# Patient Record
Sex: MALE | Race: WHITE | NOT HISPANIC OR LATINO | Employment: OTHER | URBAN - METROPOLITAN AREA
[De-identification: names, ages, dates, MRNs, and addresses within clinical notes are randomized per-mention and may not be internally consistent; named-entity substitution may affect disease eponyms.]

---

## 2018-05-11 ENCOUNTER — APPOINTMENT (OUTPATIENT)
Dept: GENERAL RADIOLOGY | Facility: HOSPITAL | Age: 73
End: 2018-05-11

## 2018-05-11 ENCOUNTER — HOSPITAL ENCOUNTER (EMERGENCY)
Facility: HOSPITAL | Age: 73
Discharge: HOME | End: 2018-05-11
Attending: FAMILY MEDICINE | Admitting: FAMILY MEDICINE

## 2018-05-11 VITALS
SYSTOLIC BLOOD PRESSURE: 118 MMHG | WEIGHT: 235 LBS | OXYGEN SATURATION: 94 % | RESPIRATION RATE: 19 BRPM | TEMPERATURE: 97.6 F | BODY MASS INDEX: 40.12 KG/M2 | DIASTOLIC BLOOD PRESSURE: 75 MMHG | HEART RATE: 80 BPM | HEIGHT: 64 IN

## 2018-05-11 DIAGNOSIS — R06.00 DYSPNEA, UNSPECIFIED TYPE: ICD-10-CM

## 2018-05-11 DIAGNOSIS — I49.3 ASYMPTOMATIC PVCS: Primary | ICD-10-CM

## 2018-05-11 LAB
ALBUMIN SERPL-MCNC: 4.1 G/DL (ref 3.5–5.2)
ALBUMIN/GLOB SERPL: 1.6 G/DL
ALP SERPL-CCNC: 113 U/L (ref 39–117)
ALT SERPL W P-5'-P-CCNC: 39 U/L (ref 1–41)
ANION GAP SERPL CALCULATED.3IONS-SCNC: 13.3 MMOL/L
AST SERPL-CCNC: 36 U/L (ref 1–40)
BASOPHILS # BLD AUTO: 0.03 10*3/MM3 (ref 0–0.2)
BASOPHILS NFR BLD AUTO: 0.6 % (ref 0–1.5)
BILIRUB SERPL-MCNC: 0.7 MG/DL (ref 0.1–1.2)
BUN BLD-MCNC: 24 MG/DL (ref 8–23)
BUN/CREAT SERPL: 19 (ref 7–25)
CALCIUM SPEC-SCNC: 9.1 MG/DL (ref 8.6–10.5)
CHLORIDE SERPL-SCNC: 104 MMOL/L (ref 98–107)
CO2 SERPL-SCNC: 24.7 MMOL/L (ref 22–29)
CREAT BLD-MCNC: 1.26 MG/DL (ref 0.76–1.27)
DEPRECATED RDW RBC AUTO: 49.6 FL (ref 37–54)
EOSINOPHIL # BLD AUTO: 0.14 10*3/MM3 (ref 0–0.7)
EOSINOPHIL NFR BLD AUTO: 2.9 % (ref 0.3–6.2)
ERYTHROCYTE [DISTWIDTH] IN BLOOD BY AUTOMATED COUNT: 13.8 % (ref 11.5–14.5)
GFR SERPL CREATININE-BSD FRML MDRD: 56 ML/MIN/1.73
GLOBULIN UR ELPH-MCNC: 2.5 GM/DL
GLUCOSE BLD-MCNC: 151 MG/DL (ref 65–99)
HCT VFR BLD AUTO: 40.9 % (ref 40.4–52.2)
HGB BLD-MCNC: 12.9 G/DL (ref 13.7–17.6)
IMM GRANULOCYTES # BLD: 0.06 10*3/MM3 (ref 0–0.03)
IMM GRANULOCYTES NFR BLD: 1.2 % (ref 0–0.5)
LYMPHOCYTES # BLD AUTO: 1.27 10*3/MM3 (ref 0.9–4.8)
LYMPHOCYTES NFR BLD AUTO: 26.3 % (ref 19.6–45.3)
MCH RBC QN AUTO: 31 PG (ref 27–32.7)
MCHC RBC AUTO-ENTMCNC: 31.5 G/DL (ref 32.6–36.4)
MCV RBC AUTO: 98.3 FL (ref 79.8–96.2)
MONOCYTES # BLD AUTO: 0.42 10*3/MM3 (ref 0.2–1.2)
MONOCYTES NFR BLD AUTO: 8.7 % (ref 5–12)
NEUTROPHILS # BLD AUTO: 2.97 10*3/MM3 (ref 1.9–8.1)
NEUTROPHILS NFR BLD AUTO: 61.5 % (ref 42.7–76)
NT-PROBNP SERPL-MCNC: 62.9 PG/ML (ref 5–900)
PLATELET # BLD AUTO: 133 10*3/MM3 (ref 140–500)
PMV BLD AUTO: 10.1 FL (ref 6–12)
POTASSIUM BLD-SCNC: 4.3 MMOL/L (ref 3.5–5.2)
PROT SERPL-MCNC: 6.6 G/DL (ref 6–8.5)
RBC # BLD AUTO: 4.16 10*6/MM3 (ref 4.6–6)
SODIUM BLD-SCNC: 142 MMOL/L (ref 136–145)
TROPONIN T SERPL-MCNC: <0.01 NG/ML (ref 0–0.03)
TSH SERPL DL<=0.05 MIU/L-ACNC: 1.56 MIU/ML (ref 0.27–4.2)
WBC NRBC COR # BLD: 4.83 10*3/MM3 (ref 4.5–10.7)

## 2018-05-11 PROCEDURE — 83880 ASSAY OF NATRIURETIC PEPTIDE: CPT | Performed by: FAMILY MEDICINE

## 2018-05-11 PROCEDURE — 93010 ELECTROCARDIOGRAM REPORT: CPT | Performed by: INTERNAL MEDICINE

## 2018-05-11 PROCEDURE — 93005 ELECTROCARDIOGRAM TRACING: CPT | Performed by: FAMILY MEDICINE

## 2018-05-11 PROCEDURE — 71046 X-RAY EXAM CHEST 2 VIEWS: CPT

## 2018-05-11 PROCEDURE — 84443 ASSAY THYROID STIM HORMONE: CPT | Performed by: FAMILY MEDICINE

## 2018-05-11 PROCEDURE — 84484 ASSAY OF TROPONIN QUANT: CPT | Performed by: FAMILY MEDICINE

## 2018-05-11 PROCEDURE — 80053 COMPREHEN METABOLIC PANEL: CPT | Performed by: FAMILY MEDICINE

## 2018-05-11 PROCEDURE — 85025 COMPLETE CBC W/AUTO DIFF WBC: CPT | Performed by: FAMILY MEDICINE

## 2018-05-11 PROCEDURE — 99284 EMERGENCY DEPT VISIT MOD MDM: CPT

## 2018-05-11 RX ORDER — ALBUTEROL SULFATE 90 UG/1
2 AEROSOL, METERED RESPIRATORY (INHALATION) EVERY 4 HOURS PRN
COMMUNITY
End: 2021-08-11

## 2018-05-11 RX ORDER — DORZOLAMIDE HYDROCHLORIDE AND TIMOLOL MALEATE 20; 5 MG/ML; MG/ML
1 SOLUTION/ DROPS OPHTHALMIC 2 TIMES DAILY
COMMUNITY

## 2018-05-11 NOTE — ED NOTES
Patient notes that over the past couple of months he has had persistent malaise, decreased appetite.  Patient notes feeling more tired with exertion and shortness of breath persists with activity.      Valentin Castro RN  05/11/18 0325

## 2018-05-11 NOTE — ED PROVIDER NOTES
EMERGENCY DEPARTMENT ENCOUNTER    CHIEF COMPLAINT  Chief Complaint: SOA  History given by: Pt  History limited by: Nothing  Room Number: 13/13  PMD: Damien Pierce MD  Pulmonologist: Dr. Peralta    HPI:  Pt is a 73 y.o. male with a hx of sleep apnea on BiPAP at night who presents complaining of SOA over the last few months that was worse last night. He reports his SOA woke him up from sleep last night. He reports increased SOA and fatigue after exertion. Pt denies cough, CP, changes in appetite, N/V/D, blood in stool, or difficulty urinating. He reports he is followed by Dr. Peralta (Pulmonologist) but has not had his BiPAP mask fit re-evaluated since 02/2017. Pt denies a hx of COPD, CHF or MI. Wife reports a family hx of heart disease.     Duration:  A few months  Onset: gradual  Timing: constant  Quality: SOA on BiPAP  Intensity/Severity: moderate  Progression: worse last night  Associated Symptoms: fatigue  Aggravating Factors: none  Alleviating Factors: none  Previous Episodes: Pt has a hx of sleep apnea  Treatment before arrival: Pt reports he was last evaluated by his pulmonologist on 02/2017    PAST MEDICAL HISTORY  Active Ambulatory Problems     Diagnosis Date Noted   • OA (osteoarthritis) of knee 09/13/2016     Resolved Ambulatory Problems     Diagnosis Date Noted   • No Resolved Ambulatory Problems     Past Medical History:   Diagnosis Date   • Arthritis    • Cancer    • Glaucoma    • Hyperlipidemia    • Hypertension    • Hypothyroid    • Restless leg    • Sleep apnea        PAST SURGICAL HISTORY  Past Surgical History:   Procedure Laterality Date   • CATARACT EXTRACTION     • COLONOSCOPY     • HERNIA REPAIR     • JOINT REPLACEMENT Right 2014   • NECK SURGERY     • [place] TOTAL KNEE ARTHROPLASTY Left 9/13/2016    Procedure: LT TOTAL KNEE ARTHROPLASTY;  Surgeon: Tadeo Basurto MD;  Location: [facility] OR;  Service: Orthopedics   • PROSTATE SURGERY     • THYROID SURGERY     • TOTAL KNEE ARTHROPLASTY          FAMILY HISTORY  History reviewed. No pertinent family history.    SOCIAL HISTORY  Social History     Social History   • Marital status:      Spouse name: N/A   • Number of children: N/A   • Years of education: N/A     Occupational History   • Not on file.     Social History Main Topics   • Smoking status: Never Smoker   • Smokeless tobacco: Never Used   • Alcohol use No   • Drug use: No   • Sexual activity: Defer     Other Topics Concern   • Not on file     Social History Narrative   • No narrative on file       ALLERGIES  Review of patient's allergies indicates no known allergies.    REVIEW OF SYSTEMS  Review of Systems   Constitutional: Positive for fatigue. Negative for activity change, appetite change and fever.   HENT: Negative for congestion and sore throat.    Eyes: Negative.    Respiratory: Positive for shortness of breath. Negative for cough.    Cardiovascular: Negative for chest pain and leg swelling.   Gastrointestinal: Negative for abdominal pain, blood in stool, diarrhea and vomiting.   Endocrine: Negative.    Genitourinary: Negative for decreased urine volume and dysuria.   Musculoskeletal: Negative for neck pain.   Skin: Negative for rash and wound.   Allergic/Immunologic: Negative.    Neurological: Negative for weakness, numbness and headaches.   Hematological: Negative.    Psychiatric/Behavioral: Negative.    All other systems reviewed and are negative.      PHYSICAL EXAM  ED Triage Vitals   Temp Heart Rate Resp BP SpO2   05/11/18 1113 05/11/18 1113 05/11/18 1113 05/11/18 1120 05/11/18 1113   97.6 °F (36.4 °C) 96 20 144/88 95 %      Temp src Heart Rate Source Patient Position BP Location FiO2 (%)   05/11/18 1113 -- -- -- --   Tympanic           Physical Exam   Constitutional: He is oriented to person, place, and time and well-developed, well-nourished, and in no distress.   HENT:   Head: Normocephalic and atraumatic.   Eyes: EOM are normal. Pupils are equal, round, and reactive to  light.   Neck: Normal range of motion. Neck supple.   Cardiovascular: Normal rate, regular rhythm and normal heart sounds.    Pulmonary/Chest: Effort normal and breath sounds normal. No respiratory distress.   Abdominal: Soft. There is no tenderness. There is no rebound and no guarding.   Musculoskeletal: Normal range of motion. He exhibits no edema.   Neurological: He is alert and oriented to person, place, and time. He has normal sensation and normal strength.   Skin: Skin is warm and dry.   Psychiatric: Mood and affect normal.   Nursing note and vitals reviewed.      LAB RESULTS  Lab Results (last 24 hours)     Procedure Component Value Units Date/Time    CBC & Differential [[id number]] Collected:  05/11/18 1141    Specimen:  Blood Updated:  05/11/18 1152    Narrative:       The following orders were created for panel order CBC & Differential.  Procedure                               Abnormality         Status                     ---------                               -----------         ------                     CBC Auto Differential[[id number]]         Abnormal            Final result                 Please view results for these tests on the individual orders.    Comprehensive Metabolic Panel [[id number]]  (Abnormal) Collected:  05/11/18 1141    Specimen:  Blood Updated:  05/11/18 1218     Glucose 151 (H) mg/dL      BUN 24 (H) mg/dL      Creatinine 1.26 mg/dL      Sodium 142 mmol/L      Potassium 4.3 mmol/L      Chloride 104 mmol/L      CO2 24.7 mmol/L      Calcium 9.1 mg/dL      Total Protein 6.6 g/dL      Albumin 4.10 g/dL      ALT (SGPT) 39 U/L      AST (SGOT) 36 U/L      Alkaline Phosphatase 113 U/L      Total Bilirubin 0.7 mg/dL      eGFR Non African Amer 56 (L) mL/min/1.73      Globulin 2.5 gm/dL      A/G Ratio 1.6 g/dL      BUN/Creatinine Ratio 19.0     Anion Gap 13.3 mmol/L     Narrative:       The MDRD GFR formula is only valid for adults with stable renal function between ages 18 and 70.    BNP  [[id number]]  (Normal) Collected:  05/11/18 1141    Specimen:  Blood Updated:  05/11/18 1218     proBNP 62.9 pg/mL     Narrative:       Among patients with dyspnea, NT-proBNP is highly sensitive for the detection of acute congestive heart failure. In addition NT-proBNP of <300 pg/ml effectively rules out acute congestive heart failure with 99% negative predictive value.    Troponin [[id number]]  (Normal) Collected:  05/11/18 1141    Specimen:  Blood Updated:  05/11/18 1218     Troponin T <0.010 ng/mL     Narrative:       Troponin T Reference Ranges:  Less than 0.03 ng/mL:    Negative for AMI  0.03 to 0.09 ng/mL:      Indeterminant for AMI  Greater than 0.09 ng/mL: Positive for AMI    CBC Auto Differential [[id number]]  (Abnormal) Collected:  05/11/18 1141    Specimen:  Blood Updated:  05/11/18 1152     WBC 4.83 10*3/mm3      RBC 4.16 (L) 10*6/mm3      Hemoglobin 12.9 (L) g/dL      Hematocrit 40.9 %      MCV 98.3 (H) fL      MCH 31.0 pg      MCHC 31.5 (L) g/dL      RDW 13.8 %      RDW-SD 49.6 fl      MPV 10.1 fL      Platelets 133 (L) 10*3/mm3      Neutrophil % 61.5 %      Lymphocyte % 26.3 %      Monocyte % 8.7 %      Eosinophil % 2.9 %      Basophil % 0.6 %      Immature Grans % 1.2 (H) %      Neutrophils, Absolute 2.97 10*3/mm3      Lymphocytes, Absolute 1.27 10*3/mm3      Monocytes, Absolute 0.42 10*3/mm3      Eosinophils, Absolute 0.14 10*3/mm3      Basophils, Absolute 0.03 10*3/mm3      Immature Grans, Absolute 0.06 (H) 10*3/mm3     TSH [[id number]]  (Normal) Collected:  05/11/18 1141    Specimen:  Blood Updated:  05/11/18 1224     TSH 1.560 mIU/mL           I ordered the above labs and reviewed the results    RADIOLOGY  XR Chest 2 View   Final Result   No focal pulmonary consolidation. Borderline heart size.   Follow-up as clinical indications persist.       This report was finalized on 5/11/2018 12:24 PM by Dr. Ethan Horan M.D.               I ordered the above noted radiological studies. Interpreted by  radiologist. Reviewed by me in PACS.       PROCEDURES  Procedures    EKG           EKG time: 1128  Rhythm/Rate: nsr, 86 with occasional PVCs  P waves and CO: normal  QRS, axis: normal   ST and T waves: Normal     Interpreted Contemporaneously by me, independently viewed  changed compared to prior 8/31/16 where PVCs are new      PROGRESS AND CONSULTS  ED Course     1120 - EKG ordered for further evaluation.     1137 - Lab work and CXR ordered for further evaluation.     1140 - TSH ordered.     He has occasional asymptomatic PVC's but nothing acute on his EKG and a negative troponin and BNP.     1454 - Rechecked pt. Pt is resting comfortably in NAD. He states he feels improved. D/w pt the results of his lab work including unremarkable CXR and lab results. Informed pt of the plan to discharge home with a follow up with cardiology and pulmonology. Pt understands and agrees with plan. All questions answered.       MEDICAL DECISION MAKING  Results were reviewed/discussed with the patient and they were also made aware of online access. Pt also made aware that some labs, such as cultures, will not be resulted during ER visit and follow up with PMD is necessary.     MDM  Number of Diagnoses or Management Options  Asymptomatic PVCs:   Dyspnea, unspecified type:      Amount and/or Complexity of Data Reviewed  Clinical lab tests: ordered and reviewed (TSH - 1.56  Troponin - negative  proBNP - 62.9)  Tests in the radiology section of CPT®: reviewed and ordered (CXR - negative acute)  Tests in the medicine section of CPT®: reviewed and ordered (See EKG procedure note.)           DIAGNOSIS  Final diagnoses:   Asymptomatic PVCs   Dyspnea, unspecified type       DISPOSITION  DISCHARGE    Patient discharged in stable condition.    Reviewed implications of results, diagnosis, meds, responsibility to follow up, warning signs and symptoms of possible worsening, potential complications and reasons to return to ER.    Patient/Family  voiced understanding of above instructions.    Discussed plan for discharge, as there is no emergent indication for admission. Patient referred to primary care provider for BP management due to today's BP. Pt/family is agreeable and understands need for follow up and repeat testing.  Pt is aware that discharge does not mean that nothing is wrong but it indicates no emergency is present that requires admission and they must continue care with follow-up as given below or physician of their choice.     FOLLOW-UP  Tadeo Peralta MD  [address]  [id number]  [facility] [id number] [phone]          Cole Macias MD  [address]  [facility] [place] [address]  [phone]               Medication List      No changes were made to your prescriptions during this visit.           Latest Documented Vital Signs:  As of 2:59 PM  BP- 118/61 HR- 83 Temp- 97.6 °F (36.4 °C) (Tympanic) O2 sat- 95%    --  Documentation assistance provided by torie Anand for Dr. Giron.  Information recorded by the scribe was done at my direction and has been verified and validated by me.          Robbin Anand  05/11/18 8128       Adriano Giron MD  05/11/18 4620

## 2018-05-24 ENCOUNTER — OFFICE VISIT (OUTPATIENT)
Dept: CARDIOLOGY | Facility: CLINIC | Age: 73
End: 2018-05-24

## 2018-05-24 VITALS
HEART RATE: 69 BPM | DIASTOLIC BLOOD PRESSURE: 86 MMHG | SYSTOLIC BLOOD PRESSURE: 122 MMHG | HEIGHT: 67 IN | WEIGHT: 246 LBS | BODY MASS INDEX: 38.61 KG/M2

## 2018-05-24 DIAGNOSIS — I10 ESSENTIAL HYPERTENSION: ICD-10-CM

## 2018-05-24 DIAGNOSIS — R06.02 SHORTNESS OF BREATH: Primary | ICD-10-CM

## 2018-05-24 DIAGNOSIS — Z82.49 FAMILY HISTORY OF PREMATURE CAD: ICD-10-CM

## 2018-05-24 DIAGNOSIS — I49.3 PVC (PREMATURE VENTRICULAR CONTRACTION): ICD-10-CM

## 2018-05-24 PROCEDURE — 99204 OFFICE O/P NEW MOD 45 MIN: CPT | Performed by: INTERNAL MEDICINE

## 2018-05-24 PROCEDURE — 93000 ELECTROCARDIOGRAM COMPLETE: CPT | Performed by: INTERNAL MEDICINE

## 2018-05-24 NOTE — PROGRESS NOTES
Date of Office Visit: 2018  Encounter Provider: Cole Macias MD  Place of Service: [facility] CARDIOLOGY  Patient Name: Nathan Baez  :1945    Chief Complaint   Patient presents with   • Irregular Heart Beat     ER/ HOSPITAL FOLLOW UP    :     HPI: Nathan Baez is a 73 y.o. male who presents today to follow up after a recent ED visit.  He denies any history of cardiac disease but he has treated hypertension and hyperlipidemia.  He denies a history of diabetes, but his glucose was 150 in the ED (that was non-fasting though).  He has three siblings with CAD, but none were premature.    He has treated sleep apnea.  Over the last year, he has had four episodes (approximately) of dyspnea upon waking up.  It gets better when he gets up and moves around.  The most recent episode was quite severe and scared him so he came to the ED.  His workup was normal; he was noted to have infrequent PVCs on the monitor.    He denies chest pain or pressure.  He denies exertional dyspnea but it would seem that he's sedentary.  He has mild dependent ankle edema.  He denies palpitations or syncope.     Past Medical History:   Diagnosis Date   • Arthritis    • Glaucoma    • Hyperlipidemia    • Hypertension    • Hypothyroid    • Prostate cancer    • Restless leg    • Sleep apnea        Past Surgical History:   Procedure Laterality Date   • CATARACT EXTRACTION     • COLONOSCOPY     • HERNIA REPAIR     • JOINT REPLACEMENT Right    • NECK SURGERY     • [place] TOTAL KNEE ARTHROPLASTY Left 2016    Procedure: LT TOTAL KNEE ARTHROPLASTY;  Surgeon: Tadeo Basurto MD;  Location: [facility];  Service: Orthopedics   • PROSTATE SURGERY     • THYROID SURGERY     • TOTAL KNEE ARTHROPLASTY         Social History     Social History   • Marital status:      Spouse name: N/A   • Number of children: N/A   • Years of education: N/A     Occupational History   • Not on file.     Social History  Main Topics   • Smoking status: Former Smoker     Quit date: 1/1/1984   • Smokeless tobacco: Never Used   • Alcohol use No      Comment: 3-4 MONTHLY   • Drug use: No   • Sexual activity: Defer     Other Topics Concern   • Not on file     Social History Narrative   • No narrative on file       Family History   Problem Relation Age of Onset   • Cancer Mother         COLON   • Cancer Father         PROSTATE   • Cancer Sister         BREAST CANCER   • Heart attack Brother    • Heart disease Brother        Review of Systems   Constitution: Positive for malaise/fatigue.   Cardiovascular: Positive for leg swelling. Negative for chest pain and palpitations.   Respiratory: Positive for cough, shortness of breath, snoring and wheezing.    Skin: Positive for itching (SHARMILA ANKLE).   All other systems reviewed and are negative.      No Known Allergies      Current Outpatient Prescriptions:   •  albuterol (PROVENTIL HFA;VENTOLIN HFA) 108 (90 Base) MCG/ACT inhaler, Inhale 2 puffs Every 4 (Four) Hours As Needed for Wheezing., Disp: , Rfl:   •  amLODIPine (NORVASC) 5 MG tablet, Take 5 mg by mouth every morning., Disp: , Rfl:   •  brimonidine-timolol (COMBIGAN) 0.2-0.5 % ophthalmic solution, Administer 1 drop to both eyes every 12 (twelve) hours., Disp: , Rfl:   •  Celecoxib (CELEBREX PO), Take 200 mg by mouth every morning., Disp: , Rfl:   •  dorzolamide-timolol (COSOPT) 22.3-6.8 MG/ML ophthalmic solution, 1 drop 2 (Two) Times a Day., Disp: , Rfl:   •  Fluticasone Furoate-Vilanterol (BREO ELLIPTA IN), Inhale., Disp: , Rfl:   •  HYDROCHLOROTHIAZIDE PO, Take 25 mg by mouth daily with breakfast., Disp: , Rfl:   •  levothyroxine (SYNTHROID) 88 MCG tablet, Take 88 mcg by mouth every morning., Disp: , Rfl:   •  losartan (COZAAR) 100 MG tablet, Take 100 mg by mouth every morning., Disp: , Rfl:   •  montelukast (SINGULAIR) 10 MG tablet, Take 10 mg by mouth every night., Disp: , Rfl:   •  PANTOPRAZOLE SODIUM PO, Take 40 mg by mouth every  "morning., Disp: , Rfl:   •  pramipexole (MIRAPEX) 1.5 MG tablet, Take 0.75 mg by mouth every evening., Disp: , Rfl:   •  rosuvastatin (CRESTOR) 20 MG tablet, Take 20 mg by mouth every evening., Disp: , Rfl:       Objective:     Vitals:    05/24/18 1020   BP: 122/86   BP Location: Right arm   Pulse: 69   Weight: 112 kg (246 lb)   Height: 170.2 cm (67\")     Body mass index is 38.53 kg/m².    Physical Exam   Constitutional: He is oriented to person, place, and time.   Obese   HENT:   Head: Normocephalic.   Nose: Nose normal.   Mouth/Throat: Oropharynx is clear and moist.   Eyes: Conjunctivae and EOM are normal. Pupils are equal, round, and reactive to light.   Neck: Normal range of motion.   Cannot assess for JVD due to habitus   Cardiovascular: Normal rate, regular rhythm, normal heart sounds and intact distal pulses.    No murmur heard.  Pulmonary/Chest: Effort normal.   Abdominal: Soft.   Obesity limits abdominal exam   Musculoskeletal: Normal range of motion. He exhibits edema (trace edema).   Neurological: He is alert and oriented to person, place, and time. No cranial nerve deficit.   Skin: Skin is warm and dry. No rash noted.   Psychiatric: He has a normal mood and affect. His behavior is normal. Judgment and thought content normal.   Vitals reviewed.        ECG 12 Lead  Date/Time: 5/24/2018 10:58 AM  Performed by: DENISE SHERIFF  Authorized by: DENISE SHERIFF   Comparison: compared with previous ECG   Comparison to previous ECG: PVC resolved  Rhythm: sinus rhythm  Conduction: conduction normal  QRS axis: normal  Other: no other findings  Clinical impression: normal ECG              Assessment:       Diagnosis Plan   1. Shortness of breath  Adult Transthoracic Echo Complete W/ Cont if Necessary Per Protocol    Stress Test With Pet Myocardial Perfusion (Multi-Study)   2. Family history of premature CAD  Adult Transthoracic Echo Complete W/ Cont if Necessary Per Protocol    Stress Test With Pet Myocardial Perfusion " (Multi-Study)   3. Essential hypertension     4. PVC (premature ventricular contraction)            Plan:       He has had intermittent episodes of severe dyspnea upon waking.  He doesn't have any other classic symptoms of CAD but he certainly has risk factors.  In the ED, his BNP was 63, which is completely normal, and his CXR was normal.  I have recommended an echocardiogram and PET stress.      His blood pressure is generally within goal.  He recently had labs performed at Dr. Pierce's office; we will obtain those to assess his glucose.      He was noted to have asymptomatic PVCs in the ED.  If the above cardiac workup is normal, I will need to get a rhythm monitor to exclude arrhythmia as the cause of his symptoms.     Sincerely,       Cole Macias MD

## 2018-06-13 ENCOUNTER — HOSPITAL ENCOUNTER (OUTPATIENT)
Dept: CARDIOLOGY | Facility: HOSPITAL | Age: 73
Discharge: HOME | End: 2018-06-13
Attending: INTERNAL MEDICINE | Admitting: INTERNAL MEDICINE

## 2018-06-13 ENCOUNTER — HOSPITAL ENCOUNTER (OUTPATIENT)
Dept: CARDIOLOGY | Facility: HOSPITAL | Age: 73
Discharge: HOME | End: 2018-06-13
Attending: INTERNAL MEDICINE

## 2018-06-13 VITALS
HEART RATE: 70 BPM | HEIGHT: 67 IN | WEIGHT: 246 LBS | SYSTOLIC BLOOD PRESSURE: 150 MMHG | BODY MASS INDEX: 38.61 KG/M2 | DIASTOLIC BLOOD PRESSURE: 88 MMHG

## 2018-06-13 DIAGNOSIS — R06.02 SHORTNESS OF BREATH: ICD-10-CM

## 2018-06-13 DIAGNOSIS — Z82.49 FAMILY HISTORY OF PREMATURE CAD: ICD-10-CM

## 2018-06-13 DIAGNOSIS — I49.3 FREQUENT PVCS: Primary | ICD-10-CM

## 2018-06-13 LAB
BH CV NUCLEAR PRIOR STUDY: 2
BH CV STRESS BP STAGE 1: NORMAL
BH CV STRESS COMMENTS STAGE 1: NORMAL
BH CV STRESS DOSE REGADENOSON STAGE 1: 0.4
BH CV STRESS DURATION MIN STAGE 1: 0
BH CV STRESS DURATION SEC STAGE 1: 10
BH CV STRESS HR STAGE 1: 94
BH CV STRESS PROTOCOL 1: NORMAL
BH CV STRESS RECOVERY BP: NORMAL MMHG
BH CV STRESS RECOVERY HR: 87 BPM
BH CV STRESS STAGE 1: 1
LV EF NUC BP: 72 %
MAXIMAL PREDICTED HEART RATE: 148 BPM
PERCENT MAX PREDICTED HR: 63.51 %
STRESS BASELINE BP: NORMAL MMHG
STRESS BASELINE HR: 73 BPM
STRESS PERCENT HR: 75 %
STRESS POST EXERCISE DUR SEC: 10 SEC
STRESS POST PEAK BP: NORMAL MMHG
STRESS POST PEAK HR: 94 BPM
STRESS TARGET HR: 126 BPM

## 2018-06-13 PROCEDURE — 78492 MYOCRD IMG PET MLT RST&STRS: CPT

## 2018-06-13 PROCEDURE — 93016 CV STRESS TEST SUPVJ ONLY: CPT | Performed by: INTERNAL MEDICINE

## 2018-06-13 PROCEDURE — 93306 TTE W/DOPPLER COMPLETE: CPT | Performed by: INTERNAL MEDICINE

## 2018-06-13 PROCEDURE — A9555 RB82 RUBIDIUM: HCPCS | Performed by: INTERNAL MEDICINE

## 2018-06-13 PROCEDURE — 78492 MYOCRD IMG PET MLT RST&STRS: CPT | Performed by: INTERNAL MEDICINE

## 2018-06-13 PROCEDURE — 93017 CV STRESS TEST TRACING ONLY: CPT

## 2018-06-13 PROCEDURE — 25010000002 PERFLUTREN (DEFINITY) 8.476 MG IN SODIUM CHLORIDE 0.9 % 10 ML INJECTION: Performed by: INTERNAL MEDICINE

## 2018-06-13 PROCEDURE — 25010000002 REGADENOSON 0.4 MG/5ML SOLUTION: Performed by: INTERNAL MEDICINE

## 2018-06-13 PROCEDURE — 93018 CV STRESS TEST I&R ONLY: CPT | Performed by: INTERNAL MEDICINE

## 2018-06-13 PROCEDURE — 0 RUBIDIUM CHLORIDE: Performed by: INTERNAL MEDICINE

## 2018-06-13 PROCEDURE — 93306 TTE W/DOPPLER COMPLETE: CPT

## 2018-06-13 RX ADMIN — RUBIDIUM CHLORIDE RB-82 1 DOSE: 150 INJECTION, SOLUTION INTRAVENOUS at 10:55

## 2018-06-13 RX ADMIN — PERFLUTREN 1.5 ML: 6.52 INJECTION, SUSPENSION INTRAVENOUS at 10:45

## 2018-06-13 RX ADMIN — REGADENOSON 0.4 MG: 0.08 INJECTION, SOLUTION INTRAVENOUS at 10:55

## 2018-06-13 RX ADMIN — RUBIDIUM CHLORIDE RB-82 1 DOSE: 150 INJECTION, SOLUTION INTRAVENOUS at 10:45

## 2018-06-14 LAB
ASCENDING AORTA: 2.7 CM
BH CV ECHO MEAS - ACS: 2.1 CM
BH CV ECHO MEAS - AO MAX PG (FULL): 2.2 MMHG
BH CV ECHO MEAS - AO MAX PG: 7 MMHG
BH CV ECHO MEAS - AO MEAN PG (FULL): 0.85 MMHG
BH CV ECHO MEAS - AO MEAN PG: 3.2 MMHG
BH CV ECHO MEAS - AO ROOT AREA (BSA CORRECTED): 1.5
BH CV ECHO MEAS - AO ROOT AREA: 8.6 CM^2
BH CV ECHO MEAS - AO ROOT DIAM: 3.3 CM
BH CV ECHO MEAS - AO V2 MAX: 131.9 CM/SEC
BH CV ECHO MEAS - AO V2 MEAN: 82.1 CM/SEC
BH CV ECHO MEAS - AO V2 VTI: 26.6 CM
BH CV ECHO MEAS - AVA(I,A): 2.2 CM^2
BH CV ECHO MEAS - AVA(I,D): 2.2 CM^2
BH CV ECHO MEAS - AVA(V,A): 2.1 CM^2
BH CV ECHO MEAS - AVA(V,D): 2.1 CM^2
BH CV ECHO MEAS - BSA(HAYCOCK): 2.3 M^2
BH CV ECHO MEAS - BSA: 2.2 M^2
BH CV ECHO MEAS - BZI_BMI: 38.5 KILOGRAMS/M^2
BH CV ECHO MEAS - BZI_METRIC_HEIGHT: 170.2 CM
BH CV ECHO MEAS - BZI_METRIC_WEIGHT: 111.6 KG
BH CV ECHO MEAS - CONTRAST EF (2CH): 62.4 ML/M^2
BH CV ECHO MEAS - CONTRAST EF 4CH: 61.8 ML/M^2
BH CV ECHO MEAS - EDV(MOD-SP2): 85 ML
BH CV ECHO MEAS - EDV(MOD-SP4): 89 ML
BH CV ECHO MEAS - EDV(TEICH): 127.7 ML
BH CV ECHO MEAS - EF(CUBED): 74 %
BH CV ECHO MEAS - EF(MOD-BP): 62 %
BH CV ECHO MEAS - EF(MOD-SP2): 62.4 %
BH CV ECHO MEAS - EF(MOD-SP4): 61.8 %
BH CV ECHO MEAS - EF(TEICH): 65.4 %
BH CV ECHO MEAS - ESV(MOD-SP2): 32 ML
BH CV ECHO MEAS - ESV(MOD-SP4): 34 ML
BH CV ECHO MEAS - ESV(TEICH): 44.2 ML
BH CV ECHO MEAS - FS: 36.1 %
BH CV ECHO MEAS - IVS/LVPW: 1
BH CV ECHO MEAS - IVSD: 1.2 CM
BH CV ECHO MEAS - LAT PEAK E' VEL: 9 CM/SEC
BH CV ECHO MEAS - LV DIASTOLIC VOL/BSA (35-75): 40.3 ML/M^2
BH CV ECHO MEAS - LV MASS(C)D: 244.9 GRAMS
BH CV ECHO MEAS - LV MASS(C)DI: 110.9 GRAMS/M^2
BH CV ECHO MEAS - LV MAX PG: 4.8 MMHG
BH CV ECHO MEAS - LV MEAN PG: 2.4 MMHG
BH CV ECHO MEAS - LV SYSTOLIC VOL/BSA (12-30): 15.4 ML/M^2
BH CV ECHO MEAS - LV V1 MAX: 109.1 CM/SEC
BH CV ECHO MEAS - LV V1 MEAN: 73.6 CM/SEC
BH CV ECHO MEAS - LV V1 VTI: 22.8 CM
BH CV ECHO MEAS - LVIDD: 5.2 CM
BH CV ECHO MEAS - LVIDS: 3.3 CM
BH CV ECHO MEAS - LVLD AP2: 7 CM
BH CV ECHO MEAS - LVLD AP4: 7 CM
BH CV ECHO MEAS - LVLS AP2: 6 CM
BH CV ECHO MEAS - LVLS AP4: 5.4 CM
BH CV ECHO MEAS - LVOT AREA (M): 2.5 CM^2
BH CV ECHO MEAS - LVOT AREA: 2.6 CM^2
BH CV ECHO MEAS - LVOT DIAM: 1.8 CM
BH CV ECHO MEAS - LVPWD: 1.2 CM
BH CV ECHO MEAS - MED PEAK E' VEL: 9 CM/SEC
BH CV ECHO MEAS - MV A DUR: 0.1 SEC
BH CV ECHO MEAS - MV A MAX VEL: 87.3 CM/SEC
BH CV ECHO MEAS - MV DEC SLOPE: 489.2 CM/SEC^2
BH CV ECHO MEAS - MV DEC TIME: 0.17 SEC
BH CV ECHO MEAS - MV E MAX VEL: 87.3 CM/SEC
BH CV ECHO MEAS - MV E/A: 1
BH CV ECHO MEAS - MV MAX PG: 3.4 MMHG
BH CV ECHO MEAS - MV MEAN PG: 1.7 MMHG
BH CV ECHO MEAS - MV P1/2T MAX VEL: 87.3 CM/SEC
BH CV ECHO MEAS - MV P1/2T: 52.3 MSEC
BH CV ECHO MEAS - MV V2 MAX: 91.5 CM/SEC
BH CV ECHO MEAS - MV V2 MEAN: 62.7 CM/SEC
BH CV ECHO MEAS - MV V2 VTI: 24.3 CM
BH CV ECHO MEAS - MVA P1/2T LCG: 2.5 CM^2
BH CV ECHO MEAS - MVA(P1/2T): 4.2 CM^2
BH CV ECHO MEAS - MVA(VTI): 2.4 CM^2
BH CV ECHO MEAS - PA ACC TIME: 0.15 SEC
BH CV ECHO MEAS - PA MAX PG (FULL): 1.7 MMHG
BH CV ECHO MEAS - PA MAX PG: 3.5 MMHG
BH CV ECHO MEAS - PA PR(ACCEL): 10.9 MMHG
BH CV ECHO MEAS - PA V2 MAX: 93.9 CM/SEC
BH CV ECHO MEAS - PULM A REVS DUR: 0.11 SEC
BH CV ECHO MEAS - PULM A REVS VEL: 31 CM/SEC
BH CV ECHO MEAS - PULM DIAS VEL: 29.7 CM/SEC
BH CV ECHO MEAS - PULM S/D: 1.4
BH CV ECHO MEAS - PULM SYS VEL: 41.2 CM/SEC
BH CV ECHO MEAS - PVA(V,A): 2.5 CM^2
BH CV ECHO MEAS - PVA(V,D): 2.5 CM^2
BH CV ECHO MEAS - QP/QS: 0.88
BH CV ECHO MEAS - RV MAX PG: 1.8 MMHG
BH CV ECHO MEAS - RV MEAN PG: 0.98 MMHG
BH CV ECHO MEAS - RV V1 MAX: 66.9 CM/SEC
BH CV ECHO MEAS - RV V1 MEAN: 46.8 CM/SEC
BH CV ECHO MEAS - RV V1 VTI: 14.8 CM
BH CV ECHO MEAS - RVOT AREA: 3.5 CM^2
BH CV ECHO MEAS - RVOT DIAM: 2.1 CM
BH CV ECHO MEAS - SI(AO): 104.1 ML/M^2
BH CV ECHO MEAS - SI(CUBED): 46.2 ML/M^2
BH CV ECHO MEAS - SI(LVOT): 26.4 ML/M^2
BH CV ECHO MEAS - SI(MOD-SP2): 24 ML/M^2
BH CV ECHO MEAS - SI(MOD-SP4): 24.9 ML/M^2
BH CV ECHO MEAS - SI(TEICH): 37.8 ML/M^2
BH CV ECHO MEAS - SUP REN AO DIAM: 2.1 CM
BH CV ECHO MEAS - SV(AO): 230 ML
BH CV ECHO MEAS - SV(CUBED): 102.1 ML
BH CV ECHO MEAS - SV(LVOT): 58.3 ML
BH CV ECHO MEAS - SV(MOD-SP2): 53 ML
BH CV ECHO MEAS - SV(MOD-SP4): 55 ML
BH CV ECHO MEAS - SV(RVOT): 51.2 ML
BH CV ECHO MEAS - SV(TEICH): 83.5 ML
BH CV ECHO MEAS - TAPSE (>1.6): 2.3 CM2
BH CV ECHO MEASUREMENTS AVERAGE E/E' RATIO: 9.7
BH CV XLRA - RV BASE: 2.4 CM
BH CV XLRA - TDI S': 15 CM/SEC
LEFT ATRIUM VOLUME INDEX: 20 ML/M2
MAXIMAL PREDICTED HEART RATE: 148 BPM
SINUS: 2.9 CM
STJ: 2.3 CM
STRESS TARGET HR: 126 BPM

## 2019-03-16 ENCOUNTER — APPOINTMENT (OUTPATIENT)
Dept: CT IMAGING | Facility: HOSPITAL | Age: 74
End: 2019-03-16

## 2019-03-16 ENCOUNTER — HOSPITAL ENCOUNTER (EMERGENCY)
Facility: HOSPITAL | Age: 74
Discharge: HOME | End: 2019-03-16
Attending: EMERGENCY MEDICINE | Admitting: EMERGENCY MEDICINE

## 2019-03-16 ENCOUNTER — HOSPITAL ENCOUNTER (OUTPATIENT)
Dept: CARDIOLOGY | Facility: HOSPITAL | Age: 74
Discharge: HOME | End: 2019-03-16

## 2019-03-16 VITALS
HEART RATE: 85 BPM | BODY MASS INDEX: 39.87 KG/M2 | SYSTOLIC BLOOD PRESSURE: 119 MMHG | HEIGHT: 67 IN | DIASTOLIC BLOOD PRESSURE: 82 MMHG | TEMPERATURE: 99.6 F | OXYGEN SATURATION: 96 % | RESPIRATION RATE: 16 BRPM | WEIGHT: 254 LBS

## 2019-03-16 DIAGNOSIS — L03.116 CELLULITIS OF LEFT LOWER EXTREMITY: ICD-10-CM

## 2019-03-16 DIAGNOSIS — M79.89 PAIN AND SWELLING OF LEFT LOWER LEG: Primary | ICD-10-CM

## 2019-03-16 DIAGNOSIS — M79.89 PAIN AND SWELLING OF LEFT LOWER LEG: ICD-10-CM

## 2019-03-16 DIAGNOSIS — M79.662 PAIN AND SWELLING OF LEFT LOWER LEG: ICD-10-CM

## 2019-03-16 DIAGNOSIS — M79.662 PAIN AND SWELLING OF LEFT LOWER LEG: Primary | ICD-10-CM

## 2019-03-16 LAB
ALBUMIN SERPL-MCNC: 4.3 G/DL (ref 3.5–5.2)
ALBUMIN/GLOB SERPL: 1.5 G/DL
ALP SERPL-CCNC: 116 U/L (ref 39–117)
ALT SERPL W P-5'-P-CCNC: 30 U/L (ref 1–41)
ANION GAP SERPL CALCULATED.3IONS-SCNC: 12.4 MMOL/L
AST SERPL-CCNC: 26 U/L (ref 1–40)
BASOPHILS # BLD AUTO: 0.04 10*3/MM3 (ref 0–0.2)
BASOPHILS NFR BLD AUTO: 0.4 % (ref 0–1.5)
BH CV LOWER VASCULAR LEFT COMMON FEMORAL AUGMENT: NORMAL
BH CV LOWER VASCULAR LEFT COMMON FEMORAL COMPETENT: NORMAL
BH CV LOWER VASCULAR LEFT COMMON FEMORAL COMPRESS: NORMAL
BH CV LOWER VASCULAR LEFT COMMON FEMORAL PHASIC: NORMAL
BH CV LOWER VASCULAR LEFT COMMON FEMORAL SPONT: NORMAL
BH CV LOWER VASCULAR LEFT DISTAL FEMORAL COMPRESS: NORMAL
BH CV LOWER VASCULAR LEFT GASTRONEMIUS COMPRESS: NORMAL
BH CV LOWER VASCULAR LEFT GREATER SAPH AK COMPRESS: NORMAL
BH CV LOWER VASCULAR LEFT GREATER SAPH BK COMPRESS: NORMAL
BH CV LOWER VASCULAR LEFT MID FEMORAL AUGMENT: NORMAL
BH CV LOWER VASCULAR LEFT MID FEMORAL COMPETENT: NORMAL
BH CV LOWER VASCULAR LEFT MID FEMORAL COMPRESS: NORMAL
BH CV LOWER VASCULAR LEFT MID FEMORAL PHASIC: NORMAL
BH CV LOWER VASCULAR LEFT MID FEMORAL SPONT: NORMAL
BH CV LOWER VASCULAR LEFT PERONEAL COMPRESS: NORMAL
BH CV LOWER VASCULAR LEFT POPLITEAL AUGMENT: NORMAL
BH CV LOWER VASCULAR LEFT POPLITEAL COMPETENT: NORMAL
BH CV LOWER VASCULAR LEFT POPLITEAL COMPRESS: NORMAL
BH CV LOWER VASCULAR LEFT POPLITEAL PHASIC: NORMAL
BH CV LOWER VASCULAR LEFT POPLITEAL SPONT: NORMAL
BH CV LOWER VASCULAR LEFT POSTERIOR TIBIAL COMPRESS: NORMAL
BH CV LOWER VASCULAR LEFT PROXIMAL FEMORAL COMPRESS: NORMAL
BH CV LOWER VASCULAR LEFT SAPHENOFEMORAL JUNCTION AUGMENT: NORMAL
BH CV LOWER VASCULAR LEFT SAPHENOFEMORAL JUNCTION COMPETENT: NORMAL
BH CV LOWER VASCULAR LEFT SAPHENOFEMORAL JUNCTION COMPRESS: NORMAL
BH CV LOWER VASCULAR LEFT SAPHENOFEMORAL JUNCTION PHASIC: NORMAL
BH CV LOWER VASCULAR LEFT SAPHENOFEMORAL JUNCTION SPONT: NORMAL
BH CV LOWER VASCULAR RIGHT COMMON FEMORAL AUGMENT: NORMAL
BH CV LOWER VASCULAR RIGHT COMMON FEMORAL COMPETENT: NORMAL
BH CV LOWER VASCULAR RIGHT COMMON FEMORAL COMPRESS: NORMAL
BH CV LOWER VASCULAR RIGHT COMMON FEMORAL PHASIC: NORMAL
BH CV LOWER VASCULAR RIGHT COMMON FEMORAL SPONT: NORMAL
BILIRUB SERPL-MCNC: 0.9 MG/DL (ref 0.1–1.2)
BUN BLD-MCNC: 21 MG/DL (ref 8–23)
BUN/CREAT SERPL: 17.1 (ref 7–25)
CALCIUM SPEC-SCNC: 9.1 MG/DL (ref 8.6–10.5)
CHLORIDE SERPL-SCNC: 103 MMOL/L (ref 98–107)
CO2 SERPL-SCNC: 27.6 MMOL/L (ref 22–29)
CREAT BLD-MCNC: 1.23 MG/DL (ref 0.76–1.27)
DEPRECATED RDW RBC AUTO: 47.5 FL (ref 37–54)
EOSINOPHIL # BLD AUTO: 0.09 10*3/MM3 (ref 0–0.4)
EOSINOPHIL NFR BLD AUTO: 1 % (ref 0.3–6.2)
ERYTHROCYTE [DISTWIDTH] IN BLOOD BY AUTOMATED COUNT: 13.3 % (ref 12.3–15.4)
GFR SERPL CREATININE-BSD FRML MDRD: 58 ML/MIN/1.73
GLOBULIN UR ELPH-MCNC: 2.9 GM/DL
GLUCOSE BLD-MCNC: 142 MG/DL (ref 65–99)
HCT VFR BLD AUTO: 41 % (ref 37.5–51)
HGB BLD-MCNC: 12.8 G/DL (ref 13–17.7)
HOLD SPECIMEN: NORMAL
HOLD SPECIMEN: NORMAL
IMM GRANULOCYTES # BLD AUTO: 0.14 10*3/MM3 (ref 0–0.05)
IMM GRANULOCYTES NFR BLD AUTO: 1.5 % (ref 0–0.5)
LYMPHOCYTES # BLD AUTO: 1.11 10*3/MM3 (ref 0.7–3.1)
LYMPHOCYTES NFR BLD AUTO: 12.2 % (ref 19.6–45.3)
MCH RBC QN AUTO: 30.3 PG (ref 26.6–33)
MCHC RBC AUTO-ENTMCNC: 31.2 G/DL (ref 31.5–35.7)
MCV RBC AUTO: 97.2 FL (ref 79–97)
MONOCYTES # BLD AUTO: 1.05 10*3/MM3 (ref 0.1–0.9)
MONOCYTES NFR BLD AUTO: 11.6 % (ref 5–12)
NEUTROPHILS # BLD AUTO: 6.66 10*3/MM3 (ref 1.4–7)
NEUTROPHILS NFR BLD AUTO: 73.3 % (ref 42.7–76)
NRBC BLD AUTO-RTO: 0.1 /100 WBC (ref 0–0)
NT-PROBNP SERPL-MCNC: 125.8 PG/ML (ref 5–900)
PLATELET # BLD AUTO: 146 10*3/MM3 (ref 140–450)
PMV BLD AUTO: 10.7 FL (ref 6–12)
POTASSIUM BLD-SCNC: 3.4 MMOL/L (ref 3.5–5.2)
PROT SERPL-MCNC: 7.2 G/DL (ref 6–8.5)
RBC # BLD AUTO: 4.22 10*6/MM3 (ref 4.14–5.8)
SODIUM BLD-SCNC: 143 MMOL/L (ref 136–145)
TROPONIN T SERPL-MCNC: <0.01 NG/ML (ref 0–0.03)
WBC NRBC COR # BLD: 9.09 10*3/MM3 (ref 3.4–10.8)
WHOLE BLOOD HOLD SPECIMEN: NORMAL
WHOLE BLOOD HOLD SPECIMEN: NORMAL

## 2019-03-16 PROCEDURE — 71275 CT ANGIOGRAPHY CHEST: CPT

## 2019-03-16 PROCEDURE — 93971 EXTREMITY STUDY: CPT

## 2019-03-16 PROCEDURE — 85025 COMPLETE CBC W/AUTO DIFF WBC: CPT | Performed by: EMERGENCY MEDICINE

## 2019-03-16 PROCEDURE — 0 IOPAMIDOL PER 1 ML: Performed by: EMERGENCY MEDICINE

## 2019-03-16 PROCEDURE — 84484 ASSAY OF TROPONIN QUANT: CPT | Performed by: EMERGENCY MEDICINE

## 2019-03-16 PROCEDURE — 83880 ASSAY OF NATRIURETIC PEPTIDE: CPT | Performed by: EMERGENCY MEDICINE

## 2019-03-16 PROCEDURE — 99284 EMERGENCY DEPT VISIT MOD MDM: CPT

## 2019-03-16 PROCEDURE — 96372 THER/PROPH/DIAG INJ SC/IM: CPT

## 2019-03-16 PROCEDURE — 80053 COMPREHEN METABOLIC PANEL: CPT | Performed by: EMERGENCY MEDICINE

## 2019-03-16 PROCEDURE — 25010000002 ENOXAPARIN PER 10 MG: Performed by: EMERGENCY MEDICINE

## 2019-03-16 RX ORDER — SODIUM CHLORIDE 0.9 % (FLUSH) 0.9 %
10 SYRINGE (ML) INJECTION AS NEEDED
Status: DISCONTINUED | OUTPATIENT
Start: 2019-03-16 | End: 2019-03-16 | Stop reason: HOSPADM

## 2019-03-16 RX ORDER — CEPHALEXIN 500 MG/1
500 CAPSULE ORAL 3 TIMES DAILY
Qty: 21 CAPSULE | Refills: 0 | Status: SHIPPED | OUTPATIENT
Start: 2019-03-16 | End: 2019-06-05 | Stop reason: HOSPADM

## 2019-03-16 RX ADMIN — IOPAMIDOL 95 ML: 755 INJECTION, SOLUTION INTRAVENOUS at 02:23

## 2019-03-16 RX ADMIN — ENOXAPARIN SODIUM 120 MG: 60 INJECTION SUBCUTANEOUS at 03:23

## 2019-03-16 NOTE — ED NOTES
"Pt c/o redness, swelling, warmth x approx 1 week.  Also c/o tingling in rt shoulder x \"a couple weeks\".  Pt states SOA off and on.     Jacque Cochran RN  03/16/19 0120    "

## 2019-03-16 NOTE — ED PROVIDER NOTES
EMERGENCY DEPARTMENT ENCOUNTER    CHIEF COMPLAINT  Chief Complaint: Leg pain  History given by: patient   History limited by: n/a  Room Number: [facility]/  PMD: Damien Pierce MD      HPI:  Pt is a 73 y.o. male who presents complaining of left leg pain, redness, and swelling that has been progressively worsening for the past 2 weeks. Pt denies known injury, break in the skin, fever, or chills. Pt does report SOA that is present occasionally. Pt denies CP or cough. Pt denies prior hx of DVT    Duration:  2 weeks   Onset: gradual  Timing: constant   Location: LLE  Radiation: none  Quality: pain  Intensity/Severity: moderate  Progression: unchanged   Associated Symptoms: redness, swelling, SOA  Aggravating Factors: none  Alleviating Factors: none    PAST MEDICAL HISTORY  Active Ambulatory Problems     Diagnosis Date Noted   • OA (osteoarthritis) of knee 09/13/2016   • Hypertension      Resolved Ambulatory Problems     Diagnosis Date Noted   • No Resolved Ambulatory Problems     Past Medical History:   Diagnosis Date   • Arthritis    • Glaucoma    • Hyperlipidemia    • Hypertension    • Hypothyroid    • Prostate cancer (CMS/HCC)    • Restless leg    • Sleep apnea        PAST SURGICAL HISTORY  Past Surgical History:   Procedure Laterality Date   • CATARACT EXTRACTION     • COLONOSCOPY     • HERNIA REPAIR     • JOINT REPLACEMENT Right 2014   • NECK SURGERY     • PA TOTAL KNEE ARTHROPLASTY Left 9/13/2016    Procedure: LT TOTAL KNEE ARTHROPLASTY;  Surgeon: Tadeo Basurto MD;  Location: [facility];  Service: Orthopedics   • PROSTATE SURGERY     • THYROID SURGERY     • TOTAL KNEE ARTHROPLASTY         FAMILY HISTORY  Family History   Problem Relation Age of Onset   • Cancer Mother         COLON   • Cancer Father         PROSTATE   • Cancer Sister         BREAST CANCER   • Heart attack Brother    • Heart disease Brother        SOCIAL HISTORY  Social History     Socioeconomic History   • Marital status:       Spouse name: Not on file   • Number of children: Not on file   • Years of education: Not on file   • Highest education level: Not on file   Social Needs   • Financial resource strain: Not on file   • Food insecurity - worry: Not on file   • Food insecurity - inability: Not on file   • Transportation needs - medical: Not on file   • Transportation needs - non-medical: Not on file   Occupational History   • Not on file   Tobacco Use   • Smoking status: Former Smoker     Last attempt to quit: 1984     Years since quittin.2   • Smokeless tobacco: Never Used   Substance and Sexual Activity   • Alcohol use: No     Comment: 3-4 MONTHLY   • Drug use: No   • Sexual activity: Defer   Other Topics Concern   • Not on file   Social History Narrative   • Not on file       ALLERGIES  Patient has no known allergies.    REVIEW OF SYSTEMS  Review of Systems   Constitutional: Negative for activity change, appetite change and fever.   HENT: Negative for congestion and sore throat.    Eyes: Negative.    Respiratory: Positive for shortness of breath. Negative for cough.    Cardiovascular: Positive for leg swelling (LLE). Negative for chest pain.   Gastrointestinal: Negative for abdominal pain, diarrhea and vomiting.   Endocrine: Negative.    Genitourinary: Negative for decreased urine volume and dysuria.   Musculoskeletal: Positive for myalgias (LLE, with redness). Negative for neck pain.   Skin: Negative for rash and wound.   Allergic/Immunologic: Negative.    Neurological: Negative for weakness, numbness and headaches.   Hematological: Negative.    Psychiatric/Behavioral: Negative.    All other systems reviewed and are negative.      PHYSICAL EXAM  ED Triage Vitals [19 0000]   Temp Heart Rate Resp BP SpO2   99.6 °F (37.6 °C) 103 16 -- 99 %      Temp src Heart Rate Source Patient Position BP Location FiO2 (%)   -- -- -- -- --       Physical Exam   Constitutional: He is oriented to person, place, and time. No distress.    HENT:   Head: Normocephalic and atraumatic.   Eyes: EOM are normal. Pupils are equal, round, and reactive to light.   Neck: Normal range of motion. Neck supple.   Cardiovascular: Regular rhythm and normal heart sounds. Tachycardia present.   Pulmonary/Chest: Effort normal and breath sounds normal. No respiratory distress.   Abdominal: Soft. There is no tenderness. There is no rebound and no guarding.   Musculoskeletal: Normal range of motion. He exhibits edema (LLE).   SWelling to the LLE with redness and warmth present. Comparments soft, pulse palpable   Neurological: He is alert and oriented to person, place, and time. He has normal sensation and normal strength.   Skin: Skin is warm and dry.   Psychiatric: Mood and affect normal.   Nursing note and vitals reviewed.      LAB RESULTS  Lab Results (last 24 hours)     Procedure Component Value Units Date/Time    CBC & Differential [[id number]] Collected:  03/16/19 0128    Specimen:  Blood Updated:  03/16/19 0154    Narrative:       The following orders were created for panel order CBC & Differential.  Procedure                               Abnormality         Status                     ---------                               -----------         ------                     CBC Auto Differential[[id number]]        Abnormal            Final result                 Please view results for these tests on the individual orders.    Comprehensive Metabolic Panel [[id number]]  (Abnormal) Collected:  03/16/19 0128    Specimen:  Blood Updated:  03/16/19 0206     Glucose 142 mg/dL      BUN 21 mg/dL      Creatinine 1.23 mg/dL      Sodium 143 mmol/L      Potassium 3.4 mmol/L      Chloride 103 mmol/L      CO2 27.6 mmol/L      Calcium 9.1 mg/dL      Total Protein 7.2 g/dL      Albumin 4.30 g/dL      ALT (SGPT) 30 U/L      AST (SGOT) 26 U/L      Alkaline Phosphatase 116 U/L      Total Bilirubin 0.9 mg/dL      eGFR Non African Amer 58 mL/min/1.73      Globulin 2.9 gm/dL      A/G Ratio 1.5  g/dL      BUN/Creatinine Ratio 17.1     Anion Gap 12.4 mmol/L     Narrative:       GFR Normal >60  Chronic Kidney Disease <60  Kidney Failure <15    Troponin [[id number]]  (Normal) Collected:  03/16/19 0128    Specimen:  Blood Updated:  03/16/19 0206     Troponin T <0.010 ng/mL     Narrative:       Troponin T Reference Range:  <= 0.03 ng/mL-   Negative for AMI  >0.03 ng/mL-     Abnormal for myocardial necrosis.  Clinicians would have to utilize clinical acumen, EKG, Troponin and serial changes to determine if it is an Acute Myocardial Infarction or myocardial injury due to an underlying chronic condition.     BNP [[id number]]  (Normal) Collected:  03/16/19 0128    Specimen:  Blood Updated:  03/16/19 0204     proBNP 125.8 pg/mL     Narrative:       Among patients with dyspnea, NT-proBNP is highly sensitive for the detection of acute congestive heart failure. In addition NT-proBNP of <300 pg/ml effectively rules out acute congestive heart failure with 99% negative predictive value.    CBC Auto Differential [[id number]]  (Abnormal) Collected:  03/16/19 0128    Specimen:  Blood Updated:  03/16/19 0154     WBC 9.09 10*3/mm3      RBC 4.22 10*6/mm3      Hemoglobin 12.8 g/dL      Hematocrit 41.0 %      MCV 97.2 fL      MCH 30.3 pg      MCHC 31.2 g/dL      RDW 13.3 %      RDW-SD 47.5 fl      MPV 10.7 fL      Platelets 146 10*3/mm3      Neutrophil % 73.3 %      Lymphocyte % 12.2 %      Monocyte % 11.6 %      Eosinophil % 1.0 %      Basophil % 0.4 %      Immature Grans % 1.5 %      Neutrophils, Absolute 6.66 10*3/mm3      Lymphocytes, Absolute 1.11 10*3/mm3      Monocytes, Absolute 1.05 10*3/mm3      Eosinophils, Absolute 0.09 10*3/mm3      Basophils, Absolute 0.04 10*3/mm3      Immature Grans, Absolute 0.14 10*3/mm3      nRBC 0.1 /100 WBC           I ordered the above labs and reviewed the results    RADIOLOGY  CT Angiogram Chest With Contrast   Final Result   1. No active disease or pulmonary embolism.   2. Partial visualization  of a suspected large upper pole left renal cyst       This report was finalized on 3/16/2019 2:45 AM by Sergio Her M.D.               I ordered the above noted radiological studies. Interpreted by radiologist. Reviewed by me in PACS.       PROCEDURES  Procedures      PROGRESS AND CONSULTS       01:16  HR- 103 Temp- 99.6 °F (37.6 °C) O2 sat- 99%  Informed pt of the plan for labs and CTA chest. Pt understands and agrees with the plan, all questions answered.    01:18  CMP, CBC, troponin, BNP, and CTA chest ordered.     03:00  BP- 136/79 HR- 90 Temp- 99.6 °F (37.6 °C) O2 sat- 92%  Rechecked the patient who is in NAD and is resting comfortably. 03:00. Informed pt that the CTA chest shows no PE. Informed pt of the plan for single dose of Lovenox in the ED tonight. Will discharge pt with an rx for abx, and outpatient referral for duplex venous doppler. Pt understands and agrees with the plan, all questions answered    03:10  Lovenox ordered for DVT prophylaxis     MEDICAL DECISION MAKING  Results were reviewed/discussed with the patient and they were also made aware of online access. Pt also made aware that some labs, such as cultures, will not be resulted during ER visit and follow up with PMD is necessary.     MDM  Number of Diagnoses or Management Options     Amount and/or Complexity of Data Reviewed  Clinical lab tests: ordered and reviewed (Troponin is <0.010)  Tests in the radiology section of CPT®: ordered and reviewed (CTA chest shows NAD)  Decide to obtain previous medical records or to obtain history from someone other than the patient: yes  Review and summarize past medical records: yes (Seen in the ED 5/11/18 s/t SOA and was dx with asymptomatic PVC's )    Patient Progress  Patient progress: stable         DIAGNOSIS  Final diagnoses:   Pain and swelling of left lower leg   Cellulitis of left lower extremity       DISPOSITION  DISCHARGE    Patient discharged in stable condition.    Reviewed implications of  results, diagnosis, meds, responsibility to follow up, warning signs and symptoms of possible worsening, potential complications and reasons to return to ER.    Patient/Family voiced understanding of above instructions.    Discussed plan for discharge, as there is no emergent indication for admission. Patient referred to primary care provider for BP management due to today's BP. Pt/family is agreeable and understands need for follow up and repeat testing.  Pt is aware that discharge does not mean that nothing is wrong but it indicates no emergency is present that requires admission and they must continue care with follow-up as given below or physician of their choice.       FOLLOW-UP  Damien Pierce MD  [id number] [id number]  [phone]    Schedule an appointment as soon as possible for a visit            Medication List      New Prescriptions    cephalexin 500 MG capsule  Commonly known as:  KEFLEX  Take 1 capsule by mouth 3 (Three) Times a Day.            Latest Documented Vital Signs:  As of 1:05 AM  BP- 119/82 HR- 85 Temp- 99.6 °F (37.6 °C) O2 sat- 96%    --  Documentation assistance provided by torie Roach for Dr Sullivan.  Information recorded by the torie was done at my direction and has been verified and validated by me.        Beckie Roach  03/16/19 0329       Guru Sullivan MD  03/17/19 0101

## 2019-04-25 ENCOUNTER — TRANSCRIBE ORDERS (OUTPATIENT)
Dept: ADMINISTRATIVE | Facility: HOSPITAL | Age: 74
End: 2019-04-25

## 2019-04-25 DIAGNOSIS — I89.0 LYMPHEDEMA: Primary | ICD-10-CM

## 2019-04-30 ENCOUNTER — HOSPITAL ENCOUNTER (OUTPATIENT)
Dept: CT IMAGING | Facility: HOSPITAL | Age: 74
Discharge: HOME | End: 2019-04-30
Admitting: SURGERY

## 2019-04-30 DIAGNOSIS — I89.0 LYMPHEDEMA: ICD-10-CM

## 2019-04-30 LAB — CREAT BLDA-MCNC: 1.2 MG/DL (ref 0.6–1.3)

## 2019-04-30 PROCEDURE — 82565 ASSAY OF CREATININE: CPT

## 2019-04-30 PROCEDURE — 25010000002 IOPAMIDOL 61 % SOLUTION: Performed by: SURGERY

## 2019-04-30 PROCEDURE — 74178 CT ABD&PLV WO CNTR FLWD CNTR: CPT

## 2019-04-30 RX ADMIN — IOPAMIDOL 85 ML: 612 INJECTION, SOLUTION INTRAVENOUS at 08:36

## 2019-06-02 ENCOUNTER — APPOINTMENT (OUTPATIENT)
Dept: CT IMAGING | Facility: HOSPITAL | Age: 74
End: 2019-06-02

## 2019-06-02 ENCOUNTER — HOSPITAL ENCOUNTER (INPATIENT)
Facility: HOSPITAL | Age: 74
LOS: 3 days | Discharge: HOME | End: 2019-06-05
Attending: EMERGENCY MEDICINE | Admitting: HOSPITALIST

## 2019-06-02 ENCOUNTER — APPOINTMENT (OUTPATIENT)
Dept: GENERAL RADIOLOGY | Facility: HOSPITAL | Age: 74
End: 2019-06-02

## 2019-06-02 DIAGNOSIS — R07.9 CHEST PAIN, UNSPECIFIED TYPE: ICD-10-CM

## 2019-06-02 DIAGNOSIS — R79.89 ELEVATED LACTIC ACID LEVEL: ICD-10-CM

## 2019-06-02 DIAGNOSIS — L03.311 ABDOMINAL WALL CELLULITIS: Primary | ICD-10-CM

## 2019-06-02 LAB
ALBUMIN SERPL-MCNC: 3.9 G/DL (ref 3.5–5.2)
ALBUMIN/GLOB SERPL: 1.3 G/DL
ALP SERPL-CCNC: 136 U/L (ref 39–117)
ALT SERPL W P-5'-P-CCNC: 44 U/L (ref 1–41)
ANION GAP SERPL CALCULATED.3IONS-SCNC: 15.1 MMOL/L
AST SERPL-CCNC: 31 U/L (ref 1–40)
BILIRUB SERPL-MCNC: 1.3 MG/DL (ref 0.2–1.2)
BUN BLD-MCNC: 24 MG/DL (ref 8–23)
BUN/CREAT SERPL: 20.7 (ref 7–25)
CALCIUM SPEC-SCNC: 9.3 MG/DL (ref 8.6–10.5)
CHLORIDE SERPL-SCNC: 97 MMOL/L (ref 98–107)
CO2 SERPL-SCNC: 24.9 MMOL/L (ref 22–29)
CREAT BLD-MCNC: 1.16 MG/DL (ref 0.76–1.27)
D-LACTATE SERPL-SCNC: 2.4 MMOL/L (ref 0.5–2)
DEPRECATED RDW RBC AUTO: 49.1 FL (ref 37–54)
ERYTHROCYTE [DISTWIDTH] IN BLOOD BY AUTOMATED COUNT: 13.6 % (ref 12.3–15.4)
GFR SERPL CREATININE-BSD FRML MDRD: 62 ML/MIN/1.73
GLOBULIN UR ELPH-MCNC: 3.1 GM/DL
GLUCOSE BLD-MCNC: 168 MG/DL (ref 65–99)
HCT VFR BLD AUTO: 41.4 % (ref 37.5–51)
HGB BLD-MCNC: 12.7 G/DL (ref 13–17.7)
HOLD SPECIMEN: NORMAL
HOLD SPECIMEN: NORMAL
LYMPHOCYTES # BLD MANUAL: 0.37 10*3/MM3 (ref 0.7–3.1)
LYMPHOCYTES NFR BLD MANUAL: 3 % (ref 19.6–45.3)
LYMPHOCYTES NFR BLD MANUAL: 5 % (ref 5–12)
MCH RBC QN AUTO: 30 PG (ref 26.6–33)
MCHC RBC AUTO-ENTMCNC: 30.7 G/DL (ref 31.5–35.7)
MCV RBC AUTO: 97.6 FL (ref 79–97)
MONOCYTES # BLD AUTO: 0.62 10*3/MM3 (ref 0.1–0.9)
NEUTROPHILS # BLD AUTO: 11.48 10*3/MM3 (ref 1.7–7)
NEUTROPHILS NFR BLD MANUAL: 92 % (ref 42.7–76)
PLAT MORPH BLD: NORMAL
PLATELET # BLD AUTO: 146 10*3/MM3 (ref 140–450)
PMV BLD AUTO: 11 FL (ref 6–12)
POTASSIUM BLD-SCNC: 4 MMOL/L (ref 3.5–5.2)
PROCALCITONIN SERPL-MCNC: 6.62 NG/ML (ref 0.1–0.25)
PROT SERPL-MCNC: 7 G/DL (ref 6–8.5)
RBC # BLD AUTO: 4.24 10*6/MM3 (ref 4.14–5.8)
RBC MORPH BLD: NORMAL
SODIUM BLD-SCNC: 137 MMOL/L (ref 136–145)
TROPONIN T SERPL-MCNC: <0.01 NG/ML (ref 0–0.03)
TROPONIN T SERPL-MCNC: <0.01 NG/ML (ref 0–0.03)
WBC MORPH BLD: NORMAL
WBC NRBC COR # BLD: 12.48 10*3/MM3 (ref 3.4–10.8)
WHOLE BLOOD HOLD SPECIMEN: NORMAL
WHOLE BLOOD HOLD SPECIMEN: NORMAL

## 2019-06-02 PROCEDURE — 87040 BLOOD CULTURE FOR BACTERIA: CPT | Performed by: PHYSICIAN ASSISTANT

## 2019-06-02 PROCEDURE — 84145 PROCALCITONIN (PCT): CPT | Performed by: PHYSICIAN ASSISTANT

## 2019-06-02 PROCEDURE — 93005 ELECTROCARDIOGRAM TRACING: CPT

## 2019-06-02 PROCEDURE — 84484 ASSAY OF TROPONIN QUANT: CPT | Performed by: EMERGENCY MEDICINE

## 2019-06-02 PROCEDURE — 80053 COMPREHEN METABOLIC PANEL: CPT

## 2019-06-02 PROCEDURE — 94640 AIRWAY INHALATION TREATMENT: CPT

## 2019-06-02 PROCEDURE — 93005 ELECTROCARDIOGRAM TRACING: CPT | Performed by: EMERGENCY MEDICINE

## 2019-06-02 PROCEDURE — 0 IOPAMIDOL PER 1 ML: Performed by: PHYSICIAN ASSISTANT

## 2019-06-02 PROCEDURE — 71046 X-RAY EXAM CHEST 2 VIEWS: CPT

## 2019-06-02 PROCEDURE — 85007 BL SMEAR W/DIFF WBC COUNT: CPT

## 2019-06-02 PROCEDURE — 85025 COMPLETE CBC W/AUTO DIFF WBC: CPT

## 2019-06-02 PROCEDURE — 83605 ASSAY OF LACTIC ACID: CPT | Performed by: PHYSICIAN ASSISTANT

## 2019-06-02 PROCEDURE — 94799 UNLISTED PULMONARY SVC/PX: CPT

## 2019-06-02 PROCEDURE — 25010000002 VANCOMYCIN 10 G RECONSTITUTED SOLUTION: Performed by: PHYSICIAN ASSISTANT

## 2019-06-02 PROCEDURE — 25010000002 PIPERACILLIN SOD-TAZOBACTAM PER 1 G: Performed by: PHYSICIAN ASSISTANT

## 2019-06-02 PROCEDURE — 99285 EMERGENCY DEPT VISIT HI MDM: CPT

## 2019-06-02 PROCEDURE — 71275 CT ANGIOGRAPHY CHEST: CPT

## 2019-06-02 PROCEDURE — 84484 ASSAY OF TROPONIN QUANT: CPT

## 2019-06-02 RX ORDER — ASPIRIN 325 MG
325 TABLET ORAL ONCE
Status: DISCONTINUED | OUTPATIENT
Start: 2019-06-02 | End: 2019-06-05 | Stop reason: HOSPADM

## 2019-06-02 RX ORDER — SODIUM CHLORIDE 0.9 % (FLUSH) 0.9 %
10 SYRINGE (ML) INJECTION AS NEEDED
Status: DISCONTINUED | OUTPATIENT
Start: 2019-06-02 | End: 2019-06-05 | Stop reason: HOSPADM

## 2019-06-02 RX ORDER — IPRATROPIUM BROMIDE AND ALBUTEROL SULFATE 2.5; .5 MG/3ML; MG/3ML
3 SOLUTION RESPIRATORY (INHALATION) ONCE
Status: COMPLETED | OUTPATIENT
Start: 2019-06-02 | End: 2019-06-02

## 2019-06-02 RX ORDER — ACETAMINOPHEN 500 MG
1000 TABLET ORAL ONCE
Status: COMPLETED | OUTPATIENT
Start: 2019-06-02 | End: 2019-06-02

## 2019-06-02 RX ADMIN — TAZOBACTAM SODIUM AND PIPERACILLIN SODIUM 4.5 G: 500; 4 INJECTION, SOLUTION INTRAVENOUS at 22:11

## 2019-06-02 RX ADMIN — IPRATROPIUM BROMIDE AND ALBUTEROL SULFATE 3 ML: 2.5; .5 SOLUTION RESPIRATORY (INHALATION) at 21:25

## 2019-06-02 RX ADMIN — IOPAMIDOL 95 ML: 755 INJECTION, SOLUTION INTRAVENOUS at 21:34

## 2019-06-02 RX ADMIN — VANCOMYCIN HYDROCHLORIDE 2250 MG: 10 INJECTION, POWDER, LYOPHILIZED, FOR SOLUTION INTRAVENOUS at 22:53

## 2019-06-02 RX ADMIN — SODIUM CHLORIDE 1000 ML: 9 INJECTION, SOLUTION INTRAVENOUS at 21:10

## 2019-06-02 RX ADMIN — ACETAMINOPHEN 1000 MG: 500 TABLET, FILM COATED ORAL at 21:07

## 2019-06-02 NOTE — ED TRIAGE NOTES
Pt to ER via private vehicle. Ambulatory on arrival. He c/o chest pain all day. He appears short of breath. He also c/o redness/warmth to lower abdomen.

## 2019-06-03 PROBLEM — I82.409 ACUTE DVT (DEEP VENOUS THROMBOSIS): Status: ACTIVE | Noted: 2019-06-03

## 2019-06-03 PROBLEM — E03.9 HYPOTHYROIDISM (ACQUIRED): Status: ACTIVE | Noted: 2019-06-03

## 2019-06-03 LAB
ANION GAP SERPL CALCULATED.3IONS-SCNC: 13.7 MMOL/L
BACTERIA UR QL AUTO: ABNORMAL /HPF
BILIRUB UR QL STRIP: NEGATIVE
BUN BLD-MCNC: 24 MG/DL (ref 8–23)
BUN/CREAT SERPL: 18.8 (ref 7–25)
CALCIUM SPEC-SCNC: 8.1 MG/DL (ref 8.6–10.5)
CHLORIDE SERPL-SCNC: 103 MMOL/L (ref 98–107)
CLARITY UR: CLEAR
CO2 SERPL-SCNC: 23.3 MMOL/L (ref 22–29)
COLOR UR: YELLOW
CREAT BLD-MCNC: 1.28 MG/DL (ref 0.76–1.27)
D-LACTATE SERPL-SCNC: 1.9 MMOL/L (ref 0.5–2)
DEPRECATED RDW RBC AUTO: 50.9 FL (ref 37–54)
ERYTHROCYTE [DISTWIDTH] IN BLOOD BY AUTOMATED COUNT: 14 % (ref 12.3–15.4)
GFR SERPL CREATININE-BSD FRML MDRD: 55 ML/MIN/1.73
GLUCOSE BLD-MCNC: 173 MG/DL (ref 65–99)
GLUCOSE BLDC GLUCOMTR-MCNC: 115 MG/DL (ref 70–130)
GLUCOSE BLDC GLUCOMTR-MCNC: 180 MG/DL (ref 70–130)
GLUCOSE BLDC GLUCOMTR-MCNC: 183 MG/DL (ref 70–130)
GLUCOSE UR STRIP-MCNC: NEGATIVE MG/DL
HCT VFR BLD AUTO: 35.3 % (ref 37.5–51)
HGB BLD-MCNC: 10.9 G/DL (ref 13–17.7)
HGB UR QL STRIP.AUTO: NEGATIVE
HOLD SPECIMEN: NORMAL
HYALINE CASTS UR QL AUTO: ABNORMAL /LPF
KETONES UR QL STRIP: NEGATIVE
LEUKOCYTE ESTERASE UR QL STRIP.AUTO: NEGATIVE
MAGNESIUM SERPL-MCNC: 1.9 MG/DL (ref 1.6–2.4)
MCH RBC QN AUTO: 30.4 PG (ref 26.6–33)
MCHC RBC AUTO-ENTMCNC: 30.9 G/DL (ref 31.5–35.7)
MCV RBC AUTO: 98.3 FL (ref 79–97)
NITRITE UR QL STRIP: POSITIVE
PH UR STRIP.AUTO: <=5 [PH] (ref 5–8)
PHOSPHATE SERPL-MCNC: 3.1 MG/DL (ref 2.5–4.5)
PLATELET # BLD AUTO: 123 10*3/MM3 (ref 140–450)
PMV BLD AUTO: 10.4 FL (ref 6–12)
POTASSIUM BLD-SCNC: 3.4 MMOL/L (ref 3.5–5.2)
PROT UR QL STRIP: NEGATIVE
RBC # BLD AUTO: 3.59 10*6/MM3 (ref 4.14–5.8)
RBC # UR: ABNORMAL /HPF
REF LAB TEST METHOD: ABNORMAL
SODIUM BLD-SCNC: 140 MMOL/L (ref 136–145)
SP GR UR STRIP: >=1.03 (ref 1–1.03)
SQUAMOUS #/AREA URNS HPF: ABNORMAL /HPF
UROBILINOGEN UR QL STRIP: ABNORMAL
WBC NRBC COR # BLD: 8.9 10*3/MM3 (ref 3.4–10.8)
WBC UR QL AUTO: ABNORMAL /HPF

## 2019-06-03 PROCEDURE — 25010000002 VANCOMYCIN 10 G RECONSTITUTED SOLUTION: Performed by: HOSPITALIST

## 2019-06-03 PROCEDURE — 81001 URINALYSIS AUTO W/SCOPE: CPT | Performed by: PHYSICIAN ASSISTANT

## 2019-06-03 PROCEDURE — 85027 COMPLETE CBC AUTOMATED: CPT | Performed by: HOSPITALIST

## 2019-06-03 PROCEDURE — 84100 ASSAY OF PHOSPHORUS: CPT | Performed by: HOSPITALIST

## 2019-06-03 PROCEDURE — 82962 GLUCOSE BLOOD TEST: CPT

## 2019-06-03 PROCEDURE — 83735 ASSAY OF MAGNESIUM: CPT | Performed by: HOSPITALIST

## 2019-06-03 PROCEDURE — 80048 BASIC METABOLIC PNL TOTAL CA: CPT | Performed by: HOSPITALIST

## 2019-06-03 PROCEDURE — 63710000001 INSULIN LISPRO (HUMAN) PER 5 UNITS: Performed by: HOSPITALIST

## 2019-06-03 PROCEDURE — 83605 ASSAY OF LACTIC ACID: CPT | Performed by: PHYSICIAN ASSISTANT

## 2019-06-03 PROCEDURE — 25010000002 PIPERACILLIN SOD-TAZOBACTAM PER 1 G: Performed by: HOSPITALIST

## 2019-06-03 PROCEDURE — 25810000003 SODIUM CHLORIDE 0.9 % WITH KCL 20 MEQ 20-0.9 MEQ/L-% SOLUTION: Performed by: HOSPITALIST

## 2019-06-03 RX ORDER — ONDANSETRON 4 MG/1
4 TABLET, FILM COATED ORAL EVERY 6 HOURS PRN
Status: DISCONTINUED | OUTPATIENT
Start: 2019-06-03 | End: 2019-06-05 | Stop reason: HOSPADM

## 2019-06-03 RX ORDER — SODIUM CHLORIDE 0.9 % (FLUSH) 0.9 %
3-10 SYRINGE (ML) INJECTION AS NEEDED
Status: DISCONTINUED | OUTPATIENT
Start: 2019-06-03 | End: 2019-06-05 | Stop reason: HOSPADM

## 2019-06-03 RX ORDER — MONTELUKAST SODIUM 10 MG/1
10 TABLET ORAL NIGHTLY
Status: DISCONTINUED | OUTPATIENT
Start: 2019-06-03 | End: 2019-06-05 | Stop reason: HOSPADM

## 2019-06-03 RX ORDER — ONDANSETRON 2 MG/ML
4 INJECTION INTRAMUSCULAR; INTRAVENOUS EVERY 6 HOURS PRN
Status: DISCONTINUED | OUTPATIENT
Start: 2019-06-03 | End: 2019-06-05 | Stop reason: HOSPADM

## 2019-06-03 RX ORDER — CELECOXIB 200 MG/1
200 CAPSULE ORAL EVERY MORNING
Status: DISCONTINUED | OUTPATIENT
Start: 2019-06-03 | End: 2019-06-05 | Stop reason: HOSPADM

## 2019-06-03 RX ORDER — PANTOPRAZOLE SODIUM 40 MG/1
40 TABLET, DELAYED RELEASE ORAL EVERY MORNING
Status: DISCONTINUED | OUTPATIENT
Start: 2019-06-03 | End: 2019-06-05 | Stop reason: HOSPADM

## 2019-06-03 RX ORDER — HYDROCODONE BITARTRATE AND ACETAMINOPHEN 5; 325 MG/1; MG/1
1 TABLET ORAL EVERY 4 HOURS PRN
Status: DISCONTINUED | OUTPATIENT
Start: 2019-06-03 | End: 2019-06-05 | Stop reason: HOSPADM

## 2019-06-03 RX ORDER — ROSUVASTATIN CALCIUM 20 MG/1
20 TABLET, COATED ORAL EVERY EVENING
Status: DISCONTINUED | OUTPATIENT
Start: 2019-06-03 | End: 2019-06-05 | Stop reason: HOSPADM

## 2019-06-03 RX ORDER — SODIUM CHLORIDE 0.9 % (FLUSH) 0.9 %
3 SYRINGE (ML) INJECTION EVERY 12 HOURS SCHEDULED
Status: DISCONTINUED | OUTPATIENT
Start: 2019-06-03 | End: 2019-06-05 | Stop reason: HOSPADM

## 2019-06-03 RX ORDER — DEXTROSE MONOHYDRATE 25 G/50ML
25 INJECTION, SOLUTION INTRAVENOUS
Status: DISCONTINUED | OUTPATIENT
Start: 2019-06-03 | End: 2019-06-05 | Stop reason: HOSPADM

## 2019-06-03 RX ORDER — BISACODYL 10 MG
10 SUPPOSITORY, RECTAL RECTAL DAILY PRN
Status: DISCONTINUED | OUTPATIENT
Start: 2019-06-03 | End: 2019-06-05 | Stop reason: HOSPADM

## 2019-06-03 RX ORDER — NICOTINE POLACRILEX 4 MG
15 LOZENGE BUCCAL
Status: DISCONTINUED | OUTPATIENT
Start: 2019-06-03 | End: 2019-06-05 | Stop reason: HOSPADM

## 2019-06-03 RX ORDER — ACETAMINOPHEN 325 MG/1
650 TABLET ORAL EVERY 4 HOURS PRN
Status: DISCONTINUED | OUTPATIENT
Start: 2019-06-03 | End: 2019-06-05 | Stop reason: HOSPADM

## 2019-06-03 RX ORDER — LOSARTAN POTASSIUM 100 MG/1
100 TABLET ORAL EVERY MORNING
Status: DISCONTINUED | OUTPATIENT
Start: 2019-06-03 | End: 2019-06-05 | Stop reason: HOSPADM

## 2019-06-03 RX ORDER — BISACODYL 5 MG/1
5 TABLET, DELAYED RELEASE ORAL DAILY PRN
Status: DISCONTINUED | OUTPATIENT
Start: 2019-06-03 | End: 2019-06-05 | Stop reason: HOSPADM

## 2019-06-03 RX ORDER — LEVOTHYROXINE SODIUM 88 UG/1
88 TABLET ORAL
Status: DISCONTINUED | OUTPATIENT
Start: 2019-06-03 | End: 2019-06-05 | Stop reason: HOSPADM

## 2019-06-03 RX ORDER — ALBUTEROL SULFATE 2.5 MG/3ML
2.5 SOLUTION RESPIRATORY (INHALATION) EVERY 6 HOURS PRN
Status: DISCONTINUED | OUTPATIENT
Start: 2019-06-03 | End: 2019-06-05 | Stop reason: HOSPADM

## 2019-06-03 RX ORDER — POTASSIUM CHLORIDE 750 MG/1
40 CAPSULE, EXTENDED RELEASE ORAL ONCE
Status: COMPLETED | OUTPATIENT
Start: 2019-06-03 | End: 2019-06-03

## 2019-06-03 RX ORDER — POTASSIUM CHLORIDE 1.5 G/1.77G
40 POWDER, FOR SOLUTION ORAL ONCE
Status: DISCONTINUED | OUTPATIENT
Start: 2019-06-03 | End: 2019-06-03

## 2019-06-03 RX ORDER — SODIUM CHLORIDE AND POTASSIUM CHLORIDE 150; 900 MG/100ML; MG/100ML
75 INJECTION, SOLUTION INTRAVENOUS CONTINUOUS
Status: DISCONTINUED | OUTPATIENT
Start: 2019-06-03 | End: 2019-06-03

## 2019-06-03 RX ADMIN — TAZOBACTAM SODIUM AND PIPERACILLIN SODIUM 4.5 G: 500; 4 INJECTION, SOLUTION INTRAVENOUS at 03:01

## 2019-06-03 RX ADMIN — LOSARTAN POTASSIUM 100 MG: 100 TABLET, FILM COATED ORAL at 06:18

## 2019-06-03 RX ADMIN — PRAMIPEXOLE DIHYDROCHLORIDE 0.75 MG: 0.5 TABLET ORAL at 17:01

## 2019-06-03 RX ADMIN — TAZOBACTAM SODIUM AND PIPERACILLIN SODIUM 4.5 G: 500; 4 INJECTION, SOLUTION INTRAVENOUS at 12:38

## 2019-06-03 RX ADMIN — INSULIN LISPRO 2 UNITS: 100 INJECTION, SOLUTION INTRAVENOUS; SUBCUTANEOUS at 12:38

## 2019-06-03 RX ADMIN — INSULIN LISPRO 2 UNITS: 100 INJECTION, SOLUTION INTRAVENOUS; SUBCUTANEOUS at 21:48

## 2019-06-03 RX ADMIN — LEVOTHYROXINE SODIUM 88 MCG: 88 TABLET ORAL at 06:18

## 2019-06-03 RX ADMIN — APIXABAN 5 MG: 5 TABLET, FILM COATED ORAL at 21:48

## 2019-06-03 RX ADMIN — SODIUM CHLORIDE, PRESERVATIVE FREE 3 ML: 5 INJECTION INTRAVENOUS at 03:02

## 2019-06-03 RX ADMIN — MONTELUKAST SODIUM 10 MG: 10 TABLET, FILM COATED ORAL at 21:48

## 2019-06-03 RX ADMIN — POTASSIUM CHLORIDE AND SODIUM CHLORIDE 75 ML/HR: 900; 150 INJECTION, SOLUTION INTRAVENOUS at 03:01

## 2019-06-03 RX ADMIN — CELECOXIB 200 MG: 200 CAPSULE ORAL at 06:18

## 2019-06-03 RX ADMIN — PANTOPRAZOLE SODIUM 40 MG: 40 TABLET, DELAYED RELEASE ORAL at 06:18

## 2019-06-03 RX ADMIN — VANCOMYCIN HYDROCHLORIDE 1500 MG: 10 INJECTION, POWDER, LYOPHILIZED, FOR SOLUTION INTRAVENOUS at 09:43

## 2019-06-03 RX ADMIN — ROSUVASTATIN CALCIUM 20 MG: 20 TABLET, FILM COATED ORAL at 17:01

## 2019-06-03 RX ADMIN — APIXABAN 5 MG: 5 TABLET, FILM COATED ORAL at 08:58

## 2019-06-03 RX ADMIN — POTASSIUM CHLORIDE 40 MEQ: 750 CAPSULE, EXTENDED RELEASE ORAL at 14:13

## 2019-06-03 RX ADMIN — TAZOBACTAM SODIUM AND PIPERACILLIN SODIUM 4.5 G: 500; 4 INJECTION, SOLUTION INTRAVENOUS at 21:49

## 2019-06-03 NOTE — ED PROVIDER NOTES
MD ATTESTATION NOTE    Pt presents c/o CP starting this morning. Pt confirms worsening abdominal rash also starting this morning. Pt's family notes a similar rash in January 2019.     Limited physical exam:  Patient is nontoxic appearing. NAD.   Lungs/cardiovascular: lungs CTAB; RRR  Abdomen: erythematous rash on lower part of abdomen that is tender and warm      EKG          EKG time: 1922  Rhythm/Rate: sinus tachy 110  Multiple PVCs  P waves and CT: nml  QRS, axis: nml   ST and T waves: nml     Interpreted Contemporaneously by me, independently viewed  Similar compared to prior 5/11/18    Plan: administer abx prior to admission.     The CARMEN and I have discussed this patient's history, physical exam, and treatment plan.  I have reviewed the documentation and personally had a face to face interaction with the patient. I affirm the documentation and agree with the treatment and plan.  The attached note describes my personal findings.    Documentation assistance provided by torie Barksdale for Dr Jonathon MD.  Information recorded by the scribe was done at my direction and has been verified and validated by me.       Laura Barksdale  06/02/19 3837       Claudy Holt MD  06/02/19 8815

## 2019-06-03 NOTE — SIGNIFICANT NOTE
06/03/19 1105   Rehab Time/Intention   Evaluation Not Performed other (see comments)  (Pt. sleeping in room. Spoke with pt.'s family in room. Pt. has been up ambulating independently and has no functional deficits at this time.  Pt. does not require skilled inpt. P.T. and family agreeable.  Will sign off. )   Rehab Treatment   Discipline physical therapist

## 2019-06-03 NOTE — PROGRESS NOTES
"DAILY PROGRESS NOTE  [facility]    Patient Identification:  Name: Nathan Baez  Age: 74 y.o.  Sex: male  :  1945  MRN: [id number]         Primary Care Physician: Damien Pierce MD      Subjective  No new complaints.  Overall feeling much better.  On questioning patient is unaware of any previous blood sugar issues.    Objective:  General Appearance:  Comfortable, well-appearing, in no acute distress and not in pain (Morbidly obese.).    Vital signs: (most recent): Blood pressure 124/79, pulse 96, temperature 98.8 °F (37.1 °C), temperature source Oral, resp. rate 20, height 170.2 cm (67\"), weight 113 kg (250 lb 1.6 oz), SpO2 95 %.    Lungs:  Normal effort and normal respiratory rate.  Breath sounds clear to auscultation.    Heart: Normal rate.  Regular rhythm.    Abdomen: Abdomen is soft and non-distended.  Bowel sounds are normal.   There is no abdominal tenderness.     Extremities: There is dependent edema.  (1+ pretibial pitting edema bilaterally.)  Neurological: Patient is alert and oriented to person, place and time.    Skin:  Warm and dry.  (Erythema involving most of the abdomen above the umbilicus.  Induration.  No drainage.  Warm to touch.)              Vital signs in last 24 hours:  Temp:  [98.2 °F (36.8 °C)-100.9 °F (38.3 °C)] 98.8 °F (37.1 °C)  Heart Rate:  [] 96  Resp:  [20-24] 20  BP: ()/(54-79) 124/79    Intake/Output:    Intake/Output Summary (Last 24 hours) at 6/3/2019 1144  Last data filed at 6/3/2019 0301  Gross per 24 hour   Intake 2100 ml   Output --   Net 2100 ml         Results from last 7 days   Lab Units 19  03519   WBC 10*3/mm3 8.90 12.48*   HEMOGLOBIN g/dL 10.9* 12.7*   PLATELETS 10*3/mm3 123* 146     Results from last 7 days   Lab Units 19  03519   SODIUM mmol/L 140 137   POTASSIUM mmol/L 3.4* 4.0   CHLORIDE mmol/L 103 97*   CO2 mmol/L 23.3 24.9   BUN mg/dL 24* 24*   CREATININE mg/dL 1.28* 1.16   GLUCOSE " mg/dL 173* 168*   Estimated Creatinine Clearance: 61.7 mL/min (A) (by C-G formula based on SCr of 1.28 mg/dL (H)).  Results from last 7 days   Lab Units 06/03/19  0354 06/02/19 1949   CALCIUM mg/dL 8.1* 9.3   ALBUMIN g/dL  --  3.90   MAGNESIUM mg/dL 1.9  --    PHOSPHORUS mg/dL 3.1  --      Results from last 7 days   Lab Units 06/02/19 1949   ALBUMIN g/dL 3.90   BILIRUBIN mg/dL 1.3*   ALK PHOS U/L 136*   AST (SGOT) U/L 31   ALT (SGPT) U/L 44*       Assessment:    Abdominal wall cellulitis: Clinically most consistent with strep infection.  Await blood cultures.      Anemia, macrocytic: Check B12, folate, TSH    Hypokalemia: P.o. replacement.    Chronic kidney disease stage III: Monitor renal status and fluid status.    Hyperglycemia, possible early diabetes type 2.:  Check A1c: Sliding scale insulin:    Morbid obesity: Weight loss highly encouraged.    Obstructive sleep apnea: Continue CPAP.    Possible UTI: Should be more than adequately covered from antibiotic she is taking for cellulitis.    Hypertension: Continue home meds.    Acute DVT (deep venous thrombosis) : Continue Eliquis.    Hypothyroidism (acquired)    Edema: DC IV fluids.      Plan:  Please see above.  Discussed with patient and wife at bedside.  Over 30 minutes spent with over one half in counseling and medical management.    Daren Coyle MD  6/3/2019  11:44 AM

## 2019-06-03 NOTE — ED PROVIDER NOTES
" EMERGENCY DEPARTMENT ENCOUNTER    CHIEF COMPLAINT  Chief Complaint: Chest Pain   History given by: Patient   History limited by: none   Room Number: 30/30  PMD: Damien Pierce MD      HPI:  Pt is a 74 y.o. male who presents complaining of intermittent, L-sided chest pain that began early this morning while at [employer]. Pt confirms mild generalized abd pain, abd distention, loss of appetite, and FRIEDMAN. Pt denies any new SOA, cough, diaphoresis, nausea, and chills. Per spouse, pt has \"beet-red\" erythema to lower abd and states that pt had similar episode in 1/2019 where he was dx with cellulitis, given topical ointment by PCP with improvement. Spouse states pt used ointment today, without improvement. Per pt, he has hx of RLE DVT, dx 2 weeks ago, and affirms compliance with Eliquis. Spouse states pt was recently on abx due to concern for cellulitis of BLE, was taken off of them 2 weeks ago due to discovery of DVT.     Duration:  More than 8 hours   Onset: gradual   Timing: constant   Location: L-sided chest   Radiation: none   Quality: pain   Intensity/Severity: moderate   Progression: unchanged   Associated Symptoms: mild abd pain, abd distention, loss of appetite, HA and erythema to lower abd   Aggravating Factors: unknown   Alleviating Factors: none   Previous Episodes: Pt has hx of cellulitis to abd and recent hx of DVT.   Treatment before arrival: Pt has used topical ointment without improvement, and affirms compliance with Eliquis.     PAST MEDICAL HISTORY  Active Ambulatory Problems     Diagnosis Date Noted   • OA (osteoarthritis) of knee 09/13/2016   • Hypertension      Resolved Ambulatory Problems     Diagnosis Date Noted   • No Resolved Ambulatory Problems     Past Medical History:   Diagnosis Date   • Arthritis    • DVT (deep venous thrombosis) (CMS/HCC)    • Glaucoma    • Hyperlipidemia    • Hypertension    • Hypothyroid    • Prostate cancer (CMS/HCC)    • Restless leg    • Sleep apnea        PAST " SURGICAL HISTORY  Past Surgical History:   Procedure Laterality Date   • CATARACT EXTRACTION     • COLONOSCOPY     • HERNIA REPAIR     • JOINT REPLACEMENT Right    • NECK SURGERY     • OH TOTAL KNEE ARTHROPLASTY Left 2016    Procedure: LT TOTAL KNEE ARTHROPLASTY;  Surgeon: Tadeo Basurto MD;  Location: [facility] OR;  Service: Orthopedics   • PROSTATE SURGERY     • THYROID SURGERY     • TOTAL KNEE ARTHROPLASTY         FAMILY HISTORY  Family History   Problem Relation Age of Onset   • Cancer Mother         COLON   • Cancer Father         PROSTATE   • Cancer Sister         BREAST CANCER   • Heart attack Brother    • Heart disease Brother        SOCIAL HISTORY  Social History     Socioeconomic History   • Marital status:      Spouse name: Not on file   • Number of children: Not on file   • Years of education: Not on file   • Highest education level: Not on file   Tobacco Use   • Smoking status: Former Smoker     Last attempt to quit: 1984     Years since quittin.4   • Smokeless tobacco: Never Used   Substance and Sexual Activity   • Alcohol use: No     Comment: 3-4 MONTHLY   • Drug use: No   • Sexual activity: Defer       ALLERGIES  Patient has no known allergies.    REVIEW OF SYSTEMS  Review of Systems   Constitutional: Positive for appetite change (loss of). Negative for activity change and fever.   HENT: Negative for congestion and sore throat.    Eyes: Negative.    Respiratory: Negative for cough and shortness of breath.    Cardiovascular: Positive for chest pain (L sided). Negative for leg swelling.   Gastrointestinal: Positive for abdominal distention and abdominal pain. Negative for diarrhea and vomiting.   Endocrine: Negative.    Genitourinary: Negative for decreased urine volume and dysuria.   Musculoskeletal: Negative for neck pain.   Skin: Positive for color change (erythema to abd). Negative for rash and wound.   Allergic/Immunologic: Negative.    Neurological: Positive for  headaches (mild). Negative for weakness and numbness.   Hematological: Negative.    Psychiatric/Behavioral: Negative.    All other systems reviewed and are negative.      PHYSICAL EXAM  ED Triage Vitals [06/02/19 1922]   Temp Heart Rate Resp BP SpO2   (!) 100.9 °F (38.3 °C) 118 24 110/68 97 %      Temp src Heart Rate Source Patient Position BP Location FiO2 (%)   Oral Monitor Lying Left arm --       Physical Exam   Constitutional: He is oriented to person, place, and time. No distress.   HENT:   Head: Normocephalic and atraumatic.   Mouth/Throat: Oropharynx is clear and moist.   Eyes: EOM are normal. Pupils are equal, round, and reactive to light.   Neck: Normal range of motion. Neck supple.   Cardiovascular: Regular rhythm and normal heart sounds. Tachycardia present.   Pulmonary/Chest: Effort normal. No respiratory distress. He has decreased breath sounds (with prolonged expirations). He has rhonchi.   Abdominal: Soft. There is no rebound and no guarding.   Large area of warmth and erythema, worse in R lower abd wall.    Musculoskeletal: Normal range of motion. He exhibits no edema.        Left lower leg: He exhibits tenderness (mild).   Neurological: He is alert and oriented to person, place, and time. He has normal sensation and normal strength.   Skin: Skin is warm and dry.   Psychiatric: Mood and affect normal.   Nursing note and vitals reviewed.      LAB RESULTS  Lab Results (last 24 hours)     Procedure Component Value Units Date/Time    CBC & Differential [[id number]] Collected:  06/02/19 1949    Specimen:  Blood Updated:  06/02/19 2032    Narrative:       The following orders were created for panel order CBC & Differential.  Procedure                               Abnormality         Status                     ---------                               -----------         ------                     CBC Auto Differential[[id number]]        Abnormal            Final result                 Please view results for  these tests on the individual orders.    Comprehensive Metabolic Panel [[id number]]  (Abnormal) Collected:  06/02/19 1949    Specimen:  Blood Updated:  06/02/19 2022     Glucose 168 mg/dL      BUN 24 mg/dL      Creatinine 1.16 mg/dL      Sodium 137 mmol/L      Potassium 4.0 mmol/L      Chloride 97 mmol/L      CO2 24.9 mmol/L      Calcium 9.3 mg/dL      Total Protein 7.0 g/dL      Albumin 3.90 g/dL      ALT (SGPT) 44 U/L      AST (SGOT) 31 U/L      Alkaline Phosphatase 136 U/L      Total Bilirubin 1.3 mg/dL      eGFR Non African Amer 62 mL/min/1.73      Globulin 3.1 gm/dL      A/G Ratio 1.3 g/dL      BUN/Creatinine Ratio 20.7     Anion Gap 15.1 mmol/L     Narrative:       GFR Normal >60  Chronic Kidney Disease <60  Kidney Failure <15    Troponin [[id number]]  (Normal) Collected:  06/02/19 1949    Specimen:  Blood Updated:  06/02/19 2022     Troponin T <0.010 ng/mL     Narrative:       Troponin T Reference Range:  <= 0.03 ng/mL-   Negative for AMI  >0.03 ng/mL-     Abnormal for myocardial necrosis.  Clinicians would have to utilize clinical acumen, EKG, Troponin and serial changes to determine if it is an Acute Myocardial Infarction or myocardial injury due to an underlying chronic condition.     CBC Auto Differential [[id number]]  (Abnormal) Collected:  06/02/19 1949    Specimen:  Blood Updated:  06/02/19 2032     WBC 12.48 10*3/mm3      RBC 4.24 10*6/mm3      Hemoglobin 12.7 g/dL      Hematocrit 41.4 %      MCV 97.6 fL      MCH 30.0 pg      MCHC 30.7 g/dL      RDW 13.6 %      RDW-SD 49.1 fl      MPV 11.0 fL      Platelets 146 10*3/mm3     Manual Differential [[id number]]  (Abnormal) Collected:  06/02/19 1949    Specimen:  Blood Updated:  06/02/19 2032     Neutrophil % 92.0 %      Lymphocyte % 3.0 %      Monocyte % 5.0 %      Neutrophils Absolute 11.48 10*3/mm3      Lymphocytes Absolute 0.37 10*3/mm3      Monocytes Absolute 0.62 10*3/mm3      RBC Morphology Normal     WBC Morphology Normal     Platelet Morphology Normal  "   Procalcitonin [[id number]]  (Abnormal) Collected:  06/02/19 1949    Specimen:  Blood Updated:  06/02/19 2253     Procalcitonin 6.62 ng/mL     Narrative:       As a Marker for Sepsis (Non-Neonates):   1. <0.5 ng/mL represents a low risk of severe sepsis and/or septic shock.  1. >2 ng/mL represents a high risk of severe sepsis and/or septic shock.    As a Marker for Lower Respiratory Tract Infections that require antibiotic therapy:  PCT on Admission     Antibiotic Therapy             6-12 Hrs later  > 0.5                Strongly Recommended            >0.25 - <0.5         Recommended  0.1 - 0.25           Discouraged                   Remeasure/reassess PCT  <0.1                 Strongly Discouraged          Remeasure/reassess PCT      As 28 day mortality risk marker: \"Change in Procalcitonin Result\" (> 80 % or <=80 %) if Day 0 (or Day 1) and Day 4 values are available. Refer to http://www.[email or web address]-pct-[email or web address]/   Change in PCT <=80 %   A decrease of PCT levels below or equal to 80 % defines a positive change in PCT test result representing a higher risk for 28-day all-cause mortality of patients diagnosed with severe sepsis or septic shock.  Change in PCT > 80 %   A decrease of PCT levels of more than 80 % defines a negative change in PCT result representing a lower risk for 28-day all-cause mortality of patients diagnosed with severe sepsis or septic shock.                Blood Culture - Blood, Arm, Right [[id number]] Collected:  06/02/19 2118    Specimen:  Blood from Arm, Right Updated:  06/02/19 2122    Lactic Acid, Plasma [[id number]]  (Abnormal) Collected:  06/02/19 2118    Specimen:  Blood Updated:  06/02/19 2146     Lactate 2.4 mmol/L     Lactic Acid, Reflex Timer (This will reflex a repeat order 3-3:15 hours after ordered.) [[id number]] Collected:  06/02/19 2118    Specimen:  Blood Updated:  06/02/19 2146    Troponin [[id number]]  (Normal) Collected:  06/02/19 2210    Specimen:  Blood from Arm, Left " Updated:  06/02/19 2244     Troponin T <0.010 ng/mL     Narrative:       Troponin T Reference Range:  <= 0.03 ng/mL-   Negative for AMI  >0.03 ng/mL-     Abnormal for myocardial necrosis.  Clinicians would have to utilize clinical acumen, EKG, Troponin and serial changes to determine if it is an Acute Myocardial Infarction or myocardial injury due to an underlying chronic condition.     Blood Culture - Blood, Arm, Left [[id number]] Collected:  06/02/19 2210    Specimen:  Blood from Arm, Left Updated:  06/02/19 2216          I ordered the above labs and reviewed the results    RADIOLOGY  CT Angiogram Chest With Contrast   Final Result   1. No active disease or pulmonary embolism.   2. Partial visualization of a suspected upper pole left renal cyst again   noted       This report was finalized on 6/2/2019 9:46 PM by Sergio Her M.D.          XR Chest 2 View   Final Result   1. No active disease.       This report was finalized on 6/2/2019 8:46 PM by Sergio Her M.D.               I ordered the above noted radiological studies. Interpreted by radiologist. Reviewed by me in PACS.     PROCEDURES  Procedures  EKG          EKG time: 1922  Rhythm/Rate: Sinus Tachycardia 110  P waves and IN: nml  QRS, axis: nml   ST and T waves: nml   Multiple PVCs  Interpreted Contemporaneously by me, independently viewed  Changed compared to prior 5/11/18 -rate faster today, PVCs present on prior.     PROGRESS AND CONSULTS     2058: Discussed pt's case with pt's son on the phone per request of pt and spouse. Informed son and pt of plan to CTA chest for PE rule out. Updated pt on evidence of fever upon arrival and probable need for admission and cellulitis treatment due to sx.     2104: Labs ordered for further evaluation.     2151: Zosyn and Vancomycin ordered for abx treatment.    2153: Discussed pt's case with Dr. Holt, who, after bedside evaluation, agrees with plan for care.     2155: Pt rechecked and resting comfortably.  Discussed results of CTA without evidence of PE or pneumonia. Informed pt of fever likely due to cellulitis of abd wall with plan for admission and further abx treatment. Pt offered antiemetics at this time which pt declined. Pt understands and agrees with the plan, all questions answered.    2217: Call placed to [facility].     2246: Discussed pt's case with Dr. Meraz ([facility]) who agreed to admit pt to telemetry for further abx treatment and monitoring of cellulitis.     MEDICAL DECISION MAKING  Results were reviewed/discussed with the patient and they were also made aware of online access. Pt also made aware that some labs, such as cultures, will not be resulted during ER visit and follow up with PMD is necessary.     MDM  Number of Diagnoses or Management Options  Abdominal wall cellulitis:   Chest pain, unspecified type:   Elevated lactic acid level:      Amount and/or Complexity of Data Reviewed  Clinical lab tests: reviewed (Lactate - 2.4  WBC - 12.48)  Tests in the radiology section of CPT®: reviewed (CXR - no active disease  doppler - negative for DVT  CTA - no active disease or PE)  Tests in the medicine section of CPT®: reviewed (See note)  Decide to obtain previous medical records or to obtain history from someone other than the patient: yes  Discuss the patient with other providers: yes (Dr. Meraz ([facility]))           DIAGNOSIS  Final diagnoses:   Abdominal wall cellulitis   Elevated lactic acid level   Chest pain, unspecified type       DISPOSITION  ADMISSION    Discussed treatment plan and reason for admission with pt/family and admitting physician.  Pt/family voiced understanding of the plan for admission for further testing/treatment as needed.         Latest Documented Vital Signs:  As of 11:19 PM  BP- 110/68 HR- 104 Temp- (!) 100.9 °F (38.3 °C) (Oral) O2 sat- 99%    --  Documentation assistance provided by torie Her for Quinn Gomez PA-C.  Information recorded by the torie was done at my  direction and has been verified and validated by me.                      Jena Her  06/02/19 9821       Quinn Gomez PA  06/02/19 7875

## 2019-06-03 NOTE — PLAN OF CARE
Problem: Patient Care Overview  Goal: Plan of Care Review  Outcome: Ongoing (interventions implemented as appropriate)   06/03/19 0618   Coping/Psychosocial   Plan of Care Reviewed With patient;spouse   Plan of Care Review   Progress no change   OTHER   Outcome Summary Vss and on RA. Steady gait. Ambulating with stand-by assist. A&0x4. Denies pain. Lower abdomen hot, pink, and tender to touch. IV ABX continued. IVF infusing. Afebrile. No distress noted.

## 2019-06-03 NOTE — H&P
Name: Nathan Baez ADMIT: 2019   : 1945  PCP: Damien Pierce MD    MRN: [id number] LOS: 0 days   AGE/SEX: 74 y.o. male  ROOM:      Chief Complaint   Patient presents with   • Chest Pain       Subjective   Patient is a 74 y.o. male who presents to [facility] with the above chief complaint.  He felt bad all day today.  He has a history of cellulitis in the past but never on his abdomen.  He was recently diagnosed with a DVT and actually presented today by feeling bad and developing some left sided chest pain.  It started off and on but it was nonradiating lasted a little bit and then would go away.  There is no aggravating or relieving factors that he noted.  Denies any history of coronary artery disease or heart issues.  He is been increasingly fatigued.  Again he was seen by his primary care physician earlier this week and diagnosed with a DVT and was started on Eliquis.  He denies any nausea vomiting no fevers or chills no history of heart attacks or strokes but does have some hypertension hyperlipidemia.  In the emergency room he was found to have a large area of cellulitis across his upper pannus and lower abdomen.  He was started on IV antibiotics but given the severity of illness we were asked to admit to the hospital.    History of Present Illness    Past Medical History:   Diagnosis Date   • Arthritis    • DVT (deep venous thrombosis) (CMS/HCC)     l leg    • Glaucoma    • Hyperlipidemia    • Hypertension    • Hypothyroid    • Prostate cancer (CMS/HCC)    • Restless leg    • Sleep apnea      Past Surgical History:   Procedure Laterality Date   • CATARACT EXTRACTION     • COLONOSCOPY     • HERNIA REPAIR     • JOINT REPLACEMENT Right    • NECK SURGERY     • MD TOTAL KNEE ARTHROPLASTY Left 2016    Procedure: LT TOTAL KNEE ARTHROPLASTY;  Surgeon: Tadeo Basurto MD;  Location: [facility];  Service: Orthopedics   • PROSTATE SURGERY     • THYROID SURGERY      • TOTAL KNEE ARTHROPLASTY       Family History   Problem Relation Age of Onset   • Cancer Mother         COLON   • Cancer Father         PROSTATE   • Cancer Sister         BREAST CANCER   • Heart attack Brother    • Heart disease Brother      Social History     Tobacco Use   • Smoking status: Former Smoker     Last attempt to quit: 1984     Years since quittin.4   • Smokeless tobacco: Never Used   Substance Use Topics   • Alcohol use: No     Comment: 3-4 MONTHLY   • Drug use: No       (Not in a hospital admission)  Allergies:  Patient has no known allergies.    Review of Systems   Unable to perform ROS: Dementia        Objective    Vital Signs  Temp:  [100.9 °F (38.3 °C)] 100.9 °F (38.3 °C)  Heart Rate:  [104-118] 104  Resp:  [20-24] 20  BP: (110)/(68) 110/68  SpO2:  [97 %-100 %] 99 %  on   ;   Device (Oxygen Therapy): room air  Body mass index is 40 kg/m².    Physical Exam   Constitutional: He is oriented to person, place, and time. He appears well-developed and well-nourished.   HENT:   Head: Normocephalic and atraumatic.   Eyes: EOM are normal.   Cardiovascular: Normal rate, regular rhythm and normal heart sounds.   Pulmonary/Chest: Effort normal and breath sounds normal.   Abdominal: Soft. Bowel sounds are normal. He exhibits distension. He exhibits no mass. There is tenderness. There is no rebound and no guarding. No hernia.   See image below   Musculoskeletal: Normal range of motion. He exhibits no edema.   Neurological: He is alert and oriented to person, place, and time.   Skin: Skin is warm and dry. No rash noted. There is erythema.   Psychiatric: He has a normal mood and affect. His behavior is normal.   Nursing note and vitals reviewed.          Results Review:   I reviewed the patient's new clinical results.  Results from last 7 days   Lab Units 19   WBC 10*3/mm3 12.48*   HEMOGLOBIN g/dL 12.7*   PLATELETS 10*3/mm3 146     Results from last 7 days   Lab Units 19   SODIUM  mmol/L 137   POTASSIUM mmol/L 4.0   CHLORIDE mmol/L 97*   CO2 mmol/L 24.9   BUN mg/dL 24*   CREATININE mg/dL 1.16   GLUCOSE mg/dL 168*   ALBUMIN g/dL 3.90   BILIRUBIN mg/dL 1.3*   ALK PHOS U/L 136*   AST (SGOT) U/L 31   ALT (SGPT) U/L 44*   Estimated Creatinine Clearance: 69.1 mL/min (by C-G formula based on SCr of 1.16 mg/dL).  Results from last 7 days   Lab Units 06/02/19 2210 06/02/19  1949   TROPONIN T ng/mL <0.010 <0.010         Invalid input(s): LDLCALC    CT Angiogram Chest With Contrast   Final Result   1. No active disease or pulmonary embolism.   2. Partial visualization of a suspected upper pole left renal cyst again   noted       This report was finalized on 6/2/2019 9:46 PM by Sergio Her M.D.          XR Chest 2 View   Final Result   1. No active disease.       This report was finalized on 6/2/2019 8:46 PM by Sergio Her M.D.            Assessment/Plan       Hypertension    Abdominal wall cellulitis    Acute DVT (deep venous thrombosis) (CMS/HCC)    Hypothyroidism (acquired)      Assessment & Plan  He is admitted to the hospital with abdominal wall cellulitis.  Is rather red and angry currently.  I placed him on IV vancomycin and Zosyn.  We will follow-up blood cultures.  We will monitor for improvement and plans to transition to oral antibiotics once we have seen significant improvement.  We will otherwise continue his home medications and monitor him closely over the course of the hospitalization.  The plan was discussed with the patient and his son at the bedside both are in agreement with the plan.      I discussed the patients findings and my recommendations with patient, family and nursing staff.    Torrey Meraz MD  [facility]  06/02/19  10:48 PM

## 2019-06-03 NOTE — PROGRESS NOTES
"Pharmacokinetic Consult - Vancomycin and Zosyn Dosing (Initial Note)    Nathan Baez has been consulted for pharmacy to dose vancomycin and Zosyn for Abdominal Wall Cellulitis.  Pharmacy dosing vancomycin and Zosyn per Dr. Torrey Ayala's request.   Goal trough: 15-20 mg/L     Relevant clinical data and objective history reviewed:  74 y.o. male 170.2 cm (67\") 113 kg (250 lb 1.6 oz)    Past Medical History:   Diagnosis Date   • Arthritis    • DVT (deep venous thrombosis) (CMS/HCC)     l leg    • Glaucoma    • Hyperlipidemia    • Hypertension    • Hypothyroid    • Prostate cancer (CMS/HCC)    • Restless leg    • Sleep apnea      Creatinine   Date Value Ref Range Status   06/02/2019 1.16 0.76 - 1.27 mg/dL Final   04/30/2019 1.20 0.60 - 1.30 mg/dL Final     Comment:     Serial Number: [id number]:  [id number]   03/16/2019 1.23 0.76 - 1.27 mg/dL Final   05/11/2018 1.26 0.76 - 1.27 mg/dL Final     BUN   Date Value Ref Range Status   06/02/2019 24 (H) 8 - 23 mg/dL Final     Estimated Creatinine Clearance: 68.1 mL/min (by C-G formula based on SCr of 1.16 mg/dL).    Lab Results   Component Value Date    WBC 12.48 (H) 06/02/2019     Temp Readings from Last 3 Encounters:   06/03/19 98.2 °F (36.8 °C) (Oral)   03/16/19 99.6 °F (37.6 °C)   05/11/18 97.6 °F (36.4 °C) (Oral)        Assessment/Plan  1) Patient received Vancomycin 2250 mg iv x1 at 0014. Will start vancomycin 1500 mg IV q12h.     2) Vancomycin level on 6/4/19 at 0930.    3) Will monitor serum creatinine every 24 hours for the first 3 days then at least every 48 hours per dosing recommendations.      4) Pharmacy will continue to follow daily while on vancomycin and adjust as needed.     5) Will order Zosyn 4.5 gm iv q8h.     Sincerely,  Jamie Elliott, [facility]      "

## 2019-06-03 NOTE — PLAN OF CARE
Problem: Patient Care Overview  Goal: Plan of Care Review  Outcome: Ongoing (interventions implemented as appropriate)   06/03/19 6500   Coping/Psychosocial   Plan of Care Reviewed With patient   Plan of Care Review   Progress improving   OTHER   Outcome Summary VSS. No c/o pain. Abd red and warm. Continue IV abx. IVF d/c'd. Up ad dasha. Ambulated in herman. Room air. Continue to monitor.

## 2019-06-04 LAB
ANION GAP SERPL CALCULATED.3IONS-SCNC: 13.5 MMOL/L
BASOPHILS # BLD AUTO: 0.03 10*3/MM3 (ref 0–0.2)
BASOPHILS NFR BLD AUTO: 0.4 % (ref 0–1.5)
BUN BLD-MCNC: 14 MG/DL (ref 8–23)
BUN/CREAT SERPL: 13.2 (ref 7–25)
CALCIUM SPEC-SCNC: 8.8 MG/DL (ref 8.6–10.5)
CHLORIDE SERPL-SCNC: 102 MMOL/L (ref 98–107)
CO2 SERPL-SCNC: 22.5 MMOL/L (ref 22–29)
CREAT BLD-MCNC: 1.06 MG/DL (ref 0.76–1.27)
DEPRECATED RDW RBC AUTO: 52.2 FL (ref 37–54)
EOSINOPHIL # BLD AUTO: 0.05 10*3/MM3 (ref 0–0.4)
EOSINOPHIL NFR BLD AUTO: 0.6 % (ref 0.3–6.2)
ERYTHROCYTE [DISTWIDTH] IN BLOOD BY AUTOMATED COUNT: 14.2 % (ref 12.3–15.4)
FOLATE SERPL-MCNC: 19.1 NG/ML (ref 4.78–24.2)
GFR SERPL CREATININE-BSD FRML MDRD: 68 ML/MIN/1.73
GLUCOSE BLD-MCNC: 115 MG/DL (ref 65–99)
GLUCOSE BLDC GLUCOMTR-MCNC: 109 MG/DL (ref 70–130)
GLUCOSE BLDC GLUCOMTR-MCNC: 119 MG/DL (ref 70–130)
GLUCOSE BLDC GLUCOMTR-MCNC: 121 MG/DL (ref 70–130)
GLUCOSE BLDC GLUCOMTR-MCNC: 177 MG/DL (ref 70–130)
HBA1C MFR BLD: 6.17 % (ref 4.8–5.6)
HCT VFR BLD AUTO: 35.4 % (ref 37.5–51)
HGB BLD-MCNC: 10.7 G/DL (ref 13–17.7)
LYMPHOCYTES # BLD AUTO: 0.86 10*3/MM3 (ref 0.7–3.1)
LYMPHOCYTES NFR BLD AUTO: 10.8 % (ref 19.6–45.3)
MCH RBC QN AUTO: 30 PG (ref 26.6–33)
MCHC RBC AUTO-ENTMCNC: 30.2 G/DL (ref 31.5–35.7)
MCV RBC AUTO: 99.2 FL (ref 79–97)
MONOCYTES # BLD AUTO: 0.92 10*3/MM3 (ref 0.1–0.9)
MONOCYTES NFR BLD AUTO: 11.6 % (ref 5–12)
NEUTROPHILS # BLD AUTO: 5.96 10*3/MM3 (ref 1.7–7)
NEUTROPHILS NFR BLD AUTO: 75.1 % (ref 42.7–76)
PLATELET # BLD AUTO: 131 10*3/MM3 (ref 140–450)
PMV BLD AUTO: 11 FL (ref 6–12)
POTASSIUM BLD-SCNC: 3.9 MMOL/L (ref 3.5–5.2)
RBC # BLD AUTO: 3.57 10*6/MM3 (ref 4.14–5.8)
SODIUM BLD-SCNC: 138 MMOL/L (ref 136–145)
TSH SERPL DL<=0.05 MIU/L-ACNC: 2.22 MIU/ML (ref 0.27–4.2)
VANCOMYCIN TROUGH SERPL-MCNC: 16.2 MCG/ML (ref 5–20)
VIT B12 BLD-MCNC: 334 PG/ML (ref 211–946)
WBC NRBC COR # BLD: 7.94 10*3/MM3 (ref 3.4–10.8)

## 2019-06-04 PROCEDURE — 25010000002 PIPERACILLIN SOD-TAZOBACTAM PER 1 G: Performed by: HOSPITALIST

## 2019-06-04 PROCEDURE — 63710000001 INSULIN LISPRO (HUMAN) PER 5 UNITS: Performed by: HOSPITALIST

## 2019-06-04 PROCEDURE — 83036 HEMOGLOBIN GLYCOSYLATED A1C: CPT | Performed by: HOSPITALIST

## 2019-06-04 PROCEDURE — 80048 BASIC METABOLIC PNL TOTAL CA: CPT | Performed by: HOSPITALIST

## 2019-06-04 PROCEDURE — 84443 ASSAY THYROID STIM HORMONE: CPT | Performed by: HOSPITALIST

## 2019-06-04 PROCEDURE — 85025 COMPLETE CBC W/AUTO DIFF WBC: CPT | Performed by: HOSPITALIST

## 2019-06-04 PROCEDURE — 82962 GLUCOSE BLOOD TEST: CPT

## 2019-06-04 PROCEDURE — 25010000002 VANCOMYCIN 10 G RECONSTITUTED SOLUTION: Performed by: HOSPITALIST

## 2019-06-04 PROCEDURE — 82746 ASSAY OF FOLIC ACID SERUM: CPT | Performed by: HOSPITALIST

## 2019-06-04 PROCEDURE — 82607 VITAMIN B-12: CPT | Performed by: HOSPITALIST

## 2019-06-04 PROCEDURE — 80202 ASSAY OF VANCOMYCIN: CPT | Performed by: HOSPITALIST

## 2019-06-04 RX ORDER — DOXYCYCLINE 100 MG/1
100 CAPSULE ORAL EVERY 12 HOURS SCHEDULED
Status: DISCONTINUED | OUTPATIENT
Start: 2019-06-04 | End: 2019-06-05 | Stop reason: HOSPADM

## 2019-06-04 RX ADMIN — TAZOBACTAM SODIUM AND PIPERACILLIN SODIUM 4.5 G: 500; 4 INJECTION, SOLUTION INTRAVENOUS at 22:03

## 2019-06-04 RX ADMIN — PRAMIPEXOLE DIHYDROCHLORIDE 0.75 MG: 0.5 TABLET ORAL at 17:36

## 2019-06-04 RX ADMIN — INSULIN LISPRO 2 UNITS: 100 INJECTION, SOLUTION INTRAVENOUS; SUBCUTANEOUS at 20:26

## 2019-06-04 RX ADMIN — SODIUM CHLORIDE, PRESERVATIVE FREE 3 ML: 5 INJECTION INTRAVENOUS at 20:27

## 2019-06-04 RX ADMIN — VANCOMYCIN HYDROCHLORIDE 1500 MG: 10 INJECTION, POWDER, LYOPHILIZED, FOR SOLUTION INTRAVENOUS at 12:43

## 2019-06-04 RX ADMIN — TAZOBACTAM SODIUM AND PIPERACILLIN SODIUM 4.5 G: 500; 4 INJECTION, SOLUTION INTRAVENOUS at 06:47

## 2019-06-04 RX ADMIN — DOXYCYCLINE 100 MG: 100 CAPSULE ORAL at 20:26

## 2019-06-04 RX ADMIN — MONTELUKAST SODIUM 10 MG: 10 TABLET, FILM COATED ORAL at 20:26

## 2019-06-04 RX ADMIN — VANCOMYCIN HYDROCHLORIDE 1500 MG: 10 INJECTION, POWDER, LYOPHILIZED, FOR SOLUTION INTRAVENOUS at 00:06

## 2019-06-04 RX ADMIN — SODIUM CHLORIDE, PRESERVATIVE FREE 3 ML: 5 INJECTION INTRAVENOUS at 09:55

## 2019-06-04 RX ADMIN — CELECOXIB 200 MG: 200 CAPSULE ORAL at 09:54

## 2019-06-04 RX ADMIN — LOSARTAN POTASSIUM 100 MG: 100 TABLET, FILM COATED ORAL at 09:54

## 2019-06-04 RX ADMIN — APIXABAN 5 MG: 5 TABLET, FILM COATED ORAL at 20:26

## 2019-06-04 RX ADMIN — TAZOBACTAM SODIUM AND PIPERACILLIN SODIUM 4.5 G: 500; 4 INJECTION, SOLUTION INTRAVENOUS at 14:14

## 2019-06-04 RX ADMIN — ROSUVASTATIN CALCIUM 20 MG: 20 TABLET, FILM COATED ORAL at 17:36

## 2019-06-04 RX ADMIN — PANTOPRAZOLE SODIUM 40 MG: 40 TABLET, DELAYED RELEASE ORAL at 09:53

## 2019-06-04 RX ADMIN — LEVOTHYROXINE SODIUM 88 MCG: 88 TABLET ORAL at 06:47

## 2019-06-04 RX ADMIN — APIXABAN 5 MG: 5 TABLET, FILM COATED ORAL at 09:54

## 2019-06-04 NOTE — PROGRESS NOTES
"DAILY PROGRESS NOTE  [facility]    Patient Identification:  Name: Nathan Baez  Age: 74 y.o.  Sex: male  :  1945  MRN: [id number]         Primary Care Physician: Damien Pierce MD      Subjective  Overall feeling much better.  Notes that the area of the rash in the lower abdomen is now feeling itchy.  No pain.    Objective:  General Appearance:  Comfortable, well-appearing, in no acute distress and not in pain (Morbidly obese).    Vital signs: (most recent): Blood pressure 129/80, pulse 89, temperature 98.5 °F (36.9 °C), temperature source Oral, resp. rate 20, height 170.2 cm (67\"), weight 115 kg (253 lb), SpO2 96 %.    Lungs:  Normal effort and normal respiratory rate.  Breath sounds clear to auscultation.    Heart: Normal rate.  Regular rhythm.    Abdomen: Abdomen is soft and non-distended.  (Obese).  Bowel sounds are normal.   There is no abdominal tenderness.     Extremities: There is no dependent edema.    Neurological: Patient is alert and oriented to person, place and time.    Skin:  Warm and dry.  There is a rash.  (Area of erythema in the lower abdominal area is approximately same the erythema is much less intense.  The duration also much improved)              Vital signs in last 24 hours:  Temp:  [98.1 °F (36.7 °C)-98.5 °F (36.9 °C)] 98.5 °F (36.9 °C)  Heart Rate:  [] 89  Resp:  [20] 20  BP: (126-134)/(75-89) 129/80    Intake/Output:    Intake/Output Summary (Last 24 hours) at 2019 1649  Last data filed at 2019 1414  Gross per 24 hour   Intake 350 ml   Output --   Net 350 ml         Results from last 7 days   Lab Units 19  0504 19  0354 19   WBC 10*3/mm3 7.94 8.90 12.48*   HEMOGLOBIN g/dL 10.7* 10.9* 12.7*   PLATELETS 10*3/mm3 131* 123* 146     Results from last 7 days   Lab Units 19  0504 19  0354 19   SODIUM mmol/L 138 140 137   POTASSIUM mmol/L 3.9 3.4* 4.0   CHLORIDE mmol/L 102 103 97*   CO2 mmol/L 22.5 23.3 " 24.9   BUN mg/dL 14 24* 24*   CREATININE mg/dL 1.06 1.28* 1.16   GLUCOSE mg/dL 115* 173* 168*   Estimated Creatinine Clearance: 75.2 mL/min (by C-G formula based on SCr of 1.06 mg/dL).  Results from last 7 days   Lab Units 06/04/19  0504 06/03/19  0354 06/02/19  1949   CALCIUM mg/dL 8.8 8.1* 9.3   ALBUMIN g/dL  --   --  3.90   MAGNESIUM mg/dL  --  1.9  --    PHOSPHORUS mg/dL  --  3.1  --      Results from last 7 days   Lab Units 06/02/19  1949   ALBUMIN g/dL 3.90   BILIRUBIN mg/dL 1.3*   ALK PHOS U/L 136*   AST (SGOT) U/L 31   ALT (SGPT) U/L 44*       Assessment:  Abdominal wall cellulitis: Clinically most consistent with strep infection.  Await blood cultures.    Change IV vancomycin to p.o. doxycycline.    Anemia, macrocytic: B12, folate, TSH normal    Hypokalemia: Corrected.    Chronic kidney disease stage III: Monitor renal status and fluid status.    Hyperglycemia IGT:    Morbid obesity: Weight loss highly encouraged.    Obstructive sleep apnea: Continue CPAP.    Possible UTI: Should be more than adequately covered from antibiotic she is taking for cellulitis.    Hypertension: Continue home meds.    Acute DVT (deep venous thrombosis) : Continue Eliquis.    Hypothyroidism (acquired)    Edema: DC IV fluids.      Plan:  Please see above.  Probable discharge tomorrow.    Daren Coyle MD  6/4/2019  4:49 PM

## 2019-06-04 NOTE — PLAN OF CARE
Problem: Fall Risk (Adult)  Intervention: Monitor/Assist with Self Care   06/04/19 0554   Activity   Activity Assistance Provided independent   Daily Care Interventions   Self-Care Promotion independence encouraged;BADL personal objects within reach     Intervention: Review Medications/Identify Contributors to Fall Risk   06/04/19 0554   Safety Management   Medication Review/Management medications reviewed       Goal: Absence of Fall  Outcome: Ongoing (interventions implemented as appropriate)   06/04/19 0554   Fall Risk (Adult)   Absence of Fall making progress toward outcome       Problem: Pain, Acute (Adult)  Intervention: Monitor and Manage Analgesia   06/04/19 0554   Safety Management   Medication Review/Management medications reviewed     Intervention: Support/Optimize Psychosocial Response to Acute Pain   06/04/19 0554   Coping/Psychosocial Interventions   Supportive Measures active listening utilized   Psychosocial Support   Diversional Activities television   Family/Support System Care self-care encouraged   Interventions   Trust Relationship/Rapport care explained         Problem: Patient Care Overview  Goal: Plan of Care Review  Outcome: Ongoing (interventions implemented as appropriate)   06/04/19 0554   Coping/Psychosocial   Plan of Care Reviewed With patient   Plan of Care Review   Progress improving   OTHER   Outcome Summary Patient had no complaints overnight. VSS. IV Zosyn and Vanc given. Will continue to monitor

## 2019-06-04 NOTE — PROGRESS NOTES
"Pharmacokinetic Evaluation: Vancomycin IV  Patient: Nathan Baez  Admission Date: 602  MRN: [id number]  : 1945    Day of vancomycin therapy:    Consult for Dr. DENISE Meraz  Treating: Abdominal wall cellulitis  Goal trough: 15-20 mcg/mL     Current regimen: Vancomycin 1500 mg IV q12hrs  Other antimicrobials: Zosyn 4.5 mg IV q8hrs EI    Relevant clinical data and objective history reviewed:  74 y.o. male 170.2 cm (67\") 115 kg (253 lb)  Body mass index is 39.63 kg/m².  Results from last 7 days   Lab Units 19  0504 19  0354 19  1949   CREATININE mg/dL 1.06 1.28* 1.16     Estimated Creatinine Clearance: 75.2 mL/min (by C-G formula based on SCr of 1.06 mg/dL).    Lab Results   Component Value Date    WBC 7.94 2019    WBC 8.90 2019    WBC 12.48 (H) 2019     Temp:  [98.1 °F (36.7 °C)-98.5 °F (36.9 °C)] 98.3 °F (36.8 °C)  Heart Rate:  [] 93  Resp:  [20] 20  BP: (126-144)/(75-89) 134/79  No intake or output data in the 24 hours ending 19 0922     Baseline cultures/labs/radiology:     & 6/3 Lactic acid: 2.4 --> 1.9   Procalcitonin: 6.62  6/3 UA: 2+ bacteria, 6-12 WBC  / HgA1C: 6.17     Radiology:    CXR: No active disease.   CTA Chest: No active disease or pulmonary embolism. Partial visualization of a suspected upper pole left renal cyst again noted     Cultures:    Blood cx x2: NG x 24 hrs      Assessment/Plan:   PMH: Arthritis, DVT, Glaucoma, Hyperlipidemia, Hypertension, Hypothyroid, Prostate cancer, Restless leg, and Sleep apnea.     HPI: Admitted w left-sided chest pain. Recent dx DVT (on Apixaban). Large area of cellulitis across upper pannus and lower abdomen.    1. Vanc trough this AM appropriate. Continue same for now. Scr improved  2. Suggest recheck level soon d/t inconsistent  administration times     Lab Results   Component Value Date    VANCO TROUGH 16.20 mcg/mL 2019 @ 09:17 am     Vancomycin IV Dosing & Levels History:  6/3 " 00:14 2250 mg x1  6/3 09:43, 00:06 1500 mg q12hrs   6/4 09:17 Trough: 16.20 mcg/mL    Thank you for your consult,    Sydin Sandoval RPH

## 2019-06-05 VITALS
RESPIRATION RATE: 18 BRPM | WEIGHT: 253 LBS | DIASTOLIC BLOOD PRESSURE: 94 MMHG | HEART RATE: 87 BPM | HEIGHT: 67 IN | OXYGEN SATURATION: 93 % | BODY MASS INDEX: 39.71 KG/M2 | SYSTOLIC BLOOD PRESSURE: 139 MMHG | TEMPERATURE: 97.9 F

## 2019-06-05 LAB
GLUCOSE BLDC GLUCOMTR-MCNC: 114 MG/DL (ref 70–130)
GLUCOSE BLDC GLUCOMTR-MCNC: 121 MG/DL (ref 70–130)

## 2019-06-05 PROCEDURE — 82962 GLUCOSE BLOOD TEST: CPT

## 2019-06-05 PROCEDURE — 25010000002 PIPERACILLIN SOD-TAZOBACTAM PER 1 G: Performed by: HOSPITALIST

## 2019-06-05 RX ORDER — DOXYCYCLINE 100 MG/1
100 CAPSULE ORAL EVERY 12 HOURS SCHEDULED
Qty: 12 CAPSULE | Refills: 0 | Status: SHIPPED | OUTPATIENT
Start: 2019-06-05 | End: 2019-06-11

## 2019-06-05 RX ADMIN — APIXABAN 5 MG: 5 TABLET, FILM COATED ORAL at 09:04

## 2019-06-05 RX ADMIN — SODIUM CHLORIDE, PRESERVATIVE FREE 3 ML: 5 INJECTION INTRAVENOUS at 09:05

## 2019-06-05 RX ADMIN — CELECOXIB 200 MG: 200 CAPSULE ORAL at 06:27

## 2019-06-05 RX ADMIN — LEVOTHYROXINE SODIUM 88 MCG: 88 TABLET ORAL at 06:27

## 2019-06-05 RX ADMIN — TAZOBACTAM SODIUM AND PIPERACILLIN SODIUM 4.5 G: 500; 4 INJECTION, SOLUTION INTRAVENOUS at 06:27

## 2019-06-05 RX ADMIN — PANTOPRAZOLE SODIUM 40 MG: 40 TABLET, DELAYED RELEASE ORAL at 06:27

## 2019-06-05 RX ADMIN — DOXYCYCLINE 100 MG: 100 CAPSULE ORAL at 09:04

## 2019-06-05 RX ADMIN — LOSARTAN POTASSIUM 100 MG: 100 TABLET, FILM COATED ORAL at 06:27

## 2019-06-05 NOTE — PLAN OF CARE
Problem: Fall Risk (Adult)  Goal: Identify Related Risk Factors and Signs and Symptoms  Outcome: Ongoing (interventions implemented as appropriate)   06/05/19 1131   Fall Risk (Adult)   Related Risk Factors (Fall Risk) environment unfamiliar   Signs and Symptoms (Fall Risk) presence of risk factors       Problem: Pain, Acute (Adult)  Goal: Identify Related Risk Factors and Signs and Symptoms  Outcome: Ongoing (interventions implemented as appropriate)   06/05/19 1131   Pain, Acute (Adult)   Related Risk Factors (Acute Pain) infection   Signs and Symptoms (Acute Pain) verbalization of pain descriptors       Problem: Infection, Risk/Actual (Adult)  Goal: Identify Related Risk Factors and Signs and Symptoms  Outcome: Ongoing (interventions implemented as appropriate)   06/05/19 1131   Infection, Risk/Actual (Adult)   Related Risk Factors (Infection, Risk/Actual) skin integrity impairment   Signs and Symptoms (Infection, Risk/Actual) pain     Goal: Infection Prevention/Resolution  Outcome: Ongoing (interventions implemented as appropriate)   06/05/19 1131   Infection, Risk/Actual (Adult)   Infection Prevention/Resolution making progress toward outcome       Problem: Patient Care Overview  Goal: Plan of Care Review  Outcome: Ongoing (interventions implemented as appropriate)   06/05/19 1131   Coping/Psychosocial   Plan of Care Reviewed With patient;spouse   Plan of Care Review   Progress improving   OTHER   Outcome Summary plan to dc home; abdomen red but improved; no c/o pain; iv zosyn and po doxy administered; tolerating regular diet; VSS

## 2019-06-05 NOTE — PROGRESS NOTES
Continued Stay Note  [facility]     Patient Name: Nathan Baez  MRN: [id number]  Today's Date: 6/5/2019    Admit Date: 6/2/2019    Discharge Plan     Row Name 06/05/19 1144       Plan    Final Discharge Disposition Code  01 - home or self-care    Final Note  DC home via private auto. Skye Grover RN    Row Name 06/05/19 1012       Plan    Plan  return home with spouse    Patient/Family in Agreement with Plan  yes    Plan Comments  Spoke with patient and wife, Coco, at bedside.  They report that patient has been ambulating ad dasha in room and in the hallway.  Discussed the benefits of having HH follow, but they do not feel it is needed at this time.  Plan remains home with wife.  CCP will follow as needed. Skye Grover RN        Discharge Codes    No documentation.       Expected Discharge Date and Time     Expected Discharge Date Expected Discharge Time    Jun 5, 2019             Skye Grover RN

## 2019-06-05 NOTE — PLAN OF CARE
Problem: Fall Risk (Adult)  Goal: Identify Related Risk Factors and Signs and Symptoms  Outcome: Ongoing (interventions implemented as appropriate)    Goal: Absence of Fall  Outcome: Ongoing (interventions implemented as appropriate)      Problem: Pain, Acute (Adult)  Goal: Identify Related Risk Factors and Signs and Symptoms  Outcome: Ongoing (interventions implemented as appropriate)    Goal: Acceptable Pain Control/Comfort Level  Outcome: Ongoing (interventions implemented as appropriate)      Problem: Infection, Risk/Actual (Adult)  Goal: Identify Related Risk Factors and Signs and Symptoms  Outcome: Ongoing (interventions implemented as appropriate)    Goal: Infection Prevention/Resolution  Outcome: Ongoing (interventions implemented as appropriate)      Problem: Patient Care Overview  Goal: Plan of Care Review  Outcome: Ongoing (interventions implemented as appropriate)   06/05/19 0526   Plan of Care Review   Progress improving   OTHER   Outcome Summary VSS, , abdomenal redness improving, IV antibiotocis changed to PO except Zosyn, no c/o pain, wife at bedside, prob d/c today     Goal: Individualization and Mutuality  Outcome: Ongoing (interventions implemented as appropriate)    Goal: Discharge Needs Assessment  Outcome: Ongoing (interventions implemented as appropriate)    Goal: Interprofessional Rounds/Family Conf  Outcome: Ongoing (interventions implemented as appropriate)

## 2019-06-05 NOTE — PROGRESS NOTES
Continued Stay Note  [facility]     Patient Name: Nathan Baez  MRN: [id number]  Today's Date: 6/5/2019    Admit Date: 6/2/2019    Discharge Plan     Row Name 06/05/19 1012       Plan    Plan  return home with spouse    Patient/Family in Agreement with Plan  yes    Plan Comments  Spoke with patient and wife, Coco, at bedside.  They report that patient has been ambulating ad dasha in room and in the hallway.  Discussed the benefits of having HH follow, but they do not feel it is needed at this time.  Plan remains home with wife.  CCP will follow as needed. Skye Grover RN        Discharge Codes    No documentation.       Expected Discharge Date and Time     Expected Discharge Date Expected Discharge Time    Jun 5, 2019             Skye Grover RN

## 2019-06-05 NOTE — DISCHARGE SUMMARY
PHYSICIAN DISCHARGE SUMMARY                                                                        [facility]    Patient Identification:  Name: Nathan Baez  Age: 74 y.o.  Sex: male  :  1945  MRN: [id number]  Primary Care Physician: Damien Pierce MD    Admit date: 2019  Discharge date and time: 2019     Discharged Condition: good    Discharge Diagnoses:   Abdominal wall cellulitis:    Anemia, macrocytic: B12, folate, TSH normal    Hypokalemia: Corrected.    Chronic kidney disease stage III:     Hyperglycemia IGT:    Morbid obesity:    Obstructive sleep apnea:     Hypertension: Continue home meds.    Acute DVT (deep venous thrombosis) :     Hypothyroidism (acquired)    Edema:           Hospital Course:  Pleasant 73-year-old male who is in the process of being worked up for atypical left-sided chest pain as well as re-recently diagnosed DVT with Eliquis just started.  He presents with a large cellulitic area on his lower abdominal area.  He did have a temperature of 100.9 on presentation although he is unaware of fever sweats or chills.  He did have a leukocytosis of 12.48 with a left shift.  On my initial evaluation there is a large area of erythema that extended from about the level of the umbilicus covering the upper portion of an abdominal pannus.  It did not extend beyond the crease.  No extension to the groin area.  There is no appreciable maceration of the skin under the pannus.  It was warm to touch and associated with palpable induration.  He was cultured and initially covered with Zosyn and vancomycin.  Vancomycin switched over to oral doxycycline.  He had progressive improvement from hospitalization.  Very quick defervescent's of his fever and resolution of the leukocytosis.  Symptomatically he is doing well.  His only complaint is that he notes some itching in the area of the rash.  Blood cultures are  negative.  Today continues to show improvement and at this point can be switched over to oral antibiotics and discharged remainder of his treatment follow-up as an outpatient.        Consults:     Consults     Date and Time Order Name Status Description    6/2/2019 9702 [facility] (on-call MD unless specified) Completed             Discharge Exam:  Physical Exam   Afebrile vital signs stable.  Well-developed well-nourished male in no apparent distress.  Lungs clear to auscultation good air movement.  Heart regular rate and rhythm.  Extremities with trace pretibial edema bilaterally patient is wearing his support hose.  Abdomen is obese.  There remains erythema extending from the level umbilicus over the superior portion of his pannus.  The intensity however is markedly improved.  The induration is markedly regressed and only a small area in the lower portion of the pannus is still palpably indurated.  No tenderness.  No pustules.  No drainage.  Warmth to touch is resolved.  Alert oriented conversant cooperative and pleasant.     Disposition:  Home    Patient Instructions:      Discharge Medications      New Medications      Instructions Start Date   doxycycline 100 MG capsule  Commonly known as:  MONODOX   100 mg, Oral, Every 12 Hours Scheduled         Continue These Medications      Instructions Start Date   albuterol sulfate  (90 Base) MCG/ACT inhaler  Commonly known as:  PROVENTIL HFA;VENTOLIN HFA;PROAIR HFA   2 puffs, Inhalation, Every 4 Hours PRN      amLODIPine 5 MG tablet  Commonly known as:  NORVASC   5 mg, Oral, Every Morning      apixaban 5 MG tablet tablet  Commonly known as:  ELIQUIS   5 mg, Oral, 2 Times Daily      BREO ELLIPTA IN   Inhalation      CELEBREX PO   200 mg, Oral, Every Morning      COMBIGAN 0.2-0.5 % ophthalmic solution  Generic drug:  brimonidine-timolol   1 drop, Both Eyes, Every 12 Hours      dorzolamide-timolol 22.3-6.8 MG/ML ophthalmic solution  Commonly known as:  COSOPT   1 drop, 2  Times Daily      HYDROCHLOROTHIAZIDE PO   25 mg, Oral, Daily With Breakfast      Latanoprost 0.005 % emulsion   1 drop, Ophthalmic, Nightly      losartan 100 MG tablet  Commonly known as:  COZAAR   100 mg, Oral, Every Morning      montelukast 10 MG tablet  Commonly known as:  SINGULAIR   10 mg, Oral, Nightly      NON FORMULARY   1 each, Both Eyes, 2 Times Daily, lalanoprost 0.005% 1 drop both eye       PANTOPRAZOLE SODIUM PO   40 mg, Oral, Every Morning      pramipexole 1.5 MG tablet  Commonly known as:  MIRAPEX   0.75 mg, Oral, 2 Times Daily      rosuvastatin 20 MG tablet  Commonly known as:  CRESTOR   20 mg, Oral, Every Evening      SYNTHROID 88 MCG tablet  Generic drug:  levothyroxine   88 mcg, Oral, Every Morning         Stop These Medications    cephalexin 500 MG capsule  Commonly known as:  KEFLEX          Diet Instructions     Diet: Cardiac      Discharge Diet:  Cardiac        No future appointments.  Additional Instructions for the Follow-ups that You Need to Schedule     Discharge Follow-up with PCP   As directed       Currently Documented PCP:    Damien Pierce MD    PCP Phone Number:    [phone]     Follow Up Details:  1 week           Follow-up Information     Milagros Zamora APRN Follow up in 13 day(s).    Specialty:  Nurse Practitioner  Why:  Follow up on June 18th @ 10:00 AM  Contact information:   [address] [facility] [address] [phone]             Damien Pierce MD .    Specialty:  Family Medicine  Why:  1 week  Contact information:  [address] [facility] [address]  [phone]                 Discharge Order (From admission, onward)    Start     Ordered    06/05/19 1107  Discharge patient  Once     Expected Discharge Date:  06/05/19    Discharge Disposition:  Home or Self Care    Physician of Record for Attribution - Please select from Treatment Team:  WEST NELSON [[id number]]    Review needed by CMO to determine Physician of Record:  No       Question  Answer Comment   Physician of Record for Attribution - Please select from Treatment Team DAREN COYLE.    Review needed by CMO to determine Physician of Record No        06/05/19 1110            Total time spent discharging patient including evaluation,post hospitalization follow up,  medication and post hospitalization instructions and education total time exceeds 30 minutes.    Signed:  Daren Coyle MD  6/5/2019  11:10 AM    EMR Dragon/Transcription disclaimer:   Much of this encounter note is an electronic transcription/translation of spoken language to printed text. The electronic translation of spoken language may permit erroneous, or at times, nonsensical words or phrases to be inadvertently transcribed; Although I have reviewed the note for such errors, some may still exist.

## 2019-06-06 ENCOUNTER — READMISSION MANAGEMENT (OUTPATIENT)
Dept: CALL CENTER | Facility: HOSPITAL | Age: 74
End: 2019-06-06

## 2019-06-06 NOTE — OUTREACH NOTE
Prep Survey      Responses   Facility patient discharged from?  [facility]   Is patient eligible?  Yes   Discharge diagnosis  Abdominal wall cellulitis Chronic kidney disease DVT   Does the patient have one of the following disease processes/diagnoses(primary or secondary)?  Other   Does the patient have Home health ordered?  No   Is there a DME ordered?  No   Prep survey completed?  Yes          Mindy Gordon RN

## 2019-06-07 LAB
BACTERIA SPEC AEROBE CULT: NORMAL
BACTERIA SPEC AEROBE CULT: NORMAL

## 2019-06-08 ENCOUNTER — READMISSION MANAGEMENT (OUTPATIENT)
Dept: CALL CENTER | Facility: HOSPITAL | Age: 74
End: 2019-06-08

## 2019-06-08 NOTE — OUTREACH NOTE
Medical Week 1 Survey      Responses   Facility patient discharged from?  [facility]   Does the patient have one of the following disease processes/diagnoses(primary or secondary)?  Other   Is there a successful TCM telephone encounter documented?  No   Week 1 attempt successful?  No   Unsuccessful attempts  Attempt 1          Sanam Rodriguez RN

## 2019-06-10 ENCOUNTER — READMISSION MANAGEMENT (OUTPATIENT)
Dept: CALL CENTER | Facility: HOSPITAL | Age: 74
End: 2019-06-10

## 2019-06-10 NOTE — OUTREACH NOTE
Medical Week 1 Survey      Responses   Facility patient discharged from?  [facility]   Does the patient have one of the following disease processes/diagnoses(primary or secondary)?  Other   Is there a successful TCM telephone encounter documented?  No   Week 1 attempt successful?  Yes   Call start time  1402   Call end time  1406   Discharge diagnosis  Abdominal wall cellulitis Chronic kidney disease DVT   Meds reviewed with patient/caregiver?  Yes   Is the patient having any side effects they believe may be caused by any medication additions or changes?  No   Does the patient have all medications ordered at discharge?  Yes   Is the patient taking all medications as directed (includes completed medication regime)?  Yes   Medication comments  still taking the antibiotics   Does the patient have a primary care provider?   Yes   Does the patient have an appointment with their PCP within 7 days of discharge?  Yes   Comments regarding PCP  saw PCP last Thursday   Has the patient kept scheduled appointments due by today?  Yes   Has home health visited the patient within 72 hours of discharge?  N/A   Psychosocial issues?  No   Did the patient receive a copy of their discharge instructions?  Yes   Nursing interventions  Reviewed instructions with patient   What is the patient's perception of their health status since discharge?  Improving   Is the patient/caregiver able to teach back the hierarchy of who to call/visit for symptoms/problems? PCP, Specialist, Home health nurse, Urgent Care, ED, 911  Yes   Additional teach back comments  Pt says he is doing much better, no questions or concerns noted.   Week 1 call completed?  Yes          Hyun Sanon RN

## 2019-06-18 ENCOUNTER — READMISSION MANAGEMENT (OUTPATIENT)
Dept: CALL CENTER | Facility: HOSPITAL | Age: 74
End: 2019-06-18

## 2019-06-18 NOTE — OUTREACH NOTE
Medical Week 2 Survey      Responses   Facility patient discharged from?  [facility]   Does the patient have one of the following disease processes/diagnoses(primary or secondary)?  Other   Week 2 attempt successful?  Yes   Call start time  0917   Discharge diagnosis  Abdominal wall cellulitis Chronic kidney disease DVT   Call end time  0920   Meds reviewed with patient/caregiver?  Yes   Is the patient having any side effects they believe may be caused by any medication additions or changes?  No   Does the patient have all medications ordered at discharge?  Yes   Is the patient taking all medications as directed (includes completed medication regime)?  Yes   Does the patient have a primary care provider?   Yes   Does the patient have an appointment with their PCP within 7 days of discharge?  Yes   Has the patient kept scheduled appointments due by today?  Yes   Has home health visited the patient within 72 hours of discharge?  N/A   Psychosocial issues?  No   Comments  Patient reports he is doing well. No further chest pain. Abdominal wound is healing well.    Did the patient receive a copy of their discharge instructions?  Yes   Nursing interventions  Reviewed instructions with patient   What is the patient's perception of their health status since discharge?  Improving   Is the patient/caregiver able to teach back signs and symptoms related to disease process for when to call PCP?  Yes   Is the patient/caregiver able to teach back signs and symptoms related to disease process for when to call 911?  Yes   Is the patient/caregiver able to teach back the hierarchy of who to call/visit for symptoms/problems? PCP, Specialist, Home health nurse, Urgent Care, ED, 911  Yes   Additional teach back comments  Seek medical care if  develops chest pain, fever or chills, SOB, or worsening s/s of infection of abdominal wound. Patient verbalized understanding.   Week 2 Call Completed?  Yes          Da Acosta RN

## 2019-06-26 ENCOUNTER — READMISSION MANAGEMENT (OUTPATIENT)
Dept: CALL CENTER | Facility: HOSPITAL | Age: 74
End: 2019-06-26

## 2019-06-26 NOTE — OUTREACH NOTE
Medical Week 3 Survey      Responses   Facility patient discharged from?  [place]   Does the patient have one of the following disease processes/diagnoses(primary or secondary)?  Other   Week 3 attempt successful?  Yes   Call start time  1017   Call end time  1020   Discharge diagnosis  Abdominal wall cellulitis Chronic kidney disease DVT   Meds reviewed with patient/caregiver?  Yes   Is the patient taking all medications as directed (includes completed medication regime)?  Yes   Medication comments  more antibiotics for cellulitis on stomach from PCP   Has the patient kept scheduled appointments due by today?  Yes   What is the patient's perception of their health status since discharge?  Improving   Is the patient/caregiver able to teach back signs and symptoms related to disease process for when to call PCP?  Yes   Is the patient/caregiver able to teach back signs and symptoms related to disease process for when to call 911?  Yes   Is the patient/caregiver able to teach back the hierarchy of who to call/visit for symptoms/problems? PCP, Specialist, Home health nurse, Urgent Care, ED, 911  Yes   Additional teach back comments  Area on stomach is better but still there. Pt on more antibx and also taking yogurt. He is keeping all appts. Afebrile at this time.    Week 3 Call Completed?  Yes          Renetta Lema RN

## 2019-07-04 ENCOUNTER — HOSPITAL ENCOUNTER (EMERGENCY)
Facility: HOSPITAL | Age: 74
Discharge: HOME | End: 2019-07-05
Attending: EMERGENCY MEDICINE | Admitting: EMERGENCY MEDICINE

## 2019-07-04 ENCOUNTER — APPOINTMENT (OUTPATIENT)
Dept: CT IMAGING | Facility: HOSPITAL | Age: 74
End: 2019-07-04
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
## 2019-07-04 DIAGNOSIS — S20.212A RIB CONTUSION, LEFT, INITIAL ENCOUNTER: Primary | ICD-10-CM

## 2019-07-04 LAB
ANION GAP SERPL CALCULATED.3IONS-SCNC: 10.8 MMOL/L (ref 5–15)
BASOPHILS # BLD AUTO: 0.03 10*3/MM3 (ref 0–0.2)
BASOPHILS NFR BLD AUTO: 0.5 % (ref 0–1.5)
BUN BLD-MCNC: 30 MG/DL (ref 8–23)
BUN/CREAT SERPL: 21.1 (ref 7–25)
CALCIUM SPEC-SCNC: 9.4 MG/DL (ref 8.6–10.5)
CHLORIDE SERPL-SCNC: 101 MMOL/L (ref 98–107)
CO2 SERPL-SCNC: 21.2 MMOL/L (ref 22–29)
CREAT BLD-MCNC: 1.42 MG/DL (ref 0.76–1.27)
DEPRECATED RDW RBC AUTO: 49.2 FL (ref 37–54)
EOSINOPHIL # BLD AUTO: 0.14 10*3/MM3 (ref 0–0.4)
EOSINOPHIL NFR BLD AUTO: 2.3 % (ref 0.3–6.2)
ERYTHROCYTE [DISTWIDTH] IN BLOOD BY AUTOMATED COUNT: 13.9 % (ref 12.3–15.4)
GFR SERPL CREATININE-BSD FRML MDRD: 49 ML/MIN/1.73
GLUCOSE BLD-MCNC: 114 MG/DL (ref 65–99)
HCT VFR BLD AUTO: 39.8 % (ref 37.5–51)
HGB BLD-MCNC: 12.4 G/DL (ref 13–17.7)
IMM GRANULOCYTES # BLD AUTO: 0.14 10*3/MM3 (ref 0–0.05)
IMM GRANULOCYTES NFR BLD AUTO: 2.3 % (ref 0–0.5)
LYMPHOCYTES # BLD AUTO: 1.19 10*3/MM3 (ref 0.7–3.1)
LYMPHOCYTES NFR BLD AUTO: 19.4 % (ref 19.6–45.3)
MCH RBC QN AUTO: 30 PG (ref 26.6–33)
MCHC RBC AUTO-ENTMCNC: 31.2 G/DL (ref 31.5–35.7)
MCV RBC AUTO: 96.1 FL (ref 79–97)
MONOCYTES # BLD AUTO: 0.66 10*3/MM3 (ref 0.1–0.9)
MONOCYTES NFR BLD AUTO: 10.7 % (ref 5–12)
NEUTROPHILS # BLD AUTO: 3.98 10*3/MM3 (ref 1.7–7)
NEUTROPHILS NFR BLD AUTO: 64.8 % (ref 42.7–76)
NRBC BLD AUTO-RTO: 0 /100 WBC (ref 0–0.2)
PLATELET # BLD AUTO: 162 10*3/MM3 (ref 140–450)
PMV BLD AUTO: 10.2 FL (ref 6–12)
POTASSIUM BLD-SCNC: 3.7 MMOL/L (ref 3.5–5.2)
RBC # BLD AUTO: 4.14 10*6/MM3 (ref 4.14–5.8)
SODIUM BLD-SCNC: 133 MMOL/L (ref 136–145)
WBC NRBC COR # BLD: 6.14 10*3/MM3 (ref 3.4–10.8)

## 2019-07-04 PROCEDURE — 25010000002 IOPAMIDOL 61 % SOLUTION: Performed by: EMERGENCY MEDICINE

## 2019-07-04 PROCEDURE — 71260 CT THORAX DX C+: CPT

## 2019-07-04 PROCEDURE — 25010000002 ONDANSETRON PER 1 MG: Performed by: EMERGENCY MEDICINE

## 2019-07-04 PROCEDURE — 74177 CT ABD & PELVIS W/CONTRAST: CPT

## 2019-07-04 PROCEDURE — 85025 COMPLETE CBC W/AUTO DIFF WBC: CPT | Performed by: EMERGENCY MEDICINE

## 2019-07-04 PROCEDURE — 96375 TX/PRO/DX INJ NEW DRUG ADDON: CPT

## 2019-07-04 PROCEDURE — 96374 THER/PROPH/DIAG INJ IV PUSH: CPT

## 2019-07-04 PROCEDURE — 99284 EMERGENCY DEPT VISIT MOD MDM: CPT

## 2019-07-04 PROCEDURE — 36415 COLL VENOUS BLD VENIPUNCTURE: CPT

## 2019-07-04 PROCEDURE — 25010000002 HYDROMORPHONE 1 MG/ML SOLUTION: Performed by: EMERGENCY MEDICINE

## 2019-07-04 PROCEDURE — 80048 BASIC METABOLIC PNL TOTAL CA: CPT | Performed by: EMERGENCY MEDICINE

## 2019-07-04 RX ORDER — ONDANSETRON 2 MG/ML
4 INJECTION INTRAMUSCULAR; INTRAVENOUS ONCE
Status: COMPLETED | OUTPATIENT
Start: 2019-07-04 | End: 2019-07-04

## 2019-07-04 RX ORDER — SODIUM CHLORIDE 0.9 % (FLUSH) 0.9 %
10 SYRINGE (ML) INJECTION AS NEEDED
Status: DISCONTINUED | OUTPATIENT
Start: 2019-07-04 | End: 2019-07-05 | Stop reason: HOSPADM

## 2019-07-04 RX ADMIN — SODIUM CHLORIDE, POTASSIUM CHLORIDE, SODIUM LACTATE AND CALCIUM CHLORIDE 1000 ML: 600; 310; 30; 20 INJECTION, SOLUTION INTRAVENOUS at 23:03

## 2019-07-04 RX ADMIN — ONDANSETRON HYDROCHLORIDE 4 MG: 2 SOLUTION INTRAMUSCULAR; INTRAVENOUS at 22:08

## 2019-07-04 RX ADMIN — HYDROMORPHONE HYDROCHLORIDE 1 MG: 1 INJECTION, SOLUTION INTRAMUSCULAR; INTRAVENOUS; SUBCUTANEOUS at 22:10

## 2019-07-04 RX ADMIN — IOPAMIDOL 85 ML: 612 INJECTION, SOLUTION INTRAVENOUS at 23:29

## 2019-07-05 VITALS
SYSTOLIC BLOOD PRESSURE: 147 MMHG | HEART RATE: 84 BPM | TEMPERATURE: 97.8 F | OXYGEN SATURATION: 95 % | WEIGHT: 250.3 LBS | HEIGHT: 67 IN | DIASTOLIC BLOOD PRESSURE: 95 MMHG | BODY MASS INDEX: 39.28 KG/M2 | RESPIRATION RATE: 20 BRPM

## 2019-07-05 RX ORDER — HYDROCODONE BITARTRATE AND ACETAMINOPHEN 5; 325 MG/1; MG/1
1 TABLET ORAL EVERY 6 HOURS PRN
Qty: 12 TABLET | Refills: 0 | Status: SHIPPED | OUTPATIENT
Start: 2019-07-05 | End: 2019-07-08

## 2019-07-05 NOTE — ED TRIAGE NOTES
Pt tripped and fell onto left side, c/o left sided rib pain. Denies hitting head recently stopped blood thinner.

## 2019-07-05 NOTE — ED PROVIDER NOTES
EMERGENCY DEPARTMENT ENCOUNTER    Room Number:  17/17  Date seen:  7/5/2019  Time seen: 9:49 PM  PCP: Damien Pierce MD  Historian: Patient    HPI:  Chief Complaint: Rib pain  A complete HPI/ROS/PMH/PSH/SH/FH are unobtainable due to: None  Context: Nathan Baez is a 74 y.o. male who presents to the ED c/o L sided rib pain, exacerbated by inspiration, that began suddenly PTA when pt fell onto his L side. Pt also c/o R knee pain and bilateral shoulder pain. He is unsure of how he fell and denies any head injury or LOC during the fall. He was on Eliquis for treatment of a DVT, but it was discontinued 3 days ago.    PAST MEDICAL HISTORY  Active Ambulatory Problems     Diagnosis Date Noted   • OA (osteoarthritis) of knee 09/13/2016   • Hypertension    • Abdominal wall cellulitis 06/02/2019   • Acute DVT (deep venous thrombosis) (CMS/HCC) 06/03/2019   • Hypothyroidism (acquired) 06/03/2019     Resolved Ambulatory Problems     Diagnosis Date Noted   • No Resolved Ambulatory Problems     Past Medical History:   Diagnosis Date   • Arthritis    • DVT (deep venous thrombosis) (CMS/HCC)    • Glaucoma    • Hyperlipidemia    • Hypertension    • Hypothyroid    • Prostate cancer (CMS/HCC)    • Restless leg    • Sleep apnea          PAST SURGICAL HISTORY  Past Surgical History:   Procedure Laterality Date   • CATARACT EXTRACTION     • COLONOSCOPY     • HERNIA REPAIR     • JOINT REPLACEMENT Right 2014   • NECK SURGERY     • DC TOTAL KNEE ARTHROPLASTY Left 9/13/2016    Procedure: LT TOTAL KNEE ARTHROPLASTY;  Surgeon: Tadeo Basurto MD;  Location: [facility];  Service: Orthopedics   • PROSTATE SURGERY     • THYROID SURGERY     • TOTAL KNEE ARTHROPLASTY           FAMILY HISTORY  Family History   Problem Relation Age of Onset   • Cancer Mother         COLON   • Cancer Father         PROSTATE   • Cancer Sister         BREAST CANCER   • Heart attack Brother    • Heart disease Brother          SOCIAL HISTORY  Social History      Socioeconomic History   • Marital status:      Spouse name: Not on file   • Number of children: Not on file   • Years of education: Not on file   • Highest education level: Not on file   Tobacco Use   • Smoking status: Former Smoker     Last attempt to quit: 1984     Years since quittin.5   • Smokeless tobacco: Never Used   Substance and Sexual Activity   • Alcohol use: No     Comment: 3-4 MONTHLY   • Drug use: No   • Sexual activity: Defer         ALLERGIES  Patient has no known allergies.        REVIEW OF SYSTEMS  Review of Systems   Constitutional: Negative for fever.   HENT: Negative for sore throat.    Respiratory: Negative for shortness of breath.    Cardiovascular: Negative for chest pain.   Gastrointestinal: Negative for abdominal pain.   Endocrine: Negative for polyuria.   Genitourinary: Negative for dysuria.   Musculoskeletal: Positive for myalgias (L sided rib pain, exacerbated by inspiration, R knee pain, and bilateral shoulder pain). Negative for neck pain.   Skin: Negative for rash.   Neurological: Negative for syncope and headaches.   All other systems reviewed and are negative.           PHYSICAL EXAM  ED Triage Vitals [19 2134]   Temp Heart Rate Resp BP SpO2   97.8 °F (36.6 °C) 101 16 145/90 93 %      Temp src Heart Rate Source Patient Position BP Location FiO2 (%)   Tympanic -- Sitting Left arm --         GENERAL: not distressed  HENT: nares patent, head is atraumatic  EYES: no scleral icterus  CV: regular rhythm, regular rate  RESPIRATORY: normal effort, CTAB  ABDOMEN: soft, nontender  MUSCULOSKELETAL: no deformity, there is non-focal shoulder tenderness bilaterally, there is L lateral chest wall tenderness, there is no C-spine tenderness, intact R knee extension with stable ligaments, and there is R patellar tenderness  NEURO: alert, WALTER, FC  SKIN: warm, dry    Vital signs and nursing notes reviewed.    LAB RESULTS  Recent Results (from the past 24 hour(s))   Basic  Metabolic Panel    Collection Time: 07/04/19 10:07 PM   Result Value Ref Range    Glucose 114 (H) 65 - 99 mg/dL    BUN 30 (H) 8 - 23 mg/dL    Creatinine 1.42 (H) 0.76 - 1.27 mg/dL    Sodium 133 (L) 136 - 145 mmol/L    Potassium 3.7 3.5 - 5.2 mmol/L    Chloride 101 98 - 107 mmol/L    CO2 21.2 (L) 22.0 - 29.0 mmol/L    Calcium 9.4 8.6 - 10.5 mg/dL    eGFR Non African Amer 49 (L) >60 mL/min/1.73    BUN/Creatinine Ratio 21.1 7.0 - 25.0    Anion Gap 10.8 5.0 - 15.0 mmol/L   CBC Auto Differential    Collection Time: 07/04/19 10:07 PM   Result Value Ref Range    WBC 6.14 3.40 - 10.80 10*3/mm3    RBC 4.14 4.14 - 5.80 10*6/mm3    Hemoglobin 12.4 (L) 13.0 - 17.7 g/dL    Hematocrit 39.8 37.5 - 51.0 %    MCV 96.1 79.0 - 97.0 fL    MCH 30.0 26.6 - 33.0 pg    MCHC 31.2 (L) 31.5 - 35.7 g/dL    RDW 13.9 12.3 - 15.4 %    RDW-SD 49.2 37.0 - 54.0 fl    MPV 10.2 6.0 - 12.0 fL    Platelets 162 140 - 450 10*3/mm3    Neutrophil % 64.8 42.7 - 76.0 %    Lymphocyte % 19.4 (L) 19.6 - 45.3 %    Monocyte % 10.7 5.0 - 12.0 %    Eosinophil % 2.3 0.3 - 6.2 %    Basophil % 0.5 0.0 - 1.5 %    Immature Grans % 2.3 (H) 0.0 - 0.5 %    Neutrophils, Absolute 3.98 1.70 - 7.00 10*3/mm3    Lymphocytes, Absolute 1.19 0.70 - 3.10 10*3/mm3    Monocytes, Absolute 0.66 0.10 - 0.90 10*3/mm3    Eosinophils, Absolute 0.14 0.00 - 0.40 10*3/mm3    Basophils, Absolute 0.03 0.00 - 0.20 10*3/mm3    Immature Grans, Absolute 0.14 (H) 0.00 - 0.05 10*3/mm3    nRBC 0.0 0.0 - 0.2 /100 WBC       Ordered the above labs and reviewed the results.        RADIOLOGY  CT Chest With Contrast   Final Result   No acute traumatic injury identified.       Radiation dose reduction techniques were utilized, including automated   exposure control and exposure modulation based on body size.       This report was finalized on 7/5/2019 12:28 AM by Dr. Tess Sorto M.D.          CT Abdomen Pelvis With Contrast   Final Result   No acute traumatic injury identified.       Radiation dose  reduction techniques were utilized, including automated   exposure control and exposure modulation based on body size.       This report was finalized on 7/5/2019 12:28 AM by Dr. Tess Sorto M.D.               MEDICATIONS GIVEN IN ER  Medications   sodium chloride 0.9 % flush 10 mL (not administered)   HYDROmorphone (DILAUDID) injection 1 mg (1 mg Intravenous Given 7/4/19 2210)   ondansetron (ZOFRAN) injection 4 mg (4 mg Intravenous Given 7/4/19 2208)   lactated ringers bolus 1,000 mL (1,000 mL Intravenous New Bag 7/4/19 2303)   iopamidol (ISOVUE-300) 61 % injection 100 mL (85 mL Intravenous Given by Other 7/4/19 2329)       PROGRESS AND CONSULTS     2159 Ordered CBC, BMP, CT abd/pel, and CT chest for further evaluation. Ordered dilaudid for pain and zofran for nausea.    0041 Rechecked with pt and inform ed him of the results of his labs and CTs. Plan to discharge with [place]. He has been advised to follow up with his PCP for his kidney function. Pt understands and agrees with the plan, all questions answered.    MEDICAL DECISION MAKING      MDM  Number of Diagnoses or Management Options  Fall, initial encounter:   Rib contusion, left, initial encounter:   Diagnosis management comments: CT scan is negative.  No evidence of any splenic laceration.  No rib fractures.  This is a rib contusion.  Patient is ambulatory.  Discharge home with good return precautions.    The patient is abnormal creatinine.  He Arslan has a follow-up appointment scheduled with his PCP next week.       Amount and/or Complexity of Data Reviewed  Clinical lab tests: ordered and reviewed  Tests in the radiology section of CPT®: ordered and reviewed  Decide to obtain previous medical records or to obtain history from someone other than the patient: yes  Obtain history from someone other than the patient: yes (wife)  Independent visualization of images, tracings, or specimens: yes (No splenic laceration)    Patient Progress  Patient  progress: stable             DIAGNOSIS  Final diagnoses:   Rib contusion, left, initial encounter   Fall, initial encounter         DISPOSITION  DISCHARGE    Patient discharged in stable condition.    Reviewed implications of results, diagnosis, meds, responsibility to follow up, warning signs and symptoms of possible worsening, potential complications and reasons to return to ER, including new or worsening sxs.    Patient/Family voiced understanding of above instructions.    Discussed plan for discharge, as there is no emergent indication for admission. Patient referred to primary care provider for BP management due to today's BP. Pt/family is agreeable and understands need for follow up and repeat testing.  Pt is aware that discharge does not mean that nothing is wrong but it indicates no emergency is present that requires admission and they must continue care with follow-up as given below or physician of their choice.     FOLLOW-UP  Damien Pierce MD  [id number] [id number]  [phone]    Schedule an appointment as soon as possible for a visit   As needed       Medication List      New Prescriptions    HYDROcodone-acetaminophen 5-325 MG per tablet  Commonly known as:  NORCO  Take 1 tablet by mouth Every 6 (Six) Hours As Needed for Moderate Pain    for up to 3 days.        Latest Documented Vital Signs:  As of 12:43 AM  BP- 151/81 HR- 81 Temp- 97.8 °F (36.6 °C) (Tympanic) O2 sat- 96%    --  Documentation assistance provided by torie Song for Dr. Saleem MD.  Information recorded by the scribe was done at my direction and has been verified and validated by me.     Ann Song  07/05/19 0043       Juni Monge II, MD  07/05/19 0147       Juni Monge II, MD  07/05/19 0148

## 2019-07-06 ENCOUNTER — READMISSION MANAGEMENT (OUTPATIENT)
Dept: CALL CENTER | Facility: HOSPITAL | Age: 74
End: 2019-07-06

## 2019-07-06 NOTE — OUTREACH NOTE
Medical Week 4 Survey      Responses   Facility patient discharged from?  [facility]   Does the patient have one of the following disease processes/diagnoses(primary or secondary)?  Other   Week 4 attempt successful?  Yes   Call start time  1636   Call end time  1638   Discharge diagnosis  Abdominal wall cellulitis Chronic kidney disease DVT   Meds reviewed with patient/caregiver?  Yes   Is the patient having any side effects they believe may be caused by any medication additions or changes?  No   Is the patient taking all medications as directed (includes completed medication regime)?  Yes   Has the patient kept scheduled appointments due by today?  Yes   Is the patient still receiving Home Health Services?  N/A   Psychosocial issues?  No   What is the patient's perception of their health status since discharge?  Improving   Is the patient/caregiver able to teach back signs and symptoms related to disease process for when to call PCP?  Yes   Is the patient/caregiver able to teach back signs and symptoms related to disease process for when to call 911?  Yes   Is the patient/caregiver able to teach back the hierarchy of who to call/visit for symptoms/problems? PCP, Specialist, Home health nurse, Urgent Care, ED, 911  Yes   Week 4 Call Completed?  Yes   Would the patient like one additional call?  No   Graduated  Yes          Nathan Silva RN

## 2019-07-11 ENCOUNTER — OFFICE VISIT (OUTPATIENT)
Dept: FAMILY MEDICINE CLINIC | Facility: CLINIC | Age: 74
End: 2019-07-11

## 2019-07-11 VITALS
DIASTOLIC BLOOD PRESSURE: 90 MMHG | HEART RATE: 84 BPM | OXYGEN SATURATION: 94 % | TEMPERATURE: 98.1 F | HEIGHT: 67 IN | SYSTOLIC BLOOD PRESSURE: 145 MMHG | BODY MASS INDEX: 38.45 KG/M2 | WEIGHT: 245 LBS

## 2019-07-11 DIAGNOSIS — K21.9 GASTROESOPHAGEAL REFLUX DISEASE WITHOUT ESOPHAGITIS: ICD-10-CM

## 2019-07-11 DIAGNOSIS — I10 ESSENTIAL HYPERTENSION: Primary | ICD-10-CM

## 2019-07-11 DIAGNOSIS — R73.9 HYPERGLYCEMIA: ICD-10-CM

## 2019-07-11 DIAGNOSIS — E03.9 HYPOTHYROIDISM (ACQUIRED): ICD-10-CM

## 2019-07-11 PROBLEM — I87.2 VENOUS INSUFFICIENCY: Status: ACTIVE | Noted: 2019-07-11

## 2019-07-11 PROBLEM — G47.33 OSA (OBSTRUCTIVE SLEEP APNEA): Status: ACTIVE | Noted: 2019-07-11

## 2019-07-11 PROBLEM — M51.36 DDD (DEGENERATIVE DISC DISEASE), LUMBAR: Status: ACTIVE | Noted: 2019-07-11

## 2019-07-11 PROBLEM — E78.2 MIXED HYPERLIPIDEMIA: Status: ACTIVE | Noted: 2019-07-11

## 2019-07-11 PROBLEM — T78.40XA ALLERGIC: Status: ACTIVE | Noted: 2019-07-11

## 2019-07-11 PROBLEM — F51.01 PRIMARY INSOMNIA: Status: ACTIVE | Noted: 2019-07-11

## 2019-07-11 PROBLEM — I82.409 ACUTE DVT (DEEP VENOUS THROMBOSIS): Status: RESOLVED | Noted: 2019-06-03 | Resolved: 2019-07-11

## 2019-07-11 PROBLEM — M51.369 DDD (DEGENERATIVE DISC DISEASE), LUMBAR: Status: ACTIVE | Noted: 2019-07-11

## 2019-07-11 PROBLEM — G25.81 RESTLESS LEG SYNDROME: Status: ACTIVE | Noted: 2019-07-11

## 2019-07-11 PROBLEM — C61 PROSTATE CANCER: Status: ACTIVE | Noted: 2019-07-11

## 2019-07-11 PROCEDURE — 99214 OFFICE O/P EST MOD 30 MIN: CPT | Performed by: FAMILY MEDICINE

## 2019-07-11 RX ORDER — CELECOXIB 200 MG/1
200 CAPSULE ORAL DAILY
COMMUNITY
End: 2019-08-22 | Stop reason: SDUPTHER

## 2019-07-11 RX ORDER — PANTOPRAZOLE SODIUM 40 MG/1
GRANULE, DELAYED RELEASE ORAL
COMMUNITY
End: 2019-10-01

## 2019-07-11 NOTE — PROGRESS NOTES
Pt is here for 3 week f/u for cellulitis on abdomen   Chief Complaint   Patient presents with   • Hypertension   • Osteoarthritis       Subjective   Nathan Baez is a 74 y.o. male.     History of Present Illness     The following portions of the patient's history were reviewed and updated as appropriate: allergies, current medications, past family history, past medical history, past social history, past surgical history and problem list.   F/U HTN.  No orhtostasis.  No SE with meds.   F/U hyperlipidemai.  No myalgias.    F/U cellulitis.  Doing well.  Finished bactrim and cephalexin.    Review of Systems   Constitutional: Negative for appetite change and fatigue.   HENT: Negative for nosebleeds and sore throat.    Eyes: Negative for blurred vision and visual disturbance.   Respiratory: Negative for shortness of breath and wheezing.    Cardiovascular: Negative for chest pain and leg swelling.   Gastrointestinal: Negative for abdominal distention and abdominal pain.   Endocrine: Negative for cold intolerance and polyuria.   Genitourinary: Negative for dysuria and hematuria.   Musculoskeletal: Negative for arthralgias and myalgias.   Skin: Negative for color change and rash.   Neurological: Negative for weakness and confusion.   Psychiatric/Behavioral: Negative for agitation and depressed mood.       Patient Active Problem List   Diagnosis   • OA (osteoarthritis) of knee   • Hypertension   • Abdominal wall cellulitis   • Hypothyroidism (acquired)   • Gastroesophageal reflux disease without esophagitis   • Mixed hyperlipidemia   • Primary insomnia   • DDD (degenerative disc disease), lumbar   • KWAME (obstructive sleep apnea)   • Hyperglycemia   • Allergic   • Venous insufficiency   • Restless leg syndrome   • Prostate cancer (CMS/HCC)       No Known Allergies      Current Outpatient Medications:   •  albuterol (PROVENTIL HFA;VENTOLIN HFA) 108 (90 Base) MCG/ACT inhaler, Inhale 2 puffs Every 4 (Four) Hours As Needed for  Wheezing., Disp: , Rfl:   •  amLODIPine (NORVASC) 5 MG tablet, Take 5 mg by mouth every morning., Disp: , Rfl:   •  dorzolamide-timolol (COSOPT) 22.3-6.8 MG/ML ophthalmic solution, 1 drop 2 (Two) Times a Day., Disp: , Rfl:   •  Fluticasone Furoate-Vilanterol (BREO ELLIPTA IN), Inhale., Disp: , Rfl:   •  HYDROCHLOROTHIAZIDE PO, Take 25 mg by mouth daily with breakfast., Disp: , Rfl:   •  Latanoprost 0.005 % emulsion, Apply 1 drop to eye(s) as directed by provider Every Night., Disp: , Rfl:   •  levothyroxine (SYNTHROID) 88 MCG tablet, Take 88 mcg by mouth every morning., Disp: , Rfl:   •  losartan (COZAAR) 100 MG tablet, Take 100 mg by mouth every morning., Disp: , Rfl:   •  montelukast (SINGULAIR) 10 MG tablet, Take 10 mg by mouth every night., Disp: , Rfl:   •  NON FORMULARY, Administer 1 each to both eyes 2 (Two) Times a Day. lalanoprost 0.005% 1 drop both eye, Disp: , Rfl:   •  PANTOPRAZOLE SODIUM PO, Take 40 mg by mouth every morning., Disp: , Rfl:   •  pramipexole (MIRAPEX) 1.5 MG tablet, Take 0.75 mg by mouth 2 (Two) Times a Day., Disp: , Rfl:   •  rosuvastatin (CRESTOR) 20 MG tablet, Take 20 mg by mouth every evening., Disp: , Rfl:   •  brimonidine-timolol (COMBIGAN) 0.2-0.5 % ophthalmic solution, Administer 1 drop to both eyes every 12 (twelve) hours., Disp: , Rfl:   •  celecoxib (CELEBREX) 200 MG capsule, Take 200 mg by mouth Daily., Disp: , Rfl:   •  pantoprazole (PROTONIX) 40 MG pack packet, Take  by mouth., Disp: , Rfl:     Past Medical History:   Diagnosis Date   • Arthritis    • DVT (deep venous thrombosis) (CMS/HCC)     l leg    • Glaucoma    • Hyperlipidemia    • Hypertension    • Hypothyroid    • Prostate cancer (CMS/HCC)    • Restless leg    • Sleep apnea        Past Surgical History:   Procedure Laterality Date   • CATARACT EXTRACTION     • COLONOSCOPY      3/17normal   • HERNIA REPAIR      umbilical   • JOINT REPLACEMENT Right 2014   • NECK SURGERY     • DC TOTAL KNEE ARTHROPLASTY Left 9/13/2016     Procedure: LT TOTAL KNEE ARTHROPLASTY;  Surgeon: Tadeo Basurto MD;  Location: [facility];  Service: Orthopedics   • PROSTATE SURGERY         • THYROID LOBECTOMY     • THYROID SURGERY     • TOTAL KNEE ARTHROPLASTY         Family History   Problem Relation Age of Onset   • Cancer Mother         COLON   • Cancer Father         PROSTATE   • Cancer Sister         BREAST CANCER   • Heart attack Brother    • Heart disease Brother        Social History     Tobacco Use   • Smoking status: Former Smoker     Last attempt to quit: 1984     Years since quittin.5   • Smokeless tobacco: Never Used   Substance Use Topics   • Alcohol use: No     Comment: 3-4 MONTHLY            Objective     Vitals:    19 1514   BP: 145/90   Pulse:    Temp:    SpO2:      Body mass index is 38.37 kg/m².    Physical Exam   Constitutional: He appears well-developed and well-nourished.   HENT:   Head: Normocephalic and atraumatic.   Mouth/Throat: Oropharynx is clear and moist.   Eyes: Pupils are equal, round, and reactive to light. No scleral icterus.   Neck: No thyromegaly present.   Cardiovascular: Normal rate and regular rhythm. Exam reveals no gallop and no friction rub.   No murmur heard.  Pulmonary/Chest: Effort normal. No respiratory distress. He has no wheezes. He has no rales. He exhibits no tenderness.   Abdominal: Soft. Bowel sounds are normal. He exhibits no distension. There is no tenderness.   Musculoskeletal: Normal range of motion. He exhibits no edema or deformity.   Lymphadenopathy:     He has no cervical adenopathy.   Neurological: No cranial nerve deficit. He exhibits normal muscle tone.   Skin: Skin is warm and dry. No rash noted. He is not diaphoretic.   Vitals reviewed.      Lab Results   Component Value Date    GLUCOSE 114 (H) 2019    BUN 30 (H) 2019    CREATININE 1.42 (H) 2019    EGFRIFNONA 49 (L) 2019    EGFRIFAFRI  2016      Comment:      <15 Indicative of kidney  failure.    BCR 21.1 07/04/2019    K 3.7 07/04/2019    CO2 21.2 (L) 07/04/2019    CALCIUM 9.4 07/04/2019    ALBUMIN 3.90 06/02/2019    AST 31 06/02/2019    ALT 44 (H) 06/02/2019       WBC   Date Value Ref Range Status   07/04/2019 6.14 3.40 - 10.80 10*3/mm3 Final     RBC   Date Value Ref Range Status   07/04/2019 4.14 4.14 - 5.80 10*6/mm3 Final     Hemoglobin   Date Value Ref Range Status   07/04/2019 12.4 (L) 13.0 - 17.7 g/dL Final     Hematocrit   Date Value Ref Range Status   07/04/2019 39.8 37.5 - 51.0 % Final     MCV   Date Value Ref Range Status   07/04/2019 96.1 79.0 - 97.0 fL Final     MCH   Date Value Ref Range Status   07/04/2019 30.0 26.6 - 33.0 pg Final     MCHC   Date Value Ref Range Status   07/04/2019 31.2 (L) 31.5 - 35.7 g/dL Final     RDW   Date Value Ref Range Status   07/04/2019 13.9 12.3 - 15.4 % Final     RDW-SD   Date Value Ref Range Status   07/04/2019 49.2 37.0 - 54.0 fl Final     MPV   Date Value Ref Range Status   07/04/2019 10.2 6.0 - 12.0 fL Final     Platelets   Date Value Ref Range Status   07/04/2019 162 140 - 450 10*3/mm3 Final     Neutrophil %   Date Value Ref Range Status   07/04/2019 64.8 42.7 - 76.0 % Final     Lymphocyte %   Date Value Ref Range Status   07/04/2019 19.4 (L) 19.6 - 45.3 % Final     Monocyte %   Date Value Ref Range Status   07/04/2019 10.7 5.0 - 12.0 % Final     Eosinophil %   Date Value Ref Range Status   07/04/2019 2.3 0.3 - 6.2 % Final     Basophil %   Date Value Ref Range Status   07/04/2019 0.5 0.0 - 1.5 % Final     Immature Grans %   Date Value Ref Range Status   07/04/2019 2.3 (H) 0.0 - 0.5 % Final     Neutrophils, Absolute   Date Value Ref Range Status   07/04/2019 3.98 1.70 - 7.00 10*3/mm3 Final     Lymphocytes, Absolute   Date Value Ref Range Status   07/04/2019 1.19 0.70 - 3.10 10*3/mm3 Final     Monocytes, Absolute   Date Value Ref Range Status   07/04/2019 0.66 0.10 - 0.90 10*3/mm3 Final     Eosinophils, Absolute   Date Value Ref Range Status    07/04/2019 0.14 0.00 - 0.40 10*3/mm3 Final     Basophils, Absolute   Date Value Ref Range Status   07/04/2019 0.03 0.00 - 0.20 10*3/mm3 Final     Immature Grans, Absolute   Date Value Ref Range Status   07/04/2019 0.14 (H) 0.00 - 0.05 10*3/mm3 Final     nRBC   Date Value Ref Range Status   07/04/2019 0.0 0.0 - 0.2 /100 WBC Final       Lab Results   Component Value Date    HGBA1C 6.17 (H) 06/04/2019       Lab Results   Component Value Date    BOPIFXXA74 334 06/04/2019       TSH   Date Value Ref Range Status   06/04/2019 2.220 0.270 - 4.200 mIU/mL Final       No results found for: CHOL  No results found for: TRIG  No results found for: HDL  No results found for: LDL  No results found for: VLDL  No results found for: LDLHDL      Procedures    Assessment/Plan   Problems Addressed this Visit        Cardiovascular and Mediastinum    Hypertension - Primary       Digestive    Gastroesophageal reflux disease without esophagitis    Relevant Medications    pantoprazole (PROTONIX) 40 MG pack packet       Endocrine    Hypothyroidism (acquired)          No orders of the defined types were placed in this encounter.      Current Outpatient Medications   Medication Sig Dispense Refill   • albuterol (PROVENTIL HFA;VENTOLIN HFA) 108 (90 Base) MCG/ACT inhaler Inhale 2 puffs Every 4 (Four) Hours As Needed for Wheezing.     • amLODIPine (NORVASC) 5 MG tablet Take 5 mg by mouth every morning.     • dorzolamide-timolol (COSOPT) 22.3-6.8 MG/ML ophthalmic solution 1 drop 2 (Two) Times a Day.     • Fluticasone Furoate-Vilanterol (BREO ELLIPTA IN) Inhale.     • HYDROCHLOROTHIAZIDE PO Take 25 mg by mouth daily with breakfast.     • Latanoprost 0.005 % emulsion Apply 1 drop to eye(s) as directed by provider Every Night.     • levothyroxine (SYNTHROID) 88 MCG tablet Take 88 mcg by mouth every morning.     • losartan (COZAAR) 100 MG tablet Take 100 mg by mouth every morning.     • montelukast (SINGULAIR) 10 MG tablet Take 10 mg by mouth every  night.     • NON FORMULARY Administer 1 each to both eyes 2 (Two) Times a Day. lalanoprost 0.005% 1 drop both eye     • PANTOPRAZOLE SODIUM PO Take 40 mg by mouth every morning.     • pramipexole (MIRAPEX) 1.5 MG tablet Take 0.75 mg by mouth 2 (Two) Times a Day.     • rosuvastatin (CRESTOR) 20 MG tablet Take 20 mg by mouth every evening.     • brimonidine-timolol (COMBIGAN) 0.2-0.5 % ophthalmic solution Administer 1 drop to both eyes every 12 (twelve) hours.     • celecoxib (CELEBREX) 200 MG capsule Take 200 mg by mouth Daily.     • pantoprazole (PROTONIX) 40 MG pack packet Take  by mouth.       No current facility-administered medications for this visit.        No Follow-up on file.    There are no Patient Instructions on file for this visit.

## 2019-07-12 LAB
ALBUMIN SERPL-MCNC: 4.3 G/DL (ref 3.5–5.2)
ALBUMIN/GLOB SERPL: 1.6 G/DL
ALP SERPL-CCNC: 136 U/L (ref 39–117)
ALT SERPL-CCNC: 30 U/L (ref 1–41)
AST SERPL-CCNC: 27 U/L (ref 1–40)
BILIRUB SERPL-MCNC: 0.6 MG/DL (ref 0.2–1.2)
BUN SERPL-MCNC: 31 MG/DL (ref 8–23)
BUN/CREAT SERPL: 23.8 (ref 7–25)
CALCIUM SERPL-MCNC: 9.6 MG/DL (ref 8.6–10.5)
CHLORIDE SERPL-SCNC: 102 MMOL/L (ref 98–107)
CO2 SERPL-SCNC: 26.4 MMOL/L (ref 22–29)
CREAT SERPL-MCNC: 1.3 MG/DL (ref 0.76–1.27)
GLOBULIN SER CALC-MCNC: 2.7 GM/DL
GLUCOSE SERPL-MCNC: 98 MG/DL (ref 65–99)
HBA1C MFR BLD: 6 % (ref 4.8–5.6)
POTASSIUM SERPL-SCNC: 4.1 MMOL/L (ref 3.5–5.2)
PROT SERPL-MCNC: 7 G/DL (ref 6–8.5)
SODIUM SERPL-SCNC: 141 MMOL/L (ref 136–145)
T4 FREE SERPL-MCNC: 1.35 NG/DL (ref 0.93–1.7)
TSH SERPL DL<=0.005 MIU/L-ACNC: 3.75 MIU/ML (ref 0.27–4.2)

## 2019-08-08 ENCOUNTER — TELEPHONE (OUTPATIENT)
Dept: FAMILY MEDICINE CLINIC | Facility: CLINIC | Age: 74
End: 2019-08-08

## 2019-08-08 NOTE — TELEPHONE ENCOUNTER
I called pt but no answer and no VM. Dr Pierce had an opening on 8/15 @ 3:45,  I went ahead and put them in this spot so it wouldn't get filled before they call back.

## 2019-08-08 NOTE — TELEPHONE ENCOUNTER
Please call Coco Baez about Nathan, she insist that he gets I to see Dr. Pierce prior to 10/1/19 over his shoulder, he fell on 7/4/19 & hurt his shoulder, he was seen at [facility] ER however his shoulder has gotten no better

## 2019-08-15 ENCOUNTER — TRANSCRIBE ORDERS (OUTPATIENT)
Dept: PHYSICAL THERAPY | Facility: CLINIC | Age: 74
End: 2019-08-15

## 2019-08-15 ENCOUNTER — OFFICE VISIT (OUTPATIENT)
Dept: FAMILY MEDICINE CLINIC | Facility: CLINIC | Age: 74
End: 2019-08-15

## 2019-08-15 VITALS
HEART RATE: 89 BPM | HEIGHT: 67 IN | TEMPERATURE: 98.3 F | OXYGEN SATURATION: 94 % | WEIGHT: 245 LBS | BODY MASS INDEX: 38.45 KG/M2 | SYSTOLIC BLOOD PRESSURE: 116 MMHG | DIASTOLIC BLOOD PRESSURE: 75 MMHG

## 2019-08-15 DIAGNOSIS — M75.81 ROTATOR CUFF TENDONITIS, RIGHT: Primary | ICD-10-CM

## 2019-08-15 DIAGNOSIS — M25.511 ACUTE PAIN OF RIGHT SHOULDER: ICD-10-CM

## 2019-08-15 DIAGNOSIS — I10 ESSENTIAL HYPERTENSION: ICD-10-CM

## 2019-08-15 DIAGNOSIS — L03.311 ABDOMINAL WALL CELLULITIS: Primary | ICD-10-CM

## 2019-08-15 PROCEDURE — 99214 OFFICE O/P EST MOD 30 MIN: CPT | Performed by: FAMILY MEDICINE

## 2019-08-15 PROCEDURE — 73030 X-RAY EXAM OF SHOULDER: CPT | Performed by: FAMILY MEDICINE

## 2019-08-15 RX ORDER — PANTOPRAZOLE SODIUM 40 MG/1
40 TABLET, DELAYED RELEASE ORAL 2 TIMES DAILY
COMMUNITY
Start: 2019-08-01 | End: 2020-02-10 | Stop reason: SDUPTHER

## 2019-08-15 RX ORDER — LATANOPROST 50 UG/ML
1 SOLUTION/ DROPS OPHTHALMIC NIGHTLY
Status: ON HOLD | COMMUNITY
Start: 2019-07-30 | End: 2023-02-17

## 2019-08-15 RX ORDER — BRIMONIDINE TARTRATE 2 MG/ML
SOLUTION/ DROPS OPHTHALMIC
COMMUNITY
Start: 2019-07-05 | End: 2019-10-30

## 2019-08-15 RX ORDER — MONTELUKAST SODIUM 10 MG/1
10 TABLET ORAL NIGHTLY
Qty: 90 TABLET | Refills: 3 | Status: SHIPPED | OUTPATIENT
Start: 2019-08-15 | End: 2020-08-19 | Stop reason: SDUPTHER

## 2019-08-15 NOTE — PROGRESS NOTES
Chief Complaint   Patient presents with   • Heartburn   • Hyperlipidemia       Subjective   Nathan Baez is a 74 y.o. male.     Pt had a fall 7/4 and went to [facility] ER where they did xray of ribs, now right shoulder is causing him pain       Heartburn   He reports no chest pain or no coughing. Pertinent negatives include no fatigue.   Hyperlipidemia   Pertinent negatives include no chest pain or shortness of breath.   c/o pain in R shoulder.  Had fall 7/4/19 with injury of ribs.  Aching at night. CT of chest was normal except coronary artery calcifications.  Worse in a recliner.    FU abdominal wall cellulitis.  Much better.   F/u HTN.  No orthostasis.  Doing well with meds.          The following portions of the patient's history were reviewed and updated as appropriate: allergies, current medications, past family history, past medical history, past social history, past surgical history and problem list.    Review of Systems   Constitutional: Negative for fatigue.   Eyes: Negative for blurred vision.   Respiratory: Negative for cough, chest tightness and shortness of breath.    Cardiovascular: Negative for chest pain, palpitations and leg swelling.   Musculoskeletal:        L shoulder pain   Neurological: Negative for dizziness, light-headedness and headache.       Patient Active Problem List   Diagnosis   • OA (osteoarthritis) of knee   • Hypertension   • Abdominal wall cellulitis   • Hypothyroidism (acquired)   • Gastroesophageal reflux disease without esophagitis   • Mixed hyperlipidemia   • Primary insomnia   • DDD (degenerative disc disease), lumbar   • KWAME (obstructive sleep apnea)   • Hyperglycemia   • Allergic   • Venous insufficiency   • Restless leg syndrome   • Prostate cancer (CMS/[facility])       No Known Allergies      Current Outpatient Medications:   •  albuterol (PROVENTIL HFA;VENTOLIN HFA) 108 (90 Base) MCG/ACT inhaler, Inhale 2 puffs Every 4 (Four) Hours As Needed for Wheezing., Disp: , Rfl:   •   amLODIPine (NORVASC) 5 MG tablet, Take 5 mg by mouth every morning., Disp: , Rfl:   •  aspirin 81 MG tablet, Take 1 tablet by mouth Daily., Disp: , Rfl:   •  brimonidine (ALPHAGAN) 0.2 % ophthalmic solution, , Disp: , Rfl:   •  brimonidine-timolol (COMBIGAN) 0.2-0.5 % ophthalmic solution, Administer 1 drop to both eyes every 12 (twelve) hours., Disp: , Rfl:   •  celecoxib (CELEBREX) 200 MG capsule, Take 200 mg by mouth Daily., Disp: , Rfl:   •  dorzolamide-timolol (COSOPT) 22.3-6.8 MG/ML ophthalmic solution, 1 drop 2 (Two) Times a Day., Disp: , Rfl:   •  Fluticasone Furoate-Vilanterol (BREO ELLIPTA IN), Inhale., Disp: , Rfl:   •  HYDROCHLOROTHIAZIDE PO, Take 25 mg by mouth daily with breakfast., Disp: , Rfl:   •  latanoprost (XALATAN) 0.005 % ophthalmic solution, , Disp: , Rfl:   •  levothyroxine (SYNTHROID) 88 MCG tablet, Take 88 mcg by mouth every morning., Disp: , Rfl:   •  losartan (COZAAR) 100 MG tablet, Take 100 mg by mouth every morning., Disp: , Rfl:   •  montelukast (SINGULAIR) 10 MG tablet, Take 1 tablet by mouth Every Night., Disp: 90 tablet, Rfl: 3  •  pantoprazole (PROTONIX) 40 MG EC tablet, , Disp: , Rfl:   •  pramipexole (MIRAPEX) 1.5 MG tablet, Take 0.75 mg by mouth 2 (Two) Times a Day., Disp: , Rfl:   •  rosuvastatin (CRESTOR) 20 MG tablet, Take 20 mg by mouth every evening., Disp: , Rfl:   •  Latanoprost 0.005 % emulsion, Apply 1 drop to eye(s) as directed by provider Every Night., Disp: , Rfl:   •  NON FORMULARY, Administer 1 each to both eyes 2 (Two) Times a Day. lalanoprost 0.005% 1 drop both eye, Disp: , Rfl:   •  pantoprazole (PROTONIX) 40 MG pack packet, Take  by mouth., Disp: , Rfl:   •  PANTOPRAZOLE SODIUM PO, Take 40 mg by mouth every morning., Disp: , Rfl:     Past Medical History:   Diagnosis Date   • Arthritis    • DVT (deep venous thrombosis) (CMS/HCC)     l leg    • Glaucoma    • Hyperlipidemia    • Hypertension    • Hypothyroid    • Prostate cancer (CMS/HCC)    • Restless leg    •  Sleep apnea        Past Surgical History:   Procedure Laterality Date   • CATARACT EXTRACTION     • COLONOSCOPY      3/17normal   • HERNIA REPAIR      umbilical   • JOINT REPLACEMENT Right    • NECK SURGERY     • VT TOTAL KNEE ARTHROPLASTY Left 2016    Procedure: LT TOTAL KNEE ARTHROPLASTY;  Surgeon: aTdeo Basurto MD;  Location: [facility] OR;  Service: Orthopedics   • PROSTATE SURGERY         • THYROID LOBECTOMY     • THYROID SURGERY     • TOTAL KNEE ARTHROPLASTY         Family History   Problem Relation Age of Onset   • Cancer Mother         COLON   • Cancer Father         PROSTATE   • Cancer Sister         BREAST CANCER   • Heart attack Brother    • Heart disease Brother        Social History     Tobacco Use   • Smoking status: Former Smoker     Last attempt to quit: 1984     Years since quittin.6   • Smokeless tobacco: Never Used   Substance Use Topics   • Alcohol use: No     Comment: 3-4 MONTHLY            Objective     Vitals:    08/15/19 1544   BP: 116/75   Pulse: 89   Temp: 98.3 °F (36.8 °C)   SpO2: 94%     Body mass index is 38.37 kg/m².    Physical Exam    Lab Results   Component Value Date    GLUCOSE 114 (H) 2019    BUN 31 (H) 2019    CREATININE 1.30 (H) 2019    EGFRIFNONA 54 (L) 2019    EGFRIFAFRI 66 2019    BCR 23.8 2019    K 4.1 2019    CO2 26.4 2019    CALCIUM 9.6 2019    PROTENTOTREF 7.0 2019    ALBUMIN 4.30 2019    LABIL2 1.6 2019    AST 27 2019    ALT 30 2019       WBC   Date Value Ref Range Status   2019 6.14 3.40 - 10.80 10*3/mm3 Final     RBC   Date Value Ref Range Status   2019 4.14 4.14 - 5.80 10*6/mm3 Final     Hemoglobin   Date Value Ref Range Status   2019 12.4 (L) 13.0 - 17.7 g/dL Final     Hematocrit   Date Value Ref Range Status   2019 39.8 37.5 - 51.0 % Final     MCV   Date Value Ref Range Status   2019 96.1 79.0 - 97.0 fL Final     MCH   Date Value  Ref Range Status   07/04/2019 30.0 26.6 - 33.0 pg Final     MCHC   Date Value Ref Range Status   07/04/2019 31.2 (L) 31.5 - 35.7 g/dL Final     RDW   Date Value Ref Range Status   07/04/2019 13.9 12.3 - 15.4 % Final     RDW-SD   Date Value Ref Range Status   07/04/2019 49.2 37.0 - 54.0 fl Final     MPV   Date Value Ref Range Status   07/04/2019 10.2 6.0 - 12.0 fL Final     Platelets   Date Value Ref Range Status   07/04/2019 162 140 - 450 10*3/mm3 Final     Neutrophil %   Date Value Ref Range Status   07/04/2019 64.8 42.7 - 76.0 % Final     Lymphocyte %   Date Value Ref Range Status   07/04/2019 19.4 (L) 19.6 - 45.3 % Final     Monocyte %   Date Value Ref Range Status   07/04/2019 10.7 5.0 - 12.0 % Final     Eosinophil %   Date Value Ref Range Status   07/04/2019 2.3 0.3 - 6.2 % Final     Basophil %   Date Value Ref Range Status   07/04/2019 0.5 0.0 - 1.5 % Final     Immature Grans %   Date Value Ref Range Status   07/04/2019 2.3 (H) 0.0 - 0.5 % Final     Neutrophils, Absolute   Date Value Ref Range Status   07/04/2019 3.98 1.70 - 7.00 10*3/mm3 Final     Lymphocytes, Absolute   Date Value Ref Range Status   07/04/2019 1.19 0.70 - 3.10 10*3/mm3 Final     Monocytes, Absolute   Date Value Ref Range Status   07/04/2019 0.66 0.10 - 0.90 10*3/mm3 Final     Eosinophils, Absolute   Date Value Ref Range Status   07/04/2019 0.14 0.00 - 0.40 10*3/mm3 Final     Basophils, Absolute   Date Value Ref Range Status   07/04/2019 0.03 0.00 - 0.20 10*3/mm3 Final     Immature Grans, Absolute   Date Value Ref Range Status   07/04/2019 0.14 (H) 0.00 - 0.05 10*3/mm3 Final     nRBC   Date Value Ref Range Status   07/04/2019 0.0 0.0 - 0.2 /100 WBC Final       Lab Results   Component Value Date    HGBA1C 6.00 (H) 07/11/2019       Lab Results   Component Value Date    RQUOSGFH62 334 06/04/2019       TSH   Date Value Ref Range Status   07/11/2019 3.750 0.270 - 4.200 mIU/mL Final   06/04/2019 2.220 0.270 - 4.200 mIU/mL Final       No results  found for: CHOL  No results found for: TRIG  No results found for: HDL  No results found for: LDL  No results found for: VLDL  No results found for: LDLHDL      Procedures    Assessment/Plan   Problems Addressed this Visit        Cardiovascular and Mediastinum    Hypertension       Other    Abdominal wall cellulitis - Primary      Other Visit Diagnoses     Acute pain of right shoulder        Relevant Orders    XR Shoulder 2+ View Right          Orders Placed This Encounter   Procedures   • XR Shoulder 2+ View Right     Order Specific Question:   Reason for Exam:     Answer:   shoulder pain       Current Outpatient Medications   Medication Sig Dispense Refill   • albuterol (PROVENTIL HFA;VENTOLIN HFA) 108 (90 Base) MCG/ACT inhaler Inhale 2 puffs Every 4 (Four) Hours As Needed for Wheezing.     • amLODIPine (NORVASC) 5 MG tablet Take 5 mg by mouth every morning.     • aspirin 81 MG tablet Take 1 tablet by mouth Daily.     • brimonidine (ALPHAGAN) 0.2 % ophthalmic solution      • brimonidine-timolol (COMBIGAN) 0.2-0.5 % ophthalmic solution Administer 1 drop to both eyes every 12 (twelve) hours.     • celecoxib (CELEBREX) 200 MG capsule Take 200 mg by mouth Daily.     • dorzolamide-timolol (COSOPT) 22.3-6.8 MG/ML ophthalmic solution 1 drop 2 (Two) Times a Day.     • Fluticasone Furoate-Vilanterol (BREO ELLIPTA IN) Inhale.     • HYDROCHLOROTHIAZIDE PO Take 25 mg by mouth daily with breakfast.     • latanoprost (XALATAN) 0.005 % ophthalmic solution      • levothyroxine (SYNTHROID) 88 MCG tablet Take 88 mcg by mouth every morning.     • losartan (COZAAR) 100 MG tablet Take 100 mg by mouth every morning.     • montelukast (SINGULAIR) 10 MG tablet Take 1 tablet by mouth Every Night. 90 tablet 3   • pantoprazole (PROTONIX) 40 MG EC tablet      • pramipexole (MIRAPEX) 1.5 MG tablet Take 0.75 mg by mouth 2 (Two) Times a Day.     • rosuvastatin (CRESTOR) 20 MG tablet Take 20 mg by mouth every evening.     • Latanoprost 0.005 %  emulsion Apply 1 drop to eye(s) as directed by provider Every Night.     • NON FORMULARY Administer 1 each to both eyes 2 (Two) Times a Day. lalanoprost 0.005% 1 drop both eye     • pantoprazole (PROTONIX) 40 MG pack packet Take  by mouth.     • PANTOPRAZOLE SODIUM PO Take 40 mg by mouth every morning.       No current facility-administered medications for this visit.        Nathan Baez had no medications administered during this visit.    No Follow-up on file.    There are no Patient Instructions on file for this visit.  PT 2 times a week for 8 weeks.

## 2019-08-19 ENCOUNTER — TELEPHONE (OUTPATIENT)
Dept: FAMILY MEDICINE CLINIC | Facility: CLINIC | Age: 74
End: 2019-08-19

## 2019-08-19 ENCOUNTER — TREATMENT (OUTPATIENT)
Dept: PHYSICAL THERAPY | Facility: CLINIC | Age: 74
End: 2019-08-19

## 2019-08-19 DIAGNOSIS — M25.511 RIGHT SHOULDER PAIN, UNSPECIFIED CHRONICITY: Primary | ICD-10-CM

## 2019-08-19 PROCEDURE — 97162 PT EVAL MOD COMPLEX 30 MIN: CPT | Performed by: PHYSICAL THERAPIST

## 2019-08-19 PROCEDURE — 97110 THERAPEUTIC EXERCISES: CPT | Performed by: PHYSICAL THERAPIST

## 2019-08-19 PROCEDURE — G0283 ELEC STIM OTHER THAN WOUND: HCPCS | Performed by: PHYSICAL THERAPIST

## 2019-08-19 NOTE — PROGRESS NOTES
Physical Therapy Initial Evaluation and Plan of Care    Patient: Nathan Baez   : 1945  Diagnosis/ICD-10 Code:  Right shoulder pain, unspecified chronicity [M25.511]  Referring practitioner: Damien Pierce MD  Past Medical History Reviewed: 2019    PLOF: Independent and lives with wife    Subjective Evaluation    History of Present Illness  Date of onset: 2019  Mechanism of injury: I fell at [employer] on  and I think I put my arm out. The shoulder has gotten worse and worse. No neck pain or numbness or tingling. Left arm is ok.   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 Tenderness of the biceps and middle deltoid.     Cervical/Thoracic Screen   Cervical range of motion within normal limits    Active Range of Motion   Left Shoulder   Normal active range of motion    Right Shoulder   Flexion: 93 degrees with pain  Abduction: 56 degrees with pain  External rotation BTH: Active external rotation behind the head: top of head. with pain  Internal rotation BTB: Active internal rotation behind the back: lateral buttocks. with pain    Passive Range of Motion     Right Shoulder   Normal passive range of motion    Joint Play     Right Shoulder  Joints within functional limits are the posterior capsule and inferior capsule.     Strength/Myotome Testing     Left Shoulder   Normal muscle strength    Right Shoulder     Planes of Motion   Flexion: 3+   Abduction: 3+   External  rotation at 0°: 3+   Internal rotation at 0°: 4+     Tests     Right Shoulder   Positive empty can, painful arc and resisted supine external rotation.   Negative AC shear and Neer's.          Assessment & Plan     Assessment  Impairments: abnormal muscle firing, abnormal or restricted ROM, impaired physical strength and pain with function  Assessment details: Pt presents to PT with symptoms consistent with R shoulder impingement and rotator cuff tendonitis. Pt has limited ROM, weakness and pain in R shoulder with ADL's and most all movement patterns.  Pt would benefit from skilled PT intervention to address the deficits noted.   Prognosis: good  Functional Limitations: lifting, sleeping, pushing, uncomfortable because of pain, reaching behind back and reaching overhead  Goals  Plan Goals: SHORT TERM GOALS: 8 visits  1. Patient to be compliant with HEP and no diff. with sleeping  2. Increased (R) UE strength to 4/5 with no pain> 4/10 to allow for household and work activities.   3. Pt to exhibit (R) shoulder active flexion / ABD to 110° in standing/sitting to assist with reaching overhead with less pain  4. Pt demonstrates improved posture in sitting and standing with minimal-no cues during treatment session    LONG TERM GOALS: 16 visits  1. Pt score <30% perceived disability on DASH   2. Pt. to exhibit (R) shoulder AROM to WFL (> 140° flex/abd. to allow for reaching overhead and behind back without pain limiting function  3. Pt to exhibit 4+/5 UE strength to allow for pushing/pulling and lifting >5 #to occur with pain <2/10  4. Pt able to reach overhead and lift 10# (B) x 10 to allow for return to doing work around home.       Plan  Therapy options: will be seen for skilled physical therapy services  Planned modality interventions: cryotherapy, electrical stimulation/Russian stimulation, TENS, thermotherapy (hydrocollator packs) and ultrasound  Other planned modality interventions: Dry Needling  Planned therapy  interventions: abdominal trunk stabilization, ADL retraining, flexibility, body mechanics training, home exercise program, functional ROM exercises, joint mobilization, manual therapy, neuromuscular re-education, postural training, soft tissue mobilization, spinal/joint mobilization, strengthening, stretching and therapeutic activities  Duration in visits: 16  Treatment plan discussed with: patient        Manual Therapy:    -     mins  24790;  Therapeutic Exercise:    10     mins  47344;     Neuromuscular Jon:    -    mins  72063;    Therapeutic Activity:     -     mins  90604;     Gait Training:      -     mins  34822;     Ultrasound:     -     mins  18629;    Electrical Stimulation:    9     mins  87628 ( );  Dry Needling     -     mins self-pay    Timed Treatment:   10   mins   Total Treatment:     55   mins      PT SIGNATURE: Elizabeth Estescy, PT   DATE TREATMENT INITIATED: 8/19/2019    Medicare Initial Certification  Certification Period: 11/17/2019  I certify that the therapy services are furnished while this patient is under my care.  The services outlined above are required by this patient, and will be reviewed every 90 days.     PHYSICIAN: Damien Pierce MD      DATE:     Please sign and return via fax to [phone].. Thank you, [facility].

## 2019-08-21 ENCOUNTER — TREATMENT (OUTPATIENT)
Dept: PHYSICAL THERAPY | Facility: CLINIC | Age: 74
End: 2019-08-21

## 2019-08-21 DIAGNOSIS — M25.511 RIGHT SHOULDER PAIN, UNSPECIFIED CHRONICITY: Primary | ICD-10-CM

## 2019-08-21 PROCEDURE — G0283 ELEC STIM OTHER THAN WOUND: HCPCS | Performed by: PHYSICAL THERAPIST

## 2019-08-21 PROCEDURE — 97110 THERAPEUTIC EXERCISES: CPT | Performed by: PHYSICAL THERAPIST

## 2019-08-21 PROCEDURE — 97140 MANUAL THERAPY 1/> REGIONS: CPT | Performed by: PHYSICAL THERAPIST

## 2019-08-21 NOTE — PROGRESS NOTES
Physical Therapy Daily Progress Note  Visit: 2    Nathan Baez reports: The shoulder is doing ok    Subjective     Objective   See Exercise, Manual, and Modality Logs for complete treatment.       Assessment & Plan     Assessment  Assessment details: Pt tolerated treatment well. Had increased pain after session. Pt also reminded of erect posture and keeping right shoulder depressed. Probable neck involvement contributing to his shoulder pain    Plan  Plan details: Add rotator cuff strength next session        Manual Therapy:    13     mins  14165;  Therapeutic Exercise:    25     mins  73728;     Neuromuscular Jon:    -    mins  45657;    Therapeutic Activity:     -     mins  82052;     Gait Training:      -     mins  01233;     Ultrasound:     8     mins  33106;    Electrical Stimulation:    10     mins  50257 ( );  Dry Needling     -     mins self-pay    Timed Treatment:   46   mins   Total Treatment:     60   mins    Elizabeth Chavez PT  [place] License #: [id number]    Physical Therapist

## 2019-08-22 DIAGNOSIS — M19.90 ARTHRITIS: Primary | ICD-10-CM

## 2019-08-22 RX ORDER — CELECOXIB 200 MG/1
200 CAPSULE ORAL DAILY
Qty: 90 CAPSULE | Refills: 3 | Status: SHIPPED | OUTPATIENT
Start: 2019-08-22 | End: 2019-11-26

## 2019-08-27 ENCOUNTER — TREATMENT (OUTPATIENT)
Dept: PHYSICAL THERAPY | Facility: CLINIC | Age: 74
End: 2019-08-27

## 2019-08-27 DIAGNOSIS — M25.511 RIGHT SHOULDER PAIN, UNSPECIFIED CHRONICITY: Primary | ICD-10-CM

## 2019-08-27 PROCEDURE — 97110 THERAPEUTIC EXERCISES: CPT | Performed by: PHYSICAL THERAPIST

## 2019-08-27 PROCEDURE — 97035 APP MDLTY 1+ULTRASOUND EA 15: CPT | Performed by: PHYSICAL THERAPIST

## 2019-08-27 PROCEDURE — G0283 ELEC STIM OTHER THAN WOUND: HCPCS | Performed by: PHYSICAL THERAPIST

## 2019-08-27 PROCEDURE — 97140 MANUAL THERAPY 1/> REGIONS: CPT | Performed by: PHYSICAL THERAPIST

## 2019-08-27 NOTE — PROGRESS NOTES
Physical Therapy Daily Progress Note  Visit: 3    Nathan Baez reports: I am not seeing much difference in therapy    Subjective     Objective   See Exercise, Manual, and Modality Logs for complete treatment.       Assessment & Plan     Assessment  Assessment details: Added shoulder isometrics to help with pain control and strength. Demonstrates good passive motion, but continues to have impingement with most movements of right shoulder    Plan  Plan details: Progress as able        Manual Therapy:    9     mins  10946;  Therapeutic Exercise:    20     mins  59388;     Neuromuscular Jon:    -    mins  98112;    Therapeutic Activity:     -     mins  11600;     Gait Training:      -     mins  57958;     Ultrasound:     10     mins  55797;    Electrical Stimulation:    15     mins  01961 ( );  Dry Needling     -     mins self-pay    Timed Treatment:   39   mins   Total Treatment:     62   mins    Elizabeth Chavez PT  [place] License #: [id number]    Physical Therapist

## 2019-08-29 ENCOUNTER — TREATMENT (OUTPATIENT)
Dept: PHYSICAL THERAPY | Facility: CLINIC | Age: 74
End: 2019-08-29

## 2019-08-29 DIAGNOSIS — M25.511 RIGHT SHOULDER PAIN, UNSPECIFIED CHRONICITY: Primary | ICD-10-CM

## 2019-08-29 PROCEDURE — G0283 ELEC STIM OTHER THAN WOUND: HCPCS | Performed by: PHYSICAL THERAPIST

## 2019-08-29 PROCEDURE — 97110 THERAPEUTIC EXERCISES: CPT | Performed by: PHYSICAL THERAPIST

## 2019-08-29 PROCEDURE — 97140 MANUAL THERAPY 1/> REGIONS: CPT | Performed by: PHYSICAL THERAPIST

## 2019-08-29 NOTE — PROGRESS NOTES
Physical Therapy Daily Progress Note  Visit: 4    Nathan Baez reports: I hurt my shoulder last night trying to open a gate. It has been hurting a little more    Subjective     Objective   See Exercise, Manual, and Modality Logs for complete treatment.       Assessment & Plan     Assessment  Assessment details: Increased tension and tenderness over bicep and deltoid muscle today. Pt reported relief after manual interventions. Educated to perform exercises that do not bother the arm and to rest from overdoing it until it heals. Education for ice as needed at home as well    Plan  Plan details: Progress as able        Manual Therapy:    11     mins  11276;  Therapeutic Exercise:    30     mins  00080;     Neuromuscular Jon:    -    mins  74775;    Therapeutic Activity:     -     mins  71068;     Gait Training:      -     mins  17905;     Ultrasound:     -     mins  63374;    Electrical Stimulation:    15     mins  94205 ( );  Dry Needling     -     mins self-pay    Timed Treatment:   41   mins   Total Treatment:     56   mins    Elizabeth Chavez PT  [place] License #: [id number]    Physical Therapist

## 2019-09-03 ENCOUNTER — TREATMENT (OUTPATIENT)
Dept: PHYSICAL THERAPY | Facility: CLINIC | Age: 74
End: 2019-09-03

## 2019-09-03 DIAGNOSIS — M25.511 RIGHT SHOULDER PAIN, UNSPECIFIED CHRONICITY: Primary | ICD-10-CM

## 2019-09-03 PROCEDURE — G0283 ELEC STIM OTHER THAN WOUND: HCPCS | Performed by: PHYSICAL THERAPIST

## 2019-09-03 PROCEDURE — 97110 THERAPEUTIC EXERCISES: CPT | Performed by: PHYSICAL THERAPIST

## 2019-09-03 PROCEDURE — 97140 MANUAL THERAPY 1/> REGIONS: CPT | Performed by: PHYSICAL THERAPIST

## 2019-09-03 NOTE — PROGRESS NOTES
Physical Therapy Daily Progress Note  Visit: 5    Nathan Velezkett reports: My shoulder is still sore, but I believe it is getting better    Subjective     Objective   See Exercise, Manual, and Modality Logs for complete treatment.       Assessment & Plan     Assessment  Assessment details: Pt tolerated progression of strength exercises very well. Did not have any increase in sx's. Continued reminders for posture with standing exercises    Plan  Plan details: Progress per POC        Manual Therapy:    10     mins  46107;  Therapeutic Exercise:    24     mins  14866;     Neuromuscular Jon:    -    mins  77260;    Therapeutic Activity:     -     mins  39442;     Gait Training:      -     mins  78294;     Ultrasound:     10     mins  18473;    Electrical Stimulation:    15     mins  76276 ( );  Dry Needling     -     mins self-pay    Timed Treatment:   44   mins   Total Treatment:     62   mins    CITLALLI Cedeno License #: [id number]    Physical Therapist

## 2019-09-05 ENCOUNTER — TREATMENT (OUTPATIENT)
Dept: PHYSICAL THERAPY | Facility: CLINIC | Age: 74
End: 2019-09-05

## 2019-09-05 DIAGNOSIS — M25.511 RIGHT SHOULDER PAIN, UNSPECIFIED CHRONICITY: Primary | ICD-10-CM

## 2019-09-05 PROCEDURE — G0283 ELEC STIM OTHER THAN WOUND: HCPCS | Performed by: PHYSICAL THERAPIST

## 2019-09-05 PROCEDURE — 97110 THERAPEUTIC EXERCISES: CPT | Performed by: PHYSICAL THERAPIST

## 2019-09-05 PROCEDURE — 97035 APP MDLTY 1+ULTRASOUND EA 15: CPT | Performed by: PHYSICAL THERAPIST

## 2019-09-05 PROCEDURE — 97140 MANUAL THERAPY 1/> REGIONS: CPT | Performed by: PHYSICAL THERAPIST

## 2019-09-05 NOTE — PROGRESS NOTES
Physical Therapy Daily Progress Note  Visit: 6    Nathan Grantt reports: I had a hard time sleeping last night because my shoulder was hurting    Subjective     Objective   See Exercise, Manual, and Modality Logs for complete treatment.       Assessment & Plan     Assessment  Assessment details: Pt did ok with exercises. Continues to have pain with overhead motion. Added ER/IR with band to HEP    Plan  Plan details: Progress as able        Manual Therapy:    8     mins  63645;  Therapeutic Exercise:    21     mins  26389;     Neuromuscular Jon:    -    mins  79520;    Therapeutic Activity:     -     mins  37540;     Gait Training:      -     mins  93946;     Ultrasound:     10     mins  92275;    Electrical Stimulation:    15     mins  29690 ( );  Dry Needling     -     mins self-pay    Timed Treatment:   39   mins   Total Treatment:     60   mins    Elizabeth Chavez PT  [place] License #: [id number]    Physical Therapist

## 2019-09-10 ENCOUNTER — TREATMENT (OUTPATIENT)
Dept: PHYSICAL THERAPY | Facility: CLINIC | Age: 74
End: 2019-09-10

## 2019-09-10 DIAGNOSIS — M25.511 RIGHT SHOULDER PAIN, UNSPECIFIED CHRONICITY: Primary | ICD-10-CM

## 2019-09-10 PROCEDURE — 97110 THERAPEUTIC EXERCISES: CPT | Performed by: PHYSICAL THERAPIST

## 2019-09-10 PROCEDURE — 97035 APP MDLTY 1+ULTRASOUND EA 15: CPT | Performed by: PHYSICAL THERAPIST

## 2019-09-10 PROCEDURE — G0283 ELEC STIM OTHER THAN WOUND: HCPCS | Performed by: PHYSICAL THERAPIST

## 2019-09-10 NOTE — PROGRESS NOTES
Physical Therapy Daily Progress Note  Visit: 7    Nathan Baez reports: My shoulder is doing better    Subjective     Objective   See Exercise, Manual, and Modality Logs for complete treatment.       Assessment & Plan     Assessment  Assessment details: Pt shoulder is doing better. Continued education for erect posture    Plan  Plan details: Reassess next session        Manual Therapy:    4     mins  63576;  Therapeutic Exercise:    25     mins  83407;     Neuromuscular Jon:    -    mins  86645;    Therapeutic Activity:     -     mins  55457;     Gait Training:      -     mins  87924;     Ultrasound:     10     mins  31740;    Electrical Stimulation:    15     mins  54526 ( );  Dry Needling     -     mins self-pay    Timed Treatment:   39   mins   Total Treatment:     56   mins    Elizabeth Chavez PT  [place] License #: [id number]    Physical Therapist

## 2019-09-12 ENCOUNTER — TELEPHONE (OUTPATIENT)
Dept: FAMILY MEDICINE CLINIC | Facility: CLINIC | Age: 74
End: 2019-09-12

## 2019-09-12 ENCOUNTER — TREATMENT (OUTPATIENT)
Dept: PHYSICAL THERAPY | Facility: CLINIC | Age: 74
End: 2019-09-12

## 2019-09-12 DIAGNOSIS — M25.511 RIGHT SHOULDER PAIN, UNSPECIFIED CHRONICITY: Primary | ICD-10-CM

## 2019-09-12 PROCEDURE — G0283 ELEC STIM OTHER THAN WOUND: HCPCS | Performed by: PHYSICAL THERAPIST

## 2019-09-12 PROCEDURE — 97035 APP MDLTY 1+ULTRASOUND EA 15: CPT | Performed by: PHYSICAL THERAPIST

## 2019-09-12 PROCEDURE — 97530 THERAPEUTIC ACTIVITIES: CPT | Performed by: PHYSICAL THERAPIST

## 2019-09-12 PROCEDURE — 97110 THERAPEUTIC EXERCISES: CPT | Performed by: PHYSICAL THERAPIST

## 2019-09-12 RX ORDER — AMLODIPINE BESYLATE 5 MG/1
5 TABLET ORAL EVERY MORNING
Qty: 90 TABLET | Refills: 3 | Status: SHIPPED | OUTPATIENT
Start: 2019-09-12 | End: 2019-11-17 | Stop reason: HOSPADM

## 2019-09-12 NOTE — PROGRESS NOTES
Physical Therapy Daily Progress Note  Visit: 8    Nathan Baez reports: I am currently building a deck. Per wife: he feels better when he is not working on his deck and he lets his arm rest a little.   I have see some improvement in therapy. I can reach behind my head better. Still difficult to reach my arm away from my body. Pain is 5/10, gets up to 8/10      Subjective     Objective       Active Range of Motion     Right Shoulder   Flexion: 160 degrees   Abduction: 161 degrees   External rotation BTH: C6   Internal rotation BTB: sacrum     Strength/Myotome Testing     Right Shoulder     Planes of Motion   Flexion: 4+   Abduction: 4+   External rotation at 0°: 4+   Internal rotation at 0°: 4+      See Exercise, Manual, and Modality Logs for complete treatment.       Assessment & Plan     Assessment  Assessment details: Pt making great progress with PT. Although he continues to report pain in his right shoulder his ROM, strength and function have significantly improved. Educated pt to try and take frequent rest breaks while building his deck to avoid tendonitis and aggravating his arthritis. Pt would benefit from continued PT services to continue building stability in order to help reduce pain    Plan  Plan details: Continue PT for 5 visits        Manual Therapy:    -     mins  83076;  Therapeutic Exercise:    34     mins  96299;     Neuromuscular Jon:    -    mins  75721;    Therapeutic Activity:     10     mins  33717;     Gait Training:      -     mins  73000;     Ultrasound:     10     mins  81958;    Electrical Stimulation:    10     mins  61379 ( );  Dry Needling     -     mins self-pay    Timed Treatment:   54   mins   Total Treatment:     68   mins    Elizabeth Chavez, PT  [place] License #: [id number]    Physical Therapist

## 2019-09-19 ENCOUNTER — TREATMENT (OUTPATIENT)
Dept: PHYSICAL THERAPY | Facility: CLINIC | Age: 74
End: 2019-09-19

## 2019-09-19 DIAGNOSIS — M25.511 RIGHT SHOULDER PAIN, UNSPECIFIED CHRONICITY: Primary | ICD-10-CM

## 2019-09-19 PROCEDURE — 97110 THERAPEUTIC EXERCISES: CPT | Performed by: PHYSICAL THERAPIST

## 2019-09-19 PROCEDURE — 97140 MANUAL THERAPY 1/> REGIONS: CPT | Performed by: PHYSICAL THERAPIST

## 2019-09-19 PROCEDURE — G0283 ELEC STIM OTHER THAN WOUND: HCPCS | Performed by: PHYSICAL THERAPIST

## 2019-09-19 NOTE — PROGRESS NOTES
Physical Therapy Daily Progress Note  Visit: 9    Nathan Baez reports: Shoulder is sore today. It is more sore at the end of the door normally    Subjective     Objective   See Exercise, Manual, and Modality Logs for complete treatment.       Assessment & Plan     Assessment  Assessment details: Pt continues to have pain with active motion overhead. Discussed that his pain at the end of the day is most likely from overuse and to use ice to manage pain and try to rest as able.     Plan  Plan details: Progress per POC        Manual Therapy:    10     mins  82795;  Therapeutic Exercise:    26     mins  63825;     Neuromuscular Jon:    -    mins  10408;    Therapeutic Activity:     -     mins  20634;     Gait Training:      -     mins  52698;     Ultrasound:     10     mins  65655;    Electrical Stimulation:    10     mins  86414 ( );  Dry Needling     -     mins self-pay    Timed Treatment:   46   mins   Total Treatment:     65   mins    Elizabeth Chavez PT  [place] License #: [id number]    Physical Therapist

## 2019-10-01 ENCOUNTER — OFFICE VISIT (OUTPATIENT)
Dept: FAMILY MEDICINE CLINIC | Facility: CLINIC | Age: 74
End: 2019-10-01

## 2019-10-01 VITALS
TEMPERATURE: 98.2 F | BODY MASS INDEX: 39.33 KG/M2 | HEART RATE: 82 BPM | DIASTOLIC BLOOD PRESSURE: 86 MMHG | OXYGEN SATURATION: 96 % | HEIGHT: 67 IN | SYSTOLIC BLOOD PRESSURE: 130 MMHG | WEIGHT: 250.6 LBS

## 2019-10-01 DIAGNOSIS — G25.81 RESTLESS LEG SYNDROME: ICD-10-CM

## 2019-10-01 DIAGNOSIS — I10 ESSENTIAL HYPERTENSION: ICD-10-CM

## 2019-10-01 DIAGNOSIS — E78.2 MIXED HYPERLIPIDEMIA: Primary | ICD-10-CM

## 2019-10-01 PROCEDURE — 99214 OFFICE O/P EST MOD 30 MIN: CPT | Performed by: FAMILY MEDICINE

## 2019-10-01 NOTE — PROGRESS NOTES
Chief Complaint   Patient presents with   • Hyperlipidemia   • Hypertension   • Chest Pain     rib pain        Subjective   Nathan Baez is a 74 y.o. male.     History of Present Illness   Fu HTN.  No orhtostasis. Doing wellwith mneds.   F/U hyperlipidemia.  No myalgias.   F/U RLS.  Doing wel lwith mirapex.  Doing well.    F/U R rib pain.  Doing much better.  Seems to have improved now.    The following portions of the patient's history were reviewed and updated as appropriate: allergies, current medications, past family history, past medical history, past social history, past surgical history and problem list.    Review of Systems   Constitutional: Negative for appetite change and fatigue.   HENT: Negative for nosebleeds and sore throat.    Eyes: Negative for blurred vision and visual disturbance.   Respiratory: Negative for shortness of breath and wheezing.    Cardiovascular: Negative for chest pain and leg swelling.   Gastrointestinal: Negative for abdominal distention and abdominal pain.   Endocrine: Negative for cold intolerance and polyuria.   Genitourinary: Negative for dysuria and hematuria.   Musculoskeletal: Negative for arthralgias and myalgias.   Skin: Negative for color change and rash.   Neurological: Negative for weakness and confusion.   Psychiatric/Behavioral: Negative for agitation and depressed mood.       Patient Active Problem List   Diagnosis   • OA (osteoarthritis) of knee   • Hypertension   • Abdominal wall cellulitis   • Hypothyroidism (acquired)   • Gastroesophageal reflux disease without esophagitis   • Mixed hyperlipidemia   • Primary insomnia   • DDD (degenerative disc disease), lumbar   • KWAME (obstructive sleep apnea)   • Hyperglycemia   • Allergic   • Venous insufficiency   • Restless leg syndrome   • Prostate cancer (CMS/HCC)   • Venous stasis dermatitis   • Tinea cruris   • Tinea corporis   • Throat clearing   • Sleep apnea   • Skin lesion of face   • Rib pain on left side   •  Restless legs syndrome   • Restless leg   • Rectal bleed   • Presbycusis   • Pes planus   • Osteoarthritis   • Obesity   • Medicare annual wellness visit, subsequent   • Lumbar strain   • Internal hemorrhoids   • Insomnia   • Inflamed skin tag   • Hypothyroid   • Hyperplastic polyp of stomach   • Hyperlipidemia   • Hospital discharge follow-up   • History of colon polyps   • High risk medication use   • Glaucoma   • Gastroenteritis, acute   • Erysipelas   • Encounter for screening colonoscopy   • Encounter for long-term (current) use of NSAIDs   • Dysphagia   • DVT (deep venous thrombosis) (CMS/HCC)   • DJD (degenerative joint disease), lumbar   • Diverticulosis   • Disequilibrium   • Cough   • Contact with hypodermic needle   • Cervical radiculopathy   • Cellulitis   • Arthritis   • Allergic rhinitis   • Acute glaucoma   • Acute embolism and thrombosis of vein   • Actinic keratosis       No Known Allergies      Current Outpatient Medications:   •  amLODIPine (NORVASC) 5 MG tablet, Take 1 tablet by mouth Every Morning., Disp: 90 tablet, Rfl: 3  •  aspirin 81 MG tablet, Take 1 tablet by mouth Daily., Disp: , Rfl:   •  azelastine (ASTELIN) 0.1 % nasal spray, 2 sprays into the nostril(s) as directed by provider 2 (Two) Times a Day. Use in each nostril as directed, Disp: , Rfl:   •  baclofen (LIORESAL) 10 MG tablet, Take 10 mg by mouth 3 (Three) Times a Day., Disp: , Rfl:   •  brimonidine (ALPHAGAN) 0.2 % ophthalmic solution, , Disp: , Rfl:   •  brimonidine-timolol (COMBIGAN) 0.2-0.5 % ophthalmic solution, Administer 1 drop to both eyes every 12 (twelve) hours., Disp: , Rfl:   •  celecoxib (CELEBREX) 200 MG capsule, Take 1 capsule by mouth Daily., Disp: 90 capsule, Rfl: 3  •  clotrimazole (LOTRIMIN) 1 % cream, Apply  topically to the appropriate area as directed 2 (Two) Times a Day., Disp: , Rfl:   •  dorzolamide-timolol (COSOPT) 22.3-6.8 MG/ML ophthalmic solution, 1 drop 2 (Two) Times a Day., Disp: , Rfl:   •   Fluticasone Furoate-Vilanterol (BREO ELLIPTA IN), Inhale., Disp: , Rfl:   •  HYDROCHLOROTHIAZIDE PO, Take 25 mg by mouth daily with breakfast., Disp: , Rfl:   •  latanoprost (XALATAN) 0.005 % ophthalmic solution, , Disp: , Rfl:   •  Latanoprost 0.005 % emulsion, Apply 1 drop to eye(s) as directed by provider Every Night., Disp: , Rfl:   •  levothyroxine (SYNTHROID) 88 MCG tablet, Take 88 mcg by mouth every morning., Disp: , Rfl:   •  losartan (COZAAR) 100 MG tablet, Take 100 mg by mouth every morning., Disp: , Rfl:   •  montelukast (SINGULAIR) 10 MG tablet, Take 1 tablet by mouth Every Night., Disp: 90 tablet, Rfl: 3  •  NON FORMULARY, Administer 1 each to both eyes 2 (Two) Times a Day. lalanoprost 0.005% 1 drop both eye, Disp: , Rfl:   •  pantoprazole (PROTONIX) 40 MG EC tablet, , Disp: , Rfl:   •  pramipexole (MIRAPEX) 1.5 MG tablet, Take 0.75 mg by mouth 2 (Two) Times a Day., Disp: , Rfl:   •  rosuvastatin (CRESTOR) 20 MG tablet, Take 20 mg by mouth every evening., Disp: , Rfl:   •  sulfamethoxazole-trimethoprim (BACTRIM DS,SEPTRA DS) 800-160 MG per tablet, Take 1 tablet by mouth 2 (Two) Times a Day., Disp: , Rfl:   •  timolol (TIMOPTIC) 0.5 % ophthalmic solution, 1 drop 2 (Two) Times a Day., Disp: , Rfl:   •  albuterol (PROVENTIL HFA;VENTOLIN HFA) 108 (90 Base) MCG/ACT inhaler, Inhale 2 puffs Every 4 (Four) Hours As Needed for Wheezing., Disp: , Rfl:   •  apixaban (ELIQUIS) 5 MG tablet tablet, Take 5 mg by mouth 2 (Two) Times a Day., Disp: , Rfl:   •  pantoprazole (PROTONIX) 40 MG pack packet, Take  by mouth., Disp: , Rfl:   •  PANTOPRAZOLE SODIUM PO, Take 40 mg by mouth every morning., Disp: , Rfl:     Past Medical History:   Diagnosis Date   • Actinic keratosis    • Acute embolism and thrombosis of vein    • Acute glaucoma    • Allergic rhinitis    • Arthritis    • Cellulitis    • Cervical radiculopathy    • Contact with hypodermic needle    • Cough    • Disequilibrium    • Diverticulosis    • DJD  (degenerative joint disease), lumbar    • DVT (deep venous thrombosis) (CMS/HCC)     l leg    • Dysphagia    • Encounter for long-term (current) use of NSAIDs    • Encounter for screening colonoscopy    • Erysipelas    • Gastroenteritis, acute    • Glaucoma    • High risk medication use    • History of colon polyps    • Hospital discharge follow-up    • Hyperglycemia    • Hyperlipidemia    • Hyperplastic polyp of stomach    • Hypertension    • Hypothyroid    • Inflamed skin tag    • Insomnia    • Internal hemorrhoids    • Lumbar strain    • Medicare annual wellness visit, subsequent    • Obesity    • KWAME (obstructive sleep apnea)    • Osteoarthritis    • Pes planus    • Presbycusis    • Prostate cancer (CMS/HCC)    • Prostate cancer (CMS/HCC)    • Rectal bleed    • Restless leg    • Restless legs syndrome    • Rib pain on left side    • Skin lesion of face    • Sleep apnea    • Throat clearing    • Tinea corporis    • Tinea cruris    • Venous stasis dermatitis        Past Surgical History:   Procedure Laterality Date   • CATARACT EXTRACTION     • COLONOSCOPY  03/2017    3/17normal. Recheck 2022. 12/11. Repeat in 5 years    • ENDOSCOPY W/ PEG REMOVAL     • FOOT SURGERY     • HERNIA REPAIR      umbilical   • JOINT REPLACEMENT Right 2014   • NECK SURGERY     • AK TOTAL KNEE ARTHROPLASTY Left 9/13/2016    Procedure: LT TOTAL KNEE ARTHROPLASTY;  Surgeon: Tadeo Basurto MD;  Location: [facility] OR;  Service: Orthopedics   • PROSTATE SURGERY      1999   • PROSTATECTOMY  07/1999 July 1999. Radical    • THYROID LOBECTOMY     • THYROID SURGERY     • TOTAL KNEE ARTHROPLASTY     • WRIST SURGERY Right        Family History   Problem Relation Age of Onset   • Cancer Mother         COLON   • Cancer Father         PROSTATE   • Cancer Sister         BREAST CANCER   • Breast cancer Sister    • Gout Sister    • Hypertension Sister    • Heart attack Brother    • Bone cancer Brother    • Heart disease Brother        Social  History     Tobacco Use   • Smoking status: Former Smoker     Last attempt to quit: 1984     Years since quittin.7   • Smokeless tobacco: Never Used   Substance Use Topics   • Alcohol use: Yes     Alcohol/week: 0.6 oz     Types: 1 Shots of liquor per week     Comment: 3-4 MONTHLY            Objective     Vitals:    10/01/19 1139   BP: 130/86   Pulse: 82   Temp: 98.2 °F (36.8 °C)   SpO2: 96%     Body mass index is 39.25 kg/m².    Physical Exam   Constitutional: He is oriented to person, place, and time. He appears well-developed and well-nourished.   HENT:   Head: Normocephalic and atraumatic.   Right Ear: External ear normal.   Left Ear: External ear normal.   Nose: Nose normal.   Mouth/Throat: Oropharynx is clear and moist.   Eyes: Conjunctivae and EOM are normal. Pupils are equal, round, and reactive to light.   Neck: Normal range of motion. Neck supple. No tracheal deviation present. No thyromegaly present.   Cardiovascular: Normal rate, regular rhythm and normal heart sounds. Exam reveals no gallop and no friction rub.   No murmur heard.  Pulmonary/Chest: Effort normal and breath sounds normal. No respiratory distress. He exhibits no tenderness.   Abdominal: Soft. Bowel sounds are normal. He exhibits no distension. There is no tenderness.   Musculoskeletal: Normal range of motion. He exhibits no edema or tenderness.   Lymphadenopathy:     He has no cervical adenopathy.   Neurological: He is alert and oriented to person, place, and time. He displays normal reflexes. No cranial nerve deficit or sensory deficit. Coordination normal.   Skin: Skin is warm and dry.   Psychiatric: He has a normal mood and affect. His behavior is normal. Judgment and thought content normal.   Nursing note and vitals reviewed.      Lab Results   Component Value Date    GLUCOSE 114 (H) 2019    BUN 31 (H) 2019    CREATININE 1.30 (H) 2019    EGFRIFNONA 54 (L) 2019    EGFRIFAFRI 66 2019    BCR 23.8  07/11/2019    K 4.1 07/11/2019    CO2 26.4 07/11/2019    CALCIUM 9.6 07/11/2019    PROTENTOTREF 7.0 07/11/2019    ALBUMIN 4.30 07/11/2019    LABIL2 1.6 07/11/2019    AST 27 07/11/2019    ALT 30 07/11/2019       WBC   Date Value Ref Range Status   07/04/2019 6.14 3.40 - 10.80 10*3/mm3 Final     RBC   Date Value Ref Range Status   07/04/2019 4.14 4.14 - 5.80 10*6/mm3 Final     Hemoglobin   Date Value Ref Range Status   07/04/2019 12.4 (L) 13.0 - 17.7 g/dL Final     Hematocrit   Date Value Ref Range Status   07/04/2019 39.8 37.5 - 51.0 % Final     MCV   Date Value Ref Range Status   07/04/2019 96.1 79.0 - 97.0 fL Final     MCH   Date Value Ref Range Status   07/04/2019 30.0 26.6 - 33.0 pg Final     MCHC   Date Value Ref Range Status   07/04/2019 31.2 (L) 31.5 - 35.7 g/dL Final     RDW   Date Value Ref Range Status   07/04/2019 13.9 12.3 - 15.4 % Final     RDW-SD   Date Value Ref Range Status   07/04/2019 49.2 37.0 - 54.0 fl Final     MPV   Date Value Ref Range Status   07/04/2019 10.2 6.0 - 12.0 fL Final     Platelets   Date Value Ref Range Status   07/04/2019 162 140 - 450 10*3/mm3 Final     Neutrophil %   Date Value Ref Range Status   07/04/2019 64.8 42.7 - 76.0 % Final     Lymphocyte %   Date Value Ref Range Status   07/04/2019 19.4 (L) 19.6 - 45.3 % Final     Monocyte %   Date Value Ref Range Status   07/04/2019 10.7 5.0 - 12.0 % Final     Eosinophil %   Date Value Ref Range Status   07/04/2019 2.3 0.3 - 6.2 % Final     Basophil %   Date Value Ref Range Status   07/04/2019 0.5 0.0 - 1.5 % Final     Immature Grans %   Date Value Ref Range Status   07/04/2019 2.3 (H) 0.0 - 0.5 % Final     Neutrophils, Absolute   Date Value Ref Range Status   07/04/2019 3.98 1.70 - 7.00 10*3/mm3 Final     Lymphocytes, Absolute   Date Value Ref Range Status   07/04/2019 1.19 0.70 - 3.10 10*3/mm3 Final     Monocytes, Absolute   Date Value Ref Range Status   07/04/2019 0.66 0.10 - 0.90 10*3/mm3 Final     Eosinophils, Absolute   Date  Value Ref Range Status   07/04/2019 0.14 0.00 - 0.40 10*3/mm3 Final     Basophils, Absolute   Date Value Ref Range Status   07/04/2019 0.03 0.00 - 0.20 10*3/mm3 Final     Immature Grans, Absolute   Date Value Ref Range Status   07/04/2019 0.14 (H) 0.00 - 0.05 10*3/mm3 Final     nRBC   Date Value Ref Range Status   07/04/2019 0.0 0.0 - 0.2 /100 WBC Final       Lab Results   Component Value Date    HGBA1C 6.00 (H) 07/11/2019       Lab Results   Component Value Date    ELEZWKVN31 334 06/04/2019       TSH   Date Value Ref Range Status   07/11/2019 3.750 0.270 - 4.200 mIU/mL Final   06/04/2019 2.220 0.270 - 4.200 mIU/mL Final       No results found for: CHOL  No results found for: TRIG  No results found for: HDL  No results found for: LDL  No results found for: VLDL  No results found for: LDLHDL      Procedures    Assessment/Plan   Problems Addressed this Visit        Cardiovascular and Mediastinum    Hypertension    Mixed hyperlipidemia - Primary       Other    Restless leg syndrome          No orders of the defined types were placed in this encounter.      Current Outpatient Medications   Medication Sig Dispense Refill   • amLODIPine (NORVASC) 5 MG tablet Take 1 tablet by mouth Every Morning. 90 tablet 3   • aspirin 81 MG tablet Take 1 tablet by mouth Daily.     • azelastine (ASTELIN) 0.1 % nasal spray 2 sprays into the nostril(s) as directed by provider 2 (Two) Times a Day. Use in each nostril as directed     • baclofen (LIORESAL) 10 MG tablet Take 10 mg by mouth 3 (Three) Times a Day.     • brimonidine (ALPHAGAN) 0.2 % ophthalmic solution      • brimonidine-timolol (COMBIGAN) 0.2-0.5 % ophthalmic solution Administer 1 drop to both eyes every 12 (twelve) hours.     • celecoxib (CELEBREX) 200 MG capsule Take 1 capsule by mouth Daily. 90 capsule 3   • clotrimazole (LOTRIMIN) 1 % cream Apply  topically to the appropriate area as directed 2 (Two) Times a Day.     • dorzolamide-timolol (COSOPT) 22.3-6.8 MG/ML ophthalmic  solution 1 drop 2 (Two) Times a Day.     • Fluticasone Furoate-Vilanterol (BREO ELLIPTA IN) Inhale.     • HYDROCHLOROTHIAZIDE PO Take 25 mg by mouth daily with breakfast.     • latanoprost (XALATAN) 0.005 % ophthalmic solution      • Latanoprost 0.005 % emulsion Apply 1 drop to eye(s) as directed by provider Every Night.     • levothyroxine (SYNTHROID) 88 MCG tablet Take 88 mcg by mouth every morning.     • losartan (COZAAR) 100 MG tablet Take 100 mg by mouth every morning.     • montelukast (SINGULAIR) 10 MG tablet Take 1 tablet by mouth Every Night. 90 tablet 3   • NON FORMULARY Administer 1 each to both eyes 2 (Two) Times a Day. lalanoprost 0.005% 1 drop both eye     • pantoprazole (PROTONIX) 40 MG EC tablet      • pramipexole (MIRAPEX) 1.5 MG tablet Take 0.75 mg by mouth 2 (Two) Times a Day.     • rosuvastatin (CRESTOR) 20 MG tablet Take 20 mg by mouth every evening.     • sulfamethoxazole-trimethoprim (BACTRIM DS,SEPTRA DS) 800-160 MG per tablet Take 1 tablet by mouth 2 (Two) Times a Day.     • timolol (TIMOPTIC) 0.5 % ophthalmic solution 1 drop 2 (Two) Times a Day.     • albuterol (PROVENTIL HFA;VENTOLIN HFA) 108 (90 Base) MCG/ACT inhaler Inhale 2 puffs Every 4 (Four) Hours As Needed for Wheezing.     • apixaban (ELIQUIS) 5 MG tablet tablet Take 5 mg by mouth 2 (Two) Times a Day.     • pantoprazole (PROTONIX) 40 MG pack packet Take  by mouth.     • PANTOPRAZOLE SODIUM PO Take 40 mg by mouth every morning.       No current facility-administered medications for this visit.        Nathan Baez had no medications administered during this visit.    Return in about 2 months (around 12/1/2019).    There are no Patient Instructions on file for this visit.

## 2019-10-30 ENCOUNTER — HOSPITAL ENCOUNTER (OUTPATIENT)
Dept: GENERAL RADIOLOGY | Facility: HOSPITAL | Age: 74
Discharge: HOME | End: 2019-10-30
Admitting: ORTHOPAEDIC SURGERY

## 2019-10-30 ENCOUNTER — APPOINTMENT (OUTPATIENT)
Dept: PREADMISSION TESTING | Facility: HOSPITAL | Age: 74
End: 2019-10-30

## 2019-10-30 VITALS
OXYGEN SATURATION: 94 % | BODY MASS INDEX: 39.24 KG/M2 | SYSTOLIC BLOOD PRESSURE: 137 MMHG | TEMPERATURE: 97.4 F | DIASTOLIC BLOOD PRESSURE: 83 MMHG | HEART RATE: 86 BPM | HEIGHT: 67 IN | WEIGHT: 250 LBS | RESPIRATION RATE: 20 BRPM

## 2019-10-30 LAB
ABO GROUP BLD: NORMAL
ANION GAP SERPL CALCULATED.3IONS-SCNC: 11.5 MMOL/L (ref 5–15)
BLD GP AB SCN SERPL QL: NEGATIVE
BUN BLD-MCNC: 23 MG/DL (ref 8–23)
BUN/CREAT SERPL: 21.7 (ref 7–25)
CALCIUM SPEC-SCNC: 8.8 MG/DL (ref 8.6–10.5)
CHLORIDE SERPL-SCNC: 104 MMOL/L (ref 98–107)
CO2 SERPL-SCNC: 24.5 MMOL/L (ref 22–29)
CREAT BLD-MCNC: 1.06 MG/DL (ref 0.76–1.27)
DEPRECATED RDW RBC AUTO: 44.5 FL (ref 37–54)
ERYTHROCYTE [DISTWIDTH] IN BLOOD BY AUTOMATED COUNT: 13.6 % (ref 12.3–15.4)
GFR SERPL CREATININE-BSD FRML MDRD: 68 ML/MIN/1.73
GLUCOSE BLD-MCNC: 139 MG/DL (ref 65–99)
HCT VFR BLD AUTO: 38.3 % (ref 37.5–51)
HGB BLD-MCNC: 12.7 G/DL (ref 13–17.7)
MCH RBC QN AUTO: 29.7 PG (ref 26.6–33)
MCHC RBC AUTO-ENTMCNC: 33.2 G/DL (ref 31.5–35.7)
MCV RBC AUTO: 89.5 FL (ref 79–97)
PLATELET # BLD AUTO: 153 10*3/MM3 (ref 140–450)
PMV BLD AUTO: 9.9 FL (ref 6–12)
POTASSIUM BLD-SCNC: 3.8 MMOL/L (ref 3.5–5.2)
RBC # BLD AUTO: 4.28 10*6/MM3 (ref 4.14–5.8)
RH BLD: POSITIVE
SODIUM BLD-SCNC: 140 MMOL/L (ref 136–145)
T&S EXPIRATION DATE: NORMAL
WBC NRBC COR # BLD: 5.74 10*3/MM3 (ref 3.4–10.8)

## 2019-10-30 PROCEDURE — 36415 COLL VENOUS BLD VENIPUNCTURE: CPT

## 2019-10-30 PROCEDURE — 73030 X-RAY EXAM OF SHOULDER: CPT

## 2019-10-30 PROCEDURE — 80048 BASIC METABOLIC PNL TOTAL CA: CPT | Performed by: ORTHOPAEDIC SURGERY

## 2019-10-30 PROCEDURE — 86901 BLOOD TYPING SEROLOGIC RH(D): CPT | Performed by: ORTHOPAEDIC SURGERY

## 2019-10-30 PROCEDURE — 86900 BLOOD TYPING SEROLOGIC ABO: CPT | Performed by: ORTHOPAEDIC SURGERY

## 2019-10-30 PROCEDURE — 85027 COMPLETE CBC AUTOMATED: CPT | Performed by: ORTHOPAEDIC SURGERY

## 2019-10-30 PROCEDURE — A9270 NON-COVERED ITEM OR SERVICE: HCPCS | Performed by: ORTHOPAEDIC SURGERY

## 2019-10-30 PROCEDURE — 86850 RBC ANTIBODY SCREEN: CPT | Performed by: ORTHOPAEDIC SURGERY

## 2019-10-30 PROCEDURE — 63710000001 MUPIROCIN 2 % OINTMENT: Performed by: ORTHOPAEDIC SURGERY

## 2019-10-30 RX ORDER — SENNOSIDES 8.6 MG
1300 CAPSULE ORAL 2 TIMES DAILY
COMMUNITY
End: 2021-08-11

## 2019-10-30 RX ORDER — CHLORHEXIDINE GLUCONATE 500 MG/1
1 CLOTH TOPICAL
COMMUNITY
End: 2019-11-17 | Stop reason: HOSPADM

## 2019-10-30 RX ORDER — FEXOFENADINE HCL 180 MG/1
180 TABLET ORAL DAILY
Status: ON HOLD | COMMUNITY
End: 2023-02-17

## 2019-11-11 ENCOUNTER — TRANSCRIBE ORDERS (OUTPATIENT)
Dept: PHYSICAL THERAPY | Facility: CLINIC | Age: 74
End: 2019-11-11

## 2019-11-11 DIAGNOSIS — Z96.611 S/P REVERSE TOTAL SHOULDER ARTHROPLASTY, RIGHT: Primary | ICD-10-CM

## 2019-11-12 NOTE — H&P
Provider: BOBO HORAT MD  HPI  Chief complaint right shoulder injury.  This gentleman is here to review MRI results for her right shoulder.  Briefly he is a 74-year-old  male is right-hand dominant and retired.  He injured his right shoulder July 4, 2019.  He fell onto the right shoulder and has been unable lift the arm since that time.  He tried to wait and let the pain settle down and then went to [facility] and did physical therapy for 6 weeks.  He continues to have soreness, sharp pain weakness and aching despite your good care provided.  He has trouble sleeping the right side.  Review of Systems:  Positive for: Insomnia, Joint Pain, Poor Balance, Shortness of Breath and Shortness of Breath While Lying down.    Patient denies: Abdominal Pain, Bleeding, Chest Pain, Convulsions/Seizure, Decreased Motion, Depression, Difficulty Swallowing, Easy Bruisability, Emotional Disturbances, Eyes or Vision Problems, Fecal Incontinence, Fever/Chills, Headaches, Increased Thirst, Increased Hunger, Nausea/Vomiting, Night Sweats, Persistent Cough, Rash, Skin Problems, Urinary Retention and Weakness.  Allergies:  * no known allergies  Medications:  azelastine hcl solution (azelastine hcl soln)   allegra allergy tablet (fexofenadine hcl tabs)   breo ellipta aerosol powder breath activated (fluticasone furoate-vilanterol aepb)   ventolin hfa aerosol solution (albuterol sulfate aers)   montelukast sodium tablet (montelukast sodium tabs)   levothyroxine sodium tablet (levothyroxine sodium tabs)   * lalanaprost?   brimonidine tartrate solution (brimonidine tartrate soln)   dorzolamide hcl-timolol mal solution (dorzolamide hcl-timolol mal soln)   rosuvastatin calcium tablet (rosuvastatin calcium tabs)   pramipexole dihydrochloride tablet (pramipexole dihydrochloride tabs)   losartan potassium tablet (losartan potassium tabs)   amlodipine besylate tablet (amlodipine besylate tabs)   pantoprazole sodium tablet delayed  release (pantoprazole sodium tbec)   hydrochlorothiazide capsule (hydrochlorothiazide caps)   celecoxib capsule (celecoxib caps)   Patient History of:  THYROID DISEASE  HIGH CHOLESTEROL  SLEEP APNEA/CPAP/BIPAP  BLOOD CLOTS/EMBOLISM - NEGATIVE  CANCER - PROSTATE  Surgical History:  left APPENDED-   left Total Knee Arthroplasty-[[id number]]   left APPENDED-   Cataract removal-[[id number]]   STOMACH SCOPE-   THYROID SURGERY-   COLONOSCOPY-   PROSTATE SURGERY-   Hernia Repair-[[id number]]   NECK SURGERY-   RIGHT WRIST JOINT REPLACEMENT-   TOE JOINT REPLACEMENT LT FOOT-   right Total Knee Arthroplasty-[[id number]]   Known Family History of:  cancer-father  cancer-mother  Past medical, social, family histories and ROS reviewed today with the patient and changes documented in the chart (10/07/2019).  Referring Dr. MARIA A LYLES MD  PCP Dr. TRUPTI GARCIA, MARIA A    Physical Exam  Height:  65.5 in.    Weight:  247 lbs.     BMI:  40.62    Mental/HEENT/Cardio/Skin  The patient's general appearance is well nourished, well hydrated, no acute distress.  Orientation is alert and oriented x 3.  The patient's mood is normal.  Pulmonary exam shows normal air exchange, no labored breathing, or shortness of breath.  A skin exam shows normal temperature and color in the area of examination.  A lymphatic exam reveals no adenopathy in the area of examination.      Right Shoulder  Skin is normal.  There is no atrophy.  There is no effusion.  There is no warmth.  No erythema.  Lymphadenopathy is negative.  Right shoulder active ROM today shows: Elevation = 80; ER(side) = 40; ER(abd) = 80; IR(abd) = 60; IR(vert) = to waist; Abduction = 100.  Right shoulder passive ROM today shows: Elevation = 140; ER(side) = 40; ER(abd) = 80; Abduction = 100.  Shoulder strength: Elevation = 3/5; ER = 4/5; IR = 5-/5; ABD = 4/5.  No crepitation.  Neer test is positive.  Grover test is positive.  Arc of motion is positive.  Empty can test was positive.  Pulses  are normal.  Normal sensation.  Capillary refill is normal.        Imaging/Diagnostic Studies  MRI shows an extremely large supraspinatus and infraspinatus tendon tear right shoulder with significant retraction.  He has a long head biceps tendon tear and moderate arthritis.  Impression  Right chronic full thickness rotator cuff tear  Right glenohumeral joint osteoarthritis    Plan  We discussed the natural history and findings.  He is an extremely large tear with associated arthritis.  It is now 3 months post injury and chronic.  I think his best chance at full recovery is her right reverse total shoulder arthroplasty.  I think repairing the tendon and having a successful result has a low chance of success.    We discussed the benefits of surgical intervention, as well as alternative treatments.  Potential surgical risks and complications include but are not limited to DVT, infection, neurovascular injury, fracture, implant wear, failure, possible need for revision surgery, loss of motion, dislocation, limb length changes.  Sufficient opportunity was given to discuss the condition and treatment plan with the doctor, and all questions were answered for the patient.  Nonsurgical measures such as injections, medications, or physical therapy may not offer significant relief to this patient.  DINESH agreed to proceed with the surgery.

## 2019-11-13 ENCOUNTER — ANESTHESIA (OUTPATIENT)
Dept: PERIOP | Facility: HOSPITAL | Age: 74
End: 2019-11-13

## 2019-11-13 ENCOUNTER — APPOINTMENT (OUTPATIENT)
Dept: GENERAL RADIOLOGY | Facility: HOSPITAL | Age: 74
End: 2019-11-13

## 2019-11-13 ENCOUNTER — ANESTHESIA EVENT (OUTPATIENT)
Dept: PERIOP | Facility: HOSPITAL | Age: 74
End: 2019-11-13

## 2019-11-13 ENCOUNTER — HOSPITAL ENCOUNTER (INPATIENT)
Facility: HOSPITAL | Age: 74
LOS: 4 days | Discharge: HOME | End: 2019-11-17
Attending: ORTHOPAEDIC SURGERY | Admitting: ORTHOPAEDIC SURGERY

## 2019-11-13 PROBLEM — Z96.611 STATUS POST REVERSE TOTAL SHOULDER REPLACEMENT, RIGHT: Status: ACTIVE | Noted: 2019-11-13

## 2019-11-13 LAB
GLUCOSE BLDC GLUCOMTR-MCNC: 133 MG/DL (ref 70–130)
HCT VFR BLD AUTO: 31.1 % (ref 37.5–51)
HGB BLD-MCNC: 10.1 G/DL (ref 13–17.7)

## 2019-11-13 PROCEDURE — C1776 JOINT DEVICE (IMPLANTABLE): HCPCS | Performed by: ORTHOPAEDIC SURGERY

## 2019-11-13 PROCEDURE — 0RRJ00Z REPLACEMENT OF RIGHT SHOULDER JOINT WITH REVERSE BALL AND SOCKET SYNTHETIC SUBSTITUTE, OPEN APPROACH: ICD-10-PCS | Performed by: ORTHOPAEDIC SURGERY

## 2019-11-13 PROCEDURE — P9016 RBC LEUKOCYTES REDUCED: HCPCS

## 2019-11-13 PROCEDURE — 25010000002 ONDANSETRON PER 1 MG: Performed by: ORTHOPAEDIC SURGERY

## 2019-11-13 PROCEDURE — C1713 ANCHOR/SCREW BN/BN,TIS/BN: HCPCS | Performed by: ORTHOPAEDIC SURGERY

## 2019-11-13 PROCEDURE — P9041 ALBUMIN (HUMAN),5%, 50ML: HCPCS | Performed by: NURSE ANESTHETIST, CERTIFIED REGISTERED

## 2019-11-13 PROCEDURE — 85018 HEMOGLOBIN: CPT | Performed by: ANESTHESIOLOGY

## 2019-11-13 PROCEDURE — 86900 BLOOD TYPING SEROLOGIC ABO: CPT

## 2019-11-13 PROCEDURE — 25010000002 HEPARIN (PORCINE) PER 1000 UNITS: Performed by: ORTHOPAEDIC SURGERY

## 2019-11-13 PROCEDURE — 82947 ASSAY GLUCOSE BLOOD QUANT: CPT

## 2019-11-13 PROCEDURE — 25010000002 PHENYLEPHRINE 10 MG/ML SOLUTION 5 ML VIAL: Performed by: ANESTHESIOLOGY

## 2019-11-13 PROCEDURE — 85018 HEMOGLOBIN: CPT

## 2019-11-13 PROCEDURE — 85014 HEMATOCRIT: CPT

## 2019-11-13 PROCEDURE — 25010000003 CEFAZOLIN IN DEXTROSE 2-4 GM/100ML-% SOLUTION: Performed by: ORTHOPAEDIC SURGERY

## 2019-11-13 PROCEDURE — 25010000002 FENTANYL CITRATE (PF) 100 MCG/2ML SOLUTION: Performed by: ANESTHESIOLOGY

## 2019-11-13 PROCEDURE — 25010000002 ALBUMIN HUMAN 5% PER 50 ML: Performed by: NURSE ANESTHETIST, CERTIFIED REGISTERED

## 2019-11-13 PROCEDURE — 25010000002 PHENYLEPHRINE PER 1 ML: Performed by: NURSE ANESTHETIST, CERTIFIED REGISTERED

## 2019-11-13 PROCEDURE — 25010000002 MIDAZOLAM PER 1 MG: Performed by: ANESTHESIOLOGY

## 2019-11-13 PROCEDURE — 82803 BLOOD GASES ANY COMBINATION: CPT

## 2019-11-13 PROCEDURE — 86923 COMPATIBILITY TEST ELECTRIC: CPT

## 2019-11-13 PROCEDURE — 94799 UNLISTED PULMONARY SVC/PX: CPT

## 2019-11-13 PROCEDURE — 36430 TRANSFUSION BLD/BLD COMPNT: CPT

## 2019-11-13 PROCEDURE — 85014 HEMATOCRIT: CPT | Performed by: ANESTHESIOLOGY

## 2019-11-13 PROCEDURE — 73020 X-RAY EXAM OF SHOULDER: CPT

## 2019-11-13 PROCEDURE — 25010000002 PROPOFOL 10 MG/ML EMULSION: Performed by: NURSE ANESTHETIST, CERTIFIED REGISTERED

## 2019-11-13 PROCEDURE — 25010000002 PHENYLEPHRINE PER 1 ML: Performed by: ANESTHESIOLOGY

## 2019-11-13 PROCEDURE — 05Q70ZZ REPAIR RIGHT AXILLARY VEIN, OPEN APPROACH: ICD-10-PCS | Performed by: SURGERY

## 2019-11-13 PROCEDURE — 82962 GLUCOSE BLOOD TEST: CPT

## 2019-11-13 PROCEDURE — 25010000002 ROPIVACAINE PER 1 MG: Performed by: ANESTHESIOLOGY

## 2019-11-13 DEVICE — GLENOSPHERE SHLDR/REV EQUINOXE 38MM: Type: IMPLANTABLE DEVICE | Site: SHOULDER | Status: FUNCTIONAL

## 2019-11-13 DEVICE — STEM HUM PRI EQUINOXE PRESSFIT 12MM SHT: Type: IMPLANTABLE DEVICE | Site: SHOULDER | Status: FUNCTIONAL

## 2019-11-13 DEVICE — IMPLANTABLE DEVICE: Type: IMPLANTABLE DEVICE | Status: FUNCTIONAL

## 2019-11-13 DEVICE — LINER HUM EQUINOXE REVSHLDR 38PLS2.5: Type: IMPLANTABLE DEVICE | Site: SHOULDER | Status: FUNCTIONAL

## 2019-11-13 DEVICE — SCRW COMPR EQUINOXE LK 4.5X26MM: Type: IMPLANTABLE DEVICE | Site: SHOULDER | Status: FUNCTIONAL

## 2019-11-13 DEVICE — SCRW COMPR EQUINOXE LK 4.5X18MM: Type: IMPLANTABLE DEVICE | Site: SHOULDER | Status: FUNCTIONAL

## 2019-11-13 DEVICE — SCRW EQUINOXE TORQ DEFINE REV SHLDR KT: Type: IMPLANTABLE DEVICE | Site: SHOULDER | Status: FUNCTIONAL

## 2019-11-13 DEVICE — PLT/JOINT GLEN EQUINOXE STD/POST: Type: IMPLANTABLE DEVICE | Site: SHOULDER | Status: FUNCTIONAL

## 2019-11-13 DEVICE — SCRW LK EQUINOXE GLENOSPHERE REV/SHLDR: Type: IMPLANTABLE DEVICE | Site: SHOULDER | Status: FUNCTIONAL

## 2019-11-13 DEVICE — TRY HUM EQUINOXE ADPT REV SHLDR PLS0: Type: IMPLANTABLE DEVICE | Site: SHOULDER | Status: FUNCTIONAL

## 2019-11-13 RX ORDER — ALBUMIN, HUMAN INJ 5% 5 %
SOLUTION INTRAVENOUS CONTINUOUS PRN
Status: DISCONTINUED | OUTPATIENT
Start: 2019-11-13 | End: 2019-11-13 | Stop reason: SURG

## 2019-11-13 RX ORDER — EPHEDRINE SULFATE 50 MG/ML
5 INJECTION, SOLUTION INTRAVENOUS ONCE AS NEEDED
Status: DISCONTINUED | OUTPATIENT
Start: 2019-11-13 | End: 2019-11-13 | Stop reason: HOSPADM

## 2019-11-13 RX ORDER — ROCURONIUM BROMIDE 10 MG/ML
INJECTION, SOLUTION INTRAVENOUS AS NEEDED
Status: DISCONTINUED | OUTPATIENT
Start: 2019-11-13 | End: 2019-11-13 | Stop reason: SURG

## 2019-11-13 RX ORDER — ROSUVASTATIN CALCIUM 20 MG/1
20 TABLET, COATED ORAL EVERY EVENING
Status: DISCONTINUED | OUTPATIENT
Start: 2019-11-13 | End: 2019-11-17 | Stop reason: HOSPADM

## 2019-11-13 RX ORDER — LOSARTAN POTASSIUM 100 MG/1
100 TABLET ORAL EVERY MORNING
Status: DISCONTINUED | OUTPATIENT
Start: 2019-11-14 | End: 2019-11-15

## 2019-11-13 RX ORDER — MONTELUKAST SODIUM 10 MG/1
10 TABLET ORAL NIGHTLY
Status: DISCONTINUED | OUTPATIENT
Start: 2019-11-13 | End: 2019-11-17 | Stop reason: HOSPADM

## 2019-11-13 RX ORDER — PROPOFOL 10 MG/ML
VIAL (ML) INTRAVENOUS
Status: DISPENSED
Start: 2019-11-13 | End: 2019-11-14

## 2019-11-13 RX ORDER — PROPOFOL 10 MG/ML
VIAL (ML) INTRAVENOUS AS NEEDED
Status: DISCONTINUED | OUTPATIENT
Start: 2019-11-13 | End: 2019-11-13 | Stop reason: SURG

## 2019-11-13 RX ORDER — ONDANSETRON 2 MG/ML
4 INJECTION INTRAMUSCULAR; INTRAVENOUS ONCE AS NEEDED
Status: DISCONTINUED | OUTPATIENT
Start: 2019-11-13 | End: 2019-11-13 | Stop reason: HOSPADM

## 2019-11-13 RX ORDER — TIMOLOL MALEATE 5 MG/ML
1 SOLUTION/ DROPS OPHTHALMIC EVERY 12 HOURS SCHEDULED
Status: DISCONTINUED | OUTPATIENT
Start: 2019-11-13 | End: 2019-11-17 | Stop reason: HOSPADM

## 2019-11-13 RX ORDER — MORPHINE SULFATE 2 MG/ML
4 INJECTION, SOLUTION INTRAMUSCULAR; INTRAVENOUS
Status: DISCONTINUED | OUTPATIENT
Start: 2019-11-13 | End: 2019-11-17 | Stop reason: HOSPADM

## 2019-11-13 RX ORDER — AMLODIPINE BESYLATE 5 MG/1
5 TABLET ORAL EVERY MORNING
Status: DISCONTINUED | OUTPATIENT
Start: 2019-11-14 | End: 2019-11-15

## 2019-11-13 RX ORDER — SODIUM CHLORIDE 9 MG/ML
INJECTION, SOLUTION INTRAVENOUS CONTINUOUS PRN
Status: DISCONTINUED | OUTPATIENT
Start: 2019-11-13 | End: 2019-11-13 | Stop reason: SURG

## 2019-11-13 RX ORDER — ALBUTEROL SULFATE 2.5 MG/3ML
2.5 SOLUTION RESPIRATORY (INHALATION) EVERY 4 HOURS PRN
Status: DISCONTINUED | OUTPATIENT
Start: 2019-11-13 | End: 2019-11-17 | Stop reason: HOSPADM

## 2019-11-13 RX ORDER — MAGNESIUM HYDROXIDE 1200 MG/15ML
LIQUID ORAL AS NEEDED
Status: DISCONTINUED | OUTPATIENT
Start: 2019-11-13 | End: 2019-11-13 | Stop reason: HOSPADM

## 2019-11-13 RX ORDER — FENTANYL CITRATE 50 UG/ML
50 INJECTION, SOLUTION INTRAMUSCULAR; INTRAVENOUS
Status: DISCONTINUED | OUTPATIENT
Start: 2019-11-13 | End: 2019-11-13 | Stop reason: HOSPADM

## 2019-11-13 RX ORDER — HYDROCHLOROTHIAZIDE 25 MG/1
25 TABLET ORAL
Status: DISCONTINUED | OUTPATIENT
Start: 2019-11-14 | End: 2019-11-17

## 2019-11-13 RX ORDER — CEFAZOLIN SODIUM 2 G/100ML
2 INJECTION, SOLUTION INTRAVENOUS ONCE
Status: DISCONTINUED | OUTPATIENT
Start: 2019-11-13 | End: 2019-11-13 | Stop reason: SDUPTHER

## 2019-11-13 RX ORDER — SODIUM CHLORIDE 0.9 % (FLUSH) 0.9 %
3 SYRINGE (ML) INJECTION EVERY 12 HOURS SCHEDULED
Status: DISCONTINUED | OUTPATIENT
Start: 2019-11-13 | End: 2019-11-13 | Stop reason: HOSPADM

## 2019-11-13 RX ORDER — DORZOLAMIDE HYDROCHLORIDE AND TIMOLOL MALEATE 20; 5 MG/ML; MG/ML
1 SOLUTION/ DROPS OPHTHALMIC 2 TIMES DAILY
Status: DISCONTINUED | OUTPATIENT
Start: 2019-11-13 | End: 2019-11-13 | Stop reason: CLARIF

## 2019-11-13 RX ORDER — MIDAZOLAM HYDROCHLORIDE 1 MG/ML
1 INJECTION INTRAMUSCULAR; INTRAVENOUS
Status: DISCONTINUED | OUTPATIENT
Start: 2019-11-13 | End: 2019-11-13 | Stop reason: HOSPADM

## 2019-11-13 RX ORDER — DIPHENHYDRAMINE HCL 25 MG
25 CAPSULE ORAL
Status: DISCONTINUED | OUTPATIENT
Start: 2019-11-13 | End: 2019-11-13 | Stop reason: HOSPADM

## 2019-11-13 RX ORDER — LIDOCAINE HYDROCHLORIDE 20 MG/ML
INJECTION, SOLUTION INFILTRATION; PERINEURAL AS NEEDED
Status: DISCONTINUED | OUTPATIENT
Start: 2019-11-13 | End: 2019-11-13 | Stop reason: SURG

## 2019-11-13 RX ORDER — LIDOCAINE HYDROCHLORIDE 10 MG/ML
0.5 INJECTION, SOLUTION EPIDURAL; INFILTRATION; INTRACAUDAL; PERINEURAL ONCE AS NEEDED
Status: DISCONTINUED | OUTPATIENT
Start: 2019-11-13 | End: 2019-11-13 | Stop reason: HOSPADM

## 2019-11-13 RX ORDER — ASPIRIN 325 MG
325 TABLET ORAL DAILY
Status: DISCONTINUED | OUTPATIENT
Start: 2019-11-14 | End: 2019-11-17 | Stop reason: HOSPADM

## 2019-11-13 RX ORDER — ALBUTEROL SULFATE 90 UG/1
2 AEROSOL, METERED RESPIRATORY (INHALATION) EVERY 4 HOURS PRN
Status: DISCONTINUED | OUTPATIENT
Start: 2019-11-13 | End: 2019-11-13 | Stop reason: CLARIF

## 2019-11-13 RX ORDER — CEFAZOLIN SODIUM 2 G/100ML
2 INJECTION, SOLUTION INTRAVENOUS EVERY 8 HOURS
Status: COMPLETED | OUTPATIENT
Start: 2019-11-13 | End: 2019-11-14

## 2019-11-13 RX ORDER — ONDANSETRON 4 MG/1
4 TABLET, FILM COATED ORAL EVERY 6 HOURS PRN
Status: DISCONTINUED | OUTPATIENT
Start: 2019-11-13 | End: 2019-11-17 | Stop reason: HOSPADM

## 2019-11-13 RX ORDER — MORPHINE SULFATE 2 MG/ML
6 INJECTION, SOLUTION INTRAMUSCULAR; INTRAVENOUS
Status: DISCONTINUED | OUTPATIENT
Start: 2019-11-13 | End: 2019-11-15

## 2019-11-13 RX ORDER — DORZOLAMIDE HCL 20 MG/ML
1 SOLUTION/ DROPS OPHTHALMIC 2 TIMES DAILY
Status: DISCONTINUED | OUTPATIENT
Start: 2019-11-13 | End: 2019-11-17 | Stop reason: HOSPADM

## 2019-11-13 RX ORDER — LEVOTHYROXINE SODIUM 88 UG/1
88 TABLET ORAL EVERY MORNING
Status: DISCONTINUED | OUTPATIENT
Start: 2019-11-14 | End: 2019-11-17 | Stop reason: HOSPADM

## 2019-11-13 RX ORDER — ROPIVACAINE HYDROCHLORIDE 5 MG/ML
INJECTION, SOLUTION EPIDURAL; INFILTRATION; PERINEURAL
Status: COMPLETED | OUTPATIENT
Start: 2019-11-13 | End: 2019-11-13

## 2019-11-13 RX ORDER — ONDANSETRON 2 MG/ML
4 INJECTION INTRAMUSCULAR; INTRAVENOUS EVERY 6 HOURS PRN
Status: DISCONTINUED | OUTPATIENT
Start: 2019-11-13 | End: 2019-11-17 | Stop reason: HOSPADM

## 2019-11-13 RX ORDER — NALOXONE HCL 0.4 MG/ML
0.4 VIAL (ML) INJECTION
Status: DISCONTINUED | OUTPATIENT
Start: 2019-11-13 | End: 2019-11-15

## 2019-11-13 RX ORDER — LABETALOL HYDROCHLORIDE 5 MG/ML
5 INJECTION, SOLUTION INTRAVENOUS
Status: DISCONTINUED | OUTPATIENT
Start: 2019-11-13 | End: 2019-11-13 | Stop reason: HOSPADM

## 2019-11-13 RX ORDER — DIAZEPAM 5 MG/1
5 TABLET ORAL EVERY 6 HOURS PRN
Status: DISCONTINUED | OUTPATIENT
Start: 2019-11-13 | End: 2019-11-17 | Stop reason: HOSPADM

## 2019-11-13 RX ORDER — FAMOTIDINE 10 MG/ML
20 INJECTION, SOLUTION INTRAVENOUS ONCE
Status: COMPLETED | OUTPATIENT
Start: 2019-11-13 | End: 2019-11-13

## 2019-11-13 RX ORDER — OXYCODONE AND ACETAMINOPHEN 7.5; 325 MG/1; MG/1
1 TABLET ORAL EVERY 4 HOURS PRN
Status: DISCONTINUED | OUTPATIENT
Start: 2019-11-13 | End: 2019-11-14

## 2019-11-13 RX ORDER — DIPHENHYDRAMINE HYDROCHLORIDE 50 MG/ML
12.5 INJECTION INTRAMUSCULAR; INTRAVENOUS
Status: DISCONTINUED | OUTPATIENT
Start: 2019-11-13 | End: 2019-11-13 | Stop reason: HOSPADM

## 2019-11-13 RX ORDER — SODIUM CHLORIDE 0.9 % (FLUSH) 0.9 %
3-10 SYRINGE (ML) INJECTION AS NEEDED
Status: DISCONTINUED | OUTPATIENT
Start: 2019-11-13 | End: 2019-11-13 | Stop reason: HOSPADM

## 2019-11-13 RX ORDER — CELECOXIB 200 MG/1
200 CAPSULE ORAL DAILY
Status: DISCONTINUED | OUTPATIENT
Start: 2019-11-14 | End: 2019-11-17 | Stop reason: HOSPADM

## 2019-11-13 RX ORDER — NALOXONE HCL 0.4 MG/ML
0.4 VIAL (ML) INJECTION
Status: DISCONTINUED | OUTPATIENT
Start: 2019-11-13 | End: 2019-11-17 | Stop reason: HOSPADM

## 2019-11-13 RX ORDER — OXYCODONE AND ACETAMINOPHEN 7.5; 325 MG/1; MG/1
TABLET ORAL
Status: COMPLETED
Start: 2019-11-13 | End: 2019-11-13

## 2019-11-13 RX ORDER — HYDRALAZINE HYDROCHLORIDE 20 MG/ML
5 INJECTION INTRAMUSCULAR; INTRAVENOUS
Status: DISCONTINUED | OUTPATIENT
Start: 2019-11-13 | End: 2019-11-13 | Stop reason: HOSPADM

## 2019-11-13 RX ORDER — EPHEDRINE SULFATE 50 MG/ML
INJECTION, SOLUTION INTRAVENOUS AS NEEDED
Status: DISCONTINUED | OUTPATIENT
Start: 2019-11-13 | End: 2019-11-13 | Stop reason: SURG

## 2019-11-13 RX ORDER — SODIUM CHLORIDE, SODIUM LACTATE, POTASSIUM CHLORIDE, CALCIUM CHLORIDE 600; 310; 30; 20 MG/100ML; MG/100ML; MG/100ML; MG/100ML
9 INJECTION, SOLUTION INTRAVENOUS CONTINUOUS
Status: DISCONTINUED | OUTPATIENT
Start: 2019-11-13 | End: 2019-11-14

## 2019-11-13 RX ORDER — AZELASTINE 1 MG/ML
2 SPRAY, METERED NASAL DAILY
Status: DISCONTINUED | OUTPATIENT
Start: 2019-11-14 | End: 2019-11-17 | Stop reason: HOSPADM

## 2019-11-13 RX ORDER — CEFAZOLIN SODIUM 2 G/100ML
2 INJECTION, SOLUTION INTRAVENOUS ONCE
Status: COMPLETED | OUTPATIENT
Start: 2019-11-13 | End: 2019-11-13

## 2019-11-13 RX ORDER — SODIUM CHLORIDE 450 MG/100ML
75 INJECTION, SOLUTION INTRAVENOUS CONTINUOUS
Status: DISCONTINUED | OUTPATIENT
Start: 2019-11-13 | End: 2019-11-16

## 2019-11-13 RX ORDER — BUDESONIDE AND FORMOTEROL FUMARATE DIHYDRATE 160; 4.5 UG/1; UG/1
2 AEROSOL RESPIRATORY (INHALATION)
Status: DISCONTINUED | OUTPATIENT
Start: 2019-11-13 | End: 2019-11-17 | Stop reason: HOSPADM

## 2019-11-13 RX ORDER — BISACODYL 10 MG
10 SUPPOSITORY, RECTAL RECTAL DAILY PRN
Status: DISCONTINUED | OUTPATIENT
Start: 2019-11-13 | End: 2019-11-17 | Stop reason: HOSPADM

## 2019-11-13 RX ORDER — PANTOPRAZOLE SODIUM 40 MG/1
40 TABLET, DELAYED RELEASE ORAL
Status: DISCONTINUED | OUTPATIENT
Start: 2019-11-14 | End: 2019-11-17 | Stop reason: HOSPADM

## 2019-11-13 RX ORDER — LIDOCAINE HYDROCHLORIDE AND EPINEPHRINE BITARTRATE 20; .01 MG/ML; MG/ML
INJECTION, SOLUTION SUBCUTANEOUS
Status: COMPLETED | OUTPATIENT
Start: 2019-11-13 | End: 2019-11-13

## 2019-11-13 RX ORDER — MIDAZOLAM HYDROCHLORIDE 1 MG/ML
2 INJECTION INTRAMUSCULAR; INTRAVENOUS
Status: DISCONTINUED | OUTPATIENT
Start: 2019-11-13 | End: 2019-11-13 | Stop reason: HOSPADM

## 2019-11-13 RX ORDER — PRAMIPEXOLE DIHYDROCHLORIDE 1.5 MG/1
1.5 TABLET ORAL 3 TIMES DAILY
Status: DISCONTINUED | OUTPATIENT
Start: 2019-11-13 | End: 2019-11-17 | Stop reason: HOSPADM

## 2019-11-13 RX ORDER — LATANOPROST 50 UG/ML
1 SOLUTION/ DROPS OPHTHALMIC NIGHTLY
Status: DISCONTINUED | OUTPATIENT
Start: 2019-11-13 | End: 2019-11-17 | Stop reason: HOSPADM

## 2019-11-13 RX ADMIN — LIDOCAINE HYDROCHLORIDE 80 MG: 20 INJECTION, SOLUTION INFILTRATION; PERINEURAL at 13:36

## 2019-11-13 RX ADMIN — TIMOLOL MALEATE 1 DROP: 5 SOLUTION OPHTHALMIC at 23:20

## 2019-11-13 RX ADMIN — PHENYLEPHRINE HYDROCHLORIDE 200 MCG: 10 INJECTION INTRAVENOUS at 15:21

## 2019-11-13 RX ADMIN — ROCURONIUM BROMIDE 10 MG: 10 INJECTION, SOLUTION INTRAVENOUS at 14:59

## 2019-11-13 RX ADMIN — PHENYLEPHRINE HYDROCHLORIDE 100 MCG: 10 INJECTION INTRAVENOUS at 15:46

## 2019-11-13 RX ADMIN — SUGAMMADEX 400 MG: 100 INJECTION, SOLUTION INTRAVENOUS at 17:42

## 2019-11-13 RX ADMIN — OXYCODONE HYDROCHLORIDE AND ACETAMINOPHEN 1 TABLET: 7.5; 325 TABLET ORAL at 19:11

## 2019-11-13 RX ADMIN — PHENYLEPHRINE HYDROCHLORIDE 100 MCG: 10 INJECTION INTRAVENOUS at 14:45

## 2019-11-13 RX ADMIN — PHENYLEPHRINE HYDROCHLORIDE 100 MCG: 10 INJECTION INTRAVENOUS at 15:41

## 2019-11-13 RX ADMIN — ROCURONIUM BROMIDE 50 MG: 10 INJECTION, SOLUTION INTRAVENOUS at 13:36

## 2019-11-13 RX ADMIN — PHENYLEPHRINE HYDROCHLORIDE 200 MCG: 10 INJECTION INTRAVENOUS at 16:15

## 2019-11-13 RX ADMIN — PHENYLEPHRINE HYDROCHLORIDE 1 MCG/KG/MIN: 10 INJECTION, SOLUTION INTRAMUSCULAR; INTRAVENOUS; SUBCUTANEOUS at 15:45

## 2019-11-13 RX ADMIN — PHENYLEPHRINE HYDROCHLORIDE 100 MCG: 10 INJECTION INTRAVENOUS at 15:07

## 2019-11-13 RX ADMIN — PHENYLEPHRINE HYDROCHLORIDE 200 MCG: 10 INJECTION INTRAVENOUS at 16:44

## 2019-11-13 RX ADMIN — PHENYLEPHRINE HYDROCHLORIDE 100 MCG: 10 INJECTION INTRAVENOUS at 15:38

## 2019-11-13 RX ADMIN — SODIUM CHLORIDE 75 ML/HR: 4.5 INJECTION, SOLUTION INTRAVENOUS at 20:48

## 2019-11-13 RX ADMIN — DORZOLAMIDE HYDROCHLORIDE 1 DROP: 20 SOLUTION/ DROPS OPHTHALMIC at 23:20

## 2019-11-13 RX ADMIN — ROSUVASTATIN CALCIUM 20 MG: 20 TABLET, FILM COATED ORAL at 21:30

## 2019-11-13 RX ADMIN — MIDAZOLAM 1 MG: 1 INJECTION INTRAMUSCULAR; INTRAVENOUS at 12:20

## 2019-11-13 RX ADMIN — ROPIVACAINE HYDROCHLORIDE 25 ML: 5 INJECTION, SOLUTION EPIDURAL; INFILTRATION; PERINEURAL at 12:30

## 2019-11-13 RX ADMIN — LIDOCAINE HYDROCHLORIDE AND EPINEPHRINE 5 ML: 20; 10 INJECTION, SOLUTION INFILTRATION; PERINEURAL at 12:30

## 2019-11-13 RX ADMIN — ROCURONIUM BROMIDE 30 MG: 10 INJECTION, SOLUTION INTRAVENOUS at 16:06

## 2019-11-13 RX ADMIN — EPHEDRINE SULFATE 10 MG: 50 INJECTION INTRAMUSCULAR; INTRAVENOUS; SUBCUTANEOUS at 14:33

## 2019-11-13 RX ADMIN — FENTANYL CITRATE 50 MCG: 50 INJECTION, SOLUTION INTRAMUSCULAR; INTRAVENOUS at 12:20

## 2019-11-13 RX ADMIN — PHENYLEPHRINE HYDROCHLORIDE 200 MCG: 10 INJECTION INTRAVENOUS at 16:59

## 2019-11-13 RX ADMIN — SODIUM CHLORIDE: 9 INJECTION, SOLUTION INTRAVENOUS at 15:45

## 2019-11-13 RX ADMIN — PROPOFOL 150 MG: 10 INJECTION, EMULSION INTRAVENOUS at 13:36

## 2019-11-13 RX ADMIN — ALBUMIN (HUMAN): 0.05 SOLUTION INTRAVENOUS at 15:30

## 2019-11-13 RX ADMIN — ROCURONIUM BROMIDE 10 MG: 10 INJECTION, SOLUTION INTRAVENOUS at 15:03

## 2019-11-13 RX ADMIN — PHENYLEPHRINE HYDROCHLORIDE 100 MCG: 10 INJECTION INTRAVENOUS at 15:01

## 2019-11-13 RX ADMIN — PHENYLEPHRINE HYDROCHLORIDE 200 MCG: 10 INJECTION INTRAVENOUS at 16:27

## 2019-11-13 RX ADMIN — PHENYLEPHRINE HYDROCHLORIDE 200 MCG: 10 INJECTION INTRAVENOUS at 16:00

## 2019-11-13 RX ADMIN — PHENYLEPHRINE HYDROCHLORIDE 100 MCG: 10 INJECTION INTRAVENOUS at 14:34

## 2019-11-13 RX ADMIN — ROCURONIUM BROMIDE 10 MG: 10 INJECTION, SOLUTION INTRAVENOUS at 14:34

## 2019-11-13 RX ADMIN — PHENYLEPHRINE HYDROCHLORIDE 200 MCG: 10 INJECTION INTRAVENOUS at 14:47

## 2019-11-13 RX ADMIN — LATANOPROST 1 DROP: 50 SOLUTION OPHTHALMIC at 21:31

## 2019-11-13 RX ADMIN — PHENYLEPHRINE HYDROCHLORIDE 100 MCG: 10 INJECTION INTRAVENOUS at 15:44

## 2019-11-13 RX ADMIN — PHENYLEPHRINE HYDROCHLORIDE 100 MCG: 10 INJECTION INTRAVENOUS at 15:04

## 2019-11-13 RX ADMIN — EPHEDRINE SULFATE 10 MG: 50 INJECTION INTRAMUSCULAR; INTRAVENOUS; SUBCUTANEOUS at 13:57

## 2019-11-13 RX ADMIN — CEFAZOLIN SODIUM 2 G: 2 INJECTION, SOLUTION INTRAVENOUS at 21:31

## 2019-11-13 RX ADMIN — FAMOTIDINE 20 MG: 10 INJECTION INTRAVENOUS at 12:45

## 2019-11-13 RX ADMIN — PHENYLEPHRINE HYDROCHLORIDE 200 MCG: 10 INJECTION INTRAVENOUS at 14:59

## 2019-11-13 RX ADMIN — PHENYLEPHRINE HYDROCHLORIDE 0.5 MCG/KG/MIN: 10 INJECTION INTRAVENOUS at 18:09

## 2019-11-13 RX ADMIN — SODIUM CHLORIDE: 9 INJECTION, SOLUTION INTRAVENOUS at 15:43

## 2019-11-13 RX ADMIN — PRAMIPEXOLE DIHYDROCHLORIDE 1.5 MG: 1.5 TABLET ORAL at 21:30

## 2019-11-13 RX ADMIN — PHENYLEPHRINE HYDROCHLORIDE 200 MCG: 10 INJECTION INTRAVENOUS at 16:07

## 2019-11-13 RX ADMIN — ROCURONIUM BROMIDE 20 MG: 10 INJECTION, SOLUTION INTRAVENOUS at 15:34

## 2019-11-13 RX ADMIN — ALBUMIN (HUMAN): 0.05 SOLUTION INTRAVENOUS at 15:05

## 2019-11-13 RX ADMIN — CEFAZOLIN SODIUM 2 G: 2 INJECTION, SOLUTION INTRAVENOUS at 13:45

## 2019-11-13 RX ADMIN — EPHEDRINE SULFATE 10 MG: 50 INJECTION INTRAMUSCULAR; INTRAVENOUS; SUBCUTANEOUS at 14:30

## 2019-11-13 RX ADMIN — PHENYLEPHRINE HYDROCHLORIDE 200 MCG: 10 INJECTION INTRAVENOUS at 17:19

## 2019-11-13 RX ADMIN — EPHEDRINE SULFATE 10 MG: 50 INJECTION INTRAMUSCULAR; INTRAVENOUS; SUBCUTANEOUS at 14:28

## 2019-11-13 RX ADMIN — PHENYLEPHRINE HYDROCHLORIDE 200 MCG: 10 INJECTION INTRAVENOUS at 16:38

## 2019-11-13 RX ADMIN — SODIUM CHLORIDE, POTASSIUM CHLORIDE, SODIUM LACTATE AND CALCIUM CHLORIDE 9 ML/HR: 600; 310; 30; 20 INJECTION, SOLUTION INTRAVENOUS at 12:08

## 2019-11-13 RX ADMIN — PHENYLEPHRINE HYDROCHLORIDE 200 MCG: 10 INJECTION INTRAVENOUS at 15:18

## 2019-11-13 RX ADMIN — SODIUM CHLORIDE: 9 INJECTION, SOLUTION INTRAVENOUS at 15:20

## 2019-11-13 RX ADMIN — PHENYLEPHRINE HYDROCHLORIDE 100 MCG: 10 INJECTION INTRAVENOUS at 15:36

## 2019-11-13 RX ADMIN — ONDANSETRON 4 MG: 2 INJECTION INTRAMUSCULAR; INTRAVENOUS at 21:30

## 2019-11-13 RX ADMIN — MONTELUKAST SODIUM 10 MG: 10 TABLET, FILM COATED ORAL at 21:31

## 2019-11-13 RX ADMIN — PHENYLEPHRINE HYDROCHLORIDE 200 MCG: 10 INJECTION INTRAVENOUS at 15:00

## 2019-11-13 RX ADMIN — EPHEDRINE SULFATE 10 MG: 50 INJECTION INTRAMUSCULAR; INTRAVENOUS; SUBCUTANEOUS at 14:20

## 2019-11-13 NOTE — ANESTHESIA PREPROCEDURE EVALUATION
Anesthesia Evaluation     Patient summary reviewed and Nursing notes reviewed   no history of anesthetic complications:  NPO Solid Status: > 8 hours  NPO Liquid Status: > 2 hours           Airway   Mallampati: II  TM distance: >3 FB  Neck ROM: full  no difficulty expected  Dental - normal exam     Pulmonary - normal exam   (+) a smoker Former, sleep apnea on CPAP,   (-) COPD, asthma, lung cancer, no home oxygen  Cardiovascular - normal exam  Exercise tolerance: good (4-7 METS)    ECG reviewed  Rhythm: regular  Rate: normal    (+) hypertension well controlled 2 medications or greater, dysrhythmias PAC, DVT resolved, hyperlipidemia,   (-) valvular problems/murmurs, past MI, CAD, angina, CHF, cardiac stents, pericardial effusion      Neuro/Psych  (+) numbness,     (-) seizures, TIA, CVA  GI/Hepatic/Renal/Endo    (+) morbid obesity, GERD, GI bleeding lower resolved, renal disease stones, thyroid problem hypothyroidism  (-) hiatal hernia, PUD, hepatitis, liver disease    Musculoskeletal     Abdominal  - normal exam   Substance History - negative use     OB/GYN          Other   arthritis,    history of cancer remission                  Anesthesia Plan    ASA 3     general with block   (GA w/ ISB for POPC)  intravenous induction     Anesthetic plan, all risks, benefits, and alternatives have been provided, discussed and informed consent has been obtained with: patient.    Plan discussed with CRNA and attending.

## 2019-11-13 NOTE — OP NOTE
TOTAL SHOULDER REVERSE ARTHROPLASTY  Procedure Note    Nathan Baez  11/13/2019    Pre-op Diagnosis:    Rotator cuff arthropathy right shoulder    Post-op Diagnosis   Rotator cuff arthropathy right shoulder    Procedure:  Right reverse total shoulder arthroplasty    Surgeon: Behzad Kelley M.D.    Surgical assistant: Stella Blanco CSA      Anesthesia: General with Block  Anesthesiologist: Pranav Norman MD  CRNA: Ольга Vu CRNA    Staff:   Cell Saver : Kleber Lan  Circulator: Renee Brown RN; Shonda Irene RN; Randi Soler RN  Scrub Person: Herlinda Bhatia  Assistant: Stella Blanco Valerie Gentry, CSA    Indication for Asst.  A surgical assistant, Stella Blanco CSA , was utilized as a medical necessity and was present through the entire procedure allowing safe completion of the procedure as well as reducing overall operative time and morbidity for the patient.    IV antibiotic: Cefazolin    Estimated Blood Loss: 1200 ml    Specimens:   Order Name Source Comment Collection Info Order Time   HEMOGLOBIN AND HEMATOCRIT, BLOOD   Collected By: Nelida Jones RN 11/13/2019  5:46 PM   PREPARE RBC Other   11/13/2019  3:19 PM    Indication for Transfusion  Intraop/Postop Blood Loss       When to Transfuse?  Today      PREPARE RBC Other   11/13/2019  4:25 PM    Indication for Transfusion  Intraop/Postop Blood Loss       When to Transfuse?  Today          Complications: Axillary vein tear requiring vascular repair    Implants: [employer] reverse total shoulder system     Implant specifics: +12 Preserve press-fit stem, +0 humeral tray, +2.5 humeral liner 38 mm, 38 mm baseplate with 3 compression screws and locking caps, 38 mm glenosphere      Procedure: The patient was taken to the operative suite.  After adequate anesthesia was established the upper extremity was prepped and draped in the usual fashion.  The head was kept in a neutral position Ioban was utilized  covering the skin over the shoulder and axilla.  An anterior incision was made starting lateral to the coracoid towards the axillary crease through skin and subcutaneous tissues.  The deltopectoral interval was entered.  The cephalic vein was preserved initially but later ligated with 2-0 Vicryl suture because of some bleeding..  The conjoined tendon was reflected medially.  The subscapularis was divided and tagged.  Arthropathic changes were noted in the glenohumeral joint.  The rotator cuff was in poor condition with tearing noted.  The superior aspect of the head was exposed because of the chronic rotator cuff tear.  The capsule was released off the humeral neck and the posterior soft tissue attachments were protected.  I cauterized the circumflex vessels laterally and then continued to externally rotate the shoulder.  As I did so bleeding was encountered in the axillary space.  Initially I tried to get digital pressure and then packed the area but it was significant amount of bleeding.  To get better exposure I felt the best next step would be to do our head cut so I could see better into the axillary area to explore the vessels.  An external cutting guide was utilized and the head was cut at a 20° retroverted angle.  Excess osteophytes were excised. Appropriate impaction broaching was carried out to a 12 mm broach and it was left in place for later trialing.I then visualized the glenoid and retractors were placed.  The capsule was reflected off the deep surface of the subscapularis.  The capsule was also released off the humeral neck and off the inferior glenoid protecting the axillary nerve throughout.  This allowed for visualization of the glenoid.  I continued to pack the inferior capsular area and vessels.  After the head cut I placed a laminar  to gain visualization.  Careful dissection was carried down inferiorly but there would seem to be a complex of vessels that were difficult to isolate  bleeding from.  This carried medially and was explored.  It was at this point that I felt a vascular consult to isolate the bleeder was indicated.  Vascular was called in and they then performed a vascular repair which would be dictated by them involving the axillary vein and some circumflex vessels inferiorly.  A central guidepin was then drilled and reaming was carried out attempting to preserve as much bone as possible.  The guide was replaced and a central cage hole was drilled.  The baseplate was then compressed onto the glenoid having previously packed bone graft into the cage from the humeral head and held with compression screws and locking caps.  An appropriate 88 mm size glenosphere was chosen and fixed to the baseplate with a compression screw.  I then trialed the humeral tray and polyethylene.  Vascular surgery stay during our trialing procedure to make sure there was no further disruption of the vascular repair they performed.  The humeral components were removed and I pulse lavaged with antibiotic solution.  I press-fit a 12 mm Preserve stem.  Good purchase and fixation was noted.  Once satisfied with range of motion and stability the +0 tray chosen was placed and held with a torque limiting screw.  The +2.5 mm, 38 mm polyethylene was then inserted and compressed and the shoulder was reduced.  Subdeltoid scar tissue was excised area  Good range of motion was noted.  Good stability was noted.  No further bleeding was encountered vigorous irrigation and suctioning was performed.  The deltopectoral interval was closed with 0 Vicryl.  The subcutaneous tissues were closed with 2-0 Vicryl.  The skin was closed with staples the patient had a sterile compression dressing applied and was awoken and taken to the postanesthetic recovery unit in good condition.    Behzad Kelley MD     Date: 11/13/2019  Time: 6:35 PM

## 2019-11-13 NOTE — OP NOTE
[facility]  Nathan Baez  1945     Brief Operative Note    11/13/19   Valentin Niño MD      Preoperative Diagnosis: right axillary vein injury    Postoperative Diagnosis: same as above    Procedure Performed: repair of bleeding axillary vein     Surgeon: Valentin Niño MD    Assistant: Debra GÓMEZ and they provided critical assistance during the case including suctioning, exposure, retraction, and reduction of blood loss.    Anesthesia: General Anesthesia via Endotracheal Tube    Estimated Blood Loss: 250 ml    Specimen: None    Complications: None    Dispo: completion shoulder replacement    Indication for procedure: 74 y.o. male undergoing shoulder repair.  We were called for intraoperative consult due to bleeding.    Description of procedure: The bleeding was controlled with manual pressure on my arrival.  The axillary vein was controlled with vascular clamps and was elevated into the wound.  There was a front and back injury of the vein.  The back wall was repaired with 5-0 Prolene.  The front wall injury was larger and was repaired with 4-0 Prolene.  Hemostasis was obtained.  The rest of the shoulder prosthetic was then implanted.  There was further severe bleeding from a large branch of the axillary vein distal to our repair.  This was also controlled with vascular clamps and repaired with 4-0 Prolene.  Approximately 250 mL of blood was lost during the time of our repair.  The axillary vein was not significantly narrowed by the repair.  The rest of the shoulder replacement and closure was performed by Dr. Kelley.          Valentin Niño MD  11/13/19

## 2019-11-13 NOTE — ANESTHESIA PROCEDURE NOTES
Airway  Urgency: elective    Date/Time: 11/13/2019 1:39 PM  Airway not difficult (not with CMAC, used due to history of patient tooth chipped for intubation.)    General Information and Staff    Patient location during procedure: OR  CRNA: Ольга Vu CRNA    Indications and Patient Condition  Indications for airway management: airway protection    Preoxygenated: yes  Mask difficulty assessment: 2 - vent by mask + OA or adjuvant +/- NMBA    Final Airway Details  Final airway type: endotracheal airway      Successful airway: ETT  Cuffed: yes   Successful intubation technique: direct laryngoscopy and video laryngoscopy  Facilitating devices/methods: intubating stylet  Endotracheal tube insertion site: oral  Blade: Papito  Blade size: D  ETT size (mm): 7.5  Cormack-Lehane Classification: grade I - full view of glottis  Placement verified by: chest auscultation and capnometry   Measured from: lips  ETT/EBT  to lips (cm): 22  Number of attempts at approach: 1  Assessment: lips, teeth, and gum same as pre-op and atraumatic intubation    Additional Comments   ett cuff up at MOP

## 2019-11-13 NOTE — ANESTHESIA PROCEDURE NOTES
Peripheral Block    Pre-sedation assessment completed: 11/13/2019 12:26 PM    Patient reassessed immediately prior to procedure    Patient location during procedure: holding area  Start time: 11/13/2019 12:26 PM  Stop time: 11/13/2019 12:33 PM  Reason for block: at surgeon's request and post-op pain management  Performed by  Anesthesiologist: Pranav Norman MD  Preanesthetic Checklist  Completed: patient identified, site marked, surgical consent, pre-op evaluation, timeout performed, IV checked, risks and benefits discussed and monitors and equipment checked  Prep:  Pt Position: supine  Sterile barriers:cap, gloves and mask  Prep: ChloraPrep  Patient monitoring: blood pressure monitoring, continuous pulse oximetry and EKG  Procedure  Sedation:yes  Performed under: local infiltration  Guidance:ultrasound guided  ULTRASOUND INTERPRETATION.  Using ultrasound guidance a 22 G (22G needle for placement of 3cc 2%lido to site prior to start of procedure.) gauge needle was placed in close proximity to the brachial plexus nerve, at which point, under ultrasound guidance anesthetic was injected in the area of the nerve and spread of the anesthesia was seen on ultrasound in close proximity thereto.  There were no abnormalities seen on ultrasound; a digital image was taken; and the patient tolerated the procedure with no complications. Images:still images obtained, printed/placed on chart    Laterality:right  Block Type:interscalene  Injection Technique:single-shot  Needle Type:echogenic  Needle Gauge:22 G  Resistance on Injection: none    Medications Used: ropivacaine (NAROPIN) 0.5 % injection, 25 mL  lidocaine-EPINEPHrine (XYLOCAINE W/EPI) 2 %-1:[id number] injection, 5 mL  Med admintered at 11/13/2019 12:30 PM      Post Assessment  Injection Assessment: negative aspiration for heme, no paresthesia on injection and incremental injection  Patient Tolerance:comfortable throughout block  Complications:no

## 2019-11-13 NOTE — PERIOPERATIVE NURSING NOTE
Dr. Norman brought patient to main PACU. Patient was intubated being manually ventilated with ambu bag. Dr. Norman stayed at bedside, administering sedation, and matthew via IV until H&H levels were drawn showing Hgb 10 and Hct 30. This made Ester comfortable with weaning off ventilator. After RT Clifton arrived, he initiated weaning tests on ventilator. Patient was pulling at least 400 tidal volumes. Ester pulled ET after deflating the balloon. Patient was able to breathe deeply at about 24 breaths per minute, with oxygen supplementation at 4L NC, saturating 95%. Within 2 minutes patient opened eyes and was oriented to place, person, and situation.

## 2019-11-14 LAB
ABO + RH BLD: NORMAL
ABO + RH BLD: NORMAL
ANION GAP SERPL CALCULATED.3IONS-SCNC: 14.7 MMOL/L (ref 5–15)
BASE EXCESS BLDA CALC-SCNC: -2 MMOL/L (ref -5–5)
BASE EXCESS BLDA CALC-SCNC: -3 MMOL/L (ref -5–5)
BASE EXCESS BLDA CALC-SCNC: 1 MMOL/L (ref -5–5)
BH BB BLOOD EXPIRATION DATE: NORMAL
BH BB BLOOD EXPIRATION DATE: NORMAL
BH BB BLOOD TYPE BARCODE: 5100
BH BB BLOOD TYPE BARCODE: 5100
BH BB DISPENSE STATUS: NORMAL
BH BB DISPENSE STATUS: NORMAL
BH BB PRODUCT CODE: NORMAL
BH BB PRODUCT CODE: NORMAL
BH BB UNIT NUMBER: NORMAL
BH BB UNIT NUMBER: NORMAL
BUN BLD-MCNC: 20 MG/DL (ref 8–23)
BUN/CREAT SERPL: 12.8 (ref 7–25)
CALCIUM SPEC-SCNC: 7.6 MG/DL (ref 8.6–10.5)
CHLORIDE SERPL-SCNC: 106 MMOL/L (ref 98–107)
CO2 BLDA-SCNC: 26 MMOL/L (ref 24–29)
CO2 BLDA-SCNC: 27 MMOL/L (ref 24–29)
CO2 BLDA-SCNC: 27 MMOL/L (ref 24–29)
CO2 SERPL-SCNC: 21.3 MMOL/L (ref 22–29)
CREAT BLD-MCNC: 1.56 MG/DL (ref 0.76–1.27)
GFR SERPL CREATININE-BSD FRML MDRD: 44 ML/MIN/1.73
GLUCOSE BLD-MCNC: 194 MG/DL (ref 65–99)
GLUCOSE BLDC GLUCOMTR-MCNC: 136 MG/DL (ref 70–130)
GLUCOSE BLDC GLUCOMTR-MCNC: 144 MG/DL (ref 70–130)
GLUCOSE BLDC GLUCOMTR-MCNC: 147 MG/DL (ref 70–130)
GLUCOSE BLDC GLUCOMTR-MCNC: 149 MG/DL (ref 70–130)
GLUCOSE BLDC GLUCOMTR-MCNC: 192 MG/DL (ref 70–130)
GLUCOSE BLDC GLUCOMTR-MCNC: 203 MG/DL (ref 70–130)
GLUCOSE BLDC GLUCOMTR-MCNC: 209 MG/DL (ref 70–130)
HCO3 BLDA-SCNC: 24.7 MMOL/L (ref 22–26)
HCO3 BLDA-SCNC: 25.5 MMOL/L (ref 22–26)
HCO3 BLDA-SCNC: 25.6 MMOL/L (ref 22–26)
HCT VFR BLD AUTO: 30.1 % (ref 37.5–51)
HCT VFR BLDA CALC: 25 % (ref 38–51)
HCT VFR BLDA CALC: 28 % (ref 38–51)
HCT VFR BLDA CALC: 29 % (ref 38–51)
HGB BLD-MCNC: 9.8 G/DL (ref 13–17.7)
HGB BLDA-MCNC: 8.5 G/DL (ref 12–17)
HGB BLDA-MCNC: 9.5 G/DL (ref 12–17)
HGB BLDA-MCNC: 9.9 G/DL (ref 12–17)
PCO2 BLDA: 42.9 MM HG (ref 35–45)
PCO2 BLDA: 56.4 MM HG (ref 35–45)
PCO2 BLDA: 58.3 MM HG (ref 35–45)
PH BLDA: 7.24 PH UNITS (ref 7.35–7.6)
PH BLDA: 7.26 PH UNITS (ref 7.35–7.6)
PH BLDA: 7.38 PH UNITS (ref 7.35–7.6)
PO2 BLDA: 35 MMHG (ref 80–105)
PO2 BLDA: 40 MMHG (ref 80–105)
PO2 BLDA: 65 MMHG (ref 80–105)
POTASSIUM BLD-SCNC: 4 MMOL/L (ref 3.5–5.2)
POTASSIUM BLDA-SCNC: 3.5 MMOL/L (ref 3.5–4.9)
POTASSIUM BLDA-SCNC: 3.5 MMOL/L (ref 3.5–4.9)
POTASSIUM BLDA-SCNC: 3.7 MMOL/L (ref 3.5–4.9)
SAO2 % BLDA: 56 % (ref 95–98)
SAO2 % BLDA: 65 % (ref 95–98)
SAO2 % BLDA: 92 % (ref 95–98)
SODIUM BLD-SCNC: 142 MMOL/L (ref 136–145)
UNIT  ABO: NORMAL
UNIT  ABO: NORMAL
UNIT  RH: NORMAL
UNIT  RH: NORMAL

## 2019-11-14 PROCEDURE — 25010000002 KETOROLAC TROMETHAMINE PER 15 MG: Performed by: ORTHOPAEDIC SURGERY

## 2019-11-14 PROCEDURE — 25010000003 CEFAZOLIN IN DEXTROSE 2-4 GM/100ML-% SOLUTION: Performed by: ORTHOPAEDIC SURGERY

## 2019-11-14 PROCEDURE — 94799 UNLISTED PULMONARY SVC/PX: CPT

## 2019-11-14 PROCEDURE — 25010000002 ENOXAPARIN PER 10 MG: Performed by: SURGERY

## 2019-11-14 PROCEDURE — 97162 PT EVAL MOD COMPLEX 30 MIN: CPT

## 2019-11-14 PROCEDURE — 80048 BASIC METABOLIC PNL TOTAL CA: CPT | Performed by: ORTHOPAEDIC SURGERY

## 2019-11-14 PROCEDURE — 25010000002 MORPHINE PER 10 MG: Performed by: ORTHOPAEDIC SURGERY

## 2019-11-14 PROCEDURE — 94640 AIRWAY INHALATION TREATMENT: CPT

## 2019-11-14 PROCEDURE — 25010000002 HYDROMORPHONE 1 MG/ML SOLUTION: Performed by: ORTHOPAEDIC SURGERY

## 2019-11-14 PROCEDURE — 85014 HEMATOCRIT: CPT | Performed by: ORTHOPAEDIC SURGERY

## 2019-11-14 PROCEDURE — 85018 HEMOGLOBIN: CPT | Performed by: ORTHOPAEDIC SURGERY

## 2019-11-14 PROCEDURE — 94664 DEMO&/EVAL PT USE INHALER: CPT

## 2019-11-14 PROCEDURE — 25010000002 PHENYLEPHRINE 10 MG/ML SOLUTION 5 ML VIAL: Performed by: ANESTHESIOLOGY

## 2019-11-14 PROCEDURE — 82962 GLUCOSE BLOOD TEST: CPT

## 2019-11-14 PROCEDURE — 97110 THERAPEUTIC EXERCISES: CPT

## 2019-11-14 RX ORDER — OXYCODONE AND ACETAMINOPHEN 10; 325 MG/1; MG/1
1 TABLET ORAL EVERY 4 HOURS PRN
Status: DISCONTINUED | OUTPATIENT
Start: 2019-11-14 | End: 2019-11-17 | Stop reason: HOSPADM

## 2019-11-14 RX ORDER — KETOROLAC TROMETHAMINE 15 MG/ML
15 INJECTION, SOLUTION INTRAMUSCULAR; INTRAVENOUS ONCE
Status: COMPLETED | OUTPATIENT
Start: 2019-11-14 | End: 2019-11-14

## 2019-11-14 RX ORDER — POLYETHYLENE GLYCOL 3350 17 G/17G
17 POWDER, FOR SOLUTION ORAL DAILY
Status: DISCONTINUED | OUTPATIENT
Start: 2019-11-15 | End: 2019-11-17 | Stop reason: HOSPADM

## 2019-11-14 RX ADMIN — TIMOLOL MALEATE 1 DROP: 5 SOLUTION OPHTHALMIC at 12:38

## 2019-11-14 RX ADMIN — MORPHINE SULFATE 4 MG: 2 INJECTION, SOLUTION INTRAMUSCULAR; INTRAVENOUS at 22:29

## 2019-11-14 RX ADMIN — OXYCODONE HYDROCHLORIDE AND ACETAMINOPHEN 1 TABLET: 10; 325 TABLET ORAL at 14:29

## 2019-11-14 RX ADMIN — HYDROCHLOROTHIAZIDE 25 MG: 25 TABLET ORAL at 12:36

## 2019-11-14 RX ADMIN — OXYCODONE HYDROCHLORIDE AND ACETAMINOPHEN 1 TABLET: 7.5; 325 TABLET ORAL at 00:47

## 2019-11-14 RX ADMIN — OXYCODONE HYDROCHLORIDE AND ACETAMINOPHEN 1 TABLET: 10; 325 TABLET ORAL at 18:33

## 2019-11-14 RX ADMIN — DORZOLAMIDE HYDROCHLORIDE 1 DROP: 20 SOLUTION/ DROPS OPHTHALMIC at 22:28

## 2019-11-14 RX ADMIN — MONTELUKAST SODIUM 10 MG: 10 TABLET, FILM COATED ORAL at 22:30

## 2019-11-14 RX ADMIN — CEFAZOLIN SODIUM 2 G: 2 INJECTION, SOLUTION INTRAVENOUS at 05:13

## 2019-11-14 RX ADMIN — MORPHINE SULFATE 4 MG: 2 INJECTION, SOLUTION INTRAMUSCULAR; INTRAVENOUS at 12:37

## 2019-11-14 RX ADMIN — PANTOPRAZOLE SODIUM 40 MG: 40 TABLET, DELAYED RELEASE ORAL at 06:15

## 2019-11-14 RX ADMIN — PHENYLEPHRINE HYDROCHLORIDE 0.5 MCG/KG/MIN: 10 INJECTION INTRAVENOUS at 06:46

## 2019-11-14 RX ADMIN — ROSUVASTATIN CALCIUM 20 MG: 20 TABLET, FILM COATED ORAL at 22:28

## 2019-11-14 RX ADMIN — OXYCODONE HYDROCHLORIDE AND ACETAMINOPHEN 1 TABLET: 7.5; 325 TABLET ORAL at 05:13

## 2019-11-14 RX ADMIN — DORZOLAMIDE HYDROCHLORIDE 1 DROP: 20 SOLUTION/ DROPS OPHTHALMIC at 12:38

## 2019-11-14 RX ADMIN — KETOROLAC TROMETHAMINE 15 MG: 15 INJECTION, SOLUTION INTRAMUSCULAR; INTRAVENOUS at 12:36

## 2019-11-14 RX ADMIN — ASPIRIN 325 MG: 325 TABLET ORAL at 12:34

## 2019-11-14 RX ADMIN — ENOXAPARIN SODIUM 30 MG: 30 INJECTION SUBCUTANEOUS at 12:38

## 2019-11-14 RX ADMIN — OXYCODONE HYDROCHLORIDE AND ACETAMINOPHEN 1 TABLET: 10; 325 TABLET ORAL at 10:14

## 2019-11-14 RX ADMIN — LATANOPROST 1 DROP: 50 SOLUTION OPHTHALMIC at 22:29

## 2019-11-14 RX ADMIN — LEVOTHYROXINE SODIUM 88 MCG: 88 TABLET ORAL at 06:14

## 2019-11-14 RX ADMIN — BUDESONIDE AND FORMOTEROL FUMARATE DIHYDRATE 2 PUFF: 160; 4.5 AEROSOL RESPIRATORY (INHALATION) at 20:45

## 2019-11-14 RX ADMIN — HYDROMORPHONE HYDROCHLORIDE 1 MG: 1 INJECTION, SOLUTION INTRAMUSCULAR; INTRAVENOUS; SUBCUTANEOUS at 08:13

## 2019-11-14 RX ADMIN — TIMOLOL MALEATE 1 DROP: 5 SOLUTION OPHTHALMIC at 22:28

## 2019-11-14 RX ADMIN — MORPHINE SULFATE 4 MG: 2 INJECTION, SOLUTION INTRAMUSCULAR; INTRAVENOUS at 03:50

## 2019-11-14 RX ADMIN — MORPHINE SULFATE 4 MG: 2 INJECTION, SOLUTION INTRAMUSCULAR; INTRAVENOUS at 16:30

## 2019-11-14 RX ADMIN — ENOXAPARIN SODIUM 30 MG: 30 INJECTION SUBCUTANEOUS at 22:29

## 2019-11-14 RX ADMIN — PRAMIPEXOLE DIHYDROCHLORIDE 1.5 MG: 1.5 TABLET ORAL at 22:28

## 2019-11-14 NOTE — ANESTHESIA POSTPROCEDURE EVALUATION
Patient: Nathan Baez    Procedure Summary     Date:  11/13/19 Room / Location:   [facility]  [facility]  [facility]    Anesthesia Start:  1330 Anesthesia Stop:  1729    Procedure:  TOTAL SHOULDER REVERSE ARTHROPLASTY RIGHT REPAIR RIGHT AXILLARY VEIN (Right Shoulder) Diagnosis:      Surgeon:  Behzad Kelley MD Provider:  Pranav Norman MD    Anesthesia Type:  general with block ASA Status:  3          Anesthesia Type: general with block  Last vitals  BP   107/65 (11/13/19 1905)   Temp   37.6 °C (99.7 °F) (11/13/19 1728)   Pulse   105 (11/13/19 1905)   Resp   24 (11/13/19 1905)     SpO2   99 % (11/13/19 1905)     Post Anesthesia Care and Evaluation    Patient location during evaluation: bedside  Patient participation: complete - patient participated  Level of consciousness: awake and alert  Pain management: adequate  Airway patency: patent  Anesthetic complications: No anesthetic complications    Cardiovascular status: acceptable  Respiratory status: acceptable  Hydration status: acceptable    Comments: /65   Pulse 105   Temp 37.6 °C (99.7 °F) (Oral)   Resp 24   Wt 113 kg (248 lb 14.4 oz)   SpO2 99%   BMI 38.98 kg/m²

## 2019-11-14 NOTE — PLAN OF CARE
Problem: Patient Care Overview  Goal: Plan of Care Review  Outcome: Ongoing (interventions implemented as appropriate)   11/14/19 0602   Coping/Psychosocial   Plan of Care Reviewed With patient   Plan of Care Review   Progress improving   OTHER   Outcome Summary Pt admitted to CCU from PACU on matthew gtt. A&O VSS with matthew gtt. Weaned as able, Hgb stable, able to get up to chair. PRN pain medication X3 for surgical pain.

## 2019-11-14 NOTE — CONSULTS
Group: [facility]         CONSULT NOTE    Patient Identification:  Nathan Baez  74 y.o.  male  1945  [id number]            Requesting physician: Ortho    Reason for Consultation: ICU comanagement    CC:     History of Present Illness:  74-year-old gentleman with known history of KWAME and multiple other medical problems.  Patient admitted to ICU with right shoulder arthroplasty and postop hypotension.  During procedure patient had injury to the right axillary vein with significant bleeding.  There was some postoperative bleeding and patient received blood transfusion intra-and postoperatively.  Currently he is on his home CPAP and a little bit of Donovan-Synephrine which is currently being weaned down.  Patient awake and other than complaining shoulder pain from his surgery he denies any chest pain or shortness of breath.  No aspiration was reported.      Review of Systems  Rest of the 12 point review of system negative  Past Medical History:  Past Medical History:   Diagnosis Date   • Actinic keratosis    • Acute embolism and thrombosis of vein    • Allergic rhinitis    • Arthritis    • Cataract     forming left eye   • Cellulitis    • Cellulitis    • Cervical radiculopathy    • Contact with hypodermic needle    • Cough    • Disequilibrium    • Diverticulosis    • DJD (degenerative joint disease), lumbar    • DVT (deep venous thrombosis) (CMS/[facility]AC, located within [facility])     l leg  dx 2019 and was on blood thinner and ow off wears compression    • Dysphagia    • Encounter for long-term (current) use of NSAIDs    • Encounter for screening colonoscopy    • Erysipelas    • Gastroenteritis, acute    • GERD (gastroesophageal reflux disease)    • Glaucoma    • High risk medication use    • History of colon polyps    • Hospital discharge follow-up    • Hyperglycemia    • Hyperlipidemia    • Hyperplastic polyp of stomach    • Hypertension    • Hypothyroid    • Inflamed skin tag    • Insomnia    • Internal hemorrhoids    • Kidney  stone     has had history of passing and still has kidney stone on both sides    • Lumbar strain    • Male urinary stress incontinence     s/p prostatectomy   • Medicare annual wellness visit, subsequent    • Obesity    • KWAME (obstructive sleep apnea)    • Osteoarthritis    • Pes planus    • Presbycusis    • Prostate cancer (CMS/HCC)    • Prostate cancer (CMS/HCC)    • Rectal bleed    • Restless legs syndrome    • Rib pain on left side    • Shoulder pain    • Skin lesion of face    • Sleep apnea     bipap   • Throat clearing    • Tinea corporis    • Tinea cruris    • Venous stasis dermatitis        Past Surgical History:  Past Surgical History:   Procedure Laterality Date   • CATARACT EXTRACTION Right    • COLONOSCOPY  03/2017    3/17normal. Recheck 2022. 12/11. Repeat in 5 years    • ENDOSCOPY W/ PEG REMOVAL     • FOOT SURGERY      1998 had big toe replaced on left foot   • HERNIA REPAIR      umbilical   • JOINT REPLACEMENT Right 2014   • NECK SURGERY      cervical disc   • TN TOTAL KNEE ARTHROPLASTY Left 9/13/2016    Procedure: LT TOTAL KNEE ARTHROPLASTY;  Surgeon: Tadeo Basurto MD;  Location: [facility];  Service: Orthopedics   • PROSTATECTOMY  07/1999 July 1999. Radical    • THYROID LOBECTOMY     • THYROID SURGERY  2011    partial doesn't know which one was removed   • TONSILLECTOMY     • TOTAL KNEE ARTHROPLASTY Right 2014   • WRIST SURGERY Right     x2  wrist replaced        Home Meds:  Medications Prior to Admission   Medication Sig Dispense Refill Last Dose   • acetaminophen (TYLENOL) 650 MG 8 hr tablet Take 1,300 mg by mouth 2 (Two) Times a Day.   11/12/2019 at Unknown time   • amLODIPine (NORVASC) 5 MG tablet Take 1 tablet by mouth Every Morning. 90 tablet 3 11/13/2019 at Unknown time   • azelastine (ASTELIN) 0.1 % nasal spray 2 sprays into the nostril(s) as directed by provider 2 (Two) Times a Day. Use in each nostril as directed   11/12/2019 at Unknown time   • BRIMONIDINE TARTRATE OP Apply 1  drop to eye(s) as directed by provider 2 (Two) Times a Day.   11/12/2019 at Unknown time   • Chlorhexidine Gluconate Cloth 2 % pads Apply 1 application topically. USE AS DIRECTED PREOP   11/13/2019 at Unknown time   • dorzolamide-timolol (COSOPT) 22.3-6.8 MG/ML ophthalmic solution Administer 1 drop to both eyes 2 (Two) Times a Day.   11/12/2019 at Unknown time   • fexofenadine (ALLEGRA) 180 MG tablet Take 180 mg by mouth Daily.   11/12/2019 at Unknown time   • Fluticasone Furoate-Vilanterol (BREO ELLIPTA IN) Inhale 1 puff Every Morning.   11/13/2019 at Unknown time   • HYDROCHLOROTHIAZIDE PO Take 25 mg by mouth daily with breakfast.   11/12/2019 at Unknown time   • latanoprost (XALATAN) 0.005 % ophthalmic solution Administer 1 drop to both eyes Every Night.   11/12/2019 at Unknown time   • Latanoprost 0.005 % emulsion Apply 1 drop to eye(s) as directed by provider Every Night.   11/12/2019 at Unknown time   • levothyroxine (SYNTHROID) 88 MCG tablet Take 88 mcg by mouth every morning.   11/12/2019 at Unknown time   • losartan (COZAAR) 100 MG tablet Take 100 mg by mouth every morning.   11/13/2019 at Unknown time   • montelukast (SINGULAIR) 10 MG tablet Take 1 tablet by mouth Every Night. 90 tablet 3 11/12/2019 at Unknown time   • mupirocin (BACTROBAN) 2 % nasal ointment 1 application into the nostril(s) as directed by provider. USE AS DIRECTED PREOP   11/13/2019 at Unknown time   • pantoprazole (PROTONIX) 40 MG EC tablet Take 40 mg by mouth 2 (Two) Times a Day.   11/13/2019 at Unknown time   • pramipexole (MIRAPEX) 1.5 MG tablet Take 1.5 mg by mouth 3 (Three) Times a Day.   11/12/2019 at Unknown time   • rosuvastatin (CRESTOR) 20 MG tablet Take 20 mg by mouth every evening.   11/12/2019 at Unknown time   • albuterol (PROVENTIL HFA;VENTOLIN HFA) 108 (90 Base) MCG/ACT inhaler Inhale 2 puffs Every 4 (Four) Hours As Needed for Wheezing.   Unknown   • ALLERGY SERUM INJECTION Inject  under the skin into the appropriate  area as directed 1 (One) Time Per Week.      • aspirin 81 MG tablet Take 1 tablet by mouth Daily.   10/30/2019   • celecoxib (CELEBREX) 200 MG capsule Take 1 capsule by mouth Daily. 90 capsule 3 10/30/2019       Allergies:  No Known Allergies    Social History:   Social History     Socioeconomic History   • Marital status:      Spouse name: Not on file   • Number of children: Not on file   • Years of education: Not on file   • Highest education level: Not on file   Tobacco Use   • Smoking status: Former Smoker     Packs/day: 1.00     Years: 20.00     Pack years: 20.00     Types: Cigarettes     Last attempt to quit: 1984     Years since quittin.8   • Smokeless tobacco: Never Used   Substance and Sexual Activity   • Alcohol use: Yes     Comment: 3-4 MONTHLY   • Drug use: No       Family History:  Family History   Problem Relation Age of Onset   • Cancer Mother         COLON   • Cancer Father         PROSTATE   • Cancer Sister         BREAST CANCER   • Breast cancer Sister    • Gout Sister    • Hypertension Sister    • Heart attack Brother    • Bone cancer Brother    • Heart disease Brother    • Malig Hyperthermia Neg Hx        Physical Exam:  /48   Pulse 96   Temp 97.1 °F (36.2 °C) (Oral)   Resp 12   Wt 115 kg (254 lb 10.1 oz)   SpO2 90%   BMI 39.88 kg/m²  Body mass index is 39.88 kg/m². 90% 115 kg (254 lb 10.1 oz)  Physical Exam  Elderly gentleman currently on a CPAP awake alert following commands  Well-developed obese body habitus  Eyes normal conjunctive a pupils reactive to light  ENT Mallampati 4 normal nasal exam  Neck midline trachea no thyromegaly  Chest clear no labored breathing  CVS regular rate and rhythm no lower extremity edema  Abdomen obese nontender no hepatospleno megaly  CNS intact normal sensory exam  Skin no rashes no nodules  Psych oriented to time place and person normal memory  Musculoskeletal right shoulder heavily bandaged no cyanosis no clubbing    LABS:  Lab  Results   Component Value Date    CALCIUM 7.6 (L) 11/14/2019    PHOS 3.1 06/03/2019     Results from last 7 days   Lab Units 11/14/19  0109 11/13/19  1747 11/13/19  1743   SODIUM mmol/L 142  --   --    POTASSIUM mmol/L 4.0  --   --    CHLORIDE mmol/L 106  --   --    CO2 mmol/L 21.3*  --   --    BUN mg/dL 20  --   --    CREATININE mg/dL 1.56*  --   --    GLUCOSE mg/dL 194*  --   --    CALCIUM mg/dL 7.6*  --   --    HEMOGLOBIN g/dL 9.8* 10.1*  --    HEMOGLOBIN, POC g/dL  --   --  9.9*     Lab Results   Component Value Date    TROPONINT <0.010 06/02/2019                 Results from last 7 days   Lab Units 11/13/19  1743 11/13/19  1639 11/13/19  1550   PH, ARTERIAL pH units 7.24* 7.26* 7.38                 Lab Results   Component Value Date    TSH 3.750 07/11/2019     Estimated Creatinine Clearance: 51.4 mL/min (A) (by C-G formula based on SCr of 1.56 mg/dL (H)).         Imaging: I personally visualized the images of scans/x-rays performed within last 3 days.      Assessment:  Postop hypertension  Right total shoulder and axillary vein repair  Postop bleeding resolved  Acute blood loss anemia  KWAME  Acute renal insufficiency      Recommendations:  Wean off pressors as tolerated  Hemoglobin stable posttransfusion  No signs of overt bleeding  Monitor hemoglobin closely  Home CPAP  Monitor creatinine closely  Discussed plan of care with the family  Once off Donovan-Synephrine patient is cleared to go out of the ICU            Paulina Valencia MD  11/14/2019  11:46 AM      Much of this encounter note is an electronic transcription/translation of spoken language to printed text using Dragon Software.

## 2019-11-14 NOTE — PLAN OF CARE
Problem: Patient Care Overview  Goal: Plan of Care Review   11/14/19 1126   Coping/Psychosocial   Plan of Care Reviewed With patient;spouse   OTHER   Outcome Summary Pt is 73 yo male admitted s/p Rt reverse TSA and Rt axillary vein repair 11/13/2019. His PMH is significant for high cholesterol, sleep apnea, and prostate CA and he has a history of multiple total joint replacements. He reports having been independent with all mobility prior to admission and used a cane in Lt hand to ambulate any distance. He presents to therapy in high levels of pain and demosntrating moderately severe SOA when mobilizing in the room. He was able to complete bed mobility and functional transfers with min A of therapist and ambulated in herman for 30 ft also with min A, displaying irregular step lengths and lateral lean over each foot while in stance phase. He reports having chronic back pain and this gait pattern may be typical for him, though he is extremely limited by SOA at the time. He will benefit from skilled therapy services to address functional impairments in activity tolerance/endurance, gait abnormalities, and need for assistance with functional transfers. Anticipate d/c to home with assist.

## 2019-11-14 NOTE — PROGRESS NOTES
Discharge Planning Assessment  [facility]     Patient Name: Nathan Baez  MRN: [id number]  Today's Date: 11/14/2019    Admit Date: 11/13/2019    Discharge Needs Assessment     Row Name 11/14/19 1545       Living Environment    Lives With  spouse    Current Living Arrangements  home/apartment/condo    Primary Care Provided by  self;spouse/significant other    Provides Primary Care For  no one    Family Caregiver if Needed  spouse    Quality of Family Relationships  supportive    Able to Return to Prior Arrangements  yes       Resource/Environmental Concerns    Resource/Environmental Concerns  none    Transportation Concerns  car, none       Transition Planning    Patient/Family Anticipates Transition to  home with family    Patient/Family Anticipated Services at Transition  none    Transportation Anticipated  family or friend will provide       Discharge Needs Assessment    Concerns to be Addressed  no discharge needs identified    Equipment Currently Used at Home  bipap/cpap    Anticipated Changes Related to Illness  none    Equipment Needed After Discharge  none        Discharge Plan     Row Name 11/14/19 1546       Plan    Plan  Home no needs     Plan Comments  MM noted  CCP met with patient and his wife Coco, [phone] to discuss discharge planning.  CCP role explained.  Face sheet verified.   His emergency contact is his wife. His  PCP he uses the [facility].  lives with his brother Arsen in a single story house.  He uis Dr Damien Pierce.  He uses  no DME  to ambulate.  He is independent with ADL's.   .  Pt has no history of home health.   He has no history of rehab stays.  He obtains his medications, from [facility].  .  Plan is home.   CCP following        Destination      No service coordination in this encounter.      Durable Medical Equipment      No service coordination in this encounter.      Dialysis/Infusion      No service coordination in this encounter.       Home Medical Care      No service coordination in this encounter.      Therapy      No service coordination in this encounter.      Community Resources      No service coordination in this encounter.          Demographic Summary    No documentation.       Functional Status    No documentation.       Psychosocial    No documentation.       Abuse/Neglect    No documentation.       Legal    No documentation.       Substance Abuse    No documentation.       Patient Forms    No documentation.           Arlette Church RN

## 2019-11-14 NOTE — PROGRESS NOTES
Name: Nathan Baez ADMIT: 2019   : 1945  PCP: Damien Pierce MD    MRN: [id number] LOS: 1 days   AGE/SEX: 74 y.o. male  ROOM: [address]    Active Hospital Problems    Diagnosis  POA   • Status post reverse total shoulder replacement, right [Z96.611]  Not Applicable      Resolved Hospital Problems   No resolved problems to display.     Subjective     74 y.o. male s/p right total shoulder repair with repair of axillary vein   Extubated and up in a chair this morning.   Complaining of the expected pain.  No complaints of numbness or tingling of the right hand.    Review of Systems  As above  Objective     Vital Signs  Temp:  [97.6 °F (36.4 °C)-99.7 °F (37.6 °C)] 97.6 °F (36.4 °C)  Heart Rate:  [] 87  Resp:  [12-24] 12  BP: ()/(32-90) 119/51    Physical Exam  General: Alert and oriented  CV: Regular rate and rhythm  Respiratory: Unlabored breathing on nasal cannula  GI: Abdomen is soft, nontender  Extremities: Right shoulder dressing clean.  No size discrepancy between the forearms.      Results Review:       I reviewed the patient's new clinical results.  Results from last 7 days   Lab Units 19  0109 19  1747 19  1743 19  1639 19  1550   HEMOGLOBIN g/dL 9.8* 10.1*  --   --   --    HEMOGLOBIN, POC g/dL  --   --  9.9* 8.5* 9.5*     Results from last 7 days   Lab Units 19  0109   SODIUM mmol/L 142   POTASSIUM mmol/L 4.0   CHLORIDE mmol/L 106   CO2 mmol/L 21.3*   BUN mg/dL 20   CREATININE mg/dL 1.56*   GLUCOSE mg/dL 194*   Estimated Creatinine Clearance: 51.4 mL/min (A) (by C-G formula based on SCr of 1.56 mg/dL (H)).  Results from last 7 days   Lab Units 19  010   CALCIUM mg/dL 7.6*       Assessment/Plan           Active Hospital Problems    Diagnosis  POA   • Status post reverse total shoulder replacement, right [Z96.611]  Not Applicable      Resolved Hospital Problems   No resolved problems to display.        Assessment & Plan  74  y.o. male postoperative day #1 status post total shoulder replacement with repair of axillary vein.  Hemoglobin is 9.8 after transfusions.  Heart rate in the 80s.  Tolerating a diet.  Renal function stable with chronic kidney disease.  Recommend initiation of prophylactic Lovenox 30 mg subcutaneous twice daily.  I discussed with his wife that he is likely to recover somewhat slowly due to the physiologic effects of significant blood loss in the elderly.  Overall though, he looks good.         Valentin Niño MD  11/14/19   7:58 AM  Office Number [phone]

## 2019-11-14 NOTE — THERAPY EVALUATION
Patient Name: Nathan Baez  : 1945    MRN: [id number]                              Today's Date: 2019       Admit Date: 2019    Visit Dx: No diagnosis found.  Patient Active Problem List   Diagnosis   • OA (osteoarthritis) of knee   • Hypertension   • Abdominal wall cellulitis   • Hypothyroidism (acquired)   • Gastroesophageal reflux disease without esophagitis   • Mixed hyperlipidemia   • Primary insomnia   • DDD (degenerative disc disease), lumbar   • KWAME (obstructive sleep apnea)   • Hyperglycemia   • Allergic   • Venous insufficiency   • Restless leg syndrome   • Prostate cancer (CMS/HCC)   • Venous stasis dermatitis   • Tinea cruris   • Tinea corporis   • Throat clearing   • Sleep apnea   • Skin lesion of face   • Rib pain on left side   • Restless legs syndrome   • Restless leg   • Rectal bleed   • Presbycusis   • Pes planus   • Osteoarthritis   • Obesity   • Medicare annual wellness visit, subsequent   • Lumbar strain   • Internal hemorrhoids   • Insomnia   • Inflamed skin tag   • Hypothyroid   • Hyperplastic polyp of stomach   • Hyperlipidemia   • Hospital discharge follow-up   • History of colon polyps   • High risk medication use   • Glaucoma   • Gastroenteritis, acute   • Erysipelas   • Encounter for screening colonoscopy   • Encounter for long-term (current) use of NSAIDs   • Dysphagia   • DVT (deep venous thrombosis) (CMS/HCC)   • DJD (degenerative joint disease), lumbar   • Diverticulosis   • Disequilibrium   • Cough   • Contact with hypodermic needle   • Cervical radiculopathy   • Cellulitis   • Arthritis   • Allergic rhinitis   • Acute glaucoma   • Acute embolism and thrombosis of vein   • Actinic keratosis   • Status post reverse total shoulder replacement, right     Past Medical History:   Diagnosis Date   • Actinic keratosis    • Acute embolism and thrombosis of vein    • Allergic rhinitis    • Arthritis    • Cataract     forming left eye   • Cellulitis    • Cellulitis    •  Cervical radiculopathy    • Contact with hypodermic needle    • Cough    • Disequilibrium    • Diverticulosis    • DJD (degenerative joint disease), lumbar    • DVT (deep venous thrombosis) (CMS/HCC)     l leg  dx 2019 and was on blood thinner and ow off wears compression    • Dysphagia    • Encounter for long-term (current) use of NSAIDs    • Encounter for screening colonoscopy    • Erysipelas    • Gastroenteritis, acute    • GERD (gastroesophageal reflux disease)    • Glaucoma    • High risk medication use    • History of colon polyps    • Hospital discharge follow-up    • Hyperglycemia    • Hyperlipidemia    • Hyperplastic polyp of stomach    • Hypertension    • Hypothyroid    • Inflamed skin tag    • Insomnia    • Internal hemorrhoids    • Kidney stone     has had history of passing and still has kidney stone on both sides    • Lumbar strain    • Male urinary stress incontinence     s/p prostatectomy   • Medicare annual wellness visit, subsequent    • Obesity    • KWAME (obstructive sleep apnea)    • Osteoarthritis    • Pes planus    • Presbycusis    • Prostate cancer (CMS/HCC)    • Prostate cancer (CMS/HCC)    • Rectal bleed    • Restless legs syndrome    • Rib pain on left side    • Shoulder pain    • Skin lesion of face    • Sleep apnea     bipap   • Throat clearing    • Tinea corporis    • Tinea cruris    • Venous stasis dermatitis      Past Surgical History:   Procedure Laterality Date   • CATARACT EXTRACTION Right    • COLONOSCOPY  03/2017    3/17normal. Recheck 2022. 12/11. Repeat in 5 years    • ENDOSCOPY W/ PEG REMOVAL     • FOOT SURGERY      1998 had big toe replaced on left foot   • HERNIA REPAIR      umbilical   • JOINT REPLACEMENT Right 2014   • NECK SURGERY      cervical disc   • NH TOTAL KNEE ARTHROPLASTY Left 9/13/2016    Procedure: LT TOTAL KNEE ARTHROPLASTY;  Surgeon: Tadeo Basurto MD;  Location: [facility];  Service: Orthopedics   • PROSTATECTOMY  07/1999 July 1999. Radical    •  THYROID LOBECTOMY     • THYROID SURGERY  2011    partial doesn't know which one was removed   • TONSILLECTOMY     • TOTAL KNEE ARTHROPLASTY Right 2014   • WRIST SURGERY Right     x2  wrist replaced     General Information     [facility] Name 11/14/19 1116          PT Evaluation Time/Intention    Document Type  evaluation  (Pended)   -AP     Mode of Treatment  physical therapy  (Pended)   -AP     Row Name 11/14/19 1116          General Information    Patient Profile Reviewed?  yes  (Pended)   -AP     Prior Level of Function  independent:  (Pended)   -AP     Existing Precautions/Restrictions  fall;shoulder  (Pended)   -AP     [facility] Name 11/14/19 1116          Relationship/Environment    Lives With  spouse  (Pended)   -AP     Row Name 11/14/19 1116          Resource/Environmental Concerns    Current Living Arrangements  home/apartment/condo  (Pended)   -AP     Row Name 11/14/19 1116          Home Main Entrance    Number of Stairs, Main Entrance  three  (Pended)   -AP     Row Name 11/14/19 1116          Stairs Within Home, Primary    Stairs, Within Home, Primary  flight of steps to basement, not necessary to go downstairs  (Pended)   -AP     Number of Stairs, Within Home, Primary  ten  (Pended)   -AP     Row Name 11/14/19 1116          Cognitive Assessment/Intervention- PT/OT    Orientation Status (Cognition)  oriented x 4  (Pended)   -AP     Row Name 11/14/19 1116          Safety Issues, Functional Mobility    Impairments Affecting Function (Mobility)  balance;endurance/activity tolerance;pain;strength;range of motion (ROM);shortness of breath  (Pended)   -AP       User Key  (r) = Recorded By, (t) = Taken By, (c) = Cosigned By    Initials Name Provider Type    AP Hermelinda Anders, PT Student PT Student        Mobility     [facility] Name 11/14/19 1117          Bed Mobility Assessment/Treatment    Bed Mobility Assessment/Treatment  bed mobility (all) activities  (Pended)   -AP     [place] Level (Bed Mobility)  minimum assist (75%  patient effort)  (Pended)   -AP     Assistive Device (Bed Mobility)  head of bed elevated;bed rails  (Pended)   -AP     Comment (Bed Mobility)  needed head of bed eevated pretty high and used hand rails with RUE  (Pended)   -AP     Row Name 11/14/19 1117          Sit-Stand Transfer    Sit-Stand [place] (Transfers)  minimum assist (75% patient effort);1 person to manage equipment;1 person assist  (Pended)   -AP     Assistive Device (Sit-Stand Transfers)  cane, straight  (Pended)   -AP     Row Name 11/14/19 1117          Gait/Stairs Assessment/Training    Gait/Stairs Assessment/Training  gait/ambulation independence  (Pended)   -AP     [place] Level (Gait)  minimum assist (75% patient effort);2 person assist  (Pended)   -AP     Distance in Feet (Gait)  30 ft   (Pended)   -AP     Deviations/Abnormal Patterns (Gait)  gait speed decreased;stride length decreased;base of support, wide  (Pended)   -AP     Bilateral Gait Deviations  lateral trunk flexion;leans left;leans right;heel strike decreased  (Pended)   -AP     Comment (Gait/Stairs)  pt laterally leans in stance phase on each foot  (Pended)   -AP       User Key  (r) = Recorded By, (t) = Taken By, (c) = Cosigned By    Initials Name Provider Type    Hermelinda Portillo PT Student PT Student        Obj/Interventions     Row Name 11/14/19 1119          General ROM    GENERAL ROM COMMENTS  AROM of RUE limited to wrist and elbow motions only, pendulums only with Rt shoulder  (Pended)   -AP     Row Name 11/14/19 1119          MMT (Manual Muscle Testing)    General MMT Comments  Unable to assess, restricted use of Rt shoulder per TSA protocol  (Pended)   -AP     Row Name 11/14/19 1119          Therapeutic Exercise    Upper Extremity (Therapeutic Exercise)  bicep curl, right  (Pended)  gripping exercise with Rt hand, shoulder pendulums Rt UE  -AP     Upper Extremity Range of Motion (Therapeutic Exercise)  forearm supination/pronation, right;wrist flexion/extension,  right  (Pended)   -AP     Position (Therapeutic Exercise)  seated  (Pended)   -AP     Sets/Reps (Therapeutic Exercise)  1/10  (Pended)   -AP     Row Name 11/14/19 1119          Static Sitting Balance    Level of [place] (Unsupported Sitting, Static Balance)  supervision  (Pended)   -AP     Sitting Position (Unsupported Sitting, Static Balance)  sitting on edge of bed  (Pended)   -AP     Row Name 11/14/19 1119          Dynamic Sitting Balance    Level of [place], Reaches Outside Midline (Sitting, Dynamic Balance)  supervision  (Pended)   -AP     Sitting Position, Reaches Outside Midline (Sitting, Dynamic Balance)  sitting on edge of bed  (Pended)   -AP     Row Name 11/14/19 1119          Static Standing Balance    Level of [place] (Supported Standing, Static Balance)  contact guard assist  (Pended)   -AP     Row Name 11/14/19 1119          Dynamic Standing Balance    Level of [place], Reaches Outside Midline (Standing, Dynamic Balance)  2 person assist;minimal assist, 75% patient effort  (Pended)   -AP     Comment, Reaches Outside Midline (Standing, Dynamic Balance)  seems unsteady on his feet, lateral lean in waddling type motion when walking. Short, uneven steps  (Pended)   -AP     Row Name 11/14/19 1119          Sensory Assessment/Intervention    Sensory General Assessment  no sensation deficits identified  (Pended)   -AP       User Key  (r) = Recorded By, (t) = Taken By, (c) = Cosigned By    Initials Name Provider Type    AP Hermelinda Anders, PT Student PT Student        Goals/Plan     Row Name 11/14/19 1123          Bed Mobility Goal 1 (PT)    Activity/Assistive Device (Bed Mobility Goal 1, PT)  bed mobility activities, all  (Pended)   -AP     [place] Level/Cues Needed (Bed Mobility Goal 1, PT)  supervision required  (Pended)   -AP     Time Frame (Bed Mobility Goal 1, PT)  1 week  (Pended)   -AP     Row Name 11/14/19 1123          Transfer Goal 1 (PT)    Activity/Assistive Device (Transfer  Goal 1, PT)  transfers, all  (Pended)   -AP     [place] Level/Cues Needed (Transfer Goal 1, PT)  supervision required  (Pended)   -AP     Time Frame (Transfer Goal 1, PT)  1 week  (Pended)   -AP     Row Name 11/14/19 1123          Gait Training Goal 1 (PT)    Activity/Assistive Device (Gait Training Goal 1, PT)  gait (walking locomotion)  (Pended)   -AP     [place] Level (Gait Training Goal 1, PT)  supervision required  (Pended)   -AP     Distance (Gait Goal 1, PT)  150 ft  (Pended)   -AP     Time Frame (Gait Training Goal 1, PT)  1 week  (Pended)   -AP     Row Name 11/14/19 1123          Patient Education Goal (PT)    Activity (Patient Education Goal, PT)  Pt will verbalize and demosntrate understanding of home exercise program.  (Pended)   -AP     Time Frame (Patient Education Goal, PT)  1 week  (Pended)   -AP       User Key  (r) = Recorded By, (t) = Taken By, (c) = Cosigned By    Initials Name Provider Type    AP Hermelinda Anders, PT Student PT Student        Clinical Impression     Row Name 11/14/19 1121          Pain Assessment    Additional Documentation  Pain Scale: Numbers Pre/Post-Treatment (Group)  (Pended)   -AP     Row Name 11/14/19 1121          Pain Scale: Numbers Pre/Post-Treatment    Pain Scale: Numbers, Pretreatment  6/10  (Pended)   -AP     Pain Location - Side  Right  (Pended)   -AP     Pain Location  shoulder  (Pended)   -AP     Pain Intervention(s)  Medication (See MAR);Ambulation/increased activity;Repositioned  (Pended)   -AP     Row Name 11/14/19 1121          Physical Therapy Clinical Impression    Patient/Family Goals Statement (PT Clinical Impression)  Return home   (Pended)   -AP     Criteria for Skilled Interventions Met (PT Clinical Impression)  yes;treatment indicated  (Pended)   -AP     Rehab Potential (PT Clinical Summary)  good, to achieve stated therapy goals  (Pended)   -AP     Row Name 11/14/19 1121          Vital Signs    Pre Systolic BP Rehab  117  (Pended)   -AP     Pre  Treatment Diastolic BP  75  (Pended)   -AP     Pretreatment Heart Rate (beats/min)  93  (Pended)   -AP     Pre SpO2 (%)  93  (Pended)   -AP     O2 Delivery Pre Treatment  supplemental O2  (Pended)   -AP     Row Name 11/14/19 1121          Positioning and Restraints    Pre-Treatment Position  in bed  (Pended)   -AP     Post Treatment Position  chair  (Pended)   -AP     In Chair  reclined;call light within reach;encouraged to call for assist;with family/caregiver;notified nsg  (Pended)   -AP       User Key  (r) = Recorded By, (t) = Taken By, (c) = Cosigned By    Initials Name Provider Type    AP Hermelinda Anders, PT Student PT Student        Outcome Measures     Row Name 11/14/19 1125          How much help from another person do you currently need...    Turning from your back to your side while in flat bed without using bedrails?  3  (Pended)   -AP     Moving from lying on back to sitting on the side of a flat bed without bedrails?  2  (Pended)   -AP     Moving to and from a bed to a chair (including a wheelchair)?  3  (Pended)   -AP     Standing up from a chair using your arms (e.g., wheelchair, bedside chair)?  3  (Pended)   -AP     Climbing 3-5 steps with a railing?  2  (Pended)   -AP     To walk in hospital room?  3  (Pended)   -AP     AM-PAC 6 Clicks Score (PT)  16  (Pended)   -AP     Row Name 11/14/19 1125          Functional Assessment    Outcome Measure Options  AM-PAC 6 Clicks Basic Mobility (PT)  (Pended)   -AP       User Key  (r) = Recorded By, (t) = Taken By, (c) = Cosigned By    Initials Name Provider Type    AP Hermelinda Anders, PT Student PT Student        Physical Therapy Education     Title: PT OT SLP Therapies (Done)     Topic: Physical Therapy (Done)     Point: Mobility training (Done)     Learning Progress Summary           Patient Acceptance, E, VU by AP at 11/14/2019 11:26 AM   Significant Other Acceptance, E, VU by AP at 11/14/2019 11:26 AM                   Point: Home exercise program (Done)      Learning Progress Summary           Patient Acceptance, E, VU by AP at 11/14/2019 11:26 AM   Significant Other Acceptance, E, VU by AP at 11/14/2019 11:26 AM                   Point: Body mechanics (Done)     Learning Progress Summary           Patient Acceptance, E, VU by AP at 11/14/2019 11:26 AM   Significant Other Acceptance, E, VU by AP at 11/14/2019 11:26 AM                   Point: Precautions (Done)     Learning Progress Summary           Patient Acceptance, E, VU by AP at 11/14/2019 11:26 AM   Significant Other Acceptance, E, VU by AP at 11/14/2019 11:26 AM                               User Key     Initials Effective Dates Name Provider Type Discipline     09/04/19 -  Hermelinda Anders, PT Student PT Student PT              PT Recommendation and Plan  Planned Therapy Interventions (PT Eval): (P) balance training, bed mobility training, gait training, home exercise program, strengthening, ROM (range of motion), transfer training  Plan of Care Reviewed With: (P) patient, spouse  Outcome Summary: (P) Pt is 73 yo male admitted s/p Rt reverse TSAand Rt axillary vein repair 11/13/2019. His PMH is significant for high cholesterol, sleep apnea, and prostate CA and he has a history of multiple total joint replacements. He reports having been independent with all mobility prior to admission and used a cane in Lt hand to ambulate any distance. He presents to therapy in high levels of pain and demosntrating moderately severe SOA when mobilizing in the room. He was able to complete bed mobility and functional transfers with min A of therapist and ambulated in herman for 30 ft also with min A, displaying irregular step lengths and lateral lean over each foot while in stance phase. He reports having chronic back pain and this gait pattern may be typical for him, though he is extremely limited by SOA at the time. He will benefit from skilled therapy services to address functional impairments in activity tolerance/endurance,  gait abnormalities, and need for assistance with functional transfers. Anticipate d/c to home with assist.      Time Calculation:   PT Charges     Row Name 11/14/19 1132             Time Calculation    Start Time  1042  (Pended)   -AP      Stop Time  1114  (Pended)   -AP      Time Calculation (min)  32 min  (Pended)   -AP      PT Received On  11/14/19  (Pended)   -AP      PT - Next Appointment  11/15/19  (Pended)   -AP      PT Goal Re-Cert Due Date  11/21/19  (Pended)   -AP         Time Calculation- PT    Total Timed Code Minutes- PT  20 minute(s)  (Pended)   -AP        User Key  (r) = Recorded By, (t) = Taken By, (c) = Cosigned By    Initials Name Provider Type    AP Hermelinda Anders, PT Student PT Student        Therapy Charges for Today     Code Description Service Date Service Provider Modifiers Qty    75581964506  PT EVAL MOD COMPLEXITY 2 11/14/2019 Hermelinda Anders, PT Student GP 1    00214586294 HC PT THER PROC EA 15 MIN 11/14/2019 Hermelinda Anders, PT Student GP 1    40691631956 HC PT THER SUPP EA 15 MIN 11/14/2019 Hermelinda Anders, PT Student GP 1          PT G-Codes  Outcome Measure Options: (P) AM-PAC 6 Clicks Basic Mobility (PT)  AM-PAC 6 Clicks Score (PT): (P) 16    Hermelinda Anders PT Student  11/14/2019

## 2019-11-14 NOTE — PROGRESS NOTES
Orthopedic Progress Note    Subjective     Post-Operative Day: 1 reverse total shoulder arthroplasty right with axillary vein repair  Systemic or Specific Complaints: .  Patient feels weak and is in pain as the block is worn off.    Objective     Vital signs in last 24 hours:  Temp:  [97.6 °F (36.4 °C)-99.7 °F (37.6 °C)] 97.6 °F (36.4 °C)  Heart Rate:  [] 96  Resp:  [12-24] 12  BP: ()/(32-90) 115/48    General: alert, appears stated age and cooperative   Neurovascular: Radial nerve: Intact, Ulnar nerve: Intact and Median nerve: Intact  Radial pulse: 2+.  Did not test axillary nerve this morning   Wound: Dressing clean and dry. No evidence of infection.   Range of Motion: Hand/Wrist/elbow ROM WNL. ROM shoulder not tested.     Data Review  CBC:  Results from last 7 days   Lab Units 11/14/19  0109   HEMOGLOBIN g/dL 9.8*   HEMATOCRIT % 30.1*       Assessment/Plan     IMPRESSION: Postoperative day 1 from right reverse total shoulder and axillary vein repair.  Patient was transfused intra-and postoperatively and has a stable hemoglobin of 9.8.  His blood pressure is now stable but he will be closely observed.  Lovenox was recommended 30 mg twice daily.  I have increased his Percocet dose and switch from morphine to Dilaudid for pain control.  A single dose of tramadol will be used 15 mg.    Pain Relief: some relief        Activity: Stay in sling.      LOS: 1 day     Behzad Kelley MD    Date: 11/14/2019  Time: 9:06 AM

## 2019-11-15 LAB
ABO GROUP BLD: NORMAL
ANION GAP SERPL CALCULATED.3IONS-SCNC: 13.2 MMOL/L (ref 5–15)
BLD GP AB SCN SERPL QL: NEGATIVE
BUN BLD-MCNC: 32 MG/DL (ref 8–23)
BUN/CREAT SERPL: 14 (ref 7–25)
CALCIUM SPEC-SCNC: 7.5 MG/DL (ref 8.6–10.5)
CHLORIDE SERPL-SCNC: 95 MMOL/L (ref 98–107)
CO2 SERPL-SCNC: 22.8 MMOL/L (ref 22–29)
CREAT BLD-MCNC: 2.28 MG/DL (ref 0.76–1.27)
DEPRECATED RDW RBC AUTO: 46.9 FL (ref 37–54)
ERYTHROCYTE [DISTWIDTH] IN BLOOD BY AUTOMATED COUNT: 14 % (ref 12.3–15.4)
GFR SERPL CREATININE-BSD FRML MDRD: 28 ML/MIN/1.73
GLUCOSE BLD-MCNC: 130 MG/DL (ref 65–99)
HCT VFR BLD AUTO: 22.9 % (ref 37.5–51)
HCT VFR BLD AUTO: 27.1 % (ref 37.5–51)
HGB BLD-MCNC: 7.6 G/DL (ref 13–17.7)
HGB BLD-MCNC: 8.8 G/DL (ref 13–17.7)
MCH RBC QN AUTO: 29.9 PG (ref 26.6–33)
MCHC RBC AUTO-ENTMCNC: 33.2 G/DL (ref 31.5–35.7)
MCV RBC AUTO: 90.2 FL (ref 79–97)
PLATELET # BLD AUTO: 106 10*3/MM3 (ref 140–450)
PMV BLD AUTO: 10.3 FL (ref 6–12)
POTASSIUM BLD-SCNC: 3.6 MMOL/L (ref 3.5–5.2)
RBC # BLD AUTO: 2.54 10*6/MM3 (ref 4.14–5.8)
RH BLD: POSITIVE
SODIUM BLD-SCNC: 131 MMOL/L (ref 136–145)
T&S EXPIRATION DATE: NORMAL
WBC NRBC COR # BLD: 8.11 10*3/MM3 (ref 3.4–10.8)

## 2019-11-15 PROCEDURE — 25010000002 MORPHINE PER 10 MG: Performed by: ORTHOPAEDIC SURGERY

## 2019-11-15 PROCEDURE — 63710000001 DIPHENHYDRAMINE PER 50 MG: Performed by: ORTHOPAEDIC SURGERY

## 2019-11-15 PROCEDURE — 80048 BASIC METABOLIC PNL TOTAL CA: CPT | Performed by: INTERNAL MEDICINE

## 2019-11-15 PROCEDURE — 97110 THERAPEUTIC EXERCISES: CPT

## 2019-11-15 PROCEDURE — 94799 UNLISTED PULMONARY SVC/PX: CPT

## 2019-11-15 PROCEDURE — 25010000002 ENOXAPARIN PER 10 MG: Performed by: SURGERY

## 2019-11-15 PROCEDURE — 36430 TRANSFUSION BLD/BLD COMPNT: CPT

## 2019-11-15 PROCEDURE — 85014 HEMATOCRIT: CPT | Performed by: ORTHOPAEDIC SURGERY

## 2019-11-15 PROCEDURE — 85018 HEMOGLOBIN: CPT | Performed by: ORTHOPAEDIC SURGERY

## 2019-11-15 PROCEDURE — 86901 BLOOD TYPING SEROLOGIC RH(D): CPT | Performed by: ORTHOPAEDIC SURGERY

## 2019-11-15 PROCEDURE — 86900 BLOOD TYPING SEROLOGIC ABO: CPT

## 2019-11-15 PROCEDURE — P9016 RBC LEUKOCYTES REDUCED: HCPCS

## 2019-11-15 PROCEDURE — 86850 RBC ANTIBODY SCREEN: CPT | Performed by: ORTHOPAEDIC SURGERY

## 2019-11-15 PROCEDURE — 85027 COMPLETE CBC AUTOMATED: CPT | Performed by: INTERNAL MEDICINE

## 2019-11-15 PROCEDURE — 86900 BLOOD TYPING SEROLOGIC ABO: CPT | Performed by: ORTHOPAEDIC SURGERY

## 2019-11-15 PROCEDURE — 86923 COMPATIBILITY TEST ELECTRIC: CPT

## 2019-11-15 RX ORDER — HYDROMORPHONE HYDROCHLORIDE 1 MG/ML
1 INJECTION, SOLUTION INTRAMUSCULAR; INTRAVENOUS; SUBCUTANEOUS
Status: DISCONTINUED | OUTPATIENT
Start: 2019-11-15 | End: 2019-11-17 | Stop reason: HOSPADM

## 2019-11-15 RX ORDER — DIPHENHYDRAMINE HCL 25 MG
50 CAPSULE ORAL EVERY 6 HOURS PRN
Status: DISCONTINUED | OUTPATIENT
Start: 2019-11-15 | End: 2019-11-17 | Stop reason: HOSPADM

## 2019-11-15 RX ORDER — AMLODIPINE BESYLATE 5 MG/1
5 TABLET ORAL EVERY MORNING
Status: DISCONTINUED | OUTPATIENT
Start: 2019-11-15 | End: 2019-11-17

## 2019-11-15 RX ORDER — OXYCODONE AND ACETAMINOPHEN 10; 325 MG/1; MG/1
1 TABLET ORAL EVERY 4 HOURS PRN
Qty: 40 TABLET | Refills: 0 | Status: SHIPPED | OUTPATIENT
Start: 2019-11-15 | End: 2019-11-24

## 2019-11-15 RX ORDER — LOSARTAN POTASSIUM 100 MG/1
100 TABLET ORAL EVERY MORNING
Status: DISCONTINUED | OUTPATIENT
Start: 2019-11-15 | End: 2019-11-15

## 2019-11-15 RX ORDER — DIPHENHYDRAMINE HCL 25 MG
25 CAPSULE ORAL EVERY 4 HOURS PRN
Status: DISCONTINUED | OUTPATIENT
Start: 2019-11-15 | End: 2019-11-17 | Stop reason: HOSPADM

## 2019-11-15 RX ADMIN — LOSARTAN POTASSIUM 100 MG: 100 TABLET, FILM COATED ORAL at 09:16

## 2019-11-15 RX ADMIN — AMLODIPINE BESYLATE 5 MG: 5 TABLET ORAL at 09:14

## 2019-11-15 RX ADMIN — LATANOPROST 1 DROP: 50 SOLUTION OPHTHALMIC at 20:22

## 2019-11-15 RX ADMIN — DIPHENHYDRAMINE HYDROCHLORIDE 25 MG: 25 CAPSULE ORAL at 20:22

## 2019-11-15 RX ADMIN — PRAMIPEXOLE DIHYDROCHLORIDE 1.5 MG: 1.5 TABLET ORAL at 20:21

## 2019-11-15 RX ADMIN — HYDROCHLOROTHIAZIDE 25 MG: 25 TABLET ORAL at 09:15

## 2019-11-15 RX ADMIN — TIMOLOL MALEATE 1 DROP: 5 SOLUTION OPHTHALMIC at 09:15

## 2019-11-15 RX ADMIN — OXYCODONE HYDROCHLORIDE AND ACETAMINOPHEN 1 TABLET: 10; 325 TABLET ORAL at 13:47

## 2019-11-15 RX ADMIN — LEVOTHYROXINE SODIUM 88 MCG: 88 TABLET ORAL at 12:13

## 2019-11-15 RX ADMIN — BUDESONIDE AND FORMOTEROL FUMARATE DIHYDRATE 2 PUFF: 160; 4.5 AEROSOL RESPIRATORY (INHALATION) at 12:10

## 2019-11-15 RX ADMIN — POLYETHYLENE GLYCOL 3350 17 G: 17 POWDER, FOR SOLUTION ORAL at 09:14

## 2019-11-15 RX ADMIN — ENOXAPARIN SODIUM 30 MG: 30 INJECTION SUBCUTANEOUS at 20:21

## 2019-11-15 RX ADMIN — PRAMIPEXOLE DIHYDROCHLORIDE 1.5 MG: 1.5 TABLET ORAL at 16:33

## 2019-11-15 RX ADMIN — TIMOLOL MALEATE 1 DROP: 5 SOLUTION OPHTHALMIC at 20:22

## 2019-11-15 RX ADMIN — ROSUVASTATIN CALCIUM 20 MG: 20 TABLET, FILM COATED ORAL at 16:33

## 2019-11-15 RX ADMIN — PANTOPRAZOLE SODIUM 40 MG: 40 TABLET, DELAYED RELEASE ORAL at 07:04

## 2019-11-15 RX ADMIN — MONTELUKAST SODIUM 10 MG: 10 TABLET, FILM COATED ORAL at 20:21

## 2019-11-15 RX ADMIN — ENOXAPARIN SODIUM 30 MG: 30 INJECTION SUBCUTANEOUS at 12:14

## 2019-11-15 RX ADMIN — BUDESONIDE AND FORMOTEROL FUMARATE DIHYDRATE 2 PUFF: 160; 4.5 AEROSOL RESPIRATORY (INHALATION) at 22:52

## 2019-11-15 RX ADMIN — ASPIRIN 325 MG: 325 TABLET ORAL at 09:14

## 2019-11-15 RX ADMIN — PRAMIPEXOLE DIHYDROCHLORIDE 1.5 MG: 1.5 TABLET ORAL at 09:14

## 2019-11-15 RX ADMIN — OXYCODONE HYDROCHLORIDE AND ACETAMINOPHEN 1 TABLET: 10; 325 TABLET ORAL at 22:49

## 2019-11-15 RX ADMIN — OXYCODONE HYDROCHLORIDE AND ACETAMINOPHEN 1 TABLET: 10; 325 TABLET ORAL at 09:14

## 2019-11-15 RX ADMIN — SODIUM CHLORIDE 75 ML/HR: 4.5 INJECTION, SOLUTION INTRAVENOUS at 02:02

## 2019-11-15 RX ADMIN — DORZOLAMIDE HYDROCHLORIDE 1 DROP: 20 SOLUTION/ DROPS OPHTHALMIC at 20:22

## 2019-11-15 RX ADMIN — MORPHINE SULFATE 4 MG: 2 INJECTION, SOLUTION INTRAMUSCULAR; INTRAVENOUS at 01:40

## 2019-11-15 RX ADMIN — DORZOLAMIDE HYDROCHLORIDE 1 DROP: 20 SOLUTION/ DROPS OPHTHALMIC at 09:15

## 2019-11-15 NOTE — DISCHARGE INSTRUCTIONS
Dr. Kelley  [address] [facility] [address]  [phone]    TOTAL & REVERSE TOTAL SHOULDER REPLACEMENT  HOSPITAL DISCHARGE INSTRUCTIONS     GENERAL INFORMATION: With improved surgical techniques for total shoulder and reverse total shoulder replacement and the Hoahaoism of ultrasound guided long acting nerve blocks, the hospital stay for patients has decreased significantly. Patients generally go home the following morning after consultation with a physical therapist.  SPECIFIC POST-OP INSTRUCTIONS:  * FOLLOW UP APPOINTMENT: You will need to call our office [phone] and make an appointment to follow up 10-14 days after your date of surgery. We can see you back sooner if there are any problems or concerns.   * BLOOD THINNERS: All patients will be on some type of blood thinner post-op to prevent blood clot. In the hospital, we often use a blood thinning shot the first day post-op. Most patients who are not already on a blood thinner are discharged on over-the-counter aspirin 325mg daily. If you were taking a blood thinner prior to surgery, we will have you resume this on the first day post-op.   * STAPLES: Staples are taken our 10-14 days post-op. This will be done during your first post-op appointment in our office.   * ICE: Ice helps to decrease both pain and swelling. Ice should be applied to the shoulder 20-25 minutes each hour while awake.   * DRESSING CHANGES: We ask that you keep the incision clean and dry. Please use water proof bandaids to cover incision while showering. These can be purchased at your local pharmacy. They may need to be overlapped to cover entire length of incision. These can be changed daily or as needed. Do not use any ointment on the incision.   * SHOWERING: The wound edges typically come together and seal by 3-5 days post-op if there is no drainage. Again, please use waterproof bandaids to cover incision while showering. DO NOT SUBMERSE SHOULDER IN POOL,HOT TUB OR BATH UNTIL AT LEAST 3-4  WEEKS POST-OP (EVEN WITH WATERPROOF BANDAIDS).  * PAIN  MEDICINE: You will be given a prescription for oral pain medication prior to discharge from the hospital. Please let us know if you have a sensitivity to certain pain medications prior to discharge. Additional pain medication prescriptions can be written, but must be picked up at either our [place] or [place] office locations. They can NOT be called into your pharmacy.   * ORAL ANTI-INFLAMMATORIES:   You will be asked to discontinue ALL oral anti-inflammatories prior to surgery. You can resume these the first day post-op.These can be combined with the oral pain medications safely. DO NOT TAKE ADDITIONAL TYLENOL WITH THE NARCOTIC PAIN MEDICATION (it already has Tylenol in it). If you were not taking an anti-inflammatory pre-op, you can start one on the first day post-op. It will help decrease pain and swelling. Typical medications and dosages are as follows:   Advil/Motrin/Ibuprofen 200mg, 4 pills every 8 hours   Aleve/Naproxen Sodium 220mg, 2 pills every 12 hours  * SLING: We recommend you wear the sling at all times, other than when showering or doing physical therapy exercises.    * WEIGHT BEARING: We ask that you be non-weight bearing on the operative shoulder. Do not use the operative arm to lift/push/pull greater than 5lbs.   * PHYSICAL THERAPY: A physical therapist will see you in the hospital your first day post-op. They will teach you some very gentle range of motion exercises to do at home for the first 10-14 days. After we see you in the office during your first post-op visit, we will give you a prescription to start formal physical therapy. This can be done downstairs at [facility] or at a location of your choice. Physical therapy is typically 2-3 times a week for 4-6 weeks, depending on the individual and their progress.   * DRIVING A CAR: Medically/legally we can not recommend you drive a car while in the sling or while on pain medication (roughly  "10-14 days).   * RETURNING TO DAILY AND RECREATIONAL ACTIVITIES: For the most part patients can progress to daily activities as tolerated (keeping in mind the restrictions listed above). Initially, we do not want you to \"overdo\" it in an attempt to minimize post-op pain and swelling. Once the swelling is controlled, you can progress with activities as tolerated. Pain and swelling should be your guide to increasing your activity level.   * RETURN TO WORK: The return to work date depends on many factors and is very dependent on the individual. You would most likely be able to return to a sedentary job after your first post-op visit (10-14 days after surgery). A more physical job would obviously require a longer recovery time before return to work.  "

## 2019-11-15 NOTE — SIGNIFICANT NOTE
Pt checked on x2 today for attempt at amb. IV for PRBC changed to his left foot. Pt educated on ex program as per orders and NWB RUE. Nsg to amb pt later today as able.

## 2019-11-15 NOTE — DISCHARGE SUMMARY
Orthopedic Discharge Summary      Patient: Nathan Baez      YOB: 1945    Medical Record Number: [id number]    Attending Physician: Behzad Kelley MD  Consulting Physician(s):   Date of Admission: 11/13/2019 10:47 AM  Date of Discharge:       Patient Active Problem List   Diagnosis   • OA (osteoarthritis) of knee   • Hypertension   • Abdominal wall cellulitis   • Hypothyroidism (acquired)   • Gastroesophageal reflux disease without esophagitis   • Mixed hyperlipidemia   • Primary insomnia   • DDD (degenerative disc disease), lumbar   • KWAME (obstructive sleep apnea)   • Hyperglycemia   • Allergic   • Venous insufficiency   • Restless leg syndrome   • Prostate cancer (CMS/HCC)   • Venous stasis dermatitis   • Tinea cruris   • Tinea corporis   • Throat clearing   • Sleep apnea   • Skin lesion of face   • Rib pain on left side   • Restless legs syndrome   • Restless leg   • Rectal bleed   • Presbycusis   • Pes planus   • Osteoarthritis   • Obesity   • Medicare annual wellness visit, subsequent   • Lumbar strain   • Internal hemorrhoids   • Insomnia   • Inflamed skin tag   • Hypothyroid   • Hyperplastic polyp of stomach   • Hyperlipidemia   • Hospital discharge follow-up   • History of colon polyps   • High risk medication use   • Glaucoma   • Gastroenteritis, acute   • Erysipelas   • Encounter for screening colonoscopy   • Encounter for long-term (current) use of NSAIDs   • Dysphagia   • DVT (deep venous thrombosis) (CMS/HCC)   • DJD (degenerative joint disease), lumbar   • Diverticulosis   • Disequilibrium   • Cough   • Contact with hypodermic needle   • Cervical radiculopathy   • Cellulitis   • Arthritis   • Allergic rhinitis   • Acute glaucoma   • Acute embolism and thrombosis of vein   • Actinic keratosis   • Status post reverse total shoulder replacement, right     [unfilled]    Allergies: No Known Allergies    Current Medications:     Discharge Medications      ASK your doctor about these  medications      Instructions Start Date   acetaminophen 650 MG 8 hr tablet  Commonly known as:  TYLENOL   1,300 mg, Oral, 2 Times Daily      albuterol sulfate  (90 Base) MCG/ACT inhaler  Commonly known as:  PROVENTIL HFA;VENTOLIN HFA;PROAIR HFA   2 puffs, Inhalation, Every 4 Hours PRN      ALLERGY SERUM INJECTION   Subcutaneous, Weekly      amLODIPine 5 MG tablet  Commonly known as:  NORVASC   5 mg, Oral, Every Morning      aspirin 81 MG tablet   81 mg, Oral, Daily      azelastine 0.1 % nasal spray  Commonly known as:  ASTELIN   2 sprays, Nasal, 2 Times Daily, Use in each nostril as directed       BREO ELLIPTA IN   1 puff, Inhalation, Every Morning      BRIMONIDINE TARTRATE OP   1 drop, Ophthalmic, 2 Times Daily      celecoxib 200 MG capsule  Commonly known as:  CELEBREX   200 mg, Oral, Daily      Chlorhexidine Gluconate Cloth 2 % pads   1 application, Apply externally, USE AS DIRECTED PREOP       dorzolamide-timolol 22.3-6.8 MG/ML ophthalmic solution  Commonly known as:  COSOPT   1 drop, Both Eyes, 2 Times Daily      fexofenadine 180 MG tablet  Commonly known as:  ALLEGRA   180 mg, Oral, Daily      HYDROCHLOROTHIAZIDE PO   25 mg, Oral, Daily With Breakfast      Latanoprost 0.005 % emulsion   1 drop, Ophthalmic, Nightly      latanoprost 0.005 % ophthalmic solution  Commonly known as:  XALATAN   1 drop, Both Eyes, Nightly      losartan 100 MG tablet  Commonly known as:  COZAAR   100 mg, Oral, Every Morning      montelukast 10 MG tablet  Commonly known as:  SINGULAIR   10 mg, Oral, Nightly      mupirocin 2 % nasal ointment  Commonly known as:  BACTROBAN   1 application, Nasal, USE AS DIRECTED PREOP       pantoprazole 40 MG EC tablet  Commonly known as:  PROTONIX   40 mg, Oral, 2 Times Daily      pramipexole 1.5 MG tablet  Commonly known as:  MIRAPEX   1.5 mg, Oral, 3 Times Daily      rosuvastatin 20 MG tablet  Commonly known as:  CRESTOR   20 mg, Oral, Every Evening      SYNTHROID 88 MCG tablet  Generic drug:   levothyroxine   88 mcg, Oral, Every Morning                 Past Medical History:   Diagnosis Date   • Actinic keratosis    • Acute embolism and thrombosis of vein    • Allergic rhinitis    • Arthritis    • Cataract     forming left eye   • Cellulitis    • Cellulitis    • Cervical radiculopathy    • Contact with hypodermic needle    • Cough    • Disequilibrium    • Diverticulosis    • DJD (degenerative joint disease), lumbar    • DVT (deep venous thrombosis) (CMS/HCC)     l leg  dx 2019 and was on blood thinner and ow off wears compression    • Dysphagia    • Encounter for long-term (current) use of NSAIDs    • Encounter for screening colonoscopy    • Erysipelas    • Gastroenteritis, acute    • GERD (gastroesophageal reflux disease)    • Glaucoma    • High risk medication use    • History of colon polyps    • Hospital discharge follow-up    • Hyperglycemia    • Hyperlipidemia    • Hyperplastic polyp of stomach    • Hypertension    • Hypothyroid    • Inflamed skin tag    • Insomnia    • Internal hemorrhoids    • Kidney stone     has had history of passing and still has kidney stone on both sides    • Lumbar strain    • Male urinary stress incontinence     s/p prostatectomy   • Medicare annual wellness visit, subsequent    • Obesity    • KWAME (obstructive sleep apnea)    • Osteoarthritis    • Pes planus    • Presbycusis    • Prostate cancer (CMS/HCC)    • Prostate cancer (CMS/HCC)    • Rectal bleed    • Restless legs syndrome    • Rib pain on left side    • Shoulder pain    • Skin lesion of face    • Sleep apnea     bipap   • Throat clearing    • Tinea corporis    • Tinea cruris    • Venous stasis dermatitis         Past Surgical History:   Procedure Laterality Date   • CATARACT EXTRACTION Right    • COLONOSCOPY  03/2017    3/17normal. Recheck 2022. 12/11. Repeat in 5 years    • ENDOSCOPY W/ PEG REMOVAL     • FOOT SURGERY      1998 had big toe replaced on left foot   • HERNIA REPAIR      umbilical   • JOINT REPLACEMENT  "Right    • NECK SURGERY      cervical disc   • OK TOTAL KNEE ARTHROPLASTY Left 2016    Procedure: LT TOTAL KNEE ARTHROPLASTY;  Surgeon: Tadeo Basurto MD;  Location: [facility];  Service: Orthopedics   • PROSTATECTOMY  1999. Radical    • THYROID LOBECTOMY     • THYROID SURGERY      partial doesn't know which one was removed   • TONSILLECTOMY     • TOTAL KNEE ARTHROPLASTY Right 2014   • WRIST SURGERY Right     x2  wrist replaced        Social History     Occupational History   • Not on file   Tobacco Use   • Smoking status: Former Smoker     Packs/day: 1.00     Years: 20.00     Pack years: 20.00     Types: Cigarettes     Last attempt to quit: 1984     Years since quittin.8   • Smokeless tobacco: Never Used   Substance and Sexual Activity   • Alcohol use: Yes     Comment: 3-4 MONTHLY   • Drug use: No   • Sexual activity: Not on file      Social History     Social History Narrative   • Not on file        Family History   Problem Relation Age of Onset   • Cancer Mother         COLON   • Cancer Father         PROSTATE   • Cancer Sister         BREAST CANCER   • Breast cancer Sister    • Gout Sister    • Hypertension Sister    • Heart attack Brother    • Bone cancer Brother    • Heart disease Brother    • Malig Hyperthermia Neg Hx          Physical Exam: 74 y.o. male  General Appearance:    Alert, cooperative, in no acute distress                      Vitals:    19 2355 11/15/19 0004 11/15/19 0336 11/15/19 0700   BP: 99/55  102/57 115/62   BP Location: Left arm  Left arm Left arm   Patient Position: Lying  Lying Lying   Pulse: 91  95 103   Resp: 16  16 16   Temp: 97 °F (36.1 °C)  97.9 °F (36.6 °C) 97.2 °F (36.2 °C)   TempSrc: Oral  Oral Oral   SpO2: 93%  90% 90%   Weight:  115 kg (254 lb 10.1 oz)     Height:  170.2 cm (67.01\")                                                      Extremities:   Incision intact without signs or symptoms of infection.               " Neurovascular status remains intact to operative extremity.      Moves all extremities well, no edema, no cyanosis, no              redness   Pulses:   Pulses palpable and equal bilaterally   Skin:   No bleeding, bruising or rash   Lymph nodes:   No palpable adenopathy   Neurologic:   Cranial nerves 2 - 12 grossly intact, sensation intact, DTR        present and equal bilaterally           Hospital Course:  74 y.o. male admitted to [facility] to services of Behzad Kelley MD with Status post reverse total shoulder replacement, right [Z96.611] on 11/13/2019 and underwent [unfilled]  Per [unfilled]. Antibiotic and VTE prophylaxis were per SCIP protocols. Post-operatively the patient transferred to the post-operative floor where the patient underwent mobilization therapy that included active as well as passive ROM exercises. Opioids were titrated to achieve appropriate pain management to allow for participation in mobilization exercises. Vital signs are now stable. The incision is intact without signs or symptoms of infection. Operative extremity neurovascular status remains intact.   Appropriate education re: incision care, activity levels, medications, and follow up visits was completed and all questions were answered. The patient is now deemed stable for discharge to Home.      DIAGNOSTIC TESTS:   [unfilled]      [unfilled]    Discharge and Follow up Instructions: Follow up in 10-14 days.        Date: [unfilled]  Behzad Kelley MD      Time:

## 2019-11-15 NOTE — PROGRESS NOTES
Orthopedic Progress Note    Subjective     Post-Operative Day: POD #2  Systemic or Specific Complaints: No Complaints. Pain is under control with PO meds.     Objective     Vital signs in last 24 hours:  Temp:  [97 °F (36.1 °C)-97.9 °F (36.6 °C)] 97.2 °F (36.2 °C)  Heart Rate:  [] 103  Resp:  [16] 16  BP: ()/(48-62) 115/62    General: alert, appears stated age and cooperative   Neurovascular: Radial nerve: Intact, Ulnar nerve: Intact and Median nerve: Intact  Radial pulse: 2+.   Wound: Dressing clean and dry. No evidence of infection.   Range of Motion: Hand/Wrist/elbow ROM WNL. ROM shoulder not tested.     Data Review  CBC:  Results from last 7 days   Lab Units 11/15/19  0303   WBC 10*3/mm3 8.11   RBC 10*6/mm3 2.54*   HEMOGLOBIN g/dL 7.6*   HEMATOCRIT % 22.9*   PLATELETS 10*3/mm3 106*       Assessment/Plan     IMPRESSION:stable w/anemia    Pain Relief: some relief    PLAN:Follow up in office 10-14 days. Will transfuse again today. If H/H is acceptable after transfusion (call my office [phone]) then he can go home this afternoon.     Activity: Stay in sling.      LOS: 2 days     Behzad Kelley MD    Date: 11/15/2019  Time: 7:56 AM

## 2019-11-15 NOTE — PLAN OF CARE
Problem: Patient Care Overview  Goal: Plan of Care Review  Outcome: Ongoing (interventions implemented as appropriate)   11/15/19 0615   Coping/Psychosocial   Plan of Care Reviewed With patient;spouse   Plan of Care Review   Progress improving   OTHER   Outcome Summary 74/M POD 1 Right reverse TKA and rt repair of rt axillary vein. Pt BRP w/assist x1. Pain was controlled by IV pain meds. Pt is resting comfortably . Will continue to monitor.      Goal: Individualization and Mutuality  Outcome: Ongoing (interventions implemented as appropriate)    Goal: Discharge Needs Assessment  Outcome: Ongoing (interventions implemented as appropriate)    Goal: Interprofessional Rounds/Family Conf  Outcome: Ongoing (interventions implemented as appropriate)      Problem: Pain, Chronic (Adult)  Goal: Identify Related Risk Factors and Signs and Symptoms  Outcome: Ongoing (interventions implemented as appropriate)    Goal: Acceptable Pain/Comfort Level and Functional Ability  Outcome: Ongoing (interventions implemented as appropriate)      Problem: Fall Risk (Adult)  Goal: Identify Related Risk Factors and Signs and Symptoms  Outcome: Ongoing (interventions implemented as appropriate)    Goal: Absence of Fall  Outcome: Outcome(s) achieved Date Met: 11/15/19      Problem: Skin Injury Risk (Adult)  Goal: Identify Related Risk Factors and Signs and Symptoms  Outcome: Ongoing (interventions implemented as appropriate)    Goal: Skin Health and Integrity  Outcome: Ongoing (interventions implemented as appropriate)

## 2019-11-15 NOTE — THERAPY TREATMENT NOTE
Patient Name: Nathan Baez  : 1945    MRN: [id number]                              Today's Date: 11/15/2019       Admit Date: 2019    Visit Dx: No diagnosis found.  Patient Active Problem List   Diagnosis   • OA (osteoarthritis) of knee   • Hypertension   • Abdominal wall cellulitis   • Hypothyroidism (acquired)   • Gastroesophageal reflux disease without esophagitis   • Mixed hyperlipidemia   • Primary insomnia   • DDD (degenerative disc disease), lumbar   • KWAME (obstructive sleep apnea)   • Hyperglycemia   • Allergic   • Venous insufficiency   • Restless leg syndrome   • Prostate cancer (CMS/HCC)   • Venous stasis dermatitis   • Tinea cruris   • Tinea corporis   • Throat clearing   • Sleep apnea   • Skin lesion of face   • Rib pain on left side   • Restless legs syndrome   • Restless leg   • Rectal bleed   • Presbycusis   • Pes planus   • Osteoarthritis   • Obesity   • Medicare annual wellness visit, subsequent   • Lumbar strain   • Internal hemorrhoids   • Insomnia   • Inflamed skin tag   • Hypothyroid   • Hyperplastic polyp of stomach   • Hyperlipidemia   • Hospital discharge follow-up   • History of colon polyps   • High risk medication use   • Glaucoma   • Gastroenteritis, acute   • Erysipelas   • Encounter for screening colonoscopy   • Encounter for long-term (current) use of NSAIDs   • Dysphagia   • DVT (deep venous thrombosis) (CMS/HCC)   • DJD (degenerative joint disease), lumbar   • Diverticulosis   • Disequilibrium   • Cough   • Contact with hypodermic needle   • Cervical radiculopathy   • Cellulitis   • Arthritis   • Allergic rhinitis   • Acute glaucoma   • Acute embolism and thrombosis of vein   • Actinic keratosis   • Status post reverse total shoulder replacement, right     Past Medical History:   Diagnosis Date   • Actinic keratosis    • Acute embolism and thrombosis of vein    • Allergic rhinitis    • Arthritis    • Cataract     forming left eye   • Cellulitis    • Cellulitis    •  Cervical radiculopathy    • Contact with hypodermic needle    • Cough    • Disequilibrium    • Diverticulosis    • DJD (degenerative joint disease), lumbar    • DVT (deep venous thrombosis) (CMS/HCC)     l leg  dx 2019 and was on blood thinner and ow off wears compression    • Dysphagia    • Encounter for long-term (current) use of NSAIDs    • Encounter for screening colonoscopy    • Erysipelas    • Gastroenteritis, acute    • GERD (gastroesophageal reflux disease)    • Glaucoma    • High risk medication use    • History of colon polyps    • Hospital discharge follow-up    • Hyperglycemia    • Hyperlipidemia    • Hyperplastic polyp of stomach    • Hypertension    • Hypothyroid    • Inflamed skin tag    • Insomnia    • Internal hemorrhoids    • Kidney stone     has had history of passing and still has kidney stone on both sides    • Lumbar strain    • Male urinary stress incontinence     s/p prostatectomy   • Medicare annual wellness visit, subsequent    • Obesity    • KWAME (obstructive sleep apnea)    • Osteoarthritis    • Pes planus    • Presbycusis    • Prostate cancer (CMS/HCC)    • Prostate cancer (CMS/HCC)    • Rectal bleed    • Restless legs syndrome    • Rib pain on left side    • Shoulder pain    • Skin lesion of face    • Sleep apnea     bipap   • Throat clearing    • Tinea corporis    • Tinea cruris    • Venous stasis dermatitis      Past Surgical History:   Procedure Laterality Date   • CATARACT EXTRACTION Right    • COLONOSCOPY  03/2017    3/17normal. Recheck 2022. 12/11. Repeat in 5 years    • ENDOSCOPY W/ PEG REMOVAL     • FOOT SURGERY      1998 had big toe replaced on left foot   • HERNIA REPAIR      umbilical   • JOINT REPLACEMENT Right 2014   • NECK SURGERY      cervical disc   • OK TOTAL KNEE ARTHROPLASTY Left 9/13/2016    Procedure: LT TOTAL KNEE ARTHROPLASTY;  Surgeon: Tadeo Basurto MD;  Location: [facility];  Service: Orthopedics   • PROSTATECTOMY  07/1999 July 1999. Radical    •  THYROID LOBECTOMY     • THYROID SURGERY  2011    partial doesn't know which one was removed   • TONSILLECTOMY     • TOTAL KNEE ARTHROPLASTY Right 2014   • WRIST SURGERY Right     x2  wrist replaced     General Information     Row Name 11/15/19 0938          PT Evaluation Time/Intention    Document Type  therapy note (daily note)  -     Mode of Treatment  physical therapy;individual therapy  -     Row Name 11/15/19 0938          General Information    Patient Profile Reviewed?  yes  -       User Key  (r) = Recorded By, (t) = Taken By, (c) = Cosigned By    Initials Name Provider Type    SV Jaye Braswell, PT Physical Therapist        Mobility     Row Name 11/15/19 0938          Bed Mobility Assessment/Treatment    Bed Mobility Assessment/Treatment  -- NT due to up with nsg to chair  -     Row Name 11/15/19 0938          Sit-Stand Transfer    Sit-Stand [place] (Transfers)  contact guard  -     Assistive Device (Sit-Stand Transfers)  walker, ganga no stc available  -     Row Name 11/15/19 0938          Gait/Stairs Assessment/Training    [place] Level (Gait)  contact guard;verbal cues  -     Assistive Device (Gait)  walker, ganga  -SV     Distance in Feet (Gait)  3' forward and back , shuffle steps, guarded with O2 and sling in place  -       User Key  (r) = Recorded By, (t) = Taken By, (c) = Cosigned By    Initials Name Provider Type    SV Jaye Braswell, PT Physical Therapist        Obj/Interventions     Row Name 11/15/19 0940          Therapeutic Exercise    Position (Therapeutic Exercise)  standing;seated  -SV     Sets/Reps (Therapeutic Exercise)  seated wrist/forearm ex: , pron/sup of forearm, wrist flex/ext, arom, elbow aarom  flex/ext all 7-10 reps   -SV     Comment (Therapeutic Exercise)  -- sling removed with LUE on rail for support, pt completed 5-7 reps pendulum ex: flex/ex, left/right, circles:cw and ccw( vc to weight shift) wife present and written copy issued of all ex  -SV      Row Name 11/15/19 0940          Static Sitting Balance    Level of [place] (Unsupported Sitting, Static Balance)  supervision  -SV     Row Name 11/15/19 0940          Static Standing Balance    Level of [place] (Supported Standing, Static Balance)  contact guard assist  -SV     Time Able to Maintain Position (Supported Standing, Static Balance)  1 to 2 minutes  -SV     Assistive Device Utilized (Supported Standing, Static Balance)  walker, ganga  -SV     Row Name 11/15/19 0940          Dynamic Standing Balance    Level of [place], Reaches Outside Midline (Standing, Dynamic Balance)  contact guard assist  -SV     Time Able to Maintain Position, Reaches Outside Midline (Standing, Dynamic Balance)  2 to 3 minutes  -SV     Assistive Device Utilized (Supported Standing, Dynamic Balance)  walker, ganga left hand on rail  -SV     Comment, Reaches Outside Midline (Standing, Dynamic Balance)  -- for pendulum ex  -SV       User Key  (r) = Recorded By, (t) = Taken By, (c) = Cosigned By    Initials Name Provider Type    SV Jaye Braswell, PT Physical Therapist        Goals/Plan    No documentation.       Clinical Impression     Row Name 11/15/19 0943          Pain Assessment    Additional Documentation  -- minimal pain per pt  -SV     [facility] Name 11/15/19 0943          Plan of Care Review    Plan of Care Reviewed With  patient;spouse  -SV     Row Name 11/15/19 0943          Vital Signs    Pre SpO2 (%)  95  -SV     O2 Delivery Pre Treatment  supplemental O2 2L  -SV     Intra SpO2 (%)  87  -SV     O2 Delivery Intra Treatment  room air trial on room air briefly: pt did not tolerate so placed back   -SV     Post SpO2 (%)  99 2L  -SV     O2 Delivery Post Treatment  supplemental O2  -SV     Pre Patient Position  Sitting  -SV     Intra Patient Position  Standing  -SV     Post Patient Position  Sitting  -SV     Row Name 11/15/19 0943          Positioning and Restraints    Pre-Treatment Position  sitting in chair/recliner   -SV     Post Treatment Position  chair  -SV     In Chair  sitting;exit alarm on;with family/caregiver;call light within reach;encouraged to call for assist  -SV       User Key  (r) = Recorded By, (t) = Taken By, (c) = Cosigned By    Initials Name Provider Type    Jaye Alvarez, PT Physical Therapist        Outcome Measures     Row Name 11/15/19 0946          How much help from another person do you currently need...    Turning from your back to your side while in flat bed without using bedrails?  2  -SV     Moving from lying on back to sitting on the side of a flat bed without bedrails?  3  -SV     Moving to and from a bed to a chair (including a wheelchair)?  3  -SV     Standing up from a chair using your arms (e.g., wheelchair, bedside chair)?  3  -SV     Climbing 3-5 steps with a railing?  3  -SV     To walk in hospital room?  3  -SV     AM-PAC 6 Clicks Score (PT)  17  -SV     Row Name 11/15/19 0946          Functional Assessment    Outcome Measure Options  AM-PAC 6 Clicks Basic Mobility (PT)  -SV       User Key  (r) = Recorded By, (t) = Taken By, (c) = Cosigned By    Initials Name Provider Type    Jaye Alvarez, PT Physical Therapist        Physical Therapy Education     Title: PT OT SLP Therapies (Done)     Topic: Physical Therapy (Done)     Point: Mobility training (Done)     Learning Progress Summary           Patient Acceptance, E, VU by AP at 11/14/2019 11:26 AM   Significant Other Acceptance, E, VU by AP at 11/14/2019 11:26 AM                   Point: Home exercise program (Done)     Learning Progress Summary           Patient Acceptance, E, VU by AP at 11/14/2019 11:26 AM   Significant Other Acceptance, E, VU by AP at 11/14/2019 11:26 AM                   Point: Body mechanics (Done)     Learning Progress Summary           Patient Acceptance, E, VU by AP at 11/14/2019 11:26 AM   Significant Other Acceptance, E, VU by AP at 11/14/2019 11:26 AM                   Point: Precautions (Done)      Learning Progress Summary           Patient Acceptance, E, VU by AP at 11/14/2019 11:26 AM   Significant Other Acceptance, E, VU by AP at 11/14/2019 11:26 AM                               User Key     Initials Effective Dates Name Provider Type Discipline     09/04/19 -  Hermelinda Anders, PT Student PT Student PT              PT Recommendation and Plan     Plan of Care Reviewed With: patient, spouse     Time Calculation:   PT Charges     Row Name 11/15/19 0946             Time Calculation    Start Time  0915  -SV      Stop Time  0935  -SV      Time Calculation (min)  20 min  -SV      PT Received On  11/15/19  -SV      PT - Next Appointment  11/15/19  -SV        User Key  (r) = Recorded By, (t) = Taken By, (c) = Cosigned By    Initials Name Provider Type     Jaye Braswell, PT Physical Therapist        Therapy Charges for Today     Code Description Service Date Service Provider Modifiers Qty    78619922076 HC PT THER PROC EA 15 MIN 11/15/2019 Jaye Braswell, PT GP 1          PT G-Codes  Outcome Measure Options: AM-PAC 6 Clicks Basic Mobility (PT)  AM-PAC 6 Clicks Score (PT): 17    Jaye Braswell, CITLALLI  11/15/2019

## 2019-11-15 NOTE — PLAN OF CARE
Problem: Patient Care Overview  Goal: Plan of Care Review  Outcome: Ongoing (interventions implemented as appropriate)   11/15/19 1850   Coping/Psychosocial   Plan of Care Reviewed With patient   OTHER   Outcome Summary POD #2 RTSA, sling in place, unable to wean to O2, received 2 units of PRBC for hemoglobin 7.6, tolerated well, pt reports adequate pain control from PO pain meds       Problem: Shoulder Arthroplasty (Adult)  Goal: Signs and Symptoms of Listed Potential Problems Will be Absent, Minimized or Managed (Shoulder Arthroplasty)  Outcome: Ongoing (interventions implemented as appropriate)   11/15/19 1850   Goal/Outcome Evaluation   Problems Assessed (Shoulder Arthro) all   Problems Present (Shoulder Arthro) functional deficit;pain;respiratory compromise

## 2019-11-15 NOTE — NURSING NOTE
Called blood bank to inquire as to the status of the blood. Was informed that the blood bank armband was . Type and screen ordered stat.

## 2019-11-16 LAB
ABO + RH BLD: NORMAL
ABO + RH BLD: NORMAL
BH BB BLOOD EXPIRATION DATE: NORMAL
BH BB BLOOD EXPIRATION DATE: NORMAL
BH BB BLOOD TYPE BARCODE: 5100
BH BB BLOOD TYPE BARCODE: 5100
BH BB DISPENSE STATUS: NORMAL
BH BB DISPENSE STATUS: NORMAL
BH BB PRODUCT CODE: NORMAL
BH BB PRODUCT CODE: NORMAL
BH BB UNIT NUMBER: NORMAL
BH BB UNIT NUMBER: NORMAL
CREAT BLD-MCNC: 1.63 MG/DL (ref 0.76–1.27)
GFR SERPL CREATININE-BSD FRML MDRD: 42 ML/MIN/1.73
HCT VFR BLD AUTO: 25.1 % (ref 37.5–51)
HGB BLD-MCNC: 8.2 G/DL (ref 13–17.7)
UNIT  ABO: NORMAL
UNIT  ABO: NORMAL
UNIT  RH: NORMAL
UNIT  RH: NORMAL

## 2019-11-16 PROCEDURE — 97110 THERAPEUTIC EXERCISES: CPT

## 2019-11-16 PROCEDURE — 94799 UNLISTED PULMONARY SVC/PX: CPT

## 2019-11-16 PROCEDURE — 25010000002 ENOXAPARIN PER 10 MG: Performed by: SURGERY

## 2019-11-16 PROCEDURE — 85018 HEMOGLOBIN: CPT | Performed by: ORTHOPAEDIC SURGERY

## 2019-11-16 PROCEDURE — 63710000001 DIPHENHYDRAMINE PER 50 MG: Performed by: ORTHOPAEDIC SURGERY

## 2019-11-16 PROCEDURE — 85014 HEMATOCRIT: CPT | Performed by: ORTHOPAEDIC SURGERY

## 2019-11-16 PROCEDURE — 82565 ASSAY OF CREATININE: CPT | Performed by: ORTHOPAEDIC SURGERY

## 2019-11-16 RX ADMIN — PRAMIPEXOLE DIHYDROCHLORIDE 1.5 MG: 1.5 TABLET ORAL at 21:31

## 2019-11-16 RX ADMIN — LEVOTHYROXINE SODIUM 88 MCG: 88 TABLET ORAL at 06:07

## 2019-11-16 RX ADMIN — OXYCODONE HYDROCHLORIDE AND ACETAMINOPHEN 1 TABLET: 10; 325 TABLET ORAL at 10:19

## 2019-11-16 RX ADMIN — LATANOPROST 1 DROP: 50 SOLUTION OPHTHALMIC at 21:31

## 2019-11-16 RX ADMIN — CELECOXIB 200 MG: 200 CAPSULE ORAL at 10:19

## 2019-11-16 RX ADMIN — OXYCODONE HYDROCHLORIDE AND ACETAMINOPHEN 1 TABLET: 10; 325 TABLET ORAL at 02:59

## 2019-11-16 RX ADMIN — DIPHENHYDRAMINE HYDROCHLORIDE 50 MG: 25 CAPSULE ORAL at 01:51

## 2019-11-16 RX ADMIN — TIMOLOL MALEATE 1 DROP: 5 SOLUTION OPHTHALMIC at 21:31

## 2019-11-16 RX ADMIN — DORZOLAMIDE HYDROCHLORIDE 1 DROP: 20 SOLUTION/ DROPS OPHTHALMIC at 10:22

## 2019-11-16 RX ADMIN — AMLODIPINE BESYLATE 5 MG: 5 TABLET ORAL at 10:30

## 2019-11-16 RX ADMIN — PANTOPRAZOLE SODIUM 40 MG: 40 TABLET, DELAYED RELEASE ORAL at 06:07

## 2019-11-16 RX ADMIN — HYDROCHLOROTHIAZIDE 25 MG: 25 TABLET ORAL at 10:30

## 2019-11-16 RX ADMIN — PRAMIPEXOLE DIHYDROCHLORIDE 1.5 MG: 1.5 TABLET ORAL at 10:19

## 2019-11-16 RX ADMIN — ENOXAPARIN SODIUM 30 MG: 30 INJECTION SUBCUTANEOUS at 10:19

## 2019-11-16 RX ADMIN — PRAMIPEXOLE DIHYDROCHLORIDE 1.5 MG: 1.5 TABLET ORAL at 18:41

## 2019-11-16 RX ADMIN — ENOXAPARIN SODIUM 30 MG: 30 INJECTION SUBCUTANEOUS at 21:31

## 2019-11-16 RX ADMIN — MONTELUKAST SODIUM 10 MG: 10 TABLET, FILM COATED ORAL at 21:31

## 2019-11-16 RX ADMIN — DORZOLAMIDE HYDROCHLORIDE 1 DROP: 20 SOLUTION/ DROPS OPHTHALMIC at 21:31

## 2019-11-16 RX ADMIN — ASPIRIN 325 MG: 325 TABLET ORAL at 10:19

## 2019-11-16 RX ADMIN — ROSUVASTATIN CALCIUM 20 MG: 20 TABLET, FILM COATED ORAL at 18:41

## 2019-11-16 RX ADMIN — BUDESONIDE AND FORMOTEROL FUMARATE DIHYDRATE 2 PUFF: 160; 4.5 AEROSOL RESPIRATORY (INHALATION) at 22:44

## 2019-11-16 RX ADMIN — TIMOLOL MALEATE 1 DROP: 5 SOLUTION OPHTHALMIC at 10:22

## 2019-11-16 RX ADMIN — BUDESONIDE AND FORMOTEROL FUMARATE DIHYDRATE 2 PUFF: 160; 4.5 AEROSOL RESPIRATORY (INHALATION) at 12:03

## 2019-11-16 RX ADMIN — AZELASTINE HYDROCHLORIDE 2 SPRAY: 137 SPRAY, METERED NASAL at 11:58

## 2019-11-16 RX ADMIN — BISACODYL 10 MG: 10 SUPPOSITORY RECTAL at 10:19

## 2019-11-16 NOTE — PROGRESS NOTES
Orthopaedic Surgery  Progress Note  11/16/2019    Patients Name:  Nathan Baez  YOB: 1945  Age: 74 y.o.  Medical Records Number:  [id number]  Date of Admission: 11/13/2019    No complaints except pain    Vitals:  Vitals:    11/15/19 2252 11/16/19 0246 11/16/19 0607 11/16/19 0658   BP:  96/59 106/55 93/54   BP Location:  Left arm Left arm Left arm   Patient Position:  Lying Lying Lying   Pulse: 97 91  89   Resp: 16 18 16   Temp:  97.9 °F (36.6 °C)  98 °F (36.7 °C)   TempSrc:  Oral  Oral   SpO2: 91% 91%  93%   Weight:       Height:           RUE:  NVI, sensation intact  No signs of DVT    Incision: clean, no signs of infection    Lab Results (last 24 hours)     Procedure Component Value Units Date/Time    Creatinine, Serum [[id number]]  (Abnormal) Collected:  11/16/19 0323    Specimen:  Blood Updated:  11/16/19 0442     Creatinine 1.63 mg/dL      eGFR Non African Amer 42 mL/min/1.73     Narrative:       GFR Normal >60  Chronic Kidney Disease <60  Kidney Failure <15    Hemoglobin & Hematocrit, Blood [[id number]]  (Abnormal) Collected:  11/15/19 2155    Specimen:  Blood Updated:  11/15/19 2213     Hemoglobin 8.8 g/dL      Hematocrit 27.1 %           Xr Shoulder 1 View Right    Result Date: 11/13/2019  Narrative: PORTABLE JOINT X-RAY  HISTORY: Right shoulder pain. Postop arthroplasty.  Portable x-ray of the right shoulder is provided.  FINDINGS:  There is arthroplasty hardware, positioned as expected.  No periprosthetic fracture is identified.  There are expected post operative changes in the soft tissues.      Impression: Reverse arthroplasty hardware at the right shoulder as expected..  This report was finalized on 11/13/2019 6:32 PM by Dr. Quinn Moore M.D.      Xr Shoulder 2+ View Right    Result Date: 10/30/2019  Narrative: TWO+ VIEWS RIGHT SHOULDER 10/30/2019  CLINICAL HISTORY: Preop reverse right total shoulder arthroplasty.  FINDINGS: Three views of the right shoulder including AP internal  and external rotation views and axillary view of the right shoulder are submitted for interpretation.  I see no acute fracture or malalignment or acute osseous abnormality in the bones of the right shoulder. There is mild marginal spurring off the inferior aspect of the right humeral head and the glenoid, compatible with mild osteoarthritic change of the right glenohumeral joint.  There is some mild joint space narrowing and inferior marginal spurring at the level of the right acromioclavicular joint as well.  There is a tiny subchondral cyst formation at the junction of the posterior lateral right humeral head and right greater tuberosity.  This report was finalized on 10/30/2019 3:34 PM by Dr. Tadeo Florian M.D.        Assesment/Plan:    Procedures:  Right  Reverse TSA with Axillary Vein Repair  Postoperative Day: 2  Weightbearing Status:  WBAT    Disposition:  Home with home health after PT today, if Hgb stable, he is comfortable and mobilizing safely    Tadeo Luo  [facility]  [address] [place] [address]  [phone]    11/16/2019

## 2019-11-16 NOTE — THERAPY TREATMENT NOTE
Patient Name: Nathan Baez  : 1945    MRN: [id number]                              Today's Date: 2019       Admit Date: 2019    Visit Dx: No diagnosis found.  Patient Active Problem List   Diagnosis   • OA (osteoarthritis) of knee   • Hypertension   • Abdominal wall cellulitis   • Hypothyroidism (acquired)   • Gastroesophageal reflux disease without esophagitis   • Mixed hyperlipidemia   • Primary insomnia   • DDD (degenerative disc disease), lumbar   • KWAME (obstructive sleep apnea)   • Hyperglycemia   • Allergic   • Venous insufficiency   • Restless leg syndrome   • Prostate cancer (CMS/HCC)   • Venous stasis dermatitis   • Tinea cruris   • Tinea corporis   • Throat clearing   • Sleep apnea   • Skin lesion of face   • Rib pain on left side   • Restless legs syndrome   • Restless leg   • Rectal bleed   • Presbycusis   • Pes planus   • Osteoarthritis   • Obesity   • Medicare annual wellness visit, subsequent   • Lumbar strain   • Internal hemorrhoids   • Insomnia   • Inflamed skin tag   • Hypothyroid   • Hyperplastic polyp of stomach   • Hyperlipidemia   • Hospital discharge follow-up   • History of colon polyps   • High risk medication use   • Glaucoma   • Gastroenteritis, acute   • Erysipelas   • Encounter for screening colonoscopy   • Encounter for long-term (current) use of NSAIDs   • Dysphagia   • DVT (deep venous thrombosis) (CMS/HCC)   • DJD (degenerative joint disease), lumbar   • Diverticulosis   • Disequilibrium   • Cough   • Contact with hypodermic needle   • Cervical radiculopathy   • Cellulitis   • Arthritis   • Allergic rhinitis   • Acute glaucoma   • Acute embolism and thrombosis of vein   • Actinic keratosis   • Status post reverse total shoulder replacement, right     Past Medical History:   Diagnosis Date   • Actinic keratosis    • Acute embolism and thrombosis of vein    • Allergic rhinitis    • Arthritis    • Cataract     forming left eye   • Cellulitis    • Cellulitis    •  Cervical radiculopathy    • Contact with hypodermic needle    • Cough    • Disequilibrium    • Diverticulosis    • DJD (degenerative joint disease), lumbar    • DVT (deep venous thrombosis) (CMS/HCC)     l leg  dx 2019 and was on blood thinner and ow off wears compression    • Dysphagia    • Encounter for long-term (current) use of NSAIDs    • Encounter for screening colonoscopy    • Erysipelas    • Gastroenteritis, acute    • GERD (gastroesophageal reflux disease)    • Glaucoma    • High risk medication use    • History of colon polyps    • Hospital discharge follow-up    • Hyperglycemia    • Hyperlipidemia    • Hyperplastic polyp of stomach    • Hypertension    • Hypothyroid    • Inflamed skin tag    • Insomnia    • Internal hemorrhoids    • Kidney stone     has had history of passing and still has kidney stone on both sides    • Lumbar strain    • Male urinary stress incontinence     s/p prostatectomy   • Medicare annual wellness visit, subsequent    • Obesity    • KWAME (obstructive sleep apnea)    • Osteoarthritis    • Pes planus    • Presbycusis    • Prostate cancer (CMS/HCC)    • Prostate cancer (CMS/HCC)    • Rectal bleed    • Restless legs syndrome    • Rib pain on left side    • Shoulder pain    • Skin lesion of face    • Sleep apnea     bipap   • Throat clearing    • Tinea corporis    • Tinea cruris    • Venous stasis dermatitis      Past Surgical History:   Procedure Laterality Date   • CATARACT EXTRACTION Right    • COLONOSCOPY  03/2017    3/17normal. Recheck 2022. 12/11. Repeat in 5 years    • ENDOSCOPY W/ PEG REMOVAL     • FOOT SURGERY      1998 had big toe replaced on left foot   • HERNIA REPAIR      umbilical   • JOINT REPLACEMENT Right 2014   • NECK SURGERY      cervical disc   • [place] TOTAL KNEE ARTHROPLASTY Left 9/13/2016    Procedure: LT TOTAL KNEE ARTHROPLASTY;  Surgeon: Tadeo Basurto MD;  Location: [facility];  Service: Orthopedics   • PROSTATECTOMY  07/1999 July 1999. Radical    •  "THYROID LOBECTOMY     • THYROID SURGERY  2011    partial doesn't know which one was removed   • TONSILLECTOMY     • TOTAL KNEE ARTHROPLASTY Right 2014   • WRIST SURGERY Right     x2  wrist replaced     General Information     Row Name 11/16/19 0845          PT Evaluation Time/Intention    Document Type  therapy note (daily note)  -     Mode of Treatment  physical therapy;individual therapy  -[facility] Name 11/16/19 0845          General Information    Patient Profile Reviewed?  yes  -       User Key  (r) = Recorded By, (t) = Taken By, (c) = Cosigned By    Initials Name Provider Type     Jaye Braswell, PT Physical Therapist        Mobility     Row Name 11/16/19 1047          Bed Mobility Assessment/Treatment    Comment (Bed Mobility)  -- NT due to up to eob with wife, sling in place  -[facility] Name 11/16/19 1047          Sit-Stand Transfer    Sit-Stand [place] (Transfers)  minimum assist (75% patient effort);contact guard  -     Assistive Device (Sit-Stand Transfers)  walker, ganga  -SV     Row Name 11/16/19 1043          Gait/Stairs Assessment/Training    [place] Level (Gait)  contact guard;verbal cues  -     Assistive Device (Gait)  walker, ganga  -SV     Distance in Feet (Gait)  -- 10', 20', 10' wtih seated rest periods: cues for ganga wx usage/sequence  -SV     Bilateral Gait Deviations  heel strike decreased;forward flexed posture  -SV     [place] Level (Stairs)  contact guard  -       User Key  (r) = Recorded By, (t) = Taken By, (c) = Cosigned By    Initials Name Provider Type     Jaye Braswell, PT Physical Therapist        Obj/Interventions     Row Name 11/16/19 1041          Therapeutic Exercise    Comment (Therapeutic Exercise)  -- Pt performing wrist/hand ex per wife, Dr Basurto present  today: \"stay in sling. avoid pendulum ex\".  -     Row Name 11/16/19 1048          Static Sitting Balance    Level of [place] (Unsupported Sitting, Static Balance)  contact guard " assist;supervision  -SV     Row Name 11/16/19 1048          Static Standing Balance    Level of [place] (Supported Standing, Static Balance)  contact guard assist  -SV     Assistive Device Utilized (Supported Standing, Static Balance)  walker, ganga  -SV       User Key  (r) = Recorded By, (t) = Taken By, (c) = Cosigned By    Initials Name Provider Type    Jaye Alvarez, PT Physical Therapist        Goals/Plan    No documentation.       Clinical Impression     Row Name 11/16/19 1050          Pain Assessment    Additional Documentation  -- no reports  -SV     Row Name 11/16/19 1050          Vital Signs    O2 Delivery Pre Treatment  supplemental O2  -SV     O2 Delivery Intra Treatment  supplemental O2  -SV     O2 Delivery Post Treatment  supplemental O2  -SV     Pre Patient Position  Sitting  -SV     Intra Patient Position  Standing  -SV     Post Patient Position  Sitting  -SV     Row Name 11/16/19 1050          Positioning and Restraints    Pre-Treatment Position  other (comment) sitting eob  -SV     Post Treatment Position  chair  -SV     In Chair  sitting;call light within reach;notified nsg;with family/caregiver  -SV       User Key  (r) = Recorded By, (t) = Taken By, (c) = Cosigned By    Initials Name Provider Type    Jaye Alvarez, PT Physical Therapist        Outcome Measures     Row Name 11/16/19 1050          How much help from another person do you currently need...    Turning from your back to your side while in flat bed without using bedrails?  3  -SV     Moving from lying on back to sitting on the side of a flat bed without bedrails?  2  -SV     Moving to and from a bed to a chair (including a wheelchair)?  3  -SV     Standing up from a chair using your arms (e.g., wheelchair, bedside chair)?  3  -SV     Climbing 3-5 steps with a railing?  1  -SV     To walk in hospital room?  3  -SV     AM-PAC 6 Clicks Score (PT)  15  -SV       User Key  (r) = Recorded By, (t) = Taken By, (c) = Cosigned By     Initials Name Provider Type     Jaye Braswell, PT Physical Therapist        Physical Therapy Education     Title: PT OT SLP Therapies (Done)     Topic: Physical Therapy (Done)     Point: Mobility training (Done)     Learning Progress Summary           Patient Acceptance, E, VU by AP at 11/14/2019 11:26 AM   Significant Other Acceptance, E, VU by AP at 11/14/2019 11:26 AM                   Point: Home exercise program (Done)     Learning Progress Summary           Patient Acceptance, E, VU by AP at 11/14/2019 11:26 AM   Significant Other Acceptance, E, VU by AP at 11/14/2019 11:26 AM                   Point: Body mechanics (Done)     Learning Progress Summary           Patient Acceptance, E, VU by AP at 11/14/2019 11:26 AM   Significant Other Acceptance, E, VU by AP at 11/14/2019 11:26 AM                   Point: Precautions (Done)     Learning Progress Summary           Patient Acceptance, E, VU by AP at 11/14/2019 11:26 AM   Significant Other Acceptance, E, VU by AP at 11/14/2019 11:26 AM                               User Key     Initials Effective Dates Name Provider Type Discipline     09/04/19 -  Hermelinda Anders, PT Student PT Student PT              PT Recommendation and Plan     Plan of Care Reviewed With: patient  Progress: improving  Outcome Summary: Pt on eob with wife today. Pt appears sleepy today on 2 L with O2 sat 92%. Pt amb 10' with cga using ganga cane, unsteady gait. Pt O2 increased to 4 L for amb 20' with cga and ganga cane vc for sequence with cane. Pt did not make it back to recline requiring seated rest for another 10'. O2 returned to 2 L once back to chair with O2 sat at 94%. Pt is unsteady and required continuous cue swith ganga wx. He may need stc but step length is very short, shuffle steps he fatigues quickly on O2 and appears to be at risk for falls.      Time Calculation:   PT Charges     Row Name 11/1945 11/16/19 8011          Time Calculation    Start Time  --  0845  -      Stop Time  --  0910  -SV     Time Calculation (min)  --  25 min  -SV     PT Received On  11/16/19  -  11/16/19  -     PT - Next Appointment  11/17/19  -  11/17/19  -       User Key  (r) = Recorded By, (t) = Taken By, (c) = Cosigned By    Initials Name Provider Type     Jaye Braswell, PT Physical Therapist        Therapy Charges for Today     Code Description Service Date Service Provider Modifiers Qty    11235163041 HC PT THER PROC EA 15 MIN 11/15/2019 Jaye Braswell, PT GP 1    08299774053 HC PT THER PROC EA 15 MIN 11/16/2019 Jaye Braswell, PT GP 1          PT G-Codes  Outcome Measure Options: AM-PAC 6 Clicks Basic Mobility (PT)  AM-PAC 6 Clicks Score (PT): 15    Jaye Braswell, PT  11/16/2019

## 2019-11-16 NOTE — PLAN OF CARE
Problem: Patient Care Overview  Goal: Plan of Care Review  Outcome: Ongoing (interventions implemented as appropriate)   11/16/19 0526   Coping/Psychosocial   Plan of Care Reviewed With patient   Plan of Care Review   Progress no change   OTHER   Outcome Summary VSS, pain controlled with PO. very restless during the night. still no BM. remains on 2L NC. resting well now. will continue to monitor       Problem: Fall Risk (Adult)  Goal: Identify Related Risk Factors and Signs and Symptoms  Outcome: Ongoing (interventions implemented as appropriate)      Problem: Skin Injury Risk (Adult)  Goal: Identify Related Risk Factors and Signs and Symptoms  Outcome: Ongoing (interventions implemented as appropriate)    Goal: Skin Health and Integrity  Outcome: Ongoing (interventions implemented as appropriate)      Problem: Shoulder Arthroplasty (Adult)  Goal: Signs and Symptoms of Listed Potential Problems Will be Absent, Minimized or Managed (Shoulder Arthroplasty)  Outcome: Ongoing (interventions implemented as appropriate)

## 2019-11-16 NOTE — PLAN OF CARE
Problem: Patient Care Overview  Goal: Plan of Care Review  Outcome: Ongoing (interventions implemented as appropriate)   11/16/19 1802   Coping/Psychosocial   Plan of Care Reviewed With patient   Plan of Care Review   Progress no change   OTHER   Outcome Summary Vitals as charted, c/o pain that is releived w/ prn pain meds, A&Ox4, up w/ 1 assist, NC 2L, recieved 2 units of PRBC yesterday and recent hgb 8.2, small BM today after suppository, will continue to monitor.

## 2019-11-16 NOTE — PLAN OF CARE
Problem: Patient Care Overview  Goal: Plan of Care Review  Outcome: Ongoing (interventions implemented as appropriate)   11/16/19 1051   Coping/Psychosocial   Plan of Care Reviewed With patient   Plan of Care Review   Progress improving   OTHER   Outcome Summary Pt on eob with wife today. Pt appears sleepy today on 2 L with O2 sat 92%. Pt amb 10' with cga using ganga cane, unsteady gait. Pt O2 increased to 4 L for amb 20' with cga and ganga cane vc for sequence with cane. Pt did not make it back to recline requiring seated rest for another 10'. O2 returned to 2 L once back to chair with O2 sat at 94%. Pt is unsteady and required continuous cue swith ganga wx. He may need stc but step length is very short, shuffle steps he fatigues quickly on O2 and appears to be at risk for falls.

## 2019-11-17 VITALS
HEIGHT: 67 IN | HEART RATE: 89 BPM | WEIGHT: 254.63 LBS | TEMPERATURE: 97.2 F | DIASTOLIC BLOOD PRESSURE: 62 MMHG | OXYGEN SATURATION: 94 % | SYSTOLIC BLOOD PRESSURE: 109 MMHG | BODY MASS INDEX: 39.97 KG/M2 | RESPIRATION RATE: 18 BRPM

## 2019-11-17 LAB
ANION GAP SERPL CALCULATED.3IONS-SCNC: 11.4 MMOL/L (ref 5–15)
ANISOCYTOSIS BLD QL: ABNORMAL
BUN BLD-MCNC: 26 MG/DL (ref 8–23)
BUN/CREAT SERPL: 18.4 (ref 7–25)
CALCIUM SPEC-SCNC: 8.7 MG/DL (ref 8.6–10.5)
CHLORIDE SERPL-SCNC: 101 MMOL/L (ref 98–107)
CO2 SERPL-SCNC: 26.6 MMOL/L (ref 22–29)
CREAT BLD-MCNC: 1.41 MG/DL (ref 0.76–1.27)
DEPRECATED RDW RBC AUTO: 47.9 FL (ref 37–54)
ERYTHROCYTE [DISTWIDTH] IN BLOOD BY AUTOMATED COUNT: 14.7 % (ref 12.3–15.4)
GFR SERPL CREATININE-BSD FRML MDRD: 49 ML/MIN/1.73
GIANT PLATELETS: ABNORMAL
GLUCOSE BLD-MCNC: 138 MG/DL (ref 65–99)
HCT VFR BLD AUTO: 25.9 % (ref 37.5–51)
HGB BLD-MCNC: 8.4 G/DL (ref 13–17.7)
HYPOCHROMIA BLD QL: ABNORMAL
LYMPHOCYTES # BLD MANUAL: 0.53 10*3/MM3 (ref 0.7–3.1)
LYMPHOCYTES NFR BLD MANUAL: 8 % (ref 5–12)
LYMPHOCYTES NFR BLD MANUAL: 9 % (ref 19.6–45.3)
MCH RBC QN AUTO: 29 PG (ref 26.6–33)
MCHC RBC AUTO-ENTMCNC: 32.4 G/DL (ref 31.5–35.7)
MCV RBC AUTO: 89.3 FL (ref 79–97)
METAMYELOCYTES NFR BLD MANUAL: 1 % (ref 0–0)
MICROCYTES BLD QL: ABNORMAL
MONOCYTES # BLD AUTO: 0.47 10*3/MM3 (ref 0.1–0.9)
MYELOCYTES NFR BLD MANUAL: 1 % (ref 0–0)
NEUTROPHILS # BLD AUTO: 4.79 10*3/MM3 (ref 1.7–7)
NEUTROPHILS NFR BLD MANUAL: 81 % (ref 42.7–76)
OVALOCYTES BLD QL SMEAR: ABNORMAL
PLATELET # BLD AUTO: 153 10*3/MM3 (ref 140–450)
PMV BLD AUTO: 10.1 FL (ref 6–12)
POLYCHROMASIA BLD QL SMEAR: ABNORMAL
POTASSIUM BLD-SCNC: 3.8 MMOL/L (ref 3.5–5.2)
RBC # BLD AUTO: 2.9 10*6/MM3 (ref 4.14–5.8)
SODIUM BLD-SCNC: 139 MMOL/L (ref 136–145)
WBC MORPH BLD: NORMAL
WBC NRBC COR # BLD: 5.91 10*3/MM3 (ref 3.4–10.8)

## 2019-11-17 PROCEDURE — 85007 BL SMEAR W/DIFF WBC COUNT: CPT | Performed by: INTERNAL MEDICINE

## 2019-11-17 PROCEDURE — 97110 THERAPEUTIC EXERCISES: CPT | Performed by: PHYSICAL THERAPIST

## 2019-11-17 PROCEDURE — 80048 BASIC METABOLIC PNL TOTAL CA: CPT | Performed by: INTERNAL MEDICINE

## 2019-11-17 PROCEDURE — 94799 UNLISTED PULMONARY SVC/PX: CPT

## 2019-11-17 PROCEDURE — 85025 COMPLETE CBC W/AUTO DIFF WBC: CPT | Performed by: INTERNAL MEDICINE

## 2019-11-17 PROCEDURE — 25010000002 ENOXAPARIN PER 10 MG: Performed by: SURGERY

## 2019-11-17 RX ADMIN — PRAMIPEXOLE DIHYDROCHLORIDE 1.5 MG: 1.5 TABLET ORAL at 09:00

## 2019-11-17 RX ADMIN — OXYCODONE HYDROCHLORIDE AND ACETAMINOPHEN 1 TABLET: 10; 325 TABLET ORAL at 02:13

## 2019-11-17 RX ADMIN — DORZOLAMIDE HYDROCHLORIDE 1 DROP: 20 SOLUTION/ DROPS OPHTHALMIC at 09:03

## 2019-11-17 RX ADMIN — AZELASTINE HYDROCHLORIDE 2 SPRAY: 137 SPRAY, METERED NASAL at 09:03

## 2019-11-17 RX ADMIN — POLYETHYLENE GLYCOL 3350 17 G: 17 POWDER, FOR SOLUTION ORAL at 09:00

## 2019-11-17 RX ADMIN — ENOXAPARIN SODIUM 30 MG: 30 INJECTION SUBCUTANEOUS at 09:10

## 2019-11-17 RX ADMIN — ASPIRIN 325 MG: 325 TABLET ORAL at 09:00

## 2019-11-17 RX ADMIN — BUDESONIDE AND FORMOTEROL FUMARATE DIHYDRATE 2 PUFF: 160; 4.5 AEROSOL RESPIRATORY (INHALATION) at 08:50

## 2019-11-17 RX ADMIN — TIMOLOL MALEATE 1 DROP: 5 SOLUTION OPHTHALMIC at 09:03

## 2019-11-17 RX ADMIN — CELECOXIB 200 MG: 200 CAPSULE ORAL at 09:00

## 2019-11-17 RX ADMIN — LEVOTHYROXINE SODIUM 88 MCG: 88 TABLET ORAL at 05:18

## 2019-11-17 RX ADMIN — PANTOPRAZOLE SODIUM 40 MG: 40 TABLET, DELAYED RELEASE ORAL at 05:18

## 2019-11-17 NOTE — PLAN OF CARE
Problem: Patient Care Overview  Goal: Plan of Care Review   11/17/19 5520   Coping/Psychosocial   Plan of Care Reviewed With patient   Plan of Care Review   Progress improving   OTHER   Outcome Summary Patient O2 sats monitored during activity due. On room air and 95% at start. Ambulated in bathroom, around room, and into hallway at lowest O2 at 90% but rebounded quickly with verbal cueing for deep breath and/or seated rest. Reports understanding of shoulder HEP. Preparing for discharge.

## 2019-11-17 NOTE — NURSING NOTE
Updated Dr. Mehta on status to obtain clearance for d/c. Ok for pt to d/c. BP stable, Hgb 8.4 and pt able to remain on room air most of shift and wearing CPAP for sleeping.

## 2019-11-17 NOTE — CONSULTS
CONSULT NOTE    INTERNAL MEDICINE   [facility]       Patient Identification:  Name: Nathan Baez  Age: 74 y.o.  Sex: male  :  1945  MRN: [id number]             Date of Consultation:  19      Primary Care Physician: Damien Pierce MD                               Requesting Physician: dr ba  Reason for Consultation:medical management    Chief Complaint:  74 year old gentleman admitted for a shoulder replacement; had bleeding afterwards due to vascular injury and required transfusions as well as pressors; he has improved and moved out of icu; we are asked to see him regarding management of his medical problems which include hyperglycemia, hypertension, anemia    History of Present Illness:   As above      Past Medical History:  Past Medical History:   Diagnosis Date   • Actinic keratosis    • Acute embolism and thrombosis of vein    • Allergic rhinitis    • Arthritis    • Cataract     forming left eye   • Cellulitis    • Cellulitis    • Cervical radiculopathy    • Contact with hypodermic needle    • Cough    • Disequilibrium    • Diverticulosis    • DJD (degenerative joint disease), lumbar    • DVT (deep venous thrombosis) (CMS/[facility]ly [facility])     l leg  dx 2019 and was on blood thinner and ow off wears compression    • Dysphagia    • Encounter for long-term (current) use of NSAIDs    • Encounter for screening colonoscopy    • Erysipelas    • Gastroenteritis, acute    • GERD (gastroesophageal reflux disease)    • Glaucoma    • High risk medication use    • History of colon polyps    • Hospital discharge follow-up    • Hyperglycemia    • Hyperlipidemia    • Hyperplastic polyp of stomach    • Hypertension    • Hypothyroid    • Inflamed skin tag    • Insomnia    • Internal hemorrhoids    • Kidney stone     has had history of passing and still has kidney stone on both sides    • Lumbar strain    • Male urinary stress incontinence     s/p prostatectomy   • Medicare annual wellness visit,  subsequent    • Obesity    • KWAME (obstructive sleep apnea)    • Osteoarthritis    • Pes planus    • Presbycusis    • Prostate cancer (CMS/HCC)    • Prostate cancer (CMS/HCC)    • Rectal bleed    • Restless legs syndrome    • Rib pain on left side    • Shoulder pain    • Skin lesion of face    • Sleep apnea     bipap   • Throat clearing    • Tinea corporis    • Tinea cruris    • Venous stasis dermatitis      Past Surgical History:  Past Surgical History:   Procedure Laterality Date   • CATARACT EXTRACTION Right    • COLONOSCOPY  03/2017    3/17normal. Recheck 2022. 12/11. Repeat in 5 years    • ENDOSCOPY W/ PEG REMOVAL     • FOOT SURGERY      1998 had big toe replaced on left foot   • HERNIA REPAIR      umbilical   • JOINT REPLACEMENT Right 2014   • NECK SURGERY      cervical disc   • HI TOTAL KNEE ARTHROPLASTY Left 9/13/2016    Procedure: LT TOTAL KNEE ARTHROPLASTY;  Surgeon: Tadeo Basurto MD;  Location: [facility];  Service: Orthopedics   • PROSTATECTOMY  07/1999 July 1999. Radical    • THYROID LOBECTOMY     • THYROID SURGERY  2011    partial doesn't know which one was removed   • TONSILLECTOMY     • TOTAL KNEE ARTHROPLASTY Right 2014   • WRIST SURGERY Right     x2  wrist replaced      Home Meds:  Medications Prior to Admission   Medication Sig Dispense Refill Last Dose   • acetaminophen (TYLENOL) 650 MG 8 hr tablet Take 1,300 mg by mouth 2 (Two) Times a Day.   11/12/2019 at Unknown time   • amLODIPine (NORVASC) 5 MG tablet Take 1 tablet by mouth Every Morning. 90 tablet 3 11/13/2019 at Unknown time   • azelastine (ASTELIN) 0.1 % nasal spray 2 sprays into the nostril(s) as directed by provider 2 (Two) Times a Day. Use in each nostril as directed   11/12/2019 at Unknown time   • BRIMONIDINE TARTRATE OP Apply 1 drop to eye(s) as directed by provider 2 (Two) Times a Day.   11/12/2019 at Unknown time   • Chlorhexidine Gluconate Cloth 2 % pads Apply 1 application topically. USE AS DIRECTED PREOP   11/13/2019  at Unknown time   • dorzolamide-timolol (COSOPT) 22.3-6.8 MG/ML ophthalmic solution Administer 1 drop to both eyes 2 (Two) Times a Day.   11/12/2019 at Unknown time   • fexofenadine (ALLEGRA) 180 MG tablet Take 180 mg by mouth Daily.   11/12/2019 at Unknown time   • Fluticasone Furoate-Vilanterol (BREO ELLIPTA IN) Inhale 1 puff Every Morning.   11/13/2019 at Unknown time   • HYDROCHLOROTHIAZIDE PO Take 25 mg by mouth daily with breakfast.   11/12/2019 at Unknown time   • latanoprost (XALATAN) 0.005 % ophthalmic solution Administer 1 drop to both eyes Every Night.   11/12/2019 at Unknown time   • Latanoprost 0.005 % emulsion Apply 1 drop to eye(s) as directed by provider Every Night.   11/12/2019 at Unknown time   • levothyroxine (SYNTHROID) 88 MCG tablet Take 88 mcg by mouth every morning.   11/12/2019 at Unknown time   • losartan (COZAAR) 100 MG tablet Take 100 mg by mouth every morning.   11/13/2019 at Unknown time   • montelukast (SINGULAIR) 10 MG tablet Take 1 tablet by mouth Every Night. 90 tablet 3 11/12/2019 at Unknown time   • mupirocin (BACTROBAN) 2 % nasal ointment 1 application into the nostril(s) as directed by provider. USE AS DIRECTED PREOP   11/13/2019 at Unknown time   • pantoprazole (PROTONIX) 40 MG EC tablet Take 40 mg by mouth 2 (Two) Times a Day.   11/13/2019 at Unknown time   • pramipexole (MIRAPEX) 1.5 MG tablet Take 1.5 mg by mouth 3 (Three) Times a Day.   11/12/2019 at Unknown time   • rosuvastatin (CRESTOR) 20 MG tablet Take 20 mg by mouth every evening.   11/12/2019 at Unknown time   • albuterol (PROVENTIL HFA;VENTOLIN HFA) 108 (90 Base) MCG/ACT inhaler Inhale 2 puffs Every 4 (Four) Hours As Needed for Wheezing.   Unknown   • ALLERGY SERUM INJECTION Inject  under the skin into the appropriate area as directed 1 (One) Time Per Week.      • aspirin 81 MG tablet Take 1 tablet by mouth Daily.   10/30/2019   • celecoxib (CELEBREX) 200 MG capsule Take 1 capsule by mouth Daily. 90 capsule 3  10/30/2019     Current Meds:     Current Facility-Administered Medications:   •  albuterol (PROVENTIL) nebulizer solution 0.083% 2.5 mg/3mL, 2.5 mg, Nebulization, Q4H PRN, Valentin Niño MD  •  amLODIPine (NORVASC) tablet 5 mg, 5 mg, Oral, QAM, Behzad Kelley MD, 5 mg at 11/16/19 1030  •  aspirin tablet 325 mg, 325 mg, Oral, Daily, Behzad Kelley MD, 325 mg at 11/16/19 1019  •  azelastine (ASTELIN) nasal spray 2 spray, 2 spray, Each Nare, Daily, Behzad Kelley MD, 2 spray at 11/16/19 1158  •  bisacodyl (DULCOLAX) suppository 10 mg, 10 mg, Rectal, Daily PRN, Behzad Kelley MD, 10 mg at 11/16/19 1019  •  budesonide-formoterol (SYMBICORT) 160-4.5 MCG/ACT inhaler 2 puff, 2 puff, Inhalation, BID - RT, Behzad Kelley MD, 2 puff at 11/16/19 1203  •  celecoxib (CeleBREX) capsule 200 mg, 200 mg, Oral, Daily, Behzad Kelley MD, 200 mg at 11/16/19 1019  •  diazePAM (VALIUM) tablet 5 mg, 5 mg, Oral, Q6H PRN, Behzad Kelley MD  •  diphenhydrAMINE (BENADRYL) capsule 25 mg, 25 mg, Oral, Q4H PRN, Tadeo Basurto MD, 25 mg at 11/15/19 2022  •  diphenhydrAMINE (BENADRYL) capsule 50 mg, 50 mg, Oral, Q6H PRN, Tadeo Basurto MD, 50 mg at 11/16/19 0151  •  dorzolamide (TRUSOPT) 2 % ophthalmic solution 1 drop, 1 drop, Both Eyes, BID, 1 drop at 11/16/19 2131 **AND** timolol (TIMOPTIC) 0.5 % ophthalmic solution 1 drop, 1 drop, Both Eyes, Q12H, Behzad Kelley MD, 1 drop at 11/16/19 2131  •  enoxaparin (LOVENOX) syringe 30 mg, 30 mg, Subcutaneous, Q12H, Valentin Niño MD, 30 mg at 11/16/19 2131  •  hydroCHLOROthiazide (HYDRODIURIL) tablet 25 mg, 25 mg, Oral, Daily With Breakfast, Behzad Kelley MD, 25 mg at 11/16/19 1030  •  HYDROmorphone PF (DILAUDID) injection 1 mg, 1 mg, Intravenous, Q2H PRN, Behzad Kelley MD  •  latanoprost (XALATAN) 0.005 % ophthalmic solution 1 drop, 1 drop, Both Eyes, Nightly, Behzad Kelley MD, 1 drop at 11/16/19 2131  •  levothyroxine (SYNTHROID, LEVOTHROID) tablet 88  mcg, 88 mcg, Oral, QAM, Behzad Kelley MD, 88 mcg at 19 0607  •  montelukast (SINGULAIR) tablet 10 mg, 10 mg, Oral, Nightly, Behzad Kelley MD, 10 mg at 19  •  morphine injection 4 mg, 4 mg, Intravenous, Q2H PRN, 4 mg at 11/15/19 0140 **AND** naloxone (NARCAN) injection 0.4 mg, 0.4 mg, Intravenous, Q5 Min PRN, Behzad Kelley MD  •  ondansetron (ZOFRAN) tablet 4 mg, 4 mg, Oral, Q6H PRN **OR** ondansetron (ZOFRAN) injection 4 mg, 4 mg, Intravenous, Q6H PRN, Behzad Kelley MD, 4 mg at 19  •  oxyCODONE-acetaminophen (PERCOCET)  MG per tablet 1 tablet, 1 tablet, Oral, Q4H PRN, Behzad Kelley MD, 1 tablet at 19 1019  •  pantoprazole (PROTONIX) EC tablet 40 mg, 40 mg, Oral, Q AM, Behzad Kelley MD, 40 mg at 19 06  •  polyethylene glycol (MIRALAX) packet 17 g, 17 g, Oral, Daily, SayiedPaulina MD, 17 g at 11/15/19 0914  •  pramipexole (MIRAPEX) tablet 1.5 mg, 1.5 mg, Oral, TID, Behzad Kelley MD, 1.5 mg at 19  •  rosuvastatin (CRESTOR) tablet 20 mg, 20 mg, Oral, Q PM, Behzad Kelley MD, 20 mg at 19 1841  •  sodium chloride 0.45 % infusion, 75 mL/hr, Intravenous, Continuous, Behzad Kelley MD, Last Rate: 75 mL/hr at 11/15/19 0202, 75 mL/hr at 11/15/19 020  Allergies:  No Known Allergies  Social History:   Social History     Socioeconomic History   • Marital status:      Spouse name: Not on file   • Number of children: Not on file   • Years of education: Not on file   • Highest education level: Not on file   Tobacco Use   • Smoking status: Former Smoker     Packs/day: 1.00     Years: 20.00     Pack years: 20.00     Types: Cigarettes     Last attempt to quit: 1984     Years since quittin.8   • Smokeless tobacco: Never Used   Substance and Sexual Activity   • Alcohol use: Yes     Comment: 3-4 MONTHLY   • Drug use: No     Family History:  Family History   Problem Relation Age of Onset   • Cancer Mother         COLON   • Cancer  "Father         PROSTATE   • Cancer Sister         BREAST CANCER   • Breast cancer Sister    • Gout Sister    • Hypertension Sister    • Heart attack Brother    • Bone cancer Brother    • Heart disease Brother    • Malig Hyperthermia Neg Hx           Review of Systems  See history of present illness and past medical history.  Patient denies headache, dizziness, syncope, falls, trauma, change in vision, change in hearing, change in taste, changes in weight, changes in appetite, focal weakness, numbness, or paresthesia.  Patient denies chest pain, palpitations, dyspnea, orthopnea, PND, cough, sinus pressure, rhinorrhea, epistaxis, hemoptysis, nausea, vomiting,hematemesis, diarrhea, constipation or hematchezia.  Denies cold or heat intolerance, polydipsia, polyuria, polyphagia. Denies hematuria, pyuria, dysuria, hesitancy, frequency or urgency. Denies consumption of raw and under cooked meats foods or change in water source.  Denies fever, chills, sweats, night sweats.  Denies missing any routine medications. Remainder of ROS is negative.      Vitals:   /60 (BP Location: Left arm, Patient Position: Sitting)   Pulse 97   Temp 98 °F (36.7 °C) (Oral)   Resp 16   Ht 170.2 cm (67.01\")   Wt 115 kg (254 lb 10.1 oz)   SpO2 97%   BMI 39.87 kg/m²   I/O:     Intake/Output Summary (Last 24 hours) at 11/16/2019 2241  Last data filed at 11/16/2019 1812  Gross per 24 hour   Intake 810 ml   Output --   Net 810 ml     Exam:  General Appearance:    Alert, cooperative, no distress, appears stated age   Head:    Normocephalic, without obvious abnormality, atraumatic   Eyes:    PERRL, conjunctiva/corneas clear, EOM's intact, both eyes   Ears:    Normal external ear canals, both ears   Nose:   Nares normal, septum midline, mucosa normal, no drainage    or sinus tenderness   Throat:   Lips, tongue, gums normal; oral mucosa pink and moist   Neck:   Supple, symmetrical, trachea midline, no adenopathy;     thyroid:  no " enlargement/tenderness/nodules; no carotid    bruit or JVD   Back:     Symmetric, no curvature, ROM normal, no CVA tenderness   Lungs:     Clear to auscultation bilaterally, respirations unlabored   Chest Wall:    No tenderness or deformity    Heart:    Regular rate and rhythm, S1 and S2 normal, no murmur, rub   or gallop   Abdomen:     Soft, non-tender, bowel sounds active all four quadrants,     no masses, no hepatomegaly, no splenomegaly   Extremities:   Extremities normal, atraumatic, no cyanosis or edema; sling for right arm   Pulses:   Pulses palpable in all extremities; symmetric all extremities   Skin:   Skin color normal, Skin is warm and dry,  no rashes or palpable lesions   Neurologic:   CNII-XII intact, motor strength grossly intact, sensation grossly intact to light touch, no focal deficits noted       Data Review:  Labs in chart were reviewed.  Lab Results   Component Value Date    WBC 8.11 11/15/2019    HGB 8.2 (L) 11/16/2019    HCT 25.1 (L) 11/16/2019     (L) 11/15/2019     Lab Results   Component Value Date     (L) 11/15/2019    K 3.6 11/15/2019    CL 95 (L) 11/15/2019    CO2 22.8 11/15/2019    BUN 32 (H) 11/15/2019    CREATININE 1.63 (H) 11/16/2019    GLUCOSE 130 (H) 11/15/2019     Lab Results   Component Value Date    CALCIUM 7.5 (L) 11/15/2019     No results found for: AST, ALT, ALKPHOS  No results found for: APTT, INR            Imaging Results (Last 7 Days)     Procedure Component Value Units Date/Time    XR Shoulder 1 View Right [[id number]] Collected:  11/13/19 1831     Updated:  11/13/19 1835    Narrative:       PORTABLE JOINT X-RAY     HISTORY: Right shoulder pain. Postop arthroplasty.     Portable x-ray of the right shoulder is provided.     FINDINGS:  There is arthroplasty hardware, positioned as expected.  No  periprosthetic fracture is identified.  There are expected post  operative   changes in the soft tissues.       Impression:       Reverse arthroplasty hardware at the  right shoulder as  expected..     This report was finalized on 11/13/2019 6:32 PM by Dr. Quinn Moore M.D.       SCANNED - IMAGING [[id number]] Resulted:  11/13/19      Updated:  11/12/19 0852        Past Medical History:   Diagnosis Date   • Actinic keratosis    • Acute embolism and thrombosis of vein    • Allergic rhinitis    • Arthritis    • Cataract     forming left eye   • Cellulitis    • Cellulitis    • Cervical radiculopathy    • Contact with hypodermic needle    • Cough    • Disequilibrium    • Diverticulosis    • DJD (degenerative joint disease), lumbar    • DVT (deep venous thrombosis) (CMS/HCC)     l leg  dx 2019 and was on blood thinner and ow off wears compression    • Dysphagia    • Encounter for long-term (current) use of NSAIDs    • Encounter for screening colonoscopy    • Erysipelas    • Gastroenteritis, acute    • GERD (gastroesophageal reflux disease)    • Glaucoma    • High risk medication use    • History of colon polyps    • Hospital discharge follow-up    • Hyperglycemia    • Hyperlipidemia    • Hyperplastic polyp of stomach    • Hypertension    • Hypothyroid    • Inflamed skin tag    • Insomnia    • Internal hemorrhoids    • Kidney stone     has had history of passing and still has kidney stone on both sides    • Lumbar strain    • Male urinary stress incontinence     s/p prostatectomy   • Medicare annual wellness visit, subsequent    • Obesity    • KWAME (obstructive sleep apnea)    • Osteoarthritis    • Pes planus    • Presbycusis    • Prostate cancer (CMS/HCC)    • Prostate cancer (CMS/HCC)    • Rectal bleed    • Restless legs syndrome    • Rib pain on left side    • Shoulder pain    • Skin lesion of face    • Sleep apnea     bipap   • Throat clearing    • Tinea corporis    • Tinea cruris    • Venous stasis dermatitis        Assessment:  Active Hospital Problems    Diagnosis  POA   • Status post reverse total shoulder replacement, right [Z96.611]  Not Applicable      Resolved Hospital  Problems   No resolved problems to display.   hypertension  Hyperglycemia  Anemia  Obesity affecting all aspects of care  Sleep apnea  Hyponatremia  ckd3  Plan:  Check labs in am  Blood sugar has been intermittently elevated  Check hgba1c  hgb drifting down again  Monitor creatinine  Thanks for involving us in his care  Will follow while he is hospitalized  Juli Nance MD   11/16/2019  10:41 PM

## 2019-11-17 NOTE — PROGRESS NOTES
" LOS: 4 days     Name: Nathan Baez  Age: 74 y.o.  Sex: male  :  1945  MRN: [id number]         Primary Care Physician: Damien Pierce MD    Subjective   Subjective  Feels much better today.  Denies any dizziness, lightheadedness, chest pain or shortness of breath.    Objective   Vital Signs  Temp:  [96.8 °F (36 °C)-98.2 °F (36.8 °C)] 97.3 °F (36.3 °C)  Heart Rate:  [] 76  Resp:  [16-18] 18  BP: ()/(51-87) 119/68  Body mass index is 39.87 kg/m².    Objective:  General Appearance:  Comfortable and in no acute distress.    Vital signs: (most recent): Blood pressure 119/68, pulse 76, temperature 97.3 °F (36.3 °C), temperature source Oral, resp. rate 18, height 170.2 cm (67.01\"), weight 115 kg (254 lb 10.1 oz), SpO2 96 %.    Lungs:  Normal effort and normal respiratory rate.    Heart: Normal rate.  Regular rhythm.    Abdomen: Abdomen is soft.  Bowel sounds are normal.   There is no abdominal tenderness.     Extremities: There is no dependent edema or local swelling.  (Right arm is in a sling)  Neurological: Patient is alert and oriented to person, place and time.    Skin:  Warm and dry.              Results Review:       I reviewed the patient's new clinical results.    Results from last 7 days   Lab Units 19  0916 11/15/19  2155 11/15/19  0303 19  0109 19  1747 19  1743 19  1639   WBC 10*3/mm3  --   --  8.11  --   --   --   --    HEMOGLOBIN g/dL 8.2* 8.8* 7.6* 9.8* 10.1*  --   --    HEMOGLOBIN, POC g/dL  --   --   --   --   --  9.9* 8.5*   PLATELETS 10*3/mm3  --   --  106*  --   --   --   --      Results from last 7 days   Lab Units 19  0817 19  0323 11/15/19  0303 19  0109   SODIUM mmol/L 139  --  131* 142   POTASSIUM mmol/L 3.8  --  3.6 4.0   CHLORIDE mmol/L 101  --  95* 106   CO2 mmol/L 26.6  --  22.8 21.3*   BUN mg/dL 26*  --  32* 20   CREATININE mg/dL 1.41* 1.63* 2.28* 1.56*   CALCIUM mg/dL 8.7  --  7.5* 7.6*   GLUCOSE mg/dL 138*  --  130* " 194*           Scheduled Meds:     aspirin 325 mg Oral Daily   azelastine 2 spray Each Nare Daily   budesonide-formoterol 2 puff Inhalation BID - RT   celecoxib 200 mg Oral Daily   dorzolamide 1 drop Both Eyes BID   And      timolol 1 drop Both Eyes Q12H   enoxaparin 30 mg Subcutaneous Q12H   latanoprost 1 drop Both Eyes Nightly   levothyroxine 88 mcg Oral QAM   montelukast 10 mg Oral Nightly   pantoprazole 40 mg Oral Q AM   polyethylene glycol 17 g Oral Daily   pramipexole 1.5 mg Oral TID   rosuvastatin 20 mg Oral Q PM     PRN Meds:   •  albuterol  •  bisacodyl  •  diazePAM  •  diphenhydrAMINE  •  diphenhydrAMINE  •  HYDROmorphone  •  Morphine **AND** naloxone  •  ondansetron **OR** ondansetron  •  oxyCODONE-acetaminophen  Continuous Infusions:       Assessment/Plan   Active Hospital Problems    Diagnosis  POA   • Status post reverse total shoulder replacement, right [Z96.611]  Not Applicable      Resolved Hospital Problems   No resolved problems to display.       Assessment & Plan    Hypotension -appears better this morning.  I have discontinued his Norvasc and hydrochlorothiazide.  Losartan was already on hold.  These should be held upon discharge and I have entered instructions for the patient and his wife regarding monitoring and restarting his medications moving forward.    Acute kidney injury -improving and creatinine down to 1.4 on lab work this morning.  Despite this will continue to improve with relaxation of his blood pressure parameters    Postoperative acute blood loss anemia -this morning's hemoglobin is pending    Pain control and DVT prophylaxis per primary service    From my standpoint, if his blood pressure remains stable over the morning hours and his hemoglobin is also stable on pending lab work then I would have no objection to discharge later today.      Tadeo Mehta MD  [facility]  11/17/19  8:59 AM

## 2019-11-17 NOTE — THERAPY TREATMENT NOTE
Patient Name: Nathan Baez  : 1945    MRN: [id number]                              Today's Date: 2019       Admit Date: 2019    Visit Dx: No diagnosis found.  Patient Active Problem List   Diagnosis   • OA (osteoarthritis) of knee   • Hypertension   • Abdominal wall cellulitis   • Hypothyroidism (acquired)   • Gastroesophageal reflux disease without esophagitis   • Mixed hyperlipidemia   • Primary insomnia   • DDD (degenerative disc disease), lumbar   • KWAME (obstructive sleep apnea)   • Hyperglycemia   • Allergic   • Venous insufficiency   • Restless leg syndrome   • Prostate cancer (CMS/HCC)   • Venous stasis dermatitis   • Tinea cruris   • Tinea corporis   • Throat clearing   • Sleep apnea   • Skin lesion of face   • Rib pain on left side   • Restless legs syndrome   • Restless leg   • Rectal bleed   • Presbycusis   • Pes planus   • Osteoarthritis   • Obesity   • Medicare annual wellness visit, subsequent   • Lumbar strain   • Internal hemorrhoids   • Insomnia   • Inflamed skin tag   • Hypothyroid   • Hyperplastic polyp of stomach   • Hyperlipidemia   • Hospital discharge follow-up   • History of colon polyps   • High risk medication use   • Glaucoma   • Gastroenteritis, acute   • Erysipelas   • Encounter for screening colonoscopy   • Encounter for long-term (current) use of NSAIDs   • Dysphagia   • DVT (deep venous thrombosis) (CMS/HCC)   • DJD (degenerative joint disease), lumbar   • Diverticulosis   • Disequilibrium   • Cough   • Contact with hypodermic needle   • Cervical radiculopathy   • Cellulitis   • Arthritis   • Allergic rhinitis   • Acute glaucoma   • Acute embolism and thrombosis of vein   • Actinic keratosis   • Status post reverse total shoulder replacement, right     Past Medical History:   Diagnosis Date   • Actinic keratosis    • Acute embolism and thrombosis of vein    • Allergic rhinitis    • Arthritis    • Cataract     forming left eye   • Cellulitis    • Cellulitis    •  Cervical radiculopathy    • Contact with hypodermic needle    • Cough    • Disequilibrium    • Diverticulosis    • DJD (degenerative joint disease), lumbar    • DVT (deep venous thrombosis) (CMS/HCC)     l leg  dx 2019 and was on blood thinner and ow off wears compression    • Dysphagia    • Encounter for long-term (current) use of NSAIDs    • Encounter for screening colonoscopy    • Erysipelas    • Gastroenteritis, acute    • GERD (gastroesophageal reflux disease)    • Glaucoma    • High risk medication use    • History of colon polyps    • Hospital discharge follow-up    • Hyperglycemia    • Hyperlipidemia    • Hyperplastic polyp of stomach    • Hypertension    • Hypothyroid    • Inflamed skin tag    • Insomnia    • Internal hemorrhoids    • Kidney stone     has had history of passing and still has kidney stone on both sides    • Lumbar strain    • Male urinary stress incontinence     s/p prostatectomy   • Medicare annual wellness visit, subsequent    • Obesity    • KWAME (obstructive sleep apnea)    • Osteoarthritis    • Pes planus    • Presbycusis    • Prostate cancer (CMS/HCC)    • Prostate cancer (CMS/HCC)    • Rectal bleed    • Restless legs syndrome    • Rib pain on left side    • Shoulder pain    • Skin lesion of face    • Sleep apnea     bipap   • Throat clearing    • Tinea corporis    • Tinea cruris    • Venous stasis dermatitis      Past Surgical History:   Procedure Laterality Date   • CATARACT EXTRACTION Right    • COLONOSCOPY  03/2017    3/17normal. Recheck 2022. 12/11. Repeat in 5 years    • ENDOSCOPY W/ PEG REMOVAL     • FOOT SURGERY      1998 had big toe replaced on left foot   • HERNIA REPAIR      umbilical   • JOINT REPLACEMENT Right 2014   • NECK SURGERY      cervical disc   • [place] TOTAL KNEE ARTHROPLASTY Left 9/13/2016    Procedure: LT TOTAL KNEE ARTHROPLASTY;  Surgeon: Tadeo Basurto MD;  Location: [facility];  Service: Orthopedics   • PROSTATECTOMY  07/1999 July 1999. Radical    •  THYROID LOBECTOMY     • THYROID SURGERY  2011    partial doesn't know which one was removed   • TONSILLECTOMY     • TOTAL KNEE ARTHROPLASTY Right 2014   • WRIST SURGERY Right     x2  wrist replaced     General Information     Row Name 11/17/19 1305          PT Evaluation Time/Intention    Document Type  therapy note (daily note)  -CJ     Mode of Treatment  individual therapy;physical therapy  -CJ       User Key  (r) = Recorded By, (t) = Taken By, (c) = Cosigned By    Initials Name Provider Type     Sunny Johnson, PT Physical Therapist        Mobility     Row Name 11/17/19 1305          Bed Mobility Assessment/Treatment    Comment (Bed Mobility)  deferred up in bed  -CJ     Row Name 11/17/19 1305          Bed-Chair Transfer    Bed-Chair [place] (Transfers)  contact guard  -     Row Name 11/17/19 1305          Sit-Stand Transfer    Sit-Stand [place] (Transfers)  contact guard  -     Assistive Device (Sit-Stand Transfers)  -- HHA  -CJ     Row Name 11/17/19 1305          Gait/Stairs Assessment/Training    Gait/Stairs Assessment/Training  gait/ambulation independence  -     [place] Level (Gait)  contact guard;minimum assist (75% patient effort)  -     Distance in Feet (Gait)  120 HHA  -     Pattern (Gait)  step-through  -CJ     Deviations/Abnormal Patterns (Gait)  gait speed decreased;stride length decreased;base of support, wide  -CJ     Bilateral Gait Deviations  heel strike decreased  -CJ     Comment (Gait/Stairs)  unsteady with ambulation but reports that is his baseline and he uses a cane intermittently at home  -       User Key  (r) = Recorded By, (t) = Taken By, (c) = Cosigned By    Initials Name Provider Type    Sunny Banks, PT Physical Therapist        Obj/Interventions     Row Name 11/17/19 1307          Therapeutic Exercise    Sets/Reps (Therapeutic Exercise)  Total shoulder protocol x 15 reps  -     Row Name 11/17/19 1306          Static Sitting Balance    Level of  [place] (Unsupported Sitting, Static Balance)  independent  -     Row Name 11/17/19 1307          Static Standing Balance    Level of [place] (Supported Standing, Static Balance)  contact guard assist  -       User Key  (r) = Recorded By, (t) = Taken By, (c) = Cosigned By    Initials Name Provider Type    Sunny Banks, PT Physical Therapist        Goals/Plan    No documentation.       Clinical Impression     Row Name 11/17/19 1307          Pain Assessment    Additional Documentation  Pain Scale: Numbers Pre/Post-Treatment (Group)  -     Row Name 11/17/19 1307          Pain Scale: Numbers Pre/Post-Treatment    Pain Scale: Numbers, Pretreatment  3/10  -     Pain Scale: Numbers, Post-Treatment  3/10  -     Pain Location - Side  Right  -     Pain Location  shoulder  -     Row Name 11/17/19 1307          Physical Therapy Clinical Impression    Patient/Family Goals Statement (PT Clinical Impression)  go home  -     Row Name 11/17/19 1307          Vital Signs    Pre SpO2 (%)  95  -CJ     O2 Delivery Pre Treatment  room air  -CJ     Intra SpO2 (%)  90  -CJ     O2 Delivery Intra Treatment  room air  -CJ     Post SpO2 (%)  93  -CJ     O2 Delivery Post Treatment  room air  -CJ     Pre Patient Position  Sitting  -CJ     Intra Patient Position  Standing  -CJ     Post Patient Position  Sitting  -CJ     Recovery Time  less than 1 min  -     Row Name 11/17/19 1307          Positioning and Restraints    Pre-Treatment Position  sitting in chair/recliner  -     Post Treatment Position  chair  -CJ     In Chair  sitting;with family/caregiver  -       User Key  (r) = Recorded By, (t) = Taken By, (c) = Cosigned By    Initials Name Provider Type    Sunny Banks, PT Physical Therapist        Outcome Measures     Row Name 11/17/19 1308          How much help from another person do you currently need...    Turning from your back to your side while in flat bed without using bedrails?  3  -CJ     Moving  from lying on back to sitting on the side of a flat bed without bedrails?  3  -CJ     Moving to and from a bed to a chair (including a wheelchair)?  3  -CJ     Standing up from a chair using your arms (e.g., wheelchair, bedside chair)?  3  -CJ     Climbing 3-5 steps with a railing?  2  -CJ     To walk in hospital room?  3  -CJ     AM-PAC 6 Clicks Score (PT)  17  -       User Key  (r) = Recorded By, (t) = Taken By, (c) = Cosigned By    Initials Name Provider Type     Sunny Johnson, PT Physical Therapist        Physical Therapy Education     Title: PT OT SLP Therapies (Done)     Topic: Physical Therapy (Done)     Point: Mobility training (Done)     Learning Progress Summary           Patient Acceptance, E, VU by  at 11/14/2019 11:26 AM   Significant Other Acceptance, E, VU by AP at 11/14/2019 11:26 AM                   Point: Home exercise program (Done)     Learning Progress Summary           Patient Acceptance, E, VU by  at 11/14/2019 11:26 AM   Significant Other Acceptance, E, VU by  at 11/14/2019 11:26 AM                   Point: Body mechanics (Done)     Learning Progress Summary           Patient Acceptance, E, VU by  at 11/14/2019 11:26 AM   Significant Other Acceptance, E, VU by  at 11/14/2019 11:26 AM                   Point: Precautions (Done)     Learning Progress Summary           Patient Acceptance, E, VU by  at 11/14/2019 11:26 AM   Significant Other Acceptance, E, VU by  at 11/14/2019 11:26 AM                               User Key     Initials Effective Dates Name Provider Type [facility] 09/04/19 -  Hermelinda Anders, PT Student PT Student PT              PT Recommendation and Plan     Plan of Care Reviewed With: patient  Progress: improving  Outcome Summary: Patient O2 sats monitored during activity due. On room air and 95% at start. Ambulated in bathroom, around room, and into hallway at lowest O2 at 90% but rebounded quickly with verbal cueing for deep breath and/or seated  rest. Reports understanding of shoulder HEP. Preparing for discharge.     Time Calculation:   PT Charges     Row Name 11/17/19 1312             Time Calculation    Start Time  1100  -      Stop Time  1125  -      Time Calculation (min)  25 min  -      PT Received On  11/17/19  -      PT - Next Appointment  11/18/19  -         Time Calculation- PT    Total Timed Code Minutes- PT  25 minute(s)  -        User Key  (r) = Recorded By, (t) = Taken By, (c) = Cosigned By    Initials Name Provider Type    Sunny Banks, PT Physical Therapist        Therapy Charges for Today     Code Description Service Date Service Provider Modifiers Qty    49901240767 HC PT THER PROC EA 15 MIN 11/17/2019 Sunny Johnson PT GP 2          PT G-Codes  Outcome Measure Options: AM-PAC 6 Clicks Basic Mobility (PT)  AM-PAC 6 Clicks Score (PT): Maribeth Johnson PT  11/17/2019

## 2019-11-17 NOTE — PROGRESS NOTES
Orthopaedic Surgery  Progress Note  11/17/2019    Patients Name:  Nathan Baez  YOB: 1945  Age: 74 y.o.  Medical Records Number:  [id number]  Date of Admission: 11/13/2019    No complaints except pain    Vitals:  Vitals:    11/16/19 2244 11/16/19 2335 11/17/19 0325 11/17/19 0728   BP:  90/53 131/69 119/68   BP Location:  Left arm Left arm Left arm   Patient Position:  Lying Lying Sitting   Pulse: 88 92 108 89   Resp: 16 16 16 16   Temp:  97.4 °F (36.3 °C) 98.2 °F (36.8 °C) 97.3 °F (36.3 °C)   TempSrc:  Oral Oral Oral   SpO2: 95% 90% 95% 96%   Weight:       Height:           RUE:  NVI, calf nontender, Sensation intact  No signs of DVT    Incision: clean, no signs of infection    Lab Results (last 24 hours)     Procedure Component Value Units Date/Time    CBC & Differential [[id number]] Collected:  11/17/19 0817    Specimen:  Blood Updated:  11/17/19 0823    Narrative:       The following orders were created for panel order CBC & Differential.  Procedure                               Abnormality         Status                     ---------                               -----------         ------                     CBC Auto Differential[[id number]]                            In process                   Please view results for these tests on the individual orders.    CBC Auto Differential [[id number]] Collected:  11/17/19 0817    Specimen:  Blood Updated:  11/17/19 0823    Basic Metabolic Panel [[id number]] Collected:  11/17/19 0817    Specimen:  Blood Updated:  11/17/19 0822    Hemoglobin & Hematocrit, Blood [[id number]]  (Abnormal) Collected:  11/16/19 0916    Specimen:  Blood Updated:  11/16/19 1010     Hemoglobin 8.2 g/dL      Hematocrit 25.1 %           Xr Shoulder 1 View Right    Result Date: 11/13/2019  Narrative: PORTABLE JOINT X-RAY  HISTORY: Right shoulder pain. Postop arthroplasty.  Portable x-ray of the right shoulder is provided.  FINDINGS:  There is arthroplasty hardware, positioned as  expected.  No periprosthetic fracture is identified.  There are expected post operative changes in the soft tissues.      Impression: Reverse arthroplasty hardware at the right shoulder as expected..  This report was finalized on 11/13/2019 6:32 PM by Dr. Quinn Moore M.D.      Xr Shoulder 2+ View Right    Result Date: 10/30/2019  Narrative: TWO+ VIEWS RIGHT SHOULDER 10/30/2019  CLINICAL HISTORY: Preop reverse right total shoulder arthroplasty.  FINDINGS: Three views of the right shoulder including AP internal and external rotation views and axillary view of the right shoulder are submitted for interpretation.  I see no acute fracture or malalignment or acute osseous abnormality in the bones of the right shoulder. There is mild marginal spurring off the inferior aspect of the right humeral head and the glenoid, compatible with mild osteoarthritic change of the right glenohumeral joint.  There is some mild joint space narrowing and inferior marginal spurring at the level of the right acromioclavicular joint as well.  There is a tiny subchondral cyst formation at the junction of the posterior lateral right humeral head and right greater tuberosity.  This report was finalized on 10/30/2019 3:34 PM by Dr. Tadeo Florian M.D.          Assesment/Plan:    Procedures:  Right  Reverse TSA with Axillary Vein repair  Postoperative Day: 3  Weightbearing Status:  WBAT with walker    Disposition:  Home with home health after PT today, if comfortable and mobilizing safely.  Hgb 8.2 yesterday, looks much better this morning.  Hgb today pending    Tadeo Luo PA-C  [facility]  [address] [place] [address]  [phone]    11/17/2019

## 2019-11-17 NOTE — PLAN OF CARE
Problem: Patient Care Overview  Goal: Plan of Care Review  Outcome: Ongoing (interventions implemented as appropriate)   11/17/19 2155   Coping/Psychosocial   Plan of Care Reviewed With patient   Plan of Care Review   Progress improving   OTHER   Outcome Summary VSS. Able to wean off O2. CPAP for sleeping. Denies pain. Dressing c/d/i. Up to chair and worked with PT.Ambulates with staff assist and gait belt. Discussed bp med and monitoring. Discharging home with assist from family.        Problem: Fall Risk (Adult)  Goal: Absence of Fall  Outcome: Ongoing (interventions implemented as appropriate)      Problem: Shoulder Arthroplasty (Adult)  Goal: Signs and Symptoms of Listed Potential Problems Will be Absent, Minimized or Managed (Shoulder Arthroplasty)  Outcome: Ongoing (interventions implemented as appropriate)

## 2019-11-17 NOTE — PLAN OF CARE
Problem: Patient Care Overview  Goal: Plan of Care Review  Outcome: Ongoing (interventions implemented as appropriate)   11/16/19 6173   Coping/Psychosocial   Plan of Care Reviewed With patient   Plan of Care Review   Progress improving   OTHER   Outcome Summary patient ambulating with assistance to bathroom, minimal complaints of pain, unable to wean off oxygen, educated on CPAP use at night and b/p monitoring     Goal: Individualization and Mutuality  Outcome: Ongoing (interventions implemented as appropriate)    Goal: Discharge Needs Assessment  Outcome: Ongoing (interventions implemented as appropriate)    Goal: Interprofessional Rounds/Family Conf  Outcome: Ongoing (interventions implemented as appropriate)      Problem: Fall Risk (Adult)  Goal: Identify Related Risk Factors and Signs and Symptoms  Outcome: Outcome(s) achieved Date Met: 11/16/19    Goal: Absence of Fall  Outcome: Ongoing (interventions implemented as appropriate)      Problem: Shoulder Arthroplasty (Adult)  Goal: Signs and Symptoms of Listed Potential Problems Will be Absent, Minimized or Managed (Shoulder Arthroplasty)  Outcome: Ongoing (interventions implemented as appropriate)    Goal: Anesthesia/Sedation Recovery  Outcome: Outcome(s) achieved Date Met: 11/16/19

## 2019-11-18 ENCOUNTER — READMISSION MANAGEMENT (OUTPATIENT)
Dept: CALL CENTER | Facility: HOSPITAL | Age: 74
End: 2019-11-18

## 2019-11-18 NOTE — OUTREACH NOTE
Prep Survey      Responses   Facility patient discharged from?  [facility]   Is patient eligible?  Yes   Discharge diagnosis  s/p reverse total shoulder replacement right   Does the patient have one of the following disease processes/diagnoses(primary or secondary)?  Total Joint Replacement   Does the patient have Home health ordered?  No   Is there a DME ordered?  No   Comments regarding appointments  Pt to schedule follow up with PCP and Orthopedic Surgeon   Prep survey completed?  Yes          Mindy Joseph RN

## 2019-11-18 NOTE — PROGRESS NOTES
Case Management Discharge Note    Final Note: DC'd home. Skye Grover RN    Destination      No service has been selected for the patient.      Durable Medical Equipment      No service has been selected for the patient.      Dialysis/Infusion      No service has been selected for the patient.      Home Medical Care      No service has been selected for the patient.      Therapy      No service has been selected for the patient.      Community Resources      No service has been selected for the patient.        Transportation Services  Private: Car    Final Discharge Disposition Code: 01 - home or self-care

## 2019-11-19 ENCOUNTER — READMISSION MANAGEMENT (OUTPATIENT)
Dept: CALL CENTER | Facility: HOSPITAL | Age: 74
End: 2019-11-19

## 2019-11-19 NOTE — OUTREACH NOTE
Total Joint Week 1 Survey      Responses   Facility patient discharged from?  [facility]   Does the patient have one of the following disease processes/diagnoses(primary or secondary)?  Total Joint Replacement   Is there a successful TCM telephone encounter documented?  No   Joint surgery performed?  Shoulder   Week 1 attempt successful?  Yes   Call start time  1452   Call end time  1458   Has the patient been back in either the hospital or Emergency Department since discharge?  No   Discharge diagnosis  s/p reverse total shoulder replacement right   Is patient permission given to speak with other caregiver?  Yes   List who call center can speak with  wife   Person spoke with today (if not patient) and relationship  wife and pt   Does the patient have all medications related to this admission filled (includes all antibiotics, pain medications, etc.)  Yes   Is the patient taking all medications as directed (includes completed medication regime)?  Yes   Is the patient able to teach back alternate methods of pain control?  Ice, Reposition, Shoulder-elevate above heart/ keep in sling as advised   Medication comments  Pt is only using pain meds approx 1x/day. At worst the pain has been 5/10 since DC but pain med has been resolving   Does the patient have a follow up appointment with their surgeon?  Yes   Has the patient kept scheduled appointments due by today?  N/A   Psychosocial issues?  No   Does the patient have a wound vac in place?  N/A   Has the patient fallen since discharge?  No   Did the patient receive a copy of their discharge instructions?  Yes   Nursing interventions  Reviewed instructions with patient   What is the patient's perception of their functional status since discharge?  Improving   Is the patient able to teach back signs and symptoms of infection?  Blisters around incision, Incisional drainage, Temp >100.4 for 24h or longer   Is the patient able to teach back how to prevent infection?  Check  incision daily, Wash hands before and after touching incision, Keep incision covered if drainage, Shower only as directed by surgeon, No lotion or creams, No tub baths, hot tub or swimming   Is the patient able to teach back signs and symptoms of DVT?  Redness in calf, Swelling in calf, Shortness of breath or chest pain, Area hot to touch   Is the patient able to teach back home safety measures?  Ability to shower, Accessibility to necessary areas in home   Did the patient implement home safety suggestions from pre-surgery classes if attended?  N/A   Is the patient/caregiver able to teach back the hierarchy of who to call/visit for symptoms/problems? PCP, Specialist, Home health nurse, Urgent Care, ED, 911  Yes   Week 1 call completed?  Yes          Saanm Rodriguez RN

## 2019-11-26 ENCOUNTER — READMISSION MANAGEMENT (OUTPATIENT)
Dept: CALL CENTER | Facility: HOSPITAL | Age: 74
End: 2019-11-26

## 2019-11-26 ENCOUNTER — OFFICE VISIT (OUTPATIENT)
Dept: FAMILY MEDICINE CLINIC | Facility: CLINIC | Age: 74
End: 2019-11-26

## 2019-11-26 VITALS
WEIGHT: 260 LBS | DIASTOLIC BLOOD PRESSURE: 85 MMHG | TEMPERATURE: 98 F | OXYGEN SATURATION: 96 % | SYSTOLIC BLOOD PRESSURE: 144 MMHG | HEIGHT: 67 IN | HEART RATE: 93 BPM | BODY MASS INDEX: 40.81 KG/M2

## 2019-11-26 DIAGNOSIS — R60.0 LOCALIZED EDEMA: ICD-10-CM

## 2019-11-26 DIAGNOSIS — I10 ESSENTIAL HYPERTENSION: Primary | ICD-10-CM

## 2019-11-26 DIAGNOSIS — Z00.00 MEDICARE ANNUAL WELLNESS VISIT, SUBSEQUENT: ICD-10-CM

## 2019-11-26 DIAGNOSIS — D62 ANEMIA DUE TO ACUTE BLOOD LOSS: ICD-10-CM

## 2019-11-26 DIAGNOSIS — G62.9 PERIPHERAL POLYNEUROPATHY: ICD-10-CM

## 2019-11-26 PROBLEM — D64.9 ANEMIA: Status: ACTIVE | Noted: 2019-11-26

## 2019-11-26 PROBLEM — G25.81 RESTLESS LEG SYNDROME: Status: RESOLVED | Noted: 2019-07-11 | Resolved: 2019-11-26

## 2019-11-26 PROCEDURE — 99214 OFFICE O/P EST MOD 30 MIN: CPT | Performed by: FAMILY MEDICINE

## 2019-11-26 PROCEDURE — G0439 PPPS, SUBSEQ VISIT: HCPCS | Performed by: FAMILY MEDICINE

## 2019-11-26 RX ORDER — FUROSEMIDE 20 MG/1
20 TABLET ORAL DAILY
Qty: 90 TABLET | Refills: 3 | Status: SHIPPED | OUTPATIENT
Start: 2019-11-26 | End: 2019-12-17 | Stop reason: SDUPTHER

## 2019-11-26 RX ORDER — GABAPENTIN 300 MG/1
300 CAPSULE ORAL NIGHTLY
Qty: 30 CAPSULE | Refills: 3 | Status: SHIPPED | OUTPATIENT
Start: 2019-11-26 | End: 2019-12-02 | Stop reason: ALTCHOICE

## 2019-11-26 NOTE — OUTREACH NOTE
Total Joint Week 2 Survey      Responses   Facility patient discharged from?  [facility]   Does the patient have one of the following disease processes/diagnoses(primary or secondary)?  Total Joint Replacement   Joint surgery performed?  Shoulder   Week 2 attempt successful?  Yes   Call start time  1154   Call end time  1158   Has the patient been back in either the hospital or Emergency Department since discharge?  No   Discharge diagnosis  s/p reverse total shoulder replacement right   Is the patient taking all medications as directed (includes completed medication regime)?  Yes   Is the patient able to teach back alternate methods of pain control?  Ice, Shoulder-elevate above heart/ keep in sling as advised   Does the patient have a follow up appointment with their surgeon?  Yes   Has the patient kept scheduled appointments due by today?  Yes   Comments  11/25 saw surgeon, staples removed.   Has home health visited the patient within 72 hours of discharge?  N/A   Psychosocial issues?  No   Has the patient began therapy sessions (either in the home or as an out patient)?  No   If the patient has started attending therapy, what post op day did they begin to attend (either in home or as an out patient)?    Will start therapy tomorrow 11/27   Has the patient fallen since discharge?  No   What is the patient's perception of their functional status since discharge?  Improving   Week 2 call completed?  Yes          Ella Garcia RN

## 2019-11-26 NOTE — PROGRESS NOTES
The ABCs of the Annual Wellness Visit  Subsequent Medicare Wellness Visit    Chief Complaint   Patient presents with   • Heartburn   • Hyperlipidemia   • 1-2 week post right shoulder replacement   LE edema.   F/U HTN.    Subjective   History of Present Illness:  Nathan Baez is a 74 y.o. male who presents for a Subsequent Medicare Wellness Visit.  F?U LE edema.  Increased swelling since 19 R total shoulder replacement.  Labs reviewed from the hospital with Cr 1.41.    F/U HTN.  BP running 120-130/60-70s.    F/U anemia post op.  Hg 8.4 9 days ago.     HEALTH RISK ASSESSMENT    Recent Hospitalizations:  Recently treated at the following:  [facility]    Current Medical Providers:  Patient Care Team:  Damien Pierce MD as PCP - General (Family Medicine)  Damien Pierce MD as PCP - Claims Attributed    Smoking Status:  Social History     Tobacco Use   Smoking Status Former Smoker   • Packs/day: 1.00   • Years: 20.00   • Pack years: 20.00   • Types: Cigarettes   • Last attempt to quit: 1984   • Years since quittin.9   Smokeless Tobacco Never Used       Alcohol Consumption:  Social History     Substance and Sexual Activity   Alcohol Use Yes    Comment: 3-4 MONTHLY       Depression Screen:   PHQ-2/PHQ-9 Depression Screening 2019   Little interest or pleasure in doing things 0   Feeling down, depressed, or hopeless 0   Total Score 0       Fall Risk Screen:  ANGELLA Fall Risk Assessment was completed, and patient is at HIGH risk for falls. Assessment completed on:2019    Health Habits and Functional and Cognitive Screening:  Functional & Cognitive Status 2019   Do you have difficulty preparing food and eating? No   Do you have difficulty bathing yourself, getting dressed or grooming yourself? No   Do you have difficulty using the toilet? No   Do you have difficulty moving around from place to place? No   Do you have trouble with steps or getting out of a bed or a  chair? No   Current Diet Well Balanced Diet   Dental Exam Up to date   Eye Exam Up to date   Exercise (times per week) 0 times per week   Current Exercise Activities Include None   Do you need help using the phone?  No   Are you deaf or do you have serious difficulty hearing?  No   Do you need help with transportation? No   Do you need help shopping? No   Do you need help preparing meals?  No   Do you need help with housework?  No   Do you need help with laundry? No   Do you need help taking your medications? No   Do you need help managing money? No   Do you ever drive or ride in a car without wearing a seat belt? No   Have you felt unusual stress, anger or loneliness in the last month? No   Who do you live with? Spouse   If you need help, do you have trouble finding someone available to you? No   Have you been bothered in the last four weeks by sexual problems? No   Do you have difficulty concentrating, remembering or making decisions? No         Does the patient have evidence of cognitive impairment? No    Asprin use counseling:Taking ASA appropriately as indicated    Age-appropriate Screening Schedule:  Refer to the list below for future screening recommendations based on patient's age, sex and/or medical conditions. Orders for these recommended tests are listed in the plan section. The patient has been provided with a written plan.    Health Maintenance   Topic Date Due   • TDAP/TD VACCINES (1 - Tdap) [birth year]04/1965   • LIPID PANEL  05/22/2018   • COLONOSCOPY  05/22/2018   • INFLUENZA VACCINE  Completed   • PNEUMOCOCCAL VACCINES (65+ LOW/MEDIUM RISK)  Completed   • ZOSTER VACCINE  Completed          The following portions of the patient's history were reviewed and updated as appropriate: allergies, current medications, past family history, past medical history, past social history, past surgical history and problem list.    Outpatient Medications Prior to Visit   Medication Sig Dispense Refill   • acetaminophen  (TYLENOL) 650 MG 8 hr tablet Take 1,300 mg by mouth 2 (Two) Times a Day.     • albuterol (PROVENTIL HFA;VENTOLIN HFA) 108 (90 Base) MCG/ACT inhaler Inhale 2 puffs Every 4 (Four) Hours As Needed for Wheezing.     • ALLERGY SERUM INJECTION Inject  under the skin into the appropriate area as directed 1 (One) Time Per Week.     • aspirin 81 MG tablet Take 1 tablet by mouth Daily.     • azelastine (ASTELIN) 0.1 % nasal spray 2 sprays into the nostril(s) as directed by provider 2 (Two) Times a Day. Use in each nostril as directed     • BRIMONIDINE TARTRATE OP Apply 1 drop to eye(s) as directed by provider 2 (Two) Times a Day.     • dorzolamide-timolol (COSOPT) 22.3-6.8 MG/ML ophthalmic solution Administer 1 drop to both eyes 2 (Two) Times a Day.     • fexofenadine (ALLEGRA) 180 MG tablet Take 180 mg by mouth Daily.     • Fluticasone Furoate-Vilanterol (BREO ELLIPTA IN) Inhale 1 puff Every Morning.     • latanoprost (XALATAN) 0.005 % ophthalmic solution Administer 1 drop to both eyes Every Night.     • Latanoprost 0.005 % emulsion Apply 1 drop to eye(s) as directed by provider Every Night.     • levothyroxine (SYNTHROID) 88 MCG tablet Take 88 mcg by mouth every morning.     • montelukast (SINGULAIR) 10 MG tablet Take 1 tablet by mouth Every Night. 90 tablet 3   • pantoprazole (PROTONIX) 40 MG EC tablet Take 40 mg by mouth 2 (Two) Times a Day.     • rosuvastatin (CRESTOR) 20 MG tablet Take 20 mg by mouth every evening.     • celecoxib (CELEBREX) 200 MG capsule Take 1 capsule by mouth Daily. 90 capsule 3   • pramipexole (MIRAPEX) 1.5 MG tablet Take 1.5 mg by mouth 3 (Three) Times a Day.       No facility-administered medications prior to visit.        Patient Active Problem List   Diagnosis   • OA (osteoarthritis) of knee   • Hypertension   • Abdominal wall cellulitis   • Hypothyroidism (acquired)   • Gastroesophageal reflux disease without esophagitis   • Mixed hyperlipidemia   • Primary insomnia   • DDD (degenerative disc  disease), lumbar   • KWAME (obstructive sleep apnea)   • Hyperglycemia   • Allergic   • Venous insufficiency   • Prostate cancer (CMS/HCC)   • Venous stasis dermatitis   • Tinea cruris   • Tinea corporis   • Throat clearing   • Sleep apnea   • Skin lesion of face   • Rib pain on left side   • Rectal bleed   • Presbycusis   • Pes planus   • Osteoarthritis   • Obesity   • Medicare annual wellness visit, subsequent   • Lumbar strain   • Internal hemorrhoids   • Insomnia   • Inflamed skin tag   • Hypothyroid   • Hyperplastic polyp of stomach   • Hospital discharge follow-up   • History of colon polyps   • High risk medication use   • Glaucoma   • Gastroenteritis, acute   • Erysipelas   • Encounter for screening colonoscopy   • Encounter for long-term (current) use of NSAIDs   • Dysphagia   • DVT (deep venous thrombosis) (CMS/HCC)   • DJD (degenerative joint disease), lumbar   • Diverticulosis   • Disequilibrium   • Cough   • Contact with hypodermic needle   • Cervical radiculopathy   • Cellulitis   • Arthritis   • Allergic rhinitis   • Acute glaucoma   • Acute embolism and thrombosis of vein   • Actinic keratosis   • Status post reverse total shoulder replacement, right   • Localized edema   • Anemia   • Peripheral polyneuropathy       Advanced Care Planning:  Patient has an advance directive - a copy has not been provided. Have asked the patient to send this to us to add to record    Review of Systems   Constitutional: Negative for activity change, appetite change and fatigue.   HENT: Negative for hearing loss and postnasal drip.    Eyes: Negative for discharge and itching.   Respiratory: Negative for cough and shortness of breath.    Cardiovascular: Negative for chest pain and leg swelling.   Gastrointestinal: Negative for abdominal distention and abdominal pain.   Endocrine: Negative for cold intolerance and heat intolerance.   Genitourinary: Negative for difficulty urinating and flank pain.   Musculoskeletal: Negative  "for arthralgias and myalgias.   Skin: Negative for color change.   Neurological: Negative for dizziness and facial asymmetry.   Hematological: Negative for adenopathy.   Psychiatric/Behavioral: Negative for agitation and confusion.       Compared to one year ago, the patient feels his physical health is better.  Compared to one year ago, the patient feels his mental health is the same.    Reviewed chart for potential of high risk medication in the elderly: yes  Reviewed chart for potential of harmful drug interactions in the elderly:yes    Objective         Vitals:    11/26/19 0815   BP: 144/85   Pulse: 93   Temp: 98 °F (36.7 °C)   SpO2: 96%   Weight: 118 kg (260 lb)   Height: 170.2 cm (67\")       Body mass index is 40.72 kg/m².  Discussed the patient's BMI with him. The BMI is above average; BMI management plan is completed.    Physical Exam   Constitutional: He appears well-developed and well-nourished.   HENT:   Head: Normocephalic and atraumatic.   Right Ear: External ear normal.   Left Ear: External ear normal.   Nose: Nose normal.   Mouth/Throat: Oropharynx is clear and moist.   Eyes: Conjunctivae and EOM are normal. Pupils are equal, round, and reactive to light. Right eye exhibits no discharge. Left eye exhibits no discharge. No scleral icterus.   Neck: Normal range of motion. Neck supple. No JVD present. No tracheal deviation present. No thyromegaly present.   Cardiovascular: Normal rate, regular rhythm, normal heart sounds and intact distal pulses. Exam reveals no gallop and no friction rub.   No murmur heard.  1 plus swelling distal 2/3 of legs.     Pulmonary/Chest: Effort normal and breath sounds normal. No respiratory distress. He has no wheezes. He has no rales. He exhibits no tenderness.   Abdominal: Soft. Bowel sounds are normal. He exhibits no distension and no mass. There is no tenderness. There is no rebound and no guarding. No hernia.   Musculoskeletal: Normal range of motion. He exhibits no " edema or tenderness.   Well healing R shoulder incision   Lymphadenopathy:     He has no cervical adenopathy.   Neurological: He displays normal reflexes. No cranial nerve deficit or sensory deficit. He exhibits normal muscle tone. Coordination normal.   Skin: Skin is warm and dry.   Psychiatric: He has a normal mood and affect. His behavior is normal. Judgment and thought content normal.   Nursing note and vitals reviewed.            Assessment/Plan   Medicare Risks and Personalized Health Plan  CMS Preventative Services Quick Reference  Inactivity/Sedentary    The above risks/problems have been discussed with the patient.  Pertinent information has been shared with the patient in the After Visit Summary.  Follow up plans and orders are seen below in the Assessment/Plan Section.    Diagnoses and all orders for this visit:    1. Essential hypertension (Primary)    2. Localized edema    3. Medicare annual wellness visit, subsequent    4. Anemia due to acute blood loss    5. Peripheral polyneuropathy    Other orders  -     furosemide (LASIX) 20 MG tablet; Take 1 tablet by mouth Daily.  Dispense: 90 tablet; Refill: 3  -     gabapentin (NEURONTIN) 300 MG capsule; Take 1 capsule by mouth Every Night.  Dispense: 30 capsule; Refill: 3    Work on strengthening to rsesist falls.  Stop HCTZ, stop celebrex.  IUTD.    Follow Up:  Return in about 1 month (around 12/26/2019).   Counseled on starting PT>    An After Visit Summary and PPPS were given to the patient.

## 2019-11-27 ENCOUNTER — TREATMENT (OUTPATIENT)
Dept: PHYSICAL THERAPY | Facility: CLINIC | Age: 74
End: 2019-11-27

## 2019-11-27 DIAGNOSIS — Z96.611 STATUS POST REVERSE TOTAL REPLACEMENT OF RIGHT SHOULDER: ICD-10-CM

## 2019-11-27 DIAGNOSIS — M25.511 ACUTE PAIN OF RIGHT SHOULDER: Primary | ICD-10-CM

## 2019-11-27 PROCEDURE — 97162 PT EVAL MOD COMPLEX 30 MIN: CPT | Performed by: PHYSICAL THERAPIST

## 2019-11-27 PROCEDURE — G0283 ELEC STIM OTHER THAN WOUND: HCPCS | Performed by: PHYSICAL THERAPIST

## 2019-11-27 PROCEDURE — 97110 THERAPEUTIC EXERCISES: CPT | Performed by: PHYSICAL THERAPIST

## 2019-11-27 NOTE — PROGRESS NOTES
Physical Therapy Initial Evaluation and Plan of Care    Patient: Nathan Baez   : 1945  Diagnosis/ICD-10 Code:  Acute pain of right shoulder [M25.511]  Referring practitioner: Behzad Kelley MD  Past Medical History Reviewed: 2019    PLOF: Independent and lives with wife    Subjective Evaluation    History of Present Illness  Date of surgery: 2019  Mechanism of injury: S/p R reverse TSA. I had some complications during surgery because they hit a blood vessel, but now I am fine. I really do not have any pain. I cannot do much with this hand at all. I do have some swelling the right arm.   I am having trouble sleeping, but not because of my shoulder.   No numbness or tingling in the right arm.   I feel off balanced and I am scared of falling        Patient Occupation: retired and likes outdoors Pain  Current pain ratin  At worst pain ratin  Location: (R) shoulder  Relieving factors: ice  Aggravating factors: lifting, overhead activity, movement and outstretched reach  Progression: improved    Social Support  Lives with: spouse    Hand dominance: right    Treatments  Previous treatment: physical therapy           Objective       Postural Observations  Seated posture: fair  Standing posture: fair        Observations     Additional Observation Details  Increased swelling in R UE    Neurological Testing     Sensation     Shoulder   Left Shoulder   Intact: light touch    Right Shoulder   Intact: light touch    Active Range of Motion   Left Shoulder   Flexion: 140 degrees   Abduction: 140 degrees   External rotation BTH: C6   Internal rotation BTB: sacrum     Passive Range of Motion     Additional Passive Range of Motion Details  Approximately 85 degrees of passive flexion at this time on R  Approximately 45 degrees of passive ER at 0 degrees of abduction on R    Strength/Myotome Testing     Left Shoulder   Normal muscle strength         Assessment & Plan     Assessment  Impairments: abnormal  muscle firing, abnormal or restricted ROM, impaired physical strength and pain with function  Assessment details: Pt presents to PT with symptoms consistent with R shoulder weakness and limited ROM s/p reverse total shoulder replacement.  Pt would benefit from skilled PT intervention to address the deficits noted.   Prognosis: good  Functional Limitations: lifting, sleeping, pushing, uncomfortable because of pain, reaching behind back and reaching overhead  Goals  Plan Goals: SHORT TERM GOALS: 10-12 visits  1. Patient to be compliant with HEP and no diff. with sleeping  2. Increased (R) UE strength to 4/5 with no pain> 4/10 to allow for household and work activities.   3. Pt to exhibit (R) shoulder active flexion / ABD to 110° in standing/sitting to assist with reaching overhead with less pain  4. Pt demonstrates improved posture in sitting and standing with minimal-no cues during treatment session    LONG TERM GOALS: 16-20 visits  1. Pt score <30% perceived disability on DASH   2. Pt. to exhibit (R) shoulder AROM to WFL (> 140° flex/abd. to allow for reaching overhead and behind back without pain limiting function  3. Pt to exhibit 4+/5 UE strength to allow for pushing/pulling and lifting >5 #to occur with pain <2/10  4. Pt able to reach overhead and lift 10# (B) x 10 to allow for return to doing work around home.       Plan  Therapy options: will be seen for skilled physical therapy services  Planned modality interventions: cryotherapy, electrical stimulation/Russian stimulation, TENS, thermotherapy (hydrocollator packs) and ultrasound  Other planned modality interventions: Dry Needling  Planned therapy interventions: abdominal trunk stabilization, ADL retraining, flexibility, body mechanics training, home exercise program, functional ROM exercises, joint mobilization, manual therapy, neuromuscular re-education, postural training, soft tissue mobilization, spinal/joint mobilization, strengthening, stretching and  therapeutic activities  Duration in visits: 20  Treatment plan discussed with: patient        Manual Therapy:    -     mins  37606;  Therapeutic Exercise:    10     mins  87150;     Neuromuscular Jon:    -    mins  75132;    Therapeutic Activity:     -     mins  75027;     Gait Training:      -     mins  06513;     Ultrasound:     -     mins  56109;    Electrical Stimulation:    15     mins  27237 ( );  Dry Needling     -     mins self-pay    Timed Treatment:   10   mins   Total Treatment:     50   mins      PT SIGNATURE: Elizabeth Chavez, PT   DATE TREATMENT INITIATED: 11/27/2019    Medicare Initial Certification  Certification Period: 2/25/2020  I certify that the therapy services are furnished while this patient is under my care.  The services outlined above are required by this patient, and will be reviewed every 90 days.     PHYSICIAN: Behzad Kelley MD      DATE:     Please sign and return via fax to [phone].. Thank you, [facility].

## 2019-12-02 ENCOUNTER — TREATMENT (OUTPATIENT)
Dept: PHYSICAL THERAPY | Facility: CLINIC | Age: 74
End: 2019-12-02

## 2019-12-02 ENCOUNTER — HOSPITAL ENCOUNTER (EMERGENCY)
Facility: HOSPITAL | Age: 74
Discharge: HOME | End: 2019-12-03
Attending: EMERGENCY MEDICINE | Admitting: EMERGENCY MEDICINE

## 2019-12-02 ENCOUNTER — APPOINTMENT (OUTPATIENT)
Dept: CT IMAGING | Facility: HOSPITAL | Age: 74
End: 2019-12-02

## 2019-12-02 ENCOUNTER — APPOINTMENT (OUTPATIENT)
Dept: GENERAL RADIOLOGY | Facility: HOSPITAL | Age: 74
End: 2019-12-02

## 2019-12-02 ENCOUNTER — TELEPHONE (OUTPATIENT)
Dept: FAMILY MEDICINE CLINIC | Facility: CLINIC | Age: 74
End: 2019-12-02

## 2019-12-02 DIAGNOSIS — G25.81 RESTLESS LEG SYNDROME: Primary | ICD-10-CM

## 2019-12-02 DIAGNOSIS — G25.81 RESTLESS LEGS: ICD-10-CM

## 2019-12-02 DIAGNOSIS — R06.00 DYSPNEA, UNSPECIFIED TYPE: Primary | ICD-10-CM

## 2019-12-02 DIAGNOSIS — Z96.611 STATUS POST REVERSE TOTAL REPLACEMENT OF RIGHT SHOULDER: ICD-10-CM

## 2019-12-02 DIAGNOSIS — M25.511 ACUTE PAIN OF RIGHT SHOULDER: Primary | ICD-10-CM

## 2019-12-02 LAB
ALBUMIN SERPL-MCNC: 3.9 G/DL (ref 3.5–5.2)
ALBUMIN/GLOB SERPL: 1.3 G/DL
ALP SERPL-CCNC: 180 U/L (ref 39–117)
ALT SERPL W P-5'-P-CCNC: 26 U/L (ref 1–41)
ANION GAP SERPL CALCULATED.3IONS-SCNC: 11.8 MMOL/L (ref 5–15)
AST SERPL-CCNC: 26 U/L (ref 1–40)
BASOPHILS # BLD AUTO: 0.02 10*3/MM3 (ref 0–0.2)
BASOPHILS NFR BLD AUTO: 0.5 % (ref 0–1.5)
BILIRUB SERPL-MCNC: 0.5 MG/DL (ref 0.2–1.2)
BUN BLD-MCNC: 19 MG/DL (ref 8–23)
BUN/CREAT SERPL: 18.3 (ref 7–25)
CALCIUM SPEC-SCNC: 8.5 MG/DL (ref 8.6–10.5)
CHLORIDE SERPL-SCNC: 103 MMOL/L (ref 98–107)
CO2 SERPL-SCNC: 28.2 MMOL/L (ref 22–29)
CREAT BLD-MCNC: 1.04 MG/DL (ref 0.76–1.27)
D DIMER PPP FEU-MCNC: 3.18 MCGFEU/ML (ref 0–0.49)
DEPRECATED RDW RBC AUTO: 46.9 FL (ref 37–54)
EOSINOPHIL # BLD AUTO: 0.1 10*3/MM3 (ref 0–0.4)
EOSINOPHIL NFR BLD AUTO: 2.5 % (ref 0.3–6.2)
ERYTHROCYTE [DISTWIDTH] IN BLOOD BY AUTOMATED COUNT: 14.7 % (ref 12.3–15.4)
GFR SERPL CREATININE-BSD FRML MDRD: 70 ML/MIN/1.73
GLOBULIN UR ELPH-MCNC: 2.9 GM/DL
GLUCOSE BLD-MCNC: 129 MG/DL (ref 65–99)
HCT VFR BLD AUTO: 26.5 % (ref 37.5–51)
HGB BLD-MCNC: 8.3 G/DL (ref 13–17.7)
IMM GRANULOCYTES # BLD AUTO: 0.07 10*3/MM3 (ref 0–0.05)
IMM GRANULOCYTES NFR BLD AUTO: 1.8 % (ref 0–0.5)
INR PPP: 1.03 (ref 0.9–1.1)
LYMPHOCYTES # BLD AUTO: 0.72 10*3/MM3 (ref 0.7–3.1)
LYMPHOCYTES NFR BLD AUTO: 18 % (ref 19.6–45.3)
MCH RBC QN AUTO: 27.3 PG (ref 26.6–33)
MCHC RBC AUTO-ENTMCNC: 31.3 G/DL (ref 31.5–35.7)
MCV RBC AUTO: 87.2 FL (ref 79–97)
MONOCYTES # BLD AUTO: 0.59 10*3/MM3 (ref 0.1–0.9)
MONOCYTES NFR BLD AUTO: 14.8 % (ref 5–12)
NEUTROPHILS # BLD AUTO: 2.49 10*3/MM3 (ref 1.7–7)
NEUTROPHILS NFR BLD AUTO: 62.4 % (ref 42.7–76)
NRBC BLD AUTO-RTO: 0.3 /100 WBC (ref 0–0.2)
NT-PROBNP SERPL-MCNC: 350.9 PG/ML (ref 5–900)
PLATELET # BLD AUTO: 204 10*3/MM3 (ref 140–450)
PMV BLD AUTO: 9.8 FL (ref 6–12)
POTASSIUM BLD-SCNC: 3.4 MMOL/L (ref 3.5–5.2)
PROT SERPL-MCNC: 6.8 G/DL (ref 6–8.5)
PROTHROMBIN TIME: 13.2 SECONDS (ref 11.7–14.2)
RBC # BLD AUTO: 3.04 10*6/MM3 (ref 4.14–5.8)
SODIUM BLD-SCNC: 143 MMOL/L (ref 136–145)
TROPONIN T SERPL-MCNC: <0.01 NG/ML (ref 0–0.03)
WBC NRBC COR # BLD: 3.99 10*3/MM3 (ref 3.4–10.8)

## 2019-12-02 PROCEDURE — 71275 CT ANGIOGRAPHY CHEST: CPT

## 2019-12-02 PROCEDURE — 85025 COMPLETE CBC W/AUTO DIFF WBC: CPT | Performed by: EMERGENCY MEDICINE

## 2019-12-02 PROCEDURE — 97110 THERAPEUTIC EXERCISES: CPT | Performed by: PHYSICAL THERAPIST

## 2019-12-02 PROCEDURE — 80053 COMPREHEN METABOLIC PANEL: CPT | Performed by: EMERGENCY MEDICINE

## 2019-12-02 PROCEDURE — 71046 X-RAY EXAM CHEST 2 VIEWS: CPT

## 2019-12-02 PROCEDURE — G0283 ELEC STIM OTHER THAN WOUND: HCPCS | Performed by: PHYSICAL THERAPIST

## 2019-12-02 PROCEDURE — 93005 ELECTROCARDIOGRAM TRACING: CPT | Performed by: EMERGENCY MEDICINE

## 2019-12-02 PROCEDURE — 85610 PROTHROMBIN TIME: CPT | Performed by: EMERGENCY MEDICINE

## 2019-12-02 PROCEDURE — 0 IOPAMIDOL PER 1 ML: Performed by: EMERGENCY MEDICINE

## 2019-12-02 PROCEDURE — 85379 FIBRIN DEGRADATION QUANT: CPT | Performed by: EMERGENCY MEDICINE

## 2019-12-02 PROCEDURE — 93010 ELECTROCARDIOGRAM REPORT: CPT | Performed by: INTERNAL MEDICINE

## 2019-12-02 PROCEDURE — 83880 ASSAY OF NATRIURETIC PEPTIDE: CPT | Performed by: EMERGENCY MEDICINE

## 2019-12-02 PROCEDURE — 84484 ASSAY OF TROPONIN QUANT: CPT | Performed by: EMERGENCY MEDICINE

## 2019-12-02 PROCEDURE — 99283 EMERGENCY DEPT VISIT LOW MDM: CPT

## 2019-12-02 PROCEDURE — 93005 ELECTROCARDIOGRAM TRACING: CPT

## 2019-12-02 PROCEDURE — 97140 MANUAL THERAPY 1/> REGIONS: CPT | Performed by: PHYSICAL THERAPIST

## 2019-12-02 RX ORDER — GABAPENTIN 300 MG/1
300 CAPSULE ORAL 3 TIMES DAILY
COMMUNITY
End: 2019-12-17

## 2019-12-02 RX ORDER — ACETAMINOPHEN AND CODEINE PHOSPHATE 300; 30 MG/1; MG/1
1 TABLET ORAL EVERY 4 HOURS PRN
COMMUNITY
End: 2020-03-14

## 2019-12-02 RX ORDER — SODIUM CHLORIDE 0.9 % (FLUSH) 0.9 %
10 SYRINGE (ML) INJECTION AS NEEDED
Status: DISCONTINUED | OUTPATIENT
Start: 2019-12-02 | End: 2019-12-03 | Stop reason: HOSPADM

## 2019-12-02 RX ORDER — OXYCODONE HYDROCHLORIDE AND ACETAMINOPHEN 5; 325 MG/1; MG/1
1 TABLET ORAL EVERY 4 HOURS PRN
COMMUNITY
End: 2019-12-17

## 2019-12-02 RX ORDER — PRAMIPEXOLE DIHYDROCHLORIDE 1 MG/1
TABLET ORAL
Qty: 135 TABLET | Refills: 1 | Status: SHIPPED | OUTPATIENT
Start: 2019-12-02 | End: 2019-12-30 | Stop reason: ALTCHOICE

## 2019-12-02 RX ADMIN — IOPAMIDOL 99 ML: 755 INJECTION, SOLUTION INTRAVENOUS at 23:21

## 2019-12-02 NOTE — TELEPHONE ENCOUNTER
PT WIFE CALLED AND REQUESTED A CALL BACK. PT WAS IN OFFICE ON 11-, AND HAD MEDICATION CHANGES. PT WIFE SATED THAT IT IS NOT WORKING, IN FACT IT IS MAKING HIS LEGS WORSE.PT NEXT APPT IS 12-.

## 2019-12-02 NOTE — TELEPHONE ENCOUNTER
Tell her to stop gabapentin and restart pramipexole 1mg 1.5 tablets at night only.  Disp 135.  1 refill.  Continue on all other meds as we discussed.

## 2019-12-02 NOTE — PROGRESS NOTES
Physical Therapy Daily Progress Note  Visit: 2    Nathan Baez reports: The shoulder is doing pretty good. My legs are bothering me    Subjective     Objective   See Exercise, Manual, and Modality Logs for complete treatment.       Assessment & Plan     Assessment  Assessment details: Pt having increased SOA with exercise and at rest today. Spouse stated they changed his medicine and they are waiting for the MD to call them back about this issue. His shoulder is moving very well    Plan  Plan details: Progress per POC        Manual Therapy:    10     mins  13509;  Therapeutic Exercise:    23     mins  93119;     Neuromuscular Jon:    -    mins  28315;    Therapeutic Activity:     -     mins  82063;     Gait Training:      -     mins  47056;     Ultrasound:     -     mins  51836;    Electrical Stimulation:    15     mins  99884 ( );  Dry Needling     -     mins self-pay    Timed Treatment:   33   mins   Total Treatment:     50   mins    Elizabeth Chavez PT  [place] License #: [id number]    Physical Therapist

## 2019-12-03 ENCOUNTER — TELEPHONE (OUTPATIENT)
Dept: FAMILY MEDICINE CLINIC | Facility: CLINIC | Age: 74
End: 2019-12-03

## 2019-12-03 VITALS
BODY MASS INDEX: 40.97 KG/M2 | OXYGEN SATURATION: 93 % | HEIGHT: 67 IN | SYSTOLIC BLOOD PRESSURE: 128 MMHG | WEIGHT: 261 LBS | HEART RATE: 91 BPM | DIASTOLIC BLOOD PRESSURE: 74 MMHG | TEMPERATURE: 98.5 F | RESPIRATION RATE: 24 BRPM

## 2019-12-03 NOTE — ED NOTES
Pt ambulatory to triage with c/o soa and tightness in chest for last few days, worse today. Had shoulder surgery on 11/13.      Camila Pierce, RN  12/02/19 2127

## 2019-12-03 NOTE — ED PROVIDER NOTES
EMERGENCY DEPARTMENT ENCOUNTER    Room Number:  12/12  Date of encounter:  12/2/2019  PCP: Damien Pierce MD  Historian: ***      HPI:  Chief Complaint: ***  A complete HPI/ROS/PMH/PSH/SH/FH are unobtainable due to: ***    Context: Nathan Baez is a 74 y.o. male who presents to the ED c/o *** that started ***.     Pt also c/o ***.     Pt denies *** and all other complaints at this time.       PAST MEDICAL HISTORY  Active Ambulatory Problems     Diagnosis Date Noted   • OA (osteoarthritis) of knee 09/13/2016   • Hypertension    • Abdominal wall cellulitis 06/02/2019   • Hypothyroidism (acquired) 06/03/2019   • Gastroesophageal reflux disease without esophagitis 07/11/2019   • Mixed hyperlipidemia 07/11/2019   • Primary insomnia 07/11/2019   • DDD (degenerative disc disease), lumbar 07/11/2019   • KWAME (obstructive sleep apnea) 07/11/2019   • Hyperglycemia 07/11/2019   • Allergic 07/11/2019   • Venous insufficiency 07/11/2019   • Prostate cancer (CMS/HCC) 07/11/2019   • Venous stasis dermatitis    • Tinea cruris    • Tinea corporis    • Throat clearing    • Sleep apnea    • Skin lesion of face    • Rib pain on left side    • Rectal bleed    • Presbycusis    • Pes planus    • Osteoarthritis    • Obesity    • Medicare annual wellness visit, subsequent    • Lumbar strain    • Internal hemorrhoids    • Insomnia    • Inflamed skin tag    • Hypothyroid    • Hyperplastic polyp of stomach    • Hospital discharge follow-up    • History of colon polyps    • High risk medication use    • Glaucoma    • Gastroenteritis, acute    • Erysipelas    • Encounter for screening colonoscopy    • Encounter for long-term (current) use of NSAIDs    • Dysphagia    • DVT (deep venous thrombosis) (CMS/[facility])    • DJD (degenerative joint disease), lumbar    • Diverticulosis    • Disequilibrium    • Cough    • Contact with hypodermic needle    • Cervical radiculopathy    • Cellulitis    • Arthritis    • Allergic rhinitis    • Acute glaucoma     • Acute embolism and thrombosis of vein    • Actinic keratosis    • Status post reverse total shoulder replacement, 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   • Localized edema 11/26/2019   • Anemia 11/26/2019   • Peripheral polyneuropathy 11/26/2019     Resolved Ambulatory Problems     Diagnosis Date Noted   • Acute DVT (deep venous thrombosis) (CMS/HCC) 06/03/2019   • Restless leg syndrome 07/11/2019   • Restless legs syndrome    • Restless leg    • Hyperlipidemia      Past Medical History:   Diagnosis Date   • Actinic keratosis    • Acute embolism and thrombosis of vein    • Allergic rhinitis    • Arthritis    • Cataract    • Cellulitis    • Cellulitis    • Cervical radiculopathy    • Contact with hypodermic needle    • Cough    • Disequilibrium    • Diverticulosis    • DJD (degenerative joint disease), lumbar    • DVT (deep venous thrombosis) (CMS/HCC)    • Dysphagia    • Encounter for long-term (current) use of NSAIDs    • Encounter for screening colonoscopy    • Erysipelas    • Gastroenteritis, acute    • GERD (gastroesophageal reflux disease)    • Glaucoma    • High risk medication use    • History of colon polyps    • Hospital discharge follow-up    • Hyperglycemia    • Hyperlipidemia    • Hyperplastic polyp of stomach    • Hypertension    • Hypothyroid    • Inflamed skin tag    • Insomnia    • Internal hemorrhoids    • Kidney stone    • Lumbar strain    • Male urinary stress incontinence    • Medicare annual wellness visit, subsequent    • Obesity    • KWAME (obstructive sleep apnea)    • Osteoarthritis    • Pes planus    • Presbycusis    • Prostate cancer (CMS/HCC)    • Prostate cancer (CMS/HCC)    • Rectal bleed    • Restless legs syndrome    • Rib pain on left side    • Shoulder pain    • Skin lesion of face    • Sleep apnea    • Throat clearing    • Tinea corporis    • Tinea cruris    • Venous stasis dermatitis          PAST SURGICAL HISTORY  Past Surgical History:   Procedure Laterality Date   • CATARACT EXTRACTION Right    •  COLONOSCOPY  2017    3/17normal. Recheck . . Repeat in 5 years    • ENDOSCOPY W/ PEG REMOVAL     • FOOT SURGERY       had big toe replaced on left foot   • HERNIA REPAIR      umbilical   • JOINT REPLACEMENT Right    • NECK SURGERY      cervical disc   • [place] TOTAL KNEE ARTHROPLASTY Left 2016    Procedure: LT TOTAL KNEE ARTHROPLASTY;  Surgeon: Tadeo Basurto MD;  Location: [facility] OR;  Service: Orthopedics   • PROSTATECTOMY  1999. Radical    • THYROID LOBECTOMY     • THYROID SURGERY      partial doesn't know which one was removed   • TONSILLECTOMY     • TOTAL KNEE ARTHROPLASTY Right    • TOTAL SHOULDER ARTHROPLASTY W/ DISTAL CLAVICLE EXCISION Right 2019    Procedure: TOTAL SHOULDER REVERSE ARTHROPLASTY RIGHT REPAIR RIGHT AXILLARY VEIN;  Surgeon: Behzad Kelley MD;  Location: [facility];  Service: Orthopedics   • WRIST SURGERY Right     x2  wrist replaced         FAMILY HISTORY  Family History   Problem Relation Age of Onset   • Cancer Mother         COLON   • Cancer Father         PROSTATE   • Cancer Sister         BREAST CANCER   • Breast cancer Sister    • Gout Sister    • Hypertension Sister    • Heart attack Brother    • Bone cancer Brother    • Heart disease Brother    • Malig Hyperthermia Neg Hx          SOCIAL HISTORY  Social History     Socioeconomic History   • Marital status:      Spouse name: Not on file   • Number of children: Not on file   • Years of education: Not on file   • Highest education level: Not on file   Tobacco Use   • Smoking status: Former Smoker     Packs/day: 1.00     Years: 20.00     Pack years: 20.00     Types: Cigarettes     Last attempt to quit: 1984     Years since quittin.9   • Smokeless tobacco: Never Used   Substance and Sexual Activity   • Alcohol use: Yes     Comment: 3-4 MONTHLY   • Drug use: No   • Sexual activity: Defer         ALLERGIES  Patient has no known allergies.        REVIEW OF  SYSTEMS  Review of Systems     All other ROS negative except as documented in HPI      PHYSICAL EXAM    I have reviewed the triage vital signs and nursing notes.    ED Triage Vitals [12/02/19 2126]   Temp Heart Rate Resp BP SpO2   98.5 °F (36.9 °C) 109 24 -- 96 %       GENERAL: ***not distressed  HENT: nares patent  EYES: no scleral icterus  CV: regular rhythm, regular rate, ***  RESPIRATORY: normal effort, ***  ABDOMEN: soft, ***  MUSCULOSKELETAL: no deformity  NEURO: alert, moves all extremities, follows commands  SKIN: warm, dry        LAB RESULTS  No results found for this or any previous visit (from the past 24 hour(s)).    Ordered the above labs and independently reviewed the results.        RADIOLOGY  No Radiology Exams Resulted Within Past 24 Hours    I ordered the above noted radiological studies. Independently reviewed by me and discussed with radiologist.  See dictation for official radiology interpretation.      PROCEDURES    Procedures    EKG          EKG time: ***  Rhythm/Rate: ***  P waves and UT: ***  QRS, axis: ***   ST and T waves: ***     Interpreted Contemporaneously by me, independently viewed  ***changed compared to prior ***      MEDICATIONS GIVEN IN ER    Medications - No data to display      PROGRESS, DATA ANALYSIS, CONSULTS, AND MEDICAL DECISION MAKING    All labs have been independently reviewed by me.  All radiology studies have been reviewed by me and discussed with radiologist dictating report.   EKG's independently reviewed by me.  Discussion below represents my analysis of pertinent findings related to patient's condition, differential diagnosis, treatment plan and final disposition.         --  ***.     --  AS OF 9:40 PM VITALS:    BP -    HR - 109  TEMP - 98.5 °F (36.9 °C) (Tympanic)  02 SATS - 96%        DIAGNOSIS  Final diagnoses:   None         DISPOSITION  ***    --  Documentation assistance provided by torie Barksdale for Dr. Edgar Reeder MD.  Information recorded by the  scribe was done at my direction and has been verified and validated by me.     Laura Barksdale  12/02/19 4542

## 2019-12-03 NOTE — TELEPHONE ENCOUNTER
S/w pt and let her know that I did call her back yesterday, the call wouldn't go through. I told her about the medication changes in the last note. She is going to call in the next few days if no better

## 2019-12-03 NOTE — TELEPHONE ENCOUNTER
Pt wife called to let you know she took him to [facility] ER last night. She stated there was no phone call returned yesterday, but it was documented in cart that the call was returned at 4:05 pm. She stated she will be waiting for a phone call today to discuss the ER visit.

## 2019-12-03 NOTE — ED PROVIDER NOTES
EMERGENCY DEPARTMENT ENCOUNTER    Room Number:  12/12  Date of encounter:  12/3/2019  PCP: Damien Pierce MD  Historian: Patient      HPI:  Chief Complaint: Shortness of breath and leg swelling  A complete HPI/ROS/PMH/PSH/SH/FH are unobtainable due to: Nothing    Context: Nathan Baez is a 74 y.o. male who presents to the ED c/o worsening shortness of breath for a week.  The patient says it is present all the time, but worse at night and when he is lying flat.  He also says that he has increasing lower extremity edema but not improving with compression stockings and diuretics as usual.    Context the patient had shoulder replacement surgery done about 3 weeks ago by Dr. Kelley and says that the postop recovery has gone well with respect to the shoulder.  His rehab is progressing nicely, he is got no significant pain and has had no fevers.    He says that he has had no cough, no chest pain, and is been very restless at night.  The wife states that he had a change in his restless leg syndrome medication from Mirapex to Neurontin about a week or so ago, and correlates the shortness of breath and heightened anxiety level with the start of that medication by the PCP.      PAST MEDICAL HISTORY  Active Ambulatory Problems     Diagnosis Date Noted   • OA (osteoarthritis) of knee 09/13/2016   • Hypertension    • Abdominal wall cellulitis 06/02/2019   • Hypothyroidism (acquired) 06/03/2019   • Gastroesophageal reflux disease without esophagitis 07/11/2019   • Mixed hyperlipidemia 07/11/2019   • Primary insomnia 07/11/2019   • DDD (degenerative disc disease), lumbar 07/11/2019   • KWAME (obstructive sleep apnea) 07/11/2019   • Hyperglycemia 07/11/2019   • Allergic 07/11/2019   • Venous insufficiency 07/11/2019   • Prostate cancer (CMS/HCC) 07/11/2019   • Venous stasis dermatitis    • Tinea cruris    • Tinea corporis    • Throat clearing    • Sleep apnea    • Skin lesion of face    • Rib pain on left side    • Rectal  bleed    • Presbycusis    • Pes planus    • Osteoarthritis    • Obesity    • Medicare annual wellness visit, subsequent    • Lumbar strain    • Internal hemorrhoids    • Insomnia    • Inflamed skin tag    • Hypothyroid    • Hyperplastic polyp of stomach    • Hospital discharge follow-up    • History of colon polyps    • High risk medication use    • Glaucoma    • Gastroenteritis, acute    • Erysipelas    • Encounter for screening colonoscopy    • Encounter for long-term (current) use of NSAIDs    • Dysphagia    • DVT (deep venous thrombosis) (CMS/HCC)    • DJD (degenerative joint disease), lumbar    • Diverticulosis    • Disequilibrium    • Cough    • Contact with hypodermic needle    • Cervical radiculopathy    • Cellulitis    • Arthritis    • Allergic rhinitis    • Acute glaucoma    • Acute embolism and thrombosis of vein    • Actinic keratosis    • Status post reverse total shoulder replacement, 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   • Localized edema 11/26/2019   • Anemia 11/26/2019   • Peripheral polyneuropathy 11/26/2019     Resolved Ambulatory Problems     Diagnosis Date Noted   • Acute DVT (deep venous thrombosis) (CMS/HCC) 06/03/2019   • Restless leg syndrome 07/11/2019   • Restless legs syndrome    • Restless leg    • Hyperlipidemia      Past Medical History:   Diagnosis Date   • Actinic keratosis    • Acute embolism and thrombosis of vein    • Allergic rhinitis    • Arthritis    • Cataract    • Cellulitis    • Cellulitis    • Cervical radiculopathy    • Contact with hypodermic needle    • Cough    • Disequilibrium    • Diverticulosis    • DJD (degenerative joint disease), lumbar    • DVT (deep venous thrombosis) (CMS/HCC)    • Dysphagia    • Encounter for long-term (current) use of NSAIDs    • Encounter for screening colonoscopy    • Erysipelas    • Gastroenteritis, acute    • GERD (gastroesophageal reflux disease)    • Glaucoma    • High risk medication use    • History of colon polyps    • Hospital discharge follow-up    •  Hyperglycemia    • Hyperlipidemia    • Hyperplastic polyp of stomach    • Hypertension    • Hypothyroid    • Inflamed skin tag    • Insomnia    • Internal hemorrhoids    • Kidney stone    • Lumbar strain    • Male urinary stress incontinence    • Medicare annual wellness visit, subsequent    • Obesity    • KWAME (obstructive sleep apnea)    • Osteoarthritis    • Pes planus    • Presbycusis    • Prostate cancer (CMS/HCC)    • Prostate cancer (CMS/HCC)    • Rectal bleed    • Restless legs syndrome    • Rib pain on left side    • Shoulder pain    • Skin lesion of face    • Sleep apnea    • Throat clearing    • Tinea corporis    • Tinea cruris    • Venous stasis dermatitis          PAST SURGICAL HISTORY  Past Surgical History:   Procedure Laterality Date   • CATARACT EXTRACTION Right    • COLONOSCOPY  03/2017    3/17normal. Recheck 2022. 12/11. Repeat in 5 years    • ENDOSCOPY W/ PEG REMOVAL     • FOOT SURGERY      1998 had big toe replaced on left foot   • HERNIA REPAIR      umbilical   • JOINT REPLACEMENT Right 2014   • NECK SURGERY      cervical disc   • [place] TOTAL KNEE ARTHROPLASTY Left 9/13/2016    Procedure: LT TOTAL KNEE ARTHROPLASTY;  Surgeon: Tadeo Basurto MD;  Location: [facility] OR;  Service: Orthopedics   • PROSTATECTOMY  07/1999 July 1999. Radical    • THYROID LOBECTOMY     • THYROID SURGERY  2011    partial doesn't know which one was removed   • TONSILLECTOMY     • TOTAL KNEE ARTHROPLASTY Right 2014   • TOTAL SHOULDER ARTHROPLASTY W/ DISTAL CLAVICLE EXCISION Right 11/13/2019    Procedure: TOTAL SHOULDER REVERSE ARTHROPLASTY RIGHT REPAIR RIGHT AXILLARY VEIN;  Surgeon: Behzad Kelley MD;  Location: [facility];  Service: Orthopedics   • WRIST SURGERY Right     x2  wrist replaced         FAMILY HISTORY  Family History   Problem Relation Age of Onset   • Cancer Mother         COLON   • Cancer Father         PROSTATE   • Cancer Sister         BREAST CANCER   • Breast cancer Sister    • Gout Sister    •  Hypertension Sister    • Heart attack Brother    • Bone cancer Brother    • Heart disease Brother    • Malig Hyperthermia Neg Hx          SOCIAL HISTORY  Social History     Socioeconomic History   • Marital status:      Spouse name: Not on file   • Number of children: Not on file   • Years of education: Not on file   • Highest education level: Not on file   Tobacco Use   • Smoking status: Former Smoker     Packs/day: 1.00     Years: 20.00     Pack years: 20.00     Types: Cigarettes     Last attempt to quit: 1984     Years since quittin.9   • Smokeless tobacco: Never Used   Substance and Sexual Activity   • Alcohol use: Yes     Comment: 3-4 MONTHLY   • Drug use: No   • Sexual activity: Defer         ALLERGIES  Patient has no known allergies.        REVIEW OF SYSTEMS  Review of Systems     All systems reviewed and negative except for those discussed in HPI.       PHYSICAL EXAM    I have reviewed the triage vital signs and nursing notes.    ED Triage Vitals [19]   Temp Heart Rate Resp BP SpO2   98.5 °F (36.9 °C) 109 24 121/71 96 %      Temp src Heart Rate Source Patient Position BP Location FiO2 (%)   Tympanic Monitor -- -- --       Physical Exam  GENERAL: Looks anxious and uncomfortable  HENT: nares patent, no JVD  EYES: no scleral icterus  CV: regular rhythm, regular rate  RESPIRATORY: normal effort, diminished breath sounds in the bases but no rales  ABDOMEN: soft, obese but nontender to palpation  MUSCULOSKELETAL: no deformity, trace pedal edema bilaterally to the knees, calves soft nontender  NEURO: alert, moves all extremities, follows commands  SKIN: warm, dry        LAB RESULTS  Recent Results (from the past 24 hour(s))   Comprehensive Metabolic Panel    Collection Time: 19  9:54 PM   Result Value Ref Range    Glucose 129 (H) 65 - 99 mg/dL    BUN 19 8 - 23 mg/dL    Creatinine 1.04 0.76 - 1.27 mg/dL    Sodium 143 136 - 145 mmol/L    Potassium 3.4 (L) 3.5 - 5.2 mmol/L    Chloride  103 98 - 107 mmol/L    CO2 28.2 22.0 - 29.0 mmol/L    Calcium 8.5 (L) 8.6 - 10.5 mg/dL    Total Protein 6.8 6.0 - 8.5 g/dL    Albumin 3.90 3.50 - 5.20 g/dL    ALT (SGPT) 26 1 - 41 U/L    AST (SGOT) 26 1 - 40 U/L    Alkaline Phosphatase 180 (H) 39 - 117 U/L    Total Bilirubin 0.5 0.2 - 1.2 mg/dL    eGFR Non African Amer 70 >60 mL/min/1.73    Globulin 2.9 gm/dL    A/G Ratio 1.3 g/dL    BUN/Creatinine Ratio 18.3 7.0 - 25.0    Anion Gap 11.8 5.0 - 15.0 mmol/L   Protime-INR    Collection Time: 12/02/19  9:54 PM   Result Value Ref Range    Protime 13.2 11.7 - 14.2 Seconds    INR 1.03 0.90 - 1.10   BNP    Collection Time: 12/02/19  9:54 PM   Result Value Ref Range    proBNP 350.9 5.0 - 900.0 pg/mL   D-dimer, Quantitative    Collection Time: 12/02/19  9:54 PM   Result Value Ref Range    D-Dimer, Quantitative 3.18 (H) 0.00 - 0.49 MCGFEU/mL   Troponin    Collection Time: 12/02/19  9:54 PM   Result Value Ref Range    Troponin T <0.010 0.000 - 0.030 ng/mL   CBC Auto Differential    Collection Time: 12/02/19  9:54 PM   Result Value Ref Range    WBC 3.99 3.40 - 10.80 10*3/mm3    RBC 3.04 (L) 4.14 - 5.80 10*6/mm3    Hemoglobin 8.3 (L) 13.0 - 17.7 g/dL    Hematocrit 26.5 (L) 37.5 - 51.0 %    MCV 87.2 79.0 - 97.0 fL    MCH 27.3 26.6 - 33.0 pg    MCHC 31.3 (L) 31.5 - 35.7 g/dL    RDW 14.7 12.3 - 15.4 %    RDW-SD 46.9 37.0 - 54.0 fl    MPV 9.8 6.0 - 12.0 fL    Platelets 204 140 - 450 10*3/mm3    Neutrophil % 62.4 42.7 - 76.0 %    Lymphocyte % 18.0 (L) 19.6 - 45.3 %    Monocyte % 14.8 (H) 5.0 - 12.0 %    Eosinophil % 2.5 0.3 - 6.2 %    Basophil % 0.5 0.0 - 1.5 %    Immature Grans % 1.8 (H) 0.0 - 0.5 %    Neutrophils, Absolute 2.49 1.70 - 7.00 10*3/mm3    Lymphocytes, Absolute 0.72 0.70 - 3.10 10*3/mm3    Monocytes, Absolute 0.59 0.10 - 0.90 10*3/mm3    Eosinophils, Absolute 0.10 0.00 - 0.40 10*3/mm3    Basophils, Absolute 0.02 0.00 - 0.20 10*3/mm3    Immature Grans, Absolute 0.07 (H) 0.00 - 0.05 10*3/mm3    nRBC 0.3 (H) 0.0 - 0.2 /100  WBC       Ordered the above labs and independently reviewed the results.        RADIOLOGY  Xr Chest 2 View    Result Date: 12/3/2019  PA AND LATERAL CHEST X-RAY  HISTORY: soa  COMPARISON: 06/02/2019.  FINDINGS: PA and lateral views of the chest were obtained. Lungs are slightly under inflated. There is some mild basilar atelectasis likely due to mild under aeration. Mild cardiomegaly. Vascularity felt to be normal. No significant pleural fluid.        Mild under aeration with suspected mild basilar atelectasis  This report was finalized on 12/3/2019 5:36 AM by Sergio Her M.D.      Ct Angiogram Chest    Result Date: 12/3/2019  CT ANGIOGRAM THORAX WITH CONTRAST, PULMONARY EMBOLISM PROTOCOL  HISTORY: Pulmonary embolism.  COMPARISON: 07/04/2019.  TECHNIQUE: Radiation dose reduction techniques were utilized, including automated exposure control and exposure modulation based on body size. Axial contrast-enhanced images of the chest were obtained according to the pulmonary embolism protocol. Coronal oblique 3-D MIP reformatted images were supplemented and reviewed.  100 mls of non ionic contrast was utilized intravenously.  FINDINGS CHEST CT: Lungs are fairly well inflated. Linear configured densities in the lung bases likely areas of scarring and/or atelectasis. There are calcified residua of granulomatous disease present. There is no convincing evidence of active air space disease process otherwise. No effusion or pulmonary embolism. No adenopathy. Aorta nonaneurysmal. There are numerous old appearing rib deformities, some on the left side are ununited but unchanged. There is partial visualization of a large left renal cyst which was present on the previous exam        1. Mild bibasilar scarring and/or atelectasis, no active disease or pulmonary embolism. 2. Stable left renal cyst  This report was finalized on 12/3/2019 5:37 AM by Sergio Her M.D.        I ordered the above noted radiological studies. Reviewed by  me and discussed with radiologist.  See dictation for official radiology interpretation.      PROCEDURES    Procedures      MEDICATIONS GIVEN IN ER    Medications   iopamidol (ISOVUE-370) 76 % injection 100 mL (99 mL Intravenous Given by Other 12/2/19 1231)         PROGRESS, DATA ANALYSIS, CONSULTS, AND MEDICAL DECISION MAKING    All labs have been independently reviewed by me.  All radiology studies have been reviewed by me and discussed with radiologist dictating the report.   EKG's independently viewed and interpreted by me.  Discussion below represents my analysis of pertinent findings related to patient's condition, differential diagnosis, treatment plan and final disposition.        ED Course as of Dec 03 0633   Tue Dec 03, 2019   0631 CBC shows hemoglobin 8.3 which is stable compared to hemoglobin at discharge 2 weeks ago.  His chemistries unremarkable, troponin negative, proBNP negative and d-dimer elevated.  [DP]   0631 Because of elevated d-dimer being postop, a CTA of the chest was performed which showed no evidence of pulmonary embolus, no pulmonary edema and no infiltrate  [DP]   0631 Based on this evaluation, there is no evidence for congestive heart failure, PE, ACS.  I turned off his O2 and he maintained his sats without difficulty.  I talked to the wife about the medications, and I feel like part of his agitation may be medication induced and he should switch back to the Mirapex and see how that works  [DP]   0632 EKG at 2132  Times rhythm 98  SD, QRS QT, there are occasional PVCs  No acute ST segment abnormalities and this is unchanged compared to June 2, 2019  [DP]      ED Course User Index  [DP] Vu Lopez MD           AS OF 6:33 AM VITALS:    BP - 128/74  HR - 91  TEMP - 98.5 °F (36.9 °C) (Tympanic)  02 SATS - 93%        DIAGNOSIS  Final diagnoses:   Dyspnea, unspecified type   Restless legs         DISPOSITION  Discharge           Vu Lopez MD  12/03/19 0633       Vu Lopez,  MD  12/03/19 0640

## 2019-12-04 ENCOUNTER — TREATMENT (OUTPATIENT)
Dept: PHYSICAL THERAPY | Facility: CLINIC | Age: 74
End: 2019-12-04

## 2019-12-04 DIAGNOSIS — M25.511 ACUTE PAIN OF RIGHT SHOULDER: Primary | ICD-10-CM

## 2019-12-04 DIAGNOSIS — Z96.611 STATUS POST REVERSE TOTAL REPLACEMENT OF RIGHT SHOULDER: ICD-10-CM

## 2019-12-04 PROCEDURE — 97140 MANUAL THERAPY 1/> REGIONS: CPT | Performed by: PHYSICAL THERAPIST

## 2019-12-04 PROCEDURE — 97110 THERAPEUTIC EXERCISES: CPT | Performed by: PHYSICAL THERAPIST

## 2019-12-04 PROCEDURE — G0283 ELEC STIM OTHER THAN WOUND: HCPCS | Performed by: PHYSICAL THERAPIST

## 2019-12-04 NOTE — PROGRESS NOTES
Physical Therapy Daily Progress Note  Visit: 3    Nathan Baez reports: I ended up going to ER for shortness of breath and they did a full work up and said that it was most likely anxiety. I have been trying to move around more. My shoulder is still doing well    Subjective     Objective   See Exercise, Manual, and Modality Logs for complete treatment.       Assessment & Plan     Assessment  Assessment details: Shoulder motion continues to be very good. Pain is well-controlled    Plan  Plan details: Progress per protocol        Manual Therapy:    10     mins  46253;  Therapeutic Exercise:    32     mins  18849;     Neuromuscular Jon:    -    mins  95332;    Therapeutic Activity:     -     mins  90378;     Gait Training:      -     mins  77767;     Ultrasound:     -     mins  13050;    Electrical Stimulation:    15     mins  85578 ( );  Dry Needling     -     mins self-pay    Timed Treatment:   42   mins   Total Treatment:     60   mins    Elizabeth Chavez, PT  [place] License #: [id number]    Physical Therapist

## 2019-12-06 ENCOUNTER — TREATMENT (OUTPATIENT)
Dept: PHYSICAL THERAPY | Facility: CLINIC | Age: 74
End: 2019-12-06

## 2019-12-06 DIAGNOSIS — M25.511 ACUTE PAIN OF RIGHT SHOULDER: Primary | ICD-10-CM

## 2019-12-06 DIAGNOSIS — Z96.611 STATUS POST REVERSE TOTAL REPLACEMENT OF RIGHT SHOULDER: ICD-10-CM

## 2019-12-06 PROCEDURE — 97140 MANUAL THERAPY 1/> REGIONS: CPT | Performed by: PHYSICAL THERAPIST

## 2019-12-06 PROCEDURE — G0283 ELEC STIM OTHER THAN WOUND: HCPCS | Performed by: PHYSICAL THERAPIST

## 2019-12-06 PROCEDURE — 97110 THERAPEUTIC EXERCISES: CPT | Performed by: PHYSICAL THERAPIST

## 2019-12-06 NOTE — PROGRESS NOTES
Physical Therapy Daily Progress Note  Visit: 4    Nathan Velezkett reports: The shoulder is doing well. I think it is less swollen    Subjective     Objective   See Exercise, Manual, and Modality Logs for complete treatment.       Assessment & Plan     Assessment  Assessment details: Pt doing very well. Had no increase in sx's with new exercises. ROM is very good, will progress with strengthening as able    Plan  Plan details: Body Blade at neutral next session and add bands to HEP        Manual Therapy:    10     mins  00465;  Therapeutic Exercise:    31     mins  21324;     Neuromuscular Jon:    -    mins  69272;    Therapeutic Activity:     -     mins  48431;     Gait Training:      -     mins  34112;     Ultrasound:     -     mins  95097;    Electrical Stimulation:    15     mins  04720 ( );  Dry Needling     -     mins self-pay    Timed Treatment:   41   mins   Total Treatment:     60   mins    Elizabeth Cahvez PT  [place] License #: [id number]    Physical Therapist       210

## 2019-12-09 ENCOUNTER — TREATMENT (OUTPATIENT)
Dept: PHYSICAL THERAPY | Facility: CLINIC | Age: 74
End: 2019-12-09

## 2019-12-09 DIAGNOSIS — M25.511 ACUTE PAIN OF RIGHT SHOULDER: Primary | ICD-10-CM

## 2019-12-09 DIAGNOSIS — Z96.611 STATUS POST REVERSE TOTAL REPLACEMENT OF RIGHT SHOULDER: ICD-10-CM

## 2019-12-09 PROCEDURE — 97110 THERAPEUTIC EXERCISES: CPT | Performed by: PHYSICAL THERAPIST

## 2019-12-09 PROCEDURE — 97140 MANUAL THERAPY 1/> REGIONS: CPT | Performed by: PHYSICAL THERAPIST

## 2019-12-09 NOTE — PROGRESS NOTES
Physical Therapy Daily Progress Note  Visit: 5    Nathan Baez reports: My shoulder is doing fine, but my leg is really hurting me today. Is it ok to use a cane in my right hand?    Subjective     Objective   See Exercise, Manual, and Modality Logs for complete treatment.       Assessment & Plan     Assessment  Assessment details: Pt able to ambulate with cane in right hand without increased soreness so I told him this was fine to do. His shoulder is doing excellent.    Plan  Plan details: Progress per ptocol        Manual Therapy:    8     mins  44290;  Therapeutic Exercise:    34     mins  74970;     Neuromuscular Jon:    -    mins  80341;    Therapeutic Activity:     -     mins  35057;     Gait Training:      -     mins  19428;     Ultrasound:     -     mins  61419;    Electrical Stimulation:    -     mins  06623 ( );  Dry Needling     -     mins self-pay    Timed Treatment:   42   mins   Total Treatment:     50   mins    Elizabeth Chavez PT  [place] License #: [id number]    Physical Therapist

## 2019-12-10 DIAGNOSIS — E78.5 HYPERLIPIDEMIA, UNSPECIFIED HYPERLIPIDEMIA TYPE: Primary | ICD-10-CM

## 2019-12-10 RX ORDER — ROSUVASTATIN CALCIUM 20 MG/1
20 TABLET, COATED ORAL EVERY EVENING
Qty: 90 TABLET | Refills: 1 | Status: SHIPPED | OUTPATIENT
Start: 2019-12-10 | End: 2020-06-12

## 2019-12-11 ENCOUNTER — READMISSION MANAGEMENT (OUTPATIENT)
Dept: CALL CENTER | Facility: HOSPITAL | Age: 74
End: 2019-12-11

## 2019-12-11 ENCOUNTER — TREATMENT (OUTPATIENT)
Dept: PHYSICAL THERAPY | Facility: CLINIC | Age: 74
End: 2019-12-11

## 2019-12-11 DIAGNOSIS — Z96.611 STATUS POST REVERSE TOTAL REPLACEMENT OF RIGHT SHOULDER: ICD-10-CM

## 2019-12-11 DIAGNOSIS — M25.511 ACUTE PAIN OF RIGHT SHOULDER: Primary | ICD-10-CM

## 2019-12-11 PROCEDURE — 97110 THERAPEUTIC EXERCISES: CPT | Performed by: PHYSICAL THERAPIST

## 2019-12-11 PROCEDURE — 97140 MANUAL THERAPY 1/> REGIONS: CPT | Performed by: PHYSICAL THERAPIST

## 2019-12-11 NOTE — PROGRESS NOTES
Physical Therapy Daily Progress Note  Visit: 6    Nathan Baez reports: My shoulder is doing great. Everything else on my body is sore though    Subjective     Objective   See Exercise, Manual, and Modality Logs for complete treatment.       Assessment & Plan     Assessment  Assessment details: Pt is doing excellent with his right shoulder function. Has more issues with endurance and standing than with his shoulder pain    Plan  Plan details: Progress per protocol        Manual Therapy:    10     mins  02919;  Therapeutic Exercise:    29     mins  72705;     Neuromuscular Jon:    -    mins  79412;    Therapeutic Activity:     -     mins  54753;     Gait Training:      -     mins  63033;     Ultrasound:     -     mins  20406;    Electrical Stimulation:    -     mins  12721 ( );  Dry Needling     -     mins self-pay    Timed Treatment:   39   mins   Total Treatment:     42   mins    Elizabeth Chavez PT  [place] License #: [id number]    Physical Therapist

## 2019-12-11 NOTE — OUTREACH NOTE
Total Joint Month 1 Survey      Responses   Facility patient discharged from?  [facility]   Does the patient have one of the following disease processes/diagnoses(primary or secondary)?  Total Joint Replacement   Joint surgery performed?  Shoulder   Month 1 attempt successful?  Yes   Call start time  1452   Call end time  1500   Has the patient been back in either the hospital or Emergency Department since discharge?  Yes   If the patient has been back to hospital or Emergency Department list date and reason  pt reports he went to ED r/t SOB   Discharge diagnosis  s/p reverse total shoulder replacement right   Has the patient kept scheduled appointments due by today?  Yes   Comments  Pt will see surgeon January 6th   Is the patient still attending therapy sessions(either in the home or as an outpatient)?  Yes [3x week and doing home excercises ]   Has the patient fallen since discharge?  No   What is the patient's perception of their functional status since discharge?  Improving   Is the patient/caregiver able to teach back the hierarchy of who to call/visit for symptoms/problems? PCP, Specialist, Home health nurse, Urgent Care, ED, 911  Yes   Month 1 call completed?  Yes   Wrap up additional comments  Pt denies any concerns/questions at this time.   Reports he is doing well.           Ines Katz, RN

## 2019-12-13 ENCOUNTER — TREATMENT (OUTPATIENT)
Dept: PHYSICAL THERAPY | Facility: CLINIC | Age: 74
End: 2019-12-13

## 2019-12-13 DIAGNOSIS — Z96.611 STATUS POST REVERSE TOTAL REPLACEMENT OF RIGHT SHOULDER: ICD-10-CM

## 2019-12-13 DIAGNOSIS — M25.511 ACUTE PAIN OF RIGHT SHOULDER: Primary | ICD-10-CM

## 2019-12-13 PROCEDURE — 97110 THERAPEUTIC EXERCISES: CPT | Performed by: PHYSICAL THERAPIST

## 2019-12-13 RX ORDER — LOSARTAN POTASSIUM 100 MG/1
100 TABLET ORAL DAILY
Qty: 90 TABLET | Refills: 0 | Status: SHIPPED | OUTPATIENT
Start: 2019-12-13 | End: 2020-02-28

## 2019-12-13 NOTE — PROGRESS NOTES
Physical Therapy Daily Progress Note  Visit: 7    Nathan Baez reports: Shoulder is doing great    Subjective     Objective   See Exercise, Manual, and Modality Logs for complete treatment.       Assessment & Plan     Assessment  Assessment details: Increased fatigue and SOA today. His shoulder mobility and strength is doing great, but he has poor standing posture and SOA limiting standing exercises. He has already been cleared for PE on 12/2.    Plan  Plan details: Progress as able        Manual Therapy:    4     mins  20425;  Therapeutic Exercise:    41     mins  74142;     Neuromuscular Jon:    -    mins  76959;    Therapeutic Activity:     -     mins  82260;     Gait Training:      -     mins  05455;     Ultrasound:     -     mins  84721;    Electrical Stimulation:    -     mins  61725 ( );  Dry Needling     -     mins self-pay    Timed Treatment:   45   mins   Total Treatment:     46   mins    CITLALLI Cedeno License #: [id number]    Physical Therapist

## 2019-12-16 ENCOUNTER — TREATMENT (OUTPATIENT)
Dept: PHYSICAL THERAPY | Facility: CLINIC | Age: 74
End: 2019-12-16

## 2019-12-16 DIAGNOSIS — Z96.611 STATUS POST REVERSE TOTAL REPLACEMENT OF RIGHT SHOULDER: ICD-10-CM

## 2019-12-16 DIAGNOSIS — M25.511 ACUTE PAIN OF RIGHT SHOULDER: Primary | ICD-10-CM

## 2019-12-16 PROCEDURE — 97110 THERAPEUTIC EXERCISES: CPT | Performed by: PHYSICAL THERAPIST

## 2019-12-16 PROCEDURE — 97140 MANUAL THERAPY 1/> REGIONS: CPT | Performed by: PHYSICAL THERAPIST

## 2019-12-16 NOTE — PROGRESS NOTES
Physical Therapy Daily Progress Note  Visit: 8    Nathan Baez reports: I really overdid it yesterday and I am paying for it today. In my whole body.     Subjective     Objective   See Exercise, Manual, and Modality Logs for complete treatment.       Assessment & Plan     Assessment  Assessment details: Pt needs cues for erect posture with exercise and continues to have weakness in ER and flexion plane, but is still progressing well    Plan  Plan details: Progress per protocol        Manual Therapy:    10     mins  12478;  Therapeutic Exercise:    35     mins  25589;     Neuromuscular Jon:    -    mins  60202;    Therapeutic Activity:     -     mins  28844;     Gait Training:      -     mins  70045;     Ultrasound:     -     mins  92196;    Electrical Stimulation:    -     mins  68536 ( );  Dry Needling     -     mins self-pay    Timed Treatment:   45   mins   Total Treatment:     50   mins    Elizabeth Chavez PT  [place] License #: [id number]    Physical Therapist

## 2019-12-17 ENCOUNTER — OFFICE VISIT (OUTPATIENT)
Dept: FAMILY MEDICINE CLINIC | Facility: CLINIC | Age: 74
End: 2019-12-17

## 2019-12-17 VITALS
DIASTOLIC BLOOD PRESSURE: 91 MMHG | SYSTOLIC BLOOD PRESSURE: 167 MMHG | TEMPERATURE: 97.8 F | HEIGHT: 67 IN | OXYGEN SATURATION: 93 % | BODY MASS INDEX: 41.33 KG/M2 | WEIGHT: 263.3 LBS | HEART RATE: 84 BPM

## 2019-12-17 DIAGNOSIS — R60.0 LOCALIZED EDEMA: Primary | ICD-10-CM

## 2019-12-17 DIAGNOSIS — D62 ANEMIA DUE TO ACUTE BLOOD LOSS: ICD-10-CM

## 2019-12-17 DIAGNOSIS — E03.9 HYPOTHYROIDISM (ACQUIRED): ICD-10-CM

## 2019-12-17 DIAGNOSIS — M51.36 DDD (DEGENERATIVE DISC DISEASE), LUMBAR: ICD-10-CM

## 2019-12-17 PROCEDURE — 99214 OFFICE O/P EST MOD 30 MIN: CPT | Performed by: FAMILY MEDICINE

## 2019-12-17 RX ORDER — POTASSIUM CHLORIDE 750 MG/1
10 TABLET, FILM COATED, EXTENDED RELEASE ORAL 2 TIMES DAILY
Qty: 60 TABLET | Refills: 3 | Status: SHIPPED | OUTPATIENT
Start: 2019-12-17 | End: 2020-02-13 | Stop reason: SDUPTHER

## 2019-12-17 RX ORDER — FUROSEMIDE 40 MG/1
TABLET ORAL
Qty: 60 TABLET | Refills: 1 | Status: SHIPPED | OUTPATIENT
Start: 2019-12-17 | End: 2020-02-13 | Stop reason: SDUPTHER

## 2019-12-17 RX ORDER — TRAMADOL HYDROCHLORIDE 50 MG/1
50 TABLET ORAL EVERY 6 HOURS PRN
Qty: 60 TABLET | Refills: 2 | Status: SHIPPED | OUTPATIENT
Start: 2019-12-17 | End: 2020-06-08

## 2019-12-17 RX ORDER — LEVOTHYROXINE SODIUM 88 UG/1
88 TABLET ORAL EVERY MORNING
Qty: 90 TABLET | Refills: 3 | Status: SHIPPED | OUTPATIENT
Start: 2019-12-17 | End: 2020-11-08 | Stop reason: SDUPTHER

## 2019-12-17 NOTE — PROGRESS NOTES
Chief Complaint   Patient presents with   • Shortness of Breath   • Restless Legs Syndrome   • edema in bilateral ankles       Subjective   Nathan Baez is a 74 y.o. male.     History of Present Illness   C/o increased SOA and difficulty with orthopnea.  Going on for 4 weeks.  Went to ER 12/3 and CT angiogram with no PE with old rib fractures on L side.  form ER.    Large L renal cyst.  Mild bibasilar scarring or atelectasis.  Had R shoulder total replacement 11/13. Up 9 lbs from 11/13/19. EF normal at 62% in 2018. R arm swollen more as well.    F/U anemia.  On iron once a day since 12/5.  F/U hypothyroidism.  On meds regularly.  Continue levothyroxine 88 once a day.     FU lumbar DDD.  Increased pain in legs and back.       The following portions of the patient's history were reviewed and updated as appropriate: allergies, current medications, past family history, past medical history, past social history, past surgical history and problem list.    Review of Systems   Constitutional: Negative for appetite change and fatigue.   HENT: Negative for nosebleeds and sore throat.    Eyes: Negative for blurred vision and visual disturbance.   Respiratory: Positive for shortness of breath. Negative for wheezing.    Cardiovascular: Positive for leg swelling. Negative for chest pain.   Gastrointestinal: Negative for abdominal distention and abdominal pain.   Endocrine: Negative for cold intolerance and polyuria.   Genitourinary: Negative for dysuria and hematuria.   Musculoskeletal: Negative for arthralgias and myalgias.   Skin: Negative for color change and rash.   Neurological: Negative for weakness and confusion.   Psychiatric/Behavioral: Negative for agitation and depressed mood.       Patient Active Problem List   Diagnosis   • OA (osteoarthritis) of knee   • Hypertension   • Abdominal wall cellulitis   • Hypothyroidism (acquired)   • Gastroesophageal reflux disease without esophagitis   • Mixed hyperlipidemia    • Primary insomnia   • DDD (degenerative disc disease), lumbar   • KWAME (obstructive sleep apnea)   • Hyperglycemia   • Allergic   • Venous insufficiency   • Prostate cancer (CMS/HCC)   • Venous stasis dermatitis   • Tinea cruris   • Tinea corporis   • Throat clearing   • Sleep apnea   • Skin lesion of face   • Rib pain on left side   • Rectal bleed   • Presbycusis   • Pes planus   • Osteoarthritis   • Obesity   • Medicare annual wellness visit, subsequent   • Lumbar strain   • Internal hemorrhoids   • Insomnia   • Inflamed skin tag   • Hypothyroid   • Hyperplastic polyp of stomach   • Hospital discharge follow-up   • History of colon polyps   • High risk medication use   • Glaucoma   • Gastroenteritis, acute   • Erysipelas   • Encounter for screening colonoscopy   • Encounter for long-term (current) use of NSAIDs   • Dysphagia   • DVT (deep venous thrombosis) (CMS/HCC)   • DJD (degenerative joint disease), lumbar   • Diverticulosis   • Disequilibrium   • Cough   • Contact with hypodermic needle   • Cervical radiculopathy   • Cellulitis   • Arthritis   • Allergic rhinitis   • Acute glaucoma   • Acute embolism and thrombosis of vein   • Actinic keratosis   • Status post reverse total shoulder replacement, right   • Localized edema   • Anemia   • Peripheral polyneuropathy       No Known Allergies      Current Outpatient Medications:   •  acetaminophen (TYLENOL) 650 MG 8 hr tablet, Take 1,300 mg by mouth 2 (Two) Times a Day., Disp: , Rfl:   •  acetaminophen-codeine (TYLENOL #3) 300-30 MG per tablet, Take 1 tablet by mouth Every 4 (Four) Hours As Needed for Moderate Pain ., Disp: , Rfl:   •  albuterol (PROVENTIL HFA;VENTOLIN HFA) 108 (90 Base) MCG/ACT inhaler, Inhale 2 puffs Every 4 (Four) Hours As Needed for Wheezing., Disp: , Rfl:   •  ALLERGY SERUM INJECTION, Inject  under the skin into the appropriate area as directed 1 (One) Time Per Week., Disp: , Rfl:   •  aspirin 81 MG tablet, Take 1 tablet by mouth Daily.,  Disp: , Rfl:   •  azelastine (ASTELIN) 0.1 % nasal spray, 2 sprays into the nostril(s) as directed by provider 2 (Two) Times a Day. Use in each nostril as directed, Disp: , Rfl:   •  BRIMONIDINE TARTRATE OP, Apply 1 drop to eye(s) as directed by provider 2 (Two) Times a Day., Disp: , Rfl:   •  dorzolamide-timolol (COSOPT) 22.3-6.8 MG/ML ophthalmic solution, Administer 1 drop to both eyes 2 (Two) Times a Day., Disp: , Rfl:   •  fexofenadine (ALLEGRA) 180 MG tablet, Take 180 mg by mouth Daily., Disp: , Rfl:   •  Fluticasone Furoate-Vilanterol (BREO ELLIPTA IN), Inhale 1 puff Every Morning., Disp: , Rfl:   •  furosemide (LASIX) 40 MG tablet, One Bid for 3 days then daily, Disp: 60 tablet, Rfl: 1  •  latanoprost (XALATAN) 0.005 % ophthalmic solution, Administer 1 drop to both eyes Every Night., Disp: , Rfl:   •  Latanoprost 0.005 % emulsion, Apply 1 drop to eye(s) as directed by provider Every Night., Disp: , Rfl:   •  levothyroxine (SYNTHROID) 88 MCG tablet, Take 1 tablet by mouth Every Morning., Disp: 90 tablet, Rfl: 3  •  losartan (COZAAR) 100 MG tablet, Take 1 tablet by mouth Daily., Disp: 90 tablet, Rfl: 0  •  montelukast (SINGULAIR) 10 MG tablet, Take 1 tablet by mouth Every Night., Disp: 90 tablet, Rfl: 3  •  pantoprazole (PROTONIX) 40 MG EC tablet, Take 40 mg by mouth 2 (Two) Times a Day., Disp: , Rfl:   •  pramipexole (MIRAPEX) 1 MG tablet, Take 1.5 tablets qhs at bed time, Disp: 135 tablet, Rfl: 1  •  rosuvastatin (CRESTOR) 20 MG tablet, Take 1 tablet by mouth Every Evening., Disp: 90 tablet, Rfl: 1  •  potassium chloride (K-DUR) 10 MEQ CR tablet, Take 1 tablet by mouth 2 (Two) Times a Day., Disp: 60 tablet, Rfl: 3  •  traMADol (ULTRAM) 50 MG tablet, Take 1 tablet by mouth Every 6 (Six) Hours As Needed for Moderate Pain ., Disp: 60 tablet, Rfl: 2    Past Medical History:   Diagnosis Date   • Actinic keratosis    • Acute embolism and thrombosis of vein    • Allergic rhinitis    • Arthritis    • Cataract      forming left eye   • Cellulitis    • Cellulitis    • Cervical radiculopathy    • Contact with hypodermic needle    • Cough    • Disequilibrium    • Diverticulosis    • DJD (degenerative joint disease), lumbar    • DVT (deep venous thrombosis) (CMS/HCC)     l leg  dx 2019 and was on blood thinner and ow off wears compression    • Dysphagia    • Encounter for long-term (current) use of NSAIDs    • Encounter for screening colonoscopy    • Erysipelas    • Gastroenteritis, acute    • GERD (gastroesophageal reflux disease)    • Glaucoma    • High risk medication use    • History of colon polyps    • Hospital discharge follow-up    • Hyperglycemia    • Hyperlipidemia    • Hyperplastic polyp of stomach    • Hypertension    • Hypothyroid    • Inflamed skin tag    • Insomnia    • Internal hemorrhoids    • Kidney stone     has had history of passing and still has kidney stone on both sides    • Lumbar strain    • Male urinary stress incontinence     s/p prostatectomy   • Medicare annual wellness visit, subsequent    • Obesity    • KWAME (obstructive sleep apnea)    • Osteoarthritis    • Pes planus    • Presbycusis    • Prostate cancer (CMS/HCC)    • Prostate cancer (CMS/HCC)    • Rectal bleed    • Restless legs syndrome    • Rib pain on left side    • Shoulder pain    • Skin lesion of face    • Sleep apnea     bipap   • Throat clearing    • Tinea corporis    • Tinea cruris    • Venous stasis dermatitis        Past Surgical History:   Procedure Laterality Date   • CATARACT EXTRACTION Right    • COLONOSCOPY  03/2017    3/17normal. Recheck 2022. 12/11. Repeat in 5 years    • ENDOSCOPY W/ PEG REMOVAL     • FOOT SURGERY      1998 had big toe replaced on left foot   • HERNIA REPAIR      umbilical   • JOINT REPLACEMENT Right 2014   • NECK SURGERY      cervical disc   • IL TOTAL KNEE ARTHROPLASTY Left 9/13/2016    Procedure: LT TOTAL KNEE ARTHROPLASTY;  Surgeon: Tadeo Basurto MD;  Location: [facility] OR;  Service: Orthopedics   •  PROSTATECTOMY  1999. Radical    • THYROID LOBECTOMY     • THYROID SURGERY      partial doesn't know which one was removed   • TONSILLECTOMY     • TOTAL KNEE ARTHROPLASTY Right 2014   • TOTAL SHOULDER ARTHROPLASTY W/ DISTAL CLAVICLE EXCISION Right 2019    Procedure: TOTAL SHOULDER REVERSE ARTHROPLASTY RIGHT REPAIR RIGHT AXILLARY VEIN;  Surgeon: Behzad Kelley MD;  Location: [facility] OR [facility];  Service: Orthopedics   • WRIST SURGERY Right     x2  wrist replaced       Family History   Problem Relation Age of Onset   • Cancer Mother         COLON   • Cancer Father         PROSTATE   • Cancer Sister         BREAST CANCER   • Breast cancer Sister    • Gout Sister    • Hypertension Sister    • Heart attack Brother    • Bone cancer Brother    • Heart disease Brother    • Malig Hyperthermia Neg Hx        Social History     Tobacco Use   • Smoking status: Former Smoker     Packs/day: 1.00     Years: 20.00     Pack years: 20.00     Types: Cigarettes     Last attempt to quit: 1984     Years since quittin.9   • Smokeless tobacco: Never Used   Substance Use Topics   • Alcohol use: Yes     Comment: 3-4 MONTHLY            Objective     Vitals:    19 1042   BP: 167/91   Pulse: 84   Temp: 97.8 °F (36.6 °C)   SpO2: 93%     Body mass index is 41.24 kg/m².    Physical Exam   Constitutional: He appears well-developed and well-nourished.   HENT:   Head: Normocephalic and atraumatic.   Mouth/Throat: Oropharynx is clear and moist.   Eyes: Pupils are equal, round, and reactive to light. No scleral icterus.   Neck: No thyromegaly present.   Cardiovascular: Normal rate and regular rhythm. Exam reveals no gallop and no friction rub.   No murmur heard.  1 plus edema bilateral legs distal to knees.   Pulmonary/Chest: Effort normal. No respiratory distress. He has no wheezes. He has no rales. He exhibits no tenderness.   Abdominal: Soft. Bowel sounds are normal. He exhibits no distension. There is no  tenderness.   Musculoskeletal: Normal range of motion. He exhibits no edema or deformity.   Lymphadenopathy:     He has no cervical adenopathy.   Neurological: No cranial nerve deficit. He exhibits normal muscle tone.   Skin: Skin is warm and dry. No rash noted. He is not diaphoretic.   Vitals reviewed.      Lab Results   Component Value Date    GLUCOSE 129 (H) 12/02/2019    BUN 19 12/02/2019    CREATININE 1.04 12/02/2019    EGFRIFNONA 70 12/02/2019    EGFRIFAFRI 66 07/11/2019    BCR 18.3 12/02/2019    K 3.4 (L) 12/02/2019    CO2 28.2 12/02/2019    CALCIUM 8.5 (L) 12/02/2019    PROTENTOTREF 7.0 07/11/2019    ALBUMIN 3.90 12/02/2019    LABIL2 1.6 07/11/2019    AST 26 12/02/2019    ALT 26 12/02/2019       WBC   Date Value Ref Range Status   12/02/2019 3.99 3.40 - 10.80 10*3/mm3 Final     RBC   Date Value Ref Range Status   12/02/2019 3.04 (L) 4.14 - 5.80 10*6/mm3 Final     Hemoglobin   Date Value Ref Range Status   12/02/2019 8.3 (L) 13.0 - 17.7 g/dL Final     Hematocrit   Date Value Ref Range Status   12/02/2019 26.5 (L) 37.5 - 51.0 % Final     MCV   Date Value Ref Range Status   12/02/2019 87.2 79.0 - 97.0 fL Final     MCH   Date Value Ref Range Status   12/02/2019 27.3 26.6 - 33.0 pg Final     MCHC   Date Value Ref Range Status   12/02/2019 31.3 (L) 31.5 - 35.7 g/dL Final     RDW   Date Value Ref Range Status   12/02/2019 14.7 12.3 - 15.4 % Final     RDW-SD   Date Value Ref Range Status   12/02/2019 46.9 37.0 - 54.0 fl Final     MPV   Date Value Ref Range Status   12/02/2019 9.8 6.0 - 12.0 fL Final     Platelets   Date Value Ref Range Status   12/02/2019 204 140 - 450 10*3/mm3 Final     Neutrophil %   Date Value Ref Range Status   12/02/2019 62.4 42.7 - 76.0 % Final     Lymphocyte %   Date Value Ref Range Status   12/02/2019 18.0 (L) 19.6 - 45.3 % Final     Monocyte %   Date Value Ref Range Status   12/02/2019 14.8 (H) 5.0 - 12.0 % Final     Eosinophil %   Date Value Ref Range Status   12/02/2019 2.5 0.3 - 6.2 %  Final     Basophil %   Date Value Ref Range Status   12/02/2019 0.5 0.0 - 1.5 % Final     Immature Grans %   Date Value Ref Range Status   12/02/2019 1.8 (H) 0.0 - 0.5 % Final     Neutrophils, Absolute   Date Value Ref Range Status   12/02/2019 2.49 1.70 - 7.00 10*3/mm3 Final     Lymphocytes, Absolute   Date Value Ref Range Status   12/02/2019 0.72 0.70 - 3.10 10*3/mm3 Final     Monocytes, Absolute   Date Value Ref Range Status   12/02/2019 0.59 0.10 - 0.90 10*3/mm3 Final     Eosinophils, Absolute   Date Value Ref Range Status   12/02/2019 0.10 0.00 - 0.40 10*3/mm3 Final     Basophils, Absolute   Date Value Ref Range Status   12/02/2019 0.02 0.00 - 0.20 10*3/mm3 Final     Immature Grans, Absolute   Date Value Ref Range Status   12/02/2019 0.07 (H) 0.00 - 0.05 10*3/mm3 Final     nRBC   Date Value Ref Range Status   12/02/2019 0.3 (H) 0.0 - 0.2 /100 WBC Final       Lab Results   Component Value Date    HGBA1C 6.00 (H) 07/11/2019       Lab Results   Component Value Date    CJIAWHUI11 334 06/04/2019       TSH   Date Value Ref Range Status   07/11/2019 3.750 0.270 - 4.200 mIU/mL Final   06/04/2019 2.220 0.270 - 4.200 mIU/mL Final       No results found for: CHOL  No results found for: TRIG  No results found for: HDL  No results found for: LDL  No results found for: VLDL  No results found for: LDLHDL      Procedures    Assessment/Plan   Problems Addressed this Visit        Endocrine    Hypothyroidism (acquired)    Relevant Medications    levothyroxine (SYNTHROID) 88 MCG tablet       Musculoskeletal and Integument    DDD (degenerative disc disease), lumbar    Relevant Medications    traMADol (ULTRAM) 50 MG tablet       Hematopoietic and Hemostatic    Anemia    Relevant Orders    CBC & Differential       Other    Localized edema - Primary    Relevant Orders    Adult Transthoracic Echo Complete W/ Cont if Necessary Per Protocol    Comprehensive Metabolic Panel          Orders Placed This Encounter   Procedures   •  Comprehensive Metabolic Panel   • Adult Transthoracic Echo Complete W/ Cont if Necessary Per Protocol     Standing Status:   Future     Order Specific Question:   Reason for exam?     Answer:   Dyspnea   • CBC & Differential     Order Specific Question:   Manual Differential     Answer:   No       Current Outpatient Medications   Medication Sig Dispense Refill   • acetaminophen (TYLENOL) 650 MG 8 hr tablet Take 1,300 mg by mouth 2 (Two) Times a Day.     • acetaminophen-codeine (TYLENOL #3) 300-30 MG per tablet Take 1 tablet by mouth Every 4 (Four) Hours As Needed for Moderate Pain .     • albuterol (PROVENTIL HFA;VENTOLIN HFA) 108 (90 Base) MCG/ACT inhaler Inhale 2 puffs Every 4 (Four) Hours As Needed for Wheezing.     • ALLERGY SERUM INJECTION Inject  under the skin into the appropriate area as directed 1 (One) Time Per Week.     • aspirin 81 MG tablet Take 1 tablet by mouth Daily.     • azelastine (ASTELIN) 0.1 % nasal spray 2 sprays into the nostril(s) as directed by provider 2 (Two) Times a Day. Use in each nostril as directed     • BRIMONIDINE TARTRATE OP Apply 1 drop to eye(s) as directed by provider 2 (Two) Times a Day.     • dorzolamide-timolol (COSOPT) 22.3-6.8 MG/ML ophthalmic solution Administer 1 drop to both eyes 2 (Two) Times a Day.     • fexofenadine (ALLEGRA) 180 MG tablet Take 180 mg by mouth Daily.     • Fluticasone Furoate-Vilanterol (BREO ELLIPTA IN) Inhale 1 puff Every Morning.     • furosemide (LASIX) 40 MG tablet One Bid for 3 days then daily 60 tablet 1   • latanoprost (XALATAN) 0.005 % ophthalmic solution Administer 1 drop to both eyes Every Night.     • Latanoprost 0.005 % emulsion Apply 1 drop to eye(s) as directed by provider Every Night.     • levothyroxine (SYNTHROID) 88 MCG tablet Take 1 tablet by mouth Every Morning. 90 tablet 3   • losartan (COZAAR) 100 MG tablet Take 1 tablet by mouth Daily. 90 tablet 0   • montelukast (SINGULAIR) 10 MG tablet Take 1 tablet by mouth Every Night. 90  tablet 3   • pantoprazole (PROTONIX) 40 MG EC tablet Take 40 mg by mouth 2 (Two) Times a Day.     • pramipexole (MIRAPEX) 1 MG tablet Take 1.5 tablets qhs at bed time 135 tablet 1   • rosuvastatin (CRESTOR) 20 MG tablet Take 1 tablet by mouth Every Evening. 90 tablet 1   • potassium chloride (K-DUR) 10 MEQ CR tablet Take 1 tablet by mouth 2 (Two) Times a Day. 60 tablet 3   • traMADol (ULTRAM) 50 MG tablet Take 1 tablet by mouth Every 6 (Six) Hours As Needed for Moderate Pain . 60 tablet 2     No current facility-administered medications for this visit.        Nathan Baez had no medications administered during this visit.    Return in about 2 weeks (around 12/31/2019).    There are no Patient Instructions on file for this visit.

## 2019-12-18 ENCOUNTER — HOSPITAL ENCOUNTER (OUTPATIENT)
Dept: CARDIOLOGY | Facility: HOSPITAL | Age: 74
Discharge: HOME | End: 2019-12-18
Admitting: FAMILY MEDICINE

## 2019-12-18 ENCOUNTER — TREATMENT (OUTPATIENT)
Dept: PHYSICAL THERAPY | Facility: CLINIC | Age: 74
End: 2019-12-18

## 2019-12-18 VITALS
HEART RATE: 97 BPM | OXYGEN SATURATION: 96 % | WEIGHT: 263 LBS | SYSTOLIC BLOOD PRESSURE: 138 MMHG | BODY MASS INDEX: 41.28 KG/M2 | HEIGHT: 67 IN | DIASTOLIC BLOOD PRESSURE: 78 MMHG

## 2019-12-18 DIAGNOSIS — R60.0 LOCALIZED EDEMA: ICD-10-CM

## 2019-12-18 DIAGNOSIS — M25.511 ACUTE PAIN OF RIGHT SHOULDER: Primary | ICD-10-CM

## 2019-12-18 DIAGNOSIS — Z96.611 STATUS POST REVERSE TOTAL REPLACEMENT OF RIGHT SHOULDER: ICD-10-CM

## 2019-12-18 LAB
ALBUMIN SERPL-MCNC: 4.4 G/DL (ref 3.5–5.2)
ALBUMIN/GLOB SERPL: 1.7 G/DL
ALP SERPL-CCNC: 168 U/L (ref 39–117)
ALT SERPL-CCNC: 25 U/L (ref 1–41)
AST SERPL-CCNC: 23 U/L (ref 1–40)
BASOPHILS # BLD AUTO: 0.04 10*3/MM3 (ref 0–0.2)
BASOPHILS NFR BLD AUTO: 0.7 % (ref 0–1.5)
BILIRUB SERPL-MCNC: 0.6 MG/DL (ref 0.2–1.2)
BUN SERPL-MCNC: 20 MG/DL (ref 8–23)
BUN/CREAT SERPL: 17.4 (ref 7–25)
CALCIUM SERPL-MCNC: 9.3 MG/DL (ref 8.6–10.5)
CHLORIDE SERPL-SCNC: 104 MMOL/L (ref 98–107)
CO2 SERPL-SCNC: 28.8 MMOL/L (ref 22–29)
CREAT SERPL-MCNC: 1.15 MG/DL (ref 0.76–1.27)
EOSINOPHIL # BLD AUTO: 0.16 10*3/MM3 (ref 0–0.4)
EOSINOPHIL NFR BLD AUTO: 2.8 % (ref 0.3–6.2)
ERYTHROCYTE [DISTWIDTH] IN BLOOD BY AUTOMATED COUNT: 14.8 % (ref 12.3–15.4)
GLOBULIN SER CALC-MCNC: 2.6 GM/DL
GLUCOSE SERPL-MCNC: 111 MG/DL (ref 65–99)
HCT VFR BLD AUTO: 33.8 % (ref 37.5–51)
HGB BLD-MCNC: 10.4 G/DL (ref 13–17.7)
IMM GRANULOCYTES # BLD AUTO: 0.23 10*3/MM3 (ref 0–0.05)
IMM GRANULOCYTES NFR BLD AUTO: 4.1 % (ref 0–0.5)
LYMPHOCYTES # BLD AUTO: 0.85 10*3/MM3 (ref 0.7–3.1)
LYMPHOCYTES NFR BLD AUTO: 15 % (ref 19.6–45.3)
MCH RBC QN AUTO: 26.9 PG (ref 26.6–33)
MCHC RBC AUTO-ENTMCNC: 30.8 G/DL (ref 31.5–35.7)
MCV RBC AUTO: 87.6 FL (ref 79–97)
MONOCYTES # BLD AUTO: 0.66 10*3/MM3 (ref 0.1–0.9)
MONOCYTES NFR BLD AUTO: 11.7 % (ref 5–12)
NEUTROPHILS # BLD AUTO: 3.71 10*3/MM3 (ref 1.7–7)
NEUTROPHILS NFR BLD AUTO: 65.7 % (ref 42.7–76)
NRBC BLD AUTO-RTO: 0 /100 WBC (ref 0–0.2)
PLATELET # BLD AUTO: 212 10*3/MM3 (ref 140–450)
POTASSIUM SERPL-SCNC: 4.5 MMOL/L (ref 3.5–5.2)
PROT SERPL-MCNC: 7 G/DL (ref 6–8.5)
RBC # BLD AUTO: 3.86 10*6/MM3 (ref 4.14–5.8)
SODIUM SERPL-SCNC: 145 MMOL/L (ref 136–145)
WBC # BLD AUTO: 5.65 10*3/MM3 (ref 3.4–10.8)

## 2019-12-18 PROCEDURE — 93306 TTE W/DOPPLER COMPLETE: CPT

## 2019-12-18 PROCEDURE — 25010000002 PERFLUTREN (DEFINITY) 8.476 MG IN SODIUM CHLORIDE 0.9 % 10 ML INJECTION: Performed by: FAMILY MEDICINE

## 2019-12-18 PROCEDURE — 93306 TTE W/DOPPLER COMPLETE: CPT | Performed by: INTERNAL MEDICINE

## 2019-12-18 PROCEDURE — 97110 THERAPEUTIC EXERCISES: CPT | Performed by: PHYSICAL THERAPIST

## 2019-12-18 PROCEDURE — 97140 MANUAL THERAPY 1/> REGIONS: CPT | Performed by: PHYSICAL THERAPIST

## 2019-12-18 RX ADMIN — PERFLUTREN 1.5 ML: 6.52 INJECTION, SUSPENSION INTRAVENOUS at 08:21

## 2019-12-18 NOTE — PROGRESS NOTES
Physical Therapy Daily Progress Note  Visit: 9    Nathan Baez reports: My shoulder is doing really good. The rest of my body is sore though    Subjective     Objective   See Exercise, Manual, and Modality Logs for complete treatment.       Assessment & Plan     Assessment  Assessment details: Pt doing well. Was standing up more erect today. Shoulder is progressing well    Plan  Plan details: Reassess next visit        Manual Therapy:    9     mins  91954;  Therapeutic Exercise:    34     mins  07708;     Neuromuscular Jon:    -    mins  49174;    Therapeutic Activity:     -     mins  49008;     Gait Training:      -     mins  06301;     Ultrasound:     -     mins  60995;    Electrical Stimulation:    -     mins  82736 ( );  Dry Needling     -     mins self-pay    Timed Treatment:   43   mins   Total Treatment:     50   mins    Elizabeth Chavez PT  [place] License #: [id number]    Physical Therapist

## 2019-12-19 LAB
AORTIC ROOT ANNULUS: 2.5 CM
ASCENDING AORTA: 2.7 CM
BH CV ECHO MEAS - ACS: 2.1 CM
BH CV ECHO MEAS - AO MAX PG (FULL): 6.2 MMHG
BH CV ECHO MEAS - AO MAX PG: 13.4 MMHG
BH CV ECHO MEAS - AO MEAN PG (FULL): 3.6 MMHG
BH CV ECHO MEAS - AO MEAN PG: 6.6 MMHG
BH CV ECHO MEAS - AO V2 MAX: 183.2 CM/SEC
BH CV ECHO MEAS - AO V2 MEAN: 118.7 CM/SEC
BH CV ECHO MEAS - AO V2 VTI: 33.8 CM
BH CV ECHO MEAS - ASC AORTA: 2.7 CM
BH CV ECHO MEAS - AVA(I,A): 2.3 CM^2
BH CV ECHO MEAS - AVA(I,D): 2.3 CM^2
BH CV ECHO MEAS - AVA(V,A): 2.2 CM^2
BH CV ECHO MEAS - AVA(V,D): 2.2 CM^2
BH CV ECHO MEAS - BSA(HAYCOCK): 2.4 M^2
BH CV ECHO MEAS - BSA: 2.3 M^2
BH CV ECHO MEAS - BZI_BMI: 41.2 KILOGRAMS/M^2
BH CV ECHO MEAS - BZI_METRIC_HEIGHT: 170.2 CM
BH CV ECHO MEAS - BZI_METRIC_WEIGHT: 119.3 KG
BH CV ECHO MEAS - EDV(MOD-SP2): 120 ML
BH CV ECHO MEAS - EDV(MOD-SP4): 179 ML
BH CV ECHO MEAS - EDV(TEICH): 107.1 ML
BH CV ECHO MEAS - EF(CUBED): 73.7 %
BH CV ECHO MEAS - EF(MOD-BP): 66 %
BH CV ECHO MEAS - EF(MOD-SP2): 65 %
BH CV ECHO MEAS - EF(MOD-SP4): 65.9 %
BH CV ECHO MEAS - EF(TEICH): 65.5 %
BH CV ECHO MEAS - ESV(MOD-SP2): 42 ML
BH CV ECHO MEAS - ESV(MOD-SP4): 61 ML
BH CV ECHO MEAS - ESV(TEICH): 37 ML
BH CV ECHO MEAS - FS: 36 %
BH CV ECHO MEAS - IVS/LVPW: 1.2
BH CV ECHO MEAS - IVSD: 1.3 CM
BH CV ECHO MEAS - LAT PEAK E' VEL: 9 CM/SEC
BH CV ECHO MEAS - LV DIASTOLIC VOL/BSA (35-75): 78.8 ML/M^2
BH CV ECHO MEAS - LV MASS(C)D: 211.4 GRAMS
BH CV ECHO MEAS - LV MASS(C)DI: 93 GRAMS/M^2
BH CV ECHO MEAS - LV MAX PG: 7.2 MMHG
BH CV ECHO MEAS - LV MEAN PG: 3 MMHG
BH CV ECHO MEAS - LV SYSTOLIC VOL/BSA (12-30): 26.9 ML/M^2
BH CV ECHO MEAS - LV V1 MAX: 134.1 CM/SEC
BH CV ECHO MEAS - LV V1 MEAN: 81.7 CM/SEC
BH CV ECHO MEAS - LV V1 VTI: 25.2 CM
BH CV ECHO MEAS - LVIDD: 4.8 CM
BH CV ECHO MEAS - LVIDS: 3.1 CM
BH CV ECHO MEAS - LVLD AP2: 8 CM
BH CV ECHO MEAS - LVLD AP4: 8.7 CM
BH CV ECHO MEAS - LVLS AP2: 6.5 CM
BH CV ECHO MEAS - LVLS AP4: 6.7 CM
BH CV ECHO MEAS - LVOT AREA (M): 3.1 CM^2
BH CV ECHO MEAS - LVOT AREA: 3 CM^2
BH CV ECHO MEAS - LVOT DIAM: 2 CM
BH CV ECHO MEAS - LVPWD: 1.1 CM
BH CV ECHO MEAS - MED PEAK E' VEL: 9 CM/SEC
BH CV ECHO MEAS - MR MAX PG: 23.1 MMHG
BH CV ECHO MEAS - MR MAX VEL: 240.3 CM/SEC
BH CV ECHO MEAS - MV A DUR: 0.12 SEC
BH CV ECHO MEAS - MV A MAX VEL: 99 CM/SEC
BH CV ECHO MEAS - MV DEC SLOPE: 469.4 CM/SEC^2
BH CV ECHO MEAS - MV DEC TIME: 0.2 SEC
BH CV ECHO MEAS - MV E MAX VEL: 92.5 CM/SEC
BH CV ECHO MEAS - MV E/A: 0.94
BH CV ECHO MEAS - MV MAX PG: 4.6 MMHG
BH CV ECHO MEAS - MV MEAN PG: 2.1 MMHG
BH CV ECHO MEAS - MV P1/2T MAX VEL: 93.8 CM/SEC
BH CV ECHO MEAS - MV P1/2T: 58.5 MSEC
BH CV ECHO MEAS - MV V2 MAX: 107.2 CM/SEC
BH CV ECHO MEAS - MV V2 MEAN: 67.9 CM/SEC
BH CV ECHO MEAS - MV V2 VTI: 27.1 CM
BH CV ECHO MEAS - MVA P1/2T LCG: 2.3 CM^2
BH CV ECHO MEAS - MVA(P1/2T): 3.8 CM^2
BH CV ECHO MEAS - MVA(VTI): 2.8 CM^2
BH CV ECHO MEAS - PA ACC TIME: 0.15 SEC
BH CV ECHO MEAS - PA MAX PG (FULL): 8.1 MMHG
BH CV ECHO MEAS - PA MAX PG: 10.6 MMHG
BH CV ECHO MEAS - PA PR(ACCEL): 13.6 MMHG
BH CV ECHO MEAS - PA V2 MAX: 162.9 CM/SEC
BH CV ECHO MEAS - PULM A REVS DUR: 0.1 SEC
BH CV ECHO MEAS - PULM A REVS VEL: 39.3 CM/SEC
BH CV ECHO MEAS - PULM DIAS VEL: 69.2 CM/SEC
BH CV ECHO MEAS - PULM S/D: 0.96
BH CV ECHO MEAS - PULM SYS VEL: 66.4 CM/SEC
BH CV ECHO MEAS - PVA(V,A): 1.5 CM^2
BH CV ECHO MEAS - PVA(V,D): 1.5 CM^2
BH CV ECHO MEAS - QP/QS: 0.69
BH CV ECHO MEAS - RV MAX PG: 2.5 MMHG
BH CV ECHO MEAS - RV MEAN PG: 1.5 MMHG
BH CV ECHO MEAS - RV V1 MAX: 79.7 CM/SEC
BH CV ECHO MEAS - RV V1 MEAN: 57.4 CM/SEC
BH CV ECHO MEAS - RV V1 VTI: 16.7 CM
BH CV ECHO MEAS - RVOT AREA: 3.1 CM^2
BH CV ECHO MEAS - RVOT DIAM: 2 CM
BH CV ECHO MEAS - SI(CUBED): 35.7 ML/M^2
BH CV ECHO MEAS - SI(LVOT): 33.5 ML/M^2
BH CV ECHO MEAS - SI(MOD-SP2): 34.3 ML/M^2
BH CV ECHO MEAS - SI(MOD-SP4): 51.9 ML/M^2
BH CV ECHO MEAS - SI(TEICH): 30.8 ML/M^2
BH CV ECHO MEAS - SUP REN AO DIAM: 2.4 CM
BH CV ECHO MEAS - SV(CUBED): 81.1 ML
BH CV ECHO MEAS - SV(LVOT): 76.2 ML
BH CV ECHO MEAS - SV(MOD-SP2): 78 ML
BH CV ECHO MEAS - SV(MOD-SP4): 118 ML
BH CV ECHO MEAS - SV(RVOT): 52.5 ML
BH CV ECHO MEAS - SV(TEICH): 70.1 ML
BH CV ECHO MEAS - TAPSE (>1.6): 2.5 CM2
BH CV ECHO MEASUREMENTS AVERAGE E/E' RATIO: 10.28
BH CV XLRA - RV BASE: 4.1 CM
BH CV XLRA - RV LENGTH: 7.3 CM
BH CV XLRA - RV MID: 3.8 CM
BH CV XLRA - TDI S': 18 CM/SEC
LEFT ATRIUM VOLUME INDEX: 22 ML/M2
MAXIMAL PREDICTED HEART RATE: 147 BPM
SINUS: 3.1 CM
STJ: 2.6 CM
STRESS TARGET HR: 125 BPM

## 2019-12-23 ENCOUNTER — TREATMENT (OUTPATIENT)
Dept: PHYSICAL THERAPY | Facility: CLINIC | Age: 74
End: 2019-12-23

## 2019-12-23 DIAGNOSIS — Z96.611 STATUS POST REVERSE TOTAL REPLACEMENT OF RIGHT SHOULDER: ICD-10-CM

## 2019-12-23 DIAGNOSIS — M25.511 ACUTE PAIN OF RIGHT SHOULDER: Primary | ICD-10-CM

## 2019-12-23 PROCEDURE — 97140 MANUAL THERAPY 1/> REGIONS: CPT | Performed by: PHYSICAL THERAPIST

## 2019-12-23 PROCEDURE — 97110 THERAPEUTIC EXERCISES: CPT | Performed by: PHYSICAL THERAPIST

## 2019-12-23 NOTE — PROGRESS NOTES
Physical Therapy Daily Progress Note  Visit: 10    Nathan Grantt reports: My shoulder is doing alright    Subjective     Objective   See Exercise, Manual, and Modality Logs for complete treatment.       Assessment & Plan     Assessment  Assessment details: Pt has excellent ROM, but continues to lack strength, especially in ER and scaption planes.     Plan  Plan details: Progress with strength        Manual Therapy:    10     mins  25207;  Therapeutic Exercise:    35     mins  11055;     Neuromuscular Jon:    -    mins  04233;    Therapeutic Activity:     -     mins  01360;     Gait Training:      -     mins  64521;     Ultrasound:     -     mins  87219;    Electrical Stimulation:    -     mins  47798 ( );  Dry Needling     -     mins self-pay    Timed Treatment:   45   mins   Total Treatment:     49   mins    Elizabeth Chavez PT  [place] License #: [id number]    Physical Therapist

## 2019-12-27 ENCOUNTER — TREATMENT (OUTPATIENT)
Dept: PHYSICAL THERAPY | Facility: CLINIC | Age: 74
End: 2019-12-27

## 2019-12-27 DIAGNOSIS — Z96.611 STATUS POST REVERSE TOTAL REPLACEMENT OF RIGHT SHOULDER: ICD-10-CM

## 2019-12-27 DIAGNOSIS — M25.511 ACUTE PAIN OF RIGHT SHOULDER: Primary | ICD-10-CM

## 2019-12-27 DIAGNOSIS — M25.511 RIGHT SHOULDER PAIN, UNSPECIFIED CHRONICITY: ICD-10-CM

## 2019-12-27 PROCEDURE — 97110 THERAPEUTIC EXERCISES: CPT | Performed by: PHYSICAL THERAPIST

## 2019-12-27 PROCEDURE — 97140 MANUAL THERAPY 1/> REGIONS: CPT | Performed by: PHYSICAL THERAPIST

## 2019-12-27 NOTE — PROGRESS NOTES
Physical Therapy Daily Progress Note  Visit: 11    Nathan Baez reports: The shoulder is a little sore today    Subjective     Objective   See Exercise, Manual, and Modality Logs for complete treatment.       Assessment & Plan     Assessment  Assessment details: Pt is doing really well. Did pretty good with ER isometric and PNF strengthening, but continues to have difficulty with standing ER strength I believe because of his standing posture.     Plan  Plan details: Progress as able        Manual Therapy:    9     mins  24803;  Therapeutic Exercise:    34     mins  33673;     Neuromuscular Jon:    -    mins  54615;    Therapeutic Activity:     -     mins  93264;     Gait Training:      -     mins  73137;     Ultrasound:     -     mins  25117;    Electrical Stimulation:    -     mins  08834 ( );  Dry Needling     -     mins self-pay    Timed Treatment:   43   mins   Total Treatment:     50   mins    Elizabeth Chavez PT  [place] License #: [id number]    Physical Therapist

## 2019-12-30 ENCOUNTER — OFFICE VISIT (OUTPATIENT)
Dept: FAMILY MEDICINE CLINIC | Facility: CLINIC | Age: 74
End: 2019-12-30

## 2019-12-30 VITALS
DIASTOLIC BLOOD PRESSURE: 72 MMHG | HEART RATE: 95 BPM | TEMPERATURE: 98.2 F | OXYGEN SATURATION: 95 % | WEIGHT: 257 LBS | HEIGHT: 67 IN | SYSTOLIC BLOOD PRESSURE: 147 MMHG | BODY MASS INDEX: 40.34 KG/M2

## 2019-12-30 DIAGNOSIS — R60.0 LOCALIZED EDEMA: ICD-10-CM

## 2019-12-30 DIAGNOSIS — D62 ANEMIA DUE TO ACUTE BLOOD LOSS: ICD-10-CM

## 2019-12-30 DIAGNOSIS — M51.36 DDD (DEGENERATIVE DISC DISEASE), LUMBAR: ICD-10-CM

## 2019-12-30 DIAGNOSIS — M47.26 OSTEOARTHRITIS OF SPINE WITH RADICULOPATHY, LUMBAR REGION: Primary | ICD-10-CM

## 2019-12-30 PROCEDURE — 99213 OFFICE O/P EST LOW 20 MIN: CPT | Performed by: FAMILY MEDICINE

## 2019-12-30 RX ORDER — PRAMIPEXOLE DIHYDROCHLORIDE 1.5 MG/1
1.5 TABLET ORAL 3 TIMES DAILY
COMMUNITY
Start: 2019-12-06 | End: 2019-12-30 | Stop reason: SDUPTHER

## 2019-12-30 RX ORDER — FERROUS SULFATE TAB EC 324 MG (65 MG FE EQUIVALENT) 324 (65 FE) MG
324 TABLET DELAYED RESPONSE ORAL
COMMUNITY
End: 2020-04-09

## 2019-12-30 RX ORDER — PRAMIPEXOLE DIHYDROCHLORIDE 1.5 MG/1
1.5 TABLET ORAL NIGHTLY PRN
Qty: 30 TABLET | Refills: 5
Start: 2019-12-30 | End: 2020-09-17 | Stop reason: SDUPTHER

## 2019-12-30 NOTE — PROGRESS NOTES
Chief Complaint   Patient presents with   • Restless Legs Syndrome       Subjective   Nathan Baez is a 74 y.o. male.     History of Present Illness   C/o pain in legs often.  Worse with sitting.  Gong on 6 or more months.  I have pain in my back all the time.  Tramadol constipates me.    F/U anemia.  Doing well with iron once a day.   F?U  Edema.  Doing well with meds.  Wt from 12/18/19 258,  251 today.   I am able to lay flat now. ECHO with EF 66% with LV concentric hypertrophy.  RV cavity mildly dilated.        The following portions of the patient's history were reviewed and updated as appropriate: allergies, current medications, past family history, past medical history, past social history, past surgical history and problem list.    Review of Systems   Constitutional: Negative for appetite change and fatigue.   HENT: Negative for nosebleeds and sore throat.    Eyes: Negative for blurred vision and visual disturbance.   Respiratory: Negative for shortness of breath and wheezing.    Cardiovascular: Positive for leg swelling. Negative for chest pain.   Gastrointestinal: Negative for abdominal distention and abdominal pain.   Endocrine: Negative for cold intolerance and polyuria.   Genitourinary: Negative for dysuria and hematuria.   Musculoskeletal: Negative for arthralgias and myalgias.   Skin: Negative for color change and rash.   Neurological: Negative for weakness and confusion.   Psychiatric/Behavioral: Negative for agitation and depressed mood.       Patient Active Problem List   Diagnosis   • OA (osteoarthritis) of knee   • Hypertension   • Abdominal wall cellulitis   • Hypothyroidism (acquired)   • Gastroesophageal reflux disease without esophagitis   • Mixed hyperlipidemia   • Primary insomnia   • DDD (degenerative disc disease), lumbar   • KWAME (obstructive sleep apnea)   • Hyperglycemia   • Allergic   • Venous insufficiency   • Prostate cancer (CMS/[facility])   • Venous stasis dermatitis   • Tinea cruris    • Tinea corporis   • Throat clearing   • Sleep apnea   • Skin lesion of face   • Rib pain on left side   • Rectal bleed   • Presbycusis   • Pes planus   • Osteoarthritis   • Obesity   • Medicare annual wellness visit, subsequent   • Lumbar strain   • Internal hemorrhoids   • Insomnia   • Inflamed skin tag   • Hypothyroid   • Hyperplastic polyp of stomach   • Hospital discharge follow-up   • History of colon polyps   • High risk medication use   • Glaucoma   • Gastroenteritis, acute   • Erysipelas   • Encounter for screening colonoscopy   • Encounter for long-term (current) use of NSAIDs   • Dysphagia   • DVT (deep venous thrombosis) (CMS/[facility])   • DJD (degenerative joint disease), lumbar   • Diverticulosis   • Disequilibrium   • Cough   • Contact with hypodermic needle   • Cervical radiculopathy   • Cellulitis   • Arthritis   • Allergic rhinitis   • Acute glaucoma   • Acute embolism and thrombosis of vein   • Actinic keratosis   • Status post reverse total shoulder replacement, right   • Localized edema   • Anemia   • Peripheral polyneuropathy       No Known Allergies      Current Outpatient Medications:   •  acetaminophen (TYLENOL) 650 MG 8 hr tablet, Take 1,300 mg by mouth 2 (Two) Times a Day., Disp: , Rfl:   •  acetaminophen-codeine (TYLENOL #3) 300-30 MG per tablet, Take 1 tablet by mouth Every 4 (Four) Hours As Needed for Moderate Pain ., Disp: , Rfl:   •  albuterol (PROVENTIL HFA;VENTOLIN HFA) 108 (90 Base) MCG/ACT inhaler, Inhale 2 puffs Every 4 (Four) Hours As Needed for Wheezing., Disp: , Rfl:   •  ALLERGY SERUM INJECTION, Inject  under the skin into the appropriate area as directed 1 (One) Time Per Week., Disp: , Rfl:   •  aspirin 81 MG tablet, Take 1 tablet by mouth Daily., Disp: , Rfl:   •  azelastine (ASTELIN) 0.1 % nasal spray, 2 sprays into the nostril(s) as directed by provider 2 (Two) Times a Day. Use in each nostril as directed, Disp: , Rfl:   •  BRIMONIDINE TARTRATE OP, Apply 1 drop to eye(s) as  directed by provider 2 (Two) Times a Day., Disp: , Rfl:   •  dorzolamide-timolol (COSOPT) 22.3-6.8 MG/ML ophthalmic solution, Administer 1 drop to both eyes 2 (Two) Times a Day., Disp: , Rfl:   •  ferrous sulfate 324 (65 Fe) MG tablet delayed-release EC tablet, Take 324 mg by mouth Daily With Breakfast., Disp: , Rfl:   •  fexofenadine (ALLEGRA) 180 MG tablet, Take 180 mg by mouth Daily., Disp: , Rfl:   •  Fluticasone Furoate-Vilanterol (BREO ELLIPTA IN), Inhale 1 puff Every Morning., Disp: , Rfl:   •  furosemide (LASIX) 40 MG tablet, One Bid for 3 days then daily, Disp: 60 tablet, Rfl: 1  •  latanoprost (XALATAN) 0.005 % ophthalmic solution, Administer 1 drop to both eyes Every Night., Disp: , Rfl:   •  Latanoprost 0.005 % emulsion, Apply 1 drop to eye(s) as directed by provider Every Night., Disp: , Rfl:   •  levothyroxine (SYNTHROID) 88 MCG tablet, Take 1 tablet by mouth Every Morning., Disp: 90 tablet, Rfl: 3  •  losartan (COZAAR) 100 MG tablet, Take 1 tablet by mouth Daily., Disp: 90 tablet, Rfl: 0  •  montelukast (SINGULAIR) 10 MG tablet, Take 1 tablet by mouth Every Night., Disp: 90 tablet, Rfl: 3  •  pantoprazole (PROTONIX) 40 MG EC tablet, Take 40 mg by mouth 2 (Two) Times a Day., Disp: , Rfl:   •  potassium chloride (K-DUR) 10 MEQ CR tablet, Take 1 tablet by mouth 2 (Two) Times a Day., Disp: 60 tablet, Rfl: 3  •  pramipexole (MIRAPEX) 1.5 MG tablet, Take 1 tablet by mouth At Night As Needed (RLS)., Disp: 30 tablet, Rfl: 5  •  rosuvastatin (CRESTOR) 20 MG tablet, Take 1 tablet by mouth Every Evening., Disp: 90 tablet, Rfl: 1  •  traMADol (ULTRAM) 50 MG tablet, Take 1 tablet by mouth Every 6 (Six) Hours As Needed for Moderate Pain ., Disp: 60 tablet, Rfl: 2    Past Medical History:   Diagnosis Date   • Actinic keratosis    • Acute embolism and thrombosis of vein    • Allergic rhinitis    • Arthritis    • Cataract     forming left eye   • Cellulitis    • Cellulitis    • Cervical radiculopathy    • Contact with  hypodermic needle    • Cough    • Disequilibrium    • Diverticulosis    • DJD (degenerative joint disease), lumbar    • DVT (deep venous thrombosis) (CMS/HCC)     l leg  dx 2019 and was on blood thinner and ow off wears compression    • Dysphagia    • Encounter for long-term (current) use of NSAIDs    • Encounter for screening colonoscopy    • Erysipelas    • Gastroenteritis, acute    • GERD (gastroesophageal reflux disease)    • Glaucoma    • High risk medication use    • History of colon polyps    • Hospital discharge follow-up    • Hyperglycemia    • Hyperlipidemia    • Hyperplastic polyp of stomach    • Hypertension    • Hypothyroid    • Inflamed skin tag    • Insomnia    • Internal hemorrhoids    • Kidney stone     has had history of passing and still has kidney stone on both sides    • Lumbar strain    • Male urinary stress incontinence     s/p prostatectomy   • Medicare annual wellness visit, subsequent    • Obesity    • KWAME (obstructive sleep apnea)    • Osteoarthritis    • Pes planus    • Presbycusis    • Prostate cancer (CMS/HCC)    • Prostate cancer (CMS/HCC)    • Rectal bleed    • Restless legs syndrome    • Rib pain on left side    • Shoulder pain    • Skin lesion of face    • Sleep apnea     bipap   • Throat clearing    • Tinea corporis    • Tinea cruris    • Venous stasis dermatitis        Past Surgical History:   Procedure Laterality Date   • CATARACT EXTRACTION Right    • COLONOSCOPY  03/2017    3/17normal. Recheck 2022. 12/11. Repeat in 5 years    • ENDOSCOPY W/ PEG REMOVAL     • FOOT SURGERY      1998 had big toe replaced on left foot   • HERNIA REPAIR      umbilical   • JOINT REPLACEMENT Right 2014   • NECK SURGERY      cervical disc   • ND TOTAL KNEE ARTHROPLASTY Left 9/13/2016    Procedure: LT TOTAL KNEE ARTHROPLASTY;  Surgeon: Tadeo Basurto MD;  Location: [facility] OR;  Service: Orthopedics   • PROSTATECTOMY  07/1999 July 1999. Radical    • THYROID LOBECTOMY     • THYROID SURGERY  2011     partial doesn't know which one was removed   • TONSILLECTOMY     • TOTAL KNEE ARTHROPLASTY Right 2014   • TOTAL SHOULDER ARTHROPLASTY W/ DISTAL CLAVICLE EXCISION Right 2019    Procedure: TOTAL SHOULDER REVERSE ARTHROPLASTY RIGHT REPAIR RIGHT AXILLARY VEIN;  Surgeon: Behzad Kelley MD;  Location[place] [facility] [place] [facility] [place]  Service: Orthopedics   • WRIST SURGERY Right     x2  wrist replaced       Family History   Problem Relation Age of Onset   • Cancer Mother         COLON   • Cancer Father         PROSTATE   • Cancer Sister         BREAST CANCER   • Breast cancer Sister    • Gout Sister    • Hypertension Sister    • Heart attack Brother    • Bone cancer Brother    • Heart disease Brother    • Malig Hyperthermia Neg Hx        Social History     Tobacco Use   • Smoking status: Former Smoker     Packs/day: 1.00     Years: 20.00     Pack years: 20.00     Types: Cigarettes     Last attempt to quit: 1984     Years since quittin.0   • Smokeless tobacco: Never Used   Substance Use Topics   • Alcohol use: Yes     Comment: 3-4 MONTHLY            Objective     Vitals:    19 0907   BP: 147/72   Pulse: 95   Temp: 98.2 °F (36.8 °C)   SpO2: 95%     Body mass index is 40.25 kg/m².    Physical Exam   Constitutional: He is oriented to person, place, and time. He appears well-developed and well-nourished.   HENT:   Head: Normocephalic and atraumatic.   Right Ear: External ear normal.   Left Ear: External ear normal.   Nose: Nose normal.   Mouth/Throat: Oropharynx is clear and moist.   Eyes: Pupils are equal, round, and reactive to light. Conjunctivae and EOM are normal.   Neck: Normal range of motion. Neck supple. No tracheal deviation present. No thyromegaly present.   Cardiovascular: Normal rate, regular rhythm and normal heart sounds. Exam reveals no gallop and no friction rub.   No murmur heard.  1 plus edema distal legs bilaterally   Pulmonary/Chest: Effort normal and breath sounds normal. No respiratory distress.  He exhibits no tenderness.   Abdominal: Soft. Bowel sounds are normal. He exhibits no distension. There is no tenderness.   Musculoskeletal: Normal range of motion. He exhibits edema. He exhibits no tenderness.   Lymphadenopathy:     He has no cervical adenopathy.   Neurological: He is alert and oriented to person, place, and time. He displays normal reflexes. No cranial nerve deficit or sensory deficit. Coordination normal.   Skin: Skin is warm and dry.   Psychiatric: He has a normal mood and affect. His behavior is normal. Judgment and thought content normal.   Nursing note and vitals reviewed.      Lab Results   Component Value Date    GLUCOSE 129 (H) 12/02/2019    BUN 20 12/17/2019    CREATININE 1.15 12/17/2019    EGFRIFNONA 62 12/17/2019    EGFRIFAFRI 76 12/17/2019    BCR 17.4 12/17/2019    K 4.5 12/17/2019    CO2 28.8 12/17/2019    CALCIUM 9.3 12/17/2019    PROTENTOTREF 7.0 12/17/2019    ALBUMIN 4.40 12/17/2019    LABIL2 1.7 12/17/2019    AST 23 12/17/2019    ALT 25 12/17/2019       WBC   Date Value Ref Range Status   12/17/2019 5.65 3.40 - 10.80 10*3/mm3 Final     RBC   Date Value Ref Range Status   12/17/2019 3.86 (L) 4.14 - 5.80 10*6/mm3 Final     Hemoglobin   Date Value Ref Range Status   12/17/2019 10.4 (L) 13.0 - 17.7 g/dL Final   12/02/2019 8.3 (L) 13.0 - 17.7 g/dL Final     Hematocrit   Date Value Ref Range Status   12/17/2019 33.8 (L) 37.5 - 51.0 % Final   12/02/2019 26.5 (L) 37.5 - 51.0 % Final     MCV   Date Value Ref Range Status   12/17/2019 87.6 79.0 - 97.0 fL Final   12/02/2019 87.2 79.0 - 97.0 fL Final     MCH   Date Value Ref Range Status   12/17/2019 26.9 26.6 - 33.0 pg Final   12/02/2019 27.3 26.6 - 33.0 pg Final     MCHC   Date Value Ref Range Status   12/17/2019 30.8 (L) 31.5 - 35.7 g/dL Final   12/02/2019 31.3 (L) 31.5 - 35.7 g/dL Final     RDW   Date Value Ref Range Status   12/17/2019 14.8 12.3 - 15.4 % Final   12/02/2019 14.7 12.3 - 15.4 % Final     RDW-SD   Date Value Ref Range Status    12/02/2019 46.9 37.0 - 54.0 fl Final     MPV   Date Value Ref Range Status   12/02/2019 9.8 6.0 - 12.0 fL Final     Platelets   Date Value Ref Range Status   12/17/2019 212 140 - 450 10*3/mm3 Final   12/02/2019 204 140 - 450 10*3/mm3 Final     Neutrophil Rel %   Date Value Ref Range Status   12/17/2019 65.7 42.7 - 76.0 % Final     Neutrophil %   Date Value Ref Range Status   12/02/2019 62.4 42.7 - 76.0 % Final     Lymphocyte Rel %   Date Value Ref Range Status   12/17/2019 15.0 (L) 19.6 - 45.3 % Final     Lymphocyte %   Date Value Ref Range Status   12/02/2019 18.0 (L) 19.6 - 45.3 % Final     Monocyte Rel %   Date Value Ref Range Status   12/17/2019 11.7 5.0 - 12.0 % Final     Monocyte %   Date Value Ref Range Status   12/02/2019 14.8 (H) 5.0 - 12.0 % Final     Eosinophil Rel %   Date Value Ref Range Status   12/17/2019 2.8 0.3 - 6.2 % Final     Eosinophil %   Date Value Ref Range Status   12/02/2019 2.5 0.3 - 6.2 % Final     Basophil Rel %   Date Value Ref Range Status   12/17/2019 0.7 0.0 - 1.5 % Final     Basophil %   Date Value Ref Range Status   12/02/2019 0.5 0.0 - 1.5 % Final     Immature Grans %   Date Value Ref Range Status   12/02/2019 1.8 (H) 0.0 - 0.5 % Final     Neutrophils Absolute   Date Value Ref Range Status   12/17/2019 3.71 1.70 - 7.00 10*3/mm3 Final     Neutrophils, Absolute   Date Value Ref Range Status   12/02/2019 2.49 1.70 - 7.00 10*3/mm3 Final     Lymphocytes Absolute   Date Value Ref Range Status   12/17/2019 0.85 0.70 - 3.10 10*3/mm3 Final     Lymphocytes, Absolute   Date Value Ref Range Status   12/02/2019 0.72 0.70 - 3.10 10*3/mm3 Final     Monocytes Absolute   Date Value Ref Range Status   12/17/2019 0.66 0.10 - 0.90 10*3/mm3 Final     Monocytes, Absolute   Date Value Ref Range Status   12/02/2019 0.59 0.10 - 0.90 10*3/mm3 Final     Eosinophils Absolute   Date Value Ref Range Status   12/17/2019 0.16 0.00 - 0.40 10*3/mm3 Final     Eosinophils, Absolute   Date Value Ref Range Status    12/02/2019 0.10 0.00 - 0.40 10*3/mm3 Final     Basophils Absolute   Date Value Ref Range Status   12/17/2019 0.04 0.00 - 0.20 10*3/mm3 Final     Basophils, Absolute   Date Value Ref Range Status   12/02/2019 0.02 0.00 - 0.20 10*3/mm3 Final     Immature Grans, Absolute   Date Value Ref Range Status   12/02/2019 0.07 (H) 0.00 - 0.05 10*3/mm3 Final     nRBC   Date Value Ref Range Status   12/17/2019 0.0 0.0 - 0.2 /100 WBC Final   12/02/2019 0.3 (H) 0.0 - 0.2 /100 WBC Final       Lab Results   Component Value Date    HGBA1C 6.00 (H) 07/11/2019       Lab Results   Component Value Date    AHCPAMMT15 334 06/04/2019       TSH   Date Value Ref Range Status   07/11/2019 3.750 0.270 - 4.200 mIU/mL Final   06/04/2019 2.220 0.270 - 4.200 mIU/mL Final       No results found for: CHOL  No results found for: TRIG  No results found for: HDL  No results found for: LDL  No results found for: VLDL  No results found for: LDLHDL      Procedures    Assessment/Plan   Problems Addressed this Visit        Musculoskeletal and Integument    DDD (degenerative disc disease), lumbar    DJD (degenerative joint disease), lumbar - Primary       Hematopoietic and Hemostatic    Anemia    Relevant Medications    ferrous sulfate 324 (65 Fe) MG tablet delayed-release EC tablet       Other    Localized edema        Continue furosemide 40 once a day.     No orders of the defined types were placed in this encounter.      Current Outpatient Medications   Medication Sig Dispense Refill   • acetaminophen (TYLENOL) 650 MG 8 hr tablet Take 1,300 mg by mouth 2 (Two) Times a Day.     • acetaminophen-codeine (TYLENOL #3) 300-30 MG per tablet Take 1 tablet by mouth Every 4 (Four) Hours As Needed for Moderate Pain .     • albuterol (PROVENTIL HFA;VENTOLIN HFA) 108 (90 Base) MCG/ACT inhaler Inhale 2 puffs Every 4 (Four) Hours As Needed for Wheezing.     • ALLERGY SERUM INJECTION Inject  under the skin into the appropriate area as directed 1 (One) Time Per Week.      • aspirin 81 MG tablet Take 1 tablet by mouth Daily.     • azelastine (ASTELIN) 0.1 % nasal spray 2 sprays into the nostril(s) as directed by provider 2 (Two) Times a Day. Use in each nostril as directed     • BRIMONIDINE TARTRATE OP Apply 1 drop to eye(s) as directed by provider 2 (Two) Times a Day.     • dorzolamide-timolol (COSOPT) 22.3-6.8 MG/ML ophthalmic solution Administer 1 drop to both eyes 2 (Two) Times a Day.     • ferrous sulfate 324 (65 Fe) MG tablet delayed-release EC tablet Take 324 mg by mouth Daily With Breakfast.     • fexofenadine (ALLEGRA) 180 MG tablet Take 180 mg by mouth Daily.     • Fluticasone Furoate-Vilanterol (BREO ELLIPTA IN) Inhale 1 puff Every Morning.     • furosemide (LASIX) 40 MG tablet One Bid for 3 days then daily 60 tablet 1   • latanoprost (XALATAN) 0.005 % ophthalmic solution Administer 1 drop to both eyes Every Night.     • Latanoprost 0.005 % emulsion Apply 1 drop to eye(s) as directed by provider Every Night.     • levothyroxine (SYNTHROID) 88 MCG tablet Take 1 tablet by mouth Every Morning. 90 tablet 3   • losartan (COZAAR) 100 MG tablet Take 1 tablet by mouth Daily. 90 tablet 0   • montelukast (SINGULAIR) 10 MG tablet Take 1 tablet by mouth Every Night. 90 tablet 3   • pantoprazole (PROTONIX) 40 MG EC tablet Take 40 mg by mouth 2 (Two) Times a Day.     • potassium chloride (K-DUR) 10 MEQ CR tablet Take 1 tablet by mouth 2 (Two) Times a Day. 60 tablet 3   • pramipexole (MIRAPEX) 1.5 MG tablet Take 1 tablet by mouth At Night As Needed (RLS). 30 tablet 5   • rosuvastatin (CRESTOR) 20 MG tablet Take 1 tablet by mouth Every Evening. 90 tablet 1   • traMADol (ULTRAM) 50 MG tablet Take 1 tablet by mouth Every 6 (Six) Hours As Needed for Moderate Pain . 60 tablet 2     No current facility-administered medications for this visit.        Nathan Baez had no medications administered during this visit.    Return in about 6 weeks (around 2/10/2020).    There are no Patient  Instructions on file for this visit.

## 2020-01-03 ENCOUNTER — TRANSCRIBE ORDERS (OUTPATIENT)
Dept: PHYSICAL THERAPY | Facility: CLINIC | Age: 75
End: 2020-01-03

## 2020-01-03 ENCOUNTER — TREATMENT (OUTPATIENT)
Dept: PHYSICAL THERAPY | Facility: CLINIC | Age: 75
End: 2020-01-03

## 2020-01-03 DIAGNOSIS — M51.36 DDD (DEGENERATIVE DISC DISEASE), LUMBAR: Primary | ICD-10-CM

## 2020-01-03 DIAGNOSIS — Z96.611 STATUS POST REVERSE TOTAL REPLACEMENT OF RIGHT SHOULDER: ICD-10-CM

## 2020-01-03 DIAGNOSIS — M25.511 ACUTE PAIN OF RIGHT SHOULDER: Primary | ICD-10-CM

## 2020-01-03 DIAGNOSIS — M54.16 RADICULOPATHY, LUMBAR REGION: ICD-10-CM

## 2020-01-03 PROCEDURE — 97110 THERAPEUTIC EXERCISES: CPT | Performed by: PHYSICAL THERAPIST

## 2020-01-03 PROCEDURE — 97140 MANUAL THERAPY 1/> REGIONS: CPT | Performed by: PHYSICAL THERAPIST

## 2020-01-03 NOTE — PROGRESS NOTES
Physical Therapy Daily Progress Note  Visit: 12    Nathan Baez reports: I go to [facility] on Monday. I have no pain in the shoulder. I can get myself dressed, but I am slow with it and still have trouble washing behind my back. The shoulder does not wake me up at night. I still feel like it is weak still and needs more strength    Subjective     Objective       Active Range of Motion     Right Shoulder   Flexion: 154 degrees   Abduction: 153 degrees   External rotation BTH: C7   Internal rotation BTB: Active internal rotation behind the back: greater trochanter.     Strength/Myotome Testing     Right Shoulder     Planes of Motion   Flexion: 4+   Abduction: 4+   External rotation at 0°: 4-   Internal rotation at 0°: 4      See Exercise, Manual, and Modality Logs for complete treatment.       Assessment & Plan     Assessment  Assessment details: Shoulder is doing excellent. Continues to have some weakness especially with ER motion, but overall his strength ad ROM are doing very well.    Plan  Plan details: Progress with strength and endurance of R shoulder        Manual Therapy:    10     mins  08515;  Therapeutic Exercise:    32     mins  61368;     Neuromuscular Jon:    -    mins  82965;    Therapeutic Activity:     -     mins  69600;     Gait Training:      -     mins  26487;     Ultrasound:     -     mins  77748;    Electrical Stimulation:    -     mins  28829 ( );  Dry Needling     -     mins self-pay    Timed Treatment:   42   mins   Total Treatment:     45   mins    CITLALLI Cedeno License #: [id number]    Physical Therapist

## 2020-01-08 ENCOUNTER — TREATMENT (OUTPATIENT)
Dept: PHYSICAL THERAPY | Facility: CLINIC | Age: 75
End: 2020-01-08

## 2020-01-08 DIAGNOSIS — Z96.611 STATUS POST REVERSE TOTAL REPLACEMENT OF RIGHT SHOULDER: ICD-10-CM

## 2020-01-08 DIAGNOSIS — M25.511 ACUTE PAIN OF RIGHT SHOULDER: Primary | ICD-10-CM

## 2020-01-08 PROCEDURE — 97140 MANUAL THERAPY 1/> REGIONS: CPT | Performed by: PHYSICAL THERAPIST

## 2020-01-08 PROCEDURE — 97110 THERAPEUTIC EXERCISES: CPT | Performed by: PHYSICAL THERAPIST

## 2020-01-08 NOTE — PROGRESS NOTES
Physical Therapy Daily Progress Note  Visit: 13    Nathan Baez reports: MD said shoulder looks good. Would like to do one more week of shoulder PT to learn HEP and then DC from shoulder and then focus PT on his back    Subjective     Objective   See Exercise, Manual, and Modality Logs for complete treatment.       Assessment & Plan     Assessment  Assessment details: Shoulder is doing well. Focus next week on preparing for progression of HEP for right shoulder    Plan  Plan details: Evaluate back next session        Manual Therapy:    10     mins  64646;  Therapeutic Exercise:    34     mins  95184;     Neuromuscular Jon:    -    mins  25987;    Therapeutic Activity:     -     mins  87755;     Gait Training:      -     mins  20104;     Ultrasound:     --     mins  52116;    Electrical Stimulation:    --     mins  55955 ( );  Dry Needling     -     mins self-pay    Timed Treatment:   44   mins   Total Treatment:     45   mins    Elizabeth Chavez PT  [place] License #: [id number]    Physical Therapist

## 2020-01-10 ENCOUNTER — TREATMENT (OUTPATIENT)
Dept: PHYSICAL THERAPY | Facility: CLINIC | Age: 75
End: 2020-01-10

## 2020-01-10 ENCOUNTER — READMISSION MANAGEMENT (OUTPATIENT)
Dept: CALL CENTER | Facility: HOSPITAL | Age: 75
End: 2020-01-10

## 2020-01-10 DIAGNOSIS — R26.2 DIFFICULTY WALKING: ICD-10-CM

## 2020-01-10 DIAGNOSIS — M54.50 CHRONIC BILATERAL LOW BACK PAIN WITHOUT SCIATICA: Primary | ICD-10-CM

## 2020-01-10 DIAGNOSIS — G89.29 CHRONIC BILATERAL LOW BACK PAIN WITHOUT SCIATICA: Primary | ICD-10-CM

## 2020-01-10 PROCEDURE — G0283 ELEC STIM OTHER THAN WOUND: HCPCS | Performed by: PHYSICAL THERAPIST

## 2020-01-10 PROCEDURE — 97162 PT EVAL MOD COMPLEX 30 MIN: CPT | Performed by: PHYSICAL THERAPIST

## 2020-01-10 PROCEDURE — 97110 THERAPEUTIC EXERCISES: CPT | Performed by: PHYSICAL THERAPIST

## 2020-01-10 NOTE — OUTREACH NOTE
Total Joint Month 2 Survey      Responses   Facility patient discharged from?  [facility]   Does the patient have one of the following disease processes/diagnoses(primary or secondary)?  Total Joint Replacement   Joint surgery performed?  Shoulder   Month 2 attempt successful?  Yes   Call start time  1545   Call end time  1548   Has the patient been back in either the hospital or Emergency Department since discharge?  Yes   If the patient has been back to hospital or Emergency Department list date and reason  pt reports he went to ED r/t SOB 12/2/19   Discharge diagnosis  s/p reverse total shoulder replacement right   Is patient permission given to speak with other caregiver?  No   Is the patient taking all medications as directed (includes completed medication regime)?  Yes   Has the patient kept scheduled appointments due by today?  Yes   Is the patient still receiving Home Health Services?  N/A   Is the patient still attending therapy sessions(either in the home or as an outpatient)?  Yes   Has the patient fallen since discharge?  No   What is the patient's perception of their functional status since discharge?  Improving   Is the patient able to teach back signs and symptoms of infection?  -- [Denies any signs of infection. ]   Is the patient/caregiver able to teach back the hierarchy of who to call/visit for symptoms/problems? PCP, Specialist, Home health nurse, Urgent Care, ED, 911  Yes   Month 2 Call Completed?  Yes          Mindy Holguin RN

## 2020-01-10 NOTE — PROGRESS NOTES
Physical Therapy Initial Evaluation and Plan of Care    Patient: Nathan Baez   : 1945  Diagnosis/ICD-10 Code:  Chronic bilateral low back pain without sciatica [M54.5, G89.29]  Referring practitioner: Damien Pierce MD  Past Medical History Reviewed: 1/10/2020    PLOF: Lives with wife and likes to work around house    Subjective Evaluation    History of Present Illness  Date of onset: 7/10/2019  Mechanism of injury: My back has been bothering me for about 2 years, but has gotten worse over the last 6 months. No incident. I fell in July, but I do not think that caused my back pain. I have not been mowing the yard because my back was bothering me. I have restless leg syndrome for years and the legs bother me. The compression stockings help the swelling a bit.   The back hurts in the middle maybe more to the right.   The back does feel stiff the morning. Both my legs tingle and burn. It happens off and on all day, mostly when I am sitting. If I am lying on my left side I do get some relief.     Reverse TSA on 2020            Patient Occupation: retired and lives with wife Pain  Current pain ratin  At worst pain ratin  Location: (R) sided low back  Relieving factors: change in position, ice and medications  Aggravating factors: ambulation, standing and prolonged positioning (sitting)  Progression: no change             Objective       Postural Observations    Additional Postural Observation Details  Lateral shift of trunk (R)    Palpation     Right Tenderness of the erector spinae and quadratus lumborum.     Tenderness     Right Hip   Tenderness in the PSIS.     Active Range of Motion     Additional Active Range of Motion Details  Limited in all planes. Demonstrates approx 25% of lumbar motion    Strength/Myotome Testing     Lumbar   Left   Normal strength    Right   Normal strength    Additional Strength Details  Decreased core strength    Ambulation     Observational Gait   Gait: antalgic    Base of support: increased    Quality of Movement During Gait   Trunk    Trunk (Right): Positive lateral lean over stance limb.     Comments   (L) pelvic shift     TU sec with SPC         Assessment & Plan     Assessment  Impairments: abnormal or restricted ROM, activity intolerance, impaired physical strength, lacks appropriate home exercise program and pain with function  Assessment details: Pt presents to PT with right sided low back pain and poor posture limiting his ability to stand, sit and ambulate comfortably. Pt has tightness through lumbar spine, poor posture and weakness in core and lumbar musculature.  Pt would benefit from skilled PT intervention to address the deficits noted.     Prognosis: good  Functional Limitations: carrying objects, lifting, walking, uncomfortable because of pain, moving in bed, sitting and unable to perform repetitive tasks  Goals  Plan Goals: SHORT TERM GOALS: Time for Goal Achievement: 4 weeks    1.  Patient to be compliant and progression of HEP                             2.  Pain level < 4/10 at worst with mentioned activities to improve function  3.  Increased thoracic, lumbar and SIJ mobility to allow for increased lumbar AROM with less pain.  4.  Increased lumbar AROM to by 25% in all planes to allow for increased ease with sit-stand transfers and functional activities    LONG TERM GOALS: Time for Goal Achievement: 2 months  1.  Pt. to score < 40 % on Back Index  2.  Pain level < 3/10 with all listed activities to return to normal.  3.  (B) LE and lower abdominal strength to 5/5 to allow for pushing, pulling and activities to occur without pain (driving, sitting, household  & Job requirements)        Plan  Therapy options: will be seen for skilled physical therapy services  Planned modality interventions: cryotherapy, electrical stimulation/Russian stimulation, TENS, thermotherapy (hydrocollator packs) and ultrasound  Other planned modality interventions: Dry  Needling  Planned therapy interventions: body mechanics training, flexibility, functional ROM exercises, home exercise program, manual therapy, neuromuscular re-education, postural training, spinal/joint mobilization, stretching, strengthening and soft tissue mobilization  Duration in visits: 12  Treatment plan discussed with: patient        Manual Therapy:    -     mins  07723;  Therapeutic Exercise:    10     mins  78940;     Neuromuscular Jon:    -    mins  05131;    Therapeutic Activity:     -     mins  20983;     Gait Training:      -     mins  07043;     Ultrasound:     -     mins  58690;    Electrical Stimulation:    15     mins  50780 ( );  Dry Needling     -     mins self-pay    Timed Treatment:   10   mins   Total Treatment:     50   mins      PT SIGNATURE: Elizabeth Chavez, PT   DATE TREATMENT INITIATED: 1/10/2020    Medicare Initial Certification  Certification Period: 4/9/2020  I certify that the therapy services are furnished while this patient is under my care.  The services outlined above are required by this patient, and will be reviewed every 90 days.     PHYSICIAN: Damien Pierce MD      DATE:     Please sign and return via fax to [phone].. Thank you, [facility].

## 2020-01-13 ENCOUNTER — TREATMENT (OUTPATIENT)
Dept: PHYSICAL THERAPY | Facility: CLINIC | Age: 75
End: 2020-01-13

## 2020-01-13 DIAGNOSIS — M25.511 ACUTE PAIN OF RIGHT SHOULDER: Primary | ICD-10-CM

## 2020-01-13 DIAGNOSIS — Z96.611 STATUS POST REVERSE TOTAL REPLACEMENT OF RIGHT SHOULDER: ICD-10-CM

## 2020-01-13 PROCEDURE — 97140 MANUAL THERAPY 1/> REGIONS: CPT | Performed by: PHYSICAL THERAPIST

## 2020-01-13 PROCEDURE — 97110 THERAPEUTIC EXERCISES: CPT | Performed by: PHYSICAL THERAPIST

## 2020-01-13 NOTE — PROGRESS NOTES
Physical Therapy Daily Progress Note  Visit: 14    Nathan Baez reports: Shoulder is doing well.     Subjective     Objective   See Exercise, Manual, and Modality Logs for complete treatment.       Assessment & Plan     Assessment  Assessment details: Shoulder is doing great. AROM and strength are progressing well. Reassess shoulder next visit for probable DC    Plan  Plan details: DC next session        Manual Therapy:    9     mins  53949;  Therapeutic Exercise:    30     mins  80828;     Neuromuscular Jon:    -    mins  98340;    Therapeutic Activity:     -     mins  07616;     Gait Training:      -     mins  02238;     Ultrasound:     -     mins  39548;    Electrical Stimulation:    -     mins  76017 ( );  Dry Needling     -     mins self-pay    Timed Treatment:   39   mins   Total Treatment:     40   mins    Elizabeth Chavez, PT  [place] License #: [id number]    Physical Therapist

## 2020-01-15 ENCOUNTER — TREATMENT (OUTPATIENT)
Dept: PHYSICAL THERAPY | Facility: CLINIC | Age: 75
End: 2020-01-15

## 2020-01-15 DIAGNOSIS — Z96.611 STATUS POST REVERSE TOTAL REPLACEMENT OF RIGHT SHOULDER: ICD-10-CM

## 2020-01-15 DIAGNOSIS — M25.511 ACUTE PAIN OF RIGHT SHOULDER: Primary | ICD-10-CM

## 2020-01-15 PROCEDURE — 97110 THERAPEUTIC EXERCISES: CPT | Performed by: PHYSICAL THERAPIST

## 2020-01-15 PROCEDURE — 97140 MANUAL THERAPY 1/> REGIONS: CPT | Performed by: PHYSICAL THERAPIST

## 2020-01-15 NOTE — PROGRESS NOTES
Physical Therapy Daily Progress Note  Visit: 15    Nathan Baez reports: Shoulder is doing fine. I am able to do everything I need to do with my shoulder    Subjective     Objective   See Exercise, Manual, and Modality Logs for complete treatment.       Assessment & Plan     Assessment  Assessment details: Pt ROM and strength are progressing very well with his right shoulder. He has met his goals and is functioning as desired with his right shoulder. Will DC his right shoulder at this time and continue PT for his low back    Plan  Plan details: DC episode for shoulder and begin low back PT        Manual Therapy:    10     mins  10762;  Therapeutic Exercise:    30     mins  72470;     Neuromuscular Jon:    -    mins  01249;    Therapeutic Activity:     -     mins  32608;     Gait Training:      -     mins  57600;     Ultrasound:     -     mins  17667;    Electrical Stimulation:    -     mins  03087 ( );  Dry Needling     -     mins self-pay    Timed Treatment:   40   mins   Total Treatment:     45   mins    Elizabeth Chavez PT  [place] License #: [id number]    Physical Therapist

## 2020-01-20 ENCOUNTER — TREATMENT (OUTPATIENT)
Dept: PHYSICAL THERAPY | Facility: CLINIC | Age: 75
End: 2020-01-20

## 2020-01-20 DIAGNOSIS — M54.50 CHRONIC BILATERAL LOW BACK PAIN WITHOUT SCIATICA: Primary | ICD-10-CM

## 2020-01-20 DIAGNOSIS — G89.29 CHRONIC BILATERAL LOW BACK PAIN WITHOUT SCIATICA: Primary | ICD-10-CM

## 2020-01-20 DIAGNOSIS — R26.2 DIFFICULTY WALKING: ICD-10-CM

## 2020-01-20 PROCEDURE — 97140 MANUAL THERAPY 1/> REGIONS: CPT | Performed by: PHYSICAL THERAPIST

## 2020-01-20 PROCEDURE — 97110 THERAPEUTIC EXERCISES: CPT | Performed by: PHYSICAL THERAPIST

## 2020-01-20 PROCEDURE — G0283 ELEC STIM OTHER THAN WOUND: HCPCS | Performed by: PHYSICAL THERAPIST

## 2020-01-20 NOTE — PROGRESS NOTES
Physical Therapy Daily Progress Note  Visit: 2    Nathan Baez reports: My back is doing ok    Subjective     Objective   See Exercise, Manual, and Modality Logs for complete treatment.       Assessment & Plan     Assessment  Assessment details: Pt tolerated treatment well. Frequent cues for erect posture and technique with exercises. Added tiny steps and supine gold clamshell to HEP    Plan  Plan details: Progress as able. Added leg press and shuttleboard. Add S/L clamshell        Manual Therapy:    5     mins  04430;  Therapeutic Exercise:    35     mins  26635;     Neuromuscular Jon:    -    mins  45438;    Therapeutic Activity:     -     mins  45305;     Gait Training:      -     mins  29081;     Ultrasound:     -     mins  94164;    Electrical Stimulation:    15     mins  66841 ( );  Dry Needling     -     mins self-pay    Timed Treatment:   40   mins   Total Treatment:     60   mins    Elizabeth Chavez PT  [place] License #: [id number]    Physical Therapist

## 2020-01-23 ENCOUNTER — TREATMENT (OUTPATIENT)
Dept: PHYSICAL THERAPY | Facility: CLINIC | Age: 75
End: 2020-01-23

## 2020-01-23 DIAGNOSIS — G89.29 CHRONIC BILATERAL LOW BACK PAIN WITHOUT SCIATICA: Primary | ICD-10-CM

## 2020-01-23 DIAGNOSIS — R26.2 DIFFICULTY WALKING: ICD-10-CM

## 2020-01-23 DIAGNOSIS — M54.50 CHRONIC BILATERAL LOW BACK PAIN WITHOUT SCIATICA: Primary | ICD-10-CM

## 2020-01-23 PROCEDURE — G0283 ELEC STIM OTHER THAN WOUND: HCPCS | Performed by: PHYSICAL THERAPIST

## 2020-01-23 PROCEDURE — 97140 MANUAL THERAPY 1/> REGIONS: CPT | Performed by: PHYSICAL THERAPIST

## 2020-01-23 PROCEDURE — 97110 THERAPEUTIC EXERCISES: CPT | Performed by: PHYSICAL THERAPIST

## 2020-01-23 NOTE — PROGRESS NOTES
Physical Therapy Daily Progress Note  Visit: 3    Nathan Baez reports: The back feels pretty good    Subjective     Objective   See Exercise, Manual, and Modality Logs for complete treatment.       Assessment & Plan     Assessment  Assessment details: Pt doing excellent. His posture is improving and his standing tolerance is improving as well. Added terra and bucky to HEP    Plan  Plan details: Progress per POC        Manual Therapy:    8     mins  43232;  Therapeutic Exercise:    34     mins  11041;     Neuromuscular Jon:    -    mins  22956;    Therapeutic Activity:     -     mins  87137;     Gait Training:      -     mins  44911;     Ultrasound:     -     mins  78516;    Electrical Stimulation:    15     mins  89884 ( );  Dry Needling     -     mins self-pay    Timed Treatment:   42   mins   Total Treatment:     60   mins    Elizabeth Chavez, PT  [place] License #: [id number]    Physical Therapist

## 2020-01-27 ENCOUNTER — TREATMENT (OUTPATIENT)
Dept: PHYSICAL THERAPY | Facility: CLINIC | Age: 75
End: 2020-01-27

## 2020-01-27 DIAGNOSIS — R26.2 DIFFICULTY WALKING: ICD-10-CM

## 2020-01-27 DIAGNOSIS — G89.29 CHRONIC BILATERAL LOW BACK PAIN WITHOUT SCIATICA: Primary | ICD-10-CM

## 2020-01-27 DIAGNOSIS — M54.50 CHRONIC BILATERAL LOW BACK PAIN WITHOUT SCIATICA: Primary | ICD-10-CM

## 2020-01-27 PROCEDURE — G0283 ELEC STIM OTHER THAN WOUND: HCPCS | Performed by: PHYSICAL THERAPIST

## 2020-01-27 PROCEDURE — 97110 THERAPEUTIC EXERCISES: CPT | Performed by: PHYSICAL THERAPIST

## 2020-01-27 PROCEDURE — 97112 NEUROMUSCULAR REEDUCATION: CPT | Performed by: PHYSICAL THERAPIST

## 2020-01-27 NOTE — PROGRESS NOTES
Physical Therapy Daily Progress Note  Visit: 4    Nathan Baez reports: Im not feeling too bad    Subjective     Objective   See Exercise, Manual, and Modality Logs for complete treatment.       Assessment & Plan     Assessment  Assessment details: Pt is making good progress with therapy. Had some difficulty with balance on shuttleboard with eyes closed. Continues to need cues for erect posture    Plan  Plan details: Progress with strength and stabilization        Manual Therapy:    5     mins  82668;  Therapeutic Exercise:    31     mins  47255;     Neuromuscular Jon:    5    mins  39366;    Therapeutic Activity:     -     mins  30889;     Gait Training:      -     mins  13394;     Ultrasound:     -     mins  77777;    Electrical Stimulation:    15     mins  03524 ( );  Dry Needling     -     mins self-pay    Timed Treatment:   41   mins   Total Treatment:     57   mins    Elizabeth Chavez PT  [place] License #: [id number]    Physical Therapist

## 2020-01-30 ENCOUNTER — TREATMENT (OUTPATIENT)
Dept: PHYSICAL THERAPY | Facility: CLINIC | Age: 75
End: 2020-01-30

## 2020-01-30 DIAGNOSIS — M54.50 CHRONIC BILATERAL LOW BACK PAIN WITHOUT SCIATICA: Primary | ICD-10-CM

## 2020-01-30 DIAGNOSIS — R26.2 DIFFICULTY WALKING: ICD-10-CM

## 2020-01-30 DIAGNOSIS — G89.29 CHRONIC BILATERAL LOW BACK PAIN WITHOUT SCIATICA: Primary | ICD-10-CM

## 2020-01-30 PROCEDURE — G0283 ELEC STIM OTHER THAN WOUND: HCPCS | Performed by: PHYSICAL THERAPIST

## 2020-01-30 PROCEDURE — 97110 THERAPEUTIC EXERCISES: CPT | Performed by: PHYSICAL THERAPIST

## 2020-01-30 PROCEDURE — 97140 MANUAL THERAPY 1/> REGIONS: CPT | Performed by: PHYSICAL THERAPIST

## 2020-01-30 NOTE — PROGRESS NOTES
Physical Therapy Daily Progress Note  Visit: 5    Nathan Baez reports: I am feeling fairly good today    Subjective     Objective   See Exercise, Manual, and Modality Logs for complete treatment.       Assessment & Plan     Assessment  Assessment details: Posture is significantly better. Although he still has some back pain, he is making good progress with posture, gait and strength.     Plan  Plan details: Progress per POC        Manual Therapy:    5     mins  01485;  Therapeutic Exercise:    35     mins  85972;     Neuromuscular Jon:    -    mins  41709;    Therapeutic Activity:     -     mins  21231;     Gait Training:      -     mins  32154;     Ultrasound:     -     mins  52778;    Electrical Stimulation:    15     mins  34212 ( );  Dry Needling     -     mins self-pay    Timed Treatment:   40   mins   Total Treatment:     58   mins    Elizabeth Chavez, PT  [place] License #: [id number]    Physical Therapist

## 2020-02-03 ENCOUNTER — TREATMENT (OUTPATIENT)
Dept: PHYSICAL THERAPY | Facility: CLINIC | Age: 75
End: 2020-02-03

## 2020-02-03 DIAGNOSIS — R26.2 DIFFICULTY WALKING: ICD-10-CM

## 2020-02-03 DIAGNOSIS — M54.50 CHRONIC BILATERAL LOW BACK PAIN WITHOUT SCIATICA: Primary | ICD-10-CM

## 2020-02-03 DIAGNOSIS — G89.29 CHRONIC BILATERAL LOW BACK PAIN WITHOUT SCIATICA: Primary | ICD-10-CM

## 2020-02-03 PROCEDURE — 97110 THERAPEUTIC EXERCISES: CPT | Performed by: PHYSICAL THERAPIST

## 2020-02-03 PROCEDURE — G0283 ELEC STIM OTHER THAN WOUND: HCPCS | Performed by: PHYSICAL THERAPIST

## 2020-02-03 PROCEDURE — 97140 MANUAL THERAPY 1/> REGIONS: CPT | Performed by: PHYSICAL THERAPIST

## 2020-02-03 NOTE — PROGRESS NOTES
Physical Therapy Daily Progress Note  Visit: 6    Nathan Baez reports: The back is decent today. Sometimes it does not feel as good as others, but it is ok now    Subjective     Objective   See Exercise, Manual, and Modality Logs for complete treatment.       Assessment & Plan     Assessment  Assessment details: Pt doing well. Posture is improving. Educated to continue working on bracing his abdominals with transfers and gait at home    Plan  Plan details: Progress per POC        Manual Therapy:    5     mins  19693;  Therapeutic Exercise:    29     mins  07084;     Neuromuscular Jon:    5    mins  39162;    Therapeutic Activity:     -     mins  15845;     Gait Training:      -     mins  20079;     Ultrasound:     -     mins  73091;    Electrical Stimulation:    15     mins  59354 ( );  Dry Needling     -     mins self-pay    Timed Treatment:   39   mins   Total Treatment:     55   mins    Elizabeth Chavez PT  [place] License #: [id number]    Physical Therapist

## 2020-02-06 ENCOUNTER — TREATMENT (OUTPATIENT)
Dept: PHYSICAL THERAPY | Facility: CLINIC | Age: 75
End: 2020-02-06

## 2020-02-06 DIAGNOSIS — G89.29 CHRONIC BILATERAL LOW BACK PAIN WITHOUT SCIATICA: Primary | ICD-10-CM

## 2020-02-06 DIAGNOSIS — R26.2 DIFFICULTY WALKING: ICD-10-CM

## 2020-02-06 DIAGNOSIS — M54.50 CHRONIC BILATERAL LOW BACK PAIN WITHOUT SCIATICA: Primary | ICD-10-CM

## 2020-02-06 PROCEDURE — 97140 MANUAL THERAPY 1/> REGIONS: CPT | Performed by: PHYSICAL THERAPIST

## 2020-02-06 PROCEDURE — G0283 ELEC STIM OTHER THAN WOUND: HCPCS | Performed by: PHYSICAL THERAPIST

## 2020-02-06 PROCEDURE — 97110 THERAPEUTIC EXERCISES: CPT | Performed by: PHYSICAL THERAPIST

## 2020-02-06 PROCEDURE — 97112 NEUROMUSCULAR REEDUCATION: CPT | Performed by: PHYSICAL THERAPIST

## 2020-02-06 NOTE — PROGRESS NOTES
Physical Therapy Daily Progress Note  Visit: 7    Nathan Sasha reports: I am doing good    Subjective     Objective   See Exercise, Manual, and Modality Logs for complete treatment.       Assessment & Plan     Assessment  Assessment details: Pt continues to make good progress with PT. Progress with standing BOSU step ups next session    Plan  Plan details: Continue lumbar mobility and core stabilization        Manual Therapy:    8     mins  84185;  Therapeutic Exercise:    25     mins  10372;     Neuromuscular Jon:    10    mins  70656;    Therapeutic Activity:     -     mins  06241;     Gait Training:      -     mins  95178;     Ultrasound:     -     mins  60435;    Electrical Stimulation:    15     mins  42962 ( );  Dry Needling     -     mins self-pay    Timed Treatment:   43   mins   Total Treatment:     60   mins    Elizabeth Chavez PT  [place] License #: [id number]    Physical Therapist

## 2020-02-10 DIAGNOSIS — K21.9 GASTROESOPHAGEAL REFLUX DISEASE WITHOUT ESOPHAGITIS: Primary | ICD-10-CM

## 2020-02-10 RX ORDER — PANTOPRAZOLE SODIUM 40 MG/1
40 TABLET, DELAYED RELEASE ORAL 2 TIMES DAILY
Qty: 90 TABLET | Refills: 3 | Status: SHIPPED | OUTPATIENT
Start: 2020-02-10 | End: 2020-02-18 | Stop reason: SDUPTHER

## 2020-02-11 ENCOUNTER — TREATMENT (OUTPATIENT)
Dept: PHYSICAL THERAPY | Facility: CLINIC | Age: 75
End: 2020-02-11

## 2020-02-11 ENCOUNTER — READMISSION MANAGEMENT (OUTPATIENT)
Dept: CALL CENTER | Facility: HOSPITAL | Age: 75
End: 2020-02-11

## 2020-02-11 DIAGNOSIS — R26.2 DIFFICULTY WALKING: ICD-10-CM

## 2020-02-11 DIAGNOSIS — G89.29 CHRONIC BILATERAL LOW BACK PAIN WITHOUT SCIATICA: Primary | ICD-10-CM

## 2020-02-11 DIAGNOSIS — M54.50 CHRONIC BILATERAL LOW BACK PAIN WITHOUT SCIATICA: Primary | ICD-10-CM

## 2020-02-11 PROCEDURE — 97110 THERAPEUTIC EXERCISES: CPT | Performed by: PHYSICAL THERAPIST

## 2020-02-11 PROCEDURE — G0283 ELEC STIM OTHER THAN WOUND: HCPCS | Performed by: PHYSICAL THERAPIST

## 2020-02-11 PROCEDURE — 97112 NEUROMUSCULAR REEDUCATION: CPT | Performed by: PHYSICAL THERAPIST

## 2020-02-12 ENCOUNTER — READMISSION MANAGEMENT (OUTPATIENT)
Dept: CALL CENTER | Facility: HOSPITAL | Age: 75
End: 2020-02-12

## 2020-02-12 NOTE — OUTREACH NOTE
Total Joint Month 3 Survey      Responses   Facility patient discharged from?  [facility]   Does the patient have one of the following disease processes/diagnoses(primary or secondary)?  Total Joint Replacement   Joint surgery performed?  Shoulder   Month 3 attempt successful?  No   Unsuccessful attempts  Attempt 2          Da Acosta RN

## 2020-02-13 ENCOUNTER — OFFICE VISIT (OUTPATIENT)
Dept: FAMILY MEDICINE CLINIC | Facility: CLINIC | Age: 75
End: 2020-02-13

## 2020-02-13 ENCOUNTER — TREATMENT (OUTPATIENT)
Dept: PHYSICAL THERAPY | Facility: CLINIC | Age: 75
End: 2020-02-13

## 2020-02-13 VITALS
SYSTOLIC BLOOD PRESSURE: 126 MMHG | HEART RATE: 89 BPM | TEMPERATURE: 98.1 F | OXYGEN SATURATION: 97 % | DIASTOLIC BLOOD PRESSURE: 81 MMHG | HEIGHT: 67 IN | BODY MASS INDEX: 39.71 KG/M2 | WEIGHT: 253 LBS

## 2020-02-13 DIAGNOSIS — M54.50 CHRONIC BILATERAL LOW BACK PAIN WITHOUT SCIATICA: Primary | ICD-10-CM

## 2020-02-13 DIAGNOSIS — M51.36 DDD (DEGENERATIVE DISC DISEASE), LUMBAR: Primary | ICD-10-CM

## 2020-02-13 DIAGNOSIS — R60.0 LOCALIZED EDEMA: ICD-10-CM

## 2020-02-13 DIAGNOSIS — D62 ANEMIA DUE TO ACUTE BLOOD LOSS: ICD-10-CM

## 2020-02-13 DIAGNOSIS — R26.2 DIFFICULTY WALKING: ICD-10-CM

## 2020-02-13 DIAGNOSIS — L91.8 INFLAMED SKIN TAG: ICD-10-CM

## 2020-02-13 DIAGNOSIS — G89.29 CHRONIC BILATERAL LOW BACK PAIN WITHOUT SCIATICA: Primary | ICD-10-CM

## 2020-02-13 DIAGNOSIS — L57.0 ACTINIC KERATOSIS: ICD-10-CM

## 2020-02-13 PROCEDURE — 99214 OFFICE O/P EST MOD 30 MIN: CPT | Performed by: FAMILY MEDICINE

## 2020-02-13 PROCEDURE — 17003 DESTRUCT PREMALG LES 2-14: CPT | Performed by: FAMILY MEDICINE

## 2020-02-13 PROCEDURE — 97112 NEUROMUSCULAR REEDUCATION: CPT | Performed by: PHYSICAL THERAPIST

## 2020-02-13 PROCEDURE — G0283 ELEC STIM OTHER THAN WOUND: HCPCS | Performed by: PHYSICAL THERAPIST

## 2020-02-13 PROCEDURE — 97110 THERAPEUTIC EXERCISES: CPT | Performed by: PHYSICAL THERAPIST

## 2020-02-13 PROCEDURE — 17000 DESTRUCT PREMALG LESION: CPT | Performed by: FAMILY MEDICINE

## 2020-02-13 RX ORDER — FUROSEMIDE 40 MG/1
TABLET ORAL
Qty: 30 TABLET | Refills: 11
Start: 2020-02-13 | End: 2020-04-09 | Stop reason: SDUPTHER

## 2020-02-13 RX ORDER — POTASSIUM CHLORIDE 750 MG/1
10 TABLET, FILM COATED, EXTENDED RELEASE ORAL DAILY
Qty: 90 TABLET | Refills: 3
Start: 2020-02-13 | End: 2020-04-09 | Stop reason: SDUPTHER

## 2020-02-13 NOTE — PROGRESS NOTES
Chief Complaint   Patient presents with   • Back Pain   • Edema     in bilateral legs        Subjective   Nathan Baez is a 74 y.o. male.     History of Present Illness   F/u DDD L spine.  Back pain doing better with pt.  I take tramadol once to BID.    F/U edema.  On furosemide once a day.    FU anemia.  Post op blood loss.  Hg 10.4 1 month ago.  On iron once a day.   C/o spot on L arm.  Crusts often.  There for months.  Will not go away.  Also irritated skin tags on L neck and one on L waistline that get irritated with clothing.  There for years.  Desires removal due to irritation.     The following portions of the patient's history were reviewed and updated as appropriate: allergies, current medications, past family history, past medical history, past social history, past surgical history and problem list.    Review of Systems   Constitutional: Negative for appetite change and fatigue.   HENT: Negative for nosebleeds and sore throat.    Eyes: Negative for blurred vision and visual disturbance.   Respiratory: Negative for shortness of breath and wheezing.    Cardiovascular: Negative for chest pain and leg swelling.   Gastrointestinal: Negative for abdominal distention and abdominal pain.   Endocrine: Negative for cold intolerance and polyuria.   Genitourinary: Negative for dysuria and hematuria.   Musculoskeletal: Negative for arthralgias and myalgias.   Skin: Positive for skin lesions. Negative for color change and rash.   Neurological: Negative for weakness and confusion.   Psychiatric/Behavioral: Negative for agitation and depressed mood.       Patient Active Problem List   Diagnosis   • OA (osteoarthritis) of knee   • Hypertension   • Abdominal wall cellulitis   • Hypothyroidism (acquired)   • Gastroesophageal reflux disease without esophagitis   • Mixed hyperlipidemia   • Primary insomnia   • DDD (degenerative disc disease), lumbar   • KWAME (obstructive sleep apnea)   • Hyperglycemia   • Allergic   • Venous  insufficiency   • Prostate cancer (CMS/HCC)   • Venous stasis dermatitis   • Tinea cruris   • Tinea corporis   • Throat clearing   • Sleep apnea   • Skin lesion of face   • Rib pain on left side   • Rectal bleed   • Presbycusis   • Pes planus   • Osteoarthritis   • Obesity   • Medicare annual wellness visit, subsequent   • Lumbar strain   • Internal hemorrhoids   • Insomnia   • Inflamed skin tag   • Hypothyroid   • Hyperplastic polyp of stomach   • Hospital discharge follow-up   • History of colon polyps   • High risk medication use   • Glaucoma   • Gastroenteritis, acute   • Erysipelas   • Encounter for screening colonoscopy   • Encounter for long-term (current) use of NSAIDs   • Dysphagia   • DVT (deep venous thrombosis) (CMS/[facility])   • DJD (degenerative joint disease), lumbar   • Diverticulosis   • Disequilibrium   • Cough   • Contact with hypodermic needle   • Cervical radiculopathy   • Cellulitis   • Arthritis   • Allergic rhinitis   • Acute glaucoma   • Acute embolism and thrombosis of vein   • Actinic keratosis   • Status post reverse total shoulder replacement, right   • Localized edema   • Anemia   • Peripheral polyneuropathy       No Known Allergies      Current Outpatient Medications:   •  acetaminophen (TYLENOL) 650 MG 8 hr tablet, Take 1,300 mg by mouth 2 (Two) Times a Day., Disp: , Rfl:   •  acetaminophen-codeine (TYLENOL #3) 300-30 MG per tablet, Take 1 tablet by mouth Every 4 (Four) Hours As Needed for Moderate Pain ., Disp: , Rfl:   •  albuterol (PROVENTIL HFA;VENTOLIN HFA) 108 (90 Base) MCG/ACT inhaler, Inhale 2 puffs Every 4 (Four) Hours As Needed for Wheezing., Disp: , Rfl:   •  ALLERGY SERUM INJECTION, Inject  under the skin into the appropriate area as directed 1 (One) Time Per Week., Disp: , Rfl:   •  aspirin 81 MG tablet, Take 1 tablet by mouth Daily., Disp: , Rfl:   •  azelastine (ASTELIN) 0.1 % nasal spray, 2 sprays into the nostril(s) as directed by provider 2 (Two) Times a Day. Use in each  nostril as directed, Disp: , Rfl:   •  BRIMONIDINE TARTRATE OP, Apply 1 drop to eye(s) as directed by provider 2 (Two) Times a Day., Disp: , Rfl:   •  dorzolamide-timolol (COSOPT) 22.3-6.8 MG/ML ophthalmic solution, Administer 1 drop to both eyes 2 (Two) Times a Day., Disp: , Rfl:   •  ferrous sulfate 324 (65 Fe) MG tablet delayed-release EC tablet, Take 324 mg by mouth Daily With Breakfast., Disp: , Rfl:   •  fexofenadine (ALLEGRA) 180 MG tablet, Take 180 mg by mouth Daily., Disp: , Rfl:   •  Fluticasone Furoate-Vilanterol (BREO ELLIPTA IN), Inhale 1 puff Every Morning., Disp: , Rfl:   •  furosemide (LASIX) 40 MG tablet, One Bid for 3 days then daily, Disp: 30 tablet, Rfl: 11  •  latanoprost (XALATAN) 0.005 % ophthalmic solution, Administer 1 drop to both eyes Every Night., Disp: , Rfl:   •  Latanoprost 0.005 % emulsion, Apply 1 drop to eye(s) as directed by provider Every Night., Disp: , Rfl:   •  levothyroxine (SYNTHROID) 88 MCG tablet, Take 1 tablet by mouth Every Morning., Disp: 90 tablet, Rfl: 3  •  losartan (COZAAR) 100 MG tablet, Take 1 tablet by mouth Daily., Disp: 90 tablet, Rfl: 0  •  montelukast (SINGULAIR) 10 MG tablet, Take 1 tablet by mouth Every Night., Disp: 90 tablet, Rfl: 3  •  pantoprazole (PROTONIX) 40 MG EC tablet, Take 1 tablet by mouth 2 (Two) Times a Day., Disp: 90 tablet, Rfl: 3  •  potassium chloride (K-DUR) 10 MEQ CR tablet, Take 1 tablet by mouth Daily., Disp: 90 tablet, Rfl: 3  •  pramipexole (MIRAPEX) 1.5 MG tablet, Take 1 tablet by mouth At Night As Needed (RLS)., Disp: 30 tablet, Rfl: 5  •  rosuvastatin (CRESTOR) 20 MG tablet, Take 1 tablet by mouth Every Evening., Disp: 90 tablet, Rfl: 1  •  traMADol (ULTRAM) 50 MG tablet, Take 1 tablet by mouth Every 6 (Six) Hours As Needed for Moderate Pain ., Disp: 60 tablet, Rfl: 2    Past Medical History:   Diagnosis Date   • Actinic keratosis    • Acute embolism and thrombosis of vein    • Allergic rhinitis    • Arthritis    • Cataract      forming left eye   • Cellulitis    • Cellulitis    • Cervical radiculopathy    • Contact with hypodermic needle    • Cough    • Disequilibrium    • Diverticulosis    • DJD (degenerative joint disease), lumbar    • DVT (deep venous thrombosis) (CMS/HCC)     l leg  dx 2019 and was on blood thinner and ow off wears compression    • Dysphagia    • Encounter for long-term (current) use of NSAIDs    • Encounter for screening colonoscopy    • Erysipelas    • Gastroenteritis, acute    • GERD (gastroesophageal reflux disease)    • Glaucoma    • High risk medication use    • History of colon polyps    • Hospital discharge follow-up    • Hyperglycemia    • Hyperlipidemia    • Hyperplastic polyp of stomach    • Hypertension    • Hypothyroid    • Inflamed skin tag    • Insomnia    • Internal hemorrhoids    • Kidney stone     has had history of passing and still has kidney stone on both sides    • Lumbar strain    • Male urinary stress incontinence     s/p prostatectomy   • Medicare annual wellness visit, subsequent    • Obesity    • KWAME (obstructive sleep apnea)    • Osteoarthritis    • Pes planus    • Presbycusis    • Prostate cancer (CMS/HCC)    • Prostate cancer (CMS/HCC)    • Rectal bleed    • Restless legs syndrome    • Rib pain on left side    • Shoulder pain    • Skin lesion of face    • Sleep apnea     bipap   • Throat clearing    • Tinea corporis    • Tinea cruris    • Venous stasis dermatitis        Past Surgical History:   Procedure Laterality Date   • CATARACT EXTRACTION Right    • COLONOSCOPY  03/2017    3/17normal. Recheck 2022. 12/11. Repeat in 5 years    • ENDOSCOPY W/ PEG REMOVAL     • FOOT SURGERY      1998 had big toe replaced on left foot   • HERNIA REPAIR      umbilical   • JOINT REPLACEMENT Right 2014   • NECK SURGERY      cervical disc   • HI TOTAL KNEE ARTHROPLASTY Left 9/13/2016    Procedure: LT TOTAL KNEE ARTHROPLASTY;  Surgeon: Tadeo Basurto MD;  Location: [facility] OR;  Service: Orthopedics   •  PROSTATECTOMY  1999. Radical    • THYROID LOBECTOMY     • THYROID SURGERY      partial doesn't know which one was removed   • TONSILLECTOMY     • TOTAL KNEE ARTHROPLASTY Right 2014   • TOTAL SHOULDER ARTHROPLASTY W/ DISTAL CLAVICLE EXCISION Right 2019    Procedure: TOTAL SHOULDER REVERSE ARTHROPLASTY RIGHT REPAIR RIGHT AXILLARY VEIN;  Surgeon: Behzad Kelley MD;  [place] [facility] [place] [facility] [place]: [place]opedics   • WRIST SURGERY Right     x2  wrist replaced       Family History   Problem Relation Age of Onset   • Cancer Mother         COLON   • Cancer Father         PROSTATE   • Cancer Sister         BREAST CANCER   • Breast cancer Sister    • Gout Sister    • Hypertension Sister    • Heart attack Brother    • Bone cancer Brother    • Heart disease Brother    • Malig Hyperthermia Neg Hx        Social History     Tobacco Use   • Smoking status: Former Smoker     Packs/day: 1.00     Years: 20.00     Pack years: 20.00     Types: Cigarettes     Last attempt to quit: 1984     Years since quittin.1   • Smokeless tobacco: Never Used   Substance Use Topics   • Alcohol use: Yes     Comment: 3-4 MONTHLY            Objective     Vitals:    20 0747   BP: 126/81   Pulse: 89   Temp: 98.1 °F (36.7 °C)   SpO2: 97%     Body mass index is 39.63 kg/m².    Physical Exam   Constitutional: He appears well-developed and well-nourished.   HENT:   Head: Normocephalic and atraumatic.   Mouth/Throat: Oropharynx is clear and moist.   Eyes: Pupils are equal, round, and reactive to light. No scleral icterus.   Neck: No thyromegaly present.   Cardiovascular: Normal rate and regular rhythm. Exam reveals no gallop and no friction rub.   No murmur heard.  Pulmonary/Chest: Effort normal. No respiratory distress. He has no wheezes. He has no rales. He exhibits no tenderness.   Abdominal: Soft. Bowel sounds are normal. He exhibits no distension. There is no tenderness.   Musculoskeletal: Normal range of motion.  He exhibits no edema or deformity.   Lymphadenopathy:     He has no cervical adenopathy.   Neurological: No cranial nerve deficit. He exhibits normal muscle tone.   Skin: Skin is warm and dry. No rash noted. He is not diaphoretic.   1cm erythematous lesion L forearm.     Vitals reviewed.      Lab Results   Component Value Date    GLUCOSE 129 (H) 12/02/2019    BUN 20 12/17/2019    CREATININE 1.15 12/17/2019    EGFRIFNONA 62 12/17/2019    EGFRIFAFRI 76 12/17/2019    BCR 17.4 12/17/2019    K 4.5 12/17/2019    CO2 28.8 12/17/2019    CALCIUM 9.3 12/17/2019    PROTENTOTREF 7.0 12/17/2019    ALBUMIN 4.40 12/17/2019    LABIL2 1.7 12/17/2019    AST 23 12/17/2019    ALT 25 12/17/2019       WBC   Date Value Ref Range Status   02/13/2020 4.97 3.40 - 10.80 10*3/mm3 Final     RBC   Date Value Ref Range Status   02/13/2020 4.60 4.14 - 5.80 10*6/mm3 Final     Hemoglobin   Date Value Ref Range Status   02/13/2020 13.1 13.0 - 17.7 g/dL Final   12/02/2019 8.3 (L) 13.0 - 17.7 g/dL Final     Hematocrit   Date Value Ref Range Status   02/13/2020 40.5 37.5 - 51.0 % Final   12/02/2019 26.5 (L) 37.5 - 51.0 % Final     MCV   Date Value Ref Range Status   02/13/2020 88.0 79.0 - 97.0 fL Final   12/02/2019 87.2 79.0 - 97.0 fL Final     MCH   Date Value Ref Range Status   02/13/2020 28.5 26.6 - 33.0 pg Final   12/02/2019 27.3 26.6 - 33.0 pg Final     MCHC   Date Value Ref Range Status   02/13/2020 32.3 31.5 - 35.7 g/dL Final   12/02/2019 31.3 (L) 31.5 - 35.7 g/dL Final     RDW   Date Value Ref Range Status   02/13/2020 14.6 12.3 - 15.4 % Final   12/02/2019 14.7 12.3 - 15.4 % Final     RDW-SD   Date Value Ref Range Status   12/02/2019 46.9 37.0 - 54.0 fl Final     MPV   Date Value Ref Range Status   12/02/2019 9.8 6.0 - 12.0 fL Final     Platelets   Date Value Ref Range Status   02/13/2020 153 140 - 450 10*3/mm3 Final   12/02/2019 204 140 - 450 10*3/mm3 Final     Neutrophil Rel %   Date Value Ref Range Status   02/13/2020 59.0 42.7 - 76.0 % Final      Neutrophil %   Date Value Ref Range Status   12/02/2019 62.4 42.7 - 76.0 % Final     Lymphocyte Rel %   Date Value Ref Range Status   02/13/2020 21.9 19.6 - 45.3 % Final     Lymphocyte %   Date Value Ref Range Status   12/02/2019 18.0 (L) 19.6 - 45.3 % Final     Monocyte Rel %   Date Value Ref Range Status   02/13/2020 13.3 (H) 5.0 - 12.0 % Final     Monocyte %   Date Value Ref Range Status   12/02/2019 14.8 (H) 5.0 - 12.0 % Final     Eosinophil Rel %   Date Value Ref Range Status   02/13/2020 2.8 0.3 - 6.2 % Final     Eosinophil %   Date Value Ref Range Status   12/02/2019 2.5 0.3 - 6.2 % Final     Basophil Rel %   Date Value Ref Range Status   02/13/2020 0.8 0.0 - 1.5 % Final     Basophil %   Date Value Ref Range Status   12/02/2019 0.5 0.0 - 1.5 % Final     Immature Grans %   Date Value Ref Range Status   12/02/2019 1.8 (H) 0.0 - 0.5 % Final     Neutrophils Absolute   Date Value Ref Range Status   02/13/2020 2.93 1.70 - 7.00 10*3/mm3 Final     Neutrophils, Absolute   Date Value Ref Range Status   12/02/2019 2.49 1.70 - 7.00 10*3/mm3 Final     Lymphocytes Absolute   Date Value Ref Range Status   02/13/2020 1.09 0.70 - 3.10 10*3/mm3 Final     Lymphocytes, Absolute   Date Value Ref Range Status   12/02/2019 0.72 0.70 - 3.10 10*3/mm3 Final     Monocytes Absolute   Date Value Ref Range Status   02/13/2020 0.66 0.10 - 0.90 10*3/mm3 Final     Monocytes, Absolute   Date Value Ref Range Status   12/02/2019 0.59 0.10 - 0.90 10*3/mm3 Final     Eosinophils Absolute   Date Value Ref Range Status   02/13/2020 0.14 0.00 - 0.40 10*3/mm3 Final     Eosinophils, Absolute   Date Value Ref Range Status   12/02/2019 0.10 0.00 - 0.40 10*3/mm3 Final     Basophils Absolute   Date Value Ref Range Status   02/13/2020 0.04 0.00 - 0.20 10*3/mm3 Final     Basophils, Absolute   Date Value Ref Range Status   12/02/2019 0.02 0.00 - 0.20 10*3/mm3 Final     Immature Grans, Absolute   Date Value Ref Range Status   12/02/2019 0.07 (H) 0.00 -  0.05 10*3/mm3 Final     nRBC   Date Value Ref Range Status   02/13/2020 0.0 0.0 - 0.2 /100 WBC Final   12/02/2019 0.3 (H) 0.0 - 0.2 /100 WBC Final       Lab Results   Component Value Date    HGBA1C 6.00 (H) 07/11/2019       Lab Results   Component Value Date    YSCFYFBW92 334 06/04/2019       TSH   Date Value Ref Range Status   07/11/2019 3.750 0.270 - 4.200 mIU/mL Final   06/04/2019 2.220 0.270 - 4.200 mIU/mL Final       No results found for: CHOL  No results found for: TRIG  No results found for: HDL  No results found for: LDL  No results found for: VLDL  No results found for: LDLHDL      Cryotherapy, Skin Lesion  Date/Time: 2/13/2020 8:09 AM  Performed by: Damien Pierce MD  Authorized by: Damien Pierce MD   Local anesthesia used: no    Anesthesia:  Local anesthesia used: no    Sedation:  Patient sedated: no    Patient tolerance: Patient tolerated the procedure well with no immediate complications  Comments: L forearm cryo 5 seconds freeze and thaw.  No comp. Pt verbal informed consent obtained.        L neck with cryotx to 5 skin tags as well as one to L waistline.  NO comp.  Pt tolerated well.      Assessment/Plan   Problems Addressed this Visit        Musculoskeletal and Integument    DDD (degenerative disc disease), lumbar - Primary    Inflamed skin tag    Actinic keratosis    Relevant Orders    Cryotherapy, Skin Lesion (Completed)       Hematopoietic and Hemostatic    Anemia    Relevant Orders    CBC & Differential (Completed)    Ferritin (Completed)    Iron (Completed)       Other    Localized edema      Continue PT for back.    Decrease potassium to once a day.  Continue furosemide 40 once a day.      Orders Placed This Encounter   Procedures   • Cryotherapy, Skin Lesion     This order was created via procedure documentation   • Ferritin     Order Specific Question:   [facility] Has the patient fasted?     Answer:   Yes   • Iron     Order Specific Question:   [facility] Has the patient fasted?      Answer:   Yes   • CBC & Differential     Order Specific Question:   Manual Differential     Answer:   No     Order Specific Question:   [facility] Has the patient fasted?     Answer:   Yes       Current Outpatient Medications   Medication Sig Dispense Refill   • acetaminophen (TYLENOL) 650 MG 8 hr tablet Take 1,300 mg by mouth 2 (Two) Times a Day.     • acetaminophen-codeine (TYLENOL #3) 300-30 MG per tablet Take 1 tablet by mouth Every 4 (Four) Hours As Needed for Moderate Pain .     • albuterol (PROVENTIL HFA;VENTOLIN HFA) 108 (90 Base) MCG/ACT inhaler Inhale 2 puffs Every 4 (Four) Hours As Needed for Wheezing.     • ALLERGY SERUM INJECTION Inject  under the skin into the appropriate area as directed 1 (One) Time Per Week.     • aspirin 81 MG tablet Take 1 tablet by mouth Daily.     • azelastine (ASTELIN) 0.1 % nasal spray 2 sprays into the nostril(s) as directed by provider 2 (Two) Times a Day. Use in each nostril as directed     • BRIMONIDINE TARTRATE OP Apply 1 drop to eye(s) as directed by provider 2 (Two) Times a Day.     • dorzolamide-timolol (COSOPT) 22.3-6.8 MG/ML ophthalmic solution Administer 1 drop to both eyes 2 (Two) Times a Day.     • ferrous sulfate 324 (65 Fe) MG tablet delayed-release EC tablet Take 324 mg by mouth Daily With Breakfast.     • fexofenadine (ALLEGRA) 180 MG tablet Take 180 mg by mouth Daily.     • Fluticasone Furoate-Vilanterol (BREO ELLIPTA IN) Inhale 1 puff Every Morning.     • furosemide (LASIX) 40 MG tablet One Bid for 3 days then daily 30 tablet 11   • latanoprost (XALATAN) 0.005 % ophthalmic solution Administer 1 drop to both eyes Every Night.     • Latanoprost 0.005 % emulsion Apply 1 drop to eye(s) as directed by provider Every Night.     • levothyroxine (SYNTHROID) 88 MCG tablet Take 1 tablet by mouth Every Morning. 90 tablet 3   • losartan (COZAAR) 100 MG tablet Take 1 tablet by mouth Daily. 90 tablet 0   • montelukast (SINGULAIR) 10 MG tablet Take 1 tablet by mouth Every  Night. 90 tablet 3   • pantoprazole (PROTONIX) 40 MG EC tablet Take 1 tablet by mouth 2 (Two) Times a Day. 90 tablet 3   • potassium chloride (K-DUR) 10 MEQ CR tablet Take 1 tablet by mouth Daily. 90 tablet 3   • pramipexole (MIRAPEX) 1.5 MG tablet Take 1 tablet by mouth At Night As Needed (RLS). 30 tablet 5   • rosuvastatin (CRESTOR) 20 MG tablet Take 1 tablet by mouth Every Evening. 90 tablet 1   • traMADol (ULTRAM) 50 MG tablet Take 1 tablet by mouth Every 6 (Six) Hours As Needed for Moderate Pain . 60 tablet 2     No current facility-administered medications for this visit.        Nathan Baez had no medications administered during this visit.    Return in about 3 months (around 5/13/2020).    There are no Patient Instructions on file for this visit.

## 2020-02-13 NOTE — PROGRESS NOTES
Physical Therapy Daily Progress Note  Visit: 9    Nathan Baez reports: I felt good after last time. I went to the MD and he was pleased with my progress. Wants me to continue PT    Subjective     Objective   See Exercise, Manual, and Modality Logs for complete treatment.       Assessment & Plan     Assessment  Assessment details: Pt doing great. His posture and strength continue to improve.    Plan  Plan details: Progress per POC. Add 3 more weeks of PT        Manual Therapy:    5     mins  23771;  Therapeutic Exercise:    27     mins  49906;     Neuromuscular Jon:    10    mins  84496;    Therapeutic Activity:     -     mins  31900;     Gait Training:      -     mins  37944;     Ultrasound:     -     mins  16674;    Electrical Stimulation:    15     mins  07956 ( );  Dry Needling     -     mins self-pay    Timed Treatment:   42   mins   Total Treatment:     60   mins    Elizabeth Chavez PT  [place] License #: [id number]    Physical Therapist

## 2020-02-14 LAB
BASOPHILS # BLD AUTO: 0.04 10*3/MM3 (ref 0–0.2)
BASOPHILS NFR BLD AUTO: 0.8 % (ref 0–1.5)
EOSINOPHIL # BLD AUTO: 0.14 10*3/MM3 (ref 0–0.4)
EOSINOPHIL NFR BLD AUTO: 2.8 % (ref 0.3–6.2)
ERYTHROCYTE [DISTWIDTH] IN BLOOD BY AUTOMATED COUNT: 14.6 % (ref 12.3–15.4)
FERRITIN SERPL-MCNC: 108 NG/ML (ref 30–400)
HCT VFR BLD AUTO: 40.5 % (ref 37.5–51)
HGB BLD-MCNC: 13.1 G/DL (ref 13–17.7)
IMM GRANULOCYTES # BLD AUTO: 0.11 10*3/MM3 (ref 0–0.05)
IMM GRANULOCYTES NFR BLD AUTO: 2.2 % (ref 0–0.5)
IRON SERPL-MCNC: 53 MCG/DL (ref 59–158)
LYMPHOCYTES # BLD AUTO: 1.09 10*3/MM3 (ref 0.7–3.1)
LYMPHOCYTES NFR BLD AUTO: 21.9 % (ref 19.6–45.3)
MCH RBC QN AUTO: 28.5 PG (ref 26.6–33)
MCHC RBC AUTO-ENTMCNC: 32.3 G/DL (ref 31.5–35.7)
MCV RBC AUTO: 88 FL (ref 79–97)
MONOCYTES # BLD AUTO: 0.66 10*3/MM3 (ref 0.1–0.9)
MONOCYTES NFR BLD AUTO: 13.3 % (ref 5–12)
NEUTROPHILS # BLD AUTO: 2.93 10*3/MM3 (ref 1.7–7)
NEUTROPHILS NFR BLD AUTO: 59 % (ref 42.7–76)
NRBC BLD AUTO-RTO: 0 /100 WBC (ref 0–0.2)
PLATELET # BLD AUTO: 153 10*3/MM3 (ref 140–450)
RBC # BLD AUTO: 4.6 10*6/MM3 (ref 4.14–5.8)
WBC # BLD AUTO: 4.97 10*3/MM3 (ref 3.4–10.8)

## 2020-02-14 NOTE — PROGRESS NOTES
Tell him or his wife that hemoglobin up ot 13 now but iron slightly low still.  Stay on iron one more month then stop iron.

## 2020-02-18 ENCOUNTER — TREATMENT (OUTPATIENT)
Dept: PHYSICAL THERAPY | Facility: CLINIC | Age: 75
End: 2020-02-18

## 2020-02-18 DIAGNOSIS — R26.2 DIFFICULTY WALKING: ICD-10-CM

## 2020-02-18 DIAGNOSIS — K21.9 GASTROESOPHAGEAL REFLUX DISEASE WITHOUT ESOPHAGITIS: ICD-10-CM

## 2020-02-18 DIAGNOSIS — M54.50 CHRONIC BILATERAL LOW BACK PAIN WITHOUT SCIATICA: Primary | ICD-10-CM

## 2020-02-18 DIAGNOSIS — G89.29 CHRONIC BILATERAL LOW BACK PAIN WITHOUT SCIATICA: Primary | ICD-10-CM

## 2020-02-18 PROCEDURE — 97110 THERAPEUTIC EXERCISES: CPT | Performed by: PHYSICAL THERAPIST

## 2020-02-18 PROCEDURE — 97112 NEUROMUSCULAR REEDUCATION: CPT | Performed by: PHYSICAL THERAPIST

## 2020-02-18 RX ORDER — PANTOPRAZOLE SODIUM 40 MG/1
40 TABLET, DELAYED RELEASE ORAL 2 TIMES DAILY
Qty: 90 TABLET | Refills: 3 | Status: SHIPPED | OUTPATIENT
Start: 2020-02-18 | End: 2020-05-19 | Stop reason: SDUPTHER

## 2020-02-18 NOTE — PROGRESS NOTES
Physical Therapy Daily Progress Note  Visit # 10      Subjective   Nathan Baez reports: no pain of note today, still feels LE weakness affecting his balance.      Objective   See Exercise, Manual, and Modality Logs for complete treatment.   Held IFC at conclusion of session d/t no pain after exercise.       Assessment & Plan     Assessment  Assessment details: Pt tolerating therex progression well, some weakness/unsteadiness noted during balance activity but able to self-correct with no cues.      Goals  Plan Goals: SHORT TERM GOALS: Time for Goal Achievement: 4 weeks    1.  Patient to be compliant and progression of HEP                             2.  Pain level < 4/10 at worst with mentioned activities to improve function  3.  Increased thoracic, lumbar and SIJ mobility to allow for increased lumbar AROM with less pain.  4.  Increased lumbar AROM to by 25% in all planes to allow for increased ease with sit-stand transfers and functional activities    LONG TERM GOALS: Time for Goal Achievement: 2 months  1.  Pt. to score < 40 % on Back Index  2.  Pain level < 3/10 with all listed activities to return to normal.  3.  (B) LE and lower abdominal strength to 5/5 to allow for pushing, pulling and activities to occur without pain (driving, sitting, household  & Job requirements)          Progress per Plan of Care and Progress strengthening /stabilization /functional activity           Timed:         Manual Therapy:         mins  70641     Therapeutic Exercise:     30    mins  40259     Neuromuscular Jon:    10    mins  15708    Therapeutic Activity:          mins  64218     Gait Training:           mins  06197     Ultrasound:          mins  29451    Ionto                                   mins  58032  Self Care                            mins  52872    Un-Timed:  Electrical Stimulation:         mins 28435 ( )  Traction          mins 84310    Timed Treatment:   40   mins   Total Treatment:     48    devonte Daniels, PTA  [place] License #[id number]  Physical Therapist Assistant

## 2020-02-20 ENCOUNTER — TREATMENT (OUTPATIENT)
Dept: PHYSICAL THERAPY | Facility: CLINIC | Age: 75
End: 2020-02-20

## 2020-02-20 DIAGNOSIS — M54.50 CHRONIC BILATERAL LOW BACK PAIN WITHOUT SCIATICA: Primary | ICD-10-CM

## 2020-02-20 DIAGNOSIS — R26.2 DIFFICULTY WALKING: ICD-10-CM

## 2020-02-20 DIAGNOSIS — G89.29 CHRONIC BILATERAL LOW BACK PAIN WITHOUT SCIATICA: Primary | ICD-10-CM

## 2020-02-20 PROCEDURE — 97110 THERAPEUTIC EXERCISES: CPT | Performed by: PHYSICAL THERAPIST

## 2020-02-20 PROCEDURE — 97112 NEUROMUSCULAR REEDUCATION: CPT | Performed by: PHYSICAL THERAPIST

## 2020-02-20 NOTE — PROGRESS NOTES
Physical Therapy Daily Progress Note  Visit # 11      Subjective   Nathan ALLISON VelezSasha reports: no pain of note today, still feels LE weakness affecting his balance.      Objective   See Exercise, Manual, and Modality Logs for complete treatment.   Continued with focus on core stability exercises today.      Held IFC at conclusion of session d/t no pain after exercise.       Assessment & Plan     Assessment  Assessment details: Pt tolerating therex progression well, notes some lumbar stiffness upon transferring from table, but no lasting pain at conclusion of session.      Goals  Plan Goals: SHORT TERM GOALS: Time for Goal Achievement: 4 weeks    1.  Patient to be compliant and progression of HEP                             2.  Pain level < 4/10 at worst with mentioned activities to improve function  3.  Increased thoracic, lumbar and SIJ mobility to allow for increased lumbar AROM with less pain.  4.  Increased lumbar AROM to by 25% in all planes to allow for increased ease with sit-stand transfers and functional activities    LONG TERM GOALS: Time for Goal Achievement: 2 months  1.  Pt. to score < 40 % on Back Index  2.  Pain level < 3/10 with all listed activities to return to normal.  3.  (B) LE and lower abdominal strength to 5/5 to allow for pushing, pulling and activities to occur without pain (driving, sitting, household  & Job requirements)          Progress per Plan of Care and Progress strengthening /stabilization /functional activity           Timed:         Manual Therapy:         mins  22311     Therapeutic Exercise:     30    mins  26154     Neuromuscular Jon:    10    mins  55688    Therapeutic Activity:          mins  31789     Gait Training:           mins  67201     Ultrasound:          mins  45199    Ionto                                   mins  99511  Self Care                            mins  02788    Un-Timed:  Electrical Stimulation:         mins 19155 ( )  Traction          mins  79283    Timed Treatment:   40   mins   Total Treatment:     48   mins    Ace Daniels PTA  [place] License #[id number]  Physical Therapist Assistant

## 2020-02-25 ENCOUNTER — TREATMENT (OUTPATIENT)
Dept: PHYSICAL THERAPY | Facility: CLINIC | Age: 75
End: 2020-02-25

## 2020-02-25 DIAGNOSIS — R26.2 DIFFICULTY WALKING: ICD-10-CM

## 2020-02-25 DIAGNOSIS — G89.29 CHRONIC BILATERAL LOW BACK PAIN WITHOUT SCIATICA: Primary | ICD-10-CM

## 2020-02-25 DIAGNOSIS — M54.50 CHRONIC BILATERAL LOW BACK PAIN WITHOUT SCIATICA: Primary | ICD-10-CM

## 2020-02-25 PROCEDURE — 97112 NEUROMUSCULAR REEDUCATION: CPT | Performed by: PHYSICAL THERAPIST

## 2020-02-25 PROCEDURE — 97110 THERAPEUTIC EXERCISES: CPT | Performed by: PHYSICAL THERAPIST

## 2020-02-25 NOTE — PROGRESS NOTES
Physical Therapy Daily Progress Note  Visit # 12      Subjective     Nathan Baez reports: my back has been doing well over the past few days, not really bothering me today, just a little tightness in my lower back.      Objective     See Exercise, Manual, and Modality Logs for complete treatment.   Progressed with black band during step out exercise, crossed arms for increased balance challenge on rockerboard.    Held IFC at conclusion of session d/t no pain after exercise, only slight fatigue.         Assessment & Plan     Assessment  Assessment details: Pt tolerating therex progression well, notes some lumbar stiffness upon transferring from table, but no lasting pain at conclusion of session.  Two short (<3 min) seated rest breaks during session d/t mm fatigue.        Goals  Plan Goals: SHORT TERM GOALS: Time for Goal Achievement: 4 weeks    1.  Patient to be compliant and progression of HEP                             2.  Pain level < 4/10 at worst with mentioned activities to improve function  3.  Increased thoracic, lumbar and SIJ mobility to allow for increased lumbar AROM with less pain.  4.  Increased lumbar AROM to by 25% in all planes to allow for increased ease with sit-stand transfers and functional activities    LONG TERM GOALS: Time for Goal Achievement: 2 months  1.  Pt. to score < 40 % on Back Index  2.  Pain level < 3/10 with all listed activities to return to normal.  3.  (B) LE and lower abdominal strength to 5/5 to allow for pushing, pulling and activities to occur without pain (driving, sitting, household  & Job requirements)          Progress per Plan of Care and Progress strengthening /stabilization /functional activity           Timed:         Manual Therapy:         mins  42291     Therapeutic Exercise:     30    mins  87465     Neuromuscular Jon:    10    mins  42826    Therapeutic Activity:          mins  20140     Gait Training:           mins  25153     Ultrasound:          mins   48937    Ionto                                   mins  10008  Self Care                            mins  60923    Un-Timed:  Electrical Stimulation:         mins 86446 ( )  Traction          mins 88223    Timed Treatment:   40   mins   Total Treatment:     50   mins    ZAYDA Noyola License #[id number]  Physical Therapist Assistant

## 2020-02-27 ENCOUNTER — TREATMENT (OUTPATIENT)
Dept: PHYSICAL THERAPY | Facility: CLINIC | Age: 75
End: 2020-02-27

## 2020-02-27 DIAGNOSIS — G89.29 CHRONIC BILATERAL LOW BACK PAIN WITHOUT SCIATICA: Primary | ICD-10-CM

## 2020-02-27 DIAGNOSIS — R26.2 DIFFICULTY WALKING: ICD-10-CM

## 2020-02-27 DIAGNOSIS — M54.50 CHRONIC BILATERAL LOW BACK PAIN WITHOUT SCIATICA: Primary | ICD-10-CM

## 2020-02-27 PROCEDURE — 97140 MANUAL THERAPY 1/> REGIONS: CPT | Performed by: PHYSICAL THERAPIST

## 2020-02-27 PROCEDURE — 97112 NEUROMUSCULAR REEDUCATION: CPT | Performed by: PHYSICAL THERAPIST

## 2020-02-27 PROCEDURE — 97110 THERAPEUTIC EXERCISES: CPT | Performed by: PHYSICAL THERAPIST

## 2020-02-27 NOTE — PROGRESS NOTES
Physical Therapy Daily Progress Note  Visit: 13    Nathan Baez reports: I am doing alright today    Subjective     Objective   See Exercise, Manual, and Modality Logs for complete treatment.       Assessment & Plan     Assessment  Assessment details: Pt reported fatigue after session. Continues to have limited standing tolerance, but this is improving each week    Plan  Plan details: Progress per POC        Manual Therapy:    10     mins  94489;  Therapeutic Exercise:    27     mins  60023;     Neuromuscular Jon:    10    mins  83080;    Therapeutic Activity:     -     mins  33744;     Gait Training:      -     mins  42277;     Ultrasound:     -     mins  31723;    Electrical Stimulation:    -     mins  40040 ( );  Dry Needling     -     mins self-pay    Timed Treatment:  47    mins   Total Treatment:     50   mins    Elizabeth Chavez PT  [place] License #: [id number]    Physical Therapist

## 2020-02-28 RX ORDER — LOSARTAN POTASSIUM 100 MG/1
TABLET ORAL
Qty: 90 TABLET | Refills: 0 | Status: SHIPPED | OUTPATIENT
Start: 2020-02-28 | End: 2020-06-12

## 2020-03-12 ENCOUNTER — TELEPHONE (OUTPATIENT)
Dept: FAMILY MEDICINE CLINIC | Facility: CLINIC | Age: 75
End: 2020-03-12

## 2020-03-14 ENCOUNTER — APPOINTMENT (OUTPATIENT)
Dept: CT IMAGING | Facility: HOSPITAL | Age: 75
End: 2020-03-14

## 2020-03-14 ENCOUNTER — HOSPITAL ENCOUNTER (OUTPATIENT)
Facility: HOSPITAL | Age: 75
Setting detail: OBSERVATION
Discharge: HOME | End: 2020-03-16
Attending: EMERGENCY MEDICINE | Admitting: INTERNAL MEDICINE

## 2020-03-14 DIAGNOSIS — A04.5 CAMPYLOBACTER DIARRHEA: Primary | ICD-10-CM

## 2020-03-14 DIAGNOSIS — N17.9 ACUTE KIDNEY INJURY (HCC): ICD-10-CM

## 2020-03-14 DIAGNOSIS — E86.0 DEHYDRATION: ICD-10-CM

## 2020-03-14 LAB
ADV 40+41 DNA STL QL NAA+NON-PROBE: NOT DETECTED
ALBUMIN SERPL-MCNC: 4.4 G/DL (ref 3.5–5.2)
ALBUMIN/GLOB SERPL: 1.6 G/DL
ALP SERPL-CCNC: 128 U/L (ref 39–117)
ALT SERPL W P-5'-P-CCNC: 17 U/L (ref 1–41)
ANION GAP SERPL CALCULATED.3IONS-SCNC: 15 MMOL/L (ref 5–15)
AST SERPL-CCNC: 14 U/L (ref 1–40)
ASTRO TYP 1-8 RNA STL QL NAA+NON-PROBE: NOT DETECTED
BILIRUB SERPL-MCNC: 0.7 MG/DL (ref 0.2–1.2)
BUN BLD-MCNC: 53 MG/DL (ref 8–23)
BUN/CREAT SERPL: 29.1 (ref 7–25)
C CAYETANENSIS DNA STL QL NAA+NON-PROBE: NOT DETECTED
CALCIUM SPEC-SCNC: 8.9 MG/DL (ref 8.6–10.5)
CAMPY SP DNA.DIARRHEA STL QL NAA+PROBE: DETECTED
CHLORIDE SERPL-SCNC: 96 MMOL/L (ref 98–107)
CO2 SERPL-SCNC: 21 MMOL/L (ref 22–29)
CREAT BLD-MCNC: 1.82 MG/DL (ref 0.76–1.27)
CRYPTOSP STL CULT: NOT DETECTED
DEPRECATED RDW RBC AUTO: 46.8 FL (ref 37–54)
E COLI DNA SPEC QL NAA+PROBE: NOT DETECTED
E HISTOLYT AG STL-ACNC: NOT DETECTED
EAEC PAA PLAS AGGR+AATA ST NAA+NON-PRB: NOT DETECTED
EC STX1 + STX2 GENES STL NAA+PROBE: NOT DETECTED
EPEC EAE GENE STL QL NAA+NON-PROBE: NOT DETECTED
ERYTHROCYTE [DISTWIDTH] IN BLOOD BY AUTOMATED COUNT: 14.7 % (ref 12.3–15.4)
ETEC LTA+ST1A+ST1B TOX ST NAA+NON-PROBE: NOT DETECTED
G LAMBLIA DNA SPEC QL NAA+PROBE: NOT DETECTED
GFR SERPL CREATININE-BSD FRML MDRD: 37 ML/MIN/1.73
GLOBULIN UR ELPH-MCNC: 2.7 GM/DL
GLUCOSE BLD-MCNC: 123 MG/DL (ref 65–99)
HCT VFR BLD AUTO: 40.4 % (ref 37.5–51)
HGB BLD-MCNC: 13.3 G/DL (ref 13–17.7)
LYMPHOCYTES # BLD MANUAL: 1.9 10*3/MM3 (ref 0.7–3.1)
LYMPHOCYTES NFR BLD MANUAL: 25.8 % (ref 5–12)
LYMPHOCYTES NFR BLD MANUAL: 27.8 % (ref 19.6–45.3)
MCH RBC QN AUTO: 28.7 PG (ref 26.6–33)
MCHC RBC AUTO-ENTMCNC: 32.9 G/DL (ref 31.5–35.7)
MCV RBC AUTO: 87.1 FL (ref 79–97)
METAMYELOCYTES NFR BLD MANUAL: 3.1 % (ref 0–0)
MONOCYTES # BLD AUTO: 1.76 10*3/MM3 (ref 0.1–0.9)
MYELOCYTES NFR BLD MANUAL: 2.1 % (ref 0–0)
NEUTROPHILS # BLD AUTO: 2.67 10*3/MM3 (ref 1.7–7)
NEUTROPHILS NFR BLD MANUAL: 39.2 % (ref 42.7–76)
NOROVIRUS GI+II RNA STL QL NAA+NON-PROBE: NOT DETECTED
P SHIGELLOIDES DNA STL QL NAA+PROBE: NOT DETECTED
PLAT MORPH BLD: NORMAL
PLATELET # BLD AUTO: 220 10*3/MM3 (ref 140–450)
PMV BLD AUTO: 9.9 FL (ref 6–12)
POTASSIUM BLD-SCNC: 3.7 MMOL/L (ref 3.5–5.2)
PROT SERPL-MCNC: 7.1 G/DL (ref 6–8.5)
RBC # BLD AUTO: 4.64 10*6/MM3 (ref 4.14–5.8)
RBC MORPH BLD: NORMAL
RV RNA STL NAA+PROBE: NOT DETECTED
SALMONELLA DNA SPEC QL NAA+PROBE: NOT DETECTED
SAPO I+II+IV+V RNA STL QL NAA+NON-PROBE: NOT DETECTED
SHIGELLA SP+EIEC IPAH STL QL NAA+PROBE: NOT DETECTED
SODIUM BLD-SCNC: 132 MMOL/L (ref 136–145)
V CHOLERAE DNA SPEC QL NAA+PROBE: NOT DETECTED
VARIANT LYMPHS NFR BLD MANUAL: 2.1 % (ref 0–5)
VIBRIO DNA SPEC NAA+PROBE: NOT DETECTED
WBC MORPH BLD: NORMAL
WBC NRBC COR # BLD: 6.82 10*3/MM3 (ref 3.4–10.8)
YERSINIA STL CULT: NOT DETECTED

## 2020-03-14 PROCEDURE — 96360 HYDRATION IV INFUSION INIT: CPT

## 2020-03-14 PROCEDURE — G0378 HOSPITAL OBSERVATION PER HR: HCPCS

## 2020-03-14 PROCEDURE — 0097U HC BIOFIRE FILMARRAY GI PANEL: CPT | Performed by: EMERGENCY MEDICINE

## 2020-03-14 PROCEDURE — 85007 BL SMEAR W/DIFF WBC COUNT: CPT | Performed by: EMERGENCY MEDICINE

## 2020-03-14 PROCEDURE — 74176 CT ABD & PELVIS W/O CONTRAST: CPT

## 2020-03-14 PROCEDURE — 80053 COMPREHEN METABOLIC PANEL: CPT | Performed by: EMERGENCY MEDICINE

## 2020-03-14 PROCEDURE — 99284 EMERGENCY DEPT VISIT MOD MDM: CPT

## 2020-03-14 PROCEDURE — 85025 COMPLETE CBC W/AUTO DIFF WBC: CPT | Performed by: EMERGENCY MEDICINE

## 2020-03-14 PROCEDURE — 87081 CULTURE SCREEN ONLY: CPT | Performed by: EMERGENCY MEDICINE

## 2020-03-14 PROCEDURE — 96361 HYDRATE IV INFUSION ADD-ON: CPT

## 2020-03-14 RX ORDER — ACETAMINOPHEN 325 MG/1
650 TABLET ORAL EVERY 4 HOURS PRN
Status: DISCONTINUED | OUTPATIENT
Start: 2020-03-14 | End: 2020-03-16 | Stop reason: HOSPADM

## 2020-03-14 RX ORDER — SODIUM CHLORIDE 9 MG/ML
100 INJECTION, SOLUTION INTRAVENOUS CONTINUOUS
Status: DISCONTINUED | OUTPATIENT
Start: 2020-03-15 | End: 2020-03-16 | Stop reason: HOSPADM

## 2020-03-14 RX ORDER — SODIUM CHLORIDE 0.9 % (FLUSH) 0.9 %
10 SYRINGE (ML) INJECTION AS NEEDED
Status: DISCONTINUED | OUTPATIENT
Start: 2020-03-14 | End: 2020-03-16 | Stop reason: HOSPADM

## 2020-03-14 RX ORDER — ACETAMINOPHEN 650 MG/1
650 SUPPOSITORY RECTAL EVERY 4 HOURS PRN
Status: DISCONTINUED | OUTPATIENT
Start: 2020-03-14 | End: 2020-03-16 | Stop reason: HOSPADM

## 2020-03-14 RX ORDER — SODIUM CHLORIDE 0.9 % (FLUSH) 0.9 %
10 SYRINGE (ML) INJECTION EVERY 12 HOURS SCHEDULED
Status: DISCONTINUED | OUTPATIENT
Start: 2020-03-15 | End: 2020-03-16 | Stop reason: HOSPADM

## 2020-03-14 RX ORDER — AZITHROMYCIN 250 MG/1
500 TABLET, FILM COATED ORAL ONCE
Status: COMPLETED | OUTPATIENT
Start: 2020-03-14 | End: 2020-03-14

## 2020-03-14 RX ORDER — ACETAMINOPHEN 160 MG/5ML
650 SOLUTION ORAL EVERY 4 HOURS PRN
Status: DISCONTINUED | OUTPATIENT
Start: 2020-03-14 | End: 2020-03-16 | Stop reason: HOSPADM

## 2020-03-14 RX ORDER — ONDANSETRON 2 MG/ML
4 INJECTION INTRAMUSCULAR; INTRAVENOUS EVERY 6 HOURS PRN
Status: DISCONTINUED | OUTPATIENT
Start: 2020-03-14 | End: 2020-03-16 | Stop reason: HOSPADM

## 2020-03-14 RX ADMIN — AZITHROMYCIN DIHYDRATE 500 MG: 250 TABLET, FILM COATED ORAL at 21:22

## 2020-03-14 RX ADMIN — SODIUM CHLORIDE, POTASSIUM CHLORIDE, SODIUM LACTATE AND CALCIUM CHLORIDE 1000 ML: 600; 310; 30; 20 INJECTION, SOLUTION INTRAVENOUS at 19:18

## 2020-03-14 NOTE — ED TRIAGE NOTES
Pt reports vomiting and diarrhea since Monday. States the vomiting has stopped but is still having diarrhea.

## 2020-03-14 NOTE — ED PROVIDER NOTES
EMERGENCY DEPARTMENT ENCOUNTER    CHIEF COMPLAINT  Chief Complaint: N/V/D  History given by: Patient  History limited by: None  Room Number: P398/1  PMD: Damien Pierce MD      HPI:  Pt is a 74 y.o. male who presents complaining of gradual onset of intermittent watery diarrhea associated with N/V, mild diffuse abd pain, low-appetite, and weight loss (lost about 15 pounds within a week) that started Monday (03/09/20) which has been progressively worsening since onset. Pt denies fever, chills, CP, SOA, urinary sxs. Current sxs have no aggravating or alleviating factors. Pt denies taking any abx recently. Pt denies eating anything which may have triggered his current sxs. He has not been around any ill contacts with similar sxs recently. He denies taking any medications for his current sxs PTA. He denies having similar sxs in the past. He states that his vomiting has resolved but he continues to have diarrheal episodes since the onset. He states that his diarrhea is mostly watery but he has noted some blood on Tuesday (03/10/20) and since then no blood noted in the stool. The diarrheal episodes occurs every other hour throughout the day. Pt has PMHx of chronic cough due to allergies. Pt denies PMHx of diverticulitis or colitis. Family at bedside.    Duration: 6 days  Onset: Gradual  Timing: Intermittent   Intensity/Severity: Watery  Progression: Worsening  Associated Symptoms: N/V, mild diffuse abd pain, low-appetite, and weight loss (lost about 15 pounds within a week)  Aggravating Factors: None  Alleviating Factors: None  Previous Episodes: None  Treatment before arrival: None    PAST MEDICAL HISTORY  Active Ambulatory Problems     Diagnosis Date Noted   • OA (osteoarthritis) of knee 09/13/2016   • Hypertension    • Abdominal wall cellulitis 06/02/2019   • Hypothyroidism (acquired) 06/03/2019   • Gastroesophageal reflux disease without esophagitis 07/11/2019   • Mixed hyperlipidemia 07/11/2019   • Primary  insomnia 07/11/2019   • DDD (degenerative disc disease), lumbar 07/11/2019   • KWAME (obstructive sleep apnea) 07/11/2019   • Hyperglycemia 07/11/2019   • Allergic 07/11/2019   • Venous insufficiency 07/11/2019   • Prostate cancer (CMS/[facility]) 07/11/2019   • Venous stasis dermatitis    • Tinea cruris    • Tinea corporis    • Throat clearing    • Sleep apnea    • Skin lesion of face    • Rib pain on left side    • Rectal bleed    • Presbycusis    • Pes planus    • Osteoarthritis    • Obesity    • Medicare annual wellness visit, subsequent    • Lumbar strain    • Internal hemorrhoids    • Insomnia    • Inflamed skin tag    • Hypothyroid    • Hyperplastic polyp of stomach    • Hospital discharge follow-up    • History of colon polyps    • High risk medication use    • Glaucoma    • Gastroenteritis, acute    • Erysipelas    • Encounter for screening colonoscopy    • Encounter for long-term (current) use of NSAIDs    • Dysphagia    • DVT (deep venous thrombosis) (CMS/[facility])    • DJD (degenerative joint disease), lumbar    • Diverticulosis    • Disequilibrium    • Cough    • Contact with hypodermic needle    • Cervical radiculopathy    • Cellulitis    • Arthritis    • Allergic rhinitis    • Acute glaucoma    • Acute embolism and thrombosis of vein    • Actinic keratosis    • Status post reverse total shoulder replacement, 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   • Localized edema 11/26/2019   • Anemia 11/26/2019   • Peripheral polyneuropathy 11/26/2019     Resolved Ambulatory Problems     Diagnosis Date Noted   • Acute DVT (deep venous thrombosis) (CMS/[facility]) 06/03/2019   • Restless leg syndrome 07/11/2019   • Restless legs syndrome    • Restless leg    • Hyperlipidemia      Past Medical History:   Diagnosis Date   • Cataract    • GERD (gastroesophageal reflux disease)    • Kidney stone    • Male urinary stress incontinence    • Shoulder pain        PAST SURGICAL HISTORY  Past Surgical History:   Procedure Laterality Date   • CATARACT EXTRACTION Right     • COLONOSCOPY  2017    3/17normal. Recheck . . Repeat in 5 years    • ENDOSCOPY W/ PEG REMOVAL     • FOOT SURGERY       had big toe replaced on left foot   • HERNIA REPAIR      umbilical   • JOINT REPLACEMENT Right    • NECK SURGERY      cervical disc   • HI TOTAL KNEE ARTHROPLASTY Left 2016    Procedure: LT TOTAL KNEE ARTHROPLASTY;  Surgeon: Tadeo Basurto MD;  Location: [facility] OR;  Service: Orthopedics   • PROSTATECTOMY  1999. Radical    • THYROID LOBECTOMY     • THYROID SURGERY      partial doesn't know which one was removed   • TONSILLECTOMY     • TOTAL KNEE ARTHROPLASTY Right    • TOTAL SHOULDER ARTHROPLASTY W/ DISTAL CLAVICLE EXCISION Right 2019    Procedure: TOTAL SHOULDER REVERSE ARTHROPLASTY RIGHT REPAIR RIGHT AXILLARY VEIN;  Surgeon: Behzad Kelley MD;  Location: [facility];  Service: Orthopedics   • WRIST SURGERY Right     x2  wrist replaced       FAMILY HISTORY  Family History   Problem Relation Age of Onset   • Cancer Mother         COLON   • Cancer Father         PROSTATE   • Cancer Sister         BREAST CANCER   • Breast cancer Sister    • Gout Sister    • Hypertension Sister    • Heart attack Brother    • Bone cancer Brother    • Heart disease Brother    • Malig Hyperthermia Neg Hx        SOCIAL HISTORY  Social History     Socioeconomic History   • Marital status:      Spouse name: Not on file   • Number of children: Not on file   • Years of education: Not on file   • Highest education level: Not on file   Tobacco Use   • Smoking status: Former Smoker     Packs/day: 1.00     Years: 20.00     Pack years: 20.00     Types: Cigarettes     Last attempt to quit: 1984     Years since quittin.2   • Smokeless tobacco: Never Used   Substance and Sexual Activity   • Alcohol use: Yes     Comment: 3-4 MONTHLY   • Drug use: No   • Sexual activity: Defer       ALLERGIES  Patient has no known allergies.    REVIEW OF SYSTEMS  Review  of Systems   Constitutional: Positive for appetite change (low) and unexpected weight change (weight loss). Negative for activity change, chills and fever.   HENT: Negative for congestion and sore throat.    Eyes: Negative.    Respiratory: Positive for cough (chronic). Negative for shortness of breath.    Cardiovascular: Negative for chest pain and leg swelling.   Gastrointestinal: Positive for diarrhea (watery), nausea and vomiting. Negative for abdominal pain (diffuse).   Endocrine: Negative.    Genitourinary: Negative for decreased urine volume and dysuria.   Musculoskeletal: Negative for neck pain.   Skin: Negative for rash and wound.   Allergic/Immunologic: Negative.    Neurological: Negative for weakness, numbness and headaches.   Hematological: Negative.    Psychiatric/Behavioral: Negative.    All other systems reviewed and are negative.      PHYSICAL EXAM  ED Triage Vitals   Temp Heart Rate Resp BP SpO2   03/14/20 1819 03/14/20 1819 03/14/20 1819 03/14/20 1906 03/14/20 1819   98.9 °F (37.2 °C) 106 18 119/71 94 %      Temp src Heart Rate Source Patient Position BP Location FiO2 (%)   03/14/20 1819 03/14/20 1819 -- -- --   Tympanic Monitor            Physical Exam   Constitutional: He is oriented to person, place, and time. No distress.   HENT:   Head: Normocephalic and atraumatic.   Dry mucus membrane   Eyes: Pupils are equal, round, and reactive to light. EOM are normal.   Neck: Normal range of motion. Neck supple.   Cardiovascular: Regular rhythm and normal heart sounds. Tachycardia present.   Mildly tachycardic   Pulmonary/Chest: Effort normal and breath sounds normal. No respiratory distress.   Abdominal: Soft. Bowel sounds are normal. There is tenderness (mild LLQ). There is no rebound and no guarding.   Musculoskeletal: Normal range of motion. He exhibits no edema.   Neurological: He is alert and oriented to person, place, and time. He has normal sensation and normal strength.   Skin: Skin is warm and  dry.   Psychiatric: Mood and affect normal.   Nursing note and vitals reviewed.      LAB RESULTS  Lab Results (last 24 hours)     Procedure Component Value Units Date/Time    Gastrointestinal Panel, PCR - Stool, Per Rectum [[id number]]  (Abnormal) Collected:  03/14/20 1847    Specimen:  Stool from Per Rectum Updated:  03/14/20 2026     Campylobacter Detected     Plesiomonas shigelloides Not Detected     Salmonella Not Detected     Vibrio Not Detected     Vibrio cholerae Not Detected     Yersinia enterocolitica Not Detected     Enteroaggregative E. coli (EAEC) Not Detected     Enteropathogenic E. coli (EPEC) Not Detected     Enterotoxigenic E. coli (ETEC) lt/st Not Detected     Shiga-like toxin-producing E. coli (STEC) stx1/stx2 Not Detected     E. coli O157 Not Detected     Shigella/Enteroinvasive E. coli (EIEC) Not Detected     Cryptosporidium Not Detected     Cyclospora cayetanensis Not Detected     Entamoeba histolytica Not Detected     Giardia lamblia Not Detected     Adenovirus F40/41 Not Detected     Astrovirus Not Detected     Norovirus GI/GII Not Detected     Rotavirus A Not Detected     Sapovirus (I, II, IV or V) Not Detected    Stool Culture, Targeted - Stool, Per Rectum [[id number]] Collected:  03/14/20 1847    Specimen:  Stool from Per Rectum Updated:  03/14/20 2025    CBC & Differential [[id number]] Collected:  03/14/20 1920    Specimen:  Blood Updated:  03/14/20 1941    Narrative:       The following orders were created for panel order CBC & Differential.  Procedure                               Abnormality         Status                     ---------                               -----------         ------                     CBC Auto Differential[[id number]]        Normal              Final result                 Please view results for these tests on the individual orders.    Comprehensive Metabolic Panel [[id number]]  (Abnormal) Collected:  03/14/20 1920    Specimen:  Blood Updated:  03/14/20 1957      Glucose 123 mg/dL      BUN 53 mg/dL      Creatinine 1.82 mg/dL      Sodium 132 mmol/L      Potassium 3.7 mmol/L      Chloride 96 mmol/L      CO2 21.0 mmol/L      Calcium 8.9 mg/dL      Total Protein 7.1 g/dL      Albumin 4.40 g/dL      ALT (SGPT) 17 U/L      AST (SGOT) 14 U/L      Alkaline Phosphatase 128 U/L      Total Bilirubin 0.7 mg/dL      eGFR Non African Amer 37 mL/min/1.73      Globulin 2.7 gm/dL      A/G Ratio 1.6 g/dL      BUN/Creatinine Ratio 29.1     Anion Gap 15.0 mmol/L     Narrative:       GFR Normal >60  Chronic Kidney Disease <60  Kidney Failure <15      CBC Auto Differential [[id number]]  (Normal) Collected:  03/14/20 1920    Specimen:  Blood Updated:  03/14/20 1941     WBC 6.82 10*3/mm3      RBC 4.64 10*6/mm3      Hemoglobin 13.3 g/dL      Hematocrit 40.4 %      MCV 87.1 fL      MCH 28.7 pg      MCHC 32.9 g/dL      RDW 14.7 %      RDW-SD 46.8 fl      MPV 9.9 fL      Platelets 220 10*3/mm3     Manual Differential [[id number]]  (Abnormal) Collected:  03/14/20 1920    Specimen:  Blood Updated:  03/14/20 2007     Neutrophil % 39.2 %      Lymphocyte % 27.8 %      Monocyte % 25.8 %      Metamyelocyte % 3.1 %      Myelocyte % 2.1 %      Atypical Lymphocyte % 2.1 %      Neutrophils Absolute 2.67 10*3/mm3      Lymphocytes Absolute 1.90 10*3/mm3      Monocytes Absolute 1.76 10*3/mm3      RBC Morphology Normal     WBC Morphology Normal     Platelet Morphology Normal          I ordered the above labs and reviewed the results    RADIOLOGY  CT Abdomen Pelvis Without Contrast   Preliminary Result   1.  Renal findings as discussed, no hydronephrosis.   2. Uncomplicated cholelithiasis.   3. Mild fatty liver.   4. Minimal colonic diverticulosis                                   I ordered the above noted radiological studies. Interpreted by radiologist. Reviewed by me in PACS.       PROCEDURES  Procedures    PROGRESS AND CONSULTS  ED Course as of Mar 14 2245   Sat Mar 14, 2020   1822 Old records reviewed.  Patient was  last seen here in the ER in December 2019 for shortness of breath.  Hemoglobin was 8.3 which was stable.  CTA of the chest showed mild bibasilar scarring and/or atelectasis but no evidence of pulmonary embolism or infiltrate.    []   1959 1.15 two months ago   Creatinine(!): 1.82 []   2101 Test results and plan for admission discussed with the patient and his wife.    []   2154 Case discussed with Hilaria schaefer [facility].  Pertinent physical exam findings, test results, and ED course were discussed with her.  She agrees to with the patient to Dr. Goyal    []      ED Course User Index  [] Tushar Pham MD       1831: Ordered labs and CT abd/pelvis for further evaluation and management. Ordered Ringers Lactated Bolus for hydration.    2025: Ordered Stool Culture for further evaluation and management.    2046: Ordered Zithromax for infection prophylaxis.    2101: Rechecked pt. Pt is resting comfortably. Notified pt of lab work results showing Campylobacter infection. Discussed the plan to admit the pt for further pt care. Pt and family agree with the plan and all questions were addressed.      MEDICAL DECISION MAKING  Results were reviewed/discussed with the patient and they were also made aware of online access. Pt also made aware that some labs, such as cultures, will not be resulted during ER visit and follow up with PMD is necessary.     MDM  Number of Diagnoses or Management Options  Acute kidney injury (CMS/HCC):   Campylobacter diarrhea:   Dehydration:   Diagnosis management comments: Patient was found to have acute kidney injury secondary to dehydration.  Stool was positive for Campylobacter.  Patient was given IV fluids and azithromycin.  CT scan did not show any evidence of colitis, bowel obstruction, bowel perforation, or diverticulitis.  Case was discussed with [facility] and the patient be admitted.       Amount and/or Complexity of Data Reviewed  Clinical lab tests: reviewed and ordered (Creatinine  1.82, 6.82, GI panel PCR positive for Campylobacter infection)  Tests in the radiology section of CPT®: reviewed and ordered (CT abd.pelvis show no hydronephrosis, uncomplicated cholelithiasis, mild fatty liver, and minimal colonic diverticulosis. )  Decide to obtain previous medical records or to obtain history from someone other than the patient: yes  Review and summarize past medical records: yes  Independent visualization of images, tracings, or specimens: yes           DIAGNOSIS  Final diagnoses:   Campylobacter diarrhea   Acute kidney injury (CMS/HCC)   Dehydration       DISPOSITION  ADMISSION    Discussed treatment plan and reason for admission with pt/family and admitting physician.  Pt/family voiced understanding of the plan for admission for further testing/treatment as needed.     Latest Documented Vital Signs:  As of 22:45  BP- 123/67 HR- 94 Temp- 97.2 °F (36.2 °C) (Oral) O2 sat- 98%    --  Documentation assistance provided by torie Alvarez for Dr. Dick Pham.  Information recorded by the scribe was done at my direction and has been verified and validated by me.              Lou Alvarez  03/14/20 4132       Tushar Pham MD  03/14/20 3327

## 2020-03-15 PROBLEM — E87.1 HYPONATREMIA: Status: ACTIVE | Noted: 2020-03-15

## 2020-03-15 LAB
ALBUMIN SERPL-MCNC: 3.7 G/DL (ref 3.5–5.2)
ALBUMIN/GLOB SERPL: 1.2 G/DL
ALP SERPL-CCNC: 117 U/L (ref 39–117)
ALT SERPL W P-5'-P-CCNC: 15 U/L (ref 1–41)
ANION GAP SERPL CALCULATED.3IONS-SCNC: 10.2 MMOL/L (ref 5–15)
AST SERPL-CCNC: 13 U/L (ref 1–40)
BILIRUB SERPL-MCNC: 0.6 MG/DL (ref 0.2–1.2)
BUN BLD-MCNC: 46 MG/DL (ref 8–23)
BUN/CREAT SERPL: 28.4 (ref 7–25)
CALCIUM SPEC-SCNC: 8.9 MG/DL (ref 8.6–10.5)
CHLORIDE SERPL-SCNC: 101 MMOL/L (ref 98–107)
CO2 SERPL-SCNC: 20.8 MMOL/L (ref 22–29)
CREAT BLD-MCNC: 1.62 MG/DL (ref 0.76–1.27)
DEPRECATED RDW RBC AUTO: 45.2 FL (ref 37–54)
EOSINOPHIL # BLD MANUAL: 0.06 10*3/MM3 (ref 0–0.4)
EOSINOPHIL NFR BLD MANUAL: 1 % (ref 0.3–6.2)
ERYTHROCYTE [DISTWIDTH] IN BLOOD BY AUTOMATED COUNT: 14.4 % (ref 12.3–15.4)
GFR SERPL CREATININE-BSD FRML MDRD: 42 ML/MIN/1.73
GIANT PLATELETS: ABNORMAL
GLOBULIN UR ELPH-MCNC: 3 GM/DL
GLUCOSE BLD-MCNC: 126 MG/DL (ref 65–99)
HCT VFR BLD AUTO: 36.9 % (ref 37.5–51)
HGB BLD-MCNC: 12.2 G/DL (ref 13–17.7)
LYMPHOCYTES # BLD MANUAL: 1.16 10*3/MM3 (ref 0.7–3.1)
LYMPHOCYTES NFR BLD MANUAL: 20 % (ref 19.6–45.3)
LYMPHOCYTES NFR BLD MANUAL: 24 % (ref 5–12)
MCH RBC QN AUTO: 28.6 PG (ref 26.6–33)
MCHC RBC AUTO-ENTMCNC: 33.1 G/DL (ref 31.5–35.7)
MCV RBC AUTO: 86.6 FL (ref 79–97)
METAMYELOCYTES NFR BLD MANUAL: 2 % (ref 0–0)
MONOCYTES # BLD AUTO: 1.39 10*3/MM3 (ref 0.1–0.9)
MYELOCYTES NFR BLD MANUAL: 5 % (ref 0–0)
NEUTROPHILS # BLD AUTO: 2.78 10*3/MM3 (ref 1.7–7)
NEUTROPHILS NFR BLD MANUAL: 48 % (ref 42.7–76)
NRBC SPEC MANUAL: 1 /100 WBC (ref 0–0.2)
PLATELET # BLD AUTO: 193 10*3/MM3 (ref 140–450)
PMV BLD AUTO: 10 FL (ref 6–12)
POTASSIUM BLD-SCNC: 3.4 MMOL/L (ref 3.5–5.2)
POTASSIUM BLD-SCNC: 3.9 MMOL/L (ref 3.5–5.2)
PROT SERPL-MCNC: 6.7 G/DL (ref 6–8.5)
RBC # BLD AUTO: 4.26 10*6/MM3 (ref 4.14–5.8)
RBC MORPH BLD: NORMAL
SODIUM BLD-SCNC: 132 MMOL/L (ref 136–145)
WBC MORPH BLD: NORMAL
WBC NRBC COR # BLD: 5.79 10*3/MM3 (ref 3.4–10.8)

## 2020-03-15 PROCEDURE — 94640 AIRWAY INHALATION TREATMENT: CPT

## 2020-03-15 PROCEDURE — 84132 ASSAY OF SERUM POTASSIUM: CPT | Performed by: INTERNAL MEDICINE

## 2020-03-15 PROCEDURE — 80053 COMPREHEN METABOLIC PANEL: CPT | Performed by: NURSE PRACTITIONER

## 2020-03-15 PROCEDURE — G0378 HOSPITAL OBSERVATION PER HR: HCPCS

## 2020-03-15 PROCEDURE — 85007 BL SMEAR W/DIFF WBC COUNT: CPT | Performed by: NURSE PRACTITIONER

## 2020-03-15 PROCEDURE — 94799 UNLISTED PULMONARY SVC/PX: CPT

## 2020-03-15 PROCEDURE — 96361 HYDRATE IV INFUSION ADD-ON: CPT

## 2020-03-15 PROCEDURE — 85025 COMPLETE CBC W/AUTO DIFF WBC: CPT | Performed by: NURSE PRACTITIONER

## 2020-03-15 RX ORDER — POTASSIUM CHLORIDE 750 MG/1
40 CAPSULE, EXTENDED RELEASE ORAL AS NEEDED
Status: DISCONTINUED | OUTPATIENT
Start: 2020-03-15 | End: 2020-03-16 | Stop reason: HOSPADM

## 2020-03-15 RX ORDER — LEVOTHYROXINE SODIUM 88 UG/1
88 TABLET ORAL EVERY MORNING
Status: DISCONTINUED | OUTPATIENT
Start: 2020-03-15 | End: 2020-03-16 | Stop reason: HOSPADM

## 2020-03-15 RX ORDER — LATANOPROST 50 UG/ML
1 SOLUTION/ DROPS OPHTHALMIC NIGHTLY
Status: DISCONTINUED | OUTPATIENT
Start: 2020-03-15 | End: 2020-03-16 | Stop reason: HOSPADM

## 2020-03-15 RX ORDER — PANTOPRAZOLE SODIUM 40 MG/1
40 TABLET, DELAYED RELEASE ORAL 2 TIMES DAILY
Status: DISCONTINUED | OUTPATIENT
Start: 2020-03-15 | End: 2020-03-16 | Stop reason: HOSPADM

## 2020-03-15 RX ORDER — DORZOLAMIDE HYDROCHLORIDE AND TIMOLOL MALEATE 20; 5 MG/ML; MG/ML
1 SOLUTION/ DROPS OPHTHALMIC 2 TIMES DAILY
Status: DISCONTINUED | OUTPATIENT
Start: 2020-03-15 | End: 2020-03-16 | Stop reason: HOSPADM

## 2020-03-15 RX ORDER — ROSUVASTATIN CALCIUM 20 MG/1
20 TABLET, COATED ORAL EVERY EVENING
Status: DISCONTINUED | OUTPATIENT
Start: 2020-03-15 | End: 2020-03-16 | Stop reason: HOSPADM

## 2020-03-15 RX ORDER — AZITHROMYCIN 250 MG/1
500 TABLET, FILM COATED ORAL
Status: DISCONTINUED | OUTPATIENT
Start: 2020-03-15 | End: 2020-03-16 | Stop reason: HOSPADM

## 2020-03-15 RX ORDER — BUDESONIDE AND FORMOTEROL FUMARATE DIHYDRATE 160; 4.5 UG/1; UG/1
2 AEROSOL RESPIRATORY (INHALATION)
Status: DISCONTINUED | OUTPATIENT
Start: 2020-03-15 | End: 2020-03-16 | Stop reason: HOSPADM

## 2020-03-15 RX ORDER — POTASSIUM CHLORIDE 7.45 MG/ML
10 INJECTION INTRAVENOUS
Status: DISCONTINUED | OUTPATIENT
Start: 2020-03-15 | End: 2020-03-16 | Stop reason: HOSPADM

## 2020-03-15 RX ORDER — LOPERAMIDE HYDROCHLORIDE 2 MG/1
4 CAPSULE ORAL 4 TIMES DAILY PRN
Status: DISCONTINUED | OUTPATIENT
Start: 2020-03-15 | End: 2020-03-15

## 2020-03-15 RX ORDER — ASPIRIN 81 MG/1
81 TABLET, CHEWABLE ORAL DAILY
Status: DISCONTINUED | OUTPATIENT
Start: 2020-03-15 | End: 2020-03-16 | Stop reason: HOSPADM

## 2020-03-15 RX ORDER — POTASSIUM CHLORIDE 1.5 G/1.77G
40 POWDER, FOR SOLUTION ORAL AS NEEDED
Status: DISCONTINUED | OUTPATIENT
Start: 2020-03-15 | End: 2020-03-16 | Stop reason: HOSPADM

## 2020-03-15 RX ORDER — AZITHROMYCIN 250 MG/1
500 TABLET, FILM COATED ORAL
Status: DISCONTINUED | OUTPATIENT
Start: 2020-03-15 | End: 2020-03-15

## 2020-03-15 RX ADMIN — SODIUM CHLORIDE 100 ML/HR: 9 INJECTION, SOLUTION INTRAVENOUS at 20:54

## 2020-03-15 RX ADMIN — DORZOLAMIDE HYDROCHLORIDE AND TIMOLOL MALEATE 1 DROP: 20; 5 SOLUTION/ DROPS OPHTHALMIC at 20:58

## 2020-03-15 RX ADMIN — SODIUM CHLORIDE 100 ML/HR: 9 INJECTION, SOLUTION INTRAVENOUS at 00:02

## 2020-03-15 RX ADMIN — LATANOPROST 1 DROP: 50 SOLUTION OPHTHALMIC at 20:58

## 2020-03-15 RX ADMIN — SODIUM CHLORIDE, PRESERVATIVE FREE 10 ML: 5 INJECTION INTRAVENOUS at 20:59

## 2020-03-15 RX ADMIN — DORZOLAMIDE HYDROCHLORIDE AND TIMOLOL MALEATE 1 DROP: 20; 5 SOLUTION/ DROPS OPHTHALMIC at 11:00

## 2020-03-15 RX ADMIN — AZITHROMYCIN DIHYDRATE 500 MG: 250 TABLET, FILM COATED ORAL at 20:53

## 2020-03-15 RX ADMIN — SODIUM CHLORIDE, PRESERVATIVE FREE 10 ML: 5 INJECTION INTRAVENOUS at 00:02

## 2020-03-15 RX ADMIN — ASPIRIN 81 MG: 81 TABLET, CHEWABLE ORAL at 10:58

## 2020-03-15 RX ADMIN — PANTOPRAZOLE SODIUM 40 MG: 40 TABLET, DELAYED RELEASE ORAL at 10:58

## 2020-03-15 RX ADMIN — SODIUM CHLORIDE, PRESERVATIVE FREE 10 ML: 5 INJECTION INTRAVENOUS at 08:26

## 2020-03-15 RX ADMIN — ROSUVASTATIN CALCIUM 20 MG: 20 TABLET, FILM COATED ORAL at 20:53

## 2020-03-15 RX ADMIN — LOPERAMIDE HYDROCHLORIDE 4 MG: 2 CAPSULE ORAL at 03:47

## 2020-03-15 RX ADMIN — BUDESONIDE AND FORMOTEROL FUMARATE DIHYDRATE 2 PUFF: 160; 4.5 AEROSOL RESPIRATORY (INHALATION) at 20:26

## 2020-03-15 RX ADMIN — POTASSIUM CHLORIDE 40 MEQ: 10 CAPSULE, COATED, EXTENDED RELEASE ORAL at 10:58

## 2020-03-15 RX ADMIN — LEVOTHYROXINE SODIUM 88 MCG: 88 TABLET ORAL at 10:58

## 2020-03-15 RX ADMIN — SODIUM CHLORIDE 100 ML/HR: 9 INJECTION, SOLUTION INTRAVENOUS at 11:00

## 2020-03-15 RX ADMIN — POTASSIUM CHLORIDE 40 MEQ: 10 CAPSULE, COATED, EXTENDED RELEASE ORAL at 15:02

## 2020-03-15 RX ADMIN — PANTOPRAZOLE SODIUM 40 MG: 40 TABLET, DELAYED RELEASE ORAL at 20:53

## 2020-03-15 NOTE — ED NOTES
"Nursing report ED to floor  Nathan Baez  74 y.o.  male    HPI (triage note):   Chief Complaint   Patient presents with   • Vomiting   • Diarrhea       Admitting doctor:   Amaury Lopez MD    Admitting diagnosis:   The primary encounter diagnosis was Campylobacter diarrhea. Diagnoses of Acute kidney injury (CMS/HCC) and Dehydration were also pertinent to this visit.    Code status:   Current Code Status     Date Active Code Status Order ID Comments User Context       Prior          Allergies:   Patient has no known allergies.    Weight:       03/14/20 1919   Weight: 107 kg (236 lb)       Most recent vitals:   Vitals:    03/14/20 1919 03/14/20 2005 03/14/20 2104 03/14/20 2206   BP:  110/59 104/58 115/66   Pulse:  90 85 93   Resp:  16 16 16   Temp:       TempSrc:       SpO2:  97% 96% 98%   Weight: 107 kg (236 lb)      Height: 170.2 cm (67\")          Active LDAs/IV Access:   Lines, Drains & Airways    Active LDAs     Name:   Placement date:   Placement time:   Site:   Days:    Peripheral IV 03/14/20 1917 Right Antecubital   03/14/20 1917    Antecubital   less than 1                Labs (abnormal labs have a star):   Labs Reviewed   GASTROINTESTINAL PANEL, PCR - Abnormal; Notable for the following components:       Result Value    Campylobacter Detected (*)     All other components within normal limits   COMPREHENSIVE METABOLIC PANEL - Abnormal; Notable for the following components:    Glucose 123 (*)     BUN 53 (*)     Creatinine 1.82 (*)     Sodium 132 (*)     Chloride 96 (*)     CO2 21.0 (*)     Alkaline Phosphatase 128 (*)     eGFR Non African Amer 37 (*)     BUN/Creatinine Ratio 29.1 (*)     All other components within normal limits    Narrative:     GFR Normal >60  Chronic Kidney Disease <60  Kidney Failure <15     MANUAL DIFFERENTIAL - Abnormal; Notable for the following components:    Neutrophil % 39.2 (*)     Monocyte % 25.8 (*)     Metamyelocyte % 3.1 (*)     Myelocyte % 2.1 (*)     Monocytes " Absolute 1.76 (*)     All other components within normal limits   CBC WITH AUTO DIFFERENTIAL - Normal   STOOL CULTURE, TARGETED   CBC AND DIFFERENTIAL    Narrative:     The following orders were created for panel order CBC & Differential.  Procedure                               Abnormality         Status                     ---------                               -----------         ------                     CBC Auto Differential[[id number]]        Normal              Final result                 Please view results for these tests on the individual orders.       EKG:   No orders to display       Meds given in ED:   Medications   sodium chloride 0.9 % flush 10 mL (has no administration in time range)   lactated ringers bolus 1,000 mL (1,000 mL Intravenous New Bag 3/14/20 1918)   azithromycin (ZITHROMAX) tablet 500 mg (500 mg Oral Given 3/14/20 2122)       Imaging results:  Ct Abdomen Pelvis Without Contrast    Result Date: 3/14/2020  1.  Renal findings as discussed, no hydronephrosis. 2. Uncomplicated cholelithiasis. 3. Mild fatty liver. 4. Minimal colonic diverticulosis             Ambulatory status:   - Pt ambulatory in ED with steady and even gait.    Social issues:   Social History     Socioeconomic History   • Marital status:      Spouse name: Not on file   • Number of children: Not on file   • Years of education: Not on file   • Highest education level: Not on file   Tobacco Use   • Smoking status: Former Smoker     Packs/day: 1.00     Years: 20.00     Pack years: 20.00     Types: Cigarettes     Last attempt to quit: 1984     Years since quittin.2   • Smokeless tobacco: Never Used   Substance and Sexual Activity   • Alcohol use: Yes     Comment: 3-4 MONTHLY   • Drug use: No   • Sexual activity: Defer          Namrata Garcia RN  [phone]

## 2020-03-15 NOTE — PLAN OF CARE
Problem: Patient Care Overview  Goal: Plan of Care Review  Outcome: Ongoing (interventions implemented as appropriate)  Flowsheets (Taken 3/15/2020 1532)  Progress: improving  Plan of Care Reviewed With: patient  Outcome Summary: no c/o N/V/D, no c/o pain, VSS, K+ protocol started, redrw timed at 1900, perianal sore, using blue cream and wipes, NS @100cc/hr

## 2020-03-15 NOTE — H&P
Patient Name:  Nathan Baez  YOB: 1945  MRN:  [id number]  Admit Date:  3/14/2020  Patient Care Team:  Damien Pierce MD as PCP - General (Family Medicine)      Subjective   History Present Illness     Chief Complaint   Patient presents with   • Vomiting   • Diarrhea     History of Present Illness   Mr. Baez is a 74 y.o. former smoker with a history of hypertension, hypothyroidism, GERD, DDD, KWAME, hyperglycemia, and prostate cancer that presents to [facility] complaining of diarrhea.  He has been having diarrhea for the past 5 days.  Early Sunday morning he started having diarrhea along with vomiting.  He states that his vomiting resolved on Tuesday.  He denies fevers upon exam but he states that he had fevers at the beginning of the week but they also resolved on Tuesday.  He states that he is having about 3-4 stools every hour.  He states he has been eating and drinking well all week except for yesterday he had decreased fluid intake.  He denies eating any contaminated food.  He denies hematochezia, shortness of air, chest pain, chills, headaches, dizziness, and night sweats.    Labs obtained in the ED shows a glucose of 123, sodium 132, creatinine 1.82, BUN 53, eGFR 37, alkaline phosphatase 128.  A gastrointestinal panel was positive for campylobacter bacteria.    Review of Systems   Constitutional: Positive for appetite change. Negative for chills and fever.   HENT: Negative for congestion and rhinorrhea.    Eyes: Negative for photophobia and visual disturbance.   Respiratory: Negative for cough and shortness of breath.    Cardiovascular: Negative for chest pain and palpitations.   Gastrointestinal: Positive for diarrhea. Negative for abdominal pain, blood in stool and nausea.   Endocrine: Negative for cold intolerance and heat intolerance.   Genitourinary: Negative for difficulty urinating and dysuria.   Musculoskeletal: Negative for gait problem and joint swelling.      Skin: Negative for rash and wound.   Neurological: Negative for dizziness, light-headedness and headaches.   Psychiatric/Behavioral: Negative for sleep disturbance and suicidal ideas.        Personal History     Past Medical History:   Diagnosis Date   • Actinic keratosis    • Acute embolism and thrombosis of vein    • Allergic rhinitis    • Arthritis    • Cataract     forming left eye   • Cellulitis    • Cellulitis    • Cervical radiculopathy    • Contact with hypodermic needle    • Cough    • Disequilibrium    • Diverticulosis    • DJD (degenerative joint disease), lumbar    • DVT (deep venous thrombosis) (CMS/HCC)     l leg  dx 2019 and was on blood thinner and ow off wears compression    • Dysphagia    • Encounter for long-term (current) use of NSAIDs    • Encounter for screening colonoscopy    • Erysipelas    • Gastroenteritis, acute    • GERD (gastroesophageal reflux disease)    • Glaucoma    • High risk medication use    • History of colon polyps    • Hospital discharge follow-up    • Hyperglycemia    • Hyperlipidemia    • Hyperplastic polyp of stomach    • Hypertension    • Hypothyroid    • Inflamed skin tag    • Insomnia    • Internal hemorrhoids    • Kidney stone     has had history of passing and still has kidney stone on both sides    • Lumbar strain    • Male urinary stress incontinence     s/p prostatectomy   • Medicare annual wellness visit, subsequent    • Obesity    • KWAME (obstructive sleep apnea)    • Osteoarthritis    • Pes planus    • Presbycusis    • Prostate cancer (CMS/HCC)    • Prostate cancer (CMS/HCC)    • Rectal bleed    • Restless legs syndrome    • Rib pain on left side    • Shoulder pain    • Skin lesion of face    • Sleep apnea     bipap   • Throat clearing    • Tinea corporis    • Tinea cruris    • Venous stasis dermatitis      Past Surgical History:   Procedure Laterality Date   • CATARACT EXTRACTION Right    • COLONOSCOPY  03/08/2017    3/17normal. Recheck 2022. 12/11. Repeat in 5  years    • ENDOSCOPY W/ PEG REMOVAL     • FOOT SURGERY       had big toe replaced on left foot   • HERNIA REPAIR  2012    umbilical   • JOINT REPLACEMENT Right    • NECK SURGERY  2011    cervical disc   • LA TOTAL KNEE ARTHROPLASTY Left 2016    Procedure: LT TOTAL KNEE ARTHROPLASTY;  Surgeon: Tadeo Basurto MD;  Location: [facility] OR;  Service: Orthopedics   • PROSTATECTOMY  1999. Radical    • THYROID LOBECTOMY     • THYROID SURGERY      partial doesn't know which one was removed   • TONSILLECTOMY     • TOTAL KNEE ARTHROPLASTY Right    • TOTAL SHOULDER ARTHROPLASTY W/ DISTAL CLAVICLE EXCISION Right 2019    Procedure: TOTAL SHOULDER REVERSE ARTHROPLASTY RIGHT REPAIR RIGHT AXILLARY VEIN;  Surgeon: Behzad Kelley MD;  Location: [facility] OR [facility];  Service: Orthopedics   • WRIST SURGERY Right 12/17/2014    x2  wrist replaced     Family History   Problem Relation Age of Onset   • Cancer Mother         COLON   • Cancer Father         PROSTATE   • Cancer Sister         BREAST CANCER   • Breast cancer Sister    • Gout Sister    • Hypertension Sister    • Heart attack Brother    • Bone cancer Brother    • Heart disease Brother    • Malig Hyperthermia Neg Hx      Social History     Tobacco Use   • Smoking status: Former Smoker     Packs/day: 1.00     Years: 20.00     Pack years: 20.00     Types: Cigarettes     Last attempt to quit: 1984     Years since quittin.2   • Smokeless tobacco: Never Used   Substance Use Topics   • Alcohol use: Yes     Comment: 3-4 MONTHLY   • Drug use: No     No current facility-administered medications on file prior to encounter.      Current Outpatient Medications on File Prior to Encounter   Medication Sig Dispense Refill   • acetaminophen (TYLENOL) 650 MG 8 hr tablet Take 1,300 mg by mouth 2 (Two) Times a Day.     • albuterol (PROVENTIL HFA;VENTOLIN HFA) 108 (90 Base) MCG/ACT inhaler Inhale 2 puffs Every 4 (Four) Hours As Needed  for Wheezing.     • ALLERGY SERUM INJECTION Inject  under the skin into the appropriate area as directed 1 (One) Time Per Week.     • aspirin 81 MG tablet Take 1 tablet by mouth Daily.     • azelastine (ASTELIN) 0.1 % nasal spray 2 sprays into the nostril(s) as directed by provider 2 (Two) Times a Day. Use in each nostril as directed     • BRIMONIDINE TARTRATE OP Apply 1 drop to eye(s) as directed by provider 2 (Two) Times a Day.     • dorzolamide-timolol (COSOPT) 22.3-6.8 MG/ML ophthalmic solution Administer 1 drop to both eyes 2 (Two) Times a Day.     • ferrous sulfate 324 (65 Fe) MG tablet delayed-release EC tablet Take 324 mg by mouth Daily With Breakfast.     • fexofenadine (ALLEGRA) 180 MG tablet Take 180 mg by mouth Daily.     • Fluticasone Furoate-Vilanterol (BREO ELLIPTA) 200-25 MCG/INH inhaler Inhale 2 puffs Every Morning.     • furosemide (LASIX) 40 MG tablet One Bid for 3 days then daily 30 tablet 11   • latanoprost (XALATAN) 0.005 % ophthalmic solution Administer 1 drop to both eyes Every Night.     • levothyroxine (SYNTHROID) 88 MCG tablet Take 1 tablet by mouth Every Morning. 90 tablet 3   • losartan (COZAAR) 100 MG tablet TAKE 1 TABLET DAILY 90 tablet 0   • montelukast (SINGULAIR) 10 MG tablet Take 1 tablet by mouth Every Night. (Patient taking differently: Take 10 mg by mouth Daily.) 90 tablet 3   • pantoprazole (PROTONIX) 40 MG EC tablet Take 1 tablet by mouth 2 (Two) Times a Day. 90 tablet 3   • potassium chloride (K-DUR) 10 MEQ CR tablet Take 1 tablet by mouth Daily. 90 tablet 3   • pramipexole (MIRAPEX) 1.5 MG tablet Take 1 tablet by mouth At Night As Needed (RLS). 30 tablet 5   • rosuvastatin (CRESTOR) 20 MG tablet Take 1 tablet by mouth Every Evening. 90 tablet 1   • traMADol (ULTRAM) 50 MG tablet Take 1 tablet by mouth Every 6 (Six) Hours As Needed for Moderate Pain . 60 tablet 2     No Known Allergies    Objective    Objective     Vital Signs  Temp:  [97.2 °F (36.2 °C)-98.9 °F (37.2 °C)]  97.2 °F (36.2 °C)  Heart Rate:  [] 94  Resp:  [16-18] 16  BP: (104-123)/(58-71) 123/67  SpO2:  [94 %-98 %] 98 %  on   ;   Device (Oxygen Therapy): room air  Body mass index is 37.6 kg/m².    Physical Exam   Constitutional: He is oriented to person, place, and time. He appears well-developed and well-nourished.   HENT:   Head: Normocephalic and atraumatic.   Eyes: Conjunctivae and EOM are normal.   Neck: Normal range of motion. Neck supple. No JVD present.   Cardiovascular: Normal rate, regular rhythm and normal heart sounds. Exam reveals no gallop and no friction rub.   No murmur heard.  Pulmonary/Chest: Effort normal and breath sounds normal. No respiratory distress. He has no wheezes. He has no rales.   Abdominal: Soft. Bowel sounds are normal. He exhibits no distension. There is no tenderness. There is no guarding.   Musculoskeletal: Normal range of motion. He exhibits no edema or deformity.   Neurological: He is alert and oriented to person, place, and time.   Skin: Skin is warm and dry. Capillary refill takes less than 2 seconds.   Psychiatric: He has a normal mood and affect. His behavior is normal.   Nursing note and vitals reviewed.      Results Review:  I reviewed the patient's new clinical results.  Discussed with ED provider.    Lab Results (last 24 hours)     Procedure Component Value Units Date/Time    Gastrointestinal Panel, PCR - Stool, Per Rectum [[id number]]  (Abnormal) Collected:  03/14/20 1847    Specimen:  Stool from Per Rectum Updated:  03/14/20 2026     Campylobacter Detected     Plesiomonas shigelloides Not Detected     Salmonella Not Detected     Vibrio Not Detected     Vibrio cholerae Not Detected     Yersinia enterocolitica Not Detected     Enteroaggregative E. coli (EAEC) Not Detected     Enteropathogenic E. coli (EPEC) Not Detected     Enterotoxigenic E. coli (ETEC) lt/st Not Detected     Shiga-like toxin-producing E. coli (STEC) stx1/stx2 Not Detected     E. coli O157 Not Detected        Shigella/Enteroinvasive E. coli (EIEC) Not Detected     Cryptosporidium Not Detected     Cyclospora cayetanensis Not Detected     Entamoeba histolytica Not Detected     Giardia lamblia Not Detected     Adenovirus F40/41 Not Detected     Astrovirus Not Detected     Norovirus GI/GII Not Detected     Rotavirus A Not Detected     Sapovirus (I, II, IV or V) Not Detected    Stool Culture, Targeted - Stool, Per Rectum [[id number]] Collected:  03/14/20 1847    Specimen:  Stool from Per Rectum Updated:  03/14/20 2025    CBC & Differential [[id number]] Collected:  03/14/20 1920    Specimen:  Blood Updated:  03/14/20 1941    Narrative:       The following orders were created for panel order CBC & Differential.  Procedure                               Abnormality         Status                     ---------                               -----------         ------                     CBC Auto Differential[[id number]]        Normal              Final result                 Please view results for these tests on the individual orders.    Comprehensive Metabolic Panel [[id number]]  (Abnormal) Collected:  03/14/20 1920    Specimen:  Blood Updated:  03/14/20 1957     Glucose 123 mg/dL      BUN 53 mg/dL      Creatinine 1.82 mg/dL      Sodium 132 mmol/L      Potassium 3.7 mmol/L      Chloride 96 mmol/L      CO2 21.0 mmol/L      Calcium 8.9 mg/dL      Total Protein 7.1 g/dL      Albumin 4.40 g/dL      ALT (SGPT) 17 U/L      AST (SGOT) 14 U/L      Alkaline Phosphatase 128 U/L      Total Bilirubin 0.7 mg/dL      eGFR Non African Amer 37 mL/min/1.73      Globulin 2.7 gm/dL      A/G Ratio 1.6 g/dL      BUN/Creatinine Ratio 29.1     Anion Gap 15.0 mmol/L     Narrative:       GFR Normal >60  Chronic Kidney Disease <60  Kidney Failure <15      CBC Auto Differential [[id number]]  (Normal) Collected:  03/14/20 1920    Specimen:  Blood Updated:  03/14/20 1941     WBC 6.82 10*3/mm3      RBC 4.64 10*6/mm3      Hemoglobin 13.3 g/dL      Hematocrit  40.4 %      MCV 87.1 fL      MCH 28.7 pg      MCHC 32.9 g/dL      RDW 14.7 %      RDW-SD 46.8 fl      MPV 9.9 fL      Platelets 220 10*3/mm3     Manual Differential [[id number]]  (Abnormal) Collected:  03/14/20 1920    Specimen:  Blood Updated:  03/14/20 2007     Neutrophil % 39.2 %      Lymphocyte % 27.8 %      Monocyte % 25.8 %      Metamyelocyte % 3.1 %      Myelocyte % 2.1 %      Atypical Lymphocyte % 2.1 %      Neutrophils Absolute 2.67 10*3/mm3      Lymphocytes Absolute 1.90 10*3/mm3      Monocytes Absolute 1.76 10*3/mm3      RBC Morphology Normal     WBC Morphology Normal     Platelet Morphology Normal          Imaging Results (Last 24 Hours)     Procedure Component Value Units Date/Time    CT Abdomen Pelvis Without Contrast [[id number]] Collected:  03/14/20 1911     Updated:  03/14/20 1912    Narrative:       NONCONTRAST CT SCANS ABDOMEN AND PELVIS     HISTORY: diarrhea/llq pain     COMPARISON: 07/04/2019.     TECHNIQUE:Radiation dose reduction techniques were utilized, including  automated exposure control and exposure modulation based on body size.   Axial images were obtained from the lung bases to the symphysis pubis  without oral or IV contrast per request.      FINDINGS:  Dominant upper pole left renal cyst is similar to previous at  7.3 cm. There is bilateral renal atrophy. There are nonobstructing right  renal calculi. Uncomplicated cholelithiasis. Mild fatty liver. Remaining  solid organs are otherwise unremarkable without benefit of IV contrast.  Minimal diverticulosis. Normal appendix. The GI tract not opacified for  assessment but non obstructive in appearance. Prior ventral hernia  repair and prostatectomy.          Impression:       1.  Renal findings as discussed, no hydronephrosis.  2. Uncomplicated cholelithiasis.  3. Mild fatty liver.  4. Minimal colonic diverticulosis                            Results for orders placed during the hospital encounter of 12/18/19   Adult Transthoracic Echo  Complete W/ Cont if Necessary Per Protocol    Narrative · Left ventricular systolic function is normal. Calculated EF = 66%.   Estimated EF was in agreement with the calculated EF. Normal left   ventricular cavity size noted. All left ventricular wall segments contract   normally. Left ventricular wall thickness is consistent with concentric   hypertrophy. Mild-moderate basal septal hypertrophy is noted. Left   ventricular diastolic function is normal.  · Normal right ventricular systolic function noted. Right ventricular   cavity is mildly dilated.          No orders to display        Assessment/Plan     Active Hospital Problems    Diagnosis  POA   • **Campylobacter diarrhea [A04.5]  Yes   • Gastroesophageal reflux disease without esophagitis [K21.9]  Yes   • DDD (degenerative disc disease), lumbar [M51.36]  Yes   • KWAME (obstructive sleep apnea) [G47.33]  Yes   • Venous insufficiency [I87.2]  Yes   • Prostate cancer (CMS/HCC) [C61]  Yes   • Hypothyroidism (acquired) [E03.9]  Yes   • Hypertension [I10]  Yes      Resolved Hospital Problems   No resolved problems to display.     Campylobacter diarrhea  -Given oral Zithromax in ED. Will continue for now  -IV hydration  - Antidiarrheal  Medications  -Supportive care    CONY  -Secondary to dehydration   -Creat 1.85, eGFR 37  -IV hydration overnight  -Trend BMP    Hypertension/KWAME/Hypothryoidsm   -Stable  -Continue home regimen       I discussed the patient's findings and my recommendations with the patient and spouse at bedside.      VTE Prophylaxis - SCDs  Code Status - Full       ALY Lopez  [facility]  03/15/20  02:08

## 2020-03-15 NOTE — PLAN OF CARE
Problem: Patient Care Overview  Goal: Plan of Care Review  Outcome: Ongoing (interventions implemented as appropriate)  Flowsheets  Taken 3/15/2020 0712  Progress: no change  Outcome Summary: ED admit for c/o vomitting and diarrhea since Monday.  Vomitting subsided but diarrhea persist.   Perianal area sore and excoriation noted.  Standby assist.  Imodium x 1.  IVF started.  Spouse at bedside.  Taken 3/14/2020 2361  Plan of Care Reviewed With: patient;spouse

## 2020-03-16 VITALS
DIASTOLIC BLOOD PRESSURE: 68 MMHG | HEIGHT: 67 IN | TEMPERATURE: 97 F | SYSTOLIC BLOOD PRESSURE: 113 MMHG | OXYGEN SATURATION: 98 % | WEIGHT: 240.08 LBS | HEART RATE: 75 BPM | RESPIRATION RATE: 16 BRPM | BODY MASS INDEX: 37.68 KG/M2

## 2020-03-16 LAB
ANION GAP SERPL CALCULATED.3IONS-SCNC: 9.5 MMOL/L (ref 5–15)
BUN BLD-MCNC: 20 MG/DL (ref 8–23)
BUN/CREAT SERPL: 17.2 (ref 7–25)
CALCIUM SPEC-SCNC: 8.6 MG/DL (ref 8.6–10.5)
CHLORIDE SERPL-SCNC: 110 MMOL/L (ref 98–107)
CO2 SERPL-SCNC: 18.5 MMOL/L (ref 22–29)
CREAT BLD-MCNC: 1.16 MG/DL (ref 0.76–1.27)
DEPRECATED RDW RBC AUTO: 45.8 FL (ref 37–54)
ERYTHROCYTE [DISTWIDTH] IN BLOOD BY AUTOMATED COUNT: 14.5 % (ref 12.3–15.4)
GFR SERPL CREATININE-BSD FRML MDRD: 62 ML/MIN/1.73
GLUCOSE BLD-MCNC: 149 MG/DL (ref 65–99)
HCT VFR BLD AUTO: 34.3 % (ref 37.5–51)
HGB BLD-MCNC: 11.3 G/DL (ref 13–17.7)
MCH RBC QN AUTO: 29.1 PG (ref 26.6–33)
MCHC RBC AUTO-ENTMCNC: 32.9 G/DL (ref 31.5–35.7)
MCV RBC AUTO: 88.4 FL (ref 79–97)
PLATELET # BLD AUTO: 176 10*3/MM3 (ref 140–450)
PMV BLD AUTO: 10.1 FL (ref 6–12)
POTASSIUM BLD-SCNC: 3.9 MMOL/L (ref 3.5–5.2)
RBC # BLD AUTO: 3.88 10*6/MM3 (ref 4.14–5.8)
SODIUM BLD-SCNC: 138 MMOL/L (ref 136–145)
WBC NRBC COR # BLD: 5.31 10*3/MM3 (ref 3.4–10.8)

## 2020-03-16 PROCEDURE — 99217 PR OBSERVATION CARE DISCHARGE MANAGEMENT: CPT | Performed by: INTERNAL MEDICINE

## 2020-03-16 PROCEDURE — 94799 UNLISTED PULMONARY SVC/PX: CPT

## 2020-03-16 PROCEDURE — 80048 BASIC METABOLIC PNL TOTAL CA: CPT | Performed by: INTERNAL MEDICINE

## 2020-03-16 PROCEDURE — G0378 HOSPITAL OBSERVATION PER HR: HCPCS

## 2020-03-16 PROCEDURE — 85027 COMPLETE CBC AUTOMATED: CPT | Performed by: INTERNAL MEDICINE

## 2020-03-16 RX ORDER — AZITHROMYCIN 250 MG/1
TABLET, FILM COATED ORAL
Qty: 4 TABLET | Refills: 0 | Status: SHIPPED | OUTPATIENT
Start: 2020-03-16 | End: 2020-03-18 | Stop reason: SDUPTHER

## 2020-03-16 RX ADMIN — BUDESONIDE AND FORMOTEROL FUMARATE DIHYDRATE 2 PUFF: 160; 4.5 AEROSOL RESPIRATORY (INHALATION) at 07:08

## 2020-03-16 RX ADMIN — ASPIRIN 81 MG: 81 TABLET, CHEWABLE ORAL at 08:33

## 2020-03-16 RX ADMIN — SODIUM CHLORIDE, PRESERVATIVE FREE 10 ML: 5 INJECTION INTRAVENOUS at 08:33

## 2020-03-16 RX ADMIN — DORZOLAMIDE HYDROCHLORIDE AND TIMOLOL MALEATE 1 DROP: 20; 5 SOLUTION/ DROPS OPHTHALMIC at 08:35

## 2020-03-16 RX ADMIN — LEVOTHYROXINE SODIUM 88 MCG: 88 TABLET ORAL at 06:18

## 2020-03-16 RX ADMIN — PANTOPRAZOLE SODIUM 40 MG: 40 TABLET, DELAYED RELEASE ORAL at 08:33

## 2020-03-16 NOTE — DISCHARGE SUMMARY
[facility] HOSPITALIST   DISCHARGE SUMMARY      Name:  Nathan Baez   Age:  74 y.o.  Sex:  male  :  1945  MRN:  [id number]   Visit Number:  [id number]  Primary Care Physician:  Damien Pierce MD  Date of Discharge:  3/16/2020  Admission Date:  3/14/2020      Discharge Diagnosis:     1.  Acute diarrhea due to Campylobacter infection  2.  Acute kidney injury, resolved at baseline.  3.  Renal cysts, similar to previous  4.  Mild fatty liver disease, follow-up with PCP  5.  Essential hypertension  6.  KWAME  7.  History of prostate cancer    Active Hospital Problems    Diagnosis  POA   • **Campylobacter diarrhea [A04.5]  Yes   • Hyponatremia [E87.1]  Yes   • Gastroesophageal reflux disease without esophagitis [K21.9]  Yes   • DDD (degenerative disc disease), lumbar [M51.36]  Yes   • KWAME (obstructive sleep apnea) [G47.33]  Yes   • Venous insufficiency [I87.2]  Yes   • Prostate cancer (CMS/HCC) [C61]  Yes   • Hyperglycemia [R73.9]  Yes   • Hypothyroidism (acquired) [E03.9]  Yes   • Hypertension [I10]  Yes      Resolved Hospital Problems   No resolved problems to display.         Presenting Problem/History of Present Illness:    Dehydration [E86.0]  Campylobacter diarrhea [A04.5]  Acute kidney injury (CMS/HCC) [N17.9]         Hospital Course:    Patient is a 74-year-old former smoker with history of hypertension, hypothyroidism, GERD, KWAME, obesity, prostate cancer who came in on 3/15/2020 with diarrhea for 5 days.  He had some vomiting as well.  He had fever, and was having 3-4 stools every hour.  She denies any travel or eating any contaminated food.  GI panel was positive for Campylobacter bacteria.  He was noted to have a creatinine of 1.82, and a sodium of 132.  He was placed on IV fluids and Zithromax.    He was admitted for observation.  He has improved.  His creatinine has returned to normal.  CT abdomen pelvis did show a renal cyst similar to previous as well as mild fatty liver.   He should follow-up with his PCP regarding this.  He currently denies any chest pressure, shortness of breath, nausea, vomiting, or pain.  He denies any more diarrhea.  Advised him to continue the Zithromax for 4 more days.    He should follow-up with his PCP in 1 week's time.  If he should develop any neurological symptoms such as unexplained numbness in his lower extremities, he is to return to the emergency room soon as possible.  He also should return if he has worsening diarrhea, abdominal pain, fever, chills.  He otherwise is stable for discharge home.    Procedures Performed:           Consults:     Consults     Date and Time Order Name Status Description    3/14/2020 2102 [facility] (on-call MD unless specified) Details Completed           Pertinent Test Results:     Lab Results (all)     Procedure Component Value Units Date/Time    Basic Metabolic Panel [[id number]]  (Abnormal) Collected:  03/16/20 0557    Specimen:  Blood Updated:  03/16/20 0723     Glucose 149 mg/dL      BUN 20 mg/dL      Creatinine 1.16 mg/dL      Sodium 138 mmol/L      Potassium 3.9 mmol/L      Chloride 110 mmol/L      CO2 18.5 mmol/L      Calcium 8.6 mg/dL      eGFR Non African Amer 62 mL/min/1.73      BUN/Creatinine Ratio 17.2     Anion Gap 9.5 mmol/L     Narrative:       GFR Normal >60  Chronic Kidney Disease <60  Kidney Failure <15      CBC (No Diff) [[id number]]  (Abnormal) Collected:  03/16/20 0557    Specimen:  Blood Updated:  03/16/20 0634     WBC 5.31 10*3/mm3      RBC 3.88 10*6/mm3      Hemoglobin 11.3 g/dL      Hematocrit 34.3 %      MCV 88.4 fL      MCH 29.1 pg      MCHC 32.9 g/dL      RDW 14.5 %      RDW-SD 45.8 fl      MPV 10.1 fL      Platelets 176 10*3/mm3     Potassium [[id number]]  (Normal) Collected:  03/15/20 1852    Specimen:  Blood Updated:  03/15/20 1937     Potassium 3.9 mmol/L     CBC Auto Differential [[id number]]  (Abnormal) Collected:  03/15/20 0624    Specimen:  Blood from Arm, Left Updated:  03/15/20 0811     WBC 5.79  10*3/mm3      RBC 4.26 10*6/mm3      Hemoglobin 12.2 g/dL      Hematocrit 36.9 %      MCV 86.6 fL      MCH 28.6 pg      MCHC 33.1 g/dL      RDW 14.4 %      RDW-SD 45.2 fl      MPV 10.0 fL      Platelets 193 10*3/mm3     Manual Differential [[id number]]  (Abnormal) Collected:  03/15/20 0624    Specimen:  Blood from Arm, Left Updated:  03/15/20 0811     Neutrophil % 48.0 %      Lymphocyte % 20.0 %      Monocyte % 24.0 %      Eosinophil % 1.0 %      Metamyelocyte % 2.0 %      Myelocyte % 5.0 %      Neutrophils Absolute 2.78 10*3/mm3      Lymphocytes Absolute 1.16 10*3/mm3      Monocytes Absolute 1.39 10*3/mm3      Eosinophils Absolute 0.06 10*3/mm3      nRBC 1.0 /100 WBC      RBC Morphology Normal     WBC Morphology Normal     Giant Platelets Slight/1+    Comprehensive Metabolic Panel [[id number]]  (Abnormal) Collected:  03/15/20 0624    Specimen:  Blood from Arm, Left Updated:  03/15/20 0720     Glucose 126 mg/dL      BUN 46 mg/dL      Creatinine 1.62 mg/dL      Sodium 132 mmol/L      Potassium 3.4 mmol/L      Chloride 101 mmol/L      CO2 20.8 mmol/L      Calcium 8.9 mg/dL      Total Protein 6.7 g/dL      Albumin 3.70 g/dL      ALT (SGPT) 15 U/L      AST (SGOT) 13 U/L      Alkaline Phosphatase 117 U/L      Total Bilirubin 0.6 mg/dL      eGFR Non African Amer 42 mL/min/1.73      Globulin 3.0 gm/dL      A/G Ratio 1.2 g/dL      BUN/Creatinine Ratio 28.4     Anion Gap 10.2 mmol/L     Narrative:       GFR Normal >60  Chronic Kidney Disease <60  Kidney Failure <15      Gastrointestinal Panel, PCR - Stool, Per Rectum [[id number]]  (Abnormal) Collected:  03/14/20 1847    Specimen:  Stool from Per Rectum Updated:  03/14/20 2026     Campylobacter Detected     Plesiomonas shigelloides Not Detected     Salmonella Not Detected     Vibrio Not Detected     Vibrio cholerae Not Detected     Yersinia enterocolitica Not Detected     Enteroaggregative E. coli (EAEC) Not Detected     Enteropathogenic E. coli (EPEC) Not Detected      Enterotoxigenic E. coli (ETEC) lt/st Not Detected     Shiga-like toxin-producing E. coli (STEC) stx1/stx2 Not Detected     E. coli O157 Not Detected     Shigella/Enteroinvasive E. coli (EIEC) Not Detected     Cryptosporidium Not Detected     Cyclospora cayetanensis Not Detected     Entamoeba histolytica Not Detected     Giardia lamblia Not Detected     Adenovirus F40/41 Not Detected     Astrovirus Not Detected     Norovirus GI/GII Not Detected     Rotavirus A Not Detected     Sapovirus (I, II, IV or V) Not Detected    Stool Culture, Targeted - Stool, Per Rectum [[id number]] Collected:  03/14/20 1847    Specimen:  Stool from Per Rectum Updated:  03/14/20 2025    Manual Differential [[id number]]  (Abnormal) Collected:  03/14/20 1920    Specimen:  Blood Updated:  03/14/20 2007     Neutrophil % 39.2 %      Lymphocyte % 27.8 %      Monocyte % 25.8 %      Metamyelocyte % 3.1 %      Myelocyte % 2.1 %      Atypical Lymphocyte % 2.1 %      Neutrophils Absolute 2.67 10*3/mm3      Lymphocytes Absolute 1.90 10*3/mm3      Monocytes Absolute 1.76 10*3/mm3      RBC Morphology Normal     WBC Morphology Normal     Platelet Morphology Normal    Comprehensive Metabolic Panel [[id number]]  (Abnormal) Collected:  03/14/20 1920    Specimen:  Blood Updated:  03/14/20 1957     Glucose 123 mg/dL      BUN 53 mg/dL      Creatinine 1.82 mg/dL      Sodium 132 mmol/L      Potassium 3.7 mmol/L      Chloride 96 mmol/L      CO2 21.0 mmol/L      Calcium 8.9 mg/dL      Total Protein 7.1 g/dL      Albumin 4.40 g/dL      ALT (SGPT) 17 U/L      AST (SGOT) 14 U/L      Alkaline Phosphatase 128 U/L      Total Bilirubin 0.7 mg/dL      eGFR Non African Amer 37 mL/min/1.73      Globulin 2.7 gm/dL      A/G Ratio 1.6 g/dL      BUN/Creatinine Ratio 29.1     Anion Gap 15.0 mmol/L     Narrative:       GFR Normal >60  Chronic Kidney Disease <60  Kidney Failure <15      CBC & Differential [[id number]] Collected:  03/14/20 1920    Specimen:  Blood Updated:  03/14/20 1941     Narrative:       The following orders were created for panel order CBC & Differential.  Procedure                               Abnormality         Status                     ---------                               -----------         ------                     CBC Auto Differential[[id number]]        Normal              Final result                 Please view results for these tests on the individual orders.    CBC Auto Differential [[id number]]  (Normal) Collected:  03/14/20 1920    Specimen:  Blood Updated:  03/14/20 1941     WBC 6.82 10*3/mm3      RBC 4.64 10*6/mm3      Hemoglobin 13.3 g/dL      Hematocrit 40.4 %      MCV 87.1 fL      MCH 28.7 pg      MCHC 32.9 g/dL      RDW 14.7 %      RDW-SD 46.8 fl      MPV 9.9 fL      Platelets 220 10*3/mm3           Imaging Results (All)     Procedure Component Value Units Date/Time    CT Abdomen Pelvis Without Contrast [[id number]] Collected:  03/14/20 1911     Updated:  03/15/20 0549    Narrative:       NONCONTRAST CT SCANS ABDOMEN AND PELVIS     HISTORY: diarrhea/llq pain     COMPARISON: 07/04/2019.     TECHNIQUE:Radiation dose reduction techniques were utilized, including  automated exposure control and exposure modulation based on body size.   Axial images were obtained from the lung bases to the symphysis pubis  without oral or IV contrast per request.      FINDINGS:  Dominant upper pole left renal cyst is similar to previous at  7.3 cm. There is bilateral renal atrophy. There are nonobstructing right  renal calculi. Uncomplicated cholelithiasis. Mild fatty liver. Remaining  solid organs are otherwise unremarkable without benefit of IV contrast.  Minimal diverticulosis. Normal appendix. The GI tract not opacified for  assessment but non obstructive in appearance. Prior ventral hernia  repair and prostatectomy.          Impression:       1.  Renal findings as discussed, no hydronephrosis.  2. Uncomplicated cholelithiasis.  3. Mild fatty liver.  4. Minimal colonic  "diverticulosis              This report was finalized on 3/15/2020 5:46 AM by Sergio Her M.D.             Condition on Discharge:      Stable    Vital Signs:    /68 (BP Location: Left arm, Patient Position: Lying)   Pulse 75   Temp 97 °F (36.1 °C) (Oral)   Resp 16   Ht 170.2 cm (67\")   Wt 109 kg (240 lb 1.3 oz)   SpO2 98%   BMI 37.60 kg/m²     Physical Exam:    General: Alert oriented ×4, well-developed well-nourished, obese  Heart: Regular rate and rhythm without murmur rub or thrill  Lungs: Clear to auscultation bilaterally without use of accessory muscles respiration  Abdomen: Soft/nontender/nondistended.  No hepatosplenomegaly is noted.  MSK: 5/5 strength in upper/lower extremities bilaterally.  Psych: Short-term and long-term memory are intact.  Does not appear anxious or depressed.    Discharge Disposition:    Home or Self Care    Discharge Medication:       Discharge Medications      New Medications      Instructions Start Date   azithromycin 250 MG tablet  Commonly known as:  ZITHROMAX   1 tablet daily for 4 days.         Changes to Medications      Instructions Start Date   montelukast 10 MG tablet  Commonly known as:  SINGULAIR  What changed:  when to take this   10 mg, Oral, Nightly         Continue These Medications      Instructions Start Date   acetaminophen 650 MG 8 hr tablet  Commonly known as:  TYLENOL   1,300 mg, Oral, 2 Times Daily      albuterol sulfate  (90 Base) MCG/ACT inhaler  Commonly known as:  PROVENTIL HFA;VENTOLIN HFA;PROAIR HFA   2 puffs, Inhalation, Every 4 Hours PRN      ALLERGY SERUM INJECTION   Subcutaneous, Weekly      aspirin 81 MG tablet   81 mg, Oral, Daily      azelastine 0.1 % nasal spray  Commonly known as:  ASTELIN   2 sprays, Nasal, 2 Times Daily, Use in each nostril as directed       BREO ELLIPTA 200-25 MCG/INH inhaler  Generic drug:  Fluticasone Furoate-Vilanterol   2 puffs, Inhalation, Every Morning      BRIMONIDINE TARTRATE OP   1 drop, " Ophthalmic, 2 Times Daily      dorzolamide-timolol 22.3-6.8 MG/ML ophthalmic solution  Commonly known as:  COSOPT   1 drop, Both Eyes, 2 Times Daily      ferrous sulfate 324 (65 Fe) MG tablet delayed-release EC tablet   324 mg, Oral, Daily With Breakfast      fexofenadine 180 MG tablet  Commonly known as:  ALLEGRA   180 mg, Oral, Daily      furosemide 40 MG tablet  Commonly known as:  LASIX   One Bid for 3 days then daily      latanoprost 0.005 % ophthalmic solution  Commonly known as:  XALATAN   1 drop, Both Eyes, Nightly      levothyroxine 88 MCG tablet  Commonly known as:  SYNTHROID   88 mcg, Oral, Every Morning      losartan 100 MG tablet  Commonly known as:  COZAAR   TAKE 1 TABLET DAILY      pantoprazole 40 MG EC tablet  Commonly known as:  PROTONIX   40 mg, Oral, 2 Times Daily      potassium chloride 10 MEQ CR tablet  Commonly known as:  K-DUR   10 mEq, Oral, Daily      pramipexole 1.5 MG tablet  Commonly known as:  MIRAPEX   1.5 mg, Oral, Nightly PRN      rosuvastatin 20 MG tablet  Commonly known as:  CRESTOR   20 mg, Oral, Every Evening      traMADol 50 MG tablet  Commonly known as:  ULTRAM   50 mg, Oral, Every 6 Hours PRN             Discharge Diet:     Diet Instructions     Diet: Regular      Discharge Diet:  Regular          Activity at Discharge:     Activity Instructions     Activity as Tolerated            Follow-up Appointments:    Future Appointments   Date Time Provider Department Center   5/18/2020  9:15 AM Damien Pierce MD [id number]     Additional Instructions for the Follow-ups that You Need to Schedule     Discharge Follow-up with PCP   As directed       Currently Documented PCP:    Damien Pierce MD    PCP Phone Number:    [phone]     Follow Up Details:  1 week               Test Results Pending at Discharge:     Order Current Status    Stool Culture, Targeted - Stool, Per Rectum In process             Jesus Ware DO  03/16/20  12:06

## 2020-03-16 NOTE — NURSING NOTE
Patient up with standby assistance of wife. Zithromax PO continues. No complaints of pain or nausea. IVF infusing.

## 2020-03-16 NOTE — PROGRESS NOTES
Case Management Discharge Note      Final Note: Home--no needs noted.         Destination      No service has been selected for the patient.      Durable Medical Equipment      No service has been selected for the patient.      Dialysis/Infusion      No service has been selected for the patient.      Home Medical Care      No service has been selected for the patient.      Therapy      No service has been selected for the patient.      Community Resources      No service has been selected for the patient.             Final Discharge Disposition Code: 01 - home or self-care

## 2020-03-17 ENCOUNTER — TRANSITIONAL CARE MANAGEMENT TELEPHONE ENCOUNTER (OUTPATIENT)
Dept: FAMILY MEDICINE CLINIC | Facility: CLINIC | Age: 75
End: 2020-03-17

## 2020-03-18 ENCOUNTER — OFFICE VISIT (OUTPATIENT)
Dept: FAMILY MEDICINE CLINIC | Facility: CLINIC | Age: 75
End: 2020-03-18

## 2020-03-18 VITALS
OXYGEN SATURATION: 98 % | HEART RATE: 96 BPM | SYSTOLIC BLOOD PRESSURE: 138 MMHG | WEIGHT: 255 LBS | DIASTOLIC BLOOD PRESSURE: 72 MMHG | TEMPERATURE: 98.6 F | HEIGHT: 67 IN | BODY MASS INDEX: 40.02 KG/M2

## 2020-03-18 DIAGNOSIS — A04.5 CAMPYLOBACTER DIARRHEA: ICD-10-CM

## 2020-03-18 DIAGNOSIS — Z09 HOSPITAL DISCHARGE FOLLOW-UP: Primary | ICD-10-CM

## 2020-03-18 PROCEDURE — 99495 TRANSJ CARE MGMT MOD F2F 14D: CPT | Performed by: FAMILY MEDICINE

## 2020-03-18 RX ORDER — AZITHROMYCIN 250 MG/1
TABLET, FILM COATED ORAL
Qty: 4 TABLET | Refills: 0 | Status: SHIPPED | OUTPATIENT
Start: 2020-03-18 | End: 2020-03-22 | Stop reason: HOSPADM

## 2020-03-18 NOTE — PROGRESS NOTES
Transitional Care Follow Up Visit  Subjective     Nahtan Baez is a 74 y.o. male who presents for a transitional care management visit.    Within 48 business hours after discharge our office contacted him via telephone to coordinate his care and needs.      I reviewed and discussed the details of that call along with the discharge summary, hospital problems, inpatient lab results, inpatient diagnostic studies, and consultation reports with Nathan.     Current outpatient and discharge medications have been reconciled for the patient.  Reviewed by: Damien Pierce MD      No flowsheet data found.  Risk for Readmission (LACE) Score: 5 (3/16/2020  6:00 AM)      History of Present Illness   Course During Hospital Stay: DC summary reviewed. Admit 3/14/20-3/16/20.  Had acute diarrhea due to Campylobacter infection.  Had acute renal injury that resolved with Cr 1.16 2 days ago(was 1.62 on admission) .  On Azithromycin at present but only 250mg  for last 2 days.  Diarrhea is better now .  No fever.      The following portions of the patient's history were reviewed and updated as appropriate: allergies, current medications, past family history, past medical history, past social history, past surgical history and problem list.    Review of Systems   Constitutional: Negative for activity change, appetite change and fatigue.   HENT: Negative for hearing loss and postnasal drip.    Eyes: Negative for discharge and itching.   Respiratory: Negative for cough and shortness of breath.    Cardiovascular: Negative for chest pain and leg swelling.   Gastrointestinal: Negative for abdominal distention and abdominal pain.   Endocrine: Negative for cold intolerance and heat intolerance.   Genitourinary: Negative for difficulty urinating and flank pain.   Musculoskeletal: Negative for arthralgias and myalgias.   Skin: Negative for color change.   Neurological: Negative for dizziness and facial asymmetry.   Hematological: Negative for  adenopathy.   Psychiatric/Behavioral: Negative for agitation and confusion.       Objective   Physical Exam   Constitutional: He appears well-developed and well-nourished.   HENT:   Head: Normocephalic and atraumatic.   Right Ear: External ear normal.   Left Ear: External ear normal.   Nose: Nose normal.   Mouth/Throat: Oropharynx is clear and moist.   Eyes: Pupils are equal, round, and reactive to light. Conjunctivae and EOM are normal. Right eye exhibits no discharge. Left eye exhibits no discharge. No scleral icterus.   Neck: Normal range of motion. Neck supple. No JVD present. No tracheal deviation present. No thyromegaly present.   Cardiovascular: Normal rate, regular rhythm, normal heart sounds and intact distal pulses. Exam reveals no gallop and no friction rub.   No murmur heard.  Pulmonary/Chest: Effort normal and breath sounds normal. No respiratory distress. He has no wheezes. He has no rales. He exhibits no tenderness.   Abdominal: Soft. Bowel sounds are normal. He exhibits no distension and no mass. There is no tenderness. There is no rebound and no guarding. No hernia.   Musculoskeletal: Normal range of motion. He exhibits no edema or tenderness.   Lymphadenopathy:     He has no cervical adenopathy.   Neurological: He displays normal reflexes. No cranial nerve deficit or sensory deficit. He exhibits normal muscle tone. Coordination normal.   Skin: Skin is warm and dry.   Psychiatric: He has a normal mood and affect. His behavior is normal. Judgment and thought content normal.   Nursing note and vitals reviewed.      Assessment/Plan   Problems Addressed this Visit        Digestive    Campylobacter diarrhea    Relevant Medications    azithromycin (ZITHROMAX) 250 MG tablet       Other    Hospital discharge follow-up - Primary        Increase azithromycin to 250 mg 2 a day for 3 days.

## 2020-03-19 ENCOUNTER — HOSPITAL ENCOUNTER (INPATIENT)
Facility: HOSPITAL | Age: 75
LOS: 2 days | Discharge: HOME | End: 2020-03-22
Attending: EMERGENCY MEDICINE | Admitting: INTERNAL MEDICINE

## 2020-03-19 DIAGNOSIS — R10.84 GENERALIZED ABDOMINAL PAIN: Primary | ICD-10-CM

## 2020-03-19 DIAGNOSIS — D72.829 LEUKOCYTOSIS, UNSPECIFIED TYPE: ICD-10-CM

## 2020-03-19 DIAGNOSIS — R50.9 FEVER, UNSPECIFIED FEVER CAUSE: ICD-10-CM

## 2020-03-19 DIAGNOSIS — R09.02 HYPOXEMIA: ICD-10-CM

## 2020-03-19 PROCEDURE — 99284 EMERGENCY DEPT VISIT MOD MDM: CPT

## 2020-03-19 RX ORDER — SODIUM CHLORIDE 9 MG/ML
100 INJECTION, SOLUTION INTRAVENOUS CONTINUOUS
Status: DISCONTINUED | OUTPATIENT
Start: 2020-03-19 | End: 2020-03-22

## 2020-03-19 RX ORDER — SODIUM CHLORIDE 0.9 % (FLUSH) 0.9 %
10 SYRINGE (ML) INJECTION AS NEEDED
Status: DISCONTINUED | OUTPATIENT
Start: 2020-03-19 | End: 2020-03-22 | Stop reason: HOSPADM

## 2020-03-20 ENCOUNTER — APPOINTMENT (OUTPATIENT)
Dept: GENERAL RADIOLOGY | Facility: HOSPITAL | Age: 75
End: 2020-03-20

## 2020-03-20 ENCOUNTER — APPOINTMENT (OUTPATIENT)
Dept: CT IMAGING | Facility: HOSPITAL | Age: 75
End: 2020-03-20

## 2020-03-20 PROBLEM — R10.84 GENERALIZED ABDOMINAL PAIN: Status: ACTIVE | Noted: 2020-03-20

## 2020-03-20 PROBLEM — R50.9 FEVER: Status: ACTIVE | Noted: 2020-03-20

## 2020-03-20 PROBLEM — K59.00 CONSTIPATION: Status: ACTIVE | Noted: 2020-03-20

## 2020-03-20 PROBLEM — R60.0 BILATERAL LOWER EXTREMITY EDEMA: Status: ACTIVE | Noted: 2020-03-20

## 2020-03-20 LAB
ALBUMIN SERPL-MCNC: 3.6 G/DL (ref 3.5–5.2)
ALBUMIN/GLOB SERPL: 1.4 G/DL
ALP SERPL-CCNC: 117 U/L (ref 39–117)
ALT SERPL W P-5'-P-CCNC: 20 U/L (ref 1–41)
ANION GAP SERPL CALCULATED.3IONS-SCNC: 12.7 MMOL/L (ref 5–15)
ANION GAP SERPL CALCULATED.3IONS-SCNC: 9.6 MMOL/L (ref 5–15)
AST SERPL-CCNC: 15 U/L (ref 1–40)
BACTERIA UR QL AUTO: ABNORMAL /HPF
BILIRUB SERPL-MCNC: 0.6 MG/DL (ref 0.2–1.2)
BILIRUB UR QL STRIP: NEGATIVE
BUN BLD-MCNC: 10 MG/DL (ref 8–23)
BUN BLD-MCNC: 9 MG/DL (ref 8–23)
BUN/CREAT SERPL: 8.8 (ref 7–25)
BUN/CREAT SERPL: 9.5 (ref 7–25)
CALCIUM SPEC-SCNC: 8.6 MG/DL (ref 8.6–10.5)
CALCIUM SPEC-SCNC: 8.6 MG/DL (ref 8.6–10.5)
CHLORIDE SERPL-SCNC: 103 MMOL/L (ref 98–107)
CHLORIDE SERPL-SCNC: 103 MMOL/L (ref 98–107)
CLARITY UR: CLEAR
CO2 SERPL-SCNC: 23.3 MMOL/L (ref 22–29)
CO2 SERPL-SCNC: 25.4 MMOL/L (ref 22–29)
COLOR UR: YELLOW
CREAT BLD-MCNC: 0.95 MG/DL (ref 0.76–1.27)
CREAT BLD-MCNC: 1.13 MG/DL (ref 0.76–1.27)
D-LACTATE SERPL-SCNC: 1.2 MMOL/L (ref 0.5–2)
DEPRECATED RDW RBC AUTO: 46.5 FL (ref 37–54)
DEPRECATED RDW RBC AUTO: 47.2 FL (ref 37–54)
ERYTHROCYTE [DISTWIDTH] IN BLOOD BY AUTOMATED COUNT: 14.5 % (ref 12.3–15.4)
ERYTHROCYTE [DISTWIDTH] IN BLOOD BY AUTOMATED COUNT: 14.7 % (ref 12.3–15.4)
GFR SERPL CREATININE-BSD FRML MDRD: 64 ML/MIN/1.73
GFR SERPL CREATININE-BSD FRML MDRD: 78 ML/MIN/1.73
GLOBULIN UR ELPH-MCNC: 2.6 GM/DL
GLUCOSE BLD-MCNC: 125 MG/DL (ref 65–99)
GLUCOSE BLD-MCNC: 130 MG/DL (ref 65–99)
GLUCOSE UR STRIP-MCNC: NEGATIVE MG/DL
HCT VFR BLD AUTO: 32.5 % (ref 37.5–51)
HCT VFR BLD AUTO: 33.2 % (ref 37.5–51)
HGB BLD-MCNC: 10.9 G/DL (ref 13–17.7)
HGB BLD-MCNC: 11.1 G/DL (ref 13–17.7)
HGB UR QL STRIP.AUTO: ABNORMAL
HYALINE CASTS UR QL AUTO: ABNORMAL /LPF
KETONES UR QL STRIP: NEGATIVE
LEUKOCYTE ESTERASE UR QL STRIP.AUTO: ABNORMAL
LIPASE SERPL-CCNC: 70 U/L (ref 13–60)
LYMPHOCYTES # BLD MANUAL: 0.81 10*3/MM3 (ref 0.7–3.1)
LYMPHOCYTES NFR BLD MANUAL: 5.1 % (ref 19.6–45.3)
LYMPHOCYTES NFR BLD MANUAL: 7.1 % (ref 5–12)
MCH RBC QN AUTO: 29.3 PG (ref 26.6–33)
MCH RBC QN AUTO: 29.6 PG (ref 26.6–33)
MCHC RBC AUTO-ENTMCNC: 33.4 G/DL (ref 31.5–35.7)
MCHC RBC AUTO-ENTMCNC: 33.5 G/DL (ref 31.5–35.7)
MCV RBC AUTO: 87.6 FL (ref 79–97)
MCV RBC AUTO: 88.3 FL (ref 79–97)
METAMYELOCYTES NFR BLD MANUAL: 3 % (ref 0–0)
MONOCYTES # BLD AUTO: 1.12 10*3/MM3 (ref 0.1–0.9)
NEUTROPHILS # BLD AUTO: 13.43 10*3/MM3 (ref 1.7–7)
NEUTROPHILS NFR BLD MANUAL: 84.8 % (ref 42.7–76)
NITRITE UR QL STRIP: NEGATIVE
PH UR STRIP.AUTO: 6 [PH] (ref 5–8)
PLAT MORPH BLD: NORMAL
PLATELET # BLD AUTO: 169 10*3/MM3 (ref 140–450)
PLATELET # BLD AUTO: 174 10*3/MM3 (ref 140–450)
PMV BLD AUTO: 10 FL (ref 6–12)
PMV BLD AUTO: 10.1 FL (ref 6–12)
POTASSIUM BLD-SCNC: 3.7 MMOL/L (ref 3.5–5.2)
POTASSIUM BLD-SCNC: 3.9 MMOL/L (ref 3.5–5.2)
PROCALCITONIN SERPL-MCNC: 0.24 NG/ML (ref 0.1–0.25)
PROT SERPL-MCNC: 6.2 G/DL (ref 6–8.5)
PROT UR QL STRIP: NEGATIVE
RBC # BLD AUTO: 3.68 10*6/MM3 (ref 4.14–5.8)
RBC # BLD AUTO: 3.79 10*6/MM3 (ref 4.14–5.8)
RBC # UR: ABNORMAL /HPF
RBC MORPH BLD: NORMAL
REF LAB TEST METHOD: ABNORMAL
SODIUM BLD-SCNC: 138 MMOL/L (ref 136–145)
SODIUM BLD-SCNC: 139 MMOL/L (ref 136–145)
SP GR UR STRIP: 1.01 (ref 1–1.03)
SQUAMOUS #/AREA URNS HPF: ABNORMAL /HPF
UROBILINOGEN UR QL STRIP: ABNORMAL
WBC MORPH BLD: NORMAL
WBC NRBC COR # BLD: 11.48 10*3/MM3 (ref 3.4–10.8)
WBC NRBC COR # BLD: 15.84 10*3/MM3 (ref 3.4–10.8)
WBC UR QL AUTO: ABNORMAL /HPF

## 2020-03-20 PROCEDURE — 25010000003 CEFTRIAXONE PER 250 MG: Performed by: INTERNAL MEDICINE

## 2020-03-20 PROCEDURE — 87077 CULTURE AEROBIC IDENTIFY: CPT | Performed by: INTERNAL MEDICINE

## 2020-03-20 PROCEDURE — 71046 X-RAY EXAM CHEST 2 VIEWS: CPT

## 2020-03-20 PROCEDURE — 85007 BL SMEAR W/DIFF WBC COUNT: CPT | Performed by: EMERGENCY MEDICINE

## 2020-03-20 PROCEDURE — 87186 SC STD MICRODIL/AGAR DIL: CPT | Performed by: INTERNAL MEDICINE

## 2020-03-20 PROCEDURE — 36415 COLL VENOUS BLD VENIPUNCTURE: CPT | Performed by: NURSE PRACTITIONER

## 2020-03-20 PROCEDURE — 81001 URINALYSIS AUTO W/SCOPE: CPT | Performed by: EMERGENCY MEDICINE

## 2020-03-20 PROCEDURE — 85027 COMPLETE CBC AUTOMATED: CPT | Performed by: NURSE PRACTITIONER

## 2020-03-20 PROCEDURE — 94640 AIRWAY INHALATION TREATMENT: CPT

## 2020-03-20 PROCEDURE — 94799 UNLISTED PULMONARY SVC/PX: CPT

## 2020-03-20 PROCEDURE — 83605 ASSAY OF LACTIC ACID: CPT | Performed by: EMERGENCY MEDICINE

## 2020-03-20 PROCEDURE — 80053 COMPREHEN METABOLIC PANEL: CPT | Performed by: EMERGENCY MEDICINE

## 2020-03-20 PROCEDURE — 85025 COMPLETE CBC W/AUTO DIFF WBC: CPT | Performed by: EMERGENCY MEDICINE

## 2020-03-20 PROCEDURE — 84145 PROCALCITONIN (PCT): CPT | Performed by: EMERGENCY MEDICINE

## 2020-03-20 PROCEDURE — 99223 1ST HOSP IP/OBS HIGH 75: CPT | Performed by: INTERNAL MEDICINE

## 2020-03-20 PROCEDURE — 83690 ASSAY OF LIPASE: CPT | Performed by: EMERGENCY MEDICINE

## 2020-03-20 PROCEDURE — 87086 URINE CULTURE/COLONY COUNT: CPT | Performed by: INTERNAL MEDICINE

## 2020-03-20 PROCEDURE — 84153 ASSAY OF PSA TOTAL: CPT | Performed by: INTERNAL MEDICINE

## 2020-03-20 PROCEDURE — 87040 BLOOD CULTURE FOR BACTERIA: CPT | Performed by: EMERGENCY MEDICINE

## 2020-03-20 PROCEDURE — 74176 CT ABD & PELVIS W/O CONTRAST: CPT

## 2020-03-20 RX ORDER — HYDROCHLOROTHIAZIDE 25 MG/1
25 TABLET ORAL DAILY
Status: DISCONTINUED | OUTPATIENT
Start: 2020-03-20 | End: 2020-03-20

## 2020-03-20 RX ORDER — BISACODYL 10 MG
10 SUPPOSITORY, RECTAL RECTAL DAILY PRN
Status: DISCONTINUED | OUTPATIENT
Start: 2020-03-20 | End: 2020-03-22 | Stop reason: HOSPADM

## 2020-03-20 RX ORDER — ACETAMINOPHEN 650 MG/1
650 SUPPOSITORY RECTAL EVERY 4 HOURS PRN
Status: DISCONTINUED | OUTPATIENT
Start: 2020-03-20 | End: 2020-03-22 | Stop reason: HOSPADM

## 2020-03-20 RX ORDER — ONDANSETRON 4 MG/1
4 TABLET, FILM COATED ORAL EVERY 6 HOURS PRN
Status: DISCONTINUED | OUTPATIENT
Start: 2020-03-20 | End: 2020-03-22 | Stop reason: HOSPADM

## 2020-03-20 RX ORDER — SODIUM CHLORIDE 0.9 % (FLUSH) 0.9 %
10 SYRINGE (ML) INJECTION AS NEEDED
Status: DISCONTINUED | OUTPATIENT
Start: 2020-03-20 | End: 2020-03-22 | Stop reason: HOSPADM

## 2020-03-20 RX ORDER — POTASSIUM CHLORIDE 750 MG/1
10 CAPSULE, EXTENDED RELEASE ORAL DAILY
Status: DISCONTINUED | OUTPATIENT
Start: 2020-03-20 | End: 2020-03-22 | Stop reason: HOSPADM

## 2020-03-20 RX ORDER — LATANOPROST 50 UG/ML
1 SOLUTION/ DROPS OPHTHALMIC NIGHTLY
Status: DISCONTINUED | OUTPATIENT
Start: 2020-03-20 | End: 2020-03-22 | Stop reason: HOSPADM

## 2020-03-20 RX ORDER — FERROUS SULFATE 325(65) MG
325 TABLET ORAL
Status: DISCONTINUED | OUTPATIENT
Start: 2020-03-20 | End: 2020-03-22 | Stop reason: HOSPADM

## 2020-03-20 RX ORDER — CELECOXIB 200 MG/1
200 CAPSULE ORAL DAILY
COMMUNITY
End: 2020-07-30 | Stop reason: ALTCHOICE

## 2020-03-20 RX ORDER — AZELASTINE 1 MG/ML
2 SPRAY, METERED NASAL 2 TIMES DAILY
Status: DISCONTINUED | OUTPATIENT
Start: 2020-03-20 | End: 2020-03-22 | Stop reason: HOSPADM

## 2020-03-20 RX ORDER — BISACODYL 5 MG/1
10 TABLET, DELAYED RELEASE ORAL ONCE
Status: COMPLETED | OUTPATIENT
Start: 2020-03-20 | End: 2020-03-20

## 2020-03-20 RX ORDER — ACETAMINOPHEN 325 MG/1
650 TABLET ORAL EVERY 4 HOURS PRN
Status: DISCONTINUED | OUTPATIENT
Start: 2020-03-20 | End: 2020-03-22 | Stop reason: HOSPADM

## 2020-03-20 RX ORDER — POLYETHYLENE GLYCOL 3350 17 G/17G
17 POWDER, FOR SOLUTION ORAL ONCE
Status: COMPLETED | OUTPATIENT
Start: 2020-03-20 | End: 2020-03-20

## 2020-03-20 RX ORDER — CEFTRIAXONE SODIUM 2 G/50ML
2 INJECTION, SOLUTION INTRAVENOUS EVERY 24 HOURS
Status: DISCONTINUED | OUTPATIENT
Start: 2020-03-20 | End: 2020-03-21

## 2020-03-20 RX ORDER — POLYETHYLENE GLYCOL 3350 17 G/17G
17 POWDER, FOR SOLUTION ORAL DAILY
Status: DISCONTINUED | OUTPATIENT
Start: 2020-03-20 | End: 2020-03-22 | Stop reason: HOSPADM

## 2020-03-20 RX ORDER — MONTELUKAST SODIUM 10 MG/1
10 TABLET ORAL NIGHTLY
Status: DISCONTINUED | OUTPATIENT
Start: 2020-03-20 | End: 2020-03-22 | Stop reason: HOSPADM

## 2020-03-20 RX ORDER — ROSUVASTATIN CALCIUM 20 MG/1
20 TABLET, COATED ORAL EVERY EVENING
Status: DISCONTINUED | OUTPATIENT
Start: 2020-03-20 | End: 2020-03-22 | Stop reason: HOSPADM

## 2020-03-20 RX ORDER — AZITHROMYCIN 250 MG/1
500 TABLET, FILM COATED ORAL ONCE
Status: COMPLETED | OUTPATIENT
Start: 2020-03-20 | End: 2020-03-20

## 2020-03-20 RX ORDER — CELECOXIB 200 MG/1
200 CAPSULE ORAL DAILY
Status: DISCONTINUED | OUTPATIENT
Start: 2020-03-20 | End: 2020-03-22 | Stop reason: HOSPADM

## 2020-03-20 RX ORDER — CALCIUM CARBONATE 200(500)MG
2 TABLET,CHEWABLE ORAL 2 TIMES DAILY PRN
Status: DISCONTINUED | OUTPATIENT
Start: 2020-03-20 | End: 2020-03-22 | Stop reason: HOSPADM

## 2020-03-20 RX ORDER — LOSARTAN POTASSIUM 100 MG/1
100 TABLET ORAL DAILY
Status: DISCONTINUED | OUTPATIENT
Start: 2020-03-20 | End: 2020-03-22 | Stop reason: HOSPADM

## 2020-03-20 RX ORDER — HYDROCHLOROTHIAZIDE 25 MG/1
25 TABLET ORAL DAILY
COMMUNITY
End: 2021-08-11

## 2020-03-20 RX ORDER — BUDESONIDE AND FORMOTEROL FUMARATE DIHYDRATE 160; 4.5 UG/1; UG/1
2 AEROSOL RESPIRATORY (INHALATION)
Status: DISCONTINUED | OUTPATIENT
Start: 2020-03-20 | End: 2020-03-22 | Stop reason: HOSPADM

## 2020-03-20 RX ORDER — DORZOLAMIDE HYDROCHLORIDE AND TIMOLOL MALEATE 20; 5 MG/ML; MG/ML
1 SOLUTION/ DROPS OPHTHALMIC 2 TIMES DAILY
Status: DISCONTINUED | OUTPATIENT
Start: 2020-03-20 | End: 2020-03-22 | Stop reason: HOSPADM

## 2020-03-20 RX ORDER — PANTOPRAZOLE SODIUM 40 MG/1
40 TABLET, DELAYED RELEASE ORAL 2 TIMES DAILY
Status: DISCONTINUED | OUTPATIENT
Start: 2020-03-20 | End: 2020-03-22 | Stop reason: HOSPADM

## 2020-03-20 RX ORDER — ALBUTEROL SULFATE 2.5 MG/3ML
2.5 SOLUTION RESPIRATORY (INHALATION) EVERY 4 HOURS PRN
Status: DISCONTINUED | OUTPATIENT
Start: 2020-03-20 | End: 2020-03-22 | Stop reason: HOSPADM

## 2020-03-20 RX ORDER — ONDANSETRON 2 MG/ML
4 INJECTION INTRAMUSCULAR; INTRAVENOUS EVERY 6 HOURS PRN
Status: DISCONTINUED | OUTPATIENT
Start: 2020-03-20 | End: 2020-03-22 | Stop reason: HOSPADM

## 2020-03-20 RX ORDER — ASPIRIN 81 MG/1
81 TABLET ORAL DAILY
Status: DISCONTINUED | OUTPATIENT
Start: 2020-03-20 | End: 2020-03-22 | Stop reason: HOSPADM

## 2020-03-20 RX ORDER — FUROSEMIDE 40 MG/1
40 TABLET ORAL DAILY
Status: DISCONTINUED | OUTPATIENT
Start: 2020-03-20 | End: 2020-03-22 | Stop reason: HOSPADM

## 2020-03-20 RX ORDER — PRAMIPEXOLE DIHYDROCHLORIDE 1.5 MG/1
1.5 TABLET ORAL NIGHTLY PRN
Status: DISCONTINUED | OUTPATIENT
Start: 2020-03-20 | End: 2020-03-22 | Stop reason: HOSPADM

## 2020-03-20 RX ORDER — LEVOTHYROXINE SODIUM 88 UG/1
88 TABLET ORAL EVERY MORNING
Status: DISCONTINUED | OUTPATIENT
Start: 2020-03-20 | End: 2020-03-22 | Stop reason: HOSPADM

## 2020-03-20 RX ORDER — ALBUTEROL SULFATE 90 UG/1
2 AEROSOL, METERED RESPIRATORY (INHALATION) EVERY 4 HOURS PRN
Status: DISCONTINUED | OUTPATIENT
Start: 2020-03-20 | End: 2020-03-20 | Stop reason: CLARIF

## 2020-03-20 RX ORDER — ACETAMINOPHEN 160 MG/5ML
650 SOLUTION ORAL EVERY 4 HOURS PRN
Status: DISCONTINUED | OUTPATIENT
Start: 2020-03-20 | End: 2020-03-22 | Stop reason: HOSPADM

## 2020-03-20 RX ORDER — SODIUM CHLORIDE 0.9 % (FLUSH) 0.9 %
10 SYRINGE (ML) INJECTION EVERY 12 HOURS SCHEDULED
Status: DISCONTINUED | OUTPATIENT
Start: 2020-03-20 | End: 2020-03-22 | Stop reason: HOSPADM

## 2020-03-20 RX ADMIN — ASPIRIN 81 MG: 81 TABLET, COATED ORAL at 12:17

## 2020-03-20 RX ADMIN — SODIUM CHLORIDE, PRESERVATIVE FREE 10 ML: 5 INJECTION INTRAVENOUS at 08:24

## 2020-03-20 RX ADMIN — BRIMONIDINE TARTRATE 1 DROP: 1 SOLUTION/ DROPS OPHTHALMIC at 21:49

## 2020-03-20 RX ADMIN — FERROUS SULFATE TAB 325 MG (65 MG ELEMENTAL FE) 325 MG: 325 (65 FE) TAB at 12:19

## 2020-03-20 RX ADMIN — BISACODYL 10 MG: 5 TABLET ORAL at 11:24

## 2020-03-20 RX ADMIN — LOSARTAN POTASSIUM 100 MG: 100 TABLET, FILM COATED ORAL at 12:20

## 2020-03-20 RX ADMIN — SODIUM CHLORIDE 100 ML/HR: 9 INJECTION, SOLUTION INTRAVENOUS at 18:21

## 2020-03-20 RX ADMIN — POLYETHYLENE GLYCOL 3350 17 G: 17 POWDER, FOR SOLUTION ORAL at 11:24

## 2020-03-20 RX ADMIN — BUDESONIDE AND FORMOTEROL FUMARATE DIHYDRATE 2 PUFF: 160; 4.5 AEROSOL RESPIRATORY (INHALATION) at 20:20

## 2020-03-20 RX ADMIN — POTASSIUM CHLORIDE 10 MEQ: 10 CAPSULE, COATED, EXTENDED RELEASE ORAL at 11:24

## 2020-03-20 RX ADMIN — CELECOXIB 200 MG: 200 CAPSULE ORAL at 12:19

## 2020-03-20 RX ADMIN — POLYETHYLENE GLYCOL 3350 17 G: 17 POWDER, FOR SOLUTION ORAL at 05:25

## 2020-03-20 RX ADMIN — SODIUM CHLORIDE, PRESERVATIVE FREE 10 ML: 5 INJECTION INTRAVENOUS at 21:50

## 2020-03-20 RX ADMIN — MONTELUKAST SODIUM 10 MG: 10 TABLET, FILM COATED ORAL at 21:47

## 2020-03-20 RX ADMIN — ROSUVASTATIN CALCIUM 20 MG: 20 TABLET, FILM COATED ORAL at 17:37

## 2020-03-20 RX ADMIN — CEFTRIAXONE SODIUM 2 G: 2 INJECTION, SOLUTION INTRAVENOUS at 15:59

## 2020-03-20 RX ADMIN — PANTOPRAZOLE SODIUM 40 MG: 40 TABLET, DELAYED RELEASE ORAL at 21:47

## 2020-03-20 RX ADMIN — AZITHROMYCIN DIHYDRATE 500 MG: 250 TABLET, FILM COATED ORAL at 12:19

## 2020-03-20 RX ADMIN — SODIUM CHLORIDE 125 ML/HR: 9 INJECTION, SOLUTION INTRAVENOUS at 08:24

## 2020-03-20 RX ADMIN — BRIMONIDINE TARTRATE 1 DROP: 1 SOLUTION/ DROPS OPHTHALMIC at 12:20

## 2020-03-20 RX ADMIN — LEVOTHYROXINE SODIUM 88 MCG: 88 TABLET ORAL at 12:19

## 2020-03-20 RX ADMIN — PANTOPRAZOLE SODIUM 40 MG: 40 TABLET, DELAYED RELEASE ORAL at 11:24

## 2020-03-20 RX ADMIN — FUROSEMIDE 40 MG: 40 TABLET ORAL at 12:20

## 2020-03-20 RX ADMIN — SODIUM CHLORIDE 125 ML/HR: 9 INJECTION, SOLUTION INTRAVENOUS at 00:22

## 2020-03-20 NOTE — H&P
Patient Name:  Nathan Baez  YOB: 1945  MRN:  [id number]  Admit Date:  3/19/2020  Patient Care Team:  Damien Pierce MD as PCP - General (Family Medicine)      Subjective   History Present Illness     Chief Complaint   Patient presents with   • Fever   • Constipation       Mr. Baez is a 74 y.o. male with a history of recent hospitalization for campylobacter stool & CONY, GERD, HTN, HLD, hypothyroidism, BLE edema, hx prostate cancer, KWAME on bipap that presents to [facility] complaining of recurrent fever and constipation. Pt was discharged from [facility] 3/16/20 on po azithromycin for campylobacter stool. Pt reports his PCP increased azithromycin to 500 mg daily, last dose today. He had not had a bowel movement x 4 days; administered an enema yesterday w/some results. He has had continued upper abdominal distension and tenderness. Temp was up to 101 at home. He had associated chills. Denies n/v. He has a chronic cough; not worse than normal. He occasionally has sputum production. Denies dyspnea. He also reports feeling as if his bladder is not completely emptying despite urinating adequate amounts and mild dysuria.     TMax 100.8 in ED, Sats 89% on RA. WBC 16, Procal 0.24, Lactate 1.2, Lipase 70, BUN/Cr 10/1.13, UA sm leuk, neg nitrite, 13-20 WBC, 3+ bact. Blood culture was drawn. CXR mild interstitial prominence likely r/t edema; no active airspace disease. CT A/P stable renal findings, uncomplicated cholelithiasis, diffuse fatty liver, constipation, diverticulosis, no obstruction; ? Cellulitis anteriorly. He was given miralax for constipation & started on IVFs. Sats are > 90% on room air.     Review of Systems   Constitutional: Positive for chills and fever.   HENT: Negative.    Respiratory: Positive for cough.    Cardiovascular: Positive for leg swelling.   Gastrointestinal: Positive for abdominal distention and constipation.   Genitourinary: Positive for dysuria.      Musculoskeletal: Negative.    Skin: Negative.    Neurological: Negative.    Psychiatric/Behavioral: Negative.         Personal History     Past Medical History:   Diagnosis Date   • Actinic keratosis    • Acute embolism and thrombosis of vein    • Allergic rhinitis    • Arthritis    • Cataract     forming left eye   • Cellulitis    • Cellulitis    • Cervical radiculopathy    • Contact with hypodermic needle    • Cough    • Disequilibrium    • Diverticulosis    • DJD (degenerative joint disease), lumbar    • DVT (deep venous thrombosis) (CMS/HCC)     l leg  dx 2019 and was on blood thinner and ow off wears compression    • Dysphagia    • Encounter for long-term (current) use of NSAIDs    • Encounter for screening colonoscopy    • Erysipelas    • Gastroenteritis, acute    • GERD (gastroesophageal reflux disease)    • Glaucoma    • High risk medication use    • History of colon polyps    • Hospital discharge follow-up    • Hyperglycemia    • Hyperlipidemia    • Hyperplastic polyp of stomach    • Hypertension    • Hypothyroid    • Inflamed skin tag    • Insomnia    • Internal hemorrhoids    • Kidney stone     has had history of passing and still has kidney stone on both sides    • Lumbar strain    • Male urinary stress incontinence     s/p prostatectomy   • Medicare annual wellness visit, subsequent    • Obesity    • KWAME (obstructive sleep apnea)    • Osteoarthritis    • Pes planus    • Presbycusis    • Prostate cancer (CMS/HCC)    • Prostate cancer (CMS/HCC)    • Rectal bleed    • Restless legs syndrome    • Rib pain on left side    • Shoulder pain    • Skin lesion of face    • Sleep apnea     bipap   • Throat clearing    • Tinea corporis    • Tinea cruris    • Venous stasis dermatitis      Past Surgical History:   Procedure Laterality Date   • CATARACT EXTRACTION Right    • COLONOSCOPY  03/08/2017    3/17normal. Recheck 2022. 12/11. Repeat in 5 years    • ENDOSCOPY W/ PEG REMOVAL     • FOOT SURGERY      1998 had big  toe replaced on left foot   • HERNIA REPAIR  2012    umbilical   • JOINT REPLACEMENT Right    • NECK SURGERY  2011    cervical disc   • MI TOTAL KNEE ARTHROPLASTY Left 2016    Procedure: LT TOTAL KNEE ARTHROPLASTY;  Surgeon: Tadeo Basurto MD;  Location: [facility] OR;  Service: Orthopedics   • PROSTATECTOMY  1999. Radical    • THYROID LOBECTOMY     • THYROID SURGERY      partial doesn't know which one was removed   • TONSILLECTOMY     • TOTAL KNEE ARTHROPLASTY Right    • TOTAL SHOULDER ARTHROPLASTY W/ DISTAL CLAVICLE EXCISION Right 2019    Procedure: TOTAL SHOULDER REVERSE ARTHROPLASTY RIGHT REPAIR RIGHT AXILLARY VEIN;  Surgeon: Behzad Kelley MD;  Location: [facility] [place] [facility];  Service: Orthopedics   • WRIST SURGERY Right 12/17/2014    x2  wrist replaced     Family History   Problem Relation Age of Onset   • Cancer Mother         COLON   • Cancer Father         PROSTATE   • Cancer Sister         BREAST CANCER   • Breast cancer Sister    • Gout Sister    • Hypertension Sister    • Heart attack Brother    • Bone cancer Brother    • Heart disease Brother    • Malig Hyperthermia Neg Hx      Social History     Tobacco Use   • Smoking status: Former Smoker     Packs/day: 1.00     Years: 20.00     Pack years: 20.00     Types: Cigarettes     Last attempt to quit: 1984     Years since quittin.2   • Smokeless tobacco: Never Used   Substance Use Topics   • Alcohol use: Yes     Comment: 3-4 MONTHLY   • Drug use: No     No current facility-administered medications on file prior to encounter.      Current Outpatient Medications on File Prior to Encounter   Medication Sig Dispense Refill   • aspirin 81 MG tablet Take 1 tablet by mouth Daily.     • azelastine (ASTELIN) 0.1 % nasal spray 2 sprays into the nostril(s) as directed by provider 2 (Two) Times a Day. Use in each nostril as directed     • azithromycin (ZITHROMAX) 250 MG tablet 2 a day for 2 days  Indications:  Campylobacter diarrhea 4 tablet 0   • BRIMONIDINE TARTRATE OP Apply 1 drop to eye(s) as directed by provider 2 (Two) Times a Day.     • celecoxib (CeleBREX) 200 MG capsule Take 200 mg by mouth Daily.     • dorzolamide-timolol (COSOPT) 22.3-6.8 MG/ML ophthalmic solution Administer 1 drop to both eyes 2 (Two) Times a Day.     • fexofenadine (ALLEGRA) 180 MG tablet Take 180 mg by mouth Daily.     • hydroCHLOROthiazide (HYDRODIURIL) 25 MG tablet Take 25 mg by mouth Daily.     • latanoprost (XALATAN) 0.005 % ophthalmic solution Administer 1 drop to both eyes Every Night.     • levothyroxine (SYNTHROID) 88 MCG tablet Take 1 tablet by mouth Every Morning. 90 tablet 3   • losartan (COZAAR) 100 MG tablet TAKE 1 TABLET DAILY 90 tablet 0   • montelukast (SINGULAIR) 10 MG tablet Take 1 tablet by mouth Every Night. (Patient taking differently: Take 10 mg by mouth Daily.) 90 tablet 3   • pantoprazole (PROTONIX) 40 MG EC tablet Take 1 tablet by mouth 2 (Two) Times a Day. 90 tablet 3   • pramipexole (MIRAPEX) 1.5 MG tablet Take 1 tablet by mouth At Night As Needed (RLS). 30 tablet 5   • rosuvastatin (CRESTOR) 20 MG tablet Take 1 tablet by mouth Every Evening. 90 tablet 1   • acetaminophen (TYLENOL) 650 MG 8 hr tablet Take 1,300 mg by mouth 2 (Two) Times a Day.     • albuterol (PROVENTIL HFA;VENTOLIN HFA) 108 (90 Base) MCG/ACT inhaler Inhale 2 puffs Every 4 (Four) Hours As Needed for Wheezing.     • ALLERGY SERUM INJECTION Inject  under the skin into the appropriate area as directed 1 (One) Time Per Week.     • ferrous sulfate 324 (65 Fe) MG tablet delayed-release EC tablet Take 324 mg by mouth Daily With Breakfast.     • Fluticasone Furoate-Vilanterol (BREO ELLIPTA) 200-25 MCG/INH inhaler Inhale 2 puffs Every Morning.     • furosemide (LASIX) 40 MG tablet One Bid for 3 days then daily 30 tablet 11   • potassium chloride (K-DUR) 10 MEQ CR tablet Take 1 tablet by mouth Daily. 90 tablet 3   • traMADol (ULTRAM) 50 MG tablet Take 1  tablet by mouth Every 6 (Six) Hours As Needed for Moderate Pain . 60 tablet 2     No Known Allergies    Objective    Objective     Vital Signs  Temp:  [98.7 °F (37.1 °C)-100.8 °F (38.2 °C)] 99.9 °F (37.7 °C)  Heart Rate:  [] 104  Resp:  [16-18] 18  BP: (139-154)/(70-90) 139/90  SpO2:  [89 %-96 %] 93 %  on   ;   Device (Oxygen Therapy): room air  Body mass index is 38.78 kg/m².    Physical Exam   Constitutional: He is oriented to person, place, and time. No distress.   HENT:   Head: Normocephalic.   Eyes: EOM are normal.   Neck: No JVD present.   Cardiovascular: Normal rate and regular rhythm.   Pulmonary/Chest: Effort normal and breath sounds normal. No respiratory distress.   Abdominal: He exhibits distension. There is tenderness.   Bowel sounds hypoactive   Musculoskeletal:   2+ BLE   Neurological: He is alert and oriented to person, place, and time.   Skin: Skin is warm and dry.   Lower abdomen w/thick scarring; no erythema   Psychiatric: He has a normal mood and affect.   Vitals reviewed.      Results Review:  I reviewed the patient's new clinical results.  I reviewed the patient's new imaging results and agree with the interpretation.  I reviewed the patient's other test results and agree with the interpretation  I personally viewed and interpreted the patient's EKG/Telemetry data      Lab Results (last 24 hours)     Procedure Component Value Units Date/Time    CBC & Differential [[id number]] Collected:  03/20/20 0005    Specimen:  Blood Updated:  03/20/20 0039    Narrative:       The following orders were created for panel order CBC & Differential.  Procedure                               Abnormality         Status                     ---------                               -----------         ------                     CBC Auto Differential[[id number]]        Abnormal            Final result                 Please view results for these tests on the individual orders.    Comprehensive Metabolic Panel  "[[id number]]  (Abnormal) Collected:  03/20/20 0005    Specimen:  Blood Updated:  03/20/20 0045     Glucose 130 mg/dL      BUN 10 mg/dL      Creatinine 1.13 mg/dL      Sodium 139 mmol/L      Potassium 3.9 mmol/L      Chloride 103 mmol/L      CO2 23.3 mmol/L      Calcium 8.6 mg/dL      Total Protein 6.2 g/dL      Albumin 3.60 g/dL      ALT (SGPT) 20 U/L      AST (SGOT) 15 U/L      Alkaline Phosphatase 117 U/L      Total Bilirubin 0.6 mg/dL      eGFR Non African Amer 64 mL/min/1.73      Globulin 2.6 gm/dL      A/G Ratio 1.4 g/dL      BUN/Creatinine Ratio 8.8     Anion Gap 12.7 mmol/L     Narrative:       GFR Normal >60  Chronic Kidney Disease <60  Kidney Failure <15      Lipase [[id number]]  (Abnormal) Collected:  03/20/20 0005    Specimen:  Blood Updated:  03/20/20 0043     Lipase 70 U/L     Blood Culture - Blood, Arm, Left [[id number]] Collected:  03/20/20 0005    Specimen:  Blood from Arm, Left Updated:  03/20/20 0015    Lactic Acid, Plasma [[id number]]  (Normal) Collected:  03/20/20 0005    Specimen:  Blood Updated:  03/20/20 0034     Lactate 1.2 mmol/L     Procalcitonin [[id number]]  (Normal) Collected:  03/20/20 0005    Specimen:  Blood Updated:  03/20/20 0050     Procalcitonin 0.24 ng/mL     Narrative:       As a Marker for Sepsis (Non-Neonates):   1. <0.5 ng/mL represents a low risk of severe sepsis and/or septic shock.  1. >2 ng/mL represents a high risk of severe sepsis and/or septic shock.    As a Marker for Lower Respiratory Tract Infections that require antibiotic therapy:  PCT on Admission     Antibiotic Therapy             6-12 Hrs later  > 0.5                Strongly Recommended            >0.25 - <0.5         Recommended  0.1 - 0.25           Discouraged                   Remeasure/reassess PCT  <0.1                 Strongly Discouraged          Remeasure/reassess PCT      As 28 day mortality risk marker: \"Change in Procalcitonin Result\" (> 80 % or <=80 %) if Day 0 (or Day 1) and Day 4 values are available. " Refer to http://www.[facility]-pct-calculator.com/   Change in PCT <=80 %   A decrease of PCT levels below or equal to 80 % defines a positive change in PCT test result representing a higher risk for 28-day all-cause mortality of patients diagnosed with severe sepsis or septic shock.  Change in PCT > 80 %   A decrease of PCT levels of more than 80 % defines a negative change in PCT result representing a lower risk for 28-day all-cause mortality of patients diagnosed with severe sepsis or septic shock.                Results may be falsely decreased if patient taking Biotin.     CBC Auto Differential [[id number]]  (Abnormal) Collected:  03/20/20 0005    Specimen:  Blood Updated:  03/20/20 0039     WBC 15.84 10*3/mm3      RBC 3.79 10*6/mm3      Hemoglobin 11.1 g/dL      Hematocrit 33.2 %      MCV 87.6 fL      MCH 29.3 pg      MCHC 33.4 g/dL      RDW 14.7 %      RDW-SD 47.2 fl      MPV 10.0 fL      Platelets 174 10*3/mm3     Manual Differential [[id number]]  (Abnormal) Collected:  03/20/20 0005    Specimen:  Blood Updated:  03/20/20 0120     Neutrophil % 84.8 %      Lymphocyte % 5.1 %      Monocyte % 7.1 %      Metamyelocyte % 3.0 %      Neutrophils Absolute 13.43 10*3/mm3      Lymphocytes Absolute 0.81 10*3/mm3      Monocytes Absolute 1.12 10*3/mm3      RBC Morphology Normal     WBC Morphology Normal     Platelet Morphology Normal    Blood Culture - Blood, Arm, Left [[id number]] Collected:  03/20/20 0021    Specimen:  Blood from Arm, Left Updated:  03/20/20 0034    Urinalysis With Microscopic If Indicated (No Culture) - Urine, Clean Catch [[id number]]  (Abnormal) Collected:  03/20/20 0229    Specimen:  Urine, Clean Catch Updated:  03/20/20 0333     Color, UA Yellow     Appearance, UA Clear     pH, UA 6.0     Specific Gravity, UA 1.009     Glucose, UA Negative     Ketones, UA Negative     Bilirubin, UA Negative     Blood, UA Trace     Protein, UA Negative     Leuk Esterase, UA Small (1+)     Nitrite, UA Negative      Urobilinogen, UA 0.2 E.U./dL    Urinalysis, Microscopic Only - Urine, Clean Catch [[id number]]  (Abnormal) Collected:  03/20/20 0229    Specimen:  Urine, Clean Catch Updated:  03/20/20 0351     RBC, UA 0-2 /HPF      WBC, UA 13-20 /HPF      Bacteria, UA 3+ /HPF      Squamous Epithelial Cells, UA 0-2 /HPF      Hyaline Casts, UA 0-2 /LPF      Methodology Manual Light Microscopy    Basic Metabolic Panel [[id number]]  (Abnormal) Collected:  03/20/20 0500    Specimen:  Blood Updated:  03/20/20 0634     Glucose 125 mg/dL      BUN 9 mg/dL      Creatinine 0.95 mg/dL      Sodium 138 mmol/L      Potassium 3.7 mmol/L      Chloride 103 mmol/L      CO2 25.4 mmol/L      Calcium 8.6 mg/dL      eGFR Non African Amer 78 mL/min/1.73      BUN/Creatinine Ratio 9.5     Anion Gap 9.6 mmol/L     Narrative:       GFR Normal >60  Chronic Kidney Disease <60  Kidney Failure <15      CBC (No Diff) [[id number]]  (Abnormal) Collected:  03/20/20 0500    Specimen:  Blood Updated:  03/20/20 0611     WBC 11.48 10*3/mm3      RBC 3.68 10*6/mm3      Hemoglobin 10.9 g/dL      Hematocrit 32.5 %      MCV 88.3 fL      MCH 29.6 pg      MCHC 33.5 g/dL      RDW 14.5 %      RDW-SD 46.5 fl      MPV 10.1 fL      Platelets 169 10*3/mm3           Imaging Results (Last 24 Hours)     Procedure Component Value Units Date/Time    XR Chest 2 View [[id number]] Collected:  03/20/20 0247     Updated:  03/20/20 0510    Narrative:       PA AND LATERAL CHEST X-RAY     HISTORY: fever     COMPARISON: 12/02/2019.     FINDINGS: PA and lateral views of the chest were obtained. Lungs are  well inflated. There is some mild prominence of the interstitial  markings bilaterally suggestive of mild edema. No active airspace  disease or significant effusion. Normal heart size. There are old  appearing right rib deformities.             Impression:       Mild interstitial prominence likely related to edema, no  active airspace disease     This report was finalized on 3/20/2020 5:07 AM by  Sergio Her M.D.       CT Abdomen Pelvis Without Contrast [[id number]] Collected:  03/20/20 0058     Updated:  03/20/20 0509    Narrative:       NONCONTRAST CT SCANS ABDOMEN AND PELVIS     HISTORY: abdominal pain and distention     COMPARISON: 03/14/2020.     TECHNIQUE:Radiation dose reduction techniques were utilized, including  automated exposure control and exposure modulation based on body size.   Axial images were obtained from the lung bases to the symphysis pubis  without oral or IV contrast per request.      FINDINGS:  Dominant upper pole left renal cyst is stable at 7.3 cm.  There is bilateral renal atrophy. Nonobstructing right renal calculi are  stable. Uncomplicated cholelithiasis. Mild fatty liver. Remaining solid  organs are otherwise unremarkable without benefit of IV contrast. Normal  aorta and appendix. Minimal diverticulosis. Mild constipation. The GI  tract not opacified for assessment but non obstructive in appearance.  Prior ventral hernia repair and prostatectomy. There is skin thickening  of the lower anterior abdominal and pelvic wall which could be related  to cellulitis in the proper clinical setting. Please correlate  clinically.          Impression:       1.  Stable renal findings as discussed, no hydronephrosis.  2. Uncomplicated cholelithiasis.  3. Diffuse fatty liver.  4. Constipation, diverticulosis, no obstruction.  5. Nonspecific skin thickening anteriorly which could be related to  cellulitis in the proper clinical setting.              This report was finalized on 3/20/2020 5:06 AM by Sergio Her M.D.             Results for orders placed during the hospital encounter of 12/18/19   Adult Transthoracic Echo Complete W/ Cont if Necessary Per Protocol    Narrative · Left ventricular systolic function is normal. Calculated EF = 66%.   Estimated EF was in agreement with the calculated EF. Normal left   ventricular cavity size noted. All left ventricular wall segments contract      normally. Left ventricular wall thickness is consistent with concentric   hypertrophy. Mild-moderate basal septal hypertrophy is noted. Left   ventricular diastolic function is normal.  · Normal right ventricular systolic function noted. Right ventricular   cavity is mildly dilated.          No orders to display        Assessment/Plan     Active Hospital Problems    Diagnosis  POA   • **Fever [R50.9]  Yes   • Generalized abdominal pain [R10.84]  Yes   • Constipation [K59.00]  Yes   • Bilateral lower extremity edema [R60.0]  Yes   • Campylobacter diarrhea [A04.5]  Yes   • KWAME (obstructive sleep apnea) [G47.33]  Yes      Resolved Hospital Problems   No resolved problems to display.       Mr. Baez is a 74 y.o. male with a history of  recent hospitalization for campylobacter stool & CONY, GERD, HTN, HLD, hypothyroidism, BLE edema, hx prostate cancer, KWAME on bipap who is admitted for fever, constipation    Recurrent fever/Campylobacter:  -Last dose azithromycin today; increased to 500 mg per PCP  -Tmax 100.8, WBC down to 11  -Blood culture collected; Procal, Lactate unremarkable  -CXR no active disease; CT ? Cellulitis; not evident on exam  -Run culture on UA; check PVR  -ID consult    Constipation:  -CT A/P no obstruction  -Dulcolax tab today; continue Miralax  -Suppository prn    KWAME:  -Home Bipap    BLE edema:  -Continue furosemide  -HCTZ listed as home med but was not continued at discharge; determine need to restart    · I discussed the patient's findings and my recommendations with patient and Dr. Chavez.    VTE Prophylaxis - SCDs.  Code Status - Full code.       ALY Vo  [facility]  03/20/20  10:24

## 2020-03-20 NOTE — ED TRIAGE NOTES
"To ER via [facility]. Pt c/o fever tonight.  Also states he has been weak and has had constipation.  Pt states he has also had \"allergy\" cough and SOA \"but I've been SOA\".  "

## 2020-03-20 NOTE — ED NOTES
"Nursing report ED to floor  Nathan Baez  74 y.o.  male    HPI (triage note):   Chief Complaint   Patient presents with   • Fever   • Constipation       Admitting doctor:   Elias Riley MD    Admitting diagnosis:   The primary encounter diagnosis was Generalized abdominal pain. Diagnoses of Fever, unspecified fever cause, Leukocytosis, unspecified type, and Hypoxemia were also pertinent to this visit.    Code status:   Current Code Status     Date Active Code Status Order ID Comments User Context       3/20/2020 0350 CPR [id number]  Rekha Marcelo APRN ED       Questions for Current Code Status     Question Answer Comment    Code Status CPR     Medical Interventions (Level of Support Prior to Arrest) Full           Allergies:   Patient has no known allergies.    Weight:       03/19/20 2357   Weight: 115 kg (254 lb)       Most recent vitals:   Vitals:    03/19/20 2334 03/19/20 2357 03/20/20 0120 03/20/20 0316   BP:  141/70 154/79    BP Location:  Left arm     Patient Position:  Lying     Pulse: (!) 124  91 96   Resp: 16      Temp: (!) 100.8 °F (38.2 °C)      TempSrc: Tympanic      SpO2: 94%  (!) 89% 96%   Weight:  115 kg (254 lb)     Height: 170.2 cm (67\")          Active LDAs/IV Access:   Lines, Drains & Airways    Active LDAs     Name:   Placement date:   Placement time:   Site:   Days:    Peripheral IV 03/20/20 0004 Right Antecubital   03/20/20    0004    Antecubital   less than 1                Labs (abnormal labs have a star):   Labs Reviewed   COMPREHENSIVE METABOLIC PANEL - Abnormal; Notable for the following components:       Result Value    Glucose 130 (*)     All other components within normal limits    Narrative:     GFR Normal >60  Chronic Kidney Disease <60  Kidney Failure <15     LIPASE - Abnormal; Notable for the following components:    Lipase 70 (*)     All other components within normal limits   URINALYSIS W/ MICROSCOPIC IF INDICATED (NO CULTURE) - Abnormal; Notable for the following " "components:    Blood, UA Trace (*)     Leuk Esterase, UA Small (1+) (*)     All other components within normal limits   CBC WITH AUTO DIFFERENTIAL - Abnormal; Notable for the following components:    WBC 15.84 (*)     RBC 3.79 (*)     Hemoglobin 11.1 (*)     Hematocrit 33.2 (*)     All other components within normal limits   MANUAL DIFFERENTIAL - Abnormal; Notable for the following components:    Neutrophil % 84.8 (*)     Lymphocyte % 5.1 (*)     Metamyelocyte % 3.0 (*)     Neutrophils Absolute 13.43 (*)     Monocytes Absolute 1.12 (*)     All other components within normal limits   URINALYSIS, MICROSCOPIC ONLY - Abnormal; Notable for the following components:    WBC, UA 13-20 (*)     Bacteria, UA 3+ (*)     All other components within normal limits   LACTIC ACID, PLASMA - Normal   PROCALCITONIN - Normal    Narrative:     As a Marker for Sepsis (Non-Neonates):   1. <0.5 ng/mL represents a low risk of severe sepsis and/or septic shock.  1. >2 ng/mL represents a high risk of severe sepsis and/or septic shock.    As a Marker for Lower Respiratory Tract Infections that require antibiotic therapy:  PCT on Admission     Antibiotic Therapy             6-12 Hrs later  > 0.5                Strongly Recommended            >0.25 - <0.5         Recommended  0.1 - 0.25           Discouraged                   Remeasure/reassess PCT  <0.1                 Strongly Discouraged          Remeasure/reassess PCT      As 28 day mortality risk marker: \"Change in Procalcitonin Result\" (> 80 % or <=80 %) if Day 0 (or Day 1) and Day 4 values are available. Refer to http://www.[employer]-pct-calculator.com/   Change in PCT <=80 %   A decrease of PCT levels below or equal to 80 % defines a positive change in PCT test result representing a higher risk for 28-day all-cause mortality of patients diagnosed with severe sepsis or septic shock.  Change in PCT > 80 %   A decrease of PCT levels of more than 80 % defines a negative change in PCT result " representing a lower risk for 28-day all-cause mortality of patients diagnosed with severe sepsis or septic shock.                Results may be falsely decreased if patient taking Biotin.    BLOOD CULTURE   BLOOD CULTURE   BASIC METABOLIC PANEL   CBC (NO DIFF)   CBC AND DIFFERENTIAL    Narrative:     The following orders were created for panel order CBC & Differential.  Procedure                               Abnormality         Status                     ---------                               -----------         ------                     CBC Auto Differential[[id number]]        Abnormal            Final result                 Please view results for these tests on the individual orders.       EKG:   No orders to display       Meds given in ED:   Medications   sodium chloride 0.9 % flush 10 mL (has no administration in time range)   sodium chloride 0.9 % infusion (125 mL/hr Intravenous New Bag 3/20/20 0022)   sodium chloride 0.9 % flush 10 mL (has no administration in time range)   sodium chloride 0.9 % flush 10 mL (has no administration in time range)   acetaminophen (TYLENOL) tablet 650 mg (has no administration in time range)     Or   acetaminophen (TYLENOL) 160 MG/5ML solution 650 mg (has no administration in time range)     Or   acetaminophen (TYLENOL) suppository 650 mg (has no administration in time range)   bisacodyl (DULCOLAX) suppository 10 mg (has no administration in time range)   ondansetron (ZOFRAN) tablet 4 mg (has no administration in time range)     Or   ondansetron (ZOFRAN) injection 4 mg (has no administration in time range)   calcium carbonate (TUMS) chewable tablet 500 mg (200 mg elemental) (has no administration in time range)   polyethylene glycol (MIRALAX) packet 17 g (has no administration in time range)       Imaging results:  Ct Abdomen Pelvis Without Contrast    Result Date: 3/20/2020  1.  Stable renal findings as discussed, no hydronephrosis. 2. Uncomplicated cholelithiasis. 3. Diffuse  fatty liver. 4. Constipation, diverticulosis, no obstruction. 5. Nonspecific skin thickening anteriorly which could be related to cellulitis in the proper clinical setting.           Xr Chest 2 View    Result Date: 3/20/2020  Mild interstitial prominence likely related to edema, no active airspace disease         Ambulatory status:   - up ab dasha    Social issues:   Social History     Socioeconomic History   • Marital status:      Spouse name: Not on file   • Number of children: Not on file   • Years of education: Not on file   • Highest education level: Not on file   Tobacco Use   • Smoking status: Former Smoker     Packs/day: 1.00     Years: 20.00     Pack years: 20.00     Types: Cigarettes     Last attempt to quit: 1984     Years since quittin.2   • Smokeless tobacco: Never Used   Substance and Sexual Activity   • Alcohol use: Yes     Comment: 3-4 MONTHLY   • Drug use: No   • Sexual activity: Margarito Sherman, RN  [phone]

## 2020-03-20 NOTE — CONSULTS
Referring Provider: Dr Chavez    Reason for Consultation: fever    History of present illness:  Nathan Baez is a 74 y.o. who I am asked to evaluate and give opinion for fever. History is obtained from the patient, family, and review of the old medical records which I summarize/synthesize as follows: He had a recent admission from 3/15-3/16 for Campylobacter enteritis. He was treated with azithromycin x 5 days with resolution of diarrhea. He is actually now constipated. He says he came back in last night due to persistent fever at home. He had mild shaking chills. He has some epigastric discomfort. He reports mild dysuria. He denies difficulty emptying his bladder and says his stream is normal. No alleviating factors to the fever. No respiratory symptoms such as cough or SOA. No rashes.     In the ER he was febrile to 100.8 F with . CXR was clear and CT A/P did not show any acute process. Labs were notable for WBC 15 (was 5 at time of recent discharge), UA 13-20 WBCs, lipase 70, and procal 0.24.     ID consulted for further recommendations.    Past Medical History:   Diagnosis Date   • Actinic keratosis    • Acute embolism and thrombosis of vein    • Allergic rhinitis    • Arthritis    • Cataract     forming left eye   • Cellulitis    • Cellulitis    • Cervical radiculopathy    • Contact with hypodermic needle    • Cough    • Disequilibrium    • Diverticulosis    • DJD (degenerative joint disease), lumbar    • DVT (deep venous thrombosis) (CMS/[facility])     l leg  dx 2019 and was on blood thinner and ow off wears compression    • Dysphagia    • Encounter for long-term (current) use of NSAIDs    • Encounter for screening colonoscopy    • Erysipelas    • Gastroenteritis, acute    • GERD (gastroesophageal reflux disease)    • Glaucoma    • High risk medication use    • History of colon polyps    • Hospital discharge follow-up    • Hyperglycemia    • Hyperlipidemia    • Hyperplastic polyp of stomach    •  Hypertension    • Hypothyroid    • Inflamed skin tag    • Insomnia    • Internal hemorrhoids    • Kidney stone     has had history of passing and still has kidney stone on both sides    • Lumbar strain    • Male urinary stress incontinence     s/p prostatectomy   • Medicare annual wellness visit, subsequent    • Obesity    • KWAME (obstructive sleep apnea)    • Osteoarthritis    • Pes planus    • Presbycusis    • Prostate cancer (CMS/HCC)    • Prostate cancer (CMS/HCC)    • Rectal bleed    • Restless legs syndrome    • Rib pain on left side    • Shoulder pain    • Skin lesion of face    • Sleep apnea     bipap   • Throat clearing    • Tinea corporis    • Tinea cruris    • Venous stasis dermatitis        Past Surgical History:   Procedure Laterality Date   • CATARACT EXTRACTION Right    • COLONOSCOPY  03/08/2017    3/17normal. Recheck 2022. 12/11. Repeat in 5 years    • ENDOSCOPY W/ PEG REMOVAL     • FOOT SURGERY      1998 had big toe replaced on left foot   • HERNIA REPAIR  03/07/2012    umbilical   • JOINT REPLACEMENT Right 2014   • NECK SURGERY  04/04/2011    cervical disc   • KS TOTAL KNEE ARTHROPLASTY Left 9/13/2016    Procedure: LT TOTAL KNEE ARTHROPLASTY;  Surgeon: Tadeo Basurto MD;  Location: [facility];  Service: Orthopedics   • PROSTATECTOMY  07/1999 July 1999. Radical    • THYROID LOBECTOMY     • THYROID SURGERY  2011    partial doesn't know which one was removed   • TONSILLECTOMY     • TOTAL KNEE ARTHROPLASTY Right 2014   • TOTAL SHOULDER ARTHROPLASTY W/ DISTAL CLAVICLE EXCISION Right 11/13/2019    Procedure: TOTAL SHOULDER REVERSE ARTHROPLASTY RIGHT REPAIR RIGHT AXILLARY VEIN;  Surgeon: Behzad Kelley MD;  Location: [facility];  Service: Orthopedics   • WRIST SURGERY Right 12/17/2014    x2  wrist replaced       Social History:    Retired from     Family History:  No 1st degree relatives w/ Campylobacter infections    Antibiotic allergies and intolerances:   None    Medications:    Current Facility-Administered Medications:   •  acetaminophen (TYLENOL) tablet 650 mg, 650 mg, Oral, Q4H PRN **OR** acetaminophen (TYLENOL) 160 MG/5ML solution 650 mg, 650 mg, Oral, Q4H PRN **OR** acetaminophen (TYLENOL) suppository 650 mg, 650 mg, Rectal, Q4H PRN, Rekha Marcelo APRN  •  albuterol (PROVENTIL) nebulizer solution 0.083% 2.5 mg/3mL, 2.5 mg, Nebulization, Q4H PRN, Catherine Panchal APRN  •  aspirin EC tablet 81 mg, 81 mg, Oral, Daily, Catherine Panchal APRN, 81 mg at 03/20/20 1217  •  azelastine (ASTELIN) nasal spray 2 spray, 2 spray, Each Nare, BID, Catherine Panchal APRN  •  bisacodyl (DULCOLAX) suppository 10 mg, 10 mg, Rectal, Daily PRN, Rekha Marcelo APRN  •  bisacodyl (DULCOLAX) suppository 10 mg, 10 mg, Rectal, Daily PRN, Catherine Panchal APRN  •  brimonidine (ALPHAGAN P) 0.1 % ophthalmic solution 1 drop, 1 drop, Both Eyes, BID, Catherine Panchal APRN, 1 drop at 03/20/20 1220  •  budesonide-formoterol (SYMBICORT) 160-4.5 MCG/ACT inhaler 2 puff, 2 puff, Inhalation, BID - RT, Catherine Panchal APRN  •  calcium carbonate (TUMS) chewable tablet 500 mg (200 mg elemental), 2 tablet, Oral, BID PRN, Rekha Marcelo APRN  •  cefTRIAXone (ROCEPHIN) IVPB 2 g, 2 g, Intravenous, Q24H, Sebastián Cisse MD  •  celecoxib (CeleBREX) capsule 200 mg, 200 mg, Oral, Daily, Catherine Panchal APRN, 200 mg at 03/20/20 1219  •  dorzolamide-timolol (COSOPT) ophthalmic solution 1 drop, 1 drop, Both Eyes, BID, Catherine Panchal APRN  •  ferrous sulfate tablet 325 mg, 325 mg, Oral, Daily With Breakfast, Catherine Panchal APRN, 325 mg at 03/20/20 1219  •  furosemide (LASIX) tablet 40 mg, 40 mg, Oral, Daily, Catherine Panchal APRN, 40 mg at 03/20/20 1220  •  latanoprost (XALATAN) 0.005 % ophthalmic solution 1 drop, 1 drop, Both Eyes, Nightly, Cathernie Panchal APRN  •  levothyroxine (SYNTHROID, LEVOTHROID) tablet 88  mcg, 88 mcg, Oral, QAM, Catherine Panchal APRN, 88 mcg at 03/20/20 1219  •  losartan (COZAAR) tablet 100 mg, 100 mg, Oral, Daily, Catherine Panchal APRN, 100 mg at 03/20/20 1220  •  montelukast (SINGULAIR) tablet 10 mg, 10 mg, Oral, Nightly, Catherine Panchal APRN  •  ondansetron (ZOFRAN) tablet 4 mg, 4 mg, Oral, Q6H PRN **OR** ondansetron (ZOFRAN) injection 4 mg, 4 mg, Intravenous, Q6H PRN, Rekha Marcelo APRN  •  pantoprazole (PROTONIX) EC tablet 40 mg, 40 mg, Oral, BID, Catherine Panchal APRN, 40 mg at 03/20/20 1124  •  polyethylene glycol (MIRALAX) packet 17 g, 17 g, Oral, Daily, Catherine Panchal APRN, 17 g at 03/20/20 1124  •  potassium chloride (MICRO-K) CR capsule 10 mEq, 10 mEq, Oral, Daily, Catherine Panchal APRN, 10 mEq at 03/20/20 1124  •  pramipexole (MIRAPEX) tablet 1.5 mg, 1.5 mg, Oral, Nightly PRN, Catherine Panchal APRN  •  rosuvastatin (CRESTOR) tablet 20 mg, 20 mg, Oral, Q PM, Catherine Panchal APRN  •  [COMPLETED] Insert peripheral IV, , , Once **AND** sodium chloride 0.9 % flush 10 mL, 10 mL, Intravenous, PRN, Jasen Fletcher MD  •  sodium chloride 0.9 % flush 10 mL, 10 mL, Intravenous, Q12H, Rekha Marcelo APRN, 10 mL at 03/20/20 0824  •  sodium chloride 0.9 % flush 10 mL, 10 mL, Intravenous, PRN, Rekha Marcelo APRN  •  sodium chloride 0.9 % infusion, 100 mL/hr, Intravenous, Continuous, Catherine Panchal APRN, Last Rate: 100 mL/hr at 03/20/20 1118, 100 mL/hr at 03/20/20 1118    Review of Systems  All systems were reviewed and are negative unless otherwise stated above in the HPI    Objective   Vital Signs   Temp:  [98.3 °F (36.8 °C)-100.8 °F (38.2 °C)] 98.3 °F (36.8 °C)  Heart Rate:  [] 97  Resp:  [16-18] 18  BP: (139-159)/(70-92) 159/86    Physical Exam:   General: awake, alert, very nice, NAD  Head: Normocephalic  Eyes: Pupils equal, no scleral icterus  ENT: MMM, OP clear, no thrush.   Cardiovascular: NR, RR, no murmurs,  mild LE edema  Respiratory: Lungs are clear to auscultation bilaterally, no rales or wheezing; normal work of breathing on ambient air  GI: Abdomen is firm and mildly distended  : no Lantigua catheter present; no CVA tenderness  Musculoskeletal: no joint abnormalities, normal musculature  Skin: No rashes  Neurological: Alert and oriented x 3, motor strength 5/5 in all four extremities  Psychiatric: Normal mood and affect       Labs:     Lab Results   Component Value Date    WBC 11.48 (H) 03/20/2020    HGB 10.9 (L) 03/20/2020    HCT 32.5 (L) 03/20/2020    MCV 88.3 03/20/2020     03/20/2020       Lab Results   Component Value Date    GLUCOSE 125 (H) 03/20/2020    BUN 9 03/20/2020    CREATININE 0.95 03/20/2020    EGFRIFNONA 78 03/20/2020    EGFRIFAFRI 76 12/17/2019    BCR 9.5 03/20/2020    CO2 25.4 03/20/2020    CALCIUM 8.6 03/20/2020    PROTENTOTREF 7.0 12/17/2019    ALBUMIN 3.60 03/20/2020    LABIL2 1.7 12/17/2019    AST 15 03/20/2020    ALT 20 03/20/2020     UA 13-20 WBCs, 3+ bacteria, trace blood, small LE, negative nitrite    Microbiology:  3/14 Stool Cx and GI PCR: + Campylobacter  3/20 Bcx: pending  3/20 UCx:  pending    Radiology (personally reviewed images and report):  CXR with edema; no active infection    CT A/P without hydronephrosis; uncomplicated cholelithiasis; fatty liver; constipation    Assessment/Plan   1. Fever in adult  2. Campylobacter enteritis - treated  3. Obesity BMI 38  4. Dysuria    Unclear cause of the fever. His Campylobacter infection was treated appropriately, and he is now constipated. CT A/P does not show any acute process in the abdomen. He does have some dysuria and an abnormal UA. I would like to have him on ceftriaxone 2 g IV q24h while awaiting blood and urine cultures. I added on a urine culture to the specimen already in the lab. I'm going to repeat a lipase in the AM though CT A/P didn't show any changes consistent with pancreatitis. He did report epigastric discomfort.      Clear CXR and no respiratory symptoms so I don't think we need an RPP or SARS-2 testing at this time.     Thank you for this consult. ID will follow.

## 2020-03-20 NOTE — PROGRESS NOTES
Discharge Planning Assessment  [facility]     Patient Name: Nathan Baez  MRN: [id number]  Today's Date: 3/20/2020    Admit Date: 3/19/2020    Discharge Needs Assessment     Row Name 03/20/20 1113       Living Environment    Lives With  spouse    Current Living Arrangements  home/apartment/condo    Potentially Unsafe Housing Conditions  other (see comments) no concerns     Primary Care Provided by  self    Provides Primary Care For  no one    Family Caregiver if Needed  spouse    Quality of Family Relationships  helpful;involved;supportive    Able to Return to Prior Arrangements  yes       Resource/Environmental Concerns    Resource/Environmental Concerns  none    Transportation Concerns  car, none       Transition Planning    Patient/Family Anticipates Transition to  home    Patient/Family Anticipated Services at Transition  none    Transportation Anticipated  family or friend will provide       Discharge Needs Assessment    Readmission Within the Last 30 Days  no previous admission in last 30 days    Concerns to be Addressed  no discharge needs identified;denies needs/concerns at this time    Equipment Currently Used at Home  cane, straight;grab bar    Anticipated Changes Related to Illness  none    Equipment Needed After Discharge  none        Discharge Plan     Row Name 03/20/20 1114       Plan    Plan  Home with spouse     Provided Post Acute Provider List?  N/A    Provided Post Acute Provider Quality & Resource List?  N/A    Plan Comments  CCP met with patient at bedside. CCP role explained and discharge planning discussed. Face sheet verified and IMM noted. Patient's PCP is Dr. Pierce. Patient lives with his spouse. Patient's bedroom and bathroom are on the main level. Patient uses a cane occasionally for mobility. Patient has BiPAP machine at home. Patient denies any home health history but has been to skilled nursing facility but could not recall which facility. [facility] [address]. Patient plans to return home. CCP will follow for I&D recommendations and assist as needed. Pily BHATKA          Destination      Coordination has not been started for this encounter.      Durable Medical Equipment      Coordination has not been started for this encounter.      Dialysis/Infusion      Coordination has not been started for this encounter.      Home Medical Care      Coordination has not been started for this encounter.      Therapy      Coordination has not been started for this encounter.      Community Resources      Coordination has not been started for this encounter.          Demographic Summary     Row Name 03/20/20 1112       General Information    Admission Type  inpatient    Arrived From  emergency department    Required Notices Provided  Important Message from Medicare    Referral Source  admission list    Reason for Consult  discharge planning    Preferred Language  English     Used During This Interaction  no        Functional Status     Row Name 03/20/20 1113       Functional Status    Usual Activity Tolerance  good    Current Activity Tolerance  good       Functional Status, IADL    Medications  independent    Meal Preparation  independent    Housekeeping  independent    Laundry  independent    Shopping  independent       Mental Status    General Appearance WDL  WDL       Mental Status Summary    Recent Changes in Mental Status/Cognitive Functioning  no changes        Psychosocial    No documentation.       Abuse/Neglect    No documentation.       Legal    No documentation.       Substance Abuse    No documentation.       Patient Forms    No documentation.           YONY Johnson

## 2020-03-20 NOTE — ED PROVIDER NOTES
EMERGENCY DEPARTMENT ENCOUNTER    CHIEF COMPLAINT  Chief Complaint: fever  History given by: patient  History limited by: poor historian  Room Number: S612/1  PMD: Damien Pierce MD      HPI:  Pt is a 74 y.o. male who presents complaining of constant fever (100F) that started gradually today. Pt also c/o constipation. Patient used an enema today and had a successful BM. Last BM before enema was four days ago. Patient is a poor historian, limiting his history. Patient has been eating and drinking normally. He reports some abdominal 'bloating' but denies pain. He reports a chronic dry cough. He has an occipital headache today. No chest pain, vomiting or shortness of air. He was admitted for days ago for dehydration after a spell of vomiting and diarrhea. No other complaints at this time.    Duration:  One day  Onset: gradual  Timing: constant  Location: general  Radiation: n/a  Quality: 100F fever  Intensity/Severity: mild  Progression: unchanged  Associated Symptoms: headache, constipation, abdominal bloating, chronic cough  Aggravating Factors: none  Alleviating Factors: none  Previous Episodes: none  Treatment before arrival: none    PAST MEDICAL HISTORY  Active Ambulatory Problems     Diagnosis Date Noted   • OA (osteoarthritis) of knee 09/13/2016   • Hypertension    • Abdominal wall cellulitis 06/02/2019   • Hypothyroidism (acquired) 06/03/2019   • Gastroesophageal reflux disease without esophagitis 07/11/2019   • Mixed hyperlipidemia 07/11/2019   • Primary insomnia 07/11/2019   • DDD (degenerative disc disease), lumbar 07/11/2019   • KWAME (obstructive sleep apnea) 07/11/2019   • Hyperglycemia 07/11/2019   • Allergic 07/11/2019   • Venous insufficiency 07/11/2019   • Prostate cancer (CMS/HCC) 07/11/2019   • Venous stasis dermatitis    • Tinea cruris    • Tinea corporis    • Throat clearing    • Sleep apnea    • Skin lesion of face    • Rib pain on left side    • Rectal bleed    • Presbycusis    • Pes planus     • Osteoarthritis    • Obesity    • Medicare annual wellness visit, subsequent    • Lumbar strain    • Internal hemorrhoids    • Insomnia    • Inflamed skin tag    • Hypothyroid    • Hyperplastic polyp of stomach    • Hospital discharge follow-up    • History of colon polyps    • High risk medication use    • Glaucoma    • Gastroenteritis, acute    • Erysipelas    • Encounter for screening colonoscopy    • Encounter for long-term (current) use of NSAIDs    • Dysphagia    • DVT (deep venous thrombosis) (CMS/[facility])    • DJD (degenerative joint disease), lumbar    • Diverticulosis    • Disequilibrium    • Cough    • Contact with hypodermic needle    • Cervical radiculopathy    • Cellulitis    • Arthritis    • Allergic rhinitis    • Acute glaucoma    • Acute embolism and thrombosis of vein    • Actinic keratosis    • Status post reverse total shoulder replacement, 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   • Localized edema 11/26/2019   • Anemia 11/26/2019   • Peripheral polyneuropathy 11/26/2019   • Campylobacter diarrhea 03/14/2020   • Hyponatremia 03/15/2020     Resolved Ambulatory Problems     Diagnosis Date Noted   • Acute DVT (deep venous thrombosis) (CMS/[facility]) 06/03/2019   • Restless leg syndrome 07/11/2019   • Restless legs syndrome    • Restless leg    • Hyperlipidemia      Past Medical History:   Diagnosis Date   • Cataract    • GERD (gastroesophageal reflux disease)    • Kidney stone    • Male urinary stress incontinence    • Shoulder pain        PAST SURGICAL HISTORY  Past Surgical History:   Procedure Laterality Date   • CATARACT EXTRACTION Right    • COLONOSCOPY  03/08/2017    3/17normal. Recheck 2022. 12/11. Repeat in 5 years    • ENDOSCOPY W/ PEG REMOVAL     • FOOT SURGERY      1998 had big toe replaced on left foot   • HERNIA REPAIR  03/07/2012    umbilical   • JOINT REPLACEMENT Right 2014   • NECK SURGERY  04/04/2011    cervical disc   • WI TOTAL KNEE ARTHROPLASTY Left 9/13/2016    Procedure: LT TOTAL KNEE ARTHROPLASTY;   Surgeon: Tadeo Basurto MD;  Location: [facility] OR;  Service: Orthopedics   • PROSTATECTOMY  1999. Radical    • THYROID LOBECTOMY     • THYROID SURGERY      partial doesn't know which one was removed   • TONSILLECTOMY     • TOTAL KNEE ARTHROPLASTY Right    • TOTAL SHOULDER ARTHROPLASTY W/ DISTAL CLAVICLE EXCISION Right 2019    Procedure: TOTAL SHOULDER REVERSE ARTHROPLASTY RIGHT REPAIR RIGHT AXILLARY VEIN;  Surgeon: Behzad Kelley MD;  [facility];  Service: Orthopedics   • WRIST SURGERY Right 12/17/2014    x2  wrist replaced       FAMILY HISTORY  Family History   Problem Relation Age of Onset   • Cancer Mother         COLON   • Cancer Father         PROSTATE   • Cancer Sister         BREAST CANCER   • Breast cancer Sister    • Gout Sister    • Hypertension Sister    • Heart attack Brother    • Bone cancer Brother    • Heart disease Brother    • Malig Hyperthermia Neg Hx        SOCIAL HISTORY  Social History     Socioeconomic History   • Marital status:      Spouse name: Not on file   • Number of children: Not on file   • Years of education: Not on file   • Highest education level: Not on file   Tobacco Use   • Smoking status: Former Smoker     Packs/day: 1.00     Years: 20.00     Pack years: 20.00     Types: Cigarettes     Last attempt to quit: 1984     Years since quittin.2   • Smokeless tobacco: Never Used   Substance and Sexual Activity   • Alcohol use: Yes     Comment: 3-4 MONTHLY   • Drug use: No   • Sexual activity: Defer       ALLERGIES  Patient has no known allergies.    REVIEW OF SYSTEMS  Review of Systems   Reason unable to perform ROS: poor historian.   Constitutional: Positive for chills. Negative for activity change, appetite change and fever.   HENT: Negative for congestion and sore throat.    Eyes: Negative.    Respiratory: Positive for cough (chronic). Negative for shortness of breath.    Cardiovascular: Negative for chest pain and leg  swelling.   Gastrointestinal: Positive for constipation. Negative for abdominal pain, diarrhea and vomiting.        + abdominal bloating   Endocrine: Negative.    Genitourinary: Negative for decreased urine volume and dysuria.   Musculoskeletal: Negative for neck pain.   Skin: Negative for rash and wound.   Allergic/Immunologic: Negative.    Neurological: Positive for headaches (occipital). Negative for weakness and numbness.   Hematological: Negative.    Psychiatric/Behavioral: Negative.    All other systems reviewed and are negative.      PHYSICAL EXAM  ED Triage Vitals [03/19/20 2334]   Temp Heart Rate Resp BP SpO2   (!) 100.8 °F (38.2 °C) (!) 124 16 -- 94 %       Physical Exam   Constitutional: He is oriented to person, place, and time. No distress.   NAD   HENT:   Head: Normocephalic and atraumatic.   Eyes: Pupils are equal, round, and reactive to light. EOM are normal.   Neck: Normal range of motion. Neck supple.   Cardiovascular: Regular rhythm and normal heart sounds. Tachycardia present.   Regular tachycardia.   Pulmonary/Chest: Effort normal and breath sounds normal. No respiratory distress.   CTAB. No respiratory distress.   Abdominal: Soft. He exhibits distension. He exhibits no mass. There is tenderness. There is no rebound and no guarding.   Diffuse abdominal tenderness without rebound or guarding. Distended. Hyperactive bowel sounds. No mass.   Musculoskeletal: Normal range of motion. He exhibits no edema.   Neurological: He is alert and oriented to person, place, and time. He has normal sensation and normal strength.   Skin: Skin is warm and dry.   Psychiatric: Mood and affect normal.   Nursing note and vitals reviewed.      LAB RESULTS  Lab Results (last 24 hours)     Procedure Component Value Units Date/Time    CBC & Differential [[id number]] Collected:  03/20/20 0005    Specimen:  Blood Updated:  03/20/20 0039    Narrative:       The following orders were created for panel order CBC &  Differential.  Procedure                               Abnormality         Status                     ---------                               -----------         ------                     CBC Auto Differential[[id number]]        Abnormal            Final result                 Please view results for these tests on the individual orders.    Comprehensive Metabolic Panel [[id number]]  (Abnormal) Collected:  03/20/20 0005    Specimen:  Blood Updated:  03/20/20 0045     Glucose 130 mg/dL      BUN 10 mg/dL      Creatinine 1.13 mg/dL      Sodium 139 mmol/L      Potassium 3.9 mmol/L      Chloride 103 mmol/L      CO2 23.3 mmol/L      Calcium 8.6 mg/dL      Total Protein 6.2 g/dL      Albumin 3.60 g/dL      ALT (SGPT) 20 U/L      AST (SGOT) 15 U/L      Alkaline Phosphatase 117 U/L      Total Bilirubin 0.6 mg/dL      eGFR Non African Amer 64 mL/min/1.73      Globulin 2.6 gm/dL      A/G Ratio 1.4 g/dL      BUN/Creatinine Ratio 8.8     Anion Gap 12.7 mmol/L     Narrative:       GFR Normal >60  Chronic Kidney Disease <60  Kidney Failure <15      Lipase [[id number]]  (Abnormal) Collected:  03/20/20 0005    Specimen:  Blood Updated:  03/20/20 0043     Lipase 70 U/L     Blood Culture - Blood, Arm, Left [[id number]] Collected:  03/20/20 0005    Specimen:  Blood from Arm, Left Updated:  03/20/20 0015    Lactic Acid, Plasma [[id number]]  (Normal) Collected:  03/20/20 0005    Specimen:  Blood Updated:  03/20/20 0034     Lactate 1.2 mmol/L     Procalcitonin [[id number]]  (Normal) Collected:  03/20/20 0005    Specimen:  Blood Updated:  03/20/20 0050     Procalcitonin 0.24 ng/mL     Narrative:       As a Marker for Sepsis (Non-Neonates):   1. <0.5 ng/mL represents a low risk of severe sepsis and/or septic shock.  1. >2 ng/mL represents a high risk of severe sepsis and/or septic shock.    As a Marker for Lower Respiratory Tract Infections that require antibiotic therapy:  PCT on Admission     Antibiotic Therapy             6-12 Hrs later  >  "0.5                Strongly Recommended            >0.25 - <0.5         Recommended  0.1 - 0.25           Discouraged                   Remeasure/reassess PCT  <0.1                 Strongly Discouraged          Remeasure/reassess PCT      As 28 day mortality risk marker: \"Change in Procalcitonin Result\" (> 80 % or <=80 %) if Day 0 (or Day 1) and Day 4 values are available. Refer to [email or web address]   Change in PCT <=80 %   A decrease of PCT levels below or equal to 80 % defines a positive change in PCT test result representing a higher risk for 28-day all-cause mortality of patients diagnosed with severe sepsis or septic shock.  Change in PCT > 80 %   A decrease of PCT levels of more than 80 % defines a negative change in PCT result representing a lower risk for 28-day all-cause mortality of patients diagnosed with severe sepsis or septic shock.                Results may be falsely decreased if patient taking Biotin.     CBC Auto Differential [[id number]]  (Abnormal) Collected:  03/20/20 0005    Specimen:  Blood Updated:  03/20/20 0039     WBC 15.84 10*3/mm3      RBC 3.79 10*6/mm3      Hemoglobin 11.1 g/dL      Hematocrit 33.2 %      MCV 87.6 fL      MCH 29.3 pg      MCHC 33.4 g/dL      RDW 14.7 %      RDW-SD 47.2 fl      MPV 10.0 fL      Platelets 174 10*3/mm3     Manual Differential [[id number]]  (Abnormal) Collected:  03/20/20 0005    Specimen:  Blood Updated:  03/20/20 0120     Neutrophil % 84.8 %      Lymphocyte % 5.1 %      Monocyte % 7.1 %      Metamyelocyte % 3.0 %      Neutrophils Absolute 13.43 10*3/mm3      Lymphocytes Absolute 0.81 10*3/mm3      Monocytes Absolute 1.12 10*3/mm3      RBC Morphology Normal     WBC Morphology Normal     Platelet Morphology Normal    Blood Culture - Blood, Arm, Left [[id number]] Collected:  03/20/20 0021    Specimen:  Blood from Arm, Left Updated:  03/20/20 0034    Urinalysis With Microscopic If Indicated (No Culture) - Urine, Clean Catch [[id number]]  " (Abnormal) Collected:  03/20/20 0229    Specimen:  Urine, Clean Catch Updated:  03/20/20 0333     Color, UA Yellow     Appearance, UA Clear     pH, UA 6.0     Specific Gravity, UA 1.009     Glucose, UA Negative     Ketones, UA Negative     Bilirubin, UA Negative     Blood, UA Trace     Protein, UA Negative     Leuk Esterase, UA Small (1+)     Nitrite, UA Negative     Urobilinogen, UA 0.2 E.U./dL    Urinalysis, Microscopic Only - Urine, Clean Catch [[id number]]  (Abnormal) Collected:  03/20/20 0229    Specimen:  Urine, Clean Catch Updated:  03/20/20 0351     RBC, UA 0-2 /HPF      WBC, UA 13-20 /HPF      Bacteria, UA 3+ /HPF      Squamous Epithelial Cells, UA 0-2 /HPF      Hyaline Casts, UA 0-2 /LPF      Methodology Manual Light Microscopy    Basic Metabolic Panel [[id number]]  (Abnormal) Collected:  03/20/20 0500    Specimen:  Blood Updated:  03/20/20 0634     Glucose 125 mg/dL      BUN 9 mg/dL      Creatinine 0.95 mg/dL      Sodium 138 mmol/L      Potassium 3.7 mmol/L      Chloride 103 mmol/L      CO2 25.4 mmol/L      Calcium 8.6 mg/dL      eGFR Non African Amer 78 mL/min/1.73      BUN/Creatinine Ratio 9.5     Anion Gap 9.6 mmol/L     Narrative:       GFR Normal >60  Chronic Kidney Disease <60  Kidney Failure <15      CBC (No Diff) [[id number]]  (Abnormal) Collected:  03/20/20 0500    Specimen:  Blood Updated:  03/20/20 0611     WBC 11.48 10*3/mm3      RBC 3.68 10*6/mm3      Hemoglobin 10.9 g/dL      Hematocrit 32.5 %      MCV 88.3 fL      MCH 29.6 pg      MCHC 33.5 g/dL      RDW 14.5 %      RDW-SD 46.5 fl      MPV 10.1 fL      Platelets 169 10*3/mm3           I ordered the above labs and reviewed the results.      RADIOLOGY  XR Chest 2 View   Final Result   Mild interstitial prominence likely related to edema, no   active airspace disease       This report was finalized on 3/20/2020 5:07 AM by Sergio Her M.D.          CT Abdomen Pelvis Without Contrast   Final Result   1.  Stable renal findings as discussed, no  hydronephrosis.   2. Uncomplicated cholelithiasis.   3. Diffuse fatty liver.   4. Constipation, diverticulosis, no obstruction.   5. Nonspecific skin thickening anteriorly which could be related to   cellulitis in the proper clinical setting.                   This report was finalized on 3/20/2020 5:06 AM by Sergio Her M.D.               I ordered the above noted radiological studies. Interpreted by radiologist. Reviewed by me in PACS.       PROGRESS AND CONSULTS     2346. Labs including cultures and CT abdomen/pelvis ordered.    0314. Call out to [facility].     0340. Spoke with ZACHARIAH Stein, who agrees to admit the patient to Dr Elias Riley.       MEDICAL DECISION MAKING  Results were reviewed/discussed with the patient and they were also made aware of online access. Pt also made aware that some labs, such as cultures, will not be resulted during ER visit and follow up with PMD is necessary.     MDM  Number of Diagnoses or Management Options     Amount and/or Complexity of Data Reviewed  Clinical lab tests: ordered and reviewed (WBC 15.84)  Tests in the radiology section of CPT®: ordered and reviewed (Nothing acute on CT abdomen/pelvis)  Decide to obtain previous medical records or to obtain history from someone other than the patient: yes (Epic)  Review and summarize past medical records: yes (Admitted for vomiting, diarrhea and dehydration 3/14/2020)  Discuss the patient with other providers: yes (ZACHARIAH Stein)    Patient Progress  Patient progress: stable         DIAGNOSIS  Final diagnoses:   Generalized abdominal pain   Fever, unspecified fever cause   Leukocytosis, unspecified type   Hypoxemia       DISPOSITION  ADMISSION    Discussed treatment plan and reason for admission with pt/family and admitting physician.  Pt/family voiced understanding of the plan for admission for further testing/treatment as needed.     Latest Documented Vital Signs:  As of 08:38  BP- 139/90 HR- 104 Temp- 99.9 °F (37.7  °C) (Oral) O2 sat- 93%    --  Documentation assistance provided by torie Barksdale for Dr aJsen Fletcher MD.  Information recorded by the scribe was done at my direction and has been verified and validated by me.     Laura Barksdale  03/20/20 0346       Jasen Fletcher MD  03/20/20 3922

## 2020-03-21 LAB — PSA SERPL-MCNC: <0.014 NG/ML (ref 0–4)

## 2020-03-21 PROCEDURE — 25010000003 CEFTRIAXONE PER 250 MG: Performed by: INTERNAL MEDICINE

## 2020-03-21 PROCEDURE — 94799 UNLISTED PULMONARY SVC/PX: CPT

## 2020-03-21 PROCEDURE — 99232 SBSQ HOSP IP/OBS MODERATE 35: CPT | Performed by: INTERNAL MEDICINE

## 2020-03-21 RX ORDER — CEFTRIAXONE SODIUM 2 G/50ML
2 INJECTION, SOLUTION INTRAVENOUS EVERY 24 HOURS
Status: COMPLETED | OUTPATIENT
Start: 2020-03-21 | End: 2020-03-22

## 2020-03-21 RX ADMIN — BUDESONIDE AND FORMOTEROL FUMARATE DIHYDRATE 2 PUFF: 160; 4.5 AEROSOL RESPIRATORY (INHALATION) at 20:26

## 2020-03-21 RX ADMIN — BRIMONIDINE TARTRATE 1 DROP: 1 SOLUTION/ DROPS OPHTHALMIC at 08:54

## 2020-03-21 RX ADMIN — SODIUM CHLORIDE 100 ML/HR: 9 INJECTION, SOLUTION INTRAVENOUS at 17:59

## 2020-03-21 RX ADMIN — BUDESONIDE AND FORMOTEROL FUMARATE DIHYDRATE 2 PUFF: 160; 4.5 AEROSOL RESPIRATORY (INHALATION) at 07:42

## 2020-03-21 RX ADMIN — POLYETHYLENE GLYCOL 3350 17 G: 17 POWDER, FOR SOLUTION ORAL at 08:42

## 2020-03-21 RX ADMIN — MONTELUKAST SODIUM 10 MG: 10 TABLET, FILM COATED ORAL at 21:18

## 2020-03-21 RX ADMIN — ROSUVASTATIN CALCIUM 20 MG: 20 TABLET, FILM COATED ORAL at 17:59

## 2020-03-21 RX ADMIN — CEFTRIAXONE SODIUM 2 G: 2 INJECTION, SOLUTION INTRAVENOUS at 12:18

## 2020-03-21 RX ADMIN — DORZOLAMIDE HYDROCHLORIDE AND TIMOLOL MALEATE 1 DROP: 20; 5 SOLUTION/ DROPS OPHTHALMIC at 21:19

## 2020-03-21 RX ADMIN — AZELASTINE HYDROCHLORIDE 2 SPRAY: 137 SPRAY, METERED NASAL at 08:54

## 2020-03-21 RX ADMIN — BRIMONIDINE TARTRATE 1 DROP: 1 SOLUTION/ DROPS OPHTHALMIC at 21:19

## 2020-03-21 RX ADMIN — LOSARTAN POTASSIUM 100 MG: 100 TABLET, FILM COATED ORAL at 08:41

## 2020-03-21 RX ADMIN — SODIUM CHLORIDE, PRESERVATIVE FREE 10 ML: 5 INJECTION INTRAVENOUS at 21:19

## 2020-03-21 RX ADMIN — ASPIRIN 81 MG: 81 TABLET, COATED ORAL at 08:41

## 2020-03-21 RX ADMIN — DORZOLAMIDE HYDROCHLORIDE AND TIMOLOL MALEATE 1 DROP: 20; 5 SOLUTION/ DROPS OPHTHALMIC at 08:53

## 2020-03-21 RX ADMIN — CELECOXIB 200 MG: 200 CAPSULE ORAL at 08:41

## 2020-03-21 RX ADMIN — PANTOPRAZOLE SODIUM 40 MG: 40 TABLET, DELAYED RELEASE ORAL at 08:41

## 2020-03-21 RX ADMIN — PANTOPRAZOLE SODIUM 40 MG: 40 TABLET, DELAYED RELEASE ORAL at 21:18

## 2020-03-21 RX ADMIN — FUROSEMIDE 40 MG: 40 TABLET ORAL at 08:41

## 2020-03-21 RX ADMIN — AZELASTINE HYDROCHLORIDE 2 SPRAY: 137 SPRAY, METERED NASAL at 21:19

## 2020-03-21 RX ADMIN — FERROUS SULFATE TAB 325 MG (65 MG ELEMENTAL FE) 325 MG: 325 (65 FE) TAB at 08:42

## 2020-03-21 RX ADMIN — POTASSIUM CHLORIDE 10 MEQ: 10 CAPSULE, COATED, EXTENDED RELEASE ORAL at 08:41

## 2020-03-21 RX ADMIN — LEVOTHYROXINE SODIUM 88 MCG: 88 TABLET ORAL at 06:22

## 2020-03-21 RX ADMIN — LATANOPROST 1 DROP: 50 SOLUTION OPHTHALMIC at 21:20

## 2020-03-21 NOTE — PROGRESS NOTES
LOS: 1 day     Chief Complaint:  Follow-up fever    History from pt and his wife.    Interval History:  Fever resolved. Mild dysuria. No weak stream. No diarrhea. Tolerating ceftriaxone.    ROS: no CP or SOA    Vital Signs  Temp:  [97.7 °F (36.5 °C)-98.9 °F (37.2 °C)] 98.9 °F (37.2 °C)  Heart Rate:  [83-97] 85  Resp:  [16-20] 16  BP: (143-159)/(79-92) 143/79    Physical Exam:  General: awake, alert, very nice, NAD  Eyes:  no scleral icterus  ENT: MMM, OP clear, no thrush.   Cardiovascular: NR  Respiratory:  normal work of breathing on ambient air  GI: Abdomen is firm and mildly distended  : no Lantigua catheter present; no CVA tenderness  Skin: No rashes  Neurological: Alert and oriented x 3, motor strength 5/5 in all four extremities  Psychiatric: Normal mood and affect     Antibiotics:  •  cefTRIAXone (ROCEPHIN) IVPB 2 g, 2 g, Intravenous, Q24H    LABS:  Micro reviewed today  Lab Results   Component Value Date    WBC 11.48 (H) 03/20/2020    HGB 10.9 (L) 03/20/2020    HCT 32.5 (L) 03/20/2020    MCV 88.3 03/20/2020     03/20/2020     Lab Results   Component Value Date    GLUCOSE 125 (H) 03/20/2020    BUN 9 03/20/2020    CREATININE 0.95 03/20/2020    EGFRIFNONA 78 03/20/2020    EGFRIFAFRI 76 12/17/2019    BCR 9.5 03/20/2020    CO2 25.4 03/20/2020    CALCIUM 8.6 03/20/2020    PROTENTOTREF 7.0 12/17/2019    ALBUMIN 3.60 03/20/2020    LABIL2 1.7 12/17/2019    AST 15 03/20/2020    ALT 20 03/20/2020     UA 13-20 WBCs, 3+ bacteria, trace blood, small LE, negative nitrite     Microbiology:  3/14 Stool Cx and GI PCR: + Campylobacter  3/20 Bcx: NGTD  3/20 UCx:  >100k GNR    Radiology (personally reviewed report):   None    Assessment/Plan   1. Fever in adult  2. Acute cystitis  3. History of recent Campylobacter enteritis - treated  4. Obesity BMI 38     Fever resolved. UCx with 100k GNR. Given dysuria, I think this should be treated. Continue ceftriaxone 2 g IV q24h while awaiting UCx ID and sensitivity as well as  blood cultures. I anticipate I can change him to an oral antibiotic starting tomorrow to complete a 7-day total course (stop date 3/26/20). Check PSA though I feel prostatitis is less likely.    Follow-up AM CBC, BMP, and lipase.    Thank you for this consult. ID will follow.

## 2020-03-21 NOTE — PROGRESS NOTES
[facility]     LOS: 1 day     Subjective:    CC:Fever and Constipation    DIET:  Diet Order   Procedures   • Diet Regular     No chest pain, no shortness of air, no nausea vomiting or diarrhea; suffering more with constipation  Feeling better    Objective:    Vital Signs:  Temp:  [97.7 °F (36.5 °C)-98.9 °F (37.2 °C)] 98 °F (36.7 °C)  Heart Rate:  [73-94] 73  Resp:  [16-20] 16  BP: (143-155)/(79-87) 155/79    SpO2:  [93 %-100 %] 100 %  on   ;   Device (Oxygen Therapy): room air  Body mass index is 38.78 kg/m².    Physical Exam   Constitutional: He appears well-developed and well-nourished.   HENT:   Head: Normocephalic and atraumatic.   Cardiovascular: Exam reveals no friction rub.   No murmur heard.  Pulmonary/Chest: Effort normal and breath sounds normal.   Abdominal: Soft. Bowel sounds are normal. He exhibits no distension. There is no tenderness.   Neurological: He is alert.   Skin: Skin is warm and dry.       Results Review:    Glucose   Date Value Ref Range Status   03/20/2020 125 (H) 65 - 99 mg/dL Final   03/20/2020 130 (H) 65 - 99 mg/dL Final     Results from last 7 days   Lab Units 03/20/20  0500   WBC 10*3/mm3 11.48*   HEMOGLOBIN g/dL 10.9*   HEMATOCRIT % 32.5*   PLATELETS 10*3/mm3 169     Results from last 7 days   Lab Units 03/20/20  0500 03/20/20  0005   SODIUM mmol/L 138 139   POTASSIUM mmol/L 3.7 3.9   CHLORIDE mmol/L 103 103   CO2 mmol/L 25.4 23.3   BUN mg/dL 9 10   CREATININE mg/dL 0.95 1.13   CALCIUM mg/dL 8.6 8.6   BILIRUBIN mg/dL  --  0.6   ALK PHOS U/L  --  117   ALT (SGPT) U/L  --  20   AST (SGOT) U/L  --  15   GLUCOSE mg/dL 125* 130*                 Cultures:  Blood Culture   Date Value Ref Range Status   03/20/2020 No growth at 24 hours  Preliminary   03/20/2020 No growth at 24 hours  Preliminary     Urine Culture   Date Value Ref Range Status   03/20/2020 >100,000 CFU/mL Gram Negative Bacilli (A)  Preliminary       I have reviewed daily medications and changes in  CPOE    Scheduled meds    aspirin 81 mg Oral Daily   azelastine 2 spray Each Nare BID   brimonidine 1 drop Both Eyes BID   budesonide-formoterol 2 puff Inhalation BID - RT   cefTRIAXone 2 g Intravenous Q24H   celecoxib 200 mg Oral Daily   dorzolamide-timolol 1 drop Both Eyes BID   ferrous sulfate 325 mg Oral Daily With Breakfast   furosemide 40 mg Oral Daily   latanoprost 1 drop Both Eyes Nightly   levothyroxine 88 mcg Oral QAM   losartan 100 mg Oral Daily   montelukast 10 mg Oral Nightly   pantoprazole 40 mg Oral BID   polyethylene glycol 17 g Oral Daily   potassium chloride 10 mEq Oral Daily   rosuvastatin 20 mg Oral Q PM   sodium chloride 10 mL Intravenous Q12H         sodium chloride 100 mL/hr Last Rate: 100 mL/hr (03/21/20 2524)     PRN meds  •  acetaminophen **OR** acetaminophen **OR** acetaminophen  •  albuterol  •  bisacodyl  •  bisacodyl  •  calcium carbonate  •  ondansetron **OR** ondansetron  •  pramipexole  •  [COMPLETED] Insert peripheral IV **AND** sodium chloride  •  sodium chloride        Fever    KWAME (obstructive sleep apnea)    Campylobacter diarrhea    Generalized abdominal pain    Constipation    Bilateral lower extremity edema        Assessment/Plan:  Recurrent fever/Campylobacter:  -Last dose azithromycin yesterday  -T-max elevation was 100.8, afebrile since that time  -Blood culture negative so far and Procal, Lactate unremarkable  -CXR no active disease  -Urine slightly abnormal and urine culture is positive, empiric treatment for urinary tract infection as the patient does have some mild dysuria  -Appreciate ID input     Constipation:  -CT A/P no obstruction  -Dulcolax tab today; continue Miralax  -Suppository prn     KWAME:  -Home Bipap     BLE edema:  -Continue furosemide    Mild leukocytosis- better    Mild anemia- stable    DVT PPX: scd      Torrey Chavez MD  03/21/20  18:54

## 2020-03-22 VITALS
TEMPERATURE: 98 F | OXYGEN SATURATION: 95 % | HEART RATE: 81 BPM | BODY MASS INDEX: 38.86 KG/M2 | WEIGHT: 247.6 LBS | SYSTOLIC BLOOD PRESSURE: 152 MMHG | DIASTOLIC BLOOD PRESSURE: 80 MMHG | RESPIRATION RATE: 20 BRPM | HEIGHT: 67 IN

## 2020-03-22 LAB — BACTERIA SPEC AEROBE CULT: ABNORMAL

## 2020-03-22 PROCEDURE — 94799 UNLISTED PULMONARY SVC/PX: CPT

## 2020-03-22 PROCEDURE — 99232 SBSQ HOSP IP/OBS MODERATE 35: CPT | Performed by: INTERNAL MEDICINE

## 2020-03-22 PROCEDURE — 25010000003 CEFTRIAXONE PER 250 MG: Performed by: INTERNAL MEDICINE

## 2020-03-22 RX ORDER — SULFAMETHOXAZOLE AND TRIMETHOPRIM 800; 160 MG/1; MG/1
1 TABLET ORAL EVERY 12 HOURS SCHEDULED
Qty: 8 TABLET | Refills: 0 | Status: SHIPPED | OUTPATIENT
Start: 2020-03-23 | End: 2020-03-27

## 2020-03-22 RX ORDER — SULFAMETHOXAZOLE AND TRIMETHOPRIM 800; 160 MG/1; MG/1
1 TABLET ORAL EVERY 12 HOURS SCHEDULED
Status: DISCONTINUED | OUTPATIENT
Start: 2020-03-23 | End: 2020-03-22 | Stop reason: HOSPADM

## 2020-03-22 RX ADMIN — AZELASTINE HYDROCHLORIDE 2 SPRAY: 137 SPRAY, METERED NASAL at 09:39

## 2020-03-22 RX ADMIN — CEFTRIAXONE SODIUM 2 G: 2 INJECTION, SOLUTION INTRAVENOUS at 11:35

## 2020-03-22 RX ADMIN — SODIUM CHLORIDE 100 ML/HR: 9 INJECTION, SOLUTION INTRAVENOUS at 04:37

## 2020-03-22 RX ADMIN — BUDESONIDE AND FORMOTEROL FUMARATE DIHYDRATE 2 PUFF: 160; 4.5 AEROSOL RESPIRATORY (INHALATION) at 11:15

## 2020-03-22 RX ADMIN — FUROSEMIDE 40 MG: 40 TABLET ORAL at 08:26

## 2020-03-22 RX ADMIN — POLYETHYLENE GLYCOL 3350 17 G: 17 POWDER, FOR SOLUTION ORAL at 08:25

## 2020-03-22 RX ADMIN — LEVOTHYROXINE SODIUM 88 MCG: 88 TABLET ORAL at 06:18

## 2020-03-22 RX ADMIN — LOSARTAN POTASSIUM 100 MG: 100 TABLET, FILM COATED ORAL at 08:26

## 2020-03-22 RX ADMIN — ASPIRIN 81 MG: 81 TABLET, COATED ORAL at 08:26

## 2020-03-22 RX ADMIN — PANTOPRAZOLE SODIUM 40 MG: 40 TABLET, DELAYED RELEASE ORAL at 08:26

## 2020-03-22 RX ADMIN — POTASSIUM CHLORIDE 10 MEQ: 10 CAPSULE, COATED, EXTENDED RELEASE ORAL at 08:26

## 2020-03-22 RX ADMIN — CELECOXIB 200 MG: 200 CAPSULE ORAL at 08:26

## 2020-03-22 RX ADMIN — FERROUS SULFATE TAB 325 MG (65 MG ELEMENTAL FE) 325 MG: 325 (65 FE) TAB at 08:26

## 2020-03-22 RX ADMIN — BRIMONIDINE TARTRATE 1 DROP: 1 SOLUTION/ DROPS OPHTHALMIC at 09:40

## 2020-03-22 RX ADMIN — DORZOLAMIDE HYDROCHLORIDE AND TIMOLOL MALEATE 1 DROP: 20; 5 SOLUTION/ DROPS OPHTHALMIC at 09:40

## 2020-03-22 NOTE — DISCHARGE SUMMARY
"Date of Admission: 3/19/2020  Date of Discharge:  3/22/2020  Primary Care Physician: Damien Pierce MD     Discharge Diagnosis:  Active Hospital Problems    Diagnosis  POA   • **Fever [R50.9]  Yes   • Generalized abdominal pain [R10.84]  Yes   • Constipation [K59.00]  Yes   • Bilateral lower extremity edema [R60.0]  Yes   • Campylobacter diarrhea [A04.5]  Yes   • KWAME (obstructive sleep apnea) [G47.33]  Yes      Resolved Hospital Problems   No resolved problems to display.       Presenting Problem/History of Present Illness:  Hypoxemia [R09.02]  Generalized abdominal pain [R10.84]  Fever, unspecified fever cause [R50.9]  Leukocytosis, unspecified type [D72.829]     Hospital Course:  The patient is a 74 y.o. male with a history of recent hospitalization for campylobacter stool & CONY, GERD, HTN, HLD, hypothyroidism, BLE edema, hx prostate cancer, and KWAME on bipap who presented with fever and constipation. Please see admission H&P from 3/20/20 for further details.  Given his recent campylobacter infection infectious diseases evaluated the patient  He was found to have a UTI which grew klebsiella.  ID recommended discharge on bactrim to complete a 7d course.  The patient's constipation was relieved with stool softeners and he should use these over the counter as needed. The patient is feeling much better and he is now afebrile, therefore he will be discharged home on oral antibiotics.     Exam Today:  Blood pressure 152/80, pulse 81, temperature 98 °F (36.7 °C), temperature source Oral, resp. rate 20, height 170.2 cm (67\"), weight 112 kg (247 lb 9.6 oz), SpO2 95 %.  Constitutional: He appears well-developed and well-nourished.   Head: Normocephalic and atraumatic.   Cardiovascular: RRR   No murmur heard.  Pulmonary/Chest: Effort normal and breath sounds normal.   Abdominal: Soft. Bowel sounds are normal. He exhibits no distension. There is no tenderness.   MSK: 1-2+ BLE edema, no tenderness  Neurological: He is " alert. No gross deficits  Skin: Skin is warm and dry.     Procedures Performed:  NONCONTRAST CT SCANS ABDOMEN AND PELVIS-3/19/20   HISTORY: abdominal pain and distention   COMPARISON: 03/14/2020     TECHNIQUE:Radiation dose reduction techniques were utilized, including  automated exposure control and exposure modulation based on body size.   Axial images were obtained from the lung bases to the symphysis pubis  without oral or IV contrast per request.      FINDINGS:  Dominant upper pole left renal cyst is stable at 7.3 cm.  There is bilateral renal atrophy. Nonobstructing right renal calculi are  stable. Uncomplicated cholelithiasis. Mild fatty liver. Remaining solid  organs are otherwise unremarkable without benefit of IV contrast. Normal  aorta and appendix. Minimal diverticulosis. Mild constipation. The GI  tract not opacified for assessment but non obstructive in appearance.  Prior ventral hernia repair and prostatectomy. There is skin thickening  of the lower anterior abdominal and pelvic wall which could be related  to cellulitis in the proper clinical setting. Please correlate  clinically.      IMPRESSION:  1.  Stable renal findings as discussed, no hydronephrosis.  2. Uncomplicated cholelithiasis.  3. Diffuse fatty liver.  4. Constipation, diverticulosis, no obstruction.  5. Nonspecific skin thickening anteriorly which could be related to  cellulitis in the proper clinical setting.      Consults:   Consults     Date and Time Order Name Status Description    3/20/2020 0957 Inpatient Infectious Diseases Consult Completed     3/20/2020 0314 [facility] (on-call MD unless specified) Details Completed     3/14/2020 2102 [facility] (on-call MD unless specified) Details Completed            Discharge Disposition:  Home or Self Care    Discharge Medications:     Discharge Medications      New Medications      Instructions Start Date   sulfamethoxazole-trimethoprim 800-160 MG per tablet  Commonly known as:  BACTRIM DS,SEPTRA DS    160 mg, Oral, Every 12 Hours Scheduled   Start Date:  March 23, 2020        Changes to Medications      Instructions Start Date   montelukast 10 MG tablet  Commonly known as:  CHANTAL  What changed:  when to take this   10 mg, Oral, Nightly         Continue These Medications      Instructions Start Date   acetaminophen 650 MG 8 hr tablet  Commonly known as:  TYLENOL   1,300 mg, Oral, 2 Times Daily      albuterol sulfate  (90 Base) MCG/ACT inhaler  Commonly known as:  PROVENTIL HFA;VENTOLIN HFA;PROAIR HFA   2 puffs, Inhalation, Every 4 Hours PRN      ALLERGY SERUM INJECTION   Subcutaneous, Weekly      aspirin 81 MG tablet   81 mg, Oral, Daily      azelastine 0.1 % nasal spray  Commonly known as:  ASTELIN   2 sprays, Nasal, 2 Times Daily, Use in each nostril as directed       Breo Ellipta 200-25 MCG/INH inhaler  Generic drug:  Fluticasone Furoate-Vilanterol   2 puffs, Inhalation, Every Morning      BRIMONIDINE TARTRATE OP   1 drop, Ophthalmic, 2 Times Daily      celecoxib 200 MG capsule  Commonly known as:  CeleBREX   200 mg, Oral, Daily      dorzolamide-timolol 22.3-6.8 MG/ML ophthalmic solution  Commonly known as:  COSOPT   1 drop, Both Eyes, 2 Times Daily      ferrous sulfate 324 (65 Fe) MG tablet delayed-release EC tablet   324 mg, Oral, Daily With Breakfast      fexofenadine 180 MG tablet  Commonly known as:  ALLEGRA   180 mg, Oral, Daily      furosemide 40 MG tablet  Commonly known as:  LASIX   One Bid for 3 days then daily      hydroCHLOROthiazide 25 MG tablet  Commonly known as:  HYDRODIURIL   25 mg, Oral, Daily      latanoprost 0.005 % ophthalmic solution  Commonly known as:  XALATAN   1 drop, Both Eyes, Nightly      levothyroxine 88 MCG tablet  Commonly known as:  Synthroid   88 mcg, Oral, Every Morning      losartan 100 MG tablet  Commonly known as:  COZAAR   TAKE 1 TABLET DAILY      pantoprazole 40 MG EC tablet  Commonly known as:  PROTONIX   40 mg, Oral, 2 Times Daily      potassium chloride 10  MEQ CR tablet  Commonly known as:  K-DUR   10 mEq, Oral, Daily      pramipexole 1.5 MG tablet  Commonly known as:  MIRAPEX   1.5 mg, Oral, Nightly PRN      rosuvastatin 20 MG tablet  Commonly known as:  CRESTOR   20 mg, Oral, Every Evening      traMADol 50 MG tablet  Commonly known as:  ULTRAM   50 mg, Oral, Every 6 Hours PRN         Stop These Medications    azithromycin 250 MG tablet  Commonly known as:  ZITHROMAX            Discharge Diet:   Diet Instructions     Diet: Regular; Thin      Discharge Diet:  Regular    Fluid Consistency:  Thin          Activity at Discharge:   Activity Instructions     Activity as Tolerated            Follow-up Appointments:  Future Appointments   Date Time Provider Department Center   5/18/2020  9:15 AM Damien Pierce MD [id number]     Additional Instructions for the Follow-ups that You Need to Schedule     Discharge Follow-up with PCP   As directed       Currently Documented PCP:    Damien Pierce MD    PCP Phone Number:    [phone]     Follow Up Details:  as scheduled               Test Results Pending at Discharge:   Order Current Status    Blood Culture - Blood, Arm, Left Preliminary result    Blood Culture - Blood, Arm, Left Preliminary result           Sergio Claire MD  03/22/20  14:41    Time Spent on Discharge Activities: Greater than 30 minutes.

## 2020-03-22 NOTE — PROGRESS NOTES
LOS: 2 days     Chief Complaint:  Follow-up fever    Interval History:  No fever. Dysuria improved. No watery or bloody stool. Tolerating ceftriaxone w/o rash or diarrhea.     ROS: no CP or SOA    Vital Signs  Temp:  [97.3 °F (36.3 °C)-98 °F (36.7 °C)] 98 °F (36.7 °C)  Heart Rate:  [] 91  Resp:  [16-18] 18  BP: (145-160)/(75-88) 152/80    Physical Exam:  General: awake, alert, eating breakfast  Eyes:  no scleral icterus  Cardiovascular: NR  Respiratory:  normal work of breathing on ambient air  GI: Abdomen is firm and mildly distended  : no Lantigua catheter present; no CVA tenderness  Skin: No rashes  Neurological: Alert and oriented x 3, motor strength 5/5 in all four extremities  Psychiatric: Normal mood and affect     Antibiotics:  •  cefTRIAXone (ROCEPHIN) IVPB 2 g, 2 g, Intravenous, Q24H    LABS:  Micro reviewed today  Lab Results   Component Value Date    WBC 11.48 (H) 03/20/2020    HGB 10.9 (L) 03/20/2020    HCT 32.5 (L) 03/20/2020    MCV 88.3 03/20/2020     03/20/2020     Lab Results   Component Value Date    GLUCOSE 125 (H) 03/20/2020    BUN 9 03/20/2020    CREATININE 0.95 03/20/2020    EGFRIFNONA 78 03/20/2020    EGFRIFAFRI 76 12/17/2019    BCR 9.5 03/20/2020    CO2 25.4 03/20/2020    CALCIUM 8.6 03/20/2020    PROTENTOTREF 7.0 12/17/2019    ALBUMIN 3.60 03/20/2020    LABIL2 1.7 12/17/2019    AST 15 03/20/2020    ALT 20 03/20/2020     UA 13-20 WBCs, 3+ bacteria, trace blood, small LE, negative nitrite  PSA <0.014     Microbiology:  3/14 Stool Cx and GI PCR: + Campylobacter  3/20 Bcx: NGTD  3/20 UCx:  >100k Kleb pneumo (sens - Unasyn, cefazolin, ceftriaxone, cefepime, ceftaz, Zosyn, doxy, Bactrim; R - ampicllin; I - NTF)    Radiology (personally reviewed report):   None    Assessment/Plan   1. Fever in adult  2. Acute cystitis  3. History of recent Campylobacter enteritis - treated  4. Obesity BMI 38  5. History of prostate CA s/p prostatectomy     Fever resolved. UCx with 100k Kleb pneumo.  Given dysuria, I think this should be treated. Continue ceftriaxone 2 g IV q24h through today's dose which is due around noon. Starting tomorrow he will start Bactrim DS BID x 4 days.     No opposition to discharge form ID standpoint.    Thank you for allowing me to be involved in the care of this patient. Infectious diseases will sign off at this time with antibiotics plan in place but please call me at [phone] if any further questions.

## 2020-03-23 ENCOUNTER — TELEPHONE (OUTPATIENT)
Dept: FAMILY MEDICINE CLINIC | Facility: CLINIC | Age: 75
End: 2020-03-23

## 2020-03-23 ENCOUNTER — READMISSION MANAGEMENT (OUTPATIENT)
Dept: CALL CENTER | Facility: HOSPITAL | Age: 75
End: 2020-03-23

## 2020-03-23 ENCOUNTER — TRANSITIONAL CARE MANAGEMENT TELEPHONE ENCOUNTER (OUTPATIENT)
Dept: FAMILY MEDICINE CLINIC | Facility: CLINIC | Age: 75
End: 2020-03-23

## 2020-03-23 LAB — BACTERIA SPEC AEROBE CULT: ABNORMAL

## 2020-03-23 NOTE — OUTREACH NOTE
Spoke with pt wife, Pt was not home. He is doing well, feeling much better. Appetite is good and pt is staying hydrated. No fever, chills, bowel issues. No SOB. Confirmed receipt and understanding of d/c orders and medications. Pt is already sched for hosp fwp on 04/09/2020, this date is apparently per Dr Pierce although this is out of TCM time frame, possibly due to COVID19 concerns.

## 2020-03-23 NOTE — OUTREACH NOTE
Prep Survey      Responses   [facility] facility patient discharged from?  [place]   Is LACE score < 7 ?  No   Eligibility  Readm Mgmt   Discharge diagnosis  Fever, gen. abdominal pain, constipation, bilat. LE edema, Campylobacter diarrhea, KWAME   Does the patient have one of the following disease processes/diagnoses(primary or secondary)?  Other   Does the patient have Home health ordered?  No   Is there a DME ordered?  No   Comments regarding appointments  See AVS   Prep survey completed?  Yes          Mindy Joseph RN

## 2020-03-23 NOTE — PROGRESS NOTES
Case Management Discharge Note      Final Note: Patient was DC'd home 3/22.    Provided Post Acute Provider List?: N/A  Provided Post Acute Provider Quality & Resource List?: N/A         Transportation Services  Private: Car    Final Discharge Disposition Code: 01 - home or self-care

## 2020-03-23 NOTE — TELEPHONE ENCOUNTER
OK to come in.  Keep 4/9 appointment and stay on the meds as directed from discharge.  Tell him I saw his hospitalization and discharge paperwork and reviewed them.

## 2020-03-23 NOTE — TELEPHONE ENCOUNTER
Pt ask that you return his/her call. Pt was in hosp for fever, uti, food poisoning. Pt does have an appt 4/9 but would like to speak with you.

## 2020-03-25 LAB
BACTERIA SPEC AEROBE CULT: NORMAL
BACTERIA SPEC AEROBE CULT: NORMAL

## 2020-03-30 ENCOUNTER — READMISSION MANAGEMENT (OUTPATIENT)
Dept: CALL CENTER | Facility: HOSPITAL | Age: 75
End: 2020-03-30

## 2020-03-30 NOTE — OUTREACH NOTE
Medical Week 1 Survey      Responses   [facility] patient discharged from?  [place]   Does the patient have one of the following disease processes/diagnoses(primary or secondary)?  Other   Is there a successful TCM telephone encounter documented?  Yes          Hyun Gillespie RN

## 2020-04-01 ENCOUNTER — TELEPHONE (OUTPATIENT)
Dept: PHYSICAL THERAPY | Facility: CLINIC | Age: 75
End: 2020-04-01

## 2020-04-01 DIAGNOSIS — R26.2 DIFFICULTY WALKING: ICD-10-CM

## 2020-04-01 DIAGNOSIS — G89.29 CHRONIC BILATERAL LOW BACK PAIN WITHOUT SCIATICA: Primary | ICD-10-CM

## 2020-04-01 DIAGNOSIS — M54.50 CHRONIC BILATERAL LOW BACK PAIN WITHOUT SCIATICA: Primary | ICD-10-CM

## 2020-04-01 NOTE — TELEPHONE ENCOUNTER
Left message RE: current status of LBP.   Left office number, advised pt to call if any questions or concerns regarding progression of HEP exercise or continued status of lower back/function around the house with ADLs.

## 2020-04-07 ENCOUNTER — READMISSION MANAGEMENT (OUTPATIENT)
Dept: CALL CENTER | Facility: HOSPITAL | Age: 75
End: 2020-04-07

## 2020-04-07 NOTE — OUTREACH NOTE
Medical Week 2 Survey      Responses   [facility] [place] patient discharged from?  [place]   COVID-19 Test Status  Not tested   Does the patient have one of the following disease processes/diagnoses(primary or secondary)?  Other   Week 2 attempt successful?  Yes   Call start time  1745   Call end time  1747   Meds reviewed with patient/caregiver?  Yes   Is the patient having any side effects they believe may be caused by any medication additions or changes?  No   Does the patient have all medications ordered at discharge?  Yes   Is the patient taking all medications as directed (includes completed medication regime)?  Yes   Does the patient have a primary care provider?   Yes   Does the patient have an appointment with their PCP within 7 days of discharge?  Greater than 7 days   Comments regarding PCP  PCP appt 4/8/20   What is preventing the patient from scheduling follow up appointments within 7 days of discharge?  Earlier appointment not available   Nursing Interventions  Verified appointment date/time/provider   Has the patient kept scheduled appointments due by today?  N/A   Psychosocial issues?  No   Did the patient receive a copy of their discharge instructions?  Yes   Nursing interventions  Reviewed instructions with patient   What is the patient's perception of their health status since discharge?  Improving   Is the patient/caregiver able to teach back signs and symptoms related to disease process for when to call PCP?  Yes   Is the patient/caregiver able to teach back signs and symptoms related to disease process for when to call 911?  Yes   Is the patient/caregiver able to teach back the hierarchy of who to call/visit for symptoms/problems? PCP, Specialist, Home health nurse, Urgent Care, ED, 911  Yes   Week 2 Call Completed?  Yes          Ignacia Haney RN

## 2020-04-09 ENCOUNTER — TELEMEDICINE (OUTPATIENT)
Dept: FAMILY MEDICINE CLINIC | Facility: CLINIC | Age: 75
End: 2020-04-09

## 2020-04-09 VITALS
BODY MASS INDEX: 38.69 KG/M2 | HEART RATE: 94 BPM | SYSTOLIC BLOOD PRESSURE: 156 MMHG | RESPIRATION RATE: 12 BRPM | DIASTOLIC BLOOD PRESSURE: 98 MMHG | WEIGHT: 247 LBS

## 2020-04-09 DIAGNOSIS — I10 ESSENTIAL HYPERTENSION: Primary | ICD-10-CM

## 2020-04-09 DIAGNOSIS — D62 ANEMIA DUE TO ACUTE BLOOD LOSS: ICD-10-CM

## 2020-04-09 PROBLEM — N10 ACUTE PYELONEPHRITIS: Status: ACTIVE | Noted: 2020-04-09

## 2020-04-09 PROCEDURE — 99214 OFFICE O/P EST MOD 30 MIN: CPT | Performed by: FAMILY MEDICINE

## 2020-04-09 RX ORDER — AMLODIPINE BESYLATE 2.5 MG/1
2.5 TABLET ORAL DAILY
Qty: 90 TABLET | Refills: 3 | Status: SHIPPED | OUTPATIENT
Start: 2020-04-09 | End: 2021-03-20

## 2020-04-09 RX ORDER — FUROSEMIDE 40 MG/1
TABLET ORAL
Qty: 90 TABLET | Refills: 3 | Status: SHIPPED | OUTPATIENT
Start: 2020-04-09 | End: 2021-01-29 | Stop reason: SDUPTHER

## 2020-04-09 RX ORDER — POTASSIUM CHLORIDE 750 MG/1
10 TABLET, FILM COATED, EXTENDED RELEASE ORAL DAILY
Qty: 90 TABLET | Refills: 3
Start: 2020-04-09 | End: 2020-06-12

## 2020-04-09 NOTE — PROGRESS NOTES
F/U UTI.      Subjective   Nathan Baez is a 74 y.o. male.     History of Present Illness   Video visit by [facility] due to covid risk.    F/U Klebsiella UTI.  Tx with 7 days of bactrim.  D/c from hospital from 3/22/20.  No symptoms now.    F/u HTN.  BP running 140-150/90-100s.   F/U anemia.  Iron level  53 2 month ago,  Ferritin 108 2 months ago.   The following portions of the patient's history were reviewed and updated as appropriate: allergies, current medications, past family history, past medical history, past social history, past surgical history and problem list.    Review of Systems   Constitutional: Negative for appetite change and fatigue.   HENT: Negative for nosebleeds and sore throat.    Eyes: Negative for blurred vision and visual disturbance.   Respiratory: Negative for shortness of breath and wheezing.    Cardiovascular: Negative for chest pain and leg swelling.   Gastrointestinal: Negative for abdominal distention and abdominal pain.   Endocrine: Negative for cold intolerance and polyuria.   Genitourinary: Negative for dysuria and hematuria.   Musculoskeletal: Negative for arthralgias and myalgias.   Skin: Negative for color change and rash.   Neurological: Negative for weakness and confusion.   Psychiatric/Behavioral: Negative for agitation and depressed mood.       Patient Active Problem List   Diagnosis   • OA (osteoarthritis) of knee   • Hypertension   • Abdominal wall cellulitis   • Hypothyroidism (acquired)   • Gastroesophageal reflux disease without esophagitis   • Mixed hyperlipidemia   • Primary insomnia   • DDD (degenerative disc disease), lumbar   • KWAME (obstructive sleep apnea)   • Hyperglycemia   • Allergic   • Venous insufficiency   • Prostate cancer (CMS/HCC)   • Venous stasis dermatitis   • Tinea cruris   • Tinea corporis   • Throat clearing   • Sleep apnea   • Skin lesion of face   • Rib pain on left side   • Rectal bleed   • Presbycusis   • Pes planus   • Osteoarthritis   •  Obesity   • Medicare annual wellness visit, subsequent   • Lumbar strain   • Internal hemorrhoids   • Insomnia   • Inflamed skin tag   • Hypothyroid   • Hyperplastic polyp of stomach   • Hospital discharge follow-up   • History of colon polyps   • High risk medication use   • Glaucoma   • Gastroenteritis, acute   • Erysipelas   • Encounter for screening colonoscopy   • Encounter for long-term (current) use of NSAIDs   • Dysphagia   • DVT (deep venous thrombosis) (CMS/HCC)   • DJD (degenerative joint disease), lumbar   • Diverticulosis   • Cough   • Contact with hypodermic needle   • Cervical radiculopathy   • Cellulitis   • Arthritis   • Allergic rhinitis   • Acute glaucoma   • Acute embolism and thrombosis of vein   • Actinic keratosis   • Status post reverse total shoulder replacement, right   • Localized edema   • Anemia   • Peripheral polyneuropathy   • Campylobacter diarrhea   • Hyponatremia   • Generalized abdominal pain   • Fever   • Constipation   • Bilateral lower extremity edema   • Acute pyelonephritis       No Known Allergies      Current Outpatient Medications:   •  acetaminophen (TYLENOL) 650 MG 8 hr tablet, Take 1,300 mg by mouth 2 (Two) Times a Day., Disp: , Rfl:   •  albuterol (PROVENTIL HFA;VENTOLIN HFA) 108 (90 Base) MCG/ACT inhaler, Inhale 2 puffs Every 4 (Four) Hours As Needed for Wheezing., Disp: , Rfl:   •  ALLERGY SERUM INJECTION, Inject  under the skin into the appropriate area as directed 1 (One) Time Per Week., Disp: , Rfl:   •  amLODIPine (NORVASC) 2.5 MG tablet, Take 1 tablet by mouth Daily., Disp: 90 tablet, Rfl: 3  •  aspirin 81 MG tablet, Take 1 tablet by mouth Daily., Disp: , Rfl:   •  azelastine (ASTELIN) 0.1 % nasal spray, 2 sprays into the nostril(s) as directed by provider 2 (Two) Times a Day. Use in each nostril as directed, Disp: , Rfl:   •  BRIMONIDINE TARTRATE OP, Apply 1 drop to eye(s) as directed by provider 2 (Two) Times a Day., Disp: , Rfl:   •  celecoxib (CeleBREX) 200  MG capsule, Take 200 mg by mouth Daily., Disp: , Rfl:   •  dorzolamide-timolol (COSOPT) 22.3-6.8 MG/ML ophthalmic solution, Administer 1 drop to both eyes 2 (Two) Times a Day., Disp: , Rfl:   •  fexofenadine (ALLEGRA) 180 MG tablet, Take 180 mg by mouth Daily., Disp: , Rfl:   •  Fluticasone Furoate-Vilanterol (BREO ELLIPTA) 200-25 MCG/INH inhaler, Inhale 2 puffs Every Morning., Disp: , Rfl:   •  furosemide (LASIX) 40 MG tablet, One a day, Disp: 90 tablet, Rfl: 3  •  hydroCHLOROthiazide (HYDRODIURIL) 25 MG tablet, Take 25 mg by mouth Daily., Disp: , Rfl:   •  latanoprost (XALATAN) 0.005 % ophthalmic solution, Administer 1 drop to both eyes Every Night., Disp: , Rfl:   •  levothyroxine (SYNTHROID) 88 MCG tablet, Take 1 tablet by mouth Every Morning., Disp: 90 tablet, Rfl: 3  •  losartan (COZAAR) 100 MG tablet, TAKE 1 TABLET DAILY, Disp: 90 tablet, Rfl: 0  •  montelukast (SINGULAIR) 10 MG tablet, Take 1 tablet by mouth Every Night. (Patient taking differently: Take 10 mg by mouth Daily.), Disp: 90 tablet, Rfl: 3  •  pantoprazole (PROTONIX) 40 MG EC tablet, Take 1 tablet by mouth 2 (Two) Times a Day., Disp: 90 tablet, Rfl: 3  •  potassium chloride (K-DUR) 10 MEQ CR tablet, Take 1 tablet by mouth Daily., Disp: 90 tablet, Rfl: 3  •  pramipexole (MIRAPEX) 1.5 MG tablet, Take 1 tablet by mouth At Night As Needed (RLS)., Disp: 30 tablet, Rfl: 5  •  rosuvastatin (CRESTOR) 20 MG tablet, Take 1 tablet by mouth Every Evening., Disp: 90 tablet, Rfl: 1  •  traMADol (ULTRAM) 50 MG tablet, Take 1 tablet by mouth Every 6 (Six) Hours As Needed for Moderate Pain ., Disp: 60 tablet, Rfl: 2    Past Medical History:   Diagnosis Date   • Actinic keratosis    • Acute embolism and thrombosis of vein    • Allergic rhinitis    • Arthritis    • Cataract     forming left eye   • Cellulitis    • Cellulitis    • Cervical radiculopathy    • Contact with hypodermic needle    • Cough    • Disequilibrium    • Diverticulosis    • DJD (degenerative  joint disease), lumbar    • DVT (deep venous thrombosis) (CMS/HCC)     l leg  dx 2019 and was on blood thinner and ow off wears compression    • Dysphagia    • Encounter for long-term (current) use of NSAIDs    • Encounter for screening colonoscopy    • Erysipelas    • Gastroenteritis, acute    • GERD (gastroesophageal reflux disease)    • Glaucoma    • High risk medication use    • History of colon polyps    • Hospital discharge follow-up    • Hyperglycemia    • Hyperlipidemia    • Hyperplastic polyp of stomach    • Hypertension    • Hypothyroid    • Inflamed skin tag    • Insomnia    • Internal hemorrhoids    • Kidney stone     has had history of passing and still has kidney stone on both sides    • Lumbar strain    • Male urinary stress incontinence     s/p prostatectomy   • Medicare annual wellness visit, subsequent    • Obesity    • KWAME (obstructive sleep apnea)    • Osteoarthritis    • Pes planus    • Presbycusis    • Prostate cancer (CMS/HCC)    • Prostate cancer (CMS/HCC)    • Rectal bleed    • Restless legs syndrome    • Rib pain on left side    • Shoulder pain    • Skin lesion of face    • Sleep apnea     bipap   • Throat clearing    • Tinea corporis    • Tinea cruris    • Venous stasis dermatitis        Past Surgical History:   Procedure Laterality Date   • CATARACT EXTRACTION Right    • COLONOSCOPY  03/08/2017    3/17normal. Recheck 2022. 12/11. Repeat in 5 years    • ENDOSCOPY W/ PEG REMOVAL     • FOOT SURGERY      1998 had big toe replaced on left foot   • HERNIA REPAIR  03/07/2012    umbilical   • JOINT REPLACEMENT Right 2014   • NECK SURGERY  04/04/2011    cervical disc   • AR TOTAL KNEE ARTHROPLASTY Left 9/13/2016    Procedure: LT TOTAL KNEE ARTHROPLASTY;  Surgeon: Tadeo Basurto MD;  Location: [facility];  Service: Orthopedics   • PROSTATECTOMY  07/1999 July 1999. Radical    • THYROID LOBECTOMY     • THYROID SURGERY  2011    partial doesn't know which one was removed   • TONSILLECTOMY      • TOTAL KNEE ARTHROPLASTY Right    • TOTAL SHOULDER ARTHROPLASTY W/ DISTAL CLAVICLE EXCISION Right 2019    Procedure: TOTAL SHOULDER REVERSE ARTHROPLASTY RIGHT REPAIR RIGHT AXILLARY VEIN;  Surgeon: Behzad Kelley MD;  Location: [facility] OR [facility];  Service: Orthopedics   • WRIST SURGERY Right 12/17/2014    x2  wrist replaced       Family History   Problem Relation Age of Onset   • Cancer Mother         COLON   • Cancer Father         PROSTATE   • Cancer Sister         BREAST CANCER   • Breast cancer Sister    • Gout Sister    • Hypertension Sister    • Heart attack Brother    • Bone cancer Brother    • Heart disease Brother    • Malig Hyperthermia Neg Hx        Social History     Tobacco Use   • Smoking status: Former Smoker     Packs/day: 1.00     Years: 20.00     Pack years: 20.00     Types: Cigarettes     Last attempt to quit: 1984     Years since quittin.2   • Smokeless tobacco: Never Used   Substance Use Topics   • Alcohol use: Yes     Comment: 3-4 MONTHLY            Objective     Vitals:    20 1043   BP: 156/98   Pulse: 94   Resp: 12     Body mass index is 38.69 kg/m².    Physical Exam   Constitutional: He is oriented to person, place, and time. He appears well-developed and well-nourished.   HENT:   Head: Normocephalic and atraumatic.   Pulmonary/Chest: Breath sounds normal.   Neurological: He is alert and oriented to person, place, and time.   Psychiatric: He has a normal mood and affect. His behavior is normal.       Lab Results   Component Value Date    GLUCOSE 125 (H) 2020    BUN 9 2020    CREATININE 0.95 2020    EGFRIFNONA 78 2020    EGFRIFAFRI 76 2019    BCR 9.5 2020    K 3.7 2020    CO2 25.4 2020    CALCIUM 8.6 2020    PROTENTOTREF 7.0 2019    ALBUMIN 3.60 2020    LABIL2 1.7 2019    AST 15 2020    ALT 20 2020       WBC   Date Value Ref Range Status   2020 11.48 (H) 3.40 - 10.80 10*3/mm3  Final   02/13/2020 4.97 3.40 - 10.80 10*3/mm3 Final     RBC   Date Value Ref Range Status   03/20/2020 3.68 (L) 4.14 - 5.80 10*6/mm3 Final   02/13/2020 4.60 4.14 - 5.80 10*6/mm3 Final     Hemoglobin   Date Value Ref Range Status   03/20/2020 10.9 (L) 13.0 - 17.7 g/dL Final     Hematocrit   Date Value Ref Range Status   03/20/2020 32.5 (L) 37.5 - 51.0 % Final     MCV   Date Value Ref Range Status   03/20/2020 88.3 79.0 - 97.0 fL Final     MCH   Date Value Ref Range Status   03/20/2020 29.6 26.6 - 33.0 pg Final     MCHC   Date Value Ref Range Status   03/20/2020 33.5 31.5 - 35.7 g/dL Final     RDW   Date Value Ref Range Status   03/20/2020 14.5 12.3 - 15.4 % Final     RDW-SD   Date Value Ref Range Status   03/20/2020 46.5 37.0 - 54.0 fl Final     MPV   Date Value Ref Range Status   03/20/2020 10.1 6.0 - 12.0 fL Final     Platelets   Date Value Ref Range Status   03/20/2020 169 140 - 450 10*3/mm3 Final     Neutrophil Rel %   Date Value Ref Range Status   02/13/2020 59.0 42.7 - 76.0 % Final     Neutrophil %   Date Value Ref Range Status   12/02/2019 62.4 42.7 - 76.0 % Final     Lymphocyte Rel %   Date Value Ref Range Status   02/13/2020 21.9 19.6 - 45.3 % Final     Lymphocyte %   Date Value Ref Range Status   12/02/2019 18.0 (L) 19.6 - 45.3 % Final     Monocyte Rel %   Date Value Ref Range Status   02/13/2020 13.3 (H) 5.0 - 12.0 % Final     Monocyte %   Date Value Ref Range Status   12/02/2019 14.8 (H) 5.0 - 12.0 % Final     Eosinophil Rel %   Date Value Ref Range Status   02/13/2020 2.8 0.3 - 6.2 % Final     Eosinophil %   Date Value Ref Range Status   12/02/2019 2.5 0.3 - 6.2 % Final     Basophil Rel %   Date Value Ref Range Status   02/13/2020 0.8 0.0 - 1.5 % Final     Basophil %   Date Value Ref Range Status   12/02/2019 0.5 0.0 - 1.5 % Final     Immature Grans %   Date Value Ref Range Status   12/02/2019 1.8 (H) 0.0 - 0.5 % Final     Neutrophils Absolute   Date Value Ref Range Status   03/20/2020 13.43 (H) 1.70 -  7.00 10*3/mm3 Final     Neutrophils, Absolute   Date Value Ref Range Status   12/02/2019 2.49 1.70 - 7.00 10*3/mm3 Final     Lymphocytes Absolute   Date Value Ref Range Status   02/13/2020 1.09 0.70 - 3.10 10*3/mm3 Final     Lymphocytes, Absolute   Date Value Ref Range Status   12/02/2019 0.72 0.70 - 3.10 10*3/mm3 Final     Monocytes Absolute   Date Value Ref Range Status   02/13/2020 0.66 0.10 - 0.90 10*3/mm3 Final     Monocytes, Absolute   Date Value Ref Range Status   12/02/2019 0.59 0.10 - 0.90 10*3/mm3 Final     Eosinophils Absolute   Date Value Ref Range Status   03/15/2020 0.06 0.00 - 0.40 10*3/mm3 Final     Eosinophils, Absolute   Date Value Ref Range Status   12/02/2019 0.10 0.00 - 0.40 10*3/mm3 Final     Basophils Absolute   Date Value Ref Range Status   02/13/2020 0.04 0.00 - 0.20 10*3/mm3 Final     Basophils, Absolute   Date Value Ref Range Status   12/02/2019 0.02 0.00 - 0.20 10*3/mm3 Final     Immature Grans, Absolute   Date Value Ref Range Status   12/02/2019 0.07 (H) 0.00 - 0.05 10*3/mm3 Final     nRBC   Date Value Ref Range Status   03/15/2020 1.0 (H) 0.0 - 0.2 /100 WBC Final   12/02/2019 0.3 (H) 0.0 - 0.2 /100 WBC Final       Lab Results   Component Value Date    HGBA1C 6.00 (H) 07/11/2019       Lab Results   Component Value Date    FPRAXYVR62 334 06/04/2019       TSH   Date Value Ref Range Status   07/11/2019 3.750 0.270 - 4.200 mIU/mL Final   06/04/2019 2.220 0.270 - 4.200 mIU/mL Final       No results found for: CHOL  No results found for: TRIG  No results found for: HDL  No results found for: LDL  No results found for: VLDL  No results found for: LDLHDL      Procedures    Assessment/Plan   Problems Addressed this Visit        Cardiovascular and Mediastinum    Hypertension - Primary    Relevant Medications    furosemide (LASIX) 40 MG tablet    amLODIPine (NORVASC) 2.5 MG tablet       Hematopoietic and Hemostatic    Anemia        HTN uncontrolled.  Add amlodipine.    Stop iron.    Drink plenty of  fluids.    No orders of the defined types were placed in this encounter.      Current Outpatient Medications   Medication Sig Dispense Refill   • acetaminophen (TYLENOL) 650 MG 8 hr tablet Take 1,300 mg by mouth 2 (Two) Times a Day.     • albuterol (PROVENTIL HFA;VENTOLIN HFA) 108 (90 Base) MCG/ACT inhaler Inhale 2 puffs Every 4 (Four) Hours As Needed for Wheezing.     • ALLERGY SERUM INJECTION Inject  under the skin into the appropriate area as directed 1 (One) Time Per Week.     • amLODIPine (NORVASC) 2.5 MG tablet Take 1 tablet by mouth Daily. 90 tablet 3   • aspirin 81 MG tablet Take 1 tablet by mouth Daily.     • azelastine (ASTELIN) 0.1 % nasal spray 2 sprays into the nostril(s) as directed by provider 2 (Two) Times a Day. Use in each nostril as directed     • BRIMONIDINE TARTRATE OP Apply 1 drop to eye(s) as directed by provider 2 (Two) Times a Day.     • celecoxib (CeleBREX) 200 MG capsule Take 200 mg by mouth Daily.     • dorzolamide-timolol (COSOPT) 22.3-6.8 MG/ML ophthalmic solution Administer 1 drop to both eyes 2 (Two) Times a Day.     • fexofenadine (ALLEGRA) 180 MG tablet Take 180 mg by mouth Daily.     • Fluticasone Furoate-Vilanterol (BREO ELLIPTA) 200-25 MCG/INH inhaler Inhale 2 puffs Every Morning.     • furosemide (LASIX) 40 MG tablet One a day 90 tablet 3   • hydroCHLOROthiazide (HYDRODIURIL) 25 MG tablet Take 25 mg by mouth Daily.     • latanoprost (XALATAN) 0.005 % ophthalmic solution Administer 1 drop to both eyes Every Night.     • levothyroxine (SYNTHROID) 88 MCG tablet Take 1 tablet by mouth Every Morning. 90 tablet 3   • losartan (COZAAR) 100 MG tablet TAKE 1 TABLET DAILY 90 tablet 0   • montelukast (SINGULAIR) 10 MG tablet Take 1 tablet by mouth Every Night. (Patient taking differently: Take 10 mg by mouth Daily.) 90 tablet 3   • pantoprazole (PROTONIX) 40 MG EC tablet Take 1 tablet by mouth 2 (Two) Times a Day. 90 tablet 3   • potassium chloride (K-DUR) 10 MEQ CR tablet Take 1 tablet by  mouth Daily. 90 tablet 3   • pramipexole (MIRAPEX) 1.5 MG tablet Take 1 tablet by mouth At Night As Needed (RLS). 30 tablet 5   • rosuvastatin (CRESTOR) 20 MG tablet Take 1 tablet by mouth Every Evening. 90 tablet 1   • traMADol (ULTRAM) 50 MG tablet Take 1 tablet by mouth Every 6 (Six) Hours As Needed for Moderate Pain . 60 tablet 2     No current facility-administered medications for this visit.        Nathan Baez had no medications administered during this visit.    No follow-ups on file.    There are no Patient Instructions on file for this visit.

## 2020-04-13 ENCOUNTER — READMISSION MANAGEMENT (OUTPATIENT)
Dept: CALL CENTER | Facility: HOSPITAL | Age: 75
End: 2020-04-13

## 2020-04-13 NOTE — OUTREACH NOTE
Medical Week 3 Survey      Responses   [facility] patient discharged from?  [place]   COVID-19 Test Status  Not tested   Does the patient have one of the following disease processes/diagnoses(primary or secondary)?  Other   Week 3 attempt successful?  Yes   Call start time  1535   Call end time  1538   Discharge diagnosis  Fever, gen. abdominal pain, constipation, bilat. LE edema, Campylobacter diarrhea, KWAME   Meds reviewed with patient/caregiver?  Yes   Is the patient taking all medications as directed (includes completed medication regime)?  Yes   Has the patient kept scheduled appointments due by today?  Yes   Comments  Telephone conference with provider   Psychosocial issues?  No   What is the patient's perception of their health status since discharge?  Improving   Is the patient/caregiver able to teach back signs and symptoms related to disease process for when to call PCP?  Yes   Is the patient/caregiver able to teach back signs and symptoms related to disease process for when to call 911?  Yes   Is the patient/caregiver able to teach back the hierarchy of who to call/visit for symptoms/problems? PCP, Specialist, Home health nurse, Urgent Care, ED, 911  Yes   Additional teach back comments  Doing well and back to normal. No issues with diarrhea.    Week 3 Call Completed?  Yes   Graduated  Yes   Did the patient feel the follow up calls were helpful during their recovery period?  Yes   Was the number of calls appropriate?  Yes   Graduated/Revoked comments  Pt back to baseline and doing well.          Renetta Lema RN

## 2020-05-19 ENCOUNTER — OFFICE VISIT (OUTPATIENT)
Dept: FAMILY MEDICINE CLINIC | Facility: CLINIC | Age: 75
End: 2020-05-19

## 2020-05-19 VITALS
HEIGHT: 67 IN | HEART RATE: 90 BPM | DIASTOLIC BLOOD PRESSURE: 84 MMHG | TEMPERATURE: 97.8 F | OXYGEN SATURATION: 97 % | WEIGHT: 260.8 LBS | SYSTOLIC BLOOD PRESSURE: 130 MMHG | BODY MASS INDEX: 40.93 KG/M2

## 2020-05-19 DIAGNOSIS — K59.04 CHRONIC IDIOPATHIC CONSTIPATION: Primary | ICD-10-CM

## 2020-05-19 DIAGNOSIS — I10 ESSENTIAL HYPERTENSION: ICD-10-CM

## 2020-05-19 DIAGNOSIS — K21.9 GASTROESOPHAGEAL REFLUX DISEASE WITHOUT ESOPHAGITIS: ICD-10-CM

## 2020-05-19 PROCEDURE — 99214 OFFICE O/P EST MOD 30 MIN: CPT | Performed by: FAMILY MEDICINE

## 2020-05-19 RX ORDER — AMOXICILLIN 250 MG
2 CAPSULE ORAL DAILY
Qty: 60 TABLET | Refills: 11 | Status: SHIPPED | OUTPATIENT
Start: 2020-05-19 | End: 2020-06-08 | Stop reason: SDUPTHER

## 2020-05-19 RX ORDER — PANTOPRAZOLE SODIUM 40 MG/1
40 TABLET, DELAYED RELEASE ORAL 2 TIMES DAILY
Qty: 90 TABLET | Refills: 3
Start: 2020-05-19 | End: 2020-08-19 | Stop reason: SDUPTHER

## 2020-05-19 NOTE — PROGRESS NOTES
Chief Complaint   Patient presents with   • Constipation       Subjective   Nathan Baez is a 75 y.o. male.     History of Present Illness   C/o trouble with constipation.  I feel like I need to go but I cannot go.  Going on 2-3 weeks.  No help with stool softeners otc.  No bettering factors.  No worsening factors.  TSH normal last year.  C scope normal 2017.    F/U HTN.  Increased amlodipine last visit.   FU KIMBERLEY.  Doing well with meds.       The following portions of the patient's history were reviewed and updated as appropriate: allergies, current medications, past family history, past medical history, past social history, past surgical history and problem list.    Review of Systems   Constitutional: Negative for appetite change and fatigue.   HENT: Negative for nosebleeds and sore throat.    Eyes: Negative for blurred vision and visual disturbance.   Respiratory: Negative for shortness of breath and wheezing.    Cardiovascular: Negative for chest pain and leg swelling.   Gastrointestinal: Positive for constipation. Negative for abdominal distention, abdominal pain and blood in stool.   Endocrine: Negative for cold intolerance and polyuria.   Genitourinary: Negative for dysuria and hematuria.   Musculoskeletal: Negative for arthralgias and myalgias.   Skin: Negative for color change and rash.   Neurological: Negative for weakness and confusion.   Psychiatric/Behavioral: Negative for agitation and depressed mood.       Patient Active Problem List   Diagnosis   • OA (osteoarthritis) of knee   • Hypertension   • Abdominal wall cellulitis   • Hypothyroidism (acquired)   • Gastroesophageal reflux disease without esophagitis   • Mixed hyperlipidemia   • Primary insomnia   • DDD (degenerative disc disease), lumbar   • KWAME (obstructive sleep apnea)   • Hyperglycemia   • Allergic   • Venous insufficiency   • Prostate cancer (CMS/HCC)   • Venous stasis dermatitis   • Tinea cruris   • Tinea corporis   • Throat clearing    • Sleep apnea   • Skin lesion of face   • Rib pain on left side   • Rectal bleed   • Presbycusis   • Pes planus   • Osteoarthritis   • Obesity   • Medicare annual wellness visit, subsequent   • Lumbar strain   • Internal hemorrhoids   • Insomnia   • Inflamed skin tag   • Hypothyroid   • Hyperplastic polyp of stomach   • Hospital discharge follow-up   • History of colon polyps   • High risk medication use   • Glaucoma   • Gastroenteritis, acute   • Erysipelas   • Encounter for screening colonoscopy   • Encounter for long-term (current) use of NSAIDs   • Dysphagia   • DVT (deep venous thrombosis) (CMS/HCC)   • DJD (degenerative joint disease), lumbar   • Diverticulosis   • Cough   • Contact with hypodermic needle   • Cervical radiculopathy   • Cellulitis   • Arthritis   • Allergic rhinitis   • Acute glaucoma   • Acute embolism and thrombosis of vein   • Actinic keratosis   • Status post reverse total shoulder replacement, right   • Localized edema   • Anemia   • Peripheral polyneuropathy   • Campylobacter diarrhea   • Hyponatremia   • Generalized abdominal pain   • Fever   • Constipation   • Bilateral lower extremity edema   • Acute pyelonephritis   • Chronic idiopathic constipation       No Known Allergies      Current Outpatient Medications:   •  acetaminophen (TYLENOL) 650 MG 8 hr tablet, Take 1,300 mg by mouth 2 (Two) Times a Day., Disp: , Rfl:   •  albuterol (PROVENTIL HFA;VENTOLIN HFA) 108 (90 Base) MCG/ACT inhaler, Inhale 2 puffs Every 4 (Four) Hours As Needed for Wheezing., Disp: , Rfl:   •  ALLERGY SERUM INJECTION, Inject  under the skin into the appropriate area as directed 1 (One) Time Per Week., Disp: , Rfl:   •  amLODIPine (NORVASC) 2.5 MG tablet, Take 1 tablet by mouth Daily., Disp: 90 tablet, Rfl: 3  •  aspirin 81 MG tablet, Take 1 tablet by mouth Daily., Disp: , Rfl:   •  azelastine (ASTELIN) 0.1 % nasal spray, 2 sprays into the nostril(s) as directed by provider 2 (Two) Times a Day. Use in each  nostril as directed, Disp: , Rfl:   •  BRIMONIDINE TARTRATE OP, Apply 1 drop to eye(s) as directed by provider 2 (Two) Times a Day., Disp: , Rfl:   •  celecoxib (CeleBREX) 200 MG capsule, Take 200 mg by mouth Daily., Disp: , Rfl:   •  dorzolamide-timolol (COSOPT) 22.3-6.8 MG/ML ophthalmic solution, Administer 1 drop to both eyes 2 (Two) Times a Day., Disp: , Rfl:   •  fexofenadine (ALLEGRA) 180 MG tablet, Take 180 mg by mouth Daily., Disp: , Rfl:   •  Fluticasone Furoate-Vilanterol (BREO ELLIPTA) 200-25 MCG/INH inhaler, Inhale 2 puffs Every Morning., Disp: , Rfl:   •  furosemide (LASIX) 40 MG tablet, One a day, Disp: 90 tablet, Rfl: 3  •  hydroCHLOROthiazide (HYDRODIURIL) 25 MG tablet, Take 25 mg by mouth Daily., Disp: , Rfl:   •  latanoprost (XALATAN) 0.005 % ophthalmic solution, Administer 1 drop to both eyes Every Night., Disp: , Rfl:   •  levothyroxine (SYNTHROID) 88 MCG tablet, Take 1 tablet by mouth Every Morning., Disp: 90 tablet, Rfl: 3  •  losartan (COZAAR) 100 MG tablet, TAKE 1 TABLET DAILY, Disp: 90 tablet, Rfl: 0  •  montelukast (SINGULAIR) 10 MG tablet, Take 1 tablet by mouth Every Night. (Patient taking differently: Take 10 mg by mouth Daily.), Disp: 90 tablet, Rfl: 3  •  pantoprazole (PROTONIX) 40 MG EC tablet, Take 1 tablet by mouth 2 (Two) Times a Day., Disp: 90 tablet, Rfl: 3  •  potassium chloride (K-DUR) 10 MEQ CR tablet, Take 1 tablet by mouth Daily., Disp: 90 tablet, Rfl: 3  •  pramipexole (MIRAPEX) 1.5 MG tablet, Take 1 tablet by mouth At Night As Needed (RLS)., Disp: 30 tablet, Rfl: 5  •  rosuvastatin (CRESTOR) 20 MG tablet, Take 1 tablet by mouth Every Evening., Disp: 90 tablet, Rfl: 1  •  traMADol (ULTRAM) 50 MG tablet, Take 1 tablet by mouth Every 6 (Six) Hours As Needed for Moderate Pain ., Disp: 60 tablet, Rfl: 2  •  sennosides-docusate (senna-docusate sodium) 8.6-50 MG per tablet, Take 2 tablets by mouth Daily., Disp: 60 tablet, Rfl: 11    Past Medical History:   Diagnosis Date   •  Actinic keratosis    • Acute embolism and thrombosis of vein    • Allergic rhinitis    • Arthritis    • Cataract     forming left eye   • Cellulitis    • Cellulitis    • Cervical radiculopathy    • Contact with hypodermic needle    • Cough    • Disequilibrium    • Diverticulosis    • DJD (degenerative joint disease), lumbar    • DVT (deep venous thrombosis) (CMS/HCC)     l leg  dx 2019 and was on blood thinner and ow off wears compression    • Dysphagia    • Encounter for long-term (current) use of NSAIDs    • Encounter for screening colonoscopy    • Erysipelas    • Gastroenteritis, acute    • GERD (gastroesophageal reflux disease)    • Glaucoma    • High risk medication use    • History of colon polyps    • Hospital discharge follow-up    • Hyperglycemia    • Hyperlipidemia    • Hyperplastic polyp of stomach    • Hypertension    • Hypothyroid    • Inflamed skin tag    • Insomnia    • Internal hemorrhoids    • Kidney stone     has had history of passing and still has kidney stone on both sides    • Lumbar strain    • Male urinary stress incontinence     s/p prostatectomy   • Medicare annual wellness visit, subsequent    • Obesity    • KWAME (obstructive sleep apnea)    • Osteoarthritis    • Pes planus    • Presbycusis    • Prostate cancer (CMS/HCC)    • Prostate cancer (CMS/HCC)    • Rectal bleed    • Restless legs syndrome    • Rib pain on left side    • Shoulder pain    • Skin lesion of face    • Sleep apnea     bipap   • Throat clearing    • Tinea corporis    • Tinea cruris    • Venous stasis dermatitis        Past Surgical History:   Procedure Laterality Date   • CATARACT EXTRACTION Right    • COLONOSCOPY  03/08/2017    3/17normal. Recheck 2022. 12/11. Repeat in 5 years    • ENDOSCOPY W/ PEG REMOVAL     • FOOT SURGERY      1998 had big toe replaced on left foot   • HERNIA REPAIR  03/07/2012    umbilical   • JOINT REPLACEMENT Right 2014   • NECK SURGERY  04/04/2011    cervical disc   • ID TOTAL KNEE ARTHROPLASTY  Left 2016    Procedure: LT TOTAL KNEE ARTHROPLASTY;  Surgeon: Tadeo Basurto MD;  Location: [facility] OR;  Service: Orthopedics   • PROSTATECTOMY  1999. Radical    • THYROID LOBECTOMY     • THYROID SURGERY      partial doesn't know which one was removed   • TONSILLECTOMY     • TOTAL KNEE ARTHROPLASTY Right    • TOTAL SHOULDER ARTHROPLASTY W/ DISTAL CLAVICLE EXCISION Right 2019    Procedure: TOTAL SHOULDER REVERSE ARTHROPLASTY RIGHT REPAIR RIGHT AXILLARY VEIN;  Surgeon: Behzad Kelley MD;  [place] [facility] [place] [facility] [place]  Service: [place]opedics   • WRIST SURGERY Right 12/17/2014    x2  wrist replaced       Family History   Problem Relation Age of Onset   • Cancer Mother         COLON   • Cancer Father         PROSTATE   • Cancer Sister         BREAST CANCER   • Breast cancer Sister    • Gout Sister    • Hypertension Sister    • Heart attack Brother    • Bone cancer Brother    • Heart disease Brother    • Malig Hyperthermia Neg Hx        Social History     Tobacco Use   • Smoking status: Former Smoker     Packs/day: 1.00     Years: 20.00     Pack years: 20.00     Types: Cigarettes     Last attempt to quit: 1984     Years since quittin.4   • Smokeless tobacco: Never Used   Substance Use Topics   • Alcohol use: Yes     Comment: 3-4 MONTHLY            Objective     Vitals:    20 1009   BP: 130/84   Pulse: 90   Temp: 97.8 °F (36.6 °C)   SpO2: 97%     Body mass index is 40.85 kg/m².    Physical Exam   Constitutional: He appears well-developed and well-nourished.   HENT:   Head: Normocephalic and atraumatic.   Mouth/Throat: Oropharynx is clear and moist.   Eyes: Pupils are equal, round, and reactive to light. No scleral icterus.   Neck: No thyromegaly present.   Cardiovascular: Normal rate and regular rhythm. Exam reveals no gallop and no friction rub.   No murmur heard.  Pulmonary/Chest: Effort normal. No respiratory distress. He has no wheezes. He has no rales. He exhibits no  tenderness.   Abdominal: Soft. Bowel sounds are normal. He exhibits no distension. There is no tenderness.   Musculoskeletal: Normal range of motion. He exhibits no edema or deformity.   Lymphadenopathy:     He has no cervical adenopathy.   Neurological: No cranial nerve deficit. He exhibits normal muscle tone.   Skin: Skin is warm and dry. No rash noted. He is not diaphoretic.   Vitals reviewed.      Lab Results   Component Value Date    GLUCOSE 125 (H) 03/20/2020    BUN 9 03/20/2020    CREATININE 0.95 03/20/2020    EGFRIFNONA 78 03/20/2020    EGFRIFAFRI 76 12/17/2019    BCR 9.5 03/20/2020    K 3.7 03/20/2020    CO2 25.4 03/20/2020    CALCIUM 8.6 03/20/2020    PROTENTOTREF 7.0 12/17/2019    ALBUMIN 3.60 03/20/2020    LABIL2 1.7 12/17/2019    AST 15 03/20/2020    ALT 20 03/20/2020       WBC   Date Value Ref Range Status   03/20/2020 11.48 (H) 3.40 - 10.80 10*3/mm3 Final   02/13/2020 4.97 3.40 - 10.80 10*3/mm3 Final     RBC   Date Value Ref Range Status   03/20/2020 3.68 (L) 4.14 - 5.80 10*6/mm3 Final   02/13/2020 4.60 4.14 - 5.80 10*6/mm3 Final     Hemoglobin   Date Value Ref Range Status   03/20/2020 10.9 (L) 13.0 - 17.7 g/dL Final     Hematocrit   Date Value Ref Range Status   03/20/2020 32.5 (L) 37.5 - 51.0 % Final     MCV   Date Value Ref Range Status   03/20/2020 88.3 79.0 - 97.0 fL Final     MCH   Date Value Ref Range Status   03/20/2020 29.6 26.6 - 33.0 pg Final     MCHC   Date Value Ref Range Status   03/20/2020 33.5 31.5 - 35.7 g/dL Final     RDW   Date Value Ref Range Status   03/20/2020 14.5 12.3 - 15.4 % Final     RDW-SD   Date Value Ref Range Status   03/20/2020 46.5 37.0 - 54.0 fl Final     MPV   Date Value Ref Range Status   03/20/2020 10.1 6.0 - 12.0 fL Final     Platelets   Date Value Ref Range Status   03/20/2020 169 140 - 450 10*3/mm3 Final     Neutrophil Rel %   Date Value Ref Range Status   02/13/2020 59.0 42.7 - 76.0 % Final     Neutrophil %   Date Value Ref Range Status   12/02/2019 62.4 42.7  - 76.0 % Final     Lymphocyte Rel %   Date Value Ref Range Status   02/13/2020 21.9 19.6 - 45.3 % Final     Lymphocyte %   Date Value Ref Range Status   12/02/2019 18.0 (L) 19.6 - 45.3 % Final     Monocyte Rel %   Date Value Ref Range Status   02/13/2020 13.3 (H) 5.0 - 12.0 % Final     Monocyte %   Date Value Ref Range Status   12/02/2019 14.8 (H) 5.0 - 12.0 % Final     Eosinophil Rel %   Date Value Ref Range Status   02/13/2020 2.8 0.3 - 6.2 % Final     Eosinophil %   Date Value Ref Range Status   12/02/2019 2.5 0.3 - 6.2 % Final     Basophil Rel %   Date Value Ref Range Status   02/13/2020 0.8 0.0 - 1.5 % Final     Basophil %   Date Value Ref Range Status   12/02/2019 0.5 0.0 - 1.5 % Final     Immature Grans %   Date Value Ref Range Status   12/02/2019 1.8 (H) 0.0 - 0.5 % Final     Neutrophils Absolute   Date Value Ref Range Status   03/20/2020 13.43 (H) 1.70 - 7.00 10*3/mm3 Final     Neutrophils, Absolute   Date Value Ref Range Status   12/02/2019 2.49 1.70 - 7.00 10*3/mm3 Final     Lymphocytes Absolute   Date Value Ref Range Status   02/13/2020 1.09 0.70 - 3.10 10*3/mm3 Final     Lymphocytes, Absolute   Date Value Ref Range Status   12/02/2019 0.72 0.70 - 3.10 10*3/mm3 Final     Monocytes Absolute   Date Value Ref Range Status   02/13/2020 0.66 0.10 - 0.90 10*3/mm3 Final     Monocytes, Absolute   Date Value Ref Range Status   12/02/2019 0.59 0.10 - 0.90 10*3/mm3 Final     Eosinophils Absolute   Date Value Ref Range Status   03/15/2020 0.06 0.00 - 0.40 10*3/mm3 Final     Eosinophils, Absolute   Date Value Ref Range Status   12/02/2019 0.10 0.00 - 0.40 10*3/mm3 Final     Basophils Absolute   Date Value Ref Range Status   02/13/2020 0.04 0.00 - 0.20 10*3/mm3 Final     Basophils, Absolute   Date Value Ref Range Status   12/02/2019 0.02 0.00 - 0.20 10*3/mm3 Final     Immature Grans, Absolute   Date Value Ref Range Status   12/02/2019 0.07 (H) 0.00 - 0.05 10*3/mm3 Final     nRBC   Date Value Ref Range Status    03/15/2020 1.0 (H) 0.0 - 0.2 /100 WBC Final   12/02/2019 0.3 (H) 0.0 - 0.2 /100 WBC Final       Lab Results   Component Value Date    HGBA1C 6.00 (H) 07/11/2019       Lab Results   Component Value Date    [id number] 334 06/04/2019       TSH   Date Value Ref Range Status   07/11/2019 3.750 0.270 - 4.200 mIU/mL Final   06/04/2019 2.220 0.270 - 4.200 mIU/mL Final       No results found for: CHOL  No results found for: TRIG  No results found for: HDL  No results found for: LDL  No results found for: VLDL  No results found for: LDLHDL      Procedures    Assessment/Plan   Problems Addressed this Visit        Cardiovascular and Mediastinum    Hypertension       Digestive    Gastroesophageal reflux disease without esophagitis    Relevant Medications    pantoprazole (PROTONIX) 40 MG EC tablet    Constipation - Primary      Cpntinue amlodipine 2.5 a day as BP controlled.    Add senna S BID.     Continue PPI.      No orders of the defined types were placed in this encounter.      Current Outpatient Medications   Medication Sig Dispense Refill   • acetaminophen (TYLENOL) 650 MG 8 hr tablet Take 1,300 mg by mouth 2 (Two) Times a Day.     • albuterol (PROVENTIL HFA;VENTOLIN HFA) 108 (90 Base) MCG/ACT inhaler Inhale 2 puffs Every 4 (Four) Hours As Needed for Wheezing.     • ALLERGY SERUM INJECTION Inject  under the skin into the appropriate area as directed 1 (One) Time Per Week.     • amLODIPine (NORVASC) 2.5 MG tablet Take 1 tablet by mouth Daily. 90 tablet 3   • aspirin 81 MG tablet Take 1 tablet by mouth Daily.     • azelastine (ASTELIN) 0.1 % nasal spray 2 sprays into the nostril(s) as directed by provider 2 (Two) Times a Day. Use in each nostril as directed     • BRIMONIDINE TARTRATE OP Apply 1 drop to eye(s) as directed by provider 2 (Two) Times a Day.     • celecoxib (CeleBREX) 200 MG capsule Take 200 mg by mouth Daily.     • dorzolamide-timolol (COSOPT) 22.3-6.8 MG/ML ophthalmic solution Administer 1 drop to both eyes 2  (Two) Times a Day.     • fexofenadine (ALLEGRA) 180 MG tablet Take 180 mg by mouth Daily.     • Fluticasone Furoate-Vilanterol (BREO ELLIPTA) 200-25 MCG/INH inhaler Inhale 2 puffs Every Morning.     • furosemide (LASIX) 40 MG tablet One a day 90 tablet 3   • hydroCHLOROthiazide (HYDRODIURIL) 25 MG tablet Take 25 mg by mouth Daily.     • latanoprost (XALATAN) 0.005 % ophthalmic solution Administer 1 drop to both eyes Every Night.     • levothyroxine (SYNTHROID) 88 MCG tablet Take 1 tablet by mouth Every Morning. 90 tablet 3   • losartan (COZAAR) 100 MG tablet TAKE 1 TABLET DAILY 90 tablet 0   • montelukast (SINGULAIR) 10 MG tablet Take 1 tablet by mouth Every Night. (Patient taking differently: Take 10 mg by mouth Daily.) 90 tablet 3   • pantoprazole (PROTONIX) 40 MG EC tablet Take 1 tablet by mouth 2 (Two) Times a Day. 90 tablet 3   • potassium chloride (K-DUR) 10 MEQ CR tablet Take 1 tablet by mouth Daily. 90 tablet 3   • pramipexole (MIRAPEX) 1.5 MG tablet Take 1 tablet by mouth At Night As Needed (RLS). 30 tablet 5   • rosuvastatin (CRESTOR) 20 MG tablet Take 1 tablet by mouth Every Evening. 90 tablet 1   • traMADol (ULTRAM) 50 MG tablet Take 1 tablet by mouth Every 6 (Six) Hours As Needed for Moderate Pain . 60 tablet 2   • sennosides-docusate (senna-docusate sodium) 8.6-50 MG per tablet Take 2 tablets by mouth Daily. 60 tablet 11     No current facility-administered medications for this visit.        Nathan Baez had no medications administered during this visit.    No follow-ups on file.    There are no Patient Instructions on file for this visit.

## 2020-06-08 ENCOUNTER — OFFICE VISIT (OUTPATIENT)
Dept: FAMILY MEDICINE CLINIC | Facility: CLINIC | Age: 75
End: 2020-06-08

## 2020-06-08 VITALS
WEIGHT: 255.2 LBS | BODY MASS INDEX: 40.06 KG/M2 | TEMPERATURE: 98.6 F | HEIGHT: 67 IN | SYSTOLIC BLOOD PRESSURE: 155 MMHG | DIASTOLIC BLOOD PRESSURE: 74 MMHG | OXYGEN SATURATION: 95 % | HEART RATE: 94 BPM

## 2020-06-08 DIAGNOSIS — K59.04 CHRONIC IDIOPATHIC CONSTIPATION: ICD-10-CM

## 2020-06-08 DIAGNOSIS — K21.9 GASTROESOPHAGEAL REFLUX DISEASE WITHOUT ESOPHAGITIS: Primary | ICD-10-CM

## 2020-06-08 DIAGNOSIS — I10 ESSENTIAL HYPERTENSION: ICD-10-CM

## 2020-06-08 PROCEDURE — 99214 OFFICE O/P EST MOD 30 MIN: CPT | Performed by: FAMILY MEDICINE

## 2020-06-08 RX ORDER — AMOXICILLIN 250 MG
CAPSULE ORAL
Qty: 90 TABLET | Refills: 11 | Status: SHIPPED | OUTPATIENT
Start: 2020-06-08 | End: 2020-07-30 | Stop reason: ALTCHOICE

## 2020-06-08 RX ORDER — POLYETHYLENE GLYCOL 3350 17 G/17G
17 POWDER, FOR SOLUTION ORAL DAILY
Start: 2020-06-08 | End: 2020-07-30 | Stop reason: ALTCHOICE

## 2020-06-08 RX ORDER — BRIMONIDINE TARTRATE 2 MG/ML
SOLUTION/ DROPS OPHTHALMIC
COMMUNITY
Start: 2020-05-07 | End: 2021-08-11

## 2020-06-08 NOTE — PROGRESS NOTES
Chief Complaint   Patient presents with   • Constipation     pt states he has some abdominal discomfort        Subjective   Nathan Baez is a 75 y.o. male.     History of Present Illness   C/o trouble with constipation for 5 weeks.  TSH normal last year.   I have to strain often.  C scope normal 2017.  No help with adding senna S BID.  I have the urge to go but cannot go.    FU HTN.  BP running 130-140/70s at home.  BMP normal except glu 125 2 months ago.   F/U KIMBERLEY.  Doing well with pantoprazole.      The following portions of the patient's history were reviewed and updated as appropriate: allergies, current medications, past family history, past medical history, past social history, past surgical history and problem list.    Review of Systems   Constitutional: Negative for appetite change and fatigue.   HENT: Negative for nosebleeds and sore throat.    Eyes: Negative for blurred vision and visual disturbance.   Respiratory: Negative for shortness of breath and wheezing.    Cardiovascular: Negative for chest pain and leg swelling.   Gastrointestinal: Positive for constipation. Negative for abdominal distention and abdominal pain.   Endocrine: Negative for cold intolerance and polyuria.   Genitourinary: Negative for dysuria and hematuria.   Musculoskeletal: Negative for arthralgias and myalgias.   Skin: Negative for color change and rash.   Neurological: Negative for weakness and confusion.   Psychiatric/Behavioral: Negative for agitation and depressed mood.       Patient Active Problem List   Diagnosis   • OA (osteoarthritis) of knee   • Hypertension   • Abdominal wall cellulitis   • Hypothyroidism (acquired)   • Gastroesophageal reflux disease without esophagitis   • Mixed hyperlipidemia   • Primary insomnia   • DDD (degenerative disc disease), lumbar   • KWAME (obstructive sleep apnea)   • Hyperglycemia   • Allergic   • Venous insufficiency   • Prostate cancer (CMS/[facility])   • Venous stasis dermatitis   • Tinea  cruris   • Tinea corporis   • Throat clearing   • Sleep apnea   • Skin lesion of face   • Rib pain on left side   • Rectal bleed   • Presbycusis   • Pes planus   • Osteoarthritis   • Obesity   • Medicare annual wellness visit, subsequent   • Lumbar strain   • Internal hemorrhoids   • Insomnia   • Inflamed skin tag   • Hypothyroid   • Hyperplastic polyp of stomach   • Hospital discharge follow-up   • History of colon polyps   • High risk medication use   • Glaucoma   • Gastroenteritis, acute   • Erysipelas   • Encounter for screening colonoscopy   • Encounter for long-term (current) use of NSAIDs   • Dysphagia   • DVT (deep venous thrombosis) (CMS/HCC)   • DJD (degenerative joint disease), lumbar   • Diverticulosis   • Cough   • Contact with hypodermic needle   • Cervical radiculopathy   • Cellulitis   • Arthritis   • Allergic rhinitis   • Acute glaucoma   • Acute embolism and thrombosis of vein   • Actinic keratosis   • Status post reverse total shoulder replacement, right   • Localized edema   • Anemia   • Peripheral polyneuropathy   • Campylobacter diarrhea   • Hyponatremia   • Generalized abdominal pain   • Fever   • Constipation   • Bilateral lower extremity edema   • Acute pyelonephritis   • Chronic idiopathic constipation       No Known Allergies      Current Outpatient Medications:   •  acetaminophen (TYLENOL) 650 MG 8 hr tablet, Take 1,300 mg by mouth 2 (Two) Times a Day., Disp: , Rfl:   •  albuterol (PROVENTIL HFA;VENTOLIN HFA) 108 (90 Base) MCG/ACT inhaler, Inhale 2 puffs Every 4 (Four) Hours As Needed for Wheezing., Disp: , Rfl:   •  ALLERGY SERUM INJECTION, Inject  under the skin into the appropriate area as directed 1 (One) Time Per Week., Disp: , Rfl:   •  amLODIPine (NORVASC) 2.5 MG tablet, Take 1 tablet by mouth Daily., Disp: 90 tablet, Rfl: 3  •  aspirin 81 MG tablet, Take 1 tablet by mouth Daily., Disp: , Rfl:   •  azelastine (ASTELIN) 0.1 % nasal spray, 2 sprays into the nostril(s) as directed by  provider 2 (Two) Times a Day. Use in each nostril as directed, Disp: , Rfl:   •  BRIMONIDINE TARTRATE OP, Apply 1 drop to eye(s) as directed by provider 2 (Two) Times a Day., Disp: , Rfl:   •  celecoxib (CeleBREX) 200 MG capsule, Take 200 mg by mouth Daily., Disp: , Rfl:   •  dorzolamide-timolol (COSOPT) 22.3-6.8 MG/ML ophthalmic solution, Administer 1 drop to both eyes 2 (Two) Times a Day., Disp: , Rfl:   •  fexofenadine (ALLEGRA) 180 MG tablet, Take 180 mg by mouth Daily., Disp: , Rfl:   •  Fluticasone Furoate-Vilanterol (BREO ELLIPTA) 200-25 MCG/INH inhaler, Inhale 2 puffs Every Morning., Disp: , Rfl:   •  furosemide (LASIX) 40 MG tablet, One a day, Disp: 90 tablet, Rfl: 3  •  hydroCHLOROthiazide (HYDRODIURIL) 25 MG tablet, Take 25 mg by mouth Daily., Disp: , Rfl:   •  latanoprost (XALATAN) 0.005 % ophthalmic solution, Administer 1 drop to both eyes Every Night., Disp: , Rfl:   •  levothyroxine (SYNTHROID) 88 MCG tablet, Take 1 tablet by mouth Every Morning., Disp: 90 tablet, Rfl: 3  •  losartan (COZAAR) 100 MG tablet, TAKE 1 TABLET DAILY, Disp: 90 tablet, Rfl: 0  •  montelukast (SINGULAIR) 10 MG tablet, Take 1 tablet by mouth Every Night. (Patient taking differently: Take 10 mg by mouth Daily.), Disp: 90 tablet, Rfl: 3  •  pantoprazole (PROTONIX) 40 MG EC tablet, Take 1 tablet by mouth 2 (Two) Times a Day., Disp: 90 tablet, Rfl: 3  •  potassium chloride (K-DUR) 10 MEQ CR tablet, Take 1 tablet by mouth Daily., Disp: 90 tablet, Rfl: 3  •  pramipexole (MIRAPEX) 1.5 MG tablet, Take 1 tablet by mouth At Night As Needed (RLS)., Disp: 30 tablet, Rfl: 5  •  rosuvastatin (CRESTOR) 20 MG tablet, Take 1 tablet by mouth Every Evening., Disp: 90 tablet, Rfl: 1  •  sennosides-docusate (senna-docusate sodium) 8.6-50 MG per tablet, 2 in am and 1 with supper., Disp: 90 tablet, Rfl: 11  •  brimonidine (ALPHAGAN) 0.2 % ophthalmic solution, Administer  to both eyes. 3 drops in each eye at night and 2 drops in the morning in each  eye, Disp: , Rfl:   •  polyethylene glycol (MIRALAX) 17 GM/SCOOP powder, Take 17 g by mouth Daily., Disp: , Rfl:     Past Medical History:   Diagnosis Date   • Actinic keratosis    • Acute embolism and thrombosis of vein    • Allergic rhinitis    • Arthritis    • Cataract     forming left eye   • Cellulitis    • Cellulitis    • Cervical radiculopathy    • Contact with hypodermic needle    • Cough    • Disequilibrium    • Diverticulosis    • DJD (degenerative joint disease), lumbar    • DVT (deep venous thrombosis) (CMS/HCC)     l leg  dx 2019 and was on blood thinner and ow off wears compression    • Dysphagia    • Encounter for long-term (current) use of NSAIDs    • Encounter for screening colonoscopy    • Erysipelas    • Gastroenteritis, acute    • GERD (gastroesophageal reflux disease)    • Glaucoma    • High risk medication use    • History of colon polyps    • Hospital discharge follow-up    • Hyperglycemia    • Hyperlipidemia    • Hyperplastic polyp of stomach    • Hypertension    • Hypothyroid    • Inflamed skin tag    • Insomnia    • Internal hemorrhoids    • Kidney stone     has had history of passing and still has kidney stone on both sides    • Lumbar strain    • Male urinary stress incontinence     s/p prostatectomy   • Medicare annual wellness visit, subsequent    • Obesity    • KWAME (obstructive sleep apnea)    • Osteoarthritis    • Pes planus    • Presbycusis    • Prostate cancer (CMS/HCC)    • Prostate cancer (CMS/HCC)    • Rectal bleed    • Restless legs syndrome    • Rib pain on left side    • Shoulder pain    • Skin lesion of face    • Sleep apnea     bipap   • Throat clearing    • Tinea corporis    • Tinea cruris    • Venous stasis dermatitis        Past Surgical History:   Procedure Laterality Date   • CATARACT EXTRACTION Right    • COLONOSCOPY  03/08/2017    3/17normal. Recheck 2022. 12/11. Repeat in 5 years    • ENDOSCOPY W/ PEG REMOVAL     • FOOT SURGERY      1998 had big toe replaced on left  foot   • HERNIA REPAIR  2012    umbilical   • JOINT REPLACEMENT Right    • NECK SURGERY  2011    cervical disc   • NV TOTAL KNEE ARTHROPLASTY Left 2016    Procedure: LT TOTAL KNEE ARTHROPLASTY;  Surgeon: Tadeo Basurto MD;  Location: [facility] OR;  Service: Orthopedics   • PROSTATECTOMY  1999. Radical    • THYROID LOBECTOMY     • THYROID SURGERY      partial doesn't know which one was removed   • TONSILLECTOMY     • TOTAL KNEE ARTHROPLASTY Right    • TOTAL SHOULDER ARTHROPLASTY W/ DISTAL CLAVICLE EXCISION Right 2019    Procedure: TOTAL SHOULDER REVERSE ARTHROPLASTY RIGHT REPAIR RIGHT AXILLARY VEIN;  Surgeon: Behzad Kelley MD;  [place] [facility] [place] [facility] [place]  Service: Orthopedics   • WRIST SURGERY Right 12/17/2014    x2  wrist replaced       Family History   Problem Relation Age of Onset   • Cancer Mother         COLON   • Cancer Father         PROSTATE   • Cancer Sister         BREAST CANCER   • Breast cancer Sister    • Gout Sister    • Hypertension Sister    • Heart attack Brother    • Bone cancer Brother    • Heart disease Brother    • Malig Hyperthermia Neg Hx        Social History     Tobacco Use   • Smoking status: Former Smoker     Packs/day: 1.00     Years: 20.00     Pack years: 20.00     Types: Cigarettes     Last attempt to quit: 1984     Years since quittin.4   • Smokeless tobacco: Never Used   Substance Use Topics   • Alcohol use: Yes     Comment: 3-4 MONTHLY            Objective     Vitals:    20 1051   BP: 155/74   Pulse: 94   Temp: 98.6 °F (37 °C)   SpO2: 95%     Body mass index is 39.97 kg/m².    Physical Exam   Constitutional: He appears well-developed and well-nourished.   HENT:   Head: Normocephalic and atraumatic.   Mouth/Throat: Oropharynx is clear and moist.   Eyes: Pupils are equal, round, and reactive to light. No scleral icterus.   Neck: No thyromegaly present.   Cardiovascular: Normal rate and regular rhythm. Exam reveals no  gallop and no friction rub.   No murmur heard.  Pulmonary/Chest: Effort normal. No respiratory distress. He has no wheezes. He has no rales. He exhibits no tenderness.   Abdominal: Soft. Bowel sounds are normal. He exhibits no distension. There is no tenderness.   Musculoskeletal: Normal range of motion. He exhibits no edema or deformity.   Lymphadenopathy:     He has no cervical adenopathy.   Neurological: No cranial nerve deficit. He exhibits normal muscle tone.   Skin: Skin is warm and dry. No rash noted. He is not diaphoretic.   Vitals reviewed.      Lab Results   Component Value Date    GLUCOSE 125 (H) 03/20/2020    BUN 9 03/20/2020    CREATININE 0.95 03/20/2020    EGFRIFNONA 78 03/20/2020    EGFRIFAFRI 76 12/17/2019    BCR 9.5 03/20/2020    K 3.7 03/20/2020    CO2 25.4 03/20/2020    CALCIUM 8.6 03/20/2020    PROTENTOTREF 7.0 12/17/2019    ALBUMIN 3.60 03/20/2020    LABIL2 1.7 12/17/2019    AST 15 03/20/2020    ALT 20 03/20/2020       WBC   Date Value Ref Range Status   03/20/2020 11.48 (H) 3.40 - 10.80 10*3/mm3 Final   02/13/2020 4.97 3.40 - 10.80 10*3/mm3 Final     RBC   Date Value Ref Range Status   03/20/2020 3.68 (L) 4.14 - 5.80 10*6/mm3 Final   02/13/2020 4.60 4.14 - 5.80 10*6/mm3 Final     Hemoglobin   Date Value Ref Range Status   03/20/2020 10.9 (L) 13.0 - 17.7 g/dL Final     Hematocrit   Date Value Ref Range Status   03/20/2020 32.5 (L) 37.5 - 51.0 % Final     MCV   Date Value Ref Range Status   03/20/2020 88.3 79.0 - 97.0 fL Final     MCH   Date Value Ref Range Status   03/20/2020 29.6 26.6 - 33.0 pg Final     MCHC   Date Value Ref Range Status   03/20/2020 33.5 31.5 - 35.7 g/dL Final     RDW   Date Value Ref Range Status   03/20/2020 14.5 12.3 - 15.4 % Final     RDW-SD   Date Value Ref Range Status   03/20/2020 46.5 37.0 - 54.0 fl Final     MPV   Date Value Ref Range Status   03/20/2020 10.1 6.0 - 12.0 fL Final     Platelets   Date Value Ref Range Status   03/20/2020 169 140 - 450 10*3/mm3 Final      Neutrophil Rel %   Date Value Ref Range Status   02/13/2020 59.0 42.7 - 76.0 % Final     Neutrophil %   Date Value Ref Range Status   12/02/2019 62.4 42.7 - 76.0 % Final     Lymphocyte Rel %   Date Value Ref Range Status   02/13/2020 21.9 19.6 - 45.3 % Final     Lymphocyte %   Date Value Ref Range Status   12/02/2019 18.0 (L) 19.6 - 45.3 % Final     Monocyte Rel %   Date Value Ref Range Status   02/13/2020 13.3 (H) 5.0 - 12.0 % Final     Monocyte %   Date Value Ref Range Status   12/02/2019 14.8 (H) 5.0 - 12.0 % Final     Eosinophil Rel %   Date Value Ref Range Status   02/13/2020 2.8 0.3 - 6.2 % Final     Eosinophil %   Date Value Ref Range Status   12/02/2019 2.5 0.3 - 6.2 % Final     Basophil Rel %   Date Value Ref Range Status   02/13/2020 0.8 0.0 - 1.5 % Final     Basophil %   Date Value Ref Range Status   12/02/2019 0.5 0.0 - 1.5 % Final     Immature Grans %   Date Value Ref Range Status   12/02/2019 1.8 (H) 0.0 - 0.5 % Final     Neutrophils Absolute   Date Value Ref Range Status   03/20/2020 13.43 (H) 1.70 - 7.00 10*3/mm3 Final     Neutrophils, Absolute   Date Value Ref Range Status   12/02/2019 2.49 1.70 - 7.00 10*3/mm3 Final     Lymphocytes Absolute   Date Value Ref Range Status   02/13/2020 1.09 0.70 - 3.10 10*3/mm3 Final     Lymphocytes, Absolute   Date Value Ref Range Status   12/02/2019 0.72 0.70 - 3.10 10*3/mm3 Final     Monocytes Absolute   Date Value Ref Range Status   02/13/2020 0.66 0.10 - 0.90 10*3/mm3 Final     Monocytes, Absolute   Date Value Ref Range Status   12/02/2019 0.59 0.10 - 0.90 10*3/mm3 Final     Eosinophils Absolute   Date Value Ref Range Status   03/15/2020 0.06 0.00 - 0.40 10*3/mm3 Final     Eosinophils, Absolute   Date Value Ref Range Status   12/02/2019 0.10 0.00 - 0.40 10*3/mm3 Final     Basophils Absolute   Date Value Ref Range Status   02/13/2020 0.04 0.00 - 0.20 10*3/mm3 Final     Basophils, Absolute   Date Value Ref Range Status   12/02/2019 0.02 0.00 - 0.20 10*3/mm3  Final     Immature Grans, Absolute   Date Value Ref Range Status   12/02/2019 0.07 (H) 0.00 - 0.05 10*3/mm3 Final     nRBC   Date Value Ref Range Status   03/15/2020 1.0 (H) 0.0 - 0.2 /100 WBC Final   12/02/2019 0.3 (H) 0.0 - 0.2 /100 WBC Final       Lab Results   Component Value Date    HGBA1C 6.00 (H) 07/11/2019       Lab Results   Component Value Date    [id number] 334 06/04/2019       TSH   Date Value Ref Range Status   07/11/2019 3.750 0.270 - 4.200 mIU/mL Final   06/04/2019 2.220 0.270 - 4.200 mIU/mL Final       No results found for: CHOL  No results found for: TRIG  No results found for: HDL  No results found for: LDL  No results found for: VLDL  No results found for: LDLHDL      Procedures    Assessment/Plan   Problems Addressed this Visit        Cardiovascular and Mediastinum    Hypertension       Digestive    Gastroesophageal reflux disease without esophagitis - Primary    Chronic idiopathic constipation      HTN.  Controlled.  Continue meds.    KIMBERLEY.  Controlled.  Continue PPI.   Constipation uncontrolled.     Add miralax daily.  Continue senna S TID.    Stop tramadol.    No orders of the defined types were placed in this encounter.      Current Outpatient Medications   Medication Sig Dispense Refill   • acetaminophen (TYLENOL) 650 MG 8 hr tablet Take 1,300 mg by mouth 2 (Two) Times a Day.     • albuterol (PROVENTIL HFA;VENTOLIN HFA) 108 (90 Base) MCG/ACT inhaler Inhale 2 puffs Every 4 (Four) Hours As Needed for Wheezing.     • ALLERGY SERUM INJECTION Inject  under the skin into the appropriate area as directed 1 (One) Time Per Week.     • amLODIPine (NORVASC) 2.5 MG tablet Take 1 tablet by mouth Daily. 90 tablet 3   • aspirin 81 MG tablet Take 1 tablet by mouth Daily.     • azelastine (ASTELIN) 0.1 % nasal spray 2 sprays into the nostril(s) as directed by provider 2 (Two) Times a Day. Use in each nostril as directed     • BRIMONIDINE TARTRATE OP Apply 1 drop to eye(s) as directed by provider 2 (Two) Times  a Day.     • celecoxib (CeleBREX) 200 MG capsule Take 200 mg by mouth Daily.     • dorzolamide-timolol (COSOPT) 22.3-6.8 MG/ML ophthalmic solution Administer 1 drop to both eyes 2 (Two) Times a Day.     • fexofenadine (ALLEGRA) 180 MG tablet Take 180 mg by mouth Daily.     • Fluticasone Furoate-Vilanterol (BREO ELLIPTA) 200-25 MCG/INH inhaler Inhale 2 puffs Every Morning.     • furosemide (LASIX) 40 MG tablet One a day 90 tablet 3   • hydroCHLOROthiazide (HYDRODIURIL) 25 MG tablet Take 25 mg by mouth Daily.     • latanoprost (XALATAN) 0.005 % ophthalmic solution Administer 1 drop to both eyes Every Night.     • levothyroxine (SYNTHROID) 88 MCG tablet Take 1 tablet by mouth Every Morning. 90 tablet 3   • losartan (COZAAR) 100 MG tablet TAKE 1 TABLET DAILY 90 tablet 0   • montelukast (SINGULAIR) 10 MG tablet Take 1 tablet by mouth Every Night. (Patient taking differently: Take 10 mg by mouth Daily.) 90 tablet 3   • pantoprazole (PROTONIX) 40 MG EC tablet Take 1 tablet by mouth 2 (Two) Times a Day. 90 tablet 3   • potassium chloride (K-DUR) 10 MEQ CR tablet Take 1 tablet by mouth Daily. 90 tablet 3   • pramipexole (MIRAPEX) 1.5 MG tablet Take 1 tablet by mouth At Night As Needed (RLS). 30 tablet 5   • rosuvastatin (CRESTOR) 20 MG tablet Take 1 tablet by mouth Every Evening. 90 tablet 1   • sennosides-docusate (senna-docusate sodium) 8.6-50 MG per tablet 2 in am and 1 with supper. 90 tablet 11   • brimonidine (ALPHAGAN) 0.2 % ophthalmic solution Administer  to both eyes. 3 drops in each eye at night and 2 drops in the morning in each eye     • polyethylene glycol (MIRALAX) 17 GM/SCOOP powder Take 17 g by mouth Daily.       No current facility-administered medications for this visit.        Nathan Baez had no medications administered during this visit.    Return in about 1 month (around 7/8/2020).    There are no Patient Instructions on file for this visit.

## 2020-06-12 DIAGNOSIS — E78.5 HYPERLIPIDEMIA, UNSPECIFIED HYPERLIPIDEMIA TYPE: ICD-10-CM

## 2020-06-12 RX ORDER — POTASSIUM CHLORIDE 750 MG/1
TABLET, FILM COATED, EXTENDED RELEASE ORAL
Qty: 180 TABLET | Refills: 3 | Status: SHIPPED | OUTPATIENT
Start: 2020-06-12 | End: 2020-12-17

## 2020-06-12 RX ORDER — LOSARTAN POTASSIUM 100 MG/1
TABLET ORAL
Qty: 90 TABLET | Refills: 1 | Status: SHIPPED | OUTPATIENT
Start: 2020-06-12 | End: 2020-07-08 | Stop reason: SDUPTHER

## 2020-06-12 RX ORDER — POTASSIUM CHLORIDE 750 MG/1
TABLET, FILM COATED, EXTENDED RELEASE ORAL
Qty: 60 TABLET | Refills: 1 | Status: SHIPPED | OUTPATIENT
Start: 2020-06-12 | End: 2020-06-12

## 2020-06-12 RX ORDER — ROSUVASTATIN CALCIUM 20 MG/1
TABLET, COATED ORAL
Qty: 90 TABLET | Refills: 1 | Status: SHIPPED | OUTPATIENT
Start: 2020-06-12 | End: 2020-11-08 | Stop reason: SDUPTHER

## 2020-06-15 ENCOUNTER — HOSPITAL ENCOUNTER (EMERGENCY)
Facility: HOSPITAL | Age: 75
Discharge: HOME | End: 2020-06-15
Attending: EMERGENCY MEDICINE | Admitting: EMERGENCY MEDICINE

## 2020-06-15 ENCOUNTER — APPOINTMENT (OUTPATIENT)
Dept: CT IMAGING | Facility: HOSPITAL | Age: 75
End: 2020-06-15

## 2020-06-15 ENCOUNTER — TELEPHONE (OUTPATIENT)
Dept: FAMILY MEDICINE CLINIC | Facility: CLINIC | Age: 75
End: 2020-06-15

## 2020-06-15 VITALS
OXYGEN SATURATION: 95 % | SYSTOLIC BLOOD PRESSURE: 164 MMHG | HEIGHT: 67 IN | DIASTOLIC BLOOD PRESSURE: 102 MMHG | WEIGHT: 255 LBS | BODY MASS INDEX: 40.02 KG/M2 | TEMPERATURE: 97.3 F | HEART RATE: 73 BPM | RESPIRATION RATE: 16 BRPM

## 2020-06-15 DIAGNOSIS — K59.00 CONSTIPATION, UNSPECIFIED CONSTIPATION TYPE: Primary | ICD-10-CM

## 2020-06-15 LAB
ALBUMIN SERPL-MCNC: 4.3 G/DL (ref 3.5–5.2)
ALBUMIN/GLOB SERPL: 1.6 G/DL
ALP SERPL-CCNC: 138 U/L (ref 39–117)
ALT SERPL W P-5'-P-CCNC: 46 U/L (ref 1–41)
ANION GAP SERPL CALCULATED.3IONS-SCNC: 9.6 MMOL/L (ref 5–15)
AST SERPL-CCNC: 35 U/L (ref 1–40)
BASOPHILS # BLD AUTO: 0.04 10*3/MM3 (ref 0–0.2)
BASOPHILS NFR BLD AUTO: 0.7 % (ref 0–1.5)
BILIRUB SERPL-MCNC: 0.6 MG/DL (ref 0.2–1.2)
BILIRUB UR QL STRIP: NEGATIVE
BUN BLD-MCNC: 18 MG/DL (ref 8–23)
BUN/CREAT SERPL: 17.3 (ref 7–25)
CALCIUM SPEC-SCNC: 9 MG/DL (ref 8.6–10.5)
CHLORIDE SERPL-SCNC: 101 MMOL/L (ref 98–107)
CLARITY UR: CLEAR
CO2 SERPL-SCNC: 28.4 MMOL/L (ref 22–29)
COLOR UR: YELLOW
CREAT BLD-MCNC: 1.04 MG/DL (ref 0.76–1.27)
DEPRECATED RDW RBC AUTO: 45.2 FL (ref 37–54)
DIFFERENTIAL METHOD BLD: ABNORMAL
EOSINOPHIL # BLD AUTO: 0.13 10*3/MM3 (ref 0–0.4)
EOSINOPHIL NFR BLD AUTO: 2.3 % (ref 0.3–6.2)
ERYTHROCYTE [DISTWIDTH] IN BLOOD BY AUTOMATED COUNT: 13.3 % (ref 12.3–15.4)
GFR SERPL CREATININE-BSD FRML MDRD: 70 ML/MIN/1.73
GLOBULIN UR ELPH-MCNC: 2.7 GM/DL
GLUCOSE BLD-MCNC: 103 MG/DL (ref 65–99)
GLUCOSE UR STRIP-MCNC: NEGATIVE MG/DL
HCT VFR BLD AUTO: 40.6 % (ref 37.5–51)
HGB BLD-MCNC: 13.6 G/DL (ref 13–17.7)
HGB UR QL STRIP.AUTO: NEGATIVE
IMM GRANULOCYTES # BLD AUTO: 0.13 10*3/MM3 (ref 0–0.05)
IMM GRANULOCYTES NFR BLD AUTO: 2.3 % (ref 0–0.5)
KETONES UR QL STRIP: NEGATIVE
LEUKOCYTE ESTERASE UR QL STRIP.AUTO: NEGATIVE
LYMPHOCYTES # BLD AUTO: 1.09 10*3/MM3 (ref 0.7–3.1)
LYMPHOCYTES NFR BLD AUTO: 19.5 % (ref 19.6–45.3)
MCH RBC QN AUTO: 30.6 PG (ref 26.6–33)
MCHC RBC AUTO-ENTMCNC: 33.5 G/DL (ref 31.5–35.7)
MCV RBC AUTO: 91.4 FL (ref 79–97)
MONOCYTES # BLD AUTO: 0.68 10*3/MM3 (ref 0.1–0.9)
MONOCYTES NFR BLD AUTO: 12.1 % (ref 5–12)
NEUTROPHILS # BLD AUTO: 3.53 10*3/MM3 (ref 1.7–7)
NEUTROPHILS NFR BLD AUTO: 63.1 % (ref 42.7–76)
NITRITE UR QL STRIP: NEGATIVE
NRBC BLD AUTO-RTO: 0 /100 WBC (ref 0–0.2)
PH UR STRIP.AUTO: 7 [PH] (ref 5–8)
PLATELET # BLD AUTO: 155 10*3/MM3 (ref 140–450)
PMV BLD AUTO: 10 FL (ref 6–12)
POTASSIUM BLD-SCNC: 4 MMOL/L (ref 3.5–5.2)
PROT SERPL-MCNC: 7 G/DL (ref 6–8.5)
PROT UR QL STRIP: NEGATIVE
RBC # BLD AUTO: 4.44 10*6/MM3 (ref 4.14–5.8)
SODIUM BLD-SCNC: 139 MMOL/L (ref 136–145)
SP GR UR STRIP: 1.02 (ref 1–1.03)
UROBILINOGEN UR QL STRIP: NORMAL
WBC # BLD AUTO: 5.6 10*3/MM3 (ref 3.4–10.8)
WBC NRBC COR # BLD: 5.6 10*3/MM3 (ref 3.4–10.8)

## 2020-06-15 PROCEDURE — 81003 URINALYSIS AUTO W/O SCOPE: CPT | Performed by: NURSE PRACTITIONER

## 2020-06-15 PROCEDURE — 99283 EMERGENCY DEPT VISIT LOW MDM: CPT

## 2020-06-15 PROCEDURE — 85025 COMPLETE CBC W/AUTO DIFF WBC: CPT | Performed by: NURSE PRACTITIONER

## 2020-06-15 PROCEDURE — 80053 COMPREHEN METABOLIC PANEL: CPT | Performed by: NURSE PRACTITIONER

## 2020-06-15 PROCEDURE — 25010000002 IOPAMIDOL 61 % SOLUTION: Performed by: EMERGENCY MEDICINE

## 2020-06-15 PROCEDURE — 74177 CT ABD & PELVIS W/CONTRAST: CPT

## 2020-06-15 RX ORDER — SODIUM CHLORIDE 0.9 % (FLUSH) 0.9 %
10 SYRINGE (ML) INJECTION AS NEEDED
Status: DISCONTINUED | OUTPATIENT
Start: 2020-06-15 | End: 2020-06-15 | Stop reason: HOSPADM

## 2020-06-15 RX ADMIN — SODIUM CHLORIDE 1000 ML: 9 INJECTION, SOLUTION INTRAVENOUS at 17:01

## 2020-06-15 RX ADMIN — IOPAMIDOL 85 ML: 612 INJECTION, SOLUTION INTRAVENOUS at 18:01

## 2020-06-15 NOTE — TELEPHONE ENCOUNTER
WIFE STATES THAT PATIENT IS STILL NOT ABLE TO PASS HIS BOWELS. WIFE REQUESTED TO GET A PHONE CALL ABOUT THIS MATTER.     BEST CALL BACK   [phone]

## 2020-06-15 NOTE — ED TRIAGE NOTES
Has been constipated x 1 month.  He has been working with pmd and has been given laxatives.  Mask on at triage

## 2020-06-15 NOTE — TELEPHONE ENCOUNTER
S/w pt wife she stated they have done enema, he is taking miralax and eating prunes etc. She said he is able to go partially but not fully empty. He is really uncomfortable and she wants to know what to do

## 2020-06-15 NOTE — ED PROVIDER NOTES
The CARMEN and I have discussed this patients history, physical exam, and treatment plan. I have reviewed the documentation and personally had a face to face interaction with the patient. I affirm the documentation and agree with the treatment and plan.  The following note describes my personal findings    This patient is a 75-year-old male presenting today with an approximate 1 month history of increasing constipation.  The patient states that he is been on laxatives as well as stool softeners without resolution of symptoms.  The patient does state that he has lower abdominal cramping sensations that are worse in the morning.  The patient denies fevers and chills.    Exam: This patient is resting comfortably and in no distress, without gross neurological deficits.  The patient is afebrile with stable vital signs.  Abdominal exam shows a soft, nontender, nondistended abdomen without rebound or guarding.  Bowel sounds are normal.    Plan: The rectal exam as was performed by the nurse practitioner showed no stool in the rectal vault.  Lab work as well as a CT scan of the abdomen and pelvis will be obtained to rule out a mechanical obstruction.  Will reevaluate and disposition pending.      The patient was wearing a facemask upon entrance into the room and remained in such throughout their visit.  I was wearing PPE including a facemask as well as gloves at any point entering the room and throughout the visit     Guru Sullivan MD  06/15/20 [id number]

## 2020-06-15 NOTE — DISCHARGE INSTRUCTIONS
Continue Senna, Miralax, add in the Milk of mag and take twice a day until you have a daily soft bowel movement    Increase your water intake    Spend less time on toilet    Return Precautions    Although you are being discharged from the ED today, I encourage you to return for worsening symptoms.  Things can, and do, change such that treatment at home with medication may not be adequate.      Specifically, return for any of the following:    Chest pain, shortness of breath, pain or nausea and vomiting not controlled by medications provided.    Please make a follow up with your Primary Care Provider for a blood pressure recheck.

## 2020-06-15 NOTE — ED PROVIDER NOTES
EMERGENCY DEPARTMENT ENCOUNTER    Room Number:  08/08  Date seen:  6/15/2020  Time seen: 4:15 PM  PCP: Damien Pierce MD  Historian: patient    HPI:  Chief complaint:constipation  A complete HPI/ROS/PMH/PSH/SH/FH are unobtainable due to: n/a  Context:Nathan Baez is a 75 y.o. male who presents to the ED with c/o one month of moderate constipation with minimal stool despite frequent sitting on toilet.  It is not made better by Senna (bid), miralax, prunes and several enemas over the past month.  It is not made worse by anything.  He was sent here per Dr. Bowie.  He denies severe abdominal pain, swelling to abdomen or vomiting, dysuria, black stools or weight loss.  He endorses occasional nausea and his last EGT/colonoscopy was in 2017 (Dr. Faria).      Patient was placed in face mask in first look. Patient was wearing facemask when I entered the room and throughout our encounter. I wore full protective equipment throughout this patient encounter including a face mask, eye shield and gloves. Hand hygiene/washing of hands was performed before donning protective equipment and after removal when leaving the room.      MEDICAL RECORD REVIEW    ALLERGIES  Patient has no known allergies.    PAST MEDICAL HISTORY  Active Ambulatory Problems     Diagnosis Date Noted   • OA (osteoarthritis) of knee 09/13/2016   • Hypertension    • Abdominal wall cellulitis 06/02/2019   • Hypothyroidism (acquired) 06/03/2019   • Gastroesophageal reflux disease without esophagitis 07/11/2019   • Mixed hyperlipidemia 07/11/2019   • Primary insomnia 07/11/2019   • DDD (degenerative disc disease), lumbar 07/11/2019   • KWAME (obstructive sleep apnea) 07/11/2019   • Hyperglycemia 07/11/2019   • Allergic 07/11/2019   • Venous insufficiency 07/11/2019   • Prostate cancer (CMS/[facility]) 07/11/2019   • Venous stasis dermatitis    • Tinea cruris    • Tinea corporis    • Throat clearing    • Sleep apnea    • Skin lesion of face    • Rib pain on left  side    • Rectal bleed    • Presbycusis    • Pes planus    • Osteoarthritis    • Obesity    • Medicare annual wellness visit, subsequent    • Lumbar strain    • Internal hemorrhoids    • Insomnia    • Inflamed skin tag    • Hypothyroid    • Hyperplastic polyp of stomach    • Hospital discharge follow-up    • History of colon polyps    • High risk medication use    • Glaucoma    • Gastroenteritis, acute    • Erysipelas    • Encounter for screening colonoscopy    • Encounter for long-term (current) use of NSAIDs    • Dysphagia    • DVT (deep venous thrombosis) (CMS/HCC)    • DJD (degenerative joint disease), lumbar    • Diverticulosis    • Cough    • Contact with hypodermic needle    • Cervical radiculopathy    • Cellulitis    • Arthritis    • Allergic rhinitis    • Acute glaucoma    • Acute embolism and thrombosis of vein    • Actinic keratosis    • Status post reverse total shoulder replacement, 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   • Localized edema 11/26/2019   • Anemia 11/26/2019   • Peripheral polyneuropathy 11/26/2019   • Campylobacter diarrhea 03/14/2020   • Hyponatremia 03/15/2020   • Generalized abdominal pain 03/20/2020   • Fever 03/20/2020   • Constipation 03/20/2020   • Bilateral lower extremity edema 03/20/2020   • Acute pyelonephritis 04/09/2020   • Chronic idiopathic constipation 05/19/2020     Resolved Ambulatory Problems     Diagnosis Date Noted   • Acute DVT (deep venous thrombosis) (CMS/HCC) 06/03/2019   • Restless leg syndrome 07/11/2019   • Restless legs syndrome    • Restless leg    • Hyperlipidemia    • Disequilibrium      Past Medical History:   Diagnosis Date   • Cataract    • GERD (gastroesophageal reflux disease)    • Kidney stone    • Male urinary stress incontinence    • Shoulder pain        PAST SURGICAL HISTORY  Past Surgical History:   Procedure Laterality Date   • CATARACT EXTRACTION Right    • COLONOSCOPY  03/08/2017    3/17normal. Recheck 2022. 12/11. Repeat in 5 years    • ENDOSCOPY W/ PEG REMOVAL      • FOOT SURGERY       had big toe replaced on left foot   • HERNIA REPAIR  2012    umbilical   • JOINT REPLACEMENT Right    • NECK SURGERY  2011    cervical disc   • DE TOTAL KNEE ARTHROPLASTY Left 2016    Procedure: LT TOTAL KNEE ARTHROPLASTY;  Surgeon: Tadeo Basurto MD;  Location: [facility] OR;  Service: Orthopedics   • PROSTATECTOMY  1999. Radical    • THYROID LOBECTOMY     • THYROID SURGERY      partial doesn't know which one was removed   • TONSILLECTOMY     • TOTAL KNEE ARTHROPLASTY Right    • TOTAL SHOULDER ARTHROPLASTY W/ DISTAL CLAVICLE EXCISION Right 2019    Procedure: TOTAL SHOULDER REVERSE ARTHROPLASTY RIGHT REPAIR RIGHT AXILLARY VEIN;  Surgeon: Behzad Kelley MD;  Location: [facility];  Service: Orthopedics   • WRIST SURGERY Right 12/17/2014    x2  wrist replaced       FAMILY HISTORY  Family History   Problem Relation Age of Onset   • Cancer Mother         COLON   • Cancer Father         PROSTATE   • Cancer Sister         BREAST CANCER   • Breast cancer Sister    • Gout Sister    • Hypertension Sister    • Heart attack Brother    • Bone cancer Brother    • Heart disease Brother    • Malig Hyperthermia Neg Hx        SOCIAL HISTORY  Social History     Socioeconomic History   • Marital status:      Spouse name: Not on file   • Number of children: Not on file   • Years of education: Not on file   • Highest education level: Not on file   Tobacco Use   • Smoking status: Former Smoker     Packs/day: 1.00     Years: 20.00     Pack years: 20.00     Types: Cigarettes     Last attempt to quit: 1984     Years since quittin.4   • Smokeless tobacco: Never Used   Substance and Sexual Activity   • Alcohol use: Yes     Comment: 3-4 MONTHLY   • Drug use: No   • Sexual activity: Defer       REVIEW OF SYSTEMS  Review of Systems    All systems reviewed and negative except for those discussed in HPI.     PHYSICAL EXAM    ED Triage Vitals   Temp  Heart Rate Resp BP SpO2   06/15/20 1352 06/15/20 1352 06/15/20 1353 -- 06/15/20 1352   97.1 °F (36.2 °C) 94 16  93 %      Temp src Heart Rate Source Patient Position BP Location FiO2 (%)   06/15/20 1352 06/15/20 1352 -- -- --   Tympanic Monitor        Physical Exam    I have reviewed the triage vital signs and nursing notes.      GENERAL: not distressed  HENT: nares patent, mm moist  EYES: no scleral icterus  NECK: no ROM limitations  CV: regular rhythm, regular rate, no murmur, rub or jose  RESPIRATORY: normal effort, CTAB  ABDOMEN: soft, large pannus present, not distended, no focal abdominal pain  : deferred  MUSCULOSKELETAL: no deformity  NEURO: alert, moves all extremities, follows commands  SKIN: warm, dry    LAB RESULTS  Recent Results (from the past 24 hour(s))   Comprehensive Metabolic Panel    Collection Time: 06/15/20  4:55 PM   Result Value Ref Range    Glucose 103 (H) 65 - 99 mg/dL    BUN 18 8 - 23 mg/dL    Creatinine 1.04 0.76 - 1.27 mg/dL    Sodium 139 136 - 145 mmol/L    Potassium 4.0 3.5 - 5.2 mmol/L    Chloride 101 98 - 107 mmol/L    CO2 28.4 22.0 - 29.0 mmol/L    Calcium 9.0 8.6 - 10.5 mg/dL    Total Protein 7.0 6.0 - 8.5 g/dL    Albumin 4.30 3.50 - 5.20 g/dL    ALT (SGPT) 46 (H) 1 - 41 U/L    AST (SGOT) 35 1 - 40 U/L    Alkaline Phosphatase 138 (H) 39 - 117 U/L    Total Bilirubin 0.6 0.2 - 1.2 mg/dL    eGFR Non African Amer 70 >60 mL/min/1.73    Globulin 2.7 gm/dL    A/G Ratio 1.6 g/dL    BUN/Creatinine Ratio 17.3 7.0 - 25.0    Anion Gap 9.6 5.0 - 15.0 mmol/L   CBC Auto Differential    Collection Time: 06/15/20  4:55 PM   Result Value Ref Range    WBC 5.60 3.40 - 10.80 10*3/mm3    RBC 4.44 4.14 - 5.80 10*6/mm3    Hemoglobin 13.6 13.0 - 17.7 g/dL    Hematocrit 40.6 37.5 - 51.0 %    MCV 91.4 79.0 - 97.0 fL    MCH 30.6 26.6 - 33.0 pg    MCHC 33.5 31.5 - 35.7 g/dL    RDW 13.3 12.3 - 15.4 %    RDW-SD 45.2 37.0 - 54.0 fl    MPV 10.0 6.0 - 12.0 fL    Platelets 155 140 - 450 10*3/mm3    Neutrophil %  63.1 42.7 - 76.0 %    Lymphocyte % 19.5 (L) 19.6 - 45.3 %    Monocyte % 12.1 (H) 5.0 - 12.0 %    Eosinophil % 2.3 0.3 - 6.2 %    Basophil % 0.7 0.0 - 1.5 %    Immature Grans % 2.3 (H) 0.0 - 0.5 %    Neutrophils, Absolute 3.53 1.70 - 7.00 10*3/mm3    Lymphocytes, Absolute 1.09 0.70 - 3.10 10*3/mm3    Monocytes, Absolute 0.68 0.10 - 0.90 10*3/mm3    Eosinophils, Absolute 0.13 0.00 - 0.40 10*3/mm3    Basophils, Absolute 0.04 0.00 - 0.20 10*3/mm3    Immature Grans, Absolute 0.13 (H) 0.00 - 0.05 10*3/mm3    nRBC 0.0 0.0 - 0.2 /100 WBC    Differential Type      WBC 5.60 3.40 - 10.80 10*3/mm3   Urinalysis With Microscopic If Indicated (No Culture) - Urine, Clean Catch    Collection Time: 06/15/20  5:00 PM   Result Value Ref Range    Color, UA Yellow Yellow, Straw    Appearance, UA Clear Clear    pH, UA 7.0 5.0 - 8.0    Specific Gravity, UA 1.018 1.005 - 1.030    Glucose, UA Negative Negative    Ketones, UA Negative Negative    Bilirubin, UA Negative Negative    Blood, UA Negative Negative    Protein, UA Negative Negative    Leuk Esterase, UA Negative Negative    Nitrite, UA Negative Negative    Urobilinogen, UA 0.2 E.U./dL 0.2 - 1.0 E.U./dL         RADIOLOGY RESULTS  CT Abdomen Pelvis With Contrast   Final Result   1. Small amount of stool in the colon.   2. There is no evidence of fecal impaction or bowel obstruction.    3. Several old bilateral rib fractures.   4. Punctate nonobstructing right renal stones.   5. At least 1 gallstone. No gallbladder wall thickening is seen.   6. A 7.6 cm left renal cyst.               Radiation dose reduction techniques were utilized, including automated   exposure control and exposure modulation based on body size.       This report was finalized on 6/15/2020 8:23 PM by Dr. Srikanth Reese M.D.                PROGRESS, DATA ANALYSIS, CONSULTS AND MEDICAL DECISION MAKING  All labs have been independently reviewed by me.  All radiology studies have been reviewed by me and discussed  with radiologist dictating the report.  EKG's independently viewed and interpreted by me unless stated otherwise. Discussion below represents my analysis of pertinent findings related to patient's condition, differential diagnosis, treatment plan and final disposition.     ED Course as of Harman 15 2201   Mon Harman 15, 2020   1637 Rectal exam performed with tech chaperone.  I do not feel any stool in the rectal vault.  Will order basic labs and CT abdomen pelvis    [EW]   1800 Discussed CT abdomen pelvis with Dr. oneill, radiologist.  Mild constipation noted and no other abnormalities.    [EW]   1848 I updated patient on status of labs.  I discussed CT that is not acute, normal labs and will plan on discharge home on [place] milk of magnesia in addition to his other medications that he is on.  I tried to stress with patient not to strain too hard at the toilet and that maybe he was trying too frequently to have BM.     [EW]      ED Course User Index  [EW] Angie Shaffer, APRN     DDX: Differential diagnosis includes but is not limited to:  - hepatobiliary pathology such as cholecystitis, cholangitis, and symptomatic cholelithiasis  - Pancreatitis  - Dyspepsia  - Small bowel obstruction  - Appendicitis  - Diverticulitis  - UTI including pyelonephritis  - Ureteral stone  - Zoster  - Colitis, including infectious and ischemic  - Atypical ACS    MDM: no acute findings on CT scan.  Pt with normal labs.  Will send home with addition of MOM for constipation.   1715:Reviewed pt's history and workup with Dr. Sullivan.  After a bedside evaluation, Dr. Sullivan agrees with the plan of care.    The patient's history, physical exam, and lab findings were discussed with the physician, who also performed a face to face history and physical exam.  I discussed all results and noted any abnormalities with patient.  Discussed absoute need to recheck abnormalities with their family physician.  I answered any of the patient's questions.   "Discussed plan for discharge, as there is no emergent indication for admission.  Pt is agreeable and understands need for follow up and repeat testing.  Pt is aware that discharge does not mean that nothing is wrong but it indicates no emergency is present and they must continue care with their family physician.  Pt is discharged with instructions to follow up with primary care doctor to have their blood pressure rechecked.     Disposition vitals:  BP (!) 164/102 (BP Location: Left arm, Patient Position: Sitting)   Pulse 73   Temp 97.3 °F (36.3 °C) (Oral)   Resp 16   Ht 170.2 cm (67\")   Wt 116 kg (255 lb)   SpO2 95%   BMI 39.94 kg/m²       DIAGNOSIS  Final diagnoses:   Constipation, unspecified constipation type       FOLLOW UP   Damien Pierce MD  [id number] [address] [address] [phone]    Schedule an appointment as soon as possible for a visit in 3 days           Angie Shaffer, APRN  06/15/20 [id number]    "

## 2020-06-16 NOTE — ED NOTES
I wore full protective equipment throughout this patient encounter including a face mask, eye shield and gloves. Hand hygiene/washing of hands was performed before donning protective equipment and after removal when leaving the room.     Camila Pierce RN  06/15/20 0947

## 2020-07-08 ENCOUNTER — OFFICE VISIT (OUTPATIENT)
Dept: FAMILY MEDICINE CLINIC | Facility: CLINIC | Age: 75
End: 2020-07-08

## 2020-07-08 VITALS
OXYGEN SATURATION: 94 % | SYSTOLIC BLOOD PRESSURE: 151 MMHG | TEMPERATURE: 98.4 F | HEIGHT: 67 IN | HEART RATE: 86 BPM | WEIGHT: 256 LBS | DIASTOLIC BLOOD PRESSURE: 81 MMHG | BODY MASS INDEX: 40.18 KG/M2

## 2020-07-08 DIAGNOSIS — K59.1 FUNCTIONAL DIARRHEA: ICD-10-CM

## 2020-07-08 DIAGNOSIS — R19.4 CHANGE IN BOWEL HABITS: ICD-10-CM

## 2020-07-08 DIAGNOSIS — K59.04 CHRONIC IDIOPATHIC CONSTIPATION: Primary | ICD-10-CM

## 2020-07-08 DIAGNOSIS — I10 ESSENTIAL HYPERTENSION: ICD-10-CM

## 2020-07-08 PROCEDURE — 99214 OFFICE O/P EST MOD 30 MIN: CPT | Performed by: FAMILY MEDICINE

## 2020-07-08 RX ORDER — LOSARTAN POTASSIUM 100 MG/1
100 TABLET ORAL DAILY
Qty: 90 TABLET | Refills: 1 | Status: SHIPPED | OUTPATIENT
Start: 2020-07-08 | End: 2020-11-08 | Stop reason: SDUPTHER

## 2020-07-08 NOTE — PROGRESS NOTES
"Chief Complaint   Patient presents with   • Constipation     On going issues with constipation. Pt states laxatives are helping him go but are just giving him diarrhea   • Diarrhea       Subjective   Nathan Baez is a 75 y.o. male.     History of Present Illness   F/U constipation.  Now with diarrhea with BMs 5 times a day.  On MOM 45 mL a day.  On miralax 3 times a week.  On Senna 2 in am and 1 in PM.  C scope 3 years ago normal.  CT abd/pelvis 6/20 with 7.6 cm renal cyst, 1 gallstone, several old bilateral rib fractures, no fecal impaction, small amt of stool in colon.  FH of colon ca in mom at age 61,  1st cousins times 3 with colon cancer.  Concerned about colon cancer.   \"I just do not feel right in my pelvis\".    F/U HTN.  Doing well .  No Se.      The following portions of the patient's history were reviewed and updated as appropriate: allergies, current medications, past family history, past medical history, past social history, past surgical history and problem list.    Review of Systems   Constitutional: Negative for appetite change and fatigue.   HENT: Negative for nosebleeds and sore throat.    Eyes: Negative for blurred vision and visual disturbance.   Respiratory: Negative for shortness of breath and wheezing.    Cardiovascular: Negative for chest pain and leg swelling.   Gastrointestinal: Positive for diarrhea. Negative for abdominal distention and abdominal pain.   Endocrine: Negative for cold intolerance and polyuria.   Genitourinary: Negative for dysuria and hematuria.   Musculoskeletal: Negative for arthralgias and myalgias.   Skin: Negative for color change and rash.   Neurological: Negative for weakness and confusion.   Psychiatric/Behavioral: Negative for agitation and depressed mood.       Patient Active Problem List   Diagnosis   • OA (osteoarthritis) of knee   • Hypertension   • Abdominal wall cellulitis   • Hypothyroidism (acquired)   • Gastroesophageal reflux disease without esophagitis "   • Mixed hyperlipidemia   • Primary insomnia   • DDD (degenerative disc disease), lumbar   • KWAME (obstructive sleep apnea)   • Hyperglycemia   • Allergic   • Venous insufficiency   • Prostate cancer (CMS/HCC)   • Venous stasis dermatitis   • Tinea cruris   • Tinea corporis   • Throat clearing   • Sleep apnea   • Skin lesion of face   • Rib pain on left side   • Rectal bleed   • Presbycusis   • Pes planus   • Osteoarthritis   • Obesity   • Medicare annual wellness visit, subsequent   • Lumbar strain   • Internal hemorrhoids   • Insomnia   • Inflamed skin tag   • Hypothyroid   • Hyperplastic polyp of stomach   • Hospital discharge follow-up   • History of colon polyps   • High risk medication use   • Glaucoma   • Gastroenteritis, acute   • Erysipelas   • Encounter for screening colonoscopy   • Encounter for long-term (current) use of NSAIDs   • Dysphagia   • DVT (deep venous thrombosis) (CMS/HCC)   • DJD (degenerative joint disease), lumbar   • Diverticulosis   • Cough   • Contact with hypodermic needle   • Cervical radiculopathy   • Cellulitis   • Arthritis   • Allergic rhinitis   • Acute glaucoma   • Acute embolism and thrombosis of vein   • Actinic keratosis   • Status post reverse total shoulder replacement, right   • Localized edema   • Anemia   • Peripheral polyneuropathy   • Campylobacter diarrhea   • Hyponatremia   • Generalized abdominal pain   • Fever   • Constipation   • Bilateral lower extremity edema   • Acute pyelonephritis   • Chronic idiopathic constipation   • Functional diarrhea   • Change in bowel habits       No Known Allergies      Current Outpatient Medications:   •  acetaminophen (TYLENOL) 650 MG 8 hr tablet, Take 1,300 mg by mouth 2 (Two) Times a Day., Disp: , Rfl:   •  albuterol (PROVENTIL HFA;VENTOLIN HFA) 108 (90 Base) MCG/ACT inhaler, Inhale 2 puffs Every 4 (Four) Hours As Needed for Wheezing., Disp: , Rfl:   •  ALLERGY SERUM INJECTION, Inject  under the skin into the appropriate area as  directed 1 (One) Time Per Week., Disp: , Rfl:   •  amLODIPine (NORVASC) 2.5 MG tablet, Take 1 tablet by mouth Daily., Disp: 90 tablet, Rfl: 3  •  aspirin 81 MG tablet, Take 1 tablet by mouth Daily., Disp: , Rfl:   •  azelastine (ASTELIN) 0.1 % nasal spray, 2 sprays into the nostril(s) as directed by provider 2 (Two) Times a Day. Use in each nostril as directed, Disp: , Rfl:   •  brimonidine (ALPHAGAN) 0.2 % ophthalmic solution, Administer  to both eyes. 3 drops in each eye at night and 2 drops in the morning in each eye, Disp: , Rfl:   •  BRIMONIDINE TARTRATE OP, Apply 1 drop to eye(s) as directed by provider 2 (Two) Times a Day., Disp: , Rfl:   •  celecoxib (CeleBREX) 200 MG capsule, Take 200 mg by mouth Daily., Disp: , Rfl:   •  dorzolamide-timolol (COSOPT) 22.3-6.8 MG/ML ophthalmic solution, Administer 1 drop to both eyes 2 (Two) Times a Day., Disp: , Rfl:   •  fexofenadine (ALLEGRA) 180 MG tablet, Take 180 mg by mouth Daily., Disp: , Rfl:   •  Fluticasone Furoate-Vilanterol (BREO ELLIPTA) 200-25 MCG/INH inhaler, Inhale 2 puffs Every Morning., Disp: , Rfl:   •  furosemide (LASIX) 40 MG tablet, One a day, Disp: 90 tablet, Rfl: 3  •  hydroCHLOROthiazide (HYDRODIURIL) 25 MG tablet, Take 25 mg by mouth Daily., Disp: , Rfl:   •  latanoprost (XALATAN) 0.005 % ophthalmic solution, Administer 1 drop to both eyes Every Night., Disp: , Rfl:   •  levothyroxine (SYNTHROID) 88 MCG tablet, Take 1 tablet by mouth Every Morning., Disp: 90 tablet, Rfl: 3  •  losartan (COZAAR) 100 MG tablet, Take 1 tablet by mouth Daily., Disp: 90 tablet, Rfl: 1  •  magnesium hydroxide (Milk of Magnesia) 400 MG/5ML suspension, Take 15 mL by mouth 2 (two) times a day., Disp: 473 mL, Rfl: 0  •  montelukast (SINGULAIR) 10 MG tablet, Take 1 tablet by mouth Every Night. (Patient taking differently: Take 10 mg by mouth Daily.), Disp: 90 tablet, Rfl: 3  •  pantoprazole (PROTONIX) 40 MG EC tablet, Take 1 tablet by mouth 2 (Two) Times a Day., Disp: 90  tablet, Rfl: 3  •  polyethylene glycol (MIRALAX) 17 GM/SCOOP powder, Take 17 g by mouth Daily., Disp: , Rfl:   •  potassium chloride (K-DUR) 10 MEQ CR tablet, TAKE 1 TABLET BY MOUTH TWICE DAILY, Disp: 180 tablet, Rfl: 3  •  pramipexole (MIRAPEX) 1.5 MG tablet, Take 1 tablet by mouth At Night As Needed (RLS)., Disp: 30 tablet, Rfl: 5  •  rosuvastatin (CRESTOR) 20 MG tablet, TAKE 1 TABLET EVERY EVENING, Disp: 90 tablet, Rfl: 1  •  sennosides-docusate (senna-docusate sodium) 8.6-50 MG per tablet, 2 in am and 1 with supper., Disp: 90 tablet, Rfl: 11    Past Medical History:   Diagnosis Date   • Actinic keratosis    • Acute embolism and thrombosis of vein    • Allergic rhinitis    • Arthritis    • Cataract     forming left eye   • Cellulitis    • Cellulitis    • Cervical radiculopathy    • Contact with hypodermic needle    • Cough    • Disequilibrium    • Diverticulosis    • DJD (degenerative joint disease), lumbar    • DVT (deep venous thrombosis) (CMS/HCC)     l leg  dx 2019 and was on blood thinner and ow off wears compression    • Dysphagia    • Encounter for long-term (current) use of NSAIDs    • Encounter for screening colonoscopy    • Erysipelas    • Gastroenteritis, acute    • GERD (gastroesophageal reflux disease)    • Glaucoma    • High risk medication use    • History of colon polyps    • Hospital discharge follow-up    • Hyperglycemia    • Hyperlipidemia    • Hyperplastic polyp of stomach    • Hypertension    • Hypothyroid    • Inflamed skin tag    • Insomnia    • Internal hemorrhoids    • Kidney stone     has had history of passing and still has kidney stone on both sides    • Lumbar strain    • Male urinary stress incontinence     s/p prostatectomy   • Medicare annual wellness visit, subsequent    • Obesity    • KWAME (obstructive sleep apnea)    • Osteoarthritis    • Pes planus    • Presbycusis    • Prostate cancer (CMS/HCC)    • Prostate cancer (CMS/HCC)    • Rectal bleed    • Restless legs syndrome    •  Rib pain on left side    • Shoulder pain    • Skin lesion of face    • Sleep apnea     bipap   • Throat clearing    • Tinea corporis    • Tinea cruris    • Venous stasis dermatitis        Past Surgical History:   Procedure Laterality Date   • CATARACT EXTRACTION Right    • COLONOSCOPY  2017    3/17normal. Recheck . . Repeat in 5 years    • ENDOSCOPY W/ PEG REMOVAL     • FOOT SURGERY       had big toe replaced on left foot   • HERNIA REPAIR  2012    umbilical   • JOINT REPLACEMENT Right    • NECK SURGERY  2011    cervical disc   • OR TOTAL KNEE ARTHROPLASTY Left 2016    Procedure: LT TOTAL KNEE ARTHROPLASTY;  Surgeon: Tadeo Basurto MD;  Location: [facility] OR;  Service: Orthopedics   • PROSTATECTOMY  1999. Radical    • THYROID LOBECTOMY     • THYROID SURGERY      partial doesn't know which one was removed   • TONSILLECTOMY     • TOTAL KNEE ARTHROPLASTY Right    • TOTAL SHOULDER ARTHROPLASTY W/ DISTAL CLAVICLE EXCISION Right 2019    Procedure: TOTAL SHOULDER REVERSE ARTHROPLASTY RIGHT REPAIR RIGHT AXILLARY VEIN;  Surgeon: Behzad Kelley MD;  Location: [facility];  Service: Orthopedics   • WRIST SURGERY Right 12/17/2014    x2  wrist replaced       Family History   Problem Relation Age of Onset   • Cancer Mother         COLON   • Cancer Father         PROSTATE   • Cancer Sister         BREAST CANCER   • Breast cancer Sister    • Gout Sister    • Hypertension Sister    • Heart attack Brother    • Bone cancer Brother    • Heart disease Brother    • Malig Hyperthermia Neg Hx        Social History     Tobacco Use   • Smoking status: Former Smoker     Packs/day: 1.00     Years: 20.00     Pack years: 20.00     Types: Cigarettes     Last attempt to quit: 1984     Years since quittin.5   • Smokeless tobacco: Never Used   Substance Use Topics   • Alcohol use: Yes     Comment: 3-4 MONTHLY            Objective     Vitals:    20 0923    BP: 151/81   Pulse: 86   Temp: 98.4 °F (36.9 °C)   SpO2: 94%     Body mass index is 40.1 kg/m².    Physical Exam   Constitutional: He appears well-developed and well-nourished.   HENT:   Head: Normocephalic and atraumatic.   Mouth/Throat: Oropharynx is clear and moist.   Eyes: Pupils are equal, round, and reactive to light. No scleral icterus.   Neck: No thyromegaly present.   Cardiovascular: Normal rate and regular rhythm. Exam reveals no gallop and no friction rub.   No murmur heard.  Pulmonary/Chest: Effort normal. No respiratory distress. He has no wheezes. He has no rales. He exhibits no tenderness.   Abdominal: Soft. Bowel sounds are normal. He exhibits no distension. There is no tenderness.   Musculoskeletal: Normal range of motion. He exhibits no edema or deformity.   Lymphadenopathy:     He has no cervical adenopathy.   Neurological: No cranial nerve deficit. He exhibits normal muscle tone.   Skin: Skin is warm and dry. No rash noted. He is not diaphoretic.   Vitals reviewed.      Lab Results   Component Value Date    GLUCOSE 103 (H) 06/15/2020    BUN 18 06/15/2020    CREATININE 1.04 06/15/2020    EGFRIFNONA 70 06/15/2020    EGFRIFAFRI 76 12/17/2019    BCR 17.3 06/15/2020    K 4.0 06/15/2020    CO2 28.4 06/15/2020    CALCIUM 9.0 06/15/2020    PROTENTOTREF 7.0 12/17/2019    ALBUMIN 4.30 06/15/2020    LABIL2 1.7 12/17/2019    AST 35 06/15/2020    ALT 46 (H) 06/15/2020       WBC   Date Value Ref Range Status   06/15/2020 5.60 3.40 - 10.80 10*3/mm3 Final   06/15/2020 5.60 3.40 - 10.80 10*3/mm3 Final   02/13/2020 4.97 3.40 - 10.80 10*3/mm3 Final     RBC   Date Value Ref Range Status   06/15/2020 4.44 4.14 - 5.80 10*6/mm3 Final   02/13/2020 4.60 4.14 - 5.80 10*6/mm3 Final     Hemoglobin   Date Value Ref Range Status   06/15/2020 13.6 13.0 - 17.7 g/dL Final     Hematocrit   Date Value Ref Range Status   06/15/2020 40.6 37.5 - 51.0 % Final     MCV   Date Value Ref Range Status   06/15/2020 91.4 79.0 - 97.0 fL  Final     MCH   Date Value Ref Range Status   06/15/2020 30.6 26.6 - 33.0 pg Final     MCHC   Date Value Ref Range Status   06/15/2020 33.5 31.5 - 35.7 g/dL Final     RDW   Date Value Ref Range Status   06/15/2020 13.3 12.3 - 15.4 % Final     RDW-SD   Date Value Ref Range Status   06/15/2020 45.2 37.0 - 54.0 fl Final     MPV   Date Value Ref Range Status   06/15/2020 10.0 6.0 - 12.0 fL Final     Platelets   Date Value Ref Range Status   06/15/2020 155 140 - 450 10*3/mm3 Final     Neutrophil %   Date Value Ref Range Status   06/15/2020 63.1 42.7 - 76.0 % Final     Lymphocyte %   Date Value Ref Range Status   06/15/2020 19.5 (L) 19.6 - 45.3 % Final     Monocyte %   Date Value Ref Range Status   06/15/2020 12.1 (H) 5.0 - 12.0 % Final     Eosinophil %   Date Value Ref Range Status   06/15/2020 2.3 0.3 - 6.2 % Final     Basophil %   Date Value Ref Range Status   06/15/2020 0.7 0.0 - 1.5 % Final     Immature Grans %   Date Value Ref Range Status   06/15/2020 2.3 (H) 0.0 - 0.5 % Final     Neutrophils, Absolute   Date Value Ref Range Status   06/15/2020 3.53 1.70 - 7.00 10*3/mm3 Final     Lymphocytes, Absolute   Date Value Ref Range Status   06/15/2020 1.09 0.70 - 3.10 10*3/mm3 Final     Monocytes, Absolute   Date Value Ref Range Status   06/15/2020 0.68 0.10 - 0.90 10*3/mm3 Final     Eosinophils, Absolute   Date Value Ref Range Status   06/15/2020 0.13 0.00 - 0.40 10*3/mm3 Final     Basophils, Absolute   Date Value Ref Range Status   06/15/2020 0.04 0.00 - 0.20 10*3/mm3 Final     Immature Grans, Absolute   Date Value Ref Range Status   06/15/2020 0.13 (H) 0.00 - 0.05 10*3/mm3 Final     nRBC   Date Value Ref Range Status   06/15/2020 0.0 0.0 - 0.2 /100 WBC Final       Lab Results   Component Value Date    HGBA1C 6.00 (H) 07/11/2019       Lab Results   Component Value Date    [id number] 334 06/04/2019       TSH   Date Value Ref Range Status   07/11/2019 3.750 0.270 - 4.200 mIU/mL Final   06/04/2019 2.220 0.270 - 4.200 mIU/mL  Final       No results found for: CHOL  No results found for: TRIG  No results found for: HDL  No results found for: LDL  No results found for: VLDL  No results found for: LDLHDL      Procedures    Assessment/Plan   Problems Addressed this Visit        Cardiovascular and Mediastinum    Hypertension    Relevant Medications    losartan (COZAAR) 100 MG tablet       Digestive    Chronic idiopathic constipation - Primary    Relevant Orders    Ambulatory Referral to Gastroenterology    Functional diarrhea    Relevant Orders    Ambulatory Referral to Gastroenterology    Change in bowel habits    Relevant Orders    Ambulatory Referral to Gastroenterology      Decrease senna S to 2 in am.  Decrease MOM to 30 cc a day.    Orders Placed This Encounter   Procedures   • Ambulatory Referral to Gastroenterology     Referral Priority:   Routine     Referral Type:   Consultation     Referral Reason:   Specialty Services Required     Referred to Provider:   Jaden Chowdhury MD     Requested Specialty:   Gastroenterology     Number of Visits Requested:   1       Current Outpatient Medications   Medication Sig Dispense Refill   • acetaminophen (TYLENOL) 650 MG 8 hr tablet Take 1,300 mg by mouth 2 (Two) Times a Day.     • albuterol (PROVENTIL HFA;VENTOLIN HFA) 108 (90 Base) MCG/ACT inhaler Inhale 2 puffs Every 4 (Four) Hours As Needed for Wheezing.     • ALLERGY SERUM INJECTION Inject  under the skin into the appropriate area as directed 1 (One) Time Per Week.     • amLODIPine (NORVASC) 2.5 MG tablet Take 1 tablet by mouth Daily. 90 tablet 3   • aspirin 81 MG tablet Take 1 tablet by mouth Daily.     • azelastine (ASTELIN) 0.1 % nasal spray 2 sprays into the nostril(s) as directed by provider 2 (Two) Times a Day. Use in each nostril as directed     • brimonidine (ALPHAGAN) 0.2 % ophthalmic solution Administer  to both eyes. 3 drops in each eye at night and 2 drops in the morning in each eye     • BRIMONIDINE TARTRATE OP Apply 1 drop  to eye(s) as directed by provider 2 (Two) Times a Day.     • celecoxib (CeleBREX) 200 MG capsule Take 200 mg by mouth Daily.     • dorzolamide-timolol (COSOPT) 22.3-6.8 MG/ML ophthalmic solution Administer 1 drop to both eyes 2 (Two) Times a Day.     • fexofenadine (ALLEGRA) 180 MG tablet Take 180 mg by mouth Daily.     • Fluticasone Furoate-Vilanterol (BREO ELLIPTA) 200-25 MCG/INH inhaler Inhale 2 puffs Every Morning.     • furosemide (LASIX) 40 MG tablet One a day 90 tablet 3   • hydroCHLOROthiazide (HYDRODIURIL) 25 MG tablet Take 25 mg by mouth Daily.     • latanoprost (XALATAN) 0.005 % ophthalmic solution Administer 1 drop to both eyes Every Night.     • levothyroxine (SYNTHROID) 88 MCG tablet Take 1 tablet by mouth Every Morning. 90 tablet 3   • losartan (COZAAR) 100 MG tablet Take 1 tablet by mouth Daily. 90 tablet 1   • magnesium hydroxide (Milk of Magnesia) 400 MG/5ML suspension Take 15 mL by mouth 2 (two) times a day. 473 mL 0   • montelukast (SINGULAIR) 10 MG tablet Take 1 tablet by mouth Every Night. (Patient taking differently: Take 10 mg by mouth Daily.) 90 tablet 3   • pantoprazole (PROTONIX) 40 MG EC tablet Take 1 tablet by mouth 2 (Two) Times a Day. 90 tablet 3   • polyethylene glycol (MIRALAX) 17 GM/SCOOP powder Take 17 g by mouth Daily.     • potassium chloride (K-DUR) 10 MEQ CR tablet TAKE 1 TABLET BY MOUTH TWICE DAILY 180 tablet 3   • pramipexole (MIRAPEX) 1.5 MG tablet Take 1 tablet by mouth At Night As Needed (RLS). 30 tablet 5   • rosuvastatin (CRESTOR) 20 MG tablet TAKE 1 TABLET EVERY EVENING 90 tablet 1   • sennosides-docusate (senna-docusate sodium) 8.6-50 MG per tablet 2 in am and 1 with supper. 90 tablet 11     No current facility-administered medications for this visit.          Nathan Baez had no medications administered during this visit.    No follow-ups on file.    There are no Patient Instructions on file for this visit.

## 2020-07-30 ENCOUNTER — OFFICE VISIT (OUTPATIENT)
Dept: GASTROENTEROLOGY | Facility: CLINIC | Age: 75
End: 2020-07-30

## 2020-07-30 VITALS — HEIGHT: 67 IN | WEIGHT: 256.6 LBS | BODY MASS INDEX: 40.27 KG/M2 | TEMPERATURE: 98 F

## 2020-07-30 DIAGNOSIS — K59.04 CHRONIC IDIOPATHIC CONSTIPATION: Primary | ICD-10-CM

## 2020-07-30 PROCEDURE — 99204 OFFICE O/P NEW MOD 45 MIN: CPT | Performed by: INTERNAL MEDICINE

## 2020-07-30 NOTE — PROGRESS NOTES
Chief Complaint   Patient presents with   • Constipation     alternating   • Diarrhea   • Abdominal Pain   • Nausea     Nathan Baez is a 75 y.o. male who presents with a history of constipation with abdominal pain nausea with diarrhea.  HPI     Patient 75-year-old male with history of 6 months of constipation with attempted treatment causing diarrhea.  Patient reports the urge to go to the bathroom will try with results of only small amounts of liquid may spend up to an hour sitting on the toilet for little soft pieces of stool to come out.  Patient seen in ER in June with moderate amount of left colon stool.  No other abnormal readings reported on CT.  Patient denies any fever or chills no bright red blood per rectum or melena.  Patient did undergo colonoscopy in 2017 and was normal.  Patient believes there may have been polyps though and was told to come back in 2022.    Past Medical History:   Diagnosis Date   • Actinic keratosis    • Acute embolism and thrombosis of vein    • Allergic rhinitis    • Arthritis    • Cataract     forming left eye   • Cellulitis    • Cellulitis    • Cervical radiculopathy    • Contact with hypodermic needle    • Cough    • Disequilibrium    • Diverticulosis    • DJD (degenerative joint disease), lumbar    • DVT (deep venous thrombosis) (CMS/HCC)     l leg  dx 2019 and was on blood thinner and ow off wears compression    • Dysphagia    • Encounter for long-term (current) use of NSAIDs    • Encounter for screening colonoscopy    • Erysipelas    • Gastroenteritis, acute    • GERD (gastroesophageal reflux disease)    • Glaucoma    • High risk medication use    • History of colon polyps    • Hospital discharge follow-up    • Hyperglycemia    • Hyperlipidemia    • Hyperplastic polyp of stomach    • Hypertension    • Hypothyroid    • Inflamed skin tag    • Insomnia    • Internal hemorrhoids    • Kidney stone     has had history of passing and still has kidney stone on both sides    •  Lumbar strain    • Male urinary stress incontinence     s/p prostatectomy   • Medicare annual wellness visit, subsequent    • Obesity    • KWAME (obstructive sleep apnea)    • Osteoarthritis    • Pes planus    • Presbycusis    • Prostate cancer (CMS/[facility])    • Prostate cancer (CMS/[facility])    • Rectal bleed    • Restless legs syndrome    • Rib pain on left side    • Shoulder pain    • Skin lesion of face    • Sleep apnea     bipap   • Throat clearing    • Tinea corporis    • Tinea cruris    • Venous stasis dermatitis        Current Outpatient Medications:   •  ALLERGY SERUM INJECTION, Inject  under the skin into the appropriate area as directed 1 (One) Time Per Week., Disp: , Rfl:   •  amLODIPine (NORVASC) 2.5 MG tablet, Take 1 tablet by mouth Daily., Disp: 90 tablet, Rfl: 3  •  aspirin 81 MG tablet, Take 1 tablet by mouth Daily., Disp: , Rfl:   •  azelastine (ASTELIN) 0.1 % nasal spray, 2 sprays into the nostril(s) as directed by provider 2 (Two) Times a Day. Use in each nostril as directed, Disp: , Rfl:   •  brimonidine (ALPHAGAN) 0.2 % ophthalmic solution, Administer  to both eyes. 3 drops in each eye at night and 2 drops in the morning in each eye, Disp: , Rfl:   •  BRIMONIDINE TARTRATE OP, Apply 1 drop to eye(s) as directed by provider 2 (Two) Times a Day., Disp: , Rfl:   •  dorzolamide-timolol (COSOPT) 22.3-6.8 MG/ML ophthalmic solution, Administer 1 drop to both eyes 2 (Two) Times a Day., Disp: , Rfl:   •  fexofenadine (ALLEGRA) 180 MG tablet, Take 180 mg by mouth Daily., Disp: , Rfl:   •  Fluticasone Furoate-Vilanterol (BREO ELLIPTA) 200-25 MCG/INH inhaler, Inhale 2 puffs Every Morning., Disp: , Rfl:   •  furosemide (LASIX) 40 MG tablet, One a day, Disp: 90 tablet, Rfl: 3  •  hydroCHLOROthiazide (HYDRODIURIL) 25 MG tablet, Take 25 mg by mouth Daily., Disp: , Rfl:   •  latanoprost (XALATAN) 0.005 % ophthalmic solution, Administer 1 drop to both eyes Every Night., Disp: , Rfl:   •  levothyroxine (SYNTHROID) 88 MCG  tablet, Take 1 tablet by mouth Every Morning., Disp: 90 tablet, Rfl: 3  •  losartan (COZAAR) 100 MG tablet, Take 1 tablet by mouth Daily., Disp: 90 tablet, Rfl: 1  •  montelukast (SINGULAIR) 10 MG tablet, Take 1 tablet by mouth Every Night. (Patient taking differently: Take 10 mg by mouth Daily.), Disp: 90 tablet, Rfl: 3  •  pantoprazole (PROTONIX) 40 MG EC tablet, Take 1 tablet by mouth 2 (Two) Times a Day., Disp: 90 tablet, Rfl: 3  •  potassium chloride (K-DUR) 10 MEQ CR tablet, TAKE 1 TABLET BY MOUTH TWICE DAILY, Disp: 180 tablet, Rfl: 3  •  pramipexole (MIRAPEX) 1.5 MG tablet, Take 1 tablet by mouth At Night As Needed (RLS)., Disp: 30 tablet, Rfl: 5  •  rosuvastatin (CRESTOR) 20 MG tablet, TAKE 1 TABLET EVERY EVENING, Disp: 90 tablet, Rfl: 1  •  acetaminophen (TYLENOL) 650 MG 8 hr tablet, Take 1,300 mg by mouth 2 (Two) Times a Day., Disp: , Rfl:   •  albuterol (PROVENTIL HFA;VENTOLIN HFA) 108 (90 Base) MCG/ACT inhaler, Inhale 2 puffs Every 4 (Four) Hours As Needed for Wheezing., Disp: , Rfl:   No Known Allergies  Social History     Socioeconomic History   • Marital status:      Spouse name: Not on file   • Number of children: Not on file   • Years of education: Not on file   • Highest education level: Not on file   Tobacco Use   • Smoking status: Former Smoker     Packs/day: 1.00     Years: 20.00     Pack years: 20.00     Types: Cigarettes     Last attempt to quit: 1984     Years since quittin.6   • Smokeless tobacco: Never Used   Substance and Sexual Activity   • Alcohol use: Yes     Comment: 3-4 MONTHLY   • Drug use: No   • Sexual activity: Defer     Family History   Problem Relation Age of Onset   • Cancer Mother         COLON   • Colon cancer Mother    • Cancer Father         PROSTATE   • Cancer Sister         BREAST CANCER   • Breast cancer Sister    • Gout Sister    • Hypertension Sister    • Heart attack Brother    • Bone cancer Brother    • Heart disease Brother    • Malig Hyperthermia  Neg Hx      Review of Systems   Constitutional: Negative.    HENT: Negative.    Eyes: Negative.    Respiratory: Negative.    Cardiovascular: Negative.    Gastrointestinal: Positive for abdominal pain, constipation, diarrhea and nausea. Negative for abdominal distention, anal bleeding, blood in stool, rectal pain and vomiting.   Endocrine: Negative.    Musculoskeletal: Positive for arthralgias and joint swelling. Negative for back pain, gait problem and myalgias.   Skin: Negative.    Allergic/Immunologic: Negative.    Hematological: Negative.      Vitals:    07/30/20 0912   Temp: 98 °F (36.7 °C)     Physical Exam   Constitutional: He is oriented to person, place, and time. He appears well-developed and well-nourished.   HENT:   Head: Normocephalic and atraumatic.   Eyes: Conjunctivae and EOM are normal. No scleral icterus.   Neck: Normal range of motion. No tracheal deviation present.   Cardiovascular: Normal rate and regular rhythm. Exam reveals no gallop and no friction rub.   No murmur heard.  Pulmonary/Chest: Effort normal and breath sounds normal. No respiratory distress. He has no wheezes. He has no rales.   Abdominal: Soft. Bowel sounds are normal. He exhibits no distension and no mass. There is no tenderness. There is no rebound and no guarding.   Musculoskeletal: Normal range of motion. He exhibits no edema.   Lymphadenopathy:     He has no cervical adenopathy.   Neurological: He is alert and oriented to person, place, and time. No cranial nerve deficit.   Skin: Skin is warm and dry. No rash noted.   Psychiatric: He has a normal mood and affect. Judgment normal.   Nursing note and vitals reviewed.    Diagnoses and all orders for this visit:    Chronic idiopathic constipation    Patient 75-year-old male with history of osteoarthritis hyperlipidemia, hypertensive, hyperglycemia as well as history of colon polyps presenting with 6 months of chronic constipation.  Patient had a CT in June with moderate amount of  stool in the left colon.  Patient describes stool as urgency with difficulty passing bowels.  Patient reports will sit up to an hour and get a small amount of stool out.  Patient reports being on senna milk of magnesia as well as intermittent MiraLAX but instead of having more normal stools she is gone to diarrhea.  Review of unremarkable CT would recommend readdressing his bowel regimen.  With senna causing increased contractions and increased motility that could give him this urgency will replace his current regimen and begin Linzess 145 daily.  Review of his CT revealed fatty replacement of the pancreas recommend adding low-dose pancreatic enzyme replacement will begin with 24,000 units and follow clinically.

## 2020-08-17 ENCOUNTER — TELEPHONE (OUTPATIENT)
Dept: GASTROENTEROLOGY | Facility: CLINIC | Age: 75
End: 2020-08-17

## 2020-08-17 NOTE — TELEPHONE ENCOUNTER
Call to spouse, Coco (see hipaa).  States has been taking linzess 145 mcg and creon 24,000 units as instructed with o/v of 7/30.  Not seeing any improvement in bowel pattern, abd pain, nausea. Almost out of samples.  Asking how to proceed.

## 2020-08-17 NOTE — TELEPHONE ENCOUNTER
----- Message from Margy Wilkins sent at 8/17/2020 11:51 AM EDT -----  Contact: [phone]  Patient is almost out of samples for linzess and creon.  Patient would like either prescription or more samples    Please call patients wife Coco [phone]

## 2020-08-19 ENCOUNTER — OFFICE VISIT (OUTPATIENT)
Dept: FAMILY MEDICINE CLINIC | Facility: CLINIC | Age: 75
End: 2020-08-19

## 2020-08-19 VITALS
SYSTOLIC BLOOD PRESSURE: 169 MMHG | HEART RATE: 71 BPM | TEMPERATURE: 98.1 F | HEIGHT: 67 IN | DIASTOLIC BLOOD PRESSURE: 98 MMHG | WEIGHT: 259 LBS | OXYGEN SATURATION: 94 % | BODY MASS INDEX: 40.65 KG/M2

## 2020-08-19 DIAGNOSIS — K21.9 GASTROESOPHAGEAL REFLUX DISEASE WITHOUT ESOPHAGITIS: ICD-10-CM

## 2020-08-19 DIAGNOSIS — E03.9 HYPOTHYROIDISM (ACQUIRED): ICD-10-CM

## 2020-08-19 DIAGNOSIS — E78.2 MIXED HYPERLIPIDEMIA: ICD-10-CM

## 2020-08-19 DIAGNOSIS — K59.04 CHRONIC IDIOPATHIC CONSTIPATION: Primary | ICD-10-CM

## 2020-08-19 DIAGNOSIS — J30.1 SEASONAL ALLERGIC RHINITIS DUE TO POLLEN: ICD-10-CM

## 2020-08-19 PROCEDURE — 99214 OFFICE O/P EST MOD 30 MIN: CPT | Performed by: FAMILY MEDICINE

## 2020-08-19 RX ORDER — PANTOPRAZOLE SODIUM 40 MG/1
40 TABLET, DELAYED RELEASE ORAL 2 TIMES DAILY
Qty: 90 TABLET | Refills: 3 | Status: SHIPPED | OUTPATIENT
Start: 2020-08-19 | End: 2020-08-24

## 2020-08-19 RX ORDER — MONTELUKAST SODIUM 10 MG/1
10 TABLET ORAL NIGHTLY
Qty: 90 TABLET | Refills: 3 | Status: SHIPPED | OUTPATIENT
Start: 2020-08-19 | End: 2020-11-08 | Stop reason: SDUPTHER

## 2020-08-19 RX ORDER — LACTULOSE 10 G/15ML
SOLUTION ORAL
Qty: 946 ML | Refills: 4 | Status: SHIPPED | OUTPATIENT
Start: 2020-08-19 | End: 2020-12-15

## 2020-08-19 NOTE — PROGRESS NOTES
Chief Complaint   Patient presents with   • Heartburn       Subjective   Nathan Baez is a 75 y.o. male.     History of Present Illness   C/o trouble with constipation.  Going on for 4 mnths or greater.  I often spend more than an hour trying to pass stool.  No help with miralax, senna S 3 pills a day.  Some help with milk of magnesia in past.  No help with  linzess 145 daily started by Dr Chowdhury.  Added Creon due to CT showing fatty replacement of pancreas, per GI note.  CT June with small amount of stool in colon.     FU KIMBERLEY.  Doing well with meds.  No Se.   fU hyperlipidmeia.    On crestor daily.  No Se.    The following portions of the patient's history were reviewed and updated as appropriate: allergies, current medications, past family history, past medical history, past social history, past surgical history and problem list.    Review of Systems   Constitutional: Negative for appetite change and fatigue.   HENT: Negative for nosebleeds and sore throat.    Eyes: Negative for blurred vision and visual disturbance.   Respiratory: Negative for shortness of breath and wheezing.    Cardiovascular: Negative for chest pain and leg swelling.   Gastrointestinal: Positive for constipation. Negative for abdominal distention and abdominal pain.   Endocrine: Negative for cold intolerance and polyuria.   Genitourinary: Negative for dysuria and hematuria.   Musculoskeletal: Negative for arthralgias and myalgias.   Skin: Negative for color change and rash.   Neurological: Negative for weakness and confusion.   Psychiatric/Behavioral: Negative for agitation and depressed mood.       Patient Active Problem List   Diagnosis   • OA (osteoarthritis) of knee   • Hypertension   • Abdominal wall cellulitis   • Hypothyroidism (acquired)   • Gastroesophageal reflux disease without esophagitis   • Mixed hyperlipidemia   • Primary insomnia   • DDD (degenerative disc disease), lumbar   • KWAME (obstructive sleep apnea)   • Hyperglycemia    • Allergic   • Venous insufficiency   • Prostate cancer (CMS/HCC)   • Venous stasis dermatitis   • Tinea cruris   • Tinea corporis   • Throat clearing   • Sleep apnea   • Skin lesion of face   • Rib pain on left side   • Rectal bleed   • Presbycusis   • Pes planus   • Osteoarthritis   • Obesity   • Medicare annual wellness visit, subsequent   • Lumbar strain   • Internal hemorrhoids   • Insomnia   • Inflamed skin tag   • Hyperplastic polyp of stomach   • Hospital discharge follow-up   • History of colon polyps   • High risk medication use   • Glaucoma   • Gastroenteritis, acute   • Erysipelas   • Encounter for screening colonoscopy   • Encounter for long-term (current) use of NSAIDs   • Dysphagia   • DVT (deep venous thrombosis) (CMS/[facility])   • DJD (degenerative joint disease), lumbar   • Diverticulosis   • Cough   • Contact with hypodermic needle   • Cervical radiculopathy   • Cellulitis   • Arthritis   • Allergic rhinitis   • Acute glaucoma   • Acute embolism and thrombosis of vein   • Actinic keratosis   • Status post reverse total shoulder replacement, right   • Localized edema   • Anemia   • Peripheral polyneuropathy   • Campylobacter diarrhea   • Hyponatremia   • Generalized abdominal pain   • Fever   • Constipation   • Bilateral lower extremity edema   • Acute pyelonephritis   • Chronic idiopathic constipation   • Functional diarrhea   • Change in bowel habits       No Known Allergies      Current Outpatient Medications:   •  acetaminophen (TYLENOL) 650 MG 8 hr tablet, Take 1,300 mg by mouth 2 (Two) Times a Day., Disp: , Rfl:   •  albuterol (PROVENTIL HFA;VENTOLIN HFA) 108 (90 Base) MCG/ACT inhaler, Inhale 2 puffs Every 4 (Four) Hours As Needed for Wheezing., Disp: , Rfl:   •  ALLERGY SERUM INJECTION, Inject  under the skin into the appropriate area as directed 1 (One) Time Per Week., Disp: , Rfl:   •  amLODIPine (NORVASC) 2.5 MG tablet, Take 1 tablet by mouth Daily., Disp: 90 tablet, Rfl: 3  •  aspirin 81 MG  tablet, Take 1 tablet by mouth Daily., Disp: , Rfl:   •  azelastine (ASTELIN) 0.1 % nasal spray, 2 sprays into the nostril(s) as directed by provider 2 (Two) Times a Day. Use in each nostril as directed, Disp: , Rfl:   •  brimonidine (ALPHAGAN) 0.2 % ophthalmic solution, Administer  to both eyes. 3 drops in each eye at night and 2 drops in the morning in each eye, Disp: , Rfl:   •  BRIMONIDINE TARTRATE OP, Apply 1 drop to eye(s) as directed by provider 2 (Two) Times a Day., Disp: , Rfl:   •  dorzolamide-timolol (COSOPT) 22.3-6.8 MG/ML ophthalmic solution, Administer 1 drop to both eyes 2 (Two) Times a Day., Disp: , Rfl:   •  fexofenadine (ALLEGRA) 180 MG tablet, Take 180 mg by mouth Daily., Disp: , Rfl:   •  Fluticasone Furoate-Vilanterol (BREO ELLIPTA) 200-25 MCG/INH inhaler, Inhale 2 puffs Every Morning., Disp: , Rfl:   •  furosemide (LASIX) 40 MG tablet, One a day, Disp: 90 tablet, Rfl: 3  •  hydroCHLOROthiazide (HYDRODIURIL) 25 MG tablet, Take 25 mg by mouth Daily., Disp: , Rfl:   •  latanoprost (XALATAN) 0.005 % ophthalmic solution, Administer 1 drop to both eyes Every Night., Disp: , Rfl:   •  levothyroxine (SYNTHROID) 88 MCG tablet, Take 1 tablet by mouth Every Morning., Disp: 90 tablet, Rfl: 3  •  losartan (COZAAR) 100 MG tablet, Take 1 tablet by mouth Daily., Disp: 90 tablet, Rfl: 1  •  montelukast (SINGULAIR) 10 MG tablet, Take 1 tablet by mouth Every Night., Disp: 90 tablet, Rfl: 3  •  pantoprazole (PROTONIX) 40 MG EC tablet, Take 1 tablet by mouth 2 (Two) Times a Day., Disp: 90 tablet, Rfl: 3  •  potassium chloride (K-DUR) 10 MEQ CR tablet, TAKE 1 TABLET BY MOUTH TWICE DAILY, Disp: 180 tablet, Rfl: 3  •  pramipexole (MIRAPEX) 1.5 MG tablet, Take 1 tablet by mouth At Night As Needed (RLS)., Disp: 30 tablet, Rfl: 5  •  rosuvastatin (CRESTOR) 20 MG tablet, TAKE 1 TABLET EVERY EVENING, Disp: 90 tablet, Rfl: 1  •  lactulose (CHRONULAC) 10 GM/15ML solution, 1 to 2 tablespoons  A day to prevent constipation.,  Disp: 946 mL, Rfl: 4    Past Medical History:   Diagnosis Date   • Actinic keratosis    • Acute embolism and thrombosis of vein    • Allergic rhinitis    • Arthritis    • Cataract     forming left eye   • Cellulitis    • Cellulitis    • Cervical radiculopathy    • Contact with hypodermic needle    • Cough    • Disequilibrium    • Diverticulosis    • DJD (degenerative joint disease), lumbar    • DVT (deep venous thrombosis) (CMS/HCC)     l leg  dx 2019 and was on blood thinner and ow off wears compression    • Dysphagia    • Encounter for long-term (current) use of NSAIDs    • Encounter for screening colonoscopy    • Erysipelas    • Gastroenteritis, acute    • GERD (gastroesophageal reflux disease)    • Glaucoma    • High risk medication use    • History of colon polyps    • Hospital discharge follow-up    • Hyperglycemia    • Hyperlipidemia    • Hyperplastic polyp of stomach    • Hypertension    • Hypothyroid    • Inflamed skin tag    • Insomnia    • Internal hemorrhoids    • Kidney stone     has had history of passing and still has kidney stone on both sides    • Lumbar strain    • Male urinary stress incontinence     s/p prostatectomy   • Medicare annual wellness visit, subsequent    • Obesity    • KWAME (obstructive sleep apnea)    • Osteoarthritis    • Pes planus    • Presbycusis    • Prostate cancer (CMS/HCC)    • Prostate cancer (CMS/HCC)    • Rectal bleed    • Restless legs syndrome    • Rib pain on left side    • Shoulder pain    • Skin lesion of face    • Sleep apnea     bipap   • Throat clearing    • Tinea corporis    • Tinea cruris    • Venous stasis dermatitis        Past Surgical History:   Procedure Laterality Date   • CATARACT EXTRACTION Right    • COLONOSCOPY  03/08/2017    3/17normal. Recheck 2022. 12/11. Repeat in 5 years    • ENDOSCOPY W/ PEG REMOVAL     • FOOT SURGERY      1998 had big toe replaced on left foot   • HERNIA REPAIR  03/07/2012    umbilical   • JOINT REPLACEMENT Right 2014   • NECK  SURGERY  2011    cervical disc   • MO TOTAL KNEE ARTHROPLASTY Left 2016    Procedure: LT TOTAL KNEE ARTHROPLASTY;  Surgeon: Tadeo Basurto MD;  Location: [facility] OR;  Service: Orthopedics   • PROSTATECTOMY  1999. Radical    • THYROID LOBECTOMY     • THYROID SURGERY      partial doesn't know which one was removed   • TONSILLECTOMY     • TOTAL KNEE ARTHROPLASTY Right    • TOTAL SHOULDER ARTHROPLASTY W/ DISTAL CLAVICLE EXCISION Right 2019    Procedure: TOTAL SHOULDER REVERSE ARTHROPLASTY RIGHT REPAIR RIGHT AXILLARY VEIN;  Surgeon: Behzad Kelley MD;  Location: [facility] OR [facility] [place] [facility];  Service: Orthopedics   • WRIST SURGERY Right 12/17/2014    x2  wrist replaced       Family History   Problem Relation Age of Onset   • Cancer Mother         COLON   • Colon cancer Mother    • Cancer Father         PROSTATE   • Cancer Sister         BREAST CANCER   • Breast cancer Sister    • Gout Sister    • Hypertension Sister    • Heart attack Brother    • Bone cancer Brother    • Heart disease Brother    • Malig Hyperthermia Neg Hx        Social History     Tobacco Use   • Smoking status: Former Smoker     Packs/day: 1.00     Years: 20.00     Pack years: 20.00     Types: Cigarettes     Last attempt to quit: 1984     Years since quittin.6   • Smokeless tobacco: Never Used   Substance Use Topics   • Alcohol use: Yes     Comment: 3-4 MONTHLY            Objective     Vitals:    20 0913   BP: 169/98   Pulse: 71   Temp: 98.1 °F (36.7 °C)   SpO2: 94%     Body mass index is 40.56 kg/m².    Physical Exam   Constitutional: He appears well-developed and well-nourished.   HENT:   Head: Normocephalic and atraumatic.   Mouth/Throat: Oropharynx is clear and moist.   Eyes: Pupils are equal, round, and reactive to light. No scleral icterus.   Neck: No thyromegaly present.   Cardiovascular: Normal rate and regular rhythm. Exam reveals no gallop and no friction rub.   No murmur  heard.  Pulmonary/Chest: Effort normal. No respiratory distress. He has no wheezes. He has no rales. He exhibits no tenderness.   Abdominal: Soft. Bowel sounds are normal. He exhibits no distension. There is no tenderness.   Musculoskeletal: Normal range of motion. He exhibits no edema or deformity.   Lymphadenopathy:     He has no cervical adenopathy.   Neurological: No cranial nerve deficit. He exhibits normal muscle tone.   Skin: Skin is warm and dry. No rash noted. He is not diaphoretic.   Vitals reviewed.      Lab Results   Component Value Date    GLUCOSE 103 (H) 06/15/2020    BUN 18 06/15/2020    CREATININE 1.04 06/15/2020    EGFRIFNONA 70 06/15/2020    EGFRIFAFRI 76 12/17/2019    BCR 17.3 06/15/2020    K 4.0 06/15/2020    CO2 28.4 06/15/2020    CALCIUM 9.0 06/15/2020    PROTENTOTREF 7.0 12/17/2019    ALBUMIN 4.30 06/15/2020    LABIL2 1.7 12/17/2019    AST 35 06/15/2020    ALT 46 (H) 06/15/2020       WBC   Date Value Ref Range Status   06/15/2020 5.60 3.40 - 10.80 10*3/mm3 Final   06/15/2020 5.60 3.40 - 10.80 10*3/mm3 Final   02/13/2020 4.97 3.40 - 10.80 10*3/mm3 Final     RBC   Date Value Ref Range Status   06/15/2020 4.44 4.14 - 5.80 10*6/mm3 Final   02/13/2020 4.60 4.14 - 5.80 10*6/mm3 Final     Hemoglobin   Date Value Ref Range Status   06/15/2020 13.6 13.0 - 17.7 g/dL Final     Hematocrit   Date Value Ref Range Status   06/15/2020 40.6 37.5 - 51.0 % Final     MCV   Date Value Ref Range Status   06/15/2020 91.4 79.0 - 97.0 fL Final     MCH   Date Value Ref Range Status   06/15/2020 30.6 26.6 - 33.0 pg Final     MCHC   Date Value Ref Range Status   06/15/2020 33.5 31.5 - 35.7 g/dL Final     RDW   Date Value Ref Range Status   06/15/2020 13.3 12.3 - 15.4 % Final     RDW-SD   Date Value Ref Range Status   06/15/2020 45.2 37.0 - 54.0 fl Final     MPV   Date Value Ref Range Status   06/15/2020 10.0 6.0 - 12.0 fL Final     Platelets   Date Value Ref Range Status   06/15/2020 155 140 - 450 10*3/mm3 Final      Neutrophil %   Date Value Ref Range Status   06/15/2020 63.1 42.7 - 76.0 % Final     Lymphocyte %   Date Value Ref Range Status   06/15/2020 19.5 (L) 19.6 - 45.3 % Final     Monocyte %   Date Value Ref Range Status   06/15/2020 12.1 (H) 5.0 - 12.0 % Final     Eosinophil %   Date Value Ref Range Status   06/15/2020 2.3 0.3 - 6.2 % Final     Basophil %   Date Value Ref Range Status   06/15/2020 0.7 0.0 - 1.5 % Final     Immature Grans %   Date Value Ref Range Status   06/15/2020 2.3 (H) 0.0 - 0.5 % Final     Neutrophils, Absolute   Date Value Ref Range Status   06/15/2020 3.53 1.70 - 7.00 10*3/mm3 Final     Lymphocytes, Absolute   Date Value Ref Range Status   06/15/2020 1.09 0.70 - 3.10 10*3/mm3 Final     Monocytes, Absolute   Date Value Ref Range Status   06/15/2020 0.68 0.10 - 0.90 10*3/mm3 Final     Eosinophils, Absolute   Date Value Ref Range Status   06/15/2020 0.13 0.00 - 0.40 10*3/mm3 Final     Basophils, Absolute   Date Value Ref Range Status   06/15/2020 0.04 0.00 - 0.20 10*3/mm3 Final     Immature Grans, Absolute   Date Value Ref Range Status   06/15/2020 0.13 (H) 0.00 - 0.05 10*3/mm3 Final     nRBC   Date Value Ref Range Status   06/15/2020 0.0 0.0 - 0.2 /100 WBC Final       Lab Results   Component Value Date    HGBA1C 6.00 (H) 07/11/2019       Lab Results   Component Value Date    WLFZEJBD17 334 06/04/2019       TSH   Date Value Ref Range Status   07/11/2019 3.750 0.270 - 4.200 mIU/mL Final   06/04/2019 2.220 0.270 - 4.200 mIU/mL Final       No results found for: CHOL  No results found for: TRIG  No results found for: HDL  No results found for: LDL  No results found for: VLDL  No results found for: LDLHDL      Procedures    Assessment/Plan   Problems Addressed this Visit        Cardiovascular and Mediastinum    Mixed hyperlipidemia    Relevant Orders    Lipid Panel With / Chol / HDL Ratio       Respiratory    Allergic rhinitis    Relevant Medications    montelukast (SINGULAIR) 10 MG tablet        Digestive    Gastroesophageal reflux disease without esophagitis    Relevant Medications    pantoprazole (PROTONIX) 40 MG EC tablet    Constipation - Primary    Relevant Medications    lactulose (CHRONULAC) 10 GM/15ML solution       Endocrine    Hypothyroidism (acquired)    Relevant Orders    TSH      Continue statin.  Continue linzess and stop creon(as no help clinically).      Orders Placed This Encounter   Procedures   • TSH   • Lipid Panel With / Chol / HDL Ratio       Current Outpatient Medications   Medication Sig Dispense Refill   • acetaminophen (TYLENOL) 650 MG 8 hr tablet Take 1,300 mg by mouth 2 (Two) Times a Day.     • albuterol (PROVENTIL HFA;VENTOLIN HFA) 108 (90 Base) MCG/ACT inhaler Inhale 2 puffs Every 4 (Four) Hours As Needed for Wheezing.     • ALLERGY SERUM INJECTION Inject  under the skin into the appropriate area as directed 1 (One) Time Per Week.     • amLODIPine (NORVASC) 2.5 MG tablet Take 1 tablet by mouth Daily. 90 tablet 3   • aspirin 81 MG tablet Take 1 tablet by mouth Daily.     • azelastine (ASTELIN) 0.1 % nasal spray 2 sprays into the nostril(s) as directed by provider 2 (Two) Times a Day. Use in each nostril as directed     • brimonidine (ALPHAGAN) 0.2 % ophthalmic solution Administer  to both eyes. 3 drops in each eye at night and 2 drops in the morning in each eye     • BRIMONIDINE TARTRATE OP Apply 1 drop to eye(s) as directed by provider 2 (Two) Times a Day.     • dorzolamide-timolol (COSOPT) 22.3-6.8 MG/ML ophthalmic solution Administer 1 drop to both eyes 2 (Two) Times a Day.     • fexofenadine (ALLEGRA) 180 MG tablet Take 180 mg by mouth Daily.     • Fluticasone Furoate-Vilanterol (BREO ELLIPTA) 200-25 MCG/INH inhaler Inhale 2 puffs Every Morning.     • furosemide (LASIX) 40 MG tablet One a day 90 tablet 3   • hydroCHLOROthiazide (HYDRODIURIL) 25 MG tablet Take 25 mg by mouth Daily.     • latanoprost (XALATAN) 0.005 % ophthalmic solution Administer 1 drop to both eyes Every  Night.     • levothyroxine (SYNTHROID) 88 MCG tablet Take 1 tablet by mouth Every Morning. 90 tablet 3   • losartan (COZAAR) 100 MG tablet Take 1 tablet by mouth Daily. 90 tablet 1   • montelukast (SINGULAIR) 10 MG tablet Take 1 tablet by mouth Every Night. 90 tablet 3   • pantoprazole (PROTONIX) 40 MG EC tablet Take 1 tablet by mouth 2 (Two) Times a Day. 90 tablet 3   • potassium chloride (K-DUR) 10 MEQ CR tablet TAKE 1 TABLET BY MOUTH TWICE DAILY 180 tablet 3   • pramipexole (MIRAPEX) 1.5 MG tablet Take 1 tablet by mouth At Night As Needed (RLS). 30 tablet 5   • rosuvastatin (CRESTOR) 20 MG tablet TAKE 1 TABLET EVERY EVENING 90 tablet 1   • lactulose (CHRONULAC) 10 GM/15ML solution 1 to 2 tablespoons  A day to prevent constipation. 946 mL 4     No current facility-administered medications for this visit.        Nathan Baez had no medications administered during this visit.    Return in about 1 month (around 9/19/2020).    There are no Patient Instructions on file for this visit.

## 2020-08-20 LAB
CHOLEST SERPL-MCNC: 143 MG/DL (ref 0–200)
CHOLEST/HDLC SERPL: 3.86 {RATIO}
HDLC SERPL-MCNC: 37 MG/DL (ref 40–60)
LDLC SERPL CALC-MCNC: 56 MG/DL (ref 0–100)
TRIGL SERPL-MCNC: 252 MG/DL (ref 0–150)
TSH SERPL DL<=0.005 MIU/L-ACNC: 3.46 UIU/ML (ref 0.27–4.2)
VLDLC SERPL CALC-MCNC: 50.4 MG/DL

## 2020-08-21 DIAGNOSIS — K21.9 GASTROESOPHAGEAL REFLUX DISEASE WITHOUT ESOPHAGITIS: ICD-10-CM

## 2020-08-21 NOTE — TELEPHONE ENCOUNTER
From: Capri Anand RegSched Rep   Sent: 8/20/2020  11:58 AM EDT   To: [facility] 2 Pool   Subject: Update/Call Back Request                         Wife, Coco, called regarding patient. States she called the other day and is awaiting a call back. But wanted to update Chowdhury that the family doctor took him off Creon, put him on lactulose solution (1 tablespoon a day and if that doesn't work to take 2 a day), and left him on Linzess (only has 2 pills left from samples).

## 2020-08-21 NOTE — TELEPHONE ENCOUNTER
Jaden Chowdhury MD  [facility] [address]   Phone Number: [phone]             Increase Linzess to 772

## 2020-08-21 NOTE — TELEPHONE ENCOUNTER
Called pt's wife back (ok per HANNAH) and advised Dr Chowdhury recommends to increase the Linzess to 290 daily. Advised can leave samples at the  and pt should call back in 2 weeks with an update. She verb understanding.

## 2020-08-24 RX ORDER — PANTOPRAZOLE SODIUM 40 MG/1
TABLET, DELAYED RELEASE ORAL
Qty: 90 TABLET | Refills: 3 | Status: SHIPPED | OUTPATIENT
Start: 2020-08-24 | End: 2020-12-17 | Stop reason: SDUPTHER

## 2020-09-08 NOTE — PLAN OF CARE
Problem: Patient Care Overview  Goal: Plan of Care Review  Outcome: Ongoing (interventions implemented as appropriate)  Flowsheets (Taken 3/21/2020 6382)  Progress: improving  Plan of Care Reviewed With: patient  Note:   Pt has been sitting on side of bed most of the day, medications given as ordered, no issues reported or observed.  VSS, will continue to monitor.      Grossly Intact

## 2020-09-09 ENCOUNTER — TELEPHONE (OUTPATIENT)
Dept: GASTROENTEROLOGY | Facility: CLINIC | Age: 75
End: 2020-09-09

## 2020-09-09 DIAGNOSIS — K59.00 CONSTIPATION, UNSPECIFIED CONSTIPATION TYPE: Primary | ICD-10-CM

## 2020-09-09 NOTE — TELEPHONE ENCOUNTER
----- Message from Margy Landin sent at 9/9/2020 11:41 AM EDT -----  Regarding: Porsha  Contact: [phone]  Pt says the medication is not working. He is still feeling bloated.

## 2020-09-10 NOTE — TELEPHONE ENCOUNTER
Called pt's wife, Coco, back (ok per HANNAH). She states pt has a constant urge to have a BM and will go to the bathroom try to pass one 10-12 times per day but will not have any luck half of the time. He will pass a very small amount of loose stool maybe 5-6 times per day. Feels pretty bloated and miserable most of the time. Currently taking Linzess 290mcg daily and Lactulose 1 tablespoon daily. Advised will update MD and call back with recs. Pt verb understanding.

## 2020-09-10 NOTE — TELEPHONE ENCOUNTER
Mohini Waldrop RegSched Rep  [facility] Pool             Coco wife, returning missed call from yesterday. [phone]

## 2020-09-15 NOTE — TELEPHONE ENCOUNTER
Called pt's wife, Coco, and advised of the recommendations from Dr Chowdhury. She verb understanding and will come in tomorrow for the x-ray.

## 2020-09-15 NOTE — TELEPHONE ENCOUNTER
Jaden Chowdhury MD  [facility] [address]   Phone Number: [phone]             Arrange a flat and upright abdomen to see if there is actually residual stool or not

## 2020-09-16 ENCOUNTER — TELEPHONE (OUTPATIENT)
Dept: GASTROENTEROLOGY | Facility: CLINIC | Age: 75
End: 2020-09-16

## 2020-09-16 ENCOUNTER — HOSPITAL ENCOUNTER (OUTPATIENT)
Dept: GENERAL RADIOLOGY | Facility: HOSPITAL | Age: 75
Discharge: HOME | End: 2020-09-16
Admitting: INTERNAL MEDICINE

## 2020-09-16 DIAGNOSIS — K59.00 CONSTIPATION, UNSPECIFIED CONSTIPATION TYPE: ICD-10-CM

## 2020-09-16 PROCEDURE — 74019 RADEX ABDOMEN 2 VIEWS: CPT

## 2020-09-16 NOTE — TELEPHONE ENCOUNTER
----- Message from Jaden Chowdhury MD sent at 9/16/2020  1:49 PM EDT -----  Moderate amount of stool still in the colon.  Please give patient colon prep to flush bowel and continue daily laxatives.

## 2020-09-16 NOTE — TELEPHONE ENCOUNTER
Called and spoke with pt's wife. Advised of the note from Dr Chowdhury. Advised can leave a prep and more samples of Linzess at the  of our office for pt. She verb understanding.

## 2020-09-17 ENCOUNTER — OFFICE VISIT (OUTPATIENT)
Dept: FAMILY MEDICINE CLINIC | Facility: CLINIC | Age: 75
End: 2020-09-17

## 2020-09-17 VITALS
HEIGHT: 67 IN | HEART RATE: 78 BPM | BODY MASS INDEX: 40.65 KG/M2 | TEMPERATURE: 97.8 F | OXYGEN SATURATION: 94 % | WEIGHT: 259 LBS | DIASTOLIC BLOOD PRESSURE: 75 MMHG | SYSTOLIC BLOOD PRESSURE: 127 MMHG

## 2020-09-17 DIAGNOSIS — I10 ESSENTIAL HYPERTENSION: ICD-10-CM

## 2020-09-17 DIAGNOSIS — E78.2 MIXED HYPERLIPIDEMIA: ICD-10-CM

## 2020-09-17 DIAGNOSIS — K59.04 CHRONIC IDIOPATHIC CONSTIPATION: ICD-10-CM

## 2020-09-17 DIAGNOSIS — G25.81 RLS (RESTLESS LEGS SYNDROME): Primary | ICD-10-CM

## 2020-09-17 PROCEDURE — 90686 IIV4 VACC NO PRSV 0.5 ML IM: CPT | Performed by: FAMILY MEDICINE

## 2020-09-17 PROCEDURE — 99214 OFFICE O/P EST MOD 30 MIN: CPT | Performed by: FAMILY MEDICINE

## 2020-09-17 PROCEDURE — G0008 ADMIN INFLUENZA VIRUS VAC: HCPCS | Performed by: FAMILY MEDICINE

## 2020-09-17 RX ORDER — PRAMIPEXOLE DIHYDROCHLORIDE 1.5 MG/1
1.5 TABLET ORAL NIGHTLY PRN
Qty: 90 TABLET | Refills: 2 | Status: ON HOLD | OUTPATIENT
Start: 2020-09-17 | End: 2023-02-17

## 2020-09-17 NOTE — PROGRESS NOTES
Chief Complaint   Patient presents with   • Constipation       Subjective   Nathan Baez is a 75 y.o. male.     History of Present Illness   75 year old WM here with f/U constipation.  Started linzess 145 a day. On lactulose as well.   Small amount of BMs yesterday and today.   Had cramping with senna.    Pt is doing a colonoscopy prep per pt report as well. TSH normal 8/19,  CBC/CMP normal 3 months ago.    fU HTN.  No orthostasis.  Doing well with meds.  NO SE.    F/U hyperlipidmeia.  No myalgias.  Dong well with crestor 20 once a dya.  LDL 56 1 month ago.   FU RLS.   I am doing well with pramipexole.  Needs refills.      The following portions of the patient's history were reviewed and updated as appropriate: allergies, current medications, past family history, past medical history, past social history, past surgical history and problem list.    Review of Systems   Constitutional: Negative for appetite change and fatigue.   HENT: Negative for nosebleeds and sore throat.    Eyes: Negative for blurred vision and visual disturbance.   Respiratory: Negative for shortness of breath and wheezing.    Cardiovascular: Negative for chest pain and leg swelling.   Gastrointestinal: Positive for constipation. Negative for abdominal distention and abdominal pain.   Endocrine: Negative for cold intolerance and polyuria.   Genitourinary: Negative for dysuria and hematuria.   Musculoskeletal: Negative for arthralgias and myalgias.   Skin: Negative for color change and rash.   Neurological: Negative for weakness and confusion.   Psychiatric/Behavioral: Negative for agitation and depressed mood.       Patient Active Problem List   Diagnosis   • OA (osteoarthritis) of knee   • Hypertension   • Abdominal wall cellulitis   • Hypothyroidism (acquired)   • Gastroesophageal reflux disease without esophagitis   • Mixed hyperlipidemia   • Primary insomnia   • DDD (degenerative disc disease), lumbar   • KWAME (obstructive sleep apnea)   •  Hyperglycemia   • Allergic   • Venous insufficiency   • Prostate cancer (CMS/HCC)   • Venous stasis dermatitis   • Tinea cruris   • Tinea corporis   • Throat clearing   • Sleep apnea   • Skin lesion of face   • Rib pain on left side   • Rectal bleed   • Presbycusis   • Pes planus   • Osteoarthritis   • Obesity   • Medicare annual wellness visit, subsequent   • Lumbar strain   • Internal hemorrhoids   • Insomnia   • Inflamed skin tag   • Hyperplastic polyp of stomach   • Hospital discharge follow-up   • History of colon polyps   • High risk medication use   • Glaucoma   • Gastroenteritis, acute   • Erysipelas   • Encounter for screening colonoscopy   • Encounter for long-term (current) use of NSAIDs   • Dysphagia   • DVT (deep venous thrombosis) (CMS/[facility])   • DJD (degenerative joint disease), lumbar   • Diverticulosis   • Cough   • Contact with hypodermic needle   • Cervical radiculopathy   • Cellulitis   • Arthritis   • Allergic rhinitis   • Acute glaucoma   • Acute embolism and thrombosis of vein   • Actinic keratosis   • Status post reverse total shoulder replacement, right   • Localized edema   • Anemia   • Peripheral polyneuropathy   • Campylobacter diarrhea   • Hyponatremia   • Generalized abdominal pain   • Fever   • Constipation   • Bilateral lower extremity edema   • Acute pyelonephritis   • Chronic idiopathic constipation   • Functional diarrhea   • Change in bowel habits   • RLS (restless legs syndrome)       No Known Allergies      Current Outpatient Medications:   •  acetaminophen (TYLENOL) 650 MG 8 hr tablet, Take 1,300 mg by mouth 2 (Two) Times a Day., Disp: , Rfl:   •  albuterol (PROVENTIL HFA;VENTOLIN HFA) 108 (90 Base) MCG/ACT inhaler, Inhale 2 puffs Every 4 (Four) Hours As Needed for Wheezing., Disp: , Rfl:   •  ALLERGY SERUM INJECTION, Inject  under the skin into the appropriate area as directed 1 (One) Time Per Week., Disp: , Rfl:   •  amLODIPine (NORVASC) 2.5 MG tablet, Take 1 tablet by mouth  Daily., Disp: 90 tablet, Rfl: 3  •  aspirin 81 MG tablet, Take 1 tablet by mouth Daily., Disp: , Rfl:   •  azelastine (ASTELIN) 0.1 % nasal spray, 2 sprays into the nostril(s) as directed by provider 2 (Two) Times a Day. Use in each nostril as directed, Disp: , Rfl:   •  brimonidine (ALPHAGAN) 0.2 % ophthalmic solution, Administer  to both eyes. 3 drops in each eye at night and 2 drops in the morning in each eye, Disp: , Rfl:   •  BRIMONIDINE TARTRATE OP, Apply 1 drop to eye(s) as directed by provider 2 (Two) Times a Day., Disp: , Rfl:   •  dorzolamide-timolol (COSOPT) 22.3-6.8 MG/ML ophthalmic solution, Administer 1 drop to both eyes 2 (Two) Times a Day., Disp: , Rfl:   •  fexofenadine (ALLEGRA) 180 MG tablet, Take 180 mg by mouth Daily., Disp: , Rfl:   •  Fluticasone Furoate-Vilanterol (BREO ELLIPTA) 200-25 MCG/INH inhaler, Inhale 2 puffs Every Morning., Disp: , Rfl:   •  furosemide (LASIX) 40 MG tablet, One a day, Disp: 90 tablet, Rfl: 3  •  hydroCHLOROthiazide (HYDRODIURIL) 25 MG tablet, Take 25 mg by mouth Daily., Disp: , Rfl:   •  lactulose (CHRONULAC) 10 GM/15ML solution, 1 to 2 tablespoons  A day to prevent constipation., Disp: 946 mL, Rfl: 4  •  latanoprost (XALATAN) 0.005 % ophthalmic solution, Administer 1 drop to both eyes Every Night., Disp: , Rfl:   •  levothyroxine (SYNTHROID) 88 MCG tablet, Take 1 tablet by mouth Every Morning., Disp: 90 tablet, Rfl: 3  •  losartan (COZAAR) 100 MG tablet, Take 1 tablet by mouth Daily., Disp: 90 tablet, Rfl: 1  •  montelukast (SINGULAIR) 10 MG tablet, Take 1 tablet by mouth Every Night., Disp: 90 tablet, Rfl: 3  •  pantoprazole (PROTONIX) 40 MG EC tablet, TAKE 1 TABLET BY MOUTH TWICE DAILY, Disp: 90 tablet, Rfl: 3  •  potassium chloride (K-DUR) 10 MEQ CR tablet, TAKE 1 TABLET BY MOUTH TWICE DAILY, Disp: 180 tablet, Rfl: 3  •  pramipexole (MIRAPEX) 1.5 MG tablet, Take 1 tablet by mouth At Night As Needed (RLS)., Disp: 90 tablet, Rfl: 2  •  rosuvastatin (CRESTOR) 20 MG  tablet, TAKE 1 TABLET EVERY EVENING, Disp: 90 tablet, Rfl: 1    Past Medical History:   Diagnosis Date   • Actinic keratosis    • Acute embolism and thrombosis of vein    • Allergic rhinitis    • Arthritis    • Cataract     forming left eye   • Cellulitis    • Cellulitis    • Cervical radiculopathy    • Contact with hypodermic needle    • Cough    • Disequilibrium    • Diverticulosis    • DJD (degenerative joint disease), lumbar    • DVT (deep venous thrombosis) (CMS/HCC)     l leg  dx 2019 and was on blood thinner and ow off wears compression    • Dysphagia    • Encounter for long-term (current) use of NSAIDs    • Encounter for screening colonoscopy    • Erysipelas    • Gastroenteritis, acute    • GERD (gastroesophageal reflux disease)    • Glaucoma    • High risk medication use    • History of colon polyps    • Hospital discharge follow-up    • Hyperglycemia    • Hyperlipidemia    • Hyperplastic polyp of stomach    • Hypertension    • Hypothyroid    • Inflamed skin tag    • Insomnia    • Internal hemorrhoids    • Kidney stone     has had history of passing and still has kidney stone on both sides    • Lumbar strain    • Male urinary stress incontinence     s/p prostatectomy   • Medicare annual wellness visit, subsequent    • Obesity    • KWAME (obstructive sleep apnea)    • Osteoarthritis    • Pes planus    • Presbycusis    • Prostate cancer (CMS/HCC)    • Prostate cancer (CMS/HCC)    • Rectal bleed    • Restless legs syndrome    • Rib pain on left side    • Shoulder pain    • Skin lesion of face    • Sleep apnea     bipap   • Throat clearing    • Tinea corporis    • Tinea cruris    • Venous stasis dermatitis        Past Surgical History:   Procedure Laterality Date   • CATARACT EXTRACTION Right    • COLONOSCOPY  03/08/2017    3/17normal. Recheck 2022. 12/11. Repeat in 5 years    • ENDOSCOPY W/ PEG REMOVAL     • FOOT SURGERY      1998 had big toe replaced on left foot   • HERNIA REPAIR  03/07/2012    umbilical   •  JOINT REPLACEMENT Right    • NECK SURGERY  2011    cervical disc   • TX TOTAL KNEE ARTHROPLASTY Left 2016    Procedure: LT TOTAL KNEE ARTHROPLASTY;  Surgeon: Tadeo Basurto MD;  Location: [facility] OR;  Service: Orthopedics   • PROSTATECTOMY  1999. Radical    • THYROID LOBECTOMY     • THYROID SURGERY      partial doesn't know which one was removed   • TONSILLECTOMY     • TOTAL KNEE ARTHROPLASTY Right    • TOTAL SHOULDER ARTHROPLASTY W/ DISTAL CLAVICLE EXCISION Right 2019    Procedure: TOTAL SHOULDER REVERSE ARTHROPLASTY RIGHT REPAIR RIGHT AXILLARY VEIN;  Surgeon: Behzad Kelley MD;  Location: [facility];  Service: Orthopedics   • WRIST SURGERY Right 12/17/2014    x2  wrist replaced       Family History   Problem Relation Age of Onset   • Cancer Mother         COLON   • Colon cancer Mother    • Cancer Father         PROSTATE   • Cancer Sister         BREAST CANCER   • Breast cancer Sister    • Gout Sister    • Hypertension Sister    • Heart attack Brother    • Bone cancer Brother    • Heart disease Brother    • Malig Hyperthermia Neg Hx        Social History     Tobacco Use   • Smoking status: Former Smoker     Packs/day: 1.00     Years: 20.00     Pack years: 20.00     Types: Cigarettes     Quit date: 1984     Years since quittin.7   • Smokeless tobacco: Never Used   Substance Use Topics   • Alcohol use: Yes     Comment: 3-4 MONTHLY            Objective     Vitals:    20 0943   BP: 127/75   Pulse: 78   Temp: 97.8 °F (36.6 °C)   SpO2: 94%     Body mass index is 40.56 kg/m².    Physical Exam  Vitals signs reviewed.   Constitutional:       Appearance: He is well-developed. He is not diaphoretic.   HENT:      Head: Normocephalic and atraumatic.   Eyes:      General: No scleral icterus.     Pupils: Pupils are equal, round, and reactive to light.   Neck:      Thyroid: No thyromegaly.   Cardiovascular:      Rate and Rhythm: Normal rate and regular rhythm.       Heart sounds: No murmur. No friction rub. No gallop.    Pulmonary:      Effort: Pulmonary effort is normal. No respiratory distress.      Breath sounds: No wheezing or rales.   Chest:      Chest wall: No tenderness.   Abdominal:      General: Bowel sounds are normal. There is no distension.      Palpations: Abdomen is soft.      Tenderness: There is no abdominal tenderness.   Musculoskeletal: Normal range of motion.         General: No deformity.   Lymphadenopathy:      Cervical: No cervical adenopathy.   Skin:     General: Skin is warm and dry.      Findings: No rash.   Neurological:      Cranial Nerves: No cranial nerve deficit.      Motor: No abnormal muscle tone.         Lab Results   Component Value Date    GLUCOSE 103 (H) 06/15/2020    BUN 18 06/15/2020    CREATININE 1.04 06/15/2020    EGFRIFNONA 70 06/15/2020    EGFRIFAFRI 76 12/17/2019    BCR 17.3 06/15/2020    K 4.0 06/15/2020    CO2 28.4 06/15/2020    CALCIUM 9.0 06/15/2020    PROTENTOTREF 7.0 12/17/2019    ALBUMIN 4.30 06/15/2020    LABIL2 1.7 12/17/2019    AST 35 06/15/2020    ALT 46 (H) 06/15/2020       WBC   Date Value Ref Range Status   06/15/2020 5.60 3.40 - 10.80 10*3/mm3 Final   06/15/2020 5.60 3.40 - 10.80 10*3/mm3 Final   02/13/2020 4.97 3.40 - 10.80 10*3/mm3 Final     RBC   Date Value Ref Range Status   06/15/2020 4.44 4.14 - 5.80 10*6/mm3 Final   02/13/2020 4.60 4.14 - 5.80 10*6/mm3 Final     Hemoglobin   Date Value Ref Range Status   06/15/2020 13.6 13.0 - 17.7 g/dL Final     Hematocrit   Date Value Ref Range Status   06/15/2020 40.6 37.5 - 51.0 % Final     MCV   Date Value Ref Range Status   06/15/2020 91.4 79.0 - 97.0 fL Final     MCH   Date Value Ref Range Status   06/15/2020 30.6 26.6 - 33.0 pg Final     MCHC   Date Value Ref Range Status   06/15/2020 33.5 31.5 - 35.7 g/dL Final     RDW   Date Value Ref Range Status   06/15/2020 13.3 12.3 - 15.4 % Final     RDW-SD   Date Value Ref Range Status   06/15/2020 45.2 37.0 - 54.0 fl Final      MPV   Date Value Ref Range Status   06/15/2020 10.0 6.0 - 12.0 fL Final     Platelets   Date Value Ref Range Status   06/15/2020 155 140 - 450 10*3/mm3 Final     Neutrophil %   Date Value Ref Range Status   06/15/2020 63.1 42.7 - 76.0 % Final     Lymphocyte %   Date Value Ref Range Status   06/15/2020 19.5 (L) 19.6 - 45.3 % Final     Monocyte %   Date Value Ref Range Status   06/15/2020 12.1 (H) 5.0 - 12.0 % Final     Eosinophil %   Date Value Ref Range Status   06/15/2020 2.3 0.3 - 6.2 % Final     Basophil %   Date Value Ref Range Status   06/15/2020 0.7 0.0 - 1.5 % Final     Immature Grans %   Date Value Ref Range Status   06/15/2020 2.3 (H) 0.0 - 0.5 % Final     Neutrophils, Absolute   Date Value Ref Range Status   06/15/2020 3.53 1.70 - 7.00 10*3/mm3 Final     Lymphocytes, Absolute   Date Value Ref Range Status   06/15/2020 1.09 0.70 - 3.10 10*3/mm3 Final     Monocytes, Absolute   Date Value Ref Range Status   06/15/2020 0.68 0.10 - 0.90 10*3/mm3 Final     Eosinophils, Absolute   Date Value Ref Range Status   06/15/2020 0.13 0.00 - 0.40 10*3/mm3 Final     Basophils, Absolute   Date Value Ref Range Status   06/15/2020 0.04 0.00 - 0.20 10*3/mm3 Final     Immature Grans, Absolute   Date Value Ref Range Status   06/15/2020 0.13 (H) 0.00 - 0.05 10*3/mm3 Final     nRBC   Date Value Ref Range Status   06/15/2020 0.0 0.0 - 0.2 /100 WBC Final       Lab Results   Component Value Date    HGBA1C 6.00 (H) 07/11/2019       Lab Results   Component Value Date    [id number] 334 06/04/2019       TSH   Date Value Ref Range Status   08/19/2020 3.460 0.270 - 4.200 uIU/mL Final   06/04/2019 2.220 0.270 - 4.200 mIU/mL Final       No results found for: CHOL  Lab Results   Component Value Date    TRIG 252 (H) 08/19/2020     Lab Results   Component Value Date    HDL 37 (L) 08/19/2020     Lab Results   Component Value Date    LDL 56 08/19/2020     Lab Results   Component Value Date    VLDL 50.4 08/19/2020     No results found for:  [facility]      Procedures    Assessment/Plan   Problems Addressed this Visit        Cardiovascular and Mediastinum    Hypertension    Mixed hyperlipidemia       Digestive    Constipation       Other    RLS (restless legs syndrome) - Primary    Relevant Medications    pramipexole (MIRAPEX) 1.5 MG tablet      Continue linzess and lactulose.    Continue BP meds.    Continue rosuvastatin.      Orders Placed This Encounter   Procedures   • Fluarix/Fluzone/Afluria Quad>6 Months       Current Outpatient Medications   Medication Sig Dispense Refill   • acetaminophen (TYLENOL) 650 MG 8 hr tablet Take 1,300 mg by mouth 2 (Two) Times a Day.     • albuterol (PROVENTIL HFA;VENTOLIN HFA) 108 (90 Base) MCG/ACT inhaler Inhale 2 puffs Every 4 (Four) Hours As Needed for Wheezing.     • ALLERGY SERUM INJECTION Inject  under the skin into the appropriate area as directed 1 (One) Time Per Week.     • amLODIPine (NORVASC) 2.5 MG tablet Take 1 tablet by mouth Daily. 90 tablet 3   • aspirin 81 MG tablet Take 1 tablet by mouth Daily.     • azelastine (ASTELIN) 0.1 % nasal spray 2 sprays into the nostril(s) as directed by provider 2 (Two) Times a Day. Use in each nostril as directed     • brimonidine (ALPHAGAN) 0.2 % ophthalmic solution Administer  to both eyes. 3 drops in each eye at night and 2 drops in the morning in each eye     • BRIMONIDINE TARTRATE OP Apply 1 drop to eye(s) as directed by provider 2 (Two) Times a Day.     • dorzolamide-timolol (COSOPT) 22.3-6.8 MG/ML ophthalmic solution Administer 1 drop to both eyes 2 (Two) Times a Day.     • fexofenadine (ALLEGRA) 180 MG tablet Take 180 mg by mouth Daily.     • Fluticasone Furoate-Vilanterol (BREO ELLIPTA) 200-25 MCG/INH inhaler Inhale 2 puffs Every Morning.     • furosemide (LASIX) 40 MG tablet One a day 90 tablet 3   • hydroCHLOROthiazide (HYDRODIURIL) 25 MG tablet Take 25 mg by mouth Daily.     • lactulose (CHRONULAC) 10 GM/15ML solution 1 to 2 tablespoons  A day to prevent  constipation. 946 mL 4   • latanoprost (XALATAN) 0.005 % ophthalmic solution Administer 1 drop to both eyes Every Night.     • levothyroxine (SYNTHROID) 88 MCG tablet Take 1 tablet by mouth Every Morning. 90 tablet 3   • losartan (COZAAR) 100 MG tablet Take 1 tablet by mouth Daily. 90 tablet 1   • montelukast (SINGULAIR) 10 MG tablet Take 1 tablet by mouth Every Night. 90 tablet 3   • pantoprazole (PROTONIX) 40 MG EC tablet TAKE 1 TABLET BY MOUTH TWICE DAILY 90 tablet 3   • potassium chloride (K-DUR) 10 MEQ CR tablet TAKE 1 TABLET BY MOUTH TWICE DAILY 180 tablet 3   • pramipexole (MIRAPEX) 1.5 MG tablet Take 1 tablet by mouth At Night As Needed (RLS). 90 tablet 2   • rosuvastatin (CRESTOR) 20 MG tablet TAKE 1 TABLET EVERY EVENING 90 tablet 1     No current facility-administered medications for this visit.        Nathan Baez had no medications administered during this visit.    Return in about 3 months (around 12/17/2020) for Medicare wellness and OV, 30 minutes.    There are no Patient Instructions on file for this visit.

## 2020-10-01 ENCOUNTER — TELEPHONE (OUTPATIENT)
Dept: GASTROENTEROLOGY | Facility: CLINIC | Age: 75
End: 2020-10-01

## 2020-10-01 ENCOUNTER — OFFICE VISIT (OUTPATIENT)
Dept: GASTROENTEROLOGY | Facility: CLINIC | Age: 75
End: 2020-10-01

## 2020-10-01 VITALS — HEIGHT: 67 IN | TEMPERATURE: 98.3 F | WEIGHT: 256.4 LBS | BODY MASS INDEX: 40.24 KG/M2

## 2020-10-01 DIAGNOSIS — K59.04 CHRONIC IDIOPATHIC CONSTIPATION: Primary | ICD-10-CM

## 2020-10-01 DIAGNOSIS — K59.1 FUNCTIONAL DIARRHEA: ICD-10-CM

## 2020-10-01 PROCEDURE — 99213 OFFICE O/P EST LOW 20 MIN: CPT | Performed by: INTERNAL MEDICINE

## 2020-10-01 RX ORDER — NITAZOXANIDE 500 MG/1
500 TABLET ORAL 2 TIMES DAILY WITH MEALS
Qty: 6 TABLET | Refills: 0 | Status: SHIPPED | OUTPATIENT
Start: 2020-10-01 | End: 2020-11-03

## 2020-10-01 NOTE — PROGRESS NOTES
Chief Complaint   Patient presents with   • Follow-up   • Constipation   • Diarrhea   • Nausea       Nathan Baez is a  75 y.o. male here for a follow up visit for IBS.    HPI     Patient 75-year-old male with history of hyperlipidemia, hypertension hypothyroid presenting with ongoing bowel issues.  Patient reports going from constipation to diarrhea in the same day.  Patient reports felt improved after colon prep though reports not clear explained that patient was not fasting or on liquids so did not expect him to clear only past the retained stool.  Since then patient reports is feeling better but still with alternating constipation and diarrhea through the day.  Patient stools are a bit easier to pass but will follow solid stools with a couple of looser stools in the same episode.  Patient is having a good day today.  Patient actually completed the colon prep on 9/18.  Patient denies bright red blood per rectum or melena noted.    Past Medical History:   Diagnosis Date   • Actinic keratosis    • Acute embolism and thrombosis of vein    • Allergic rhinitis    • Arthritis    • Cataract     forming left eye   • Cellulitis    • Cellulitis    • Cervical radiculopathy    • Contact with hypodermic needle    • Cough    • Disequilibrium    • Diverticulosis    • DJD (degenerative joint disease), lumbar    • DVT (deep venous thrombosis) (CMS/[facility])     l leg  dx 2019 and was on blood thinner and ow off wears compression    • Dysphagia    • Encounter for long-term (current) use of NSAIDs    • Encounter for screening colonoscopy    • Erysipelas    • Gastroenteritis, acute    • GERD (gastroesophageal reflux disease)    • Glaucoma    • High risk medication use    • History of colon polyps    • Hospital discharge follow-up    • Hyperglycemia    • Hyperlipidemia    • Hyperplastic polyp of stomach    • Hypertension    • Hypothyroid    • Inflamed skin tag    • Insomnia    • Internal hemorrhoids    • Kidney stone     has had  history of passing and still has kidney stone on both sides    • Lumbar strain    • Male urinary stress incontinence     s/p prostatectomy   • Medicare annual wellness visit, subsequent    • Obesity    • KWAME (obstructive sleep apnea)    • Osteoarthritis    • Pes planus    • Presbycusis    • Prostate cancer (CMS/HCC)    • Prostate cancer (CMS/[facility])    • Rectal bleed    • Restless legs syndrome    • Rib pain on left side    • Shoulder pain    • Skin lesion of face    • Sleep apnea     bipap   • Throat clearing    • Tinea corporis    • Tinea cruris    • Venous stasis dermatitis          Current Outpatient Medications:   •  acetaminophen (TYLENOL) 650 MG 8 hr tablet, Take 1,300 mg by mouth 2 (Two) Times a Day., Disp: , Rfl:   •  albuterol (PROVENTIL HFA;VENTOLIN HFA) 108 (90 Base) MCG/ACT inhaler, Inhale 2 puffs Every 4 (Four) Hours As Needed for Wheezing., Disp: , Rfl:   •  ALLERGY SERUM INJECTION, Inject  under the skin into the appropriate area as directed 1 (One) Time Per Week., Disp: , Rfl:   •  amLODIPine (NORVASC) 2.5 MG tablet, Take 1 tablet by mouth Daily., Disp: 90 tablet, Rfl: 3  •  aspirin 81 MG tablet, Take 1 tablet by mouth Daily., Disp: , Rfl:   •  brimonidine (ALPHAGAN) 0.2 % ophthalmic solution, Administer  to both eyes. 3 drops in each eye at night and 2 drops in the morning in each eye, Disp: , Rfl:   •  BRIMONIDINE TARTRATE OP, Apply 1 drop to eye(s) as directed by provider 2 (Two) Times a Day., Disp: , Rfl:   •  dorzolamide-timolol (COSOPT) 22.3-6.8 MG/ML ophthalmic solution, Administer 1 drop to both eyes 2 (Two) Times a Day., Disp: , Rfl:   •  fexofenadine (ALLEGRA) 180 MG tablet, Take 180 mg by mouth Daily., Disp: , Rfl:   •  Fluticasone Furoate-Vilanterol (BREO ELLIPTA) 200-25 MCG/INH inhaler, Inhale 2 puffs Every Morning., Disp: , Rfl:   •  furosemide (LASIX) 40 MG tablet, One a day, Disp: 90 tablet, Rfl: 3  •  lactulose (CHRONULAC) 10 GM/15ML solution, 1 to 2 tablespoons  A day to prevent  constipation., Disp: 946 mL, Rfl: 4  •  latanoprost (XALATAN) 0.005 % ophthalmic solution, Administer 1 drop to both eyes Every Night., Disp: , Rfl:   •  levothyroxine (SYNTHROID) 88 MCG tablet, Take 1 tablet by mouth Every Morning., Disp: 90 tablet, Rfl: 3  •  linaclotide (LINZESS) 145 MCG capsule capsule, Take 145 mcg by mouth Every Morning Before Breakfast., Disp: , Rfl:   •  losartan (COZAAR) 100 MG tablet, Take 1 tablet by mouth Daily., Disp: 90 tablet, Rfl: 1  •  montelukast (SINGULAIR) 10 MG tablet, Take 1 tablet by mouth Every Night., Disp: 90 tablet, Rfl: 3  •  pantoprazole (PROTONIX) 40 MG EC tablet, TAKE 1 TABLET BY MOUTH TWICE DAILY, Disp: 90 tablet, Rfl: 3  •  potassium chloride (K-DUR) 10 MEQ CR tablet, TAKE 1 TABLET BY MOUTH TWICE DAILY, Disp: 180 tablet, Rfl: 3  •  pramipexole (MIRAPEX) 1.5 MG tablet, Take 1 tablet by mouth At Night As Needed (RLS)., Disp: 90 tablet, Rfl: 2  •  rosuvastatin (CRESTOR) 20 MG tablet, TAKE 1 TABLET EVERY EVENING, Disp: 90 tablet, Rfl: 1  •  azelastine (ASTELIN) 0.1 % nasal spray, 2 sprays into the nostril(s) as directed by provider 2 (Two) Times a Day. Use in each nostril as directed, Disp: , Rfl:   •  hydroCHLOROthiazide (HYDRODIURIL) 25 MG tablet, Take 25 mg by mouth Daily., Disp: , Rfl:   •  nitazoxanide (Alinia) 500 MG tablet, Take 1 tablet by mouth 2 (Two) Times a Day With Meals., Disp: 6 tablet, Rfl: 0    No Known Allergies    Social History     Socioeconomic History   • Marital status:      Spouse name: Not on file   • Number of children: Not on file   • Years of education: Not on file   • Highest education level: Not on file   Tobacco Use   • Smoking status: Former Smoker     Packs/day: 1.00     Years: 20.00     Pack years: 20.00     Types: Cigarettes     Quit date: 1984     Years since quittin.7   • Smokeless tobacco: Never Used   Substance and Sexual Activity   • Alcohol use: Yes     Comment: 3-4 MONTHLY   • Drug use: No   • Sexual activity:  Defer       Family History   Problem Relation Age of Onset   • Cancer Mother         COLON   • Colon cancer Mother    • Cancer Father         PROSTATE   • Cancer Sister         BREAST CANCER   • Breast cancer Sister    • Gout Sister    • Hypertension Sister    • Heart attack Brother    • Bone cancer Brother    • Heart disease Brother    • Malig Hyperthermia Neg Hx        Review of Systems   Constitutional: Negative.    Respiratory: Negative.    Cardiovascular: Negative.    Gastrointestinal: Positive for abdominal distention, constipation and diarrhea. Negative for abdominal pain, anal bleeding, blood in stool, nausea, rectal pain and vomiting.   Musculoskeletal: Positive for arthralgias, back pain and gait problem.   Skin: Negative.    Hematological: Negative.        Vitals:    10/01/20 0833   Temp: 98.3 °F (36.8 °C)       Physical Exam  Vitals signs reviewed.   Constitutional:       Appearance: He is well-developed.   HENT:      Head: Normocephalic and atraumatic.   Eyes:      General: No scleral icterus.     Pupils: Pupils are equal, round, and reactive to light.   Cardiovascular:      Rate and Rhythm: Normal rate and regular rhythm.      Heart sounds: No murmur. No gallop.    Pulmonary:      Effort: Pulmonary effort is normal. No respiratory distress.      Breath sounds: Normal breath sounds.   Abdominal:      General: Bowel sounds are normal. There is no distension.      Palpations: Abdomen is soft. There is no mass.      Tenderness: There is no abdominal tenderness.      Hernia: No hernia is present.   Skin:     General: Skin is warm and dry.      Coloration: Skin is not jaundiced.      Findings: No rash.   Neurological:      General: No focal deficit present.      Mental Status: He is alert and oriented to person, place, and time.   Psychiatric:         Behavior: Behavior normal.         Thought Content: Thought content normal.         Judgment: Judgment normal.         Office Visit on 08/19/2020   Component  Date Value Ref Range Status   • TSH 08/19/2020 3.460  0.270 - 4.200 uIU/mL Final   • Total Cholesterol 08/19/2020 143  0 - 200 mg/dL Final   • Triglycerides 08/19/2020 252* 0 - 150 mg/dL Final   • HDL Cholesterol 08/19/2020 37* 40 - 60 mg/dL Final   • VLDL Cholesterol 08/19/2020 50.4  mg/dL Final   • LDL Cholesterol  08/19/2020 56  0 - 100 mg/dL Final   • Chol/HDL Ratio 08/19/2020 3.86   Final       Nathan was seen today for follow-up, constipation, diarrhea and nausea.    Diagnoses and all orders for this visit:    Chronic idiopathic constipation    Functional diarrhea    Other orders  -     nitazoxanide (Alinia) 500 MG tablet; Take 1 tablet by mouth 2 (Two) Times a Day With Meals.      Patient 75-year-old male with history of chronic constipation alternating with diarrhea here for follow-up.  Patient reports felt better with his colon prep but still when he has a bowel movement spends in order amount of time on the toilet often passing more formed stools and having to follow-up with several episodes of loose stools.  Patient sometimes in the bathroom an hour before he can get out and move.  This morning is having a good day with a single bowel movement yesterday reports had 2 large hard stools with movement then had to come back in 15 minutes and passed some loose stool.  Patient reports multiple episodes during the day where he feels like he has to go to the bathroom will go but only pass gas.  At this point patient requesting something for the gas and to continue the Linzess to see if this alone will help.  Discussed if not improved would consider repeat colonoscopy though little over 3 years this past last 1 there is definitely a symptomatic change.  We will follow-up clinically in 1 month.

## 2020-10-01 NOTE — TELEPHONE ENCOUNTER
----- Message from Margy Landin sent at 10/1/2020 10:12 AM EDT -----  Regarding: Linzess  Contact: [phone]  Pt wife Coco said that he was given samples of Linzess 290mg but he had only been taking the 145mg.

## 2020-10-01 NOTE — TELEPHONE ENCOUNTER
Returned phone call to spouse.   She states Dr. Chowdhury gave the patient samples of Linzess. She states the patient has been taking 290 mcg daily and the samples are 145 mcg.   Advised to have the patient take 2 capsule every day. She verb understanding.

## 2020-11-03 ENCOUNTER — OFFICE VISIT (OUTPATIENT)
Dept: GASTROENTEROLOGY | Facility: CLINIC | Age: 75
End: 2020-11-03

## 2020-11-03 VITALS — BODY MASS INDEX: 40.15 KG/M2 | TEMPERATURE: 98.6 F | HEIGHT: 67 IN | WEIGHT: 255.8 LBS

## 2020-11-03 DIAGNOSIS — K59.1 FUNCTIONAL DIARRHEA: ICD-10-CM

## 2020-11-03 DIAGNOSIS — K59.04 CHRONIC IDIOPATHIC CONSTIPATION: Primary | ICD-10-CM

## 2020-11-03 PROCEDURE — 99213 OFFICE O/P EST LOW 20 MIN: CPT | Performed by: INTERNAL MEDICINE

## 2020-11-03 RX ORDER — NORTRIPTYLINE HYDROCHLORIDE 10 MG/1
10 CAPSULE ORAL NIGHTLY
Qty: 30 CAPSULE | Refills: 11 | Status: SHIPPED | OUTPATIENT
Start: 2020-11-03 | End: 2020-12-15 | Stop reason: SDUPTHER

## 2020-11-03 NOTE — PROGRESS NOTES
Chief Complaint   Patient presents with   • Follow-up   • Abdominal Pain   • Nausea       Nathan Baez is a  75 y.o. male here for a follow up visit for chronic constipation and diarrhea.    HPI     Patient 35-year-old male with history of hypertension, hyperlipidemia and hyperglycemia with alternating bowel habits.  Patient reports doing better with his stools, has normal stools in the morning though has still some excess gas.  But after first stool have additional loose stools for the next hour or so that are frequent and loose.  Patient denies further constipation taking Linzess 290.    Past Medical History:   Diagnosis Date   • Actinic keratosis    • Acute embolism and thrombosis of vein    • Allergic rhinitis    • Arthritis    • Cataract     forming left eye   • Cellulitis    • Cellulitis    • Cervical radiculopathy    • Contact with hypodermic needle    • Cough    • Disequilibrium    • Diverticulosis    • DJD (degenerative joint disease), lumbar    • DVT (deep venous thrombosis) (CMS/[facility])     l leg  dx 2019 and was on blood thinner and ow off wears compression    • Dysphagia    • Encounter for long-term (current) use of NSAIDs    • Encounter for screening colonoscopy    • Erysipelas    • Gastroenteritis, acute    • GERD (gastroesophageal reflux disease)    • Glaucoma    • High risk medication use    • History of colon polyps    • Hospital discharge follow-up    • Hyperglycemia    • Hyperlipidemia    • Hyperplastic polyp of stomach    • Hypertension    • Hypothyroid    • Inflamed skin tag    • Insomnia    • Internal hemorrhoids    • Kidney stone     has had history of passing and still has kidney stone on both sides    • Lumbar strain    • Male urinary stress incontinence     s/p prostatectomy   • Medicare annual wellness visit, subsequent    • Obesity    • KWAME (obstructive sleep apnea)    • Osteoarthritis    • Pes planus    • Presbycusis    • Prostate cancer (CMS/[facility])    • Prostate cancer (CMS/[facility])    •  Rectal bleed    • Restless legs syndrome    • Rib pain on left side    • Shoulder pain    • Skin lesion of face    • Sleep apnea     bipap   • Throat clearing    • Tinea corporis    • Tinea cruris    • Venous stasis dermatitis          Current Outpatient Medications:   •  acetaminophen (TYLENOL) 650 MG 8 hr tablet, Take 1,300 mg by mouth 2 (Two) Times a Day., Disp: , Rfl:   •  albuterol (PROVENTIL HFA;VENTOLIN HFA) 108 (90 Base) MCG/ACT inhaler, Inhale 2 puffs Every 4 (Four) Hours As Needed for Wheezing., Disp: , Rfl:   •  ALLERGY SERUM INJECTION, Inject  under the skin into the appropriate area as directed 1 (One) Time Per Week., Disp: , Rfl:   •  amLODIPine (NORVASC) 2.5 MG tablet, Take 1 tablet by mouth Daily., Disp: 90 tablet, Rfl: 3  •  aspirin 81 MG tablet, Take 1 tablet by mouth Daily., Disp: , Rfl:   •  azelastine (ASTELIN) 0.1 % nasal spray, 2 sprays into the nostril(s) as directed by provider 2 (Two) Times a Day. Use in each nostril as directed, Disp: , Rfl:   •  brimonidine (ALPHAGAN) 0.2 % ophthalmic solution, Administer  to both eyes. 3 drops in each eye at night and 2 drops in the morning in each eye, Disp: , Rfl:   •  BRIMONIDINE TARTRATE OP, Apply 1 drop to eye(s) as directed by provider 2 (Two) Times a Day., Disp: , Rfl:   •  dorzolamide-timolol (COSOPT) 22.3-6.8 MG/ML ophthalmic solution, Administer 1 drop to both eyes 2 (Two) Times a Day., Disp: , Rfl:   •  fexofenadine (ALLEGRA) 180 MG tablet, Take 180 mg by mouth Daily., Disp: , Rfl:   •  Fluticasone Furoate-Vilanterol (BREO ELLIPTA) 200-25 MCG/INH inhaler, Inhale 2 puffs Every Morning., Disp: , Rfl:   •  furosemide (LASIX) 40 MG tablet, One a day, Disp: 90 tablet, Rfl: 3  •  hydroCHLOROthiazide (HYDRODIURIL) 25 MG tablet, Take 25 mg by mouth Daily., Disp: , Rfl:   •  lactulose (CHRONULAC) 10 GM/15ML solution, 1 to 2 tablespoons  A day to prevent constipation., Disp: 946 mL, Rfl: 4  •  latanoprost (XALATAN) 0.005 % ophthalmic solution,  Administer 1 drop to both eyes Every Night., Disp: , Rfl:   •  levothyroxine (SYNTHROID) 88 MCG tablet, Take 1 tablet by mouth Every Morning., Disp: 90 tablet, Rfl: 3  •  linaclotide (LINZESS) 290 MCG capsule capsule, Take 1 capsule by mouth Every Morning Before Breakfast., Disp: 60 capsule, Rfl: 0  •  losartan (COZAAR) 100 MG tablet, Take 1 tablet by mouth Daily., Disp: 90 tablet, Rfl: 1  •  montelukast (SINGULAIR) 10 MG tablet, Take 1 tablet by mouth Every Night., Disp: 90 tablet, Rfl: 3  •  pantoprazole (PROTONIX) 40 MG EC tablet, TAKE 1 TABLET BY MOUTH TWICE DAILY, Disp: 90 tablet, Rfl: 3  •  potassium chloride (K-DUR) 10 MEQ CR tablet, TAKE 1 TABLET BY MOUTH TWICE DAILY, Disp: 180 tablet, Rfl: 3  •  pramipexole (MIRAPEX) 1.5 MG tablet, Take 1 tablet by mouth At Night As Needed (RLS)., Disp: 90 tablet, Rfl: 2  •  Psyllium (METAMUCIL PO), Take  by mouth Daily., Disp: , Rfl:   •  rosuvastatin (CRESTOR) 20 MG tablet, TAKE 1 TABLET EVERY EVENING, Disp: 90 tablet, Rfl: 1  •  nortriptyline (PAMELOR) 10 MG capsule, Take 1 capsule by mouth Every Night., Disp: 30 capsule, Rfl: 11    No Known Allergies    Social History     Socioeconomic History   • Marital status:      Spouse name: Not on file   • Number of children: Not on file   • Years of education: Not on file   • Highest education level: Not on file   Tobacco Use   • Smoking status: Former Smoker     Packs/day: 1.00     Years: 20.00     Pack years: 20.00     Types: Cigarettes     Quit date: 1984     Years since quittin.8   • Smokeless tobacco: Never Used   Substance and Sexual Activity   • Alcohol use: Yes     Comment: 3-4 MONTHLY   • Drug use: No   • Sexual activity: Defer       Family History   Problem Relation Age of Onset   • Cancer Mother         COLON   • Colon cancer Mother    • Cancer Father         PROSTATE   • Cancer Sister         BREAST CANCER   • Breast cancer Sister    • Gout Sister    • Hypertension Sister    • Heart attack Brother     • Bone cancer Brother    • Heart disease Brother    • Malig Hyperthermia Neg Hx        Review of Systems   Constitutional: Negative.    Respiratory: Negative.    Cardiovascular: Negative.    Gastrointestinal: Positive for abdominal distention and diarrhea. Negative for abdominal pain, anal bleeding, blood in stool and constipation.   Musculoskeletal: Negative.    Skin: Negative.    Hematological: Negative.        Vitals:    11/03/20 0955   Temp: 98.6 °F (37 °C)       Physical Exam  Vitals signs reviewed.   Constitutional:       Appearance: He is well-developed.   HENT:      Head: Normocephalic and atraumatic.   Eyes:      General: No scleral icterus.     Pupils: Pupils are equal, round, and reactive to light.   Cardiovascular:      Rate and Rhythm: Normal rate and regular rhythm.      Heart sounds: Normal heart sounds.   Pulmonary:      Effort: Pulmonary effort is normal. No respiratory distress.      Breath sounds: Normal breath sounds.   Abdominal:      General: Bowel sounds are normal. There is no distension.      Palpations: Abdomen is soft. There is no mass.      Tenderness: There is no abdominal tenderness.      Hernia: No hernia is present.   Musculoskeletal: Normal range of motion.      Right lower leg: No edema.      Left lower leg: No edema.   Skin:     General: Skin is warm and dry.      Coloration: Skin is not jaundiced.   Neurological:      General: No focal deficit present.      Mental Status: He is alert and oriented to person, place, and time.      Cranial Nerves: No cranial nerve deficit.   Psychiatric:         Behavior: Behavior normal.         Thought Content: Thought content normal.         Judgment: Judgment normal.         No visits with results within 2 Month(s) from this visit.   Latest known visit with results is:   Office Visit on 08/19/2020   Component Date Value Ref Range Status   • TSH 08/19/2020 3.460  0.270 - 4.200 uIU/mL Final   • Total Cholesterol 08/19/2020 143  0 - 200 mg/dL  Final   • Triglycerides 08/19/2020 252* 0 - 150 mg/dL Final   • HDL Cholesterol 08/19/2020 37* 40 - 60 mg/dL Final   • VLDL Cholesterol 08/19/2020 50.4  mg/dL Final   • LDL Cholesterol  08/19/2020 56  0 - 100 mg/dL Final   • Chol/HDL Ratio 08/19/2020 3.86   Final       Diagnoses and all orders for this visit:    1. Chronic idiopathic constipation (Primary)    2. Functional diarrhea    Other orders  -     linaclotide (LINZESS) 290 MCG capsule capsule; Take 1 capsule by mouth Every Morning Before Breakfast.  Dispense: 60 capsule; Refill: 0  -     nortriptyline (PAMELOR) 10 MG capsule; Take 1 capsule by mouth Every Night.  Dispense: 30 capsule; Refill: 11      Patient returns feeling improved.  Somewhat less bloated though still has gas in the morning when he wakes up.  Patient's initial stool is formed but not hard but then follows with for 5 additional watery stools.  We will continue the Linzess 290 but add nightly nortriptyline 10 mg and follow symptomatically.  Will adjust to effect.  Patient to follow-up in 1 month.

## 2020-11-07 DIAGNOSIS — E78.5 HYPERLIPIDEMIA, UNSPECIFIED HYPERLIPIDEMIA TYPE: ICD-10-CM

## 2020-11-07 DIAGNOSIS — J30.1 SEASONAL ALLERGIC RHINITIS DUE TO POLLEN: ICD-10-CM

## 2020-11-08 RX ORDER — MONTELUKAST SODIUM 10 MG/1
TABLET ORAL
Qty: 90 TABLET | Refills: 3 | Status: SHIPPED | OUTPATIENT
Start: 2020-11-08 | End: 2021-02-24 | Stop reason: SDUPTHER

## 2020-11-08 RX ORDER — LOSARTAN POTASSIUM 100 MG/1
TABLET ORAL
Qty: 90 TABLET | Refills: 1 | Status: SHIPPED | OUTPATIENT
Start: 2020-11-08 | End: 2021-02-24 | Stop reason: SDUPTHER

## 2020-11-08 RX ORDER — LEVOTHYROXINE SODIUM 88 UG/1
TABLET ORAL
Qty: 90 TABLET | Refills: 3 | Status: SHIPPED | OUTPATIENT
Start: 2020-11-08 | End: 2021-02-01 | Stop reason: SDUPTHER

## 2020-11-08 RX ORDER — ROSUVASTATIN CALCIUM 20 MG/1
TABLET, COATED ORAL
Qty: 90 TABLET | Refills: 1 | Status: SHIPPED | OUTPATIENT
Start: 2020-11-08 | End: 2021-02-24 | Stop reason: SDUPTHER

## 2020-12-15 ENCOUNTER — OFFICE VISIT (OUTPATIENT)
Dept: GASTROENTEROLOGY | Facility: CLINIC | Age: 75
End: 2020-12-15

## 2020-12-15 VITALS — WEIGHT: 257 LBS | TEMPERATURE: 97 F | HEIGHT: 67 IN | BODY MASS INDEX: 40.34 KG/M2

## 2020-12-15 DIAGNOSIS — K21.9 GASTROESOPHAGEAL REFLUX DISEASE, UNSPECIFIED WHETHER ESOPHAGITIS PRESENT: ICD-10-CM

## 2020-12-15 DIAGNOSIS — K59.04 CHRONIC IDIOPATHIC CONSTIPATION: Primary | ICD-10-CM

## 2020-12-15 PROCEDURE — 99213 OFFICE O/P EST LOW 20 MIN: CPT | Performed by: NURSE PRACTITIONER

## 2020-12-15 RX ORDER — NORTRIPTYLINE HYDROCHLORIDE 10 MG/1
10 CAPSULE ORAL NIGHTLY
Qty: 90 CAPSULE | Refills: 3 | Status: SHIPPED | OUTPATIENT
Start: 2020-12-15 | End: 2021-02-04 | Stop reason: ALTCHOICE

## 2020-12-15 NOTE — PROGRESS NOTES
Chief Complaint   Patient presents with   • Constipation   • Diarrhea     HPI    Nathan Baez is a  75 y.o. male here for a follow up visit for chronic constipation and diarrhea.  This patient follows with Dr. Chowdhury, new to me.  Last seen by Dr. Chowdhury in November at which time patient seem to be doing better treating constipation with Linzess 290.  Given complaints of gas and bloat patient was recommended to start Pamelor 10 mg once nightly to treat IBS component in combination with Linzess.    On visit today the patient reports improvement in bowel pattern with addition of Pamelor.  His only complaint is continued prolonged time in the bathroom up to 45 minutes having a bowel movement.  There is incomplete evacuation but he denies episodes of diarrhea/constipation, abdominal pain, or rectal bleeding.  Overall improving.    No nausea, vomiting, acid reflux/heartburn, or dysphagia.  He continues on Protonix 40 mg once daily and reports adequate control GERD symptoms.    Patient is due for screening colonoscopy in 2022.    Past Medical History:   Diagnosis Date   • Actinic keratosis    • Acute embolism and thrombosis of vein    • Allergic rhinitis    • Arthritis    • Cataract     forming left eye   • Cellulitis    • Cellulitis    • Cervical radiculopathy    • Contact with hypodermic needle    • Cough    • Disequilibrium    • Diverticulosis    • DJD (degenerative joint disease), lumbar    • DVT (deep venous thrombosis) (CMS/HCC)     l leg  dx 2019 and was on blood thinner and ow off wears compression    • Dysphagia    • Encounter for long-term (current) use of NSAIDs    • Encounter for screening colonoscopy    • Erysipelas    • Gastroenteritis, acute    • GERD (gastroesophageal reflux disease)    • Glaucoma    • High risk medication use    • History of colon polyps    • Hospital discharge follow-up    • Hyperglycemia    • Hyperlipidemia    • Hyperplastic polyp of stomach    • Hypertension    • Hypothyroid    •  Inflamed skin tag    • Insomnia    • Internal hemorrhoids    • Kidney stone     has had history of passing and still has kidney stone on both sides    • Lumbar strain    • Male urinary stress incontinence     s/p prostatectomy   • Medicare annual wellness visit, subsequent    • Obesity    • KWAME (obstructive sleep apnea)    • Osteoarthritis    • Pes planus    • Presbycusis    • Prostate cancer (CMS/HCC)    • Prostate cancer (CMS/HCC)    • Rectal bleed    • Restless legs syndrome    • Rib pain on left side    • Shoulder pain    • Skin lesion of face    • Sleep apnea     bipap   • Throat clearing    • Tinea corporis    • Tinea cruris    • Venous stasis dermatitis        Past Surgical History:   Procedure Laterality Date   • CATARACT EXTRACTION Right    • COLONOSCOPY  03/08/2017    3/17normal. Recheck 2022. 12/11. Repeat in 5 years    • ENDOSCOPY W/ PEG REMOVAL     • FOOT SURGERY      1998 had big toe replaced on left foot   • HERNIA REPAIR  03/07/2012    umbilical   • JOINT REPLACEMENT Right 2014   • NECK SURGERY  04/04/2011    cervical disc   • TN TOTAL KNEE ARTHROPLASTY Left 9/13/2016    Procedure: LT TOTAL KNEE ARTHROPLASTY;  Surgeon: Tadeo Basurto MD;  Location: [facility] OR;  Service: Orthopedics   • PROSTATECTOMY  07/1999 July 1999. Radical    • THYROID LOBECTOMY     • THYROID SURGERY  2011    partial doesn't know which one was removed   • TONSILLECTOMY     • TOTAL KNEE ARTHROPLASTY Right 2014   • TOTAL SHOULDER ARTHROPLASTY W/ DISTAL CLAVICLE EXCISION Right 11/13/2019    Procedure: TOTAL SHOULDER REVERSE ARTHROPLASTY RIGHT REPAIR RIGHT AXILLARY VEIN;  Surgeon: Behzad Kelley MD;  Location: [facility];  Service: Orthopedics   • WRIST SURGERY Right 12/17/2014    x2  wrist replaced       Scheduled Meds:  Outpatient Encounter Medications as of 12/15/2020   Medication Sig Dispense Refill   • acetaminophen (TYLENOL) 650 MG 8 hr tablet Take 1,300 mg by mouth 2 (Two) Times a Day.     • albuterol (PROVENTIL  HFA;VENTOLIN HFA) 108 (90 Base) MCG/ACT inhaler Inhale 2 puffs Every 4 (Four) Hours As Needed for Wheezing.     • ALLERGY SERUM INJECTION Inject  under the skin into the appropriate area as directed 1 (One) Time Per Week.     • amLODIPine (NORVASC) 2.5 MG tablet Take 1 tablet by mouth Daily. 90 tablet 3   • aspirin 81 MG tablet Take 1 tablet by mouth Daily.     • azelastine (ASTELIN) 0.1 % nasal spray 2 sprays into the nostril(s) as directed by provider 2 (Two) Times a Day. Use in each nostril as directed     • brimonidine (ALPHAGAN) 0.2 % ophthalmic solution Administer  to both eyes. 3 drops in each eye at night and 2 drops in the morning in each eye     • BRIMONIDINE TARTRATE OP Apply 1 drop to eye(s) as directed by provider 2 (Two) Times a Day.     • dorzolamide-timolol (COSOPT) 22.3-6.8 MG/ML ophthalmic solution Administer 1 drop to both eyes 2 (Two) Times a Day.     • fexofenadine (ALLEGRA) 180 MG tablet Take 180 mg by mouth Daily.     • Fluticasone Furoate-Vilanterol (BREO ELLIPTA) 200-25 MCG/INH inhaler Inhale 2 puffs Every Morning.     • furosemide (LASIX) 40 MG tablet One a day 90 tablet 3   • hydroCHLOROthiazide (HYDRODIURIL) 25 MG tablet Take 25 mg by mouth Daily.     • latanoprost (XALATAN) 0.005 % ophthalmic solution Administer 1 drop to both eyes Every Night.     • levothyroxine (SYNTHROID, LEVOTHROID) 88 MCG tablet TAKE 1 TABLET EVERY MORNING 90 tablet 3   • losartan (COZAAR) 100 MG tablet TAKE 1 TABLET DAILY 90 tablet 1   • montelukast (SINGULAIR) 10 MG tablet TAKE 1 TABLET EVERY NIGHT 90 tablet 3   • nortriptyline (PAMELOR) 10 MG capsule Take 1 capsule by mouth Every Night. 90 capsule 3   • pantoprazole (PROTONIX) 40 MG EC tablet TAKE 1 TABLET BY MOUTH TWICE DAILY 90 tablet 3   • potassium chloride (K-DUR) 10 MEQ CR tablet TAKE 1 TABLET BY MOUTH TWICE DAILY 180 tablet 3   • pramipexole (MIRAPEX) 1.5 MG tablet Take 1 tablet by mouth At Night As Needed (RLS). 90 tablet 2   • Psyllium (METAMUCIL PO)  Take  by mouth Daily.     • rosuvastatin (CRESTOR) 20 MG tablet TAKE 1 TABLET EVERY EVENING 90 tablet 1   • [DISCONTINUED] lactulose (CHRONULAC) 10 GM/15ML solution 1 to 2 tablespoons  A day to prevent constipation. 946 mL 4   • [DISCONTINUED] linaclotide (LINZESS) 290 MCG capsule capsule Take 1 capsule by mouth Every Morning Before Breakfast. 60 capsule 0   • [DISCONTINUED] nortriptyline (PAMELOR) 10 MG capsule Take 1 capsule by mouth Every Night. 30 capsule 11   • linaclotide (Linzess) 290 MCG capsule capsule Take 1 capsule by mouth Every Morning Before Breakfast. 90 capsule 3   • [DISCONTINUED] levothyroxine (SYNTHROID) 88 MCG tablet Take 1 tablet by mouth Every Morning. 90 tablet 3   • [DISCONTINUED] losartan (COZAAR) 100 MG tablet Take 1 tablet by mouth Daily. 90 tablet 1   • [DISCONTINUED] montelukast (SINGULAIR) 10 MG tablet Take 1 tablet by mouth Every Night. 90 tablet 3   • [DISCONTINUED] rosuvastatin (CRESTOR) 20 MG tablet TAKE 1 TABLET EVERY EVENING 90 tablet 1     No facility-administered encounter medications on file as of 12/15/2020.        Continuous Infusions:No current facility-administered medications for this visit.       PRN Meds:.    No Known Allergies    Social History     Socioeconomic History   • Marital status:      Spouse name: Not on file   • Number of children: Not on file   • Years of education: Not on file   • Highest education level: Not on file   Tobacco Use   • Smoking status: Former Smoker     Packs/day: 1.00     Years: 20.00     Pack years: 20.00     Types: Cigarettes     Quit date: 1984     Years since quittin.9   • Smokeless tobacco: Never Used   Substance and Sexual Activity   • Alcohol use: Yes     Comment: 3-4 MONTHLY   • Drug use: No   • Sexual activity: Defer       Family History   Problem Relation Age of Onset   • Cancer Mother         COLON   • Colon cancer Mother    • Cancer Father         PROSTATE   • Cancer Sister         BREAST CANCER   • Breast cancer  Sister    • Gout Sister    • Hypertension Sister    • Heart attack Brother    • Bone cancer Brother    • Heart disease Brother    • Malig Hyperthermia Neg Hx        Review of Systems   Constitutional: Negative for activity change, appetite change, fatigue, fever and unexpected weight change.   HENT: Negative for trouble swallowing.    Respiratory: Negative for apnea, cough, choking, chest tightness, shortness of breath and wheezing.    Cardiovascular: Negative for chest pain, palpitations and leg swelling.   Gastrointestinal: Positive for constipation. Negative for abdominal distention, abdominal pain, anal bleeding, blood in stool, diarrhea, nausea, rectal pain and vomiting.       Vitals:    12/15/20 0954   Temp: 97 °F (36.1 °C)       Physical Exam  Constitutional:       Appearance: He is well-developed.   Cardiovascular:      Rate and Rhythm: Normal rate and regular rhythm.      Heart sounds: Normal heart sounds.   Pulmonary:      Effort: Pulmonary effort is normal. No respiratory distress.      Breath sounds: Normal breath sounds. No wheezing.   Abdominal:      General: Bowel sounds are normal. There is no distension.      Palpations: Abdomen is soft. There is no mass.      Tenderness: There is no abdominal tenderness. There is no guarding.      Hernia: No hernia is present.   Skin:     General: Skin is warm and dry.   Neurological:      Mental Status: He is alert and oriented to person, place, and time.   Psychiatric:         Behavior: Behavior normal.     Assessment    Chronic constipation and diarrhea  Incomplete evacuation  GERD    Plan    Pleasant 75-year-old male seen today in follow-up with a history of chronic constipation and diarrhea managing symptoms with 290 Linzess and Pamelor 10 mg once nightly.  His only complaint on visit today is excessive time in the bathroom and feelings of incomplete evacuation.  We discussed the benefits of high-fiber diet and educational handout was provided to the patient.   I want him to start Metamucil 1 tablespoon daily in combination with Cota colon health probiotics.  Continue current dosage of Pamelor and Linzess.  No indication for colonoscopy at this time.  Patient due for screening colonoscopy in 2 years.  Return to clinic 6 weeks to monitor for symptom improvement.

## 2020-12-17 ENCOUNTER — OFFICE VISIT (OUTPATIENT)
Dept: FAMILY MEDICINE CLINIC | Facility: CLINIC | Age: 75
End: 2020-12-17

## 2020-12-17 VITALS
OXYGEN SATURATION: 96 % | WEIGHT: 257 LBS | TEMPERATURE: 98.6 F | DIASTOLIC BLOOD PRESSURE: 88 MMHG | BODY MASS INDEX: 40.34 KG/M2 | SYSTOLIC BLOOD PRESSURE: 148 MMHG | HEIGHT: 67 IN | HEART RATE: 85 BPM

## 2020-12-17 DIAGNOSIS — R73.9 HYPERGLYCEMIA: ICD-10-CM

## 2020-12-17 DIAGNOSIS — I10 ESSENTIAL HYPERTENSION: ICD-10-CM

## 2020-12-17 DIAGNOSIS — K21.9 GASTROESOPHAGEAL REFLUX DISEASE WITHOUT ESOPHAGITIS: ICD-10-CM

## 2020-12-17 DIAGNOSIS — Z00.00 MEDICARE ANNUAL WELLNESS VISIT, SUBSEQUENT: Primary | ICD-10-CM

## 2020-12-17 DIAGNOSIS — E78.2 MIXED HYPERLIPIDEMIA: ICD-10-CM

## 2020-12-17 DIAGNOSIS — R79.9 ABNORMAL FINDING OF BLOOD CHEMISTRY, UNSPECIFIED: ICD-10-CM

## 2020-12-17 PROCEDURE — 99214 OFFICE O/P EST MOD 30 MIN: CPT | Performed by: FAMILY MEDICINE

## 2020-12-17 PROCEDURE — G0439 PPPS, SUBSEQ VISIT: HCPCS | Performed by: FAMILY MEDICINE

## 2020-12-17 RX ORDER — PANTOPRAZOLE SODIUM 40 MG/1
40 TABLET, DELAYED RELEASE ORAL 2 TIMES DAILY
Qty: 90 TABLET | Refills: 3 | Status: SHIPPED | OUTPATIENT
Start: 2020-12-17 | End: 2021-08-15

## 2020-12-17 NOTE — PROGRESS NOTES
The ABCs of the Annual Wellness Visit  Subsequent Medicare Wellness Visit    Chief Complaint   Patient presents with   • Medicare Wellness-subsequent       Subjective   History of Present Illness:  Nathan Baez is a 75 y.o. male who presents for a Subsequent Medicare Wellness Visit.  F/U hyperglycemia.  I have been eating a lot of sweets.    F/U HTN.  No orhtostasis.  Doing well with meds.    F/U hyperlipidmeia.  LDL 56 4 months ago.   Continue rosuvastatin 20 once a day.    Fu KIMBERLEY.  Doing well with meds.  No SE.    HEALTH RISK ASSESSMENT    Recent Hospitalizations:  No hospitalization(s) within the last year.    Current Medical Providers:  Patient Care Team:  Damien Pierce MD as PCP - General (Family Medicine)    Smoking Status:  Social History     Tobacco Use   Smoking Status Former Smoker   • Packs/day: 1.00   • Years: 20.00   • Pack years: 20.00   • Types: Cigarettes   • Quit date: 1984   • Years since quittin.9   Smokeless Tobacco Never Used       Alcohol Consumption:  Social History     Substance and Sexual Activity   Alcohol Use Yes    Comment: 3-4 MONTHLY       Depression Screen:   PHQ-2/PHQ-9 Depression Screening 2019   Little interest or pleasure in doing things 0   Feeling down, depressed, or hopeless 0   Total Score 0       Fall Risk Screen:  STEADI Fall Risk Assessment was completed, and patient is at LOW risk for falls.Assessment completed on:2020    Health Habits and Functional and Cognitive Screening:  Functional & Cognitive Status 2020   Do you have difficulty preparing food and eating? No   Do you have difficulty bathing yourself, getting dressed or grooming yourself? No   Do you have difficulty using the toilet? Yes   Do you have difficulty moving around from place to place? No   Do you have trouble with steps or getting out of a bed or a chair? No   Current Diet Unhealthy Diet   Dental Exam Up to date   Eye Exam Up to date   Exercise (times per week) 0 times per  week   Current Exercises Include No Regular Exercise   Current Exercise Activities Include -   Do you need help using the phone?  No   Are you deaf or do you have serious difficulty hearing?  Yes   Do you need help with transportation? No   Do you need help shopping? No   Do you need help preparing meals?  No   Do you need help with housework?  No   Do you need help with laundry? No   Do you need help taking your medications? No   Do you need help managing money? No   Do you ever drive or ride in a car without wearing a seat belt? No   Have you felt unusual stress, anger or loneliness in the last month? No   Who do you live with? Spouse   If you need help, do you have trouble finding someone available to you? No   Have you been bothered in the last four weeks by sexual problems? No   Do you have difficulty concentrating, remembering or making decisions? No         Does the patient have evidence of cognitive impairment? No    Asprin use counseling:Taking ASA appropriately as indicated    Age-appropriate Screening Schedule:  Refer to the list below for future screening recommendations based on patient's age, sex and/or medical conditions. Orders for these recommended tests are listed in the plan section. The patient has been provided with a written plan.    Health Maintenance   Topic Date Due   • TDAP/TD VACCINES (1 - Tdap) 05/04/1964   • LIPID PANEL  08/19/2021   • COLONOSCOPY  03/08/2027   • INFLUENZA VACCINE  Completed   • ZOSTER VACCINE  Completed          The following portions of the patient's history were reviewed and updated as appropriate: allergies, current medications, past family history, past medical history, past social history, past surgical history and problem list.    Outpatient Medications Prior to Visit   Medication Sig Dispense Refill   • acetaminophen (TYLENOL) 650 MG 8 hr tablet Take 1,300 mg by mouth 2 (Two) Times a Day.     • albuterol (PROVENTIL HFA;VENTOLIN HFA) 108 (90 Base) MCG/ACT inhaler  Inhale 2 puffs Every 4 (Four) Hours As Needed for Wheezing.     • ALLERGY SERUM INJECTION Inject  under the skin into the appropriate area as directed 1 (One) Time Per Week.     • amLODIPine (NORVASC) 2.5 MG tablet Take 1 tablet by mouth Daily. 90 tablet 3   • aspirin 81 MG tablet Take 1 tablet by mouth Daily.     • azelastine (ASTELIN) 0.1 % nasal spray 2 sprays into the nostril(s) as directed by provider 2 (Two) Times a Day. Use in each nostril as directed     • brimonidine (ALPHAGAN) 0.2 % ophthalmic solution Administer  to both eyes. 3 drops in each eye at night and 2 drops in the morning in each eye     • BRIMONIDINE TARTRATE OP Apply 1 drop to eye(s) as directed by provider 2 (Two) Times a Day.     • dorzolamide-timolol (COSOPT) 22.3-6.8 MG/ML ophthalmic solution Administer 1 drop to both eyes 2 (Two) Times a Day.     • fexofenadine (ALLEGRA) 180 MG tablet Take 180 mg by mouth Daily.     • Fluticasone Furoate-Vilanterol (BREO ELLIPTA) 200-25 MCG/INH inhaler Inhale 2 puffs Every Morning.     • furosemide (LASIX) 40 MG tablet One a day 90 tablet 3   • hydroCHLOROthiazide (HYDRODIURIL) 25 MG tablet Take 25 mg by mouth Daily.     • latanoprost (XALATAN) 0.005 % ophthalmic solution Administer 1 drop to both eyes Every Night.     • levothyroxine (SYNTHROID, LEVOTHROID) 88 MCG tablet TAKE 1 TABLET EVERY MORNING 90 tablet 3   • linaclotide (Linzess) 290 MCG capsule capsule Take 1 capsule by mouth Every Morning Before Breakfast. 90 capsule 3   • losartan (COZAAR) 100 MG tablet TAKE 1 TABLET DAILY 90 tablet 1   • montelukast (SINGULAIR) 10 MG tablet TAKE 1 TABLET EVERY NIGHT 90 tablet 3   • nortriptyline (PAMELOR) 10 MG capsule Take 1 capsule by mouth Every Night. 90 capsule 3   • pramipexole (MIRAPEX) 1.5 MG tablet Take 1 tablet by mouth At Night As Needed (RLS). 90 tablet 2   • Psyllium (METAMUCIL PO) Take  by mouth Daily.     • rosuvastatin (CRESTOR) 20 MG tablet TAKE 1 TABLET EVERY EVENING 90 tablet 1   •  pantoprazole (PROTONIX) 40 MG EC tablet TAKE 1 TABLET BY MOUTH TWICE DAILY 90 tablet 3   • potassium chloride (K-DUR) 10 MEQ CR tablet TAKE 1 TABLET BY MOUTH TWICE DAILY 180 tablet 3     No facility-administered medications prior to visit.        Patient Active Problem List   Diagnosis   • OA (osteoarthritis) of knee   • Hypertension   • Abdominal wall cellulitis   • Hypothyroidism (acquired)   • Gastroesophageal reflux disease without esophagitis   • Mixed hyperlipidemia   • Primary insomnia   • DDD (degenerative disc disease), lumbar   • KWAME (obstructive sleep apnea)   • Hyperglycemia   • Allergic   • Venous insufficiency   • Prostate cancer (CMS/HCC)   • Venous stasis dermatitis   • Tinea cruris   • Tinea corporis   • Throat clearing   • Sleep apnea   • Skin lesion of face   • Rib pain on left side   • Rectal bleed   • Presbycusis   • Pes planus   • Osteoarthritis   • Obesity   • Medicare annual wellness visit, subsequent   • Lumbar strain   • Internal hemorrhoids   • Insomnia   • Inflamed skin tag   • Hyperplastic polyp of stomach   • Hospital discharge follow-up   • History of colon polyps   • High risk medication use   • Glaucoma   • Gastroenteritis, acute   • Erysipelas   • Encounter for screening colonoscopy   • Encounter for long-term (current) use of NSAIDs   • Dysphagia   • DVT (deep venous thrombosis) (CMS/HCC)   • DJD (degenerative joint disease), lumbar   • Diverticulosis   • Cough   • Contact with hypodermic needle   • Cervical radiculopathy   • Cellulitis   • Arthritis   • Allergic rhinitis   • Acute glaucoma   • Acute embolism and thrombosis of vein   • Actinic keratosis   • Status post reverse total shoulder replacement, right   • Localized edema   • Anemia   • Peripheral polyneuropathy   • Campylobacter diarrhea   • Hyponatremia   • Generalized abdominal pain   • Fever   • Constipation   • Bilateral lower extremity edema   • Acute pyelonephritis   • Chronic idiopathic constipation   • Functional  "diarrhea   • Change in bowel habits   • RLS (restless legs syndrome)       Advanced Care Planning:  ACP discussion was held with the patient during this visit. Patient has an advance directive (not in EMR), copy requested.    Review of Systems   Constitutional: Negative for activity change, appetite change and fatigue.   HENT: Negative for hearing loss and postnasal drip.    Eyes: Negative for discharge and itching.   Respiratory: Negative for cough and shortness of breath.    Cardiovascular: Negative for chest pain and leg swelling.   Gastrointestinal: Negative for abdominal distention and abdominal pain.   Endocrine: Negative for cold intolerance and heat intolerance.   Genitourinary: Negative for difficulty urinating and flank pain.   Musculoskeletal: Negative for arthralgias and myalgias.   Skin: Negative for color change.   Neurological: Negative for dizziness and facial asymmetry.   Hematological: Negative for adenopathy.   Psychiatric/Behavioral: Negative for agitation and confusion.       Compared to one year ago, the patient feels his physical health is the same.  Compared to one year ago, the patient feels his mental health is the same.    Reviewed chart for potential of high risk medication in the elderly: yes  Reviewed chart for potential of harmful drug interactions in the elderly:yes    Objective         Vitals:    12/17/20 0822   BP: 148/88   BP Location: Left arm   Patient Position: Sitting   Pulse: 85   Temp: 98.6 °F (37 °C)   SpO2: 96%   Weight: 117 kg (257 lb)   Height: 170.2 cm (67.01\")       Body mass index is 40.24 kg/m².  Discussed the patient's BMI with him. The BMI is above average; BMI management plan is completed.    Physical Exam  Vitals signs reviewed.   Constitutional:       Appearance: He is well-developed. He is not diaphoretic.   HENT:      Head: Normocephalic and atraumatic.   Eyes:      General: No scleral icterus.     Pupils: Pupils are equal, round, and reactive to light.   Neck: "      Thyroid: No thyromegaly.   Cardiovascular:      Rate and Rhythm: Normal rate and regular rhythm.      Heart sounds: No murmur. No friction rub. No gallop.    Pulmonary:      Effort: Pulmonary effort is normal. No respiratory distress.      Breath sounds: No wheezing or rales.   Chest:      Chest wall: No tenderness.   Abdominal:      General: Bowel sounds are normal. There is no distension.      Palpations: Abdomen is soft.      Tenderness: There is no abdominal tenderness.   Musculoskeletal: Normal range of motion.         General: No deformity.   Lymphadenopathy:      Cervical: No cervical adenopathy.   Skin:     General: Skin is warm and dry.      Findings: No rash.   Neurological:      Cranial Nerves: No cranial nerve deficit.      Motor: No abnormal muscle tone.               Assessment/Plan   Medicare Risks and Personalized Health Plan  CMS Preventative Services Quick Reference  Inactivity/Sedentary    The above risks/problems have been discussed with the patient.  Pertinent information has been shared with the patient in the After Visit Summary.  Follow up plans and orders are seen below in the Assessment/Plan Section.    Diagnoses and all orders for this visit:    1. Medicare annual wellness visit, subsequent (Primary)    2. Essential hypertension  -     Comprehensive Metabolic Panel    3. Mixed hyperlipidemia  -     Comprehensive Metabolic Panel    4. Hyperglycemia  -     Hemoglobin A1c    5. Gastroesophageal reflux disease without esophagitis  -     Comprehensive Metabolic Panel  -     Hemoglobin A1c  -     pantoprazole (PROTONIX) 40 MG EC tablet; Take 1 tablet by mouth 2 (Two) Times a Day.  Dispense: 90 tablet; Refill: 3    6. Abnormal finding of blood chemistry, unspecified   -     Hemoglobin A1c    Continue BP meds.    Continue statin.  LDL at goal  Counseled on diet and exercise.    Follow Up:  No follow-ups on file.     An After Visit Summary and PPPS were given to the patient.     Preventive  counseling:  Encouraged regular activity.  Flu/pneumovax utd.  C scope 2017.  PSA 3/20.

## 2020-12-18 PROBLEM — R73.9 HYPERGLYCEMIA: Status: RESOLVED | Noted: 2019-07-11 | Resolved: 2020-12-18

## 2020-12-18 PROBLEM — E11.65 TYPE 2 DIABETES MELLITUS WITH HYPERGLYCEMIA: Status: ACTIVE | Noted: 2020-12-18

## 2020-12-18 LAB
ALBUMIN SERPL-MCNC: 4.2 G/DL (ref 3.5–5.2)
ALBUMIN/GLOB SERPL: 1.5 G/DL
ALP SERPL-CCNC: 165 U/L (ref 39–117)
ALT SERPL-CCNC: 45 U/L (ref 1–41)
AST SERPL-CCNC: 48 U/L (ref 1–40)
BILIRUB SERPL-MCNC: 0.8 MG/DL (ref 0–1.2)
BUN SERPL-MCNC: 21 MG/DL (ref 8–23)
BUN/CREAT SERPL: 19.4 (ref 7–25)
CALCIUM SERPL-MCNC: 9.1 MG/DL (ref 8.6–10.5)
CHLORIDE SERPL-SCNC: 102 MMOL/L (ref 98–107)
CO2 SERPL-SCNC: 27.2 MMOL/L (ref 22–29)
CREAT SERPL-MCNC: 1.08 MG/DL (ref 0.76–1.27)
GLOBULIN SER CALC-MCNC: 2.8 GM/DL
GLUCOSE SERPL-MCNC: 155 MG/DL (ref 65–99)
HBA1C MFR BLD: 6.8 % (ref 4.8–5.6)
POTASSIUM SERPL-SCNC: 4.1 MMOL/L (ref 3.5–5.2)
PROT SERPL-MCNC: 7 G/DL (ref 6–8.5)
SODIUM SERPL-SCNC: 138 MMOL/L (ref 136–145)

## 2021-01-29 RX ORDER — FUROSEMIDE 40 MG/1
TABLET ORAL
Qty: 90 TABLET | Refills: 3 | Status: SHIPPED | OUTPATIENT
Start: 2021-01-29 | End: 2021-03-17 | Stop reason: SDUPTHER

## 2021-02-01 RX ORDER — LEVOTHYROXINE SODIUM 88 UG/1
88 TABLET ORAL EVERY MORNING
Qty: 90 TABLET | Refills: 3 | Status: SHIPPED | OUTPATIENT
Start: 2021-02-01 | End: 2021-10-21

## 2021-02-01 NOTE — TELEPHONE ENCOUNTER
Caller: Coco Baez    Relationship: Emergency Contact    Best call back number: [phone]    Medication needed:   Requested Prescriptions     Pending Prescriptions Disp Refills   • levothyroxine (SYNTHROID, LEVOTHROID) 88 MCG tablet 90 tablet 3     Sig: Take 1 tablet by mouth Every Morning.       When do you need the refill by: ASAP    What details did the patient provide when requesting the medication: PATIENT HAS ONE WEEK LEFT     Does the patient have less than a 3 day supply:  [] Yes  [x] No    What is the patient's preferred pharmacy: [id number] [address] AT [facility] - [phone] [facility] [phone]

## 2021-02-04 ENCOUNTER — OFFICE VISIT (OUTPATIENT)
Dept: GASTROENTEROLOGY | Facility: CLINIC | Age: 76
End: 2021-02-04

## 2021-02-04 VITALS — WEIGHT: 257.6 LBS | HEIGHT: 67 IN | BODY MASS INDEX: 40.43 KG/M2 | TEMPERATURE: 97 F

## 2021-02-04 DIAGNOSIS — K59.04 CHRONIC IDIOPATHIC CONSTIPATION: Primary | ICD-10-CM

## 2021-02-04 DIAGNOSIS — K59.1 FUNCTIONAL DIARRHEA: ICD-10-CM

## 2021-02-04 PROCEDURE — 99212 OFFICE O/P EST SF 10 MIN: CPT | Performed by: INTERNAL MEDICINE

## 2021-02-04 RX ORDER — FLUTICASONE PROPIONATE AND SALMETEROL 232; 14 UG/1; UG/1
1 POWDER, METERED RESPIRATORY (INHALATION) 2 TIMES DAILY
Status: ON HOLD | COMMUNITY
Start: 2021-01-11 | End: 2023-02-17

## 2021-02-04 RX ORDER — COLESEVELAM 180 1/1
625 TABLET ORAL DAILY
Qty: 90 TABLET | Refills: 3 | Status: SHIPPED | OUTPATIENT
Start: 2021-02-04 | End: 2021-03-03 | Stop reason: CLARIF

## 2021-02-04 NOTE — PROGRESS NOTES
Chief Complaint   Patient presents with   • Follow-up   • Constipation       Nathan Baez is a  75 y.o. male here for a follow up visit for alternating constipation and diarrhea.    HPI     Patient 75-year-old male with history of hyperlipidemia GERD seeing us for alternating constipation and diarrhea.  Patient reports now his first bowel movement the day is difficult to get started but once he goes has to stay on the toilet for another hour just waiting for the loose stool to stop.  Patient denies any fever chills no melena has seen some mucus in the stools however.    Past Medical History:   Diagnosis Date   • Actinic keratosis    • Acute embolism and thrombosis of vein    • Allergic rhinitis    • Arthritis    • Cataract     forming left eye   • Cellulitis    • Cellulitis    • Cervical radiculopathy    • Contact with hypodermic needle    • Cough    • Disequilibrium    • Diverticulosis    • DJD (degenerative joint disease), lumbar    • DVT (deep venous thrombosis) (CMS/[facility])     l leg  dx 2019 and was on blood thinner and ow off wears compression    • Dysphagia    • Encounter for long-term (current) use of NSAIDs    • Encounter for screening colonoscopy    • Erysipelas    • Gastroenteritis, acute    • GERD (gastroesophageal reflux disease)    • Glaucoma    • High risk medication use    • History of colon polyps    • Hospital discharge follow-up    • Hyperglycemia    • Hyperlipidemia    • Hyperplastic polyp of stomach    • Hypertension    • Hypothyroid    • Inflamed skin tag    • Insomnia    • Internal hemorrhoids    • Kidney stone     has had history of passing and still has kidney stone on both sides    • Lumbar strain    • Male urinary stress incontinence     s/p prostatectomy   • Medicare annual wellness visit, subsequent    • Obesity    • KWAME (obstructive sleep apnea)    • Osteoarthritis    • Pes planus    • Presbycusis    • Prostate cancer (CMS/[facility])    • Prostate cancer (CMS/[facility])    • Rectal bleed    •  Restless legs syndrome    • Rib pain on left side    • Shoulder pain    • Skin lesion of face    • Sleep apnea     bipap   • Throat clearing    • Tinea corporis    • Tinea cruris    • Venous stasis dermatitis          Current Outpatient Medications:   •  acetaminophen (TYLENOL) 650 MG 8 hr tablet, Take 1,300 mg by mouth 2 (Two) Times a Day., Disp: , Rfl:   •  albuterol (PROVENTIL HFA;VENTOLIN HFA) 108 (90 Base) MCG/ACT inhaler, Inhale 2 puffs Every 4 (Four) Hours As Needed for Wheezing., Disp: , Rfl:   •  ALLERGY SERUM INJECTION, Inject  under the skin into the appropriate area as directed 1 (One) Time Per Week., Disp: , Rfl:   •  amLODIPine (NORVASC) 2.5 MG tablet, Take 1 tablet by mouth Daily., Disp: 90 tablet, Rfl: 3  •  aspirin 81 MG tablet, Take 1 tablet by mouth Daily., Disp: , Rfl:   •  brimonidine (ALPHAGAN) 0.2 % ophthalmic solution, Administer  to both eyes. 3 drops in each eye at night and 2 drops in the morning in each eye, Disp: , Rfl:   •  BRIMONIDINE TARTRATE OP, Apply 1 drop to eye(s) as directed by provider 2 (Two) Times a Day., Disp: , Rfl:   •  dorzolamide-timolol (COSOPT) 22.3-6.8 MG/ML ophthalmic solution, Administer 1 drop to both eyes 2 (Two) Times a Day., Disp: , Rfl:   •  fexofenadine (ALLEGRA) 180 MG tablet, Take 180 mg by mouth Daily., Disp: , Rfl:   •  Fluticasone-Salmeterol 232-14 MCG/ACT aerosol powder , Inhale 1 puff 2 (Two) Times a Day., Disp: , Rfl:   •  furosemide (LASIX) 40 MG tablet, TAKE 1 TABLET BY MOUTH EVERY DAY, Disp: 90 tablet, Rfl: 3  •  hydroCHLOROthiazide (HYDRODIURIL) 25 MG tablet, Take 25 mg by mouth Daily., Disp: , Rfl:   •  latanoprost (XALATAN) 0.005 % ophthalmic solution, Administer 1 drop to both eyes Every Night., Disp: , Rfl:   •  levothyroxine (SYNTHROID, LEVOTHROID) 88 MCG tablet, Take 1 tablet by mouth Every Morning., Disp: 90 tablet, Rfl: 3  •  linaclotide (Linzess) 290 MCG capsule capsule, Take 1 capsule by mouth Every Morning Before Breakfast., Disp: 90  capsule, Rfl: 3  •  losartan (COZAAR) 100 MG tablet, TAKE 1 TABLET DAILY, Disp: 90 tablet, Rfl: 1  •  montelukast (SINGULAIR) 10 MG tablet, TAKE 1 TABLET EVERY NIGHT, Disp: 90 tablet, Rfl: 3  •  pantoprazole (PROTONIX) 40 MG EC tablet, Take 1 tablet by mouth 2 (Two) Times a Day., Disp: 90 tablet, Rfl: 3  •  pramipexole (MIRAPEX) 1.5 MG tablet, Take 1 tablet by mouth At Night As Needed (RLS)., Disp: 90 tablet, Rfl: 2  •  Psyllium (METAMUCIL PO), Take  by mouth Daily., Disp: , Rfl:   •  rosuvastatin (CRESTOR) 20 MG tablet, TAKE 1 TABLET EVERY EVENING, Disp: 90 tablet, Rfl: 1  •  azelastine (ASTELIN) 0.1 % nasal spray, 2 sprays into the nostril(s) as directed by provider 2 (Two) Times a Day. Use in each nostril as directed, Disp: , Rfl:   •  colesevelam (Welchol) 625 MG tablet, Take 1 tablet by mouth Daily., Disp: 90 tablet, Rfl: 3  •  Fluticasone Furoate-Vilanterol (BREO ELLIPTA) 200-25 MCG/INH inhaler, Inhale 2 puffs Every Morning., Disp: , Rfl:     No Known Allergies    Social History     Socioeconomic History   • Marital status:      Spouse name: Not on file   • Number of children: Not on file   • Years of education: Not on file   • Highest education level: Not on file   Tobacco Use   • Smoking status: Former Smoker     Packs/day: 1.00     Years: 20.00     Pack years: 20.00     Types: Cigarettes     Quit date: 1984     Years since quittin.1   • Smokeless tobacco: Never Used   Substance and Sexual Activity   • Alcohol use: Yes     Comment: 3-4 MONTHLY   • Drug use: No   • Sexual activity: Defer       Family History   Problem Relation Age of Onset   • Cancer Mother         COLON   • Colon cancer Mother    • Cancer Father         PROSTATE   • Cancer Sister         BREAST CANCER   • Breast cancer Sister    • Gout Sister    • Hypertension Sister    • Heart attack Brother    • Bone cancer Brother    • Heart disease Brother    • Malig Hyperthermia Neg Hx        Review of Systems   Constitutional: Negative.     Respiratory: Negative.    Cardiovascular: Negative.    Gastrointestinal: Positive for constipation and diarrhea. Negative for abdominal distention, abdominal pain, anal bleeding, nausea, rectal pain and vomiting.   Musculoskeletal: Negative.    Skin: Negative.    Hematological: Negative.        Vitals:    02/04/21 0959   Temp: 97 °F (36.1 °C)       Physical Exam  Vitals signs reviewed.   Constitutional:       Appearance: He is well-developed.   HENT:      Head: Normocephalic and atraumatic.   Eyes:      General: No scleral icterus.     Pupils: Pupils are equal, round, and reactive to light.   Cardiovascular:      Rate and Rhythm: Normal rate and regular rhythm.      Heart sounds: Normal heart sounds.   Pulmonary:      Effort: Pulmonary effort is normal. No respiratory distress.      Breath sounds: Normal breath sounds.   Abdominal:      General: Bowel sounds are normal. There is no distension.      Palpations: Abdomen is soft. There is no mass.      Tenderness: There is no abdominal tenderness.      Hernia: No hernia is present.   Skin:     General: Skin is warm and dry.      Coloration: Skin is not jaundiced.      Findings: No rash.   Neurological:      General: No focal deficit present.      Mental Status: He is alert and oriented to person, place, and time.   Psychiatric:         Behavior: Behavior normal.         Thought Content: Thought content normal.         Office Visit on 12/17/2020   Component Date Value Ref Range Status   • Glucose 12/17/2020 155* 65 - 99 mg/dL Final   • BUN 12/17/2020 21  8 - 23 mg/dL Final   • Creatinine 12/17/2020 1.08  0.76 - 1.27 mg/dL Final   • eGFR Non  Am 12/17/2020 67  >60 mL/min/1.73 Final   • eGFR African Am 12/17/2020 81  >60 mL/min/1.73 Final   • BUN/Creatinine Ratio 12/17/2020 19.4  7.0 - 25.0 Final   • Sodium 12/17/2020 138  136 - 145 mmol/L Final   • Potassium 12/17/2020 4.1  3.5 - 5.2 mmol/L Final   • Chloride 12/17/2020 102  98 - 107 mmol/L Final   • Total CO2  12/17/2020 27.2  22.0 - 29.0 mmol/L Final   • Calcium 12/17/2020 9.1  8.6 - 10.5 mg/dL Final   • Total Protein 12/17/2020 7.0  6.0 - 8.5 g/dL Final   • Albumin 12/17/2020 4.20  3.50 - 5.20 g/dL Final   • Globulin 12/17/2020 2.8  gm/dL Final   • A/G Ratio 12/17/2020 1.5  g/dL Final   • Total Bilirubin 12/17/2020 0.8  0.0 - 1.2 mg/dL Final   • Alkaline Phosphatase 12/17/2020 165* 39 - 117 U/L Final   • AST (SGOT) 12/17/2020 48* 1 - 40 U/L Final   • ALT (SGPT) 12/17/2020 45* 1 - 41 U/L Final   • Hemoglobin A1C 12/17/2020 6.80* 4.80 - 5.60 % Final       Diagnoses and all orders for this visit:    1. Chronic idiopathic constipation (Primary)    2. Functional diarrhea    Other orders  -     colesevelam (Welchol) 625 MG tablet; Take 1 tablet by mouth Daily.  Dispense: 90 tablet; Refill: 3      Patient 75-year-old male with history of alternating constipation diarrhea reports change since last visit.  Adding the nortriptyline seems to have worsened his constipation but still having multiple stools.  Again all of this is within an hour in the morning of him waking up and does fairly well the rest of the day.  We will continue the Linzess 290 but will DC the nortriptyline and add WelChol as a binding agent.  We will follow-up in 6 to 8 weeks to assess therapy.  Patient to call if symptoms worsen.

## 2021-02-24 DIAGNOSIS — J30.1 SEASONAL ALLERGIC RHINITIS DUE TO POLLEN: ICD-10-CM

## 2021-02-24 DIAGNOSIS — E78.5 HYPERLIPIDEMIA, UNSPECIFIED HYPERLIPIDEMIA TYPE: ICD-10-CM

## 2021-02-24 RX ORDER — ROSUVASTATIN CALCIUM 20 MG/1
20 TABLET, COATED ORAL EVERY EVENING
Qty: 90 TABLET | Refills: 3 | Status: SHIPPED | OUTPATIENT
Start: 2021-02-24 | End: 2022-02-17

## 2021-02-24 RX ORDER — LOSARTAN POTASSIUM 100 MG/1
100 TABLET ORAL DAILY
Qty: 90 TABLET | Refills: 3 | Status: SHIPPED | OUTPATIENT
Start: 2021-02-24 | End: 2022-02-17

## 2021-02-24 RX ORDER — MONTELUKAST SODIUM 10 MG/1
10 TABLET ORAL NIGHTLY
Qty: 90 TABLET | Refills: 3 | Status: SHIPPED | OUTPATIENT
Start: 2021-02-24 | End: 2022-02-17

## 2021-02-24 NOTE — TELEPHONE ENCOUNTER
Caller: Coco Baez    Relationship: Emergency Contact    Best call back number: [phone]     Medication needed:   Requested Prescriptions     Pending Prescriptions Disp Refills   • montelukast (SINGULAIR) 10 MG tablet 90 tablet 3     Sig: Take 1 tablet by mouth Every Night.   • losartan (COZAAR) 100 MG tablet 90 tablet 1     Sig: Take 1 tablet by mouth Daily.   • rosuvastatin (CRESTOR) 20 MG tablet 90 tablet 1     Sig: Take 1 tablet by mouth Every Evening.       When do you need the refill by: ASAP     What details did the patient provide when requesting the medication: ASAP    Does the patient have less than a 3 day supply:  [x] Yes  [] No    What is the patient's preferred pharmacy: [id number] [address] AT [facility] [address] - [phone] [facility] [phone] FX

## 2021-02-26 ENCOUNTER — TELEPHONE (OUTPATIENT)
Dept: GASTROENTEROLOGY | Facility: CLINIC | Age: 76
End: 2021-02-26

## 2021-03-03 RX ORDER — MONTELUKAST SODIUM 4 MG/1
1 TABLET, CHEWABLE ORAL 2 TIMES DAILY
Qty: 60 TABLET | Refills: 11 | Status: SHIPPED | OUTPATIENT
Start: 2021-03-03 | End: 2021-10-25

## 2021-03-17 RX ORDER — FUROSEMIDE 40 MG/1
TABLET ORAL
Qty: 90 TABLET | Refills: 3 | Status: SHIPPED | OUTPATIENT
Start: 2021-03-17 | End: 2022-03-19

## 2021-03-20 RX ORDER — AMLODIPINE BESYLATE 2.5 MG/1
2.5 TABLET ORAL DAILY
Qty: 90 TABLET | Refills: 3 | Status: SHIPPED | OUTPATIENT
Start: 2021-03-20 | End: 2022-03-19

## 2021-04-01 ENCOUNTER — OFFICE VISIT (OUTPATIENT)
Dept: GASTROENTEROLOGY | Facility: CLINIC | Age: 76
End: 2021-04-01

## 2021-04-01 ENCOUNTER — TELEPHONE (OUTPATIENT)
Dept: GASTROENTEROLOGY | Facility: CLINIC | Age: 76
End: 2021-04-01

## 2021-04-01 VITALS — BODY MASS INDEX: 40.27 KG/M2 | TEMPERATURE: 97.3 F | WEIGHT: 256.6 LBS | HEIGHT: 67 IN

## 2021-04-01 DIAGNOSIS — R19.4 CHANGE IN BOWEL HABITS: Primary | ICD-10-CM

## 2021-04-01 DIAGNOSIS — Z80.0 FAMILY HISTORY OF GI MALIGNANCY: ICD-10-CM

## 2021-04-01 PROCEDURE — 99213 OFFICE O/P EST LOW 20 MIN: CPT | Performed by: INTERNAL MEDICINE

## 2021-04-01 RX ORDER — NETARSUDIL 0.2 MG/ML
SOLUTION/ DROPS OPHTHALMIC; TOPICAL
COMMUNITY
Start: 2021-02-25 | End: 2021-08-11

## 2021-04-01 NOTE — H&P (VIEW-ONLY)
Chief Complaint   Patient presents with   • Follow-up   • Constipation       Nathan Baez is a  75 y.o. male here for a follow up visit for continued constipation and diarrhea daily.    HPI     Patient 75-year-old male with history of hypertension hyperlipidemia as well as GERD degenerative disc disease here for follow-up.  Patient continues to alternate bowel movements with hard stools first thing and then an hour or so of diarrhea unable to leave the house until he completes his routine.  Patient denies any bright red blood per rectum but does have a family history of colon cancer in his mother.  Patient here for further recommendations.  Patient has been taking the WelChol which he paid for but is not been adequate to control his bowels.  Patient continues other medication including Linzess.    Past Medical History:   Diagnosis Date   • Actinic keratosis    • Acute embolism and thrombosis of vein    • Allergic rhinitis    • Arthritis    • Cataract     forming left eye   • Cellulitis    • Cellulitis    • Cervical radiculopathy    • Contact with hypodermic needle    • Cough    • Disequilibrium    • Diverticulosis    • DJD (degenerative joint disease), lumbar    • DVT (deep venous thrombosis) (CMS/[facility])     l leg  dx 2019 and was on blood thinner and ow off wears compression    • Dysphagia    • Encounter for long-term (current) use of NSAIDs    • Encounter for screening colonoscopy    • Erysipelas    • Gastroenteritis, acute    • GERD (gastroesophageal reflux disease)    • Glaucoma    • High risk medication use    • History of colon polyps    • Hospital discharge follow-up    • Hyperglycemia    • Hyperlipidemia    • Hyperplastic polyp of stomach    • Hypertension    • Hypothyroid    • Inflamed skin tag    • Insomnia    • Internal hemorrhoids    • Kidney stone     has had history of passing and still has kidney stone on both sides    • Lumbar strain    • Male urinary stress incontinence     s/p prostatectomy   •  Medicare annual wellness visit, subsequent    • Obesity    • KWAME (obstructive sleep apnea)    • Osteoarthritis    • Pes planus    • Presbycusis    • Prostate cancer (CMS/[facility])    • Prostate cancer (CMS/[facility])    • Rectal bleed    • Restless legs syndrome    • Rib pain on left side    • Shoulder pain    • Skin lesion of face    • Sleep apnea     bipap   • Throat clearing    • Tinea corporis    • Tinea cruris    • Venous stasis dermatitis          Current Outpatient Medications:   •  acetaminophen (TYLENOL) 650 MG 8 hr tablet, Take 1,300 mg by mouth 2 (Two) Times a Day., Disp: , Rfl:   •  albuterol (PROVENTIL HFA;VENTOLIN HFA) 108 (90 Base) MCG/ACT inhaler, Inhale 2 puffs Every 4 (Four) Hours As Needed for Wheezing., Disp: , Rfl:   •  ALLERGY SERUM INJECTION, Inject  under the skin into the appropriate area as directed 1 (One) Time Per Week., Disp: , Rfl:   •  amLODIPine (NORVASC) 2.5 MG tablet, TAKE 1 TABLET BY MOUTH DAILY, Disp: 90 tablet, Rfl: 3  •  aspirin 81 MG tablet, Take 1 tablet by mouth Daily., Disp: , Rfl:   •  azelastine (ASTELIN) 0.1 % nasal spray, 2 sprays into the nostril(s) as directed by provider 2 (Two) Times a Day. Use in each nostril as directed, Disp: , Rfl:   •  brimonidine (ALPHAGAN) 0.2 % ophthalmic solution, Administer  to both eyes. 3 drops in each eye at night and 2 drops in the morning in each eye, Disp: , Rfl:   •  BRIMONIDINE TARTRATE OP, Apply 1 drop to eye(s) as directed by provider 2 (Two) Times a Day., Disp: , Rfl:   •  dorzolamide-timolol (COSOPT) 22.3-6.8 MG/ML ophthalmic solution, Administer 1 drop to both eyes 2 (Two) Times a Day., Disp: , Rfl:   •  fexofenadine (ALLEGRA) 180 MG tablet, Take 180 mg by mouth Daily., Disp: , Rfl:   •  Fluticasone Furoate-Vilanterol (BREO ELLIPTA) 200-25 MCG/INH inhaler, Inhale 2 puffs Every Morning., Disp: , Rfl:   •  furosemide (LASIX) 40 MG tablet, TAKE 1 TABLET BY MOUTH EVERY DAY, Disp: 90 tablet, Rfl: 3  •  latanoprost (XALATAN) 0.005 % ophthalmic  solution, Administer 1 drop to both eyes Every Night., Disp: , Rfl:   •  levothyroxine (SYNTHROID, LEVOTHROID) 88 MCG tablet, Take 1 tablet by mouth Every Morning., Disp: 90 tablet, Rfl: 3  •  linaclotide (Linzess) 290 MCG capsule capsule, Take 1 capsule by mouth Every Morning Before Breakfast., Disp: 90 capsule, Rfl: 3  •  losartan (COZAAR) 100 MG tablet, Take 1 tablet by mouth Daily., Disp: 90 tablet, Rfl: 3  •  montelukast (SINGULAIR) 10 MG tablet, Take 1 tablet by mouth Every Night., Disp: 90 tablet, Rfl: 3  •  pantoprazole (PROTONIX) 40 MG EC tablet, Take 1 tablet by mouth 2 (Two) Times a Day., Disp: 90 tablet, Rfl: 3  •  pramipexole (MIRAPEX) 1.5 MG tablet, Take 1 tablet by mouth At Night As Needed (RLS)., Disp: 90 tablet, Rfl: 2  •  Psyllium (METAMUCIL PO), Take  by mouth As Needed., Disp: , Rfl:   •  Rhopressa 0.02 % solution, INSTILL 1 DROP IN LEFT EYE DAILY AT BEDTIME, Disp: , Rfl:   •  rosuvastatin (CRESTOR) 20 MG tablet, Take 1 tablet by mouth Every Evening., Disp: 90 tablet, Rfl: 3  •  colestipol (COLESTID) 1 g tablet, Take 1 tablet by mouth 2 (Two) Times a Day., Disp: 60 tablet, Rfl: 11  •  Fluticasone-Salmeterol 232-14 MCG/ACT aerosol powder , Inhale 1 puff 2 (Two) Times a Day., Disp: , Rfl:   •  hydroCHLOROthiazide (HYDRODIURIL) 25 MG tablet, Take 25 mg by mouth Daily., Disp: , Rfl:     No Known Allergies    Social History     Socioeconomic History   • Marital status:      Spouse name: Not on file   • Number of children: Not on file   • Years of education: Not on file   • Highest education level: Not on file   Tobacco Use   • Smoking status: Former Smoker     Packs/day: 1.00     Years: 20.00     Pack years: 20.00     Types: Cigarettes     Quit date: 1984     Years since quittin.2   • Smokeless tobacco: Never Used   Substance and Sexual Activity   • Alcohol use: Yes     Comment: 3-4 MONTHLY   • Drug use: No   • Sexual activity: Defer       Family History   Problem Relation Age of Onset    • Cancer Mother         COLON   • Colon cancer Mother    • Cancer Father         PROSTATE   • Cancer Sister         BREAST CANCER   • Breast cancer Sister    • Gout Sister    • Hypertension Sister    • Heart attack Brother    • Bone cancer Brother    • Heart disease Brother    • Malig Hyperthermia Neg Hx        Review of Systems   Constitutional: Negative.    Respiratory: Negative.    Cardiovascular: Negative.    Gastrointestinal: Positive for constipation and diarrhea. Negative for abdominal distention, abdominal pain, anal bleeding, blood in stool, nausea, rectal pain and vomiting.   Musculoskeletal: Positive for arthralgias and gait problem.   Skin: Negative.    Hematological: Negative.        Vitals:    04/01/21 1048   Temp: 97.3 °F (36.3 °C)       Physical Exam  Vitals reviewed.   Constitutional:       Appearance: Normal appearance. He is well-developed. He is obese.   HENT:      Head: Normocephalic and atraumatic.   Eyes:      Pupils: Pupils are equal, round, and reactive to light.   Cardiovascular:      Rate and Rhythm: Normal rate and regular rhythm.      Heart sounds: Normal heart sounds.   Pulmonary:      Effort: Pulmonary effort is normal.      Breath sounds: Normal breath sounds. No wheezing or rales.   Abdominal:      General: Bowel sounds are normal. There is no distension.      Palpations: Abdomen is soft. There is no mass.      Tenderness: There is no abdominal tenderness.      Hernia: No hernia is present.   Skin:     General: Skin is warm and dry.      Coloration: Skin is not jaundiced.      Findings: No rash.   Neurological:      General: No focal deficit present.      Mental Status: He is alert and oriented to person, place, and time.   Psychiatric:         Behavior: Behavior normal.         Thought Content: Thought content normal.         Judgment: Judgment normal.         No visits with results within 2 Month(s) from this visit.   Latest known visit with results is:   Office Visit on  12/17/2020   Component Date Value Ref Range Status   • Glucose 12/17/2020 155* 65 - 99 mg/dL Final   • BUN 12/17/2020 21  8 - 23 mg/dL Final   • Creatinine 12/17/2020 1.08  0.76 - 1.27 mg/dL Final   • eGFR Non  Am 12/17/2020 67  >60 mL/min/1.73 Final   • eGFR African Am 12/17/2020 81  >60 mL/min/1.73 Final   • BUN/Creatinine Ratio 12/17/2020 19.4  7.0 - 25.0 Final   • Sodium 12/17/2020 138  136 - 145 mmol/L Final   • Potassium 12/17/2020 4.1  3.5 - 5.2 mmol/L Final   • Chloride 12/17/2020 102  98 - 107 mmol/L Final   • Total CO2 12/17/2020 27.2  22.0 - 29.0 mmol/L Final   • Calcium 12/17/2020 9.1  8.6 - 10.5 mg/dL Final   • Total Protein 12/17/2020 7.0  6.0 - 8.5 g/dL Final   • Albumin 12/17/2020 4.20  3.50 - 5.20 g/dL Final   • Globulin 12/17/2020 2.8  gm/dL Final   • A/G Ratio 12/17/2020 1.5  g/dL Final   • Total Bilirubin 12/17/2020 0.8  0.0 - 1.2 mg/dL Final   • Alkaline Phosphatase 12/17/2020 165* 39 - 117 U/L Final   • AST (SGOT) 12/17/2020 48* 1 - 40 U/L Final   • ALT (SGPT) 12/17/2020 45* 1 - 41 U/L Final   • Hemoglobin A1C 12/17/2020 6.80* 4.80 - 5.60 % Final       Diagnoses and all orders for this visit:    1. Change in bowel habits (Primary)  -     Case Request; Standing  -     Implement Anesthesia orders day of procedure.; Standing  -     Obtain informed consent; Standing  -     Case Request    2. Family history of GI malignancy  -     Case Request; Standing  -     Implement Anesthesia orders day of procedure.; Standing  -     Obtain informed consent; Standing  -     Case Request      Patient 75-year-old male with history of hypertension and hyperlipidemia with family history colon cancer will be due next year for repeat colonoscopy but still having alternating bowel habits with constipation and diarrhea agent every morning.  Patient denies any nausea vomiting no melena or bright red blood per rectum.  Still with crampy abdominal pain spends up to an hour each morning trying to get out of the  house but unable to due to his bowel movements.  Will arrange slightly early colonoscopy to evaluate for change in and follow-up clinically for response to his medications and symptoms.

## 2021-04-01 NOTE — PROGRESS NOTES
Chief Complaint   Patient presents with   • Follow-up   • Constipation       Nathan Baez is a  75 y.o. male here for a follow up visit for continued constipation and diarrhea daily.    HPI     Patient 75-year-old male with history of hypertension hyperlipidemia as well as GERD degenerative disc disease here for follow-up.  Patient continues to alternate bowel movements with hard stools first thing and then an hour or so of diarrhea unable to leave the house until he completes his routine.  Patient denies any bright red blood per rectum but does have a family history of colon cancer in his mother.  Patient here for further recommendations.  Patient has been taking the WelChol which he paid for but is not been adequate to control his bowels.  Patient continues other medication including Linzess.    Past Medical History:   Diagnosis Date   • Actinic keratosis    • Acute embolism and thrombosis of vein    • Allergic rhinitis    • Arthritis    • Cataract     forming left eye   • Cellulitis    • Cellulitis    • Cervical radiculopathy    • Contact with hypodermic needle    • Cough    • Disequilibrium    • Diverticulosis    • DJD (degenerative joint disease), lumbar    • DVT (deep venous thrombosis) (CMS/HCC)     l leg  dx 2019 and was on blood thinner and ow off wears compression    • Dysphagia    • Encounter for long-term (current) use of NSAIDs    • Encounter for screening colonoscopy    • Erysipelas    • Gastroenteritis, acute    • GERD (gastroesophageal reflux disease)    • Glaucoma    • High risk medication use    • History of colon polyps    • Hospital discharge follow-up    • Hyperglycemia    • Hyperlipidemia    • Hyperplastic polyp of stomach    • Hypertension    • Hypothyroid    • Inflamed skin tag    • Insomnia    • Internal hemorrhoids    • Kidney stone     has had history of passing and still has kidney stone on both sides    • Lumbar strain    • Male urinary stress incontinence     s/p prostatectomy   •  Medicare annual wellness visit, subsequent    • Obesity    • KWAME (obstructive sleep apnea)    • Osteoarthritis    • Pes planus    • Presbycusis    • Prostate cancer (CMS/[facility])    • Prostate cancer (CMS/[facility])    • Rectal bleed    • Restless legs syndrome    • Rib pain on left side    • Shoulder pain    • Skin lesion of face    • Sleep apnea     bipap   • Throat clearing    • Tinea corporis    • Tinea cruris    • Venous stasis dermatitis          Current Outpatient Medications:   •  acetaminophen (TYLENOL) 650 MG 8 hr tablet, Take 1,300 mg by mouth 2 (Two) Times a Day., Disp: , Rfl:   •  albuterol (PROVENTIL HFA;VENTOLIN HFA) 108 (90 Base) MCG/ACT inhaler, Inhale 2 puffs Every 4 (Four) Hours As Needed for Wheezing., Disp: , Rfl:   •  ALLERGY SERUM INJECTION, Inject  under the skin into the appropriate area as directed 1 (One) Time Per Week., Disp: , Rfl:   •  amLODIPine (NORVASC) 2.5 MG tablet, TAKE 1 TABLET BY MOUTH DAILY, Disp: 90 tablet, Rfl: 3  •  aspirin 81 MG tablet, Take 1 tablet by mouth Daily., Disp: , Rfl:   •  azelastine (ASTELIN) 0.1 % nasal spray, 2 sprays into the nostril(s) as directed by provider 2 (Two) Times a Day. Use in each nostril as directed, Disp: , Rfl:   •  brimonidine (ALPHAGAN) 0.2 % ophthalmic solution, Administer  to both eyes. 3 drops in each eye at night and 2 drops in the morning in each eye, Disp: , Rfl:   •  BRIMONIDINE TARTRATE OP, Apply 1 drop to eye(s) as directed by provider 2 (Two) Times a Day., Disp: , Rfl:   •  dorzolamide-timolol (COSOPT) 22.3-6.8 MG/ML ophthalmic solution, Administer 1 drop to both eyes 2 (Two) Times a Day., Disp: , Rfl:   •  fexofenadine (ALLEGRA) 180 MG tablet, Take 180 mg by mouth Daily., Disp: , Rfl:   •  Fluticasone Furoate-Vilanterol (BREO ELLIPTA) 200-25 MCG/INH inhaler, Inhale 2 puffs Every Morning., Disp: , Rfl:   •  furosemide (LASIX) 40 MG tablet, TAKE 1 TABLET BY MOUTH EVERY DAY, Disp: 90 tablet, Rfl: 3  •  latanoprost (XALATAN) 0.005 % ophthalmic  solution, Administer 1 drop to both eyes Every Night., Disp: , Rfl:   •  levothyroxine (SYNTHROID, LEVOTHROID) 88 MCG tablet, Take 1 tablet by mouth Every Morning., Disp: 90 tablet, Rfl: 3  •  linaclotide (Linzess) 290 MCG capsule capsule, Take 1 capsule by mouth Every Morning Before Breakfast., Disp: 90 capsule, Rfl: 3  •  losartan (COZAAR) 100 MG tablet, Take 1 tablet by mouth Daily., Disp: 90 tablet, Rfl: 3  •  montelukast (SINGULAIR) 10 MG tablet, Take 1 tablet by mouth Every Night., Disp: 90 tablet, Rfl: 3  •  pantoprazole (PROTONIX) 40 MG EC tablet, Take 1 tablet by mouth 2 (Two) Times a Day., Disp: 90 tablet, Rfl: 3  •  pramipexole (MIRAPEX) 1.5 MG tablet, Take 1 tablet by mouth At Night As Needed (RLS)., Disp: 90 tablet, Rfl: 2  •  Psyllium (METAMUCIL PO), Take  by mouth As Needed., Disp: , Rfl:   •  Rhopressa 0.02 % solution, INSTILL 1 DROP IN LEFT EYE DAILY AT BEDTIME, Disp: , Rfl:   •  rosuvastatin (CRESTOR) 20 MG tablet, Take 1 tablet by mouth Every Evening., Disp: 90 tablet, Rfl: 3  •  colestipol (COLESTID) 1 g tablet, Take 1 tablet by mouth 2 (Two) Times a Day., Disp: 60 tablet, Rfl: 11  •  Fluticasone-Salmeterol 232-14 MCG/ACT aerosol powder , Inhale 1 puff 2 (Two) Times a Day., Disp: , Rfl:   •  hydroCHLOROthiazide (HYDRODIURIL) 25 MG tablet, Take 25 mg by mouth Daily., Disp: , Rfl:     No Known Allergies    Social History     Socioeconomic History   • Marital status:      Spouse name: Not on file   • Number of children: Not on file   • Years of education: Not on file   • Highest education level: Not on file   Tobacco Use   • Smoking status: Former Smoker     Packs/day: 1.00     Years: 20.00     Pack years: 20.00     Types: Cigarettes     Quit date: 1984     Years since quittin.2   • Smokeless tobacco: Never Used   Substance and Sexual Activity   • Alcohol use: Yes     Comment: 3-4 MONTHLY   • Drug use: No   • Sexual activity: Defer       Family History   Problem Relation Age of Onset    • Cancer Mother         COLON   • Colon cancer Mother    • Cancer Father         PROSTATE   • Cancer Sister         BREAST CANCER   • Breast cancer Sister    • Gout Sister    • Hypertension Sister    • Heart attack Brother    • Bone cancer Brother    • Heart disease Brother    • Malig Hyperthermia Neg Hx        Review of Systems   Constitutional: Negative.    Respiratory: Negative.    Cardiovascular: Negative.    Gastrointestinal: Positive for constipation and diarrhea. Negative for abdominal distention, abdominal pain, anal bleeding, blood in stool, nausea, rectal pain and vomiting.   Musculoskeletal: Positive for arthralgias and gait problem.   Skin: Negative.    Hematological: Negative.        Vitals:    04/01/21 1048   Temp: 97.3 °F (36.3 °C)       Physical Exam  Vitals reviewed.   Constitutional:       Appearance: Normal appearance. He is well-developed. He is obese.   HENT:      Head: Normocephalic and atraumatic.   Eyes:      Pupils: Pupils are equal, round, and reactive to light.   Cardiovascular:      Rate and Rhythm: Normal rate and regular rhythm.      Heart sounds: Normal heart sounds.   Pulmonary:      Effort: Pulmonary effort is normal.      Breath sounds: Normal breath sounds. No wheezing or rales.   Abdominal:      General: Bowel sounds are normal. There is no distension.      Palpations: Abdomen is soft. There is no mass.      Tenderness: There is no abdominal tenderness.      Hernia: No hernia is present.   Skin:     General: Skin is warm and dry.      Coloration: Skin is not jaundiced.      Findings: No rash.   Neurological:      General: No focal deficit present.      Mental Status: He is alert and oriented to person, place, and time.   Psychiatric:         Behavior: Behavior normal.         Thought Content: Thought content normal.         Judgment: Judgment normal.         No visits with results within 2 Month(s) from this visit.   Latest known visit with results is:   Office Visit on  12/17/2020   Component Date Value Ref Range Status   • Glucose 12/17/2020 155* 65 - 99 mg/dL Final   • BUN 12/17/2020 21  8 - 23 mg/dL Final   • Creatinine 12/17/2020 1.08  0.76 - 1.27 mg/dL Final   • eGFR Non  Am 12/17/2020 67  >60 mL/min/1.73 Final   • eGFR African Am 12/17/2020 81  >60 mL/min/1.73 Final   • BUN/Creatinine Ratio 12/17/2020 19.4  7.0 - 25.0 Final   • Sodium 12/17/2020 138  136 - 145 mmol/L Final   • Potassium 12/17/2020 4.1  3.5 - 5.2 mmol/L Final   • Chloride 12/17/2020 102  98 - 107 mmol/L Final   • Total CO2 12/17/2020 27.2  22.0 - 29.0 mmol/L Final   • Calcium 12/17/2020 9.1  8.6 - 10.5 mg/dL Final   • Total Protein 12/17/2020 7.0  6.0 - 8.5 g/dL Final   • Albumin 12/17/2020 4.20  3.50 - 5.20 g/dL Final   • Globulin 12/17/2020 2.8  gm/dL Final   • A/G Ratio 12/17/2020 1.5  g/dL Final   • Total Bilirubin 12/17/2020 0.8  0.0 - 1.2 mg/dL Final   • Alkaline Phosphatase 12/17/2020 165* 39 - 117 U/L Final   • AST (SGOT) 12/17/2020 48* 1 - 40 U/L Final   • ALT (SGPT) 12/17/2020 45* 1 - 41 U/L Final   • Hemoglobin A1C 12/17/2020 6.80* 4.80 - 5.60 % Final       Diagnoses and all orders for this visit:    1. Change in bowel habits (Primary)  -     Case Request; Standing  -     Implement Anesthesia orders day of procedure.; Standing  -     Obtain informed consent; Standing  -     Case Request    2. Family history of GI malignancy  -     Case Request; Standing  -     Implement Anesthesia orders day of procedure.; Standing  -     Obtain informed consent; Standing  -     Case Request      Patient 75-year-old male with history of hypertension and hyperlipidemia with family history colon cancer will be due next year for repeat colonoscopy but still having alternating bowel habits with constipation and diarrhea agent every morning.  Patient denies any nausea vomiting no melena or bright red blood per rectum.  Still with crampy abdominal pain spends up to an hour each morning trying to get out of the  house but unable to due to his bowel movements.  Will arrange slightly early colonoscopy to evaluate for change in and follow-up clinically for response to his medications and symptoms.

## 2021-04-07 ENCOUNTER — TRANSCRIBE ORDERS (OUTPATIENT)
Dept: LAB | Facility: HOSPITAL | Age: 76
End: 2021-04-07

## 2021-04-07 DIAGNOSIS — Z01.818 OTHER SPECIFIED PRE-OPERATIVE EXAMINATION: Primary | ICD-10-CM

## 2021-04-16 ENCOUNTER — LAB (OUTPATIENT)
Dept: LAB | Facility: HOSPITAL | Age: 76
End: 2021-04-16

## 2021-04-16 DIAGNOSIS — Z01.818 OTHER SPECIFIED PRE-OPERATIVE EXAMINATION: ICD-10-CM

## 2021-04-16 PROCEDURE — U0004 COV-19 TEST NON-CDC HGH THRU: HCPCS

## 2021-04-16 PROCEDURE — C9803 HOPD COVID-19 SPEC COLLECT: HCPCS

## 2021-04-16 PROCEDURE — U0005 INFEC AGEN DETEC AMPLI PROBE: HCPCS

## 2021-04-17 LAB — SARS-COV-2 ORF1AB RESP QL NAA+PROBE: NOT DETECTED

## 2021-04-19 ENCOUNTER — ANESTHESIA EVENT (OUTPATIENT)
Dept: GASTROENTEROLOGY | Facility: HOSPITAL | Age: 76
End: 2021-04-19

## 2021-04-19 ENCOUNTER — ANESTHESIA (OUTPATIENT)
Dept: GASTROENTEROLOGY | Facility: HOSPITAL | Age: 76
End: 2021-04-19

## 2021-04-19 ENCOUNTER — HOSPITAL ENCOUNTER (OUTPATIENT)
Facility: HOSPITAL | Age: 76
Setting detail: HOSPITAL OUTPATIENT SURGERY
Discharge: HOME | End: 2021-04-19
Attending: INTERNAL MEDICINE | Admitting: INTERNAL MEDICINE

## 2021-04-19 VITALS
HEIGHT: 67 IN | SYSTOLIC BLOOD PRESSURE: 125 MMHG | BODY MASS INDEX: 39.71 KG/M2 | RESPIRATION RATE: 16 BRPM | DIASTOLIC BLOOD PRESSURE: 86 MMHG | WEIGHT: 253 LBS | OXYGEN SATURATION: 98 % | HEART RATE: 69 BPM

## 2021-04-19 DIAGNOSIS — Z80.0 FAMILY HISTORY OF GI MALIGNANCY: ICD-10-CM

## 2021-04-19 DIAGNOSIS — R19.4 CHANGE IN BOWEL HABITS: ICD-10-CM

## 2021-04-19 PROCEDURE — 25010000002 PROPOFOL 10 MG/ML EMULSION: Performed by: NURSE ANESTHETIST, CERTIFIED REGISTERED

## 2021-04-19 PROCEDURE — 45385 COLONOSCOPY W/LESION REMOVAL: CPT | Performed by: INTERNAL MEDICINE

## 2021-04-19 PROCEDURE — 88305 TISSUE EXAM BY PATHOLOGIST: CPT | Performed by: INTERNAL MEDICINE

## 2021-04-19 RX ORDER — LIDOCAINE HYDROCHLORIDE 20 MG/ML
INJECTION, SOLUTION INFILTRATION; PERINEURAL AS NEEDED
Status: DISCONTINUED | OUTPATIENT
Start: 2021-04-19 | End: 2021-04-19 | Stop reason: SURG

## 2021-04-19 RX ORDER — SODIUM CHLORIDE, SODIUM LACTATE, POTASSIUM CHLORIDE, CALCIUM CHLORIDE 600; 310; 30; 20 MG/100ML; MG/100ML; MG/100ML; MG/100ML
1000 INJECTION, SOLUTION INTRAVENOUS CONTINUOUS
Status: DISCONTINUED | OUTPATIENT
Start: 2021-04-19 | End: 2021-04-19 | Stop reason: HOSPADM

## 2021-04-19 RX ORDER — PROPOFOL 10 MG/ML
VIAL (ML) INTRAVENOUS CONTINUOUS PRN
Status: DISCONTINUED | OUTPATIENT
Start: 2021-04-19 | End: 2021-04-19 | Stop reason: SURG

## 2021-04-19 RX ORDER — SODIUM CHLORIDE 0.9 % (FLUSH) 0.9 %
10 SYRINGE (ML) INJECTION AS NEEDED
Status: DISCONTINUED | OUTPATIENT
Start: 2021-04-19 | End: 2021-04-19 | Stop reason: HOSPADM

## 2021-04-19 RX ORDER — PROPOFOL 10 MG/ML
VIAL (ML) INTRAVENOUS AS NEEDED
Status: DISCONTINUED | OUTPATIENT
Start: 2021-04-19 | End: 2021-04-19 | Stop reason: SURG

## 2021-04-19 RX ORDER — LIDOCAINE HYDROCHLORIDE 10 MG/ML
0.5 INJECTION, SOLUTION INFILTRATION; PERINEURAL ONCE AS NEEDED
Status: DISCONTINUED | OUTPATIENT
Start: 2021-04-19 | End: 2021-04-19 | Stop reason: HOSPADM

## 2021-04-19 RX ADMIN — PROPOFOL 200 MCG/KG/MIN: 10 INJECTION, EMULSION INTRAVENOUS at 10:19

## 2021-04-19 RX ADMIN — PROPOFOL 80 MG: 10 INJECTION, EMULSION INTRAVENOUS at 10:19

## 2021-04-19 RX ADMIN — SODIUM CHLORIDE, POTASSIUM CHLORIDE, SODIUM LACTATE AND CALCIUM CHLORIDE 1000 ML: 600; 310; 30; 20 INJECTION, SOLUTION INTRAVENOUS at 09:29

## 2021-04-19 RX ADMIN — LIDOCAINE HYDROCHLORIDE 100 MG: 20 INJECTION, SOLUTION INFILTRATION; PERINEURAL at 10:19

## 2021-04-19 NOTE — ANESTHESIA PREPROCEDURE EVALUATION
Anesthesia Evaluation     Patient summary reviewed and Nursing notes reviewed   no history of anesthetic complications:  NPO Solid Status: > 8 hours  NPO Liquid Status: > 2 hours           Airway   Mallampati: II  TM distance: >3 FB  Neck ROM: full  no difficulty expected  Dental - normal exam     Pulmonary - normal exam   (+) a smoker Former, sleep apnea on CPAP,   (-) COPD, asthma, lung cancer, no home oxygen  Cardiovascular - normal exam  Exercise tolerance: good (4-7 METS)    ECG reviewed  Rhythm: regular  Rate: normal    (+) hypertension well controlled 2 medications or greater, dysrhythmias PAC, DVT resolved, hyperlipidemia,   (-) valvular problems/murmurs, past MI, CAD, angina, CHF, cardiac stents, pericardial effusion      Neuro/Psych  (+) numbness,     (-) seizures, TIA, CVA  GI/Hepatic/Renal/Endo    (+) morbid obesity, GERD, GI bleeding lower resolved, renal disease stones, thyroid problem hypothyroidism  (-) hiatal hernia, PUD, hepatitis, liver disease    Musculoskeletal     Abdominal  - normal exam   Substance History - negative use     OB/GYN          Other   arthritis,    history of cancer remission                      Anesthesia Plan    ASA 3     MAC   (GA w/ ISB for POPC)  intravenous induction     Anesthetic plan, all risks, benefits, and alternatives have been provided, discussed and informed consent has been obtained with: patient.

## 2021-04-19 NOTE — ANESTHESIA POSTPROCEDURE EVALUATION
"Patient: Nathan Baez    Procedure Summary     Date: 04/19/21 Room / Location:  [facility]    Anesthesia Start: 1013 Anesthesia Stop: 1053    Procedure: COLONOSCOPY INTO CECUM WITH HOT & COLD  SNARE POLYPECTOMIES (N/A ) Diagnosis:       Change in bowel habits      Family history of GI malignancy      Colon polyps      Diverticulosis      Internal hemorrhoids      (Change in bowel habits [R19.4])      (Family history of GI malignancy [Z80.0])    Surgeons: Jaden Chowdhury MD Provider: Quinn Alvarez MD    Anesthesia Type: MAC ASA Status: 3          Anesthesia Type: MAC    Vitals  Vitals Value Taken Time   /86 04/19/21 1111   Temp     Pulse 69 04/19/21 1111   Resp 16 04/19/21 1111   SpO2 98 % 04/19/21 1111           Post Anesthesia Care and Evaluation    Patient location during evaluation: bedside  Patient participation: complete - patient participated  Level of consciousness: awake and alert  Pain management: adequate  Airway patency: patent  Anesthetic complications: No anesthetic complications    Cardiovascular status: acceptable  Respiratory status: acceptable  Hydration status: acceptable    Comments: /86   Pulse 69   Resp 16   Ht 170.2 cm (67\")   Wt 115 kg (253 lb)   SpO2 98%   BMI 39.63 kg/m²       "

## 2021-04-19 NOTE — DISCHARGE INSTRUCTIONS
For the next 24 hours patient needs to be with a responsible adult.    For 24 hours DO NOT drive, operate machinery, appliances, drink alcohol, make important decisions or sign legal documents.    Start with a light or bland diet if you are feeling sick to your stomach otherwise advance to regular diet as tolerated.    Follow recommendations on procedure report if provided by your doctor.    Call Dr Chowdhury for problems [phone]    Problems may include but not limited to: large amounts of bleeding, trouble breathing, repeated vomiting, severe unrelieved pain, fever or chills.

## 2021-04-19 NOTE — BRIEF OP NOTE
COLONOSCOPY  Progress Note    Nathan Baez  4/19/2021    Pre-op Diagnosis:   Change in bowel habits [R19.4]  Family history of GI malignancy [Z80.0]       Post-Op Diagnosis Codes:     * Change in bowel habits [R19.4]     * Family history of GI malignancy [Z80.0]     * Colon polyps [K63.5]     * Diverticulosis [K57.90]     * Internal hemorrhoids [K64.8]    Procedure/CPT® Codes:        Procedure(s):  COLONOSCOPY INTO CECUM WITH HOT & COLD  SNARE POLYPECTOMIES    Surgeon(s):  Jaden Chowdhury MD    Anesthesia: Monitored Anesthesia Care    Staff:   Endo Technician: Lorenzo Cadena, PCT  Endo Nurse: Catrachita Mcclure RN         Estimated Blood Loss: minimal    Urine Voided: * No values recorded between 4/19/2021 10:13 AM and 4/19/2021 10:50 AM *    Specimens:                Specimens     ID Source Type Tests Collected By Collected At Frozen?    A Large Intestine, Cecum Polyp · TISSUE PATHOLOGY EXAM   Jaden Chowdhury MD 4/19/21 1029     Description: CECAL POLYPS    This specimen was not marked as sent.    B Large Intestine, Right / Ascending Colon Polyp · TISSUE PATHOLOGY EXAM   Jaden Chowdhury MD 4/19/21 1034     Description: ASCENDING COLON POLYP    This specimen was not marked as sent.    C Large Intestine, Transverse Colon Polyp · TISSUE PATHOLOGY EXAM   Jaden Chowdhury MD 4/19/21 1036     Description: TRANSVERSE COLON POLYPS    This specimen was not marked as sent.    D Large Intestine, Left / Descending Colon Polyp · TISSUE PATHOLOGY EXAM   Jaden Chowdhury MD 4/19/21 1043     Description: DESCENDING COLON POLYP    This specimen was not marked as sent.                Drains: * No LDAs found *    Findings: Colonoscopy the terminal ileum with normal ileum mucosa.  Colon polyps seen in the cecum ascending transverse and descending x7 removed cold snare and hot snare.  Sigmoid diverticulosis internal hemorrhoids were noted as well.    Complications: None          Jaden Chowdhury MD      Date: 4/19/2021  Time: 10:51 EDT

## 2021-04-20 LAB
LAB AP CASE REPORT: NORMAL
PATH REPORT.FINAL DX SPEC: NORMAL
PATH REPORT.GROSS SPEC: NORMAL

## 2021-05-06 ENCOUNTER — OFFICE VISIT (OUTPATIENT)
Dept: FAMILY MEDICINE CLINIC | Facility: CLINIC | Age: 76
End: 2021-05-06

## 2021-05-06 VITALS
DIASTOLIC BLOOD PRESSURE: 72 MMHG | TEMPERATURE: 98.7 F | HEART RATE: 91 BPM | SYSTOLIC BLOOD PRESSURE: 138 MMHG | HEIGHT: 67 IN | OXYGEN SATURATION: 98 % | WEIGHT: 252 LBS | BODY MASS INDEX: 39.55 KG/M2

## 2021-05-06 DIAGNOSIS — E03.9 HYPOTHYROIDISM (ACQUIRED): ICD-10-CM

## 2021-05-06 DIAGNOSIS — I10 ESSENTIAL HYPERTENSION: ICD-10-CM

## 2021-05-06 DIAGNOSIS — E11.65 TYPE 2 DIABETES MELLITUS WITH HYPERGLYCEMIA, WITHOUT LONG-TERM CURRENT USE OF INSULIN (HCC): Primary | ICD-10-CM

## 2021-05-06 PROCEDURE — 99214 OFFICE O/P EST MOD 30 MIN: CPT | Performed by: FAMILY MEDICINE

## 2021-05-06 RX ORDER — LACTULOSE 10 G/15ML
SOLUTION ORAL
COMMUNITY
Start: 2021-04-28 | End: 2021-08-11

## 2021-05-06 RX ORDER — COLESEVELAM 180 1/1
1875 TABLET ORAL DAILY
COMMUNITY
End: 2021-08-11

## 2021-05-06 NOTE — PROGRESS NOTES
Chief Complaint   Patient presents with   • Hypertension       Subjective   Nathan Baez is a 76 y.o. male.     History of Present Illness   F/U HTN.  No orhtostasis.  Doing well with meds.    F/U hypothyroidism.  ON levothyroxine 88 once a dya.  F/U DM2.  No meds at present.        The following portions of the patient's history were reviewed and updated as appropriate: allergies, current medications, past family history, past medical history, past social history, past surgical history and problem list.    Review of Systems   Constitutional: Negative for appetite change and fatigue.   HENT: Negative for nosebleeds and sore throat.    Eyes: Negative for blurred vision and visual disturbance.   Respiratory: Negative for shortness of breath and wheezing.    Cardiovascular: Negative for chest pain and leg swelling.   Gastrointestinal: Negative for abdominal distention and abdominal pain.   Endocrine: Negative for cold intolerance and polyuria.   Genitourinary: Negative for dysuria and hematuria.   Musculoskeletal: Negative for arthralgias and myalgias.   Skin: Negative for color change and rash.   Neurological: Negative for weakness and confusion.   Psychiatric/Behavioral: Negative for agitation and depressed mood.       Patient Active Problem List   Diagnosis   • OA (osteoarthritis) of knee   • Hypertension   • Abdominal wall cellulitis   • Hypothyroidism (acquired)   • Gastroesophageal reflux disease without esophagitis   • Mixed hyperlipidemia   • Primary insomnia   • DDD (degenerative disc disease), lumbar   • KWAME (obstructive sleep apnea)   • Allergic   • Venous insufficiency   • Prostate cancer (CMS/[facility])   • Venous stasis dermatitis   • Tinea cruris   • Tinea corporis   • Throat clearing   • Sleep apnea   • Skin lesion of face   • Rib pain on left side   • Rectal bleed   • Presbycusis   • Pes planus   • Osteoarthritis   • Obesity   • Medicare annual wellness visit, subsequent   • Lumbar strain   • Internal  hemorrhoids   • Insomnia   • Inflamed skin tag   • Hyperplastic polyp of stomach   • Hospital discharge follow-up   • History of colon polyps   • High risk medication use   • Glaucoma   • Gastroenteritis, acute   • Erysipelas   • Encounter for screening colonoscopy   • Encounter for long-term (current) use of NSAIDs   • Dysphagia   • DVT (deep venous thrombosis) (CMS/HCC)   • DJD (degenerative joint disease), lumbar   • Diverticulosis   • Cough   • Contact with hypodermic needle   • Cervical radiculopathy   • Cellulitis   • Arthritis   • Allergic rhinitis   • Acute glaucoma   • Acute embolism and thrombosis of vein   • Actinic keratosis   • Status post reverse total shoulder replacement, right   • Localized edema   • Anemia   • Peripheral polyneuropathy   • Campylobacter diarrhea   • Hyponatremia   • Generalized abdominal pain   • Fever   • Constipation   • Bilateral lower extremity edema   • Acute pyelonephritis   • Chronic idiopathic constipation   • Functional diarrhea   • Change in bowel habits   • RLS (restless legs syndrome)   • Type 2 diabetes mellitus with hyperglycemia (CMS/HCC)   • Family history of GI malignancy       No Known Allergies      Current Outpatient Medications:   •  acetaminophen (TYLENOL) 650 MG 8 hr tablet, Take 1,300 mg by mouth 2 (Two) Times a Day., Disp: , Rfl:   •  albuterol (PROVENTIL HFA;VENTOLIN HFA) 108 (90 Base) MCG/ACT inhaler, Inhale 2 puffs Every 4 (Four) Hours As Needed for Wheezing., Disp: , Rfl:   •  ALLERGY SERUM INJECTION, Inject  under the skin into the appropriate area as directed 1 (One) Time Per Week., Disp: , Rfl:   •  amLODIPine (NORVASC) 2.5 MG tablet, TAKE 1 TABLET BY MOUTH DAILY, Disp: 90 tablet, Rfl: 3  •  aspirin 81 MG tablet, Take 1 tablet by mouth Daily., Disp: , Rfl:   •  azelastine (ASTELIN) 0.1 % nasal spray, 2 sprays into the nostril(s) as directed by provider 2 (Two) Times a Day. Use in each nostril as directed, Disp: , Rfl:   •  brimonidine (ALPHAGAN) 0.2  % ophthalmic solution, Administer  to both eyes. 3 drops in each eye at night and 2 drops in the morning in each eye, Disp: , Rfl:   •  BRIMONIDINE TARTRATE OP, Apply 1 drop to eye(s) as directed by provider 2 (Two) Times a Day., Disp: , Rfl:   •  colesevelam (WELCHOL) 625 MG tablet, Take 1,875 mg by mouth Daily., Disp: , Rfl:   •  dorzolamide-timolol (COSOPT) 22.3-6.8 MG/ML ophthalmic solution, Administer 1 drop to both eyes 2 (Two) Times a Day., Disp: , Rfl:   •  fexofenadine (ALLEGRA) 180 MG tablet, Take 180 mg by mouth Daily., Disp: , Rfl:   •  Fluticasone Furoate-Vilanterol (BREO ELLIPTA) 200-25 MCG/INH inhaler, Inhale 2 puffs Every Morning., Disp: , Rfl:   •  Fluticasone-Salmeterol 232-14 MCG/ACT aerosol powder , Inhale 1 puff 2 (Two) Times a Day., Disp: , Rfl:   •  furosemide (LASIX) 40 MG tablet, TAKE 1 TABLET BY MOUTH EVERY DAY, Disp: 90 tablet, Rfl: 3  •  lactulose (CHRONULAC) 10 GM/15ML solution, TAKE 15 TO 30 MLS BY MOUTH EVERY DAY TO PREVENT CONSTIPATION., Disp: , Rfl:   •  latanoprost (XALATAN) 0.005 % ophthalmic solution, Administer 1 drop to both eyes Every Night., Disp: , Rfl:   •  levothyroxine (SYNTHROID, LEVOTHROID) 88 MCG tablet, Take 1 tablet by mouth Every Morning., Disp: 90 tablet, Rfl: 3  •  linaclotide (Linzess) 290 MCG capsule capsule, Take 1 capsule by mouth Every Morning Before Breakfast., Disp: 90 capsule, Rfl: 3  •  losartan (COZAAR) 100 MG tablet, Take 1 tablet by mouth Daily., Disp: 90 tablet, Rfl: 3  •  montelukast (SINGULAIR) 10 MG tablet, Take 1 tablet by mouth Every Night., Disp: 90 tablet, Rfl: 3  •  pantoprazole (PROTONIX) 40 MG EC tablet, Take 1 tablet by mouth 2 (Two) Times a Day., Disp: 90 tablet, Rfl: 3  •  pramipexole (MIRAPEX) 1.5 MG tablet, Take 1 tablet by mouth At Night As Needed (RLS)., Disp: 90 tablet, Rfl: 2  •  Psyllium (METAMUCIL PO), Take  by mouth As Needed., Disp: , Rfl:   •  Rhopressa 0.02 % solution, INSTILL 1 DROP IN LEFT EYE DAILY AT BEDTIME, Disp: , Rfl:    •  rosuvastatin (CRESTOR) 20 MG tablet, Take 1 tablet by mouth Every Evening., Disp: 90 tablet, Rfl: 3  •  colestipol (COLESTID) 1 g tablet, Take 1 tablet by mouth 2 (Two) Times a Day., Disp: 60 tablet, Rfl: 11  •  hydroCHLOROthiazide (HYDRODIURIL) 25 MG tablet, Take 25 mg by mouth Daily., Disp: , Rfl:     Past Medical History:   Diagnosis Date   • Actinic keratosis    • Acute embolism and thrombosis of vein    • Allergic rhinitis    • Arthritis    • Cataract     forming left eye   • Cellulitis    • Cellulitis    • Cervical radiculopathy    • Contact with hypodermic needle    • Cough    • Disequilibrium    • Diverticulosis    • DJD (degenerative joint disease), lumbar    • DVT (deep venous thrombosis) (CMS/HCC)     l leg  dx 2019 and was on blood thinner and ow off wears compression    • Dysphagia    • Encounter for long-term (current) use of NSAIDs    • Encounter for screening colonoscopy    • Erysipelas    • Gastroenteritis, acute    • GERD (gastroesophageal reflux disease)    • Glaucoma    • High risk medication use    • History of colon polyps    • Hospital discharge follow-up    • Hyperglycemia    • Hyperlipidemia    • Hyperplastic polyp of stomach    • Hypertension    • Hypothyroid    • Inflamed skin tag    • Insomnia    • Internal hemorrhoids    • Kidney stone     has had history of passing and still has kidney stone on both sides    • Lumbar strain    • Male urinary stress incontinence     s/p prostatectomy   • Medicare annual wellness visit, subsequent    • Obesity    • KWAME (obstructive sleep apnea)    • Osteoarthritis    • Pes planus    • Presbycusis    • Prostate cancer (CMS/HCC)    • Prostate cancer (CMS/HCC)    • Rectal bleed    • Restless legs syndrome    • Rib pain on left side    • Shoulder pain    • Skin lesion of face    • Sleep apnea     bipap   • Throat clearing    • Tinea corporis    • Tinea cruris    • Venous stasis dermatitis        Past Surgical History:   Procedure Laterality Date   •  CATARACT EXTRACTION Right    • COLONOSCOPY  2017    3/17normal. Recheck . . Repeat in 5 years    • COLONOSCOPY N/A 2021    Procedure: COLONOSCOPY INTO CECUM WITH HOT & COLD  SNARE POLYPECTOMIES;  Surgeon: Jaden Chowdhury MD;  Location: [facility] ENDOSCOPY;  Service: Gastroenterology;  Laterality: N/A;  PRE: CHANGE IN BOWEL HABITS; FAMILY H/O COLON CANCER  POST: DIVERTICULOSIS, POLYPS   • ENDOSCOPY W/ PEG REMOVAL     • FOOT SURGERY       had big toe replaced on left foot   • HERNIA REPAIR  2012    umbilical   • JOINT REPLACEMENT Right    • NECK SURGERY  2011    cervical disc   • IL TOTAL KNEE ARTHROPLASTY Left 2016    Procedure: LT TOTAL KNEE ARTHROPLASTY;  Surgeon: Tadeo Basurto MD;  Location: [facility] MAIN OR;  Service: Orthopedics   • PROSTATECTOMY  1999. Radical    • THYROID LOBECTOMY     • THYROID SURGERY      partial doesn't know which one was removed   • TONSILLECTOMY     • TOTAL KNEE ARTHROPLASTY Right    • TOTAL SHOULDER ARTHROPLASTY W/ DISTAL CLAVICLE EXCISION Right 2019    Procedure: TOTAL SHOULDER REVERSE ARTHROPLASTY RIGHT REPAIR RIGHT AXILLARY VEIN;  Surgeon: Behzad Kelley MD;  Location: [facility] OR OSC;  Service: Orthopedics   • WRIST SURGERY Right 12/17/2014    x2  wrist replaced       Family History   Problem Relation Age of Onset   • Cancer Mother         COLON   • Colon cancer Mother    • Cancer Father         PROSTATE   • Cancer Sister         BREAST CANCER   • Breast cancer Sister    • Gout Sister    • Hypertension Sister    • Heart attack Brother    • Bone cancer Brother    • Heart disease Brother    • Malig Hyperthermia Neg Hx        Social History     Tobacco Use   • Smoking status: Former Smoker     Packs/day: 1.00     Years: 20.00     Pack years: 20.00     Types: Cigarettes     Quit date: 1984     Years since quittin.3   • Smokeless tobacco: Never Used   Substance Use Topics   • Alcohol use: Yes      Comment: 3-4 MONTHLY            Objective     Vitals:    05/06/21 1458   BP: 138/72   Pulse:    Temp:    SpO2:      Body mass index is 39.47 kg/m².    Physical Exam  Vitals reviewed.   Constitutional:       Appearance: He is well-developed. He is not diaphoretic.   HENT:      Head: Normocephalic and atraumatic.   Eyes:      General: No scleral icterus.     Pupils: Pupils are equal, round, and reactive to light.   Neck:      Thyroid: No thyromegaly.   Cardiovascular:      Rate and Rhythm: Normal rate and regular rhythm.      Heart sounds: No murmur heard.   No friction rub. No gallop.    Pulmonary:      Effort: Pulmonary effort is normal. No respiratory distress.      Breath sounds: No wheezing or rales.   Chest:      Chest wall: No tenderness.   Abdominal:      General: Bowel sounds are normal. There is no distension.      Palpations: Abdomen is soft.      Tenderness: There is no abdominal tenderness.   Musculoskeletal:         General: No deformity. Normal range of motion.   Lymphadenopathy:      Cervical: No cervical adenopathy.   Skin:     General: Skin is warm and dry.      Findings: No rash.   Neurological:      Cranial Nerves: No cranial nerve deficit.      Motor: No abnormal muscle tone.         Lab Results   Component Value Date    GLUCOSE 103 (H) 06/15/2020    BUN 21 12/17/2020    CREATININE 1.08 12/17/2020    EGFRIFNONA 67 12/17/2020    EGFRIFAFRI 81 12/17/2020    BCR 19.4 12/17/2020    K 4.1 12/17/2020    CO2 27.2 12/17/2020    CALCIUM 9.1 12/17/2020    PROTENTOTREF 7.0 12/17/2020    ALBUMIN 4.20 12/17/2020    LABIL2 1.5 12/17/2020    AST 48 (H) 12/17/2020    ALT 45 (H) 12/17/2020       WBC   Date Value Ref Range Status   06/15/2020 5.60 3.40 - 10.80 10*3/mm3 Final   06/15/2020 5.60 3.40 - 10.80 10*3/mm3 Final   02/13/2020 4.97 3.40 - 10.80 10*3/mm3 Final     RBC   Date Value Ref Range Status   06/15/2020 4.44 4.14 - 5.80 10*6/mm3 Final   02/13/2020 4.60 4.14 - 5.80 10*6/mm3 Final     Hemoglobin   Date  Value Ref Range Status   06/15/2020 13.6 13.0 - 17.7 g/dL Final     Hematocrit   Date Value Ref Range Status   06/15/2020 40.6 37.5 - 51.0 % Final     MCV   Date Value Ref Range Status   06/15/2020 91.4 79.0 - 97.0 fL Final     MCH   Date Value Ref Range Status   06/15/2020 30.6 26.6 - 33.0 pg Final     MCHC   Date Value Ref Range Status   06/15/2020 33.5 31.5 - 35.7 g/dL Final     RDW   Date Value Ref Range Status   06/15/2020 13.3 12.3 - 15.4 % Final     RDW-SD   Date Value Ref Range Status   06/15/2020 45.2 37.0 - 54.0 fl Final     MPV   Date Value Ref Range Status   06/15/2020 10.0 6.0 - 12.0 fL Final     Platelets   Date Value Ref Range Status   06/15/2020 155 140 - 450 10*3/mm3 Final     Neutrophil %   Date Value Ref Range Status   06/15/2020 63.1 42.7 - 76.0 % Final     Lymphocyte %   Date Value Ref Range Status   06/15/2020 19.5 (L) 19.6 - 45.3 % Final     Monocyte %   Date Value Ref Range Status   06/15/2020 12.1 (H) 5.0 - 12.0 % Final     Eosinophil %   Date Value Ref Range Status   06/15/2020 2.3 0.3 - 6.2 % Final     Basophil %   Date Value Ref Range Status   06/15/2020 0.7 0.0 - 1.5 % Final     Immature Grans %   Date Value Ref Range Status   06/15/2020 2.3 (H) 0.0 - 0.5 % Final     Neutrophils, Absolute   Date Value Ref Range Status   06/15/2020 3.53 1.70 - 7.00 10*3/mm3 Final     Lymphocytes, Absolute   Date Value Ref Range Status   06/15/2020 1.09 0.70 - 3.10 10*3/mm3 Final     Monocytes, Absolute   Date Value Ref Range Status   06/15/2020 0.68 0.10 - 0.90 10*3/mm3 Final     Eosinophils, Absolute   Date Value Ref Range Status   06/15/2020 0.13 0.00 - 0.40 10*3/mm3 Final     Basophils, Absolute   Date Value Ref Range Status   06/15/2020 0.04 0.00 - 0.20 10*3/mm3 Final     Immature Grans, Absolute   Date Value Ref Range Status   06/15/2020 0.13 (H) 0.00 - 0.05 10*3/mm3 Final     nRBC   Date Value Ref Range Status   06/15/2020 0.0 0.0 - 0.2 /100 WBC Final       Lab Results   Component Value Date     HGBA1C 6.80 (H) 12/17/2020       Lab Results   Component Value Date    GMGGFSMM84 334 06/04/2019       TSH   Date Value Ref Range Status   08/19/2020 3.460 0.270 - 4.200 uIU/mL Final   06/04/2019 2.220 0.270 - 4.200 mIU/mL Final       No results found for: CHOL  Lab Results   Component Value Date    TRIG 252 (H) 08/19/2020     Lab Results   Component Value Date    HDL 37 (L) 08/19/2020     Lab Results   Component Value Date    LDL 56 08/19/2020     Lab Results   Component Value Date    VLDL 50.4 08/19/2020     No results found for: LDLHDL  Labs reviewed with LDL 56 8/20,  A1c 6.8 12/20.  Cr 1.0. 12/20.      Procedures    Assessment/Plan   Problems Addressed this Visit     Hypertension    Relevant Orders    Comprehensive Metabolic Panel    Hypothyroidism (acquired)    Relevant Orders    TSH    Type 2 diabetes mellitus with hyperglycemia (CMS/[facility]) - Primary    Relevant Orders    Comprehensive Metabolic Panel    Hemoglobin A1c    Lipid Panel With / Chol / HDL Ratio      Diagnoses       Codes Comments    Type 2 diabetes mellitus with hyperglycemia, without long-term current use of insulin (CMS/[facility])    -  Primary ICD-10-CM: E11.65  ICD-9-CM: 250.00, 790.29     Hypothyroidism (acquired)     ICD-10-CM: E03.9  ICD-9-CM: 244.9     Essential hypertension     ICD-10-CM: I10  ICD-9-CM: 401.9       HTN.  Controlled.  Continue hctz/losartan/amlo/furosemide.    Hypothyroidism.  Continue levothyroxine.  Check TSh.    Hyperlipidemia.  Check FLP.  Continue rosuvastatin 20 once a day.       Orders Placed This Encounter   Procedures   • Comprehensive Metabolic Panel     Order Specific Question:   Release to patient     Answer:   Immediate   • Hemoglobin A1c     Order Specific Question:   Release to patient     Answer:   Immediate   • Lipid Panel With / Chol / HDL Ratio     Order Specific Question:   Release to patient     Answer:   Immediate   • TSH     Order Specific Question:   Release to patient     Answer:   Immediate       Current  Outpatient Medications   Medication Sig Dispense Refill   • acetaminophen (TYLENOL) 650 MG 8 hr tablet Take 1,300 mg by mouth 2 (Two) Times a Day.     • albuterol (PROVENTIL HFA;VENTOLIN HFA) 108 (90 Base) MCG/ACT inhaler Inhale 2 puffs Every 4 (Four) Hours As Needed for Wheezing.     • ALLERGY SERUM INJECTION Inject  under the skin into the appropriate area as directed 1 (One) Time Per Week.     • amLODIPine (NORVASC) 2.5 MG tablet TAKE 1 TABLET BY MOUTH DAILY 90 tablet 3   • aspirin 81 MG tablet Take 1 tablet by mouth Daily.     • azelastine (ASTELIN) 0.1 % nasal spray 2 sprays into the nostril(s) as directed by provider 2 (Two) Times a Day. Use in each nostril as directed     • brimonidine (ALPHAGAN) 0.2 % ophthalmic solution Administer  to both eyes. 3 drops in each eye at night and 2 drops in the morning in each eye     • BRIMONIDINE TARTRATE OP Apply 1 drop to eye(s) as directed by provider 2 (Two) Times a Day.     • colesevelam (WELCHOL) 625 MG tablet Take 1,875 mg by mouth Daily.     • dorzolamide-timolol (COSOPT) 22.3-6.8 MG/ML ophthalmic solution Administer 1 drop to both eyes 2 (Two) Times a Day.     • fexofenadine (ALLEGRA) 180 MG tablet Take 180 mg by mouth Daily.     • Fluticasone Furoate-Vilanterol (BREO ELLIPTA) 200-25 MCG/INH inhaler Inhale 2 puffs Every Morning.     • Fluticasone-Salmeterol 232-14 MCG/ACT aerosol powder  Inhale 1 puff 2 (Two) Times a Day.     • furosemide (LASIX) 40 MG tablet TAKE 1 TABLET BY MOUTH EVERY DAY 90 tablet 3   • lactulose (CHRONULAC) 10 GM/15ML solution TAKE 15 TO 30 MLS BY MOUTH EVERY DAY TO PREVENT CONSTIPATION.     • latanoprost (XALATAN) 0.005 % ophthalmic solution Administer 1 drop to both eyes Every Night.     • levothyroxine (SYNTHROID, LEVOTHROID) 88 MCG tablet Take 1 tablet by mouth Every Morning. 90 tablet 3   • linaclotide (Linzess) 290 MCG capsule capsule Take 1 capsule by mouth Every Morning Before Breakfast. 90 capsule 3   • losartan (COZAAR) 100 MG tablet  Take 1 tablet by mouth Daily. 90 tablet 3   • montelukast (SINGULAIR) 10 MG tablet Take 1 tablet by mouth Every Night. 90 tablet 3   • pantoprazole (PROTONIX) 40 MG EC tablet Take 1 tablet by mouth 2 (Two) Times a Day. 90 tablet 3   • pramipexole (MIRAPEX) 1.5 MG tablet Take 1 tablet by mouth At Night As Needed (RLS). 90 tablet 2   • Psyllium (METAMUCIL PO) Take  by mouth As Needed.     • Rhopressa 0.02 % solution INSTILL 1 DROP IN LEFT EYE DAILY AT BEDTIME     • rosuvastatin (CRESTOR) 20 MG tablet Take 1 tablet by mouth Every Evening. 90 tablet 3   • colestipol (COLESTID) 1 g tablet Take 1 tablet by mouth 2 (Two) Times a Day. 60 tablet 11   • hydroCHLOROthiazide (HYDRODIURIL) 25 MG tablet Take 25 mg by mouth Daily.       No current facility-administered medications for this visit.       Nathan Baez had no medications administered during this visit.    Return in about 4 months (around 9/6/2021).    There are no Patient Instructions on file for this visit.        no discharge, no irritation, no pain, no redness, and no visual changes.

## 2021-05-07 LAB
ALBUMIN SERPL-MCNC: 4.3 G/DL (ref 3.5–5.2)
ALBUMIN/GLOB SERPL: 2 G/DL
ALP SERPL-CCNC: 146 U/L (ref 39–117)
ALT SERPL-CCNC: 31 U/L (ref 1–41)
AST SERPL-CCNC: 33 U/L (ref 1–40)
BILIRUB SERPL-MCNC: 0.8 MG/DL (ref 0–1.2)
BUN SERPL-MCNC: 13 MG/DL (ref 8–23)
BUN/CREAT SERPL: 14.1 (ref 7–25)
CALCIUM SERPL-MCNC: 9.6 MG/DL (ref 8.6–10.5)
CHLORIDE SERPL-SCNC: 101 MMOL/L (ref 98–107)
CHOLEST SERPL-MCNC: 122 MG/DL (ref 0–200)
CHOLEST/HDLC SERPL: 3.7 {RATIO}
CO2 SERPL-SCNC: 28.1 MMOL/L (ref 22–29)
CREAT SERPL-MCNC: 0.92 MG/DL (ref 0.76–1.27)
GLOBULIN SER CALC-MCNC: 2.1 GM/DL
GLUCOSE SERPL-MCNC: 99 MG/DL (ref 65–99)
HBA1C MFR BLD: 7 % (ref 4.8–5.6)
HDLC SERPL-MCNC: 33 MG/DL (ref 40–60)
LDLC SERPL CALC-MCNC: 49 MG/DL (ref 0–100)
POTASSIUM SERPL-SCNC: 3.7 MMOL/L (ref 3.5–5.2)
PROT SERPL-MCNC: 6.4 G/DL (ref 6–8.5)
SODIUM SERPL-SCNC: 138 MMOL/L (ref 136–145)
TRIGL SERPL-MCNC: 254 MG/DL (ref 0–150)
TSH SERPL DL<=0.005 MIU/L-ACNC: 2.12 UIU/ML (ref 0.27–4.2)
VLDLC SERPL CALC-MCNC: 40 MG/DL (ref 5–40)

## 2021-05-12 ENCOUNTER — TELEPHONE (OUTPATIENT)
Dept: GASTROENTEROLOGY | Facility: CLINIC | Age: 76
End: 2021-05-12

## 2021-05-12 NOTE — TELEPHONE ENCOUNTER
Patient called. Spoke with spouse Coco(listed on [facility]). Advised as per Dr. Chowdhury's note. She verb understanding and will relay message to patient.   Recall c/s in 5 yrs to  and recall 4/19/26.

## 2021-05-12 NOTE — TELEPHONE ENCOUNTER
----- Message from Jaden Chowdhury MD sent at 4/30/2021 12:38 PM EDT -----  Polyp benign repeat: 5 years as discussed

## 2021-08-10 ENCOUNTER — TRANSCRIBE ORDERS (OUTPATIENT)
Dept: PREADMISSION TESTING | Facility: HOSPITAL | Age: 76
End: 2021-08-10

## 2021-08-11 ENCOUNTER — HOSPITAL ENCOUNTER (OUTPATIENT)
Dept: GENERAL RADIOLOGY | Facility: HOSPITAL | Age: 76
Discharge: HOME | End: 2021-08-11

## 2021-08-11 ENCOUNTER — PRE-ADMISSION TESTING (OUTPATIENT)
Dept: PREADMISSION TESTING | Facility: HOSPITAL | Age: 76
End: 2021-08-11

## 2021-08-11 VITALS
RESPIRATION RATE: 24 BRPM | OXYGEN SATURATION: 97 % | HEART RATE: 99 BPM | SYSTOLIC BLOOD PRESSURE: 151 MMHG | TEMPERATURE: 99 F | DIASTOLIC BLOOD PRESSURE: 87 MMHG

## 2021-08-11 LAB
ALBUMIN SERPL-MCNC: 4.2 G/DL (ref 3.5–5.2)
ALBUMIN/GLOB SERPL: 1.4 G/DL
ALP SERPL-CCNC: 171 U/L (ref 39–117)
ALT SERPL W P-5'-P-CCNC: 42 U/L (ref 1–41)
ANION GAP SERPL CALCULATED.3IONS-SCNC: 13 MMOL/L (ref 5–15)
AST SERPL-CCNC: 37 U/L (ref 1–40)
BACTERIA UR QL AUTO: NORMAL /HPF
BILIRUB SERPL-MCNC: 0.6 MG/DL (ref 0–1.2)
BILIRUB UR QL STRIP: NEGATIVE
BUN SERPL-MCNC: 19 MG/DL (ref 8–23)
BUN/CREAT SERPL: 19.6 (ref 7–25)
CALCIUM SPEC-SCNC: 9.3 MG/DL (ref 8.6–10.5)
CHLORIDE SERPL-SCNC: 102 MMOL/L (ref 98–107)
CLARITY UR: CLEAR
CO2 SERPL-SCNC: 24 MMOL/L (ref 22–29)
COLOR UR: YELLOW
CREAT SERPL-MCNC: 0.97 MG/DL (ref 0.76–1.27)
DEPRECATED RDW RBC AUTO: 42.2 FL (ref 37–54)
ERYTHROCYTE [DISTWIDTH] IN BLOOD BY AUTOMATED COUNT: 13.3 % (ref 12.3–15.4)
GFR SERPL CREATININE-BSD FRML MDRD: 75 ML/MIN/1.73
GLOBULIN UR ELPH-MCNC: 3 GM/DL
GLUCOSE SERPL-MCNC: 161 MG/DL (ref 65–99)
GLUCOSE UR STRIP-MCNC: NEGATIVE MG/DL
HCT VFR BLD AUTO: 40.5 % (ref 37.5–51)
HGB BLD-MCNC: 13.9 G/DL (ref 13–17.7)
HGB UR QL STRIP.AUTO: NEGATIVE
HYALINE CASTS UR QL AUTO: NORMAL /LPF
INR PPP: 1.03 (ref 0.9–1.1)
KETONES UR QL STRIP: NEGATIVE
LEUKOCYTE ESTERASE UR QL STRIP.AUTO: NEGATIVE
MCH RBC QN AUTO: 30.2 PG (ref 26.6–33)
MCHC RBC AUTO-ENTMCNC: 34.3 G/DL (ref 31.5–35.7)
MCV RBC AUTO: 88 FL (ref 79–97)
NITRITE UR QL STRIP: NEGATIVE
PH UR STRIP.AUTO: 7 [PH] (ref 5–8)
PLATELET # BLD AUTO: 174 10*3/MM3 (ref 140–450)
PMV BLD AUTO: 10.6 FL (ref 6–12)
POTASSIUM SERPL-SCNC: 4 MMOL/L (ref 3.5–5.2)
PROT SERPL-MCNC: 7.2 G/DL (ref 6–8.5)
PROT UR QL STRIP: ABNORMAL
PROTHROMBIN TIME: 13.3 SECONDS (ref 11.7–14.2)
RBC # BLD AUTO: 4.6 10*6/MM3 (ref 4.14–5.8)
RBC # UR: NORMAL /HPF
REF LAB TEST METHOD: NORMAL
SODIUM SERPL-SCNC: 139 MMOL/L (ref 136–145)
SP GR UR STRIP: 1.02 (ref 1–1.03)
SQUAMOUS #/AREA URNS HPF: NORMAL /HPF
UROBILINOGEN UR QL STRIP: ABNORMAL
WBC # BLD AUTO: 6.88 10*3/MM3 (ref 3.4–10.8)
WBC UR QL AUTO: NORMAL /HPF

## 2021-08-11 PROCEDURE — 71046 X-RAY EXAM CHEST 2 VIEWS: CPT

## 2021-08-11 PROCEDURE — 93005 ELECTROCARDIOGRAM TRACING: CPT

## 2021-08-11 PROCEDURE — 87086 URINE CULTURE/COLONY COUNT: CPT

## 2021-08-11 PROCEDURE — A9270 NON-COVERED ITEM OR SERVICE: HCPCS | Performed by: ORTHOPAEDIC SURGERY

## 2021-08-11 PROCEDURE — 36415 COLL VENOUS BLD VENIPUNCTURE: CPT

## 2021-08-11 PROCEDURE — 85027 COMPLETE CBC AUTOMATED: CPT

## 2021-08-11 PROCEDURE — 80053 COMPREHEN METABOLIC PANEL: CPT

## 2021-08-11 PROCEDURE — 93010 ELECTROCARDIOGRAM REPORT: CPT | Performed by: INTERNAL MEDICINE

## 2021-08-11 PROCEDURE — 73502 X-RAY EXAM HIP UNI 2-3 VIEWS: CPT

## 2021-08-11 PROCEDURE — 63710000001 MUPIROCIN 2 % OINTMENT: Performed by: ORTHOPAEDIC SURGERY

## 2021-08-11 PROCEDURE — 81001 URINALYSIS AUTO W/SCOPE: CPT

## 2021-08-11 PROCEDURE — 85610 PROTHROMBIN TIME: CPT

## 2021-08-11 RX ORDER — ALBUTEROL SULFATE 90 UG/1
2 AEROSOL, METERED RESPIRATORY (INHALATION) EVERY 4 HOURS PRN
Status: ON HOLD | COMMUNITY
End: 2023-02-17

## 2021-08-11 NOTE — DISCHARGE INSTRUCTIONS
Take the following medications the morning of surgery:  PANTOPRAZOLE, AMLODIPINE, DORZOLAMIDE DROP, BRIMONIDINE DROP, LEVOTHYROXINE, FLUTICASONE INH    ARRIVE TO MAIN OR DESK 8/19/21 AT 8:30AM      If you are on prescription narcotic pain medication to control your pain you may also take that medication the morning of surgery.    General Instructions:  • Do not eat solid food after midnight the night before surgery.  • You may drink clear liquids day of surgery but must stop at least one hour before your hospital arrival time.  • It is beneficial for you to have a clear drink that contains carbohydrates the day of surgery.  We suggest a 12 to 20 ounce bottle of Gatorade or Powerade for non-diabetic patients or a 12 to 20 ounce bottle of G2 or Powerade Zero for diabetic patients. (Pediatric patients, are not advised to drink a 12 to 20 ounce carbohydrate drink)    Clear liquids are liquids you can see through.  Nothing red in color.     Plain water                               Sports drinks  Sodas                                   Gelatin (Jell-O)  Fruit juices without pulp such as white grape juice and apple juice  Popsicles that contain no fruit or yogurt  Tea or coffee (no cream or milk added)  Gatorade / Powerade  G2 / Powerade Zero    • Infants may have breast milk up to four hours before surgery.  • Infants drinking formula may drink formula up to six hours before surgery.   • Patients who avoid smoking, chewing tobacco and alcohol for 4 weeks prior to surgery have a reduced risk of post-operative complications.  Quit smoking as many days before surgery as you can.  • Do not smoke, use chewing tobacco or drink alcohol the day of surgery.   • If applicable bring your C-PAP/ BI-PAP machine.  • Bring any papers given to you in the doctor’s office.  • Wear clean comfortable clothes.  • Do not wear contact lenses, false eyelashes or make-up.  Bring a case for your glasses.   • Bring crutches or walker if  applicable.  • Remove all piercings.  Leave jewelry and any other valuables at home.  • Hair extensions with metal clips must be removed prior to surgery.  • The Pre-Admission Testing nurse will instruct you to bring medications if unable to obtain an accurate list in Pre-Admission Testing.          Preventing a Surgical Site Infection:  • For 2 to 3 days before surgery, avoid shaving with a razor because the razor can irritate skin and make it easier to develop an infection.    • Any areas of open skin can increase the risk of a post-operative wound infection by allowing bacteria to enter and travel throughout the body.  Notify your surgeon if you have any skin wounds / rashes even if it is not near the expected surgical site.  The area will need assessed to determine if surgery should be delayed until it is healed.  • The night prior to surgery shower using a fresh bar of anti-bacterial soap (such as Dial) and clean washcloth.  Sleep in a clean bed with clean clothing.  Do not allow pets to sleep with you.  • Shower on the morning of surgery using a fresh bar of anti-bacterial soap (such as Dial) and clean washcloth.  Dry with a clean towel and dress in clean clothing.  • Ask your surgeon if you will be receiving antibiotics prior to surgery.  • Make sure you, your family, and all healthcare providers clean their hands with soap and water or an alcohol based hand  before caring for you or your wound.    Day of surgery:  Your arrival time is approximately two hours before your scheduled surgery time.  Upon arrival, a Pre-op nurse and Anesthesiologist will review your health history, obtain vital signs, and answer questions you may have.  The only belongings needed at this time will be a list of your home medications and if applicable your C-PAP/BI-PAP machine.  A Pre-op nurse will start an IV and you may receive medication in preparation for surgery, including something to help you relax.     Please be  aware that surgery does come with discomfort.  We want to make every effort to control your discomfort so please discuss any uncontrolled symptoms with your nurse.   Your doctor will most likely have prescribed pain medications.      If you are going home after surgery you will receive individualized written care instructions before being discharged.  A responsible adult must drive you to and from the hospital on the day of your surgery and stay with you for 24 hours.  Discharge prescriptions can be filled by the hospital pharmacy during regular pharmacy hours.  If you are having surgery late in the day/evening your prescription may be e-prescribed to your pharmacy.  Please verify your pharmacy hours or chose a 24 hour pharmacy to avoid not having access to your prescription because your pharmacy has closed for the day.    If you are staying overnight following surgery, you will be transported to your hospital room following the recovery period.  [facility] has all private rooms.    If you have any questions please call Pre-Admission Testing at [phone].  Deductibles and co-payments are collected on the day of service. Please be prepared to pay the required co-pay, deductible or deposit on the day of service as defined by your plan.    Patient Education for Self-Quarantine Process    • Following your COVID testing, we strongly recommend that you wear a mask when you are with other people and practice social distancing.   • Limit your activities to only required outings.  • Wash your hands with soap and water frequently for at least 20 seconds.   • Avoid touching your eyes, nose and mouth with unwashed hands.  • Do not share anything - utensils, drinking glasses, food from the same bowl.   • Sanitize household surfaces daily. Include all high touch areas (door handles, light switches, phones, countertops, etc.)    Call your surgeon immediately if you experience any of the following  symptoms:  • Sore Throat  • Shortness of Breath or difficulty breathing  • Cough  • Chills  • Body soreness or muscle pain  • Headache  • Fever  • New loss of taste or smell  • Do not arrive for your surgery ill.  Your procedure will need to be rescheduled to another time.  You will need to call your physician before the day of surgery to avoid any unnecessary exposure to hospital staff as well as other patients.      CHLORHEXIDINE CLOTH INSTRUCTIONS  The morning of surgery follow these instructions using the Chlorhexidine cloths you've been given.  These steps reduce bacteria on the body.  Do not use the cloths near your eyes, ears mouth, genitalia or on open wounds.  Throw the cloths away after use but do not try to flush them down a toilet.      • Open and remove one cloth at a time from the package.    • Leave the cloth unfolded and begin the bathing.  • Massage the skin with the cloths using gentle pressure to remove bacteria.  Do not scrub harshly.   • Follow the steps below with one 2% CHG cloth per area (6 total cloths).  • One cloth for neck, shoulders and chest.  • One cloth for both arms, hands, fingers and underarms (do underarms last).  • One cloth for the abdomen followed by groin.  • One cloth for right leg and foot including between the toes.  • One cloth for left leg and foot including between the toes.  • The last cloth is to be used for the back of the neck, back and buttocks.    Allow the CHG to air dry 3 minutes on the skin which will give it time to work and decrease the chance of irritation.  The skin may feel sticky until it is dry.  Do not rinse with water or any other liquid or you will lose the beneficial effects of the CHG.  If mild skin irritation occurs, do rinse the skin to remove the CHG.  Report this to the nurse at time of admission.  Do not apply lotions, creams, ointments, deodorants or perfumes after using the clothes. Dress in clean clothes before coming to the  [facility].    BACTROBAN NASAL OINTMENT  There are many germs normally in your nose. Bactroban is an ointment that will help reduce these germs. Please follow these instructions for Bactroban use:      ____The day before surgery in the morning  Date________    ____The day before surgery in the evening              Date________    ____The day of surgery in the morning    Date________    **Squirt ½ package of Bactroban Ointment onto a cotton applicator and apply to inside of 1st nostril.  Squirt the remaining Bactroban and apply to the inside of the other nostril.        Use the night before and morning of surgery - Swish, gargle, and spit - do not swallow.

## 2021-08-12 LAB — BACTERIA SPEC AEROBE CULT: NO GROWTH

## 2021-08-13 DIAGNOSIS — K21.9 GASTROESOPHAGEAL REFLUX DISEASE WITHOUT ESOPHAGITIS: ICD-10-CM

## 2021-08-13 LAB — QT INTERVAL: 357 MS

## 2021-08-15 RX ORDER — PANTOPRAZOLE SODIUM 40 MG/1
TABLET, DELAYED RELEASE ORAL
Qty: 90 TABLET | Refills: 3 | Status: SHIPPED | OUTPATIENT
Start: 2021-08-15 | End: 2021-10-25

## 2021-08-15 RX ORDER — PANTOPRAZOLE SODIUM 40 MG/1
TABLET, DELAYED RELEASE ORAL
Qty: 90 TABLET | Refills: 3 | Status: SHIPPED | OUTPATIENT
Start: 2021-08-15 | End: 2022-02-25

## 2021-08-17 ENCOUNTER — LAB (OUTPATIENT)
Dept: LAB | Facility: HOSPITAL | Age: 76
End: 2021-08-17

## 2021-08-17 LAB — SARS-COV-2 ORF1AB RESP QL NAA+PROBE: NOT DETECTED

## 2021-08-17 PROCEDURE — U0005 INFEC AGEN DETEC AMPLI PROBE: HCPCS

## 2021-08-17 PROCEDURE — U0004 COV-19 TEST NON-CDC HGH THRU: HCPCS

## 2021-08-17 PROCEDURE — C9803 HOPD COVID-19 SPEC COLLECT: HCPCS

## 2021-08-19 ENCOUNTER — ANESTHESIA EVENT (OUTPATIENT)
Dept: PERIOP | Facility: HOSPITAL | Age: 76
End: 2021-08-19

## 2021-08-19 ENCOUNTER — APPOINTMENT (OUTPATIENT)
Dept: GENERAL RADIOLOGY | Facility: HOSPITAL | Age: 76
End: 2021-08-19

## 2021-08-19 ENCOUNTER — HOSPITAL ENCOUNTER (INPATIENT)
Facility: HOSPITAL | Age: 76
LOS: 2 days | Discharge: HOME HEALTH | End: 2021-08-21
Attending: ORTHOPAEDIC SURGERY | Admitting: ORTHOPAEDIC SURGERY

## 2021-08-19 ENCOUNTER — ANESTHESIA (OUTPATIENT)
Dept: PERIOP | Facility: HOSPITAL | Age: 76
End: 2021-08-19

## 2021-08-19 DIAGNOSIS — M16.11 PRIMARY OSTEOARTHRITIS OF RIGHT HIP: Primary | ICD-10-CM

## 2021-08-19 LAB — GLUCOSE BLDC GLUCOMTR-MCNC: 285 MG/DL (ref 70–130)

## 2021-08-19 PROCEDURE — 97161 PT EVAL LOW COMPLEX 20 MIN: CPT

## 2021-08-19 PROCEDURE — 25010000002 EPINEPHRINE 30 MG/30ML SOLUTION

## 2021-08-19 PROCEDURE — 82962 GLUCOSE BLOOD TEST: CPT

## 2021-08-19 PROCEDURE — 25010000002 PROPOFOL 10 MG/ML EMULSION: Performed by: NURSE ANESTHETIST, CERTIFIED REGISTERED

## 2021-08-19 PROCEDURE — 25010000002 ONDANSETRON PER 1 MG: Performed by: NURSE ANESTHETIST, CERTIFIED REGISTERED

## 2021-08-19 PROCEDURE — 25010000002 KETOROLAC TROMETHAMINE PER 15 MG

## 2021-08-19 PROCEDURE — 25010000002 FENTANYL CITRATE (PF) 50 MCG/ML SOLUTION: Performed by: NURSE ANESTHETIST, CERTIFIED REGISTERED

## 2021-08-19 PROCEDURE — 97110 THERAPEUTIC EXERCISES: CPT

## 2021-08-19 PROCEDURE — 73501 X-RAY EXAM HIP UNI 1 VIEW: CPT

## 2021-08-19 PROCEDURE — 25010000002 ROPIVACAINE PER 1 MG

## 2021-08-19 PROCEDURE — 25010000002 MORPHINE PER 10 MG: Performed by: ORTHOPAEDIC SURGERY

## 2021-08-19 PROCEDURE — C1776 JOINT DEVICE (IMPLANTABLE): HCPCS | Performed by: ORTHOPAEDIC SURGERY

## 2021-08-19 PROCEDURE — 25010000002 FENTANYL CITRATE (PF) 50 MCG/ML SOLUTION: Performed by: ANESTHESIOLOGY

## 2021-08-19 PROCEDURE — 25010000003 CEFAZOLIN IN DEXTROSE 2-4 GM/100ML-% SOLUTION: Performed by: ORTHOPAEDIC SURGERY

## 2021-08-19 PROCEDURE — 25010000002 HYDROMORPHONE PER 4 MG: Performed by: NURSE ANESTHETIST, CERTIFIED REGISTERED

## 2021-08-19 PROCEDURE — 25010000002 NEOSTIGMINE 5 MG/10ML SOLUTION: Performed by: NURSE ANESTHETIST, CERTIFIED REGISTERED

## 2021-08-19 PROCEDURE — 25010000002 ROPIVACAINE PER 1 MG: Performed by: ANESTHESIOLOGY

## 2021-08-19 PROCEDURE — 76942 ECHO GUIDE FOR BIOPSY: CPT | Performed by: ORTHOPAEDIC SURGERY

## 2021-08-19 PROCEDURE — 25010000002 PHENYLEPHRINE PER 1 ML: Performed by: NURSE ANESTHETIST, CERTIFIED REGISTERED

## 2021-08-19 DEVICE — WAX BONE HEMO NAT 2.5G: Type: IMPLANTABLE DEVICE | Site: HIP | Status: FUNCTIONAL

## 2021-08-19 DEVICE — SCRW ACET CORT TRILOGY S/TAP 6.5X25: Type: IMPLANTABLE DEVICE | Site: HIP | Status: FUNCTIONAL

## 2021-08-19 DEVICE — HD FEM/HIP G7 BIOLOX/DELTA OPTN 40MM: Type: IMPLANTABLE DEVICE | Site: HIP | Status: FUNCTIONAL

## 2021-08-19 DEVICE — ADAPT HIP BIOLOX OPTN TYPE1 TPR MIN 6: Type: IMPLANTABLE DEVICE | Site: HIP | Status: FUNCTIONAL

## 2021-08-19 DEVICE — SHLL ACET OSSEOTI G7 4H SZF 56MM: Type: IMPLANTABLE DEVICE | Site: HIP | Status: FUNCTIONAL

## 2021-08-19 DEVICE — CP HIP UPCHRG OSSEOTI LTD HL CUPS: Type: IMPLANTABLE DEVICE | Site: HIP | Status: FUNCTIONAL

## 2021-08-19 DEVICE — SCRW ACET CORT TRILOGY S/TAP 6.5X35: Type: IMPLANTABLE DEVICE | Site: HIP | Status: FUNCTIONAL

## 2021-08-19 DEVICE — STEM FEM/HIP TAPERLOC COMPL DIST/REDUC PPS OFFST/STD SZ11: Type: IMPLANTABLE DEVICE | Site: HIP | Status: FUNCTIONAL

## 2021-08-19 DEVICE — TOTAL HIP PRIMARY: Type: IMPLANTABLE DEVICE | Site: HIP | Status: FUNCTIONAL

## 2021-08-19 DEVICE — LINER ACET G7 HI/WL E1 SZF 40MM: Type: IMPLANTABLE DEVICE | Site: HIP | Status: FUNCTIONAL

## 2021-08-19 RX ORDER — DOCUSATE SODIUM 100 MG/1
100 CAPSULE, LIQUID FILLED ORAL 2 TIMES DAILY PRN
Status: DISCONTINUED | OUTPATIENT
Start: 2021-08-19 | End: 2021-08-21 | Stop reason: HOSPADM

## 2021-08-19 RX ORDER — SODIUM CHLORIDE 450 MG/100ML
100 INJECTION, SOLUTION INTRAVENOUS CONTINUOUS
Status: DISCONTINUED | OUTPATIENT
Start: 2021-08-19 | End: 2021-08-21 | Stop reason: HOSPADM

## 2021-08-19 RX ORDER — SODIUM CHLORIDE 0.9 % (FLUSH) 0.9 %
3 SYRINGE (ML) INJECTION EVERY 12 HOURS SCHEDULED
Status: DISCONTINUED | OUTPATIENT
Start: 2021-08-19 | End: 2021-08-21 | Stop reason: HOSPADM

## 2021-08-19 RX ORDER — FUROSEMIDE 40 MG/1
40 TABLET ORAL DAILY
Status: DISCONTINUED | OUTPATIENT
Start: 2021-08-19 | End: 2021-08-21 | Stop reason: HOSPADM

## 2021-08-19 RX ORDER — LOSARTAN POTASSIUM 100 MG/1
100 TABLET ORAL DAILY
Status: DISCONTINUED | OUTPATIENT
Start: 2021-08-19 | End: 2021-08-21 | Stop reason: HOSPADM

## 2021-08-19 RX ORDER — ONDANSETRON 2 MG/ML
4 INJECTION INTRAMUSCULAR; INTRAVENOUS EVERY 6 HOURS PRN
Status: DISCONTINUED | OUTPATIENT
Start: 2021-08-19 | End: 2021-08-21 | Stop reason: HOSPADM

## 2021-08-19 RX ORDER — PANTOPRAZOLE SODIUM 40 MG/1
40 TABLET, DELAYED RELEASE ORAL 2 TIMES DAILY
Status: DISCONTINUED | OUTPATIENT
Start: 2021-08-19 | End: 2021-08-21 | Stop reason: HOSPADM

## 2021-08-19 RX ORDER — LATANOPROST 50 UG/ML
1 SOLUTION/ DROPS OPHTHALMIC NIGHTLY
Status: DISCONTINUED | OUTPATIENT
Start: 2021-08-19 | End: 2021-08-21 | Stop reason: HOSPADM

## 2021-08-19 RX ORDER — FENTANYL CITRATE 50 UG/ML
50 INJECTION, SOLUTION INTRAMUSCULAR; INTRAVENOUS
Status: DISCONTINUED | OUTPATIENT
Start: 2021-08-19 | End: 2021-08-19 | Stop reason: HOSPADM

## 2021-08-19 RX ORDER — DIPHENHYDRAMINE HYDROCHLORIDE 50 MG/ML
12.5 INJECTION INTRAMUSCULAR; INTRAVENOUS
Status: DISCONTINUED | OUTPATIENT
Start: 2021-08-19 | End: 2021-08-19 | Stop reason: HOSPADM

## 2021-08-19 RX ORDER — PROMETHAZINE HYDROCHLORIDE 25 MG/1
25 SUPPOSITORY RECTAL ONCE AS NEEDED
Status: DISCONTINUED | OUTPATIENT
Start: 2021-08-19 | End: 2021-08-19 | Stop reason: HOSPADM

## 2021-08-19 RX ORDER — ROCURONIUM BROMIDE 10 MG/ML
INJECTION, SOLUTION INTRAVENOUS AS NEEDED
Status: DISCONTINUED | OUTPATIENT
Start: 2021-08-19 | End: 2021-08-19 | Stop reason: SURG

## 2021-08-19 RX ORDER — ASPIRIN 325 MG
325 TABLET, DELAYED RELEASE (ENTERIC COATED) ORAL 2 TIMES DAILY WITH MEALS
Status: DISCONTINUED | OUTPATIENT
Start: 2021-08-20 | End: 2021-08-21 | Stop reason: HOSPADM

## 2021-08-19 RX ORDER — ACETAMINOPHEN 500 MG
1000 TABLET ORAL ONCE
Status: COMPLETED | OUTPATIENT
Start: 2021-08-19 | End: 2021-08-19

## 2021-08-19 RX ORDER — SODIUM CHLORIDE 0.9 % (FLUSH) 0.9 %
3-10 SYRINGE (ML) INJECTION AS NEEDED
Status: DISCONTINUED | OUTPATIENT
Start: 2021-08-19 | End: 2021-08-19 | Stop reason: HOSPADM

## 2021-08-19 RX ORDER — ONDANSETRON 4 MG/1
4 TABLET, FILM COATED ORAL EVERY 6 HOURS PRN
Status: DISCONTINUED | OUTPATIENT
Start: 2021-08-19 | End: 2021-08-21 | Stop reason: HOSPADM

## 2021-08-19 RX ORDER — HYDRALAZINE HYDROCHLORIDE 20 MG/ML
5 INJECTION INTRAMUSCULAR; INTRAVENOUS
Status: DISCONTINUED | OUTPATIENT
Start: 2021-08-19 | End: 2021-08-19 | Stop reason: HOSPADM

## 2021-08-19 RX ORDER — OXYCODONE HYDROCHLORIDE AND ACETAMINOPHEN 5; 325 MG/1; MG/1
2 TABLET ORAL EVERY 4 HOURS PRN
Status: DISCONTINUED | OUTPATIENT
Start: 2021-08-19 | End: 2021-08-21 | Stop reason: HOSPADM

## 2021-08-19 RX ORDER — LIDOCAINE HYDROCHLORIDE 20 MG/ML
INJECTION, SOLUTION INFILTRATION; PERINEURAL AS NEEDED
Status: DISCONTINUED | OUTPATIENT
Start: 2021-08-19 | End: 2021-08-19 | Stop reason: SURG

## 2021-08-19 RX ORDER — ACETAMINOPHEN 325 MG/1
325 TABLET ORAL EVERY 4 HOURS PRN
Status: DISCONTINUED | OUTPATIENT
Start: 2021-08-19 | End: 2021-08-21 | Stop reason: HOSPADM

## 2021-08-19 RX ORDER — AMLODIPINE BESYLATE 2.5 MG/1
2.5 TABLET ORAL DAILY
Status: DISCONTINUED | OUTPATIENT
Start: 2021-08-20 | End: 2021-08-21 | Stop reason: HOSPADM

## 2021-08-19 RX ORDER — LABETALOL HYDROCHLORIDE 5 MG/ML
5 INJECTION, SOLUTION INTRAVENOUS
Status: DISCONTINUED | OUTPATIENT
Start: 2021-08-19 | End: 2021-08-19 | Stop reason: HOSPADM

## 2021-08-19 RX ORDER — PROPOFOL 10 MG/ML
VIAL (ML) INTRAVENOUS AS NEEDED
Status: DISCONTINUED | OUTPATIENT
Start: 2021-08-19 | End: 2021-08-19 | Stop reason: SURG

## 2021-08-19 RX ORDER — ALBUTEROL SULFATE 90 UG/1
2 AEROSOL, METERED RESPIRATORY (INHALATION) EVERY 4 HOURS PRN
Status: DISCONTINUED | OUTPATIENT
Start: 2021-08-19 | End: 2021-08-21 | Stop reason: HOSPADM

## 2021-08-19 RX ORDER — PROMETHAZINE HYDROCHLORIDE 25 MG/1
25 TABLET ORAL ONCE AS NEEDED
Status: DISCONTINUED | OUTPATIENT
Start: 2021-08-19 | End: 2021-08-19 | Stop reason: HOSPADM

## 2021-08-19 RX ORDER — LIDOCAINE HYDROCHLORIDE 10 MG/ML
0.5 INJECTION, SOLUTION EPIDURAL; INFILTRATION; INTRACAUDAL; PERINEURAL ONCE AS NEEDED
Status: DISCONTINUED | OUTPATIENT
Start: 2021-08-19 | End: 2021-08-19 | Stop reason: HOSPADM

## 2021-08-19 RX ORDER — SODIUM CHLORIDE 0.9 % (FLUSH) 0.9 %
1-10 SYRINGE (ML) INJECTION AS NEEDED
Status: DISCONTINUED | OUTPATIENT
Start: 2021-08-19 | End: 2021-08-21 | Stop reason: HOSPADM

## 2021-08-19 RX ORDER — FAMOTIDINE 10 MG/ML
20 INJECTION, SOLUTION INTRAVENOUS ONCE
Status: COMPLETED | OUTPATIENT
Start: 2021-08-19 | End: 2021-08-19

## 2021-08-19 RX ORDER — GLYCOPYRROLATE 0.2 MG/ML
INJECTION INTRAMUSCULAR; INTRAVENOUS AS NEEDED
Status: DISCONTINUED | OUTPATIENT
Start: 2021-08-19 | End: 2021-08-19 | Stop reason: SURG

## 2021-08-19 RX ORDER — NALOXONE HCL 0.4 MG/ML
0.2 VIAL (ML) INJECTION AS NEEDED
Status: DISCONTINUED | OUTPATIENT
Start: 2021-08-19 | End: 2021-08-19 | Stop reason: HOSPADM

## 2021-08-19 RX ORDER — FENTANYL CITRATE 50 UG/ML
INJECTION, SOLUTION INTRAMUSCULAR; INTRAVENOUS
Status: COMPLETED | OUTPATIENT
Start: 2021-08-19 | End: 2021-08-19

## 2021-08-19 RX ORDER — ROPIVACAINE HYDROCHLORIDE 5 MG/ML
INJECTION, SOLUTION EPIDURAL; INFILTRATION; PERINEURAL
Status: COMPLETED | OUTPATIENT
Start: 2021-08-19 | End: 2021-08-19

## 2021-08-19 RX ORDER — DIAZEPAM 5 MG/1
5 TABLET ORAL 2 TIMES DAILY PRN
Status: DISCONTINUED | OUTPATIENT
Start: 2021-08-19 | End: 2021-08-21 | Stop reason: HOSPADM

## 2021-08-19 RX ORDER — CLINDAMYCIN PHOSPHATE 900 MG/50ML
900 INJECTION INTRAVENOUS EVERY 8 HOURS
Status: COMPLETED | OUTPATIENT
Start: 2021-08-19 | End: 2021-08-20

## 2021-08-19 RX ORDER — OXYCODONE AND ACETAMINOPHEN 10; 325 MG/1; MG/1
1 TABLET ORAL EVERY 4 HOURS PRN
Status: DISCONTINUED | OUTPATIENT
Start: 2021-08-19 | End: 2021-08-19 | Stop reason: HOSPADM

## 2021-08-19 RX ORDER — CETIRIZINE HYDROCHLORIDE 10 MG/1
10 TABLET ORAL DAILY
Status: DISCONTINUED | OUTPATIENT
Start: 2021-08-19 | End: 2021-08-21 | Stop reason: HOSPADM

## 2021-08-19 RX ORDER — DORZOLAMIDE HCL 20 MG/ML
1 SOLUTION/ DROPS OPHTHALMIC 2 TIMES DAILY
Status: DISCONTINUED | OUTPATIENT
Start: 2021-08-19 | End: 2021-08-21 | Stop reason: HOSPADM

## 2021-08-19 RX ORDER — FENTANYL CITRATE 50 UG/ML
INJECTION, SOLUTION INTRAMUSCULAR; INTRAVENOUS AS NEEDED
Status: DISCONTINUED | OUTPATIENT
Start: 2021-08-19 | End: 2021-08-19 | Stop reason: SURG

## 2021-08-19 RX ORDER — LEVOTHYROXINE SODIUM 88 UG/1
88 TABLET ORAL
Status: DISCONTINUED | OUTPATIENT
Start: 2021-08-20 | End: 2021-08-21 | Stop reason: HOSPADM

## 2021-08-19 RX ORDER — SODIUM CHLORIDE 0.9 % (FLUSH) 0.9 %
3 SYRINGE (ML) INJECTION EVERY 12 HOURS SCHEDULED
Status: DISCONTINUED | OUTPATIENT
Start: 2021-08-19 | End: 2021-08-19 | Stop reason: HOSPADM

## 2021-08-19 RX ORDER — MORPHINE SULFATE 2 MG/ML
4 INJECTION, SOLUTION INTRAMUSCULAR; INTRAVENOUS
Status: DISCONTINUED | OUTPATIENT
Start: 2021-08-19 | End: 2021-08-21 | Stop reason: HOSPADM

## 2021-08-19 RX ORDER — FLUMAZENIL 0.1 MG/ML
0.2 INJECTION INTRAVENOUS AS NEEDED
Status: DISCONTINUED | OUTPATIENT
Start: 2021-08-19 | End: 2021-08-19 | Stop reason: HOSPADM

## 2021-08-19 RX ORDER — PROMETHAZINE HYDROCHLORIDE 12.5 MG/1
12.5 TABLET ORAL EVERY 6 HOURS PRN
Status: DISCONTINUED | OUTPATIENT
Start: 2021-08-19 | End: 2021-08-21 | Stop reason: HOSPADM

## 2021-08-19 RX ORDER — FERROUS SULFATE 325(65) MG
325 TABLET ORAL
Status: DISCONTINUED | OUTPATIENT
Start: 2021-08-20 | End: 2021-08-21 | Stop reason: HOSPADM

## 2021-08-19 RX ORDER — SODIUM CHLORIDE, SODIUM LACTATE, POTASSIUM CHLORIDE, CALCIUM CHLORIDE 600; 310; 30; 20 MG/100ML; MG/100ML; MG/100ML; MG/100ML
9 INJECTION, SOLUTION INTRAVENOUS CONTINUOUS
Status: DISCONTINUED | OUTPATIENT
Start: 2021-08-19 | End: 2021-08-21 | Stop reason: HOSPADM

## 2021-08-19 RX ORDER — HYDROMORPHONE HYDROCHLORIDE 1 MG/ML
0.25 INJECTION, SOLUTION INTRAMUSCULAR; INTRAVENOUS; SUBCUTANEOUS
Status: DISCONTINUED | OUTPATIENT
Start: 2021-08-19 | End: 2021-08-19 | Stop reason: HOSPADM

## 2021-08-19 RX ORDER — EPHEDRINE SULFATE 50 MG/ML
5 INJECTION, SOLUTION INTRAVENOUS ONCE AS NEEDED
Status: DISCONTINUED | OUTPATIENT
Start: 2021-08-19 | End: 2021-08-19 | Stop reason: HOSPADM

## 2021-08-19 RX ORDER — CHOLECALCIFEROL (VITAMIN D3) 125 MCG
5 CAPSULE ORAL NIGHTLY PRN
Status: DISCONTINUED | OUTPATIENT
Start: 2021-08-19 | End: 2021-08-21 | Stop reason: HOSPADM

## 2021-08-19 RX ORDER — ROSUVASTATIN CALCIUM 20 MG/1
20 TABLET, COATED ORAL EVERY EVENING
Status: DISCONTINUED | OUTPATIENT
Start: 2021-08-19 | End: 2021-08-21 | Stop reason: HOSPADM

## 2021-08-19 RX ORDER — NALOXONE HCL 0.4 MG/ML
0.4 VIAL (ML) INJECTION
Status: DISCONTINUED | OUTPATIENT
Start: 2021-08-19 | End: 2021-08-21 | Stop reason: HOSPADM

## 2021-08-19 RX ORDER — DIPHENHYDRAMINE HCL 25 MG
25 CAPSULE ORAL
Status: DISCONTINUED | OUTPATIENT
Start: 2021-08-19 | End: 2021-08-19 | Stop reason: HOSPADM

## 2021-08-19 RX ORDER — MONTELUKAST SODIUM 10 MG/1
10 TABLET ORAL NIGHTLY
Status: DISCONTINUED | OUTPATIENT
Start: 2021-08-19 | End: 2021-08-21 | Stop reason: HOSPADM

## 2021-08-19 RX ORDER — NEOSTIGMINE METHYLSULFATE 0.5 MG/ML
INJECTION, SOLUTION INTRAVENOUS AS NEEDED
Status: DISCONTINUED | OUTPATIENT
Start: 2021-08-19 | End: 2021-08-19 | Stop reason: SURG

## 2021-08-19 RX ORDER — ONDANSETRON 2 MG/ML
4 INJECTION INTRAMUSCULAR; INTRAVENOUS ONCE AS NEEDED
Status: DISCONTINUED | OUTPATIENT
Start: 2021-08-19 | End: 2021-08-19 | Stop reason: HOSPADM

## 2021-08-19 RX ORDER — HYDROCODONE BITARTRATE AND ACETAMINOPHEN 5; 325 MG/1; MG/1
1 TABLET ORAL ONCE AS NEEDED
Status: DISCONTINUED | OUTPATIENT
Start: 2021-08-19 | End: 2021-08-19 | Stop reason: HOSPADM

## 2021-08-19 RX ORDER — DIAPER,BRIEF,INFANT-TODD,DISP
EACH MISCELLANEOUS EVERY 12 HOURS SCHEDULED
Status: DISCONTINUED | OUTPATIENT
Start: 2021-08-19 | End: 2021-08-21 | Stop reason: HOSPADM

## 2021-08-19 RX ORDER — PRAMIPEXOLE DIHYDROCHLORIDE 1.5 MG/1
1.5 TABLET ORAL 2 TIMES DAILY
Status: DISCONTINUED | OUTPATIENT
Start: 2021-08-19 | End: 2021-08-21 | Stop reason: HOSPADM

## 2021-08-19 RX ORDER — ONDANSETRON 2 MG/ML
INJECTION INTRAMUSCULAR; INTRAVENOUS AS NEEDED
Status: DISCONTINUED | OUTPATIENT
Start: 2021-08-19 | End: 2021-08-19 | Stop reason: SURG

## 2021-08-19 RX ORDER — OXYCODONE HYDROCHLORIDE AND ACETAMINOPHEN 5; 325 MG/1; MG/1
1 TABLET ORAL EVERY 4 HOURS PRN
Status: DISCONTINUED | OUTPATIENT
Start: 2021-08-19 | End: 2021-08-21 | Stop reason: HOSPADM

## 2021-08-19 RX ORDER — TIMOLOL MALEATE 5 MG/ML
1 SOLUTION/ DROPS OPHTHALMIC EVERY 12 HOURS SCHEDULED
Status: DISCONTINUED | OUTPATIENT
Start: 2021-08-19 | End: 2021-08-21 | Stop reason: HOSPADM

## 2021-08-19 RX ORDER — DORZOLAMIDE HYDROCHLORIDE AND TIMOLOL MALEATE 20; 5 MG/ML; MG/ML
SOLUTION/ DROPS OPHTHALMIC 2 TIMES DAILY
Status: DISCONTINUED | OUTPATIENT
Start: 2021-08-19 | End: 2021-08-19 | Stop reason: CLARIF

## 2021-08-19 RX ORDER — CEFAZOLIN SODIUM 2 G/100ML
2 INJECTION, SOLUTION INTRAVENOUS ONCE
Status: COMPLETED | OUTPATIENT
Start: 2021-08-19 | End: 2021-08-19

## 2021-08-19 RX ORDER — CELECOXIB 200 MG/1
200 CAPSULE ORAL ONCE
Status: COMPLETED | OUTPATIENT
Start: 2021-08-19 | End: 2021-08-19

## 2021-08-19 RX ADMIN — PHENYLEPHRINE HYDROCHLORIDE 100 MCG: 10 INJECTION INTRAVENOUS at 09:52

## 2021-08-19 RX ADMIN — SODIUM CHLORIDE, POTASSIUM CHLORIDE, SODIUM LACTATE AND CALCIUM CHLORIDE 9 ML/HR: 600; 310; 30; 20 INJECTION, SOLUTION INTRAVENOUS at 07:35

## 2021-08-19 RX ADMIN — GLYCOPYRROLATE 0.3 MG: 0.2 INJECTION INTRAMUSCULAR; INTRAVENOUS at 10:30

## 2021-08-19 RX ADMIN — PHENYLEPHRINE HYDROCHLORIDE 150 MCG: 10 INJECTION INTRAVENOUS at 08:53

## 2021-08-19 RX ADMIN — CLINDAMYCIN PHOSPHATE 900 MG: 900 INJECTION, SOLUTION INTRAVENOUS at 16:30

## 2021-08-19 RX ADMIN — HYDROCORTISONE: 1 CREAM TOPICAL at 20:41

## 2021-08-19 RX ADMIN — DORZOLAMIDE HYDROCHLORIDE 1 DROP: 20 SOLUTION/ DROPS OPHTHALMIC at 20:41

## 2021-08-19 RX ADMIN — SODIUM CHLORIDE, PRESERVATIVE FREE 3 ML: 5 INJECTION INTRAVENOUS at 19:51

## 2021-08-19 RX ADMIN — HYDROMORPHONE HYDROCHLORIDE 0.25 MG: 1 INJECTION, SOLUTION INTRAMUSCULAR; INTRAVENOUS; SUBCUTANEOUS at 12:31

## 2021-08-19 RX ADMIN — ACETAMINOPHEN 1000 MG: 500 TABLET, FILM COATED ORAL at 07:35

## 2021-08-19 RX ADMIN — NEOSTIGMINE METHYLSULFATE 3 MG: 0.5 INJECTION INTRAVENOUS at 10:30

## 2021-08-19 RX ADMIN — PROPOFOL 150 MG: 10 INJECTION, EMULSION INTRAVENOUS at 08:44

## 2021-08-19 RX ADMIN — FENTANYL CITRATE 50 MCG: 50 INJECTION INTRAMUSCULAR; INTRAVENOUS at 08:44

## 2021-08-19 RX ADMIN — MONTELUKAST SODIUM 10 MG: 10 TABLET, FILM COATED ORAL at 19:51

## 2021-08-19 RX ADMIN — FAMOTIDINE 20 MG: 10 INJECTION INTRAVENOUS at 07:35

## 2021-08-19 RX ADMIN — PRAMIPEXOLE DIHYDROCHLORIDE 1.5 MG: 1.5 TABLET ORAL at 19:51

## 2021-08-19 RX ADMIN — LIDOCAINE HYDROCHLORIDE 100 MG: 20 INJECTION, SOLUTION INFILTRATION; PERINEURAL at 08:44

## 2021-08-19 RX ADMIN — PHENYLEPHRINE HYDROCHLORIDE 150 MCG: 10 INJECTION INTRAVENOUS at 10:03

## 2021-08-19 RX ADMIN — FUROSEMIDE 40 MG: 40 TABLET ORAL at 15:33

## 2021-08-19 RX ADMIN — ROSUVASTATIN CALCIUM 20 MG: 20 TABLET, FILM COATED ORAL at 17:03

## 2021-08-19 RX ADMIN — PROPOFOL 100 MG: 10 INJECTION, EMULSION INTRAVENOUS at 09:58

## 2021-08-19 RX ADMIN — PHENYLEPHRINE HYDROCHLORIDE 150 MCG: 10 INJECTION INTRAVENOUS at 10:23

## 2021-08-19 RX ADMIN — OXYCODONE AND ACETAMINOPHEN 1 TABLET: 5; 325 TABLET ORAL at 17:09

## 2021-08-19 RX ADMIN — SODIUM CHLORIDE, POTASSIUM CHLORIDE, SODIUM LACTATE AND CALCIUM CHLORIDE: 600; 310; 30; 20 INJECTION, SOLUTION INTRAVENOUS at 10:30

## 2021-08-19 RX ADMIN — FENTANYL CITRATE 25 MCG: 50 INJECTION INTRAMUSCULAR; INTRAVENOUS at 09:14

## 2021-08-19 RX ADMIN — SODIUM CHLORIDE 100 ML/HR: 4.5 INJECTION, SOLUTION INTRAVENOUS at 17:10

## 2021-08-19 RX ADMIN — LOSARTAN POTASSIUM 100 MG: 100 TABLET, FILM COATED ORAL at 15:33

## 2021-08-19 RX ADMIN — CEFAZOLIN SODIUM 2 G: 2 INJECTION, SOLUTION INTRAVENOUS at 08:32

## 2021-08-19 RX ADMIN — FENTANYL CITRATE 50 MCG: 50 INJECTION, SOLUTION INTRAMUSCULAR; INTRAVENOUS at 11:24

## 2021-08-19 RX ADMIN — CELECOXIB 200 MG: 200 CAPSULE ORAL at 07:35

## 2021-08-19 RX ADMIN — FENTANYL CITRATE 25 MCG: 50 INJECTION INTRAMUSCULAR; INTRAVENOUS at 09:56

## 2021-08-19 RX ADMIN — BRIMONIDINE TARTRATE 1 DROP: 1 SOLUTION/ DROPS OPHTHALMIC at 17:02

## 2021-08-19 RX ADMIN — FENTANYL CITRATE 50 MCG: 50 INJECTION INTRAMUSCULAR; INTRAVENOUS at 07:54

## 2021-08-19 RX ADMIN — PHENYLEPHRINE HYDROCHLORIDE 150 MCG: 10 INJECTION INTRAVENOUS at 10:12

## 2021-08-19 RX ADMIN — OXYCODONE AND ACETAMINOPHEN 1 TABLET: 5; 325 TABLET ORAL at 21:19

## 2021-08-19 RX ADMIN — ROCURONIUM BROMIDE 50 MG: 50 INJECTION INTRAVENOUS at 08:44

## 2021-08-19 RX ADMIN — HYDROMORPHONE HYDROCHLORIDE 0.25 MG: 1 INJECTION, SOLUTION INTRAMUSCULAR; INTRAVENOUS; SUBCUTANEOUS at 12:38

## 2021-08-19 RX ADMIN — LATANOPROST 1 DROP: 50 SOLUTION OPHTHALMIC at 20:41

## 2021-08-19 RX ADMIN — PANTOPRAZOLE SODIUM 40 MG: 40 TABLET, DELAYED RELEASE ORAL at 19:51

## 2021-08-19 RX ADMIN — CETIRIZINE HYDROCHLORIDE 10 MG: 10 TABLET ORAL at 15:33

## 2021-08-19 RX ADMIN — TIMOLOL MALEATE 1 DROP: 5 SOLUTION/ DROPS OPHTHALMIC at 20:41

## 2021-08-19 RX ADMIN — ROPIVACAINE HYDROCHLORIDE 30 ML: 5 INJECTION, SOLUTION EPIDURAL; INFILTRATION; PERINEURAL at 08:01

## 2021-08-19 RX ADMIN — PHENYLEPHRINE HYDROCHLORIDE 100 MCG: 10 INJECTION INTRAVENOUS at 09:28

## 2021-08-19 RX ADMIN — ONDANSETRON 4 MG: 2 INJECTION INTRAMUSCULAR; INTRAVENOUS at 10:30

## 2021-08-19 RX ADMIN — MUPIROCIN 1 APPLICATION: 20 OINTMENT TOPICAL at 19:51

## 2021-08-19 NOTE — ANESTHESIA PROCEDURE NOTES
Peripheral Block    Pre-sedation assessment completed: 8/19/2021 7:58 AM    Patient reassessed immediately prior to procedure    Patient location during procedure: holding area  Start time: 8/19/2021 8:01 AM  Stop time: 8/19/2021 8:01 AM  Reason for block: at surgeon's request and post-op pain management  Performed by  Anesthesiologist: Pranav Norman MD  Preanesthetic Checklist  Completed: patient identified, IV checked, site marked, risks and benefits discussed, surgical consent, monitors and equipment checked, pre-op evaluation and timeout performed  Prep:  Pt Position: supine  Sterile barriers:cap, gloves, mask and washed/disinfected hands  Prep: ChloraPrep  Patient monitoring: blood pressure monitoring, continuous pulse oximetry and EKG  Procedure  Sedation:yes  Performed under: local infiltration  Guidance:ultrasound guided  ULTRASOUND INTERPRETATION.  Using ultrasound guidance a 22 G (22G needle for placement of 3cc 2%lido to site prior to start of procedure.) gauge needle was placed in close proximity to the femoral nerve, at which point, under ultrasound guidance anesthetic was injected in the area of the nerve and spread of the anesthesia was seen on ultrasound in close proximity thereto.  There were no abnormalities seen on ultrasound; a digital image was taken; and the patient tolerated the procedure with no complications. Images:still images obtained, printed/placed on chart    Laterality:right  Block Type:fascia iliaca compartment  Injection Technique:single-shot  Needle Type:echogenic  Needle Gauge:22 G  Resistance on Injection: none    Medications Used: ropivacaine (NAROPIN) 0.5 % injection, 30 mL  Med admintered at 8/19/2021 8:01 AM      Post Assessment  Injection Assessment: negative aspiration for heme, no paresthesia on injection and incremental injection  Patient Tolerance:comfortable throughout block  Complications:no  Additional Notes  Ultrasound guidance was used to view needle placement  and verify medication disbursement for this block.

## 2021-08-19 NOTE — THERAPY EVALUATION
Patient Name: Nathan Baez  : 1945    MRN: [id number]                              Today's Date: 2021       Admit Date: 2021    Visit Dx:     ICD-10-CM ICD-9-CM   1. Primary osteoarthritis of right hip  M16.11 715.15     Patient Active Problem List   Diagnosis   • OA (osteoarthritis) of knee   • Hypertension   • Abdominal wall cellulitis   • Hypothyroidism (acquired)   • Gastroesophageal reflux disease without esophagitis   • Mixed hyperlipidemia   • Primary insomnia   • DDD (degenerative disc disease), lumbar   • KWAME (obstructive sleep apnea)   • Allergic   • Venous insufficiency   • Prostate cancer (CMS/[facility])   • Venous stasis dermatitis   • Tinea cruris   • Tinea corporis   • Throat clearing   • Sleep apnea   • Skin lesion of face   • Rib pain on left side   • Rectal bleed   • Presbycusis   • Pes planus   • Osteoarthritis   • Obesity   • Medicare annual wellness visit, subsequent   • Lumbar strain   • Internal hemorrhoids   • Insomnia   • Inflamed skin tag   • Hyperplastic polyp of stomach   • Hospital discharge follow-up   • History of colon polyps   • High risk medication use   • Glaucoma   • Gastroenteritis, acute   • Erysipelas   • Encounter for screening colonoscopy   • Encounter for long-term (current) use of NSAIDs   • Dysphagia   • DVT (deep venous thrombosis) (CMS/HCC)   • DJD (degenerative joint disease), lumbar   • Diverticulosis   • Cough   • Contact with hypodermic needle   • Cervical radiculopathy   • Cellulitis   • Arthritis   • Allergic rhinitis   • Acute glaucoma   • Acute embolism and thrombosis of vein   • Actinic keratosis   • Status post reverse total shoulder replacement, right   • Localized edema   • Anemia   • Peripheral polyneuropathy   • Campylobacter diarrhea   • Hyponatremia   • Generalized abdominal pain   • Fever   • Constipation   • Bilateral lower extremity edema   • Acute pyelonephritis   • Chronic idiopathic constipation   • Functional diarrhea   • Change in  bowel habits   • RLS (restless legs syndrome)   • Type 2 diabetes mellitus with hyperglycemia (CMS/HCC)   • Family history of GI malignancy   • Primary osteoarthritis of right hip     Past Medical History:   Diagnosis Date   • Actinic keratosis    • Acute embolism and thrombosis of vein    • Allergic rhinitis    • Arthritis    • Cervical radiculopathy    • Disequilibrium    • DJD (degenerative joint disease), lumbar    • Elevated cholesterol    • Erysipelas    • GERD (gastroesophageal reflux disease)    • Glaucoma    • Hip pain, chronic, right    • History of kidney stones     X1   • History of peripheral edema     LOWER LEGS BILAT, COMPRESSION KNEE HIGH    • History of transfusion    • Hyperlipidemia    • Hypertension    • Hypothyroid    • Insomnia    • Limited mobility     RIGHT HIP   • Male urinary stress incontinence     s/p prostatectomy-WEAR PAD   • Obesity    • KWAME (obstructive sleep apnea)    • Osteoarthritis    • Pes planus    • Presbycusis    • Prostate cancer (CMS/HCC)    • Restless legs syndrome    • Shoulder pain     RIGHT, LIMITED MOBILITY-AT TIMES   • Sleep apnea     bipap   • Tinea corporis    • Tinea cruris    • Unsteady gait     RIGHT HIP   • Weakness     RIGHT HIP     Past Surgical History:   Procedure Laterality Date   • CATARACT EXTRACTION, BILATERAL Bilateral    • CERVICAL DISCECTOMY ANTERIOR     • COLONOSCOPY  03/08/2017    3/17normal. Recheck 2022. 12/11. Repeat in 5 years    • COLONOSCOPY N/A 4/19/2021    Procedure: COLONOSCOPY INTO CECUM WITH HOT & COLD  SNARE POLYPECTOMIES;  Surgeon: Jaden Chowdhury MD;  Location: [facility] ENDOSCOPY;  Service: Gastroenterology;  Laterality: N/A;  PRE: CHANGE IN BOWEL HABITS; FAMILY H/O COLON CANCER  POST: DIVERTICULOSIS, POLYPS   • ENDOSCOPY W/ PEG REMOVAL     • FOOT SURGERY      1998 had big toe replaced on left foot-METAL    • HERNIA REPAIR  03/07/2012    umbilical   • JOINT REPLACEMENT Right 2014   • LA TOTAL KNEE ARTHROPLASTY Left 9/13/2016     Procedure: LT TOTAL KNEE ARTHROPLASTY;  Surgeon: Tadeo Basurto MD;  Location: [facility] OR;  Service: Orthopedics   • PROSTATECTOMY  07/1999 July 1999. Radical    • THYROID LOBECTOMY     • TONSILLECTOMY     • TOTAL KNEE ARTHROPLASTY Right 2014   • TOTAL SHOULDER ARTHROPLASTY W/ DISTAL CLAVICLE EXCISION Right 11/13/2019    Procedure: TOTAL SHOULDER REVERSE ARTHROPLASTY RIGHT REPAIR RIGHT AXILLARY VEIN;  Surgeon: Behzad Kelley MD;  [place] [facility] [place] [facility] [place]  Service: [place]opedics   • WRIST SURGERY Right 12/17/2014    x2  wrist replaced     General Information     Row Name 08/19/21 1559          Physical Therapy Time and Intention    Document Type  evaluation Pt is s/p Right THR (posterior)  -MS     Mode of Treatment  physical therapy;individual therapy  -MS     Row Name 08/19/21 1551          General Information    Patient Profile Reviewed  yes  -MS     Prior Level of Function  independent:  -MS     Existing Precautions/Restrictions  fall;hip, posterior Exit alarm  -MS     Barriers to Rehab  none identified  -MS     Row Name 08/19/21 1559          Cognition    Orientation Status (Cognition)  oriented x 3  -MS     Row Name 08/19/21 1558          Safety Issues, Functional Mobility    Comment, Safety Issues/Impairments (Mobility)  Gait belt used for safety.  -MS       User Key  (r) = Recorded By, (t) = Taken By, (c) = Cosigned By    Initials Name Provider Type    MS Sergio Jin, PT Physical Therapist        Mobility     Row Name 08/19/21 1600          Bed Mobility    Bed Mobility  supine-sit;sit-supine  -MS     Supine-Sit [place] (Bed Mobility)  minimum assist (75% patient effort)  -MS     Row Name 08/19/21 1600          Sit-Stand Transfer    Sit-Stand [place] (Transfers)  minimum assist (75% patient effort)  -MS     Assistive Device (Sit-Stand Transfers)  walker, front-wheeled  -MS     Row Name 08/19/21 1600          Gait/Stairs (Locomotion)    [place] Level (Gait)  minimum assist (75%  patient effort)  -MS     Assistive Device (Gait)  walker, front-wheeled  -MS     Distance in Feet (Gait)  12 feet  -MS     Deviations/Abnormal Patterns (Gait)  antalgic;khanh decreased  -MS     Bilateral Gait Deviations  forward flexed posture  -MS     Comment (Gait/Stairs)  Limited in gait distance due to fatigue/weakness.  Verbal/tactile cues given for posture correction and Rwx guidance.  -MS     Row Name 08/19/21 1600          Mobility    Extremity Weight-bearing Status  right lower extremity  -MS     Right Lower Extremity (Weight-bearing Status)  weight-bearing as tolerated (WBAT)  -MS       User Key  (r) = Recorded By, (t) = Taken By, (c) = Cosigned By    Initials Name Provider Type    Sergio Perez, PT Physical Therapist        Obj/Interventions     Row Name 08/19/21 1601          Range of Motion Comprehensive    Comment, General Range of Motion  BUE/LLE (WFL's)  -MS     Row Name 08/19/21 1601          Strength Comprehensive (MMT)    Comment, General Manual Muscle Testing (MMT) Assessment  BUE/LLE (3+/5)  -MS     Row Name 08/19/21 1601          Motor Skills    Therapeutic Exercise  -- Right THR ther. ex. program x 10 reps completed with assist.  -MS       User Key  (r) = Recorded By, (t) = Taken By, (c) = Cosigned By    Initials Name Provider Type    MS Sergio Jin, PT Physical Therapist        Goals/Plan     Row Name 08/19/21 1602          Bed Mobility Goal 1 (PT)    Activity/Assistive Device (Bed Mobility Goal 1, PT)  bed mobility activities, all  -MS     [place] Level/Cues Needed (Bed Mobility Goal 1, PT)  standby assist  -MS     Time Frame (Bed Mobility Goal 1, PT)  long term goal (LTG);3 days  -MS     Row Name 08/19/21 1602          Transfer Goal 1 (PT)    Activity/Assistive Device (Transfer Goal 1, PT)  transfers, all;walker, rolling  -MS     [place] Level/Cues Needed (Transfer Goal 1, PT)  standby assist  -MS     Time Frame (Transfer Goal 1, PT)  long term goal (LTG);3 days   -MS     Row Name 08/19/21 1602          Gait Training Goal 1 (PT)    Activity/Assistive Device (Gait Training Goal 1, PT)  gait (walking locomotion);walker, rolling  -MS     [place] Level (Gait Training Goal 1, PT)  standby assist  -MS     Distance (Gait Training Goal 1, PT)  100 feet  -MS     Time Frame (Gait Training Goal 1, PT)  long term goal (LTG);3 days  -MS     Row Name 08/19/21 1602          Stairs Goal 1 (PT)    Activity/Assistive Device (Stairs Goal 1, PT)  stairs, all skills  -MS     [place] Level/Cues Needed (Stairs Goal 1, PT)  contact guard assist  -MS     Number of Stairs (Stairs Goal 1, PT)  3  -MS     Time Frame (Stairs Goal 1, PT)  long term goal (LTG);3 days  -MS       User Key  (r) = Recorded By, (t) = Taken By, (c) = Cosigned By    Initials Name Provider Type    MS Sergio Jin, PT Physical Therapist        Clinical Impression     Row Name 08/19/21 1601          Pain    Additional Documentation  Pain Scale: Numbers Pre/Post-Treatment (Group)  -MS     Row Name 08/19/21 1601          Pain Scale: Numbers Pre/Post-Treatment    Pretreatment Pain Rating  5/10  -MS     Posttreatment Pain Rating  5/10  -MS     Pain Location - Side  Right  -MS     Pain Location  hip  -MS     Pain Intervention(s)  Medication (See MAR);Elevated;Nursing Notified;Repositioned;Cold applied  -MS     Row Name 08/19/21 1601          Plan of Care Review    Plan of Care Reviewed With  patient;family  -MS     Row Name 08/19/21 1601          Therapy Assessment/Plan (PT)    Rehab Potential (PT)  good, to achieve stated therapy goals  -MS     Criteria for Skilled Interventions Met (PT)  skilled treatment is necessary  -MS     Row Name 08/19/21 1601          Positioning and Restraints    Pre-Treatment Position  in bed  -MS     Post Treatment Position  chair  -MS     In Chair  notified nsg;reclined;sitting;call light within reach;encouraged to call for assist;exit alarm on;with family/caregiver;with other staff All lines  intact. Ice pack to Right hip.  -MS       User Key  (r) = Recorded By, (t) = Taken By, (c) = Cosigned By    Initials Name Provider Type    MS Sergio Jin, PT Physical Therapist        Outcome Measures     Row Name 08/19/21 1603          How much help from another person do you currently need...    Turning from your back to your side while in flat bed without using bedrails?  3  -MS     Moving from lying on back to sitting on the side of a flat bed without bedrails?  3  -MS     Moving to and from a bed to a chair (including a wheelchair)?  3  -MS     Standing up from a chair using your arms (e.g., wheelchair, bedside chair)?  3  -MS     Climbing 3-5 steps with a railing?  2  -MS     To walk in hospital room?  3  -MS     AM-PAC 6 Clicks Score (PT)  17  -MS     Row Name 08/19/21 1603          Functional Assessment    Outcome Measure Options  AM-PAC 6 Clicks Basic Mobility (PT)  -MS       User Key  (r) = Recorded By, (t) = Taken By, (c) = Cosigned By    Initials Name Provider Type    Sergio Perez, PT Physical Therapist                       Physical Therapy Education                 Title: PT OT SLP Therapies (Done)     Topic: Physical Therapy (Done)     Point: Mobility training (Done)     Learning Progress Summary           Patient Acceptance, E,D, VU,NR by MS at 8/19/2021 1603                   Point: Home exercise program (Done)     Learning Progress Summary           Patient Acceptance, E,D, VU,NR by MS at 8/19/2021 1603                   Point: Body mechanics (Done)     Learning Progress Summary           Patient Acceptance, E,D, VU,NR by MS at 8/19/2021 1603                   Point: Precautions (Done)     Learning Progress Summary           Patient Acceptance, E,D, VU,NR by MS at 8/19/2021 1603                               User Key     Initials Effective Dates Name Provider Type Discipline    MS 06/16/21 -  Sergio Jin PT Physical Therapist PT              PT Recommendation and Plan  Planned  Therapy Interventions (PT): balance training, bed mobility training, gait training, home exercise program, patient/family education, postural re-education, transfer training, strengthening, stair training  Plan of Care Reviewed With: patient  Outcome Summary: Pt. presents with typical post op impairments related to THR surgery that include decreased ROM, strength, and overall balance.  Pt. will benefit from skilled inpt. P.T. to address his functional deficits and to assist pt. in regaining his maximum level of independence with functional mobility.     Time Calculation:   PT Charges     Row Name 08/19/21 1604             Time Calculation    Start Time  1455  -MS      Stop Time  1515  -MS      Time Calculation (min)  20 min  -MS      PT Received On  08/19/21  -MS      PT - Next Appointment  08/20/21  -MS      PT Goal Re-Cert Due Date  08/22/21  -MS         Time Calculation- PT    Total Timed Code Minutes- PT  19 minute(s)  -MS        User Key  (r) = Recorded By, (t) = Taken By, (c) = Cosigned By    Initials Name Provider Type    Sergio Perez, PT Physical Therapist        Therapy Charges for Today     Code Description Service Date Service Provider Modifiers Qty    [id number]  PT EVAL LOW COMPLEXITY 2 8/19/2021 Sergio Jin, PT GP 1    34934587539 HC PT THER PROC EA 15 MIN 8/19/2021 Sergio Jin, PT GP 1          PT G-Codes  Outcome Measure Options: AM-PAC 6 Clicks Basic Mobility (PT)  AM-PAC 6 Clicks Score (PT): 17    Sergio Jin PT  8/19/2021

## 2021-08-19 NOTE — CASE MANAGEMENT/SOCIAL WORK
Discharge Planning Assessment  [facility]     Patient Name: Nathan Baez  MRN: [id number]  Today's Date: 8/19/2021    Admit Date: 8/19/2021    Discharge Needs Assessment     Row Name 08/19/21 1521       Living Environment    Lives With  spouse    Current Living Arrangements  home/apartment/condo    Potentially Unsafe Housing Conditions  other (see comments) no concerns    Primary Care Provided by  self    Family Caregiver if Needed  spouse    Quality of Family Relationships  involved;helpful;supportive    Able to Return to Prior Arrangements  yes       Resource/Environmental Concerns    Resource/Environmental Concerns  none       Transition Planning    Patient/Family Anticipates Transition to  home    Patient/Family Anticipated Services at Transition  home health care    Transportation Anticipated  family or friend will provide       Discharge Needs Assessment    Current Outpatient/Agency/Support Group  homecare agency    Outpatient/Agency/Support Group Needs  homecare agency    Discharge Facility/Level of Care Needs  home with [facility]        Discharge Plan     Row Name 08/19/21 1528       Plan    Plan  Home with spouse and [facility]    Patient/Family in Agreement with Plan  yes    Plan Comments  CCP met with patient and patient’s spouse. CCP role explained and face sheet verified. Patient resides with his wife. Patient’s bedroom and bathroom are on the main level. Patient has used [facility] in the past and has been to [facility] for rehab. Per care plan; plan is to return home with [facility]. Patient confirmed plan of home with [facility] and denies any concerns returning home. CCP will follow for any needs to arise. Pily BHAKTA        Continued Care and Services - Admitted Since 8/19/2021    Coordination has not been started for this encounter.         Demographic Summary     Row Name 08/19/21 1528       General Information    Referral Source  admission list    Reason for Consult  discharge planning    Preferred  Language  English     Used During This Interaction  no        Functional Status     Row Name 08/19/21 1521       Functional Status    Usual Activity Tolerance  good    Current Activity Tolerance  good       Functional Status, IADL    Medications  independent    Meal Preparation  independent    Housekeeping  independent    Laundry  independent    Shopping  independent        Psychosocial    No documentation.       Abuse/Neglect    No documentation.       Legal    No documentation.       Substance Abuse    No documentation.       Patient Forms    No documentation.           YONY Johnson

## 2021-08-19 NOTE — ANESTHESIA PROCEDURE NOTES
Airway  Urgency: elective    Date/Time: 8/19/2021 8:47 AM  Airway not difficult    General Information and Staff    Patient location during procedure: OR  Anesthesiologist: Pranav Norman MD  CRNA: Daria Barron CRNA    Indications and Patient Condition  Indications for airway management: airway protection    Preoxygenated: yes  MILS maintained throughout  Mask difficulty assessment: 2 - vent by mask + OA or adjuvant +/- NMBA    Final Airway Details  Final airway type: endotracheal airway      Successful airway: ETT  Cuffed: yes   Successful intubation technique: video laryngoscopy  Facilitating devices/methods: intubating stylet  Endotracheal tube insertion site: oral  Blade: CMAC  Blade size: D  ETT size (mm): 7.5  Cormack-Lehane Classification: grade I - full view of glottis  Placement verified by: chest auscultation and capnometry   Cuff volume (mL): 8  Measured from: teeth  ETT/EBT  to teeth (cm): 23  Number of attempts at approach: 1  Assessment: lips, teeth, and gum same as pre-op and atraumatic intubation

## 2021-08-19 NOTE — H&P
Orthopaedic Surgery History and Physical    Patient Name:  Nathan Baez  YOB: 1945  Age: 76 y.o.  Medical Records Number:  [id number]    Date of Admission:  8/19/2021  6:03 AM    Chief Complaint:  Primary osteoarthritis of right hip [M16.11]    Nathan Baez is a 76 y.o. male who presents c/o severe right hip pain.  The pain has been on and off for many years, worsening recently to the point where the pain is becoming disabling. The pain is a constant dull ache with occasional sharp, stabbing pains.  The patient has failed conservative treatment and would like to proceed with right total hip arthroplasty.    There were no vitals taken for this visit.    Past Medical History:    Past Medical History:   Diagnosis Date   • Actinic keratosis    • Acute embolism and thrombosis of vein    • Allergic rhinitis    • Arthritis    • Cervical radiculopathy    • Disequilibrium    • DJD (degenerative joint disease), lumbar    • Elevated cholesterol    • Erysipelas    • GERD (gastroesophageal reflux disease)    • Glaucoma    • Hip pain, chronic, right    • History of kidney stones     X1   • History of peripheral edema     LOWER LEGS BILAT, COMPRESSION KNEE HIGH    • History of transfusion    • Hyperlipidemia    • Hypertension    • Hypothyroid    • Insomnia    • Limited mobility     RIGHT HIP   • Male urinary stress incontinence     s/p prostatectomy-WEAR PAD   • Obesity    • KWAME (obstructive sleep apnea)    • Osteoarthritis    • Pes planus    • Presbycusis    • Prostate cancer (CMS/HCC)    • Restless legs syndrome    • Shoulder pain     RIGHT, LIMITED MOBILITY-AT TIMES   • Sleep apnea     bipap   • Tinea corporis    • Tinea cruris    • Unsteady gait     RIGHT HIP   • Weakness     RIGHT HIP       Past Surgical History:   Past Surgical History:   Procedure Laterality Date   • CATARACT EXTRACTION, BILATERAL Bilateral    • CERVICAL DISCECTOMY ANTERIOR     • COLONOSCOPY  03/08/2017    3/17normal. Recheck 2022.  . Repeat in 5 years    • COLONOSCOPY N/A 2021    Procedure: COLONOSCOPY INTO CECUM WITH HOT & COLD  SNARE POLYPECTOMIES;  Surgeon: Jaden Chowdhury MD;  Location: [facility] ENDOSCOPY;  Service: Gastroenterology;  Laterality: N/A;  PRE: CHANGE IN BOWEL HABITS; FAMILY H/O COLON CANCER  POST: DIVERTICULOSIS, POLYPS   • ENDOSCOPY W/ PEG REMOVAL     • FOOT SURGERY       had big toe replaced on left foot-METAL    • HERNIA REPAIR  2012    umbilical   • JOINT REPLACEMENT Right    • GA TOTAL KNEE ARTHROPLASTY Left 2016    Procedure: LT TOTAL KNEE ARTHROPLASTY;  Surgeon: Tadeo Basurto MD;  Location: [facility] MAIN OR;  Service: Orthopedics   • PROSTATECTOMY  1999. Radical    • THYROID LOBECTOMY     • TONSILLECTOMY     • TOTAL KNEE ARTHROPLASTY Right    • TOTAL SHOULDER ARTHROPLASTY W/ DISTAL CLAVICLE EXCISION Right 2019    Procedure: TOTAL SHOULDER REVERSE ARTHROPLASTY RIGHT REPAIR RIGHT AXILLARY VEIN;  Surgeon: Behzad Kelley MD;  Location: [facility] OR OSC;  Service: Orthopedics   • WRIST SURGERY Right 12/17/2014    x2  wrist replaced       Social History:    Social History     Socioeconomic History   • Marital status:      Spouse name: Not on file   • Number of children: Not on file   • Years of education: Not on file   • Highest education level: Not on file   Tobacco Use   • Smoking status: Former Smoker     Packs/day: 1.00     Years: 20.00     Pack years: 20.00     Types: Cigarettes     Quit date: 1984     Years since quittin.6   • Smokeless tobacco: Former User     Types: Chew   Substance and Sexual Activity   • Alcohol use: Yes     Comment: 3-4 MONTHLY   • Drug use: No   • Sexual activity: Defer       Family History:    Family History   Problem Relation Age of Onset   • Cancer Mother         COLON   • Colon cancer Mother    • Cancer Father         PROSTATE   • Cancer Sister         BREAST CANCER   • Breast cancer Sister    • Gout Sister    •  Hypertension Sister    • Heart attack Brother    • Bone cancer Brother    • Heart disease Brother    • Malig Hyperthermia Neg Hx        Current Medications:  Scheduled Meds:famotidine, 20 mg, Intravenous, Once  sodium chloride, 3 mL, Intravenous, Q12H      Continuous Infusions:lactated ringers, 9 mL/hr      PRN Meds:.fentanyl  •  lidocaine PF 1%  •  sodium chloride    Current Facility-Administered Medications:   •  famotidine (PEPCID) injection 20 mg, 20 mg, Intravenous, Once, Pranav Norman MD  •  fentaNYL citrate (PF) (SUBLIMAZE) injection 50 mcg, 50 mcg, Intravenous, Q10 Min PRN, Pranav Norman MD  •  lactated ringers infusion, 9 mL/hr, Intravenous, Continuous, Pranav Norman MD  •  lidocaine PF 1% (XYLOCAINE) injection 0.5 mL, 0.5 mL, Injection, Once PRN, Pranav Norman MD  •  sodium chloride 0.9 % flush 3 mL, 3 mL, Intravenous, Q12H, Pranav Norman MD  •  sodium chloride 0.9 % flush 3-10 mL, 3-10 mL, Intravenous, PRN, Pranav Norman MD    Allergies:  No Known Allergies    Review of Systems:   HEENT: Patient denies any headaches, vision changes, change in hearing, or tinnitus, Patient denies any rhinorrhea,epistaxis, sinus pain, mouth or dental problems, sore throat or hoarseness, or dysphagia  Pulmonary: Patient denies any cough, congestion, SOA, or wheezing  Cardiovascular: Patient denies any chest pain, dyspnea, palpitations, weakness, intolerance of exercise, varicosities, swelling of extremities, known murmur  Gastrointestinal:  Patient denies nausea, vomiting, diarrhea, constipation, loss  of appetite, change in appetite, dysphagia, gas, heartburn, melena, change in bowel habits, use of laxatives or other drugs to alter the function of the gastrointestinal tract.  Genital/Urinary: Patient denies dysuria, change in color of urine, change in frequency of urination, pain with urgency, incontinence, retention, or nocturia.  Musculoskeletal: Patient denies increased warmth; redness; or  swelling of joints; limitation of function; deformity; crepitation: pain in a joint or an extremity, the neck, or the back, especially with movement.  Neurological: Patient denies dizziness, tremor, ataxia, difficulty in speaking, change in speech, paresthesia, loss of sensation, seizures, syncope, changes in memory.  Endocrine system: Patient denies tremors, palpitations, intolerance of heat or cold, polyuria, polydipsia, polyphagia, diaphoresis, exophthalmos, or goiter.  Psychological: Patient denies thoughts/plans or harming self or other; depression,  insomnia, night terrors, gill, memory loss, disorientation.  Skin: Patient denies any bruising, rashes, discoloration, pruritus, wounds, ulcers, decubiti, changes in the hair or nails  Hematopoietic: Patient denies history of spontaneous or excessive bleeding, epistaxis, hematuria, melena, fatigue, enlarged or tender lymph nodes, pallor, history of anemia.        Physical Exam:  Awake, A&O x3, affect normal, no acute distress  Ambulating with a limp due to hip pain  Hip ROM is limited due to pain  No instability  Strength is 4/5 in the quad, hamstring, abduction and adduction  Cap refill is normal, Sensation intact    Card:  RR, HD Stable  Pulm:  Regular breathing, no S.O.A  Abd:  Soft, NT, ND    Lab Results (last 24 hours)     ** No results found for the last 24 hours. **          XR Chest PA & Lateral    Result Date: 8/12/2021  Narrative: XR CHEST PA AND LATERAL-  HISTORY: Male who is 76 years-old,  preoperative evaluation  TECHNIQUE: Frontal and lateral views of the chest  COMPARISON: 03/20/2020  FINDINGS: The heart size is normal. Aorta is calcified. Pulmonary vasculature is unremarkable. No focal pulmonary consolidation, pleural effusion, or pneumothorax. No acute osseous process.      Impression: No evidence for acute pulmonary process. Follow-up as clinically indicated.  This report was finalized on 8/12/2021 3:10 PM by Dr. Ethan Horan M.D.      XR  Hip With or Without Pelvis 2 - 3 View Right    Result Date: 8/12/2021  Narrative: XR HIP W OR WO PELVIS 2-3 VIEW RIGHT-  INDICATIONS: Preoperative evaluation  TECHNIQUE: Frontal view the pelvis, 2 views of the right hip  COMPARISON: Correlate with  image from CT from 07/04/2019  FINDINGS:  Moderate to prominent right hip joint space narrowing is seen. Left hip joint space appears preserved. No acute fracture, erosion, or dislocation is identified. Sclerotic focus of the right iliac bone appear stable at least as far back as 07/04/2019.      Impression:  Degenerative joint space narrowing at the right hip.  This report was finalized on 8/12/2021 3:04 PM by Dr. Ethan Horan M.D.          Assessment:  End-stage Primary Right Hip Osteoarthritis    Plan:  Patient's pain is becoming disabling, despite extensive conservative treatment.  Radiographs reveal end-stage degenerative changes.  The risks of surgery, including, but not limited to, heart attack, stroke, dying, DVT, leg length inequality, nerve injury, vascular injury, stiffness and infection were discussed.  The alternatives and benefits were also discussed.  All questions answered and the patient wishes to proceed with right total hip arthroplasty.    Tadeo Luo PA-C  [facility]  [address] [place] [address]  [phone]    8/19/2021    CC: Damien Pierce MD, Tadeo Basurto MD

## 2021-08-19 NOTE — ANESTHESIA POSTPROCEDURE EVALUATION
"Patient: Nathan Baez    Procedure Summary     Date: 08/19/21 Room / Location: [facility] OR [address] [place] [address] MAIN OR    Anesthesia Start: 0837 Anesthesia Stop: 1108    Procedure: TOTAL HIP ARTHROPLASTY RIGHT POSTERIOR (Right Hip) Diagnosis:     Surgeons: Tadeo Basurto MD Provider: Pranav Norman MD    Anesthesia Type: general ASA Status: 3          Anesthesia Type: general    Vitals  Vitals Value Taken Time   /66 08/19/21 1246   Temp 36.7 °C (98 °F) 08/19/21 1245   Pulse 88 08/19/21 1258   Resp 16 08/19/21 1245   SpO2 95 % 08/19/21 1258   Vitals shown include unvalidated device data.        Post Anesthesia Care and Evaluation    Patient location during evaluation: bedside  Patient participation: complete - patient participated  Level of consciousness: awake and alert  Pain score: 0  Pain management: adequate  Airway patency: patent  Anesthetic complications: No anesthetic complications    Cardiovascular status: acceptable  Respiratory status: acceptable  Hydration status: acceptable    Comments: /66 (BP Location: Right arm, Patient Position: Lying)   Pulse 89   Temp 36.7 °C (98 °F) (Oral)   Resp 16   Ht 170.2 cm (67\")   Wt 114 kg (250 lb 14.1 oz)   SpO2 94%   BMI 39.29 kg/m²       "

## 2021-08-19 NOTE — OP NOTE
Orthopaedic Surgery Operative Note    Patient Name:  Nathan Baez  YOB: 1945  Age: 76 y.o.  Medical Records Number:  [id number]    Date of Procedure:  8/19/2021    Pre-operative Diagnosis:  Primary Osteoarthritis Right Hip    Post-operative Diagnosis:  Primary Osteoarthritis Right Hip    Procedure Performed:  Right Total Hip Arthroplasty                                          Layered Closure    Implants:  Biomet 56 mm G7 Acetabular Shell, 11 mm Standard Offset Taperloc Complete Stem, 40 mm High-wall Polyethylene Liner, -6 40 mm Ceramic Head    Surgeon:  Tadeo Basurto M.D.    Assistant: Jackeline Luo (who was present during the critical portions of the case, thereby decreasing operative time and patient morbidity)    Anesthetic Type:  General    Estimated Blood Loss:  250cc's    Specimens: * No orders in the log *    No Complications      Indications for Procedure:  Nathan Baez is a 76 y.o. male suffering from end stage degenerative changes in the right hip.  The patients pain is becoming disabling, despite extensive conservative care, including NSAIDS, activity modification and therapy.  The risks, benefits and alternatives were discussed and the patient wishes to proceed with right total hip arthroplasty.      Procedure Performed:    After informed consent was obtained, the correct patient identified, the correct operative side marked by the operative surgeon and pre-operative IV antibiotics given, the patient was taken to the operating room and placed supine on the operating table.  After general anesthesia was induced, a surgical time out was performed and 1 gm of IV Tranxemic Acid given, the patient was placed in the left lateral decubitus position and the right lower extremity was prepped with chloraprep and draped in a sterile fashion.    An incision was made overlying the right hip.  We sharply dissected down to expose the fascia over the gluteus obey and the Iliotibial band.  We  split the fascia in line with the skin incision and placed a Charnley retractor carefully to avoid damage to the Sciatic Nerve.  We then began our posterior approach to the hip by identifying the short external rotators and tagging these with a #2 MaxBraid and releasing them from their insertion on the femur.  We then identified the posterior capsule, released the capsule from it's insertion on the femur and tagged the capsule proximally and distally with a #2 MaxBraid  We then dislocated the hip and made our neck cut roughly 2-3 mm proximal to the lesser trochanter. We measured the head to be 52 mm and our neck cut to be 58 mm.      We then gained exposure of the acetabulum, removed the pulvinar and labrum, then we sequentially reamed from a 36 mm reamer up to a 55 mm reamer.  We had excellent interference fit and a nice bleeding, bony bed for our acetabular component.  We copiously irrigated the acetabulum, then impacted our permanent acetabular component into place.  We assured we were completely seated by checking through the apical hole, we placed two 6.5 mm cancellous screws for rotational stability, then placed our permanent liner.    We then turned our attention to the femur where we cleared the trochanteric fossa of soft tissue.  We entered the canal with a box chisel, hand held reamer and lateralizing reamer.  We then sequentially broached from a 4 mm broach up to a 11 mm broach.  We trialed with both a standard neck and high offset neck with the -6 40 mm head and had excellent stability, range of motion and leg length equality with the std offset neck.  An intraoperative radiograph revealed excellent implant position and alignment.  We removed our trials and copiously irrigated the hip.  We then placed our permanent 11 std offset stem and trialed again with a -6 and -3 40 mm heads.  The -6 40 mm head gave us the best range of motion, stability and leg length equality.  We removed the trials, copiously  irrigated the hip, cleaned and dried the trunion, then impacted our permanent head.  We then reduced the hip and began our layered closure after placing our local anesthetic and re-dosing our IV antibiotics.  We closed the short external rotators and posterior capsule through two drill holes in the greater trochanter and a soft tissue stitch in the Gluteus Medius.  We closed the deep fascia with a #1 Vicryl, the deep subcutaneous tissue with a #1 Vicryl, the subcutaneous tissue with 2-0 Vicryl and the skin with 3-0 Monocryl and a Zip-boubacar.  We placed a sterile dressing of Xeroform and an Island dressing.  All sponge and needle counts were correct.  The patient was awakened from general anesthesia and taken to the recovery room in stable condition.    The patient will be started on Aspirin 325 mg twice daily for DVT prophylaxis.  IV antibiotics will be discontinued within 24 hours of surgery.  Immediately prior to surgery, there were no acute Thromboembolic nor Cardiovascular risk factors.  An updated Medical Reconciliation form is on the chart.    Tadeo Luo PA-C  [facility]  [address] [place] [address]  [phone]    8/19/2021

## 2021-08-19 NOTE — PLAN OF CARE
Goal Outcome Evaluation:              Outcome Summary: Received pt from PACU, s/p RT hip arthroplasty,dressing with small dried spot drainage, VSS, on O2 @ 2lpm, w/ CPAP, on accuchecks, on PRN pain meds,seen by PT today, voided to BR, needs attended, will continue to monitor./

## 2021-08-19 NOTE — ANESTHESIA PREPROCEDURE EVALUATION
Anesthesia Evaluation     Patient summary reviewed and Nursing notes reviewed   no history of anesthetic complications:  NPO Solid Status: > 8 hours  NPO Liquid Status: > 2 hours           Airway   Mallampati: III  TM distance: >3 FB  Neck ROM: full  Possible difficult intubation and Large neck circumference  Dental - normal exam     Pulmonary - normal exam   (+) a smoker Former, sleep apnea on CPAP,   (-) COPD, asthma, lung cancer, no home oxygen  Cardiovascular - normal exam  Exercise tolerance: good (4-7 METS)    ECG reviewed  Rhythm: regular  Rate: normal    (+) hypertension well controlled 2 medications or greater, DVT resolved, hyperlipidemia,   (-) valvular problems/murmurs, past MI, CAD, dysrhythmias, angina, CHF, cardiac stents      Neuro/Psych  (+) weakness, numbness,     (-) seizures, TIA, CVA  GI/Hepatic/Renal/Endo    (+) obesity,  GERD well controlled, GI bleeding lower resolved, renal disease, diabetes mellitus type 2 poorly controlled, thyroid problem hypothyroidism  (-) hiatal hernia, PUD, hepatitis, liver disease    Musculoskeletal     Abdominal   (+) obese,     Abdomen: soft.   Substance History - negative use     OB/GYN negative ob/gyn ROS         Other   arthritis,    history of cancer remission                  Anesthesia Plan    ASA 3     general with block   (GA w/ FIC block for POPC)  intravenous induction     Anesthetic plan, all risks, benefits, and alternatives have been provided, discussed and informed consent has been obtained with: patient.    Plan discussed with CRNA.

## 2021-08-19 NOTE — PLAN OF CARE
Goal Outcome Evaluation:  Plan of Care Reviewed With: patient           Outcome Summary: Pt. presents with typical post op impairments related to THR surgery that include decreased ROM, strength, and overall balance.  Pt. will benefit from skilled inpt. P.T. to address his functional deficits and to assist pt. in regaining his maximum level of independence with functional mobility.    Patient was wearing a face mask during this therapy encounter. Therapist used appropriate personal protective equipment including eye protection, mask, and gloves.  Mask used was standard procedure mask. Appropriate PPE was worn during the entire therapy session. Hand hygiene was completed before and after therapy session. Patient is not in enhanced droplet precautions.

## 2021-08-19 NOTE — DISCHARGE PLACEMENT REQUEST
"Dinesh Mart (76 y.o. Male)     Date of Birth Social Security Number Address Home Phone MRN    1945  [facility] [phone] [id number]    Confucianist Marital Status          Restorationist        Admission Date Admission Type Admitting Provider Attending Provider Department, Room/Bed    8/19/21 Elective Tadeo Basurto MD Goodin, Robert A, MD [address], P778/1    Discharge Date Discharge Disposition Discharge Destination                       Attending Provider: Tadeo Basurto MD    Allergies: No Known Allergies    Isolation: None   Infection: None   Code Status: Prior    Ht: 170.2 cm (67\")   Wt: 114 kg (250 lb 14.1 oz)    Admission Cmt: None   Principal Problem: None                Active Insurance as of 8/19/2021     Primary Coverage     Payor Plan Insurance Group Employer/Plan Group    MEDICARE MEDICARE A & B      Payor Plan Address Payor Plan Phone Number Payor Plan Fax Number Effective Dates    [address] [phone]  5/1/2011 - None Entered    [facility] [id number]       Subscriber Name Subscriber Birth Date Member ID       BRITTNYDINESH COOPER 1945 [id number]           Secondary Coverage     Payor Plan Insurance Group Employer/Plan Group    [facility] SUPP [id number]     Payor Plan Address Payor Plan Phone Number Payor Plan Fax Number Effective Dates    [address]   12/1/2016 - None Entered    [facility] [id number]       Subscriber Name Subscriber Birth Date Member ID       DINESH MART 1945 [id number]                 Emergency Contacts      (Rel.) Home Phone Work Phone Mobile Phone    BrittnyCoco (Spouse) [phone] -- [phone]    BrittnyDean (Son) [phone] -- [phone]              "

## 2021-08-20 ENCOUNTER — TRANSCRIBE ORDERS (OUTPATIENT)
Dept: HOME HEALTH SERVICES | Facility: HOME HEALTHCARE | Age: 76
End: 2021-08-20

## 2021-08-20 ENCOUNTER — HOME HEALTH ADMISSION (OUTPATIENT)
Dept: HOME HEALTH SERVICES | Facility: HOME HEALTHCARE | Age: 76
End: 2021-08-20

## 2021-08-20 DIAGNOSIS — Z96.641 STATUS POST RIGHT HIP REPLACEMENT: Primary | ICD-10-CM

## 2021-08-20 LAB
ANION GAP SERPL CALCULATED.3IONS-SCNC: 10.8 MMOL/L (ref 5–15)
BUN SERPL-MCNC: 16 MG/DL (ref 8–23)
BUN/CREAT SERPL: 15.4 (ref 7–25)
CALCIUM SPEC-SCNC: 8.5 MG/DL (ref 8.6–10.5)
CHLORIDE SERPL-SCNC: 100 MMOL/L (ref 98–107)
CO2 SERPL-SCNC: 26.2 MMOL/L (ref 22–29)
CREAT SERPL-MCNC: 1.04 MG/DL (ref 0.76–1.27)
DEPRECATED RDW RBC AUTO: 42.8 FL (ref 37–54)
ERYTHROCYTE [DISTWIDTH] IN BLOOD BY AUTOMATED COUNT: 13 % (ref 12.3–15.4)
GFR SERPL CREATININE-BSD FRML MDRD: 70 ML/MIN/1.73
GLUCOSE BLDC GLUCOMTR-MCNC: 314 MG/DL (ref 70–130)
GLUCOSE BLDC GLUCOMTR-MCNC: 319 MG/DL (ref 70–130)
GLUCOSE SERPL-MCNC: 226 MG/DL (ref 65–99)
HCT VFR BLD AUTO: 35.1 % (ref 37.5–51)
HGB BLD-MCNC: 11.7 G/DL (ref 13–17.7)
MCH RBC QN AUTO: 30 PG (ref 26.6–33)
MCHC RBC AUTO-ENTMCNC: 33.3 G/DL (ref 31.5–35.7)
MCV RBC AUTO: 90 FL (ref 79–97)
PLATELET # BLD AUTO: 149 10*3/MM3 (ref 140–450)
PMV BLD AUTO: 10.3 FL (ref 6–12)
POTASSIUM SERPL-SCNC: 4 MMOL/L (ref 3.5–5.2)
RBC # BLD AUTO: 3.9 10*6/MM3 (ref 4.14–5.8)
SODIUM SERPL-SCNC: 137 MMOL/L (ref 136–145)
WBC # BLD AUTO: 8.15 10*3/MM3 (ref 3.4–10.8)

## 2021-08-20 PROCEDURE — 94799 UNLISTED PULMONARY SVC/PX: CPT

## 2021-08-20 PROCEDURE — 82962 GLUCOSE BLOOD TEST: CPT

## 2021-08-20 PROCEDURE — 80048 BASIC METABOLIC PNL TOTAL CA: CPT | Performed by: ORTHOPAEDIC SURGERY

## 2021-08-20 PROCEDURE — 97110 THERAPEUTIC EXERCISES: CPT

## 2021-08-20 PROCEDURE — 97116 GAIT TRAINING THERAPY: CPT

## 2021-08-20 PROCEDURE — 0SR904Z REPLACEMENT OF RIGHT HIP JOINT WITH CERAMIC ON POLYETHYLENE SYNTHETIC SUBSTITUTE, OPEN APPROACH: ICD-10-PCS | Performed by: ORTHOPAEDIC SURGERY

## 2021-08-20 PROCEDURE — 85027 COMPLETE CBC AUTOMATED: CPT | Performed by: ORTHOPAEDIC SURGERY

## 2021-08-20 RX ORDER — OXYCODONE HYDROCHLORIDE AND ACETAMINOPHEN 5; 325 MG/1; MG/1
1-2 TABLET ORAL EVERY 4 HOURS PRN
Qty: 60 TABLET | Refills: 0 | Status: SHIPPED | OUTPATIENT
Start: 2021-08-20 | End: 2021-10-25

## 2021-08-20 RX ORDER — DEXTROSE MONOHYDRATE 25 G/50ML
25 INJECTION, SOLUTION INTRAVENOUS
Status: DISCONTINUED | OUTPATIENT
Start: 2021-08-20 | End: 2021-08-21 | Stop reason: HOSPADM

## 2021-08-20 RX ORDER — PSEUDOEPHEDRINE HCL 30 MG
100 TABLET ORAL 2 TIMES DAILY PRN
Qty: 30 CAPSULE | Refills: 1 | Status: SHIPPED | OUTPATIENT
Start: 2021-08-20 | End: 2021-10-25

## 2021-08-20 RX ORDER — INSULIN LISPRO 100 [IU]/ML
0-9 INJECTION, SOLUTION INTRAVENOUS; SUBCUTANEOUS
Status: DISCONTINUED | OUTPATIENT
Start: 2021-08-21 | End: 2021-08-21 | Stop reason: HOSPADM

## 2021-08-20 RX ORDER — NICOTINE POLACRILEX 4 MG
15 LOZENGE BUCCAL
Status: DISCONTINUED | OUTPATIENT
Start: 2021-08-20 | End: 2021-08-21 | Stop reason: HOSPADM

## 2021-08-20 RX ORDER — ASPIRIN 325 MG
325 TABLET, DELAYED RELEASE (ENTERIC COATED) ORAL 2 TIMES DAILY WITH MEALS
Qty: 60 TABLET | Refills: 0 | Status: SHIPPED | OUTPATIENT
Start: 2021-08-20 | End: 2022-08-10

## 2021-08-20 RX ADMIN — TIMOLOL MALEATE 1 DROP: 5 SOLUTION/ DROPS OPHTHALMIC at 21:53

## 2021-08-20 RX ADMIN — HYDROCORTISONE: 1 CREAM TOPICAL at 08:20

## 2021-08-20 RX ADMIN — SODIUM CHLORIDE, PRESERVATIVE FREE 3 ML: 5 INJECTION INTRAVENOUS at 20:58

## 2021-08-20 RX ADMIN — PRAMIPEXOLE DIHYDROCHLORIDE 1.5 MG: 1.5 TABLET ORAL at 20:58

## 2021-08-20 RX ADMIN — LATANOPROST 1 DROP: 50 SOLUTION OPHTHALMIC at 21:54

## 2021-08-20 RX ADMIN — DOCUSATE SODIUM 100 MG: 100 CAPSULE, LIQUID FILLED ORAL at 12:09

## 2021-08-20 RX ADMIN — DORZOLAMIDE HYDROCHLORIDE 1 DROP: 20 SOLUTION/ DROPS OPHTHALMIC at 08:22

## 2021-08-20 RX ADMIN — ASPIRIN 325 MG: 325 TABLET, COATED ORAL at 08:19

## 2021-08-20 RX ADMIN — AMLODIPINE BESYLATE 2.5 MG: 2.5 TABLET ORAL at 08:20

## 2021-08-20 RX ADMIN — FUROSEMIDE 40 MG: 40 TABLET ORAL at 08:21

## 2021-08-20 RX ADMIN — OXYCODONE AND ACETAMINOPHEN 1 TABLET: 5; 325 TABLET ORAL at 08:20

## 2021-08-20 RX ADMIN — CLINDAMYCIN PHOSPHATE 900 MG: 900 INJECTION, SOLUTION INTRAVENOUS at 00:13

## 2021-08-20 RX ADMIN — PRAMIPEXOLE DIHYDROCHLORIDE 1.5 MG: 1.5 TABLET ORAL at 08:23

## 2021-08-20 RX ADMIN — LOSARTAN POTASSIUM 100 MG: 100 TABLET, FILM COATED ORAL at 08:21

## 2021-08-20 RX ADMIN — MONTELUKAST SODIUM 10 MG: 10 TABLET, FILM COATED ORAL at 20:58

## 2021-08-20 RX ADMIN — TIMOLOL MALEATE 1 DROP: 5 SOLUTION/ DROPS OPHTHALMIC at 08:22

## 2021-08-20 RX ADMIN — CETIRIZINE HYDROCHLORIDE 10 MG: 10 TABLET ORAL at 08:20

## 2021-08-20 RX ADMIN — BRIMONIDINE TARTRATE 1 DROP: 1 SOLUTION/ DROPS OPHTHALMIC at 21:53

## 2021-08-20 RX ADMIN — MUPIROCIN 1 APPLICATION: 20 OINTMENT TOPICAL at 08:19

## 2021-08-20 RX ADMIN — DORZOLAMIDE HYDROCHLORIDE 1 DROP: 20 SOLUTION/ DROPS OPHTHALMIC at 21:53

## 2021-08-20 RX ADMIN — HYDROCORTISONE: 1 CREAM TOPICAL at 21:02

## 2021-08-20 RX ADMIN — LEVOTHYROXINE SODIUM 88 MCG: 0.09 TABLET ORAL at 06:17

## 2021-08-20 RX ADMIN — FERROUS SULFATE TAB 325 MG (65 MG ELEMENTAL FE) 325 MG: 325 (65 FE) TAB at 08:19

## 2021-08-20 RX ADMIN — DOCUSATE SODIUM 100 MG: 100 CAPSULE, LIQUID FILLED ORAL at 03:36

## 2021-08-20 RX ADMIN — METFORMIN HYDROCHLORIDE 500 MG: 500 TABLET ORAL at 21:06

## 2021-08-20 RX ADMIN — OXYCODONE AND ACETAMINOPHEN 1 TABLET: 5; 325 TABLET ORAL at 03:36

## 2021-08-20 RX ADMIN — PANTOPRAZOLE SODIUM 40 MG: 40 TABLET, DELAYED RELEASE ORAL at 20:58

## 2021-08-20 RX ADMIN — BRIMONIDINE TARTRATE 1 DROP: 1 SOLUTION/ DROPS OPHTHALMIC at 08:24

## 2021-08-20 RX ADMIN — PANTOPRAZOLE SODIUM 40 MG: 40 TABLET, DELAYED RELEASE ORAL at 08:19

## 2021-08-20 RX ADMIN — OXYCODONE AND ACETAMINOPHEN 2 TABLET: 5; 325 TABLET ORAL at 12:09

## 2021-08-20 RX ADMIN — MUPIROCIN 1 APPLICATION: 20 OINTMENT TOPICAL at 20:58

## 2021-08-20 NOTE — THERAPY TREATMENT NOTE
Patient Name: Nathan Baez  : 1945    MRN: [id number]                              Today's Date: 2021       Admit Date: 2021    Visit Dx:     ICD-10-CM ICD-9-CM   1. Primary osteoarthritis of right hip  M16.11 715.15     Patient Active Problem List   Diagnosis   • OA (osteoarthritis) of knee   • Hypertension   • Abdominal wall cellulitis   • Hypothyroidism (acquired)   • Gastroesophageal reflux disease without esophagitis   • Mixed hyperlipidemia   • Primary insomnia   • DDD (degenerative disc disease), lumbar   • KWAME (obstructive sleep apnea)   • Allergic   • Venous insufficiency   • Prostate cancer (CMS/[facility])   • Venous stasis dermatitis   • Tinea cruris   • Tinea corporis   • Throat clearing   • Sleep apnea   • Skin lesion of face   • Rib pain on left side   • Rectal bleed   • Presbycusis   • Pes planus   • Osteoarthritis   • Obesity   • Medicare annual wellness visit, subsequent   • Lumbar strain   • Internal hemorrhoids   • Insomnia   • Inflamed skin tag   • Hyperplastic polyp of stomach   • Hospital discharge follow-up   • History of colon polyps   • High risk medication use   • Glaucoma   • Gastroenteritis, acute   • Erysipelas   • Encounter for screening colonoscopy   • Encounter for long-term (current) use of NSAIDs   • Dysphagia   • DVT (deep venous thrombosis) (CMS/HCC)   • DJD (degenerative joint disease), lumbar   • Diverticulosis   • Cough   • Contact with hypodermic needle   • Cervical radiculopathy   • Cellulitis   • Arthritis   • Allergic rhinitis   • Acute glaucoma   • Acute embolism and thrombosis of vein   • Actinic keratosis   • Status post reverse total shoulder replacement, right   • Localized edema   • Anemia   • Peripheral polyneuropathy   • Campylobacter diarrhea   • Hyponatremia   • Generalized abdominal pain   • Fever   • Constipation   • Bilateral lower extremity edema   • Acute pyelonephritis   • Chronic idiopathic constipation   • Functional diarrhea   • Change in  bowel habits   • RLS (restless legs syndrome)   • Type 2 diabetes mellitus with hyperglycemia (CMS/HCC)   • Family history of GI malignancy   • Primary osteoarthritis of right hip     Past Medical History:   Diagnosis Date   • Actinic keratosis    • Acute embolism and thrombosis of vein    • Allergic rhinitis    • Arthritis    • Cervical radiculopathy    • Disequilibrium    • DJD (degenerative joint disease), lumbar    • Elevated cholesterol    • Erysipelas    • GERD (gastroesophageal reflux disease)    • Glaucoma    • Hip pain, chronic, right    • History of kidney stones     X1   • History of peripheral edema     LOWER LEGS BILAT, COMPRESSION KNEE HIGH    • History of transfusion    • Hyperlipidemia    • Hypertension    • Hypothyroid    • Insomnia    • Limited mobility     RIGHT HIP   • Male urinary stress incontinence     s/p prostatectomy-WEAR PAD   • Obesity    • KWAME (obstructive sleep apnea)    • Osteoarthritis    • Pes planus    • Presbycusis    • Prostate cancer (CMS/HCC)    • Restless legs syndrome    • Shoulder pain     RIGHT, LIMITED MOBILITY-AT TIMES   • Sleep apnea     bipap   • Tinea corporis    • Tinea cruris    • Unsteady gait     RIGHT HIP   • Weakness     RIGHT HIP     Past Surgical History:   Procedure Laterality Date   • CATARACT EXTRACTION, BILATERAL Bilateral    • CERVICAL DISCECTOMY ANTERIOR     • COLONOSCOPY  03/08/2017    3/17normal. Recheck 2022. 12/11. Repeat in 5 years    • COLONOSCOPY N/A 4/19/2021    Procedure: COLONOSCOPY INTO CECUM WITH HOT & COLD  SNARE POLYPECTOMIES;  Surgeon: Jaden Chowdhury MD;  Location: [facility] ENDOSCOPY;  Service: Gastroenterology;  Laterality: N/A;  PRE: CHANGE IN BOWEL HABITS; FAMILY H/O COLON CANCER  POST: DIVERTICULOSIS, POLYPS   • ENDOSCOPY W/ PEG REMOVAL     • FOOT SURGERY      1998 had big toe replaced on left foot-METAL    • HERNIA REPAIR  03/07/2012    umbilical   • JOINT REPLACEMENT Right 2014   • WI TOTAL KNEE ARTHROPLASTY Left 9/13/2016     Procedure: LT TOTAL KNEE ARTHROPLASTY;  Surgeon: Tadeo Basurto MD;  Location: [facility] OR;  Service: Orthopedics   • PROSTATECTOMY  07/1999 July 1999. Radical    • THYROID LOBECTOMY     • TONSILLECTOMY     • TOTAL HIP ARTHROPLASTY Right 8/19/2021    Procedure: TOTAL HIP ARTHROPLASTY RIGHT POSTERIOR;  Surgeon: Tadeo Basurto MD;  Location: [facility] OR;  Service: Orthopedics;  Laterality: Right;   • TOTAL KNEE ARTHROPLASTY Right 2014   • TOTAL SHOULDER ARTHROPLASTY W/ DISTAL CLAVICLE EXCISION Right 11/13/2019    Procedure: TOTAL SHOULDER REVERSE ARTHROPLASTY RIGHT REPAIR RIGHT AXILLARY VEIN;  Surgeon: Behzad Kelley MD;  Location: [facility] OR [facility];  Service: Orthopedics   • WRIST SURGERY Right 12/17/2014    x2  wrist replaced     General Information     Row Name 08/20/21 1646 08/20/21 1040       Physical Therapy Time and Intention    Document Type  therapy note (daily note)  -DJ  therapy note (daily note)  -MS    Mode of Treatment  physical therapy;individual therapy  -DJ  physical therapy;individual therapy  -MS    Row Name 08/20/21 1646 08/20/21 1040       General Information    Patient Profile Reviewed  yes  -DJ  yes  -MS    Existing Precautions/Restrictions  fall  -DJ  (S) fall Exit alarm  -MS    Barriers to Rehab  --  none identified  -MS    Row Name 08/20/21 1646          Home Main Entrance    Number of Stairs, Main Entrance  three  -DJ     Row Name 08/20/21 1646          Stairs Within Home, Primary    Number of Stairs, Within Home, Primary  none  -DJ     Row Name 08/20/21 1646 08/20/21 1040       Cognition    Orientation Status (Cognition)  oriented x 4 pleasant and cooperative, wife present and helpful  -DJ  oriented x 3  -MS    Row Name 08/20/21 1646 08/20/21 1040       Safety Issues, Functional Mobility    Comment, Safety Issues/Impairments (Mobility)  gt belt, nonskid socks  -DJ  Gait belt used for safety.  -MS      User Key  (r) = Recorded By, (t) = Taken By, (c) = Cosigned By    Initials  Name Provider Type    MS Sergio Jin SAW, PT Physical Therapist    DJ Kenisha Linda, PT Physical Therapist        Mobility     Row Name 08/20/21 1647 08/20/21 1041       Bed Mobility    Bed Mobility  supine-sit  -DJ  --    Supine-Sit [place] (Bed Mobility)  minimum assist (75% patient effort);2 person assist;verbal cues  -DJ  moderate assist (50% patient effort);2 person assist  -MS    Sit-Supine [place] (Bed Mobility)  not tested  -DJ  --    Assistive Device (Bed Mobility)  bed rails;head of bed elevated  -DJ  --    Comment (Bed Mobility)  Pt left in recliner  -DJ  --    Row Name 08/20/21 1647          Transfers    Comment (Transfers)  sit/stand from EOB  -DJ     Row Name 08/20/21 1647          Bed-Chair Transfer    Bed-Chair [place] (Transfers)  not tested  -DJ     Row Name 08/20/21 1647 08/20/21 1041       Sit-Stand Transfer    Sit-Stand [place] (Transfers)  minimum assist (75% patient effort);2 person assist  -DJ  minimum assist (75% patient effort);2 person assist  -MS    Assistive Device (Sit-Stand Transfers)  walker, front-wheeled  -DJ  walker, front-wheeled  -MS    Row Name 08/20/21 1647 08/20/21 1041       Gait/Stairs (Locomotion)    [place] Level (Gait)  contact guard;2 person assist  -DJ  contact guard;2 person assist  -MS    Assistive Device (Gait)  walker, front-wheeled  -DJ  walker, front-wheeled  -MS    Distance in Feet (Gait)  35'  -DJ  25 feet  -MS    Deviations/Abnormal Patterns (Gait)  antalgic;khanh decreased  -DJ  antalgic;khanh decreased  -MS    Bilateral Gait Deviations  forward flexed posture  -DJ  forward flexed posture  -MS    Comment (Gait/Stairs)  Pt amb slowly and cautiously 30-35' with r wx, R LE limp, req 3-4 staending rest breaks due to UE fatigue. Fair balance, poor endurance  -DJ  Verbal/tactile cues for posture correction and Rwx guidance. Limited in gait distance due to fatigue, weakness, and pain.  -MS    Row Name 08/20/21 1041          Mobility     Right Lower Extremity (Weight-bearing Status)  weight-bearing as tolerated (WBAT)  -MS       User Key  (r) = Recorded By, (t) = Taken By, (c) = Cosigned By    Initials Name Provider Type    Brian Perezkaden SPRING, PT Physical Therapist    Kenisha Ly PT Physical Therapist        Obj/Interventions     Row Name 08/20/21 1649 08/20/21 1041       Motor Skills    Therapeutic Exercise  other (see comments) AP, QS, GS  -DJ  -- Right THR ther. ex. program x 10 reps completed with assist.  -MS    Row Name 08/20/21 1649          Balance    Balance Assessment  standing static balance;standing dynamic balance  -DJ     Static Standing Balance  WFL  -DJ     Dynamic Standing Balance  mild impairment;supported;standing  -DJ     Balance Interventions  standing;sit to stand;supported;weight shifting activity  -DJ       User Key  (r) = Recorded By, (t) = Taken By, (c) = Cosigned By    Initials Name Provider Type    Sregio Perez SAW, PT Physical Therapist    Kenisha Ly, CITLALLI Physical Therapist        Goals/Plan    No documentation.       Clinical Impression     Row Name 08/20/21 1650          Pain    Additional Documentation  Pain Scale: Numbers Pre/Post-Treatment (Group)  -DJ     Row Name 08/20/21 1650 08/20/21 1042       Pain Scale: Numbers Pre/Post-Treatment    Pretreatment Pain Rating  --  6/10  -MS    Posttreatment Pain Rating  --  6/10  -MS    Pain Location - Side  Right  -DJ  Right  -MS    Pain Location  hip  -DJ  hip  -MS    Pre/Posttreatment Pain Comment  Pt c/o soreness R hip  -DJ  --    Pain Intervention(s)  Repositioned;Rest  -DJ  Medication (See MAR);Elevated;Nursing Notified;Cold applied;Repositioned  -MS    Row Name 08/20/21 1650          Plan of Care Review    Plan of Care Reviewed With  patient;spouse  -DJ     Progress  improving  -DJ     Outcome Summary  Pt resting in bed in NAD, AAO x 3, agreeable to PT. Reviewed supine exs with pt. Pt req min A Of 2 for bed mobility and sit/stand from EOB. Pt amb 30-35'  with r wx and CGA of 2 with vc to keep wx close and stand tall. Pace is slow and enduranceis poor - pt req 4-5 standing rest breaks due to UE fatigue. Pt returned ot recliner chair. Unable to attempts stairs this pm. Cont PT to address functiona l deficits and prepare for d/c home with home health unless pt req short rehab stay.  -DJ     Row Name 08/20/21 1650          Therapy Assessment/Plan (PT)    Patient/Family Therapy Goals Statement (PT)  home  -DJ     Criteria for Skilled Interventions Met (PT)  yes;meets criteria;skilled treatment is necessary  -DJ     Row Name 08/20/21 1650          Vital Signs    O2 Delivery Pre Treatment  room air  -DJ     O2 Delivery Intra Treatment  room air  -DJ     O2 Delivery Post Treatment  room air  -DJ     Pre Patient Position  Supine  -DJ     Intra Patient Position  Standing  -DJ     Post Patient Position  Sitting  -DJ     Row Name 08/20/21 1650 08/20/21 1042       Positioning and Restraints    Pre-Treatment Position  in bed  -DJ  in bed  -MS    Post Treatment Position  chair  -DJ  chair  -MS    In Chair  reclined;call light within reach;encouraged to call for assist;exit alarm on;with family/caregiver;RLE elevated  -DJ  notified nsg;reclined;sitting;call light within reach;encouraged to call for assist;exit alarm on;with family/caregiver Ice pack to Right hip.  -MS      User Key  (r) = Recorded By, (t) = Taken By, (c) = Cosigned By    Initials Name Provider Type    Sergio Perez, PT Physical Therapist    Kenisha Ly, CITLALLI Physical Therapist        Outcome Measures     Row Name 08/20/21 1653 08/20/21 1043       How much help from another person do you currently need...    Turning from your back to your side while in flat bed without using bedrails?  2  -DJ  2  -MS    Moving from lying on back to sitting on the side of a flat bed without bedrails?  2  -DJ  2  -MS    Moving to and from a bed to a chair (including a wheelchair)?  3  -DJ  3  -MS    Standing up from a chair  using your arms (e.g., wheelchair, bedside chair)?  3  -DJ  2  -MS    Climbing 3-5 steps with a railing?  2  -DJ  2  -MS    To walk in hospital room?  3  -DJ  3  -MS    AM-PAC 6 Clicks Score (PT)  15  -DJ  14  -MS    Row Name 08/20/21 1653 08/20/21 1043       Functional Assessment    Outcome Measure Options  AM-PAC 6 Clicks Basic Mobility (PT)  -DJ  AM-PAC 6 Clicks Basic Mobility (PT)  -MS      User Key  (r) = Recorded By, (t) = Taken By, (c) = Cosigned By    Initials Name Provider Type    MS DavinSergio, PT Physical Therapist    Kenisha Ly, PT Physical Therapist                       Physical Therapy Education                 Title: PT OT SLP Therapies (Done)     Topic: Physical Therapy (Done)     Point: Mobility training (Done)     Learning Progress Summary           Patient Acceptance, TB, DU by GABRIEL at 8/20/2021 1653    Acceptance, E,D, VU,NR by MS at 8/20/2021 1043    Acceptance, E,D, VU,NR by MS at 8/19/2021 1603   Family Acceptance, TB, DU by DJ at 8/20/2021 1653                   Point: Home exercise program (Done)     Learning Progress Summary           Patient Acceptance, TB, DU by DJ at 8/20/2021 1653    Acceptance, E,D, VU,NR by MS at 8/20/2021 1043    Acceptance, E,D, VU,NR by MS at 8/19/2021 1603   Family Acceptance, TB, DU by DJ at 8/20/2021 1653                   Point: Body mechanics (Done)     Learning Progress Summary           Patient Acceptance, TB, DU by DJ at 8/20/2021 1653    Acceptance, E,D, VU,NR by MS at 8/20/2021 1043    Acceptance, E,D, VU,NR by MS at 8/19/2021 1603   Family Acceptance, TB, DU by DJ at 8/20/2021 1653                   Point: Precautions (Done)     Learning Progress Summary           Patient Acceptance, TB, DU by DJ at 8/20/2021 1653    Acceptance, E,D, VU,NR by MS at 8/20/2021 1043    Acceptance, E,D, VU,NR by MS at 8/19/2021 1603   Family Acceptance, TB, DU by DJ at 8/20/2021 0335                               User Key     Initials Effective Dates Name Provider  Type Discipline    MS 06/16/21 -  Sergio Jin, PT Physical Therapist PT    DJ 10/25/19 -  Kenisha Linda PT Physical Therapist PT              PT Recommendation and Plan     Plan of Care Reviewed With: patient, spouse  Progress: improving  Outcome Summary: Pt resting in bed in NAD, AAO x 3, agreeable to PT. Reviewed supine exs with pt. Pt req min A Of 2 for bed mobility and sit/stand from EOB. Pt amb 30-35' with r wx and CGA of 2 with vc to keep wx close and stand tall. Pace is slow and enduranceis poor - pt req 4-5 standing rest breaks due to UE fatigue. Pt returned ot recliner chair. Unable to attempts stairs this pm. Cont PT to address functiona l deficits and prepare for d/c home with home health unless pt req short rehab stay.     Time Calculation:   PT Charges     Row Name 08/20/21 1656 08/20/21 1046          Time Calculation    Start Time  1626  -DJ  0912  -MS     Stop Time  1645  -DJ  0930  -MS     Time Calculation (min)  19 min  -DJ  18 min  -MS     PT Non-Billable Time (min)  10 min  -DJ  --     PT Received On  08/20/21  -DJ  08/20/21  -MS     PT - Next Appointment  08/21/21  -DJ  08/20/21  -MS        Time Calculation- PT    Total Timed Code Minutes- PT  --  17 minute(s)  -MS       User Key  (r) = Recorded By, (t) = Taken By, (c) = Cosigned By    Initials Name Provider Type    MS JinSergio paula, PT Physical Therapist    DJ Kenisha Linda PT Physical Therapist        Therapy Charges for Today     Code Description Service Date Service Provider Modifiers Qty    92327305027 HC GAIT TRAINING EA 15 MIN 8/20/2021 Kenisha Linda, PT GP 1    95142856770 HC PT THER SUPP EA 15 MIN 8/20/2021 Kenisha Linda, PT GP 1          PT G-Codes  Outcome Measure Options: AM-PAC 6 Clicks Basic Mobility (PT)  AM-PAC 6 Clicks Score (PT): 15    Kenisha Linda PT  8/20/2021

## 2021-08-20 NOTE — PLAN OF CARE
Goal Outcome Evaluation:  Plan of Care Reviewed With: patient           Outcome Summary: Pt. able to ambulate 25 feet, CGA x 2, with use of Rwx this date. Pt. requires Mod. assist x 2 for bed mobility and Min. assist x 2 for sit <-> stand transfers.  Right THR ther. ex. program x 10 reps completed with assist for general strengthening.  Verbal/tactile cues given during ambulation for posture correction.  Pt. limited in gait distance due to fatigue, weakness, and pain.  Given these symptoms, pt. also unable to attempt stairs this AM.    Patient was wearing a face mask during this therapy encounter. Therapist used appropriate personal protective equipment including eye protection, mask, and gloves.  Mask used was standard procedure mask. Appropriate PPE was worn during the entire therapy session. Hand hygiene was completed before and after therapy session. Patient is not in enhanced droplet precautions.

## 2021-08-20 NOTE — PLAN OF CARE
Goal Outcome Evaluation:  Plan of Care Reviewed With: patient        Progress: improving  Outcome Summary: VSS, NVI, dressing c/d/i, ambulating short distances with walker, voiding per urn, plan to dc home today or tomorrow, educated on bp meds and monitoring

## 2021-08-20 NOTE — DISCHARGE SUMMARY
Orthopaedic Surgery Discharge Summary    Patient Name:  Nathan Baez  YOB: 1945  Age: 76 y.o.  Medical Records Number:  [id number]    Date of Admission:  8/19/2021  Date of Discharge:  8/21/2021    Primary Discharge Diagnosis:  Primary osteoarthritis of right hip [M16.11]    Secondary Discharge Diagnosis:    Problems Addressed this Visit        Musculoskeletal and Injuries    Primary osteoarthritis of right hip - Primary    Relevant Medications    oxyCODONE-acetaminophen (PERCOCET) 5-325 MG per tablet    Other Relevant Orders    Walker      Diagnoses       Codes Comments    Primary osteoarthritis of right hip    -  Primary ICD-10-CM: M16.11  ICD-9-CM: 715.15           Procedures Performed:  Right Total Hip Arthroplasty      Hospital Course:    Nathan Baez is a 76 y.o.  male who underwent successful right russel on 8/19/2021.  Nathan Baez was started on Aspirin 325 mg po twice daily  post-operatively for DVT prophylaxis.  On post-op day 1 the patients dressing was changed and their incision was clean, with no signs of infection and their calf was soft, with no signs of DVT.  The patient progressed well with physical therapy and the patients hemoglobin remained stable. On post-operative day 2 the patient was felt ready for discharge.     Vitals:  Vitals:    08/19/21 1955 08/19/21 2334 08/20/21 0317 08/20/21 0728   BP: 139/84 140/71 118/64 130/71   BP Location: Right arm Right arm Right arm Left arm   Patient Position: Sitting Lying Sitting Sitting   Pulse: 97 86 96 95   Resp: 18 18 18 18   Temp: 98.2 °F (36.8 °C) 99.3 °F (37.4 °C) 98 °F (36.7 °C) 98.4 °F (36.9 °C)   TempSrc: Skin Skin Skin Skin   SpO2: 95% 94% 92% 93%   Weight:       Height:           Discharge Medications:      Discharge Medications      New Medications      Instructions Start Date   aspirin  MG tablet  Replaces: aspirin 81 MG tablet   325 mg, Oral, 2 Times Daily With Meals      docusate sodium 100 MG capsule   100 mg,  Oral, 2 Times Daily PRN      oxyCODONE-acetaminophen 5-325 MG per tablet  Commonly known as: PERCOCET   1-2 tablets, Oral, Every 4 Hours PRN         Changes to Medications      Instructions Start Date   furosemide 40 MG tablet  Commonly known as: LASIX  What changed:   · how much to take  · how to take this  · when to take this  · additional instructions   TAKE 1 TABLET BY MOUTH EVERY DAY      pramipexole 1.5 MG tablet  Commonly known as: MIRAPEX  What changed: when to take this   1.5 mg, Oral, Nightly PRN         Continue These Medications      Instructions Start Date   albuterol sulfate  (90 Base) MCG/ACT inhaler  Commonly known as: PROVENTIL HFA;VENTOLIN HFA;PROAIR HFA   2 puffs, Inhalation, Every 4 Hours PRN      ALLERGY SERUM INJECTION   Subcutaneous, Weekly      amLODIPine 2.5 MG tablet  Commonly known as: NORVASC   2.5 mg, Oral, Daily      BRIMONIDINE TARTRATE OP   1 drop, Ophthalmic, 2 Times Daily      colestipol 1 g tablet  Commonly known as: COLESTID   1 g, Oral, 2 Times Daily      dorzolamide-timolol 22.3-6.8 MG/ML ophthalmic solution  Commonly known as: COSOPT   1 drop, Both Eyes, 2 Times Daily      fexofenadine 180 MG tablet  Commonly known as: ALLEGRA   180 mg, Oral, Daily      Fluticasone-Salmeterol 232-14 MCG/ACT aerosol powder    1 puff, Inhalation, 2 Times Daily      latanoprost 0.005 % ophthalmic solution  Commonly known as: XALATAN   1 drop, Both Eyes, Nightly      levothyroxine 88 MCG tablet  Commonly known as: SYNTHROID, LEVOTHROID   88 mcg, Oral, Every Morning      losartan 100 MG tablet  Commonly known as: COZAAR   100 mg, Oral, Daily      montelukast 10 MG tablet  Commonly known as: SINGULAIR   10 mg, Oral, Nightly      pantoprazole 40 MG EC tablet  Commonly known as: PROTONIX   TAKE 1 TABLET BY MOUTH TWICE DAILY      pantoprazole 40 MG EC tablet  Commonly known as: PROTONIX   TAKE 1 TABLET BY MOUTH TWICE DAILY      rosuvastatin 20 MG tablet  Commonly known as: CRESTOR   20 mg, Oral,  Every Evening         Stop These Medications    aspirin 81 MG tablet  Replaced by: aspirin  MG tablet     Chlorhexidine Gluconate 2 % pads     mupirocin 2 % ointment  Commonly known as: BACTROBAN            Pain Medications:  Percocet 5/325 mg 1-2 po q 4-6 hours prn pain    DVT Prophylaxis:  Enteric Coated Aspirin 325 mg po twice daily for 2 weeks, then one daily for 4 weeks    Total Hip Replacement Discharge Instructions:    I. ACTIVITIES:  1. Exercises:  ? Complete exercise program as taught by the hospital physical therapist 2 times per day  ? Exercise program will be advanced by the physical therapist  ? During the day be up ambulating every 2 hours (while awake) for short distances  ? Complete the ankle pump exercises at least 10 times per hour (while awake)  ? Elevate legs when in bed and for at least 30 minutes during the day.Use cold packs 20-30 minutes approximately 5 times per day. This should be done before and after completing your exercises and at any time you are experiencing pain/ stiffness in your operative extremity.      2. Activities of Daily Living:  ? No tub baths, hot tubs, or swimming pools for 4 weeks  ? May shower and let water run over the incision on post-operative day #5 if no drainage. Do not scrub or rub the incision. Simply let the water run over the incision and pat dry.    II. Restrictions  ? Continue hip precautions as taught at the hospital  ? Your surgeon will discuss with you when you will be able to drive again.  ? Weight bearing is as tolerated  ? First week stay inside on even terrain. May go up and down stairs one stair at a time utilizing the hand rail once cleared by physical therapy to do so.  ? After one week, you may venture outside (if cleared to do so by physical therapist).    III. Precautions:  ? Everyone that comes near you should wash their hands  ? No elective dental, genital-urinary, or colon procedures or surgical procedures for 12 weeks after surgery  unless absolutely necessary.  ?  If dental work or surgical procedure is deemed absolutely necessary, you will need to contact your surgeon as you will need to take antibiotics 1 hour prior to any dental work (including teeth cleanings).  ? Please discuss with your surgeon prophylactic antibiotics as the length of time this intervention will be necessary for you varies with each patient’s health history and situation.  ? Avoid sick people. If you must be around someone who is ill, they should wear a mask.  ? Avoid visits to the Emergency Room or Urgent Care unless you are having a life threatening event.   ? If ordered stockings are to be placed on in the morning and removed at night. Monitor the stockings to ensure that any swelling is not causing the stockings to become too tight. In this case, remove stockings immediately.    IV. INCISION CARE:  ? Wash your hands prior to dressing changes  ? Change the dressing as needed to keep incision clean and dry. Utilize dry gauze and paper tape. Avoid touching the side of the gauze that goes against the incision with your hands.  ? No creams or ointments to the incision  ? May remove dressing once the incision is free of drainage  ? Do not touch or pick at the incision  ? Check incision every day and notify surgeon immediately if any of the following signs or symptoms are noted:  o Increase in redness  o Increase in swelling around the incision and of the entire extremity  o Increase in pain  o Drainage oozing from the incision  o Pulling apart of the edges of the incision  o Increase in overall body temperature (greater than 100.5 degrees)  ? Your surgeon will instruct you regarding suture or staple removal    V. Medications:   1. Anticoagulants: You will be discharged on an anticoagulant. This is a prophylactic medication that helps prevent blood clots during your post-operative period. The type and length of dosage varies based on your individual needs, procedure  performed, and surgeon’s preference.  ? While taking the anticoagulant, you should avoid taking any additional aspirin, ibuprofen (Advil or Motrin), Aleve (Naprosyn) or other non-steroidal anti-inflammatory medications.   ? Notify surgeon immediately if any larisa bleeding is noted in the urine, stool, emesis, or from the nose or the incision. Blood in the stool will often appear as black rather than red. Blood in urine may appear as pink. Blood in emesis may appear as brown/black like coffee grounds.  ? You will need to apply pressure for longer periods of time to any cuts or abrasions to stop bleeding  ? Avoid alcohol while taking anticoagulants    2. Stool Softeners: You will be at greater risk of constipation after surgery due to being less mobile and the pain medications.   ? Take stool softeners as instructed by your surgeon while on pain medications. Over the counter Colace 100 mg 1-2 capsules twice daily.   ? If stools become too loose or too frequent, please decreases the dosage or stop the stool softener.  ? If constipation occurs despite use of stool softeners, you are to continue the stool softeners and add a laxative (Milk of Magnesia 1 ounce daily as needed)  ? Drink plenty of fluids, and eat fruits and vegetables during your recovery time    3. Pain Medications utilized after surgery are narcotics and the law requires that the following information be given to all patients that are prescribed narcotics:  ? CLASSIFICATION: Pain medications are called Opioids and are narcotics  ? LEGALITIES: It is illegal to share narcotics with others and to drive within 24 hours of taking narcotics  ? POTENTIAL SIDE EFFECTS: Potential side effects of opioids include: nausea, vomiting, itching, dizziness, drowsiness, dry mouth, constipation, and difficulty urinating.  ? POTENTIAL ADVERSE EFFECTS:   o Opioid tolerance can develop with use of pain medications and this simply means that it requires more and more of the  medication to control pain; however, this is seen more in patients that use opioids for longer periods of time.  o Opioid dependence can develop with use of Opioids and this simply means that to stop the medication can cause withdrawal symptoms; however, this is seen with patients that use Opioids for longer periods of time.  o Opioid addiction can develop with use of Opioids and the incidence of this is very unlikely in patients who take the medications as ordered and stop the medications as instructed.  o Opioid overdose can be dangerous, but is unlikely when the medication is taken as ordered and stopped when ordered. It is important not to mix opioids with alcohol or with and type of sedative such as Benadryl as this can lead to over sedation and respiratory difficulty.  ? DOSAGE:   o Pain medications will need to be taken consistently for the first week to decrease pain and promote adequate pain relief and participation in physical therapy.  o After the initial surgical pain begins to resolve, you may begin to decrease the pain medication. By the end of 6-8 weeks, you should be off of pain medications.  o Refills will not be given by the office during evening hours, on weekends, or after 6-8 weeks post-op.  o To seek refills on pain medications during the initial 6 week post-operative period, you must call the office 48 hours in advance to request the refill. The office will then notify you when to  the prescription. DO NOT wait until you are out of the medication to request a refill.    V. FOLLOW-UP VISITS:  ? You will need to follow up in the office with your surgeon in 3 weeks. Please call this number [phone]  to schedule this appointment.  If you have any concerns or suspected complications prior to your follow up visit, please call your surgeons office. Do not wait until your appointment time if you suspect complications. These will need to be addressed in the office promptly.    Tadeo SIGALA  Sherine Luo PA-C  [facility]  [facility] [place] [address]  [phone]    8/20/2021    CC:Damien Pierce MD:Tadeo Basurto MD

## 2021-08-20 NOTE — PROGRESS NOTES
Orthopaedic Surgery  Progress Note  8/20/2021    Patients Name:  Nathan Baez  YOB: 1945  Age: 76 y.o.  Medical Records Number:  [id number]  Date of Admission: 8/19/2021    No complaints except pain    Vitals:  Vitals:    08/19/21 1955 08/19/21 2334 08/20/21 0317 08/20/21 0728   BP: 139/84 140/71 118/64 130/71   BP Location: Right arm Right arm Right arm Left arm   Patient Position: Sitting Lying Sitting Sitting   Pulse: 97 86 96 95   Resp: 18 18 18 18   Temp: 98.2 °F (36.8 °C) 99.3 °F (37.4 °C) 98 °F (36.7 °C) 98.4 °F (36.9 °C)   TempSrc: Skin Skin Skin Skin   SpO2: 95% 94% 92% 93%   Weight:       Height:           RLE:  NVI, calf nontender, Sensation intact  No signs of DVT    Incision: clean, no signs of infection    Lab Results (last 24 hours)     ** No results found for the last 24 hours. **          Peripheral Block    Result Date: 8/19/2021  Narrative: Pranav Norman MD     8/19/2021  8:04 AM Peripheral Block Pre-sedation assessment completed: 8/19/2021 7:58 AM Patient reassessed immediately prior to procedure Patient location during procedure: holding area Start time: 8/19/2021 8:01 AM Stop time: 8/19/2021 8:01 AM Reason for block: at surgeon's request and post-op pain management Performed by Anesthesiologist: Pranav Norman MD Preanesthetic Checklist Completed: patient identified, IV checked, site marked, risks and benefits discussed, surgical consent, monitors and equipment checked, pre-op evaluation and timeout performed Prep: Pt Position: supine Sterile barriers:cap, gloves, mask and washed/disinfected hands Prep: ChloraPrep Patient monitoring: blood pressure monitoring, continuous pulse oximetry and EKG Procedure Sedation:yes Performed under: local infiltration Guidance:ultrasound guided ULTRASOUND INTERPRETATION.  Using ultrasound guidance a 22 G (22G needle for placement of 3cc 2%lido to site prior to start of procedure.) gauge needle was placed in close proximity to the  femoral nerve, at which point, under ultrasound guidance anesthetic was injected in the area of the nerve and spread of the anesthesia was seen on ultrasound in close proximity thereto.  There were no abnormalities seen on ultrasound; a digital image was taken; and the patient tolerated the procedure with no complications. Images:still images obtained, printed/placed on chart Laterality:right Block Type:fascia iliaca compartment Injection Technique:single-shot Needle Type:echogenic Needle Gauge:22 G Resistance on Injection: none Medications Used: ropivacaine (NAROPIN) 0.5 % injection, 30 mL Med admintered at 8/19/2021 8:01 AM Post Assessment Injection Assessment: negative aspiration for heme, no paresthesia on injection and incremental injection Patient Tolerance:comfortable throughout block Complications:no Additional Notes Ultrasound guidance was used to view needle placement and verify medication disbursement for this block.     XR Chest PA & Lateral    Result Date: 8/12/2021  Narrative: XR CHEST PA AND LATERAL-  HISTORY: Male who is 76 years-old,  preoperative evaluation  TECHNIQUE: Frontal and lateral views of the chest  COMPARISON: 03/20/2020  FINDINGS: The heart size is normal. Aorta is calcified. Pulmonary vasculature is unremarkable. No focal pulmonary consolidation, pleural effusion, or pneumothorax. No acute osseous process.      Impression: No evidence for acute pulmonary process. Follow-up as clinically indicated.  This report was finalized on 8/12/2021 3:10 PM by Dr. Ethan Horan M.D.      XR Hip With or Without Pelvis 1 View Right    Result Date: 8/19/2021  Narrative: PORTABLE VIEW OF THE RIGHT HIP AND PELVIS 08/19/2021  CLINICAL HISTORY: Status post right hip arthroplasty.  FINDINGS: Portable postoperative view of the pelvis and then a single portable postoperative view of the right hip are submitted for interpretation. There has been performance of total right hip arthroplasty acetabular  component fixated by a vertical screw to the inferior right iliac bone, extends well-looking the proximal femur there is good alignment of femoral and acetabular components the right hip prosthesis. No superimposed acute fracture or malalignment seen in the bones in the right hip.      Impression: Status post total right hip arthroplasty with good alignment of the hardware. No complicating features are seen.      XR Hip With or Without Pelvis 1 View Right    Result Date: 8/19/2021  Narrative: RIGHT HIP  HISTORY: Surgery.  A single AP view of the right hip was obtained intraoperatively. The acetabular component of the hip prosthesis is present placed. The femoral component has not yet been completed. There is no evidence of fracture.  This report was finalized on 8/19/2021 10:34 AM by Dr. Torrey Lester M.D.      XR Hip With or Without Pelvis 2 - 3 View Right    Result Date: 8/12/2021  Narrative: XR HIP W OR WO PELVIS 2-3 VIEW RIGHT-  INDICATIONS: Preoperative evaluation  TECHNIQUE: Frontal view the pelvis, 2 views of the right hip  COMPARISON: Correlate with  image from CT from 07/04/2019  FINDINGS:  Moderate to prominent right hip joint space narrowing is seen. Left hip joint space appears preserved. No acute fracture, erosion, or dislocation is identified. Sclerotic focus of the right iliac bone appear stable at least as far back as 07/04/2019.      Impression:  Degenerative joint space narrowing at the right hip.  This report was finalized on 8/12/2021 3:04 PM by Dr. Ethan Horan M.D.          Assesment/Plan:    Procedures:  Right MADELIN  Postoperative Day: 1  Weightbearing Status:  WBAT with walker  DVT Prophylaxis:  ASA for DVT prophylaxis    Disposition:  Home with home health after PT today, if comfortable and mobilizing safely    Tadeo Luo PA-C  [facility]  [address] [place] [address]  [phone]    8/20/2021

## 2021-08-20 NOTE — PLAN OF CARE
Goal Outcome Evaluation:  Plan of Care Reviewed With: patient, spouse        Progress: improving  Outcome Summary: Pt resting in bed in NAD, AAO x 3, agreeable to PT. Reviewed supine exs with pt. Pt req min A Of 2 for bed mobility and sit/stand from EOB. Pt amb 30-35' with r wx and CGA of 2 with vc to keep wx close and stand tall. Pace is slow and enduranceis poor - pt req 4-5 standing rest breaks due to UE fatigue. Pt returned ot recliner chair. Unable to attempts stairs this pm. Cont PT to address functiona l deficits and prepare for d/c home with home health unless pt req short rehab stay. Attempt stairs tomorrow  Patient was intermittently wearing a face mask during this therapy encounter. Therapist used appropriate personal protective equipment including eye protection, mask, and gloves.  Mask used was standard procedure mask. Appropriate PPE was worn during the entire therapy session. Hand hygiene was completed before and after therapy session. Patient is not in enhanced droplet precautions. PT zeus [place]

## 2021-08-20 NOTE — PLAN OF CARE
Goal Outcome Evaluation:              Outcome Summary: A&OX4, WORKED C PT, UP TO CHAIR, SLOW TO AMBULATE, UNSTEADY C WALKER, UP TO BRP, PAIN CONTROLLED C PO MEDS, HOME TODAY IF CLEARED BY PT

## 2021-08-20 NOTE — CONSULTS
CONSULT NOTE    INTERNAL MEDICINE   [facility]       Patient Identification:  Name: Nathan Baez  Age: 76 y.o.  Sex: male  :  1945  MRN: [id number]             Date of Consultation:  21      Primary Care Physician: Damien Pierce MD                               Requesting Physician: dr ba  Reason for Consultation:medical management    Chief Complaint:  76 year old gentleman who was admitted following a hip replacement; he had elevated blood sugar readings so we were asked to see him; he also has a history of hypertension and hypothyroidism    History of Present Illness:   As above      Past Medical History:  Past Medical History:   Diagnosis Date   • Actinic keratosis    • Acute embolism and thrombosis of vein    • Allergic rhinitis    • Arthritis    • Cervical radiculopathy    • Disequilibrium    • DJD (degenerative joint disease), lumbar    • Elevated cholesterol    • Erysipelas    • GERD (gastroesophageal reflux disease)    • Glaucoma    • Hip pain, chronic, right    • History of kidney stones     X1   • History of peripheral edema     LOWER LEGS BILAT, COMPRESSION KNEE HIGH    • History of transfusion    • Hyperlipidemia    • Hypertension    • Hypothyroid    • Insomnia    • Limited mobility     RIGHT HIP   • Male urinary stress incontinence     s/p prostatectomy-WEAR PAD   • Obesity    • KWAME (obstructive sleep apnea)    • Osteoarthritis    • Pes planus    • Presbycusis    • Prostate cancer (CMS/HCC)    • Restless legs syndrome    • Shoulder pain     RIGHT, LIMITED MOBILITY-AT TIMES   • Sleep apnea     bipap   • Tinea corporis    • Tinea cruris    • Unsteady gait     RIGHT HIP   • Weakness     RIGHT HIP     Past Surgical History:  Past Surgical History:   Procedure Laterality Date   • CATARACT EXTRACTION, BILATERAL Bilateral    • CERVICAL DISCECTOMY ANTERIOR     • COLONOSCOPY  2017    3/17normal. Recheck . Repeat in 5 years    • COLONOSCOPY N/A  4/19/2021    Procedure: COLONOSCOPY INTO CECUM WITH HOT & COLD  SNARE POLYPECTOMIES;  Surgeon: Jaden Chowdhury MD;  Location[facility] END[facility]COPY;  Service: Gastroenterology;  Laterality: N/A;  PRE: CHANGE IN BOWEL HABITS; FAMILY H/O COLON CANCER  POST: DIVERTICULOSIS, POLYPS   • ENDOSCOPY W/ PEG REMOVAL     • FOOT SURGERY      1998 had big toe replaced on left foot-METAL    • HERNIA REPAIR  03/07/2012    umbilical   • JOINT REPLACEMENT Right 2014   • MA TOTAL KNEE ARTHROPLASTY Left 9/13/2016    Procedure: LT TOTAL KNEE ARTHROPLASTY;  Surgeon: Tadeo Basutro MD;  Location: [facility] MAIN OR;  Service: Orthopedics   • PROSTATECTOMY  07/1999 July 1999. Radical    • THYROID LOBECTOMY     • TONSILLECTOMY     • TOTAL HIP ARTHROPLASTY Right 8/19/2021    Procedure: TOTAL HIP ARTHROPLASTY RIGHT POSTERIOR;  Surgeon: Tadeo Basurto MD;  Location: [facility] MAIN OR;  Service: Orthopedics;  Laterality: Right;   • TOTAL KNEE ARTHROPLASTY Right 2014   • TOTAL SHOULDER ARTHROPLASTY W/ DISTAL CLAVICLE EXCISION Right 11/13/2019    Procedure: TOTAL SHOULDER REVERSE ARTHROPLASTY RIGHT REPAIR RIGHT AXILLARY VEIN;  Surgeon: Behzad Kelley MD;  Location: [facility] OR OSC;  Service: Orthopedics   • WRIST SURGERY Right 12/17/2014    x2  wrist replaced      Home Meds:  Medications Prior to Admission   Medication Sig Dispense Refill Last Dose   • amLODIPine (NORVASC) 2.5 MG tablet TAKE 1 TABLET BY MOUTH DAILY 90 tablet 3 8/19/2021 at 0430   • BRIMONIDINE TARTRATE OP Apply 1 drop to eye(s) as directed by provider 2 (Two) Times a Day.   8/19/2021 at 0430   • Chlorhexidine Gluconate 2 % pads Apply  topically. AS DIRECTED PAT   8/19/2021 at 0430   • dorzolamide-timolol (COSOPT) 22.3-6.8 MG/ML ophthalmic solution Administer 1 drop to both eyes 2 (Two) Times a Day.   8/19/2021 at 0430   • fexofenadine (ALLEGRA) 180 MG tablet Take 180 mg by mouth Daily.   8/18/2021 at 0800   • Fluticasone-Salmeterol 232-14 MCG/ACT aerosol powder  Inhale 1 puff 2  (Two) Times a Day.   8/19/2021 at 0430   • furosemide (LASIX) 40 MG tablet TAKE 1 TABLET BY MOUTH EVERY DAY (Patient taking differently: Take 40 mg by mouth Daily.) 90 tablet 3 8/18/2021 at 0800   • latanoprost (XALATAN) 0.005 % ophthalmic solution Administer 1 drop to both eyes Every Night.   8/18/2021 at 2200   • levothyroxine (SYNTHROID, LEVOTHROID) 88 MCG tablet Take 1 tablet by mouth Every Morning. 90 tablet 3 8/19/2021 at 0430   • losartan (COZAAR) 100 MG tablet Take 1 tablet by mouth Daily. 90 tablet 3 8/18/2021 at 0800   • montelukast (SINGULAIR) 10 MG tablet Take 1 tablet by mouth Every Night. 90 tablet 3 8/18/2021 at 2000   • mupirocin (BACTROBAN) 2 % ointment Apply  topically to the appropriate area as directed 3 (Three) Times a Day. AS DIRECTED PAT   8/19/2021 at 0430   • pantoprazole (PROTONIX) 40 MG EC tablet TAKE 1 TABLET BY MOUTH TWICE DAILY 90 tablet 3 8/19/2021 at 0430   • pramipexole (MIRAPEX) 1.5 MG tablet Take 1 tablet by mouth At Night As Needed (RLS). (Patient taking differently: Take 1.5 mg by mouth 2 (Two) Times a Day.) 90 tablet 2 8/18/2021 at 2000   • rosuvastatin (CRESTOR) 20 MG tablet Take 1 tablet by mouth Every Evening. 90 tablet 3 8/18/2021 at 2000   • albuterol sulfate  (90 Base) MCG/ACT inhaler Inhale 2 puffs Every 4 (Four) Hours As Needed for Wheezing.   8/5/2021   • ALLERGY SERUM INJECTION Inject  under the skin into the appropriate area as directed 1 (One) Time Per Week.   8/17/2021   • aspirin 81 MG tablet Take 1 tablet by mouth Daily. (Patient taking differently: Take 81 mg by mouth Daily. HOLD FOR SURGERY)   8/12/2021   • colestipol (COLESTID) 1 g tablet Take 1 tablet by mouth 2 (Two) Times a Day. 60 tablet 11    • pantoprazole (PROTONIX) 40 MG EC tablet TAKE 1 TABLET BY MOUTH TWICE DAILY 90 tablet 3      Current Meds:     Current Facility-Administered Medications:   •  acetaminophen (TYLENOL) tablet 325 mg, 325 mg, Oral, Q4H PRN, Tadeo Basurto MD  •  albuterol  sulfate HFA (PROVENTIL HFA;VENTOLIN HFA;PROAIR HFA) inhaler 2 puff, 2 puff, Inhalation, Q4H PRN, Tadeo Basurto MD  •  amLODIPine (NORVASC) tablet 2.5 mg, 2.5 mg, Oral, Daily, Tadeo Basurto MD, 2.5 mg at 08/20/21 0820  •  aspirin EC tablet 325 mg, 325 mg, Oral, BID With Meals, Tadeo Basurto MD, 325 mg at 08/20/21 0819  •  brimonidine (ALPHAGAN P) 0.1 % ophthalmic solution 1 drop, 1 drop, Both Eyes, BID, Tadeo Basurto MD, 1 drop at 08/20/21 0824  •  cetirizine (zyrTEC) tablet 10 mg, 10 mg, Oral, Daily, Tadeo Basurto MD, 10 mg at 08/20/21 0820  •  diazePAM (VALIUM) tablet 5 mg, 5 mg, Oral, BID PRN, Tadeo Basurto MD  •  docusate sodium (COLACE) capsule 100 mg, 100 mg, Oral, BID PRN, Tadeo Basurto MD, 100 mg at 08/20/21 1209  •  dorzolamide (TRUSOPT) 2 % ophthalmic solution 1 drop, 1 drop, Both Eyes, BID, 1 drop at 08/20/21 0822 **AND** timolol (TIMOPTIC) 0.5 % ophthalmic solution 1 drop, 1 drop, Both Eyes, Q12H, Tadeo Basurto MD, 1 drop at 08/20/21 0822  •  ferrous sulfate tablet 325 mg, 325 mg, Oral, Daily With Breakfast, Tadeo Basurto MD, 325 mg at 08/20/21 0819  •  fluticasone-salmeterol (ADVAIR HFA) 230-21 MCG/ACT inhaler 2 puff, 2 puff, Inhalation, BID - RT, Tadeo Basurto MD  •  furosemide (LASIX) tablet 40 mg, 40 mg, Oral, Daily, Tadeo Basurto MD, 40 mg at 08/20/21 0821  •  hydrocortisone 1 % cream, , Topical, Q12H, Tadeo Basurto MD, Given at 08/20/21 0820  •  lactated ringers infusion, 9 mL/hr, Intravenous, Continuous, Tadeo Basurto MD, Last Rate: 9 mL/hr at 08/19/21 0837, New Bag at 08/19/21 1030  •  latanoprost (XALATAN) 0.005 % ophthalmic solution 1 drop, 1 drop, Both Eyes, Nightly, Tadeo Basurto MD, 1 drop at 08/19/21 2041  •  levothyroxine (SYNTHROID, LEVOTHROID) tablet 88 mcg, 88 mcg, Oral, Q AM, Tadeo Basurto MD, 88 mcg at 08/20/21 0617  •  losartan (COZAAR) tablet 100 mg, 100 mg, Oral, Daily, Tadeo Basurto MD, 100 mg at 08/20/21 0821  •   melatonin tablet 5 mg, 5 mg, Oral, Nightly PRN, Tadeo Basurto MD  •  montelukast (SINGULAIR) tablet 10 mg, 10 mg, Oral, Nightly, Tadeo Basurto MD, 10 mg at 08/19/21 1951  •  morphine injection 4 mg, 4 mg, Intravenous, Q2H PRN **AND** naloxone (NARCAN) injection 0.4 mg, 0.4 mg, Intravenous, Q5 Min PRN, Tadeo Basurto MD  •  mupirocin (BACTROBAN) 2 % nasal ointment, , Each Nare, BID, Tadeo Basurto MD, 1 application at 08/20/21 0819  •  ondansetron (ZOFRAN) tablet 4 mg, 4 mg, Oral, Q6H PRN **OR** ondansetron (ZOFRAN) injection 4 mg, 4 mg, Intravenous, Q6H PRN, Tadeo Basurto MD  •  oxyCODONE-acetaminophen (PERCOCET) 5-325 MG per tablet 1 tablet, 1 tablet, Oral, Q4H PRN, Tadeo Basurto MD, 1 tablet at 08/20/21 0820  •  oxyCODONE-acetaminophen (PERCOCET) 5-325 MG per tablet 2 tablet, 2 tablet, Oral, Q4H PRN, Tadeo Basurto MD, 2 tablet at 08/20/21 1209  •  pantoprazole (PROTONIX) EC tablet 40 mg, 40 mg, Oral, BID, Tadeo Basurto MD, 40 mg at 08/20/21 0819  •  pramipexole (MIRAPEX) tablet 1.5 mg, 1.5 mg, Oral, BID, Tadeo Basurto MD, 1.5 mg at 08/20/21 0823  •  promethazine (PHENERGAN) tablet 12.5 mg, 12.5 mg, Oral, Q6H PRN, Tadeo Basurto MD  •  rosuvastatin (CRESTOR) tablet 20 mg, 20 mg, Oral, Q PM, Tadeo Basurto MD, 20 mg at 08/19/21 1703  •  sodium chloride 0.45 % infusion, 100 mL/hr, Intravenous, Continuous, Tadeo Basurto MD, Last Rate: 100 mL/hr at 08/19/21 1710, 100 mL/hr at 08/19/21 1710  •  sodium chloride 0.9 % flush 1-10 mL, 1-10 mL, Intravenous, PRN, Tadeo Basurto MD  •  sodium chloride 0.9 % flush 3 mL, 3 mL, Intravenous, Q12H, Tadeo Basurto MD, 3 mL at 08/19/21 1951  Allergies:  No Known Allergies  Social History:   Social History     Socioeconomic History   • Marital status:      Spouse name: Not on file   • Number of children: Not on file   • Years of education: Not on file   • Highest education level: Not on file   Tobacco Use   • Smoking status:  "Former Smoker     Packs/day: 1.00     Years: 20.00     Pack years: 20.00     Types: Cigarettes     Quit date: 1984     Years since quittin.6   • Smokeless tobacco: Former User     Types: Chew   Substance and Sexual Activity   • Alcohol use: Yes     Comment: 3-4 MONTHLY   • Drug use: No   • Sexual activity: Defer     Family History:  Family History   Problem Relation Age of Onset   • Cancer Mother         COLON   • Colon cancer Mother    • Cancer Father         PROSTATE   • Cancer Sister         BREAST CANCER   • Breast cancer Sister    • Gout Sister    • Hypertension Sister    • Heart attack Brother    • Bone cancer Brother    • Heart disease Brother    • Malig Hyperthermia Neg Hx           Review of Systems  See history of present illness and past medical history.  Patient denies headache, dizziness, syncope, falls, trauma, change in vision, change in hearing, change in taste, changes in weight, changes in appetite, focal weakness, numbness, or paresthesia.  Patient denies chest pain, palpitations, dyspnea, orthopnea, PND, cough, sinus pressure, rhinorrhea, epistaxis, hemoptysis, nausea, vomiting,hematemesis, diarrhea, constipation or hematchezia.  Denies cold or heat intolerance, polydipsia, polyuria, polyphagia. Denies hematuria, pyuria, dysuria, hesitancy, frequency or urgency. Denies consumption of raw and under cooked meats foods or change in water source.  Denies fever, chills, sweats, night sweats.  Denies missing any routine medications. Remainder of ROS is negative.      Vitals:   BP 98/56 (BP Location: Right arm, Patient Position: Lying)   Pulse 98   Temp 99.1 °F (37.3 °C) (Skin)   Resp 16   Ht 170.2 cm (67\")   Wt 114 kg (250 lb 14.1 oz)   SpO2 90%   BMI 39.29 kg/m²   I/O:     Intake/Output Summary (Last 24 hours) at 2021  Last data filed at 2021 1700  Gross per 24 hour   Intake 690 ml   Output 400 ml   Net 290 ml     Exam:  General Appearance:    Alert, cooperative, no " distress, appears stated age   Head:    Normocephalic, without obvious abnormality, atraumatic   Eyes:    PERRL, conjunctivae/corneas clear, EOM's intact, both eyes   Ears:    Normal external ear canals, both ears   Nose:   Nares normal, septum midline, mucosa normal, no drainage    or sinus tenderness   Throat:   Lips, tongue, gums normal; oral mucosa pink and moist   Neck:   Supple, symmetrical, trachea midline, no adenopathy;     thyroid:  no enlargement/tenderness/nodules; no carotid    bruit or JVD   Back:     Symmetric, no curvature, ROM normal, no CVA tenderness   Lungs:     Clear to auscultation bilaterally, respirations unlabored   Chest Wall:    No tenderness or deformity    Heart:    Regular rate and rhythm, S1 and S2 normal, no murmur, rub   or gallop   Abdomen:     Soft, nontender, bowel sounds active all four quadrants,     no masses, no hepatomegaly, no splenomegaly   Extremities:   Extremities normal, atraumatic, no cyanosis or edema   Pulses:   Pulses palpable in all extremities; symmetric all extremities   Skin:   Skin color normal, Skin is warm and dry,  no rashes or palpable lesions   Neurologic:   CNII-XII intact, motor strength grossly intact, sensation grossly intact to light touch, no focal deficits noted       Data Review:  Labs in chart were reviewed.  WBC   Date Value Ref Range Status   08/20/2021 8.15 3.40 - 10.80 10*3/mm3 Final     Hemoglobin   Date Value Ref Range Status   08/20/2021 11.7 (L) 13.0 - 17.7 g/dL Final     Hematocrit   Date Value Ref Range Status   08/20/2021 35.1 (L) 37.5 - 51.0 % Final     Platelets   Date Value Ref Range Status   08/20/2021 149 140 - 450 10*3/mm3 Final     Sodium   Date Value Ref Range Status   08/20/2021 137 136 - 145 mmol/L Final     Potassium   Date Value Ref Range Status   08/20/2021 4.0 3.5 - 5.2 mmol/L Final     Chloride   Date Value Ref Range Status   08/20/2021 100 98 - 107 mmol/L Final     CO2   Date Value Ref Range Status   08/20/2021 26.2 22.0  - 29.0 mmol/L Final     BUN   Date Value Ref Range Status   08/20/2021 16 8 - 23 mg/dL Final     Creatinine   Date Value Ref Range Status   08/20/2021 1.04 0.76 - 1.27 mg/dL Final     Glucose   Date Value Ref Range Status   08/20/2021 226 (H) 65 - 99 mg/dL Final     Calcium   Date Value Ref Range Status   08/20/2021 8.5 (L) 8.6 - 10.5 mg/dL Final     No results found for: AST, ALT, ALKPHOS            Imaging Results (Last 7 Days)     Procedure Component Value Units Date/Time    XR Hip With or Without Pelvis 1 View Right [[id number]] Collected: 08/19/21 1215     Updated: 08/20/21 0946    Narrative:      PORTABLE VIEW OF THE RIGHT HIP AND PELVIS 08/19/2021     CLINICAL HISTORY: Status post right hip arthroplasty.     FINDINGS: Portable postoperative view of the pelvis and then a single  portable postoperative view of the right hip are submitted for  interpretation. There has been performance of total right hip  arthroplasty acetabular component fixated by a vertical screw to the  inferior right iliac bone, extends well-looking the proximal femur there  is good alignment of femoral and acetabular components the right hip  prosthesis. No superimposed acute fracture or malalignment seen in the  bones in the right hip.       Impression:      Status post total right hip arthroplasty with good alignment  of the hardware. No complicating features are seen.     This report was finalized on 8/20/2021 9:43 AM by Dr. Tadeo Florian M.D.       XR Hip With or Without Pelvis 1 View Right [[id number]] Collected: 08/19/21 1034     Updated: 08/19/21 1037    Narrative:      RIGHT HIP     HISTORY: Surgery.     A single AP view of the right hip was obtained intraoperatively. The  acetabular component of the hip prosthesis is present placed. The  femoral component has not yet been completed. There is no evidence of  fracture.     This report was finalized on 8/19/2021 10:34 AM by Dr. Torrey Lester M.D.           Past Medical History:    Diagnosis Date   • Actinic keratosis    • Acute embolism and thrombosis of vein    • Allergic rhinitis    • Arthritis    • Cervical radiculopathy    • Disequilibrium    • DJD (degenerative joint disease), lumbar    • Elevated cholesterol    • Erysipelas    • GERD (gastroesophageal reflux disease)    • Glaucoma    • Hip pain, chronic, right    • History of kidney stones     X1   • History of peripheral edema     LOWER LEGS BILAT, COMPRESSION KNEE HIGH    • History of transfusion    • Hyperlipidemia    • Hypertension    • Hypothyroid    • Insomnia    • Limited mobility     RIGHT HIP   • Male urinary stress incontinence     s/p prostatectomy-WEAR PAD   • Obesity    • KWAME (obstructive sleep apnea)    • Osteoarthritis    • Pes planus    • Presbycusis    • Prostate cancer (CMS/HCC)    • Restless legs syndrome    • Shoulder pain     RIGHT, LIMITED MOBILITY-AT TIMES   • Sleep apnea     bipap   • Tinea corporis    • Tinea cruris    • Unsteady gait     RIGHT HIP   • Weakness     RIGHT HIP       Assessment:  Active Hospital Problems    Diagnosis  POA   • Primary osteoarthritis of right hip [M16.11]  Yes      Resolved Hospital Problems   No resolved problems to display.   diabetes  Hypertension  Hypothyroidism  Obesity  Sleep apnea    Plan:  Add metformin, insulin sliding scale  Monitor blood pressure  Ask the diabetic educator to see him  Check [id number]  Thanks for involving us in his care  Will follow while he is hospitalized    Juli Nance MD   8/20/2021  19:46 EDT

## 2021-08-20 NOTE — THERAPY TREATMENT NOTE
Patient Name: Nathan Baez  : 1945    MRN: [id number]                              Today's Date: 2021       Admit Date: 2021    Visit Dx:     ICD-10-CM ICD-9-CM   1. Primary osteoarthritis of right hip  M16.11 715.15     Patient Active Problem List   Diagnosis   • OA (osteoarthritis) of knee   • Hypertension   • Abdominal wall cellulitis   • Hypothyroidism (acquired)   • Gastroesophageal reflux disease without esophagitis   • Mixed hyperlipidemia   • Primary insomnia   • DDD (degenerative disc disease), lumbar   • KWAME (obstructive sleep apnea)   • Allergic   • Venous insufficiency   • Prostate cancer (CMS/[facility])   • Venous stasis dermatitis   • Tinea cruris   • Tinea corporis   • Throat clearing   • Sleep apnea   • Skin lesion of face   • Rib pain on left side   • Rectal bleed   • Presbycusis   • Pes planus   • Osteoarthritis   • Obesity   • Medicare annual wellness visit, subsequent   • Lumbar strain   • Internal hemorrhoids   • Insomnia   • Inflamed skin tag   • Hyperplastic polyp of stomach   • Hospital discharge follow-up   • History of colon polyps   • High risk medication use   • Glaucoma   • Gastroenteritis, acute   • Erysipelas   • Encounter for screening colonoscopy   • Encounter for long-term (current) use of NSAIDs   • Dysphagia   • DVT (deep venous thrombosis) (CMS/HCC)   • DJD (degenerative joint disease), lumbar   • Diverticulosis   • Cough   • Contact with hypodermic needle   • Cervical radiculopathy   • Cellulitis   • Arthritis   • Allergic rhinitis   • Acute glaucoma   • Acute embolism and thrombosis of vein   • Actinic keratosis   • Status post reverse total shoulder replacement, right   • Localized edema   • Anemia   • Peripheral polyneuropathy   • Campylobacter diarrhea   • Hyponatremia   • Generalized abdominal pain   • Fever   • Constipation   • Bilateral lower extremity edema   • Acute pyelonephritis   • Chronic idiopathic constipation   • Functional diarrhea   • Change in  bowel habits   • RLS (restless legs syndrome)   • Type 2 diabetes mellitus with hyperglycemia (CMS/HCC)   • Family history of GI malignancy   • Primary osteoarthritis of right hip     Past Medical History:   Diagnosis Date   • Actinic keratosis    • Acute embolism and thrombosis of vein    • Allergic rhinitis    • Arthritis    • Cervical radiculopathy    • Disequilibrium    • DJD (degenerative joint disease), lumbar    • Elevated cholesterol    • Erysipelas    • GERD (gastroesophageal reflux disease)    • Glaucoma    • Hip pain, chronic, right    • History of kidney stones     X1   • History of peripheral edema     LOWER LEGS BILAT, COMPRESSION KNEE HIGH    • History of transfusion    • Hyperlipidemia    • Hypertension    • Hypothyroid    • Insomnia    • Limited mobility     RIGHT HIP   • Male urinary stress incontinence     s/p prostatectomy-WEAR PAD   • Obesity    • KWAME (obstructive sleep apnea)    • Osteoarthritis    • Pes planus    • Presbycusis    • Prostate cancer (CMS/HCC)    • Restless legs syndrome    • Shoulder pain     RIGHT, LIMITED MOBILITY-AT TIMES   • Sleep apnea     bipap   • Tinea corporis    • Tinea cruris    • Unsteady gait     RIGHT HIP   • Weakness     RIGHT HIP     Past Surgical History:   Procedure Laterality Date   • CATARACT EXTRACTION, BILATERAL Bilateral    • CERVICAL DISCECTOMY ANTERIOR     • COLONOSCOPY  03/08/2017    3/17normal. Recheck 2022. 12/11. Repeat in 5 years    • COLONOSCOPY N/A 4/19/2021    Procedure: COLONOSCOPY INTO CECUM WITH HOT & COLD  SNARE POLYPECTOMIES;  Surgeon: Jaden Chowdhury MD;  Location: [facility] ENDOSCOPY;  Service: Gastroenterology;  Laterality: N/A;  PRE: CHANGE IN BOWEL HABITS; FAMILY H/O COLON CANCER  POST: DIVERTICULOSIS, POLYPS   • ENDOSCOPY W/ PEG REMOVAL     • FOOT SURGERY      1998 had big toe replaced on left foot-METAL    • HERNIA REPAIR  03/07/2012    umbilical   • JOINT REPLACEMENT Right 2014   • [place] TOTAL KNEE ARTHROPLASTY Left 9/13/2016     Procedure: LT TOTAL KNEE ARTHROPLASTY;  Surgeon: Tadeo Basurto MD;  Location: [facility] OR;  Service: Orthopedics   • PROSTATECTOMY  07/1999 July 1999. Radical    • THYROID LOBECTOMY     • TONSILLECTOMY     • TOTAL HIP ARTHROPLASTY Right 8/19/2021    Procedure: TOTAL HIP ARTHROPLASTY RIGHT POSTERIOR;  Surgeon: Tadeo Basurto MD;  Location: [facility] OR;  Service: Orthopedics;  Laterality: Right;   • TOTAL KNEE ARTHROPLASTY Right 2014   • TOTAL SHOULDER ARTHROPLASTY W/ DISTAL CLAVICLE EXCISION Right 11/13/2019    Procedure: TOTAL SHOULDER REVERSE ARTHROPLASTY RIGHT REPAIR RIGHT AXILLARY VEIN;  Surgeon: Behzad Kelley MD;  Location: [facility];  Service: Orthopedics   • WRIST SURGERY Right 12/17/2014    x2  wrist replaced     General Information     Row Name 08/20/21 1040          Physical Therapy Time and Intention    Document Type  therapy note (daily note)  -MS     Mode of Treatment  physical therapy;individual therapy  -MS     Row Name 08/20/21 1040          General Information    Patient Profile Reviewed  yes  -MS     Existing Precautions/Restrictions  (S) fall Exit alarm  -MS     Barriers to Rehab  none identified  -MS     Row Name 08/20/21 1040          Cognition    Orientation Status (Cognition)  oriented x 3  -MS     Row Name 08/20/21 1040          Safety Issues, Functional Mobility    Comment, Safety Issues/Impairments (Mobility)  Gait belt used for safety.  -MS       User Key  (r) = Recorded By, (t) = Taken By, (c) = Cosigned By    Initials Name Provider Type    MS Sergio Jin SAW, PT Physical Therapist        Mobility     Row Name 08/20/21 1041          Bed Mobility    Supine-Sit [place] (Bed Mobility)  moderate assist (50% patient effort);2 person assist  -MS     Row Name 08/20/21 1041          Sit-Stand Transfer    Sit-Stand [place] (Transfers)  minimum assist (75% patient effort);2 person assist  -MS     Assistive Device (Sit-Stand Transfers)  walker, front-wheeled  -MS      Row Name 08/20/21 1041          Gait/Stairs (Locomotion)    [place] Level (Gait)  contact guard;2 person assist  -MS     Assistive Device (Gait)  walker, front-wheeled  -MS     Distance in Feet (Gait)  25 feet  -MS     Deviations/Abnormal Patterns (Gait)  antalgic;khanh decreased  -MS     Bilateral Gait Deviations  forward flexed posture  -MS     Comment (Gait/Stairs)  Verbal/tactile cues for posture correction and Rwx guidance. Limited in gait distance due to fatigue, weakness, and pain.  -MS     Row Name 08/20/21 1041          Mobility    Right Lower Extremity (Weight-bearing Status)  weight-bearing as tolerated (WBAT)  -MS       User Key  (r) = Recorded By, (t) = Taken By, (c) = Cosigned By    Initials Name Provider Type    Sergio Perez PT Physical Therapist        Obj/Interventions     Row Name 08/20/21 1041          Motor Skills    Therapeutic Exercise  -- Right THR ther. ex. program x 10 reps completed with assist.  -MS       User Key  (r) = Recorded By, (t) = Taken By, (c) = Cosigned By    Initials Name Provider Type    Sergio Perez, PT Physical Therapist        Goals/Plan    No documentation.       Clinical Impression     Row Name 08/20/21 1042          Pain Scale: Numbers Pre/Post-Treatment    Pretreatment Pain Rating  6/10  -MS     Posttreatment Pain Rating  6/10  -MS     Pain Location - Side  Right  -MS     Pain Location  hip  -MS     Pain Intervention(s)  Medication (See MAR);Elevated;Nursing Notified;Cold applied;Repositioned  -MS     Row Name 08/20/21 1042          Positioning and Restraints    Pre-Treatment Position  in bed  -MS     Post Treatment Position  chair  -MS     In Chair  notified nsg;reclined;sitting;call light within reach;encouraged to call for assist;exit alarm on;with family/caregiver Ice pack to Right hip.  -MS       User Key  (r) = Recorded By, (t) = Taken By, (c) = Cosigned By    Initials Name Provider Type    Sergio Perez, PT Physical Therapist         Outcome Measures     Row Name 08/20/21 1043          How much help from another person do you currently need...    Turning from your back to your side while in flat bed without using bedrails?  2  -MS     Moving from lying on back to sitting on the side of a flat bed without bedrails?  2  -MS     Moving to and from a bed to a chair (including a wheelchair)?  3  -MS     Standing up from a chair using your arms (e.g., wheelchair, bedside chair)?  2  -MS     Climbing 3-5 steps with a railing?  2  -MS     To walk in hospital room?  3  -MS     AM-PAC 6 Clicks Score (PT)  14  -MS     Row Name 08/20/21 1043          Functional Assessment    Outcome Measure Options  AM-PAC 6 Clicks Basic Mobility (PT)  -MS       User Key  (r) = Recorded By, (t) = Taken By, (c) = Cosigned By    Initials Name Provider Type    Sergio Perez, PT Physical Therapist                       Physical Therapy Education                 Title: PT OT SLP Therapies (Done)     Topic: Physical Therapy (Done)     Point: Mobility training (Done)     Learning Progress Summary           Patient Acceptance, E,D, VU,NR by MS at 8/20/2021 1043    Acceptance, E,D, VU,NR by MS at 8/19/2021 1603                   Point: Home exercise program (Done)     Learning Progress Summary           Patient Acceptance, E,D, VU,NR by MS at 8/20/2021 1043    Acceptance, E,D, VU,NR by MS at 8/19/2021 1603                   Point: Body mechanics (Done)     Learning Progress Summary           Patient Acceptance, E,D, VU,NR by MS at 8/20/2021 1043    Acceptance, E,D, VU,NR by MS at 8/19/2021 1603                   Point: Precautions (Done)     Learning Progress Summary           Patient Acceptance, E,D, VU,NR by MS at 8/20/2021 1043    Acceptance, E,D, VU,NR by MS at 8/19/2021 1603                               User Key     Initials Effective Dates Name Provider Type Discipline    MS 06/16/21 -  Sergio Jin, PT Physical Therapist PT              PT Recommendation and  Plan  Planned Therapy Interventions (PT): balance training, bed mobility training, gait training, home exercise program, patient/family education, postural re-education, transfer training, strengthening, stair training  Plan of Care Reviewed With: patient  Outcome Summary: Pt. able to ambulate 25 feet, CGA x 2, with use of Rwx this date. Pt. requires Mod. assist x 2 for bed mobility and Min. assist x 2 for sit <-> stand transfers.  Right THR ther. ex. program x 10 reps completed with assist for general strengthening.  Verbal/tactile cues given during ambulation for posture correction.  Pt. limited in gait distance due to fatigue, weakness, and pain.  Given these symptoms, pt. also unable to attempt stairs this AM.     Time Calculation:   PT Charges     Row Name 08/20/21 1046             Time Calculation    Start Time  0912  -MS      Stop Time  0930  -MS      Time Calculation (min)  18 min  -MS      PT Received On  08/20/21  -MS      PT - Next Appointment  08/20/21  -MS         Time Calculation- PT    Total Timed Code Minutes- PT  17 minute(s)  -MS        User Key  (r) = Recorded By, (t) = Taken By, (c) = Cosigned By    Initials Name Provider Type    Sergio Perez, PT Physical Therapist        Therapy Charges for Today     Code Description Service Date Service Provider Modifiers Qty    77609452684 HC PT EVAL LOW COMPLEXITY 2 8/19/2021 Sergio Jin, PT GP 1    27408161867 HC PT THER PROC EA 15 MIN 8/19/2021 Sergio Jin, PT GP 1    54622982412 HC PT THER PROC EA 15 MIN 8/20/2021 Sergio Jin, PT GP 1    85951455932 HC PT THER SUPP EA 15 MIN 8/20/2021 Sergio Jin, PT GP 1          PT G-Codes  Outcome Measure Options: AM-PAC 6 Clicks Basic Mobility (PT)  AM-PAC 6 Clicks Score (PT): 14    Sergio Jin, CITLALLI  8/20/2021

## 2021-08-20 NOTE — NURSING NOTE
Pt BG above 200 with A1C trending up. Paged MD regarding Accu-check and insulin orders if appropriate

## 2021-08-21 ENCOUNTER — READMISSION MANAGEMENT (OUTPATIENT)
Dept: CALL CENTER | Facility: HOSPITAL | Age: 76
End: 2021-08-21

## 2021-08-21 VITALS
RESPIRATION RATE: 18 BRPM | TEMPERATURE: 98.4 F | WEIGHT: 250.88 LBS | BODY MASS INDEX: 39.38 KG/M2 | OXYGEN SATURATION: 92 % | HEART RATE: 100 BPM | SYSTOLIC BLOOD PRESSURE: 121 MMHG | DIASTOLIC BLOOD PRESSURE: 68 MMHG | HEIGHT: 67 IN

## 2021-08-21 LAB
GLUCOSE BLDC GLUCOMTR-MCNC: 236 MG/DL (ref 70–130)
GLUCOSE BLDC GLUCOMTR-MCNC: 300 MG/DL (ref 70–130)
HBA1C MFR BLD: 7.7 % (ref 4.8–5.6)

## 2021-08-21 PROCEDURE — 82962 GLUCOSE BLOOD TEST: CPT

## 2021-08-21 PROCEDURE — 97116 GAIT TRAINING THERAPY: CPT | Performed by: PHYSICAL THERAPIST

## 2021-08-21 PROCEDURE — 83036 HEMOGLOBIN GLYCOSYLATED A1C: CPT | Performed by: INTERNAL MEDICINE

## 2021-08-21 PROCEDURE — 63710000001 INSULIN LISPRO (HUMAN) PER 5 UNITS: Performed by: INTERNAL MEDICINE

## 2021-08-21 RX ADMIN — INSULIN LISPRO 7 UNITS: 100 INJECTION, SOLUTION INTRAVENOUS; SUBCUTANEOUS at 11:43

## 2021-08-21 RX ADMIN — CETIRIZINE HYDROCHLORIDE 10 MG: 10 TABLET ORAL at 09:00

## 2021-08-21 RX ADMIN — HYDROCORTISONE: 1 CREAM TOPICAL at 09:13

## 2021-08-21 RX ADMIN — OXYCODONE AND ACETAMINOPHEN 1 TABLET: 5; 325 TABLET ORAL at 06:06

## 2021-08-21 RX ADMIN — BRIMONIDINE TARTRATE 1 DROP: 1 SOLUTION/ DROPS OPHTHALMIC at 09:12

## 2021-08-21 RX ADMIN — TIMOLOL MALEATE 1 DROP: 5 SOLUTION/ DROPS OPHTHALMIC at 09:12

## 2021-08-21 RX ADMIN — DORZOLAMIDE HYDROCHLORIDE 1 DROP: 20 SOLUTION/ DROPS OPHTHALMIC at 09:12

## 2021-08-21 RX ADMIN — OXYCODONE AND ACETAMINOPHEN 2 TABLET: 5; 325 TABLET ORAL at 00:36

## 2021-08-21 RX ADMIN — FERROUS SULFATE TAB 325 MG (65 MG ELEMENTAL FE) 325 MG: 325 (65 FE) TAB at 09:00

## 2021-08-21 RX ADMIN — AMLODIPINE BESYLATE 2.5 MG: 2.5 TABLET ORAL at 09:00

## 2021-08-21 RX ADMIN — LEVOTHYROXINE SODIUM 88 MCG: 0.09 TABLET ORAL at 06:04

## 2021-08-21 RX ADMIN — ASPIRIN 325 MG: 325 TABLET, COATED ORAL at 09:00

## 2021-08-21 RX ADMIN — LOSARTAN POTASSIUM 100 MG: 100 TABLET, FILM COATED ORAL at 09:00

## 2021-08-21 RX ADMIN — INSULIN LISPRO 4 UNITS: 100 INJECTION, SOLUTION INTRAVENOUS; SUBCUTANEOUS at 09:00

## 2021-08-21 RX ADMIN — PRAMIPEXOLE DIHYDROCHLORIDE 1.5 MG: 1.5 TABLET ORAL at 09:00

## 2021-08-21 RX ADMIN — METFORMIN HYDROCHLORIDE 500 MG: 500 TABLET ORAL at 09:00

## 2021-08-21 RX ADMIN — FUROSEMIDE 40 MG: 40 TABLET ORAL at 09:00

## 2021-08-21 RX ADMIN — DOCUSATE SODIUM 100 MG: 100 CAPSULE, LIQUID FILLED ORAL at 00:37

## 2021-08-21 RX ADMIN — PANTOPRAZOLE SODIUM 40 MG: 40 TABLET, DELAYED RELEASE ORAL at 09:00

## 2021-08-21 RX ADMIN — MUPIROCIN 1 APPLICATION: 20 OINTMENT TOPICAL at 09:00

## 2021-08-21 NOTE — OUTREACH NOTE
Prep Survey      Responses   Orthodoxy facility patient discharged from?  [place]   Is LACE score < 7 ?  No   Emergency Room discharge w/ pulse ox?  No   Eligibility  [facility]    Date of Admission  08/19/21   Date of Discharge  08/21/21   Discharge Disposition  Home-Health Care Sv   Discharge diagnosis  Right Total Hip Arthroplasty   Does the patient have one of the following disease processes/diagnoses(primary or secondary)?  Total Joint Replacement   Does the patient have Home health ordered?  Yes   What is the Home health agency?   [facility]   Is there a DME ordered?  No   Prep survey completed?  Yes          Sandhya Bah RN

## 2021-08-21 NOTE — PROGRESS NOTES
[facility] to follow per Christa Huerta's careplan for [facility] joint protocol.  Patient is D/Cing today and order transcribed yesterday for the in home PT to follow up for joint protocol for Dr Basurto.  All info was correct on facesheet and patient was agreeable.  Claudette Arias RN

## 2021-08-21 NOTE — CASE MANAGEMENT/SOCIAL WORK
Continued Stay Note  [facility]     Patient Name: Nathan Baez  MRN: [id number]  Today's Date: 8/21/2021    Admit Date: 8/19/2021    Discharge Plan     Row Name 08/21/21 0910       Plan    Plan  Home with spouse and [facility]    Patient/Family in Agreement with Plan  yes    Plan Comments  [facility] spoke with Claudette with [facility] to confirm she is aware of discharge.  Claudette confirms that she is aware.  Nagi        Discharge Codes    No documentation.       Expected Discharge Date and Time     Expected Discharge Date Expected Discharge Time    Aug 20, 2021             Jesu Núñez

## 2021-08-21 NOTE — PROGRESS NOTES
Orthopaedic Surgery  Progress Note  8/21/2021    Patients Name:  Nathan Baez  YOB: 1945  Age: 76 y.o.  Medical Records Number:  [id number]  Date of Admission: 8/19/2021    No complaints except pain    Vitals:  Vitals:    08/20/21 1522 08/20/21 2103 08/20/21 2353 08/21/21 0708   BP: 98/56  137/74 121/68   BP Location: Right arm  Right arm Right arm   Patient Position: Lying  Lying Sitting   Pulse: 98  101 100   Resp: 16  18 18   Temp: 99.1 °F (37.3 °C)  98 °F (36.7 °C) 98.4 °F (36.9 °C)   TempSrc: Skin  Skin Skin   SpO2: 90% 93% 93% 92%   Weight:       Height:           RLE:  NVI, calf nontender, sensation intact  No signs of DVT    Incision: clean, no signs of infection    Lab Results (last 24 hours)     Procedure Component Value Units Date/Time    Hemoglobin A1c [[id number]]  (Abnormal) Collected: 08/21/21 0522    Specimen: Blood Updated: 08/21/21 0848     Hemoglobin A1C 7.70 %     Narrative:      Hemoglobin A1C Ranges:    Increased Risk for Diabetes  5.7% to 6.4%  Diabetes                     >= 6.5%  Diabetic Goal                < 7.0%    POC Glucose Once [[id number]]  (Abnormal) Collected: 08/21/21 0617    Specimen: Blood Updated: 08/21/21 0619     Glucose 236 mg/dL      Comment: Meter: [id number] : [id number] Jordi MCGOVERN       POC Glucose Once [[id number]]  (Abnormal) Collected: 08/20/21 2134    Specimen: Blood Updated: 08/20/21 2139     Glucose 319 mg/dL      Comment: Meter: [id number] : [id number] Jordi Diana SHANIQUA       POC Glucose Once [[id number]]  (Abnormal) Collected: 08/20/21 1619    Specimen: Blood Updated: 08/20/21 1622     Glucose 314 mg/dL      Comment: Meter: [id number] : [id number] Randolph MCGOVERN             Assesment/Plan:    Procedures:  Right MADELIN  Postoperative Day: 2  Weightbearing Status:  WBAT with walker  DVT Prophylaxis:  ASA for DVT prophylaxis    Reason for Inpatient Admission:  I certify that this patient requires inpatient admission for greater than 2  midnights due to fall risk with history of frequent falls    Disposition:  Home with home health after PT today, if comfortable and mobilizing safely    Tadeo Luo PA-C  [facility]  [address] [place], [place] [address] [phone]    8/21/2021

## 2021-08-21 NOTE — THERAPY TREATMENT NOTE
Patient Name: Nathan Baez  : 1945    MRN: [id number]                              Today's Date: 2021       Admit Date: 2021    Visit Dx:     ICD-10-CM ICD-9-CM   1. Primary osteoarthritis of right hip  M16.11 715.15     Patient Active Problem List   Diagnosis   • OA (osteoarthritis) of knee   • Hypertension   • Abdominal wall cellulitis   • Hypothyroidism (acquired)   • Gastroesophageal reflux disease without esophagitis   • Mixed hyperlipidemia   • Primary insomnia   • DDD (degenerative disc disease), lumbar   • KWAME (obstructive sleep apnea)   • Allergic   • Venous insufficiency   • Prostate cancer (CMS/[facility])   • Venous stasis dermatitis   • Tinea cruris   • Tinea corporis   • Throat clearing   • Sleep apnea   • Skin lesion of face   • Rib pain on left side   • Rectal bleed   • Presbycusis   • Pes planus   • Osteoarthritis   • Obesity   • Medicare annual wellness visit, subsequent   • Lumbar strain   • Internal hemorrhoids   • Insomnia   • Inflamed skin tag   • Hyperplastic polyp of stomach   • Hospital discharge follow-up   • History of colon polyps   • High risk medication use   • Glaucoma   • Gastroenteritis, acute   • Erysipelas   • Encounter for screening colonoscopy   • Encounter for long-term (current) use of NSAIDs   • Dysphagia   • DVT (deep venous thrombosis) (CMS/HCC)   • DJD (degenerative joint disease), lumbar   • Diverticulosis   • Cough   • Contact with hypodermic needle   • Cervical radiculopathy   • Cellulitis   • Arthritis   • Allergic rhinitis   • Acute glaucoma   • Acute embolism and thrombosis of vein   • Actinic keratosis   • Status post reverse total shoulder replacement, right   • Localized edema   • Anemia   • Peripheral polyneuropathy   • Campylobacter diarrhea   • Hyponatremia   • Generalized abdominal pain   • Fever   • Constipation   • Bilateral lower extremity edema   • Acute pyelonephritis   • Chronic idiopathic constipation   • Functional diarrhea   • Change in  bowel habits   • RLS (restless legs syndrome)   • Type 2 diabetes mellitus with hyperglycemia (CMS/HCC)   • Family history of GI malignancy   • Primary osteoarthritis of right hip     Past Medical History:   Diagnosis Date   • Actinic keratosis    • Acute embolism and thrombosis of vein    • Allergic rhinitis    • Arthritis    • Cervical radiculopathy    • Disequilibrium    • DJD (degenerative joint disease), lumbar    • Elevated cholesterol    • Erysipelas    • GERD (gastroesophageal reflux disease)    • Glaucoma    • Hip pain, chronic, right    • History of kidney stones     X1   • History of peripheral edema     LOWER LEGS BILAT, COMPRESSION KNEE HIGH    • History of transfusion    • Hyperlipidemia    • Hypertension    • Hypothyroid    • Insomnia    • Limited mobility     RIGHT HIP   • Male urinary stress incontinence     s/p prostatectomy-WEAR PAD   • Obesity    • KWAME (obstructive sleep apnea)    • Osteoarthritis    • Pes planus    • Presbycusis    • Prostate cancer (CMS/HCC)    • Restless legs syndrome    • Shoulder pain     RIGHT, LIMITED MOBILITY-AT TIMES   • Sleep apnea     bipap   • Tinea corporis    • Tinea cruris    • Unsteady gait     RIGHT HIP   • Weakness     RIGHT HIP     Past Surgical History:   Procedure Laterality Date   • CATARACT EXTRACTION, BILATERAL Bilateral    • CERVICAL DISCECTOMY ANTERIOR     • COLONOSCOPY  03/08/2017    3/17normal. Recheck 2022. 12/11. Repeat in 5 years    • COLONOSCOPY N/A 4/19/2021    Procedure: COLONOSCOPY INTO CECUM WITH HOT & COLD  SNARE POLYPECTOMIES;  Surgeon: Jaden Chowdhury MD;  Location: [facility] ENDOSCOPY;  Service: Gastroenterology;  Laterality: N/A;  PRE: CHANGE IN BOWEL HABITS; FAMILY H/O COLON CANCER  POST: DIVERTICULOSIS, POLYPS   • ENDOSCOPY W/ PEG REMOVAL     • FOOT SURGERY      1998 had big toe replaced on left foot-METAL    • HERNIA REPAIR  03/07/2012    umbilical   • JOINT REPLACEMENT Right 2014   • WI TOTAL KNEE ARTHROPLASTY Left 9/13/2016     Procedure: LT TOTAL KNEE ARTHROPLASTY;  Surgeon: Tadeo Basurto MD;  Location: [facility] OR;  Service: Orthopedics   • PROSTATECTOMY  07/1999 July 1999. Radical    • THYROID LOBECTOMY     • TONSILLECTOMY     • TOTAL HIP ARTHROPLASTY Right 8/19/2021    Procedure: TOTAL HIP ARTHROPLASTY RIGHT POSTERIOR;  Surgeon: Tadeo Basurto MD;  Location: [facility] OR;  Service: Orthopedics;  Laterality: Right;   • TOTAL KNEE ARTHROPLASTY Right 2014   • TOTAL SHOULDER ARTHROPLASTY W/ DISTAL CLAVICLE EXCISION Right 11/13/2019    Procedure: TOTAL SHOULDER REVERSE ARTHROPLASTY RIGHT REPAIR RIGHT AXILLARY VEIN;  Surgeon: Behzad Kelley MD;  Location: [facility] [place];  Service: Orthopedics   • WRIST SURGERY Right 12/17/2014    x2  wrist replaced     General Information     Row Name 08/21/21 1327          Physical Therapy Time and Intention    Document Type  therapy note (daily note)  -GT     Mode of Treatment  physical therapy;individual therapy  -GT     Row Name 08/21/21 1327          General Information    Patient Profile Reviewed  yes  -GT     Existing Precautions/Restrictions  fall  -GT     Row Name 08/21/21 1327          Cognition    Orientation Status (Cognition)  oriented x 4  -GT     Row Name 08/21/21 1327          Safety Issues, Functional Mobility    Comment, Safety Issues/Impairments (Mobility)  gt belt and non skid socks  -GT       User Key  (r) = Recorded By, (t) = Taken By, (c) = Cosigned By    Initials Name Provider Type    GT Ethan Cummings, PT Physical Therapist        Mobility     Row Name 08/21/21 1328          Bed Mobility    Supine-Sit Boyd (Bed Mobility)  not tested  -GT     Sit-Supine Boyd (Bed Mobility)  not tested  -GT     Comment (Bed Mobility)  Pt was in recliner  -GT     Row Name 08/21/21 1328          Transfers    Comment (Transfers)  chair to standing, standing to chair transfers  -GT     Row Name 08/21/21 1328          Bed-Chair Transfer    Bed-Chair Boyd (Transfers)  not  tested  -GT     Row Name 08/21/21 1328          Sit-Stand Transfer    Sit-Stand [place] (Transfers)  minimum assist (75% patient effort);1 person assist  -GT     Assistive Device (Sit-Stand Transfers)  walker, front-wheeled  -GT     Row Name 08/21/21 1328          Gait/Stairs (Locomotion)    [place] Level (Gait)  contact guard;1 person assist  -GT     Assistive Device (Gait)  walker, front-wheeled  -GT     Distance in Feet (Gait)  60'  -GT     Deviations/Abnormal Patterns (Gait)  antalgic;khanh decreased;weight shifting decreased;stride length decreased  -GT     Bilateral Gait Deviations  forward flexed posture  -GT     [place] Level (Stairs)  contact guard;2 person assist  -GT     Handrail Location (Stairs)  left side (ascending);left side (descending)  -GT     Number of Steps (Stairs)  3  -GT     Ascending Technique (Stairs)  step-to-step  -GT     Descending Technique (Stairs)  step-to-step  -GT     Stairs, Safety Issues  sequencing ability decreased;weight-shifting ability decreased  -GT     Row Name 08/21/21 1328          Mobility    Extremity Weight-bearing Status  right lower extremity  -GT     Right Lower Extremity (Weight-bearing Status)  weight-bearing as tolerated (WBAT)  -GT       User Key  (r) = Recorded By, (t) = Taken By, (c) = Cosigned By    Initials Name Provider Type    GT Ethan Cummings, PT Physical Therapist        Obj/Interventions    No documentation.       Goals/Plan    No documentation.       Clinical Impression     Row Name 08/21/21 1331          Pain Scale: Numbers Pre/Post-Treatment    Pretreatment Pain Rating  3/10  -GT     Posttreatment Pain Rating  4/10  -GT     Pain Location - Side  Right  -GT     Pain Location  hip  -GT     Pre/Posttreatment Pain Comment  Soreness  -GT     Row Name 08/21/21 1331          Plan of Care Review    Plan of Care Reviewed With  patient  -GT     Progress  improving  -GT     Row Name 08/21/21 1331          Therapy Assessment/Plan (PT)    Rehab  Potential (PT)  good, to achieve stated therapy goals  -GT     Criteria for Skilled Interventions Met (PT)  yes;meets criteria;skilled treatment is necessary  -GT     Row Name 08/21/21 1331          Positioning and Restraints    Pre-Treatment Position  sitting in chair/recliner  -GT     Post Treatment Position  chair  -GT     In Chair  reclined;sitting;call light within reach;encouraged to call for assist;with family/caregiver;exit alarm on;RLE elevated  -GT       User Key  (r) = Recorded By, (t) = Taken By, (c) = Cosigned By    Initials Name Provider Type    GT Ethan Cummings, PT Physical Therapist        Outcome Measures    No documentation.                      Physical Therapy Education                 Title: PT OT SLP Therapies (Done)     Topic: Physical Therapy (Done)     Point: Mobility training (Done)     Learning Progress Summary           Patient Acceptance, TB, DU by GABRIEL at 8/20/2021 1653    Acceptance, E,D, VU,NR by MS at 8/20/2021 1043    Acceptance, E,D, VU,NR by MS at 8/19/2021 1603   Family Acceptance, TB, DU by DJ at 8/20/2021 1653                   Point: Home exercise program (Done)     Learning Progress Summary           Patient Acceptance, TB, DU by DJ at 8/20/2021 1653    Acceptance, E,D, VU,NR by MS at 8/20/2021 1043    Acceptance, E,D, VU,NR by MS at 8/19/2021 1603   Family Acceptance, TB, DU by DJ at 8/20/2021 1653                   Point: Body mechanics (Done)     Learning Progress Summary           Patient Acceptance, TB, DU by DJ at 8/20/2021 1653    Acceptance, E,D, VU,NR by MS at 8/20/2021 1043    Acceptance, E,D, VU,NR by MS at 8/19/2021 1603   Family Acceptance, TB, DU by DJ at 8/20/2021 1653                   Point: Precautions (Done)     Learning Progress Summary           Patient Acceptance, TB, DU by DJ at 8/20/2021 1653    Acceptance, E,D, VU,NR by MS at 8/20/2021 1043    Acceptance, E,D, VU,NR by MS at 8/19/2021 1603   Family Acceptance, TB, DU by DJ at 8/20/2021 1653                                User Key     Initials Effective Dates Name Provider Type Discipline    MS 06/16/21 -  Sergio Jin PT Physical Therapist PT    DJ 10/25/19 -  Kenisha Linda PT Physical Therapist PT              PT Recommendation and Plan     Plan of Care Reviewed With: patient  Progress: improving     Time Calculation:   PT Charges     Row Name 08/21/21 1337             Time Calculation    Start Time  0942  -GT      Stop Time  1000  -GT      Time Calculation (min)  18 min  -GT      PT Received On  08/22/21  -GT        User Key  (r) = Recorded By, (t) = Taken By, (c) = Cosigned By    Initials Name Provider Type    GT Ethan Cummings, CITLALLI Physical Therapist        Therapy Charges for Today     Code Description Service Date Service Provider Modifiers Qty    [id number] HC GAIT TRAINING EA 15 MIN 8/21/2021 Ethan Cummings, PT GP 1          PT G-Codes  Outcome Measure Options: AM-PAC 6 Clicks Basic Mobility (PT)  AM-PAC 6 Clicks Score (PT): 15    Ethan Cummings PT  8/21/2021

## 2021-08-21 NOTE — PLAN OF CARE
Goal Outcome Evaluation:  Plan of Care Reviewed With: patient        Progress: improving   Pt was in recliner and wheeled to stairs due to history of poor endurance during BUE. Spouse present to observed stair training. Pt transferred sit<> CGA x 1 and ambulated to stairs CGA x 1. Pt educated for sequencing and performed 3 step to ascending and descending steps with L UE support on hand rail and R UE with HHA to simulate pt having help to make sure he gets in the home safely. Pt and spouse demonstrate understanding without concerns. Pt educated on using UE to help accept weight shift and transfer with stairs. Pt to d/c and return home.

## 2021-08-21 NOTE — CASE MANAGEMENT/SOCIAL WORK
Continued Stay Note  [facility]     Patient Name: Nathan Baez  MRN: [id number]  Today's Date: 8/21/2021    Admit Date: 8/19/2021    Discharge Plan     Row Name 08/21/21 0948       Plan    Plan  Home with [facility]    Provided Post Acute Provider List?  Yes    Provided Post Acute Provider Quality & Resource List?  Yes    Post Acute Provider Quality and Resource List  Home Health    Patient/Family in Agreement with Plan  yes    Plan Comments  CCP received call from pts Nurse, Adriana ([phone]) asking if [facility] can do diabetic teaching as no one here this weekend to do diabetic teaching.  CCP called Claudette at [facility] and left vm informing her pt is being discharged and will need diabetic teaching as well.  CCP left my phone number to call me if she has any questions.    Row Name 08/21/21 0910       Plan    Plan  Home with spouse and [facility]    Patient/Family in Agreement with Plan  yes    Plan Comments  CCP spoke with Claudette with [facility] to confirm she is aware of discharge.  Claudette confirms that she is aware.  Nagi        Discharge Codes    No documentation.       Expected Discharge Date and Time     Expected Discharge Date Expected Discharge Time    Aug 21, 2021             Lakisha Betts

## 2021-08-21 NOTE — PLAN OF CARE
Goal Outcome Evaluation:  Plan of Care Reviewed With: patient        Progress: improving  Outcome Summary: The pt remains stable. A/O. No s/s distress. S/p R MADELIN posterior on 08/19/21. Pain controlled w/ PRN Percocet. C/o constipation this evening, PRN Colace given as ordered. Wears CPAP at bed time. WBAT. BRP. Ax1. Slowly ambulates w/ walker going to the bathtoom. Will continue to monitor.

## 2021-08-22 ENCOUNTER — HOME CARE VISIT (OUTPATIENT)
Dept: HOME HEALTH SERVICES | Facility: HOME HEALTHCARE | Age: 76
End: 2021-08-22

## 2021-08-22 PROCEDURE — G0151 HHCP-SERV OF PT,EA 15 MIN: HCPCS

## 2021-08-23 ENCOUNTER — TRANSITIONAL CARE MANAGEMENT TELEPHONE ENCOUNTER (OUTPATIENT)
Dept: CALL CENTER | Facility: HOSPITAL | Age: 76
End: 2021-08-23

## 2021-08-23 ENCOUNTER — HOME CARE VISIT (OUTPATIENT)
Dept: HOME HEALTH SERVICES | Facility: HOME HEALTHCARE | Age: 76
End: 2021-08-23

## 2021-08-23 PROCEDURE — G0151 HHCP-SERV OF PT,EA 15 MIN: HCPCS

## 2021-08-23 NOTE — PROGRESS NOTES
Case Management Discharge Note      Final Note: DC home with [facility]    Provided Post Acute Provider List?: Yes  Provided Post Acute Provider Quality & Resource List?: Yes  Post Acute Provider Quality and Resource List: Home Health    Selected Continued Care - Discharged on 8/21/2021 Admission date: 8/19/2021 - Discharge disposition: Home or Self Care    Destination    No services have been selected for the patient.              Durable Medical Equipment    No services have been selected for the patient.              Dialysis/Infusion    No services have been selected for the patient.              Home Medical Care Coordination complete.    Service Provider Selected Services Address Phone Fax Patient Preferred    [facility]  Home Health Services [address] [address] [phone] --          Therapy    No services have been selected for the patient.              Community Resources    No services have been selected for the patient.              Community & DME    No services have been selected for the patient.                       Final Discharge Disposition Code: 06 - home with home health care

## 2021-08-23 NOTE — OUTREACH NOTE
Call Center TCM Note      Responses   [facility] patient discharged from?  [place]   Does the patient have one of the following disease processes/diagnoses(primary or secondary)?  Total Joint Replacement   Joint surgery performed?  Hip   TCM attempt successful?  Yes   Call start time  1541   Call end time  1556   Does the patient have all medications related to this admission filled (includes all antibiotics, pain medications, etc.)  Yes   Is the patient taking all medications as directed (includes completed medication regime)?  N/A   Is the patient able to teach back alternate methods of pain control?  Ice, Knee-elevation/no pillow under knee, Correct alignment, Short, frequent activity   Does the patient have a follow up appointment with their surgeon?  Yes   Has the patient kept scheduled appointments due by today?  N/A   Comments  Has a followup with surgeon on 9/10/2021 but declines a PCP appt.   What is the Home health agency?   [facility]   Has home health visited the patient within 72 hours of discharge?  Yes   Psychosocial issues?  No   Has the patient fallen since discharge?  No   Did the patient receive a copy of their discharge instructions?  Yes   Nursing interventions  Reviewed instructions with patient   What is the patient's perception of their functional status since discharge?  Improving   Is the patient able to teach back signs and symptoms of infection?  Temp >100.4 for 24h or longer, Incisional drainage, Increased swelling or redness around incision (not associated with surgical edema), Severe discomfort or pain, Changes in mobility   Is the patient able to teach back how to prevent infection?  Check incision daily, No tub baths, hot tub or swimming, Shower only as directed by surgeon   Is the patient able to teach back signs and symptoms of DVT?  Redness in calf, Area hot to touch, Shortness of breath or chest pain, Swelling in calf, Severe pain in calf   Did the patient implement home safety  suggestions from pre-surgery classes if attended?  N/A   Is the patient/caregiver able to teach back the hierarchy of who to call/visit for symptoms/problems? PCP, Specialist, Home health nurse, Urgent Care, ED, 911  Yes   TCM call completed?  Yes          Da Acosta RN    8/23/2021, 15:56 EDT

## 2021-08-24 VITALS
DIASTOLIC BLOOD PRESSURE: 74 MMHG | TEMPERATURE: 98.5 F | RESPIRATION RATE: 18 BRPM | SYSTOLIC BLOOD PRESSURE: 128 MMHG | HEART RATE: 80 BPM

## 2021-08-26 VITALS
TEMPERATURE: 98.3 F | RESPIRATION RATE: 18 BRPM | HEART RATE: 80 BPM | DIASTOLIC BLOOD PRESSURE: 80 MMHG | SYSTOLIC BLOOD PRESSURE: 134 MMHG

## 2021-08-27 ENCOUNTER — HOME CARE VISIT (OUTPATIENT)
Dept: HOME HEALTH SERVICES | Facility: HOME HEALTHCARE | Age: 76
End: 2021-08-27

## 2021-08-27 PROCEDURE — G0151 HHCP-SERV OF PT,EA 15 MIN: HCPCS

## 2021-08-29 VITALS
SYSTOLIC BLOOD PRESSURE: 138 MMHG | HEART RATE: 80 BPM | TEMPERATURE: 98.1 F | RESPIRATION RATE: 18 BRPM | DIASTOLIC BLOOD PRESSURE: 84 MMHG

## 2021-08-30 ENCOUNTER — HOME CARE VISIT (OUTPATIENT)
Dept: HOME HEALTH SERVICES | Facility: HOME HEALTHCARE | Age: 76
End: 2021-08-30

## 2021-08-30 VITALS
SYSTOLIC BLOOD PRESSURE: 134 MMHG | HEART RATE: 78 BPM | DIASTOLIC BLOOD PRESSURE: 80 MMHG | TEMPERATURE: 97.9 F | RESPIRATION RATE: 17 BRPM

## 2021-08-30 PROCEDURE — G0151 HHCP-SERV OF PT,EA 15 MIN: HCPCS

## 2021-09-01 ENCOUNTER — READMISSION MANAGEMENT (OUTPATIENT)
Dept: CALL CENTER | Facility: HOSPITAL | Age: 76
End: 2021-09-01

## 2021-09-01 NOTE — OUTREACH NOTE
Total Joint Week 2 Survey      Responses   [facility] patient discharged from?  [place]   Does the patient have one of the following disease processes/diagnoses(primary or secondary)?  Total Joint Replacement   Joint surgery performed?  Hip   Week 2 attempt successful?  No   Unsuccessful attempts  Attempt 1          Hyun Gillespie RN

## 2021-09-02 ENCOUNTER — HOME CARE VISIT (OUTPATIENT)
Dept: HOME HEALTH SERVICES | Facility: HOME HEALTHCARE | Age: 76
End: 2021-09-02

## 2021-09-02 PROCEDURE — G0151 HHCP-SERV OF PT,EA 15 MIN: HCPCS

## 2021-09-03 ENCOUNTER — TELEPHONE (OUTPATIENT)
Dept: ORTHOPEDIC SURGERY | Facility: HOSPITAL | Age: 76
End: 2021-09-03

## 2021-09-03 ENCOUNTER — HOME CARE VISIT (OUTPATIENT)
Dept: HOME HEALTH SERVICES | Facility: HOME HEALTHCARE | Age: 76
End: 2021-09-03

## 2021-09-03 NOTE — TELEPHONE ENCOUNTER
Called and spoke with Ms. Baez as Mr. Baez was unavailable. She states he is doing well. Pain is gone. He does have a h/o RLS and that has been acting up. He is still working with HH PT and progressing. He is using the cane in the house. They go walking up and down the driveway and he gets better everyday. The zipline was removed Wednesday and the incision looks good. He has an appt to see MD Friday. They have no questions/concerns for me at this time, my contact information was given should he need anything. Verbalized understanding.

## 2021-09-06 ENCOUNTER — READMISSION MANAGEMENT (OUTPATIENT)
Dept: CALL CENTER | Facility: HOSPITAL | Age: 76
End: 2021-09-06

## 2021-09-06 VITALS
HEART RATE: 80 BPM | SYSTOLIC BLOOD PRESSURE: 124 MMHG | TEMPERATURE: 97.7 F | RESPIRATION RATE: 17 BRPM | DIASTOLIC BLOOD PRESSURE: 76 MMHG

## 2021-09-06 NOTE — OUTREACH NOTE
"Total Joint Week 2 Survey      Responses   [facility] patient discharged from?  [place]   Does the patient have one of the following disease processes/diagnoses(primary or secondary)?  Total Joint Replacement   Joint surgery performed?  Hip   Week 2 attempt successful?  Yes   Call start time  1656   Call end time  1712   Has the patient been back in either the hospital or Emergency Department since discharge?  No   Discharge diagnosis  Right Total Hip Arthroplasty   Is the patient taking all medications as directed (includes completed medication regime)?  Yes   Medication comments  Has \"doubled up\" on his Lasix for swelling in his right leg   Has the patient kept scheduled appointments due by today?  N/A   What is the Home health agency?   [facility]   Has home health visited the patient within 72 hours of discharge?  Yes   Home health comments  PT comes 2xweek   Has the patient began therapy sessions (either in the home or as an out patient)?  Yes   Has the patient fallen since discharge?  No   What is the patient's perception of their functional status since discharge?  Improving   Is the patient able to teach back how to prevent infection?  Keep incision covered if drainage, No tub baths, hot tub or swimming   Additional teach back comments  Advised to call PCP in am for swelling in lower leg and increasing Lasix   Week 2 call completed?  Yes          Ella Garcia RN  "

## 2021-09-08 ENCOUNTER — HOME CARE VISIT (OUTPATIENT)
Dept: HOME HEALTH SERVICES | Facility: HOME HEALTHCARE | Age: 76
End: 2021-09-08

## 2021-09-08 ENCOUNTER — TELEPHONE (OUTPATIENT)
Dept: FAMILY MEDICINE CLINIC | Facility: CLINIC | Age: 76
End: 2021-09-08

## 2021-09-08 VITALS
HEART RATE: 74 BPM | TEMPERATURE: 97.5 F | DIASTOLIC BLOOD PRESSURE: 76 MMHG | SYSTOLIC BLOOD PRESSURE: 124 MMHG | RESPIRATION RATE: 17 BRPM

## 2021-09-08 PROCEDURE — G0151 HHCP-SERV OF PT,EA 15 MIN: HCPCS

## 2021-09-08 NOTE — TELEPHONE ENCOUNTER
Hold amlodipine for 5 days and double up on water pill for a total of 5 days.  After 5 days, resume previous once a day dosing.

## 2021-09-08 NOTE — TELEPHONE ENCOUNTER
PATIENT'S WIFE CALLED STATING THAT THE PATIENT HAD HIP SURGERY ON 08/19/21. SINCE THEN THE PATIENT'S LEGS HAVE BEEN VERY SWOLLEN.    FOR THE LAST THREE DAYS SHE HAS BEEN DOUBLING UP ON THE PATIENT'S WATER PILL, BUT WAS INFORMED THAT SHE NEEDED TO GET DR. LYLES'S APPROVAL BEFORE DOING SO.    PLEASE ADVISE  [phone]

## 2021-09-09 NOTE — CASE COMMUNICATION
Re: Nathan Baez  : 22    The physical therapy visit on 9/3/21 for the above client was missed due to progress, therefore, the prescribed frequency of visits was not met.    If you have questions or would like further information about this patient, please contact us at [phone].    Regards,   Payam Smith, PT

## 2021-09-21 ENCOUNTER — READMISSION MANAGEMENT (OUTPATIENT)
Dept: CALL CENTER | Facility: HOSPITAL | Age: 76
End: 2021-09-21

## 2021-09-21 NOTE — OUTREACH NOTE
Total Joint Month 1 Survey      Responses   [facility] patient discharged from?  [place]   Does the patient have one of the following disease processes/diagnoses(primary or secondary)?  Total Joint Replacement   Joint surgery performed?  Hip   Month 1 attempt successful?  Yes   Call start time  1707   Call end time  1714   Discharge diagnosis  Right Total Hip Arthroplasty   Is the patient taking all medications as directed (includes completed medication regime)?  Yes   Is the patient able to teach back alternate methods of pain control?  Reposition, Correct alignment, Short, frequent activity [uses heating pain for sciatic nerve pain]   Has the patient kept scheduled appointments due by today?  Yes   Is the patient still receiving Home Health Services?  N/A   Is the patient still attending therapy sessions(either in the home or as an outpatient)?  No   Has the patient fallen since discharge?  No   What is the patient's perception of their functional status since discharge?  Improving   Is the patient able to teach back signs and symptoms of infection?  Changes in mobility, Incisional drainage   If the patient is a current smoker, are they able to teach back resources for cessation?  Not a smoker   Is the patient/caregiver able to teach back the hierarchy of who to call/visit for symptoms/problems? PCP, Specialist, Home health nurse, Urgent Care, ED, 911  Yes   Additional teach back comments  still managing sciatic pain down right leg, was given Prednisone pack, advised pt to consult with ortho about scisatic pain that still continues after steroid pack, ask about more pT and to be outpt, pt agreed with plan   Month 1 call completed?  Yes          Natalia Stanton RN

## 2021-10-12 ENCOUNTER — TREATMENT (OUTPATIENT)
Dept: PHYSICAL THERAPY | Facility: CLINIC | Age: 76
End: 2021-10-12

## 2021-10-12 DIAGNOSIS — M54.41 RIGHT-SIDED LOW BACK PAIN WITH RIGHT-SIDED SCIATICA, UNSPECIFIED CHRONICITY: ICD-10-CM

## 2021-10-12 DIAGNOSIS — M25.551 RIGHT HIP PAIN: Primary | ICD-10-CM

## 2021-10-12 DIAGNOSIS — R26.2 DIFFICULTY WALKING: ICD-10-CM

## 2021-10-12 PROCEDURE — 97530 THERAPEUTIC ACTIVITIES: CPT | Performed by: PHYSICAL THERAPIST

## 2021-10-12 PROCEDURE — 97110 THERAPEUTIC EXERCISES: CPT | Performed by: PHYSICAL THERAPIST

## 2021-10-12 PROCEDURE — 97162 PT EVAL MOD COMPLEX 30 MIN: CPT | Performed by: PHYSICAL THERAPIST

## 2021-10-12 NOTE — PROGRESS NOTES
Physical Therapy Initial Evaluation and Plan of Care    Patient: Nathan Baez   : 1945  Diagnosis/ICD-10 Code:  Right hip pain [M25.551]  Referring practitioner: Tadeo Basurto MD  Past Medical History Reviewed: 10/12/2021    PLOF: Independent and lives with wife    Subjective Evaluation    History of Present Illness  Date of surgery: 2021  Mechanism of injury: They replaced my right hip on  and the hip is doing well, but now I am having some low back pain and sciatic nerve pain on the right side. The right nerve pain goes to the foot. I also have restless leg syndrome and it could be that too. I am not sleeping well at all. I try every position, but no position is comfortable.  I did have some back pain prior to surgery, but it is worse now.   I cannot walk as far and I am weaker. I am able to walk about 1 minute before I need a rest break.   The left side is ok. The knees and ankles are good.         Patient Occupation: retired and lives with wife Pain  Current pain ratin  At worst pain ratin  Location: (R) posterior hip and low back   Relieving factors: change in position, medications, heat and ice  Aggravating factors: standing, ambulation, prolonged positioning and sleeping  Progression: no change    Treatments  Discharged from (in last 30 days): home health care           Objective          Strength/Myotome Testing     Left Hip   Planes of Motion   Flexion: 4+  External rotation: 4+  Internal rotation: 4+    Right Hip   Planes of Motion   Flexion: 4-    Left Knee   Flexion: 4+  Extension: 4+    Right Knee   Flexion: 4  Extension: 4    Left Ankle/Foot   Dorsiflexion: 4+    Right Ankle/Foot   Dorsiflexion: 4    Ambulation     Observational Gait   Gait: antalgic   Decreased walking speed.     Quality of Movement During Gait   Trunk    Trunk (Right): Positive lateral lean over stance limb.     Comments   TU sec without AD (increased time with pivoting and 2 LOB's and interrupted  gait pattern)  2 reps sit-stand without UE            Assessment & Plan     Assessment  Impairments: abnormal coordination, abnormal gait, activity intolerance, impaired balance, impaired physical strength, lacks appropriate home exercise program and safety issue  Assessment details: Pt presents to PT with symptoms consistent with gait and balance difficulties, as well as low back and hip pain and weakness secondary to (R) MADELIN a couple months ago.  Pt would benefit from skilled PT intervention to address the deficits noted.   Discussed used of cane at this time for safety until he gets stronger in order to decrease risk for falls and improve gait mechanics  Prognosis: good  Functional Limitations: walking, standing, stooping and reaching overhead  Goals  Plan Goals: SHORT TERM GOALS: 6-8 visits  1. Pt will be compliant with HEP.  2. Pt will have TUG score of 15 sec or less in order to decrease risk for falls.  3.  Pt to perform 5 x STS in 30 sec to demonstrate improve safety with positional changes.  4. Pt will be able to ambulate for 3 min without rest break due to improved endurance and strength.  5. Pt will demonstrate 4/5 B LE strength in order to improve gait, transfers and stair climbing ability.      LONG TERM GOALS: 14-16 visits  1.Pt will be able to ambulate for 5 minutes or greater with improve step length and heel strike in order to decrease fall risk.  2. Pt will be able to climb 5 steps due to improved strength.  3. Pt will be able to perform 10 sit-stand without use of UE due to improved strength.  4. Pt will be able to stand on foam for 10 sec or greater with eyes open due to improved balance.  5. Pt will improve TUG score to 13 sec or less due to improved gait mechanics, strength and balance.      Plan  Therapy options: will be seen for skilled physical therapy services  Planned modality interventions: cryotherapy and thermotherapy (hydrocollator packs)  Planned therapy interventions: abdominal trunk  stabilization, ADL retraining, body mechanics training, balance/weight-bearing training, flexibility, functional ROM exercises, gait training, home exercise program, neuromuscular re-education, motor coordination training, postural training, soft tissue mobilization, strengthening, stretching, therapeutic activities, transfer training and IADL retraining  Duration in visits: 20  Treatment plan discussed with: patient        Manual Therapy:    -     mins  81270;  Therapeutic Exercise:    13     mins  84603;     Neuromuscular Jon:    -    mins  54258;    Therapeutic Activity:     10     mins  92853;     Gait Training:      -     mins  24681;     Ultrasound:     -     mins  22015;    Electrical Stimulation:    -     mins  04792 ( );  Dry Needling     -     mins self-pay    Timed Treatment:   23   mins   Total Treatment:     55   mins      PT SIGNATURE: Elizabeth Chavez PT   [place] license #: [id number]  DATE TREATMENT INITIATED: 10/12/2021    Medicare Initial Certification  Certification Period: 1/10/2022  I certify that the therapy services are furnished while this patient is under my care.  The services outlined above are required by this patient, and will be reviewed every 90 days.     PHYSICIAN: Tadeo Basurto MD      DATE:     Please sign and return via fax to [phone].. Thank you, [facility].

## 2021-10-18 ENCOUNTER — TREATMENT (OUTPATIENT)
Dept: PHYSICAL THERAPY | Facility: CLINIC | Age: 76
End: 2021-10-18

## 2021-10-18 PROCEDURE — 97110 THERAPEUTIC EXERCISES: CPT | Performed by: PHYSICAL THERAPIST

## 2021-10-18 NOTE — PROGRESS NOTES
Physical Therapy Initial Evaluation and Plan of Care    Patient: Nathan Baez   : 1945  Diagnosis/ICD-10 Code:  No primary diagnosis found.  Referring practitioner: Tadeo Basurto MD  Past Medical History Reviewed: 10/18/2021    PLOF: Independent and lives with wife    Subjective Evaluation    History of Present Illness  Date of surgery: 2021  Mechanism of injury: They replaced my right hip on  and the hip is doing well, but now I am having some low back pain and sciatic nerve pain on the right side. The right nerve pain goes to the foot. I also have restless leg syndrome and it could be that too. I am not sleeping well at all. I try every position, but no position is comfortable.  I did have some back pain prior to surgery, but it is worse now.   I cannot walk as far and I am weaker. I am able to walk about 1 minute before I need a rest break.   The left side is ok. The knees and ankles are good.         Patient Occupation: retired and lives with wife Pain  Current pain ratin  At worst pain ratin  Location: (R) posterior hip and low back   Relieving factors: change in position, medications, heat and ice  Aggravating factors: standing, ambulation, prolonged positioning and sleeping  Progression: no change    Treatments  Discharged from (in last 30 days): home health care           Objective          Strength/Myotome Testing     Left Hip   Planes of Motion   Flexion: 4+  External rotation: 4+  Internal rotation: 4+    Right Hip   Planes of Motion   Flexion: 4-    Left Knee   Flexion: 4+  Extension: 4+    Right Knee   Flexion: 4  Extension: 4    Left Ankle/Foot   Dorsiflexion: 4+    Right Ankle/Foot   Dorsiflexion: 4    Ambulation     Observational Gait   Gait: antalgic   Decreased walking speed.     Quality of Movement During Gait   Trunk    Trunk (Right): Positive lateral lean over stance limb.     Comments   TU sec without AD (increased time with pivoting and 2 LOB's and  interrupted gait pattern)  2 reps sit-stand without UE            Assessment & Plan     Assessment  Impairments: abnormal coordination, abnormal gait, activity intolerance, impaired balance, impaired physical strength, lacks appropriate home exercise program and safety issue  Assessment details: Pt presents to PT with symptoms consistent with gait and balance difficulties, as well as low back and hip pain and weakness secondary to (R) MADELIN a couple months ago.  Pt would benefit from skilled PT intervention to address the deficits noted.   Discussed used of cane at this time for safety until he gets stronger in order to decrease risk for falls and improve gait mechanics  Prognosis: good  Functional Limitations: walking, standing, stooping and reaching overhead  Goals  Plan Goals: SHORT TERM GOALS: 6-8 visits  1. Pt will be compliant with HEP.  2. Pt will have TUG score of 15 sec or less in order to decrease risk for falls.  3.  Pt to perform 5 x STS in 30 sec to demonstrate improve safety with positional changes.  4. Pt will be able to ambulate for 3 min without rest break due to improved endurance and strength.  5. Pt will demonstrate 4/5 B LE strength in order to improve gait, transfers and stair climbing ability.      LONG TERM GOALS: 14-16 visits  1.Pt will be able to ambulate for 5 minutes or greater with improve step length and heel strike in order to decrease fall risk.  2. Pt will be able to climb 5 steps due to improved strength.  3. Pt will be able to perform 10 sit-stand without use of UE due to improved strength.  4. Pt will be able to stand on foam for 10 sec or greater with eyes open due to improved balance.  5. Pt will improve TUG score to 13 sec or less due to improved gait mechanics, strength and balance.      Plan  Therapy options: will be seen for skilled physical therapy services  Planned modality interventions: cryotherapy and thermotherapy (hydrocollator packs)  Planned therapy interventions:  abdominal trunk stabilization, ADL retraining, body mechanics training, balance/weight-bearing training, flexibility, functional ROM exercises, gait training, home exercise program, neuromuscular re-education, motor coordination training, postural training, soft tissue mobilization, strengthening, stretching, therapeutic activities, transfer training and IADL retraining  Duration in visits: 20  Treatment plan discussed with: patient        Manual Therapy:    -     mins  54080;  Therapeutic Exercise:    13     mins  71595;     Neuromuscular Jon:    -    mins  21318;    Therapeutic Activity:     10     mins  07102;     Gait Training:      -     mins  18326;     Ultrasound:     -     mins  75679;    Electrical Stimulation:    -     mins  09569 ( );  Dry Needling     -     mins self-pay    Timed Treatment:   23   mins   Total Treatment:     55   mins      PT SIGNATURE: Ace Daniels PTA   [place] license #: [id number]  DATE TREATMENT INITIATED: 10/18/2021    Medicare Initial Certification  Certification Period: 1/16/2022  I certify that the therapy services are furnished while this patient is under my care.  The services outlined above are required by this patient, and will be reviewed every 90 days.     PHYSICIAN: Tadeo Basurto MD      DATE:     Please sign and return via fax to  .. Thank you, [facility].

## 2021-10-18 NOTE — PROGRESS NOTES
Physical Therapy Daily Progress Note  Visit # 2      Subjective   Nathan Baez reports:   My hip is aching today.  My back feels fine right now.      Objective   See Exercise, Manual, and Modality Logs for complete treatment.   Reviewed current HEP, progressed therex program with exercises as noted.    Assessment/Plan  Tolerated continued therapeutic exercise well today, no increased pain reported during or after exercises.     Progress per Plan of Care and Progress strengthening /stabilization /functional activity           Timed:         Manual Therapy:     -    mins  51338     Therapeutic Exercise:     39    mins  15333     Neuromuscular Jon:    -    mins  72895    Therapeutic Activity:      -    mins  16583     Gait Training:       -    mins  29646     Ultrasound:      -    mins  62663    [place]                               -    mins  09652  Self Care                        -    mins  41892    Un-Timed:  Electrical Stimulation:     -    mins 99279 ( )  Traction      -    mins 16535    Timed Treatment:   39   mins   Total Treatment:     46   mins    ZAYDA Noyola License #[id number]  Physical Therapist Assistant

## 2021-10-21 ENCOUNTER — READMISSION MANAGEMENT (OUTPATIENT)
Dept: CALL CENTER | Facility: HOSPITAL | Age: 76
End: 2021-10-21

## 2021-10-21 RX ORDER — LEVOTHYROXINE SODIUM 88 UG/1
88 TABLET ORAL EVERY MORNING
Qty: 90 TABLET | Refills: 3 | Status: SHIPPED | OUTPATIENT
Start: 2021-10-21 | End: 2022-10-25

## 2021-10-21 NOTE — OUTREACH NOTE
Total Joint Month 2 Survey      Responses   [facility] patient discharged from? [place]   Does the patient have one of the following disease processes/diagnoses(primary or secondary)? Total Joint Replacement   Joint surgery performed? Hip   Month 2 attempt successful? Yes   Call start time 1032   Call end time 1035   Discharge diagnosis Right Total Hip Arthroplasty   Is the patient taking all medications as directed (includes completed medication regime)? Yes   Is the patient able to teach back alternate methods of pain control? Correct alignment   Has the patient kept scheduled appointments due by today? Yes   Comments Has f/u with surgeon in NOV   Is the patient still receiving Home Health Services? N/A   Is the patient still attending therapy sessions(either in the home or as an outpatient)? Yes   Has the patient fallen since discharge? No   Comments Having therapy OTPT--has been using cane--few more weeks. Has some pain in his back so therapist wants him to use his cane with walking   What is the patient's perception of their functional status since discharge? Improving   Is the patient/caregiver able to teach back the hierarchy of who to call/visit for symptoms/problems? PCP, Specialist, Home health nurse, Urgent Care, ED, 911 Yes   Month 2 Call Completed? Yes          Renetta Lema RN

## 2021-10-21 NOTE — TELEPHONE ENCOUNTER
Rx Refill Note  Requested Prescriptions     Pending Prescriptions Disp Refills   • levothyroxine (SYNTHROID, LEVOTHROID) 88 MCG tablet [Pharmacy Med Name: LEVOTHYROXINE 0.088MG (88MCG) TAB] 90 tablet 3     Sig: TAKE 1 TABLET BY MOUTH EVERY MORNING      Last office visit with prescribing clinician: 5/6/2021      Next office visit with prescribing clinician: 10/25/2021            Letty Santoro MA  10/21/21, 13:57 EDT

## 2021-10-22 ENCOUNTER — TREATMENT (OUTPATIENT)
Dept: PHYSICAL THERAPY | Facility: CLINIC | Age: 76
End: 2021-10-22

## 2021-10-22 DIAGNOSIS — R26.2 DIFFICULTY WALKING: ICD-10-CM

## 2021-10-22 DIAGNOSIS — M54.41 RIGHT-SIDED LOW BACK PAIN WITH RIGHT-SIDED SCIATICA, UNSPECIFIED CHRONICITY: ICD-10-CM

## 2021-10-22 DIAGNOSIS — M25.551 RIGHT HIP PAIN: Primary | ICD-10-CM

## 2021-10-22 PROCEDURE — 97110 THERAPEUTIC EXERCISES: CPT | Performed by: PHYSICAL THERAPIST

## 2021-10-22 NOTE — PROGRESS NOTES
Physical Therapy Daily Progress Note  Visit: 3    Nathan Velezkett reports: The back is a little sore today    Subjective     Objective   See Exercise, Manual, and Modality Logs for complete treatment.       Assessment & Plan     Assessment  Assessment details: Pt doing good. Tolerated treatment well with increased rest breaks due to fatigue. Frequent reminders for posture and abdominal bracing    Plan  Plan details: Progress per POC        Manual Therapy:    -     mins  07278;  Therapeutic Exercise:    41     mins  98654;     Neuromuscular Jon:    -    mins  83848;    Therapeutic Activity:     -     mins  41788;     Gait Training:      -     mins  21217;     Ultrasound:     -     mins  75504;    Electrical Stimulation:    -     mins  08744 ( );  Dry Needling     -     mins self-pay    Timed Treatment:   41   mins   Total Treatment:     42   mins    Elizabeth Chavez PT  [place] License #: [id number]    Physical Therapist

## 2021-10-25 ENCOUNTER — TREATMENT (OUTPATIENT)
Dept: PHYSICAL THERAPY | Facility: CLINIC | Age: 76
End: 2021-10-25

## 2021-10-25 ENCOUNTER — OFFICE VISIT (OUTPATIENT)
Dept: FAMILY MEDICINE CLINIC | Facility: CLINIC | Age: 76
End: 2021-10-25

## 2021-10-25 VITALS
BODY MASS INDEX: 40.18 KG/M2 | HEART RATE: 92 BPM | OXYGEN SATURATION: 98 % | WEIGHT: 256 LBS | DIASTOLIC BLOOD PRESSURE: 60 MMHG | SYSTOLIC BLOOD PRESSURE: 140 MMHG | HEIGHT: 67 IN | TEMPERATURE: 98.6 F

## 2021-10-25 DIAGNOSIS — K59.04 CHRONIC IDIOPATHIC CONSTIPATION: ICD-10-CM

## 2021-10-25 DIAGNOSIS — I10 PRIMARY HYPERTENSION: ICD-10-CM

## 2021-10-25 DIAGNOSIS — E11.65 TYPE 2 DIABETES MELLITUS WITH HYPERGLYCEMIA, WITHOUT LONG-TERM CURRENT USE OF INSULIN (HCC): Primary | ICD-10-CM

## 2021-10-25 PROCEDURE — 97110 THERAPEUTIC EXERCISES: CPT | Performed by: PHYSICAL THERAPIST

## 2021-10-25 PROCEDURE — 99214 OFFICE O/P EST MOD 30 MIN: CPT | Performed by: FAMILY MEDICINE

## 2021-10-25 RX ORDER — AMOXICILLIN 250 MG
1 CAPSULE ORAL DAILY
Qty: 60 TABLET | Refills: 4 | Status: SHIPPED | OUTPATIENT
Start: 2021-10-25 | End: 2022-12-17

## 2021-10-25 RX ORDER — MELOXICAM 15 MG/1
15 TABLET ORAL DAILY
COMMUNITY
Start: 2021-10-08 | End: 2022-04-14 | Stop reason: SDUPTHER

## 2021-10-25 RX ORDER — TRAMADOL HYDROCHLORIDE 50 MG/1
50-100 TABLET ORAL
COMMUNITY
Start: 2021-08-02 | End: 2022-06-10

## 2021-10-25 NOTE — PROGRESS NOTES
Chief Complaint   Patient presents with   • Diabetes       Subjective   Nathan Baez is a 76 y.o. male.     History of Present Illness   F/U DM2.  Doing well with metformin 500 BID.  A1c was 7.7 2 weeks ago.  Needs refill.    F/U HTN.  No orthostasis.  Doing wel with meds.    F/U hyperlipidmeia.  Doing wel with meds.  LDL 49 5 months ago.  C/o trouble with constipation.  TSH nromal  5 months ago.   No help with lactulose or linzess.      The following portions of the patient's history were reviewed and updated as appropriate: allergies, current medications, past family history, past medical history, past social history, past surgical history and problem list.    Review of Systems   Constitutional: Negative for appetite change and fatigue.   HENT: Negative for nosebleeds and sore throat.    Eyes: Negative for blurred vision and visual disturbance.   Respiratory: Negative for shortness of breath and wheezing.    Cardiovascular: Negative for chest pain and leg swelling.   Gastrointestinal: Positive for constipation. Negative for abdominal distention and abdominal pain.   Endocrine: Negative for cold intolerance and polyuria.   Genitourinary: Negative for dysuria and hematuria.   Musculoskeletal: Negative for arthralgias and myalgias.   Skin: Negative for color change and rash.   Neurological: Negative for weakness and confusion.   Psychiatric/Behavioral: Negative for agitation and depressed mood.       Patient Active Problem List   Diagnosis   • OA (osteoarthritis) of knee   • Hypertension   • Abdominal wall cellulitis   • Hypothyroidism (acquired)   • Gastroesophageal reflux disease without esophagitis   • Mixed hyperlipidemia   • Primary insomnia   • DDD (degenerative disc disease), lumbar   • KWAME (obstructive sleep apnea)   • Allergic   • Venous insufficiency   • Prostate cancer (HCC)   • Venous stasis dermatitis   • Tinea cruris   • Tinea corporis   • Throat clearing   • Sleep apnea   • Skin lesion of face   •  Rib pain on left side   • Rectal bleed   • Presbycusis   • Pes planus   • Osteoarthritis   • Obesity   • Medicare annual wellness visit, subsequent   • Lumbar strain   • Internal hemorrhoids   • Insomnia   • Inflamed skin tag   • Hyperplastic polyp of stomach   • Hospital discharge follow-up   • History of colon polyps   • High risk medication use   • Glaucoma   • Gastroenteritis, acute   • Erysipelas   • Encounter for screening colonoscopy   • Encounter for long-term (current) use of NSAIDs   • Dysphagia   • DVT (deep venous thrombosis) (Formerly [facility])   • DJD (degenerative joint disease), lumbar   • Diverticulosis   • Cough   • Contact with hypodermic needle   • Cervical radiculopathy   • Cellulitis   • Arthritis   • Allergic rhinitis   • Acute glaucoma   • Acute embolism and thrombosis of vein   • Actinic keratosis   • Status post reverse total shoulder replacement, right   • Localized edema   • Anemia   • Peripheral polyneuropathy   • Campylobacter diarrhea   • Hyponatremia   • Generalized abdominal pain   • Fever   • Constipation   • Bilateral lower extremity edema   • Acute pyelonephritis   • Chronic idiopathic constipation   • Functional diarrhea   • Change in bowel habits   • RLS (restless legs syndrome)   • Type 2 diabetes mellitus with hyperglycemia (Formerly [facility])   • Family history of GI malignancy   • Primary osteoarthritis of right hip       No Known Allergies      Current Outpatient Medications:   •  albuterol sulfate  (90 Base) MCG/ACT inhaler, Inhale 2 puffs Every 4 (Four) Hours As Needed for Wheezing., Disp: , Rfl:   •  ALLERGY SERUM INJECTION, Inject  under the skin into the appropriate area as directed 1 (One) Time Per Week., Disp: , Rfl:   •  amLODIPine (NORVASC) 2.5 MG tablet, TAKE 1 TABLET BY MOUTH DAILY, Disp: 90 tablet, Rfl: 3  •  aspirin  MG tablet, Take 1 tablet by mouth 2 (Two) Times a Day With Meals., Disp: 60 tablet, Rfl: 0  •  BRIMONIDINE TARTRATE OP, Apply 1 drop to eye(s) as directed by  provider 2 (Two) Times a Day., Disp: , Rfl:   •  dorzolamide-timolol (COSOPT) 22.3-6.8 MG/ML ophthalmic solution, Administer 1 drop to both eyes 2 (Two) Times a Day., Disp: , Rfl:   •  fexofenadine (ALLEGRA) 180 MG tablet, Take 180 mg by mouth Daily., Disp: , Rfl:   •  Fluticasone-Salmeterol 232-14 MCG/ACT aerosol powder , Inhale 1 puff 2 (Two) Times a Day., Disp: , Rfl:   •  furosemide (LASIX) 40 MG tablet, TAKE 1 TABLET BY MOUTH EVERY DAY (Patient taking differently: Take 40 mg by mouth Daily.), Disp: 90 tablet, Rfl: 3  •  latanoprost (XALATAN) 0.005 % ophthalmic solution, Administer 1 drop to both eyes Every Night., Disp: , Rfl:   •  levothyroxine (SYNTHROID, LEVOTHROID) 88 MCG tablet, TAKE 1 TABLET BY MOUTH EVERY MORNING, Disp: 90 tablet, Rfl: 3  •  losartan (COZAAR) 100 MG tablet, Take 1 tablet by mouth Daily., Disp: 90 tablet, Rfl: 3  •  meloxicam (MOBIC) 15 MG tablet, Take 15 mg by mouth Daily., Disp: , Rfl:   •  metFORMIN (GLUCOPHAGE) 500 MG tablet, Take 1 tablet by mouth 2 (Two) Times a Day With Meals., Disp: 180 tablet, Rfl: 3  •  montelukast (SINGULAIR) 10 MG tablet, Take 1 tablet by mouth Every Night., Disp: 90 tablet, Rfl: 3  •  pantoprazole (PROTONIX) 40 MG EC tablet, TAKE 1 TABLET BY MOUTH TWICE DAILY, Disp: 90 tablet, Rfl: 3  •  pramipexole (MIRAPEX) 1.5 MG tablet, Take 1 tablet by mouth At Night As Needed (RLS). (Patient taking differently: Take 1.5 mg by mouth 2 (Two) Times a Day.), Disp: 90 tablet, Rfl: 2  •  rosuvastatin (CRESTOR) 20 MG tablet, Take 1 tablet by mouth Every Evening., Disp: 90 tablet, Rfl: 3  •  traMADol (ULTRAM) 50 MG tablet, Take  mg by mouth. for pain, Disp: , Rfl:   •  oxyCODONE-acetaminophen (PERCOCET) 5-325 MG per tablet, Take 1-2 tablets by mouth Every 4 (Four) Hours As Needed (Pain)., Disp: 60 tablet, Rfl: 0  •  sennosides-docusate (senna-docusate sodium) 8.6-50 MG per tablet, Take 1 tablet by mouth Daily., Disp: 60 tablet, Rfl: 4    Past Medical History:   Diagnosis  Date   • Actinic keratosis    • Acute embolism and thrombosis of vein    • Allergic    • Allergic rhinitis    • Arthritis    • Cataract    • Cervical radiculopathy    • Chronic constipation    • Diabetes mellitus (HCC)    • Disequilibrium    • DJD (degenerative joint disease), lumbar    • Elevated cholesterol    • Erysipelas    • GERD (gastroesophageal reflux disease)    • Glaucoma    • Hip pain, chronic, right    • History of kidney stones     X1   • History of peripheral edema     LOWER LEGS BILAT, COMPRESSION KNEE HIGH    • History of transfusion    • Hyperlipidemia    • Hypertension    • Hypothyroid    • Insomnia    • Limited mobility     RIGHT HIP   • Male urinary stress incontinence     s/p prostatectomy-WEAR PAD   • Obesity    • KWAME (obstructive sleep apnea)    • Osteoarthritis    • Pes planus    • Presbycusis    • Prostate cancer (HCC)    • Restless legs syndrome    • Shoulder pain     RIGHT, LIMITED MOBILITY-AT TIMES   • Sleep apnea     bipap   • Tinea corporis    • Tinea cruris    • Unsteady gait     RIGHT HIP   • Weakness     RIGHT HIP       Past Surgical History:   Procedure Laterality Date   • CATARACT EXTRACTION, BILATERAL Bilateral    • CERVICAL DISCECTOMY ANTERIOR     • COLONOSCOPY  03/08/2017    3/17normal. Recheck 2022. 12/11. Repeat in 5 years    • COLONOSCOPY N/A 4/19/2021    Procedure: COLONOSCOPY INTO CECUM WITH HOT & COLD  SNARE POLYPECTOMIES;  Surgeon: Jaden Chowdhury MD;  Location: [facility] ENDOSCOPY;  Service: Gastroenterology;  Laterality: N/A;  PRE: CHANGE IN BOWEL HABITS; FAMILY H/O COLON CANCER  POST: DIVERTICULOSIS, POLYPS   • ENDOSCOPY W/ PEG REMOVAL     • FOOT SURGERY      1998 had big toe replaced on left foot-METAL    • HERNIA REPAIR  03/07/2012    umbilical   • JOINT REPLACEMENT Right 2014   • MA TOTAL KNEE ARTHROPLASTY Left 9/13/2016    Procedure: LT TOTAL KNEE ARTHROPLASTY;  Surgeon: Tadeo Basurto MD;  Location: [facility] MAIN OR;  Service: Orthopedics   • PROSTATECTOMY   1999. Radical    • THYROID LOBECTOMY     • TONSILLECTOMY     • TOTAL HIP ARTHROPLASTY Right 2021    Procedure: TOTAL HIP ARTHROPLASTY RIGHT POSTERIOR;  Surgeon: Tadeo Basurto MD;  Location: [facility] OR;  Service: Orthopedics;  Laterality: Right;   • TOTAL KNEE ARTHROPLASTY Right    • TOTAL SHOULDER ARTHROPLASTY W/ DISTAL CLAVICLE EXCISION Right 2019    Procedure: TOTAL SHOULDER REVERSE ARTHROPLASTY RIGHT REPAIR RIGHT AXILLARY VEIN;  Surgeon: Behzad Kelley MD;  Location: [facility];  Service: Orthopedics   • WRIST SURGERY Right 12/17/2014    x2  wrist replaced       Family History   Problem Relation Age of Onset   • Cancer Mother         COLON   • Colon cancer Mother    • Cancer Father         PROSTATE   • Cancer Sister         BREAST CANCER   • Breast cancer Sister    • Gout Sister    • Hypertension Sister    • Heart attack Brother    • Bone cancer Brother    • Heart disease Brother    • Malig Hyperthermia Neg Hx        Social History     Tobacco Use   • Smoking status: Former Smoker     Packs/day: 1.00     Years: 20.00     Pack years: 20.00     Types: Cigarettes     Start date:      Quit date: 1984     Years since quittin.8   • Smokeless tobacco: Former User     Types: Chew   Substance Use Topics   • Alcohol use: Yes     Comment: 3-4 MONTHLY            Objective     Vitals:    10/25/21 0744   BP: 140/60   Pulse: 92   Temp: 98.6 °F (37 °C)   SpO2: 98%     Body mass index is 40.1 kg/m².    Physical Exam  Vitals reviewed.   Constitutional:       Appearance: He is well-developed. He is not diaphoretic.   HENT:      Head: Normocephalic and atraumatic.   Eyes:      General: No scleral icterus.     Pupils: Pupils are equal, round, and reactive to light.   Neck:      Thyroid: No thyromegaly.   Cardiovascular:      Rate and Rhythm: Normal rate and regular rhythm.      Heart sounds: No murmur heard.  No friction rub. No gallop.    Pulmonary:      Effort: Pulmonary  effort is normal. No respiratory distress.      Breath sounds: No wheezing or rales.   Chest:      Chest wall: No tenderness.   Abdominal:      General: Bowel sounds are normal. There is no distension.      Palpations: Abdomen is soft.      Tenderness: There is no abdominal tenderness.   Musculoskeletal:         General: No deformity. Normal range of motion.   Lymphadenopathy:      Cervical: No cervical adenopathy.   Skin:     General: Skin is warm and dry.      Findings: No rash.   Neurological:      Cranial Nerves: No cranial nerve deficit.      Motor: No abnormal muscle tone.         Lab Results   Component Value Date    GLUCOSE 226 (H) 08/20/2021    BUN 16 08/20/2021    CREATININE 1.04 08/20/2021    EGFRIFNONA 70 08/20/2021    EGFRIFAFRI 97 05/06/2021    BCR 15.4 08/20/2021    K 4.0 08/20/2021    CO2 26.2 08/20/2021    CALCIUM 8.5 (L) 08/20/2021    PROTENTOTREF 6.4 05/06/2021    ALBUMIN 4.20 08/11/2021    LABIL2 2.0 05/06/2021    AST 37 08/11/2021    ALT 42 (H) 08/11/2021       WBC   Date Value Ref Range Status   08/20/2021 8.15 3.40 - 10.80 10*3/mm3 Final   06/15/2020 5.60 3.40 - 10.80 10*3/mm3 Final   02/13/2020 4.97 3.40 - 10.80 10*3/mm3 Final     RBC   Date Value Ref Range Status   08/20/2021 3.90 (L) 4.14 - 5.80 10*6/mm3 Final   02/13/2020 4.60 4.14 - 5.80 10*6/mm3 Final     Hemoglobin   Date Value Ref Range Status   08/20/2021 11.7 (L) 13.0 - 17.7 g/dL Final     Hematocrit   Date Value Ref Range Status   08/20/2021 35.1 (L) 37.5 - 51.0 % Final     MCV   Date Value Ref Range Status   08/20/2021 90.0 79.0 - 97.0 fL Final     MCH   Date Value Ref Range Status   08/20/2021 30.0 26.6 - 33.0 pg Final     MCHC   Date Value Ref Range Status   08/20/2021 33.3 31.5 - 35.7 g/dL Final     RDW   Date Value Ref Range Status   08/20/2021 13.0 12.3 - 15.4 % Final     RDW-SD   Date Value Ref Range Status   08/20/2021 42.8 37.0 - 54.0 fl Final     MPV   Date Value Ref Range Status   08/20/2021 10.3 6.0 - 12.0 fL Final      Platelets   Date Value Ref Range Status   08/20/2021 149 140 - 450 10*3/mm3 Final     Neutrophil %   Date Value Ref Range Status   06/15/2020 63.1 42.7 - 76.0 % Final     Lymphocyte %   Date Value Ref Range Status   06/15/2020 19.5 (L) 19.6 - 45.3 % Final     Monocyte %   Date Value Ref Range Status   06/15/2020 12.1 (H) 5.0 - 12.0 % Final     Eosinophil %   Date Value Ref Range Status   06/15/2020 2.3 0.3 - 6.2 % Final     Basophil %   Date Value Ref Range Status   06/15/2020 0.7 0.0 - 1.5 % Final     Immature Grans %   Date Value Ref Range Status   06/15/2020 2.3 (H) 0.0 - 0.5 % Final     Neutrophils, Absolute   Date Value Ref Range Status   06/15/2020 3.53 1.70 - 7.00 10*3/mm3 Final     Lymphocytes, Absolute   Date Value Ref Range Status   06/15/2020 1.09 0.70 - 3.10 10*3/mm3 Final     Monocytes, Absolute   Date Value Ref Range Status   06/15/2020 0.68 0.10 - 0.90 10*3/mm3 Final     Eosinophils, Absolute   Date Value Ref Range Status   06/15/2020 0.13 0.00 - 0.40 10*3/mm3 Final     Basophils, Absolute   Date Value Ref Range Status   06/15/2020 0.04 0.00 - 0.20 10*3/mm3 Final     Immature Grans, Absolute   Date Value Ref Range Status   06/15/2020 0.13 (H) 0.00 - 0.05 10*3/mm3 Final     nRBC   Date Value Ref Range Status   06/15/2020 0.0 0.0 - 0.2 /100 WBC Final       Lab Results   Component Value Date    HGBA1C 7.70 (H) 08/21/2021       Lab Results   Component Value Date    AHISCKYJ29 334 06/04/2019       TSH   Date Value Ref Range Status   05/06/2021 2.120 0.270 - 4.200 uIU/mL Final   06/04/2019 2.220 0.270 - 4.200 mIU/mL Final       No results found for: CHOL  Lab Results   Component Value Date    TRIG 254 (H) 05/06/2021     Lab Results   Component Value Date    HDL 33 (L) 05/06/2021     Lab Results   Component Value Date    LDL 49 05/06/2021     Lab Results   Component Value Date    VLDL 40 05/06/2021     No results found for: LDLHDL      Procedures    Assessment/Plan   Problems Addressed this Visit      Constipation    Relevant Medications    sennosides-docusate (senna-docusate sodium) 8.6-50 MG per tablet    Hypertension    Type 2 diabetes mellitus with hyperglycemia (HCC) - Primary    Relevant Medications    metFORMIN (GLUCOPHAGE) 500 MG tablet      Diagnoses       Codes Comments    Type 2 diabetes mellitus with hyperglycemia, without long-term current use of insulin (HCC)    -  Primary ICD-10-CM: E11.65  ICD-9-CM: 250.00, 790.29     Chronic idiopathic constipation     ICD-10-CM: K59.04  ICD-9-CM: 564.00     Primary hypertension     ICD-10-CM: I10  ICD-9-CM: 401.9         DM2.  Uncontrolled.  RF metformin 500 BID.    Constipation.  Uncontrolled/chronic.  Start senna s BID.    HTN.  Controlled.  Continue meds.    No orders of the defined types were placed in this encounter.      Current Outpatient Medications   Medication Sig Dispense Refill   • albuterol sulfate  (90 Base) MCG/ACT inhaler Inhale 2 puffs Every 4 (Four) Hours As Needed for Wheezing.     • ALLERGY SERUM INJECTION Inject  under the skin into the appropriate area as directed 1 (One) Time Per Week.     • amLODIPine (NORVASC) 2.5 MG tablet TAKE 1 TABLET BY MOUTH DAILY 90 tablet 3   • aspirin  MG tablet Take 1 tablet by mouth 2 (Two) Times a Day With Meals. 60 tablet 0   • BRIMONIDINE TARTRATE OP Apply 1 drop to eye(s) as directed by provider 2 (Two) Times a Day.     • dorzolamide-timolol (COSOPT) 22.3-6.8 MG/ML ophthalmic solution Administer 1 drop to both eyes 2 (Two) Times a Day.     • fexofenadine (ALLEGRA) 180 MG tablet Take 180 mg by mouth Daily.     • Fluticasone-Salmeterol 232-14 MCG/ACT aerosol powder  Inhale 1 puff 2 (Two) Times a Day.     • furosemide (LASIX) 40 MG tablet TAKE 1 TABLET BY MOUTH EVERY DAY (Patient taking differently: Take 40 mg by mouth Daily.) 90 tablet 3   • latanoprost (XALATAN) 0.005 % ophthalmic solution Administer 1 drop to both eyes Every Night.     • levothyroxine (SYNTHROID, LEVOTHROID) 88 MCG tablet TAKE  1 TABLET BY MOUTH EVERY MORNING 90 tablet 3   • losartan (COZAAR) 100 MG tablet Take 1 tablet by mouth Daily. 90 tablet 3   • meloxicam (MOBIC) 15 MG tablet Take 15 mg by mouth Daily.     • metFORMIN (GLUCOPHAGE) 500 MG tablet Take 1 tablet by mouth 2 (Two) Times a Day With Meals. 180 tablet 3   • montelukast (SINGULAIR) 10 MG tablet Take 1 tablet by mouth Every Night. 90 tablet 3   • pantoprazole (PROTONIX) 40 MG EC tablet TAKE 1 TABLET BY MOUTH TWICE DAILY 90 tablet 3   • pramipexole (MIRAPEX) 1.5 MG tablet Take 1 tablet by mouth At Night As Needed (RLS). (Patient taking differently: Take 1.5 mg by mouth 2 (Two) Times a Day.) 90 tablet 2   • rosuvastatin (CRESTOR) 20 MG tablet Take 1 tablet by mouth Every Evening. 90 tablet 3   • traMADol (ULTRAM) 50 MG tablet Take  mg by mouth. for pain     • oxyCODONE-acetaminophen (PERCOCET) 5-325 MG per tablet Take 1-2 tablets by mouth Every 4 (Four) Hours As Needed (Pain). 60 tablet 0   • sennosides-docusate (senna-docusate sodium) 8.6-50 MG per tablet Take 1 tablet by mouth Daily. 60 tablet 4     No current facility-administered medications for this visit.       Nathan Baez had no medications administered during this visit.    Return in about 6 weeks (around 12/6/2021).    There are no Patient Instructions on file for this visit.

## 2021-10-25 NOTE — PROGRESS NOTES
Physical Therapy Daily Progress Note  Visit: 4    Nathan Baez reports:  I'm feeling good today, my back and hips feel good right now, no pain.      Subjective     Objective   See Exercise, Manual, and Modality Logs for complete treatment.     Assessment & Plan     Assessment  Assessment details:   Tolerated continued progression of therapeutic exercise well today, no increased pain reported during or after exercises.  Demos good recall of posture training over past few visits, able to maintain good seated posture with activity.     Plan  Plan details: Progress per POC        Manual Therapy:    -     mins  53491;  Therapeutic Exercise:    41     mins  06493;     Neuromuscular Jon:    -    mins  46250;    Therapeutic Activity:     -     mins  83375;     Gait Training:      -     mins  21295;     Ultrasound:     -     mins  58004;    Electrical Stimulation:    -     mins  32100 ( );  Dry Needling     -     mins self-pay    Timed Treatment:   41   mins   Total Treatment:     49   mins    ZAYDA Noyola License #[id number]  Physical Therapist Assistant

## 2021-11-01 ENCOUNTER — TREATMENT (OUTPATIENT)
Dept: PHYSICAL THERAPY | Facility: CLINIC | Age: 76
End: 2021-11-01

## 2021-11-01 DIAGNOSIS — M25.551 RIGHT HIP PAIN: Primary | ICD-10-CM

## 2021-11-01 DIAGNOSIS — M54.41 RIGHT-SIDED LOW BACK PAIN WITH RIGHT-SIDED SCIATICA, UNSPECIFIED CHRONICITY: ICD-10-CM

## 2021-11-01 PROCEDURE — 97110 THERAPEUTIC EXERCISES: CPT | Performed by: PHYSICAL THERAPIST

## 2021-11-01 PROCEDURE — 97530 THERAPEUTIC ACTIVITIES: CPT | Performed by: PHYSICAL THERAPIST

## 2021-11-01 NOTE — PROGRESS NOTES
Physical Therapy Daily Progress Note  Visit: 5    Nathan Baez reports:  Doing well overall, hip is feeling tight, some pain in center low back.      Subjective     Objective   See Exercise, Manual, and Modality Logs for complete treatment.     Assessment & Plan     Assessment  Assessment details:   Tolerated continued progression of therapeutic exercise well today, no increased pain reported during or after exercises.  Notes some soreness in hip after step activity.      Goals  Plan Goals: SHORT TERM GOALS: 6-8 visits  1. Pt will be compliant with HEP - MET  2. Pt will have TUG score of 15 sec or less in order to decrease risk for falls.  3.  Pt to perform 5 x STS in 30 sec to demonstrate improve safety with positional changes.  4. Pt will be able to ambulate for 3 min without rest break due to improved endurance and strength.  5. Pt will demonstrate 4/5 B LE strength in order to improve gait, transfers and stair climbing ability.    Plan  Plan details: Progress per POC        Manual Therapy:    -     mins  91297;  Therapeutic Exercise:    30     mins  27430;     Neuromuscular Jon:    -    mins  86182;    Therapeutic Activity:     10     mins  77846;     Gait Training:      -     mins  54531;     Ultrasound:     -     mins  29535;    Electrical Stimulation:    -     mins  45405 ( );  Dry Needling     -     mins self-pay    Timed Treatment:   40   mins   Total Treatment:     47   mins    ZAYDA Noyola License #[id number]  Physical Therapist Assistant

## 2021-11-03 ENCOUNTER — TREATMENT (OUTPATIENT)
Dept: PHYSICAL THERAPY | Facility: CLINIC | Age: 76
End: 2021-11-03

## 2021-11-03 DIAGNOSIS — R26.2 DIFFICULTY WALKING: ICD-10-CM

## 2021-11-03 DIAGNOSIS — M54.41 RIGHT-SIDED LOW BACK PAIN WITH RIGHT-SIDED SCIATICA, UNSPECIFIED CHRONICITY: ICD-10-CM

## 2021-11-03 DIAGNOSIS — M25.551 RIGHT HIP PAIN: Primary | ICD-10-CM

## 2021-11-03 PROCEDURE — 97116 GAIT TRAINING THERAPY: CPT | Performed by: PHYSICAL THERAPIST

## 2021-11-03 PROCEDURE — 97110 THERAPEUTIC EXERCISES: CPT | Performed by: PHYSICAL THERAPIST

## 2021-11-03 NOTE — PROGRESS NOTES
Physical Therapy Daily Progress Note  Visit: 6    Nathan Baez reports: I am doing alright I suppose    Subjective     Objective   See Exercise, Manual, and Modality Logs for complete treatment.       Assessment & Plan     Assessment  Assessment details: Pt did well. Fatigues fast due to weakness and low activity tolerance. Educated to make sure he is exercising at home and building his endurance through walking.     Plan  Plan details: Progress per POC        Manual Therapy:    -     mins  93060;  Therapeutic Exercise:    35     mins  63156;     Neuromuscular Jon:    -    mins  24534;    Therapeutic Activity:     -     mins  70432;     Gait Training:      10     mins  27446;     Ultrasound:     -     mins  34663;    Electrical Stimulation:    -     mins  13168 ( );  Dry Needling     -     mins self-pay    Timed Treatment:   45   mins   Total Treatment:     46   mins    Elizabeth Chavez PT  [place] License #: [id number]    Physical Therapist

## 2021-11-08 ENCOUNTER — TREATMENT (OUTPATIENT)
Dept: PHYSICAL THERAPY | Facility: CLINIC | Age: 76
End: 2021-11-08

## 2021-11-08 DIAGNOSIS — M54.41 RIGHT-SIDED LOW BACK PAIN WITH RIGHT-SIDED SCIATICA, UNSPECIFIED CHRONICITY: ICD-10-CM

## 2021-11-08 DIAGNOSIS — R26.2 DIFFICULTY WALKING: ICD-10-CM

## 2021-11-08 DIAGNOSIS — M25.551 RIGHT HIP PAIN: Primary | ICD-10-CM

## 2021-11-08 PROCEDURE — 97530 THERAPEUTIC ACTIVITIES: CPT | Performed by: PHYSICAL THERAPIST

## 2021-11-08 PROCEDURE — 97110 THERAPEUTIC EXERCISES: CPT | Performed by: PHYSICAL THERAPIST

## 2021-11-08 NOTE — PROGRESS NOTES
Physical Therapy Daily Progress Note  Visit: 7    Nathan Sasha reports: I am tired today. I have been working at home. I have been on my feet all morning. I still feel weak    Subjective     Objective   See Exercise, Manual, and Modality Logs for complete treatment.       Assessment & Plan     Assessment  Assessment details: Pt doing well. A bit fatigued today from working this morning. Added sit-stand without UE support and step ups with right leg for HEP    Plan  Plan details: Progress with (B) LE strength        Manual Therapy:    -     mins  68319;  Therapeutic Exercise:    30     mins  48166;     Neuromuscular Jon:    -    mins  15259;    Therapeutic Activity:     9     mins  68287;     Gait Training:      -     mins  31130;     Ultrasound:     -     mins  89361;    Electrical Stimulation:    -     mins  20149 ( );  Dry Needling     -     mins self-pay    Timed Treatment:   39   mins   Total Treatment:     40   mins    Elizabeth Chavez PT  [place] License #: [id number]    Physical Therapist

## 2021-11-11 ENCOUNTER — TREATMENT (OUTPATIENT)
Dept: PHYSICAL THERAPY | Facility: CLINIC | Age: 76
End: 2021-11-11

## 2021-11-11 DIAGNOSIS — M25.551 RIGHT HIP PAIN: Primary | ICD-10-CM

## 2021-11-11 DIAGNOSIS — R26.2 DIFFICULTY WALKING: ICD-10-CM

## 2021-11-11 DIAGNOSIS — M54.41 RIGHT-SIDED LOW BACK PAIN WITH RIGHT-SIDED SCIATICA, UNSPECIFIED CHRONICITY: ICD-10-CM

## 2021-11-11 PROCEDURE — 97530 THERAPEUTIC ACTIVITIES: CPT | Performed by: PHYSICAL THERAPIST

## 2021-11-11 PROCEDURE — 97112 NEUROMUSCULAR REEDUCATION: CPT | Performed by: PHYSICAL THERAPIST

## 2021-11-11 PROCEDURE — 97110 THERAPEUTIC EXERCISES: CPT | Performed by: PHYSICAL THERAPIST

## 2021-11-11 NOTE — PROGRESS NOTES
Physical Therapy Daily Progress Note  Visit: 8    Nathan Sasha reports: I feel like I am walking down the steps better    Subjective     Objective   See Exercise, Manual, and Modality Logs for complete treatment.       Assessment & Plan     Assessment  Assessment details: Pt tolerated treatment today with minimal rest breaks. Good progression with weight bearing, balance and stair climbing today    Plan  Plan details: Reassess to determine need for further therapy        Manual Therapy:    -     mins  00868;  Therapeutic Exercise:    22     mins  28166;     Neuromuscular Jon:    8    mins  39019;    Therapeutic Activity:     10     mins  56353;     Gait Training:      -     mins  13056;     Ultrasound:     -     mins  63959;    Electrical Stimulation:    -     mins  31750 ( );  Dry Needling     -     mins self-pay    Timed Treatment:   40   mins   Total Treatment:     41   mins    Elizabeth Chavez PT  [place] License #: [id number]    Physical Therapist

## 2021-11-15 ENCOUNTER — TREATMENT (OUTPATIENT)
Dept: PHYSICAL THERAPY | Facility: CLINIC | Age: 76
End: 2021-11-15

## 2021-11-15 DIAGNOSIS — R26.2 DIFFICULTY WALKING: ICD-10-CM

## 2021-11-15 DIAGNOSIS — M25.551 RIGHT HIP PAIN: Primary | ICD-10-CM

## 2021-11-15 DIAGNOSIS — M54.41 RIGHT-SIDED LOW BACK PAIN WITH RIGHT-SIDED SCIATICA, UNSPECIFIED CHRONICITY: ICD-10-CM

## 2021-11-15 PROCEDURE — 97112 NEUROMUSCULAR REEDUCATION: CPT | Performed by: PHYSICAL THERAPIST

## 2021-11-15 PROCEDURE — 97530 THERAPEUTIC ACTIVITIES: CPT | Performed by: PHYSICAL THERAPIST

## 2021-11-15 PROCEDURE — 97110 THERAPEUTIC EXERCISES: CPT | Performed by: PHYSICAL THERAPIST

## 2021-11-15 NOTE — PROGRESS NOTES
Physical Therapy Daily Progress Note  Visit: 9    Nathan Baez reports: I can walk much longer at this time. I do feel stronger than I did. I am able to go up and down steps now. I have been exercising at home. Pain is 0/10 at this time and I never really have much pain    Subjective     Objective          Strength/Myotome Testing     Left Hip   Planes of Motion   Flexion: 5    Right Hip   Planes of Motion   Flexion: 5    Left Knee   Flexion: 5  Extension: 5    Right Knee   Flexion: 5  Extension: 5    Left Ankle/Foot   Dorsiflexion: 5    Right Ankle/Foot   Dorsiflexion: 5    Ambulation     Comments   TUG: 10 sec without AD  30 sec sit-stand: 10 reps      See Exercise, Manual, and Modality Logs for complete treatment.       Assessment & Plan     Assessment  Assessment details: Pt has made excellent progress in PT. He is back to his normal level of functioning and reports not having any pain. He is able to ambulate without AD and climb stairs reciprocally. Pt has met PT goals at this time. Education for continued work on HEP    Plan  Plan details: DC unless calls and has set back within the next few weeks        Manual Therapy:    -     mins  27656;  Therapeutic Exercise:    20     mins  05559;     Neuromuscular Jon:    9    mins  00349;    Therapeutic Activity:     10     mins  91947;     Gait Training:      -     mins  08141;     Ultrasound:     -     mins  83780;    Electrical Stimulation:    -     mins  38253 ( );  Dry Needling     -     mins self-pay    Timed Treatment:   39   mins   Total Treatment:     45   mins    Elizabeth Chavez PT  [place] License #: [id number]    Physical Therapist

## 2021-11-22 ENCOUNTER — READMISSION MANAGEMENT (OUTPATIENT)
Dept: CALL CENTER | Facility: HOSPITAL | Age: 76
End: 2021-11-22

## 2021-11-22 NOTE — OUTREACH NOTE
Total Joint Month 3 Survey      Responses   [facility] patient discharged from? [facility]   Does the patient have one of the following disease processes/diagnoses(primary or secondary)? Total Joint Replacement   Joint surgery performed? Hip   Month 3 attempt successful? Yes   Call start time 1620   Call end time 1621   Has the patient been back in either the hospital or Emergency Department since discharge? No   Discharge diagnosis Right Total Hip Arthroplasty   Is the patient taking all medications as directed (includes completed medication regime)? Yes   Has the patient kept scheduled appointments due by today? Yes   Comments Has f/u with surgeon in NOV   Is the patient still attending therapy sessions(either in the home or as an outpatient)? No   Has the patient fallen since discharge? No   Comments Occasionally needing the cane   What is the patient's perception of their functional status since discharge? Improving   If the patient is a current smoker, are they able to teach back resources for cessation? Not a smoker   Is the patient/caregiver able to teach back the hierarchy of who to call/visit for symptoms/problems? PCP, Specialist, Home health nurse, Urgent Care, ED, 911 Yes   Graduated Yes   Is the patient interested in additional calls from an ambulatory ?  NOTE:  applies to high risk patients requiring additional follow-up. No   Did the patient feel the follow up calls were helpful during their recovery period? Yes   Was the number of calls appropriate? Yes   Wrap up additional comments Pt reports he is doing well.  Denies needs at this time.           Ines Diggs RN

## 2021-12-06 ENCOUNTER — OFFICE VISIT (OUTPATIENT)
Dept: FAMILY MEDICINE CLINIC | Facility: CLINIC | Age: 76
End: 2021-12-06

## 2021-12-06 VITALS
TEMPERATURE: 96.8 F | DIASTOLIC BLOOD PRESSURE: 88 MMHG | HEART RATE: 92 BPM | SYSTOLIC BLOOD PRESSURE: 130 MMHG | HEIGHT: 67 IN | OXYGEN SATURATION: 97 % | BODY MASS INDEX: 40.18 KG/M2 | WEIGHT: 256 LBS

## 2021-12-06 DIAGNOSIS — C61 PROSTATE CANCER (HCC): ICD-10-CM

## 2021-12-06 DIAGNOSIS — E78.2 MIXED HYPERLIPIDEMIA: ICD-10-CM

## 2021-12-06 DIAGNOSIS — E03.9 HYPOTHYROIDISM (ACQUIRED): ICD-10-CM

## 2021-12-06 DIAGNOSIS — I10 PRIMARY HYPERTENSION: Primary | ICD-10-CM

## 2021-12-06 DIAGNOSIS — E11.65 TYPE 2 DIABETES MELLITUS WITH HYPERGLYCEMIA, WITHOUT LONG-TERM CURRENT USE OF INSULIN (HCC): ICD-10-CM

## 2021-12-06 PROCEDURE — 99214 OFFICE O/P EST MOD 30 MIN: CPT | Performed by: FAMILY MEDICINE

## 2021-12-06 NOTE — PROGRESS NOTES
Chief Complaint   Patient presents with   • Diabetes       Subjective   Nathan Baez is a 76 y.o. male.     History of Present Illness   F/U DM2.  Doing well with meds.  On metformin 500 BID.  No BS checks.    F/U HTn. No orthostasis.  Doing well with meds.    F/U hyperlipidemia.  N omyalgias.    The following portions of the patient's history were reviewed and updated as appropriate: allergies, current medications, past family history, past medical history, past social history, past surgical history and problem list.    Review of Systems   Constitutional: Negative for appetite change and fatigue.   HENT: Negative for nosebleeds and sore throat.    Eyes: Negative for blurred vision and visual disturbance.   Respiratory: Negative for shortness of breath and wheezing.    Cardiovascular: Negative for chest pain and leg swelling.   Gastrointestinal: Negative for abdominal distention and abdominal pain.   Endocrine: Negative for cold intolerance and polyuria.   Genitourinary: Negative for dysuria and hematuria.   Musculoskeletal: Negative for arthralgias and myalgias.   Skin: Negative for color change and rash.   Neurological: Negative for weakness and confusion.   Psychiatric/Behavioral: Negative for agitation and depressed mood.       Patient Active Problem List   Diagnosis   • OA (osteoarthritis) of knee   • Hypertension   • Abdominal wall cellulitis   • Hypothyroidism (acquired)   • Gastroesophageal reflux disease without esophagitis   • Mixed hyperlipidemia   • Primary insomnia   • DDD (degenerative disc disease), lumbar   • KWAME (obstructive sleep apnea)   • Allergic   • Venous insufficiency   • Prostate cancer (HCC)   • Venous stasis dermatitis   • Tinea cruris   • Tinea corporis   • Throat clearing   • Sleep apnea   • Skin lesion of face   • Rib pain on left side   • Rectal bleed   • Presbycusis   • Pes planus   • Osteoarthritis   • Obesity   • Medicare annual wellness visit, subsequent   • Lumbar strain   •  Internal hemorrhoids   • Insomnia   • Inflamed skin tag   • Hyperplastic polyp of stomach   • Hospital discharge follow-up   • History of colon polyps   • High risk medication use   • Glaucoma   • Gastroenteritis, acute   • Erysipelas   • Encounter for screening colonoscopy   • Encounter for long-term (current) use of NSAIDs   • Dysphagia   • DVT (deep venous thrombosis) (HCC)   • DJD (degenerative joint disease), lumbar   • Diverticulosis   • Cough   • Contact with hypodermic needle   • Cervical radiculopathy   • Cellulitis   • Arthritis   • Allergic rhinitis   • Acute glaucoma   • Acute embolism and thrombosis of vein   • Actinic keratosis   • Status post reverse total shoulder replacement, right   • Localized edema   • Anemia   • Peripheral polyneuropathy   • Campylobacter diarrhea   • Hyponatremia   • Generalized abdominal pain   • Fever   • Constipation   • Bilateral lower extremity edema   • Acute pyelonephritis   • Chronic idiopathic constipation   • Functional diarrhea   • Change in bowel habits   • RLS (restless legs syndrome)   • Type 2 diabetes mellitus with hyperglycemia (formerly [facility])   • Family history of GI malignancy   • Primary osteoarthritis of right hip       No Known Allergies      Current Outpatient Medications:   •  albuterol sulfate  (90 Base) MCG/ACT inhaler, Inhale 2 puffs Every 4 (Four) Hours As Needed for Wheezing., Disp: , Rfl:   •  ALLERGY SERUM INJECTION, Inject  under the skin into the appropriate area as directed 1 (One) Time Per Week., Disp: , Rfl:   •  amLODIPine (NORVASC) 2.5 MG tablet, TAKE 1 TABLET BY MOUTH DAILY, Disp: 90 tablet, Rfl: 3  •  aspirin  MG tablet, Take 1 tablet by mouth 2 (Two) Times a Day With Meals., Disp: 60 tablet, Rfl: 0  •  BRIMONIDINE TARTRATE OP, Apply 1 drop to eye(s) as directed by provider 2 (Two) Times a Day., Disp: , Rfl:   •  dorzolamide-timolol (COSOPT) 22.3-6.8 MG/ML ophthalmic solution, Administer 1 drop to both eyes 2 (Two) Times a Day., Disp:  , Rfl:   •  fexofenadine (ALLEGRA) 180 MG tablet, Take 180 mg by mouth Daily., Disp: , Rfl:   •  Fluticasone-Salmeterol 232-14 MCG/ACT aerosol powder , Inhale 1 puff 2 (Two) Times a Day., Disp: , Rfl:   •  furosemide (LASIX) 40 MG tablet, TAKE 1 TABLET BY MOUTH EVERY DAY (Patient taking differently: Take 40 mg by mouth Daily.), Disp: 90 tablet, Rfl: 3  •  latanoprost (XALATAN) 0.005 % ophthalmic solution, Administer 1 drop to both eyes Every Night., Disp: , Rfl:   •  levothyroxine (SYNTHROID, LEVOTHROID) 88 MCG tablet, TAKE 1 TABLET BY MOUTH EVERY MORNING, Disp: 90 tablet, Rfl: 3  •  losartan (COZAAR) 100 MG tablet, Take 1 tablet by mouth Daily., Disp: 90 tablet, Rfl: 3  •  meloxicam (MOBIC) 15 MG tablet, Take 15 mg by mouth Daily., Disp: , Rfl:   •  metFORMIN (GLUCOPHAGE) 500 MG tablet, Take 1 tablet by mouth 2 (Two) Times a Day With Meals., Disp: 180 tablet, Rfl: 3  •  montelukast (SINGULAIR) 10 MG tablet, Take 1 tablet by mouth Every Night., Disp: 90 tablet, Rfl: 3  •  pantoprazole (PROTONIX) 40 MG EC tablet, TAKE 1 TABLET BY MOUTH TWICE DAILY, Disp: 90 tablet, Rfl: 3  •  pramipexole (MIRAPEX) 1.5 MG tablet, Take 1 tablet by mouth At Night As Needed (RLS). (Patient taking differently: Take 1.5 mg by mouth 2 (Two) Times a Day.), Disp: 90 tablet, Rfl: 2  •  rosuvastatin (CRESTOR) 20 MG tablet, Take 1 tablet by mouth Every Evening., Disp: 90 tablet, Rfl: 3  •  sennosides-docusate (senna-docusate sodium) 8.6-50 MG per tablet, Take 1 tablet by mouth Daily., Disp: 60 tablet, Rfl: 4  •  traMADol (ULTRAM) 50 MG tablet, Take  mg by mouth. for pain, Disp: , Rfl:     Past Medical History:   Diagnosis Date   • Actinic keratosis    • Acute embolism and thrombosis of vein    • Allergic    • Allergic rhinitis    • Arthritis    • Cataract    • Cervical radiculopathy    • Chronic constipation    • Diabetes mellitus (HCC)    • Disequilibrium    • DJD (degenerative joint disease), lumbar    • Elevated cholesterol    •  Erysipelas    • GERD (gastroesophageal reflux disease)    • Glaucoma    • Hip pain, chronic, right    • History of kidney stones     X1   • History of peripheral edema     LOWER LEGS BILAT, COMPRESSION KNEE HIGH    • History of transfusion    • Hyperlipidemia    • Hypertension    • Hypothyroid    • Insomnia    • Limited mobility     RIGHT HIP   • Male urinary stress incontinence     s/p prostatectomy-WEAR PAD   • Obesity    • KWAME (obstructive sleep apnea)    • Osteoarthritis    • Pes planus    • Presbycusis    • Prostate cancer (HCC)    • Restless legs syndrome    • Shoulder pain     RIGHT, LIMITED MOBILITY-AT TIMES   • Sleep apnea     bipap   • Tinea corporis    • Tinea cruris    • Unsteady gait     RIGHT HIP   • Weakness     RIGHT HIP       Past Surgical History:   Procedure Laterality Date   • CATARACT EXTRACTION, BILATERAL Bilateral    • CERVICAL DISCECTOMY ANTERIOR     • COLONOSCOPY  03/08/2017    3/17normal. Recheck 2022. 12/11. Repeat in 5 years    • COLONOSCOPY N/A 4/19/2021    Procedure: COLONOSCOPY INTO CECUM WITH HOT & COLD  SNARE POLYPECTOMIES;  Surgeon: Jaden Chowdhury MD;  Location: [facility] ENDOSCOPY;  Service: Gastroenterology;  Laterality: N/A;  PRE: CHANGE IN BOWEL HABITS; FAMILY H/O COLON CANCER  POST: DIVERTICULOSIS, POLYPS   • ENDOSCOPY W/ PEG REMOVAL     • FOOT SURGERY      1998 had big toe replaced on left foot-METAL    • HERNIA REPAIR  03/07/2012    umbilical   • JOINT REPLACEMENT Right 2014   • ME TOTAL KNEE ARTHROPLASTY Left 9/13/2016    Procedure: LT TOTAL KNEE ARTHROPLASTY;  Surgeon: Tadeo Basurto MD;  Location: [facility] OR;  Service: Orthopedics   • PROSTATECTOMY  07/1999 July 1999. Radical    • THYROID LOBECTOMY     • TONSILLECTOMY     • TOTAL HIP ARTHROPLASTY Right 8/19/2021    Procedure: TOTAL HIP ARTHROPLASTY RIGHT POSTERIOR;  Surgeon: Tadeo Basurto MD;  Location: [facility] OR;  Service: Orthopedics;  Laterality: Right;   • TOTAL KNEE ARTHROPLASTY Right 2014   •  TOTAL SHOULDER ARTHROPLASTY W/ DISTAL CLAVICLE EXCISION Right 2019    Procedure: TOTAL SHOULDER REVERSE ARTHROPLASTY RIGHT REPAIR RIGHT AXILLARY VEIN;  Surgeon: Behzad Kelley MD;  Location: [facility] OR [facility] [place] [facility];  Service: Orthopedics   • WRIST SURGERY Right 12/17/2014    x2  wrist replaced       Family History   Problem Relation Age of Onset   • Cancer Mother         COLON   • Colon cancer Mother    • Cancer Father         PROSTATE   • Cancer Sister         BREAST CANCER   • Breast cancer Sister    • Gout Sister    • Hypertension Sister    • Heart attack Brother    • Bone cancer Brother    • Heart disease Brother    • Malig Hyperthermia Neg Hx        Social History     Tobacco Use   • Smoking status: Former Smoker     Packs/day: 1.00     Years: 20.00     Pack years: 20.00     Types: Cigarettes     Start date:      Quit date: 1984     Years since quittin.9   • Smokeless tobacco: Former User     Types: Chew   Substance Use Topics   • Alcohol use: Yes     Comment: 3-4 MONTHLY            Objective     Vitals:    21 1050   BP: 130/88   Pulse:    Temp:    SpO2:      Body mass index is 40.1 kg/m².    Physical Exam  Vitals reviewed.   Constitutional:       Appearance: He is well-developed. He is not diaphoretic.   HENT:      Head: Normocephalic and atraumatic.   Eyes:      General: No scleral icterus.     Pupils: Pupils are equal, round, and reactive to light.   Neck:      Thyroid: No thyromegaly.   Cardiovascular:      Rate and Rhythm: Normal rate and regular rhythm.      Heart sounds: No murmur heard.  No friction rub. No gallop.    Pulmonary:      Effort: Pulmonary effort is normal. No respiratory distress.      Breath sounds: No wheezing or rales.   Chest:      Chest wall: No tenderness.   Abdominal:      General: Bowel sounds are normal. There is no distension.      Palpations: Abdomen is soft.      Tenderness: There is no abdominal tenderness.   Musculoskeletal:         General: No deformity.  Normal range of motion.   Lymphadenopathy:      Cervical: No cervical adenopathy.   Skin:     General: Skin is warm and dry.      Findings: No rash.   Neurological:      Cranial Nerves: No cranial nerve deficit.      Motor: No abnormal muscle tone.         Lab Results   Component Value Date    GLUCOSE 226 (H) 08/20/2021    BUN 16 08/20/2021    CREATININE 1.04 08/20/2021    EGFRIFNONA 70 08/20/2021    EGFRIFAFRI 97 05/06/2021    BCR 15.4 08/20/2021    K 4.0 08/20/2021    CO2 26.2 08/20/2021    CALCIUM 8.5 (L) 08/20/2021    PROTENTOTREF 6.4 05/06/2021    ALBUMIN 4.20 08/11/2021    LABIL2 2.0 05/06/2021    AST 37 08/11/2021    ALT 42 (H) 08/11/2021       WBC   Date Value Ref Range Status   08/20/2021 8.15 3.40 - 10.80 10*3/mm3 Final   06/15/2020 5.60 3.40 - 10.80 10*3/mm3 Final   02/13/2020 4.97 3.40 - 10.80 10*3/mm3 Final     RBC   Date Value Ref Range Status   08/20/2021 3.90 (L) 4.14 - 5.80 10*6/mm3 Final   02/13/2020 4.60 4.14 - 5.80 10*6/mm3 Final     Hemoglobin   Date Value Ref Range Status   08/20/2021 11.7 (L) 13.0 - 17.7 g/dL Final     Hematocrit   Date Value Ref Range Status   08/20/2021 35.1 (L) 37.5 - 51.0 % Final     MCV   Date Value Ref Range Status   08/20/2021 90.0 79.0 - 97.0 fL Final     MCH   Date Value Ref Range Status   08/20/2021 30.0 26.6 - 33.0 pg Final     MCHC   Date Value Ref Range Status   08/20/2021 33.3 31.5 - 35.7 g/dL Final     RDW   Date Value Ref Range Status   08/20/2021 13.0 12.3 - 15.4 % Final     RDW-SD   Date Value Ref Range Status   08/20/2021 42.8 37.0 - 54.0 fl Final     MPV   Date Value Ref Range Status   08/20/2021 10.3 6.0 - 12.0 fL Final     Platelets   Date Value Ref Range Status   08/20/2021 149 140 - 450 10*3/mm3 Final     Neutrophil %   Date Value Ref Range Status   06/15/2020 63.1 42.7 - 76.0 % Final     Lymphocyte %   Date Value Ref Range Status   06/15/2020 19.5 (L) 19.6 - 45.3 % Final     Monocyte %   Date Value Ref Range Status   06/15/2020 12.1 (H) 5.0 - 12.0 %  Final     Eosinophil %   Date Value Ref Range Status   06/15/2020 2.3 0.3 - 6.2 % Final     Basophil %   Date Value Ref Range Status   06/15/2020 0.7 0.0 - 1.5 % Final     Immature Grans %   Date Value Ref Range Status   06/15/2020 2.3 (H) 0.0 - 0.5 % Final     Neutrophils, Absolute   Date Value Ref Range Status   06/15/2020 3.53 1.70 - 7.00 10*3/mm3 Final     Lymphocytes, Absolute   Date Value Ref Range Status   06/15/2020 1.09 0.70 - 3.10 10*3/mm3 Final     Monocytes, Absolute   Date Value Ref Range Status   06/15/2020 0.68 0.10 - 0.90 10*3/mm3 Final     Eosinophils, Absolute   Date Value Ref Range Status   06/15/2020 0.13 0.00 - 0.40 10*3/mm3 Final     Basophils, Absolute   Date Value Ref Range Status   06/15/2020 0.04 0.00 - 0.20 10*3/mm3 Final     Immature Grans, Absolute   Date Value Ref Range Status   06/15/2020 0.13 (H) 0.00 - 0.05 10*3/mm3 Final     nRBC   Date Value Ref Range Status   06/15/2020 0.0 0.0 - 0.2 /100 WBC Final       Lab Results   Component Value Date    HGBA1C 7.70 (H) 08/21/2021       Lab Results   Component Value Date    POXTLDLA86 334 06/04/2019       TSH   Date Value Ref Range Status   05/06/2021 2.120 0.270 - 4.200 uIU/mL Final   06/04/2019 2.220 0.270 - 4.200 mIU/mL Final       No results found for: CHOL  Lab Results   Component Value Date    TRIG 254 (H) 05/06/2021     Lab Results   Component Value Date    HDL 33 (L) 05/06/2021     Lab Results   Component Value Date    LDL 49 05/06/2021     Lab Results   Component Value Date    VLDL 40 05/06/2021     No results found for: LDLHDL      Procedures    Assessment/Plan   Problems Addressed this Visit     Hypertension - Primary    Relevant Orders    Comprehensive Metabolic Panel    Lipid Panel With / Chol / HDL Ratio    Hypothyroidism (acquired)    Relevant Orders    TSH    Mixed hyperlipidemia    Relevant Orders    Lipid Panel With / Chol / HDL Ratio    Prostate cancer (HCC)    Relevant Orders    PSA DIAGNOSTIC    Type 2 diabetes mellitus  with hyperglycemia (HCC)    Relevant Orders    Comprehensive Metabolic Panel    Hemoglobin A1c    Vitamin B12      Diagnoses       Codes Comments    Primary hypertension    -  Primary ICD-10-CM: I10  ICD-9-CM: 401.9     Hypothyroidism (acquired)     ICD-10-CM: E03.9  ICD-9-CM: 244.9     Type 2 diabetes mellitus with hyperglycemia, without long-term current use of insulin (HCC)     ICD-10-CM: E11.65  ICD-9-CM: 250.00, 790.29     Mixed hyperlipidemia     ICD-10-CM: E78.2  ICD-9-CM: 272.2     Prostate cancer (HCC)     ICD-10-CM: C61  ICD-9-CM: 185         DM2.  Doing well with meds.  Check A1c.    Hypothyroidism.  Check TSH.   Continue replacement.    HTN.  Controlled.  Continue meds.  Check CMP.    Hyperlipidemia.  Check FLP.   Continue rosuvastatin.     Orders Placed This Encounter   Procedures   • Comprehensive Metabolic Panel     Order Specific Question:   Release to patient     Answer:   Immediate   • Lipid Panel With / Chol / HDL Ratio     Order Specific Question:   Release to patient     Answer:   Immediate   • Hemoglobin A1c     Order Specific Question:   Release to patient     Answer:   Immediate   • TSH     Order Specific Question:   Release to patient     Answer:   Immediate   • Vitamin B12     Order Specific Question:   Release to patient     Answer:   Immediate   • PSA DIAGNOSTIC     Order Specific Question:   Release to patient     Answer:   Immediate       Current Outpatient Medications   Medication Sig Dispense Refill   • albuterol sulfate  (90 Base) MCG/ACT inhaler Inhale 2 puffs Every 4 (Four) Hours As Needed for Wheezing.     • ALLERGY SERUM INJECTION Inject  under the skin into the appropriate area as directed 1 (One) Time Per Week.     • amLODIPine (NORVASC) 2.5 MG tablet TAKE 1 TABLET BY MOUTH DAILY 90 tablet 3   • aspirin  MG tablet Take 1 tablet by mouth 2 (Two) Times a Day With Meals. 60 tablet 0   • BRIMONIDINE TARTRATE OP Apply 1 drop to eye(s) as directed by provider 2 (Two)  Times a Day.     • dorzolamide-timolol (COSOPT) 22.3-6.8 MG/ML ophthalmic solution Administer 1 drop to both eyes 2 (Two) Times a Day.     • fexofenadine (ALLEGRA) 180 MG tablet Take 180 mg by mouth Daily.     • Fluticasone-Salmeterol 232-14 MCG/ACT aerosol powder  Inhale 1 puff 2 (Two) Times a Day.     • furosemide (LASIX) 40 MG tablet TAKE 1 TABLET BY MOUTH EVERY DAY (Patient taking differently: Take 40 mg by mouth Daily.) 90 tablet 3   • latanoprost (XALATAN) 0.005 % ophthalmic solution Administer 1 drop to both eyes Every Night.     • levothyroxine (SYNTHROID, LEVOTHROID) 88 MCG tablet TAKE 1 TABLET BY MOUTH EVERY MORNING 90 tablet 3   • losartan (COZAAR) 100 MG tablet Take 1 tablet by mouth Daily. 90 tablet 3   • meloxicam (MOBIC) 15 MG tablet Take 15 mg by mouth Daily.     • metFORMIN (GLUCOPHAGE) 500 MG tablet Take 1 tablet by mouth 2 (Two) Times a Day With Meals. 180 tablet 3   • montelukast (SINGULAIR) 10 MG tablet Take 1 tablet by mouth Every Night. 90 tablet 3   • pantoprazole (PROTONIX) 40 MG EC tablet TAKE 1 TABLET BY MOUTH TWICE DAILY 90 tablet 3   • pramipexole (MIRAPEX) 1.5 MG tablet Take 1 tablet by mouth At Night As Needed (RLS). (Patient taking differently: Take 1.5 mg by mouth 2 (Two) Times a Day.) 90 tablet 2   • rosuvastatin (CRESTOR) 20 MG tablet Take 1 tablet by mouth Every Evening. 90 tablet 3   • sennosides-docusate (senna-docusate sodium) 8.6-50 MG per tablet Take 1 tablet by mouth Daily. 60 tablet 4   • traMADol (ULTRAM) 50 MG tablet Take  mg by mouth. for pain       No current facility-administered medications for this visit.       Nathan Baez had no medications administered during this visit.    Return in about 4 months (around 4/6/2022).    There are no Patient Instructions on file for this visit.

## 2021-12-07 ENCOUNTER — TELEPHONE (OUTPATIENT)
Dept: FAMILY MEDICINE CLINIC | Facility: CLINIC | Age: 76
End: 2021-12-07

## 2021-12-07 DIAGNOSIS — E53.8 B12 DEFICIENCY: Primary | ICD-10-CM

## 2021-12-07 DIAGNOSIS — Z79.4 TYPE 2 DIABETES MELLITUS WITH HYPERGLYCEMIA, WITH LONG-TERM CURRENT USE OF INSULIN (HCC): ICD-10-CM

## 2021-12-07 DIAGNOSIS — E11.65 TYPE 2 DIABETES MELLITUS WITH HYPERGLYCEMIA, WITH LONG-TERM CURRENT USE OF INSULIN (HCC): ICD-10-CM

## 2021-12-07 LAB
ALBUMIN SERPL-MCNC: 4.2 G/DL (ref 3.7–4.7)
ALBUMIN/GLOB SERPL: 1.8 {RATIO} (ref 1.2–2.2)
ALP SERPL-CCNC: 183 IU/L (ref 44–121)
ALT SERPL-CCNC: 32 IU/L (ref 0–44)
AST SERPL-CCNC: 29 IU/L (ref 0–40)
BILIRUB SERPL-MCNC: 0.6 MG/DL (ref 0–1.2)
BUN SERPL-MCNC: 24 MG/DL (ref 8–27)
BUN/CREAT SERPL: 25 (ref 10–24)
CALCIUM SERPL-MCNC: 9.6 MG/DL (ref 8.6–10.2)
CHLORIDE SERPL-SCNC: 98 MMOL/L (ref 96–106)
CHOLEST SERPL-MCNC: 146 MG/DL (ref 100–199)
CHOLEST/HDLC SERPL: 4.3 RATIO (ref 0–5)
CO2 SERPL-SCNC: 25 MMOL/L (ref 20–29)
CREAT SERPL-MCNC: 0.97 MG/DL (ref 0.76–1.27)
GLOBULIN SER CALC-MCNC: 2.3 G/DL (ref 1.5–4.5)
GLUCOSE SERPL-MCNC: 253 MG/DL (ref 65–99)
HBA1C MFR BLD: 9 % (ref 4.8–5.6)
HDLC SERPL-MCNC: 34 MG/DL
LDLC SERPL CALC-MCNC: 60 MG/DL (ref 0–99)
POTASSIUM SERPL-SCNC: 4.2 MMOL/L (ref 3.5–5.2)
PROT SERPL-MCNC: 6.5 G/DL (ref 6–8.5)
PSA SERPL-MCNC: <0.1 NG/ML (ref 0–4)
SODIUM SERPL-SCNC: 136 MMOL/L (ref 134–144)
TRIGL SERPL-MCNC: 334 MG/DL (ref 0–149)
TSH SERPL DL<=0.005 MIU/L-ACNC: 2.64 UIU/ML (ref 0.45–4.5)
VIT B12 SERPL-MCNC: 276 PG/ML (ref 232–1245)
VLDLC SERPL CALC-MCNC: 52 MG/DL (ref 5–40)

## 2021-12-07 RX ORDER — PEN NEEDLE, DIABETIC 30 GX3/16"
1 NEEDLE, DISPOSABLE MISCELLANEOUS DAILY
Qty: 100 EACH | Refills: 3 | Status: ON HOLD | OUTPATIENT
Start: 2021-12-07 | End: 2023-02-17

## 2021-12-07 RX ORDER — MAGNESIUM 200 MG
TABLET ORAL
Qty: 90 EACH | Refills: 3 | Status: SHIPPED | OUTPATIENT
Start: 2021-12-07

## 2021-12-07 NOTE — TELEPHONE ENCOUNTER
Pt spouse called and was given the lab results stated. She verbalized an understanding and will  medication for pt to begin to take.

## 2021-12-23 ENCOUNTER — TELEPHONE (OUTPATIENT)
Dept: FAMILY MEDICINE CLINIC | Facility: CLINIC | Age: 76
End: 2021-12-23

## 2021-12-23 NOTE — TELEPHONE ENCOUNTER
Caller: Coco Baez    Relationship: Emergency Contact    Best call back number: [phone] (H)    What medications are you currently taking:   Current Outpatient Medications on File Prior to Visit   Medication Sig Dispense Refill   • albuterol sulfate  (90 Base) MCG/ACT inhaler Inhale 2 puffs Every 4 (Four) Hours As Needed for Wheezing.     • ALLERGY SERUM INJECTION Inject  under the skin into the appropriate area as directed 1 (One) Time Per Week.     • amLODIPine (NORVASC) 2.5 MG tablet TAKE 1 TABLET BY MOUTH DAILY 90 tablet 3   • aspirin  MG tablet Take 1 tablet by mouth 2 (Two) Times a Day With Meals. 60 tablet 0   • BRIMONIDINE TARTRATE OP Apply 1 drop to eye(s) as directed by provider 2 (Two) Times a Day.     • Cyanocobalamin (B-12) 1000 MCG sublingual tablet One sublingual tab dissolved on tongue daily 90 each 3   • dorzolamide-timolol (COSOPT) 22.3-6.8 MG/ML ophthalmic solution Administer 1 drop to both eyes 2 (Two) Times a Day.     • fexofenadine (ALLEGRA) 180 MG tablet Take 180 mg by mouth Daily.     • Fluticasone-Salmeterol 232-14 MCG/ACT aerosol powder  Inhale 1 puff 2 (Two) Times a Day.     • furosemide (LASIX) 40 MG tablet TAKE 1 TABLET BY MOUTH EVERY DAY (Patient taking differently: Take 40 mg by mouth Daily.) 90 tablet 3   • Insulin Glargine (LANTUS SOLOSTAR) 100 UNIT/ML injection pen Inject 14 Units under the skin into the appropriate area as directed Daily. 5 pen 5   • Insulin Pen Needle (Pen Needles) 31G X 5 MM misc 1 application Daily. 100 each 3   • latanoprost (XALATAN) 0.005 % ophthalmic solution Administer 1 drop to both eyes Every Night.     • levothyroxine (SYNTHROID, LEVOTHROID) 88 MCG tablet TAKE 1 TABLET BY MOUTH EVERY MORNING 90 tablet 3   • losartan (COZAAR) 100 MG tablet Take 1 tablet by mouth Daily. 90 tablet 3   • meloxicam (MOBIC) 15 MG tablet Take 15 mg by mouth Daily.     • metFORMIN (GLUCOPHAGE) 500 MG tablet Take 1 tablet by mouth 2 (Two) Times a Day With  Meals. 180 tablet 3   • montelukast (SINGULAIR) 10 MG tablet Take 1 tablet by mouth Every Night. 90 tablet 3   • pantoprazole (PROTONIX) 40 MG EC tablet TAKE 1 TABLET BY MOUTH TWICE DAILY 90 tablet 3   • pramipexole (MIRAPEX) 1.5 MG tablet Take 1 tablet by mouth At Night As Needed (RLS). (Patient taking differently: Take 1.5 mg by mouth 2 (Two) Times a Day.) 90 tablet 2   • rosuvastatin (CRESTOR) 20 MG tablet Take 1 tablet by mouth Every Evening. 90 tablet 3   • sennosides-docusate (senna-docusate sodium) 8.6-50 MG per tablet Take 1 tablet by mouth Daily. 60 tablet 4   • traMADol (ULTRAM) 50 MG tablet Take  mg by mouth. for pain       No current facility-administered medications on file prior to visit.          When did you start taking these medications: 12/6/21    Which medication are you concerned about: Insulin Glargine (LANTUS SOLOSTAR) 100 UNIT/ML     Who prescribed you this medication: DR LYLES    What are your concerns: WIFE STATES HAS BECOME MORE THIRSTY AND URINATING CONSTANTLY WIFE IS INQUIRING IF PATIENT SHOULD CONTINUE ON metFORMIN (GLUCOPHAGE) 500 MG tablet AND furosemide (LASIX) 40 MG tablet    How long have you had these concerns:

## 2022-02-17 DIAGNOSIS — E78.5 HYPERLIPIDEMIA, UNSPECIFIED HYPERLIPIDEMIA TYPE: ICD-10-CM

## 2022-02-17 DIAGNOSIS — J30.1 SEASONAL ALLERGIC RHINITIS DUE TO POLLEN: ICD-10-CM

## 2022-02-17 RX ORDER — ROSUVASTATIN CALCIUM 20 MG/1
20 TABLET, COATED ORAL EVERY EVENING
Qty: 90 TABLET | Refills: 3 | Status: SHIPPED | OUTPATIENT
Start: 2022-02-17 | End: 2022-11-11

## 2022-02-17 RX ORDER — LOSARTAN POTASSIUM 100 MG/1
100 TABLET ORAL DAILY
Qty: 90 TABLET | Refills: 3 | Status: SHIPPED | OUTPATIENT
Start: 2022-02-17 | End: 2022-11-11

## 2022-02-17 RX ORDER — MONTELUKAST SODIUM 10 MG/1
10 TABLET ORAL NIGHTLY
Qty: 90 TABLET | Refills: 3 | Status: SHIPPED | OUTPATIENT
Start: 2022-02-17 | End: 2022-11-16

## 2022-02-25 DIAGNOSIS — K21.9 GASTROESOPHAGEAL REFLUX DISEASE WITHOUT ESOPHAGITIS: ICD-10-CM

## 2022-02-25 RX ORDER — PANTOPRAZOLE SODIUM 40 MG/1
TABLET, DELAYED RELEASE ORAL
Qty: 90 TABLET | Refills: 3 | Status: SHIPPED | OUTPATIENT
Start: 2022-02-25 | End: 2022-09-14

## 2022-02-28 DIAGNOSIS — M51.36 DDD (DEGENERATIVE DISC DISEASE), LUMBAR: Primary | ICD-10-CM

## 2022-03-09 ENCOUNTER — TELEPHONE (OUTPATIENT)
Dept: FAMILY MEDICINE CLINIC | Facility: CLINIC | Age: 77
End: 2022-03-09

## 2022-03-10 ENCOUNTER — TELEPHONE (OUTPATIENT)
Dept: FAMILY MEDICINE CLINIC | Facility: CLINIC | Age: 77
End: 2022-03-10

## 2022-03-10 DIAGNOSIS — M47.816 OSTEOARTHRITIS OF LUMBAR SPINE, UNSPECIFIED SPINAL OSTEOARTHRITIS COMPLICATION STATUS: Primary | ICD-10-CM

## 2022-03-10 NOTE — TELEPHONE ENCOUNTER
Patient states he had an MRI of his spine in December at [facility]. Would this work for his pain management appointment?

## 2022-03-10 NOTE — TELEPHONE ENCOUNTER
Tell patient this and let him know I am ordering an MRI of his low back before he sees pain management.

## 2022-03-10 NOTE — TELEPHONE ENCOUNTER
The patient has an appointment with Pain Management 4/14/22 and they are needing imagining of the lumbar spine before the appointment.

## 2022-03-19 RX ORDER — FUROSEMIDE 40 MG/1
TABLET ORAL
Qty: 90 TABLET | Refills: 3 | Status: SHIPPED | OUTPATIENT
Start: 2022-03-19 | End: 2023-03-12 | Stop reason: SDUPTHER

## 2022-03-19 RX ORDER — AMLODIPINE BESYLATE 2.5 MG/1
2.5 TABLET ORAL DAILY
Qty: 90 TABLET | Refills: 3 | Status: SHIPPED | OUTPATIENT
Start: 2022-03-19 | End: 2022-12-17

## 2022-04-12 ENCOUNTER — HOSPITAL ENCOUNTER (OUTPATIENT)
Dept: PAIN MEDICINE | Facility: HOSPITAL | Age: 77
Discharge: HOME | End: 2022-04-12
Admitting: ANESTHESIOLOGY

## 2022-04-12 ENCOUNTER — ANESTHESIA EVENT (OUTPATIENT)
Dept: PAIN MEDICINE | Facility: HOSPITAL | Age: 77
End: 2022-04-12

## 2022-04-12 ENCOUNTER — ANESTHESIA (OUTPATIENT)
Dept: PAIN MEDICINE | Facility: HOSPITAL | Age: 77
End: 2022-04-12

## 2022-04-12 VITALS — WEIGHT: 250 LBS | HEIGHT: 67 IN | BODY MASS INDEX: 39.24 KG/M2

## 2022-04-12 DIAGNOSIS — M99.53 INTERVERTEBRAL DISC STENOSIS OF NEURAL CANAL OF LUMBAR REGION: Primary | ICD-10-CM

## 2022-04-12 PROCEDURE — G0463 HOSPITAL OUTPT CLINIC VISIT: HCPCS

## 2022-04-12 PROCEDURE — C1755 CATHETER, INTRASPINAL: HCPCS | Performed by: ANESTHESIOLOGY

## 2022-04-12 NOTE — ANESTHESIA PROCEDURE NOTES
No procedure was performed today.  We are awaiting insurance approval.      Patient reassessed immediately prior to procedure    Performed By  Anesthesiologist: Kleber Hassan MD  Additional Notes  The patient did not have a procedure today.  We are awaiting insurance approval.

## 2022-04-12 NOTE — H&P
CHIEF COMPLAINT:  Low back pain        HISTORY OF PRESENT ILLNESS:  This patient is complaining of low back pain which radiates down his right lower extremity.  It ranges between a 6 and 8 out of 10 on the pain scale.  The pain is described as aching in nature. The pain is exacerbated by standing and walking, particularly in an upright position, and relieved by rest, lying down, sitting.  The patient has tried conservative therapy for greater than 6 weeks including: Rest, and physical therapy.  The pain is significantly decreasing the patient's Activities of Daily Living.      Diagnostic imaging: MRI of the lumbar spine without contrast from 12/16/2021 shows multilevel degeneration with stenosis and a disc extrusion noted at L3-L4.  L4-L5 seems to be likely involved as well.  The full dictation by the radiologist is available in the chart.     This patient was referred to our clinic by Dr. Damien Pierce for consideration of interventional pain management options.         PAST MEDICAL HISTORY:  Current Outpatient Medications on File Prior to Encounter   Medication Sig Dispense Refill   • albuterol sulfate  (90 Base) MCG/ACT inhaler Inhale 2 puffs Every 4 (Four) Hours As Needed for Wheezing.     • ALLERGY SERUM INJECTION Inject  under the skin into the appropriate area as directed 1 (One) Time Per Week.     • amLODIPine (NORVASC) 2.5 MG tablet TAKE 1 TABLET BY MOUTH DAILY 90 tablet 3   • aspirin  MG tablet Take 1 tablet by mouth 2 (Two) Times a Day With Meals. 60 tablet 0   • BRIMONIDINE TARTRATE OP Apply 1 drop to eye(s) as directed by provider 2 (Two) Times a Day.     • Cyanocobalamin (B-12) 1000 MCG sublingual tablet One sublingual tab dissolved on tongue daily 90 each 3   • dorzolamide-timolol (COSOPT) 22.3-6.8 MG/ML ophthalmic solution Administer 1 drop to both eyes 2 (Two) Times a Day.     • fexofenadine (ALLEGRA) 180 MG tablet Take 180 mg by mouth Daily.     • Fluticasone-Salmeterol 232-14  MCG/ACT aerosol powder  Inhale 1 puff 2 (Two) Times a Day.     • furosemide (LASIX) 40 MG tablet TAKE 1 TABLET BY MOUTH EVERY DAY 90 tablet 3   • Insulin Glargine (LANTUS SOLOSTAR) 100 UNIT/ML injection pen Inject 14 Units under the skin into the appropriate area as directed Daily. 5 pen 5   • Insulin Pen Needle (Pen Needles) 31G X 5 MM misc 1 application Daily. 100 each 3   • latanoprost (XALATAN) 0.005 % ophthalmic solution Administer 1 drop to both eyes Every Night.     • levothyroxine (SYNTHROID, LEVOTHROID) 88 MCG tablet TAKE 1 TABLET BY MOUTH EVERY MORNING 90 tablet 3   • losartan (COZAAR) 100 MG tablet TAKE 1 TABLET BY MOUTH DAILY 90 tablet 3   • meloxicam (MOBIC) 15 MG tablet Take 15 mg by mouth Daily.     • metFORMIN (GLUCOPHAGE) 500 MG tablet Take 1 tablet by mouth 2 (Two) Times a Day With Meals. 180 tablet 3   • montelukast (SINGULAIR) 10 MG tablet TAKE 1 TABLET BY MOUTH EVERY NIGHT 90 tablet 3   • pantoprazole (PROTONIX) 40 MG EC tablet TAKE 1 TABLET BY MOUTH TWICE DAILY 90 tablet 3   • pramipexole (MIRAPEX) 1.5 MG tablet Take 1 tablet by mouth At Night As Needed (RLS). (Patient taking differently: Take 1.5 mg by mouth 2 (Two) Times a Day.) 90 tablet 2   • rosuvastatin (CRESTOR) 20 MG tablet TAKE 1 TABLET BY MOUTH EVERY EVENING 90 tablet 3   • sennosides-docusate (senna-docusate sodium) 8.6-50 MG per tablet Take 1 tablet by mouth Daily. 60 tablet 4   • traMADol (ULTRAM) 50 MG tablet Take  mg by mouth. for pain       No current facility-administered medications on file prior to encounter.       Past Medical History:   Diagnosis Date   • Actinic keratosis    • Acute embolism and thrombosis of vein    • Allergic    • Allergic rhinitis    • Arthritis    • Cataract    • Cervical radiculopathy    • Chronic constipation    • Diabetes mellitus (HCC)    • Disequilibrium    • DJD (degenerative joint disease), lumbar    • Elevated cholesterol    • Erysipelas    • GERD (gastroesophageal reflux disease)    •  "Glaucoma    • Hip pain, chronic, right    • History of kidney stones     X1   • History of peripheral edema     LOWER LEGS BILAT, COMPRESSION KNEE HIGH    • History of transfusion    • Hyperlipidemia    • Hypertension    • Hypothyroid    • Insomnia    • Limited mobility     RIGHT HIP   • Male urinary stress incontinence     s/p prostatectomy-WEAR PAD   • Obesity    • KWAME (obstructive sleep apnea)    • Osteoarthritis    • Pes planus    • Presbycusis    • Prostate cancer (HCC)    • Restless legs syndrome    • Shoulder pain     RIGHT, LIMITED MOBILITY-AT TIMES   • Sleep apnea     bipap   • Tinea corporis    • Tinea cruris    • Unsteady gait     RIGHT HIP   • Weakness     RIGHT HIP       SOCIAL HISTORY:  Counseled to avoid tobacco     REVIEW OF SYSTEMS:  No pertinent hematologic, infectious, or constitutional symptoms.  Other review of systems non-contributory     PHYSICAL EXAM:  Ht 170.2 cm (67\")   Wt 113 kg (250 lb)   BMI 39.16 kg/m²   Constitutional: Well-developed well-nourished no acute distress  General: Alert and oriented  Neuromuscular: He has increased pain in his low back and down his right lower extremity with standing or walking in upright position for short period time.  This seems consistent with the stenotic portion of his MRI.       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  -  I will schedule him for lumbar epidural steroid injections spaced approximately 2-4 weeks apart.  I will most likely enter the epidural space at the L4-L5 level and attempt to get medication spread to both the L4-5 and the L3-L4 level.  L3-L4 looks fairly stenotic, and I might have better success with an L4-5 entry. If pain control is acceptable after this injection, it was discussed with the patient that they may return for the subsequent " injections if and when their pain returns.    - Risks were discussed with the patient, including but not limited to: failure of relief, worsening pain, tissue, nerve, blood vessel or spinal cord damage, post-dural-puncture headache, bleeding, epidural hematoma, infection, and epidural abscess. Benefits and alternatives were also discussed. No promises were made. Risks/benefits/alternatives of procedural medications were also discussed, including but not limited to hyperglycemia, fluid retention, decreased immune system, osteoporosis, and anaphylactoid reactions. The patient voiced understanding and agreed to proceed.   -  Physical therapy exercises at home should be considered, as tolerated, as prescribed by physical therapy or from the pain clinic handout (given to the patient) to allow for a home exercise program.  Continuation of these exercises every day, or multiple times per week, even when the patient has good pain relief, was stressed to the patient as a preventative measure to decrease the frequency and severity of future pain episodes.  -  It is recommended that the patient use the least amount of opioid medication possible.     -  Heat and ice to the affected area as tolerated for pain control.  It was discussed that heating pads can cause burns.  -  Daily low impact exercise such as walking or water exercise was recommended to maintain overall health and aid in weight control.   -  Patient was counseled to abstain from tobacco products.  -  Follow up as needed for subsequent injections.  -This gentleman has a history of prostate cancer.  I will obviously leave further work-up for possible recurrence and/or metastases to the primary care physician.  - My total time based on preparing to see the patient, obtaining and/or reviewing separately obtained history, performing a medically appropriate exam and/or evaluation, counseling and educating the patient/family/caregiver, documenting clinical information in  the electronic or other health record, independently interpreting results and communicating results to the patient/family/caregiver, and in care coordination was 32 minutes consistent with a 98467 billing code.       Kleber Hassan M.D.  Partner, [facility], [facility]  Board Certified in Pain Medicine by the American Board of Anesthesiology  Board Certified in Anesthesiology by the American Board of Anesthesiology     Much of this encounter note is an electronic transcription/translation of spoken language to printed text. The electronic translation of spoken language may permit erroneous, or at times, nonsensical words or phrases to be inadvertently transcribed. Although I have reviewed the note for such errors, some may still exist.

## 2022-04-13 ENCOUNTER — APPOINTMENT (OUTPATIENT)
Dept: PAIN MEDICINE | Facility: HOSPITAL | Age: 77
End: 2022-04-13

## 2022-04-14 ENCOUNTER — OFFICE VISIT (OUTPATIENT)
Dept: FAMILY MEDICINE CLINIC | Facility: CLINIC | Age: 77
End: 2022-04-14

## 2022-04-14 VITALS
TEMPERATURE: 97.8 F | DIASTOLIC BLOOD PRESSURE: 78 MMHG | SYSTOLIC BLOOD PRESSURE: 124 MMHG | HEART RATE: 104 BPM | BODY MASS INDEX: 38.77 KG/M2 | WEIGHT: 247 LBS | HEIGHT: 67 IN | OXYGEN SATURATION: 98 %

## 2022-04-14 DIAGNOSIS — R19.8 PAIN ASSOCIATED WITH DEFECATION: ICD-10-CM

## 2022-04-14 DIAGNOSIS — Z11.59 ENCOUNTER FOR HEPATITIS C SCREENING TEST FOR LOW RISK PATIENT: ICD-10-CM

## 2022-04-14 DIAGNOSIS — E78.2 MIXED HYPERLIPIDEMIA: ICD-10-CM

## 2022-04-14 DIAGNOSIS — I10 PRIMARY HYPERTENSION: ICD-10-CM

## 2022-04-14 DIAGNOSIS — Z00.00 MEDICARE ANNUAL WELLNESS VISIT, SUBSEQUENT: Primary | ICD-10-CM

## 2022-04-14 DIAGNOSIS — E53.8 B12 DEFICIENCY: ICD-10-CM

## 2022-04-14 DIAGNOSIS — E11.65 TYPE 2 DIABETES MELLITUS WITH HYPERGLYCEMIA, WITHOUT LONG-TERM CURRENT USE OF INSULIN: ICD-10-CM

## 2022-04-14 DIAGNOSIS — Z28.39 IMMUNIZATION DEFICIENCY: ICD-10-CM

## 2022-04-14 DIAGNOSIS — E11.65 TYPE 2 DIABETES MELLITUS WITH HYPERGLYCEMIA, WITH LONG-TERM CURRENT USE OF INSULIN: ICD-10-CM

## 2022-04-14 DIAGNOSIS — Z79.4 TYPE 2 DIABETES MELLITUS WITH HYPERGLYCEMIA, WITH LONG-TERM CURRENT USE OF INSULIN: ICD-10-CM

## 2022-04-14 PROCEDURE — 99214 OFFICE O/P EST MOD 30 MIN: CPT | Performed by: FAMILY MEDICINE

## 2022-04-14 PROCEDURE — 91305 COVID-19 (PFIZER) 12+ YRS: CPT | Performed by: FAMILY MEDICINE

## 2022-04-14 PROCEDURE — 1126F AMNT PAIN NOTED NONE PRSNT: CPT | Performed by: FAMILY MEDICINE

## 2022-04-14 PROCEDURE — G0439 PPPS, SUBSEQ VISIT: HCPCS | Performed by: FAMILY MEDICINE

## 2022-04-14 PROCEDURE — 0054A COVID-19 (PFIZER) 12+ YRS: CPT | Performed by: FAMILY MEDICINE

## 2022-04-14 PROCEDURE — 1170F FXNL STATUS ASSESSED: CPT | Performed by: FAMILY MEDICINE

## 2022-04-14 PROCEDURE — 1160F RVW MEDS BY RX/DR IN RCRD: CPT | Performed by: FAMILY MEDICINE

## 2022-04-14 PROCEDURE — 1159F MED LIST DOCD IN RCRD: CPT | Performed by: FAMILY MEDICINE

## 2022-04-14 PROCEDURE — 1125F AMNT PAIN NOTED PAIN PRSNT: CPT | Performed by: FAMILY MEDICINE

## 2022-04-14 RX ORDER — MELOXICAM 15 MG/1
15 TABLET ORAL DAILY
Qty: 90 TABLET | Refills: 1 | Status: SHIPPED | OUTPATIENT
Start: 2022-04-14 | End: 2022-07-15 | Stop reason: SDUPTHER

## 2022-04-14 NOTE — PROGRESS NOTES
The ABCs of the Annual Wellness Visit  Subsequent Medicare Wellness Visit    Chief Complaint   Patient presents with   • Medicare Wellness-subsequent   f/U HTN.    Subjective    History of Present Illness:  Nathan Baez is a 76 y.o. male who presents for a Subsequent Medicare Wellness Visit.  F/U HTN.  On meds regulalry.     F/U DM2. On meds regulalry. Started on insulin last visit.    F/U hyperlipidmeia.  LDL 60 form 12/21.    The following portions of the patient's history were reviewed and   updated as appropriate: allergies, current medications, past family history, past medical history, past social history, past surgical history and problem list.    Compared to one year ago, the patient feels his physical   health is the same.    Compared to one year ago, the patient feels his mental   health is the same.    Recent Hospitalizations:  This patient has had a [facility] admission record on file within the last 365 days.    Current Medical Providers:  Patient Care Team:  Damien Pierce MD as PCP - General (Family Medicine)    Outpatient Medications Prior to Visit   Medication Sig Dispense Refill   • albuterol sulfate  (90 Base) MCG/ACT inhaler Inhale 2 puffs Every 4 (Four) Hours As Needed for Wheezing.     • ALLERGY SERUM INJECTION Inject  under the skin into the appropriate area as directed 1 (One) Time Per Week.     • amLODIPine (NORVASC) 2.5 MG tablet TAKE 1 TABLET BY MOUTH DAILY 90 tablet 3   • aspirin  MG tablet Take 1 tablet by mouth 2 (Two) Times a Day With Meals. 60 tablet 0   • BRIMONIDINE TARTRATE OP Apply 1 drop to eye(s) as directed by provider 2 (Two) Times a Day.     • Cyanocobalamin (B-12) 1000 MCG sublingual tablet One sublingual tab dissolved on tongue daily 90 each 3   • dorzolamide-timolol (COSOPT) 22.3-6.8 MG/ML ophthalmic solution Administer 1 drop to both eyes 2 (Two) Times a Day.     • fexofenadine (ALLEGRA) 180 MG tablet Take 180 mg by mouth Daily.     •  Fluticasone-Salmeterol 232-14 MCG/ACT aerosol powder  Inhale 1 puff 2 (Two) Times a Day.     • furosemide (LASIX) 40 MG tablet TAKE 1 TABLET BY MOUTH EVERY DAY 90 tablet 3   • Insulin Pen Needle (Pen Needles) 31G X 5 MM misc 1 application Daily. 100 each 3   • latanoprost (XALATAN) 0.005 % ophthalmic solution Administer 1 drop to both eyes Every Night.     • levothyroxine (SYNTHROID, LEVOTHROID) 88 MCG tablet TAKE 1 TABLET BY MOUTH EVERY MORNING 90 tablet 3   • losartan (COZAAR) 100 MG tablet TAKE 1 TABLET BY MOUTH DAILY 90 tablet 3   • metFORMIN (GLUCOPHAGE) 500 MG tablet Take 1 tablet by mouth 2 (Two) Times a Day With Meals. 180 tablet 3   • montelukast (SINGULAIR) 10 MG tablet TAKE 1 TABLET BY MOUTH EVERY NIGHT 90 tablet 3   • pantoprazole (PROTONIX) 40 MG EC tablet TAKE 1 TABLET BY MOUTH TWICE DAILY 90 tablet 3   • pramipexole (MIRAPEX) 1.5 MG tablet Take 1 tablet by mouth At Night As Needed (RLS). (Patient taking differently: Take 1.5 mg by mouth 2 (Two) Times a Day.) 90 tablet 2   • rosuvastatin (CRESTOR) 20 MG tablet TAKE 1 TABLET BY MOUTH EVERY EVENING 90 tablet 3   • sennosides-docusate (senna-docusate sodium) 8.6-50 MG per tablet Take 1 tablet by mouth Daily. 60 tablet 4   • traMADol (ULTRAM) 50 MG tablet Take  mg by mouth. for pain     • Insulin Glargine (LANTUS SOLOSTAR) 100 UNIT/ML injection pen Inject 14 Units under the skin into the appropriate area as directed Daily. 5 pen 5   • meloxicam (MOBIC) 15 MG tablet Take 15 mg by mouth Daily.       No facility-administered medications prior to visit.       Opioid medication/s are on active medication list.  and I have evaluated his active treatment plan and pain score trends (see table).  There were no vitals filed for this visit.  I have reviewed the chart for potential of high risk medication and harmful drug interactions in the elderly.            Aspirin is on active medication list. Aspirin use is indicated based on review of current medical  condition/s. Pros and cons of this therapy have been discussed today. Benefits of this medication outweigh potential harm.  Patient has been encouraged to continue taking this medication.  .      Patient Active Problem List   Diagnosis   • OA (osteoarthritis) of knee   • Hypertension   • Abdominal wall cellulitis   • Hypothyroidism (acquired)   • Gastroesophageal reflux disease without esophagitis   • Mixed hyperlipidemia   • Primary insomnia   • DDD (degenerative disc disease), lumbar   • KWAME (obstructive sleep apnea)   • Allergic   • Venous insufficiency   • Prostate cancer (HCC)   • Venous stasis dermatitis   • Tinea cruris   • Tinea corporis   • Throat clearing   • Sleep apnea   • Skin lesion of face   • Rib pain on left side   • Rectal bleed   • Presbycusis   • Pes planus   • Osteoarthritis   • Obesity   • Medicare annual wellness visit, subsequent   • Lumbar strain   • Internal hemorrhoids   • Insomnia   • Inflamed skin tag   • Hyperplastic polyp of stomach   • Hospital discharge follow-up   • History of colon polyps   • High risk medication use   • Glaucoma   • Gastroenteritis, acute   • Erysipelas   • Encounter for screening colonoscopy   • Encounter for long-term (current) use of NSAIDs   • Dysphagia   • DVT (deep venous thrombosis) (HCC)   • DJD (degenerative joint disease), lumbar   • Diverticulosis   • Cough   • Contact with hypodermic needle   • Cervical radiculopathy   • Cellulitis   • Arthritis   • Allergic rhinitis   • Acute glaucoma   • Acute embolism and thrombosis of vein   • Actinic keratosis   • Status post reverse total shoulder replacement, right   • Localized edema   • Anemia   • Peripheral polyneuropathy   • Campylobacter diarrhea   • Hyponatremia   • Generalized abdominal pain   • Fever   • Constipation   • Bilateral lower extremity edema   • Acute pyelonephritis   • Chronic idiopathic constipation   • Functional diarrhea   • Change in bowel habits   • RLS (restless legs syndrome)   • Type  "2 diabetes mellitus with hyperglycemia (HCC)   • Family history of GI malignancy   • Primary osteoarthritis of right hip   • B12 deficiency   • Intervertebral disc stenosis of neural canal of lumbar region   • Encounter for hepatitis C screening test for low risk patient   • Pain associated with defecation     Advance Care Planning  Advance Directive is not on file.  ACP discussion was held with the patient during this visit. Patient has an advance directive (not in EMR), copy requested.    Review of Systems   Constitutional: Negative for diaphoresis, fatigue and fever.   HENT: Negative for nosebleeds and postnasal drip.    Cardiovascular: Negative for chest pain and leg swelling.   Gastrointestinal: Negative for constipation and rectal pain.        Objective    Vitals:    04/14/22 1341   BP: 124/78   BP Location: Right arm   Patient Position: Sitting   Pulse: 104   Temp: 97.8 °F (36.6 °C)   TempSrc: Temporal   SpO2: 98%   Weight: 112 kg (247 lb)   Height: 170.2 cm (67\")     BMI Readings from Last 1 Encounters:   04/14/22 38.69 kg/m²   BMI is above normal parameters. Recommendations include: exercise counseling    Does the patient have evidence of cognitive impairment? No    Physical Exam  Vitals reviewed.   Constitutional:       Appearance: He is well-developed. He is not diaphoretic.   HENT:      Head: Normocephalic and atraumatic.   Eyes:      General: No scleral icterus.     Pupils: Pupils are equal, round, and reactive to light.   Neck:      Thyroid: No thyromegaly.   Cardiovascular:      Rate and Rhythm: Normal rate and regular rhythm.      Heart sounds: No murmur heard.    No friction rub. No gallop.   Pulmonary:      Effort: Pulmonary effort is normal. No respiratory distress.      Breath sounds: No wheezing or rales.   Chest:      Chest wall: No tenderness.   Abdominal:      General: Bowel sounds are normal. There is no distension.      Palpations: Abdomen is soft.      Tenderness: There is no abdominal " tenderness.   Musculoskeletal:         General: No deformity. Normal range of motion.   Lymphadenopathy:      Cervical: No cervical adenopathy.   Skin:     General: Skin is warm and dry.      Findings: No rash.   Neurological:      Cranial Nerves: No cranial nerve deficit.      Motor: No abnormal muscle tone.       Lab Results   Component Value Date    HGBA1C 13.0 (H) 2022            HEALTH RISK ASSESSMENT    Smoking Status:  Social History     Tobacco Use   Smoking Status Former Smoker   • Packs/day: 1.00   • Years: 20.00   • Pack years: 20.00   • Types: Cigarettes   • Start date:    • Quit date: 1984   • Years since quittin.3   Smokeless Tobacco Former User   • Types: Chew     Alcohol Consumption:  Social History     Substance and Sexual Activity   Alcohol Use Yes    Comment: 3-4 MONTHLY     Fall Risk Screen:    [facility] Fall Risk Assessment has not been completed.    Depression Screening:  PHQ-2/PHQ-9 Depression Screening 2022   Retired Total Score -   Little Interest or Pleasure in Doing Things 0-->not at all   Feeling Down, Depressed or Hopeless 0-->not at all   Trouble Falling or Staying Asleep, or Sleeping Too Much 0-->not at all   Feeling Tired or Having Little Energy 0-->not at all   Poor Appetite or Overeating 0-->not at all   Feeling Bad about Yourself - or that You are a Failure or Have Let Yourself or Your Family Down 0-->not at all   Trouble Concentrating on Things, Such as Reading the Newspaper or Watching Television 0-->not at all   Moving or Speaking So Slowly that Other People Could Have Noticed? Or the Opposite - Being So Fidgety 0-->not at all   Thoughts that You Would be Better Off Dead or of Hurting Yourself in Some Way 0-->not at all   PHQ-9: Brief Depression Severity Measure Score 0   If You Checked Off Any Problems, How Difficult Have These Problems Made It For You to Do Your Work, Take Care of Things at Home, or Get Along with Other People? not difficult at all        Health Habits and Functional and Cognitive Screening:  Functional & Cognitive Status 4/14/2022   Do you have difficulty preparing food and eating? No   Do you have difficulty bathing yourself, getting dressed or grooming yourself? No   Do you have difficulty using the toilet? No   Do you have difficulty moving around from place to place? No   Do you have trouble with steps or getting out of a bed or a chair? No   Current Diet Unhealthy Diet   Dental Exam Up to date   Eye Exam Up to date   Exercise (times per week) 0 times per week   Current Exercises Include No Regular Exercise   Current Exercise Activities Include -   Do you need help using the phone?  No   Are you deaf or do you have serious difficulty hearing?  Yes   Do you need help with transportation? No   Do you need help shopping? No   Do you need help preparing meals?  No   Do you need help with housework?  No   Do you need help with laundry? No   Do you need help taking your medications? No   Do you need help managing money? No   Do you ever drive or ride in a car without wearing a seat belt? No   Have you felt unusual stress, anger or loneliness in the last month? No   Who do you live with? Spouse   If you need help, do you have trouble finding someone available to you? No   Have you been bothered in the last four weeks by sexual problems? -   Do you have difficulty concentrating, remembering or making decisions? No       Age-appropriate Screening Schedule:  Refer to the list below for future screening recommendations based on patient's age, sex and/or medical conditions. Orders for these recommended tests are listed in the plan section. The patient has been provided with a written plan.    Health Maintenance   Topic Date Due   • URINE MICROALBUMIN  Never done   • TDAP/TD VACCINES (1 - Tdap) Never done   • DIABETIC FOOT EXAM  Never done   • DIABETIC EYE EXAM  05/12/2022   • INFLUENZA VACCINE  08/01/2022   • HEMOGLOBIN A1C  10/14/2022   • LIPID PANEL   12/06/2022   • ZOSTER VACCINE  Completed              Assessment/Plan   CMS Preventative Services Quick Reference  Risk Factors Identified During Encounter  Inactivity/Sedentary  The above risks/problems have been discussed with the patient.  Follow up actions/plans if indicated are seen below in the Assessment/Plan Section.  Pertinent information has been shared with the patient in the After Visit Summary.    Diagnoses and all orders for this visit:    1. Medicare annual wellness visit, subsequent (Primary)    2. Type 2 diabetes mellitus with hyperglycemia, without long-term current use of insulin (HCC)  -     Hemoglobin A1c    3. Primary hypertension  -     Comprehensive Metabolic Panel    4. B12 deficiency  -     Vitamin B12    5. Encounter for hepatitis C screening test for low risk patient  -     Hepatitis C Antibody    6. Type 2 diabetes mellitus with hyperglycemia, with long-term current use of insulin (HCC)  -     Discontinue: Insulin Glargine (LANTUS SOLOSTAR) 100 UNIT/ML injection pen; Inject 20 Units under the skin into the appropriate area as directed Daily.  Dispense: 5 pen; Refill: 5    7. Pain associated with defecation  -     Ambulatory Referral to Colorectal Surgery    8. Immunization deficiency  -     COVID-19 Vaccine (Pfizer) Gray Cap    9. Mixed hyperlipidemia    Other orders  -     meloxicam (MOBIC) 15 MG tablet; Take 1 tablet by mouth Daily.  Dispense: 90 tablet; Refill: 1    B12 def.  ON B12 now.  Check B12.    HTN.  Controlled.  Continue meds.  Check CMP.    DM2.  Uncontrolled.  Check A1c.  Continue insulin but increase to 20 units a day.  Hyperlipidmeia.  Continue crestor.             Preventive Counseling:  Encouraged to stay active.  Covid vaccine booster number 2 today.  HEP A UTD.  Pneumovax UTD.  Colonoscopy UTD.    Dentist UTD.  Optho UTD.      Follow Up:   Return in about 4 months (around 8/14/2022).     An After Visit Summary and PPPS were made available to the patient.

## 2022-04-15 LAB
ALBUMIN SERPL-MCNC: 4.3 G/DL (ref 3.7–4.7)
ALBUMIN/GLOB SERPL: 1.8 {RATIO} (ref 1.2–2.2)
ALP SERPL-CCNC: 211 IU/L (ref 44–121)
ALT SERPL-CCNC: 25 IU/L (ref 0–44)
AST SERPL-CCNC: 34 IU/L (ref 0–40)
BILIRUB SERPL-MCNC: 0.6 MG/DL (ref 0–1.2)
BUN SERPL-MCNC: 23 MG/DL (ref 8–27)
BUN/CREAT SERPL: 20 (ref 10–24)
CALCIUM SERPL-MCNC: 9.7 MG/DL (ref 8.6–10.2)
CHLORIDE SERPL-SCNC: 98 MMOL/L (ref 96–106)
CO2 SERPL-SCNC: 25 MMOL/L (ref 20–29)
CREAT SERPL-MCNC: 1.15 MG/DL (ref 0.76–1.27)
EGFRCR SERPLBLD CKD-EPI 2021: 66 ML/MIN/1.73
GLOBULIN SER CALC-MCNC: 2.4 G/DL (ref 1.5–4.5)
GLUCOSE SERPL-MCNC: 325 MG/DL (ref 65–99)
HBA1C MFR BLD: 13 % (ref 4.8–5.6)
HCV AB S/CO SERPL IA: 0.1 S/CO RATIO (ref 0–0.9)
POTASSIUM SERPL-SCNC: 4.5 MMOL/L (ref 3.5–5.2)
PROT SERPL-MCNC: 6.7 G/DL (ref 6–8.5)
SODIUM SERPL-SCNC: 140 MMOL/L (ref 134–144)
VIT B12 SERPL-MCNC: 1048 PG/ML (ref 232–1245)

## 2022-04-17 DIAGNOSIS — E11.65 TYPE 2 DIABETES MELLITUS WITH HYPERGLYCEMIA, WITH LONG-TERM CURRENT USE OF INSULIN: ICD-10-CM

## 2022-04-17 DIAGNOSIS — Z79.4 TYPE 2 DIABETES MELLITUS WITH HYPERGLYCEMIA, WITH LONG-TERM CURRENT USE OF INSULIN: ICD-10-CM

## 2022-04-19 ENCOUNTER — APPOINTMENT (OUTPATIENT)
Dept: MRI IMAGING | Facility: HOSPITAL | Age: 77
End: 2022-04-19

## 2022-05-03 ENCOUNTER — HOSPITAL ENCOUNTER (OUTPATIENT)
Dept: PAIN MEDICINE | Facility: HOSPITAL | Age: 77
Discharge: HOME | End: 2022-05-03

## 2022-05-03 ENCOUNTER — ANESTHESIA (OUTPATIENT)
Dept: PAIN MEDICINE | Facility: HOSPITAL | Age: 77
End: 2022-05-03

## 2022-05-03 ENCOUNTER — ANESTHESIA EVENT (OUTPATIENT)
Dept: PAIN MEDICINE | Facility: HOSPITAL | Age: 77
End: 2022-05-03

## 2022-05-03 ENCOUNTER — HOSPITAL ENCOUNTER (OUTPATIENT)
Dept: GENERAL RADIOLOGY | Facility: HOSPITAL | Age: 77
Discharge: HOME | End: 2022-05-03

## 2022-05-03 VITALS
SYSTOLIC BLOOD PRESSURE: 142 MMHG | HEART RATE: 92 BPM | DIASTOLIC BLOOD PRESSURE: 72 MMHG | TEMPERATURE: 98.6 F | OXYGEN SATURATION: 98 % | RESPIRATION RATE: 14 BRPM

## 2022-05-03 DIAGNOSIS — R52 PAIN: ICD-10-CM

## 2022-05-03 DIAGNOSIS — M99.53 INTERVERTEBRAL DISC STENOSIS OF NEURAL CANAL OF LUMBAR REGION: ICD-10-CM

## 2022-05-03 PROCEDURE — 25010000002 METHYLPREDNISOLONE PER 80 MG: Performed by: ANESTHESIOLOGY

## 2022-05-03 PROCEDURE — 0 IOPAMIDOL 41 % SOLUTION: Performed by: ANESTHESIOLOGY

## 2022-05-03 PROCEDURE — 77003 FLUOROGUIDE FOR SPINE INJECT: CPT

## 2022-05-03 RX ORDER — MIDAZOLAM HYDROCHLORIDE 1 MG/ML
1 INJECTION INTRAMUSCULAR; INTRAVENOUS AS NEEDED
Status: DISCONTINUED | OUTPATIENT
Start: 2022-05-03 | End: 2022-05-04 | Stop reason: HOSPADM

## 2022-05-03 RX ORDER — FENTANYL CITRATE 50 UG/ML
50 INJECTION, SOLUTION INTRAMUSCULAR; INTRAVENOUS AS NEEDED
Status: DISCONTINUED | OUTPATIENT
Start: 2022-05-03 | End: 2022-05-04 | Stop reason: HOSPADM

## 2022-05-03 RX ORDER — METHYLPREDNISOLONE ACETATE 80 MG/ML
80 INJECTION, SUSPENSION INTRA-ARTICULAR; INTRALESIONAL; INTRAMUSCULAR; SOFT TISSUE ONCE
Status: COMPLETED | OUTPATIENT
Start: 2022-05-03 | End: 2022-05-03

## 2022-05-03 RX ORDER — SODIUM CHLORIDE 0.9 % (FLUSH) 0.9 %
3 SYRINGE (ML) INJECTION EVERY 12 HOURS SCHEDULED
Status: DISCONTINUED | OUTPATIENT
Start: 2022-05-03 | End: 2022-05-04 | Stop reason: HOSPADM

## 2022-05-03 RX ORDER — SODIUM CHLORIDE 0.9 % (FLUSH) 0.9 %
3-10 SYRINGE (ML) INJECTION AS NEEDED
Status: DISCONTINUED | OUTPATIENT
Start: 2022-05-03 | End: 2022-05-04 | Stop reason: HOSPADM

## 2022-05-03 RX ORDER — LIDOCAINE HYDROCHLORIDE 10 MG/ML
1 INJECTION, SOLUTION INFILTRATION; PERINEURAL ONCE
Status: DISCONTINUED | OUTPATIENT
Start: 2022-05-03 | End: 2022-05-04 | Stop reason: HOSPADM

## 2022-05-03 RX ADMIN — IOPAMIDOL 10 ML: 408 INJECTION, SOLUTION INTRATHECAL at 08:45

## 2022-05-03 RX ADMIN — METHYLPREDNISOLONE ACETATE 80 MG: 80 INJECTION, SUSPENSION INTRA-ARTICULAR; INTRALESIONAL; INTRAMUSCULAR; SOFT TISSUE at 08:45

## 2022-05-03 NOTE — H&P
CHIEF COMPLAINT:  Low back pain        HISTORY OF PRESENT ILLNESS:  This patient is complaining of low back pain which radiates down his right lower extremity.  It ranges between a 6 and 8 out of 10 on the pain scale.  The pain is described as aching in nature. The pain is exacerbated by standing and walking, particularly in an upright position, and relieved by rest, lying down, sitting.  The patient has tried conservative therapy for greater than 6 weeks including: Rest, and physical therapy.  The pain is significantly decreasing the patient's Activities of Daily Living.       Diagnostic imaging: MRI of the lumbar spine without contrast from 12/16/2021 shows multilevel degeneration with stenosis and a disc extrusion noted at L3-L4.  L4-L5 seems to be likely involved as well.  The full dictation by the radiologist is available in the chart.      This patient was referred to our clinic by Dr. Damien Pierce for consideration of interventional pain management options.    He is here today with insurance approval for planned procedure.     PAST MEDICAL HISTORY:  Current Outpatient Medications on File Prior to Encounter   Medication Sig Dispense Refill   • albuterol sulfate  (90 Base) MCG/ACT inhaler Inhale 2 puffs Every 4 (Four) Hours As Needed for Wheezing.     • amLODIPine (NORVASC) 2.5 MG tablet TAKE 1 TABLET BY MOUTH DAILY 90 tablet 3   • aspirin  MG tablet Take 1 tablet by mouth 2 (Two) Times a Day With Meals. 60 tablet 0   • BRIMONIDINE TARTRATE OP Apply 1 drop to eye(s) as directed by provider 2 (Two) Times a Day.     • Cyanocobalamin (B-12) 1000 MCG sublingual tablet One sublingual tab dissolved on tongue daily 90 each 3   • dorzolamide-timolol (COSOPT) 22.3-6.8 MG/ML ophthalmic solution Administer 1 drop to both eyes 2 (Two) Times a Day.     • fexofenadine (ALLEGRA) 180 MG tablet Take 180 mg by mouth Daily.     • Fluticasone-Salmeterol 232-14 MCG/ACT aerosol powder  Inhale 1 puff 2 (Two) Times a  Day.     • furosemide (LASIX) 40 MG tablet TAKE 1 TABLET BY MOUTH EVERY DAY 90 tablet 3   • Insulin Glargine (LANTUS SOLOSTAR) 100 UNIT/ML injection pen Inject 26 Units under the skin into the appropriate area as directed Daily. 5 pen 5   • latanoprost (XALATAN) 0.005 % ophthalmic solution Administer 1 drop to both eyes Every Night.     • levothyroxine (SYNTHROID, LEVOTHROID) 88 MCG tablet TAKE 1 TABLET BY MOUTH EVERY MORNING 90 tablet 3   • losartan (COZAAR) 100 MG tablet TAKE 1 TABLET BY MOUTH DAILY 90 tablet 3   • meloxicam (MOBIC) 15 MG tablet Take 1 tablet by mouth Daily. 90 tablet 1   • metFORMIN (GLUCOPHAGE) 500 MG tablet Take 1 tablet by mouth 2 (Two) Times a Day With Meals. 180 tablet 3   • montelukast (SINGULAIR) 10 MG tablet TAKE 1 TABLET BY MOUTH EVERY NIGHT 90 tablet 3   • pantoprazole (PROTONIX) 40 MG EC tablet TAKE 1 TABLET BY MOUTH TWICE DAILY 90 tablet 3   • pramipexole (MIRAPEX) 1.5 MG tablet Take 1 tablet by mouth At Night As Needed (RLS). (Patient taking differently: Take 1.5 mg by mouth 2 (Two) Times a Day.) 90 tablet 2   • rosuvastatin (CRESTOR) 20 MG tablet TAKE 1 TABLET BY MOUTH EVERY EVENING 90 tablet 3   • sennosides-docusate (senna-docusate sodium) 8.6-50 MG per tablet Take 1 tablet by mouth Daily. 60 tablet 4   • ALLERGY SERUM INJECTION Inject  under the skin into the appropriate area as directed 1 (One) Time Per Week.     • Insulin Pen Needle (Pen Needles) 31G X 5 MM misc 1 application Daily. 100 each 3   • traMADol (ULTRAM) 50 MG tablet Take  mg by mouth. for pain       No current facility-administered medications on file prior to encounter.       Past Medical History:   Diagnosis Date   • Actinic keratosis    • Acute embolism and thrombosis of vein    • Allergic    • Allergic rhinitis    • Arthritis    • Cataract    • Cervical radiculopathy    • Chronic constipation    • Diabetes mellitus (HCC)    • Disequilibrium    • DJD (degenerative joint disease), lumbar    • Elevated  cholesterol    • Erysipelas    • GERD (gastroesophageal reflux disease)    • Glaucoma    • Hip pain, chronic, right    • History of kidney stones     X1   • History of peripheral edema     LOWER LEGS BILAT, COMPRESSION KNEE HIGH    • History of transfusion    • Hyperlipidemia    • Hypertension    • Hypothyroid    • Insomnia    • Intervertebral disc stenosis of neural canal of lumbar region 04/12/2022   • Limited mobility     RIGHT HIP   • Low back pain    • Male urinary stress incontinence     s/p prostatectomy-WEAR PAD   • Obesity    • KWAME (obstructive sleep apnea)    • Osteoarthritis    • Pes planus    • Presbycusis    • Prostate cancer (HCC)    • Restless legs syndrome    • Shoulder pain     RIGHT, LIMITED MOBILITY-AT TIMES   • Sleep apnea     bipap   • Tinea corporis    • Tinea cruris    • Unsteady gait     RIGHT HIP   • Weakness     RIGHT HIP       SOCIAL HISTORY:  Counseled to avoid tobacco     REVIEW OF SYSTEMS:  No pertinent hematologic, infectious, or constitutional symptoms.  Other review of systems non-contributory     PHYSICAL EXAM:  /86 (BP Location: Left arm, Patient Position: Sitting)   Pulse 94   Temp 37 °C (98.6 °F) (Infrared)   Resp 20   SpO2 95%   Constitutional: Well-developed well-nourished no acute distress  General: Alert and oriented  Neuromuscular: His pain seems to increase in his low back and down his right lower extremity when he stands or walks in an upright position for short period time.  This once again seems consistent with the stenotic portion of his MRI.       DIAGNOSIS:  Post-Op Diagnosis Codes:  Post-Op Diagnosis Codes:     * Intervertebral disc stenosis of neural canal of lumbar region [M99.53]    PLAN:  -He presents today with an insurance approval for the procedure as planned.  -  Lumbar epidural steroid injections spaced approximately 2-4 weeks apart.  I will most likely enter the epidural space at the L4-L5 level and attempt to get medication spread to both the  L4-5 and the L3-L4 level.  L3-L4 looks fairly stenotic, and I might have better success with an L4-5 entry. If pain control is acceptable after this injection, it was discussed with the patient that they may return for the subsequent injections if and when their pain returns.     - Risks were discussed with the patient, including but not limited to: failure of relief, worsening pain, tissue, nerve, blood vessel or spinal cord damage, post-dural-puncture headache, bleeding, epidural hematoma, infection, and epidural abscess. Benefits and alternatives were also discussed. No promises were made. Risks/benefits/alternatives of procedural medications were also discussed, including but not limited to hyperglycemia, fluid retention, decreased immune system, osteoporosis, and anaphylactoid reactions. The patient voiced understanding and agreed to proceed.   -  Physical therapy exercises at home should be considered, as tolerated, as prescribed by physical therapy or from the pain clinic handout (given to the patient) to allow for a home exercise program.  Continuation of these exercises every day, or multiple times per week, even when the patient has good pain relief, was stressed to the patient as a preventative measure to decrease the frequency and severity of future pain episodes.  -  It is recommended that the patient use the least amount of opioid medication possible.     -  Heat and ice to the affected area as tolerated for pain control.  It was discussed that heating pads can cause burns.  -  Daily low impact exercise such as walking or water exercise was recommended to maintain overall health and aid in weight control.   -  Patient was counseled to abstain from tobacco products.  -  Follow up as needed for subsequent injections.      Kleber Hassan M.D.  [place], [place], [facility], [facility]  Board Certified in Pain Medicine by the American Board of Anesthesiology  Board Certified in  Anesthesiology by the American Board of Anesthesiology     Much of this encounter note is an electronic transcription/translation of spoken language to printed text. The electronic translation of spoken language may permit erroneous, or at times, nonsensical words or phrases to be inadvertently transcribed. Although I have reviewed the note for such errors, some may still exist.

## 2022-05-03 NOTE — ANESTHESIA PROCEDURE NOTES
Lumbar epidural steroid injection    Pre-sedation assessment completed: 5/3/2022 8:37 AM    Patient reassessed immediately prior to procedure    Patient location during procedure: pain clinic  Start Time: 5/3/2022 8:40 AM  Stop Time: 5/3/2022 8:46 AM  Indication:procedure for pain  Performed By  Anesthesiologist: Kleber Hassan MD  Preanesthetic Checklist  Completed: patient identified, IV checked, site marked, risks and benefits discussed, surgical consent, monitors and equipment checked, pre-op evaluation and timeout performed  Additional Notes  Post-Op Diagnosis Codes:     * Intervertebral disc stenosis of neural canal of lumbar region (M99.53)    The patient was observed in recovery with no evidence of neurological deficits or other problems. All questions were answered. The patient was discharged with appropriate instructions.  Prep:  Pt Position:prone  Sterile Tech:cap, gloves, mask and sterile barrier  Prep:chlorhexidine gluconate and isopropyl alcohol  Monitoring:blood pressure monitoring, continuous pulse oximetry and EKG  Procedure:Sedation: no     Approach:midline  Guidance: fluoroscopy  Location:lumbar  Level:4-5  Needle Type:Tuohy  Needle Gauge:20 G  Aspiration:negative  Medications:  Preservative Free Saline:3mL  Isovue:1mL  Comments:I had an initial false loss-of-resistance with posterior contrast spread.  Upon further advancement, good loss of resistance was obtained, and medication was injected.Depomedrol:80mg  Post Assessment:  Post-procedure: Dressing per nursing.  Pt Tolerance:patient tolerated the procedure well with no apparent complications  Complications:no

## 2022-05-26 DIAGNOSIS — K21.9 GASTROESOPHAGEAL REFLUX DISEASE WITHOUT ESOPHAGITIS: ICD-10-CM

## 2022-05-26 RX ORDER — PANTOPRAZOLE SODIUM 40 MG/1
TABLET, DELAYED RELEASE ORAL
Qty: 90 TABLET | Refills: 3 | OUTPATIENT
Start: 2022-05-26

## 2022-06-10 ENCOUNTER — OFFICE VISIT (OUTPATIENT)
Dept: SURGERY | Facility: CLINIC | Age: 77
End: 2022-06-10

## 2022-06-10 VITALS
TEMPERATURE: 97.5 F | SYSTOLIC BLOOD PRESSURE: 148 MMHG | OXYGEN SATURATION: 95 % | DIASTOLIC BLOOD PRESSURE: 78 MMHG | HEART RATE: 72 BPM | BODY MASS INDEX: 38.3 KG/M2 | WEIGHT: 244 LBS | HEIGHT: 67 IN

## 2022-06-10 DIAGNOSIS — K59.00 CONSTIPATION, UNSPECIFIED CONSTIPATION TYPE: Primary | ICD-10-CM

## 2022-06-10 PROCEDURE — 99204 OFFICE O/P NEW MOD 45 MIN: CPT | Performed by: COLON & RECTAL SURGERY

## 2022-06-10 RX ORDER — LANCETS
EACH MISCELLANEOUS 2 TIMES DAILY
Status: ON HOLD | COMMUNITY
Start: 2022-04-28 | End: 2023-02-17

## 2022-06-10 RX ORDER — AMOXICILLIN 500 MG/1
TABLET, FILM COATED ORAL
COMMUNITY
Start: 2022-05-19 | End: 2022-08-10

## 2022-06-10 RX ORDER — BRIMONIDINE TARTRATE 2 MG/ML
SOLUTION/ DROPS OPHTHALMIC
COMMUNITY
Start: 2022-06-09 | End: 2022-10-31 | Stop reason: HOSPADM

## 2022-06-10 RX ORDER — PRUCALOPRIDE 2 MG/1
2 TABLET, FILM COATED ORAL DAILY
Qty: 30 TABLET | Refills: 0 | Status: SHIPPED | OUTPATIENT
Start: 2022-06-10 | End: 2022-08-10

## 2022-06-10 NOTE — PROGRESS NOTES
Nathan Baez is a 77 y.o. male who is seen as a consult at the request of Damien Pierce MD for Consult (Pt here today with Wife Coco.).      HPI:    The patient is accompanied by an adult female.     The patient reports he cannot have a bowel movement. He states he feels like he is about to go and sit down and nothing comes out. He states it hurts him during the day. The patient reports every once in a while, it comes out in a little bit of a hard lump. The patient reports this has been going on for a couple of years. The adult female reports in 03/2020 they went to the emergency room and they said he had food poisoning. The adult female reports ever since then he started complaining with his stomach and his bowels. The adult female reports they do not know if he had food poisoning, but she did not get anything. The adult female reports on 03/14/2020 and 03/19/2020 they went back in for a urinary tract infection. The adult female reports he has always had trouble with his bowels. The adult female reports that is when he really started complaining that his bowel was not emptying. The adult female reports his stomach was hurting all the time. The patient states it hurts all around the waist at times. He states he walks with a cane. The patient reports he feels better and walks a little bit easier. The adult female reports he had a CT scan in 06/2020. The adult female states the patient had a colonoscopy last year and Dr. Chowdhury found a bunch of polyps. The adult female reports colon cancer runs in his family. The patient reports Dr. Chowdhury put him on all kinds of medication. The adult female states he took Linzess for 6 to 7 months or so, but he never saw no improvement. She reports that he was also given unknown medication.  She reports he was on milk of magnesia, MiraLAX, and now takes Metamucil. The patient reports he drinks olive oil every night. The adult female reports the Metamucil is better right  now. The patient reports sometimes it gives him a little bit of relief, but he does not quit hurting like he wants to go somewhere. The patient reports when he wipes it, there is a bunch of yellow mucus. The patient reports he does not feel like there is tissue coming out of his buttocks that he has to push back in. The adult female reports that Dr. Finn is the provider that ordered the MRI. He mentioned that it may have to do with his back, he has trouble walking and he says a lot of it he thinks is his walking troubles affecting his stomach.     Past Medical History:   Diagnosis Date   • Actinic keratosis    • Acute embolism and thrombosis of vein    • Allergic    • Allergic rhinitis    • Arthritis    • Cataract    • Cervical radiculopathy    • Chronic constipation    • Diabetes mellitus (HCC)    • Disequilibrium    • DJD (degenerative joint disease), lumbar    • Elevated cholesterol    • Erysipelas    • GERD (gastroesophageal reflux disease)    • Glaucoma    • Hip pain, chronic, right    • History of kidney stones     X1   • History of peripheral edema     LOWER LEGS BILAT, COMPRESSION KNEE HIGH    • History of transfusion    • Hyperlipidemia    • Hypertension    • Hypothyroid    • Insomnia    • Intervertebral disc stenosis of neural canal of lumbar region 04/12/2022   • Limited mobility     RIGHT HIP   • Low back pain    • Male urinary stress incontinence     s/p prostatectomy-WEAR PAD   • Obesity    • KWAME (obstructive sleep apnea)    • Osteoarthritis    • Pelvic floor dysfunction 06/2022    DEFOGRAM ORDERED AT [facility] BY DR. ROSHAN SCHAFER   • Pes planus    • Presbycusis    • Prostate cancer (HCC)    • Restless legs syndrome    • Shoulder pain     RIGHT, LIMITED MOBILITY-AT TIMES   • Sleep apnea     bipap   • Tinea corporis    • Tinea cruris    • Unsteady gait     RIGHT HIP   • Weakness     RIGHT HIP       Past Surgical History:   Procedure Laterality Date   • CATARACT EXTRACTION, BILATERAL Bilateral    •  CERVICAL DISCECTOMY ANTERIOR     • COLONOSCOPY  03/08/2017    3/17normal. Recheck 2022. 12/11. Repeat in 5 years    • COLONOSCOPY N/A 4/19/2021    Procedure: COLONOSCOPY INTO CECUM WITH HOT & COLD  SNARE POLYPECTOMIES;  Surgeon: Jaden Chowdhury MD;  Location: [facility] ENDOSCOPY;  Service: Gastroenterology;  Laterality: N/A;  PRE: CHANGE IN BOWEL HABITS; FAMILY H/O COLON CANCER  POST: DIVERTICULOSIS, POLYPS   • ENDOSCOPY W/ PEG REMOVAL     • FOOT SURGERY      1998 had big toe replaced on left foot-METAL    • HERNIA REPAIR  03/07/2012    umbilical   • JOINT REPLACEMENT Right 2014   • WI TOTAL KNEE ARTHROPLASTY Left 9/13/2016    Procedure: LT TOTAL KNEE ARTHROPLASTY;  Surgeon: Tadeo Basurto MD;  Location: [facility] OR;  Service: Orthopedics   • PROSTATECTOMY  07/1999 July 1999. Radical    • THYROID LOBECTOMY     • TONSILLECTOMY     • TOTAL HIP ARTHROPLASTY Right 8/19/2021    Procedure: TOTAL HIP ARTHROPLASTY RIGHT POSTERIOR;  Surgeon: Tadeo Basurto MD;  Location: [facility] OR;  Service: Orthopedics;  Laterality: Right;   • TOTAL KNEE ARTHROPLASTY Right 2014   • TOTAL SHOULDER ARTHROPLASTY W/ DISTAL CLAVICLE EXCISION Right 11/13/2019    Procedure: TOTAL SHOULDER REVERSE ARTHROPLASTY RIGHT REPAIR RIGHT AXILLARY VEIN;  Surgeon: Behzad Kelley MD;  Location: [facility] OR [facility];  Service: Orthopedics   • WRIST SURGERY Right 12/17/2014    x2  wrist replaced       Social History:   reports that he quit smoking about 38 years ago. His smoking use included cigarettes. He started smoking about 62 years ago. He has a 20.00 pack-year smoking history. He has quit using smokeless tobacco.  His smokeless tobacco use included chew. He reports current alcohol use. He reports that he does not use drugs.      Marriage status:     Family History   Problem Relation Age of Onset   • Arthritis Mother    • Cancer Mother         COLON   • Colon cancer Mother    • Arthritis Father    • Cancer Father         PROSTATE   •  Heart disease Sister    • Arthritis Sister    • Cancer Sister         BREAST CANCER   • Breast cancer Sister    • Gout Sister    • Hypertension Sister    • Hypertension Brother    • Heart disease Brother    • Arthritis Brother    • Heart attack Brother    • Bone cancer Brother    • Heart disease Brother    • Malig Hyperthermia Neg Hx          Current Outpatient Medications:   •  albuterol sulfate  (90 Base) MCG/ACT inhaler, Inhale 2 puffs Every 4 (Four) Hours As Needed for Wheezing., Disp: , Rfl:   •  ALLERGY SERUM INJECTION, Inject  under the skin into the appropriate area as directed 1 (One) Time Per Week., Disp: , Rfl:   •  amLODIPine (NORVASC) 2.5 MG tablet, TAKE 1 TABLET BY MOUTH DAILY, Disp: 90 tablet, Rfl: 3  •  amoxicillin (AMOXIL) 500 MG tablet, , Disp: , Rfl:   •  aspirin  MG tablet, Take 1 tablet by mouth 2 (Two) Times a Day With Meals., Disp: 60 tablet, Rfl: 0  •  brimonidine (ALPHAGAN) 0.2 % ophthalmic solution, , Disp: , Rfl:   •  BRIMONIDINE TARTRATE OP, Apply 1 drop to eye(s) as directed by provider 2 (Two) Times a Day., Disp: , Rfl:   •  Cyanocobalamin (B-12) 1000 MCG sublingual tablet, One sublingual tab dissolved on tongue daily, Disp: 90 each, Rfl: 3  •  dorzolamide-timolol (COSOPT) 22.3-6.8 MG/ML ophthalmic solution, Administer 1 drop to both eyes 2 (Two) Times a Day., Disp: , Rfl:   •  fexofenadine (ALLEGRA) 180 MG tablet, Take 180 mg by mouth Daily., Disp: , Rfl:   •  Fluticasone-Salmeterol 232-14 MCG/ACT aerosol powder , Inhale 1 puff 2 (Two) Times a Day., Disp: , Rfl:   •  furosemide (LASIX) 40 MG tablet, TAKE 1 TABLET BY MOUTH EVERY DAY, Disp: 90 tablet, Rfl: 3  •  Insulin Glargine (LANTUS SOLOSTAR) 100 UNIT/ML injection pen, Inject 26 Units under the skin into the appropriate area as directed Daily., Disp: 5 pen, Rfl: 5  •  Insulin Pen Needle (Pen Needles) 31G X 5 MM misc, 1 application Daily., Disp: 100 each, Rfl: 3  •  latanoprost (XALATAN) 0.005 % ophthalmic solution,  Administer 1 drop to both eyes Every Night., Disp: , Rfl:   •  levothyroxine (SYNTHROID, LEVOTHROID) 88 MCG tablet, TAKE 1 TABLET BY MOUTH EVERY MORNING, Disp: 90 tablet, Rfl: 3  •  losartan (COZAAR) 100 MG tablet, TAKE 1 TABLET BY MOUTH DAILY, Disp: 90 tablet, Rfl: 3  •  meloxicam (MOBIC) 15 MG tablet, Take 1 tablet by mouth Daily., Disp: 90 tablet, Rfl: 1  •  metFORMIN (GLUCOPHAGE) 500 MG tablet, Take 1 tablet by mouth 2 (Two) Times a Day With Meals., Disp: 180 tablet, Rfl: 3  •  Microlet Lancets misc, 2 (Two) Times a Day. use for testing, Disp: , Rfl:   •  montelukast (SINGULAIR) 10 MG tablet, TAKE 1 TABLET BY MOUTH EVERY NIGHT, Disp: 90 tablet, Rfl: 3  •  pantoprazole (PROTONIX) 40 MG EC tablet, TAKE 1 TABLET BY MOUTH TWICE DAILY, Disp: 90 tablet, Rfl: 3  •  pramipexole (MIRAPEX) 1.5 MG tablet, Take 1 tablet by mouth At Night As Needed (RLS). (Patient taking differently: Take 1.5 mg by mouth 2 (Two) Times a Day.), Disp: 90 tablet, Rfl: 2  •  rosuvastatin (CRESTOR) 20 MG tablet, TAKE 1 TABLET BY MOUTH EVERY EVENING, Disp: 90 tablet, Rfl: 3  •  sennosides-docusate (senna-docusate sodium) 8.6-50 MG per tablet, Take 1 tablet by mouth Daily., Disp: 60 tablet, Rfl: 4  •  Hydrocortisone, Perianal, (Anusol-HC) 2.5 % rectal cream, Apply rectally 3 times daily for 7-10 days during hemorrhoid flare.  Include applicator., Disp: 30 g, Rfl: 1  •  Prucalopride Succinate (Motegrity) 2 MG tablet, Take 1 tablet by mouth Daily., Disp: 30 tablet, Rfl: 0    Allergy  Adhesive tape    Review of Systems   Constitutional: Negative for decreased appetite and weight gain.   HENT: Negative for congestion, hearing loss and hoarse voice.    Eyes: Negative for blurred vision, discharge and visual disturbance.   Cardiovascular: Negative for chest pain, cyanosis and leg swelling.   Respiratory: Positive for cough and wheezing. Negative for shortness of breath, sleep disturbances due to breathing and snoring.    Endocrine: Negative for cold  intolerance and heat intolerance.   Hematologic/Lymphatic: Does not bruise/bleed easily.   Skin: Negative for itching, poor wound healing and skin cancer.   Musculoskeletal: Positive for arthritis and back pain. Negative for joint pain and joint swelling.   Gastrointestinal: Positive for constipation and diarrhea. Negative for abdominal pain, change in bowel habit and bowel incontinence.   Genitourinary: Negative for bladder incontinence, dysuria and hematuria.   Neurological: Positive for loss of balance. Negative for brief paralysis, excessive daytime sleepiness, dizziness, focal weakness, headaches, light-headedness and weakness.   Psychiatric/Behavioral: Negative for altered mental status and hallucinations. The patient does not have insomnia.    Allergic/Immunologic: Positive for environmental allergies. Negative for HIV exposure and persistent infections.   All other systems reviewed and are negative.      Vitals:    06/10/22 1521   BP: 148/78   Pulse: 72   Temp: 97.5 °F (36.4 °C)   SpO2: 95%     Body mass index is 38.22 kg/m².    Physical Exam  Exam conducted with a chaperone present.   Constitutional:       General: He is not in acute distress.     Appearance: He is well-developed.   HENT:      Head: Normocephalic and atraumatic.      Nose: Nose normal.   Eyes:      Conjunctiva/sclera: Conjunctivae normal.      Pupils: Pupils are equal, round, and reactive to light.   Neck:      Trachea: No tracheal deviation.   Pulmonary:      Effort: Pulmonary effort is normal. No respiratory distress.      Breath sounds: Normal breath sounds.   Abdominal:      General: Bowel sounds are normal. There is no distension.      Palpations: Abdomen is soft.   Musculoskeletal:         General: No deformity. Normal range of motion.      Cervical back: Normal range of motion.   Skin:     General: Skin is warm and dry.   Neurological:      Mental Status: He is alert and oriented to person, place, and time.      Cranial Nerves: No  cranial nerve deficit.      Coordination: Coordination normal.      Gait: Gait normal.   Psychiatric:         Behavior: Behavior normal.         Judgment: Judgment normal.         Review of Medical Record:  I reviewed colonoscopy that was done last year by Dr. Chowdhury.    Assessment:  Constipation-since patient not responsive to stimulants, he needs to be checked for pelvic floor dysfunction.    Plan:        - I advised  the patient that the defecography test is an option. I explained to the patient that a special dye into the rectum and then watch you sitting strain on the  toilet and take x-rays to make sure that the pelvic floor is loosening where it's supposed to, pulling where it's supposed to to see if you're able to get things out  correctly.         - I will order a defecography.        - Sent in order for Motegrity 2 mg once daily      Transcribed from ambient dictation for Bunny Diggs MD by Sharifa Steiner.  06/10/22   16:36 EDT    Patient verbalized consent to the visit recording.  This patient was evaluated by me, recommendations made, documentation reviewed, edited, and revised by me, Bunny Diggs MD

## 2022-06-22 ENCOUNTER — HOSPITAL ENCOUNTER (EMERGENCY)
Facility: HOSPITAL | Age: 77
Discharge: HOME | End: 2022-06-22
Attending: EMERGENCY MEDICINE | Admitting: EMERGENCY MEDICINE

## 2022-06-22 ENCOUNTER — TELEPHONE (OUTPATIENT)
Dept: SURGERY | Facility: CLINIC | Age: 77
End: 2022-06-22

## 2022-06-22 VITALS
DIASTOLIC BLOOD PRESSURE: 66 MMHG | RESPIRATION RATE: 16 BRPM | SYSTOLIC BLOOD PRESSURE: 103 MMHG | TEMPERATURE: 96.9 F | HEART RATE: 97 BPM | OXYGEN SATURATION: 96 %

## 2022-06-22 DIAGNOSIS — R73.9 HYPERGLYCEMIA: ICD-10-CM

## 2022-06-22 DIAGNOSIS — K62.5 RECTAL BLEED: Primary | ICD-10-CM

## 2022-06-22 LAB
ABO GROUP BLD: NORMAL
ALBUMIN SERPL-MCNC: 4.2 G/DL (ref 3.5–5.2)
ALBUMIN/GLOB SERPL: 2 G/DL
ALP SERPL-CCNC: 185 U/L (ref 39–117)
ALT SERPL W P-5'-P-CCNC: 27 U/L (ref 1–41)
ANION GAP SERPL CALCULATED.3IONS-SCNC: 12 MMOL/L (ref 5–15)
AST SERPL-CCNC: 28 U/L (ref 1–40)
BASOPHILS # BLD AUTO: 0.05 10*3/MM3 (ref 0–0.2)
BASOPHILS NFR BLD AUTO: 0.9 % (ref 0–1.5)
BILIRUB SERPL-MCNC: 0.5 MG/DL (ref 0–1.2)
BLD GP AB SCN SERPL QL: NEGATIVE
BUN SERPL-MCNC: 24 MG/DL (ref 8–23)
BUN/CREAT SERPL: 18.5 (ref 7–25)
CALCIUM SPEC-SCNC: 9.1 MG/DL (ref 8.6–10.5)
CHLORIDE SERPL-SCNC: 96 MMOL/L (ref 98–107)
CO2 SERPL-SCNC: 24 MMOL/L (ref 22–29)
CREAT SERPL-MCNC: 1.3 MG/DL (ref 0.76–1.27)
DEPRECATED RDW RBC AUTO: 44.1 FL (ref 37–54)
EGFRCR SERPLBLD CKD-EPI 2021: 56.9 ML/MIN/1.73
EOSINOPHIL # BLD AUTO: 0.09 10*3/MM3 (ref 0–0.4)
EOSINOPHIL NFR BLD AUTO: 1.6 % (ref 0.3–6.2)
ERYTHROCYTE [DISTWIDTH] IN BLOOD BY AUTOMATED COUNT: 14 % (ref 12.3–15.4)
GLOBULIN UR ELPH-MCNC: 2.1 GM/DL
GLUCOSE BLDC GLUCOMTR-MCNC: 390 MG/DL (ref 70–130)
GLUCOSE SERPL-MCNC: 499 MG/DL (ref 65–99)
HCT VFR BLD AUTO: 34.3 % (ref 37.5–51)
HGB BLD-MCNC: 11.5 G/DL (ref 13–17.7)
IMM GRANULOCYTES # BLD AUTO: 0.22 10*3/MM3 (ref 0–0.05)
IMM GRANULOCYTES NFR BLD AUTO: 3.9 % (ref 0–0.5)
LIPASE SERPL-CCNC: 35 U/L (ref 13–60)
LYMPHOCYTES # BLD AUTO: 0.92 10*3/MM3 (ref 0.7–3.1)
LYMPHOCYTES NFR BLD AUTO: 16.5 % (ref 19.6–45.3)
MCH RBC QN AUTO: 29.1 PG (ref 26.6–33)
MCHC RBC AUTO-ENTMCNC: 33.5 G/DL (ref 31.5–35.7)
MCV RBC AUTO: 86.8 FL (ref 79–97)
MONOCYTES # BLD AUTO: 0.54 10*3/MM3 (ref 0.1–0.9)
MONOCYTES NFR BLD AUTO: 9.7 % (ref 5–12)
NEUTROPHILS NFR BLD AUTO: 3.77 10*3/MM3 (ref 1.7–7)
NEUTROPHILS NFR BLD AUTO: 67.4 % (ref 42.7–76)
NRBC BLD AUTO-RTO: 0 /100 WBC (ref 0–0.2)
PLATELET # BLD AUTO: 163 10*3/MM3 (ref 140–450)
PMV BLD AUTO: 10.7 FL (ref 6–12)
POTASSIUM SERPL-SCNC: 4.4 MMOL/L (ref 3.5–5.2)
PROT SERPL-MCNC: 6.3 G/DL (ref 6–8.5)
RBC # BLD AUTO: 3.95 10*6/MM3 (ref 4.14–5.8)
RH BLD: POSITIVE
SODIUM SERPL-SCNC: 132 MMOL/L (ref 136–145)
T&S EXPIRATION DATE: NORMAL
WBC NRBC COR # BLD: 5.59 10*3/MM3 (ref 3.4–10.8)
WHOLE BLOOD HOLD COAG: NORMAL

## 2022-06-22 PROCEDURE — 86900 BLOOD TYPING SEROLOGIC ABO: CPT | Performed by: PHYSICIAN ASSISTANT

## 2022-06-22 PROCEDURE — 83690 ASSAY OF LIPASE: CPT | Performed by: PHYSICIAN ASSISTANT

## 2022-06-22 PROCEDURE — 86850 RBC ANTIBODY SCREEN: CPT | Performed by: PHYSICIAN ASSISTANT

## 2022-06-22 PROCEDURE — 85025 COMPLETE CBC W/AUTO DIFF WBC: CPT | Performed by: PHYSICIAN ASSISTANT

## 2022-06-22 PROCEDURE — 80053 COMPREHEN METABOLIC PANEL: CPT | Performed by: PHYSICIAN ASSISTANT

## 2022-06-22 PROCEDURE — 63710000001 INSULIN REGULAR HUMAN PER 5 UNITS: Performed by: PHYSICIAN ASSISTANT

## 2022-06-22 PROCEDURE — 86901 BLOOD TYPING SEROLOGIC RH(D): CPT | Performed by: PHYSICIAN ASSISTANT

## 2022-06-22 PROCEDURE — 96374 THER/PROPH/DIAG INJ IV PUSH: CPT

## 2022-06-22 PROCEDURE — 82962 GLUCOSE BLOOD TEST: CPT

## 2022-06-22 PROCEDURE — 99283 EMERGENCY DEPT VISIT LOW MDM: CPT

## 2022-06-22 RX ORDER — HYDROCORTISONE 25 MG/G
CREAM TOPICAL
Qty: 30 G | Refills: 1 | Status: ON HOLD | OUTPATIENT
Start: 2022-06-22 | End: 2023-02-17

## 2022-06-22 RX ORDER — SODIUM CHLORIDE 0.9 % (FLUSH) 0.9 %
10 SYRINGE (ML) INJECTION AS NEEDED
Status: DISCONTINUED | OUTPATIENT
Start: 2022-06-22 | End: 2022-06-22 | Stop reason: HOSPADM

## 2022-06-22 RX ADMIN — INSULIN HUMAN 10 UNITS: 100 INJECTION, SOLUTION PARENTERAL at 14:56

## 2022-06-22 RX ADMIN — SODIUM CHLORIDE 1000 ML: 9 INJECTION, SOLUTION INTRAVENOUS at 14:57

## 2022-06-22 NOTE — PROGRESS NOTES
Spoke with patient's wife via telephone.  Sent Anusol cream to pharmacy, and discussed this with her.  Discussed internal hemorrhoids is most likely source of bleeding.  They have a follow-up appointment already scheduled on July 8.  They will call if symptoms worsen before then.  Also suggested sitz baths, avoiding straining, fiber, and MiraLAX.

## 2022-06-22 NOTE — ED TRIAGE NOTES
Pt complains of bright red rectal bleeding for the last week. Complains of mild abdominal pain with it. Denies blood thinners.     Patient masked at arrival and triage staff wore all appropriate PPE during entire encounter with patient.

## 2022-06-22 NOTE — TELEPHONE ENCOUNTER
I spoke with our Physician Assistant Capri Perez that is here this week and she suggested he go to the ER. Coco states she is not sure if he will go. I told her the importance it is that he goes right away because of the facility of lab and other testing he may need he will have there along with results right away.    Coco voiced understanding.    HUB warm transferred call with patient's wife Coco on the line, states that patient has been bleeding rectally X 5 days bright red and half a cup or more, states he is not complaining of any rectal pain, but is having Abdominal Pain, feeling sick to his stomach/Nausea, feeling extremely weak. She mentioned that Dr. Diggs put him on Trulance 9 days ago, so she is not sure if that has anything to do with his rectal bleeding. She says they will be on vacation leaving this Sunday 06/26/2022 for a week and has everything paid for.

## 2022-06-22 NOTE — ED PROVIDER NOTES
MD ATTESTATION NOTE    The CARMEN and I have discussed this patient's history, physical exam, and treatment plan.    I provided a substantive portion of the care of this patient. I personally performed the physical exam, in its entirety. The attached note describes my personal findings.      Nathan Baez is a 77 y.o. male who presents to the ED c/o rectal bleeding.  Reports having 1 week history of bright red blood from his rectum.  No associated abdominal pain.  He is on no blood thinners.      On exam:  GENERAL: not distressed  HENT: nares patent  EYES: no scleral icterus  CV: regular rhythm, regular rate  RESPIRATORY: normal effort  ABDOMEN: soft, nontender  MUSCULOSKELETAL: no deformity  NEURO: alert, moves all extremities, follows commands  SKIN: warm, dry    Labs  Recent Results (from the past 24 hour(s))   Comprehensive Metabolic Panel    Collection Time: 06/22/22 12:35 PM    Specimen: Blood   Result Value Ref Range    Glucose 499 (C) 65 - 99 mg/dL    BUN 24 (H) 8 - 23 mg/dL    Creatinine 1.30 (H) 0.76 - 1.27 mg/dL    Sodium 132 (L) 136 - 145 mmol/L    Potassium 4.4 3.5 - 5.2 mmol/L    Chloride 96 (L) 98 - 107 mmol/L    CO2 24.0 22.0 - 29.0 mmol/L    Calcium 9.1 8.6 - 10.5 mg/dL    Total Protein 6.3 6.0 - 8.5 g/dL    Albumin 4.20 3.50 - 5.20 g/dL    ALT (SGPT) 27 1 - 41 U/L    AST (SGOT) 28 1 - 40 U/L    Alkaline Phosphatase 185 (H) 39 - 117 U/L    Total Bilirubin 0.5 0.0 - 1.2 mg/dL    Globulin 2.1 gm/dL    A/G Ratio 2.0 g/dL    BUN/Creatinine Ratio 18.5 7.0 - 25.0    Anion Gap 12.0 5.0 - 15.0 mmol/L    eGFR 56.9 (L) >60.0 mL/min/1.73   Lipase    Collection Time: 06/22/22 12:35 PM    Specimen: Blood   Result Value Ref Range    Lipase 35 13 - 60 U/L   Type & Screen    Collection Time: 06/22/22 12:35 PM    Specimen: Blood   Result Value Ref Range    ABO Type O     RH type Positive     Antibody Screen Negative     T&S Expiration Date 6/25/2022 11:59:59 PM    CBC Auto Differential    Collection Time: 06/22/22  12:35 PM    Specimen: Blood   Result Value Ref Range    WBC 5.59 3.40 - 10.80 10*3/mm3    RBC 3.95 (L) 4.14 - 5.80 10*6/mm3    Hemoglobin 11.5 (L) 13.0 - 17.7 g/dL    Hematocrit 34.3 (L) 37.5 - 51.0 %    MCV 86.8 79.0 - 97.0 fL    MCH 29.1 26.6 - 33.0 pg    MCHC 33.5 31.5 - 35.7 g/dL    RDW 14.0 12.3 - 15.4 %    RDW-SD 44.1 37.0 - 54.0 fl    MPV 10.7 6.0 - 12.0 fL    Platelets 163 140 - 450 10*3/mm3    Neutrophil % 67.4 42.7 - 76.0 %    Lymphocyte % 16.5 (L) 19.6 - 45.3 %    Monocyte % 9.7 5.0 - 12.0 %    Eosinophil % 1.6 0.3 - 6.2 %    Basophil % 0.9 0.0 - 1.5 %    Immature Grans % 3.9 (H) 0.0 - 0.5 %    Neutrophils, Absolute 3.77 1.70 - 7.00 10*3/mm3    Lymphocytes, Absolute 0.92 0.70 - 3.10 10*3/mm3    Monocytes, Absolute 0.54 0.10 - 0.90 10*3/mm3    Eosinophils, Absolute 0.09 0.00 - 0.40 10*3/mm3    Basophils, Absolute 0.05 0.00 - 0.20 10*3/mm3    Immature Grans, Absolute 0.22 (H) 0.00 - 0.05 10*3/mm3    nRBC 0.0 0.0 - 0.2 /100 WBC   Light Blue Top    Collection Time: 06/22/22 12:35 PM   Result Value Ref Range    Extra Tube Hold for add-ons.        Radiology  No Radiology Exams Resulted Within Past 24 Hours    Medical Decision Making:  ED Course as of 06/22/22 2305   Wed Jun 22, 2022   1229 Patient presents with an approximate 1 week history of bright red rectal bleeding, weakness.  No significant abdominal pain.  No blood thinners.  Hemodynamically stable.  Plan to check basic labs and reevaluate. [EE]   1357 Hemoglobin(!): 11.5  This is stable [EE]   1357 Patient has a fairly benign exam.  He has stable vitals and stable hemoglobin.  He has been bleeding for a week.  I believe clinically he is stable.    I discussed the patient with Capri Perez, physician assistant with Dr. Diggs.  They agree with work-up and will see the patient in follow-up. [EE]   1411 Glucose(!!): 499  Patient is unsure if he took his insulin this morning.  We will treat here. [EE]   1411 Creatinine(!): 1.30 [EE]   1558 Glucose(!): 390  [EE]   1558 Patient is hemodynamically stable.  Plan to discharge.  He will follow-up outpatient with Dr. Diggs. [EE]      ED Course User Index  [EE] Fei Quinn, PA           PPE: Both the patient and I wore a surgical mask throughout the entire patient encounter. I wore protective goggles.     Diagnosis  Final diagnoses:   Rectal bleed   Hyperglycemia        Juni Monge II, MD  06/22/22 5855

## 2022-06-22 NOTE — ED PROVIDER NOTES
EMERGENCY DEPARTMENT ENCOUNTER    Room Number:  05/05  Date of encounter:  6/22/2022  PCP: Damien Pierce MD  Historian: Patient, spouse      I used full protective equipment while examining this patient.  This includes face mask, gloves and protective eyewear.  I washed my hands before entering the room and immediately upon leaving the room      HPI:  Chief Complaint: Rectal bleeding  A complete HPI/ROS/PMH/PSH/SH/FH are unobtainable due to: Nothing    Context: Nathan Baez is a 77 y.o. male who presents to the ED c/o 1 week history of intermittent, progressively worsening bright red rectal bleeding.  Patient has known history of constipation and follows with Dr. Diggs and colorectal surgery.  Patient had a recent defocography at [facility] with unknown results.  Patient had tried Trulance for his constipation.  Patient states for the past week he has had significant bleeding.  He states he feels weak.  He states the bleeding can occur at any time either with bowel movements or without bowel movements.  He states it is enough to fill up the toilet bowl and make the water red.  He does not take any blood thinners.  He denies any dizziness or lightheadedness.    Review of Medical Records  I reviewed patient's colonoscopy from 4/19/2021.  This showed internal hemorrhoids and sigmoid diverticulosis.    PAST MEDICAL HISTORY  Active Ambulatory Problems     Diagnosis Date Noted   • OA (osteoarthritis) of knee 09/13/2016   • Hypertension    • Abdominal wall cellulitis 06/02/2019   • Hypothyroidism (acquired) 06/03/2019   • Gastroesophageal reflux disease without esophagitis 07/11/2019   • Mixed hyperlipidemia 07/11/2019   • Primary insomnia 07/11/2019   • DDD (degenerative disc disease), lumbar 07/11/2019   • KWAME (obstructive sleep apnea) 07/11/2019   • Allergic 07/11/2019   • Venous insufficiency 07/11/2019   • Prostate cancer (HCC) 07/11/2019   • Venous stasis dermatitis    • Tinea cruris    • Tinea  corporis    • Throat clearing    • Sleep apnea    • Skin lesion of face    • Rib pain on left side    • Rectal bleed    • Presbycusis    • Pes planus    • Osteoarthritis    • Obesity    • Medicare annual wellness visit, subsequent    • Lumbar strain    • Internal hemorrhoids    • Insomnia    • Inflamed skin tag    • Hyperplastic polyp of stomach    • Hospital discharge follow-up    • History of colon polyps    • High risk medication use    • Glaucoma    • Gastroenteritis, acute    • Erysipelas    • Encounter for screening colonoscopy    • Encounter for long-term (current) use of NSAIDs    • Dysphagia    • DVT (deep venous thrombosis) (HCC)    • DJD (degenerative joint disease), lumbar    • Diverticulosis    • Cough    • Contact with hypodermic needle    • Cervical radiculopathy    • Cellulitis    • Arthritis    • Allergic rhinitis    • Acute glaucoma    • Acute embolism and thrombosis of vein    • Actinic keratosis    • Status post reverse total shoulder replacement, 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   • Localized edema 11/26/2019   • Anemia 11/26/2019   • Peripheral polyneuropathy 11/26/2019   • Campylobacter diarrhea 03/14/2020   • Hyponatremia 03/15/2020   • Generalized abdominal pain 03/20/2020   • Fever 03/20/2020   • Constipation 03/20/2020   • Bilateral lower extremity edema 03/20/2020   • Acute pyelonephritis 04/09/2020   • Chronic idiopathic constipation 05/19/2020   • Functional diarrhea 07/08/2020   • Change in bowel habits 07/08/2020   • RLS (restless legs syndrome) 09/17/2020   • Type 2 diabetes mellitus with hyperglycemia (HCC) 12/18/2020   • Family history of GI malignancy 04/01/2021   • Primary osteoarthritis of right hip 08/19/2021   • B12 deficiency 12/07/2021   • Intervertebral disc stenosis of neural canal of lumbar region 04/12/2022   • Encounter for hepatitis C screening test for low risk patient 04/14/2022   • Pain associated with defecation 04/14/2022     Resolved Ambulatory Problems     Diagnosis Date Noted    • Acute DVT (deep venous thrombosis) (HCC) 06/03/2019   • Hyperglycemia 07/11/2019   • Restless leg syndrome 07/11/2019   • Restless legs syndrome    • Restless leg    • Hypothyroid    • Hyperlipidemia    • Disequilibrium      Past Medical History:   Diagnosis Date   • Cataract    • Chronic constipation    • Diabetes mellitus (HCC)    • Elevated cholesterol    • GERD (gastroesophageal reflux disease)    • Hip pain, chronic, right    • History of kidney stones    • History of peripheral edema    • History of transfusion    • Limited mobility    • Low back pain    • Male urinary stress incontinence    • Pelvic floor dysfunction 06/2022   • Shoulder pain    • Unsteady gait    • Weakness          PAST SURGICAL HISTORY  Past Surgical History:   Procedure Laterality Date   • CATARACT EXTRACTION, BILATERAL Bilateral    • CERVICAL DISCECTOMY ANTERIOR     • COLONOSCOPY  03/08/2017    3/17normal. Recheck 2022. 12/11. Repeat in 5 years    • COLONOSCOPY N/A 4/19/2021    Procedure: COLONOSCOPY INTO CECUM WITH HOT & COLD  SNARE POLYPECTOMIES;  Surgeon: Jaden Chowdhury MD;  Location: [facility] ENDOSCOPY;  Service: Gastroenterology;  Laterality: N/A;  PRE: CHANGE IN BOWEL HABITS; FAMILY H/O COLON CANCER  POST: DIVERTICULOSIS, POLYPS   • ENDOSCOPY W/ PEG REMOVAL     • FOOT SURGERY      1998 had big toe replaced on left foot-METAL    • HERNIA REPAIR  03/07/2012    umbilical   • JOINT REPLACEMENT Right 2014   • TN TOTAL KNEE ARTHROPLASTY Left 9/13/2016    Procedure: LT TOTAL KNEE ARTHROPLASTY;  Surgeon: Tadeo Basurto MD;  Location: [facility];  Service: Orthopedics   • PROSTATECTOMY  07/1999 July 1999. Radical    • THYROID LOBECTOMY     • TONSILLECTOMY     • TOTAL HIP ARTHROPLASTY Right 8/19/2021    Procedure: TOTAL HIP ARTHROPLASTY RIGHT POSTERIOR;  Surgeon: Tadeo Basurto MD;  Location: [facility];  Service: Orthopedics;  Laterality: Right;   • TOTAL KNEE ARTHROPLASTY Right 2014   • TOTAL SHOULDER ARTHROPLASTY  W/ DISTAL CLAVICLE EXCISION Right 2019    Procedure: TOTAL SHOULDER REVERSE ARTHROPLASTY RIGHT REPAIR RIGHT AXILLARY VEIN;  Surgeon: Behzad Kelley MD;  [place] [facility] [place] [facility] [place]  Service: [place]opedics   • WRIST SURGERY Right 12/17/2014    x2  wrist replaced         FAMILY HISTORY  Family History   Problem Relation Age of Onset   • Arthritis Mother    • Cancer Mother         COLON   • Colon cancer Mother    • Arthritis Father    • Cancer Father         PROSTATE   • Heart disease Sister    • Arthritis Sister    • Cancer Sister         BREAST CANCER   • Breast cancer Sister    • Gout Sister    • Hypertension Sister    • Hypertension Brother    • Heart disease Brother    • Arthritis Brother    • Heart attack Brother    • Bone cancer Brother    • Heart disease Brother    • Malig Hyperthermia Neg Hx          SOCIAL HISTORY  Social History     Socioeconomic History   • Marital status:    Tobacco Use   • Smoking status: Former Smoker     Packs/day: 1.00     Years: 20.00     Pack years: 20.00     Types: Cigarettes     Start date:      Quit date: 1984     Years since quittin.4   • Smokeless tobacco: Former User     Types: Chew   Vaping Use   • Vaping Use: Never used   Substance and Sexual Activity   • Alcohol use: Yes     Comment: 3-4 MONTHLY   • Drug use: No   • Sexual activity: Defer         ALLERGIES  Adhesive tape        REVIEW OF SYSTEMS  All systems reviewed and negative except for those discussed in HPI.       PHYSICAL EXAM    I have reviewed the triage vital signs and nursing notes.    ED Triage Vitals [22 1135]   Temp Heart Rate Resp BP SpO2   96.9 °F (36.1 °C) 111 16 -- 92 %      Temp src Heart Rate Source Patient Position BP Location FiO2 (%)   Tympanic Monitor -- -- --       Physical Exam  GENERAL: Alert, oriented, nontoxic-appearing, not distressed  HENT: head atraumatic, no nuchal rigidity  EYES: no scleral icterus, EOMI  CV: regular rhythm, regular rate, no murmur  RESPIRATORY:  normal effort, CTA  ABDOMEN: soft, nontender  : External hemorrhoids visible.  No active bleeding.  Nontender exam.  MUSCULOSKELETAL: no deformity, FROM, no calf swelling or tenderness  NEURO: alert, moves all extremities, follows commands  SKIN: warm, dry        LAB RESULTS  Recent Results (from the past 24 hour(s))   Comprehensive Metabolic Panel    Collection Time: 06/22/22 12:35 PM    Specimen: Blood   Result Value Ref Range    Glucose 499 (C) 65 - 99 mg/dL    BUN 24 (H) 8 - 23 mg/dL    Creatinine 1.30 (H) 0.76 - 1.27 mg/dL    Sodium 132 (L) 136 - 145 mmol/L    Potassium 4.4 3.5 - 5.2 mmol/L    Chloride 96 (L) 98 - 107 mmol/L    CO2 24.0 22.0 - 29.0 mmol/L    Calcium 9.1 8.6 - 10.5 mg/dL    Total Protein 6.3 6.0 - 8.5 g/dL    Albumin 4.20 3.50 - 5.20 g/dL    ALT (SGPT) 27 1 - 41 U/L    AST (SGOT) 28 1 - 40 U/L    Alkaline Phosphatase 185 (H) 39 - 117 U/L    Total Bilirubin 0.5 0.0 - 1.2 mg/dL    Globulin 2.1 gm/dL    A/G Ratio 2.0 g/dL    BUN/Creatinine Ratio 18.5 7.0 - 25.0    Anion Gap 12.0 5.0 - 15.0 mmol/L    eGFR 56.9 (L) >60.0 mL/min/1.73   Lipase    Collection Time: 06/22/22 12:35 PM    Specimen: Blood   Result Value Ref Range    Lipase 35 13 - 60 U/L   Type & Screen    Collection Time: 06/22/22 12:35 PM    Specimen: Blood   Result Value Ref Range    ABO Type O     RH type Positive     Antibody Screen Negative     T&S Expiration Date 6/25/2022 11:59:59 PM    CBC Auto Differential    Collection Time: 06/22/22 12:35 PM    Specimen: Blood   Result Value Ref Range    WBC 5.59 3.40 - 10.80 10*3/mm3    RBC 3.95 (L) 4.14 - 5.80 10*6/mm3    Hemoglobin 11.5 (L) 13.0 - 17.7 g/dL    Hematocrit 34.3 (L) 37.5 - 51.0 %    MCV 86.8 79.0 - 97.0 fL    MCH 29.1 26.6 - 33.0 pg    MCHC 33.5 31.5 - 35.7 g/dL    RDW 14.0 12.3 - 15.4 %    RDW-SD 44.1 37.0 - 54.0 fl    MPV 10.7 6.0 - 12.0 fL    Platelets 163 140 - 450 10*3/mm3    Neutrophil % 67.4 42.7 - 76.0 %    Lymphocyte % 16.5 (L) 19.6 - 45.3 %    Monocyte % 9.7 5.0 -  12.0 %    Eosinophil % 1.6 0.3 - 6.2 %    Basophil % 0.9 0.0 - 1.5 %    Immature Grans % 3.9 (H) 0.0 - 0.5 %    Neutrophils, Absolute 3.77 1.70 - 7.00 10*3/mm3    Lymphocytes, Absolute 0.92 0.70 - 3.10 10*3/mm3    Monocytes, Absolute 0.54 0.10 - 0.90 10*3/mm3    Eosinophils, Absolute 0.09 0.00 - 0.40 10*3/mm3    Basophils, Absolute 0.05 0.00 - 0.20 10*3/mm3    Immature Grans, Absolute 0.22 (H) 0.00 - 0.05 10*3/mm3    nRBC 0.0 0.0 - 0.2 /100 WBC   Light Blue Top    Collection Time: 06/22/22 12:35 PM   Result Value Ref Range    Extra Tube Hold for add-ons.    POC Glucose Once    Collection Time: 06/22/22  3:33 PM    Specimen: Blood   Result Value Ref Range    Glucose 390 (H) 70 - 130 mg/dL       Ordered the above labs and independently reviewed the results.        MEDICATIONS GIVEN IN ER    Medications   sodium chloride 0.9 % flush 10 mL (has no administration in time range)   sodium chloride 0.9 % bolus 1,000 mL (0 mL Intravenous Stopped 6/22/22 1624)   insulin regular (humuLIN R,novoLIN R) injection 10 Units (10 Units Intravenous Given 6/22/22 1456)         PROGRESS, DATA ANALYSIS, CONSULTS, AND MEDICAL DECISION MAKING    All labs have been independently reviewed by me.  All radiology studies have been reviewed by me and discussed with radiologist dictating the report.   EKG's independently viewed and interpreted by me.  Discussion below represents my analysis of pertinent findings related to patient's condition, differential diagnosis, treatment plan and final disposition.    I have discussed case with Dr. Monge, emergency room physician.  He has performed his own bedside examination and agrees with treatment plan.    ED Course as of 06/22/22 1634   Wed Jun 22, 2022   1229 Patient presents with an approximate 1 week history of bright red rectal bleeding, weakness.  No significant abdominal pain.  No blood thinners.  Hemodynamically stable.  Plan to check basic labs and reevaluate. [EE]   1357 Hemoglobin(!):  11.5  This is stable [EE]   1357 Patient has a fairly benign exam.  He has stable vitals and stable hemoglobin.  He has been bleeding for a week.  I believe clinically he is stable.    I discussed the patient with Capri Perez, physician assistant with Dr. Diggs.  They agree with work-up and will see the patient in follow-up. [EE]   1411 Glucose(!!): 499  Patient is unsure if he took his insulin this morning.  We will treat here. [EE]   1411 Creatinine(!): 1.30 [EE]   1558 Glucose(!): 390 [EE]   1558 Patient is hemodynamically stable.  Plan to discharge.  He will follow-up outpatient with Dr. Diggs. [EE]      ED Course User Index  [EE] Fei Quinn PA       AS OF 16:34 EDT VITALS:    BP - 103/66  HR - 97  TEMP - 96.9 °F (36.1 °C) (Tympanic)  O2 SATS - 96%        DIAGNOSIS  Final diagnoses:   Rectal bleed   Hyperglycemia         DISPOSITION  Discharged      Dictated utilizing Dragon dictation     Fei Quinn PA  06/22/22 8595

## 2022-07-08 ENCOUNTER — OFFICE VISIT (OUTPATIENT)
Dept: SURGERY | Facility: CLINIC | Age: 77
End: 2022-07-08

## 2022-07-08 VITALS
OXYGEN SATURATION: 95 % | HEIGHT: 67 IN | TEMPERATURE: 97.9 F | DIASTOLIC BLOOD PRESSURE: 84 MMHG | SYSTOLIC BLOOD PRESSURE: 150 MMHG | HEART RATE: 92 BPM | BODY MASS INDEX: 38.36 KG/M2 | WEIGHT: 244.4 LBS

## 2022-07-08 DIAGNOSIS — K59.00 CONSTIPATION, UNSPECIFIED CONSTIPATION TYPE: Primary | ICD-10-CM

## 2022-07-08 PROBLEM — N20.0 CALCULUS OF KIDNEY: Status: ACTIVE | Noted: 2022-07-08

## 2022-07-08 PROBLEM — K21.9 GASTROESOPHAGEAL REFLUX DISEASE: Status: ACTIVE | Noted: 2022-07-08

## 2022-07-08 PROCEDURE — 99212 OFFICE O/P EST SF 10 MIN: CPT | Performed by: COLON & RECTAL SURGERY

## 2022-07-08 NOTE — PROGRESS NOTES
"Nathan Baez is a 77 y.o. male in for follow up of Constipation, unspecified constipation type  Nathan Baez is a 77-year-old male who presents for follow-up.   The patient is accompanied by his wife.  The patient reports he has pain all around his back and around to his stomach. The patient's wife states the patient had an MRI done of his back and was told he has degenerative discs and arthritis. She states the patient had one injection for pain management, but the patient did not see results. She states the patient is scheduled for another one in 08/2022. The patient states he stays miserable all the time.     The patient states he feels like he needs to go to the bathroom all the time. His wife states the patient tried Trulance for 9 days in the past, and it helped, but caused his hemorrhoids to bleed. The patient states that he bled for 2 or 3 weeks. The patient's wife states that they have tried Linzess and several other similar medications in the past without success. The patient states he has been drinking Metamucil and olive oil.      /84   Pulse 92   Temp 97.9 °F (36.6 °C)   Ht 170.2 cm (67\")   Wt 111 kg (244 lb 6.4 oz)   SpO2 95%   BMI 38.28 kg/m²   Body mass index is 38.28 kg/m².      PE:  Physical Exam  Constitutional:       General: He is not in acute distress.     Appearance: He is well-developed.   HENT:      Head: Normocephalic and atraumatic.   Abdominal:      General: There is no distension.      Palpations: Abdomen is soft.   Musculoskeletal:         General: Normal range of motion.   Neurological:      Mental Status: He is alert.   Psychiatric:         Thought Content: Thought content normal.         Review of medical record:The patient underwent a defecography which showed a small rectocele and some abnormal angles.   Assessment:   1. Constipation, unspecified constipation type         Plan:  I discussed the findings of the defecography and associated anatomy and physiology with " the patient and his wife. I explained that, unfortunately, there is no surgical remedy for the symptoms he is experiencing. I will refer the patient to physical therapy for pelvic floor training. The patient and his wife were given the opportunity to ask questions. All questions were ranswered.    Transcribed from ambient dictation for Bunny Diggs MD by Sara Gonzalez.  07/08/22   13:40 EDT    Patient verbalized consent to the visit recording.  I have personally performed the services described in this document as transcribed by the above individual, and it is both accurate and complete.  Bunny Diggs MD  7/15/2022  07:17 EDT

## 2022-07-15 RX ORDER — MELOXICAM 15 MG/1
15 TABLET ORAL DAILY
Qty: 90 TABLET | Refills: 1 | Status: ON HOLD | OUTPATIENT
Start: 2022-07-15 | End: 2023-02-17

## 2022-07-15 NOTE — TELEPHONE ENCOUNTER
Rx Refill Note  Requested Prescriptions     Pending Prescriptions Disp Refills   • meloxicam (MOBIC) 15 MG tablet [Pharmacy Med Name: MELOXICAM 15MG TABLETS] 90 tablet 1     Sig: TAKE 1 TABLET BY MOUTH DAILY      Last office visit with prescribing clinician: 4/14/2022      Next office visit with prescribing clinician: 8/10/2022     LAST REFILL 04/14/2022           Renee Herring MA  07/15/22, 09:48 EDT

## 2022-08-03 ENCOUNTER — APPOINTMENT (OUTPATIENT)
Dept: PAIN MEDICINE | Facility: HOSPITAL | Age: 77
End: 2022-08-03

## 2022-08-10 ENCOUNTER — OFFICE VISIT (OUTPATIENT)
Dept: FAMILY MEDICINE CLINIC | Facility: CLINIC | Age: 77
End: 2022-08-10

## 2022-08-10 VITALS
SYSTOLIC BLOOD PRESSURE: 124 MMHG | HEIGHT: 67 IN | OXYGEN SATURATION: 96 % | TEMPERATURE: 98.2 F | HEART RATE: 87 BPM | BODY MASS INDEX: 38.8 KG/M2 | DIASTOLIC BLOOD PRESSURE: 60 MMHG | RESPIRATION RATE: 16 BRPM | WEIGHT: 247.2 LBS

## 2022-08-10 DIAGNOSIS — E11.65 TYPE 2 DIABETES MELLITUS WITH HYPERGLYCEMIA, WITH LONG-TERM CURRENT USE OF INSULIN: ICD-10-CM

## 2022-08-10 DIAGNOSIS — Z79.4 TYPE 2 DIABETES MELLITUS WITH HYPERGLYCEMIA, WITH LONG-TERM CURRENT USE OF INSULIN: ICD-10-CM

## 2022-08-10 DIAGNOSIS — E11.65 TYPE 2 DIABETES MELLITUS WITH HYPERGLYCEMIA, WITHOUT LONG-TERM CURRENT USE OF INSULIN: Primary | ICD-10-CM

## 2022-08-10 DIAGNOSIS — I10 PRIMARY HYPERTENSION: ICD-10-CM

## 2022-08-10 PROCEDURE — 99214 OFFICE O/P EST MOD 30 MIN: CPT | Performed by: FAMILY MEDICINE

## 2022-08-10 RX ORDER — ASPIRIN 81 MG/1
81 TABLET, DELAYED RELEASE ORAL DAILY
COMMUNITY

## 2022-08-10 NOTE — PROGRESS NOTES
Chief Complaint   Patient presents with   • Hypertension       Subjective   Nathan Baez is a 77 y.o. male.     History of Present Illness   F/U HTN  No orthostasis.    F/U DM2.  BS running 300s and even 400 at times.  On lantus 20 units a day.      The following portions of the patient's history were reviewed and updated as appropriate: allergies, current medications, past family history, past medical history, past social history, past surgical history and problem list.    Review of Systems   Constitutional: Negative for appetite change and fatigue.   HENT: Negative for nosebleeds and sore throat.    Eyes: Negative for blurred vision and visual disturbance.   Respiratory: Negative for shortness of breath and wheezing.    Cardiovascular: Negative for chest pain and leg swelling.   Gastrointestinal: Negative for abdominal distention and abdominal pain.   Endocrine: Negative for cold intolerance and polyuria.   Genitourinary: Negative for dysuria and hematuria.   Musculoskeletal: Negative for arthralgias and myalgias.   Skin: Negative for color change and rash.   Neurological: Negative for weakness and confusion.   Psychiatric/Behavioral: Negative for agitation and depressed mood.       Patient Active Problem List   Diagnosis   • OA (osteoarthritis) of knee   • Hypertension   • Abdominal wall cellulitis   • Hypothyroidism (acquired)   • Gastroesophageal reflux disease without esophagitis   • Mixed hyperlipidemia   • Primary insomnia   • DDD (degenerative disc disease), lumbar   • KWAME (obstructive sleep apnea)   • Allergic   • Venous insufficiency   • Prostate cancer (HCC)   • Venous stasis dermatitis   • Tinea cruris   • Tinea corporis   • Throat clearing   • Sleep apnea   • Skin lesion of face   • Rib pain on left side   • Rectal bleed   • Presbycusis   • Pes planus   • Osteoarthritis   • Obesity   • Medicare annual wellness visit, subsequent   • Lumbar strain   • Internal hemorrhoids   • Insomnia   • Inflamed  skin tag   • Hyperplastic polyp of stomach   • Hospital discharge follow-up   • History of colon polyps   • High risk medication use   • Glaucoma   • Gastroenteritis, acute   • Erysipelas   • Encounter for screening colonoscopy   • Encounter for long-term (current) use of NSAIDs   • Dysphagia   • DVT (deep venous thrombosis) (HCC)   • DJD (degenerative joint disease), lumbar   • Diverticulosis   • Cough   • Contact with hypodermic needle   • Cervical radiculopathy   • Cellulitis   • Arthritis   • Allergic rhinitis   • Acute glaucoma   • Acute embolism and thrombosis of vein   • Actinic keratosis   • Status post reverse total shoulder replacement, right   • Localized edema   • Anemia   • Peripheral polyneuropathy   • Campylobacter diarrhea   • Hyponatremia   • Generalized abdominal pain   • Fever   • Constipation   • Bilateral lower extremity edema   • Acute pyelonephritis   • Chronic idiopathic constipation   • Functional diarrhea   • Change in bowel habits   • RLS (restless legs syndrome)   • Type 2 diabetes mellitus with hyperglycemia (HCC)   • Family history of GI malignancy   • Primary osteoarthritis of right hip   • B12 deficiency   • Intervertebral disc stenosis of neural canal of lumbar region   • Encounter for hepatitis C screening test for low risk patient   • Pain associated with defecation   • Calculus of kidney   • Gastroesophageal reflux disease       Allergies   Allergen Reactions   • Adhesive Tape Rash         Current Outpatient Medications:   •  albuterol sulfate  (90 Base) MCG/ACT inhaler, Inhale 2 puffs Every 4 (Four) Hours As Needed for Wheezing., Disp: , Rfl:   •  ALLERGY SERUM INJECTION, Inject  under the skin into the appropriate area as directed 1 (One) Time Per Week., Disp: , Rfl:   •  amLODIPine (NORVASC) 2.5 MG tablet, TAKE 1 TABLET BY MOUTH DAILY, Disp: 90 tablet, Rfl: 3  •  aspirin 81 MG EC tablet, Take 81 mg by mouth Daily., Disp: , Rfl:   •  brimonidine (ALPHAGAN) 0.2 %  ophthalmic solution, , Disp: , Rfl:   •  BRIMONIDINE TARTRATE OP, Apply 1 drop to eye(s) as directed by provider 2 (Two) Times a Day., Disp: , Rfl:   •  Cyanocobalamin (B-12) 1000 MCG sublingual tablet, One sublingual tab dissolved on tongue daily, Disp: 90 each, Rfl: 3  •  dorzolamide-timolol (COSOPT) 22.3-6.8 MG/ML ophthalmic solution, Administer 1 drop to both eyes 2 (Two) Times a Day., Disp: , Rfl:   •  fexofenadine (ALLEGRA) 180 MG tablet, Take 180 mg by mouth Daily., Disp: , Rfl:   •  Fluticasone-Salmeterol 232-14 MCG/ACT aerosol powder , Inhale 1 puff 2 (Two) Times a Day., Disp: , Rfl:   •  furosemide (LASIX) 40 MG tablet, TAKE 1 TABLET BY MOUTH EVERY DAY, Disp: 90 tablet, Rfl: 3  •  Hydrocortisone, Perianal, (Anusol-HC) 2.5 % rectal cream, Apply rectally 3 times daily for 7-10 days during hemorrhoid flare.  Include applicator., Disp: 30 g, Rfl: 1  •  Insulin Glargine (LANTUS SOLOSTAR) 100 UNIT/ML injection pen, Inject 40 Units under the skin into the appropriate area as directed Daily., Disp: 10 pen, Rfl: 5  •  Insulin Pen Needle (Pen Needles) 31G X 5 MM misc, 1 application Daily., Disp: 100 each, Rfl: 3  •  latanoprost (XALATAN) 0.005 % ophthalmic solution, Administer 1 drop to both eyes Every Night., Disp: , Rfl:   •  levothyroxine (SYNTHROID, LEVOTHROID) 88 MCG tablet, TAKE 1 TABLET BY MOUTH EVERY MORNING, Disp: 90 tablet, Rfl: 3  •  losartan (COZAAR) 100 MG tablet, TAKE 1 TABLET BY MOUTH DAILY, Disp: 90 tablet, Rfl: 3  •  meloxicam (MOBIC) 15 MG tablet, TAKE 1 TABLET BY MOUTH DAILY, Disp: 90 tablet, Rfl: 1  •  metFORMIN (GLUCOPHAGE) 500 MG tablet, Take 1 tablet by mouth 2 (Two) Times a Day With Meals., Disp: 180 tablet, Rfl: 3  •  Microlet Lancets misc, 2 (Two) Times a Day. use for testing, Disp: , Rfl:   •  montelukast (SINGULAIR) 10 MG tablet, TAKE 1 TABLET BY MOUTH EVERY NIGHT, Disp: 90 tablet, Rfl: 3  •  pantoprazole (PROTONIX) 40 MG EC tablet, TAKE 1 TABLET BY MOUTH TWICE DAILY, Disp: 90 tablet,  Rfl: 3  •  pramipexole (MIRAPEX) 1.5 MG tablet, Take 1 tablet by mouth At Night As Needed (RLS). (Patient taking differently: Take 1.5 mg by mouth 2 (Two) Times a Day.), Disp: 90 tablet, Rfl: 2  •  rosuvastatin (CRESTOR) 20 MG tablet, TAKE 1 TABLET BY MOUTH EVERY EVENING, Disp: 90 tablet, Rfl: 3  •  sennosides-docusate (senna-docusate sodium) 8.6-50 MG per tablet, Take 1 tablet by mouth Daily., Disp: 60 tablet, Rfl: 4  •  aspirin  MG tablet, Take 1 tablet by mouth 2 (Two) Times a Day With Meals., Disp: 60 tablet, Rfl: 0  •  dapagliflozin (FARXIGA) 5 MG tablet tablet, Take 1 tablet by mouth Daily., Disp: 30 tablet, Rfl: 11  •  Prucalopride Succinate (Motegrity) 2 MG tablet, Take 1 tablet by mouth Daily., Disp: 30 tablet, Rfl: 0    Past Medical History:   Diagnosis Date   • Actinic keratosis    • Acute embolism and thrombosis of vein    • Allergic    • Allergic rhinitis    • Arthritis    • Cataract    • Cervical radiculopathy    • Chronic constipation    • Diabetes mellitus (HCC)    • Disequilibrium    • DJD (degenerative joint disease), lumbar    • Elevated cholesterol    • Erysipelas    • GERD (gastroesophageal reflux disease)    • Glaucoma    • Hip pain, chronic, right    • History of kidney stones     X1   • History of peripheral edema     LOWER LEGS BILAT, COMPRESSION KNEE HIGH    • History of transfusion    • Hyperlipidemia    • Hypertension    • Hypothyroid    • Insomnia    • Intervertebral disc stenosis of neural canal of lumbar region 04/12/2022   • Limited mobility     RIGHT HIP   • Low back pain    • Male urinary stress incontinence     s/p prostatectomy-WEAR PAD   • Obesity    • KWAME (obstructive sleep apnea)    • Osteoarthritis    • Pelvic floor dysfunction 06/2022    DEFOGRAM ORDERED AT [facility] BY DR. ROSHAN SCHAFER   • Pes planus    • Presbycusis    • Prostate cancer (HCC)    • Restless legs syndrome    • Shoulder pain     RIGHT, LIMITED MOBILITY-AT TIMES   • Sleep apnea     bipap   • Tinea corporis     • Tinea cruris    • Unsteady gait     RIGHT HIP   • Weakness     RIGHT HIP       Past Surgical History:   Procedure Laterality Date   • CATARACT EXTRACTION, BILATERAL Bilateral    • CERVICAL DISCECTOMY ANTERIOR     • COLONOSCOPY  03/08/2017    3/17normal. Recheck 2022. 12/11. Repeat in 5 years    • COLONOSCOPY N/A 4/19/2021    Procedure: COLONOSCOPY INTO CECUM WITH HOT & COLD  SNARE POLYPECTOMIES;  Surgeon: Jaden Chowdhury MD;  Location: [facility] ENDOSCOPY;  Service: Gastroenterology;  Laterality: N/A;  PRE: CHANGE IN BOWEL HABITS; FAMILY H/O COLON CANCER  POST: DIVERTICULOSIS, POLYPS   • ENDOSCOPY W/ PEG REMOVAL     • FOOT SURGERY      1998 had big toe replaced on left foot-METAL    • HERNIA REPAIR  03/07/2012    umbilical   • JOINT REPLACEMENT Right 2014   • OK TOTAL KNEE ARTHROPLASTY Left 9/13/2016    Procedure: LT TOTAL KNEE ARTHROPLASTY;  Surgeon: Tadeo Basurto MD;  Location: [facility] MAIN OR;  Service: Orthopedics   • PROSTATECTOMY  07/1999 July 1999. Radical    • THYROID LOBECTOMY     • TONSILLECTOMY     • TOTAL HIP ARTHROPLASTY Right 8/19/2021    Procedure: TOTAL HIP ARTHROPLASTY RIGHT POSTERIOR;  Surgeon: Tadeo Basurto MD;  Location: [facility] MAIN OR;  Service: Orthopedics;  Laterality: Right;   • TOTAL KNEE ARTHROPLASTY Right 2014   • TOTAL SHOULDER ARTHROPLASTY W/ DISTAL CLAVICLE EXCISION Right 11/13/2019    Procedure: TOTAL SHOULDER REVERSE ARTHROPLASTY RIGHT REPAIR RIGHT AXILLARY VEIN;  Surgeon: Behzad Kelley MD;  Location: [facility] OR OSC;  Service: Orthopedics   • WRIST SURGERY Right 12/17/2014    x2  wrist replaced       Family History   Problem Relation Age of Onset   • Arthritis Mother    • Cancer Mother         COLON   • Colon cancer Mother    • Arthritis Father    • Cancer Father         PROSTATE   • Heart disease Sister    • Arthritis Sister    • Cancer Sister         BREAST CANCER   • Breast cancer Sister    • Gout Sister    • Hypertension Sister    • Hypertension Brother    • Heart  disease Brother    • Arthritis Brother    • Heart attack Brother    • Bone cancer Brother    • Heart disease Brother    • Malig Hyperthermia Neg Hx        Social History     Tobacco Use   • Smoking status: Former Smoker     Packs/day: 1.00     Years: 20.00     Pack years: 20.00     Types: Cigarettes     Start date:      Quit date: 1984     Years since quittin.6   • Smokeless tobacco: Former User     Types: Chew   Substance Use Topics   • Alcohol use: Yes     Comment: 3-4 MONTHLY            Objective     Vitals:    08/10/22 0903   BP: 124/60   Pulse: 87   Resp: 16   Temp: 98.2 °F (36.8 °C)   SpO2: 96%     Body mass index is 38.72 kg/m².    Physical Exam  Vitals reviewed.   Constitutional:       Appearance: He is well-developed. He is not diaphoretic.   HENT:      Head: Normocephalic and atraumatic.   Eyes:      General: No scleral icterus.     Pupils: Pupils are equal, round, and reactive to light.   Neck:      Thyroid: No thyromegaly.   Cardiovascular:      Rate and Rhythm: Normal rate and regular rhythm.      Heart sounds: No murmur heard.    No friction rub. No gallop.   Pulmonary:      Effort: Pulmonary effort is normal. No respiratory distress.      Breath sounds: No wheezing or rales.   Chest:      Chest wall: No tenderness.   Abdominal:      General: Bowel sounds are normal. There is no distension.      Palpations: Abdomen is soft.      Tenderness: There is no abdominal tenderness.   Musculoskeletal:         General: No deformity. Normal range of motion.   Lymphadenopathy:      Cervical: No cervical adenopathy.   Skin:     General: Skin is warm and dry.      Findings: No rash.   Neurological:      Cranial Nerves: No cranial nerve deficit.      Motor: No abnormal muscle tone.         Lab Results   Component Value Date    GLUCOSE 499 (C) 2022    BUN 24 (H) 2022    CREATININE 1.30 (H) 2022    EGFRIFNONA 76 2021    EGFRIFAFRI 88 2021    BCR 18.5 2022    K 4.4  06/22/2022    CO2 24.0 06/22/2022    CALCIUM 9.1 06/22/2022    PROTENTOTREF 6.7 04/14/2022    ALBUMIN 4.20 06/22/2022    LABIL2 1.8 04/14/2022    AST 28 06/22/2022    ALT 27 06/22/2022       WBC   Date Value Ref Range Status   06/22/2022 5.59 3.40 - 10.80 10*3/mm3 Final   06/15/2020 5.60 3.40 - 10.80 10*3/mm3 Final   02/13/2020 4.97 3.40 - 10.80 10*3/mm3 Final     RBC   Date Value Ref Range Status   06/22/2022 3.95 (L) 4.14 - 5.80 10*6/mm3 Final   02/13/2020 4.60 4.14 - 5.80 10*6/mm3 Final     Hemoglobin   Date Value Ref Range Status   06/22/2022 11.5 (L) 13.0 - 17.7 g/dL Final     Hematocrit   Date Value Ref Range Status   06/22/2022 34.3 (L) 37.5 - 51.0 % Final     MCV   Date Value Ref Range Status   06/22/2022 86.8 79.0 - 97.0 fL Final     MCH   Date Value Ref Range Status   06/22/2022 29.1 26.6 - 33.0 pg Final     MCHC   Date Value Ref Range Status   06/22/2022 33.5 31.5 - 35.7 g/dL Final     RDW   Date Value Ref Range Status   06/22/2022 14.0 12.3 - 15.4 % Final     RDW-SD   Date Value Ref Range Status   06/22/2022 44.1 37.0 - 54.0 fl Final     MPV   Date Value Ref Range Status   06/22/2022 10.7 6.0 - 12.0 fL Final     Platelets   Date Value Ref Range Status   06/22/2022 163 140 - 450 10*3/mm3 Final     Neutrophil %   Date Value Ref Range Status   06/22/2022 67.4 42.7 - 76.0 % Final     Lymphocyte %   Date Value Ref Range Status   06/22/2022 16.5 (L) 19.6 - 45.3 % Final     Monocyte %   Date Value Ref Range Status   06/22/2022 9.7 5.0 - 12.0 % Final     Eosinophil %   Date Value Ref Range Status   06/22/2022 1.6 0.3 - 6.2 % Final     Basophil %   Date Value Ref Range Status   06/22/2022 0.9 0.0 - 1.5 % Final     Immature Grans %   Date Value Ref Range Status   06/22/2022 3.9 (H) 0.0 - 0.5 % Final     Neutrophils, Absolute   Date Value Ref Range Status   06/22/2022 3.77 1.70 - 7.00 10*3/mm3 Final     Lymphocytes, Absolute   Date Value Ref Range Status   06/22/2022 0.92 0.70 - 3.10 10*3/mm3 Final     Monocytes,  Absolute   Date Value Ref Range Status   06/22/2022 0.54 0.10 - 0.90 10*3/mm3 Final     Eosinophils, Absolute   Date Value Ref Range Status   06/22/2022 0.09 0.00 - 0.40 10*3/mm3 Final     Basophils, Absolute   Date Value Ref Range Status   06/22/2022 0.05 0.00 - 0.20 10*3/mm3 Final     Immature Grans, Absolute   Date Value Ref Range Status   06/22/2022 0.22 (H) 0.00 - 0.05 10*3/mm3 Final     nRBC   Date Value Ref Range Status   06/22/2022 0.0 0.0 - 0.2 /100 WBC Final       Lab Results   Component Value Date    HGBA1C 13.0 (H) 04/14/2022       Lab Results   Component Value Date    XTBLCNYO72 1,048 04/14/2022       TSH   Date Value Ref Range Status   12/06/2021 2.640 0.450 - 4.500 uIU/mL Final   06/04/2019 2.220 0.270 - 4.200 mIU/mL Final       No results found for: CHOL  Lab Results   Component Value Date    TRIG 334 (H) 12/06/2021     Lab Results   Component Value Date    HDL 34 (L) 12/06/2021     Lab Results   Component Value Date    LDL 60 12/06/2021     Lab Results   Component Value Date    VLDL 52 (H) 12/06/2021     No results found for: LDLHDL      Procedures    Assessment & Plan   Problems Addressed this Visit     Hypertension    Type 2 diabetes mellitus with hyperglycemia (HCC) - Primary    Relevant Medications    Insulin Glargine (LANTUS SOLOSTAR) 100 UNIT/ML injection pen    dapagliflozin (FARXIGA) 5 MG tablet tablet      Diagnoses       Codes Comments    Type 2 diabetes mellitus with hyperglycemia, without long-term current use of insulin (HCC)    -  Primary ICD-10-CM: E11.65  ICD-9-CM: 250.00, 790.29     Primary hypertension     ICD-10-CM: I10  ICD-9-CM: 401.9     Type 2 diabetes mellitus with hyperglycemia, with long-term current use of insulin (HCC)     ICD-10-CM: E11.65, Z79.4  ICD-9-CM: 250.00, 790.29, V58.67       DM2.  Uncontrolled.  Increase lantus to 32 units and ramp up 2 units a day until am sugar below 150.  Start farxiga 5mg a day.    HTN.  Controlled.  Continue meds.      No orders of the  defined types were placed in this encounter.      Current Outpatient Medications   Medication Sig Dispense Refill   • albuterol sulfate  (90 Base) MCG/ACT inhaler Inhale 2 puffs Every 4 (Four) Hours As Needed for Wheezing.     • ALLERGY SERUM INJECTION Inject  under the skin into the appropriate area as directed 1 (One) Time Per Week.     • amLODIPine (NORVASC) 2.5 MG tablet TAKE 1 TABLET BY MOUTH DAILY 90 tablet 3   • aspirin 81 MG EC tablet Take 81 mg by mouth Daily.     • brimonidine (ALPHAGAN) 0.2 % ophthalmic solution      • BRIMONIDINE TARTRATE OP Apply 1 drop to eye(s) as directed by provider 2 (Two) Times a Day.     • Cyanocobalamin (B-12) 1000 MCG sublingual tablet One sublingual tab dissolved on tongue daily 90 each 3   • dorzolamide-timolol (COSOPT) 22.3-6.8 MG/ML ophthalmic solution Administer 1 drop to both eyes 2 (Two) Times a Day.     • fexofenadine (ALLEGRA) 180 MG tablet Take 180 mg by mouth Daily.     • Fluticasone-Salmeterol 232-14 MCG/ACT aerosol powder  Inhale 1 puff 2 (Two) Times a Day.     • furosemide (LASIX) 40 MG tablet TAKE 1 TABLET BY MOUTH EVERY DAY 90 tablet 3   • Hydrocortisone, Perianal, (Anusol-HC) 2.5 % rectal cream Apply rectally 3 times daily for 7-10 days during hemorrhoid flare.  Include applicator. 30 g 1   • Insulin Glargine (LANTUS SOLOSTAR) 100 UNIT/ML injection pen Inject 40 Units under the skin into the appropriate area as directed Daily. 10 pen 5   • Insulin Pen Needle (Pen Needles) 31G X 5 MM misc 1 application Daily. 100 each 3   • latanoprost (XALATAN) 0.005 % ophthalmic solution Administer 1 drop to both eyes Every Night.     • levothyroxine (SYNTHROID, LEVOTHROID) 88 MCG tablet TAKE 1 TABLET BY MOUTH EVERY MORNING 90 tablet 3   • losartan (COZAAR) 100 MG tablet TAKE 1 TABLET BY MOUTH DAILY 90 tablet 3   • meloxicam (MOBIC) 15 MG tablet TAKE 1 TABLET BY MOUTH DAILY 90 tablet 1   • metFORMIN (GLUCOPHAGE) 500 MG tablet Take 1 tablet by mouth 2 (Two) Times a Day  With Meals. 180 tablet 3   • Microlet Lancets misc 2 (Two) Times a Day. use for testing     • montelukast (SINGULAIR) 10 MG tablet TAKE 1 TABLET BY MOUTH EVERY NIGHT 90 tablet 3   • pantoprazole (PROTONIX) 40 MG EC tablet TAKE 1 TABLET BY MOUTH TWICE DAILY 90 tablet 3   • pramipexole (MIRAPEX) 1.5 MG tablet Take 1 tablet by mouth At Night As Needed (RLS). (Patient taking differently: Take 1.5 mg by mouth 2 (Two) Times a Day.) 90 tablet 2   • rosuvastatin (CRESTOR) 20 MG tablet TAKE 1 TABLET BY MOUTH EVERY EVENING 90 tablet 3   • sennosides-docusate (senna-docusate sodium) 8.6-50 MG per tablet Take 1 tablet by mouth Daily. 60 tablet 4   • aspirin  MG tablet Take 1 tablet by mouth 2 (Two) Times a Day With Meals. 60 tablet 0   • dapagliflozin (FARXIGA) 5 MG tablet tablet Take 1 tablet by mouth Daily. 30 tablet 11   • Prucalopride Succinate (Motegrity) 2 MG tablet Take 1 tablet by mouth Daily. 30 tablet 0     No current facility-administered medications for this visit.       Nathan Baez had no medications administered during this visit.    Return in about 1 month (around 9/10/2022).    There are no Patient Instructions on file for this visit.

## 2022-08-17 ENCOUNTER — ANESTHESIA (OUTPATIENT)
Dept: PAIN MEDICINE | Facility: HOSPITAL | Age: 77
End: 2022-08-17

## 2022-08-17 ENCOUNTER — HOSPITAL ENCOUNTER (OUTPATIENT)
Dept: PAIN MEDICINE | Facility: HOSPITAL | Age: 77
Discharge: HOME | End: 2022-08-17

## 2022-08-17 ENCOUNTER — ANESTHESIA EVENT (OUTPATIENT)
Dept: PAIN MEDICINE | Facility: HOSPITAL | Age: 77
End: 2022-08-17

## 2022-08-17 ENCOUNTER — HOSPITAL ENCOUNTER (OUTPATIENT)
Dept: GENERAL RADIOLOGY | Facility: HOSPITAL | Age: 77
Discharge: HOME | End: 2022-08-17

## 2022-08-17 VITALS
SYSTOLIC BLOOD PRESSURE: 134 MMHG | RESPIRATION RATE: 16 BRPM | OXYGEN SATURATION: 92 % | HEART RATE: 75 BPM | DIASTOLIC BLOOD PRESSURE: 70 MMHG | TEMPERATURE: 98 F

## 2022-08-17 DIAGNOSIS — R52 PAIN: ICD-10-CM

## 2022-08-17 DIAGNOSIS — M51.36 DDD (DEGENERATIVE DISC DISEASE), LUMBAR: Primary | ICD-10-CM

## 2022-08-17 PROCEDURE — 77003 FLUOROGUIDE FOR SPINE INJECT: CPT

## 2022-08-17 PROCEDURE — 25010000002 METHYLPREDNISOLONE PER 80 MG: Performed by: ANESTHESIOLOGY

## 2022-08-17 PROCEDURE — 0 IOPAMIDOL 41 % SOLUTION: Performed by: ANESTHESIOLOGY

## 2022-08-17 RX ORDER — MIDAZOLAM HYDROCHLORIDE 1 MG/ML
1 INJECTION INTRAMUSCULAR; INTRAVENOUS AS NEEDED
Status: DISCONTINUED | OUTPATIENT
Start: 2022-08-17 | End: 2022-08-18 | Stop reason: HOSPADM

## 2022-08-17 RX ORDER — LIDOCAINE HYDROCHLORIDE 10 MG/ML
1 INJECTION, SOLUTION INFILTRATION; PERINEURAL ONCE AS NEEDED
Status: DISCONTINUED | OUTPATIENT
Start: 2022-08-17 | End: 2022-08-18 | Stop reason: HOSPADM

## 2022-08-17 RX ORDER — FENTANYL CITRATE 50 UG/ML
50 INJECTION, SOLUTION INTRAMUSCULAR; INTRAVENOUS AS NEEDED
Status: DISCONTINUED | OUTPATIENT
Start: 2022-08-17 | End: 2022-08-18 | Stop reason: HOSPADM

## 2022-08-17 RX ORDER — METHYLPREDNISOLONE ACETATE 80 MG/ML
80 INJECTION, SUSPENSION INTRA-ARTICULAR; INTRALESIONAL; INTRAMUSCULAR; SOFT TISSUE ONCE
Status: COMPLETED | OUTPATIENT
Start: 2022-08-17 | End: 2022-08-17

## 2022-08-17 RX ORDER — SODIUM CHLORIDE 0.9 % (FLUSH) 0.9 %
1-10 SYRINGE (ML) INJECTION AS NEEDED
Status: DISCONTINUED | OUTPATIENT
Start: 2022-08-17 | End: 2022-08-18 | Stop reason: HOSPADM

## 2022-08-17 RX ADMIN — IOPAMIDOL 10 ML: 408 INJECTION, SOLUTION INTRATHECAL at 09:55

## 2022-08-17 RX ADMIN — METHYLPREDNISOLONE ACETATE 80 MG: 80 INJECTION, SUSPENSION INTRA-ARTICULAR; INTRALESIONAL; INTRAMUSCULAR; SOFT TISSUE at 09:55

## 2022-08-17 NOTE — H&P
INTERVAL HISTORY:    The patient returns for another Lumbar epidural steroid injection today.  They have received 30% improvement since their last injection with a pain level of 6/10 at its worst recently.    Conservative measures tried in the interim physical therapy    Current Outpatient Medications on File Prior to Encounter   Medication Sig Dispense Refill   • albuterol sulfate  (90 Base) MCG/ACT inhaler Inhale 2 puffs Every 4 (Four) Hours As Needed for Wheezing.     • ALLERGY SERUM INJECTION Inject  under the skin into the appropriate area as directed 1 (One) Time Per Week.     • amLODIPine (NORVASC) 2.5 MG tablet TAKE 1 TABLET BY MOUTH DAILY 90 tablet 3   • aspirin 81 MG EC tablet Take 81 mg by mouth Daily.     • brimonidine (ALPHAGAN) 0.2 % ophthalmic solution      • BRIMONIDINE TARTRATE OP Apply 1 drop to eye(s) as directed by provider 2 (Two) Times a Day.     • Cyanocobalamin (B-12) 1000 MCG sublingual tablet One sublingual tab dissolved on tongue daily 90 each 3   • dapagliflozin (FARXIGA) 5 MG tablet tablet Take 1 tablet by mouth Daily. 30 tablet 11   • dorzolamide-timolol (COSOPT) 22.3-6.8 MG/ML ophthalmic solution Administer 1 drop to both eyes 2 (Two) Times a Day.     • fexofenadine (ALLEGRA) 180 MG tablet Take 180 mg by mouth Daily.     • Fluticasone-Salmeterol 232-14 MCG/ACT aerosol powder  Inhale 1 puff 2 (Two) Times a Day.     • furosemide (LASIX) 40 MG tablet TAKE 1 TABLET BY MOUTH EVERY DAY 90 tablet 3   • Hydrocortisone, Perianal, (Anusol-HC) 2.5 % rectal cream Apply rectally 3 times daily for 7-10 days during hemorrhoid flare.  Include applicator. 30 g 1   • Insulin Glargine (LANTUS SOLOSTAR) 100 UNIT/ML injection pen Inject 40 Units under the skin into the appropriate area as directed Daily. 10 pen 5   • Insulin Pen Needle (Pen Needles) 31G X 5 MM misc 1 application Daily. 100 each 3   • latanoprost (XALATAN) 0.005 % ophthalmic solution Administer 1 drop to both eyes Every Night.     •  levothyroxine (SYNTHROID, LEVOTHROID) 88 MCG tablet TAKE 1 TABLET BY MOUTH EVERY MORNING 90 tablet 3   • losartan (COZAAR) 100 MG tablet TAKE 1 TABLET BY MOUTH DAILY 90 tablet 3   • meloxicam (MOBIC) 15 MG tablet TAKE 1 TABLET BY MOUTH DAILY 90 tablet 1   • metFORMIN (GLUCOPHAGE) 500 MG tablet Take 1 tablet by mouth 2 (Two) Times a Day With Meals. 180 tablet 3   • Microlet Lancets misc 2 (Two) Times a Day. use for testing     • montelukast (SINGULAIR) 10 MG tablet TAKE 1 TABLET BY MOUTH EVERY NIGHT 90 tablet 3   • pantoprazole (PROTONIX) 40 MG EC tablet TAKE 1 TABLET BY MOUTH TWICE DAILY 90 tablet 3   • pramipexole (MIRAPEX) 1.5 MG tablet Take 1 tablet by mouth At Night As Needed (RLS). (Patient taking differently: Take 1.5 mg by mouth 2 (Two) Times a Day.) 90 tablet 2   • rosuvastatin (CRESTOR) 20 MG tablet TAKE 1 TABLET BY MOUTH EVERY EVENING 90 tablet 3   • sennosides-docusate (senna-docusate sodium) 8.6-50 MG per tablet Take 1 tablet by mouth Daily. 60 tablet 4     No current facility-administered medications on file prior to encounter.       Past Medical History:   Diagnosis Date   • Actinic keratosis    • Acute embolism and thrombosis of vein    • Allergic    • Allergic rhinitis    • Arthritis    • Cataract    • Cervical radiculopathy    • Chronic constipation    • Diabetes mellitus (HCC)    • Disequilibrium    • DJD (degenerative joint disease), lumbar    • Elevated cholesterol    • Erysipelas    • GERD (gastroesophageal reflux disease)    • Glaucoma    • Hip pain, chronic, right    • History of kidney stones     X1   • History of peripheral edema     LOWER LEGS BILAT, COMPRESSION KNEE HIGH    • History of transfusion    • Hyperlipidemia    • Hypertension    • Hypothyroid    • Insomnia    • Intervertebral disc stenosis of neural canal of lumbar region 04/12/2022   • Limited mobility     RIGHT HIP   • Low back pain    • Male urinary stress incontinence     s/p prostatectomy-WEAR PAD   • Obesity    • KWAME  (obstructive sleep apnea)    • Osteoarthritis    • Pelvic floor dysfunction 06/2022    DEFOGRAM ORDERED AT [facility] BY DR. ROSHAN SCHAFER   • Pes planus    • Presbycusis    • Prostate cancer (HCC)    • Restless legs syndrome    • Shoulder pain     RIGHT, LIMITED MOBILITY-AT TIMES   • Sleep apnea     bipap   • Tinea corporis    • Tinea cruris    • Unsteady gait     RIGHT HIP   • Weakness     RIGHT HIP       No hematologic infectious or constitutional symptoms  Positive KWAME screen/DX:  2 or more mitigating factors  non-supine position, no narcs        Exam:  /82 (BP Location: Left arm, Patient Position: Lying)   Pulse 95   Temp 36.7 °C (98 °F) (Infrared)   Resp 16   SpO2 96%   Airway Mallampatti 2  Alert and oriented      Diagnosis:  Post-Op Diagnosis Codes:     * Intervertebral disc stenosis of neural canal of lumbar region [M99.53]    Plan:  Lumbar 4 epidural steroid injection under fluoroscopic guidance    I have encouraged them to continue:  1.  Physical therapy exercises at home as prescribed by physical therapy or from the pain clinic handout.  Continuation of these exercises every day, or multiple times per week, even when the patient has good pain relief, was stressed to the patient as a preventative measure to decrease the frequency and severity of future pain episodes.  2.  Continue pain medicines as already prescribed.  If patient not currently taking any, it is recommended to begin Acetaminophen 1000 mg po q 8 hours.  If other medicines containing Acetaminophen are currently prescribed, maintain daily dose at 3000mg.    3.  If they can tolerate NSAIDS, it is recommended to take Ibuprofen 600 mg po q 6 hours for 7 days during pain exacerbations.   Alternatively, they may substitute an NSAID of their choice (e.g. Aleve)  4.  Heat and ice to the affected area as tolerated for pain control.   5.  Low impact exercise such as walking or water exercise was recommended to maintain overall health and aid in  weight control.   6.  Follow up as needed for subsequent injections.

## 2022-08-17 NOTE — ANESTHESIA PROCEDURE NOTES
PAIN Epidural block      Patient reassessed immediately prior to procedure    Patient location during procedure: pain clinic  Indication:procedure for pain  Performed By  Anesthesiologist: Juni Boudreaux MD  Preanesthetic Checklist  Completed: patient identified and risks and benefits discussed  Additional Notes  Diagnosis:     Post-Op Diagnosis Codes:     * Intervertebral disc stenosis of neural canal of lumbar region (M99.53)    Sedation:  none    A lumbar epidural steroid injection with fluoroscopic guidance and an Isovue dye epidurogram was performed.  Under fluoroscopic guidance, the epidural space was identified and accessed, confirmed by loss of resistance to saline followed by injection of Isovue dye, which shows a good epidurogram.  The above medications were injected uneventfully.    Prep:  Pt Position:prone  Sterile Tech:cap, gloves, mask and sterile barrier  Prep:chlorhexidine gluconate and isopropyl alcohol  Monitoring:blood pressure monitoring, continuous pulse oximetry and EKG  Procedure:Sedation: no     Approach:right paramedian  Guidance: fluoroscopy  Location:lumbar  Level:4-5  Needle Type:Tuohy  Needle Gauge:20  Aspiration:negative  Medications:  Preservative Free Saline:1mL  Isovue:1mL  Comments:Depomedrol 80 mg  Post Assessment:  Pt Tolerance:patient tolerated the procedure well with no apparent complications  Complications:no

## 2022-09-14 DIAGNOSIS — K21.9 GASTROESOPHAGEAL REFLUX DISEASE WITHOUT ESOPHAGITIS: ICD-10-CM

## 2022-09-14 RX ORDER — PANTOPRAZOLE SODIUM 40 MG/1
TABLET, DELAYED RELEASE ORAL
Qty: 90 TABLET | Refills: 3 | Status: SHIPPED | OUTPATIENT
Start: 2022-09-14 | End: 2022-12-17

## 2022-09-14 NOTE — TELEPHONE ENCOUNTER
Rx Refill Note  Requested Prescriptions     Pending Prescriptions Disp Refills   • pantoprazole (PROTONIX) 40 MG EC tablet [Pharmacy Med Name: PANTOPRAZOLE 40MG TABLETS] 90 tablet 3     Sig: TAKE 1 TABLET BY MOUTH TWICE DAILY      Last office visit with prescribing clinician: 8/10/2022      Next office visit with prescribing clinician: 9/15/2022

## 2022-09-15 ENCOUNTER — OFFICE VISIT (OUTPATIENT)
Dept: FAMILY MEDICINE CLINIC | Facility: CLINIC | Age: 77
End: 2022-09-15

## 2022-09-15 ENCOUNTER — TELEPHONE (OUTPATIENT)
Dept: FAMILY MEDICINE CLINIC | Facility: CLINIC | Age: 77
End: 2022-09-15

## 2022-09-15 VITALS
SYSTOLIC BLOOD PRESSURE: 124 MMHG | WEIGHT: 248 LBS | DIASTOLIC BLOOD PRESSURE: 68 MMHG | BODY MASS INDEX: 38.92 KG/M2 | OXYGEN SATURATION: 95 % | HEART RATE: 116 BPM | TEMPERATURE: 100.3 F | HEIGHT: 67 IN

## 2022-09-15 DIAGNOSIS — R52 BODY ACHES: ICD-10-CM

## 2022-09-15 DIAGNOSIS — Z79.4 TYPE 2 DIABETES MELLITUS WITH HYPERGLYCEMIA, WITH LONG-TERM CURRENT USE OF INSULIN: ICD-10-CM

## 2022-09-15 DIAGNOSIS — E11.65 TYPE 2 DIABETES MELLITUS WITH HYPERGLYCEMIA, WITHOUT LONG-TERM CURRENT USE OF INSULIN: ICD-10-CM

## 2022-09-15 DIAGNOSIS — E11.65 TYPE 2 DIABETES MELLITUS WITH HYPERGLYCEMIA, WITH LONG-TERM CURRENT USE OF INSULIN: ICD-10-CM

## 2022-09-15 DIAGNOSIS — R05.9 COUGH: Primary | ICD-10-CM

## 2022-09-15 PROCEDURE — 99214 OFFICE O/P EST MOD 30 MIN: CPT | Performed by: FAMILY MEDICINE

## 2022-09-15 RX ORDER — SULFAMETHOXAZOLE AND TRIMETHOPRIM 800; 160 MG/1; MG/1
1 TABLET ORAL 2 TIMES DAILY
Qty: 20 TABLET | Refills: 0 | Status: SHIPPED | OUTPATIENT
Start: 2022-09-15 | End: 2022-10-26

## 2022-09-15 NOTE — PROGRESS NOTES
Chief Complaint   Patient presents with   • Diabetes   • Fatigue     Chills and body aches through the night        Subjective   Nathan aBez is a 77 y.o. male.     History of Present Illness   C/o 1 day history of cough and SOA.  Feels sluggish.  Chills last night.    F/U DM2.  BS running 119-150s.  Off 200s.  On 46 units of lantus a day.  Farxiga is too expensive.    The following portions of the patient's history were reviewed and updated as appropriate: allergies, current medications, past family history, past medical history, past social history, past surgical history and problem list.    Review of Systems   Constitutional: Positive for chills. Negative for appetite change, fatigue and fever.   HENT: Positive for rhinorrhea. Negative for nosebleeds and sore throat.    Eyes: Negative for blurred vision and visual disturbance.   Respiratory: Positive for cough. Negative for shortness of breath and wheezing.    Cardiovascular: Negative for chest pain and leg swelling.   Gastrointestinal: Negative for abdominal distention and abdominal pain.   Endocrine: Negative for cold intolerance and polyuria.   Genitourinary: Negative for dysuria and hematuria.   Musculoskeletal: Negative for arthralgias and myalgias.   Skin: Negative for color change and rash.   Neurological: Negative for weakness and confusion.   Psychiatric/Behavioral: Negative for agitation and depressed mood.       Patient Active Problem List   Diagnosis   • OA (osteoarthritis) of knee   • Hypertension   • Abdominal wall cellulitis   • Hypothyroidism (acquired)   • Gastroesophageal reflux disease without esophagitis   • Mixed hyperlipidemia   • Primary insomnia   • DDD (degenerative disc disease), lumbar   • KWAME (obstructive sleep apnea)   • Allergic   • Venous insufficiency   • Prostate cancer (HCC)   • Venous stasis dermatitis   • Tinea cruris   • Tinea corporis   • Throat clearing   • Sleep apnea   • Skin lesion of face   • Rib pain on left side   •  Rectal bleed   • Presbycusis   • Pes planus   • Osteoarthritis   • Obesity   • Medicare annual wellness visit, subsequent   • Lumbar strain   • Internal hemorrhoids   • Insomnia   • Inflamed skin tag   • Hyperplastic polyp of stomach   • Hospital discharge follow-up   • History of colon polyps   • High risk medication use   • Glaucoma   • Gastroenteritis, acute   • Erysipelas   • Encounter for screening colonoscopy   • Encounter for long-term (current) use of NSAIDs   • Dysphagia   • DVT (deep venous thrombosis) (HCC)   • DJD (degenerative joint disease), lumbar   • Diverticulosis   • Cough   • Contact with hypodermic needle   • Cervical radiculopathy   • Cellulitis   • Arthritis   • Allergic rhinitis   • Acute glaucoma   • Acute embolism and thrombosis of vein   • Actinic keratosis   • Status post reverse total shoulder replacement, right   • Localized edema   • Anemia   • Peripheral polyneuropathy   • Campylobacter diarrhea   • Hyponatremia   • Generalized abdominal pain   • Fever   • Constipation   • Bilateral lower extremity edema   • Acute pyelonephritis   • Chronic idiopathic constipation   • Functional diarrhea   • Change in bowel habits   • RLS (restless legs syndrome)   • Type 2 diabetes mellitus with hyperglycemia ([facility])   • Family history of GI malignancy   • Primary osteoarthritis of right hip   • B12 deficiency   • Intervertebral disc stenosis of neural canal of lumbar region   • Encounter for hepatitis C screening test for low risk patient   • Pain associated with defecation   • Calculus of kidney   • Gastroesophageal reflux disease   • Body aches       Allergies   Allergen Reactions   • Adhesive Tape Rash         Current Outpatient Medications:   •  albuterol sulfate  (90 Base) MCG/ACT inhaler, Inhale 2 puffs Every 4 (Four) Hours As Needed for Wheezing., Disp: , Rfl:   •  ALLERGY SERUM INJECTION, Inject  under the skin into the appropriate area as directed 1 (One) Time Per Week., Disp: , Rfl:    •  amLODIPine (NORVASC) 2.5 MG tablet, TAKE 1 TABLET BY MOUTH DAILY, Disp: 90 tablet, Rfl: 3  •  aspirin 81 MG EC tablet, Take 81 mg by mouth Daily., Disp: , Rfl:   •  brimonidine (ALPHAGAN) 0.2 % ophthalmic solution, , Disp: , Rfl:   •  BRIMONIDINE TARTRATE OP, Apply 1 drop to eye(s) as directed by provider 2 (Two) Times a Day., Disp: , Rfl:   •  Cyanocobalamin (B-12) 1000 MCG sublingual tablet, One sublingual tab dissolved on tongue daily, Disp: 90 each, Rfl: 3  •  dorzolamide-timolol (COSOPT) 22.3-6.8 MG/ML ophthalmic solution, Administer 1 drop to both eyes 2 (Two) Times a Day., Disp: , Rfl:   •  fexofenadine (ALLEGRA) 180 MG tablet, Take 180 mg by mouth Daily., Disp: , Rfl:   •  Fluticasone-Salmeterol 232-14 MCG/ACT aerosol powder , Inhale 1 puff 2 (Two) Times a Day., Disp: , Rfl:   •  furosemide (LASIX) 40 MG tablet, TAKE 1 TABLET BY MOUTH EVERY DAY, Disp: 90 tablet, Rfl: 3  •  Hydrocortisone, Perianal, (Anusol-HC) 2.5 % rectal cream, Apply rectally 3 times daily for 7-10 days during hemorrhoid flare.  Include applicator., Disp: 30 g, Rfl: 1  •  Insulin Glargine (LANTUS SOLOSTAR) 100 UNIT/ML injection pen, Inject 46 Units under the skin into the appropriate area as directed Daily., Disp: , Rfl:   •  Insulin Pen Needle (Pen Needles) 31G X 5 MM misc, 1 application Daily., Disp: 100 each, Rfl: 3  •  latanoprost (XALATAN) 0.005 % ophthalmic solution, Administer 1 drop to both eyes Every Night., Disp: , Rfl:   •  levothyroxine (SYNTHROID, LEVOTHROID) 88 MCG tablet, TAKE 1 TABLET BY MOUTH EVERY MORNING, Disp: 90 tablet, Rfl: 3  •  losartan (COZAAR) 100 MG tablet, TAKE 1 TABLET BY MOUTH DAILY, Disp: 90 tablet, Rfl: 3  •  meloxicam (MOBIC) 15 MG tablet, TAKE 1 TABLET BY MOUTH DAILY, Disp: 90 tablet, Rfl: 1  •  metFORMIN (GLUCOPHAGE) 500 MG tablet, Take 1 tablet by mouth 2 (Two) Times a Day With Meals., Disp: 180 tablet, Rfl: 3  •  Microlet Lancets misc, 2 (Two) Times a Day. use for testing, Disp: , Rfl:   •   montelukast (SINGULAIR) 10 MG tablet, TAKE 1 TABLET BY MOUTH EVERY NIGHT, Disp: 90 tablet, Rfl: 3  •  pantoprazole (PROTONIX) 40 MG EC tablet, TAKE 1 TABLET BY MOUTH TWICE DAILY, Disp: 90 tablet, Rfl: 3  •  pramipexole (MIRAPEX) 1.5 MG tablet, Take 1 tablet by mouth At Night As Needed (RLS). (Patient taking differently: Take 1.5 mg by mouth 2 (Two) Times a Day.), Disp: 90 tablet, Rfl: 2  •  rosuvastatin (CRESTOR) 20 MG tablet, TAKE 1 TABLET BY MOUTH EVERY EVENING, Disp: 90 tablet, Rfl: 3  •  sennosides-docusate (senna-docusate sodium) 8.6-50 MG per tablet, Take 1 tablet by mouth Daily., Disp: 60 tablet, Rfl: 4    Past Medical History:   Diagnosis Date   • Actinic keratosis    • Acute embolism and thrombosis of vein    • Allergic    • Allergic rhinitis    • Arthritis    • Cataract    • Cervical radiculopathy    • Chronic constipation    • Diabetes mellitus (HCC)    • Disequilibrium    • DJD (degenerative joint disease), lumbar    • Elevated cholesterol    • Erysipelas    • GERD (gastroesophageal reflux disease)    • Glaucoma    • Hip pain, chronic, right    • History of kidney stones     X1   • History of peripheral edema     LOWER LEGS BILAT, COMPRESSION KNEE HIGH    • History of transfusion    • Hyperlipidemia    • Hypertension    • Hypothyroid    • Insomnia    • Intervertebral disc stenosis of neural canal of lumbar region 04/12/2022   • Limited mobility     RIGHT HIP   • Low back pain    • Male urinary stress incontinence     s/p prostatectomy-WEAR PAD   • Obesity    • KWAME (obstructive sleep apnea)    • Osteoarthritis    • Pelvic floor dysfunction 06/2022    DEFOGRAM ORDERED AT [facility] BY DR. ROSHAN SCHAFER   • Pes planus    • Presbycusis    • Prostate cancer (HCC)    • Restless legs syndrome    • Shoulder pain     RIGHT, LIMITED MOBILITY-AT TIMES   • Sleep apnea     bipap   • Tinea corporis    • Tinea cruris    • Unsteady gait     RIGHT HIP   • Weakness     RIGHT HIP       Past Surgical History:   Procedure  Laterality Date   • CATARACT EXTRACTION, BILATERAL Bilateral    • CERVICAL DISCECTOMY ANTERIOR     • COLONOSCOPY  03/08/2017    3/17normal. Recheck 2022. 12/11. Repeat in 5 years    • COLONOSCOPY N/A 4/19/2021    Procedure: COLONOSCOPY INTO CECUM WITH HOT & COLD  SNARE POLYPECTOMIES;  Surgeon: Jaden Chowdhury MD;  Location: [facility] ENDOSCOPY;  Service: Gastroenterology;  Laterality: N/A;  PRE: CHANGE IN BOWEL HABITS; FAMILY H/O COLON CANCER  POST: DIVERTICULOSIS, POLYPS   • ENDOSCOPY W/ PEG REMOVAL     • FOOT SURGERY      1998 had big toe replaced on left foot-METAL    • HERNIA REPAIR  03/07/2012    umbilical   • JOINT REPLACEMENT Right 2014   • MD TOTAL KNEE ARTHROPLASTY Left 9/13/2016    Procedure: LT TOTAL KNEE ARTHROPLASTY;  Surgeon: Tadeo Basurto MD;  Location: [facility] MAIN OR;  Service: Orthopedics   • PROSTATECTOMY  07/1999 July 1999. Radical    • THYROID LOBECTOMY     • TONSILLECTOMY     • TOTAL HIP ARTHROPLASTY Right 8/19/2021    Procedure: TOTAL HIP ARTHROPLASTY RIGHT POSTERIOR;  Surgeon: Tadeo Basurto MD;  Location: [facility] MAIN OR;  Service: Orthopedics;  Laterality: Right;   • TOTAL KNEE ARTHROPLASTY Right 2014   • TOTAL SHOULDER ARTHROPLASTY W/ DISTAL CLAVICLE EXCISION Right 11/13/2019    Procedure: TOTAL SHOULDER REVERSE ARTHROPLASTY RIGHT REPAIR RIGHT AXILLARY VEIN;  Surgeon: Behzad Kelley MD;  Location: [facility] OR [facility];  Service: Orthopedics   • WRIST SURGERY Right 12/17/2014    x2  wrist replaced       Family History   Problem Relation Age of Onset   • Arthritis Mother    • Cancer Mother         COLON   • Colon cancer Mother    • Arthritis Father    • Cancer Father         PROSTATE   • Heart disease Sister    • Arthritis Sister    • Cancer Sister         BREAST CANCER   • Breast cancer Sister    • Gout Sister    • Hypertension Sister    • Hypertension Brother    • Heart disease Brother    • Arthritis Brother    • Heart attack Brother    • Bone cancer Brother    • Heart disease Brother     • Malig Hyperthermia Neg Hx        Social History     Tobacco Use   • Smoking status: Former Smoker     Packs/day: 1.00     Years: 20.00     Pack years: 20.00     Types: Cigarettes     Start date:      Quit date: 1984     Years since quittin.7   • Smokeless tobacco: Former User     Types: Chew   Substance Use Topics   • Alcohol use: Yes     Comment: 3-4 MONTHLY            Objective     Vitals:    09/15/22 0828   BP: 124/68   Pulse: 116   Temp: 100.3 °F (37.9 °C)   SpO2: 95%     Body mass index is 38.83 kg/m².    Physical Exam  Vitals reviewed.   Constitutional:       Appearance: He is well-developed. He is not diaphoretic.   HENT:      Head: Normocephalic and atraumatic.   Eyes:      General: No scleral icterus.     Pupils: Pupils are equal, round, and reactive to light.   Neck:      Thyroid: No thyromegaly.   Cardiovascular:      Rate and Rhythm: Normal rate and regular rhythm.      Heart sounds: No murmur heard.    No friction rub. No gallop.   Pulmonary:      Effort: Pulmonary effort is normal. No respiratory distress.      Breath sounds: No wheezing or rales.   Chest:      Chest wall: No tenderness.   Abdominal:      General: Bowel sounds are normal. There is no distension.      Palpations: Abdomen is soft.      Tenderness: There is no abdominal tenderness.   Musculoskeletal:         General: No deformity. Normal range of motion.   Lymphadenopathy:      Cervical: No cervical adenopathy.   Skin:     General: Skin is warm and dry.      Findings: No rash.   Neurological:      Cranial Nerves: No cranial nerve deficit.      Motor: No abnormal muscle tone.         Lab Results   Component Value Date    GLUCOSE 499 (C) 2022    BUN 24 (H) 2022    CREATININE 1.30 (H) 2022    EGFRIFNONA 76 2021    EGFRIFAFRI 88 2021    BCR 18.5 2022    K 4.4 2022    CO2 24.0 2022    CALCIUM 9.1 2022    PROTENTOTREF 6.7 2022    ALBUMIN 4.20 2022    LABIL2 1.8  04/14/2022    AST 28 06/22/2022    ALT 27 06/22/2022       WBC   Date Value Ref Range Status   06/22/2022 5.59 3.40 - 10.80 10*3/mm3 Final   06/15/2020 5.60 3.40 - 10.80 10*3/mm3 Final   02/13/2020 4.97 3.40 - 10.80 10*3/mm3 Final     RBC   Date Value Ref Range Status   06/22/2022 3.95 (L) 4.14 - 5.80 10*6/mm3 Final   02/13/2020 4.60 4.14 - 5.80 10*6/mm3 Final     Hemoglobin   Date Value Ref Range Status   06/22/2022 11.5 (L) 13.0 - 17.7 g/dL Final     Hematocrit   Date Value Ref Range Status   06/22/2022 34.3 (L) 37.5 - 51.0 % Final     MCV   Date Value Ref Range Status   06/22/2022 86.8 79.0 - 97.0 fL Final     MCH   Date Value Ref Range Status   06/22/2022 29.1 26.6 - 33.0 pg Final     MCHC   Date Value Ref Range Status   06/22/2022 33.5 31.5 - 35.7 g/dL Final     RDW   Date Value Ref Range Status   06/22/2022 14.0 12.3 - 15.4 % Final     RDW-SD   Date Value Ref Range Status   06/22/2022 44.1 37.0 - 54.0 fl Final     MPV   Date Value Ref Range Status   06/22/2022 10.7 6.0 - 12.0 fL Final     Platelets   Date Value Ref Range Status   06/22/2022 163 140 - 450 10*3/mm3 Final     Neutrophil %   Date Value Ref Range Status   06/22/2022 67.4 42.7 - 76.0 % Final     Lymphocyte %   Date Value Ref Range Status   06/22/2022 16.5 (L) 19.6 - 45.3 % Final     Monocyte %   Date Value Ref Range Status   06/22/2022 9.7 5.0 - 12.0 % Final     Eosinophil %   Date Value Ref Range Status   06/22/2022 1.6 0.3 - 6.2 % Final     Basophil %   Date Value Ref Range Status   06/22/2022 0.9 0.0 - 1.5 % Final     Immature Grans %   Date Value Ref Range Status   06/22/2022 3.9 (H) 0.0 - 0.5 % Final     Neutrophils, Absolute   Date Value Ref Range Status   06/22/2022 3.77 1.70 - 7.00 10*3/mm3 Final     Lymphocytes, Absolute   Date Value Ref Range Status   06/22/2022 0.92 0.70 - 3.10 10*3/mm3 Final     Monocytes, Absolute   Date Value Ref Range Status   06/22/2022 0.54 0.10 - 0.90 10*3/mm3 Final     Eosinophils, Absolute   Date Value Ref Range  Status   06/22/2022 0.09 0.00 - 0.40 10*3/mm3 Final     Basophils, Absolute   Date Value Ref Range Status   06/22/2022 0.05 0.00 - 0.20 10*3/mm3 Final     Immature Grans, Absolute   Date Value Ref Range Status   06/22/2022 0.22 (H) 0.00 - 0.05 10*3/mm3 Final     nRBC   Date Value Ref Range Status   06/22/2022 0.0 0.0 - 0.2 /100 WBC Final       Lab Results   Component Value Date    HGBA1C 13.0 (H) 04/14/2022       Lab Results   Component Value Date    VSVBDBOX24 1,048 04/14/2022       TSH   Date Value Ref Range Status   12/06/2021 2.640 0.450 - 4.500 uIU/mL Final   06/04/2019 2.220 0.270 - 4.200 mIU/mL Final       No results found for: CHOL  Lab Results   Component Value Date    TRIG 334 (H) 12/06/2021     Lab Results   Component Value Date    HDL 34 (L) 12/06/2021     Lab Results   Component Value Date    LDL 60 12/06/2021     Lab Results   Component Value Date    VLDL 52 (H) 12/06/2021     No results found for: LDLHDL      Procedures    Assessment & Plan   Problems Addressed this Visit     Body aches    Relevant Orders    CBC & Differential    COVID-19,[facility] ROUTINE, NP/OP SWAB IN TRANSPORT MEDIA OR ESWAB 72 HR TAT - Swab, Nasopharynx    Cough - Primary    Relevant Orders    CBC & Differential    COVID-19,[facility] ROUTINE, NP/OP SWAB IN TRANSPORT MEDIA OR ESWAB 72 HR TAT - Swab, Nasopharynx    Type 2 diabetes mellitus with hyperglycemia (HCC)    Relevant Medications    Insulin Glargine (LANTUS SOLOSTAR) 100 UNIT/ML injection pen    Other Relevant Orders    CBC & Differential    Comprehensive Metabolic Panel    Hemoglobin A1c    Lipid Panel With / Chol / HDL Ratio    CBC & Differential    Comprehensive Metabolic Panel    Hemoglobin A1c    Lipid Panel With / Chol / HDL Ratio      Diagnoses       Codes Comments    Cough    -  Primary ICD-10-CM: R05.9  ICD-9-CM: 786.2     Body aches     ICD-10-CM: R52  ICD-9-CM: 780.96     Type 2 diabetes mellitus with hyperglycemia, without long-term current use of insulin (HCC)      ICD-10-CM: E11.65  ICD-9-CM: 250.00, 790.29     Type 2 diabetes mellitus with hyperglycemia, with long-term current use of insulin (HCC)     ICD-10-CM: E11.65, Z79.4  ICD-9-CM: 250.00, 790.29, V58.67       Cough with body aches. Systemic symptoms.   Check CBC, covid test.    DM2.  Improved.  Check A1c.  Check CMP.  Increase lantus to 46 units a day.      Orders Placed This Encounter   Procedures   • COVID-19,[facility] ROUTINE, NP/OP SWAB IN TRANSPORT MEDIA OR ESWAB 72 HR TAT - Swab, Nasopharynx     Order Specific Question:   Release to patient     Answer:   Immediate     Order Specific Question:   Previously tested for COVID-19?     Answer:   No     Order Specific Question:   Employed in healthcare setting?     Answer:   No     Order Specific Question:   Symptomatic for COVID-19 as defined by CDC?     Answer:   Yes     Order Specific Question:   Hospitalized for COVID-19?     Answer:   No     Order Specific Question:   Admitted to ICU for COVID-19?     Answer:   No     Order Specific Question:   Resident in a congregate (group) care setting?     Answer:   No   • Comprehensive Metabolic Panel     Order Specific Question:   Release to patient     Answer:   Routine Release   • Hemoglobin A1c     Order Specific Question:   Release to patient     Answer:   Routine Release   • Lipid Panel With / Chol / HDL Ratio     Order Specific Question:   Release to patient     Answer:   Routine Release   • CBC & Differential     Order Specific Question:   Manual Differential     Answer:   No       Current Outpatient Medications   Medication Sig Dispense Refill   • albuterol sulfate  (90 Base) MCG/ACT inhaler Inhale 2 puffs Every 4 (Four) Hours As Needed for Wheezing.     • ALLERGY SERUM INJECTION Inject  under the skin into the appropriate area as directed 1 (One) Time Per Week.     • amLODIPine (NORVASC) 2.5 MG tablet TAKE 1 TABLET BY MOUTH DAILY 90 tablet 3   • aspirin 81 MG EC tablet Take 81 mg by mouth Daily.     •  brimonidine (ALPHAGAN) 0.2 % ophthalmic solution      • BRIMONIDINE TARTRATE OP Apply 1 drop to eye(s) as directed by provider 2 (Two) Times a Day.     • Cyanocobalamin (B-12) 1000 MCG sublingual tablet One sublingual tab dissolved on tongue daily 90 each 3   • dorzolamide-timolol (COSOPT) 22.3-6.8 MG/ML ophthalmic solution Administer 1 drop to both eyes 2 (Two) Times a Day.     • fexofenadine (ALLEGRA) 180 MG tablet Take 180 mg by mouth Daily.     • Fluticasone-Salmeterol 232-14 MCG/ACT aerosol powder  Inhale 1 puff 2 (Two) Times a Day.     • furosemide (LASIX) 40 MG tablet TAKE 1 TABLET BY MOUTH EVERY DAY 90 tablet 3   • Hydrocortisone, Perianal, (Anusol-HC) 2.5 % rectal cream Apply rectally 3 times daily for 7-10 days during hemorrhoid flare.  Include applicator. 30 g 1   • Insulin Glargine (LANTUS SOLOSTAR) 100 UNIT/ML injection pen Inject 46 Units under the skin into the appropriate area as directed Daily.     • Insulin Pen Needle (Pen Needles) 31G X 5 MM misc 1 application Daily. 100 each 3   • latanoprost (XALATAN) 0.005 % ophthalmic solution Administer 1 drop to both eyes Every Night.     • levothyroxine (SYNTHROID, LEVOTHROID) 88 MCG tablet TAKE 1 TABLET BY MOUTH EVERY MORNING 90 tablet 3   • losartan (COZAAR) 100 MG tablet TAKE 1 TABLET BY MOUTH DAILY 90 tablet 3   • meloxicam (MOBIC) 15 MG tablet TAKE 1 TABLET BY MOUTH DAILY 90 tablet 1   • metFORMIN (GLUCOPHAGE) 500 MG tablet Take 1 tablet by mouth 2 (Two) Times a Day With Meals. 180 tablet 3   • Microlet Lancets misc 2 (Two) Times a Day. use for testing     • montelukast (SINGULAIR) 10 MG tablet TAKE 1 TABLET BY MOUTH EVERY NIGHT 90 tablet 3   • pantoprazole (PROTONIX) 40 MG EC tablet TAKE 1 TABLET BY MOUTH TWICE DAILY 90 tablet 3   • pramipexole (MIRAPEX) 1.5 MG tablet Take 1 tablet by mouth At Night As Needed (RLS). (Patient taking differently: Take 1.5 mg by mouth 2 (Two) Times a Day.) 90 tablet 2   • rosuvastatin (CRESTOR) 20 MG tablet TAKE 1 TABLET  BY MOUTH EVERY EVENING 90 tablet 3   • sennosides-docusate (senna-docusate sodium) 8.6-50 MG per tablet Take 1 tablet by mouth Daily. 60 tablet 4     No current facility-administered medications for this visit.       Nathan Baez had no medications administered during this visit.    Return in about 2 months (around 11/15/2022).    There are no Patient Instructions on file for this visit.

## 2022-09-15 NOTE — TELEPHONE ENCOUNTER
Tell them I sent over an antibiotic to take twice a day for 10 days.  Go ahead and start that today.

## 2022-09-15 NOTE — TELEPHONE ENCOUNTER
Caller: Coco Baez    Relationship to patient: Emergency Contact    Best call back number: [phone]    Patient is needing: PATIENT WAS IN THIS MORNING TO SEE DR. LYLES BUT DIDN'T MENTION TO HIM THAT HIS STOMACH IS RED AND HOT TO THE TOUCH. HIS WIFE THINKS THIS MAY BE WHY HE IS FEELING SO BAD. PLEASE REACH OUT AND ADVISE.

## 2022-09-16 LAB
ALBUMIN SERPL-MCNC: 4.1 G/DL (ref 3.5–5.2)
ALBUMIN/GLOB SERPL: 2 G/DL
ALP SERPL-CCNC: 239 U/L (ref 39–117)
ALT SERPL-CCNC: 35 U/L (ref 1–41)
AST SERPL-CCNC: 30 U/L (ref 1–40)
BILIRUB SERPL-MCNC: 2.1 MG/DL (ref 0–1.2)
BUN SERPL-MCNC: 16 MG/DL (ref 8–23)
BUN/CREAT SERPL: 12.4 (ref 7–25)
CALCIUM SERPL-MCNC: 9.3 MG/DL (ref 8.6–10.5)
CHLORIDE SERPL-SCNC: 101 MMOL/L (ref 98–107)
CHOLEST SERPL-MCNC: 91 MG/DL (ref 0–200)
CHOLEST/HDLC SERPL: 3.25 {RATIO}
CO2 SERPL-SCNC: 28 MMOL/L (ref 22–29)
CREAT SERPL-MCNC: 1.29 MG/DL (ref 0.76–1.27)
DIFFERENTIAL COMMENT: ABNORMAL
EGFRCR SERPLBLD CKD-EPI 2021: 57.5 ML/MIN/1.73
ERYTHROCYTE [DISTWIDTH] IN BLOOD BY AUTOMATED COUNT: 16.4 % (ref 12.3–15.4)
GLOBULIN SER CALC-MCNC: 2.1 GM/DL
GLUCOSE SERPL-MCNC: 119 MG/DL (ref 65–99)
HBA1C MFR BLD: 8.2 % (ref 4.8–5.6)
HCT VFR BLD AUTO: 32 % (ref 37.5–51)
HDLC SERPL-MCNC: 28 MG/DL (ref 40–60)
HGB BLD-MCNC: 10.2 G/DL (ref 13–17.7)
LABCORP SARS-COV-2, NAA 2 DAY TAT: NORMAL
LDLC SERPL CALC-MCNC: 32 MG/DL (ref 0–100)
LYMPHOCYTES # BLD MANUAL: 0.71 10*3/MM3 (ref 0.7–3.1)
LYMPHOCYTES NFR BLD MANUAL: 11 % (ref 19.6–45.3)
MCH RBC QN AUTO: 26.4 PG (ref 26.6–33)
MCHC RBC AUTO-ENTMCNC: 31.9 G/DL (ref 31.5–35.7)
MCV RBC AUTO: 82.9 FL (ref 79–97)
MONOCYTES # BLD MANUAL: 0.83 10*3/MM3 (ref 0.1–0.9)
MONOCYTES NFR BLD MANUAL: 13 % (ref 5–12)
NEUTROPHILS # BLD MANUAL: 4.82 10*3/MM3 (ref 1.7–7)
NEUTROPHILS NFR BLD MANUAL: 75 % (ref 42.7–76)
NRBC BLD AUTO-RTO: 0 /100 WBC (ref 0–0.2)
PLATELET # BLD AUTO: 148 10*3/MM3 (ref 140–450)
PLATELET BLD QL SMEAR: ABNORMAL
POTASSIUM SERPL-SCNC: 4.1 MMOL/L (ref 3.5–5.2)
PROT SERPL-MCNC: 6.2 G/DL (ref 6–8.5)
RBC # BLD AUTO: 3.86 10*6/MM3 (ref 4.14–5.8)
RBC MORPH BLD: ABNORMAL
SARS-COV-2 RNA RESP QL NAA+PROBE: NOT DETECTED
SODIUM SERPL-SCNC: 141 MMOL/L (ref 136–145)
TRIGL SERPL-MCNC: 191 MG/DL (ref 0–150)
VLDLC SERPL CALC-MCNC: 31 MG/DL (ref 5–40)
WBC # BLD AUTO: 6.42 10*3/MM3 (ref 3.4–10.8)

## 2022-09-26 ENCOUNTER — TELEPHONE (OUTPATIENT)
Dept: FAMILY MEDICINE CLINIC | Facility: CLINIC | Age: 77
End: 2022-09-26

## 2022-10-16 DIAGNOSIS — E11.65 TYPE 2 DIABETES MELLITUS WITH HYPERGLYCEMIA, WITHOUT LONG-TERM CURRENT USE OF INSULIN: ICD-10-CM

## 2022-10-17 NOTE — TELEPHONE ENCOUNTER
Rx Refill Note  Requested Prescriptions     Pending Prescriptions Disp Refills   • metFORMIN (GLUCOPHAGE) 500 MG tablet [Pharmacy Med Name: METFORMIN 500MG TABLETS] 180 tablet 3     Sig: TAKE 1 TABLET BY MOUTH TWICE DAILY WITH MEALS      Last office visit with prescribing clinician: 9/15/2022      Next office visit with prescribing clinician: 10/26/2022

## 2022-10-22 DIAGNOSIS — E11.65 TYPE 2 DIABETES MELLITUS WITH HYPERGLYCEMIA, WITHOUT LONG-TERM CURRENT USE OF INSULIN: ICD-10-CM

## 2022-10-24 NOTE — TELEPHONE ENCOUNTER
Rx Refill Note  Requested Prescriptions     Pending Prescriptions Disp Refills   • metFORMIN (GLUCOPHAGE) 500 MG tablet [Pharmacy Med Name: METFORMIN 500MG TABLETS] 180 tablet 0     Sig: TAKE 1 TABLET BY MOUTH TWICE DAILY WITH MEALS      Last office visit with prescribing clinician: Visit date not found      Next office visit with prescribing clinician: Visit date not found            Zacarias Quiroga CMA/LMR  10/24/22, 08:43 EDT

## 2022-10-25 RX ORDER — BLOOD-GLUCOSE METER
KIT MISCELLANEOUS
Status: ON HOLD | COMMUNITY
Start: 2022-09-27 | End: 2023-02-17

## 2022-10-25 RX ORDER — LEVOTHYROXINE SODIUM 88 UG/1
88 TABLET ORAL EVERY MORNING
Qty: 90 TABLET | Refills: 3 | Status: SHIPPED | OUTPATIENT
Start: 2022-10-25

## 2022-10-25 RX ORDER — BLOOD-GLUCOSE METER
KIT MISCELLANEOUS
Status: ON HOLD | COMMUNITY
Start: 2022-09-28 | End: 2023-02-17

## 2022-10-25 NOTE — TELEPHONE ENCOUNTER
Rx Refill Note  Requested Prescriptions     Pending Prescriptions Disp Refills   • levothyroxine (SYNTHROID, LEVOTHROID) 88 MCG tablet [Pharmacy Med Name: LEVOTHYROXINE 0.088MG (88MCG) TAB] 90 tablet 3     Sig: TAKE 1 TABLET BY MOUTH EVERY MORNING      Last office visit with prescribing clinician: 9/15/2022      Next office visit with prescribing clinician: 10/26/2022     Aleyda Slaughter MA  10/25/22, 08:31 EDT

## 2022-10-26 ENCOUNTER — OFFICE VISIT (OUTPATIENT)
Dept: FAMILY MEDICINE CLINIC | Facility: CLINIC | Age: 77
End: 2022-10-26

## 2022-10-26 VITALS
BODY MASS INDEX: 40.84 KG/M2 | OXYGEN SATURATION: 96 % | TEMPERATURE: 98.3 F | DIASTOLIC BLOOD PRESSURE: 88 MMHG | HEIGHT: 67 IN | SYSTOLIC BLOOD PRESSURE: 150 MMHG | HEART RATE: 95 BPM | RESPIRATION RATE: 28 BRPM | WEIGHT: 260.2 LBS

## 2022-10-26 DIAGNOSIS — E80.6 HYPERBILIRUBINEMIA: ICD-10-CM

## 2022-10-26 DIAGNOSIS — E11.65 TYPE 2 DIABETES MELLITUS WITH HYPERGLYCEMIA, WITH LONG-TERM CURRENT USE OF INSULIN: ICD-10-CM

## 2022-10-26 DIAGNOSIS — L98.9 SKIN LESION: Primary | ICD-10-CM

## 2022-10-26 DIAGNOSIS — B37.2 CANDIDIASIS, INTERTRIGO: ICD-10-CM

## 2022-10-26 DIAGNOSIS — D62 ANEMIA DUE TO ACUTE BLOOD LOSS: ICD-10-CM

## 2022-10-26 DIAGNOSIS — Z79.4 TYPE 2 DIABETES MELLITUS WITH HYPERGLYCEMIA, WITH LONG-TERM CURRENT USE OF INSULIN: ICD-10-CM

## 2022-10-26 DIAGNOSIS — D64.89 ANEMIA DUE TO OTHER CAUSE, NOT CLASSIFIED: ICD-10-CM

## 2022-10-26 PROCEDURE — 99214 OFFICE O/P EST MOD 30 MIN: CPT | Performed by: FAMILY MEDICINE

## 2022-10-26 RX ORDER — KETOCONAZOLE 20 MG/G
1 CREAM TOPICAL DAILY
Qty: 60 G | Refills: 3 | Status: SHIPPED | OUTPATIENT
Start: 2022-10-26

## 2022-10-26 NOTE — PROGRESS NOTES
Chief Complaint   Patient presents with   • Diabetes       Subjective   Nathan Baez is a 77 y.o. male.     History of Present Illness   C/o trouble with redness off and on under abdomen.  Off and on.    F/U DM2.  A1c 8.2.  BS running 110-148.  On 56 units of insulin a day now.    F/U elevated bilirubin.  No abdominal pain.   C/o trouble with spot on L arm.  There for months.      The following portions of the patient's history were reviewed and updated as appropriate: allergies, current medications, past family history, past medical history, past social history, past surgical history and problem list.    Review of Systems   Constitutional: Negative for appetite change and fatigue.   HENT: Negative for nosebleeds and sore throat.    Eyes: Negative for blurred vision and visual disturbance.   Respiratory: Negative for shortness of breath and wheezing.    Cardiovascular: Negative for chest pain and leg swelling.   Gastrointestinal: Negative for abdominal distention and abdominal pain.   Endocrine: Negative for cold intolerance and polyuria.   Genitourinary: Negative for dysuria and hematuria.   Musculoskeletal: Negative for arthralgias and myalgias.   Skin: Positive for skin lesions. Negative for color change and rash.   Neurological: Negative for weakness and confusion.   Psychiatric/Behavioral: Negative for agitation and depressed mood.       Patient Active Problem List   Diagnosis   • OA (osteoarthritis) of knee   • Hypertension   • Abdominal wall cellulitis   • Hypothyroidism (acquired)   • Gastroesophageal reflux disease without esophagitis   • Mixed hyperlipidemia   • Primary insomnia   • DDD (degenerative disc disease), lumbar   • KWAME (obstructive sleep apnea)   • Allergic   • Venous insufficiency   • Prostate cancer (HCC)   • Venous stasis dermatitis   • Tinea cruris   • Tinea corporis   • Throat clearing   • Sleep apnea   • Skin lesion of face   • Rib pain on left side   • Rectal bleed   • Presbycusis   •  Pes planus   • Osteoarthritis   • Obesity   • Medicare annual wellness visit, subsequent   • Lumbar strain   • Internal hemorrhoids   • Insomnia   • Inflamed skin tag   • Hyperplastic polyp of stomach   • Hospital discharge follow-up   • History of colon polyps   • High risk medication use   • Glaucoma   • Gastroenteritis, acute   • Erysipelas   • Encounter for screening colonoscopy   • Encounter for long-term (current) use of NSAIDs   • Dysphagia   • DVT (deep venous thrombosis) (HCC)   • DJD (degenerative joint disease), lumbar   • Diverticulosis   • Cough   • Contact with hypodermic needle   • Cervical radiculopathy   • Cellulitis   • Arthritis   • Allergic rhinitis   • Acute glaucoma   • Acute embolism and thrombosis of vein   • Actinic keratosis   • Status post reverse total shoulder replacement, right   • Localized edema   • Other specified anemias   • Peripheral polyneuropathy   • Campylobacter diarrhea   • Hyponatremia   • Generalized abdominal pain   • Fever   • Constipation   • Bilateral lower extremity edema   • Acute pyelonephritis   • Chronic idiopathic constipation   • Functional diarrhea   • Change in bowel habits   • RLS (restless legs syndrome)   • Type 2 diabetes mellitus with hyperglycemia ([facility])   • Family history of GI malignancy   • Primary osteoarthritis of right hip   • B12 deficiency   • Intervertebral disc stenosis of neural canal of lumbar region   • Encounter for hepatitis C screening test for low risk patient   • Pain associated with defecation   • Calculus of kidney   • Gastroesophageal reflux disease   • Body aches   • Skin lesion   • Candidiasis, intertrigo   • Hyperbilirubinemia       Allergies   Allergen Reactions   • Adhesive Tape Rash         Current Outpatient Medications:   •  albuterol sulfate  (90 Base) MCG/ACT inhaler, Inhale 2 puffs Every 4 (Four) Hours As Needed for Wheezing., Disp: , Rfl:   •  ALLERGY SERUM INJECTION, Inject  under the skin into the appropriate area  as directed 1 (One) Time Per Week., Disp: , Rfl:   •  amLODIPine (NORVASC) 2.5 MG tablet, TAKE 1 TABLET BY MOUTH DAILY, Disp: 90 tablet, Rfl: 3  •  aspirin 81 MG EC tablet, Take 81 mg by mouth Daily., Disp: , Rfl:   •  Blood Glucose Monitoring Suppl (FreeStyle Lite) w/Device kit, USE FOR BLOOD SUGAR, Disp: , Rfl:   •  brimonidine (ALPHAGAN) 0.2 % ophthalmic solution, , Disp: , Rfl:   •  BRIMONIDINE TARTRATE OP, Apply 1 drop to eye(s) as directed by provider 2 (Two) Times a Day., Disp: , Rfl:   •  Cyanocobalamin (B-12) 1000 MCG sublingual tablet, One sublingual tab dissolved on tongue daily, Disp: 90 each, Rfl: 3  •  dorzolamide-timolol (COSOPT) 22.3-6.8 MG/ML ophthalmic solution, Administer 1 drop to both eyes 2 (Two) Times a Day., Disp: , Rfl:   •  fexofenadine (ALLEGRA) 180 MG tablet, Take 180 mg by mouth Daily., Disp: , Rfl:   •  Fluticasone-Salmeterol 232-14 MCG/ACT aerosol powder , Inhale 1 puff 2 (Two) Times a Day., Disp: , Rfl:   •  FREESTYLE LITE test strip, USE TO TEST ONCE A DAY, Disp: , Rfl:   •  furosemide (LASIX) 40 MG tablet, TAKE 1 TABLET BY MOUTH EVERY DAY, Disp: 90 tablet, Rfl: 3  •  Hydrocortisone, Perianal, (Anusol-HC) 2.5 % rectal cream, Apply rectally 3 times daily for 7-10 days during hemorrhoid flare.  Include applicator., Disp: 30 g, Rfl: 1  •  Insulin Glargine (LANTUS SOLOSTAR) 100 UNIT/ML injection pen, Inject 56 Units under the skin into the appropriate area as directed Daily., Disp: 45 mL, Rfl: 3  •  Insulin Pen Needle (Pen Needles) 31G X 5 MM misc, 1 application Daily., Disp: 100 each, Rfl: 3  •  latanoprost (XALATAN) 0.005 % ophthalmic solution, Administer 1 drop to both eyes Every Night., Disp: , Rfl:   •  levothyroxine (SYNTHROID, LEVOTHROID) 88 MCG tablet, TAKE 1 TABLET BY MOUTH EVERY MORNING, Disp: 90 tablet, Rfl: 3  •  losartan (COZAAR) 100 MG tablet, TAKE 1 TABLET BY MOUTH DAILY, Disp: 90 tablet, Rfl: 3  •  meloxicam (MOBIC) 15 MG tablet, TAKE 1 TABLET BY MOUTH DAILY, Disp: 90  tablet, Rfl: 1  •  metFORMIN (GLUCOPHAGE) 500 MG tablet, TAKE 1 TABLET BY MOUTH TWICE DAILY WITH MEALS, Disp: 180 tablet, Rfl: 0  •  Microlet Lancets misc, 2 (Two) Times a Day. use for testing, Disp: , Rfl:   •  montelukast (SINGULAIR) 10 MG tablet, TAKE 1 TABLET BY MOUTH EVERY NIGHT, Disp: 90 tablet, Rfl: 3  •  pantoprazole (PROTONIX) 40 MG EC tablet, TAKE 1 TABLET BY MOUTH TWICE DAILY, Disp: 90 tablet, Rfl: 3  •  pramipexole (MIRAPEX) 1.5 MG tablet, Take 1 tablet by mouth At Night As Needed (RLS). (Patient taking differently: Take 1 tablet by mouth 2 (Two) Times a Day.), Disp: 90 tablet, Rfl: 2  •  rosuvastatin (CRESTOR) 20 MG tablet, TAKE 1 TABLET BY MOUTH EVERY EVENING, Disp: 90 tablet, Rfl: 3  •  sennosides-docusate (senna-docusate sodium) 8.6-50 MG per tablet, Take 1 tablet by mouth Daily., Disp: 60 tablet, Rfl: 4  •  ketoconazole (NIZORAL) 2 % cream, Apply 1 application topically to the appropriate area as directed Daily., Disp: 60 g, Rfl: 3    Past Medical History:   Diagnosis Date   • Actinic keratosis    • Acute embolism and thrombosis of vein    • Allergic    • Allergic rhinitis    • Arthritis    • Cataract    • Cervical radiculopathy    • Chronic constipation    • Diabetes mellitus (HCC)    • Disequilibrium    • DJD (degenerative joint disease), lumbar    • Elevated cholesterol    • Erysipelas    • GERD (gastroesophageal reflux disease)    • Glaucoma    • Hip pain, chronic, right    • History of kidney stones     X1   • History of peripheral edema     LOWER LEGS BILAT, COMPRESSION KNEE HIGH    • History of transfusion    • Hyperlipidemia    • Hypertension    • Hypothyroid    • Insomnia    • Intervertebral disc stenosis of neural canal of lumbar region 04/12/2022   • Limited mobility     RIGHT HIP   • Low back pain    • Male urinary stress incontinence     s/p prostatectomy-WEAR PAD   • Obesity    • KWAME (obstructive sleep apnea)    • Osteoarthritis    • Pelvic floor dysfunction 06/2022    DEFOGRAM ORDERED  AT [facility] BY DR. ROSHAN SCHAFER   • Pes planus    • Presbycusis    • Prostate cancer (HCC)    • Restless legs syndrome    • Shoulder pain     RIGHT, LIMITED MOBILITY-AT TIMES   • Sleep apnea     bipap   • Tinea corporis    • Tinea cruris    • Unsteady gait     RIGHT HIP   • Weakness     RIGHT HIP       Past Surgical History:   Procedure Laterality Date   • CATARACT EXTRACTION, BILATERAL Bilateral    • CERVICAL DISCECTOMY ANTERIOR     • COLONOSCOPY  03/08/2017    3/17normal. Recheck 2022. 12/11. Repeat in 5 years    • COLONOSCOPY N/A 4/19/2021    Procedure: COLONOSCOPY INTO CECUM WITH HOT & COLD  SNARE POLYPECTOMIES;  Surgeon: Jaden Chowdhury MD;  Location: [facility] ENDOSCOPY;  Service: Gastroenterology;  Laterality: N/A;  PRE: CHANGE IN BOWEL HABITS; FAMILY H/O COLON CANCER  POST: DIVERTICULOSIS, POLYPS   • ENDOSCOPY W/ PEG REMOVAL     • FOOT SURGERY      1998 had big toe replaced on left foot-METAL    • HERNIA REPAIR  03/07/2012    umbilical   • JOINT REPLACEMENT Right 2014   • NH TOTAL KNEE ARTHROPLASTY Left 9/13/2016    Procedure: LT TOTAL KNEE ARTHROPLASTY;  Surgeon: Tadeo Basurto MD;  Location: [facility] OR;  Service: Orthopedics   • PROSTATECTOMY  07/1999 July 1999. Radical    • THYROID LOBECTOMY     • TONSILLECTOMY     • TOTAL HIP ARTHROPLASTY Right 8/19/2021    Procedure: TOTAL HIP ARTHROPLASTY RIGHT POSTERIOR;  Surgeon: Tadeo Basurto MD;  Location: [facility] OR;  Service: Orthopedics;  Laterality: Right;   • TOTAL KNEE ARTHROPLASTY Right 2014   • TOTAL SHOULDER ARTHROPLASTY W/ DISTAL CLAVICLE EXCISION Right 11/13/2019    Procedure: TOTAL SHOULDER REVERSE ARTHROPLASTY RIGHT REPAIR RIGHT AXILLARY VEIN;  Surgeon: Behzad Kelley MD;  Location: [facility];  Service: Orthopedics   • WRIST SURGERY Right 12/17/2014    x2  wrist replaced       Family History   Problem Relation Age of Onset   • Arthritis Mother    • Cancer Mother         COLON   • Colon cancer Mother    • Arthritis Father    •  Cancer Father         PROSTATE   • Heart disease Sister    • Arthritis Sister    • Cancer Sister         BREAST CANCER   • Breast cancer Sister    • Gout Sister    • Hypertension Sister    • Hypertension Brother    • Heart disease Brother    • Arthritis Brother    • Heart attack Brother    • Bone cancer Brother    • Heart disease Brother    • Malig Hyperthermia Neg Hx        Social History     Tobacco Use   • Smoking status: Former     Packs/day: 1.00     Years: 20.00     Pack years: 20.00     Types: Cigarettes     Start date:      Quit date: 1984     Years since quittin.8   • Smokeless tobacco: Former     Types: Chew   Substance Use Topics   • Alcohol use: Yes     Comment: 3-4 MONTHLY            Objective     Vitals:    10/26/22 0900   BP: 150/88   Pulse: 95   Resp: 28   Temp: 98.3 °F (36.8 °C)   SpO2: 96%     Body mass index is 40.74 kg/m².    Physical Exam  Vitals reviewed.   Constitutional:       Appearance: He is well-developed. He is not diaphoretic.   HENT:      Head: Normocephalic and atraumatic.   Eyes:      General: No scleral icterus.     Pupils: Pupils are equal, round, and reactive to light.   Neck:      Thyroid: No thyromegaly.   Cardiovascular:      Rate and Rhythm: Normal rate and regular rhythm.      Heart sounds: No murmur heard.    No friction rub. No gallop.   Pulmonary:      Effort: Pulmonary effort is normal. No respiratory distress.      Breath sounds: No wheezing or rales.   Chest:      Chest wall: No tenderness.   Abdominal:      General: Bowel sounds are normal. There is no distension.      Palpations: Abdomen is soft.      Tenderness: There is no abdominal tenderness.   Musculoskeletal:         General: No deformity. Normal range of motion.   Lymphadenopathy:      Cervical: No cervical adenopathy.   Skin:     General: Skin is warm and dry.      Findings: No rash.      Comments: L arm with scaly area noted 1 cm diameter.   Neurological:      Cranial Nerves: No cranial nerve  deficit.      Motor: No abnormal muscle tone.         Lab Results   Component Value Date    GLUCOSE 119 (H) 09/15/2022    BUN 16 09/15/2022    CREATININE 1.29 (H) 09/15/2022    EGFRIFNONA 76 12/06/2021    EGFRIFAFRI 88 12/06/2021    BCR 12.4 09/15/2022    K 4.1 09/15/2022    CO2 28.0 09/15/2022    CALCIUM 9.3 09/15/2022    PROTENTOTREF 6.2 09/15/2022    ALBUMIN 4.10 09/15/2022    LABIL2 2.0 09/15/2022    AST 30 09/15/2022    ALT 35 09/15/2022       WBC   Date Value Ref Range Status   09/15/2022 6.42 3.40 - 10.80 10*3/mm3 Final     RBC   Date Value Ref Range Status   09/15/2022 3.86 (L) 4.14 - 5.80 10*6/mm3 Final     Hemoglobin   Date Value Ref Range Status   09/15/2022 10.2 (L) 13.0 - 17.7 g/dL Final   06/22/2022 11.5 (L) 13.0 - 17.7 g/dL Final     Hematocrit   Date Value Ref Range Status   09/15/2022 32.0 (L) 37.5 - 51.0 % Final   06/22/2022 34.3 (L) 37.5 - 51.0 % Final     MCV   Date Value Ref Range Status   09/15/2022 82.9 79.0 - 97.0 fL Final   06/22/2022 86.8 79.0 - 97.0 fL Final     MCH   Date Value Ref Range Status   09/15/2022 26.4 (L) 26.6 - 33.0 pg Final   06/22/2022 29.1 26.6 - 33.0 pg Final     MCHC   Date Value Ref Range Status   09/15/2022 31.9 31.5 - 35.7 g/dL Final   06/22/2022 33.5 31.5 - 35.7 g/dL Final     RDW   Date Value Ref Range Status   09/15/2022 16.4 (H) 12.3 - 15.4 % Final   06/22/2022 14.0 12.3 - 15.4 % Final     RDW-SD   Date Value Ref Range Status   06/22/2022 44.1 37.0 - 54.0 fl Final     MPV   Date Value Ref Range Status   06/22/2022 10.7 6.0 - 12.0 fL Final     Platelets   Date Value Ref Range Status   09/15/2022 148 140 - 450 10*3/mm3 Final   06/22/2022 163 140 - 450 10*3/mm3 Final     Neutrophil %   Date Value Ref Range Status   06/22/2022 67.4 42.7 - 76.0 % Final     Lymphocyte %   Date Value Ref Range Status   06/22/2022 16.5 (L) 19.6 - 45.3 % Final     Monocyte %   Date Value Ref Range Status   06/22/2022 9.7 5.0 - 12.0 % Final     Eosinophil %   Date Value Ref Range Status    06/22/2022 1.6 0.3 - 6.2 % Final     Basophil %   Date Value Ref Range Status   06/22/2022 0.9 0.0 - 1.5 % Final     Immature Grans %   Date Value Ref Range Status   06/22/2022 3.9 (H) 0.0 - 0.5 % Final     Neutrophils Absolute   Date Value Ref Range Status   09/15/2022 4.82 1.70 - 7.00 10*3/mm3 Final     Neutrophils, Absolute   Date Value Ref Range Status   06/22/2022 3.77 1.70 - 7.00 10*3/mm3 Final     Lymphocytes Absolute   Date Value Ref Range Status   09/15/2022 0.71 0.70 - 3.10 10*3/mm3 Final     Lymphocytes, Absolute   Date Value Ref Range Status   06/22/2022 0.92 0.70 - 3.10 10*3/mm3 Final     Monocytes Absolute   Date Value Ref Range Status   09/15/2022 0.83 0.10 - 0.90 10*3/mm3 Final     Monocytes, Absolute   Date Value Ref Range Status   06/22/2022 0.54 0.10 - 0.90 10*3/mm3 Final     Eosinophils, Absolute   Date Value Ref Range Status   06/22/2022 0.09 0.00 - 0.40 10*3/mm3 Final     Basophils, Absolute   Date Value Ref Range Status   06/22/2022 0.05 0.00 - 0.20 10*3/mm3 Final     Immature Grans, Absolute   Date Value Ref Range Status   06/22/2022 0.22 (H) 0.00 - 0.05 10*3/mm3 Final     nRBC   Date Value Ref Range Status   09/15/2022 0.0 0.0 - 0.2 /100 WBC Final   06/22/2022 0.0 0.0 - 0.2 /100 WBC Final       Lab Results   Component Value Date    HGBA1C 8.20 (H) 09/15/2022       Lab Results   Component Value Date    YIECBWBI08 1,048 04/14/2022       TSH   Date Value Ref Range Status   12/06/2021 2.640 0.450 - 4.500 uIU/mL Final   06/04/2019 2.220 0.270 - 4.200 mIU/mL Final       No results found for: CHOL  Lab Results   Component Value Date    TRIG 191 (H) 09/15/2022     Lab Results   Component Value Date    HDL 28 (L) 09/15/2022     Lab Results   Component Value Date    LDL 32 09/15/2022     Lab Results   Component Value Date    VLDL 31 09/15/2022     No results found for: LDLHDL      Procedures    Assessment & Plan   Problems Addressed this Visit     Candidiasis, intertrigo    Relevant Medications     ketoconazole (NIZORAL) 2 % cream    Hyperbilirubinemia    Relevant Orders    Comprehensive Metabolic Panel    Other specified anemias    Relevant Orders    CBC & Differential    Ferritin    Iron    Vitamin B12    Skin lesion - Primary    Relevant Medications    ketoconazole (NIZORAL) 2 % cream    Other Relevant Orders    Ambulatory Referral to Dermatology    Type 2 diabetes mellitus with hyperglycemia (HCC)    Relevant Medications    Insulin Glargine (LANTUS SOLOSTAR) 100 UNIT/ML injection pen   Diagnoses       Codes Comments    Skin lesion    -  Primary ICD-10-CM: L98.9  ICD-9-CM: 709.9     Type 2 diabetes mellitus with hyperglycemia, with long-term current use of insulin (HCC)     ICD-10-CM: E11.65, Z79.4  ICD-9-CM: 250.00, 790.29, V58.67     Candidiasis, intertrigo     ICD-10-CM: B37.2  ICD-9-CM: 112.3     Hyperbilirubinemia     ICD-10-CM: E80.6  ICD-9-CM: 782.4     Anemia due to acute blood loss     ICD-10-CM: D62  ICD-9-CM: 285.1     Anemia due to other cause, not classified     ICD-10-CM: D64.89  ICD-9-CM: 285.8       DM2.  Improved but uncontrolled.  RF lantus at 56 u a day.   Intertrigo , candidiasis.  New problem, chronic.  Ketoconazole cream at night.    Skin lesion.  To derm.    Hyperbilirubinemia.  Check CMP.      Orders Placed This Encounter   Procedures   • Comprehensive Metabolic Panel     Order Specific Question:   Release to patient     Answer:   Routine Release   • Ferritin   • Iron   • Vitamin B12     Order Specific Question:   Release to patient     Answer:   Routine Release   • Ambulatory Referral to Dermatology     Referral Priority:   Routine     Referral Type:   Consultation     Referral Reason:   Specialty Services Required     Requested Specialty:   Dermatology     Number of Visits Requested:   1   • CBC & Differential     Order Specific Question:   Manual Differential     Answer:   No       Current Outpatient Medications   Medication Sig Dispense Refill   • albuterol sulfate  (90  Base) MCG/ACT inhaler Inhale 2 puffs Every 4 (Four) Hours As Needed for Wheezing.     • ALLERGY SERUM INJECTION Inject  under the skin into the appropriate area as directed 1 (One) Time Per Week.     • amLODIPine (NORVASC) 2.5 MG tablet TAKE 1 TABLET BY MOUTH DAILY 90 tablet 3   • aspirin 81 MG EC tablet Take 81 mg by mouth Daily.     • Blood Glucose Monitoring Suppl (FreeStyle Lite) w/Device kit USE FOR BLOOD SUGAR     • brimonidine (ALPHAGAN) 0.2 % ophthalmic solution      • BRIMONIDINE TARTRATE OP Apply 1 drop to eye(s) as directed by provider 2 (Two) Times a Day.     • Cyanocobalamin (B-12) 1000 MCG sublingual tablet One sublingual tab dissolved on tongue daily 90 each 3   • dorzolamide-timolol (COSOPT) 22.3-6.8 MG/ML ophthalmic solution Administer 1 drop to both eyes 2 (Two) Times a Day.     • fexofenadine (ALLEGRA) 180 MG tablet Take 180 mg by mouth Daily.     • Fluticasone-Salmeterol 232-14 MCG/ACT aerosol powder  Inhale 1 puff 2 (Two) Times a Day.     • FREESTYLE LITE test strip USE TO TEST ONCE A DAY     • furosemide (LASIX) 40 MG tablet TAKE 1 TABLET BY MOUTH EVERY DAY 90 tablet 3   • Hydrocortisone, Perianal, (Anusol-HC) 2.5 % rectal cream Apply rectally 3 times daily for 7-10 days during hemorrhoid flare.  Include applicator. 30 g 1   • Insulin Glargine (LANTUS SOLOSTAR) 100 UNIT/ML injection pen Inject 56 Units under the skin into the appropriate area as directed Daily. 45 mL 3   • Insulin Pen Needle (Pen Needles) 31G X 5 MM misc 1 application Daily. 100 each 3   • latanoprost (XALATAN) 0.005 % ophthalmic solution Administer 1 drop to both eyes Every Night.     • levothyroxine (SYNTHROID, LEVOTHROID) 88 MCG tablet TAKE 1 TABLET BY MOUTH EVERY MORNING 90 tablet 3   • losartan (COZAAR) 100 MG tablet TAKE 1 TABLET BY MOUTH DAILY 90 tablet 3   • meloxicam (MOBIC) 15 MG tablet TAKE 1 TABLET BY MOUTH DAILY 90 tablet 1   • metFORMIN (GLUCOPHAGE) 500 MG tablet TAKE 1 TABLET BY MOUTH TWICE DAILY WITH MEALS 180  tablet 0   • Microlet Lancets misc 2 (Two) Times a Day. use for testing     • montelukast (SINGULAIR) 10 MG tablet TAKE 1 TABLET BY MOUTH EVERY NIGHT 90 tablet 3   • pantoprazole (PROTONIX) 40 MG EC tablet TAKE 1 TABLET BY MOUTH TWICE DAILY 90 tablet 3   • pramipexole (MIRAPEX) 1.5 MG tablet Take 1 tablet by mouth At Night As Needed (RLS). (Patient taking differently: Take 1 tablet by mouth 2 (Two) Times a Day.) 90 tablet 2   • rosuvastatin (CRESTOR) 20 MG tablet TAKE 1 TABLET BY MOUTH EVERY EVENING 90 tablet 3   • sennosides-docusate (senna-docusate sodium) 8.6-50 MG per tablet Take 1 tablet by mouth Daily. 60 tablet 4   • ketoconazole (NIZORAL) 2 % cream Apply 1 application topically to the appropriate area as directed Daily. 60 g 3     No current facility-administered medications for this visit.       Nathan Baez had no medications administered during this visit.    Return in about 3 months (around 1/26/2023).    There are no Patient Instructions on file for this visit.

## 2022-10-27 ENCOUNTER — APPOINTMENT (OUTPATIENT)
Dept: GENERAL RADIOLOGY | Facility: HOSPITAL | Age: 77
End: 2022-10-27

## 2022-10-27 ENCOUNTER — APPOINTMENT (OUTPATIENT)
Dept: CT IMAGING | Facility: HOSPITAL | Age: 77
End: 2022-10-27

## 2022-10-27 ENCOUNTER — HOSPITAL ENCOUNTER (INPATIENT)
Facility: HOSPITAL | Age: 77
LOS: 4 days | Discharge: HOME | End: 2022-10-31
Attending: EMERGENCY MEDICINE | Admitting: INTERNAL MEDICINE

## 2022-10-27 DIAGNOSIS — U07.1 ACUTE RESPIRATORY FAILURE DUE TO COVID-19: Primary | ICD-10-CM

## 2022-10-27 DIAGNOSIS — R10.84 GENERALIZED ABDOMINAL PAIN: ICD-10-CM

## 2022-10-27 DIAGNOSIS — D64.89 ANEMIA DUE TO OTHER CAUSE, NOT CLASSIFIED: Primary | ICD-10-CM

## 2022-10-27 DIAGNOSIS — L03.311 ABDOMINAL WALL CELLULITIS: ICD-10-CM

## 2022-10-27 DIAGNOSIS — J96.00 ACUTE RESPIRATORY FAILURE DUE TO COVID-19: Primary | ICD-10-CM

## 2022-10-27 LAB
ALBUMIN SERPL-MCNC: 3.9 G/DL (ref 3.5–5.2)
ALBUMIN SERPL-MCNC: 4 G/DL (ref 3.5–5.2)
ALBUMIN SERPL-MCNC: 4.4 G/DL (ref 3.5–5.2)
ALBUMIN/GLOB SERPL: 1.6 G/DL
ALBUMIN/GLOB SERPL: 1.7 G/DL
ALP SERPL-CCNC: 174 U/L (ref 39–117)
ALP SERPL-CCNC: 177 U/L (ref 39–117)
ALP SERPL-CCNC: 187 U/L (ref 39–117)
ALT SERPL W P-5'-P-CCNC: 24 U/L (ref 1–41)
ALT SERPL W P-5'-P-CCNC: 24 U/L (ref 1–41)
ALT SERPL-CCNC: 26 U/L (ref 1–41)
ANION GAP SERPL CALCULATED.3IONS-SCNC: 14.8 MMOL/L (ref 5–15)
ARTERIAL PATENCY WRIST A: POSITIVE
AST SERPL-CCNC: 28 U/L (ref 1–40)
AST SERPL-CCNC: 29 U/L (ref 1–40)
AST SERPL-CCNC: 29 U/L (ref 1–40)
ATMOSPHERIC PRESS: 755.8 MMHG
B PARAPERT DNA SPEC QL NAA+PROBE: NOT DETECTED
B PERT DNA SPEC QL NAA+PROBE: NOT DETECTED
BASE EXCESS BLDA CALC-SCNC: 3.4 MMOL/L (ref 0–2)
BASOPHILS # BLD AUTO: 0.02 10*3/MM3 (ref 0–0.2)
BASOPHILS NFR BLD AUTO: 0.5 % (ref 0–1.5)
BDY SITE: ABNORMAL
BILIRUB CONJ SERPL-MCNC: <0.2 MG/DL (ref 0–0.3)
BILIRUB INDIRECT SERPL-MCNC: ABNORMAL MG/DL
BILIRUB SERPL-MCNC: 0.4 MG/DL (ref 0–1.2)
BILIRUB SERPL-MCNC: 0.5 MG/DL (ref 0–1.2)
BILIRUB SERPL-MCNC: 0.5 MG/DL (ref 0–1.2)
BUN SERPL-MCNC: 12 MG/DL (ref 8–23)
BUN SERPL-MCNC: 12 MG/DL (ref 8–23)
BUN/CREAT SERPL: 11.1 (ref 7–25)
BUN/CREAT SERPL: 11.5 (ref 7–25)
C PNEUM DNA NPH QL NAA+NON-PROBE: NOT DETECTED
CALCIUM SERPL-MCNC: 8.7 MG/DL (ref 8.6–10.5)
CALCIUM SPEC-SCNC: 9.1 MG/DL (ref 8.6–10.5)
CHLORIDE SERPL-SCNC: 100 MMOL/L (ref 98–107)
CHLORIDE SERPL-SCNC: 99 MMOL/L (ref 98–107)
CO2 SERPL-SCNC: 26.2 MMOL/L (ref 22–29)
CO2 SERPL-SCNC: 31.9 MMOL/L (ref 22–29)
CREAT SERPL-MCNC: 1.04 MG/DL (ref 0.76–1.27)
CREAT SERPL-MCNC: 1.08 MG/DL (ref 0.76–1.27)
CREAT SERPL-MCNC: 1.12 MG/DL (ref 0.76–1.27)
D-LACTATE SERPL-SCNC: 1.9 MMOL/L (ref 0.5–2)
DEPRECATED RDW RBC AUTO: 44.4 FL (ref 37–54)
EGFRCR SERPLBLD CKD-EPI 2021: 68.1 ML/MIN/1.73
EGFRCR SERPLBLD CKD-EPI 2021: 71.1 ML/MIN/1.73
EGFRCR SERPLBLD CKD-EPI 2021: 74.4 ML/MIN/1.73
EOSINOPHIL # BLD AUTO: 0.07 10*3/MM3 (ref 0–0.4)
EOSINOPHIL # BLD MANUAL: 0.03 10*3/MM3 (ref 0–0.4)
EOSINOPHIL NFR BLD AUTO: 1.6 % (ref 0.3–6.2)
EOSINOPHIL NFR BLD MANUAL: 0.8 % (ref 0.3–6.2)
ERYTHROCYTE [DISTWIDTH] IN BLOOD BY AUTOMATED COUNT: 15.4 % (ref 12.3–15.4)
ERYTHROCYTE [DISTWIDTH] IN BLOOD BY AUTOMATED COUNT: 15.5 % (ref 12.3–15.4)
FERRITIN SERPL-MCNC: 34 NG/ML (ref 30–400)
FLUAV SUBTYP SPEC NAA+PROBE: NOT DETECTED
FLUBV RNA ISLT QL NAA+PROBE: NOT DETECTED
GAS FLOW AIRWAY: 2 LPM
GLOBULIN SER CALC-MCNC: 2.4 GM/DL
GLOBULIN UR ELPH-MCNC: 2.8 GM/DL
GLUCOSE BLDC GLUCOMTR-MCNC: 148 MG/DL (ref 70–130)
GLUCOSE SERPL-MCNC: 128 MG/DL (ref 65–99)
GLUCOSE SERPL-MCNC: 89 MG/DL (ref 65–99)
HADV DNA SPEC NAA+PROBE: NOT DETECTED
HCO3 BLDA-SCNC: 28.2 MMOL/L (ref 22–28)
HCOV 229E RNA SPEC QL NAA+PROBE: NOT DETECTED
HCOV HKU1 RNA SPEC QL NAA+PROBE: NOT DETECTED
HCOV NL63 RNA SPEC QL NAA+PROBE: NOT DETECTED
HCOV OC43 RNA SPEC QL NAA+PROBE: NOT DETECTED
HCT VFR BLD AUTO: 31.1 % (ref 37.5–51)
HCT VFR BLD AUTO: 31.6 % (ref 37.5–51)
HGB BLD-MCNC: 9.6 G/DL (ref 13–17.7)
HGB BLD-MCNC: 9.8 G/DL (ref 13–17.7)
HMPV RNA NPH QL NAA+NON-PROBE: NOT DETECTED
HOLD SPECIMEN: NORMAL
HOLD SPECIMEN: NORMAL
HPIV1 RNA ISLT QL NAA+PROBE: NOT DETECTED
HPIV2 RNA SPEC QL NAA+PROBE: NOT DETECTED
HPIV3 RNA NPH QL NAA+PROBE: NOT DETECTED
HPIV4 P GENE NPH QL NAA+PROBE: NOT DETECTED
IMM GRANULOCYTES # BLD AUTO: 0.12 10*3/MM3 (ref 0–0.05)
IMM GRANULOCYTES NFR BLD AUTO: 2.8 % (ref 0–0.5)
IRON SERPL-MCNC: 21 MCG/DL (ref 59–158)
LYMPHOCYTES # BLD AUTO: 1.32 10*3/MM3 (ref 0.7–3.1)
LYMPHOCYTES # BLD MANUAL: 0.43 10*3/MM3 (ref 0.7–3.1)
LYMPHOCYTES NFR BLD AUTO: 30.7 % (ref 19.6–45.3)
LYMPHOCYTES NFR BLD MANUAL: 4.5 % (ref 5–12)
M PNEUMO IGG SER IA-ACNC: NOT DETECTED
MCH RBC QN AUTO: 24.7 PG (ref 26.6–33)
MCH RBC QN AUTO: 24.8 PG (ref 26.6–33)
MCHC RBC AUTO-ENTMCNC: 30.4 G/DL (ref 31.5–35.7)
MCHC RBC AUTO-ENTMCNC: 31.5 G/DL (ref 31.5–35.7)
MCV RBC AUTO: 78.5 FL (ref 79–97)
MCV RBC AUTO: 81.7 FL (ref 79–97)
MICROCYTES BLD QL: ABNORMAL
MODALITY: ABNORMAL
MONOCYTES # BLD AUTO: 0.65 10*3/MM3 (ref 0.1–0.9)
MONOCYTES # BLD: 0.17 10*3/MM3 (ref 0.1–0.9)
MONOCYTES NFR BLD AUTO: 15.1 % (ref 5–12)
MYELOCYTES NFR BLD MANUAL: 0.4 % (ref 0–0)
NEUTROPHILS # BLD AUTO: 2.12 10*3/MM3 (ref 1.7–7)
NEUTROPHILS # BLD AUTO: 3.1 10*3/MM3 (ref 1.7–7)
NEUTROPHILS NFR BLD AUTO: 49.3 % (ref 42.7–76)
NEUTROPHILS NFR BLD MANUAL: 82.8 % (ref 42.7–76)
NRBC BLD AUTO-RTO: 0 /100 WBC (ref 0–0.2)
NRBC BLD AUTO-RTO: 0.2 /100 WBC (ref 0–0.2)
NT-PROBNP SERPL-MCNC: 117 PG/ML (ref 0–1800)
PCO2 BLDA: 42.9 MM HG (ref 35–45)
PH BLDA: 7.42 PH UNITS (ref 7.35–7.45)
PLAT MORPH BLD: NORMAL
PLATELET # BLD AUTO: 160 10*3/MM3 (ref 140–450)
PLATELET # BLD AUTO: 169 10*3/MM3 (ref 140–450)
PMV BLD AUTO: 10 FL (ref 6–12)
PO2 BLDA: 63.2 MM HG (ref 80–100)
POTASSIUM SERPL-SCNC: 4 MMOL/L (ref 3.5–5.2)
POTASSIUM SERPL-SCNC: 4 MMOL/L (ref 3.5–5.2)
PROCALCITONIN SERPL-MCNC: 0.25 NG/ML (ref 0–0.25)
PROT SERPL-MCNC: 6.4 G/DL (ref 6–8.5)
PROT SERPL-MCNC: 7 G/DL (ref 6–8.5)
PROT SERPL-MCNC: 7.2 G/DL (ref 6–8.5)
QT INTERVAL: 322 MS
RBC # BLD AUTO: 3.87 10*6/MM3 (ref 4.14–5.8)
RBC # BLD AUTO: 3.96 10*6/MM3 (ref 4.14–5.8)
RHINOVIRUS RNA SPEC NAA+PROBE: NOT DETECTED
RSV RNA NPH QL NAA+NON-PROBE: NOT DETECTED
SAO2 % BLDCOA: 92.2 % (ref 92–99)
SARS-COV-2 RNA NPH QL NAA+NON-PROBE: DETECTED
SCHISTOCYTES BLD QL SMEAR: ABNORMAL
SODIUM SERPL-SCNC: 140 MMOL/L (ref 136–145)
SODIUM SERPL-SCNC: 141 MMOL/L (ref 136–145)
TOTAL RATE: 24 BREATHS/MINUTE
TROPONIN T SERPL-MCNC: <0.01 NG/ML (ref 0–0.03)
VARIANT LYMPHS NFR BLD MANUAL: 0.8 % (ref 0–5)
VARIANT LYMPHS NFR BLD MANUAL: 10.7 % (ref 19.6–45.3)
VIT B12 SERPL-MCNC: 1009 PG/ML (ref 211–946)
WBC # BLD AUTO: 4.3 10*3/MM3 (ref 3.4–10.8)
WBC MORPH BLD: NORMAL
WBC NRBC COR # BLD: 3.74 10*3/MM3 (ref 3.4–10.8)
WHOLE BLOOD HOLD COAG: NORMAL
WHOLE BLOOD HOLD SPECIMEN: NORMAL

## 2022-10-27 PROCEDURE — 93005 ELECTROCARDIOGRAM TRACING: CPT

## 2022-10-27 PROCEDURE — 84484 ASSAY OF TROPONIN QUANT: CPT | Performed by: EMERGENCY MEDICINE

## 2022-10-27 PROCEDURE — 94640 AIRWAY INHALATION TREATMENT: CPT

## 2022-10-27 PROCEDURE — 25010000002 DEXAMETHASONE PER 1 MG: Performed by: PHYSICIAN ASSISTANT

## 2022-10-27 PROCEDURE — 25010000002 REMDESIVIR 100 MG/20ML SOLUTION 1 EACH VIAL: Performed by: PHYSICIAN ASSISTANT

## 2022-10-27 PROCEDURE — 74177 CT ABD & PELVIS W/CONTRAST: CPT

## 2022-10-27 PROCEDURE — 87077 CULTURE AEROBIC IDENTIFY: CPT | Performed by: PHYSICIAN ASSISTANT

## 2022-10-27 PROCEDURE — 83880 ASSAY OF NATRIURETIC PEPTIDE: CPT | Performed by: EMERGENCY MEDICINE

## 2022-10-27 PROCEDURE — 36600 WITHDRAWAL OF ARTERIAL BLOOD: CPT

## 2022-10-27 PROCEDURE — 0202U NFCT DS 22 TRGT SARS-COV-2: CPT | Performed by: PHYSICIAN ASSISTANT

## 2022-10-27 PROCEDURE — 87150 DNA/RNA AMPLIFIED PROBE: CPT | Performed by: PHYSICIAN ASSISTANT

## 2022-10-27 PROCEDURE — 82962 GLUCOSE BLOOD TEST: CPT

## 2022-10-27 PROCEDURE — 85007 BL SMEAR W/DIFF WBC COUNT: CPT | Performed by: EMERGENCY MEDICINE

## 2022-10-27 PROCEDURE — 71275 CT ANGIOGRAPHY CHEST: CPT

## 2022-10-27 PROCEDURE — 94761 N-INVAS EAR/PLS OXIMETRY MLT: CPT

## 2022-10-27 PROCEDURE — 94799 UNLISTED PULMONARY SVC/PX: CPT

## 2022-10-27 PROCEDURE — 83605 ASSAY OF LACTIC ACID: CPT | Performed by: PHYSICIAN ASSISTANT

## 2022-10-27 PROCEDURE — 99285 EMERGENCY DEPT VISIT HI MDM: CPT

## 2022-10-27 PROCEDURE — 25010000002 VANCOMYCIN 10 G RECONSTITUTED SOLUTION: Performed by: PHYSICIAN ASSISTANT

## 2022-10-27 PROCEDURE — 93010 ELECTROCARDIOGRAM REPORT: CPT | Performed by: INTERNAL MEDICINE

## 2022-10-27 PROCEDURE — 87040 BLOOD CULTURE FOR BACTERIA: CPT | Performed by: PHYSICIAN ASSISTANT

## 2022-10-27 PROCEDURE — 25010000002 PIPERACILLIN SOD-TAZOBACTAM PER 1 G: Performed by: INTERNAL MEDICINE

## 2022-10-27 PROCEDURE — 80053 COMPREHEN METABOLIC PANEL: CPT | Performed by: EMERGENCY MEDICINE

## 2022-10-27 PROCEDURE — 71045 X-RAY EXAM CHEST 1 VIEW: CPT

## 2022-10-27 PROCEDURE — 36415 COLL VENOUS BLD VENIPUNCTURE: CPT

## 2022-10-27 PROCEDURE — XW033E5 INTRODUCTION OF REMDESIVIR ANTI-INFECTIVE INTO PERIPHERAL VEIN, PERCUTANEOUS APPROACH, NEW TECHNOLOGY GROUP 5: ICD-10-PCS | Performed by: STUDENT IN AN ORGANIZED HEALTH CARE EDUCATION/TRAINING PROGRAM

## 2022-10-27 PROCEDURE — 84145 PROCALCITONIN (PCT): CPT | Performed by: PHYSICIAN ASSISTANT

## 2022-10-27 PROCEDURE — 0 IOPAMIDOL PER 1 ML: Performed by: EMERGENCY MEDICINE

## 2022-10-27 PROCEDURE — 82248 BILIRUBIN DIRECT: CPT | Performed by: EMERGENCY MEDICINE

## 2022-10-27 PROCEDURE — 85025 COMPLETE CBC W/AUTO DIFF WBC: CPT | Performed by: EMERGENCY MEDICINE

## 2022-10-27 PROCEDURE — 93005 ELECTROCARDIOGRAM TRACING: CPT | Performed by: EMERGENCY MEDICINE

## 2022-10-27 PROCEDURE — 82803 BLOOD GASES ANY COMBINATION: CPT

## 2022-10-27 PROCEDURE — 82565 ASSAY OF CREATININE: CPT | Performed by: PHYSICIAN ASSISTANT

## 2022-10-27 PROCEDURE — 25010000002 PIPERACILLIN SOD-TAZOBACTAM PER 1 G: Performed by: PHYSICIAN ASSISTANT

## 2022-10-27 PROCEDURE — 94664 DEMO&/EVAL PT USE INHALER: CPT

## 2022-10-27 PROCEDURE — 87186 SC STD MICRODIL/AGAR DIL: CPT | Performed by: PHYSICIAN ASSISTANT

## 2022-10-27 RX ORDER — ALBUTEROL SULFATE 2.5 MG/3ML
2.5 SOLUTION RESPIRATORY (INHALATION) EVERY 6 HOURS PRN
Status: DISCONTINUED | OUTPATIENT
Start: 2022-10-27 | End: 2022-10-31 | Stop reason: HOSPADM

## 2022-10-27 RX ORDER — LOSARTAN POTASSIUM 100 MG/1
100 TABLET ORAL DAILY
Status: DISCONTINUED | OUTPATIENT
Start: 2022-10-27 | End: 2022-10-31 | Stop reason: HOSPADM

## 2022-10-27 RX ORDER — UREA 10 %
3 LOTION (ML) TOPICAL NIGHTLY PRN
Status: DISCONTINUED | OUTPATIENT
Start: 2022-10-27 | End: 2022-10-31 | Stop reason: HOSPADM

## 2022-10-27 RX ORDER — AMOXICILLIN 250 MG
1 CAPSULE ORAL DAILY
Status: DISCONTINUED | OUTPATIENT
Start: 2022-10-27 | End: 2022-10-31 | Stop reason: HOSPADM

## 2022-10-27 RX ORDER — DEXAMETHASONE SODIUM PHOSPHATE 10 MG/ML
6 INJECTION INTRAMUSCULAR; INTRAVENOUS ONCE
Status: COMPLETED | OUTPATIENT
Start: 2022-10-27 | End: 2022-10-27

## 2022-10-27 RX ORDER — DORZOLAMIDE HYDROCHLORIDE AND TIMOLOL MALEATE 20; 5 MG/ML; MG/ML
SOLUTION/ DROPS OPHTHALMIC 2 TIMES DAILY
Status: DISCONTINUED | OUTPATIENT
Start: 2022-10-27 | End: 2022-10-31 | Stop reason: HOSPADM

## 2022-10-27 RX ORDER — NITROGLYCERIN 0.4 MG/1
0.4 TABLET SUBLINGUAL
Status: DISCONTINUED | OUTPATIENT
Start: 2022-10-27 | End: 2022-10-31 | Stop reason: HOSPADM

## 2022-10-27 RX ORDER — AMLODIPINE BESYLATE 2.5 MG/1
2.5 TABLET ORAL DAILY
Status: DISCONTINUED | OUTPATIENT
Start: 2022-10-27 | End: 2022-10-31 | Stop reason: HOSPADM

## 2022-10-27 RX ORDER — ASPIRIN 81 MG/1
81 TABLET ORAL DAILY
Status: DISCONTINUED | OUTPATIENT
Start: 2022-10-27 | End: 2022-10-31 | Stop reason: HOSPADM

## 2022-10-27 RX ORDER — SODIUM CHLORIDE 0.9 % (FLUSH) 0.9 %
10 SYRINGE (ML) INJECTION AS NEEDED
Status: DISCONTINUED | OUTPATIENT
Start: 2022-10-27 | End: 2022-10-31 | Stop reason: HOSPADM

## 2022-10-27 RX ORDER — ACETAMINOPHEN 500 MG
1000 TABLET ORAL ONCE
Status: COMPLETED | OUTPATIENT
Start: 2022-10-27 | End: 2022-10-27

## 2022-10-27 RX ORDER — ROSUVASTATIN CALCIUM 20 MG/1
20 TABLET, COATED ORAL EVERY EVENING
Status: DISCONTINUED | OUTPATIENT
Start: 2022-10-27 | End: 2022-10-31 | Stop reason: HOSPADM

## 2022-10-27 RX ORDER — BUDESONIDE AND FORMOTEROL FUMARATE DIHYDRATE 160; 4.5 UG/1; UG/1
2 AEROSOL RESPIRATORY (INHALATION)
Status: DISCONTINUED | OUTPATIENT
Start: 2022-10-27 | End: 2022-10-31 | Stop reason: HOSPADM

## 2022-10-27 RX ORDER — ONDANSETRON 4 MG/1
4 TABLET, FILM COATED ORAL EVERY 6 HOURS PRN
Status: DISCONTINUED | OUTPATIENT
Start: 2022-10-27 | End: 2022-10-31 | Stop reason: HOSPADM

## 2022-10-27 RX ORDER — PRAMIPEXOLE DIHYDROCHLORIDE 1.5 MG/1
1.5 TABLET ORAL 2 TIMES DAILY
Status: DISCONTINUED | OUTPATIENT
Start: 2022-10-27 | End: 2022-10-31 | Stop reason: HOSPADM

## 2022-10-27 RX ORDER — LATANOPROST 50 UG/ML
1 SOLUTION/ DROPS OPHTHALMIC NIGHTLY
Status: DISCONTINUED | OUTPATIENT
Start: 2022-10-27 | End: 2022-10-31 | Stop reason: HOSPADM

## 2022-10-27 RX ORDER — FUROSEMIDE 40 MG/1
40 TABLET ORAL DAILY
Status: DISCONTINUED | OUTPATIENT
Start: 2022-10-27 | End: 2022-10-31 | Stop reason: HOSPADM

## 2022-10-27 RX ORDER — ACETAMINOPHEN 325 MG/1
650 TABLET ORAL EVERY 4 HOURS PRN
Status: DISCONTINUED | OUTPATIENT
Start: 2022-10-27 | End: 2022-10-31 | Stop reason: HOSPADM

## 2022-10-27 RX ORDER — CHOLECALCIFEROL (VITAMIN D3) 125 MCG
1000 CAPSULE ORAL DAILY
Status: DISCONTINUED | OUTPATIENT
Start: 2022-10-27 | End: 2022-10-31 | Stop reason: HOSPADM

## 2022-10-27 RX ORDER — INSULIN LISPRO 100 [IU]/ML
0-9 INJECTION, SOLUTION INTRAVENOUS; SUBCUTANEOUS
Status: DISCONTINUED | OUTPATIENT
Start: 2022-10-28 | End: 2022-10-31 | Stop reason: HOSPADM

## 2022-10-27 RX ORDER — PANTOPRAZOLE SODIUM 40 MG/1
40 TABLET, DELAYED RELEASE ORAL 2 TIMES DAILY
Status: DISCONTINUED | OUTPATIENT
Start: 2022-10-27 | End: 2022-10-31 | Stop reason: HOSPADM

## 2022-10-27 RX ORDER — DEXAMETHASONE SODIUM PHOSPHATE 10 MG/ML
6 INJECTION INTRAMUSCULAR; INTRAVENOUS DAILY
Status: DISCONTINUED | OUTPATIENT
Start: 2022-10-28 | End: 2022-10-31

## 2022-10-27 RX ORDER — LEVOTHYROXINE SODIUM 88 UG/1
88 TABLET ORAL
Status: DISCONTINUED | OUTPATIENT
Start: 2022-10-28 | End: 2022-10-31 | Stop reason: HOSPADM

## 2022-10-27 RX ORDER — DEXTROSE MONOHYDRATE 25 G/50ML
25 INJECTION, SOLUTION INTRAVENOUS
Status: DISCONTINUED | OUTPATIENT
Start: 2022-10-27 | End: 2022-10-31 | Stop reason: HOSPADM

## 2022-10-27 RX ORDER — IPRATROPIUM BROMIDE AND ALBUTEROL SULFATE 2.5; .5 MG/3ML; MG/3ML
3 SOLUTION RESPIRATORY (INHALATION) ONCE
Status: COMPLETED | OUTPATIENT
Start: 2022-10-27 | End: 2022-10-27

## 2022-10-27 RX ORDER — ONDANSETRON 2 MG/ML
4 INJECTION INTRAMUSCULAR; INTRAVENOUS EVERY 6 HOURS PRN
Status: DISCONTINUED | OUTPATIENT
Start: 2022-10-27 | End: 2022-10-31 | Stop reason: HOSPADM

## 2022-10-27 RX ORDER — NICOTINE POLACRILEX 4 MG
15 LOZENGE BUCCAL
Status: DISCONTINUED | OUTPATIENT
Start: 2022-10-27 | End: 2022-10-31 | Stop reason: HOSPADM

## 2022-10-27 RX ORDER — BRIMONIDINE TARTRATE 2 MG/ML
1 SOLUTION/ DROPS OPHTHALMIC 3 TIMES DAILY
Status: DISCONTINUED | OUTPATIENT
Start: 2022-10-27 | End: 2022-10-31 | Stop reason: HOSPADM

## 2022-10-27 RX ORDER — MONTELUKAST SODIUM 10 MG/1
10 TABLET ORAL NIGHTLY
Status: DISCONTINUED | OUTPATIENT
Start: 2022-10-27 | End: 2022-10-31 | Stop reason: HOSPADM

## 2022-10-27 RX ADMIN — SODIUM CHLORIDE 1000 ML: 9 INJECTION, SOLUTION INTRAVENOUS at 13:45

## 2022-10-27 RX ADMIN — ROSUVASTATIN CALCIUM 20 MG: 20 TABLET, FILM COATED ORAL at 21:23

## 2022-10-27 RX ADMIN — ASPIRIN 81 MG: 81 TABLET, COATED ORAL at 21:24

## 2022-10-27 RX ADMIN — FUROSEMIDE 40 MG: 40 TABLET ORAL at 21:23

## 2022-10-27 RX ADMIN — IOPAMIDOL 100 ML: 755 INJECTION, SOLUTION INTRAVENOUS at 15:27

## 2022-10-27 RX ADMIN — ACETAMINOPHEN 1000 MG: 500 TABLET ORAL at 13:45

## 2022-10-27 RX ADMIN — TAZOBACTAM SODIUM AND PIPERACILLIN SODIUM 3.38 G: 375; 3 INJECTION, SOLUTION INTRAVENOUS at 17:17

## 2022-10-27 RX ADMIN — INSULIN GLARGINE-YFGN 56 UNITS: 100 INJECTION, SOLUTION SUBCUTANEOUS at 21:22

## 2022-10-27 RX ADMIN — Medication 1000 MCG: at 21:23

## 2022-10-27 RX ADMIN — MONTELUKAST SODIUM 10 MG: 10 TABLET, FILM COATED ORAL at 21:23

## 2022-10-27 RX ADMIN — DOCUSATE SODIUM 50MG AND SENNOSIDES 8.6MG 1 TABLET: 8.6; 5 TABLET, FILM COATED ORAL at 21:24

## 2022-10-27 RX ADMIN — BUDESONIDE AND FORMOTEROL FUMARATE DIHYDRATE 2 PUFF: 160; 4.5 AEROSOL RESPIRATORY (INHALATION) at 23:39

## 2022-10-27 RX ADMIN — TAZOBACTAM SODIUM AND PIPERACILLIN SODIUM 3.38 G: 375; 3 INJECTION, SOLUTION INTRAVENOUS at 21:24

## 2022-10-27 RX ADMIN — PANTOPRAZOLE SODIUM 40 MG: 40 TABLET, DELAYED RELEASE ORAL at 21:23

## 2022-10-27 RX ADMIN — DEXAMETHASONE SODIUM PHOSPHATE 6 MG: 10 INJECTION INTRAMUSCULAR; INTRAVENOUS at 15:42

## 2022-10-27 RX ADMIN — REMDESIVIR 200 MG: 100 INJECTION, POWDER, LYOPHILIZED, FOR SOLUTION INTRAVENOUS at 22:51

## 2022-10-27 RX ADMIN — VANCOMYCIN HYDROCHLORIDE 2250 MG: 10 INJECTION, POWDER, LYOPHILIZED, FOR SOLUTION INTRAVENOUS at 17:57

## 2022-10-27 RX ADMIN — LOSARTAN POTASSIUM 100 MG: 100 TABLET, FILM COATED ORAL at 21:23

## 2022-10-27 RX ADMIN — PRAMIPEXOLE DIHYDROCHLORIDE 1.5 MG: 1.5 TABLET ORAL at 21:24

## 2022-10-27 RX ADMIN — IPRATROPIUM BROMIDE AND ALBUTEROL SULFATE 3 ML: 2.5; .5 SOLUTION RESPIRATORY (INHALATION) at 13:25

## 2022-10-27 RX ADMIN — AMLODIPINE BESYLATE 2.5 MG: 2.5 TABLET ORAL at 21:23

## 2022-10-28 PROBLEM — B95.1 BACTEREMIA DUE TO GROUP B STREPTOCOCCUS: Status: ACTIVE | Noted: 2022-10-28

## 2022-10-28 PROBLEM — R78.81 BACTEREMIA DUE TO GROUP B STREPTOCOCCUS: Status: ACTIVE | Noted: 2022-10-28

## 2022-10-28 LAB
ALBUMIN SERPL-MCNC: 3.5 G/DL (ref 3.5–5.2)
ALP SERPL-CCNC: 128 U/L (ref 39–117)
ALT SERPL W P-5'-P-CCNC: 19 U/L (ref 1–41)
ANION GAP SERPL CALCULATED.3IONS-SCNC: 10 MMOL/L (ref 5–15)
AST SERPL-CCNC: 29 U/L (ref 1–40)
BACTERIA BLD CULT: ABNORMAL
BILIRUB CONJ SERPL-MCNC: 0.2 MG/DL (ref 0–0.3)
BILIRUB INDIRECT SERPL-MCNC: 0.4 MG/DL
BILIRUB SERPL-MCNC: 0.6 MG/DL (ref 0–1.2)
BOTTLE TYPE: ABNORMAL
BUN SERPL-MCNC: 16 MG/DL (ref 8–23)
BUN/CREAT SERPL: 12.8 (ref 7–25)
CALCIUM SPEC-SCNC: 8.1 MG/DL (ref 8.6–10.5)
CHLORIDE SERPL-SCNC: 101 MMOL/L (ref 98–107)
CO2 SERPL-SCNC: 25 MMOL/L (ref 22–29)
CREAT SERPL-MCNC: 1.25 MG/DL (ref 0.76–1.27)
DEPRECATED RDW RBC AUTO: 46.3 FL (ref 37–54)
EGFRCR SERPLBLD CKD-EPI 2021: 59.7 ML/MIN/1.73
ERYTHROCYTE [DISTWIDTH] IN BLOOD BY AUTOMATED COUNT: 15.9 % (ref 12.3–15.4)
GLUCOSE BLDC GLUCOMTR-MCNC: 144 MG/DL (ref 70–130)
GLUCOSE BLDC GLUCOMTR-MCNC: 183 MG/DL (ref 70–130)
GLUCOSE BLDC GLUCOMTR-MCNC: 188 MG/DL (ref 70–130)
GLUCOSE BLDC GLUCOMTR-MCNC: 240 MG/DL (ref 70–130)
GLUCOSE SERPL-MCNC: 124 MG/DL (ref 65–99)
HBA1C MFR BLD: 6.8 % (ref 4.8–5.6)
HCT VFR BLD AUTO: 26.8 % (ref 37.5–51)
HGB BLD-MCNC: 8.3 G/DL (ref 13–17.7)
MCH RBC QN AUTO: 24.6 PG (ref 26.6–33)
MCHC RBC AUTO-ENTMCNC: 31 G/DL (ref 31.5–35.7)
MCV RBC AUTO: 79.5 FL (ref 79–97)
PLATELET # BLD AUTO: 145 10*3/MM3 (ref 140–450)
PMV BLD AUTO: 9.9 FL (ref 6–12)
POTASSIUM SERPL-SCNC: 4 MMOL/L (ref 3.5–5.2)
PROT SERPL-MCNC: 6.4 G/DL (ref 6–8.5)
RBC # BLD AUTO: 3.37 10*6/MM3 (ref 4.14–5.8)
SODIUM SERPL-SCNC: 136 MMOL/L (ref 136–145)
WBC NRBC COR # BLD: 11.05 10*3/MM3 (ref 3.4–10.8)

## 2022-10-28 PROCEDURE — 80076 HEPATIC FUNCTION PANEL: CPT | Performed by: PHYSICIAN ASSISTANT

## 2022-10-28 PROCEDURE — 94760 N-INVAS EAR/PLS OXIMETRY 1: CPT

## 2022-10-28 PROCEDURE — 83036 HEMOGLOBIN GLYCOSYLATED A1C: CPT | Performed by: INTERNAL MEDICINE

## 2022-10-28 PROCEDURE — 82962 GLUCOSE BLOOD TEST: CPT

## 2022-10-28 PROCEDURE — 63710000001 INSULIN LISPRO (HUMAN) PER 5 UNITS: Performed by: INTERNAL MEDICINE

## 2022-10-28 PROCEDURE — 87040 BLOOD CULTURE FOR BACTERIA: CPT | Performed by: STUDENT IN AN ORGANIZED HEALTH CARE EDUCATION/TRAINING PROGRAM

## 2022-10-28 PROCEDURE — 25010000002 REMDESIVIR 100 MG/20ML SOLUTION 1 EACH VIAL

## 2022-10-28 PROCEDURE — 0 PENICILLIN G POTASSIUM PER 600000 UNITS: Performed by: INTERNAL MEDICINE

## 2022-10-28 PROCEDURE — 94799 UNLISTED PULMONARY SVC/PX: CPT

## 2022-10-28 PROCEDURE — 25010000002 DEXAMETHASONE PER 1 MG: Performed by: INTERNAL MEDICINE

## 2022-10-28 PROCEDURE — 25010000002 PIPERACILLIN SOD-TAZOBACTAM PER 1 G: Performed by: INTERNAL MEDICINE

## 2022-10-28 PROCEDURE — 99223 1ST HOSP IP/OBS HIGH 75: CPT | Performed by: STUDENT IN AN ORGANIZED HEALTH CARE EDUCATION/TRAINING PROGRAM

## 2022-10-28 PROCEDURE — 80048 BASIC METABOLIC PNL TOTAL CA: CPT | Performed by: INTERNAL MEDICINE

## 2022-10-28 PROCEDURE — 85027 COMPLETE CBC AUTOMATED: CPT | Performed by: INTERNAL MEDICINE

## 2022-10-28 PROCEDURE — 94664 DEMO&/EVAL PT USE INHALER: CPT

## 2022-10-28 RX ADMIN — PRAMIPEXOLE DIHYDROCHLORIDE 1.5 MG: 1.5 TABLET ORAL at 09:53

## 2022-10-28 RX ADMIN — BUDESONIDE AND FORMOTEROL FUMARATE DIHYDRATE 2 PUFF: 160; 4.5 AEROSOL RESPIRATORY (INHALATION) at 20:48

## 2022-10-28 RX ADMIN — LEVOTHYROXINE SODIUM 88 MCG: 0.09 TABLET ORAL at 06:05

## 2022-10-28 RX ADMIN — TIMOLOL MALEATE: 5 SOLUTION OPHTHALMIC at 21:58

## 2022-10-28 RX ADMIN — BRIMONIDINE TARTRATE 1 DROP: 2 SOLUTION OPHTHALMIC at 21:58

## 2022-10-28 RX ADMIN — ROSUVASTATIN CALCIUM 20 MG: 20 TABLET, FILM COATED ORAL at 17:10

## 2022-10-28 RX ADMIN — DEXAMETHASONE SODIUM PHOSPHATE 6 MG: 10 INJECTION INTRAMUSCULAR; INTRAVENOUS at 09:54

## 2022-10-28 RX ADMIN — BUDESONIDE AND FORMOTEROL FUMARATE DIHYDRATE 2 PUFF: 160; 4.5 AEROSOL RESPIRATORY (INHALATION) at 07:49

## 2022-10-28 RX ADMIN — Medication 10 ML: at 21:57

## 2022-10-28 RX ADMIN — TIMOLOL MALEATE: 5 SOLUTION OPHTHALMIC at 09:59

## 2022-10-28 RX ADMIN — INSULIN LISPRO 2 UNITS: 100 INJECTION, SOLUTION INTRAVENOUS; SUBCUTANEOUS at 17:10

## 2022-10-28 RX ADMIN — PANTOPRAZOLE SODIUM 40 MG: 40 TABLET, DELAYED RELEASE ORAL at 09:54

## 2022-10-28 RX ADMIN — PANTOPRAZOLE SODIUM 40 MG: 40 TABLET, DELAYED RELEASE ORAL at 21:56

## 2022-10-28 RX ADMIN — BRIMONIDINE TARTRATE 1 DROP: 2 SOLUTION OPHTHALMIC at 09:58

## 2022-10-28 RX ADMIN — MONTELUKAST SODIUM 10 MG: 10 TABLET, FILM COATED ORAL at 21:56

## 2022-10-28 RX ADMIN — BRIMONIDINE TARTRATE 1 DROP: 2 SOLUTION OPHTHALMIC at 16:25

## 2022-10-28 RX ADMIN — INSULIN GLARGINE-YFGN 56 UNITS: 100 INJECTION, SOLUTION SUBCUTANEOUS at 10:17

## 2022-10-28 RX ADMIN — ASPIRIN 81 MG: 81 TABLET, COATED ORAL at 09:53

## 2022-10-28 RX ADMIN — LATANOPROST 1 DROP: 50 SOLUTION OPHTHALMIC at 22:45

## 2022-10-28 RX ADMIN — REMDESIVIR 100 MG: 100 INJECTION, POWDER, LYOPHILIZED, FOR SOLUTION INTRAVENOUS at 21:56

## 2022-10-28 RX ADMIN — INSULIN LISPRO 4 UNITS: 100 INJECTION, SOLUTION INTRAVENOUS; SUBCUTANEOUS at 12:40

## 2022-10-28 RX ADMIN — TAZOBACTAM SODIUM AND PIPERACILLIN SODIUM 3.38 G: 375; 3 INJECTION, SOLUTION INTRAVENOUS at 02:54

## 2022-10-28 RX ADMIN — Medication 10 ML: at 10:05

## 2022-10-28 RX ADMIN — SODIUM CHLORIDE 4 MILLION UNITS: 9 INJECTION, SOLUTION INTRAVENOUS at 23:37

## 2022-10-28 RX ADMIN — SODIUM CHLORIDE 4 MILLION UNITS: 9 INJECTION, SOLUTION INTRAVENOUS at 14:58

## 2022-10-28 RX ADMIN — SODIUM CHLORIDE 4 MILLION UNITS: 9 INJECTION, SOLUTION INTRAVENOUS at 12:40

## 2022-10-28 RX ADMIN — PRAMIPEXOLE DIHYDROCHLORIDE 1.5 MG: 1.5 TABLET ORAL at 21:57

## 2022-10-29 LAB
ALBUMIN SERPL-MCNC: 3.5 G/DL (ref 3.5–5.2)
ALBUMIN/GLOB SERPL: 1 G/DL
ALP SERPL-CCNC: 137 U/L (ref 39–117)
ALT SERPL W P-5'-P-CCNC: 26 U/L (ref 1–41)
ANION GAP SERPL CALCULATED.3IONS-SCNC: 10.1 MMOL/L (ref 5–15)
AST SERPL-CCNC: 35 U/L (ref 1–40)
BASOPHILS # BLD AUTO: 0.02 10*3/MM3 (ref 0–0.2)
BASOPHILS NFR BLD AUTO: 0.2 % (ref 0–1.5)
BILIRUB CONJ SERPL-MCNC: <0.2 MG/DL (ref 0–0.3)
BILIRUB SERPL-MCNC: 0.3 MG/DL (ref 0–1.2)
BUN SERPL-MCNC: 19 MG/DL (ref 8–23)
BUN/CREAT SERPL: 17.8 (ref 7–25)
CALCIUM SPEC-SCNC: 8.7 MG/DL (ref 8.6–10.5)
CHLORIDE SERPL-SCNC: 103 MMOL/L (ref 98–107)
CO2 SERPL-SCNC: 26.9 MMOL/L (ref 22–29)
CREAT SERPL-MCNC: 1.07 MG/DL (ref 0.76–1.27)
D DIMER PPP FEU-MCNC: 1.12 MCGFEU/ML (ref 0–0.49)
DEPRECATED RDW RBC AUTO: 46.2 FL (ref 37–54)
EGFRCR SERPLBLD CKD-EPI 2021: 71.9 ML/MIN/1.73
EOSINOPHIL # BLD AUTO: 0 10*3/MM3 (ref 0–0.4)
EOSINOPHIL NFR BLD AUTO: 0 % (ref 0.3–6.2)
ERYTHROCYTE [DISTWIDTH] IN BLOOD BY AUTOMATED COUNT: 15.8 % (ref 12.3–15.4)
GLOBULIN UR ELPH-MCNC: 3.4 GM/DL
GLUCOSE BLDC GLUCOMTR-MCNC: 105 MG/DL (ref 70–130)
GLUCOSE BLDC GLUCOMTR-MCNC: 209 MG/DL (ref 70–130)
GLUCOSE BLDC GLUCOMTR-MCNC: 307 MG/DL (ref 70–130)
GLUCOSE BLDC GLUCOMTR-MCNC: 71 MG/DL (ref 70–130)
GLUCOSE SERPL-MCNC: 110 MG/DL (ref 65–99)
HCT VFR BLD AUTO: 27.6 % (ref 37.5–51)
HGB BLD-MCNC: 8.5 G/DL (ref 13–17.7)
IMM GRANULOCYTES # BLD AUTO: 0.06 10*3/MM3 (ref 0–0.05)
IMM GRANULOCYTES NFR BLD AUTO: 0.6 % (ref 0–0.5)
LYMPHOCYTES # BLD AUTO: 1.29 10*3/MM3 (ref 0.7–3.1)
LYMPHOCYTES NFR BLD AUTO: 12.6 % (ref 19.6–45.3)
MCH RBC QN AUTO: 24.8 PG (ref 26.6–33)
MCHC RBC AUTO-ENTMCNC: 30.8 G/DL (ref 31.5–35.7)
MCV RBC AUTO: 80.5 FL (ref 79–97)
MONOCYTES # BLD AUTO: 0.82 10*3/MM3 (ref 0.1–0.9)
MONOCYTES NFR BLD AUTO: 8 % (ref 5–12)
NEUTROPHILS NFR BLD AUTO: 78.6 % (ref 42.7–76)
NEUTROPHILS NFR BLD AUTO: 8.07 10*3/MM3 (ref 1.7–7)
NRBC BLD AUTO-RTO: 0 /100 WBC (ref 0–0.2)
PLATELET # BLD AUTO: 165 10*3/MM3 (ref 140–450)
PMV BLD AUTO: 9.6 FL (ref 6–12)
POTASSIUM SERPL-SCNC: 4.3 MMOL/L (ref 3.5–5.2)
PROT SERPL-MCNC: 6.9 G/DL (ref 6–8.5)
RBC # BLD AUTO: 3.43 10*6/MM3 (ref 4.14–5.8)
RETICS # AUTO: 0.04 10*6/MM3 (ref 0.02–0.13)
RETICS/RBC NFR AUTO: 1.17 % (ref 0.7–1.9)
SODIUM SERPL-SCNC: 140 MMOL/L (ref 136–145)
WBC NRBC COR # BLD: 10.26 10*3/MM3 (ref 3.4–10.8)

## 2022-10-29 PROCEDURE — 94761 N-INVAS EAR/PLS OXIMETRY MLT: CPT

## 2022-10-29 PROCEDURE — 25010000002 DEXAMETHASONE PER 1 MG: Performed by: INTERNAL MEDICINE

## 2022-10-29 PROCEDURE — 94799 UNLISTED PULMONARY SVC/PX: CPT

## 2022-10-29 PROCEDURE — 85045 AUTOMATED RETICULOCYTE COUNT: CPT | Performed by: HOSPITALIST

## 2022-10-29 PROCEDURE — 0 PENICILLIN G POTASSIUM PER 600000 UNITS: Performed by: INTERNAL MEDICINE

## 2022-10-29 PROCEDURE — 80053 COMPREHEN METABOLIC PANEL: CPT | Performed by: INTERNAL MEDICINE

## 2022-10-29 PROCEDURE — 94760 N-INVAS EAR/PLS OXIMETRY 1: CPT

## 2022-10-29 PROCEDURE — 85025 COMPLETE CBC W/AUTO DIFF WBC: CPT | Performed by: INTERNAL MEDICINE

## 2022-10-29 PROCEDURE — 25010000002 REMDESIVIR 100 MG/20ML SOLUTION 1 EACH VIAL: Performed by: PHYSICIAN ASSISTANT

## 2022-10-29 PROCEDURE — 82962 GLUCOSE BLOOD TEST: CPT

## 2022-10-29 PROCEDURE — 63710000001 INSULIN LISPRO (HUMAN) PER 5 UNITS: Performed by: INTERNAL MEDICINE

## 2022-10-29 PROCEDURE — 94664 DEMO&/EVAL PT USE INHALER: CPT

## 2022-10-29 PROCEDURE — 99232 SBSQ HOSP IP/OBS MODERATE 35: CPT | Performed by: INTERNAL MEDICINE

## 2022-10-29 PROCEDURE — 85379 FIBRIN DEGRADATION QUANT: CPT | Performed by: INTERNAL MEDICINE

## 2022-10-29 PROCEDURE — 97530 THERAPEUTIC ACTIVITIES: CPT

## 2022-10-29 PROCEDURE — 82248 BILIRUBIN DIRECT: CPT | Performed by: PHYSICIAN ASSISTANT

## 2022-10-29 PROCEDURE — 97162 PT EVAL MOD COMPLEX 30 MIN: CPT

## 2022-10-29 RX ADMIN — BRIMONIDINE TARTRATE 1 DROP: 2 SOLUTION OPHTHALMIC at 21:44

## 2022-10-29 RX ADMIN — PANTOPRAZOLE SODIUM 40 MG: 40 TABLET, DELAYED RELEASE ORAL at 21:44

## 2022-10-29 RX ADMIN — ASPIRIN 81 MG: 81 TABLET, COATED ORAL at 09:35

## 2022-10-29 RX ADMIN — INSULIN GLARGINE-YFGN 56 UNITS: 100 INJECTION, SOLUTION SUBCUTANEOUS at 09:35

## 2022-10-29 RX ADMIN — BUDESONIDE AND FORMOTEROL FUMARATE DIHYDRATE 2 PUFF: 160; 4.5 AEROSOL RESPIRATORY (INHALATION) at 19:21

## 2022-10-29 RX ADMIN — ROSUVASTATIN CALCIUM 20 MG: 20 TABLET, FILM COATED ORAL at 18:12

## 2022-10-29 RX ADMIN — INSULIN LISPRO 4 UNITS: 100 INJECTION, SOLUTION INTRAVENOUS; SUBCUTANEOUS at 18:12

## 2022-10-29 RX ADMIN — LEVOTHYROXINE SODIUM 88 MCG: 0.09 TABLET ORAL at 06:17

## 2022-10-29 RX ADMIN — PRAMIPEXOLE DIHYDROCHLORIDE 1.5 MG: 1.5 TABLET ORAL at 09:35

## 2022-10-29 RX ADMIN — DOCUSATE SODIUM 50MG AND SENNOSIDES 8.6MG 1 TABLET: 8.6; 5 TABLET, FILM COATED ORAL at 09:35

## 2022-10-29 RX ADMIN — Medication 1000 MCG: at 09:35

## 2022-10-29 RX ADMIN — TIMOLOL MALEATE: 5 SOLUTION OPHTHALMIC at 21:44

## 2022-10-29 RX ADMIN — REMDESIVIR 100 MG: 100 INJECTION, POWDER, LYOPHILIZED, FOR SOLUTION INTRAVENOUS at 21:44

## 2022-10-29 RX ADMIN — PRAMIPEXOLE DIHYDROCHLORIDE 1.5 MG: 1.5 TABLET ORAL at 21:43

## 2022-10-29 RX ADMIN — BRIMONIDINE TARTRATE 1 DROP: 2 SOLUTION OPHTHALMIC at 09:39

## 2022-10-29 RX ADMIN — AMLODIPINE BESYLATE 2.5 MG: 2.5 TABLET ORAL at 09:35

## 2022-10-29 RX ADMIN — SODIUM CHLORIDE 4 MILLION UNITS: 9 INJECTION, SOLUTION INTRAVENOUS at 18:12

## 2022-10-29 RX ADMIN — BUDESONIDE AND FORMOTEROL FUMARATE DIHYDRATE 2 PUFF: 160; 4.5 AEROSOL RESPIRATORY (INHALATION) at 07:13

## 2022-10-29 RX ADMIN — LATANOPROST 1 DROP: 50 SOLUTION OPHTHALMIC at 21:44

## 2022-10-29 RX ADMIN — LOSARTAN POTASSIUM 100 MG: 100 TABLET, FILM COATED ORAL at 09:35

## 2022-10-29 RX ADMIN — TIMOLOL MALEATE: 5 SOLUTION OPHTHALMIC at 09:39

## 2022-10-29 RX ADMIN — SODIUM CHLORIDE 4 MILLION UNITS: 9 INJECTION, SOLUTION INTRAVENOUS at 06:17

## 2022-10-29 RX ADMIN — PANTOPRAZOLE SODIUM 40 MG: 40 TABLET, DELAYED RELEASE ORAL at 09:42

## 2022-10-29 RX ADMIN — FUROSEMIDE 40 MG: 40 TABLET ORAL at 09:35

## 2022-10-29 RX ADMIN — SODIUM CHLORIDE 4 MILLION UNITS: 9 INJECTION, SOLUTION INTRAVENOUS at 14:57

## 2022-10-29 RX ADMIN — SODIUM CHLORIDE 4 MILLION UNITS: 9 INJECTION, SOLUTION INTRAVENOUS at 22:46

## 2022-10-29 RX ADMIN — MONTELUKAST SODIUM 10 MG: 10 TABLET, FILM COATED ORAL at 21:44

## 2022-10-29 RX ADMIN — SODIUM CHLORIDE 4 MILLION UNITS: 9 INJECTION, SOLUTION INTRAVENOUS at 11:53

## 2022-10-29 RX ADMIN — DEXAMETHASONE SODIUM PHOSPHATE 6 MG: 10 INJECTION INTRAMUSCULAR; INTRAVENOUS at 09:36

## 2022-10-29 RX ADMIN — BRIMONIDINE TARTRATE 1 DROP: 2 SOLUTION OPHTHALMIC at 18:12

## 2022-10-29 RX ADMIN — SODIUM CHLORIDE 4 MILLION UNITS: 9 INJECTION, SOLUTION INTRAVENOUS at 01:50

## 2022-10-30 LAB
ALBUMIN SERPL-MCNC: 3.7 G/DL (ref 3.5–5.2)
ALBUMIN/GLOB SERPL: 1.1 G/DL
ALP SERPL-CCNC: 146 U/L (ref 39–117)
ALT SERPL W P-5'-P-CCNC: 31 U/L (ref 1–41)
ANION GAP SERPL CALCULATED.3IONS-SCNC: 9.5 MMOL/L (ref 5–15)
AST SERPL-CCNC: 30 U/L (ref 1–40)
BACTERIA SPEC AEROBE CULT: ABNORMAL
BASOPHILS # BLD AUTO: 0.01 10*3/MM3 (ref 0–0.2)
BASOPHILS NFR BLD AUTO: 0.1 % (ref 0–1.5)
BILIRUB SERPL-MCNC: 0.3 MG/DL (ref 0–1.2)
BUN SERPL-MCNC: 24 MG/DL (ref 8–23)
BUN/CREAT SERPL: 22.9 (ref 7–25)
CALCIUM SPEC-SCNC: 8.7 MG/DL (ref 8.6–10.5)
CHLORIDE SERPL-SCNC: 104 MMOL/L (ref 98–107)
CO2 SERPL-SCNC: 25.5 MMOL/L (ref 22–29)
CREAT SERPL-MCNC: 1.05 MG/DL (ref 0.76–1.27)
DEPRECATED RDW RBC AUTO: 44.3 FL (ref 37–54)
EGFRCR SERPLBLD CKD-EPI 2021: 73.6 ML/MIN/1.73
EOSINOPHIL # BLD AUTO: 0 10*3/MM3 (ref 0–0.4)
EOSINOPHIL NFR BLD AUTO: 0 % (ref 0.3–6.2)
ERYTHROCYTE [DISTWIDTH] IN BLOOD BY AUTOMATED COUNT: 15.6 % (ref 12.3–15.4)
FERRITIN SERPL-MCNC: 113 NG/ML (ref 30–400)
GLOBULIN UR ELPH-MCNC: 3.4 GM/DL
GLUCOSE BLDC GLUCOMTR-MCNC: 102 MG/DL (ref 70–130)
GLUCOSE BLDC GLUCOMTR-MCNC: 209 MG/DL (ref 70–130)
GLUCOSE BLDC GLUCOMTR-MCNC: 229 MG/DL (ref 70–130)
GLUCOSE BLDC GLUCOMTR-MCNC: 284 MG/DL (ref 70–130)
GLUCOSE SERPL-MCNC: 99 MG/DL (ref 65–99)
GRAM STN SPEC: ABNORMAL
HCT VFR BLD AUTO: 28.2 % (ref 37.5–51)
HGB BLD-MCNC: 8.7 G/DL (ref 13–17.7)
IMM GRANULOCYTES # BLD AUTO: 0.16 10*3/MM3 (ref 0–0.05)
IMM GRANULOCYTES NFR BLD AUTO: 1.8 % (ref 0–0.5)
IRON 24H UR-MRATE: 21 MCG/DL (ref 59–158)
IRON SATN MFR SERPL: 5 % (ref 20–50)
ISOLATED FROM: ABNORMAL
LYMPHOCYTES # BLD AUTO: 1.21 10*3/MM3 (ref 0.7–3.1)
LYMPHOCYTES NFR BLD AUTO: 13.4 % (ref 19.6–45.3)
MCH RBC QN AUTO: 24.2 PG (ref 26.6–33)
MCHC RBC AUTO-ENTMCNC: 30.9 G/DL (ref 31.5–35.7)
MCV RBC AUTO: 78.6 FL (ref 79–97)
MONOCYTES # BLD AUTO: 0.62 10*3/MM3 (ref 0.1–0.9)
MONOCYTES NFR BLD AUTO: 6.9 % (ref 5–12)
NEUTROPHILS NFR BLD AUTO: 7.02 10*3/MM3 (ref 1.7–7)
NEUTROPHILS NFR BLD AUTO: 77.8 % (ref 42.7–76)
NRBC BLD AUTO-RTO: 0.1 /100 WBC (ref 0–0.2)
PLATELET # BLD AUTO: 180 10*3/MM3 (ref 140–450)
PMV BLD AUTO: 9.4 FL (ref 6–12)
POTASSIUM SERPL-SCNC: 4.5 MMOL/L (ref 3.5–5.2)
PROT SERPL-MCNC: 7.1 G/DL (ref 6–8.5)
RBC # BLD AUTO: 3.59 10*6/MM3 (ref 4.14–5.8)
SODIUM SERPL-SCNC: 139 MMOL/L (ref 136–145)
TIBC SERPL-MCNC: 422 MCG/DL (ref 298–536)
TRANSFERRIN SERPL-MCNC: 283 MG/DL (ref 200–360)
TSH SERPL DL<=0.05 MIU/L-ACNC: 3.07 UIU/ML (ref 0.27–4.2)
WBC NRBC COR # BLD: 9.02 10*3/MM3 (ref 3.4–10.8)

## 2022-10-30 PROCEDURE — 94760 N-INVAS EAR/PLS OXIMETRY 1: CPT

## 2022-10-30 PROCEDURE — 84443 ASSAY THYROID STIM HORMONE: CPT | Performed by: HOSPITALIST

## 2022-10-30 PROCEDURE — 84466 ASSAY OF TRANSFERRIN: CPT | Performed by: HOSPITALIST

## 2022-10-30 PROCEDURE — 0 PENICILLIN G POTASSIUM PER 600000 UNITS: Performed by: INTERNAL MEDICINE

## 2022-10-30 PROCEDURE — 94799 UNLISTED PULMONARY SVC/PX: CPT

## 2022-10-30 PROCEDURE — 25010000002 REMDESIVIR 100 MG/20ML SOLUTION 1 EACH VIAL: Performed by: PHYSICIAN ASSISTANT

## 2022-10-30 PROCEDURE — 94761 N-INVAS EAR/PLS OXIMETRY MLT: CPT

## 2022-10-30 PROCEDURE — 80053 COMPREHEN METABOLIC PANEL: CPT | Performed by: INTERNAL MEDICINE

## 2022-10-30 PROCEDURE — 82728 ASSAY OF FERRITIN: CPT | Performed by: HOSPITALIST

## 2022-10-30 PROCEDURE — 82962 GLUCOSE BLOOD TEST: CPT

## 2022-10-30 PROCEDURE — 25010000002 DEXAMETHASONE PER 1 MG: Performed by: INTERNAL MEDICINE

## 2022-10-30 PROCEDURE — 85025 COMPLETE CBC W/AUTO DIFF WBC: CPT | Performed by: INTERNAL MEDICINE

## 2022-10-30 PROCEDURE — 63710000001 INSULIN LISPRO (HUMAN) PER 5 UNITS: Performed by: INTERNAL MEDICINE

## 2022-10-30 PROCEDURE — 83540 ASSAY OF IRON: CPT | Performed by: HOSPITALIST

## 2022-10-30 RX ORDER — FERROUS SULFATE 325(65) MG
325 TABLET ORAL EVERY OTHER DAY
Status: DISCONTINUED | OUTPATIENT
Start: 2022-10-30 | End: 2022-10-31 | Stop reason: HOSPADM

## 2022-10-30 RX ADMIN — DEXAMETHASONE SODIUM PHOSPHATE 6 MG: 10 INJECTION INTRAMUSCULAR; INTRAVENOUS at 08:42

## 2022-10-30 RX ADMIN — ROSUVASTATIN CALCIUM 20 MG: 20 TABLET, FILM COATED ORAL at 17:12

## 2022-10-30 RX ADMIN — BRIMONIDINE TARTRATE 1 DROP: 2 SOLUTION OPHTHALMIC at 17:13

## 2022-10-30 RX ADMIN — REMDESIVIR 100 MG: 100 INJECTION, POWDER, LYOPHILIZED, FOR SOLUTION INTRAVENOUS at 20:33

## 2022-10-30 RX ADMIN — SODIUM CHLORIDE 4 MILLION UNITS: 9 INJECTION, SOLUTION INTRAVENOUS at 10:38

## 2022-10-30 RX ADMIN — MONTELUKAST SODIUM 10 MG: 10 TABLET, FILM COATED ORAL at 20:33

## 2022-10-30 RX ADMIN — SODIUM CHLORIDE 4 MILLION UNITS: 9 INJECTION, SOLUTION INTRAVENOUS at 17:58

## 2022-10-30 RX ADMIN — BRIMONIDINE TARTRATE 1 DROP: 2 SOLUTION OPHTHALMIC at 20:29

## 2022-10-30 RX ADMIN — BUDESONIDE AND FORMOTEROL FUMARATE DIHYDRATE 2 PUFF: 160; 4.5 AEROSOL RESPIRATORY (INHALATION) at 20:35

## 2022-10-30 RX ADMIN — LEVOTHYROXINE SODIUM 88 MCG: 0.09 TABLET ORAL at 06:05

## 2022-10-30 RX ADMIN — SODIUM CHLORIDE 4 MILLION UNITS: 9 INJECTION, SOLUTION INTRAVENOUS at 21:30

## 2022-10-30 RX ADMIN — BUDESONIDE AND FORMOTEROL FUMARATE DIHYDRATE 2 PUFF: 160; 4.5 AEROSOL RESPIRATORY (INHALATION) at 10:46

## 2022-10-30 RX ADMIN — INSULIN GLARGINE-YFGN 56 UNITS: 100 INJECTION, SOLUTION SUBCUTANEOUS at 08:41

## 2022-10-30 RX ADMIN — LOSARTAN POTASSIUM 100 MG: 100 TABLET, FILM COATED ORAL at 08:41

## 2022-10-30 RX ADMIN — DOCUSATE SODIUM 50MG AND SENNOSIDES 8.6MG 1 TABLET: 8.6; 5 TABLET, FILM COATED ORAL at 08:42

## 2022-10-30 RX ADMIN — ASPIRIN 81 MG: 81 TABLET, COATED ORAL at 08:42

## 2022-10-30 RX ADMIN — TIMOLOL MALEATE: 5 SOLUTION OPHTHALMIC at 20:30

## 2022-10-30 RX ADMIN — PRAMIPEXOLE DIHYDROCHLORIDE 1.5 MG: 1.5 TABLET ORAL at 08:42

## 2022-10-30 RX ADMIN — AMLODIPINE BESYLATE 2.5 MG: 2.5 TABLET ORAL at 08:41

## 2022-10-30 RX ADMIN — LATANOPROST 1 DROP: 50 SOLUTION OPHTHALMIC at 20:32

## 2022-10-30 RX ADMIN — BRIMONIDINE TARTRATE 1 DROP: 2 SOLUTION OPHTHALMIC at 08:43

## 2022-10-30 RX ADMIN — Medication 1000 MCG: at 08:42

## 2022-10-30 RX ADMIN — INSULIN LISPRO 6 UNITS: 100 INJECTION, SOLUTION INTRAVENOUS; SUBCUTANEOUS at 17:12

## 2022-10-30 RX ADMIN — SODIUM CHLORIDE 4 MILLION UNITS: 9 INJECTION, SOLUTION INTRAVENOUS at 06:04

## 2022-10-30 RX ADMIN — FERROUS SULFATE TAB 325 MG (65 MG ELEMENTAL FE) 325 MG: 325 (65 FE) TAB at 20:31

## 2022-10-30 RX ADMIN — PANTOPRAZOLE SODIUM 40 MG: 40 TABLET, DELAYED RELEASE ORAL at 20:31

## 2022-10-30 RX ADMIN — PRAMIPEXOLE DIHYDROCHLORIDE 1.5 MG: 1.5 TABLET ORAL at 20:33

## 2022-10-30 RX ADMIN — INSULIN LISPRO 4 UNITS: 100 INJECTION, SOLUTION INTRAVENOUS; SUBCUTANEOUS at 12:18

## 2022-10-30 RX ADMIN — PANTOPRAZOLE SODIUM 40 MG: 40 TABLET, DELAYED RELEASE ORAL at 08:41

## 2022-10-30 RX ADMIN — SODIUM CHLORIDE 4 MILLION UNITS: 9 INJECTION, SOLUTION INTRAVENOUS at 02:06

## 2022-10-30 RX ADMIN — SODIUM CHLORIDE 4 MILLION UNITS: 9 INJECTION, SOLUTION INTRAVENOUS at 14:39

## 2022-10-30 RX ADMIN — FUROSEMIDE 40 MG: 40 TABLET ORAL at 08:42

## 2022-10-30 RX ADMIN — TIMOLOL MALEATE: 5 SOLUTION OPHTHALMIC at 08:43

## 2022-10-31 ENCOUNTER — READMISSION MANAGEMENT (OUTPATIENT)
Dept: CALL CENTER | Facility: HOSPITAL | Age: 77
End: 2022-10-31

## 2022-10-31 VITALS
BODY MASS INDEX: 38.34 KG/M2 | TEMPERATURE: 97.2 F | HEIGHT: 69 IN | HEART RATE: 71 BPM | RESPIRATION RATE: 18 BRPM | OXYGEN SATURATION: 91 % | WEIGHT: 258.9 LBS | SYSTOLIC BLOOD PRESSURE: 141 MMHG | DIASTOLIC BLOOD PRESSURE: 83 MMHG

## 2022-10-31 DIAGNOSIS — Z79.4 TYPE 2 DIABETES MELLITUS WITH HYPERGLYCEMIA, WITH LONG-TERM CURRENT USE OF INSULIN: ICD-10-CM

## 2022-10-31 DIAGNOSIS — E11.65 TYPE 2 DIABETES MELLITUS WITH HYPERGLYCEMIA, WITH LONG-TERM CURRENT USE OF INSULIN: ICD-10-CM

## 2022-10-31 LAB
ALBUMIN SERPL-MCNC: 3.9 G/DL (ref 3.5–5.2)
ALBUMIN/GLOB SERPL: 1.4 G/DL
ALP SERPL-CCNC: 135 U/L (ref 39–117)
ALT SERPL W P-5'-P-CCNC: 31 U/L (ref 1–41)
ANION GAP SERPL CALCULATED.3IONS-SCNC: 10.8 MMOL/L (ref 5–15)
ANISOCYTOSIS BLD QL: ABNORMAL
AST SERPL-CCNC: 24 U/L (ref 1–40)
BILIRUB SERPL-MCNC: 0.3 MG/DL (ref 0–1.2)
BUN SERPL-MCNC: 24 MG/DL (ref 8–23)
BUN/CREAT SERPL: 20 (ref 7–25)
CALCIUM SPEC-SCNC: 8.8 MG/DL (ref 8.6–10.5)
CHLORIDE SERPL-SCNC: 103 MMOL/L (ref 98–107)
CO2 SERPL-SCNC: 26.2 MMOL/L (ref 22–29)
CREAT SERPL-MCNC: 1.2 MG/DL (ref 0.76–1.27)
D DIMER PPP FEU-MCNC: 1.65 MCGFEU/ML (ref 0–0.49)
DEPRECATED RDW RBC AUTO: 44.1 FL (ref 37–54)
EGFRCR SERPLBLD CKD-EPI 2021: 62.7 ML/MIN/1.73
EOSINOPHIL # BLD MANUAL: 0.08 10*3/MM3 (ref 0–0.4)
EOSINOPHIL NFR BLD MANUAL: 1 % (ref 0.3–6.2)
ERYTHROCYTE [DISTWIDTH] IN BLOOD BY AUTOMATED COUNT: 15.6 % (ref 12.3–15.4)
GLOBULIN UR ELPH-MCNC: 2.8 GM/DL
GLUCOSE BLDC GLUCOMTR-MCNC: 123 MG/DL (ref 70–130)
GLUCOSE BLDC GLUCOMTR-MCNC: 78 MG/DL (ref 70–130)
GLUCOSE SERPL-MCNC: 74 MG/DL (ref 65–99)
HCT VFR BLD AUTO: 26.9 % (ref 37.5–51)
HGB BLD-MCNC: 8.4 G/DL (ref 13–17.7)
LYMPHOCYTES # BLD MANUAL: 1.18 10*3/MM3 (ref 0.7–3.1)
LYMPHOCYTES NFR BLD MANUAL: 4 % (ref 5–12)
MCH RBC QN AUTO: 24.5 PG (ref 26.6–33)
MCHC RBC AUTO-ENTMCNC: 31.2 G/DL (ref 31.5–35.7)
MCV RBC AUTO: 78.4 FL (ref 79–97)
METAMYELOCYTES NFR BLD MANUAL: 3 % (ref 0–0)
MONOCYTES # BLD: 0.32 10*3/MM3 (ref 0.1–0.9)
MYELOCYTES NFR BLD MANUAL: 7 % (ref 0–0)
NEUTROPHILS # BLD AUTO: 5.52 10*3/MM3 (ref 1.7–7)
NEUTROPHILS NFR BLD MANUAL: 70 % (ref 42.7–76)
NRBC BLD AUTO-RTO: 0.3 /100 WBC (ref 0–0.2)
PLAT MORPH BLD: NORMAL
PLATELET # BLD AUTO: 181 10*3/MM3 (ref 140–450)
PMV BLD AUTO: 9.5 FL (ref 6–12)
POTASSIUM SERPL-SCNC: 4 MMOL/L (ref 3.5–5.2)
PROT SERPL-MCNC: 6.7 G/DL (ref 6–8.5)
RBC # BLD AUTO: 3.43 10*6/MM3 (ref 4.14–5.8)
SODIUM SERPL-SCNC: 140 MMOL/L (ref 136–145)
VARIANT LYMPHS NFR BLD MANUAL: 15 % (ref 19.6–45.3)
WBC MORPH BLD: NORMAL
WBC NRBC COR # BLD: 7.88 10*3/MM3 (ref 3.4–10.8)

## 2022-10-31 PROCEDURE — 80053 COMPREHEN METABOLIC PANEL: CPT | Performed by: INTERNAL MEDICINE

## 2022-10-31 PROCEDURE — 25010000002 DEXAMETHASONE PER 1 MG: Performed by: INTERNAL MEDICINE

## 2022-10-31 PROCEDURE — 85007 BL SMEAR W/DIFF WBC COUNT: CPT | Performed by: INTERNAL MEDICINE

## 2022-10-31 PROCEDURE — 94799 UNLISTED PULMONARY SVC/PX: CPT

## 2022-10-31 PROCEDURE — 82962 GLUCOSE BLOOD TEST: CPT

## 2022-10-31 PROCEDURE — 85025 COMPLETE CBC W/AUTO DIFF WBC: CPT | Performed by: INTERNAL MEDICINE

## 2022-10-31 PROCEDURE — 0 PENICILLIN G POTASSIUM PER 600000 UNITS: Performed by: INTERNAL MEDICINE

## 2022-10-31 PROCEDURE — 94761 N-INVAS EAR/PLS OXIMETRY MLT: CPT

## 2022-10-31 PROCEDURE — 94664 DEMO&/EVAL PT USE INHALER: CPT

## 2022-10-31 PROCEDURE — 85379 FIBRIN DEGRADATION QUANT: CPT | Performed by: INTERNAL MEDICINE

## 2022-10-31 PROCEDURE — 99232 SBSQ HOSP IP/OBS MODERATE 35: CPT | Performed by: STUDENT IN AN ORGANIZED HEALTH CARE EDUCATION/TRAINING PROGRAM

## 2022-10-31 RX ORDER — AMOXICILLIN 500 MG/1
1000 CAPSULE ORAL EVERY 8 HOURS SCHEDULED
Qty: 60 CAPSULE | Refills: 0 | Status: SHIPPED | OUTPATIENT
Start: 2022-10-31 | End: 2022-11-10

## 2022-10-31 RX ORDER — AMOXICILLIN 250 MG/1
1000 CAPSULE ORAL EVERY 8 HOURS SCHEDULED
Status: DISCONTINUED | OUTPATIENT
Start: 2022-10-31 | End: 2022-10-31 | Stop reason: HOSPADM

## 2022-10-31 RX ADMIN — PRAMIPEXOLE DIHYDROCHLORIDE 1.5 MG: 1.5 TABLET ORAL at 08:23

## 2022-10-31 RX ADMIN — LEVOTHYROXINE SODIUM 88 MCG: 0.09 TABLET ORAL at 06:03

## 2022-10-31 RX ADMIN — TIMOLOL MALEATE: 5 SOLUTION OPHTHALMIC at 08:25

## 2022-10-31 RX ADMIN — SODIUM CHLORIDE 4 MILLION UNITS: 9 INJECTION, SOLUTION INTRAVENOUS at 03:24

## 2022-10-31 RX ADMIN — AMLODIPINE BESYLATE 2.5 MG: 2.5 TABLET ORAL at 08:23

## 2022-10-31 RX ADMIN — FUROSEMIDE 40 MG: 40 TABLET ORAL at 08:23

## 2022-10-31 RX ADMIN — Medication 1000 MCG: at 08:23

## 2022-10-31 RX ADMIN — LOSARTAN POTASSIUM 100 MG: 100 TABLET, FILM COATED ORAL at 08:23

## 2022-10-31 RX ADMIN — INSULIN GLARGINE-YFGN 56 UNITS: 100 INJECTION, SOLUTION SUBCUTANEOUS at 08:15

## 2022-10-31 RX ADMIN — DOCUSATE SODIUM 50MG AND SENNOSIDES 8.6MG 1 TABLET: 8.6; 5 TABLET, FILM COATED ORAL at 08:23

## 2022-10-31 RX ADMIN — BUDESONIDE AND FORMOTEROL FUMARATE DIHYDRATE 2 PUFF: 160; 4.5 AEROSOL RESPIRATORY (INHALATION) at 06:55

## 2022-10-31 RX ADMIN — DEXAMETHASONE SODIUM PHOSPHATE 6 MG: 10 INJECTION INTRAMUSCULAR; INTRAVENOUS at 08:24

## 2022-10-31 RX ADMIN — BRIMONIDINE TARTRATE 1 DROP: 2 SOLUTION OPHTHALMIC at 08:24

## 2022-10-31 RX ADMIN — SODIUM CHLORIDE 4 MILLION UNITS: 9 INJECTION, SOLUTION INTRAVENOUS at 06:03

## 2022-10-31 RX ADMIN — ASPIRIN 81 MG: 81 TABLET, COATED ORAL at 08:23

## 2022-10-31 RX ADMIN — PANTOPRAZOLE SODIUM 40 MG: 40 TABLET, DELAYED RELEASE ORAL at 08:23

## 2022-10-31 NOTE — TELEPHONE ENCOUNTER
Caller: Coco Baez    Relationship: Emergency Contact    Best call back number: [phone]    Requested Prescriptions:   Requested Prescriptions     Pending Prescriptions Disp Refills   • Insulin Glargine (LANTUS SOLOSTAR) 100 UNIT/ML injection pen 45 mL 3     Sig: Inject 56 Units under the skin into the appropriate area as directed Daily.        Pharmacy where request should be sent: [id number] [address] AT [facility] - [phone] [facility] [phone]      Additional details provided by patient: SHE SAID HE SHOULD GET 15 PENS AND THEY ONLY PRESCRIBED 10.  SHE DID NOT  THE PREVIOUS PRESCRIPTION.  THEY NEED A NEW PRESCRIPTION FOR 15 PENS    Does the patient have less than a 3 day supply:  [x] Yes  [] No    Margy Sarmiento Rep   10/31/22 08:44 EDT

## 2022-10-31 NOTE — OUTREACH NOTE
Prep Survey    Flowsheet Row Responses   [facility] facility patient discharged from? [place]   Is LACE score < 7 ? No   Emergency Room discharge w/ pulse ox? No   Eligibility [facility]   Date of Admission 10/27/22   Date of Discharge 10/31/22   Discharge Disposition Home or Self Care   Discharge diagnosis Acute resp failure d/t COVID-19 PNA, abdominal wall cellulitis, bacteremia, T2DM   Does the patient have one of the following disease processes/diagnoses(primary or secondary)? COVID-19   Does the patient have Home health ordered? No   Is there a DME ordered? No   Prep survey completed? Yes          PROSPER WALKER - Registered Nurse

## 2022-11-01 ENCOUNTER — TRANSITIONAL CARE MANAGEMENT TELEPHONE ENCOUNTER (OUTPATIENT)
Dept: CALL CENTER | Facility: HOSPITAL | Age: 77
End: 2022-11-01

## 2022-11-01 LAB — BACTERIA SPEC AEROBE CULT: NORMAL

## 2022-11-01 NOTE — OUTREACH NOTE
Call Center TCM Note    Flowsheet Row Responses   [facility] patient discharged from? [place]   Does the patient have one of the following disease processes/diagnoses(primary or secondary)? COVID-19   COVID-19 underlying condition? None   TCM attempt successful? Yes   Call start time 1237   Call end time 1239   Discharge diagnosis Acute resp failure d/t COVID-19 PNA, abdominal wall cellulitis, bacteremia, T2DM   Meds reviewed with patient/caregiver? Yes   Is the patient having any side effects they believe may be caused by any medication additions or changes? No   Does the patient have all medications ordered at discharge? Yes   Is the patient taking all medications as directed (includes completed medication regime)? Yes   Comments Hosp dc fu apt on 11/11/22 with PCP group    Does the patient have an appointment with their PCP within 7 days of discharge? Yes   Has home health visited the patient within 72 hours of discharge? N/A   Psychosocial issues? No   Did the patient receive a copy of their discharge instructions? Yes   Did the patient receive a copy of COVID-19 specific instructions? Yes   Nursing interventions Reviewed instructions with patient   What is the patient's perception of their health status since discharge? Improving   Does the patient have any of the following symptoms? None   Nursing Interventions Nurse provided patient education   Pulse Ox monitoring None   Is the patient/caregiver able to teach back steps to recovery at home? Set small, achievable goals for return to baseline health, Eat a well-balance diet, Rest and rebuild strength, gradually increase activity   If the patient is a current smoker, are they able to teach back resources for cessation? Not a smoker   Is the patient/caregiver able to teach back the hierarchy of who to call/visit for symptoms/problems? PCP, Specialist, Home health nurse, Urgent Care, ED, 911 Yes   TCM call completed? Yes   Call end time 1239           Crystal Reynolds RN    11/1/2022, 12:39 EDT

## 2022-11-02 LAB
BACTERIA SPEC AEROBE CULT: NORMAL
BACTERIA SPEC AEROBE CULT: NORMAL

## 2022-11-02 NOTE — CASE MANAGEMENT/SOCIAL WORK
Continued Stay Note  [facility]     Patient Name: Nathan Baez  MRN: [id number]  Today's Date: 11/2/2022    Admit Date: 10/27/2022    Plan: Home with wife, denies any needs- follow for iv abx   Discharge Plan     Row Name 11/02/22 0956       Plan    Final Discharge Disposition Code 01 - home or self-care    Final Note Home with wife and oral abx, no additional dc orders noted for CCP. Yolande JOSE/CCP               Discharge Codes    No documentation.               Expected Discharge Date and Time     Expected Discharge Date Expected Discharge Time    Oct 31, 2022             Dorinda Rob, RN

## 2022-11-03 ENCOUNTER — READMISSION MANAGEMENT (OUTPATIENT)
Dept: CALL CENTER | Facility: HOSPITAL | Age: 77
End: 2022-11-03

## 2022-11-03 NOTE — OUTREACH NOTE
COVID-19 Week 1 Survey    Flowsheet Row Responses   [facility] patient discharged from? [place]   Does the patient have one of the following disease processes/diagnoses(primary or secondary)? COVID-19   COVID-19 underlying condition? None   Call Number Call 2   Week 1 Call successful? Yes   Call start time 1454   Call end time 1458   Discharge diagnosis Acute resp failure d/t COVID-19 PNA, abdominal wall cellulitis, bacteremia, T2DM   Meds reviewed with patient/caregiver? Yes   Is the patient having any side effects they believe may be caused by any medication additions or changes? No   Is the patient taking all medications as directed (includes completed medication regime)? Yes   Does the patient have a primary care provider?  Yes   Comments regarding PCP 11/11/22   Does the patient have an appointment with their PCP or specialist within 7 days of discharge? Greater than 7 days   Nursing Interventions Verified appointment date/time/provider   Has the patient kept scheduled appointments due by today? N/A   Has home health visited the patient within 72 hours of discharge? N/A   Psychosocial issues? No   What is the patient's perception of their health status since discharge? Improving   Does the patient have any of the following symptoms? None   Nursing Interventions Nurse provided patient education   Pulse Ox monitoring None   Is the patient/caregiver able to teach back steps to recovery at home? Set small, achievable goals for return to baseline health   Is the patient/caregiver able to teach back the hierarchy of who to call/visit for symptoms/problems? PCP, Specialist, Home health nurse, Urgent Care, ED, 911 Yes   COVID-19 call completed? Yes          ADRIANO WALKER - Registered Nurse

## 2022-11-10 ENCOUNTER — READMISSION MANAGEMENT (OUTPATIENT)
Dept: CALL CENTER | Facility: HOSPITAL | Age: 77
End: 2022-11-10

## 2022-11-10 NOTE — OUTREACH NOTE
COVID-19 Week 2 Survey    Flowsheet Row Responses   [facility] patient discharged from? [place]   Does the patient have one of the following disease processes/diagnoses(primary or secondary)? COVID-19   COVID-19 underlying condition? None   Call Number Call 1   COVID-19 Week 2: Call 1 attempt successful? Yes   Call start time 1115   Call end time 1116   Discharge diagnosis Acute resp failure d/t COVID-19 PNA, abdominal wall cellulitis, bacteremia, T2DM   Meds reviewed with patient/caregiver? Yes   Is the patient having any side effects they believe may be caused by any medication additions or changes? No   Does the patient have all medications ordered at discharge? Yes   Is the patient taking all medications as directed (includes completed medication regime)? Yes   Does the patient have a primary care provider?  Yes   Comments regarding PCP 11/11/22   Does the patient have an appointment with their PCP or specialist within 7 days of discharge? Yes   Has the patient kept scheduled appointments due by today? N/A   Has home health visited the patient within 72 hours of discharge? N/A   Psychosocial issues? No   What is the patient's perception of their health status since discharge? Improving   Does the patient have any of the following symptoms? None  [Tired]   Nursing Interventions Nurse provided patient education   Is the patient/caregiver able to teach back steps to recovery at home? Eat a well-balance diet, Rest and rebuild strength, gradually increase activity   COVID-19 call completed? Yes          ADAIR WALKER - Registered Nurse

## 2022-11-11 ENCOUNTER — OFFICE VISIT (OUTPATIENT)
Dept: FAMILY MEDICINE CLINIC | Facility: CLINIC | Age: 77
End: 2022-11-11

## 2022-11-11 VITALS
OXYGEN SATURATION: 98 % | SYSTOLIC BLOOD PRESSURE: 145 MMHG | DIASTOLIC BLOOD PRESSURE: 84 MMHG | TEMPERATURE: 97.1 F | HEART RATE: 85 BPM | BODY MASS INDEX: 37.41 KG/M2 | WEIGHT: 252.6 LBS | HEIGHT: 69 IN

## 2022-11-11 DIAGNOSIS — E78.5 HYPERLIPIDEMIA, UNSPECIFIED HYPERLIPIDEMIA TYPE: ICD-10-CM

## 2022-11-11 DIAGNOSIS — J96.00 ACUTE RESPIRATORY FAILURE DUE TO COVID-19: Primary | ICD-10-CM

## 2022-11-11 DIAGNOSIS — U07.1 ACUTE RESPIRATORY FAILURE DUE TO COVID-19: Primary | ICD-10-CM

## 2022-11-11 PROCEDURE — 1111F DSCHRG MED/CURRENT MED MERGE: CPT | Performed by: NURSE PRACTITIONER

## 2022-11-11 PROCEDURE — 99495 TRANSJ CARE MGMT MOD F2F 14D: CPT | Performed by: NURSE PRACTITIONER

## 2022-11-11 RX ORDER — LOSARTAN POTASSIUM 100 MG/1
100 TABLET ORAL DAILY
Qty: 90 TABLET | Refills: 3 | Status: SHIPPED | OUTPATIENT
Start: 2022-11-11

## 2022-11-11 RX ORDER — ROSUVASTATIN CALCIUM 20 MG/1
20 TABLET, COATED ORAL EVERY EVENING
Qty: 90 TABLET | Refills: 3 | Status: SHIPPED | OUTPATIENT
Start: 2022-11-11

## 2022-11-11 NOTE — PROGRESS NOTES
Transitional Care Follow Up Visit  Subjective     Nathan Baez is a 77 y.o. male who presents for a transitional care management visit.    Within 48 business hours after discharge our office contacted him via telephone to coordinate his care and needs.      I reviewed and discussed the details of that call along with the discharge summary, hospital problems, inpatient lab results, inpatient diagnostic studies, and consultation reports with Nathan.     Current outpatient and discharge medications have been reconciled for the patient.  Reviewed by: ALY Garcia      Date of TCM Phone Call 10/31/2022   [facility]   Date of Admission 10/27/2022   Date of Discharge 10/31/2022   Discharge Disposition Home or Self Care     Risk for Readmission (LACE) Score: 11 (10/31/2022  6:00 AM)      History of Present Illness   Course During Hospital Stay:  77 y.o. male with a history of diabetes type 2 presenting with acute respiratory failure secondary to COVID-19 pneumonia, abdominal wall cellulitis and group B streptococcus bacteremia.  Patient received remdesivir and dexamethasone.  Infectious disease consulted and discharged patient on amoxicillin 1 g 3 times daily for total 14 days of antibiotics.     The following portions of the patient's history were reviewed and updated as appropriate: allergies, current medications, past family history, past medical history, past social history, past surgical history and problem list.    Review of Systems   Constitutional: Negative for fever.   Respiratory: Negative for cough and shortness of breath.    Cardiovascular: Negative for chest pain.       Objective   Physical Exam  Vitals and nursing note reviewed.   Constitutional:       Appearance: Normal appearance.   Cardiovascular:      Rate and Rhythm: Normal rate and regular rhythm.      Heart sounds: Normal heart sounds.   Pulmonary:      Effort: Pulmonary effort is normal.      Breath sounds: Normal breath sounds.    Neurological:      Mental Status: He is alert and oriented to person, place, and time.   Psychiatric:         Mood and Affect: Mood normal.         Assessment & Plan   Diagnoses and all orders for this visit:    1. Acute respiratory failure due to COVID-19 (HCC) (Primary)    Patient continues to improve so no additional evaluation or treatment is necessary at this time.

## 2022-11-11 NOTE — TELEPHONE ENCOUNTER
Rx Refill Note  Requested Prescriptions     Pending Prescriptions Disp Refills   • losartan (COZAAR) 100 MG tablet [Pharmacy Med Name: LOSARTAN 100MG TABLETS] 90 tablet 3     Sig: TAKE 1 TABLET BY MOUTH DAILY   • rosuvastatin (CRESTOR) 20 MG tablet [Pharmacy Med Name: ROSUVASTATIN 20MG TABLETS] 90 tablet 3     Sig: TAKE 1 TABLET BY MOUTH EVERY EVENING      Last office visit with prescribing clinician: 10/26/2022      Next office visit with prescribing clinician: 1/26/2023

## 2022-11-16 DIAGNOSIS — J30.1 SEASONAL ALLERGIC RHINITIS DUE TO POLLEN: ICD-10-CM

## 2022-11-16 RX ORDER — MONTELUKAST SODIUM 10 MG/1
10 TABLET ORAL NIGHTLY
Qty: 90 TABLET | Refills: 3 | Status: SHIPPED | OUTPATIENT
Start: 2022-11-16

## 2022-11-17 ENCOUNTER — READMISSION MANAGEMENT (OUTPATIENT)
Dept: CALL CENTER | Facility: HOSPITAL | Age: 77
End: 2022-11-17

## 2022-11-17 NOTE — OUTREACH NOTE
COVID-19 Week 3 Survey    Flowsheet Row Responses   [facility] facility patient discharged from? [place]   Does the patient have one of the following disease processes/diagnoses(primary or secondary)? COVID-19   COVID-19 underlying condition? None   Call Number Call 1   COVID-19 Week 3: Call 1 attempt successful? No   Discharge diagnosis Acute resp failure d/t COVID-19 PNA, abdominal wall cellulitis, bacteremia, T2DM          DON WALKER - Registered Nurse

## 2022-11-25 ENCOUNTER — HOSPITAL ENCOUNTER (OUTPATIENT)
Dept: CT IMAGING | Facility: HOSPITAL | Age: 77
Discharge: HOME | End: 2022-11-25
Admitting: FAMILY MEDICINE

## 2022-11-25 DIAGNOSIS — D64.89 ANEMIA DUE TO OTHER CAUSE, NOT CLASSIFIED: ICD-10-CM

## 2022-11-25 DIAGNOSIS — R10.84 GENERALIZED ABDOMINAL PAIN: ICD-10-CM

## 2022-11-25 PROCEDURE — 0 DIATRIZOATE MEGLUMINE & SODIUM PER 1 ML: Performed by: FAMILY MEDICINE

## 2022-11-25 PROCEDURE — 74177 CT ABD & PELVIS W/CONTRAST: CPT

## 2022-11-25 PROCEDURE — 25010000002 IOPAMIDOL 61 % SOLUTION: Performed by: FAMILY MEDICINE

## 2022-11-25 RX ADMIN — DIATRIZOATE MEGLUMINE AND DIATRIZOATE SODIUM 30 ML: 660; 100 LIQUID ORAL; RECTAL at 09:30

## 2022-11-25 RX ADMIN — IOPAMIDOL 85 ML: 612 INJECTION, SOLUTION INTRAVENOUS at 11:13

## 2022-12-16 DIAGNOSIS — K59.04 CHRONIC IDIOPATHIC CONSTIPATION: ICD-10-CM

## 2022-12-17 DIAGNOSIS — K21.9 GASTROESOPHAGEAL REFLUX DISEASE WITHOUT ESOPHAGITIS: ICD-10-CM

## 2022-12-17 RX ORDER — AMLODIPINE BESYLATE 2.5 MG/1
2.5 TABLET ORAL DAILY
Qty: 90 TABLET | Refills: 3 | Status: SHIPPED | OUTPATIENT
Start: 2022-12-17

## 2022-12-17 RX ORDER — PANTOPRAZOLE SODIUM 40 MG/1
TABLET, DELAYED RELEASE ORAL
Qty: 90 TABLET | Refills: 3 | Status: SHIPPED | OUTPATIENT
Start: 2022-12-17

## 2022-12-17 RX ORDER — AMOXICILLIN 250 MG
2 CAPSULE ORAL 2 TIMES DAILY
Qty: 200 TABLET | Refills: 3 | Status: SHIPPED | OUTPATIENT
Start: 2022-12-17

## 2023-01-05 ENCOUNTER — OFFICE VISIT (OUTPATIENT)
Dept: GASTROENTEROLOGY | Facility: CLINIC | Age: 78
End: 2023-01-05
Payer: MEDICARE

## 2023-01-05 ENCOUNTER — TELEPHONE (OUTPATIENT)
Dept: GASTROENTEROLOGY | Facility: CLINIC | Age: 78
End: 2023-01-05
Payer: MEDICARE

## 2023-01-05 VITALS
BODY MASS INDEX: 38.92 KG/M2 | HEART RATE: 95 BPM | TEMPERATURE: 97 F | DIASTOLIC BLOOD PRESSURE: 88 MMHG | OXYGEN SATURATION: 94 % | WEIGHT: 262.8 LBS | HEIGHT: 69 IN | SYSTOLIC BLOOD PRESSURE: 150 MMHG

## 2023-01-05 DIAGNOSIS — R10.84 GENERALIZED ABDOMINAL PAIN: ICD-10-CM

## 2023-01-05 DIAGNOSIS — D50.9 IRON DEFICIENCY ANEMIA, UNSPECIFIED IRON DEFICIENCY ANEMIA TYPE: Primary | ICD-10-CM

## 2023-01-05 PROCEDURE — 99214 OFFICE O/P EST MOD 30 MIN: CPT | Performed by: INTERNAL MEDICINE

## 2023-01-05 NOTE — TELEPHONE ENCOUNTER
REGGIE patient via telephone for. Scheduled 01/30/2023 with arrival time of 1115am . Prep paperwork mailed to verified address on file. Patient advised arrival time may change based on [facility] guidelines. REGGIE GILL

## 2023-01-05 NOTE — PROGRESS NOTES
Chief Complaint   Patient presents with   • Abdominal Pain       Nathan Baez is a  77 y.o. male here for a follow up visit for chronic abdominal pain.    HPI     Patient 37-year-old male with history of degenerative disc disease, hypertension, hyperlipidemia and hypothyroidism as well as diabetes presenting in follow-up with chronic abdominal pain radiating down to his legs bilaterally.  Patient apparently saw her primary care noted with a bump in bilirubin and was sent to CT for evaluation with follow-up here.  In the meantime patient was diagnosed with COVID and respiratory failure went to the hospital.  Review of the hospital labs show that the bilirubin had returned to normal and never returned to the level seen on that 1 particular test.  Patient's alkaline phosphatase remains mildly elevated but not rising.  CT revealed no biliary abdominal pathology though gallstones were noted there was no gallbladder inflammation.  No residual stool was seen in CT either to explain the patient's chronic pain.  Patient here for further recommendations.      Past Medical History:   Diagnosis Date   • Actinic keratosis    • Acute embolism and thrombosis of vein    • Allergic    • Allergic rhinitis    • Arthritis    • Cataract    • Cervical radiculopathy    • Chronic constipation    • Diabetes mellitus (HCC)    • Disequilibrium    • DJD (degenerative joint disease), lumbar    • Elevated cholesterol    • Erysipelas    • GERD (gastroesophageal reflux disease)    • Glaucoma    • Hip pain, chronic, right    • History of kidney stones     X1   • History of peripheral edema     LOWER LEGS BILAT, COMPRESSION KNEE HIGH    • History of transfusion    • Hyperlipidemia    • Hypertension    • Hypothyroid    • Insomnia    • 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 ROSHAN SCHAFER   • Pes planus    • Presbycusis    • Prostate cancer (HCC)    • Restless legs syndrome    • Shoulder pain     RIGHT, LIMITED MOBILITY-AT TIMES   • Sleep apnea     bipap   • Tinea corporis    • Tinea cruris    • Unsteady gait     RIGHT HIP   • Weakness     RIGHT HIP         Current Outpatient Medications:   •  albuterol sulfate  (90 Base) MCG/ACT inhaler, Inhale 2 puffs Every 4 (Four) Hours As Needed for Wheezing., Disp: , Rfl:   •  ALLERGY SERUM INJECTION, Inject  under the skin into the appropriate area as directed 1 (One) Time Per Week., Disp: , Rfl:   •  amLODIPine (NORVASC) 2.5 MG tablet, TAKE 1 TABLET BY MOUTH DAILY, Disp: 90 tablet, Rfl: 3  •  aspirin 81 MG EC tablet, Take 81 mg by mouth Daily., Disp: , Rfl:   •  Blood Glucose Monitoring Suppl (FreeStyle Lite) w/Device kit, USE FOR BLOOD SUGAR, Disp: , Rfl:   •  BRIMONIDINE TARTRATE OP, Apply 1 drop to eye(s) as directed by provider 2 (Two) Times a Day., Disp: , Rfl:   •  Cyanocobalamin (B-12) 1000 MCG sublingual tablet, One sublingual tab dissolved on tongue daily, Disp: 90 each, Rfl: 3  •  dorzolamide-timolol (COSOPT) 22.3-6.8 MG/ML ophthalmic solution, Administer 1 drop to both eyes 2 (Two) Times a Day., Disp: , Rfl:   •  fexofenadine (ALLEGRA) 180 MG tablet, Take 180 mg by mouth Daily., Disp: , Rfl:   •  Fluticasone-Salmeterol 232-14 MCG/ACT aerosol powder , Inhale 1 puff 2 (Two) Times a Day., Disp: , Rfl:   •  FREESTYLE LITE test strip, USE TO TEST ONCE A DAY, Disp: , Rfl:   •  furosemide (LASIX) 40 MG tablet, TAKE 1 TABLET BY MOUTH EVERY DAY, Disp: 90 tablet, Rfl: 3  •  Hydrocortisone, Perianal, (Anusol-HC) 2.5 % rectal cream, Apply rectally 3 times daily for 7-10 days during hemorrhoid flare.  Include applicator., Disp: 30 g, Rfl: 1  •  Insulin Glargine (LANTUS SOLOSTAR) 100 UNIT/ML injection pen, Inject 56 Units  under the skin into the appropriate area as directed Daily., Disp: 45 mL, Rfl: 3  •  Insulin Pen Needle (Pen Needles) 31G X 5 MM misc, 1 application Daily., Disp: 100 each, Rfl: 3  •  ketoconazole (NIZORAL) 2 % cream, Apply 1 application topically to the appropriate area as directed Daily., Disp: 60 g, Rfl: 3  •  latanoprost (XALATAN) 0.005 % ophthalmic solution, Administer 1 drop to both eyes Every Night., Disp: , Rfl:   •  levothyroxine (SYNTHROID, LEVOTHROID) 88 MCG tablet, TAKE 1 TABLET BY MOUTH EVERY MORNING, Disp: 90 tablet, Rfl: 3  •  losartan (COZAAR) 100 MG tablet, TAKE 1 TABLET BY MOUTH DAILY, Disp: 90 tablet, Rfl: 3  •  metFORMIN (GLUCOPHAGE) 500 MG tablet, TAKE 1 TABLET BY MOUTH TWICE DAILY WITH MEALS, Disp: 180 tablet, Rfl: 0  •  Microlet Lancets misc, 2 (Two) Times a Day. use for testing, Disp: , Rfl:   •  montelukast (SINGULAIR) 10 MG tablet, TAKE 1 TABLET BY MOUTH EVERY NIGHT, Disp: 90 tablet, Rfl: 3  •  pantoprazole (PROTONIX) 40 MG EC tablet, TAKE 1 TABLET BY MOUTH TWICE DAILY, Disp: 90 tablet, Rfl: 3  •  pramipexole (MIRAPEX) 1.5 MG tablet, Take 1 tablet by mouth At Night As Needed (RLS)., Disp: 90 tablet, Rfl: 2  •  rosuvastatin (CRESTOR) 20 MG tablet, TAKE 1 TABLET BY MOUTH EVERY EVENING, Disp: 90 tablet, Rfl: 3  •  sennosides-docusate (Stimulant Laxative) 8.6-50 MG per tablet, Take 2 tablets by mouth 2 (Two) Times a Day., Disp: 200 tablet, Rfl: 3  •  meloxicam (MOBIC) 15 MG tablet, TAKE 1 TABLET BY MOUTH DAILY, Disp: 90 tablet, Rfl: 1    Allergies   Allergen Reactions   • Adhesive Tape Rash       Social History     Socioeconomic History   • Marital status:    Tobacco Use   • Smoking status: Former     Packs/day: 1.00     Years: 20.00     Pack years: 20.00     Types: Cigarettes     Start date:      Quit date: 1984     Years since quittin.0   • Smokeless tobacco: Former     Types: Chew   Vaping Use   • Vaping Use: Never used   Substance and Sexual Activity   • Alcohol use: Yes      Comment: 3-4 MONTHLY   • Drug use: No   • Sexual activity: Defer       Family History   Problem Relation Age of Onset   • Arthritis Mother    • Cancer Mother         COLON   • Colon cancer Mother    • Arthritis Father    • Cancer Father         PROSTATE   • Heart disease Sister    • Arthritis Sister    • Cancer Sister         BREAST CANCER   • Breast cancer Sister    • Gout Sister    • Hypertension Sister    • Hypertension Brother    • Heart disease Brother    • Arthritis Brother    • Heart attack Brother    • Bone cancer Brother    • Heart disease Brother    • Malig Hyperthermia Neg Hx        Review of Systems   Constitutional: Negative.    Respiratory: Positive for shortness of breath. Negative for apnea, cough, choking, chest tightness, wheezing and stridor.    Cardiovascular: Negative.    Gastrointestinal: Positive for abdominal pain. Negative for abdominal distention, anal bleeding, blood in stool, constipation, diarrhea, nausea, rectal pain and vomiting.   Musculoskeletal: Positive for arthralgias, back pain and gait problem.   Skin: Negative.    Hematological: Negative.        Vitals:    01/05/23 1052   BP: 150/88   Pulse: 95   Temp: 97 °F (36.1 °C)   SpO2: 94%       Physical Exam  Vitals reviewed.   Constitutional:       Appearance: He is well-developed. He is obese. He is ill-appearing.   HENT:      Head: Normocephalic and atraumatic.   Eyes:      General: No scleral icterus.     Pupils: Pupils are equal, round, and reactive to light.   Cardiovascular:      Rate and Rhythm: Normal rate and regular rhythm.      Heart sounds: Normal heart sounds.   Pulmonary:      Effort: Respiratory distress present.      Breath sounds: No wheezing or rales.   Abdominal:      General: Bowel sounds are normal. There is no distension.      Palpations: Abdomen is soft. There is no mass.      Tenderness: There is no abdominal tenderness.      Hernia: No hernia is present.   Skin:     General: Skin is warm and dry.       Coloration: Skin is not jaundiced.      Findings: No rash.   Neurological:      General: No focal deficit present.      Mental Status: He is alert and oriented to person, place, and time.      Cranial Nerves: No cranial nerve deficit.   Psychiatric:         Behavior: Behavior normal.         Thought Content: Thought content normal.         Judgment: Judgment normal.         No visits with results within 2 Month(s) from this visit.   Latest known visit with results is:   No results displayed because visit has over 200 results.          Diagnoses and all orders for this visit:    1. Iron deficiency anemia, unspecified iron deficiency anemia type (Primary)  -     Case Request; Standing  -     Implement Anesthesia orders day of procedure.; Standing  -     Obtain informed consent; Standing  -     Case Request    2. Generalized abdominal pain      Patient 77-year-old male with history of prostate cancer, hypertension, hyperlipidemia and hypothyroid but degenerative disc disease with continued generalized abdominal pain radiating down his legs bilaterally.  Patient on daily laxatives at bedtime though the laxatives do not affect his abdominal pain which seems mostly lower bilaterally.  Patient reports the pain radiates around his back bilaterally around his belly and down his legs.  Review of patient's CT ordered in October prior to his COVID admission with no residual stool in the colon.  Apparently CT ordered due to rising bilirubin to 2.1 with chronically elevated alkaline phosphatase which normalized after this.  Patient does have gallstones in the gallbladder though biliary tree is normal.  Question whether that particular lab test was hemolyzed which would explain the elevated bilirubin but it has not returned.  No gallbladder inflammation noted on CT.  Chronic alkaline phosphatase likely related to his chronic arthritis.  At this point he was also noted with progressive iron deficiency anemia that was not addressed  in the hospital.  Patient recommended to begin multivitamin with iron and will arrange EGD.  Patient placed on Protonix the end of December we will continue.  Would recommend follow-up with neurosurgery versus orthopedics for his chronic back pain radiating to the legs which may be the source of this lower abdominal pain that he has unrelated to eating or drinking or bowel movements.

## 2023-01-26 ENCOUNTER — OFFICE VISIT (OUTPATIENT)
Dept: FAMILY MEDICINE CLINIC | Facility: CLINIC | Age: 78
End: 2023-01-26
Payer: MEDICARE

## 2023-01-26 VITALS
SYSTOLIC BLOOD PRESSURE: 140 MMHG | HEIGHT: 69 IN | TEMPERATURE: 95.8 F | BODY MASS INDEX: 38.98 KG/M2 | DIASTOLIC BLOOD PRESSURE: 80 MMHG | WEIGHT: 263.2 LBS | HEART RATE: 94 BPM | OXYGEN SATURATION: 96 %

## 2023-01-26 DIAGNOSIS — M51.36 DDD (DEGENERATIVE DISC DISEASE), LUMBAR: Primary | ICD-10-CM

## 2023-01-26 DIAGNOSIS — E11.65 TYPE 2 DIABETES MELLITUS WITH HYPERGLYCEMIA, UNSPECIFIED WHETHER LONG TERM INSULIN USE: ICD-10-CM

## 2023-01-26 DIAGNOSIS — D50.9 IRON DEFICIENCY ANEMIA, UNSPECIFIED IRON DEFICIENCY ANEMIA TYPE: ICD-10-CM

## 2023-01-26 PROCEDURE — 99214 OFFICE O/P EST MOD 30 MIN: CPT | Performed by: FAMILY MEDICINE

## 2023-01-26 NOTE — PROGRESS NOTES
Chief Complaint   Patient presents with   • Diabetes       Subjective   Nathan Baez is a 77 y.o. male.     History of Present Illness   F/u DM2.  On 56 units insulin a day.   F/U anemia.  On MVI daily.  C scope 2 years ago.  Pain down into legs bilaterally.  EGD upcoming in 4 days.    F/U Lumbar back pain with sciatica.  Hard to sit or stand long.    The following portions of the patient's history were reviewed and updated as appropriate: allergies, current medications, past family history, past medical history, past social history, past surgical history and problem list.    Review of Systems   Constitutional: Negative for appetite change and fatigue.   HENT: Negative for nosebleeds and sore throat.    Eyes: Negative for blurred vision and visual disturbance.   Respiratory: Negative for shortness of breath and wheezing.    Cardiovascular: Negative for chest pain and leg swelling.   Gastrointestinal: Negative for abdominal distention and abdominal pain.   Endocrine: Negative for cold intolerance and polyuria.   Genitourinary: Negative for dysuria and hematuria.   Musculoskeletal: Positive for back pain. Negative for arthralgias and myalgias.   Skin: Negative for color change and rash.   Neurological: Negative for weakness and confusion.   Psychiatric/Behavioral: Negative for agitation and depressed mood.       Patient Active Problem List   Diagnosis   • OA (osteoarthritis) of knee   • Hypertension   • Abdominal wall cellulitis   • Hypothyroidism (acquired)   • Gastroesophageal reflux disease without esophagitis   • Mixed hyperlipidemia   • Primary insomnia   • DDD (degenerative disc disease), lumbar   • KWAME (obstructive sleep apnea)   • Allergic   • Venous insufficiency   • Prostate cancer (HCC)   • Venous stasis dermatitis   • Tinea cruris   • Tinea corporis   • Throat clearing   • Sleep apnea   • Skin lesion of face   • Rib pain on left side   • Rectal bleed   • Presbycusis   • Pes planus   • Osteoarthritis   •  Obesity   • Medicare annual wellness visit, subsequent   • Lumbar strain   • Internal hemorrhoids   • Insomnia   • Inflamed skin tag   • Hyperplastic polyp of stomach   • Hospital discharge follow-up   • History of colon polyps   • High risk medication use   • Glaucoma   • Gastroenteritis, acute   • Erysipelas   • Encounter for screening colonoscopy   • Encounter for long-term (current) use of NSAIDs   • Dysphagia   • DVT (deep venous thrombosis) ([facility])   • DJD (degenerative joint disease), lumbar   • Diverticulosis   • Cough   • Contact with hypodermic needle   • Cervical radiculopathy   • Cellulitis   • Arthritis   • Allergic rhinitis   • Acute glaucoma   • Acute embolism and thrombosis of vein   • Actinic keratosis   • Status post reverse total shoulder replacement, right   • Localized edema   • Other specified anemias   • Peripheral polyneuropathy   • Campylobacter diarrhea   • Hyponatremia   • Generalized abdominal pain   • Fever   • Constipation   • Bilateral lower extremity edema   • Acute pyelonephritis   • Chronic idiopathic constipation   • Functional diarrhea   • Change in bowel habits   • RLS (restless legs syndrome)   • Type 2 diabetes mellitus with hyperglycemia ([facility])   • Family history of GI malignancy   • Primary osteoarthritis of right hip   • B12 deficiency   • Intervertebral disc stenosis of neural canal of lumbar region   • Encounter for hepatitis C screening test for low risk patient   • Pain associated with defecation   • Calculus of kidney   • Gastroesophageal reflux disease   • Body aches   • Skin lesion   • Candidiasis, intertrigo   • Hyperbilirubinemia   • Acute respiratory failure due to COVID-19 ([facility])   • Bacteremia due to group B Streptococcus   • Iron deficiency anemia       Allergies   Allergen Reactions   • Adhesive Tape Rash         Current Outpatient Medications:   •  albuterol sulfate  (90 Base) MCG/ACT inhaler, Inhale 2 puffs Every 4 (Four) Hours As Needed for Wheezing.,  Disp: , Rfl:   •  ALLERGY SERUM INJECTION, Inject  under the skin into the appropriate area as directed 1 (One) Time Per Week., Disp: , Rfl:   •  amLODIPine (NORVASC) 2.5 MG tablet, TAKE 1 TABLET BY MOUTH DAILY, Disp: 90 tablet, Rfl: 3  •  aspirin 81 MG EC tablet, Take 81 mg by mouth Daily., Disp: , Rfl:   •  Blood Glucose Monitoring Suppl (FreeStyle Lite) w/Device kit, USE FOR BLOOD SUGAR, Disp: , Rfl:   •  BRIMONIDINE TARTRATE OP, Apply 1 drop to eye(s) as directed by provider 2 (Two) Times a Day., Disp: , Rfl:   •  Cyanocobalamin (B-12) 1000 MCG sublingual tablet, One sublingual tab dissolved on tongue daily, Disp: 90 each, Rfl: 3  •  dorzolamide-timolol (COSOPT) 22.3-6.8 MG/ML ophthalmic solution, Administer 1 drop to both eyes 2 (Two) Times a Day., Disp: , Rfl:   •  fexofenadine (ALLEGRA) 180 MG tablet, Take 180 mg by mouth Daily., Disp: , Rfl:   •  Fluticasone-Salmeterol 232-14 MCG/ACT aerosol powder , Inhale 1 puff 2 (Two) Times a Day., Disp: , Rfl:   •  FREESTYLE LITE test strip, USE TO TEST ONCE A DAY, Disp: , Rfl:   •  furosemide (LASIX) 40 MG tablet, TAKE 1 TABLET BY MOUTH EVERY DAY, Disp: 90 tablet, Rfl: 3  •  Hydrocortisone, Perianal, (Anusol-HC) 2.5 % rectal cream, Apply rectally 3 times daily for 7-10 days during hemorrhoid flare.  Include applicator., Disp: 30 g, Rfl: 1  •  Insulin Glargine (LANTUS SOLOSTAR) 100 UNIT/ML injection pen, Inject 56 Units under the skin into the appropriate area as directed Daily., Disp: 45 mL, Rfl: 3  •  Insulin Pen Needle (Pen Needles) 31G X 5 MM misc, 1 application Daily., Disp: 100 each, Rfl: 3  •  ketoconazole (NIZORAL) 2 % cream, Apply 1 application topically to the appropriate area as directed Daily., Disp: 60 g, Rfl: 3  •  latanoprost (XALATAN) 0.005 % ophthalmic solution, Administer 1 drop to both eyes Every Night., Disp: , Rfl:   •  levothyroxine (SYNTHROID, LEVOTHROID) 88 MCG tablet, TAKE 1 TABLET BY MOUTH EVERY MORNING, Disp: 90 tablet, Rfl: 3  •  losartan  (COZAAR) 100 MG tablet, TAKE 1 TABLET BY MOUTH DAILY, Disp: 90 tablet, Rfl: 3  •  meloxicam (MOBIC) 15 MG tablet, TAKE 1 TABLET BY MOUTH DAILY, Disp: 90 tablet, Rfl: 1  •  metFORMIN (GLUCOPHAGE) 500 MG tablet, TAKE 1 TABLET BY MOUTH TWICE DAILY WITH MEALS, Disp: 180 tablet, Rfl: 0  •  Microlet Lancets misc, 2 (Two) Times a Day. use for testing, Disp: , Rfl:   •  montelukast (SINGULAIR) 10 MG tablet, TAKE 1 TABLET BY MOUTH EVERY NIGHT, Disp: 90 tablet, Rfl: 3  •  pantoprazole (PROTONIX) 40 MG EC tablet, TAKE 1 TABLET BY MOUTH TWICE DAILY, Disp: 90 tablet, Rfl: 3  •  pramipexole (MIRAPEX) 1.5 MG tablet, Take 1 tablet by mouth At Night As Needed (RLS)., Disp: 90 tablet, Rfl: 2  •  rosuvastatin (CRESTOR) 20 MG tablet, TAKE 1 TABLET BY MOUTH EVERY EVENING, Disp: 90 tablet, Rfl: 3  •  sennosides-docusate (Stimulant Laxative) 8.6-50 MG per tablet, Take 2 tablets by mouth 2 (Two) Times a Day., Disp: 200 tablet, Rfl: 3    Past Medical History:   Diagnosis Date   • Actinic keratosis    • Acute embolism and thrombosis of vein    • Allergic    • Allergic rhinitis    • Arthritis    • Cataract    • Cervical radiculopathy    • Chronic constipation    • Diabetes mellitus (HCC)    • Disequilibrium    • DJD (degenerative joint disease), lumbar    • Elevated cholesterol    • Erysipelas    • GERD (gastroesophageal reflux disease)    • Glaucoma    • Hip pain, chronic, right    • History of kidney stones     X1   • History of peripheral edema     LOWER LEGS BILAT, COMPRESSION KNEE HIGH    • History of transfusion    • Hyperlipidemia    • Hypertension    • Hypothyroid    • Insomnia    • Intervertebral disc stenosis of neural canal of lumbar region 04/12/2022   • Limited mobility     RIGHT HIP   • Low back pain    • Male urinary stress incontinence     s/p prostatectomy-WEAR PAD   • Obesity    • KWAME (obstructive sleep apnea)    • Osteoarthritis    • Pelvic floor dysfunction 06/2022    DEFOGRAM ORDERED AT [facility] BY DR. ROSHAN SCHAFER   • Pes  planus    • Presbycusis    • Prostate cancer (HCC)    • Restless legs syndrome    • Shoulder pain     RIGHT, LIMITED MOBILITY-AT TIMES   • Sleep apnea     bipap   • Tinea corporis    • Tinea cruris    • Unsteady gait     RIGHT HIP   • Weakness     RIGHT HIP       Past Surgical History:   Procedure Laterality Date   • CATARACT EXTRACTION, BILATERAL Bilateral    • CERVICAL DISCECTOMY ANTERIOR     • COLONOSCOPY  03/08/2017    3/17normal. Recheck 2022. 12/11. Repeat in 5 years    • COLONOSCOPY N/A 4/19/2021    Procedure: COLONOSCOPY INTO CECUM WITH HOT & COLD  SNARE POLYPECTOMIES;  Surgeon: Jaden Chowdhury MD;  Location: [facility] ENDOSCOPY;  Service: Gastroenterology;  Laterality: N/A;  PRE: CHANGE IN BOWEL HABITS; FAMILY H/O COLON CANCER  POST: DIVERTICULOSIS, POLYPS   • ENDOSCOPY W/ PEG REMOVAL     • FOOT SURGERY      1998 had big toe replaced on left foot-METAL    • HERNIA REPAIR  03/07/2012    umbilical   • JOINT REPLACEMENT Right 2014   • [facility] ARTHRP KNE CONDYLE&PLATU MEDIAL&LAT COMPARTMENTS Left 9/13/2016    Procedure: LT TOTAL KNEE ARTHROPLASTY;  Surgeon: Tadeo Basurto MD;  Location: [facility] MAIN OR;  Service: Orthopedics   • PROSTATECTOMY  07/1999 July 1999. Radical    • THYROID LOBECTOMY     • TONSILLECTOMY     • TOTAL HIP ARTHROPLASTY Right 8/19/2021    Procedure: TOTAL HIP ARTHROPLASTY RIGHT POSTERIOR;  Surgeon: Tadeo Basurto MD;  Location: [facility] OR;  Service: Orthopedics;  Laterality: Right;   • TOTAL KNEE ARTHROPLASTY Right 2014   • TOTAL SHOULDER ARTHROPLASTY W/ DISTAL CLAVICLE EXCISION Right 11/13/2019    Procedure: TOTAL SHOULDER REVERSE ARTHROPLASTY RIGHT REPAIR RIGHT AXILLARY VEIN;  Surgeon: Behzad Kelley MD;  Location: [facility];  Service: Orthopedics   • WRIST SURGERY Right 12/17/2014    x2  wrist replaced       Family History   Problem Relation Age of Onset   • Arthritis Mother    • Cancer Mother         COLON   • Colon cancer Mother    • Arthritis Father    • Cancer Father          PROSTATE   • Heart disease Sister    • Arthritis Sister    • Cancer Sister         BREAST CANCER   • Breast cancer Sister    • Gout Sister    • Hypertension Sister    • Hypertension Brother    • Heart disease Brother    • Arthritis Brother    • Heart attack Brother    • Bone cancer Brother    • Heart disease Brother    • Malig Hyperthermia Neg Hx        Social History     Tobacco Use   • Smoking status: Former     Packs/day: 1.00     Years: 20.00     Pack years: 20.00     Types: Cigarettes     Start date:      Quit date: 1984     Years since quittin.0   • Smokeless tobacco: Former     Types: Chew   Substance Use Topics   • Alcohol use: Yes     Comment: 3-4 MONTHLY            Objective     Vitals:    23 0911   BP: 140/80   Pulse: 94   Temp: 95.8 °F (35.4 °C)   SpO2: 96%     Body mass index is 38.85 kg/m².    Physical Exam  Vitals reviewed.   Constitutional:       Appearance: He is well-developed. He is not diaphoretic.   HENT:      Head: Normocephalic and atraumatic.   Eyes:      General: No scleral icterus.     Pupils: Pupils are equal, round, and reactive to light.   Neck:      Thyroid: No thyromegaly.   Cardiovascular:      Rate and Rhythm: Normal rate and regular rhythm.      Heart sounds: No murmur heard.    No friction rub. No gallop.   Pulmonary:      Effort: Pulmonary effort is normal. No respiratory distress.      Breath sounds: No wheezing or rales.   Chest:      Chest wall: No tenderness.   Abdominal:      General: Bowel sounds are normal. There is no distension.      Palpations: Abdomen is soft.      Tenderness: There is no abdominal tenderness.   Musculoskeletal:         General: No deformity. Normal range of motion.   Lymphadenopathy:      Cervical: No cervical adenopathy.   Skin:     General: Skin is warm and dry.      Findings: No rash.   Neurological:      Cranial Nerves: No cranial nerve deficit.      Motor: No abnormal muscle tone.         Lab Results   Component Value  Date    GLUCOSE 74 10/31/2022    BUN 24 (H) 10/31/2022    CREATININE 1.20 10/31/2022    EGFRIFNONA 76 12/06/2021    EGFRIFAFRI 88 12/06/2021    BCR 20.0 10/31/2022    K 4.0 10/31/2022    CO2 26.2 10/31/2022    CALCIUM 8.8 10/31/2022    PROTENTOTREF 6.4 10/26/2022    ALBUMIN 3.90 10/31/2022    LABIL2 1.7 10/26/2022    AST 24 10/31/2022    ALT 31 10/31/2022       WBC   Date Value Ref Range Status   10/31/2022 7.88 3.40 - 10.80 10*3/mm3 Final   10/26/2022 4.30 3.40 - 10.80 10*3/mm3 Final     RBC   Date Value Ref Range Status   10/31/2022 3.43 (L) 4.14 - 5.80 10*6/mm3 Final   10/26/2022 3.87 (L) 4.14 - 5.80 10*6/mm3 Final     Hemoglobin   Date Value Ref Range Status   10/31/2022 8.4 (L) 13.0 - 17.7 g/dL Final     Hematocrit   Date Value Ref Range Status   10/31/2022 26.9 (L) 37.5 - 51.0 % Final     MCV   Date Value Ref Range Status   10/31/2022 78.4 (L) 79.0 - 97.0 fL Final     MCH   Date Value Ref Range Status   10/31/2022 24.5 (L) 26.6 - 33.0 pg Final     MCHC   Date Value Ref Range Status   10/31/2022 31.2 (L) 31.5 - 35.7 g/dL Final     RDW   Date Value Ref Range Status   10/31/2022 15.6 (H) 12.3 - 15.4 % Final     RDW-SD   Date Value Ref Range Status   10/31/2022 44.1 37.0 - 54.0 fl Final     MPV   Date Value Ref Range Status   10/31/2022 9.5 6.0 - 12.0 fL Final     Platelets   Date Value Ref Range Status   10/31/2022 181 140 - 450 10*3/mm3 Final     Neutrophil %   Date Value Ref Range Status   10/30/2022 77.8 (H) 42.7 - 76.0 % Final     Lymphocyte %   Date Value Ref Range Status   10/30/2022 13.4 (L) 19.6 - 45.3 % Final     Monocyte %   Date Value Ref Range Status   10/30/2022 6.9 5.0 - 12.0 % Final     Eosinophil %   Date Value Ref Range Status   10/30/2022 0.0 (L) 0.3 - 6.2 % Final     Basophil %   Date Value Ref Range Status   10/30/2022 0.1 0.0 - 1.5 % Final     Immature Grans %   Date Value Ref Range Status   10/30/2022 1.8 (H) 0.0 - 0.5 % Final     Neutrophils Absolute   Date Value Ref Range Status    10/31/2022 5.52 1.70 - 7.00 10*3/mm3 Final     Neutrophils, Absolute   Date Value Ref Range Status   10/30/2022 7.02 (H) 1.70 - 7.00 10*3/mm3 Final     Lymphocytes, Absolute   Date Value Ref Range Status   10/30/2022 1.21 0.70 - 3.10 10*3/mm3 Final     Monocytes, Absolute   Date Value Ref Range Status   10/30/2022 0.62 0.10 - 0.90 10*3/mm3 Final     Eosinophils Absolute   Date Value Ref Range Status   10/31/2022 0.08 0.00 - 0.40 10*3/mm3 Final     Eosinophils, Absolute   Date Value Ref Range Status   10/30/2022 0.00 0.00 - 0.40 10*3/mm3 Final     Basophils, Absolute   Date Value Ref Range Status   10/30/2022 0.01 0.00 - 0.20 10*3/mm3 Final     Immature Grans, Absolute   Date Value Ref Range Status   10/30/2022 0.16 (H) 0.00 - 0.05 10*3/mm3 Final     nRBC   Date Value Ref Range Status   10/31/2022 0.3 (H) 0.0 - 0.2 /100 WBC Final       Lab Results   Component Value Date    HGBA1C 6.80 (H) 10/28/2022       Lab Results   Component Value Date    PRCBYZMX16 1,009 (H) 10/26/2022       TSH   Date Value Ref Range Status   10/30/2022 3.070 0.270 - 4.200 uIU/mL Final       No results found for: CHOL  Lab Results   Component Value Date    TRIG 191 (H) 09/15/2022     Lab Results   Component Value Date    HDL 28 (L) 09/15/2022     Lab Results   Component Value Date    LDL 32 09/15/2022     Lab Results   Component Value Date    VLDL 31 09/15/2022     No results found for: LDLHDL      Procedures    Assessment & Plan   Problems Addressed this Visit     DDD (degenerative disc disease), lumbar - Primary    Relevant Orders    Ambulatory Referral to Pain Management    Iron deficiency anemia    Relevant Orders    Comprehensive Metabolic Panel    CBC & Differential    Ferritin    Iron    Type 2 diabetes mellitus with hyperglycemia (HCC)    Relevant Orders    Hemoglobin A1c   Diagnoses       Codes Comments    DDD (degenerative disc disease), lumbar    -  Primary ICD-10-CM: M51.36  ICD-9-CM: 722.52     Type 2 diabetes mellitus with  hyperglycemia, unspecified whether long term insulin use (HCC)     ICD-10-CM: E11.65  ICD-9-CM: 250.00     Iron deficiency anemia, unspecified iron deficiency anemia type     ICD-10-CM: D50.9  ICD-9-CM: 280.9         Iron def anemia.  Check CBC, fe, ferritin.  Start flintstones with iron 2 a day.    Lumbar DDD.  Uncontrolled  To pain management.    DM2.  Controlled.  Check A1c. Continue meds.     Orders Placed This Encounter   Procedures   • Comprehensive Metabolic Panel     Order Specific Question:   Release to patient     Answer:   Routine Release   • Ferritin   • Iron   • Hemoglobin A1c     Order Specific Question:   Release to patient     Answer:   Routine Release   • Ambulatory Referral to Pain Management     Referral Priority:   Routine     Referral Type:   Pain Management     Referral Reason:   Specialty Services Required     Requested Specialty:   Pain Medicine     Number of Visits Requested:   1   • CBC & Differential     Order Specific Question:   Manual Differential     Answer:   No       Current Outpatient Medications   Medication Sig Dispense Refill   • albuterol sulfate  (90 Base) MCG/ACT inhaler Inhale 2 puffs Every 4 (Four) Hours As Needed for Wheezing.     • ALLERGY SERUM INJECTION Inject  under the skin into the appropriate area as directed 1 (One) Time Per Week.     • amLODIPine (NORVASC) 2.5 MG tablet TAKE 1 TABLET BY MOUTH DAILY 90 tablet 3   • aspirin 81 MG EC tablet Take 81 mg by mouth Daily.     • Blood Glucose Monitoring Suppl (FreeStyle Lite) w/Device kit USE FOR BLOOD SUGAR     • BRIMONIDINE TARTRATE OP Apply 1 drop to eye(s) as directed by provider 2 (Two) Times a Day.     • Cyanocobalamin (B-12) 1000 MCG sublingual tablet One sublingual tab dissolved on tongue daily 90 each 3   • dorzolamide-timolol (COSOPT) 22.3-6.8 MG/ML ophthalmic solution Administer 1 drop to both eyes 2 (Two) Times a Day.     • fexofenadine (ALLEGRA) 180 MG tablet Take 180 mg by mouth Daily.     •  Fluticasone-Salmeterol 232-14 MCG/ACT aerosol powder  Inhale 1 puff 2 (Two) Times a Day.     • FREESTYLE LITE test strip USE TO TEST ONCE A DAY     • furosemide (LASIX) 40 MG tablet TAKE 1 TABLET BY MOUTH EVERY DAY 90 tablet 3   • Hydrocortisone, Perianal, (Anusol-HC) 2.5 % rectal cream Apply rectally 3 times daily for 7-10 days during hemorrhoid flare.  Include applicator. 30 g 1   • Insulin Glargine (LANTUS SOLOSTAR) 100 UNIT/ML injection pen Inject 56 Units under the skin into the appropriate area as directed Daily. 45 mL 3   • Insulin Pen Needle (Pen Needles) 31G X 5 MM misc 1 application Daily. 100 each 3   • ketoconazole (NIZORAL) 2 % cream Apply 1 application topically to the appropriate area as directed Daily. 60 g 3   • latanoprost (XALATAN) 0.005 % ophthalmic solution Administer 1 drop to both eyes Every Night.     • levothyroxine (SYNTHROID, LEVOTHROID) 88 MCG tablet TAKE 1 TABLET BY MOUTH EVERY MORNING 90 tablet 3   • losartan (COZAAR) 100 MG tablet TAKE 1 TABLET BY MOUTH DAILY 90 tablet 3   • meloxicam (MOBIC) 15 MG tablet TAKE 1 TABLET BY MOUTH DAILY 90 tablet 1   • metFORMIN (GLUCOPHAGE) 500 MG tablet TAKE 1 TABLET BY MOUTH TWICE DAILY WITH MEALS 180 tablet 0   • Microlet Lancets misc 2 (Two) Times a Day. use for testing     • montelukast (SINGULAIR) 10 MG tablet TAKE 1 TABLET BY MOUTH EVERY NIGHT 90 tablet 3   • pantoprazole (PROTONIX) 40 MG EC tablet TAKE 1 TABLET BY MOUTH TWICE DAILY 90 tablet 3   • pramipexole (MIRAPEX) 1.5 MG tablet Take 1 tablet by mouth At Night As Needed (RLS). 90 tablet 2   • rosuvastatin (CRESTOR) 20 MG tablet TAKE 1 TABLET BY MOUTH EVERY EVENING 90 tablet 3   • sennosides-docusate (Stimulant Laxative) 8.6-50 MG per tablet Take 2 tablets by mouth 2 (Two) Times a Day. 200 tablet 3     No current facility-administered medications for this visit.       Nathan Baez had no medications administered during this visit.    Return in about 1 month (around 2/26/2023).    There are  no Patient Instructions on file for this visit.

## 2023-01-27 LAB
ALBUMIN SERPL-MCNC: 4.7 G/DL (ref 3.5–5.2)
ALBUMIN/GLOB SERPL: 2 G/DL
ALP SERPL-CCNC: 175 U/L (ref 39–117)
ALT SERPL-CCNC: 30 U/L (ref 1–41)
AST SERPL-CCNC: 29 U/L (ref 1–40)
BASOPHILS # BLD AUTO: ABNORMAL 10*3/UL
BILIRUB SERPL-MCNC: 0.5 MG/DL (ref 0–1.2)
BUN SERPL-MCNC: 24 MG/DL (ref 8–23)
BUN/CREAT SERPL: 20 (ref 7–25)
CALCIUM SERPL-MCNC: 9.6 MG/DL (ref 8.6–10.5)
CHLORIDE SERPL-SCNC: 101 MMOL/L (ref 98–107)
CO2 SERPL-SCNC: 29.5 MMOL/L (ref 22–29)
CREAT SERPL-MCNC: 1.2 MG/DL (ref 0.76–1.27)
DIFFERENTIAL COMMENT: NORMAL
EGFRCR SERPLBLD CKD-EPI 2021: 62.7 ML/MIN/1.73
EOSINOPHIL # BLD AUTO: ABNORMAL 10*3/UL
EOSINOPHIL # BLD MANUAL: 0.19 10*3/MM3 (ref 0–0.4)
EOSINOPHIL NFR BLD AUTO: ABNORMAL %
EOSINOPHIL NFR BLD MANUAL: 4 % (ref 0.3–6.2)
ERYTHROCYTE [DISTWIDTH] IN BLOOD BY AUTOMATED COUNT: 15.7 % (ref 12.3–15.4)
FERRITIN SERPL-MCNC: 23.6 NG/ML (ref 30–400)
GLOBULIN SER CALC-MCNC: 2.4 GM/DL
GLUCOSE SERPL-MCNC: 131 MG/DL (ref 65–99)
HBA1C MFR BLD: 6.9 % (ref 4.8–5.6)
HCT VFR BLD AUTO: 32.9 % (ref 37.5–51)
HGB BLD-MCNC: 9.9 G/DL (ref 13–17.7)
IRON SERPL-MCNC: 36 MCG/DL (ref 59–158)
LYMPHOCYTES # BLD AUTO: ABNORMAL 10*3/UL
LYMPHOCYTES # BLD MANUAL: 1.75 10*3/MM3 (ref 0.7–3.1)
LYMPHOCYTES NFR BLD AUTO: ABNORMAL %
LYMPHOCYTES NFR BLD MANUAL: 36 % (ref 19.6–45.3)
MCH RBC QN AUTO: 22.5 PG (ref 26.6–33)
MCHC RBC AUTO-ENTMCNC: 30.1 G/DL (ref 31.5–35.7)
MCV RBC AUTO: 74.8 FL (ref 79–97)
MONOCYTES # BLD MANUAL: 0.39 10*3/MM3 (ref 0.1–0.9)
MONOCYTES NFR BLD AUTO: ABNORMAL %
MONOCYTES NFR BLD MANUAL: 8 % (ref 5–12)
NEUTROPHILS # BLD MANUAL: 2.52 10*3/MM3 (ref 1.7–7)
NEUTROPHILS NFR BLD AUTO: ABNORMAL %
NEUTROPHILS NFR BLD MANUAL: 52 % (ref 42.7–76)
PLATELET # BLD AUTO: 205 10*3/MM3 (ref 140–450)
PLATELET BLD QL SMEAR: NORMAL
POTASSIUM SERPL-SCNC: 4.6 MMOL/L (ref 3.5–5.2)
PROT SERPL-MCNC: 7.1 G/DL (ref 6–8.5)
RBC # BLD AUTO: 4.4 10*6/MM3 (ref 4.14–5.8)
RBC MORPH BLD: NORMAL
SODIUM SERPL-SCNC: 140 MMOL/L (ref 136–145)
WBC # BLD AUTO: 4.85 10*3/MM3 (ref 3.4–10.8)

## 2023-01-30 ENCOUNTER — HOSPITAL ENCOUNTER (OUTPATIENT)
Facility: HOSPITAL | Age: 78
Setting detail: HOSPITAL OUTPATIENT SURGERY
Discharge: HOME | End: 2023-01-30
Attending: INTERNAL MEDICINE | Admitting: INTERNAL MEDICINE
Payer: MEDICARE

## 2023-01-30 ENCOUNTER — ANESTHESIA EVENT (OUTPATIENT)
Dept: GASTROENTEROLOGY | Facility: HOSPITAL | Age: 78
End: 2023-01-30
Payer: MEDICARE

## 2023-01-30 ENCOUNTER — ANESTHESIA (OUTPATIENT)
Dept: GASTROENTEROLOGY | Facility: HOSPITAL | Age: 78
End: 2023-01-30
Payer: MEDICARE

## 2023-01-30 VITALS
SYSTOLIC BLOOD PRESSURE: 125 MMHG | HEART RATE: 83 BPM | RESPIRATION RATE: 16 BRPM | WEIGHT: 263 LBS | HEIGHT: 67 IN | OXYGEN SATURATION: 95 % | DIASTOLIC BLOOD PRESSURE: 70 MMHG | BODY MASS INDEX: 41.28 KG/M2

## 2023-01-30 DIAGNOSIS — D50.9 IRON DEFICIENCY ANEMIA, UNSPECIFIED IRON DEFICIENCY ANEMIA TYPE: ICD-10-CM

## 2023-01-30 LAB — GLUCOSE BLDC GLUCOMTR-MCNC: 152 MG/DL (ref 70–130)

## 2023-01-30 PROCEDURE — 43239 EGD BIOPSY SINGLE/MULTIPLE: CPT | Performed by: INTERNAL MEDICINE

## 2023-01-30 PROCEDURE — 82962 GLUCOSE BLOOD TEST: CPT

## 2023-01-30 PROCEDURE — 25010000002 PROPOFOL 10 MG/ML EMULSION: Performed by: NURSE ANESTHETIST, CERTIFIED REGISTERED

## 2023-01-30 PROCEDURE — 88305 TISSUE EXAM BY PATHOLOGIST: CPT | Performed by: INTERNAL MEDICINE

## 2023-01-30 RX ORDER — PROPOFOL 10 MG/ML
VIAL (ML) INTRAVENOUS CONTINUOUS PRN
Status: DISCONTINUED | OUTPATIENT
Start: 2023-01-30 | End: 2023-01-30 | Stop reason: SURG

## 2023-01-30 RX ORDER — SODIUM CHLORIDE 0.9 % (FLUSH) 0.9 %
10 SYRINGE (ML) INJECTION AS NEEDED
Status: DISCONTINUED | OUTPATIENT
Start: 2023-01-30 | End: 2023-01-30 | Stop reason: HOSPADM

## 2023-01-30 RX ORDER — LIDOCAINE HYDROCHLORIDE 20 MG/ML
INJECTION, SOLUTION INFILTRATION; PERINEURAL AS NEEDED
Status: DISCONTINUED | OUTPATIENT
Start: 2023-01-30 | End: 2023-01-30 | Stop reason: SURG

## 2023-01-30 RX ORDER — PROPOFOL 10 MG/ML
VIAL (ML) INTRAVENOUS AS NEEDED
Status: DISCONTINUED | OUTPATIENT
Start: 2023-01-30 | End: 2023-01-30 | Stop reason: SURG

## 2023-01-30 RX ORDER — SODIUM CHLORIDE, SODIUM LACTATE, POTASSIUM CHLORIDE, CALCIUM CHLORIDE 600; 310; 30; 20 MG/100ML; MG/100ML; MG/100ML; MG/100ML
1000 INJECTION, SOLUTION INTRAVENOUS CONTINUOUS
Status: DISCONTINUED | OUTPATIENT
Start: 2023-01-30 | End: 2023-01-30 | Stop reason: HOSPADM

## 2023-01-30 RX ADMIN — Medication 150 MG: at 12:21

## 2023-01-30 RX ADMIN — LIDOCAINE HYDROCHLORIDE 100 MG: 20 INJECTION, SOLUTION INFILTRATION; PERINEURAL at 12:21

## 2023-01-30 RX ADMIN — SODIUM CHLORIDE, POTASSIUM CHLORIDE, SODIUM LACTATE AND CALCIUM CHLORIDE 1000 ML: 600; 310; 30; 20 INJECTION, SOLUTION INTRAVENOUS at 11:37

## 2023-01-30 RX ADMIN — PROPOFOL 200 MCG/KG/MIN: 10 INJECTION, EMULSION INTRAVENOUS at 12:21

## 2023-01-30 NOTE — ANESTHESIA PREPROCEDURE EVALUATION
Anesthesia Evaluation     Patient summary reviewed and Nursing notes reviewed   NPO Solid Status: > 8 hours  NPO Liquid Status: > 4 hours           Airway   Mallampati: II  Neck ROM: full  No difficulty expected  Dental      Comment: Implants, including upper front    Pulmonary     breath sounds clear to auscultation  (+) a smoker Former, sleep apnea,   Cardiovascular     Rhythm: regular    (+) hypertension, DVT, hyperlipidemia,       Neuro/Psych  (+) weakness, numbness,    GI/Hepatic/Renal/Endo    (+) obesity, morbid obesity, GERD, GI bleeding , renal disease, diabetes mellitus, thyroid problem hypothyroidism    Musculoskeletal     Abdominal   (+) obese,    Substance History      OB/GYN          Other   arthritis,    history of cancer                    Anesthesia Plan    ASA 3     MAC     intravenous induction     Anesthetic plan, risks, benefits, and alternatives have been provided, discussed and informed consent has been obtained with: patient.        CODE STATUS:

## 2023-01-30 NOTE — ANESTHESIA POSTPROCEDURE EVALUATION
"Patient: Nathan Baez    Procedure Summary     Date: 01/30/23 Room / Location: [facility] ENDOSCOPY 6 / [facility]OSCOPY    Anesthesia Start: 1207 Anesthesia Stop: 1233    Procedure: ESOPHAGOGASTRODUODENOSCOPY with biopsies (Esophagus) Diagnosis:       Iron deficiency anemia, unspecified iron deficiency anemia type      Gastritis      (Iron deficiency anemia, unspecified iron deficiency anemia type [D50.9])    Surgeons: Jaden Chowdhury MD Provider: Randell Mullen MD    Anesthesia Type: MAC ASA Status: 3          Anesthesia Type: MAC    Vitals  Vitals Value Taken Time   /70 01/30/23 1247   Temp     Pulse 83 01/30/23 1247   Resp 16 01/30/23 1247   SpO2 95 % 01/30/23 1247           Post Anesthesia Care and Evaluation    Patient location during evaluation: bedside  Patient participation: complete - patient participated  Level of consciousness: sleepy but conscious  Pain score: 0  Pain management: adequate    Airway patency: patent  Anesthetic complications: No anesthetic complications    Cardiovascular status: acceptable  Respiratory status: acceptable  Hydration status: acceptable    Comments: /70 (BP Location: Left arm, Patient Position: Lying)   Pulse 83   Resp 16   Ht 170.2 cm (67\")   Wt 119 kg (263 lb)   SpO2 95%   BMI 41.19 kg/m²         "

## 2023-01-31 LAB
LAB AP CASE REPORT: NORMAL
PATH REPORT.FINAL DX SPEC: NORMAL
PATH REPORT.GROSS SPEC: NORMAL

## 2023-02-01 ENCOUNTER — OFFICE VISIT (OUTPATIENT)
Dept: PAIN MEDICINE | Facility: CLINIC | Age: 78
End: 2023-02-01
Payer: MEDICARE

## 2023-02-01 VITALS
TEMPERATURE: 98.2 F | WEIGHT: 266.4 LBS | HEIGHT: 67 IN | BODY MASS INDEX: 41.81 KG/M2 | DIASTOLIC BLOOD PRESSURE: 85 MMHG | OXYGEN SATURATION: 98 % | SYSTOLIC BLOOD PRESSURE: 152 MMHG | HEART RATE: 98 BPM

## 2023-02-01 DIAGNOSIS — G62.9 PERIPHERAL POLYNEUROPATHY: ICD-10-CM

## 2023-02-01 DIAGNOSIS — M51.36 DDD (DEGENERATIVE DISC DISEASE), LUMBAR: Primary | ICD-10-CM

## 2023-02-01 DIAGNOSIS — R20.2 PARESTHESIA OF BILATERAL LEGS: ICD-10-CM

## 2023-02-01 DIAGNOSIS — M48.062 SPINAL STENOSIS OF LUMBAR REGION WITH NEUROGENIC CLAUDICATION: ICD-10-CM

## 2023-02-01 PROCEDURE — 99215 OFFICE O/P EST HI 40 MIN: CPT | Performed by: NURSE PRACTITIONER

## 2023-02-01 RX ORDER — GABAPENTIN 300 MG/1
300 CAPSULE ORAL 3 TIMES DAILY
Qty: 90 CAPSULE | Refills: 1 | Status: SHIPPED | OUTPATIENT
Start: 2023-02-01 | End: 2023-03-15 | Stop reason: SDUPTHER

## 2023-02-01 NOTE — PROGRESS NOTES
"CHIEF COMPLAINT  Leg Pain     Subjective   Nathan Baez is a 77 y.o. male.   He presents to the office for evaluation of lumbar DDD. He was referred here by Dr. Damien Pierce.     Patient presents today with chronic low back and bilateral leg pain which has been going on for about the past 2 to 3 years.  The patient has undergone conservative treatment including physical therapy without benefit.  He was evaluated by Dr. Carl a year ago for a neurosurgical consultation, patient states that the surgeon recommended epidural injections rather than surgical intervention.  He had 2 lumbar epidural steroid injections at [facility] pain clinic which may have helped some but does not recall any significant benefit.  He has tried a variety of medications including topicals (OTC's), tylenol, and pain medications left over from surgeries.  He was referred to our clinic for further evaluation and treatment.    Has pain today is rated as a 3/10 VAS.  Location of the pain is primarily in both legs. He reports pain of both legs, difficult to determine specific pattern but more severe in the posterior thigh/calf.  The pain extends into bilateral feet.  He reports that the pain feels like \"nerve endings\" or like \"bugs crawling on his bones\". He is constantly moving his legs.  He finds it difficult to get comfortable at night.  The pain does get worse as the day progresses.  It is aggravated by walking, standing, many positions. He can get some relief by laying flat on his side.  He cannot tolerate even riding in a car.  He reports that he cannot stand wearing long pants due to the feeling on his legs.      He is diabetic. Insulin dependent.  Last A1C 8.2, has been as high as 13 within the past year     He is on Mirapex for RLS.     He is not on a blood thinner.     Procedure list (Hospital) ---  5/3/2022 L4-L5 LESI  8/17/2022 L4-L5 LESI    History of Present Illness     PEG Assessment   What number best describes your " pain on average in the past week?6  What number best describes how, during the past week, pain has interfered with your enjoyment of life?9  What number best describes how, during the past week, pain has interfered with your general activity?  9        Current Outpatient Medications:   •  albuterol sulfate  (90 Base) MCG/ACT inhaler, Inhale 2 puffs Every 4 (Four) Hours As Needed for Wheezing., Disp: , Rfl:   •  ALLERGY SERUM INJECTION, Inject  under the skin into the appropriate area as directed 1 (One) Time Per Week., Disp: , Rfl:   •  amLODIPine (NORVASC) 2.5 MG tablet, TAKE 1 TABLET BY MOUTH DAILY, Disp: 90 tablet, Rfl: 3  •  aspirin 81 MG EC tablet, Take 81 mg by mouth Daily., Disp: , Rfl:   •  Blood Glucose Monitoring Suppl (FreeStyle Lite) w/Device kit, USE FOR BLOOD SUGAR, Disp: , Rfl:   •  BRIMONIDINE TARTRATE OP, Apply 1 drop to eye(s) as directed by provider 2 (Two) Times a Day., Disp: , Rfl:   •  Cyanocobalamin (B-12) 1000 MCG sublingual tablet, One sublingual tab dissolved on tongue daily, Disp: 90 each, Rfl: 3  •  dorzolamide-timolol (COSOPT) 22.3-6.8 MG/ML ophthalmic solution, Administer 1 drop to both eyes 2 (Two) Times a Day., Disp: , Rfl:   •  fexofenadine (ALLEGRA) 180 MG tablet, Take 180 mg by mouth Daily., Disp: , Rfl:   •  Fluticasone-Salmeterol 232-14 MCG/ACT aerosol powder , Inhale 1 puff 2 (Two) Times a Day., Disp: , Rfl:   •  FREESTYLE LITE test strip, USE TO TEST ONCE A DAY, Disp: , Rfl:   •  furosemide (LASIX) 40 MG tablet, TAKE 1 TABLET BY MOUTH EVERY DAY, Disp: 90 tablet, Rfl: 3  •  Hydrocortisone, Perianal, (Anusol-HC) 2.5 % rectal cream, Apply rectally 3 times daily for 7-10 days during hemorrhoid flare.  Include applicator., Disp: 30 g, Rfl: 1  •  Insulin Glargine (LANTUS SOLOSTAR) 100 UNIT/ML injection pen, Inject 56 Units under the skin into the appropriate area as directed Daily., Disp: 45 mL, Rfl: 3  •  Insulin Pen Needle (Pen Needles) 31G X 5 MM misc, 1 application Daily.,  Disp: 100 each, Rfl: 3  •  ketoconazole (NIZORAL) 2 % cream, Apply 1 application topically to the appropriate area as directed Daily., Disp: 60 g, Rfl: 3  •  latanoprost (XALATAN) 0.005 % ophthalmic solution, Administer 1 drop to both eyes Every Night., Disp: , Rfl:   •  levothyroxine (SYNTHROID, LEVOTHROID) 88 MCG tablet, TAKE 1 TABLET BY MOUTH EVERY MORNING, Disp: 90 tablet, Rfl: 3  •  losartan (COZAAR) 100 MG tablet, TAKE 1 TABLET BY MOUTH DAILY, Disp: 90 tablet, Rfl: 3  •  meloxicam (MOBIC) 15 MG tablet, TAKE 1 TABLET BY MOUTH DAILY, Disp: 90 tablet, Rfl: 1  •  metFORMIN (GLUCOPHAGE) 500 MG tablet, TAKE 1 TABLET BY MOUTH TWICE DAILY WITH MEALS, Disp: 180 tablet, Rfl: 0  •  Microlet Lancets misc, 2 (Two) Times a Day. use for testing, Disp: , Rfl:   •  montelukast (SINGULAIR) 10 MG tablet, TAKE 1 TABLET BY MOUTH EVERY NIGHT, Disp: 90 tablet, Rfl: 3  •  pantoprazole (PROTONIX) 40 MG EC tablet, TAKE 1 TABLET BY MOUTH TWICE DAILY, Disp: 90 tablet, Rfl: 3  •  pramipexole (MIRAPEX) 1.5 MG tablet, Take 1 tablet by mouth At Night As Needed (RLS)., Disp: 90 tablet, Rfl: 2  •  rosuvastatin (CRESTOR) 20 MG tablet, TAKE 1 TABLET BY MOUTH EVERY EVENING, Disp: 90 tablet, Rfl: 3  •  sennosides-docusate (Stimulant Laxative) 8.6-50 MG per tablet, Take 2 tablets by mouth 2 (Two) Times a Day., Disp: 200 tablet, Rfl: 3  •  gabapentin (NEURONTIN) 300 MG capsule, Take 1 capsule by mouth 3 (Three) Times a Day. 1 po hs x 1 week, then bid x 1 week, then tid thereafter as needed/tolerated, Disp: 90 capsule, Rfl: 1    The following portions of the patient's history were reviewed and updated as appropriate: allergies, current medications, past family history, past medical history, past social history, past surgical history and problem list.      REVIEW OF PERTINENT MEDICAL DATA            Patient has been referred to our clinic by his primary care physician Dr. Damien Pierce.  I reviewed his office note 1/26/2023.  Patient was reporting  "lumbar back pain with sciatica.  Hard to sit or stand for long periods.    Patient underwent LESI L4-L5 on 5/3/2022 as well as 8/17/2022 at [facility] pain management clinic.    Review of Systems   Constitutional: Positive for activity change (decreased) and fatigue. Negative for chills and fever.   HENT: Positive for hearing loss. Negative for congestion.    Eyes: Negative for visual disturbance.   Respiratory: Positive for shortness of breath. Negative for chest tightness.    Cardiovascular: Negative for chest pain.   Gastrointestinal: Positive for constipation. Negative for abdominal pain.   Genitourinary: Negative for difficulty urinating and dysuria.   Musculoskeletal: Positive for back pain.   Neurological: Positive for weakness and light-headedness. Negative for dizziness, numbness and headaches.   Psychiatric/Behavioral: Positive for agitation and sleep disturbance. The patient is not nervous/anxious.          Vitals:    02/01/23 1017   BP: 152/85   Pulse: 98   Temp: 98.2 °F (36.8 °C)   SpO2: 98%   Weight: 121 kg (266 lb 6.4 oz)   Height: 170.2 cm (67\")   PainSc:   3   PainLoc: Back         Objective   Physical Exam  Vitals and nursing note reviewed.   Constitutional:       General: He is not in acute distress.     Appearance: Normal appearance. He is not ill-appearing.   Pulmonary:      Effort: Pulmonary effort is normal. No respiratory distress.   Musculoskeletal:      Lumbar back: Tenderness present. Negative right straight leg raise test and negative left straight leg raise test.   Neurological:      Mental Status: He is alert and oriented to person, place, and time.      Motor: Motor function is intact.      Gait: Gait abnormal (slowed).   Psychiatric:         Mood and Affect: Mood normal.         Behavior: Behavior normal.         Assessment & Plan   Diagnoses and all orders for this visit:    1. DDD (degenerative disc disease), lumbar (Primary)    2. Paresthesia of bilateral legs  -     " gabapentin (NEURONTIN) 300 MG capsule; Take 1 capsule by mouth 3 (Three) Times a Day. 1 po hs x 1 week, then bid x 1 week, then tid thereafter as needed/tolerated  Dispense: 90 capsule; Refill: 1  -     EMG & Nerve Conduction Test; Future    3. Spinal stenosis of lumbar region with neurogenic claudication    4. Peripheral polyneuropathy      --- EMG/Nerve Conduction Study   --- The patient will be started on a trial of gabapentin. he will be started on gabapentin 300 mg once nightly for one week. Will then increase to twice daily for one week. Patient will then increase to three times daily as tolerated. Reviewed potential side effects, including somnolence and dizziness.   --- We may need to seek a secondary neurosurgical opinion  --- Bilateral LTFESI could be an option, however difficult to pin down a specific dermatome   --- Neuromodulation could be an advanced option if unable to manage symptoms medically and with injections   --- Attempt to obtain records from Dr. Carl   --- Follow-up 6-8 weeks (ideally after EMG)         QIANA report has been reviewed and scanned into the patient's chart.    As the clinician, I personally reviewed the QIANA from 2/1/2023 while the patient was in the office today.    Dictated utilizing Dragon dictation.     I spent 45 minutes caring for Nathan on this date of service. This time includes time spent by me in the following activities: preparing for the visit, reviewing tests, obtaining and/or reviewing a separately obtained history, performing a medically appropriate examination and/or evaluation, counseling and educating the patient/family/caregiver, ordering medications, tests, or procedures and documenting information in the medical record

## 2023-02-16 ENCOUNTER — HOSPITAL ENCOUNTER (INPATIENT)
Facility: HOSPITAL | Age: 78
LOS: 3 days | Discharge: HOME | DRG: 871 | End: 2023-02-20
Attending: EMERGENCY MEDICINE | Admitting: INTERNAL MEDICINE
Payer: MEDICARE

## 2023-02-16 ENCOUNTER — APPOINTMENT (OUTPATIENT)
Dept: GENERAL RADIOLOGY | Facility: HOSPITAL | Age: 78
DRG: 871 | End: 2023-02-16
Payer: MEDICARE

## 2023-02-16 DIAGNOSIS — I50.9 ACUTE CONGESTIVE HEART FAILURE, UNSPECIFIED HEART FAILURE TYPE: ICD-10-CM

## 2023-02-16 DIAGNOSIS — A41.9 SEPSIS, DUE TO UNSPECIFIED ORGANISM, UNSPECIFIED WHETHER ACUTE ORGAN DYSFUNCTION PRESENT: ICD-10-CM

## 2023-02-16 DIAGNOSIS — I95.9 HYPOTENSION, UNSPECIFIED HYPOTENSION TYPE: ICD-10-CM

## 2023-02-16 DIAGNOSIS — J96.02 ACUTE RESPIRATORY FAILURE WITH HYPOXIA AND HYPERCAPNIA: Primary | ICD-10-CM

## 2023-02-16 DIAGNOSIS — L03.311 ABDOMINAL WALL CELLULITIS: ICD-10-CM

## 2023-02-16 DIAGNOSIS — J96.01 ACUTE RESPIRATORY FAILURE WITH HYPOXIA AND HYPERCAPNIA: Primary | ICD-10-CM

## 2023-02-16 LAB
ALBUMIN SERPL-MCNC: 3.9 G/DL (ref 3.5–5.2)
ALBUMIN/GLOB SERPL: 1.3 G/DL
ALP SERPL-CCNC: 135 U/L (ref 39–117)
ALT SERPL W P-5'-P-CCNC: 25 U/L (ref 1–41)
ANION GAP SERPL CALCULATED.3IONS-SCNC: 10.4 MMOL/L (ref 5–15)
AST SERPL-CCNC: 34 U/L (ref 1–40)
BILIRUB SERPL-MCNC: 0.9 MG/DL (ref 0–1.2)
BUN SERPL-MCNC: 15 MG/DL (ref 8–23)
BUN/CREAT SERPL: 13 (ref 7–25)
CALCIUM SPEC-SCNC: 8.5 MG/DL (ref 8.6–10.5)
CHLORIDE SERPL-SCNC: 102 MMOL/L (ref 98–107)
CO2 SERPL-SCNC: 26.6 MMOL/L (ref 22–29)
CREAT SERPL-MCNC: 1.15 MG/DL (ref 0.76–1.27)
D-LACTATE SERPL-SCNC: 1.5 MMOL/L (ref 0.5–2)
EGFRCR SERPLBLD CKD-EPI 2021: 66 ML/MIN/1.73
GLOBULIN UR ELPH-MCNC: 3 GM/DL
GLUCOSE SERPL-MCNC: 118 MG/DL (ref 65–99)
POTASSIUM SERPL-SCNC: 4.1 MMOL/L (ref 3.5–5.2)
PROT SERPL-MCNC: 6.9 G/DL (ref 6–8.5)
SODIUM SERPL-SCNC: 139 MMOL/L (ref 136–145)

## 2023-02-16 PROCEDURE — 99285 EMERGENCY DEPT VISIT HI MDM: CPT

## 2023-02-16 PROCEDURE — 80053 COMPREHEN METABOLIC PANEL: CPT

## 2023-02-16 PROCEDURE — 87040 BLOOD CULTURE FOR BACTERIA: CPT

## 2023-02-16 PROCEDURE — 83605 ASSAY OF LACTIC ACID: CPT

## 2023-02-16 PROCEDURE — 83880 ASSAY OF NATRIURETIC PEPTIDE: CPT | Performed by: PHYSICIAN ASSISTANT

## 2023-02-16 PROCEDURE — 93010 ELECTROCARDIOGRAM REPORT: CPT | Performed by: INTERNAL MEDICINE

## 2023-02-16 PROCEDURE — 84484 ASSAY OF TROPONIN QUANT: CPT | Performed by: PHYSICIAN ASSISTANT

## 2023-02-16 PROCEDURE — 36415 COLL VENOUS BLD VENIPUNCTURE: CPT

## 2023-02-16 PROCEDURE — 25010000002 PIPERACILLIN SOD-TAZOBACTAM PER 1 G: Performed by: PHYSICIAN ASSISTANT

## 2023-02-16 PROCEDURE — 93005 ELECTROCARDIOGRAM TRACING: CPT

## 2023-02-16 PROCEDURE — 71045 X-RAY EXAM CHEST 1 VIEW: CPT

## 2023-02-16 PROCEDURE — 84145 PROCALCITONIN (PCT): CPT | Performed by: PHYSICIAN ASSISTANT

## 2023-02-16 PROCEDURE — 85007 BL SMEAR W/DIFF WBC COUNT: CPT

## 2023-02-16 PROCEDURE — 85025 COMPLETE CBC W/AUTO DIFF WBC: CPT

## 2023-02-16 RX ORDER — SODIUM CHLORIDE 0.9 % (FLUSH) 0.9 %
10 SYRINGE (ML) INJECTION AS NEEDED
Status: DISCONTINUED | OUTPATIENT
Start: 2023-02-16 | End: 2023-02-20 | Stop reason: HOSPADM

## 2023-02-16 RX ADMIN — SODIUM CHLORIDE, POTASSIUM CHLORIDE, SODIUM LACTATE AND CALCIUM CHLORIDE 1000 ML: 600; 310; 30; 20 INJECTION, SOLUTION INTRAVENOUS at 23:56

## 2023-02-16 RX ADMIN — TAZOBACTAM SODIUM AND PIPERACILLIN SODIUM 3.38 G: 375; 3 INJECTION, SOLUTION INTRAVENOUS at 23:57

## 2023-02-17 ENCOUNTER — APPOINTMENT (OUTPATIENT)
Dept: CT IMAGING | Facility: HOSPITAL | Age: 78
DRG: 871 | End: 2023-02-17
Payer: MEDICARE

## 2023-02-17 ENCOUNTER — APPOINTMENT (OUTPATIENT)
Dept: CARDIOLOGY | Facility: HOSPITAL | Age: 78
DRG: 871 | End: 2023-02-17
Payer: MEDICARE

## 2023-02-17 PROBLEM — J96.01 ACUTE RESPIRATORY FAILURE WITH HYPOXIA AND HYPERCAPNIA: Status: ACTIVE | Noted: 2023-02-17

## 2023-02-17 PROBLEM — J96.02 ACUTE RESPIRATORY FAILURE WITH HYPOXIA AND HYPERCAPNIA (HCC): Status: ACTIVE | Noted: 2023-02-17

## 2023-02-17 LAB
ANISOCYTOSIS BLD QL: ABNORMAL
AORTIC DIMENSIONLESS INDEX: 0.8 (DI)
ARTERIAL PATENCY WRIST A: POSITIVE
ATMOSPHERIC PRESS: 746.4 MMHG
BASE EXCESS BLDA CALC-SCNC: 1.9 MMOL/L (ref 0–2)
BDY SITE: ABNORMAL
BH CV ECHO MEAS - ACS: 2.29 CM
BH CV ECHO MEAS - AO MAX PG: 11.7 MMHG
BH CV ECHO MEAS - AO MEAN PG: 7.2 MMHG
BH CV ECHO MEAS - AO ROOT DIAM: 3.1 CM
BH CV ECHO MEAS - AO V2 MAX: 171 CM/SEC
BH CV ECHO MEAS - AO V2 VTI: 31.5 CM
BH CV ECHO MEAS - AVA(I,D): 3 CM2
BH CV ECHO MEAS - EDV(CUBED): 118.2 ML
BH CV ECHO MEAS - EDV(MOD-SP2): 89 ML
BH CV ECHO MEAS - EDV(MOD-SP4): 91 ML
BH CV ECHO MEAS - EF(MOD-BP): 63.2 %
BH CV ECHO MEAS - EF(MOD-SP2): 61.8 %
BH CV ECHO MEAS - EF(MOD-SP4): 62.6 %
BH CV ECHO MEAS - ESV(CUBED): 33.4 ML
BH CV ECHO MEAS - ESV(MOD-SP2): 34 ML
BH CV ECHO MEAS - ESV(MOD-SP4): 34 ML
BH CV ECHO MEAS - FS: 34.4 %
BH CV ECHO MEAS - IVS/LVPW: 1.39 CM
BH CV ECHO MEAS - IVSD: 1.29 CM
BH CV ECHO MEAS - LA DIMENSION: 4.4 CM
BH CV ECHO MEAS - LAT PEAK E' VEL: 9 CM/SEC
BH CV ECHO MEAS - LV DIASTOLIC VOL/BSA (35-75): 40.6 CM2
BH CV ECHO MEAS - LV MASS(C)D: 203.5 GRAMS
BH CV ECHO MEAS - LV MAX PG: 11.6 MMHG
BH CV ECHO MEAS - LV MEAN PG: 4.7 MMHG
BH CV ECHO MEAS - LV SYSTOLIC VOL/BSA (12-30): 15.2 CM2
BH CV ECHO MEAS - LV V1 MAX: 170.2 CM/SEC
BH CV ECHO MEAS - LV V1 VTI: 26.3 CM
BH CV ECHO MEAS - LVIDD: 4.9 CM
BH CV ECHO MEAS - LVIDS: 3.2 CM
BH CV ECHO MEAS - LVOT AREA: 3.6 CM2
BH CV ECHO MEAS - LVOT DIAM: 2.14 CM
BH CV ECHO MEAS - LVPWD: 0.93 CM
BH CV ECHO MEAS - MED PEAK E' VEL: 7.5 CM/SEC
BH CV ECHO MEAS - MV A MAX VEL: 103.3 CM/SEC
BH CV ECHO MEAS - MV DEC SLOPE: 617.1 CM/SEC2
BH CV ECHO MEAS - MV DEC TIME: 0.21 MSEC
BH CV ECHO MEAS - MV E MAX VEL: 93.4 CM/SEC
BH CV ECHO MEAS - MV E/A: 0.9
BH CV ECHO MEAS - MV MAX PG: 5.6 MMHG
BH CV ECHO MEAS - MV MEAN PG: 2.9 MMHG
BH CV ECHO MEAS - MV P1/2T: 59.3 MSEC
BH CV ECHO MEAS - MV V2 VTI: 21.9 CM
BH CV ECHO MEAS - MVA(P1/2T): 3.7 CM2
BH CV ECHO MEAS - MVA(VTI): 4.3 CM2
BH CV ECHO MEAS - PA ACC TIME: 0.11 SEC
BH CV ECHO MEAS - PA PR(ACCEL): 30.3 MMHG
BH CV ECHO MEAS - PA V2 MAX: 157.5 CM/SEC
BH CV ECHO MEAS - QP/QS: 1.21
BH CV ECHO MEAS - RAP SYSTOLE: 3 MMHG
BH CV ECHO MEAS - RV MAX PG: 6 MMHG
BH CV ECHO MEAS - RV V1 MAX: 122.7 CM/SEC
BH CV ECHO MEAS - RV V1 VTI: 20.2 CM
BH CV ECHO MEAS - RVDD: 3.2 CM
BH CV ECHO MEAS - RVOT DIAM: 2.7 CM
BH CV ECHO MEAS - RVSP: 29 MMHG
BH CV ECHO MEAS - SI(MOD-SP2): 24.5 ML/M2
BH CV ECHO MEAS - SI(MOD-SP4): 25.4 ML/M2
BH CV ECHO MEAS - SV(LVOT): 94.9 ML
BH CV ECHO MEAS - SV(MOD-SP2): 55 ML
BH CV ECHO MEAS - SV(MOD-SP4): 57 ML
BH CV ECHO MEAS - SV(RVOT): 114.6 ML
BH CV ECHO MEAS - TAPSE (>1.6): 3.4 CM
BH CV ECHO MEAS - TR MAX PG: 25.5 MMHG
BH CV ECHO MEAS - TR MAX VEL: 252.5 CM/SEC
BH CV ECHO MEASUREMENTS AVERAGE E/E' RATIO: 11.32
BH CV XLRA - RV BASE: 2.9 CM
BH CV XLRA - RV LENGTH: 7.4 CM
BH CV XLRA - RV MID: 2.7 CM
DEPRECATED RDW RBC AUTO: 49.5 FL (ref 37–54)
EOSINOPHIL # BLD MANUAL: 0.09 10*3/MM3 (ref 0–0.4)
EOSINOPHIL NFR BLD MANUAL: 1 % (ref 0.3–6.2)
ERYTHROCYTE [DISTWIDTH] IN BLOOD BY AUTOMATED COUNT: 17.9 % (ref 12.3–15.4)
GAS FLOW AIRWAY: 4 LPM
GEN 5 2HR TROPONIN T REFLEX: 55 NG/L
GLUCOSE BLDC GLUCOMTR-MCNC: 164 MG/DL (ref 70–130)
GLUCOSE BLDC GLUCOMTR-MCNC: 203 MG/DL (ref 70–130)
HCO3 BLDA-SCNC: 29 MMOL/L (ref 22–28)
HCT VFR BLD AUTO: 30.7 % (ref 37.5–51)
HGB BLD-MCNC: 9.7 G/DL (ref 13–17.7)
HOLD SPECIMEN: NORMAL
HOLD SPECIMEN: NORMAL
HYPOCHROMIA BLD QL: ABNORMAL
LEFT ATRIUM VOLUME INDEX: 27.2 ML/M2
LYMPHOCYTES # BLD MANUAL: 0.45 10*3/MM3 (ref 0.7–3.1)
LYMPHOCYTES NFR BLD MANUAL: 9.2 % (ref 5–12)
MAXIMAL PREDICTED HEART RATE: 144 BPM
MCH RBC QN AUTO: 24.6 PG (ref 26.6–33)
MCHC RBC AUTO-ENTMCNC: 31.6 G/DL (ref 31.5–35.7)
MCV RBC AUTO: 77.7 FL (ref 79–97)
MICROCYTES BLD QL: ABNORMAL
MODALITY: ABNORMAL
MONOCYTES # BLD: 0.81 10*3/MM3 (ref 0.1–0.9)
MRSA DNA SPEC QL NAA+PROBE: NORMAL
NEUTROPHILS # BLD AUTO: 7.44 10*3/MM3 (ref 1.7–7)
NEUTROPHILS NFR BLD MANUAL: 84.7 % (ref 42.7–76)
NT-PROBNP SERPL-MCNC: 427 PG/ML (ref 0–1800)
OVALOCYTES BLD QL SMEAR: ABNORMAL
PCO2 BLDA: 56.3 MM HG (ref 35–45)
PH BLDA: 7.32 PH UNITS (ref 7.35–7.45)
PLAT MORPH BLD: NORMAL
PLATELET # BLD AUTO: 157 10*3/MM3 (ref 140–450)
PMV BLD AUTO: 9.9 FL (ref 6–12)
PO2 BLDA: 97.6 MM HG (ref 80–100)
POIKILOCYTOSIS BLD QL SMEAR: ABNORMAL
POLYCHROMASIA BLD QL SMEAR: ABNORMAL
PROCALCITONIN SERPL-MCNC: 11.5 NG/ML (ref 0–0.25)
QT INTERVAL: 343 MS
RBC # BLD AUTO: 3.95 10*6/MM3 (ref 4.14–5.8)
SAO2 % BLDCOA: 96.8 % (ref 92–99)
SINUS: 2.7 CM
STJ: 2.8 CM
STRESS TARGET HR: 122 BPM
TOTAL RATE: 18 BREATHS/MINUTE
TROPONIN T DELTA: -8 NG/L
TROPONIN T SERPL HS-MCNC: 63 NG/L
VARIANT LYMPHS NFR BLD MANUAL: 5.1 % (ref 19.6–45.3)
WBC MORPH BLD: NORMAL
WBC NRBC COR # BLD: 8.78 10*3/MM3 (ref 3.4–10.8)
WHOLE BLOOD HOLD COAG: NORMAL
WHOLE BLOOD HOLD SPECIMEN: NORMAL

## 2023-02-17 PROCEDURE — 25010000002 FUROSEMIDE PER 20 MG: Performed by: PHYSICIAN ASSISTANT

## 2023-02-17 PROCEDURE — 94799 UNLISTED PULMONARY SVC/PX: CPT

## 2023-02-17 PROCEDURE — 25010000002 IOPAMIDOL 61 % SOLUTION: Performed by: EMERGENCY MEDICINE

## 2023-02-17 PROCEDURE — 93306 TTE W/DOPPLER COMPLETE: CPT

## 2023-02-17 PROCEDURE — 25010000002 PERFLUTREN (DEFINITY) 8.476 MG IN SODIUM CHLORIDE (PF) 0.9 % 10 ML INJECTION: Performed by: INTERNAL MEDICINE

## 2023-02-17 PROCEDURE — 25010000002 CEFTRIAXONE PER 250 MG: Performed by: INTERNAL MEDICINE

## 2023-02-17 PROCEDURE — 25010000002 PIPERACILLIN SOD-TAZOBACTAM PER 1 G: Performed by: INTERNAL MEDICINE

## 2023-02-17 PROCEDURE — 94660 CPAP INITIATION&MGMT: CPT

## 2023-02-17 PROCEDURE — 82962 GLUCOSE BLOOD TEST: CPT

## 2023-02-17 PROCEDURE — 63710000001 INSULIN LISPRO (HUMAN) PER 5 UNITS: Performed by: INTERNAL MEDICINE

## 2023-02-17 PROCEDURE — 25010000002 ENOXAPARIN PER 10 MG: Performed by: INTERNAL MEDICINE

## 2023-02-17 PROCEDURE — 74177 CT ABD & PELVIS W/CONTRAST: CPT

## 2023-02-17 PROCEDURE — 84484 ASSAY OF TROPONIN QUANT: CPT | Performed by: PHYSICIAN ASSISTANT

## 2023-02-17 PROCEDURE — 25010000002 VANCOMYCIN 10 G RECONSTITUTED SOLUTION: Performed by: PHYSICIAN ASSISTANT

## 2023-02-17 PROCEDURE — 93306 TTE W/DOPPLER COMPLETE: CPT | Performed by: INTERNAL MEDICINE

## 2023-02-17 PROCEDURE — 94664 DEMO&/EVAL PT USE INHALER: CPT

## 2023-02-17 PROCEDURE — 36600 WITHDRAWAL OF ARTERIAL BLOOD: CPT

## 2023-02-17 PROCEDURE — 82803 BLOOD GASES ANY COMBINATION: CPT

## 2023-02-17 PROCEDURE — 87641 MR-STAPH DNA AMP PROBE: CPT | Performed by: INTERNAL MEDICINE

## 2023-02-17 RX ORDER — ALBUTEROL SULFATE 2.5 MG/3ML
2.5 SOLUTION RESPIRATORY (INHALATION) EVERY 4 HOURS PRN
COMMUNITY

## 2023-02-17 RX ORDER — MAGNESIUM SULFATE HEPTAHYDRATE 40 MG/ML
4 INJECTION, SOLUTION INTRAVENOUS AS NEEDED
Status: DISCONTINUED | OUTPATIENT
Start: 2023-02-17 | End: 2023-02-20 | Stop reason: HOSPADM

## 2023-02-17 RX ORDER — ENOXAPARIN SODIUM 100 MG/ML
40 INJECTION SUBCUTANEOUS DAILY
Status: DISCONTINUED | OUTPATIENT
Start: 2023-02-17 | End: 2023-02-20 | Stop reason: HOSPADM

## 2023-02-17 RX ORDER — POLYETHYLENE GLYCOL 3350 17 G/17G
17 POWDER, FOR SOLUTION ORAL DAILY PRN
Status: DISCONTINUED | OUTPATIENT
Start: 2023-02-17 | End: 2023-02-20 | Stop reason: HOSPADM

## 2023-02-17 RX ORDER — GABAPENTIN 300 MG/1
300 CAPSULE ORAL 3 TIMES DAILY
Status: DISCONTINUED | OUTPATIENT
Start: 2023-02-17 | End: 2023-02-20 | Stop reason: HOSPADM

## 2023-02-17 RX ORDER — POTASSIUM CHLORIDE 750 MG/1
40 TABLET, FILM COATED, EXTENDED RELEASE ORAL AS NEEDED
Status: DISCONTINUED | OUTPATIENT
Start: 2023-02-17 | End: 2023-02-20 | Stop reason: HOSPADM

## 2023-02-17 RX ORDER — SODIUM CHLORIDE 9 MG/ML
50 INJECTION, SOLUTION INTRAVENOUS CONTINUOUS
Status: DISCONTINUED | OUTPATIENT
Start: 2023-02-17 | End: 2023-02-19

## 2023-02-17 RX ORDER — ALBUTEROL SULFATE 90 UG/1
2 AEROSOL, METERED RESPIRATORY (INHALATION) EVERY 4 HOURS PRN
COMMUNITY

## 2023-02-17 RX ORDER — SODIUM CHLORIDE 0.9 % (FLUSH) 0.9 %
10 SYRINGE (ML) INJECTION EVERY 12 HOURS SCHEDULED
Status: DISCONTINUED | OUTPATIENT
Start: 2023-02-17 | End: 2023-02-20 | Stop reason: HOSPADM

## 2023-02-17 RX ORDER — INSULIN GLARGINE 100 [IU]/ML
40 INJECTION, SOLUTION SUBCUTANEOUS DAILY
COMMUNITY

## 2023-02-17 RX ORDER — CALCIUM GLUCONATE 20 MG/ML
1 INJECTION, SOLUTION INTRAVENOUS AS NEEDED
Status: DISCONTINUED | OUTPATIENT
Start: 2023-02-17 | End: 2023-02-20 | Stop reason: HOSPADM

## 2023-02-17 RX ORDER — IPRATROPIUM BROMIDE AND ALBUTEROL SULFATE 2.5; .5 MG/3ML; MG/3ML
3 SOLUTION RESPIRATORY (INHALATION) EVERY 6 HOURS PRN
Status: DISCONTINUED | OUTPATIENT
Start: 2023-02-17 | End: 2023-02-18 | Stop reason: SDUPTHER

## 2023-02-17 RX ORDER — LEVOTHYROXINE SODIUM 88 UG/1
88 TABLET ORAL EVERY MORNING
Status: DISCONTINUED | OUTPATIENT
Start: 2023-02-18 | End: 2023-02-20 | Stop reason: HOSPADM

## 2023-02-17 RX ORDER — BISACODYL 10 MG
10 SUPPOSITORY, RECTAL RECTAL DAILY PRN
Status: DISCONTINUED | OUTPATIENT
Start: 2023-02-17 | End: 2023-02-20 | Stop reason: HOSPADM

## 2023-02-17 RX ORDER — SODIUM CHLORIDE 9 MG/ML
40 INJECTION, SOLUTION INTRAVENOUS AS NEEDED
Status: DISCONTINUED | OUTPATIENT
Start: 2023-02-17 | End: 2023-02-20 | Stop reason: HOSPADM

## 2023-02-17 RX ORDER — DEXTROSE MONOHYDRATE 25 G/50ML
25 INJECTION, SOLUTION INTRAVENOUS
Status: DISCONTINUED | OUTPATIENT
Start: 2023-02-17 | End: 2023-02-20 | Stop reason: HOSPADM

## 2023-02-17 RX ORDER — SODIUM CHLORIDE 0.9 % (FLUSH) 0.9 %
10 SYRINGE (ML) INJECTION AS NEEDED
Status: DISCONTINUED | OUTPATIENT
Start: 2023-02-17 | End: 2023-02-20 | Stop reason: HOSPADM

## 2023-02-17 RX ORDER — MAGNESIUM SULFATE HEPTAHYDRATE 40 MG/ML
2 INJECTION, SOLUTION INTRAVENOUS AS NEEDED
Status: DISCONTINUED | OUTPATIENT
Start: 2023-02-17 | End: 2023-02-20 | Stop reason: HOSPADM

## 2023-02-17 RX ORDER — INSULIN LISPRO 100 [IU]/ML
0-14 INJECTION, SOLUTION INTRAVENOUS; SUBCUTANEOUS
Status: DISCONTINUED | OUTPATIENT
Start: 2023-02-17 | End: 2023-02-20 | Stop reason: HOSPADM

## 2023-02-17 RX ORDER — ACETAMINOPHEN 650 MG/1
650 SUPPOSITORY RECTAL EVERY 4 HOURS PRN
Status: DISCONTINUED | OUTPATIENT
Start: 2023-02-17 | End: 2023-02-20 | Stop reason: HOSPADM

## 2023-02-17 RX ORDER — ONDANSETRON 4 MG/1
4 TABLET, FILM COATED ORAL EVERY 6 HOURS PRN
Status: DISCONTINUED | OUTPATIENT
Start: 2023-02-17 | End: 2023-02-20 | Stop reason: HOSPADM

## 2023-02-17 RX ORDER — FUROSEMIDE 10 MG/ML
80 INJECTION INTRAMUSCULAR; INTRAVENOUS ONCE
Status: COMPLETED | OUTPATIENT
Start: 2023-02-17 | End: 2023-02-17

## 2023-02-17 RX ORDER — AMOXICILLIN 250 MG
2 CAPSULE ORAL 2 TIMES DAILY
Status: DISCONTINUED | OUTPATIENT
Start: 2023-02-17 | End: 2023-02-20 | Stop reason: HOSPADM

## 2023-02-17 RX ORDER — BISACODYL 5 MG/1
5 TABLET, DELAYED RELEASE ORAL DAILY PRN
Status: DISCONTINUED | OUTPATIENT
Start: 2023-02-17 | End: 2023-02-20 | Stop reason: HOSPADM

## 2023-02-17 RX ORDER — ONDANSETRON 2 MG/ML
4 INJECTION INTRAMUSCULAR; INTRAVENOUS EVERY 6 HOURS PRN
Status: DISCONTINUED | OUTPATIENT
Start: 2023-02-17 | End: 2023-02-20 | Stop reason: HOSPADM

## 2023-02-17 RX ORDER — POTASSIUM CHLORIDE 1.5 G/1.77G
40 POWDER, FOR SOLUTION ORAL AS NEEDED
Status: DISCONTINUED | OUTPATIENT
Start: 2023-02-17 | End: 2023-02-20 | Stop reason: HOSPADM

## 2023-02-17 RX ORDER — ACETAMINOPHEN 325 MG/1
650 TABLET ORAL EVERY 4 HOURS PRN
Status: DISCONTINUED | OUTPATIENT
Start: 2023-02-17 | End: 2023-02-20 | Stop reason: HOSPADM

## 2023-02-17 RX ORDER — POTASSIUM CHLORIDE 7.45 MG/ML
10 INJECTION INTRAVENOUS
Status: DISCONTINUED | OUTPATIENT
Start: 2023-02-17 | End: 2023-02-20 | Stop reason: HOSPADM

## 2023-02-17 RX ORDER — BRIMONIDINE TARTRATE 2 MG/ML
1 SOLUTION/ DROPS OPHTHALMIC 2 TIMES DAILY
COMMUNITY

## 2023-02-17 RX ORDER — IBUPROFEN 600 MG/1
1 TABLET ORAL
Status: DISCONTINUED | OUTPATIENT
Start: 2023-02-17 | End: 2023-02-20 | Stop reason: HOSPADM

## 2023-02-17 RX ORDER — NICOTINE POLACRILEX 4 MG
15 LOZENGE BUCCAL
Status: DISCONTINUED | OUTPATIENT
Start: 2023-02-17 | End: 2023-02-20 | Stop reason: HOSPADM

## 2023-02-17 RX ADMIN — PERFLUTREN 3 ML: 6.52 INJECTION, SUSPENSION INTRAVENOUS at 11:33

## 2023-02-17 RX ADMIN — FUROSEMIDE 80 MG: 10 INJECTION, SOLUTION INTRAMUSCULAR; INTRAVENOUS at 01:20

## 2023-02-17 RX ADMIN — IOPAMIDOL 85 ML: 612 INJECTION, SOLUTION INTRAVENOUS at 00:28

## 2023-02-17 RX ADMIN — INSULIN GLARGINE-YFGN 15 UNITS: 100 INJECTION, SOLUTION SUBCUTANEOUS at 11:46

## 2023-02-17 RX ADMIN — DOCUSATE SODIUM 50MG AND SENNOSIDES 8.6MG 2 TABLET: 8.6; 5 TABLET, FILM COATED ORAL at 08:48

## 2023-02-17 RX ADMIN — ENOXAPARIN SODIUM 40 MG: 100 INJECTION SUBCUTANEOUS at 08:47

## 2023-02-17 RX ADMIN — SODIUM CHLORIDE 1000 ML: 9 INJECTION, SOLUTION INTRAVENOUS at 03:14

## 2023-02-17 RX ADMIN — INSULIN LISPRO 5 UNITS: 100 INJECTION, SOLUTION INTRAVENOUS; SUBCUTANEOUS at 11:46

## 2023-02-17 RX ADMIN — GABAPENTIN 300 MG: 300 CAPSULE ORAL at 16:42

## 2023-02-17 RX ADMIN — CEFTRIAXONE SODIUM 1 G: 1 INJECTION, POWDER, FOR SOLUTION INTRAMUSCULAR; INTRAVENOUS at 13:16

## 2023-02-17 RX ADMIN — SODIUM CHLORIDE 50 ML/HR: 9 INJECTION, SOLUTION INTRAVENOUS at 07:18

## 2023-02-17 RX ADMIN — TAZOBACTAM SODIUM AND PIPERACILLIN SODIUM 3.38 G: 375; 3 INJECTION, SOLUTION INTRAVENOUS at 07:17

## 2023-02-17 RX ADMIN — VANCOMYCIN HYDROCHLORIDE 2250 MG: 10 INJECTION, POWDER, LYOPHILIZED, FOR SOLUTION INTRAVENOUS at 00:41

## 2023-02-17 RX ADMIN — INSULIN LISPRO 3 UNITS: 100 INJECTION, SOLUTION INTRAVENOUS; SUBCUTANEOUS at 18:08

## 2023-02-17 RX ADMIN — Medication 10 ML: at 08:48

## 2023-02-18 LAB
ALBUMIN SERPL-MCNC: 3.7 G/DL (ref 3.5–5.2)
ALBUMIN/GLOB SERPL: 1.2 G/DL
ALP SERPL-CCNC: 137 U/L (ref 39–117)
ALT SERPL W P-5'-P-CCNC: 22 U/L (ref 1–41)
ANION GAP SERPL CALCULATED.3IONS-SCNC: 9.1 MMOL/L (ref 5–15)
AST SERPL-CCNC: 18 U/L (ref 1–40)
BASOPHILS # BLD AUTO: 0.01 10*3/MM3 (ref 0–0.2)
BASOPHILS NFR BLD AUTO: 0.2 % (ref 0–1.5)
BILIRUB SERPL-MCNC: 0.5 MG/DL (ref 0–1.2)
BUN SERPL-MCNC: 15 MG/DL (ref 8–23)
BUN/CREAT SERPL: 15.2 (ref 7–25)
CALCIUM SPEC-SCNC: 8.9 MG/DL (ref 8.6–10.5)
CHLORIDE SERPL-SCNC: 105 MMOL/L (ref 98–107)
CO2 SERPL-SCNC: 25.9 MMOL/L (ref 22–29)
CREAT SERPL-MCNC: 0.99 MG/DL (ref 0.76–1.27)
DEPRECATED RDW RBC AUTO: 51.4 FL (ref 37–54)
EGFRCR SERPLBLD CKD-EPI 2021: 78.9 ML/MIN/1.73
EOSINOPHIL # BLD AUTO: 0.03 10*3/MM3 (ref 0–0.4)
EOSINOPHIL NFR BLD AUTO: 0.5 % (ref 0.3–6.2)
ERYTHROCYTE [DISTWIDTH] IN BLOOD BY AUTOMATED COUNT: 18.3 % (ref 12.3–15.4)
GLOBULIN UR ELPH-MCNC: 3.1 GM/DL
GLUCOSE BLDC GLUCOMTR-MCNC: 112 MG/DL (ref 70–130)
GLUCOSE BLDC GLUCOMTR-MCNC: 138 MG/DL (ref 70–130)
GLUCOSE BLDC GLUCOMTR-MCNC: 168 MG/DL (ref 70–130)
GLUCOSE BLDC GLUCOMTR-MCNC: 183 MG/DL (ref 70–130)
GLUCOSE BLDC GLUCOMTR-MCNC: 183 MG/DL (ref 70–130)
GLUCOSE SERPL-MCNC: 132 MG/DL (ref 65–99)
HCT VFR BLD AUTO: 29.9 % (ref 37.5–51)
HGB BLD-MCNC: 9.3 G/DL (ref 13–17.7)
IMM GRANULOCYTES # BLD AUTO: 0.05 10*3/MM3 (ref 0–0.05)
IMM GRANULOCYTES NFR BLD AUTO: 0.8 % (ref 0–0.5)
LYMPHOCYTES # BLD AUTO: 1.06 10*3/MM3 (ref 0.7–3.1)
LYMPHOCYTES NFR BLD AUTO: 17.5 % (ref 19.6–45.3)
MCH RBC QN AUTO: 24.5 PG (ref 26.6–33)
MCHC RBC AUTO-ENTMCNC: 31.1 G/DL (ref 31.5–35.7)
MCV RBC AUTO: 78.9 FL (ref 79–97)
MONOCYTES # BLD AUTO: 0.7 10*3/MM3 (ref 0.1–0.9)
MONOCYTES NFR BLD AUTO: 11.6 % (ref 5–12)
NEUTROPHILS NFR BLD AUTO: 4.2 10*3/MM3 (ref 1.7–7)
NEUTROPHILS NFR BLD AUTO: 69.4 % (ref 42.7–76)
NRBC BLD AUTO-RTO: 0 /100 WBC (ref 0–0.2)
PLATELET # BLD AUTO: 144 10*3/MM3 (ref 140–450)
PMV BLD AUTO: 9.6 FL (ref 6–12)
POTASSIUM SERPL-SCNC: 3.9 MMOL/L (ref 3.5–5.2)
PROT SERPL-MCNC: 6.8 G/DL (ref 6–8.5)
RBC # BLD AUTO: 3.79 10*6/MM3 (ref 4.14–5.8)
SODIUM SERPL-SCNC: 140 MMOL/L (ref 136–145)
WBC NRBC COR # BLD: 6.05 10*3/MM3 (ref 3.4–10.8)

## 2023-02-18 PROCEDURE — 94799 UNLISTED PULMONARY SVC/PX: CPT

## 2023-02-18 PROCEDURE — 82962 GLUCOSE BLOOD TEST: CPT

## 2023-02-18 PROCEDURE — 25010000002 VANCOMYCIN 10 G RECONSTITUTED SOLUTION: Performed by: INTERNAL MEDICINE

## 2023-02-18 PROCEDURE — 25010000002 CEFTRIAXONE PER 250 MG: Performed by: INTERNAL MEDICINE

## 2023-02-18 PROCEDURE — 85025 COMPLETE CBC W/AUTO DIFF WBC: CPT | Performed by: INTERNAL MEDICINE

## 2023-02-18 PROCEDURE — 25010000002 ENOXAPARIN PER 10 MG: Performed by: INTERNAL MEDICINE

## 2023-02-18 PROCEDURE — 80053 COMPREHEN METABOLIC PANEL: CPT | Performed by: INTERNAL MEDICINE

## 2023-02-18 RX ORDER — IPRATROPIUM BROMIDE AND ALBUTEROL SULFATE 2.5; .5 MG/3ML; MG/3ML
3 SOLUTION RESPIRATORY (INHALATION) EVERY 6 HOURS PRN
Status: DISCONTINUED | OUTPATIENT
Start: 2023-02-18 | End: 2023-02-20 | Stop reason: HOSPADM

## 2023-02-18 RX ORDER — BUDESONIDE AND FORMOTEROL FUMARATE DIHYDRATE 160; 4.5 UG/1; UG/1
2 AEROSOL RESPIRATORY (INHALATION)
Status: DISCONTINUED | OUTPATIENT
Start: 2023-02-18 | End: 2023-02-20 | Stop reason: HOSPADM

## 2023-02-18 RX ADMIN — INSULIN GLARGINE-YFGN 15 UNITS: 100 INJECTION, SOLUTION SUBCUTANEOUS at 08:17

## 2023-02-18 RX ADMIN — LEVOTHYROXINE SODIUM 88 MCG: 0.09 TABLET ORAL at 08:17

## 2023-02-18 RX ADMIN — GABAPENTIN 300 MG: 300 CAPSULE ORAL at 08:17

## 2023-02-18 RX ADMIN — BUDESONIDE AND FORMOTEROL FUMARATE DIHYDRATE 2 PUFF: 160; 4.5 AEROSOL RESPIRATORY (INHALATION) at 19:51

## 2023-02-18 RX ADMIN — DOCUSATE SODIUM 50MG AND SENNOSIDES 8.6MG 2 TABLET: 8.6; 5 TABLET, FILM COATED ORAL at 08:17

## 2023-02-18 RX ADMIN — CEFTRIAXONE SODIUM 1 G: 1 INJECTION, POWDER, FOR SOLUTION INTRAMUSCULAR; INTRAVENOUS at 12:59

## 2023-02-18 RX ADMIN — GABAPENTIN 300 MG: 300 CAPSULE ORAL at 20:06

## 2023-02-18 RX ADMIN — ACETAMINOPHEN 650 MG: 325 TABLET, FILM COATED ORAL at 16:42

## 2023-02-18 RX ADMIN — VANCOMYCIN HYDROCHLORIDE 1250 MG: 10 INJECTION, POWDER, LYOPHILIZED, FOR SOLUTION INTRAVENOUS at 23:49

## 2023-02-18 RX ADMIN — Medication 10 ML: at 00:14

## 2023-02-18 RX ADMIN — Medication 10 ML: at 20:07

## 2023-02-18 RX ADMIN — Medication 10 ML: at 08:20

## 2023-02-18 RX ADMIN — DOCUSATE SODIUM 50MG AND SENNOSIDES 8.6MG 2 TABLET: 8.6; 5 TABLET, FILM COATED ORAL at 20:06

## 2023-02-18 RX ADMIN — ENOXAPARIN SODIUM 40 MG: 100 INJECTION SUBCUTANEOUS at 08:17

## 2023-02-18 RX ADMIN — VANCOMYCIN HYDROCHLORIDE 1250 MG: 10 INJECTION, POWDER, LYOPHILIZED, FOR SOLUTION INTRAVENOUS at 00:13

## 2023-02-18 RX ADMIN — GABAPENTIN 300 MG: 300 CAPSULE ORAL at 16:42

## 2023-02-18 RX ADMIN — GABAPENTIN 300 MG: 300 CAPSULE ORAL at 00:13

## 2023-02-19 LAB
ALBUMIN SERPL-MCNC: 3.9 G/DL (ref 3.5–5.2)
ALBUMIN/GLOB SERPL: 1.2 G/DL
ALP SERPL-CCNC: 163 U/L (ref 39–117)
ALT SERPL W P-5'-P-CCNC: 23 U/L (ref 1–41)
ANION GAP SERPL CALCULATED.3IONS-SCNC: 8.5 MMOL/L (ref 5–15)
AST SERPL-CCNC: 22 U/L (ref 1–40)
BASOPHILS # BLD AUTO: 0.02 10*3/MM3 (ref 0–0.2)
BASOPHILS NFR BLD AUTO: 0.4 % (ref 0–1.5)
BILIRUB SERPL-MCNC: 0.5 MG/DL (ref 0–1.2)
BUN SERPL-MCNC: 13 MG/DL (ref 8–23)
BUN/CREAT SERPL: 13 (ref 7–25)
CALCIUM SPEC-SCNC: 9.6 MG/DL (ref 8.6–10.5)
CHLORIDE SERPL-SCNC: 106 MMOL/L (ref 98–107)
CO2 SERPL-SCNC: 27.5 MMOL/L (ref 22–29)
CREAT SERPL-MCNC: 1 MG/DL (ref 0.76–1.27)
DEPRECATED RDW RBC AUTO: 50.7 FL (ref 37–54)
EGFRCR SERPLBLD CKD-EPI 2021: 78 ML/MIN/1.73
EOSINOPHIL # BLD AUTO: 0.07 10*3/MM3 (ref 0–0.4)
EOSINOPHIL NFR BLD AUTO: 1.4 % (ref 0.3–6.2)
ERYTHROCYTE [DISTWIDTH] IN BLOOD BY AUTOMATED COUNT: 17.9 % (ref 12.3–15.4)
GLOBULIN UR ELPH-MCNC: 3.2 GM/DL
GLUCOSE BLDC GLUCOMTR-MCNC: 115 MG/DL (ref 70–130)
GLUCOSE BLDC GLUCOMTR-MCNC: 121 MG/DL (ref 70–130)
GLUCOSE BLDC GLUCOMTR-MCNC: 143 MG/DL (ref 70–130)
GLUCOSE BLDC GLUCOMTR-MCNC: 147 MG/DL (ref 70–130)
GLUCOSE SERPL-MCNC: 119 MG/DL (ref 65–99)
HCT VFR BLD AUTO: 31 % (ref 37.5–51)
HGB BLD-MCNC: 9.4 G/DL (ref 13–17.7)
IMM GRANULOCYTES # BLD AUTO: 0.06 10*3/MM3 (ref 0–0.05)
IMM GRANULOCYTES NFR BLD AUTO: 1.2 % (ref 0–0.5)
LYMPHOCYTES # BLD AUTO: 0.93 10*3/MM3 (ref 0.7–3.1)
LYMPHOCYTES NFR BLD AUTO: 18.4 % (ref 19.6–45.3)
MCH RBC QN AUTO: 24 PG (ref 26.6–33)
MCHC RBC AUTO-ENTMCNC: 30.3 G/DL (ref 31.5–35.7)
MCV RBC AUTO: 79.1 FL (ref 79–97)
MONOCYTES # BLD AUTO: 0.52 10*3/MM3 (ref 0.1–0.9)
MONOCYTES NFR BLD AUTO: 10.3 % (ref 5–12)
NEUTROPHILS NFR BLD AUTO: 3.46 10*3/MM3 (ref 1.7–7)
NEUTROPHILS NFR BLD AUTO: 68.3 % (ref 42.7–76)
NRBC BLD AUTO-RTO: 0 /100 WBC (ref 0–0.2)
PLATELET # BLD AUTO: 160 10*3/MM3 (ref 140–450)
PMV BLD AUTO: 9.5 FL (ref 6–12)
POTASSIUM SERPL-SCNC: 4.2 MMOL/L (ref 3.5–5.2)
PROCALCITONIN SERPL-MCNC: 8.7 NG/ML (ref 0–0.25)
PROT SERPL-MCNC: 7.1 G/DL (ref 6–8.5)
RBC # BLD AUTO: 3.92 10*6/MM3 (ref 4.14–5.8)
SODIUM SERPL-SCNC: 142 MMOL/L (ref 136–145)
VANCOMYCIN TROUGH SERPL-MCNC: <4 MCG/ML (ref 5–20)
WBC NRBC COR # BLD: 5.06 10*3/MM3 (ref 3.4–10.8)

## 2023-02-19 PROCEDURE — 94664 DEMO&/EVAL PT USE INHALER: CPT

## 2023-02-19 PROCEDURE — 25010000002 ENOXAPARIN PER 10 MG: Performed by: INTERNAL MEDICINE

## 2023-02-19 PROCEDURE — 82962 GLUCOSE BLOOD TEST: CPT

## 2023-02-19 PROCEDURE — 84145 PROCALCITONIN (PCT): CPT | Performed by: INTERNAL MEDICINE

## 2023-02-19 PROCEDURE — 94799 UNLISTED PULMONARY SVC/PX: CPT

## 2023-02-19 PROCEDURE — 25010000002 VANCOMYCIN 10 G RECONSTITUTED SOLUTION: Performed by: INTERNAL MEDICINE

## 2023-02-19 PROCEDURE — 94761 N-INVAS EAR/PLS OXIMETRY MLT: CPT

## 2023-02-19 PROCEDURE — 25010000002 CEFTRIAXONE PER 250 MG: Performed by: INTERNAL MEDICINE

## 2023-02-19 PROCEDURE — 80202 ASSAY OF VANCOMYCIN: CPT | Performed by: INTERNAL MEDICINE

## 2023-02-19 PROCEDURE — 80053 COMPREHEN METABOLIC PANEL: CPT | Performed by: INTERNAL MEDICINE

## 2023-02-19 PROCEDURE — 85025 COMPLETE CBC W/AUTO DIFF WBC: CPT | Performed by: INTERNAL MEDICINE

## 2023-02-19 RX ORDER — AMLODIPINE BESYLATE 2.5 MG/1
2.5 TABLET ORAL DAILY
Status: DISCONTINUED | OUTPATIENT
Start: 2023-02-19 | End: 2023-02-20 | Stop reason: HOSPADM

## 2023-02-19 RX ORDER — VANCOMYCIN HYDROCHLORIDE 1 G/200ML
1000 INJECTION, SOLUTION INTRAVENOUS EVERY 12 HOURS
Status: DISCONTINUED | OUTPATIENT
Start: 2023-02-20 | End: 2023-02-20 | Stop reason: HOSPADM

## 2023-02-19 RX ORDER — LOSARTAN POTASSIUM 100 MG/1
100 TABLET ORAL DAILY
Status: DISCONTINUED | OUTPATIENT
Start: 2023-02-19 | End: 2023-02-20 | Stop reason: HOSPADM

## 2023-02-19 RX ORDER — AMLODIPINE BESYLATE 2.5 MG/1
2.5 TABLET ORAL DAILY
Status: DISCONTINUED | OUTPATIENT
Start: 2023-02-20 | End: 2023-02-19

## 2023-02-19 RX ADMIN — AMLODIPINE BESYLATE 2.5 MG: 2.5 TABLET ORAL at 15:01

## 2023-02-19 RX ADMIN — GABAPENTIN 300 MG: 300 CAPSULE ORAL at 07:39

## 2023-02-19 RX ADMIN — LEVOTHYROXINE SODIUM 88 MCG: 0.09 TABLET ORAL at 07:40

## 2023-02-19 RX ADMIN — ACETAMINOPHEN 650 MG: 325 TABLET, FILM COATED ORAL at 07:40

## 2023-02-19 RX ADMIN — BUDESONIDE AND FORMOTEROL FUMARATE DIHYDRATE 2 PUFF: 160; 4.5 AEROSOL RESPIRATORY (INHALATION) at 19:55

## 2023-02-19 RX ADMIN — ENOXAPARIN SODIUM 40 MG: 100 INJECTION SUBCUTANEOUS at 07:40

## 2023-02-19 RX ADMIN — Medication 10 ML: at 08:38

## 2023-02-19 RX ADMIN — VANCOMYCIN HYDROCHLORIDE 1250 MG: 10 INJECTION, POWDER, LYOPHILIZED, FOR SOLUTION INTRAVENOUS at 22:30

## 2023-02-19 RX ADMIN — DOCUSATE SODIUM 50MG AND SENNOSIDES 8.6MG 2 TABLET: 8.6; 5 TABLET, FILM COATED ORAL at 07:40

## 2023-02-19 RX ADMIN — Medication 10 ML: at 20:38

## 2023-02-19 RX ADMIN — GABAPENTIN 300 MG: 300 CAPSULE ORAL at 20:37

## 2023-02-19 RX ADMIN — CEFTRIAXONE SODIUM 1 G: 1 INJECTION, POWDER, FOR SOLUTION INTRAMUSCULAR; INTRAVENOUS at 13:07

## 2023-02-19 RX ADMIN — GABAPENTIN 300 MG: 300 CAPSULE ORAL at 15:01

## 2023-02-19 RX ADMIN — DOCUSATE SODIUM 50MG AND SENNOSIDES 8.6MG 2 TABLET: 8.6; 5 TABLET, FILM COATED ORAL at 20:37

## 2023-02-19 RX ADMIN — LOSARTAN POTASSIUM 100 MG: 100 TABLET, FILM COATED ORAL at 15:01

## 2023-02-19 RX ADMIN — INSULIN GLARGINE-YFGN 15 UNITS: 100 INJECTION, SOLUTION SUBCUTANEOUS at 07:40

## 2023-02-19 RX ADMIN — BUDESONIDE AND FORMOTEROL FUMARATE DIHYDRATE 2 PUFF: 160; 4.5 AEROSOL RESPIRATORY (INHALATION) at 08:02

## 2023-02-20 ENCOUNTER — READMISSION MANAGEMENT (OUTPATIENT)
Dept: CALL CENTER | Facility: HOSPITAL | Age: 78
End: 2023-02-20
Payer: MEDICARE

## 2023-02-20 VITALS
HEIGHT: 67 IN | DIASTOLIC BLOOD PRESSURE: 88 MMHG | HEART RATE: 104 BPM | BODY MASS INDEX: 41.99 KG/M2 | TEMPERATURE: 98.3 F | RESPIRATION RATE: 18 BRPM | WEIGHT: 267.5 LBS | SYSTOLIC BLOOD PRESSURE: 159 MMHG | OXYGEN SATURATION: 91 %

## 2023-02-20 LAB
ALBUMIN SERPL-MCNC: 4.2 G/DL (ref 3.5–5.2)
ALBUMIN/GLOB SERPL: 1.4 G/DL
ALP SERPL-CCNC: 202 U/L (ref 39–117)
ALT SERPL W P-5'-P-CCNC: 30 U/L (ref 1–41)
ANION GAP SERPL CALCULATED.3IONS-SCNC: 8.7 MMOL/L (ref 5–15)
AST SERPL-CCNC: 28 U/L (ref 1–40)
BASOPHILS # BLD AUTO: 0.03 10*3/MM3 (ref 0–0.2)
BASOPHILS NFR BLD AUTO: 0.6 % (ref 0–1.5)
BILIRUB SERPL-MCNC: 0.5 MG/DL (ref 0–1.2)
BUN SERPL-MCNC: 12 MG/DL (ref 8–23)
BUN/CREAT SERPL: 12.8 (ref 7–25)
CALCIUM SPEC-SCNC: 9.6 MG/DL (ref 8.6–10.5)
CHLORIDE SERPL-SCNC: 104 MMOL/L (ref 98–107)
CO2 SERPL-SCNC: 27.3 MMOL/L (ref 22–29)
CREAT SERPL-MCNC: 0.94 MG/DL (ref 0.76–1.27)
DEPRECATED RDW RBC AUTO: 51.4 FL (ref 37–54)
EGFRCR SERPLBLD CKD-EPI 2021: 84 ML/MIN/1.73
EOSINOPHIL # BLD AUTO: 0.1 10*3/MM3 (ref 0–0.4)
EOSINOPHIL NFR BLD AUTO: 2 % (ref 0.3–6.2)
ERYTHROCYTE [DISTWIDTH] IN BLOOD BY AUTOMATED COUNT: 18.1 % (ref 12.3–15.4)
GLOBULIN UR ELPH-MCNC: 3 GM/DL
GLUCOSE BLDC GLUCOMTR-MCNC: 122 MG/DL (ref 70–130)
GLUCOSE BLDC GLUCOMTR-MCNC: 165 MG/DL (ref 70–130)
GLUCOSE SERPL-MCNC: 113 MG/DL (ref 65–99)
HCT VFR BLD AUTO: 32.8 % (ref 37.5–51)
HGB BLD-MCNC: 9.8 G/DL (ref 13–17.7)
IMM GRANULOCYTES # BLD AUTO: 0.09 10*3/MM3 (ref 0–0.05)
IMM GRANULOCYTES NFR BLD AUTO: 1.8 % (ref 0–0.5)
LYMPHOCYTES # BLD AUTO: 1.51 10*3/MM3 (ref 0.7–3.1)
LYMPHOCYTES NFR BLD AUTO: 30.8 % (ref 19.6–45.3)
MCH RBC QN AUTO: 23.7 PG (ref 26.6–33)
MCHC RBC AUTO-ENTMCNC: 29.9 G/DL (ref 31.5–35.7)
MCV RBC AUTO: 79.4 FL (ref 79–97)
MONOCYTES # BLD AUTO: 0.51 10*3/MM3 (ref 0.1–0.9)
MONOCYTES NFR BLD AUTO: 10.4 % (ref 5–12)
NEUTROPHILS NFR BLD AUTO: 2.66 10*3/MM3 (ref 1.7–7)
NEUTROPHILS NFR BLD AUTO: 54.4 % (ref 42.7–76)
NRBC BLD AUTO-RTO: 0 /100 WBC (ref 0–0.2)
PLATELET # BLD AUTO: 188 10*3/MM3 (ref 140–450)
PMV BLD AUTO: 9.8 FL (ref 6–12)
POTASSIUM SERPL-SCNC: 4 MMOL/L (ref 3.5–5.2)
PROT SERPL-MCNC: 7.2 G/DL (ref 6–8.5)
RBC # BLD AUTO: 4.13 10*6/MM3 (ref 4.14–5.8)
SODIUM SERPL-SCNC: 140 MMOL/L (ref 136–145)
WBC NRBC COR # BLD: 4.9 10*3/MM3 (ref 3.4–10.8)

## 2023-02-20 PROCEDURE — 25010000002 VANCOMYCIN PER 500 MG: Performed by: INTERNAL MEDICINE

## 2023-02-20 PROCEDURE — 82962 GLUCOSE BLOOD TEST: CPT

## 2023-02-20 PROCEDURE — 94799 UNLISTED PULMONARY SVC/PX: CPT

## 2023-02-20 PROCEDURE — 25010000002 ENOXAPARIN PER 10 MG: Performed by: INTERNAL MEDICINE

## 2023-02-20 PROCEDURE — 94761 N-INVAS EAR/PLS OXIMETRY MLT: CPT

## 2023-02-20 PROCEDURE — 85025 COMPLETE CBC W/AUTO DIFF WBC: CPT | Performed by: INTERNAL MEDICINE

## 2023-02-20 PROCEDURE — 80053 COMPREHEN METABOLIC PANEL: CPT | Performed by: INTERNAL MEDICINE

## 2023-02-20 PROCEDURE — 94664 DEMO&/EVAL PT USE INHALER: CPT

## 2023-02-20 RX ORDER — DOXYCYCLINE HYCLATE 100 MG
100 TABLET ORAL 2 TIMES DAILY
Qty: 14 TABLET | Refills: 0 | Status: SHIPPED | OUTPATIENT
Start: 2023-02-20 | End: 2023-02-27

## 2023-02-20 RX ORDER — AMOXICILLIN AND CLAVULANATE POTASSIUM 875; 125 MG/1; MG/1
1 TABLET, FILM COATED ORAL 2 TIMES DAILY
Qty: 14 TABLET | Refills: 0 | Status: SHIPPED | OUTPATIENT
Start: 2023-02-20 | End: 2023-02-27

## 2023-02-20 RX ADMIN — LOSARTAN POTASSIUM 100 MG: 100 TABLET, FILM COATED ORAL at 08:49

## 2023-02-20 RX ADMIN — INSULIN GLARGINE-YFGN 15 UNITS: 100 INJECTION, SOLUTION SUBCUTANEOUS at 08:53

## 2023-02-20 RX ADMIN — LEVOTHYROXINE SODIUM 88 MCG: 0.09 TABLET ORAL at 08:49

## 2023-02-20 RX ADMIN — AMLODIPINE BESYLATE 2.5 MG: 2.5 TABLET ORAL at 08:49

## 2023-02-20 RX ADMIN — DOCUSATE SODIUM 50MG AND SENNOSIDES 8.6MG 2 TABLET: 8.6; 5 TABLET, FILM COATED ORAL at 08:49

## 2023-02-20 RX ADMIN — GABAPENTIN 300 MG: 300 CAPSULE ORAL at 08:49

## 2023-02-20 RX ADMIN — Medication 10 ML: at 14:07

## 2023-02-20 RX ADMIN — BUDESONIDE AND FORMOTEROL FUMARATE DIHYDRATE 2 PUFF: 160; 4.5 AEROSOL RESPIRATORY (INHALATION) at 06:59

## 2023-02-20 RX ADMIN — ENOXAPARIN SODIUM 40 MG: 100 INJECTION SUBCUTANEOUS at 08:49

## 2023-02-20 RX ADMIN — VANCOMYCIN HYDROCHLORIDE 1000 MG: 1 INJECTION, SOLUTION INTRAVENOUS at 12:51

## 2023-02-21 ENCOUNTER — TRANSITIONAL CARE MANAGEMENT TELEPHONE ENCOUNTER (OUTPATIENT)
Dept: CALL CENTER | Facility: HOSPITAL | Age: 78
End: 2023-02-21
Payer: MEDICARE

## 2023-02-21 LAB
BACTERIA SPEC AEROBE CULT: NORMAL
BACTERIA SPEC AEROBE CULT: NORMAL

## 2023-02-21 NOTE — OUTREACH NOTE
Prep Survey    Flowsheet Row Responses   [facility] facility patient discharged from? [place]   Is LACE score < 7 ? No   Eligibility [facility]   Date of Admission 02/16/23   Date of Discharge 02/20/23   Discharge Disposition Home or Self Care   Discharge diagnosis Resp failure   Does the patient have one of the following disease processes/diagnoses(primary or secondary)? Other   Does the patient have Home health ordered? No   Is there a DME ordered? Yes   What DME was ordered? [place] for home oxygen   Prep survey completed? Yes          PAWAN A - Registered Nurse

## 2023-02-21 NOTE — OUTREACH NOTE
Call Center TCM Note    Flowsheet Row Responses   [facility] patient discharged from? [place]   Does the patient have one of the following disease processes/diagnoses(primary or secondary)? Other   TCM attempt successful? Yes   Discharge diagnosis Resp failure   Meds reviewed with patient/caregiver? Yes   Is the patient having any side effects they believe may be caused by any medication additions or changes? No   Does the patient have all medications ordered at discharge? Yes   Is the patient taking all medications as directed (includes completed medication regime)? Yes   Does the patient have an appointment with their PCP within 7 days of discharge? Yes   Has home health visited the patient within 72 hours of discharge? N/A   What DME was ordered? [place] for home oxygen   Has all DME been delivered? Yes   TCM call completed? Yes   Wrap up additional comments D/C DX: Resp failure - Pt doing pretty well. Pt was supplied oxygen connector at d/c, but not parts needed to connect it. He is going to p/u needed items today. New rx's Amoxicillin, Doxycycline in place. Pt will see PCP Dr Pierce on 02/27/2023.          Mindy Weeks MA    2/21/2023, 12:09 EST

## 2023-02-27 ENCOUNTER — OFFICE VISIT (OUTPATIENT)
Dept: FAMILY MEDICINE CLINIC | Facility: CLINIC | Age: 78
End: 2023-02-27
Payer: MEDICARE

## 2023-02-27 VITALS
OXYGEN SATURATION: 93 % | HEIGHT: 67 IN | BODY MASS INDEX: 40.06 KG/M2 | TEMPERATURE: 98.2 F | HEART RATE: 97 BPM | DIASTOLIC BLOOD PRESSURE: 70 MMHG | WEIGHT: 255.2 LBS | SYSTOLIC BLOOD PRESSURE: 146 MMHG

## 2023-02-27 DIAGNOSIS — Z09 HOSPITAL DISCHARGE FOLLOW-UP: Primary | ICD-10-CM

## 2023-02-27 DIAGNOSIS — R06.89 HYPOVENTILATION SYNDROME: ICD-10-CM

## 2023-02-27 DIAGNOSIS — L03.311 ABDOMINAL WALL CELLULITIS: ICD-10-CM

## 2023-02-27 PROCEDURE — 1111F DSCHRG MED/CURRENT MED MERGE: CPT | Performed by: FAMILY MEDICINE

## 2023-02-27 PROCEDURE — 99495 TRANSJ CARE MGMT MOD F2F 14D: CPT | Performed by: FAMILY MEDICINE

## 2023-02-27 NOTE — PROGRESS NOTES
Transitional Care Follow Up Visit  Subjective     Nathan Baez is a 77 y.o. male who presents for a transitional care management visit.    Within 48 business hours after discharge our office contacted him via telephone to coordinate his care and needs.      I reviewed and discussed the details of that call along with the discharge summary, hospital problems, inpatient lab results, inpatient diagnostic studies, and consultation reports with Nathan.     Current outpatient and discharge medications have been reconciled for the patient.  Reviewed by: Damien Pierce MD      Date of TCM Phone Call 2/20/2023   [facility]   Date of Admission 2/16/2023   Date of Discharge 2/20/2023   Discharge Disposition Home or Self Care     Risk for Readmission (LACE) Score: 13 (2/20/2023  6:00 AM)      History of Present Illness   Course During Hospital Stay:  [facility] fluids/antibiotics.  ID seen for abd wall cellulitis.  CT abd/pelvis congruent with this.  MRSA screen neg.  Blood cx neg.  Hx of strep bacteremia 10/22.  Treated on d/c with augmentin and doxy.  Had hypoxemia due to OHS and needed 2L with activity.  F/U with pulm upcoming in 3 weeks.       The following portions of the patient's history were reviewed and updated as appropriate: allergies, current medications, past family history, past medical history, past social history, past surgical history and problem list.    Review of Systems   Constitutional: Negative for activity change, appetite change and fatigue.   HENT: Negative for hearing loss and postnasal drip.    Eyes: Negative for discharge and itching.   Respiratory: Negative for cough and shortness of breath.    Cardiovascular: Negative for chest pain and leg swelling.   Gastrointestinal: Negative for abdominal distention and abdominal pain.   Endocrine: Negative for cold intolerance and heat intolerance.   Genitourinary: Negative for difficulty urinating and  flank pain.   Musculoskeletal: Negative for arthralgias and myalgias.   Skin: Positive for rash. Negative for color change.   Neurological: Negative for dizziness and facial asymmetry.   Hematological: Negative for adenopathy.   Psychiatric/Behavioral: Negative for agitation and confusion.       Objective   Physical Exam  Vitals reviewed.   Constitutional:       Appearance: He is well-developed. He is not diaphoretic.   HENT:      Head: Normocephalic and atraumatic.   Eyes:      General: No scleral icterus.     Pupils: Pupils are equal, round, and reactive to light.   Neck:      Thyroid: No thyromegaly.   Cardiovascular:      Rate and Rhythm: Normal rate and regular rhythm.      Heart sounds: No murmur heard.    No friction rub. No gallop.   Pulmonary:      Effort: Pulmonary effort is normal. No respiratory distress.      Breath sounds: No wheezing or rales.   Chest:      Chest wall: No tenderness.   Abdominal:      General: Bowel sounds are normal. There is no distension.      Palpations: Abdomen is soft.      Tenderness: There is no abdominal tenderness.   Musculoskeletal:         General: No deformity. Normal range of motion.   Lymphadenopathy:      Cervical: No cervical adenopathy.   Skin:     General: Skin is warm and dry.      Findings: No rash.      Comments: Lower pannus with slight erythema.     Neurological:      Cranial Nerves: No cranial nerve deficit.      Motor: No abnormal muscle tone.         Assessment & Plan   Diagnoses and all orders for this visit:    1. Hospital discharge follow-up (Primary)    2. Abdominal wall cellulitis    3. Hypoventilation syndrome      Abdominal wall cellulitis.  Resolving.  Use ketoconazole BID.  Finish antibiotics.    Hypoventilation syndrome.  Use O2 anytime sleeping.

## 2023-02-28 DIAGNOSIS — Z79.4 TYPE 2 DIABETES MELLITUS WITH HYPERGLYCEMIA, WITH LONG-TERM CURRENT USE OF INSULIN: ICD-10-CM

## 2023-02-28 DIAGNOSIS — E11.65 TYPE 2 DIABETES MELLITUS WITH HYPERGLYCEMIA, WITH LONG-TERM CURRENT USE OF INSULIN: ICD-10-CM

## 2023-03-01 ENCOUNTER — READMISSION MANAGEMENT (OUTPATIENT)
Dept: CALL CENTER | Facility: HOSPITAL | Age: 78
End: 2023-03-01
Payer: MEDICARE

## 2023-03-01 RX ORDER — FLURBIPROFEN SODIUM 0.3 MG/ML
SOLUTION/ DROPS OPHTHALMIC
Qty: 100 EACH | Refills: 3 | Status: SHIPPED | OUTPATIENT
Start: 2023-03-01

## 2023-03-01 NOTE — OUTREACH NOTE
Medical Week 2 Survey    Flowsheet Row Responses   [facility] patient discharged from? [place]   Does the patient have one of the following disease processes/diagnoses(primary or secondary)? Other   Week 2 attempt successful? No   Unsuccessful attempts Attempt 1          Corina Gonzalez Nurse

## 2023-03-12 RX ORDER — FUROSEMIDE 40 MG/1
TABLET ORAL
Qty: 90 TABLET | Refills: 3 | Status: SHIPPED | OUTPATIENT
Start: 2023-03-12

## 2023-03-15 ENCOUNTER — PREP FOR SURGERY (OUTPATIENT)
Dept: SURGERY | Facility: SURGERY CENTER | Age: 78
End: 2023-03-15
Payer: MEDICARE

## 2023-03-15 ENCOUNTER — OFFICE VISIT (OUTPATIENT)
Dept: PAIN MEDICINE | Facility: CLINIC | Age: 78
End: 2023-03-15
Payer: MEDICARE

## 2023-03-15 VITALS
SYSTOLIC BLOOD PRESSURE: 146 MMHG | BODY MASS INDEX: 38.89 KG/M2 | HEART RATE: 85 BPM | OXYGEN SATURATION: 100 % | DIASTOLIC BLOOD PRESSURE: 76 MMHG | WEIGHT: 247.8 LBS | HEIGHT: 67 IN | TEMPERATURE: 97.7 F

## 2023-03-15 DIAGNOSIS — M47.816 LUMBAR FACET ARTHROPATHY: Primary | ICD-10-CM

## 2023-03-15 DIAGNOSIS — M51.36 DDD (DEGENERATIVE DISC DISEASE), LUMBAR: Primary | ICD-10-CM

## 2023-03-15 DIAGNOSIS — E11.42 DIABETIC PERIPHERAL NEUROPATHY: ICD-10-CM

## 2023-03-15 DIAGNOSIS — R20.2 PARESTHESIA OF BILATERAL LEGS: ICD-10-CM

## 2023-03-15 DIAGNOSIS — M47.816 LUMBAR FACET ARTHROPATHY: ICD-10-CM

## 2023-03-15 PROCEDURE — 1125F AMNT PAIN NOTED PAIN PRSNT: CPT | Performed by: NURSE PRACTITIONER

## 2023-03-15 PROCEDURE — 99214 OFFICE O/P EST MOD 30 MIN: CPT | Performed by: NURSE PRACTITIONER

## 2023-03-15 PROCEDURE — 1159F MED LIST DOCD IN RCRD: CPT | Performed by: NURSE PRACTITIONER

## 2023-03-15 PROCEDURE — 3077F SYST BP >= 140 MM HG: CPT | Performed by: NURSE PRACTITIONER

## 2023-03-15 PROCEDURE — 3078F DIAST BP <80 MM HG: CPT | Performed by: NURSE PRACTITIONER

## 2023-03-15 PROCEDURE — 1160F RVW MEDS BY RX/DR IN RCRD: CPT | Performed by: NURSE PRACTITIONER

## 2023-03-15 RX ORDER — GABAPENTIN 300 MG/1
300 CAPSULE ORAL 3 TIMES DAILY
Qty: 270 CAPSULE | Refills: 1 | Status: SHIPPED | OUTPATIENT
Start: 2023-03-15

## 2023-03-15 NOTE — PROGRESS NOTES
"CHIEF COMPLAINT    Follow up leg pain  Follow up back pain      Subjective   Nathan Baez is a 77 y.o. male  who presents for follow-up.  He has a history of back pain, leg pain.  Since patient was last evaluated Gabapentin was initiated.  Also an EMG and nerve conduction study was performed. He reports significant improvement in the leg symptoms since starting the Gabapentin. Taking 300 mg tid and is tolerating this well.  \"It has been a miracle\".  Very little pain present in the legs since starting the Gabapentin.      Pain today is rated as a 5/10 VAS (back worse than legs today).  The pain is axial in nature, across the lower lumbar spine.  The back is made worse by walking, prolonged standing.  The pain improves with rest.      Conservative treatment ---  Physical therapy - yes  Medications - topicals, tylenol, gabapentin     Pertinent surgical treatment---  None.  Saw Dr. Carl about a year ago who recommended epidural injections prior to considering surgical intervention.    Pain Management Procedures ([facility])---  5/3/2022 L4-L5 LESI  8/17/2022 L4-L5 LESI    Back Pain  This is a chronic problem. The current episode started more than 1 year ago. The problem occurs daily. The problem has been waxing and waning since onset. The pain is present in the lumbar spine. The quality of the pain is described as aching, burning and shooting. The pain radiates to the left thigh, right thigh, left foot and right foot. The pain is at a severity of 5/10. Associated symptoms include numbness (left hand/fingers ) and weakness (generalized). Pertinent negatives include no dysuria or headaches.      PEG Assessment   What number best describes your pain on average in the past week?5  What number best describes how, during the past week, pain has interfered with your enjoyment of life?2  What number best describes how, during the past week, pain has interfered with your general activity?  2    Review of Pertinent " "Medical Data ---  EMG Dr. Scanlon      The following portions of the patient's history were reviewed and updated as appropriate: allergies, current medications, past family history, past medical history, past social history, past surgical history and problem list.    Review of Systems   Respiratory: Negative for cough and shortness of breath.    Gastrointestinal: Negative for constipation, diarrhea, nausea and vomiting.   Genitourinary: Negative for difficulty urinating and dysuria.   Musculoskeletal: Positive for back pain.   Neurological: Positive for weakness (generalized) and numbness (left hand/fingers ). Negative for dizziness, light-headedness and headaches.   Psychiatric/Behavioral: Positive for sleep disturbance. Negative for agitation and suicidal ideas. The patient is not nervous/anxious.      I have reviewed and confirmed the accuracy of the ROS as documented by the MA/LPN/RN ALY Bolaños    Vitals:    03/15/23 1014   BP: 146/76   BP Location: Left arm   Pulse: 85   Temp: 97.7 °F (36.5 °C)   TempSrc: Temporal   SpO2: 100%   Weight: 112 kg (247 lb 12.8 oz)   Height: 170.2 cm (67.01\")   PainSc:   5   PainLoc: Back         Objective   Physical Exam  Vitals and nursing note reviewed.   Constitutional:       General: He is not in acute distress.     Appearance: Normal appearance. He is not ill-appearing.   Pulmonary:      Effort: Pulmonary effort is normal. No respiratory distress.   Musculoskeletal:      Lumbar back: Tenderness and bony tenderness present. Negative right straight leg raise test and negative left straight leg raise test.      Comments: +lumbar facet tenderness/loading   Neurological:      Mental Status: He is alert and oriented to person, place, and time.      Motor: Motor function is intact.      Gait: Gait abnormal (slowed).   Psychiatric:         Mood and Affect: Mood normal.         Behavior: Behavior normal.       Assessment & Plan   Diagnoses and all orders for this visit:    1. " DDD (degenerative disc disease), lumbar (Primary)    2. Diabetic peripheral neuropathy (HCC)    3. Lumbar facet arthropathy    4. Paresthesia of bilateral legs  -     gabapentin (NEURONTIN) 300 MG capsule; Take 1 capsule by mouth 3 (Three) Times a Day.  Dispense: 270 capsule; Refill: 1      --- Bilateral L4-S1 MBB x 2, 2 weeks apart   Diagnostic Facet Joint Procedure:   MBB     The first diagnostic facet joint procedure is considered medically reasonable and necessary for the diagnosis and treatment of chronic pain for this patient due to the patient meeting all of the following criteria:    - 1. Moderate to severe chronic neck or low back pain, predominantly axial, that causes functional deficit measured on pain or disability scale.  - 2. Pain present for minimum of 3 months with documented failure to respond to noninvasive conservative management (as tolerated)  - 3. Absence of untreated radiculopathy or neurogenic claudication (except for radiculopathy caused by facet joint synovial cyst)  - 4. There is no non-facet pathology per clinical assessment or radiology studies that could explain the source of the patient’s pain, including but not limited to fracture, tumor, infection, or significant deformity.    Pain / Disability Scale    The scale used for measurement of pain and/or disability for this patient was the [place] back pain disability scale.  The score was 61 on 02/01/2023    --- Gabapentin was refilled   --- Follow-up for procedure and 1 week post in clinic         As the clinician, I personally reviewed the QIANA from 3/15/2023 while the patient was in the office today.    Patient remained masked during entire encounter. No cough present. I donned a mask and eye protection throughout entire visit. Prior to donning mask and eye protection, hand hygiene was performed, as well as when it was doffed.  I was closer than 6 feet, but not for an extended period of time. No obvious exposure to any bodily  fluids.

## 2023-03-15 NOTE — H&P (VIEW-ONLY)
"CHIEF COMPLAINT    Follow up leg pain  Follow up back pain      Subjective   Nathan Baez is a 77 y.o. male  who presents for follow-up.  He has a history of back pain, leg pain.  Since patient was last evaluated Gabapentin was initiated.  Also an EMG and nerve conduction study was performed. He reports significant improvement in the leg symptoms since starting the Gabapentin. Taking 300 mg tid and is tolerating this well.  \"It has been a miracle\".  Very little pain present in the legs since starting the Gabapentin.      Pain today is rated as a 5/10 VAS (back worse than legs today).  The pain is axial in nature, across the lower lumbar spine.  The back is made worse by walking, prolonged standing.  The pain improves with rest.      Conservative treatment ---  Physical therapy - yes  Medications - topicals, tylenol, gabapentin     Pertinent surgical treatment---  None.  Saw Dr. Carl about a year ago who recommended epidural injections prior to considering surgical intervention.    Pain Management Procedures ([facility])---  5/3/2022 L4-L5 LESI  8/17/2022 L4-L5 LESI    Back Pain  This is a chronic problem. The current episode started more than 1 year ago. The problem occurs daily. The problem has been waxing and waning since onset. The pain is present in the lumbar spine. The quality of the pain is described as aching, burning and shooting. The pain radiates to the left thigh, right thigh, left foot and right foot. The pain is at a severity of 5/10. Associated symptoms include numbness (left hand/fingers ) and weakness (generalized). Pertinent negatives include no dysuria or headaches.      PEG Assessment   What number best describes your pain on average in the past week?5  What number best describes how, during the past week, pain has interfered with your enjoyment of life?2  What number best describes how, during the past week, pain has interfered with your general activity?  2    Review of Pertinent " "Medical Data ---  EMG Dr. Scanlon      The following portions of the patient's history were reviewed and updated as appropriate: allergies, current medications, past family history, past medical history, past social history, past surgical history and problem list.    Review of Systems   Respiratory: Negative for cough and shortness of breath.    Gastrointestinal: Negative for constipation, diarrhea, nausea and vomiting.   Genitourinary: Negative for difficulty urinating and dysuria.   Musculoskeletal: Positive for back pain.   Neurological: Positive for weakness (generalized) and numbness (left hand/fingers ). Negative for dizziness, light-headedness and headaches.   Psychiatric/Behavioral: Positive for sleep disturbance. Negative for agitation and suicidal ideas. The patient is not nervous/anxious.      I have reviewed and confirmed the accuracy of the ROS as documented by the MA/LPN/RN ALY Bolaños    Vitals:    03/15/23 1014   BP: 146/76   BP Location: Left arm   Pulse: 85   Temp: 97.7 °F (36.5 °C)   TempSrc: Temporal   SpO2: 100%   Weight: 112 kg (247 lb 12.8 oz)   Height: 170.2 cm (67.01\")   PainSc:   5   PainLoc: Back         Objective   Physical Exam  Vitals and nursing note reviewed.   Constitutional:       General: He is not in acute distress.     Appearance: Normal appearance. He is not ill-appearing.   Pulmonary:      Effort: Pulmonary effort is normal. No respiratory distress.   Musculoskeletal:      Lumbar back: Tenderness and bony tenderness present. Negative right straight leg raise test and negative left straight leg raise test.      Comments: +lumbar facet tenderness/loading   Neurological:      Mental Status: He is alert and oriented to person, place, and time.      Motor: Motor function is intact.      Gait: Gait abnormal (slowed).   Psychiatric:         Mood and Affect: Mood normal.         Behavior: Behavior normal.       Assessment & Plan   Diagnoses and all orders for this visit:    1. " DDD (degenerative disc disease), lumbar (Primary)    2. Diabetic peripheral neuropathy (HCC)    3. Lumbar facet arthropathy    4. Paresthesia of bilateral legs  -     gabapentin (NEURONTIN) 300 MG capsule; Take 1 capsule by mouth 3 (Three) Times a Day.  Dispense: 270 capsule; Refill: 1      --- Bilateral L4-S1 MBB x 2, 2 weeks apart   Diagnostic Facet Joint Procedure:   MBB     The first diagnostic facet joint procedure is considered medically reasonable and necessary for the diagnosis and treatment of chronic pain for this patient due to the patient meeting all of the following criteria:    - 1. Moderate to severe chronic neck or low back pain, predominantly axial, that causes functional deficit measured on pain or disability scale.  - 2. Pain present for minimum of 3 months with documented failure to respond to noninvasive conservative management (as tolerated)  - 3. Absence of untreated radiculopathy or neurogenic claudication (except for radiculopathy caused by facet joint synovial cyst)  - 4. There is no non-facet pathology per clinical assessment or radiology studies that could explain the source of the patient’s pain, including but not limited to fracture, tumor, infection, or significant deformity.    Pain / Disability Scale    The scale used for measurement of pain and/or disability for this patient was the [place] back pain disability scale.  The score was 61 on 02/01/2023    --- Gabapentin was refilled   --- Follow-up for procedure and 1 week post in clinic         As the clinician, I personally reviewed the QIANA from 3/15/2023 while the patient was in the office today.    Patient remained masked during entire encounter. No cough present. I donned a mask and eye protection throughout entire visit. Prior to donning mask and eye protection, hand hygiene was performed, as well as when it was doffed.  I was closer than 6 feet, but not for an extended period of time. No obvious exposure to any bodily  fluids.

## 2023-03-20 ENCOUNTER — TRANSCRIBE ORDERS (OUTPATIENT)
Dept: SURGERY | Facility: SURGERY CENTER | Age: 78
End: 2023-03-20
Payer: MEDICARE

## 2023-03-20 DIAGNOSIS — Z41.9 SURGERY, ELECTIVE: Primary | ICD-10-CM

## 2023-03-20 LAB — GLUCOSE BLDC GLUCOMTR-MCNC: 147 MG/DL (ref 70–130)

## 2023-03-21 ENCOUNTER — TRANSCRIBE ORDERS (OUTPATIENT)
Dept: SURGERY | Facility: SURGERY CENTER | Age: 78
End: 2023-03-21
Payer: MEDICARE

## 2023-03-21 DIAGNOSIS — Z41.9 SURGERY, ELECTIVE: Primary | ICD-10-CM

## 2023-04-01 ENCOUNTER — TELEPHONE (OUTPATIENT)
Dept: GASTROENTEROLOGY | Facility: CLINIC | Age: 78
End: 2023-04-01
Payer: MEDICARE

## 2023-04-01 NOTE — TELEPHONE ENCOUNTER
----- Message from Jaden Chowdhury MD sent at 2/12/2023 11:08 PM EST -----  EGD normal, arrange small bowel capsule

## 2023-04-01 NOTE — TELEPHONE ENCOUNTER
Called pt and spoke with pt's wife and advised of Dr Chowdhury's note. Verb understanding.  Pt is agreeable to have capsule study. Order placed    Message sent to Ruiz for precert for capsule study.

## 2023-04-03 ENCOUNTER — APPOINTMENT (OUTPATIENT)
Dept: GENERAL RADIOLOGY | Facility: SURGERY CENTER | Age: 78
End: 2023-04-03
Payer: MEDICARE

## 2023-04-06 NOTE — DISCHARGE INSTRUCTIONS
[facility] - Post-procedure Instructions          --  While there are no absolute restrictions, it is recommended that you do not perform strenuous activity today. In the morning, you may resume your level of activity as before your block.    --  If you have a band-aid at your injection site, please remove it later today. Observe the area for any redness, swelling, pus-like drainage, or a temperature over 101°. If any of these symptoms occur, please call your doctor at [phone]. If after office hours, leave a message and the on-call provider will return your call.    --  Ice may be applied to your injection site. It is recommended you avoid direct heat (heating pad; hot tub) for 1-2 days.    --  Call [facility] at [phone] if you experience persistent headache, persistent bleeding from the injection site, or severe pain not relieved by heat or oral medication.    --  Do not make important decisions today.    --  Due to the effects of the block and/or the I.V. Sedation, DO NOT drive or operate hazardous machinery for 12 hours.  Local anesthetics may cause numbness after procedure and precautions must be taken with regards to operating equipment as well as with walking, even if ambulating with assistance of another person or with an assistive device.    --  Do not drink alcohol for 12 hours.    -- You may return to work tomorrow, or as directed by your referring doctor.    --  Occasionally you may notice a slight increase in your pain after the procedure. This should start to improve within the next 24-48 hours. Radiofrequency ablation procedure pain may last 3-4 weeks.    --  It may take as long as 3-4 days before you notice a gradual improvement in your pain and/or other symptoms.    -- You may continue to take your prescribed pain medication as needed.    --  Some normal possible side effects of steroid use could include fluid retention, increased blood sugar, dull headache,  increased sweating, increased appetite, mood swings and flushing.    --  Diabetics are recommended to watch their blood glucose level closely for 24-48 hours after the injection.    --  Must stay in PACU for 20 min upon arrival and prove no leg weakness before being discharged.    --  IN THE EVENT OF A LIFE THREATENING EMERGENCY, (CHEST PAIN, BREATHING DIFFICULTIES, PARALYSIS…) YOU SHOULD GO TO YOUR NEAREST EMERGENCY ROOM.    --  You should be contacted by our office within 2-3 days to schedule follow up or next appointment date.  If not contacted within 7 days, please call the office at [phone]    If you have even 1 hour of relief, these injections are considered successful.

## 2023-04-07 ENCOUNTER — HOSPITAL ENCOUNTER (OUTPATIENT)
Facility: SURGERY CENTER | Age: 78
Setting detail: HOSPITAL OUTPATIENT SURGERY
Discharge: HOME | End: 2023-04-07
Attending: ANESTHESIOLOGY | Admitting: ANESTHESIOLOGY
Payer: MEDICARE

## 2023-04-07 ENCOUNTER — HOSPITAL ENCOUNTER (OUTPATIENT)
Dept: GENERAL RADIOLOGY | Facility: SURGERY CENTER | Age: 78
Setting detail: HOSPITAL OUTPATIENT SURGERY
End: 2023-04-07
Payer: MEDICARE

## 2023-04-07 VITALS
DIASTOLIC BLOOD PRESSURE: 65 MMHG | WEIGHT: 235 LBS | OXYGEN SATURATION: 95 % | BODY MASS INDEX: 37.77 KG/M2 | HEIGHT: 66 IN | SYSTOLIC BLOOD PRESSURE: 150 MMHG | RESPIRATION RATE: 16 BRPM | HEART RATE: 79 BPM | TEMPERATURE: 97.5 F

## 2023-04-07 DIAGNOSIS — Z41.9 SURGERY, ELECTIVE: ICD-10-CM

## 2023-04-07 PROCEDURE — 64494 INJ PARAVERT F JNT L/S 2 LEV: CPT | Performed by: ANESTHESIOLOGY

## 2023-04-07 PROCEDURE — 64495 INJ PARAVERT F JNT L/S 3 LEV: CPT | Performed by: ANESTHESIOLOGY

## 2023-04-07 PROCEDURE — 64493 INJ PARAVERT F JNT L/S 1 LEV: CPT | Performed by: ANESTHESIOLOGY

## 2023-04-07 PROCEDURE — 77002 NEEDLE LOCALIZATION BY XRAY: CPT

## 2023-04-07 PROCEDURE — 76000 FLUOROSCOPY <1 HR PHYS/QHP: CPT

## 2023-04-07 NOTE — OP NOTE
Lumbar Medial Branch Blockade at  Bilateral L3-S1  [facility]      PREOPERATIVE DIAGNOSIS:  Lumbar spondylosis without myelopathy    POSTOPERATIVE DIAGNOSIS:  Lumbar spondylosis without myelopathy    PROCEDURE:   Diagnostic Lumbar Medial Branch Nerve Blockades, with fluoroscopy:  at the L3, L4, L5 transverse processes and the sacral alar groove)   1. 12930-JR, RT -- Lumbar Facet blocks, 1st Level  2. 82403-FP, RT -- Lumbar Facet blocks, 2nd  Level  3. 06117-FM, RT -- Lumbar Facet blocks, 3rd Level     PRE-PROCEDURE DISCUSSION WITH PATIENT:    Risks and complications were discussed with the patient prior to starting the procedure and informed consent was obtained.      SURGEON:  Lauren Houston MD    REASON FOR PROCEDURE:    The patient complains of pain that seems to have a significant axial component    SEDATION:  No sedation was used for this procedure  ANESTHETIC:  0.25% bupivacaine  STEROID:  None  TOTAL VOLUME OF SOLUTION:  8ml    DESCRIPTON OF PROCEDURE:  After obtaining informed consent, IV access was not obtained in the preoperative area.   The patient was taken to the operating room.  The patient was placed in the prone position with a pillow under the abdomen. All pressure points were well padded.  EKG, blood pressure, and pulse oximeter were monitored.  The patient was monitored and sedated by the RN under my direction. The lumbosacral area was prepped with Chloraprep and draped in a sterile fashion.     AP fluoroscopic image was used to visualize the junction of the right S1 superior articular process with the sacral ala.  The skin and subcutaneous tissue over the area was anesthetized with 1% lidocaine.  A 22-gauge spinal needle was then advanced percutaneously through the anesthetized skin tract under fluoroscopic guidance in a coaxial view to contact periosteum.  After negative aspiration, a volume of 1 mL of the local anesthetic was injected without resistance.  The image was then  optimized to maximize visualization of the junctions of the L3, L4, L5 superior articular processes with the transverse processes.  The skin and subcutaneous tissue over the areas was anesthetized with 1% lidocaine.  A 22-gauge spinal needle was then advanced percutaneously through the anesthetized skin tracts under fluoroscopic guidance in a coaxial view to contact periosteum at the target sites.  After negative aspiration, a volume of 1 mL of the local anesthetic  was injected without resistance at each of the target sites.      The same procedure was then performed on the contralateral side in the exact same fashion.        Onset of analgesia was noted.  Vital signs remained stable throughout.      ESTIMATED BLOOD LOSS:  <5 mL  SPECIMENS:  none    COMPLICATIONS:   No complications were noted.    TOLERANCE & DISCHARGE CONDITION:    The patient tolerated the procedure well.  The patient was transported to the recovery area without difficulties.  The patient was discharged to home under the care of family in stable and satisfactory condition.    Pre-procedure pain score: 5/10 at rest, 7/10 with activity  Post-procedure pain score: 0/10    PLAN OF CARE:  1. The patient was given our standard instruction sheet.  2. We discussed that Lumbar Medial Branch Blockade is a diagnostic procedure in consideration for radiofrequency ablation if two diagnostic procedures prove to be positive for significant benefit.  An alternative plan could be therapeutic lumbar branch blockades.  3. The patient is asked to keep an account of pain relief after the procedure today.  4. The patient will  Return to clinic 1-2 wks.  5. The patient will resume all medications as per the medication reconciliation sheet.

## 2023-04-12 NOTE — DISCHARGE INSTRUCTIONS
[facility] - Post-procedure Instructions          --  While there are no absolute restrictions, it is recommended that you do not perform strenuous activity today. In the morning, you may resume your level of activity as before your block.    --  If you have a band-aid at your injection site, please remove it later today. Observe the area for any redness, swelling, pus-like drainage, or a temperature over 101°. If any of these symptoms occur, please call your doctor at [phone]. If after office hours, leave a message and the on-call provider will return your call.    --  Ice may be applied to your injection site. It is recommended you avoid direct heat (heating pad; hot tub) for 1-2 days.    --  Call [facility] at [phone] if you experience persistent headache, persistent bleeding from the injection site, or severe pain not relieved by heat or oral medication.    --  Do not make important decisions today.    --  Due to the effects of the block and/or the I.V. Sedation, DO NOT drive or operate hazardous machinery for 12 hours.  Local anesthetics may cause numbness after procedure and precautions must be taken with regards to operating equipment as well as with walking, even if ambulating with assistance of another person or with an assistive device.    --  Do not drink alcohol for 12 hours.    -- You may return to work tomorrow, or as directed by your referring doctor.    --  Occasionally you may notice a slight increase in your pain after the procedure. This should start to improve within the next 24-48 hours. Radiofrequency ablation procedure pain may last 3-4 weeks.    --  It may take as long as 3-4 days before you notice a gradual improvement in your pain and/or other symptoms.    -- You may continue to take your prescribed pain medication as needed.    --  Some normal possible side effects of steroid use could include fluid retention, increased blood sugar, dull headache,  increased sweating, increased appetite, mood swings and flushing.    --  Diabetics are recommended to watch their blood glucose level closely for 24-48 hours after the injection.    --  Must stay in PACU for 20 min upon arrival and prove no leg weakness before being discharged.    --  IN THE EVENT OF A LIFE THREATENING EMERGENCY, (CHEST PAIN, BREATHING DIFFICULTIES, PARALYSIS…) YOU SHOULD GO TO YOUR NEAREST EMERGENCY ROOM.    --  You should be contacted by our office within 2-3 days to schedule follow up or next appointment date.  If not contacted within 7 days, please call the office at [phone]    If you have even 1 hour of relief, these injections are considered successful.

## 2023-04-13 NOTE — SIGNIFICANT NOTE
Patient educated on the following :    - If you are receiving Sedation for your procedure Nothing to Eat 6 hours and only clear liquids for 2 hours prior to your procedure.    -You will need to have someone drive you home after your PROCEDURE and remain with you for 24 hours after the PROCEDURE  - The date of your procedure, your are welcome to have one visitor at bedside or remain within 10-15 minutes of [facility]  -You will need to arrive at 1045 on 4/17 for your PROCEDURE  -Please contact [employer] [facility] [employer] [facility] at: [phone] with any questions and/or concerns

## 2023-04-14 ENCOUNTER — PREP FOR SURGERY (OUTPATIENT)
Dept: SURGERY | Facility: SURGERY CENTER | Age: 78
End: 2023-04-14
Payer: MEDICARE

## 2023-04-14 ENCOUNTER — OFFICE VISIT (OUTPATIENT)
Dept: PAIN MEDICINE | Facility: CLINIC | Age: 78
End: 2023-04-14
Payer: MEDICARE

## 2023-04-14 VITALS
SYSTOLIC BLOOD PRESSURE: 141 MMHG | HEIGHT: 66 IN | HEART RATE: 69 BPM | WEIGHT: 242.6 LBS | DIASTOLIC BLOOD PRESSURE: 70 MMHG | RESPIRATION RATE: 20 BRPM | TEMPERATURE: 96.8 F | OXYGEN SATURATION: 96 % | BODY MASS INDEX: 38.99 KG/M2

## 2023-04-14 DIAGNOSIS — M47.816 LUMBAR FACET ARTHROPATHY: ICD-10-CM

## 2023-04-14 DIAGNOSIS — M47.816 LUMBAR FACET ARTHROPATHY: Primary | ICD-10-CM

## 2023-04-14 DIAGNOSIS — M51.36 DDD (DEGENERATIVE DISC DISEASE), LUMBAR: Primary | ICD-10-CM

## 2023-04-14 DIAGNOSIS — E11.42 DIABETIC PERIPHERAL NEUROPATHY: ICD-10-CM

## 2023-04-14 PROCEDURE — 1160F RVW MEDS BY RX/DR IN RCRD: CPT | Performed by: NURSE PRACTITIONER

## 2023-04-14 PROCEDURE — 99214 OFFICE O/P EST MOD 30 MIN: CPT | Performed by: NURSE PRACTITIONER

## 2023-04-14 PROCEDURE — 3077F SYST BP >= 140 MM HG: CPT | Performed by: NURSE PRACTITIONER

## 2023-04-14 PROCEDURE — 1126F AMNT PAIN NOTED NONE PRSNT: CPT | Performed by: NURSE PRACTITIONER

## 2023-04-14 PROCEDURE — 1159F MED LIST DOCD IN RCRD: CPT | Performed by: NURSE PRACTITIONER

## 2023-04-14 PROCEDURE — 3078F DIAST BP <80 MM HG: CPT | Performed by: NURSE PRACTITIONER

## 2023-04-14 NOTE — PROGRESS NOTES
CHIEF COMPLAINT  Back pain    Subjective   Nathan Baez is a 77 y.o. male  who presents to the office for follow-up of procedure.  He completed a bilateral L4-S1 MBB   on  4/7/2023 performed by Dr. Houston for management of back pain. Patient reports 95% relief from the procedure x 3 days.  He actually went home and mowed the lawn which she has not been able to do in 3 years.    Pain today is rated as a 0/10 VAS.  The worst of his back pain is axial in nature, across the lower lumbar spine.  Leg symptoms are managed with Gabapentin 300 mg tid.  No adverse reactions      Conservative treatment ---  Physical therapy - yes  Medications - topicals, tylenol, gabapentin      Pertinent surgical treatment---  None.  Saw Dr. Carl about a year ago who recommended epidural injections prior to considering surgical intervention.     Pain Management Procedures ([facility])---  5/3/2022 L4-L5 LESI  8/17/2022 L4-L5 LESI    Back Pain  This is a chronic problem. The current episode started more than 1 year ago. The problem occurs daily. The problem has been waxing and waning since onset. The pain is present in the lumbar spine. The quality of the pain is described as aching, burning and shooting. The pain radiates to the left thigh, right thigh, left foot and right foot. The pain is at a severity of 0/10. Pertinent negatives include no abdominal pain, chest pain, dysuria, fever, headaches, numbness or weakness.      PEG Assessment   What number best describes your pain on average in the past week?5  What number best describes how, during the past week, pain has interfered with your enjoyment of life?0  What number best describes how, during the past week, pain has interfered with your general activity?  1    Review of Pertinent Medical Data ---      The following portions of the patient's history were reviewed and updated as appropriate: allergies, current medications, past family history, past medical history, past social  "history, past surgical history and problem list.    Review of Systems   Constitutional: Positive for activity change. Negative for fatigue and fever.   HENT: Negative for congestion.    Eyes: Negative for visual disturbance.   Respiratory: Negative for cough and chest tightness.    Cardiovascular: Negative for chest pain.   Gastrointestinal: Negative for abdominal pain, constipation and diarrhea.   Genitourinary: Negative for difficulty urinating and dysuria.   Musculoskeletal: Positive for back pain.   Neurological: Negative for dizziness, weakness, light-headedness, numbness and headaches.   Psychiatric/Behavioral: Negative for agitation, sleep disturbance and suicidal ideas. The patient is not nervous/anxious.      I have reviewed and confirmed the accuracy of the ROS as documented by the MA/LPN/RN ALY Bolaños     Vitals:    04/14/23 0952   BP: 141/70   BP Location: Left arm   Patient Position: Sitting   Cuff Size: Large Adult   Pulse: 69   Resp: 20   Temp: 96.8 °F (36 °C)   TempSrc: Temporal   SpO2: 96%   Weight: 110 kg (242 lb 9.6 oz)   Height: 167.6 cm (66\")   PainSc: 0-No pain         Objective   Physical Exam  Vitals and nursing note reviewed.   Constitutional:       General: He is not in acute distress.     Appearance: Normal appearance. He is not ill-appearing.   Pulmonary:      Effort: Pulmonary effort is normal. No respiratory distress.   Musculoskeletal:      Lumbar back: Tenderness and bony tenderness present. Negative right straight leg raise test and negative left straight leg raise test.      Comments: +lumbar facet tenderness/loading   Neurological:      Mental Status: He is alert and oriented to person, place, and time.      Motor: Motor function is intact.      Gait: Gait abnormal (slowed).   Psychiatric:         Mood and Affect: Mood normal.         Behavior: Behavior normal.         Assessment & Plan   Diagnoses and all orders for this visit:    1. DDD (degenerative disc disease), " lumbar (Primary)    2. Diabetic peripheral neuropathy    3. Lumbar facet arthropathy      --- Bilateral L4-S1 MBB #2    Diagnostic Facet Joint Procedure:   MBB     The confirmatory diagnostic facet joint procedure is considered medically reasonable and necessary for the diagnosis and treatment of chronic pain for this patient due to the patient meeting all of the following criteria:    - 1. Moderate to severe chronic neck or low back pain, predominantly axial, that causes functional deficit measured on pain or disability scale.  - 2. Pain present for minimum of 3 months with documented failure to respond to noninvasive conservative management (as tolerated)  - 3. Absence of untreated radiculopathy or neurogenic claudication (except for radiculopathy caused by facet joint synovial cyst)  - 4. There is no non-facet pathology per clinical assessment or radiology studies that could explain the source of the patient’s pain, including but not limited to fracture, tumor, infection, or significant deformity.    The patient has also shown a consistent positive response of at least 80% relief of primary (index) pain (with the duration of relief being consistent with the agent used).    Pain / Disability Scale     The scale used for measurement of pain and/or disability for this patient was the [place] back pain disability scale.  The score was 61 on 02/01/2023    --- continue Gabapentin   --- Follow-up for procedure and in clinic 1 week post           As the clinician, I personally reviewed the QIANA from 4/14/2023 while the patient was in the office today.     Dictated utilizing Dragon dictation.

## 2023-04-14 NOTE — H&P (VIEW-ONLY)
CHIEF COMPLAINT  Back pain    Subjective   Nathan Baez is a 77 y.o. male  who presents to the office for follow-up of procedure.  He completed a bilateral L4-S1 MBB   on  4/7/2023 performed by Dr. Houston for management of back pain. Patient reports 95% relief from the procedure x 3 days.  He actually went home and mowed the lawn which she has not been able to do in 3 years.    Pain today is rated as a 0/10 VAS.  The worst of his back pain is axial in nature, across the lower lumbar spine.  Leg symptoms are managed with Gabapentin 300 mg tid.  No adverse reactions      Conservative treatment ---  Physical therapy - yes  Medications - topicals, tylenol, gabapentin      Pertinent surgical treatment---  None.  Saw Dr. Carl about a year ago who recommended epidural injections prior to considering surgical intervention.     Pain Management Procedures ([facility])---  5/3/2022 L4-L5 LESI  8/17/2022 L4-L5 LESI    Back Pain  This is a chronic problem. The current episode started more than 1 year ago. The problem occurs daily. The problem has been waxing and waning since onset. The pain is present in the lumbar spine. The quality of the pain is described as aching, burning and shooting. The pain radiates to the left thigh, right thigh, left foot and right foot. The pain is at a severity of 0/10. Pertinent negatives include no abdominal pain, chest pain, dysuria, fever, headaches, numbness or weakness.      PEG Assessment   What number best describes your pain on average in the past week?5  What number best describes how, during the past week, pain has interfered with your enjoyment of life?0  What number best describes how, during the past week, pain has interfered with your general activity?  1    Review of Pertinent Medical Data ---      The following portions of the patient's history were reviewed and updated as appropriate: allergies, current medications, past family history, past medical history, past social  "history, past surgical history and problem list.    Review of Systems   Constitutional: Positive for activity change. Negative for fatigue and fever.   HENT: Negative for congestion.    Eyes: Negative for visual disturbance.   Respiratory: Negative for cough and chest tightness.    Cardiovascular: Negative for chest pain.   Gastrointestinal: Negative for abdominal pain, constipation and diarrhea.   Genitourinary: Negative for difficulty urinating and dysuria.   Musculoskeletal: Positive for back pain.   Neurological: Negative for dizziness, weakness, light-headedness, numbness and headaches.   Psychiatric/Behavioral: Negative for agitation, sleep disturbance and suicidal ideas. The patient is not nervous/anxious.      I have reviewed and confirmed the accuracy of the ROS as documented by the MA/LPN/RN ALY Bolaños     Vitals:    04/14/23 0952   BP: 141/70   BP Location: Left arm   Patient Position: Sitting   Cuff Size: Large Adult   Pulse: 69   Resp: 20   Temp: 96.8 °F (36 °C)   TempSrc: Temporal   SpO2: 96%   Weight: 110 kg (242 lb 9.6 oz)   Height: 167.6 cm (66\")   PainSc: 0-No pain         Objective   Physical Exam  Vitals and nursing note reviewed.   Constitutional:       General: He is not in acute distress.     Appearance: Normal appearance. He is not ill-appearing.   Pulmonary:      Effort: Pulmonary effort is normal. No respiratory distress.   Musculoskeletal:      Lumbar back: Tenderness and bony tenderness present. Negative right straight leg raise test and negative left straight leg raise test.      Comments: +lumbar facet tenderness/loading   Neurological:      Mental Status: He is alert and oriented to person, place, and time.      Motor: Motor function is intact.      Gait: Gait abnormal (slowed).   Psychiatric:         Mood and Affect: Mood normal.         Behavior: Behavior normal.         Assessment & Plan   Diagnoses and all orders for this visit:    1. DDD (degenerative disc disease), " lumbar (Primary)    2. Diabetic peripheral neuropathy    3. Lumbar facet arthropathy      --- Bilateral L4-S1 MBB #2    Diagnostic Facet Joint Procedure:   MBB     The confirmatory diagnostic facet joint procedure is considered medically reasonable and necessary for the diagnosis and treatment of chronic pain for this patient due to the patient meeting all of the following criteria:    - 1. Moderate to severe chronic neck or low back pain, predominantly axial, that causes functional deficit measured on pain or disability scale.  - 2. Pain present for minimum of 3 months with documented failure to respond to noninvasive conservative management (as tolerated)  - 3. Absence of untreated radiculopathy or neurogenic claudication (except for radiculopathy caused by facet joint synovial cyst)  - 4. There is no non-facet pathology per clinical assessment or radiology studies that could explain the source of the patient’s pain, including but not limited to fracture, tumor, infection, or significant deformity.    The patient has also shown a consistent positive response of at least 80% relief of primary (index) pain (with the duration of relief being consistent with the agent used).    Pain / Disability Scale     The scale used for measurement of pain and/or disability for this patient was the [place] back pain disability scale.  The score was 61 on 02/01/2023    --- continue Gabapentin   --- Follow-up for procedure and in clinic 1 week post           As the clinician, I personally reviewed the QIANA from 4/14/2023 while the patient was in the office today.     Dictated utilizing Dragon dictation.

## 2023-04-17 ENCOUNTER — HOSPITAL ENCOUNTER (OUTPATIENT)
Facility: SURGERY CENTER | Age: 78
Setting detail: HOSPITAL OUTPATIENT SURGERY
Discharge: HOME | End: 2023-04-17
Attending: ANESTHESIOLOGY | Admitting: ANESTHESIOLOGY
Payer: MEDICARE

## 2023-04-17 ENCOUNTER — HOSPITAL ENCOUNTER (OUTPATIENT)
Dept: GENERAL RADIOLOGY | Facility: SURGERY CENTER | Age: 78
Setting detail: HOSPITAL OUTPATIENT SURGERY
End: 2023-04-17
Payer: MEDICARE

## 2023-04-17 VITALS
HEIGHT: 67 IN | OXYGEN SATURATION: 97 % | DIASTOLIC BLOOD PRESSURE: 70 MMHG | RESPIRATION RATE: 16 BRPM | WEIGHT: 230 LBS | TEMPERATURE: 98.4 F | SYSTOLIC BLOOD PRESSURE: 115 MMHG | HEART RATE: 84 BPM | BODY MASS INDEX: 36.1 KG/M2

## 2023-04-17 DIAGNOSIS — Z41.9 SURGERY, ELECTIVE: ICD-10-CM

## 2023-04-17 PROCEDURE — 64493 INJ PARAVERT F JNT L/S 1 LEV: CPT | Performed by: ANESTHESIOLOGY

## 2023-04-17 PROCEDURE — 64494 INJ PARAVERT F JNT L/S 2 LEV: CPT | Performed by: ANESTHESIOLOGY

## 2023-04-17 PROCEDURE — 77002 NEEDLE LOCALIZATION BY XRAY: CPT

## 2023-04-17 PROCEDURE — 64495 INJ PARAVERT F JNT L/S 3 LEV: CPT | Performed by: ANESTHESIOLOGY

## 2023-04-17 PROCEDURE — 76000 FLUOROSCOPY <1 HR PHYS/QHP: CPT

## 2023-04-17 RX ORDER — SODIUM CHLORIDE 0.9 % (FLUSH) 0.9 %
10 SYRINGE (ML) INJECTION EVERY 12 HOURS SCHEDULED
Status: DISCONTINUED | OUTPATIENT
Start: 2023-04-17 | End: 2023-04-17 | Stop reason: HOSPADM

## 2023-04-17 RX ORDER — SODIUM CHLORIDE 0.9 % (FLUSH) 0.9 %
10 SYRINGE (ML) INJECTION AS NEEDED
Status: DISCONTINUED | OUTPATIENT
Start: 2023-04-17 | End: 2023-04-17 | Stop reason: HOSPADM

## 2023-04-17 NOTE — OP NOTE
Lumbar Medial Branch Blockade at  Bilateral L3-S1  [facility]      PREOPERATIVE DIAGNOSIS:  Lumbar spondylosis without myelopathy    POSTOPERATIVE DIAGNOSIS:  Lumbar spondylosis without myelopathy    PROCEDURE:   Diagnostic Lumbar Medial Branch Nerve Blockades, with fluoroscopy:  at the L3, L4, L5 transverse processes and the sacral alar groove)   1. 43726-US, RT -- Lumbar Facet blocks, 1st Level  2. 56195-AI, RT -- Lumbar Facet blocks, 2nd  Level  3. 96097-UK, RT -- Lumbar Facet blocks, 3rd Level     PRE-PROCEDURE DISCUSSION WITH PATIENT:    Risks and complications were discussed with the patient prior to starting the procedure and informed consent was obtained.      SURGEON:  Lauren Houston MD    REASON FOR PROCEDURE:    The patient complains of pain that seems to have a significant axial component and Previous diagnostic positivity of a Lumbar Medial Branch Blockade at the same levels. Due to significant spondylosis in more than 2 levels of facet joints, 3 levels will be treated on this patient.      SEDATION:  No sedation was used for this procedure  ANESTHETIC:  0.25% bupivacaine  STEROID:  None  TOTAL VOLUME OF SOLUTION:  8ml    DESCRIPTON OF PROCEDURE:  After obtaining informed consent, IV access was not obtained in the preoperative area.   The patient was taken to the operating room.  The patient was placed in the prone position with a pillow under the abdomen. All pressure points were well padded.  EKG, blood pressure, and pulse oximeter were monitored.  The patient was monitored and sedated by the RN under my direction. The lumbosacral area was prepped with Chloraprep and draped in a sterile fashion.     AP fluoroscopic image was used to visualize the junction of the right S1 superior articular process with the sacral ala.  The skin and subcutaneous tissue over the area was anesthetized with 1% lidocaine.  A 22-gauge spinal needle was then advanced percutaneously through the  anesthetized skin tract under fluoroscopic guidance in a coaxial view to contact periosteum.  After negative aspiration, a volume of 1 mL of the local anesthetic was injected without resistance.  The image was then optimized to maximize visualization of the junctions of the L3, L4, L5 superior articular processes with the transverse processes.  The skin and subcutaneous tissue over the areas was anesthetized with 1% lidocaine.  A 22-gauge spinal needle was then advanced percutaneously through the anesthetized skin tracts under fluoroscopic guidance in a coaxial view to contact periosteum at the target sites.  After negative aspiration, a volume of 1 mL of the local anesthetic  was injected without resistance at each of the target sites.      The same procedure was then performed on the contralateral side in the exact same fashion.        Onset of analgesia was noted.  Vital signs remained stable throughout.      ESTIMATED BLOOD LOSS:  <5 mL  SPECIMENS:  none    COMPLICATIONS:   No complications were noted.    TOLERANCE & DISCHARGE CONDITION:    The patient tolerated the procedure well.  The patient was transported to the recovery area without difficulties.  The patient was discharged to home under the care of family in stable and satisfactory condition.    Pre-procedure pain score: 5/10 at rest, 7/10 with activity  Post-procedure pain score: 0/10    PLAN OF CARE:  1. The patient was given our standard instruction sheet.  2. We discussed that Lumbar Medial Branch Blockade is a diagnostic procedure in consideration for radiofrequency ablation if two diagnostic procedures prove to be positive for significant benefit.  An alternative plan could be therapeutic lumbar branch blockades.  3. The patient is asked to keep an account of pain relief after the procedure today.  4. The patient will  Return to clinic 1-2 wks.  5. The patient will resume all medications as per the medication reconciliation sheet.

## 2023-04-19 DIAGNOSIS — E11.65 TYPE 2 DIABETES MELLITUS WITH HYPERGLYCEMIA, WITHOUT LONG-TERM CURRENT USE OF INSULIN: ICD-10-CM

## 2023-04-25 ENCOUNTER — OFFICE VISIT (OUTPATIENT)
Dept: PAIN MEDICINE | Facility: CLINIC | Age: 78
End: 2023-04-25
Payer: MEDICARE

## 2023-04-25 ENCOUNTER — PREP FOR SURGERY (OUTPATIENT)
Dept: SURGERY | Facility: SURGERY CENTER | Age: 78
End: 2023-04-25
Payer: MEDICARE

## 2023-04-25 VITALS
BODY MASS INDEX: 37.48 KG/M2 | HEIGHT: 67 IN | HEART RATE: 90 BPM | TEMPERATURE: 97.8 F | OXYGEN SATURATION: 95 % | DIASTOLIC BLOOD PRESSURE: 68 MMHG | SYSTOLIC BLOOD PRESSURE: 118 MMHG | WEIGHT: 238.8 LBS

## 2023-04-25 DIAGNOSIS — M51.36 DDD (DEGENERATIVE DISC DISEASE), LUMBAR: Primary | ICD-10-CM

## 2023-04-25 DIAGNOSIS — M47.816 LUMBAR FACET ARTHROPATHY: ICD-10-CM

## 2023-04-25 DIAGNOSIS — E11.42 DIABETIC PERIPHERAL NEUROPATHY: ICD-10-CM

## 2023-04-25 DIAGNOSIS — M47.816 LUMBAR FACET ARTHROPATHY: Primary | ICD-10-CM

## 2023-04-25 PROCEDURE — 99214 OFFICE O/P EST MOD 30 MIN: CPT | Performed by: NURSE PRACTITIONER

## 2023-04-25 PROCEDURE — 1125F AMNT PAIN NOTED PAIN PRSNT: CPT | Performed by: NURSE PRACTITIONER

## 2023-04-25 PROCEDURE — 1159F MED LIST DOCD IN RCRD: CPT | Performed by: NURSE PRACTITIONER

## 2023-04-25 PROCEDURE — 3074F SYST BP LT 130 MM HG: CPT | Performed by: NURSE PRACTITIONER

## 2023-04-25 PROCEDURE — 3078F DIAST BP <80 MM HG: CPT | Performed by: NURSE PRACTITIONER

## 2023-04-25 PROCEDURE — 1160F RVW MEDS BY RX/DR IN RCRD: CPT | Performed by: NURSE PRACTITIONER

## 2023-04-25 RX ORDER — SODIUM CHLORIDE 0.9 % (FLUSH) 0.9 %
10 SYRINGE (ML) INJECTION AS NEEDED
OUTPATIENT
Start: 2023-04-25

## 2023-04-25 RX ORDER — SODIUM CHLORIDE 0.9 % (FLUSH) 0.9 %
10 SYRINGE (ML) INJECTION EVERY 12 HOURS SCHEDULED
OUTPATIENT
Start: 2023-04-25

## 2023-04-25 NOTE — H&P (VIEW-ONLY)
CHIEF COMPLAINT  Procedure follow up - Lumbar Medial Branch Blockade at  Bilateral L3-S1    Subjective   Nathan Baez is a 77 y.o. male  who presents to the office for follow-up of procedure.  He completed a Lumbar Medial Branch Blockade at  Bilateral L3-S1   on  4/17/2023 performed by Dr. Houston for management of back pain. Patient reports 100% relief for a few hours after the procedure.      Bilateral L4-S1 MBB   on  4/7/2023 --- 95% relief from the procedure x 3 days.  He actually went home and mowed the lawn which she has not been able to do in 3 years.     Pain today is rated as a 5/10 VAS.  The worst of his back pain is axial in nature, across the lower lumbar spine.  Leg symptoms are managed with Gabapentin 300 mg tid.  No adverse reactions     Conservative treatment ---  Physical therapy - yes  Medications - topicals, tylenol, gabapentin      Pertinent surgical treatment---  None.  Saw Dr. aCrl about a year ago who recommended epidural injections prior to considering surgical intervention.     Pain Management Procedures ([facility]---  5/3/2022 L4-L5 LESI  8/17/2022 L4-L5 LESI    Back Pain  This is a chronic problem. The current episode started more than 1 year ago. The problem occurs daily. The problem has been waxing and waning since onset. The pain is present in the lumbar spine. The quality of the pain is described as aching, burning and shooting. The pain radiates to the left thigh, right thigh, left foot and right foot. The pain is at a severity of 5/10. Associated symptoms include numbness (left thumb, middle finger). Pertinent negatives include no abdominal pain, chest pain, dysuria, fever, headaches or weakness.     PEG Assessment   What number best describes your pain on average in the past week?6  What number best describes how, during the past week, pain has interfered with your enjoyment of life?6  What number best describes how, during the past week, pain has interfered with your  "general activity?  6    Review of Pertinent Medical Data ---      The following portions of the patient's history were reviewed and updated as appropriate: allergies, current medications, past family history, past medical history, past social history, past surgical history and problem list.    Review of Systems   Constitutional: Negative for chills, fatigue and fever.   Respiratory: Negative for cough and shortness of breath.    Cardiovascular: Negative for chest pain.   Gastrointestinal: Negative for abdominal pain, constipation and diarrhea.   Genitourinary: Negative for difficulty urinating and dysuria.   Musculoskeletal: Positive for back pain and gait problem (ambulates with a cane).   Neurological: Positive for numbness (left thumb, middle finger). Negative for dizziness, weakness, light-headedness and headaches.   Psychiatric/Behavioral: Negative for sleep disturbance.     I have reviewed and confirmed the accuracy of the ROS as documented by the MA/LPN/RN ALY Bolaños     Vitals:    04/25/23 1012   BP: 118/68   BP Location: Left arm   Patient Position: Sitting   Pulse: 90   Temp: 97.8 °F (36.6 °C)   TempSrc: Temporal   SpO2: 95%   Weight: 108 kg (238 lb 12.8 oz)   Height: 170.2 cm (67\")   PainSc:   5   PainLoc: Back         Objective   Physical Exam  Vitals and nursing note reviewed.   Constitutional:       General: He is not in acute distress.     Appearance: Normal appearance. He is not ill-appearing.   Pulmonary:      Effort: Pulmonary effort is normal. No respiratory distress.   Musculoskeletal:      Lumbar back: Tenderness and bony tenderness present.      Comments: +lumbar facet tenderness/loading   Neurological:      Mental Status: He is alert and oriented to person, place, and time.      Motor: Motor function is intact.      Gait: Gait abnormal (slowed).   Psychiatric:         Mood and Affect: Mood normal.         Behavior: Behavior normal.             Assessment & Plan   Diagnoses and all " orders for this visit:    1. DDD (degenerative disc disease), lumbar (Primary)    2. Diabetic peripheral neuropathy    3. Lumbar facet arthropathy      --- Bilateral L3-S1 RFA (patient does not have a laterality preference for first procedure)    Thermal Radiofrequency Destruction    This initial thermal radiofrequency destruction (RFA) of cervical, thoracic, or lumbar paravertebral facet joint (medial branch) nerves is considered medically reasonable and necessary as this patient has met the criteria of having at least two (2) medically reasonable and necessary diagnostic MBBs, with each one providing a consistent minimum of 80% sustained relief of primary (index) pain (with the duration of relief being consistent with the agent used).    Pain / Disability Scale     The scale used for measurement of pain and/or disability for this patient was the [place] back pain disability scale.  The score was 61 on 02/01/2023    --- Follow-up for procedure and 6 weeks post RFA          As the clinician, I personally reviewed the QIANA from 4/25/2023 while the patient was in the office today.    Dictated utilizing Dragon dictation.

## 2023-04-25 NOTE — PROGRESS NOTES
CHIEF COMPLAINT  Procedure follow up - Lumbar Medial Branch Blockade at  Bilateral L3-S1    Subjective   Nathan Baez is a 77 y.o. male  who presents to the office for follow-up of procedure.  He completed a Lumbar Medial Branch Blockade at  Bilateral L3-S1   on  4/17/2023 performed by Dr. Houston for management of back pain. Patient reports 100% relief for a few hours after the procedure.      Bilateral L4-S1 MBB   on  4/7/2023 --- 95% relief from the procedure x 3 days.  He actually went home and mowed the lawn which she has not been able to do in 3 years.     Pain today is rated as a 5/10 VAS.  The worst of his back pain is axial in nature, across the lower lumbar spine.  Leg symptoms are managed with Gabapentin 300 mg tid.  No adverse reactions     Conservative treatment ---  Physical therapy - yes  Medications - topicals, tylenol, gabapentin      Pertinent surgical treatment---  None.  Saw Dr. Carl about a year ago who recommended epidural injections prior to considering surgical intervention.     Pain Management Procedures ([facility])---  5/3/2022 L4-L5 LESI  8/17/2022 L4-L5 LESI    Back Pain  This is a chronic problem. The current episode started more than 1 year ago. The problem occurs daily. The problem has been waxing and waning since onset. The pain is present in the lumbar spine. The quality of the pain is described as aching, burning and shooting. The pain radiates to the left thigh, right thigh, left foot and right foot. The pain is at a severity of 5/10. Associated symptoms include numbness (left thumb, middle finger). Pertinent negatives include no abdominal pain, chest pain, dysuria, fever, headaches or weakness.     PEG Assessment   What number best describes your pain on average in the past week?6  What number best describes how, during the past week, pain has interfered with your enjoyment of life?6  What number best describes how, during the past week, pain has interfered with your  "general activity?  6    Review of Pertinent Medical Data ---      The following portions of the patient's history were reviewed and updated as appropriate: allergies, current medications, past family history, past medical history, past social history, past surgical history and problem list.    Review of Systems   Constitutional: Negative for chills, fatigue and fever.   Respiratory: Negative for cough and shortness of breath.    Cardiovascular: Negative for chest pain.   Gastrointestinal: Negative for abdominal pain, constipation and diarrhea.   Genitourinary: Negative for difficulty urinating and dysuria.   Musculoskeletal: Positive for back pain and gait problem (ambulates with a cane).   Neurological: Positive for numbness (left thumb, middle finger). Negative for dizziness, weakness, light-headedness and headaches.   Psychiatric/Behavioral: Negative for sleep disturbance.     I have reviewed and confirmed the accuracy of the ROS as documented by the MA/LPN/RN ALY Bolaños     Vitals:    04/25/23 1012   BP: 118/68   BP Location: Left arm   Patient Position: Sitting   Pulse: 90   Temp: 97.8 °F (36.6 °C)   TempSrc: Temporal   SpO2: 95%   Weight: 108 kg (238 lb 12.8 oz)   Height: 170.2 cm (67\")   PainSc:   5   PainLoc: Back         Objective   Physical Exam  Vitals and nursing note reviewed.   Constitutional:       General: He is not in acute distress.     Appearance: Normal appearance. He is not ill-appearing.   Pulmonary:      Effort: Pulmonary effort is normal. No respiratory distress.   Musculoskeletal:      Lumbar back: Tenderness and bony tenderness present.      Comments: +lumbar facet tenderness/loading   Neurological:      Mental Status: He is alert and oriented to person, place, and time.      Motor: Motor function is intact.      Gait: Gait abnormal (slowed).   Psychiatric:         Mood and Affect: Mood normal.         Behavior: Behavior normal.             Assessment & Plan   Diagnoses and all " orders for this visit:    1. DDD (degenerative disc disease), lumbar (Primary)    2. Diabetic peripheral neuropathy    3. Lumbar facet arthropathy      --- Bilateral L3-S1 RFA (patient does not have a laterality preference for first procedure)    Thermal Radiofrequency Destruction    This initial thermal radiofrequency destruction (RFA) of cervical, thoracic, or lumbar paravertebral facet joint (medial branch) nerves is considered medically reasonable and necessary as this patient has met the criteria of having at least two (2) medically reasonable and necessary diagnostic MBBs, with each one providing a consistent minimum of 80% sustained relief of primary (index) pain (with the duration of relief being consistent with the agent used).    Pain / Disability Scale     The scale used for measurement of pain and/or disability for this patient was the [place] back pain disability scale.  The score was 61 on 02/01/2023    --- Follow-up for procedure and 6 weeks post RFA          As the clinician, I personally reviewed the QIANA from 4/25/2023 while the patient was in the office today.    Dictated utilizing Dragon dictation.

## 2023-04-27 ENCOUNTER — OFFICE VISIT (OUTPATIENT)
Dept: FAMILY MEDICINE CLINIC | Facility: CLINIC | Age: 78
End: 2023-04-27
Payer: MEDICARE

## 2023-04-27 ENCOUNTER — TRANSCRIBE ORDERS (OUTPATIENT)
Dept: SURGERY | Facility: SURGERY CENTER | Age: 78
End: 2023-04-27
Payer: MEDICARE

## 2023-04-27 VITALS
OXYGEN SATURATION: 99 % | DIASTOLIC BLOOD PRESSURE: 60 MMHG | TEMPERATURE: 96.3 F | HEART RATE: 82 BPM | WEIGHT: 239.4 LBS | BODY MASS INDEX: 37.57 KG/M2 | SYSTOLIC BLOOD PRESSURE: 120 MMHG | HEIGHT: 67 IN

## 2023-04-27 DIAGNOSIS — Z41.9 SURGERY, ELECTIVE: Primary | ICD-10-CM

## 2023-04-27 DIAGNOSIS — E11.65 TYPE 2 DIABETES MELLITUS WITH HYPERGLYCEMIA, UNSPECIFIED WHETHER LONG TERM INSULIN USE: Primary | ICD-10-CM

## 2023-04-27 DIAGNOSIS — I10 PRIMARY HYPERTENSION: ICD-10-CM

## 2023-04-27 PROCEDURE — 99214 OFFICE O/P EST MOD 30 MIN: CPT | Performed by: FAMILY MEDICINE

## 2023-04-27 PROCEDURE — 3074F SYST BP LT 130 MM HG: CPT | Performed by: FAMILY MEDICINE

## 2023-04-27 PROCEDURE — 3078F DIAST BP <80 MM HG: CPT | Performed by: FAMILY MEDICINE

## 2023-04-27 RX ORDER — INSULIN GLARGINE 100 [IU]/ML
12 INJECTION, SOLUTION SUBCUTANEOUS DAILY
Start: 2023-04-27

## 2023-04-27 NOTE — PROGRESS NOTES
Chief Complaint   Patient presents with   • Diabetes       Subjective   Nathan Baez is a 77 y.o. male.     History of Present Illness   F/U DM2.  BS running 110 usually.  Occ 99.  On lantus 12 units a day.   FU KIMBERLEY.  Doing well No primary care provider on file. pantoprazole BID.    F/U HTN.  No orthostasis.    The following portions of the patient's history were reviewed and updated as appropriate: allergies, current medications, past family history, past medical history, past social history, past surgical history and problem list.    Review of Systems   Constitutional: Negative for appetite change and fatigue.   HENT: Negative for nosebleeds and sore throat.    Eyes: Negative for blurred vision and visual disturbance.   Respiratory: Negative for shortness of breath and wheezing.    Cardiovascular: Negative for chest pain and leg swelling.   Gastrointestinal: Negative for abdominal distention and abdominal pain.   Endocrine: Negative for cold intolerance and polyuria.   Genitourinary: Negative for dysuria and hematuria.   Musculoskeletal: Negative for arthralgias and myalgias.   Skin: Negative for color change and rash.   Neurological: Negative for weakness and confusion.   Psychiatric/Behavioral: Negative for agitation and depressed mood.       Patient Active Problem List   Diagnosis   • OA (osteoarthritis) of knee   • Hypertension   • Abdominal wall cellulitis   • Hypothyroidism (acquired)   • Gastroesophageal reflux disease without esophagitis   • Mixed hyperlipidemia   • Primary insomnia   • DDD (degenerative disc disease), lumbar   • KWAME (obstructive sleep apnea)   • Allergic   • Venous insufficiency   • Prostate cancer   • Venous stasis dermatitis   • Tinea cruris   • Tinea corporis   • Throat clearing   • Sleep apnea   • Skin lesion of face   • Rib pain on left side   • Rectal bleed   • Presbycusis   • Pes planus   • Osteoarthritis   • Obesity   • Medicare annual wellness visit, subsequent   • Lumbar  strain   • Internal hemorrhoids   • Insomnia   • Inflamed skin tag   • Hyperplastic polyp of stomach   • Hospital discharge follow-up   • History of colon polyps   • High risk medication use   • Glaucoma   • Gastroenteritis, acute   • Erysipelas   • Encounter for screening colonoscopy   • Encounter for long-term (current) use of NSAIDs   • Dysphagia   • DVT (deep venous thrombosis)   • Lumbar facet arthropathy   • Diverticulosis   • Cough   • Contact with hypodermic needle   • Cervical radiculopathy   • Cellulitis   • Arthritis   • Allergic rhinitis   • Acute glaucoma   • Acute embolism and thrombosis of vein   • Actinic keratosis   • Status post reverse total shoulder replacement, right   • Localized edema   • Other specified anemias   • Peripheral polyneuropathy   • Campylobacter diarrhea   • Hyponatremia   • Generalized abdominal pain   • Fever   • Constipation   • Bilateral lower extremity edema   • Acute pyelonephritis   • Chronic idiopathic constipation   • Functional diarrhea   • Change in bowel habits   • RLS (restless legs syndrome)   • Type 2 diabetes mellitus with hyperglycemia   • Family history of GI malignancy   • Primary osteoarthritis of right hip   • B12 deficiency   • Intervertebral disc stenosis of neural canal of lumbar region   • Encounter for hepatitis C screening test for low risk patient   • Pain associated with defecation   • Calculus of kidney   • Gastroesophageal reflux disease   • Body aches   • Skin lesion   • Candidiasis, intertrigo   • Hyperbilirubinemia   • Acute respiratory failure due to COVID-19   • Bacteremia due to group B Streptococcus   • Iron deficiency anemia   • Acute respiratory failure with hypoxia and hypercapnia   • Hypoventilation syndrome       Allergies   Allergen Reactions   • Adhesive Tape Rash         Current Outpatient Medications:   •  albuterol (PROVENTIL) (2.5 MG/3ML) 0.083% nebulizer solution, Take 2.5 mg by nebulization Every 4 (Four) Hours As Needed for  Wheezing., Disp: , Rfl:   •  albuterol sulfate  (90 Base) MCG/ACT inhaler, Inhale 2 puffs Every 4 (Four) Hours As Needed for Wheezing., Disp: , Rfl:   •  aspirin 81 MG EC tablet, Take 1 tablet by mouth Daily., Disp: , Rfl:   •  B-D UF III MINI PEN NEEDLES 31G X 5 MM misc, 1 APPLICATION DAILY, Disp: 100 each, Rfl: 3  •  brimonidine (ALPHAGAN) 0.2 % ophthalmic solution, Administer 1 drop to both eyes 2 (Two) Times a Day., Disp: , Rfl:   •  Cyanocobalamin (B-12) 1000 MCG sublingual tablet, One sublingual tab dissolved on tongue daily, Disp: 90 each, Rfl: 3  •  dorzolamide-timolol (COSOPT) 22.3-6.8 MG/ML ophthalmic solution, Administer 1 drop to both eyes 2 (Two) Times a Day., Disp: , Rfl:   •  fluticasone-salmeterol (ADVAIR HFA) 230-21 MCG/ACT inhaler, Inhale 2 puffs 2 (Two) Times a Day., Disp: , Rfl:   •  furosemide (LASIX) 40 MG tablet, TAKE 1 TABLET BY MOUTH EVERY DAY, Disp: 90 tablet, Rfl: 3  •  gabapentin (NEURONTIN) 300 MG capsule, Take 1 capsule by mouth 3 (Three) Times a Day., Disp: 270 capsule, Rfl: 1  •  insulin glargine (LANTUS, SEMGLEE) 100 UNIT/ML injection, Inject 12 Units under the skin into the appropriate area as directed Daily., Disp: , Rfl:   •  ketoconazole (NIZORAL) 2 % cream, Apply 1 application topically to the appropriate area as directed Daily., Disp: 60 g, Rfl: 3  •  levothyroxine (SYNTHROID, LEVOTHROID) 88 MCG tablet, TAKE 1 TABLET BY MOUTH EVERY MORNING, Disp: 90 tablet, Rfl: 3  •  losartan (COZAAR) 100 MG tablet, TAKE 1 TABLET BY MOUTH DAILY, Disp: 90 tablet, Rfl: 3  •  metFORMIN (GLUCOPHAGE) 500 MG tablet, TAKE 1 TABLET BY MOUTH TWICE DAILY WITH MEALS, Disp: 180 tablet, Rfl: 0  •  montelukast (SINGULAIR) 10 MG tablet, TAKE 1 TABLET BY MOUTH EVERY NIGHT, Disp: 90 tablet, Rfl: 3  •  pantoprazole (PROTONIX) 40 MG EC tablet, TAKE 1 TABLET BY MOUTH TWICE DAILY, Disp: 90 tablet, Rfl: 3  •  rosuvastatin (CRESTOR) 20 MG tablet, TAKE 1 TABLET BY MOUTH EVERY EVENING, Disp: 90 tablet, Rfl:  3  •  sennosides-docusate (Stimulant Laxative) 8.6-50 MG per tablet, Take 2 tablets by mouth 2 (Two) Times a Day., Disp: 200 tablet, Rfl: 3    Past Medical History:   Diagnosis Date   • Actinic keratosis    • Acute embolism and thrombosis of vein    • Allergic    • Allergic rhinitis    • Arthritis    • Cataract    • Cervical radiculopathy    • Chronic constipation    • Diabetes mellitus    • Disequilibrium    • DJD (degenerative joint disease), lumbar    • Elevated cholesterol    • Erysipelas    • GERD (gastroesophageal reflux disease)    • Glaucoma    • Hip pain, chronic, right    • History of kidney stones     X1   • History of peripheral edema     LOWER LEGS BILAT, COMPRESSION KNEE HIGH    • History of transfusion    • Hyperlipidemia    • Hypertension    • Hypothyroid    • Insomnia    • Intervertebral disc stenosis of neural canal of lumbar region 04/12/2022   • Limited mobility     RIGHT HIP   • Low back pain    • Male urinary stress incontinence     s/p prostatectomy-WEAR PAD   • Obesity    • KWAME (obstructive sleep apnea)    • Osteoarthritis    • Pelvic floor dysfunction 06/2022    DEFOGRAM ORDERED AT [facility] BY DR. ROSHAN SCHAFER   • Pes planus    • Presbycusis    • Prostate cancer    • Restless legs syndrome    • Shoulder pain     RIGHT, LIMITED MOBILITY-AT TIMES   • Sleep apnea     bipap   • Tinea corporis    • Tinea cruris    • Unsteady gait     RIGHT HIP   • Weakness     RIGHT HIP       Past Surgical History:   Procedure Laterality Date   • CATARACT EXTRACTION, BILATERAL Bilateral    • CERVICAL DISCECTOMY ANTERIOR     • COLONOSCOPY  03/08/2017    3/17normal. Recheck 2022. 12/11. Repeat in 5 years    • COLONOSCOPY N/A 4/19/2021    Procedure: COLONOSCOPY INTO CECUM WITH HOT & COLD  SNARE POLYPECTOMIES;  Surgeon: Jaden Chowdhury MD;  Location[facility] ENDOSCOPY;  Service: Gastroenterology;  Laterality: N/A;  PRE: CHANGE IN BOWEL HABITS; FAMILY H/O COLON CANCER  POST: DIVERTICULOSIS, POLYPS   • ENDOSCOPY N/A  1/30/2023    Procedure: ESOPHAGOGASTRODUODENOSCOPY with biopsies;  Surgeon: Jaden Chowdhury MD;  Location: [facility] ENDOSCOPY;  Service: Gastroenterology;  Laterality: N/A;  pre- abdominal pain, anemia  post- gastritis   • ENDOSCOPY W/ PEG REMOVAL     • FOOT SURGERY      1998 had big toe replaced on left foot-METAL    • HERNIA REPAIR  03/07/2012    umbilical   • JOINT REPLACEMENT Right 2014   • MEDIAL BRANCH BLOCK Bilateral 4/7/2023    Procedure: LUMBAR MEDIAL BRANCH BLOCK bilateral L4-S1 [id number];  Surgeon: Lauren Houston MD;  [place] [facility] [place];  Service: Pain Management;  Laterality: Bilateral;   • MEDIAL BRANCH BLOCK Bilateral 4/17/2023    Procedure: LUMBAR MEDIAL BRANCH BLOCK bilateral L4-S1 [id number];  Surgeon: Lauren Houston MD;  Location: [facility];  Service: Pain Management;  Laterality: Bilateral;   • LA ARTHRP KNE CONDYLE&PLATU MEDIAL&LAT COMPARTMENTS Left 9/13/2016    Procedure: LT TOTAL KNEE ARTHROPLASTY;  Surgeon: Tadeo Basurto MD;  Location: [facility] MAIN OR;  Service: Orthopedics   • PROSTATECTOMY  07/1999 July 1999. Radical    • THYROID LOBECTOMY     • TONSILLECTOMY     • TOTAL HIP ARTHROPLASTY Right 8/19/2021    Procedure: TOTAL HIP ARTHROPLASTY RIGHT POSTERIOR;  Surgeon: Tadeo Basurto MD;  Location: [facility] OR;  Service: Orthopedics;  Laterality: Right;   • TOTAL KNEE ARTHROPLASTY Right 2014   • TOTAL SHOULDER ARTHROPLASTY W/ DISTAL CLAVICLE EXCISION Right 11/13/2019    Procedure: TOTAL SHOULDER REVERSE ARTHROPLASTY RIGHT REPAIR RIGHT AXILLARY VEIN;  Surgeon: Behzad Kelley MD;  Location: [facility];  Service: Orthopedics   • WRIST SURGERY Right 12/17/2014    x2  wrist replaced       Family History   Problem Relation Age of Onset   • Arthritis Mother    • Cancer Mother         COLON   • Colon cancer Mother    • Arthritis Father    • Cancer Father         PROSTATE   • Heart disease Sister    • Arthritis Sister    • Cancer Sister         BREAST CANCER   • Breast  cancer Sister    • Gout Sister    • Hypertension Sister    • Hypertension Brother    • Heart disease Brother    • Arthritis Brother    • Heart attack Brother    • Bone cancer Brother    • Heart disease Brother    • Malig Hyperthermia Neg Hx        Social History     Tobacco Use   • Smoking status: Former     Packs/day: 1.00     Years: 20.00     Pack years: 20.00     Types: Cigarettes     Start date:      Quit date: 1984     Years since quittin.3   • Smokeless tobacco: Former     Types: Chew   Substance Use Topics   • Alcohol use: Yes     Comment: 3-4 MONTHLY            Objective     Vitals:    23 0828   BP: 120/60   Pulse: 82   Temp: 96.3 °F (35.7 °C)   SpO2: 99%     Body mass index is 37.49 kg/m².    Physical Exam  Vitals reviewed.   Constitutional:       Appearance: He is well-developed. He is not diaphoretic.   HENT:      Head: Normocephalic and atraumatic.   Eyes:      General: No scleral icterus.     Pupils: Pupils are equal, round, and reactive to light.   Neck:      Thyroid: No thyromegaly.   Cardiovascular:      Rate and Rhythm: Normal rate and regular rhythm.      Heart sounds: No murmur heard.    No friction rub. No gallop.   Pulmonary:      Effort: Pulmonary effort is normal. No respiratory distress.      Breath sounds: No wheezing or rales.   Chest:      Chest wall: No tenderness.   Abdominal:      General: Bowel sounds are normal. There is no distension.      Palpations: Abdomen is soft.      Tenderness: There is no abdominal tenderness.   Musculoskeletal:         General: No deformity. Normal range of motion.   Lymphadenopathy:      Cervical: No cervical adenopathy.   Skin:     General: Skin is warm and dry.      Findings: No rash.   Neurological:      Cranial Nerves: No cranial nerve deficit.      Motor: No abnormal muscle tone.         Lab Results   Component Value Date    GLUCOSE 113 (H) 2023    BUN 12 2023    CREATININE 0.94 2023    EGFRIFNONA 76 2021     EGFRIFAFRI 88 12/06/2021    BCR 12.8 02/20/2023    K 4.0 02/20/2023    CO2 27.3 02/20/2023    CALCIUM 9.6 02/20/2023    PROTENTOTREF 7.1 01/26/2023    ALBUMIN 4.2 02/20/2023    LABIL2 2.0 01/26/2023    AST 28 02/20/2023    ALT 30 02/20/2023       WBC   Date Value Ref Range Status   02/20/2023 4.90 3.40 - 10.80 10*3/mm3 Final   01/26/2023 4.85 3.40 - 10.80 10*3/mm3 Final     RBC   Date Value Ref Range Status   02/20/2023 4.13 (L) 4.14 - 5.80 10*6/mm3 Final   01/26/2023 4.40 4.14 - 5.80 10*6/mm3 Final     Hemoglobin   Date Value Ref Range Status   02/20/2023 9.8 (L) 13.0 - 17.7 g/dL Final     Hematocrit   Date Value Ref Range Status   02/20/2023 32.8 (L) 37.5 - 51.0 % Final     MCV   Date Value Ref Range Status   02/20/2023 79.4 79.0 - 97.0 fL Final     MCH   Date Value Ref Range Status   02/20/2023 23.7 (L) 26.6 - 33.0 pg Final     MCHC   Date Value Ref Range Status   02/20/2023 29.9 (L) 31.5 - 35.7 g/dL Final     RDW   Date Value Ref Range Status   02/20/2023 18.1 (H) 12.3 - 15.4 % Final     RDW-SD   Date Value Ref Range Status   02/20/2023 51.4 37.0 - 54.0 fl Final     MPV   Date Value Ref Range Status   02/20/2023 9.8 6.0 - 12.0 fL Final     Platelets   Date Value Ref Range Status   02/20/2023 188 140 - 450 10*3/mm3 Final     Neutrophil %   Date Value Ref Range Status   02/20/2023 54.4 42.7 - 76.0 % Final     Lymphocyte %   Date Value Ref Range Status   02/20/2023 30.8 19.6 - 45.3 % Final     Monocyte %   Date Value Ref Range Status   02/20/2023 10.4 5.0 - 12.0 % Final     Eosinophil %   Date Value Ref Range Status   02/20/2023 2.0 0.3 - 6.2 % Final     Basophil %   Date Value Ref Range Status   02/20/2023 0.6 0.0 - 1.5 % Final     Immature Grans %   Date Value Ref Range Status   02/20/2023 1.8 (H) 0.0 - 0.5 % Final     Neutrophils, Absolute   Date Value Ref Range Status   02/20/2023 2.66 1.70 - 7.00 10*3/mm3 Final     Lymphocytes, Absolute   Date Value Ref Range Status   02/20/2023 1.51 0.70 - 3.10 10*3/mm3 Final      Monocytes, Absolute   Date Value Ref Range Status   02/20/2023 0.51 0.10 - 0.90 10*3/mm3 Final     Eosinophils, Absolute   Date Value Ref Range Status   02/20/2023 0.10 0.00 - 0.40 10*3/mm3 Final     Basophils, Absolute   Date Value Ref Range Status   02/20/2023 0.03 0.00 - 0.20 10*3/mm3 Final     Immature Grans, Absolute   Date Value Ref Range Status   02/20/2023 0.09 (H) 0.00 - 0.05 10*3/mm3 Final     nRBC   Date Value Ref Range Status   02/20/2023 0.0 0.0 - 0.2 /100 WBC Final       Lab Results   Component Value Date    HGBA1C 6.90 (H) 01/26/2023       Lab Results   Component Value Date    JPQZGMSE26 1,009 (H) 10/26/2022       TSH   Date Value Ref Range Status   10/30/2022 3.070 0.270 - 4.200 uIU/mL Final       No results found for: CHOL  Lab Results   Component Value Date    TRIG 191 (H) 09/15/2022     Lab Results   Component Value Date    HDL 28 (L) 09/15/2022     Lab Results   Component Value Date    LDL 32 09/15/2022     Lab Results   Component Value Date    VLDL 31 09/15/2022     No results found for: LDLHDL      Procedures    Assessment & Plan   Problems Addressed this Visit     Hypertension    Relevant Orders    Comprehensive Metabolic Panel    Type 2 diabetes mellitus with hyperglycemia - Primary    Relevant Medications    insulin glargine (LANTUS, SEMGLEE) 100 UNIT/ML injection    Other Relevant Orders    Comprehensive Metabolic Panel    Hemoglobin A1c    Microalbumin / Creatinine Urine Ratio - Urine, Clean Catch   Diagnoses       Codes Comments    Type 2 diabetes mellitus with hyperglycemia, unspecified whether long term insulin use    -  Primary ICD-10-CM: E11.65  ICD-9-CM: 250.00     Primary hypertension     ICD-10-CM: I10  ICD-9-CM: 401.9       DM2.  Controlled.  Continue meds.  Continue lantus 12 units a day . Continue metformin.  Check a1c, cmp, urine micro.   HTN.  Controlled.  Stop amlo.  Stay on losartan.      Orders Placed This Encounter   Procedures   • Comprehensive Metabolic Panel      Order Specific Question:   Release to patient     Answer:   Routine Release   • Hemoglobin A1c     Order Specific Question:   Release to patient     Answer:   Routine Release   • Microalbumin / Creatinine Urine Ratio - Urine, Clean Catch     Order Specific Question:   Release to patient     Answer:   Routine Release       Current Outpatient Medications   Medication Sig Dispense Refill   • albuterol (PROVENTIL) (2.5 MG/3ML) 0.083% nebulizer solution Take 2.5 mg by nebulization Every 4 (Four) Hours As Needed for Wheezing.     • albuterol sulfate  (90 Base) MCG/ACT inhaler Inhale 2 puffs Every 4 (Four) Hours As Needed for Wheezing.     • aspirin 81 MG EC tablet Take 1 tablet by mouth Daily.     • B-D UF III MINI PEN NEEDLES 31G X 5 MM misc 1 APPLICATION DAILY 100 each 3   • brimonidine (ALPHAGAN) 0.2 % ophthalmic solution Administer 1 drop to both eyes 2 (Two) Times a Day.     • Cyanocobalamin (B-12) 1000 MCG sublingual tablet One sublingual tab dissolved on tongue daily 90 each 3   • dorzolamide-timolol (COSOPT) 22.3-6.8 MG/ML ophthalmic solution Administer 1 drop to both eyes 2 (Two) Times a Day.     • fluticasone-salmeterol (ADVAIR HFA) 230-21 MCG/ACT inhaler Inhale 2 puffs 2 (Two) Times a Day.     • furosemide (LASIX) 40 MG tablet TAKE 1 TABLET BY MOUTH EVERY DAY 90 tablet 3   • gabapentin (NEURONTIN) 300 MG capsule Take 1 capsule by mouth 3 (Three) Times a Day. 270 capsule 1   • insulin glargine (LANTUS, SEMGLEE) 100 UNIT/ML injection Inject 12 Units under the skin into the appropriate area as directed Daily.     • ketoconazole (NIZORAL) 2 % cream Apply 1 application topically to the appropriate area as directed Daily. 60 g 3   • levothyroxine (SYNTHROID, LEVOTHROID) 88 MCG tablet TAKE 1 TABLET BY MOUTH EVERY MORNING 90 tablet 3   • losartan (COZAAR) 100 MG tablet TAKE 1 TABLET BY MOUTH DAILY 90 tablet 3   • metFORMIN (GLUCOPHAGE) 500 MG tablet TAKE 1 TABLET BY MOUTH TWICE DAILY WITH MEALS 180 tablet 0   •  montelukast (SINGULAIR) 10 MG tablet TAKE 1 TABLET BY MOUTH EVERY NIGHT 90 tablet 3   • pantoprazole (PROTONIX) 40 MG EC tablet TAKE 1 TABLET BY MOUTH TWICE DAILY 90 tablet 3   • rosuvastatin (CRESTOR) 20 MG tablet TAKE 1 TABLET BY MOUTH EVERY EVENING 90 tablet 3   • sennosides-docusate (Stimulant Laxative) 8.6-50 MG per tablet Take 2 tablets by mouth 2 (Two) Times a Day. 200 tablet 3     No current facility-administered medications for this visit.       Nathan Baez had no medications administered during this visit.    Return in about 3 months (around 7/27/2023) for Medicare wellness and OV, 30 minutes.    There are no Patient Instructions on file for this visit.

## 2023-04-28 LAB
ALBUMIN SERPL-MCNC: 4.5 G/DL (ref 3.5–5.2)
ALBUMIN/CREAT UR: 77 MG/G CREAT (ref 0–29)
ALBUMIN/GLOB SERPL: 1.9 G/DL
ALP SERPL-CCNC: 163 U/L (ref 39–117)
ALT SERPL-CCNC: 24 U/L (ref 1–41)
AST SERPL-CCNC: 21 U/L (ref 1–40)
BILIRUB SERPL-MCNC: 0.8 MG/DL (ref 0–1.2)
BUN SERPL-MCNC: 17 MG/DL (ref 8–23)
BUN/CREAT SERPL: 15.7 (ref 7–25)
CALCIUM SERPL-MCNC: 10.1 MG/DL (ref 8.6–10.5)
CHLORIDE SERPL-SCNC: 101 MMOL/L (ref 98–107)
CO2 SERPL-SCNC: 27.9 MMOL/L (ref 22–29)
CREAT SERPL-MCNC: 1.08 MG/DL (ref 0.76–1.27)
CREAT UR-MCNC: 67.6 MG/DL
EGFRCR SERPLBLD CKD-EPI 2021: 71.1 ML/MIN/1.73
GLOBULIN SER CALC-MCNC: 2.4 GM/DL
GLUCOSE SERPL-MCNC: 127 MG/DL (ref 65–99)
HBA1C MFR BLD: 5.6 % (ref 4.8–5.6)
MICROALBUMIN UR-MCNC: 51.8 UG/ML
POTASSIUM SERPL-SCNC: 4.2 MMOL/L (ref 3.5–5.2)
PROT SERPL-MCNC: 6.9 G/DL (ref 6–8.5)
SODIUM SERPL-SCNC: 140 MMOL/L (ref 136–145)

## 2023-05-01 ENCOUNTER — PREP FOR SURGERY (OUTPATIENT)
Dept: SURGERY | Facility: SURGERY CENTER | Age: 78
End: 2023-05-01
Payer: MEDICARE

## 2023-05-01 DIAGNOSIS — M47.816 LUMBAR FACET ARTHROPATHY: Primary | ICD-10-CM

## 2023-05-01 RX ORDER — SODIUM CHLORIDE 0.9 % (FLUSH) 0.9 %
10 SYRINGE (ML) INJECTION AS NEEDED
OUTPATIENT
Start: 2023-05-01

## 2023-05-01 RX ORDER — SODIUM CHLORIDE 0.9 % (FLUSH) 0.9 %
10 SYRINGE (ML) INJECTION EVERY 12 HOURS SCHEDULED
OUTPATIENT
Start: 2023-05-01

## 2023-05-16 NOTE — DISCHARGE INSTRUCTIONS
[facility] - Post-procedure Instructions          --  While there are no absolute restrictions, it is recommended that you do not perform strenuous activity today. In the morning, you may resume your level of activity as before your block.    --  If you have a band-aid at your injection site, please remove it later today. Observe the area for any redness, swelling, pus-like drainage, or a temperature over 101°. If any of these symptoms occur, please call your doctor at [phone]. If after office hours, leave a message and the on-call provider will return your call.    --  Ice may be applied to your injection site. It is recommended you avoid direct heat (heating pad; hot tub) for 1-2 days.    --  Call [facility] at [phone] if you experience persistent headache, persistent bleeding from the injection site, or severe pain not relieved by heat or oral medication.    --  Do not make important decisions today.    --  Due to the effects of the block and/or the I.V. Sedation, DO NOT drive or operate hazardous machinery for 12 hours.  Local anesthetics may cause numbness after procedure and precautions must be taken with regards to operating equipment as well as with walking, even if ambulating with assistance of another person or with an assistive device.    --  Do not drink alcohol for 12 hours.    -- You may return to work tomorrow, or as directed by your referring doctor.    --  Occasionally you may notice a slight increase in your pain after the procedure. This should start to improve within the next 24-48 hours. Radiofrequency ablation procedure pain may last 3-4 weeks.    --  It may take as long as 3-4 days before you notice a gradual improvement in your pain and/or other symptoms.    -- You may continue to take your prescribed pain medication as needed.    --  Some normal possible side effects of steroid use could include fluid retention, increased blood sugar, dull headache,  "increased sweating, increased appetite, mood swings and flushing.    --  Diabetics are recommended to watch their blood glucose level closely for 24-48 hours after the injection.    --  Must stay in PACU for 20 min upon arrival and prove no leg weakness before being discharged.    --  IN THE EVENT OF A LIFE THREATENING EMERGENCY, (CHEST PAIN, BREATHING DIFFICULTIES, PARALYSIS…) YOU SHOULD GO TO YOUR NEAREST EMERGENCY ROOM.    --  You should be contacted by our office within 2-3 days to schedule follow up or next appointment date.  If not contacted within 7 days, please call the office at [phone]     Radiofrequency ablation (RFA):     - Radiofrequency ablation is a term used to describe cauterization or \"burning.\"   - In pain management, we can use this technique with a special needle to target and destroy areas that are causing your pain.   - In most cases, you must have TWO successful \"test injections\" before you are a candidate for RFA.    After your RFA:   - Because heat is used in this technique, it is common to have soreness after the procedure.  Sometimes \"neuritis\" occurs, which feels like tingling, prickly, or sunburn under the skin sensations.   - Ice packs are helpful in decreasing this soreness and preventing post-procedure \"neuritis\" pain.  Use an ice pack for 20 minutes at a time at least 3 times the day of and the day after your procedure.   - It is common to have arm/leg numbness or weakness the day of your procedure, but this should wear off by the following day.   - It may take up to 6 weeks to gain full benefit from this procedure.   "

## 2023-05-17 ENCOUNTER — HOSPITAL ENCOUNTER (OUTPATIENT)
Facility: SURGERY CENTER | Age: 78
Setting detail: HOSPITAL OUTPATIENT SURGERY
Discharge: HOME | End: 2023-05-17
Attending: ANESTHESIOLOGY | Admitting: ANESTHESIOLOGY
Payer: MEDICARE

## 2023-05-17 ENCOUNTER — HOSPITAL ENCOUNTER (OUTPATIENT)
Dept: GENERAL RADIOLOGY | Facility: SURGERY CENTER | Age: 78
End: 2023-05-17
Payer: MEDICARE

## 2023-05-17 VITALS
DIASTOLIC BLOOD PRESSURE: 67 MMHG | WEIGHT: 235 LBS | HEART RATE: 78 BPM | OXYGEN SATURATION: 94 % | RESPIRATION RATE: 16 BRPM | HEIGHT: 67 IN | SYSTOLIC BLOOD PRESSURE: 104 MMHG | TEMPERATURE: 98.4 F | BODY MASS INDEX: 36.88 KG/M2

## 2023-05-17 DIAGNOSIS — M47.816 LUMBAR FACET ARTHROPATHY: ICD-10-CM

## 2023-05-17 DIAGNOSIS — Z41.9 SURGERY, ELECTIVE: ICD-10-CM

## 2023-05-17 PROCEDURE — 64636 DESTROY L/S FACET JNT ADDL: CPT | Performed by: ANESTHESIOLOGY

## 2023-05-17 PROCEDURE — 25010000002 FENTANYL CITRATE (PF) 50 MCG/ML SOLUTION: Performed by: ANESTHESIOLOGY

## 2023-05-17 PROCEDURE — 25010000002 MIDAZOLAM PER 1 MG: Performed by: ANESTHESIOLOGY

## 2023-05-17 PROCEDURE — 64635 DESTROY LUMB/SAC FACET JNT: CPT | Performed by: ANESTHESIOLOGY

## 2023-05-17 PROCEDURE — 76000 FLUOROSCOPY <1 HR PHYS/QHP: CPT

## 2023-05-17 RX ORDER — SODIUM CHLORIDE 0.9 % (FLUSH) 0.9 %
10 SYRINGE (ML) INJECTION EVERY 12 HOURS SCHEDULED
Status: DISCONTINUED | OUTPATIENT
Start: 2023-05-17 | End: 2023-05-17 | Stop reason: HOSPADM

## 2023-05-17 RX ORDER — MELOXICAM 15 MG/1
15 TABLET ORAL DAILY
COMMUNITY

## 2023-05-17 RX ORDER — FLUTICASONE PROPIONATE 50 MCG
2 SPRAY, SUSPENSION (ML) NASAL DAILY
COMMUNITY

## 2023-05-17 RX ORDER — PRAMIPEXOLE DIHYDROCHLORIDE 1.5 MG/1
1.5 TABLET ORAL 2 TIMES DAILY
COMMUNITY

## 2023-05-17 RX ORDER — L.ACID,CASEI/B.ANIMAL/S.THERMO 16B CELL
1 CAPSULE ORAL DAILY
COMMUNITY

## 2023-05-17 RX ORDER — FEXOFENADINE HCL 180 MG/1
180 TABLET ORAL DAILY
COMMUNITY

## 2023-05-17 RX ORDER — MIDAZOLAM HYDROCHLORIDE 1 MG/ML
INJECTION INTRAMUSCULAR; INTRAVENOUS AS NEEDED
Status: DISCONTINUED | OUTPATIENT
Start: 2023-05-17 | End: 2023-05-17 | Stop reason: HOSPADM

## 2023-05-17 RX ORDER — FENTANYL CITRATE 50 UG/ML
INJECTION, SOLUTION INTRAMUSCULAR; INTRAVENOUS AS NEEDED
Status: DISCONTINUED | OUTPATIENT
Start: 2023-05-17 | End: 2023-05-17 | Stop reason: HOSPADM

## 2023-05-17 RX ORDER — SODIUM CHLORIDE 0.9 % (FLUSH) 0.9 %
10 SYRINGE (ML) INJECTION AS NEEDED
Status: DISCONTINUED | OUTPATIENT
Start: 2023-05-17 | End: 2023-05-17 | Stop reason: HOSPADM

## 2023-05-17 NOTE — OP NOTE
Radiofrequency ablation of bilateral L3-S1  [facility]    PREOPERATIVE DIAGNOSIS:  Lumbar spondylosis without myelopathy   POSTOPERATIVE DIAGNOSIS:  Lumbar spondylosis without myelopathy     PROCEDURES PERFORMED:   Bilateral  lumbar radiofrequency ablation of the medial branches at the transverse processes of L3, L4, L5, and the sacral alar groove to thermally treat these facet joints.  1.  17315 -LT, RT Lumbar radiofrequency ablation 1st level.  2.  00637 -LT, RT Lumbar radiofrequency ablation 2nd level.  3.  85567 -LT, RT Lumbar radiofrequency ablation 3rd level.     INFORMED CONSENT:  In preprocedure discussion with the patient, the risks and complications were discussed prior to starting the procedure and informed consent was obtained.     SURGEON:   Lauren Houston MD    INDICATIONS:  The patient presents with chronic lower back pain. The patient underwent 2 lumbar medial branch blocks with diagnostically positive relief. Given the patient’s significant pain relief, it is diagnostic that we have likely found the source of the patient’s pain; therefore, lumbar radiofrequency ablation has been indicated. Due to significant spondylosis in more than 2 levels of facet joints, 3 levels will be treated on this patient.      SEDATION:  Light sedation was used for this procedure which included and Versed 1mg & Fentanyl 50mcg    TIME OF PROCEDURE:  The Interoperative procedure time, after administration of the IV sedative, was 26 minutes.    ANESTHETIC:  Lidocaine 1% for skin infiltration, 2% lidocaine and 0.25% bupivacaine for injection.    STEROID:  None.    DESCRIPTION OF PROCEDURE:  After obtaining informed consent an IV was  started in the preoperative area. The patient was taken to the operating room. The patient was placed in prone position with a pillow under the abdomen. All pressure points were padded. EKG, blood pressure, and pulse oximetry were monitored. The patient was  sedated. The  lumbosacral area was prepped with ChloraPrep and draped in a sterile fashion.     The junction of the right S1 superior articular process and sacral ala was identified in a AP fluoroscopic view. The skin and subcutaneous tissue inferior to the junction was anesthetized with 1% lidocaine. A 20-gauge 150mm RF Abbott needle was then advanced percutaneously through the anesthetized skin tract under fluoroscopic guidance until the non-insulated portion of the needle lie at the junction.  The image was then obliqued towards the right side to maximize visualization of the junctions of the L3, L4, L5 superior articular processes with the transverse processes.  Needle placement was performed as described above until the non-insulated portion of the needles lie at the targeted junctions.  All needle tips were confirmed to be posterior to the neural foramen in the lateral fluoroscopic view. Sensory stimulation was then performed with good stimulation of the back at 0.5V or less at each level. Motor stimulation was performed up to 1.5V at each level producing stimulation of the multifidus muscles of the back and no stimulation of the lower extremity at any level. Each level was then anesthetized with 2% lidocaine prior to treatment with pulsed radiofrequency thermocoagulation at 42 degrees Celsius. Each level was then treated with thermal radiofrequency thermocoagulation at 80 degrees Celsius for 60 seconds in two separate cycles.  Prior to the removal of each needle, a volume of 1 mL of injectate was administered at each site.  The total volume consisted of 1mL of 2% lidocaine and 1mL of 0.25% bupivacaine.    The same procedure was then performed on the contralateral side in the exact same fashion.      ESTIMATED BLOOD LOSS:  Minimal.    SPECIMENS:  None.    COMPLICATIONS:  None.    TOLERANCE AND DISCHARGE:  The patient tolerated the procedure well. The patient was transported to the recovery area without difficulties. The  patient was discharged home under the care of family in stable and satisfactory condition.    PLAN:  1.  The patient was given our standard instruction sheet.  2.  The patient will resume all medications per the medication reconciliation sheet.  3.  The patient will Return to clinic in 8 wks.

## 2023-05-19 NOTE — TELEPHONE ENCOUNTER
Get imodium over the counter and take as directions say.  Likely viral illness going around.   
PT WIFE CALL PT HAS BEEN SICK VOMITING AND DIARRHEA FOR 4 DAYS. PT IS NOT LONGER VOMITING, BUT STILL HAS DIARRHEA.  PT WIFE WANTS TO KNOW WHAT OTC HE CAN TAKE????  
Please advise  
Pt wife is aware   
4 = No assist / stand by assistance

## 2023-07-26 DIAGNOSIS — E11.65 TYPE 2 DIABETES MELLITUS WITH HYPERGLYCEMIA, WITHOUT LONG-TERM CURRENT USE OF INSULIN: ICD-10-CM

## 2023-08-07 ENCOUNTER — OFFICE VISIT (OUTPATIENT)
Dept: FAMILY MEDICINE CLINIC | Facility: CLINIC | Age: 78
End: 2023-08-07
Payer: MEDICARE

## 2023-08-07 VITALS
TEMPERATURE: 98.4 F | HEIGHT: 67 IN | SYSTOLIC BLOOD PRESSURE: 128 MMHG | OXYGEN SATURATION: 94 % | DIASTOLIC BLOOD PRESSURE: 96 MMHG | WEIGHT: 245.6 LBS | HEART RATE: 80 BPM | BODY MASS INDEX: 38.55 KG/M2

## 2023-08-07 DIAGNOSIS — M51.36 DDD (DEGENERATIVE DISC DISEASE), LUMBAR: ICD-10-CM

## 2023-08-07 DIAGNOSIS — E11.65 TYPE 2 DIABETES MELLITUS WITH HYPERGLYCEMIA, UNSPECIFIED WHETHER LONG TERM INSULIN USE: ICD-10-CM

## 2023-08-07 DIAGNOSIS — Z00.00 MEDICARE ANNUAL WELLNESS VISIT, SUBSEQUENT: Primary | ICD-10-CM

## 2023-08-07 PROCEDURE — G0439 PPPS, SUBSEQ VISIT: HCPCS | Performed by: FAMILY MEDICINE

## 2023-08-07 PROCEDURE — 99214 OFFICE O/P EST MOD 30 MIN: CPT | Performed by: FAMILY MEDICINE

## 2023-08-07 PROCEDURE — 3074F SYST BP LT 130 MM HG: CPT | Performed by: FAMILY MEDICINE

## 2023-08-07 PROCEDURE — 1170F FXNL STATUS ASSESSED: CPT | Performed by: FAMILY MEDICINE

## 2023-08-07 PROCEDURE — 90677 PCV20 VACCINE IM: CPT | Performed by: FAMILY MEDICINE

## 2023-08-07 PROCEDURE — 3080F DIAST BP >= 90 MM HG: CPT | Performed by: FAMILY MEDICINE

## 2023-08-07 RX ORDER — LATANOPROST 50 UG/ML
SOLUTION/ DROPS OPHTHALMIC
COMMUNITY
Start: 2023-05-23

## 2023-08-07 RX ORDER — INSULIN GLARGINE 100 [IU]/ML
10 INJECTION, SOLUTION SUBCUTANEOUS DAILY
Qty: 9 ML | Refills: 3
Start: 2023-08-07

## 2023-08-07 RX ORDER — CELECOXIB 200 MG/1
200 CAPSULE ORAL DAILY
Qty: 30 CAPSULE | Refills: 5 | Status: SHIPPED | OUTPATIENT
Start: 2023-08-07

## 2023-08-07 NOTE — PROGRESS NOTES
The ABCs of the Annual Wellness Visit  Subsequent Medicare Wellness Visit    Subjective    Nathan Baez is a 78 y.o. male who presents for a Subsequent Medicare Wellness Visit.    The following portions of the patient's history were reviewed and   updated as appropriate: allergies, current medications, past family history, past medical history, past social history, past surgical history, and problem list.    Compared to one year ago, the patient feels his physical   health is the same.    Compared to one year ago, the patient feels his mental   health is the same.    Recent Hospitalizations:  This patient has had a [facility] admission record on file within the last 365 days.    Current Medical Providers:  Patient Care Team:  Damien Pierce MD as PCP - General (Family Medicine)  Bunny Diggs MD as Consulting Physician (Colon and Rectal Surgery)    Outpatient Medications Prior to Visit   Medication Sig Dispense Refill    albuterol (PROVENTIL) (2.5 MG/3ML) 0.083% nebulizer solution Take 2.5 mg by nebulization Every 4 (Four) Hours As Needed for Wheezing.      albuterol sulfate  (90 Base) MCG/ACT inhaler Inhale 2 puffs Every 4 (Four) Hours As Needed for Wheezing.      amLODIPine (NORVASC) 2.5 MG tablet       aspirin 81 MG EC tablet Take 1 tablet by mouth Daily.      B-D UF III MINI PEN NEEDLES 31G X 5 MM misc 1 APPLICATION DAILY 100 each 3    brimonidine (ALPHAGAN) 0.2 % ophthalmic solution Administer 1 drop to both eyes 2 (Two) Times a Day.      Cyanocobalamin (B-12) 1000 MCG sublingual tablet One sublingual tab dissolved on tongue daily 90 each 3    dorzolamide-timolol (COSOPT) 22.3-6.8 MG/ML ophthalmic solution Administer 1 drop to both eyes 2 (Two) Times a Day.      fexofenadine (ALLEGRA) 180 MG tablet Take 1 tablet by mouth Daily.      fluticasone (FLONASE) 50 MCG/ACT nasal spray 2 sprays into the nostril(s) as directed by provider Daily.      fluticasone-salmeterol (ADVAIR HFA) 230-21  MCG/ACT inhaler Inhale 2 puffs 2 (Two) Times a Day.      FREESTYLE LITE test strip USE TO TEST ONCE A DAY      furosemide (LASIX) 40 MG tablet TAKE 1 TABLET BY MOUTH EVERY DAY 90 tablet 3    gabapentin (NEURONTIN) 300 MG capsule Take 1 capsule by mouth 4 (Four) Times a Day. 120 capsule 1    Hydrocortisone, Perianal, (ANUSOL-HC) 2.5 % rectal cream APPLY RECTALLY THREE TIMES DAILY FOR 7-10 DAYS DURNG HEMORRHOID FLARE      ketoconazole (NIZORAL) 2 % cream Apply 1 application topically to the appropriate area as directed Daily. 60 g 3    Lancets (freestyle) lancets USE TO TEST ONCE A DAY      latanoprost (XALATAN) 0.005 % ophthalmic solution INSTILL 1 DROP IN BOTH EYES DAILY AT BEDTIME      levothyroxine (SYNTHROID, LEVOTHROID) 88 MCG tablet TAKE 1 TABLET BY MOUTH EVERY MORNING 90 tablet 3    losartan (COZAAR) 100 MG tablet TAKE 1 TABLET BY MOUTH DAILY 90 tablet 3    metFORMIN (GLUCOPHAGE) 500 MG tablet TAKE 1 TABLET BY MOUTH TWICE DAILY WITH MEALS 180 tablet 0    montelukast (SINGULAIR) 10 MG tablet TAKE 1 TABLET BY MOUTH EVERY NIGHT 90 tablet 3    pantoprazole (PROTONIX) 40 MG EC tablet TAKE 1 TABLET BY MOUTH TWICE DAILY 90 tablet 3    pramipexole (MIRAPEX) 1.5 MG tablet Take 1 tablet by mouth 2 (Two) Times a Day.      Probiotic Product (Risaquad-2) capsule capsule Take 1 capsule by mouth Daily.      rosuvastatin (CRESTOR) 20 MG tablet TAKE 1 TABLET BY MOUTH EVERY EVENING 90 tablet 3    sennosides-docusate (Stimulant Laxative) 8.6-50 MG per tablet Take 2 tablets by mouth 2 (Two) Times a Day. 200 tablet 3    insulin glargine (LANTUS, SEMGLEE) 100 UNIT/ML injection Inject 12 Units under the skin into the appropriate area as directed Daily.      meloxicam (MOBIC) 15 MG tablet Take 1 tablet by mouth Daily.       No facility-administered medications prior to visit.       No opioid medication identified on active medication list. I have reviewed chart for other potential  high risk medication/s and harmful drug interactions  in the elderly.        Aspirin is on active medication list. Aspirin use is indicated based on review of current medical condition/s. Pros and cons of this therapy have been discussed today. Benefits of this medication outweigh potential harm.  Patient has been encouraged to continue taking this medication.  .      Patient Active Problem List   Diagnosis    OA (osteoarthritis) of knee    Hypertension    Abdominal wall cellulitis    Hypothyroidism (acquired)    Gastroesophageal reflux disease without esophagitis    Mixed hyperlipidemia    Primary insomnia    DDD (degenerative disc disease), lumbar    KWAME (obstructive sleep apnea)    Allergic    Venous insufficiency    Prostate cancer    Venous stasis dermatitis    Tinea cruris    Tinea corporis    Throat clearing    Sleep apnea    Skin lesion of face    Rib pain on left side    Rectal bleed    Presbycusis    Pes planus    Osteoarthritis    Obesity    Medicare annual wellness visit, subsequent    Lumbar strain    Internal hemorrhoids    Insomnia    Inflamed skin tag    Hyperplastic polyp of stomach    Hospital discharge follow-up    History of colon polyps    High risk medication use    Glaucoma    Gastroenteritis, acute    Erysipelas    Encounter for screening colonoscopy    Encounter for long-term (current) use of NSAIDs    Dysphagia    DVT (deep venous thrombosis)    Lumbar facet arthropathy    Diverticulosis    Cough    Contact with hypodermic needle    Cervical radiculopathy    Cellulitis    Arthritis    Allergic rhinitis    Acute glaucoma    Acute embolism and thrombosis of vein    Actinic keratosis    Status post reverse total shoulder replacement, right    Localized edema    Other specified anemias    Peripheral polyneuropathy    Campylobacter diarrhea    Hyponatremia    Generalized abdominal pain    Fever    Constipation    Bilateral lower extremity edema    Acute pyelonephritis    Chronic idiopathic constipation    Functional diarrhea    Change in bowel  "habits    RLS (restless legs syndrome)    Type 2 diabetes mellitus with hyperglycemia    Family history of GI malignancy    Primary osteoarthritis of right hip    B12 deficiency    Intervertebral disc stenosis of neural canal of lumbar region    Encounter for hepatitis C screening test for low risk patient    Pain associated with defecation    Calculus of kidney    Gastroesophageal reflux disease    Body aches    Skin lesion    Candidiasis, intertrigo    Hyperbilirubinemia    Acute respiratory failure due to COVID-19    Bacteremia due to group B Streptococcus    Iron deficiency anemia    Acute respiratory failure with hypoxia and hypercapnia    Hypoventilation syndrome     Advance Care Planning   Advance Care Planning     Advance Directive is on file.  ACP discussion was held with the patient during this visit. Patient has an advance directive in EMR which is still valid.      Objective    Vitals:    23 1143   BP: 128/96   BP Location: Left arm   Patient Position: Sitting   Cuff Size: Adult   Pulse: 80   Temp: 98.4 øF (36.9 øC)   SpO2: 94%   Weight: 111 kg (245 lb 9.6 oz)   Height: 170.2 cm (67.01\")     Estimated body mass index is 38.46 kg/mý as calculated from the following:    Height as of this encounter: 170.2 cm (67.01\").    Weight as of this encounter: 111 kg (245 lb 9.6 oz).    Class 2 Severe Obesity (BMI >=35 and <=39.9). Obesity-related health conditions include the following: hypertension. Obesity is unchanged. BMI is is above average; BMI management plan is completed. We discussed portion control and increasing exercise.      Does the patient have evidence of cognitive impairment? No          HEALTH RISK ASSESSMENT    Smoking Status:  Social History     Tobacco Use   Smoking Status Former    Packs/day: 1.00    Years: 20.00    Pack years: 20.00    Types: Cigarettes    Start date:     Quit date: 1984    Years since quittin.6   Smokeless Tobacco Former    Types: Chew     Alcohol " Consumption:  Social History     Substance and Sexual Activity   Alcohol Use Yes    Comment: 3-4 MONTHLY     Fall Risk Screen:    ANGELLA Fall Risk Assessment has not been completed.    Depression Screenin/5/2023    10:30 AM   PHQ-2/PHQ-9 Depression Screening   Little Interest or Pleasure in Doing Things 0-->not at all   Feeling Down, Depressed or Hopeless 0-->not at all   PHQ-9: Brief Depression Severity Measure Score 0       Health Habits and Functional and Cognitive Screenin/7/2023    12:00 PM   Functional & Cognitive Status   Do you have difficulty preparing food and eating? No   Do you have difficulty bathing yourself, getting dressed or grooming yourself? No   Do you have difficulty using the toilet? No   Do you have difficulty moving around from place to place? No   Do you have trouble with steps or getting out of a bed or a chair? No   Current Diet Low Carb Diet   Dental Exam Up to date   Eye Exam Up to date   Exercise (times per week) 0 times per week   Current Exercises Include No Regular Exercise   Do you need help using the phone?  No   Are you deaf or do you have serious difficulty hearing?  Yes   Do you need help to go to places out of walking distance? No   Do you need help shopping? No   Do you need help preparing meals?  No   Do you need help with housework?  No   Do you need help with laundry? No   Do you need help taking your medications? No   Do you need help managing money? No   Do you ever drive or ride in a car without wearing a seat belt? No   Have you felt unusual stress, anger or loneliness in the last month? No   Who do you live with? Spouse   If you need help, do you have trouble finding someone available to you? No   Have you been bothered in the last four weeks by sexual problems? No   Do you have difficulty concentrating, remembering or making decisions? No       Age-appropriate Screening Schedule:  Refer to the list below for future screening recommendations based on  patient's age, sex and/or medical conditions. Orders for these recommended tests are listed in the plan section. The patient has been provided with a written plan.    Health Maintenance   Topic Date Due    TDAP/TD VACCINES (1 - Tdap) Never done    COVID-19 Vaccine (6 - Pfizer series) 02/18/2023    LIPID PANEL  09/15/2023    INFLUENZA VACCINE  10/01/2023    HEMOGLOBIN A1C  10/27/2023    DIABETIC EYE EXAM  03/29/2024    ANNUAL WELLNESS VISIT  08/07/2024    COLORECTAL CANCER SCREENING  04/19/2026    HEPATITIS C SCREENING  Completed    Pneumococcal Vaccine 65+  Completed    ZOSTER VACCINE  Completed    URINE MICROALBUMIN  Discontinued                  CMS Preventative Services Quick Reference  Risk Factors Identified During Encounter  Inactivity/Sedentary: Patient was advised to exercise at least 150 minutes a week per CDC recommendations.  The above risks/problems have been discussed with the patient.  Pertinent information has been shared with the patient in the After Visit Summary.  An After Visit Summary and PPPS were made available to the patient.    Follow Up:   Next Medicare Wellness visit to be scheduled in 1 year.       Additional E&M Note during same encounter follows:  Patient has multiple medical problems which are significant and separately identifiable that require additional work above and beyond the Medicare Wellness Visit.      Chief Complaint  Diabetes    Subjective        Diabetes  Pertinent negatives for diabetes include no chest pain and no fatigue.   Nathan Baez is also being seen today for low back pain.  No help with PT or ablation form pain management.      Review of Systems   Constitutional:  Negative for chills, diaphoresis and fatigue.   HENT:  Negative for rhinorrhea.    Respiratory:  Negative for cough and shortness of breath.    Cardiovascular:  Negative for chest pain and leg swelling.   Gastrointestinal:  Negative for abdominal distention and abdominal pain.   Musculoskeletal:   "Negative for arthralgias and back pain.   Skin:  Negative for rash.     Objective   Vital Signs:  /96 (BP Location: Left arm, Patient Position: Sitting, Cuff Size: Adult)   Pulse 80   Temp 98.4 øF (36.9 øC)   Ht 170.2 cm (67.01\")   Wt 111 kg (245 lb 9.6 oz)   SpO2 94%   BMI 38.46 kg/mý     Physical Exam  Vitals reviewed.   Constitutional:       Appearance: He is well-developed. He is not diaphoretic.   HENT:      Head: Normocephalic and atraumatic.   Eyes:      General: No scleral icterus.     Pupils: Pupils are equal, round, and reactive to light.   Neck:      Thyroid: No thyromegaly.   Cardiovascular:      Rate and Rhythm: Normal rate and regular rhythm.      Heart sounds: No murmur heard.    No friction rub. No gallop.   Pulmonary:      Effort: Pulmonary effort is normal. No respiratory distress.      Breath sounds: No wheezing or rales.   Chest:      Chest wall: No tenderness.   Abdominal:      General: Bowel sounds are normal. There is no distension.      Palpations: Abdomen is soft.      Tenderness: There is no abdominal tenderness.   Musculoskeletal:         General: No deformity. Normal range of motion.   Lymphadenopathy:      Cervical: No cervical adenopathy.   Skin:     General: Skin is warm and dry.      Findings: No rash.   Neurological:      Cranial Nerves: No cranial nerve deficit.      Motor: No abnormal muscle tone.                       Assessment and Plan   Diagnoses and all orders for this visit:    1. Medicare annual wellness visit, subsequent (Primary)    2. DDD (degenerative disc disease), lumbar  -     celecoxib (CeleBREX) 200 MG capsule; Take 1 capsule by mouth Daily.  Dispense: 30 capsule; Refill: 5  -     MRI Lumbar Spine Without Contrast; Future    3. Type 2 diabetes mellitus with hyperglycemia, unspecified whether long term insulin use  -     Lipid Panel With / Chol / HDL Ratio  -     Comprehensive Metabolic Panel  -     Hemoglobin A1c    Other orders  -     Pneumococcal " Conjugate Vaccine 20-Valent All  -     insulin glargine (LANTUS, SEMGLEE) 100 UNIT/ML injection; Inject 10 Units under the skin into the appropriate area as directed Daily.  Dispense: 9 mL; Refill: 3      Lumbar DDD.  Uncontrolled.  Start celebrex 200 a day.  Failed ablation and PT.  Check MRI of Lumbar.   DM2.  UnControlled.  Chronic.  Decrease lantus to 10 units a day.        Preventive Counseling:  Encouraged to stay active.  Covid vaccine UTD.  HEP A UTD.  Prevnar 20 today.  Colonoscopy UTD.    Dentist UTD.  Optho UTD.           Follow Up   Return in about 3 months (around 11/7/2023).  Patient was given instructions and counseling regarding his condition or for health maintenance advice. Please see specific information pulled into the AVS if appropriate.

## 2023-08-08 LAB
ALBUMIN SERPL-MCNC: 4.4 G/DL (ref 3.5–5.2)
ALBUMIN/GLOB SERPL: 2.1 G/DL
ALP SERPL-CCNC: 170 U/L (ref 39–117)
ALT SERPL-CCNC: 42 U/L (ref 1–41)
AST SERPL-CCNC: 25 U/L (ref 1–40)
BILIRUB SERPL-MCNC: 0.8 MG/DL (ref 0–1.2)
BUN SERPL-MCNC: 21 MG/DL (ref 8–23)
BUN/CREAT SERPL: 20.2 (ref 7–25)
CALCIUM SERPL-MCNC: 9.8 MG/DL (ref 8.6–10.5)
CHLORIDE SERPL-SCNC: 103 MMOL/L (ref 98–107)
CHOLEST SERPL-MCNC: 152 MG/DL (ref 0–200)
CHOLEST/HDLC SERPL: 4.47 {RATIO}
CO2 SERPL-SCNC: 25.4 MMOL/L (ref 22–29)
CREAT SERPL-MCNC: 1.04 MG/DL (ref 0.76–1.27)
EGFRCR SERPLBLD CKD-EPI 2021: 74 ML/MIN/1.73
GLOBULIN SER CALC-MCNC: 2.1 GM/DL
GLUCOSE SERPL-MCNC: 130 MG/DL (ref 65–99)
HBA1C MFR BLD: 6.1 % (ref 4.8–5.6)
HDLC SERPL-MCNC: 34 MG/DL (ref 40–60)
LDLC SERPL CALC-MCNC: 61 MG/DL (ref 0–100)
POTASSIUM SERPL-SCNC: 4.3 MMOL/L (ref 3.5–5.2)
PROT SERPL-MCNC: 6.5 G/DL (ref 6–8.5)
SODIUM SERPL-SCNC: 141 MMOL/L (ref 136–145)
TRIGL SERPL-MCNC: 369 MG/DL (ref 0–150)
VLDLC SERPL CALC-MCNC: 57 MG/DL (ref 5–40)

## 2023-08-09 ENCOUNTER — TELEPHONE (OUTPATIENT)
Dept: FAMILY MEDICINE CLINIC | Facility: CLINIC | Age: 78
End: 2023-08-09

## 2023-08-09 NOTE — TELEPHONE ENCOUNTER
Caller: Nathan Baez    Relationship: Self    Best call back number: [phone]    What is the best time to reach you: ANYTIME     Who are you requesting to speak with (clinical staff, provider,  specific staff member): CLINICAL STAFF     Do you know the name of the person who called: SELF     What was the call regarding: PATIENT WIFE CALLED AND STATED TO PLEASE SEND THE CELEBREX MEDICATION INTO TO [facility] ON [address]. THANKS     Is it okay if the provider responds through MyChart: NO

## 2023-08-09 NOTE — TELEPHONE ENCOUNTER
SPOKE TO PATIENT WIFE I INFORMED HER THAT THE RX WAS SENT TO HIS PHARMACY ON 8/7/23 PATIENT WIFE IS GOING TO CALL THEM TO SEE WHEN IT WILL BE READY I DID INFORMED HER THAT SHE CAN GIVE US A CALL BACK IF SHE HAS ANY ISSUES

## 2023-08-10 DIAGNOSIS — R20.2 PARESTHESIA OF BILATERAL LEGS: ICD-10-CM

## 2023-08-10 RX ORDER — GABAPENTIN 300 MG/1
CAPSULE ORAL
Qty: 120 CAPSULE | OUTPATIENT
Start: 2023-08-10

## 2023-08-14 DIAGNOSIS — M51.36 DDD (DEGENERATIVE DISC DISEASE), LUMBAR: ICD-10-CM

## 2023-09-06 ENCOUNTER — PREP FOR SURGERY (OUTPATIENT)
Dept: SURGERY | Facility: SURGERY CENTER | Age: 78
End: 2023-09-06
Payer: MEDICARE

## 2023-09-06 ENCOUNTER — OFFICE VISIT (OUTPATIENT)
Dept: PAIN MEDICINE | Facility: CLINIC | Age: 78
End: 2023-09-06
Payer: MEDICARE

## 2023-09-06 ENCOUNTER — TRANSCRIBE ORDERS (OUTPATIENT)
Dept: SURGERY | Facility: SURGERY CENTER | Age: 78
End: 2023-09-06
Payer: MEDICARE

## 2023-09-06 VITALS
BODY MASS INDEX: 38.45 KG/M2 | WEIGHT: 245 LBS | HEART RATE: 86 BPM | HEIGHT: 67 IN | RESPIRATION RATE: 20 BRPM | DIASTOLIC BLOOD PRESSURE: 90 MMHG | OXYGEN SATURATION: 93 % | SYSTOLIC BLOOD PRESSURE: 128 MMHG | TEMPERATURE: 96.8 F

## 2023-09-06 DIAGNOSIS — E11.42 DIABETIC PERIPHERAL NEUROPATHY: Primary | ICD-10-CM

## 2023-09-06 DIAGNOSIS — M16.12 OSTEOARTHRITIS OF LEFT HIP, UNSPECIFIED OSTEOARTHRITIS TYPE: Primary | ICD-10-CM

## 2023-09-06 DIAGNOSIS — M16.12 PRIMARY OSTEOARTHRITIS OF LEFT HIP: ICD-10-CM

## 2023-09-06 DIAGNOSIS — Z41.9 SURGERY, ELECTIVE: Primary | ICD-10-CM

## 2023-09-06 DIAGNOSIS — M47.816 LUMBAR FACET ARTHROPATHY: ICD-10-CM

## 2023-09-06 DIAGNOSIS — R20.2 PARESTHESIA OF BILATERAL LEGS: ICD-10-CM

## 2023-09-06 PROCEDURE — 1160F RVW MEDS BY RX/DR IN RCRD: CPT | Performed by: NURSE PRACTITIONER

## 2023-09-06 PROCEDURE — 1125F AMNT PAIN NOTED PAIN PRSNT: CPT | Performed by: NURSE PRACTITIONER

## 2023-09-06 PROCEDURE — 3080F DIAST BP >= 90 MM HG: CPT | Performed by: NURSE PRACTITIONER

## 2023-09-06 PROCEDURE — 99214 OFFICE O/P EST MOD 30 MIN: CPT | Performed by: NURSE PRACTITIONER

## 2023-09-06 PROCEDURE — 1159F MED LIST DOCD IN RCRD: CPT | Performed by: NURSE PRACTITIONER

## 2023-09-06 PROCEDURE — 3074F SYST BP LT 130 MM HG: CPT | Performed by: NURSE PRACTITIONER

## 2023-09-06 RX ORDER — DIAZEPAM 5 MG/1
10 TABLET ORAL ONCE
OUTPATIENT
Start: 2023-09-06 | End: 2023-09-06

## 2023-09-06 RX ORDER — GABAPENTIN 300 MG/1
300 CAPSULE ORAL 4 TIMES DAILY
Qty: 120 CAPSULE | Refills: 5 | Status: SHIPPED | OUTPATIENT
Start: 2023-09-06

## 2023-09-06 NOTE — PROGRESS NOTES
CHIEF COMPLAINT  Back pain    Subjective   Nathan Baez is a 78 y.o. male  who presents for follow-up.  He has a history of back pain, bilateral LE pain. Last visit was reporting worsening left hip pain.  Xray shows moderate arthritis. He is hesitant to see ortho, does not want more surgery.      Had a new lumbar MRI 8/13/2023     Pain today is rated as a 5/10 VAS.  The worst of his pain is in the left lateral hip.  Leg symptoms are managed with Gabapentin 300 mg qid.  The Gabapentin really helps with his DPN symptoms.  No adverse reactions. Also takes Celebrex 200 mg daily prescribed by PCP     Bilateral L3-S1 RFA 5/17/2023 - helped back pain some     Conservative treatment ---  Physical therapy - yes  Medications - topicals, tylenol, gabapentin      Pertinent surgical treatment---  None.  Saw Dr. Carl about a year ago who recommended epidural injections prior to considering surgical intervention.     Pain Management Procedures ([facility] [place])---  5/3/2022 L4-L5 LESI  8/17/2022 L4-L5 LESI    Back Pain  This is a chronic problem. The current episode started more than 1 year ago. The problem occurs daily. The problem has been waxing and waning since onset. The pain is present in the lumbar spine. The quality of the pain is described as aching, burning and shooting. The pain does not radiate. The pain is at a severity of 5/10. Associated symptoms include abdominal pain. Pertinent negatives include no chest pain, dysuria, fever, headaches, numbness or weakness. He has tried analgesics, walking and home exercises for the symptoms. The treatment provided mild relief.      PEG Assessment   What number best describes your pain on average in the past week?4  What number best describes how, during the past week, pain has interfered with your enjoyment of life?5  What number best describes how, during the past week, pain has interfered with your general activity?  5    Review of Pertinent Medical Data ---          The  "following portions of the patient's history were reviewed and updated as appropriate: allergies, current medications, past family history, past medical history, past social history, past surgical history, and problem list.    Review of Systems   Constitutional:  Positive for fatigue. Negative for activity change and fever.   Cardiovascular:  Negative for chest pain.   Gastrointestinal:  Positive for abdominal pain. Negative for constipation and diarrhea.   Genitourinary:  Negative for difficulty urinating and dysuria.   Musculoskeletal:  Positive for back pain.   Neurological:  Negative for dizziness, weakness, light-headedness, numbness and headaches.   Psychiatric/Behavioral:  Negative for agitation, sleep disturbance and suicidal ideas. The patient is not nervous/anxious.    I have reviewed and confirmed the accuracy of the ROS as documented by the MA/LPN/RN ALY Bolaños    Vitals:    09/06/23 0832   BP: 128/90   BP Location: Left arm   Patient Position: Sitting   Cuff Size: Adult   Pulse: 86   Resp: 20   Temp: 96.8 °F (36 °C)   SpO2: 93%   Weight: 111 kg (245 lb)   Height: 170.2 cm (67.01\")   PainSc:   5   PainLoc: Back     Objective   Physical Exam  Vitals and nursing note reviewed.   Constitutional:       General: He is not in acute distress.     Appearance: Normal appearance. He is not ill-appearing.   Pulmonary:      Effort: Pulmonary effort is normal. No respiratory distress.   Musculoskeletal:      Lumbar back: Tenderness and bony tenderness present.      Left hip: Bony tenderness present. Decreased range of motion (pain with internal and external rotation).   Neurological:      Mental Status: He is alert and oriented to person, place, and time.      Motor: Motor function is intact.      Gait: Gait abnormal (slowed, ambulates with cane).   Psychiatric:         Mood and Affect: Mood normal.         Behavior: Behavior normal.         Assessment & Plan   Diagnoses and all orders for this " visit:    1. Diabetic peripheral neuropathy (Primary)    2. Lumbar facet arthropathy    3. Paresthesia of bilateral legs  -     gabapentin (NEURONTIN) 300 MG capsule; Take 1 capsule by mouth 4 (Four) Times a Day.  Dispense: 120 capsule; Refill: 5    4. Primary osteoarthritis of left hip      --- Left Intra Articular hip injection     Reviewed the procedure at length with the patient.  Included in the review was expectations, complications, risk and benefits.The procedure was described in detail and the risks, benefits and alternatives were discussed with the patient (including but not limited to: bleeding, infection, nerve damage, worsening of pain, inability to perform injection, paralysis, seizures, coma, no pain relief and death) who agreed to proceed.  Discussed the potential for sedation if warranted/wanted.  The procedure will plan to be performed at [facility] with fluoroscopic guidance(unless ultrasound is indicated) and could potentially have steroids and contrast dye used. Questions were answered and in a way the patient could understand.  Patient verbalized understanding and wishes to proceed.  This intervention will be ordered.  Discussed with patient that all procedures are part of a multimodal plan of care and include either formal PT or a home exercise program.  Patient has no evidence of coagulopathy or current infection.    --- Refill Gabapentin   --- May consider returning to Dr. Basurto (ortho) pending results of left hip injection  --- Could still try LTFESI   --- long term consideration could be SCS    Nathan Baez reports a pain score of 5.  Given his pain assessment as noted, treatment options were discussed and the following options were decided upon as a follow-up plan to address the patient's pain:  see plan above .      --- Follow-up for injection and 4 weeks post procedure            QIANA REPORT    As the clinician, I personally reviewed the QIANA from 9/6/2023  while the patient was in the office today.    History and physical exam exhibit continued safe and appropriate use of controlled substances.       Dictated utilizing Dragon dictation.

## 2023-09-07 ENCOUNTER — TELEPHONE (OUTPATIENT)
Dept: PAIN MEDICINE | Facility: CLINIC | Age: 78
End: 2023-09-07

## 2023-09-07 NOTE — TELEPHONE ENCOUNTER
Caller: Coco Baez    Relationship to patient: Emergency Contact    Best call back number: [phone]      Patient is needing: CANCEL INJECTION FOR 9.8.23, SOMETHING HAS COME UP

## 2023-09-18 DIAGNOSIS — K21.9 GASTROESOPHAGEAL REFLUX DISEASE WITHOUT ESOPHAGITIS: ICD-10-CM

## 2023-09-18 RX ORDER — PANTOPRAZOLE SODIUM 40 MG/1
TABLET, DELAYED RELEASE ORAL
Qty: 90 TABLET | Refills: 3 | Status: SHIPPED | OUTPATIENT
Start: 2023-09-18

## 2023-10-04 ENCOUNTER — OFFICE VISIT (OUTPATIENT)
Dept: SURGERY | Facility: CLINIC | Age: 78
End: 2023-10-04
Payer: MEDICARE

## 2023-10-04 VITALS
DIASTOLIC BLOOD PRESSURE: 82 MMHG | SYSTOLIC BLOOD PRESSURE: 142 MMHG | HEIGHT: 67 IN | BODY MASS INDEX: 39.24 KG/M2 | WEIGHT: 250 LBS

## 2023-10-04 DIAGNOSIS — K64.9 HEMORRHOIDS, UNSPECIFIED HEMORRHOID TYPE: Primary | ICD-10-CM

## 2023-10-04 DIAGNOSIS — K59.04 CHRONIC IDIOPATHIC CONSTIPATION: ICD-10-CM

## 2023-10-04 PROCEDURE — 3077F SYST BP >= 140 MM HG: CPT | Performed by: PHYSICIAN ASSISTANT

## 2023-10-04 PROCEDURE — 99203 OFFICE O/P NEW LOW 30 MIN: CPT | Performed by: PHYSICIAN ASSISTANT

## 2023-10-04 PROCEDURE — 1159F MED LIST DOCD IN RCRD: CPT | Performed by: PHYSICIAN ASSISTANT

## 2023-10-04 PROCEDURE — 1160F RVW MEDS BY RX/DR IN RCRD: CPT | Performed by: PHYSICIAN ASSISTANT

## 2023-10-04 PROCEDURE — 3079F DIAST BP 80-89 MM HG: CPT | Performed by: PHYSICIAN ASSISTANT

## 2023-10-04 RX ORDER — BISACODYL 5 MG/1
5 TABLET, DELAYED RELEASE ORAL 2 TIMES DAILY
COMMUNITY

## 2023-10-04 RX ORDER — SENNOSIDES 8.6 MG
650 CAPSULE ORAL EVERY 8 HOURS PRN
COMMUNITY

## 2023-10-05 NOTE — PROGRESS NOTES
ASSESSMENT/PLAN:    78-year-old gentleman with off-and-on issues with hemorrhoids.  Dr. Rivers and I had a lengthy discussion about his constipation being the primary source for his hemorrhoids and that he needs to address this with Dr. Chowdhury and have this under control to help resolve his hemorrhoids.  We also did recommend increasing his activity level, each day trying to increase his distance that he is ambulating as well as increase his hydration in hopes of stimulating his bowel function.  His PCP will also need to keep an eye on his hemoglobin to ensure that when he does have his hemorrhoids that he is not having significant blood loss.  All questions were answered and he was willing to proceed with all recommendations.    CC:     Hemorrhoids    HPI:    This is a 78-year-old gentleman presenting to the office today as a self-referral.  For years now he has had issues with constipation, sitting on the toilet for upwards of an hour at a time.  This is led to at least 1 year now of a sensation of something protruding from his rectum and occasionally causing discomfort and blood on the toilet paper.  He has been told that these are hemorrhoids in the past and they will come and go.  He does take Dulcolax and a stimulant twice daily to help with bowel movements and has previously been on Linzess without improvement according to him and his wife.  He does complain of abdominal cramping throughout and around his back but states this is the result of his constipation and inability to completely evacuate he denies blood mixed in with his bowel movements, dark tarry stools, unexpected weight loss or any additional complaints    ENDOSCOPY:   Colonoscopy 2021 history of colon polyps tubular adenomatous  EGD 2023 gastritis    RADIOLOGY:   CT abdomen pelvis: 2/17/2023 soft tissue edema present in the subcutaneous tissues likely related to cellulitis but no abscess or soft tissue gas, cholelithiasis present, no acute  abnormalities intra-abdominal    SOCIAL HISTORY:   Denies tobacco use  Occasional alcohol use    FAMILY HISTORY:    Colorectal cancer: Mother    PREVIOUS ABDOMINAL SURGERY    Radical prostatectomy 1999  Open umbilical hernia repair 2012    OTHER SURGERY  Past Surgical History:   Procedure Laterality Date    CATARACT EXTRACTION, BILATERAL Bilateral     CERVICAL DISCECTOMY ANTERIOR      COLONOSCOPY  03/08/2017    3/17 normal. Recheck 2022. 12/11. Repeat in 5 years    COLONOSCOPY N/A 04/19/2021    Procedure: COLONOSCOPY INTO CECUM WITH HOT & COLD  SNARE POLYPECTOMIES;  Surgeon: Jaden Chowdhury MD;  Location: [facility] ENDOSCOPY;  Service: Gastroenterology;  Laterality: N/A;  PRE: CHANGE IN BOWEL HABITS; FAMILY H/O COLON CANCER  POST: DIVERTICULOSIS, POLYPS    ENDOSCOPY N/A 01/30/2023    Procedure: ESOPHAGOGASTRODUODENOSCOPY with biopsies;  Surgeon: Jaden Chowdhury MD;  Location: [facility] ENDOSCOPY;  Service: Gastroenterology;  Laterality: N/A;  pre- abdominal pain, anemia  post- gastritis    ENDOSCOPY W/ PEG REMOVAL      EYE SURGERY  2013    Cataract    FOOT SURGERY      1998 had big toe replaced on left foot-METAL     HERNIA REPAIR  03/07/2012    umbilical    JOINT REPLACEMENT Left     big toe    MEDIAL BRANCH BLOCK Bilateral 04/07/2023    Procedure: LUMBAR MEDIAL BRANCH BLOCK bilateral L4-S1 [id number];  Surgeon: Lauren Houston MD;  Location: [facility] MAIN OR;  Service: Pain Management;  Laterality: Bilateral;    MEDIAL BRANCH BLOCK Bilateral 04/17/2023    Procedure: LUMBAR MEDIAL BRANCH BLOCK bilateral L4-S1 [id number];  Surgeon: Lauren Houston MD;  Location: [facility] MAIN OR;  Service: Pain Management;  Laterality: Bilateral;    WI ARTHRP KNE CONDYLE&PLATU MEDIAL&LAT COMPARTMENTS Left 09/13/2016    Procedure: LT TOTAL KNEE ARTHROPLASTY;  Surgeon: Tadeo Basurto MD;  Location: [facility] MAIN OR;  Service: Orthopedics    PROSTATECTOMY  07/1999 July 1999. Radical     RADIOFREQUENCY ABLATION Right 05/17/2023     Procedure: RADIOFREQUENCY ABLATION LUMBAR right L3-S1;  Surgeon: Lauren Houston MD;  Location: [facility] MAIN OR;  Service: Pain Management;  Laterality: Right;    THYROID LOBECTOMY      TONSILLECTOMY      TOTAL HIP ARTHROPLASTY Right 08/19/2021    Procedure: TOTAL HIP ARTHROPLASTY RIGHT POSTERIOR;  Surgeon: Tadeo Basurto MD;  Location: [facility] MAIN OR;  Service: Orthopedics;  Laterality: Right;    TOTAL KNEE ARTHROPLASTY Right 2014    TOTAL SHOULDER ARTHROPLASTY W/ DISTAL CLAVICLE EXCISION Right 11/13/2019    Procedure: TOTAL SHOULDER REVERSE ARTHROPLASTY RIGHT REPAIR RIGHT AXILLARY VEIN;  Surgeon: Behzad Kelley MD;  Location: [facility] OR [facility];  Service: Orthopedics    WRIST SURGERY Right 12/17/2014    x2  wrist replaced       PAST MEDICAL HISTORY:    Past Medical History:   Diagnosis Date    Actinic keratosis     Acute embolism and thrombosis of vein     Allergic     Allergic rhinitis     Arthritis     Cataract     Cervical radiculopathy     Chronic constipation     Colon polyp     Diabetes mellitus     Disequilibrium     DJD (degenerative joint disease), lumbar     Elevated cholesterol     Erysipelas     GERD (gastroesophageal reflux disease)     Glaucoma     Hip pain, chronic, right     History of kidney stones     X1    History of peripheral edema     LOWER LEGS BILAT, COMPRESSION KNEE HIGH     History of transfusion     Hyperlipidemia     Hypertension     Hypothyroid     Insomnia     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 ROSHAN SCHAFER    Pes planus     Presbycusis     Prostate cancer     Restless legs syndrome     Shoulder pain     RIGHT, LIMITED MOBILITY-AT TIMES    Sleep apnea     bipap    Tinea corporis     Tinea cruris      Unsteady gait     RIGHT HIP    Weakness     RIGHT HIP       MEDICATIONS:     Current Outpatient Medications:     acetaminophen (TYLENOL) 650 MG 8 hr tablet, Take 1 tablet by mouth Every 8 (Eight) Hours As Needed for Mild Pain., Disp: , Rfl:     albuterol (PROVENTIL) (2.5 MG/3ML) 0.083% nebulizer solution, Take 2.5 mg by nebulization Every 4 (Four) Hours As Needed for Wheezing., Disp: , Rfl:     albuterol sulfate  (90 Base) MCG/ACT inhaler, Inhale 2 puffs Every 4 (Four) Hours As Needed for Wheezing., Disp: , Rfl:     Ascorbic Acid (VITAMIN C PO), Take 1 tablet by mouth Daily., Disp: , Rfl:     aspirin 81 MG EC tablet, Take 1 tablet by mouth Daily., Disp: , Rfl:     B-D UF III MINI PEN NEEDLES 31G X 5 MM misc, 1 APPLICATION DAILY, Disp: 100 each, Rfl: 3    bisacodyl (Dulcolax) 5 MG EC tablet, Take 1 tablet by mouth 2 (Two) Times a Day., Disp: , Rfl:     brimonidine (ALPHAGAN) 0.2 % ophthalmic solution, Administer 1 drop to both eyes 2 (Two) Times a Day., Disp: , Rfl:     celecoxib (CeleBREX) 200 MG capsule, Take 1 capsule by mouth Daily., Disp: 30 capsule, Rfl: 5    Cyanocobalamin (B-12) 1000 MCG sublingual tablet, One sublingual tab dissolved on tongue daily, Disp: 90 each, Rfl: 3    dorzolamide-timolol (COSOPT) 22.3-6.8 MG/ML ophthalmic solution, Administer 1 drop to both eyes 2 (Two) Times a Day., Disp: , Rfl:     fexofenadine (ALLEGRA) 180 MG tablet, Take 1 tablet by mouth Daily., Disp: , Rfl:     fluticasone (FLONASE) 50 MCG/ACT nasal spray, 2 sprays into the nostril(s) as directed by provider Daily., Disp: , Rfl:     fluticasone-salmeterol (ADVAIR HFA) 230-21 MCG/ACT inhaler, Inhale 2 puffs 2 (Two) Times a Day., Disp: , Rfl:     FREESTYLE LITE test strip, USE TO TEST ONCE A DAY, Disp: , Rfl:     furosemide (LASIX) 40 MG tablet, TAKE 1 TABLET BY MOUTH EVERY DAY, Disp: 90 tablet, Rfl: 3    gabapentin (NEURONTIN) 300 MG capsule, Take 1 capsule by mouth 4 (Four) Times a Day., Disp: 120 capsule, Rfl: 5     Hydrocortisone, Perianal, (ANUSOL-HC) 2.5 % rectal cream, APPLY RECTALLY THREE TIMES DAILY FOR 7-10 DAYS DURNG HEMORRHOID FLARE, Disp: , Rfl:     insulin glargine (LANTUS, SEMGLEE) 100 UNIT/ML injection, Inject 10 Units under the skin into the appropriate area as directed Daily., Disp: 9 mL, Rfl: 3    ketoconazole (NIZORAL) 2 % cream, Apply 1 application topically to the appropriate area as directed Daily., Disp: 60 g, Rfl: 3    Lancets (freestyle) lancets, USE TO TEST ONCE A DAY, Disp: , Rfl:     latanoprost (XALATAN) 0.005 % ophthalmic solution, INSTILL 1 DROP IN BOTH EYES DAILY AT BEDTIME, Disp: , Rfl:     levothyroxine (SYNTHROID, LEVOTHROID) 88 MCG tablet, TAKE 1 TABLET BY MOUTH EVERY MORNING, Disp: 90 tablet, Rfl: 3    losartan (COZAAR) 100 MG tablet, TAKE 1 TABLET BY MOUTH DAILY, Disp: 90 tablet, Rfl: 3    MAGNESIUM PO, Take 1 tablet by mouth Daily., Disp: , Rfl:     metFORMIN (GLUCOPHAGE) 500 MG tablet, TAKE 1 TABLET BY MOUTH TWICE DAILY WITH MEALS, Disp: 180 tablet, Rfl: 0    montelukast (SINGULAIR) 10 MG tablet, TAKE 1 TABLET BY MOUTH EVERY NIGHT, Disp: 90 tablet, Rfl: 3    Multiple Vitamins-Iron (MULTIVITAMIN/IRON PO), Take 1 tablet by mouth Daily., Disp: , Rfl:     pantoprazole (PROTONIX) 40 MG EC tablet, TAKE 1 TABLET BY MOUTH TWICE DAILY, Disp: 90 tablet, Rfl: 3    pramipexole (MIRAPEX) 1.5 MG tablet, Take 1 tablet by mouth 2 (Two) Times a Day., Disp: , Rfl:     Probiotic Product (Risaquad-2) capsule capsule, Take 1 capsule by mouth Daily., Disp: , Rfl:     rosuvastatin (CRESTOR) 20 MG tablet, TAKE 1 TABLET BY MOUTH EVERY EVENING, Disp: 90 tablet, Rfl: 3    sennosides-docusate (Stimulant Laxative) 8.6-50 MG per tablet, Take 2 tablets by mouth 2 (Two) Times a Day., Disp: 200 tablet, Rfl: 3    ALLERGIES:   Allergies   Allergen Reactions    Adhesive Tape Rash     Pt states he only had one reaction after hip surgery       PHYSICAL EXAM:   Constitutional: Well-developed well-nourished, chronically  "ill-appearing in no acute distress  Vital signs:   Height 67\"  Weight 250  BMI 39.2  Neck: Supple, no palpable mass, trachea midline  Rectal exam: Redundant tissue with hemorrhoidal skin tags, irritation anteriorly with minimal tenderness  Psychiatric: Alert and oriented ×3, normal affect       He Means PA-C    [facility]   [address], [address]  [place] [address]     [address], [address]  [place] [address]     Office: [phone]  Fax: [phone]    "

## 2023-10-17 RX ORDER — BLOOD-GLUCOSE METER
KIT MISCELLANEOUS
Qty: 200 EACH | Refills: 11 | Status: SHIPPED | OUTPATIENT
Start: 2023-10-17

## 2023-10-17 RX ORDER — LANCETS 28 GAUGE
EACH MISCELLANEOUS
Qty: 200 EACH | Refills: 11 | Status: SHIPPED | OUTPATIENT
Start: 2023-10-17

## 2023-10-17 NOTE — TELEPHONE ENCOUNTER
The form that was faxed to [facility] was incomplete. They are needing an answer for question #3. The form was scanned into the patient's chart 10/11/23 and is titled test strips. John is just needing the form refaxed to them with question 3 answered as well. Please advise.

## 2023-10-18 ENCOUNTER — TELEPHONE (OUTPATIENT)
Dept: FAMILY MEDICINE CLINIC | Facility: CLINIC | Age: 78
End: 2023-10-18
Payer: MEDICARE

## 2023-10-24 RX ORDER — FUROSEMIDE 40 MG/1
TABLET ORAL
Qty: 90 TABLET | Refills: 3 | Status: SHIPPED | OUTPATIENT
Start: 2023-10-24

## 2023-11-01 DIAGNOSIS — E11.65 TYPE 2 DIABETES MELLITUS WITH HYPERGLYCEMIA, WITHOUT LONG-TERM CURRENT USE OF INSULIN: ICD-10-CM

## 2023-11-08 ENCOUNTER — OFFICE VISIT (OUTPATIENT)
Dept: FAMILY MEDICINE CLINIC | Facility: CLINIC | Age: 78
End: 2023-11-08
Payer: MEDICARE

## 2023-11-08 VITALS
OXYGEN SATURATION: 97 % | DIASTOLIC BLOOD PRESSURE: 78 MMHG | SYSTOLIC BLOOD PRESSURE: 132 MMHG | WEIGHT: 248.6 LBS | HEART RATE: 70 BPM | HEIGHT: 67 IN | BODY MASS INDEX: 39.02 KG/M2

## 2023-11-08 DIAGNOSIS — I10 PRIMARY HYPERTENSION: Primary | ICD-10-CM

## 2023-11-08 DIAGNOSIS — D50.9 IRON DEFICIENCY ANEMIA, UNSPECIFIED IRON DEFICIENCY ANEMIA TYPE: ICD-10-CM

## 2023-11-08 DIAGNOSIS — E03.9 HYPOTHYROIDISM (ACQUIRED): ICD-10-CM

## 2023-11-08 DIAGNOSIS — E11.65 TYPE 2 DIABETES MELLITUS WITH HYPERGLYCEMIA, UNSPECIFIED WHETHER LONG TERM INSULIN USE: ICD-10-CM

## 2023-11-08 DIAGNOSIS — E53.8 B12 DEFICIENCY: ICD-10-CM

## 2023-11-08 PROCEDURE — 99214 OFFICE O/P EST MOD 30 MIN: CPT | Performed by: FAMILY MEDICINE

## 2023-11-08 PROCEDURE — 3075F SYST BP GE 130 - 139MM HG: CPT | Performed by: FAMILY MEDICINE

## 2023-11-08 PROCEDURE — 3078F DIAST BP <80 MM HG: CPT | Performed by: FAMILY MEDICINE

## 2023-11-08 NOTE — PROGRESS NOTES
Chief Complaint   Patient presents with    Diabetes     Follow up       Hyperlipidemia    Hypertension    Hypothyroidism       Subjective   Nathan Baez is a 78 y.o. male.     History of Present Illness   fU HTN.  Doing well with meds.    F/U B12 def.  On B12 daily.    F/U hypothyroidism.  Doing well with meds.   F/U iron def anemia.  On flintstones 2 a day.     The following portions of the patient's history were reviewed and updated as appropriate: allergies, current medications, past family history, past medical history, past social history, past surgical history and problem list.    Review of Systems   Constitutional:  Negative for appetite change and fatigue.   HENT:  Negative for nosebleeds and sore throat.    Eyes:  Negative for blurred vision and visual disturbance.   Respiratory:  Negative for shortness of breath and wheezing.    Cardiovascular:  Negative for chest pain and leg swelling.   Gastrointestinal:  Negative for abdominal distention and abdominal pain.   Endocrine: Negative for cold intolerance and polyuria.   Genitourinary:  Negative for dysuria and hematuria.   Musculoskeletal:  Negative for arthralgias and myalgias.   Skin:  Negative for color change and rash.   Neurological:  Negative for weakness and confusion.   Psychiatric/Behavioral:  Negative for agitation and depressed mood.        Patient Active Problem List   Diagnosis    OA (osteoarthritis) of knee    Hypertension    Abdominal wall cellulitis    Hypothyroidism (acquired)    Gastroesophageal reflux disease without esophagitis    Mixed hyperlipidemia    Primary insomnia    DDD (degenerative disc disease), lumbar    KWAME (obstructive sleep apnea)    Allergic    Venous insufficiency    Prostate cancer    Venous stasis dermatitis    Tinea cruris    Tinea corporis    Throat clearing    Sleep apnea    Skin lesion of face    Rib pain on left side    Rectal bleed    Presbycusis    Pes planus    Osteoarthritis    Obesity    Medicare annual  wellness visit, subsequent    Lumbar strain    Internal hemorrhoids    Insomnia    Inflamed skin tag    Hyperplastic polyp of stomach    Hospital discharge follow-up    History of colon polyps    High risk medication use    Glaucoma    Gastroenteritis, acute    Erysipelas    Encounter for screening colonoscopy    Encounter for long-term (current) use of NSAIDs    Dysphagia    DVT (deep venous thrombosis)    Lumbar facet arthropathy    Diverticulosis    Cough    Contact with hypodermic needle    Cervical radiculopathy    Cellulitis    Arthritis    Allergic rhinitis    Acute glaucoma    Acute embolism and thrombosis of vein    Actinic keratosis    Status post reverse total shoulder replacement, right    Localized edema    Other specified anemias    Peripheral polyneuropathy    Campylobacter diarrhea    Hyponatremia    Generalized abdominal pain    Fever    Constipation    Bilateral lower extremity edema    Acute pyelonephritis    Chronic idiopathic constipation    Functional diarrhea    Change in bowel habits    RLS (restless legs syndrome)    Type 2 diabetes mellitus with hyperglycemia    Family history of GI malignancy    Primary osteoarthritis of right hip    B12 deficiency    Intervertebral disc stenosis of neural canal of lumbar region    Encounter for hepatitis C screening test for low risk patient    Pain associated with defecation    Calculus of kidney    Gastroesophageal reflux disease    Body aches    Skin lesion    Candidiasis, intertrigo    Hyperbilirubinemia    Acute respiratory failure due to COVID-19    Bacteremia due to group B Streptococcus    Iron deficiency anemia    Acute respiratory failure with hypoxia and hypercapnia    Hypoventilation syndrome       Allergies   Allergen Reactions    Adhesive Tape Rash     Pt states he only had one reaction after hip surgery         Current Outpatient Medications:     acetaminophen (TYLENOL) 650 MG 8 hr tablet, Take 1 tablet by mouth Every 8 (Eight) Hours As  Needed for Mild Pain., Disp: , Rfl:     albuterol (PROVENTIL) (2.5 MG/3ML) 0.083% nebulizer solution, Take 2.5 mg by nebulization Every 4 (Four) Hours As Needed for Wheezing., Disp: , Rfl:     albuterol sulfate  (90 Base) MCG/ACT inhaler, Inhale 2 puffs Every 4 (Four) Hours As Needed for Wheezing., Disp: , Rfl:     Ascorbic Acid (VITAMIN C PO), Take 1 tablet by mouth Daily., Disp: , Rfl:     aspirin 81 MG EC tablet, Take 1 tablet by mouth Daily., Disp: , Rfl:     B-D UF III MINI PEN NEEDLES 31G X 5 MM misc, 1 APPLICATION DAILY, Disp: 100 each, Rfl: 3    bisacodyl (Dulcolax) 5 MG EC tablet, Take 1 tablet by mouth 2 (Two) Times a Day., Disp: , Rfl:     brimonidine (ALPHAGAN) 0.2 % ophthalmic solution, Administer 1 drop to both eyes 2 (Two) Times a Day., Disp: , Rfl:     celecoxib (CeleBREX) 200 MG capsule, Take 1 capsule by mouth Daily., Disp: 30 capsule, Rfl: 5    Cyanocobalamin (B-12) 1000 MCG sublingual tablet, One sublingual tab dissolved on tongue daily, Disp: 90 each, Rfl: 3    dorzolamide-timolol (COSOPT) 22.3-6.8 MG/ML ophthalmic solution, Administer 1 drop to both eyes 2 (Two) Times a Day., Disp: , Rfl:     fexofenadine (ALLEGRA) 180 MG tablet, Take 1 tablet by mouth Daily., Disp: , Rfl:     fluticasone (FLONASE) 50 MCG/ACT nasal spray, 2 sprays into the nostril(s) as directed by provider Daily., Disp: , Rfl:     fluticasone-salmeterol (ADVAIR HFA) 230-21 MCG/ACT inhaler, Inhale 2 puffs 2 (Two) Times a Day., Disp: , Rfl:     furosemide (LASIX) 40 MG tablet, TAKE 1 TABLET BY MOUTH EVERY DAY, Disp: 90 tablet, Rfl: 3    gabapentin (NEURONTIN) 300 MG capsule, Take 1 capsule by mouth 4 (Four) Times a Day., Disp: 120 capsule, Rfl: 5    glucose blood (FREESTYLE LITE) test strip, Check blood sugar TID, Disp: 200 each, Rfl: 11    Hydrocortisone, Perianal, (ANUSOL-HC) 2.5 % rectal cream, APPLY RECTALLY THREE TIMES DAILY FOR 7-10 DAYS DURNG HEMORRHOID FLARE, Disp: , Rfl:     insulin glargine (LANTUS, SEMGLEE) 100  UNIT/ML injection, Inject 10 Units under the skin into the appropriate area as directed Daily., Disp: 9 mL, Rfl: 3    ketoconazole (NIZORAL) 2 % cream, Apply 1 application topically to the appropriate area as directed Daily., Disp: 60 g, Rfl: 3    Lancets (freestyle) lancets, Check blood sugar TID, Disp: 200 each, Rfl: 11    latanoprost (XALATAN) 0.005 % ophthalmic solution, INSTILL 1 DROP IN BOTH EYES DAILY AT BEDTIME, Disp: , Rfl:     levothyroxine (SYNTHROID, LEVOTHROID) 88 MCG tablet, TAKE 1 TABLET BY MOUTH EVERY MORNING, Disp: 90 tablet, Rfl: 3    losartan (COZAAR) 100 MG tablet, TAKE 1 TABLET BY MOUTH DAILY, Disp: 90 tablet, Rfl: 3    MAGNESIUM PO, Take 1 tablet by mouth Daily., Disp: , Rfl:     metFORMIN (GLUCOPHAGE) 500 MG tablet, TAKE 1 TABLET BY MOUTH TWICE DAILY WITH MEALS, Disp: 180 tablet, Rfl: 3    montelukast (SINGULAIR) 10 MG tablet, TAKE 1 TABLET BY MOUTH EVERY NIGHT, Disp: 90 tablet, Rfl: 3    Multiple Vitamins-Iron (MULTIVITAMIN/IRON PO), Take 1 tablet by mouth Daily., Disp: , Rfl:     pantoprazole (PROTONIX) 40 MG EC tablet, TAKE 1 TABLET BY MOUTH TWICE DAILY, Disp: 90 tablet, Rfl: 3    pramipexole (MIRAPEX) 1.5 MG tablet, Take 1 tablet by mouth 2 (Two) Times a Day., Disp: , Rfl:     Probiotic Product (Risaquad-2) capsule capsule, Take 1 capsule by mouth Daily., Disp: , Rfl:     rosuvastatin (CRESTOR) 20 MG tablet, TAKE 1 TABLET BY MOUTH EVERY EVENING, Disp: 90 tablet, Rfl: 3    sennosides-docusate (Stimulant Laxative) 8.6-50 MG per tablet, Take 2 tablets by mouth 2 (Two) Times a Day., Disp: 200 tablet, Rfl: 3    Past Medical History:   Diagnosis Date    Actinic keratosis     Acute embolism and thrombosis of vein     Allergic     Allergic rhinitis     Arthritis     Cataract     Cervical radiculopathy     Chronic constipation     Colon polyp     Diabetes mellitus     Disequilibrium     DJD (degenerative joint disease), lumbar     Elevated cholesterol     Erysipelas     GERD (gastroesophageal  reflux disease)     Glaucoma     Hip pain, chronic, right     History of kidney stones     X1    History of peripheral edema     LOWER LEGS BILAT, COMPRESSION KNEE HIGH     History of transfusion     Hyperlipidemia     Hypertension     Hypothyroid     Insomnia     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 ROSHAN SCHAFER    Pes planus     Presbycusis     Prostate cancer     Restless legs syndrome     Shoulder pain     RIGHT, LIMITED MOBILITY-AT TIMES    Sleep apnea     bipap    Tinea corporis     Tinea cruris     Unsteady gait     RIGHT HIP    Weakness     RIGHT HIP       Past Surgical History:   Procedure Laterality Date    CATARACT EXTRACTION, BILATERAL Bilateral     CERVICAL DISCECTOMY ANTERIOR      COLONOSCOPY  03/08/2017    3/17 normal. Recheck 2022. 12/11. Repeat in 5 years    COLONOSCOPY N/A 04/19/2021    Procedure: COLONOSCOPY INTO CECUM WITH HOT & COLD  SNARE POLYPECTOMIES;  Surgeon: Jaden Chowdhury MD;  Location: [facility] ENDOSCOPY;  Service: Gastroenterology;  Laterality: N/A;  PRE: CHANGE IN BOWEL HABITS; FAMILY H/O COLON CANCER  POST: DIVERTICULOSIS, POLYPS    ENDOSCOPY N/A 01/30/2023    Procedure: ESOPHAGOGASTRODUODENOSCOPY with biopsies;  Surgeon: Jaden Chowdhury MD;  Location: [facility] ENDOSCOPY;  Service: Gastroenterology;  Laterality: N/A;  pre- abdominal pain, anemia  post- gastritis    ENDOSCOPY W/ PEG REMOVAL      EYE SURGERY  2013    Cataract    FOOT SURGERY      1998 had big toe replaced on left foot-METAL     HERNIA REPAIR  03/07/2012    umbilical    JOINT REPLACEMENT Left     big toe    MEDIAL BRANCH BLOCK Bilateral 04/07/2023    Procedure: LUMBAR MEDIAL BRANCH BLOCK bilateral L4-S1 [id number];  Surgeon:  Lauren Houston MD;  [place] [facility] [place];  Service: Pain Management;  Laterality: Bilateral;    MEDIAL BRANCH BLOCK Bilateral 04/17/2023    Procedure: LUMBAR MEDIAL BRANCH BLOCK bilateral L4-S1 [id number];  Surgeon: Lauren Houston MD;  [place] [facility] [place];  Service: Pain Management;  Laterality: Bilateral;    SC ARTHRP KNE CONDYLE&PLATU MEDIAL&LAT COMPARTMENTS Left 09/13/2016    Procedure: LT TOTAL KNEE ARTHROPLASTY;  Surgeon: Tadeo Basurto MD;  Location: [facility] MAIN OR;  Service: Orthopedics    PROSTATECTOMY  07/1999 July 1999. Radical     RADIOFREQUENCY ABLATION Right 05/17/2023    Procedure: RADIOFREQUENCY ABLATION LUMBAR right L3-S1;  Surgeon: Lauren Houston MD;  [place] [facility] [place];  Service: Pain Management;  Laterality: Right;    THYROID LOBECTOMY      TONSILLECTOMY      TOTAL HIP ARTHROPLASTY Right 08/19/2021    Procedure: TOTAL HIP ARTHROPLASTY RIGHT POSTERIOR;  Surgeon: Tadeo Basurto MD;  Location: [facility] MAIN OR;  Service: Orthopedics;  Laterality: Right;    TOTAL KNEE ARTHROPLASTY Right 2014    TOTAL SHOULDER ARTHROPLASTY W/ DISTAL CLAVICLE EXCISION Right 11/13/2019    Procedure: TOTAL SHOULDER REVERSE ARTHROPLASTY RIGHT REPAIR RIGHT AXILLARY VEIN;  Surgeon: Behzad Kelley MD;  [place] [facility] [place] [facility] [place]  Service: Orthopedics    WRIST SURGERY Right 12/17/2014    x2  wrist replaced       Family History   Problem Relation Age of Onset    Arthritis Mother     Colon cancer Mother     Arthritis Father     Cancer Father         Prostate cancer    Heart disease Sister     Arthritis Sister     Breast cancer Sister     Gout Sister     Hypertension Sister     Cancer Sister         All three breast cancer    Hypertension Brother     Heart disease Brother     Arthritis Brother     Heart attack Brother     Bone cancer Brother     Heart disease Brother     Malig Hyperthermia Neg Hx        Social History     Tobacco Use    Smoking status: Former     Packs/day: 1.00     Years: 20.00      Additional pack years: 0.00     Total pack years: 20.00     Types: Cigarettes     Start date: 1960     Quit date: 1984     Years since quittin.8    Smokeless tobacco: Former     Types: Chew   Substance Use Topics    Alcohol use: Yes     Alcohol/week: 2.0 standard drinks of alcohol     Types: 2 Cans of beer per week     Comment: 3-4 MONTHLY            Objective     Vitals:    23 1357   BP: 132/78   Pulse: 70   SpO2: 97%     Body mass index is 38.94 kg/m².    Physical Exam  Vitals reviewed.   Constitutional:       Appearance: He is well-developed. He is not diaphoretic.   HENT:      Head: Normocephalic and atraumatic.   Eyes:      General: No scleral icterus.     Pupils: Pupils are equal, round, and reactive to light.   Neck:      Thyroid: No thyromegaly.   Cardiovascular:      Rate and Rhythm: Normal rate and regular rhythm.      Heart sounds: No murmur heard.     No friction rub. No gallop.   Pulmonary:      Effort: Pulmonary effort is normal. No respiratory distress.      Breath sounds: No wheezing or rales.   Chest:      Chest wall: No tenderness.   Abdominal:      General: Bowel sounds are normal. There is no distension.      Palpations: Abdomen is soft.      Tenderness: There is no abdominal tenderness.   Musculoskeletal:         General: No deformity. Normal range of motion.   Lymphadenopathy:      Cervical: No cervical adenopathy.   Skin:     General: Skin is warm and dry.      Findings: No rash.   Neurological:      Cranial Nerves: No cranial nerve deficit.      Motor: No abnormal muscle tone.         Lab Results   Component Value Date    GLUCOSE 130 (H) 2023    BUN 21 2023    CREATININE 1.04 2023    EGFRIFNONA 76 2021    EGFRIFAFRI 88 2021    BCR 20.2 2023    K 4.3 2023    CO2 25.4 2023    CALCIUM 9.8 2023    PROTENTOTREF 6.5 2023    ALBUMIN 4.4 2023    LABIL2 2.1 2023    AST 25 2023    ALT 42 (H) 2023        WBC   Date Value Ref Range Status   02/20/2023 4.90 3.40 - 10.80 10*3/mm3 Final   01/26/2023 4.85 3.40 - 10.80 10*3/mm3 Final     RBC   Date Value Ref Range Status   02/20/2023 4.13 (L) 4.14 - 5.80 10*6/mm3 Final   01/26/2023 4.40 4.14 - 5.80 10*6/mm3 Final     Hemoglobin   Date Value Ref Range Status   02/20/2023 9.8 (L) 13.0 - 17.7 g/dL Final     Hematocrit   Date Value Ref Range Status   02/20/2023 32.8 (L) 37.5 - 51.0 % Final     MCV   Date Value Ref Range Status   02/20/2023 79.4 79.0 - 97.0 fL Final     MCH   Date Value Ref Range Status   02/20/2023 23.7 (L) 26.6 - 33.0 pg Final     MCHC   Date Value Ref Range Status   02/20/2023 29.9 (L) 31.5 - 35.7 g/dL Final     RDW   Date Value Ref Range Status   02/20/2023 18.1 (H) 12.3 - 15.4 % Final     RDW-SD   Date Value Ref Range Status   02/20/2023 51.4 37.0 - 54.0 fl Final     MPV   Date Value Ref Range Status   02/20/2023 9.8 6.0 - 12.0 fL Final     Platelets   Date Value Ref Range Status   02/20/2023 188 140 - 450 10*3/mm3 Final     Neutrophil %   Date Value Ref Range Status   02/20/2023 54.4 42.7 - 76.0 % Final     Lymphocyte %   Date Value Ref Range Status   02/20/2023 30.8 19.6 - 45.3 % Final     Monocyte %   Date Value Ref Range Status   02/20/2023 10.4 5.0 - 12.0 % Final     Eosinophil %   Date Value Ref Range Status   02/20/2023 2.0 0.3 - 6.2 % Final     Basophil %   Date Value Ref Range Status   02/20/2023 0.6 0.0 - 1.5 % Final     Immature Grans %   Date Value Ref Range Status   02/20/2023 1.8 (H) 0.0 - 0.5 % Final     Neutrophils, Absolute   Date Value Ref Range Status   02/20/2023 2.66 1.70 - 7.00 10*3/mm3 Final     Lymphocytes, Absolute   Date Value Ref Range Status   02/20/2023 1.51 0.70 - 3.10 10*3/mm3 Final     Monocytes, Absolute   Date Value Ref Range Status   02/20/2023 0.51 0.10 - 0.90 10*3/mm3 Final     Eosinophils, Absolute   Date Value Ref Range Status   02/20/2023 0.10 0.00 - 0.40 10*3/mm3 Final     Basophils, Absolute   Date Value Ref  "Range Status   02/20/2023 0.03 0.00 - 0.20 10*3/mm3 Final     Immature Grans, Absolute   Date Value Ref Range Status   02/20/2023 0.09 (H) 0.00 - 0.05 10*3/mm3 Final     nRBC   Date Value Ref Range Status   02/20/2023 0.0 0.0 - 0.2 /100 WBC Final       Lab Results   Component Value Date    HGBA1C 6.10 (H) 08/07/2023       Lab Results   Component Value Date    CNGVLRSX30 1,009 (H) 10/26/2022       TSH   Date Value Ref Range Status   10/30/2022 3.070 0.270 - 4.200 uIU/mL Final       No results found for: \"CHOL\"  Lab Results   Component Value Date    TRIG 369 (H) 08/07/2023     Lab Results   Component Value Date    HDL 34 (L) 08/07/2023     Lab Results   Component Value Date    LDL 61 08/07/2023     Lab Results   Component Value Date    VLDL 57 (H) 08/07/2023     No results found for: \"LDLHDL\"      Procedures    Assessment & Plan   Problems Addressed this Visit       Hypertension - Primary    Type 2 diabetes mellitus with hyperglycemia    B12 deficiency    Iron deficiency anemia     Diagnoses         Codes Comments    Primary hypertension    -  Primary ICD-10-CM: I10  ICD-9-CM: 401.9     Type 2 diabetes mellitus with hyperglycemia, unspecified whether long term insulin use     ICD-10-CM: E11.65  ICD-9-CM: 250.00     B12 deficiency     ICD-10-CM: E53.8  ICD-9-CM: 266.2     Iron deficiency anemia, unspecified iron deficiency anemia type     ICD-10-CM: D50.9  ICD-9-CM: 280.9         Iron def anemia.  Check Fe, ferritn, CBC.  Continue flintstones with iron 2 a day.    DM2.  Check A1c, urine micro.  Contnue meds.    B12 def.  Check B12.  Continue B12.      No orders of the defined types were placed in this encounter.      Current Outpatient Medications   Medication Sig Dispense Refill    acetaminophen (TYLENOL) 650 MG 8 hr tablet Take 1 tablet by mouth Every 8 (Eight) Hours As Needed for Mild Pain.      albuterol (PROVENTIL) (2.5 MG/3ML) 0.083% nebulizer solution Take 2.5 mg by nebulization Every 4 (Four) Hours As Needed " for Wheezing.      albuterol sulfate  (90 Base) MCG/ACT inhaler Inhale 2 puffs Every 4 (Four) Hours As Needed for Wheezing.      Ascorbic Acid (VITAMIN C PO) Take 1 tablet by mouth Daily.      aspirin 81 MG EC tablet Take 1 tablet by mouth Daily.      B-D UF III MINI PEN NEEDLES 31G X 5 MM misc 1 APPLICATION DAILY 100 each 3    bisacodyl (Dulcolax) 5 MG EC tablet Take 1 tablet by mouth 2 (Two) Times a Day.      brimonidine (ALPHAGAN) 0.2 % ophthalmic solution Administer 1 drop to both eyes 2 (Two) Times a Day.      celecoxib (CeleBREX) 200 MG capsule Take 1 capsule by mouth Daily. 30 capsule 5    Cyanocobalamin (B-12) 1000 MCG sublingual tablet One sublingual tab dissolved on tongue daily 90 each 3    dorzolamide-timolol (COSOPT) 22.3-6.8 MG/ML ophthalmic solution Administer 1 drop to both eyes 2 (Two) Times a Day.      fexofenadine (ALLEGRA) 180 MG tablet Take 1 tablet by mouth Daily.      fluticasone (FLONASE) 50 MCG/ACT nasal spray 2 sprays into the nostril(s) as directed by provider Daily.      fluticasone-salmeterol (ADVAIR HFA) 230-21 MCG/ACT inhaler Inhale 2 puffs 2 (Two) Times a Day.      furosemide (LASIX) 40 MG tablet TAKE 1 TABLET BY MOUTH EVERY DAY 90 tablet 3    gabapentin (NEURONTIN) 300 MG capsule Take 1 capsule by mouth 4 (Four) Times a Day. 120 capsule 5    glucose blood (FREESTYLE LITE) test strip Check blood sugar  each 11    Hydrocortisone, Perianal, (ANUSOL-HC) 2.5 % rectal cream APPLY RECTALLY THREE TIMES DAILY FOR 7-10 DAYS DURNG HEMORRHOID FLARE      insulin glargine (LANTUS, SEMGLEE) 100 UNIT/ML injection Inject 10 Units under the skin into the appropriate area as directed Daily. 9 mL 3    ketoconazole (NIZORAL) 2 % cream Apply 1 application topically to the appropriate area as directed Daily. 60 g 3    Lancets (freestyle) lancets Check blood sugar  each 11    latanoprost (XALATAN) 0.005 % ophthalmic solution INSTILL 1 DROP IN BOTH EYES DAILY AT BEDTIME       levothyroxine (SYNTHROID, LEVOTHROID) 88 MCG tablet TAKE 1 TABLET BY MOUTH EVERY MORNING 90 tablet 3    losartan (COZAAR) 100 MG tablet TAKE 1 TABLET BY MOUTH DAILY 90 tablet 3    MAGNESIUM PO Take 1 tablet by mouth Daily.      metFORMIN (GLUCOPHAGE) 500 MG tablet TAKE 1 TABLET BY MOUTH TWICE DAILY WITH MEALS 180 tablet 3    montelukast (SINGULAIR) 10 MG tablet TAKE 1 TABLET BY MOUTH EVERY NIGHT 90 tablet 3    Multiple Vitamins-Iron (MULTIVITAMIN/IRON PO) Take 1 tablet by mouth Daily.      pantoprazole (PROTONIX) 40 MG EC tablet TAKE 1 TABLET BY MOUTH TWICE DAILY 90 tablet 3    pramipexole (MIRAPEX) 1.5 MG tablet Take 1 tablet by mouth 2 (Two) Times a Day.      Probiotic Product (Risaquad-2) capsule capsule Take 1 capsule by mouth Daily.      rosuvastatin (CRESTOR) 20 MG tablet TAKE 1 TABLET BY MOUTH EVERY EVENING 90 tablet 3    sennosides-docusate (Stimulant Laxative) 8.6-50 MG per tablet Take 2 tablets by mouth 2 (Two) Times a Day. 200 tablet 3     No current facility-administered medications for this visit.       Nathan Baez had no medications administered during this visit.    No follow-ups on file.    There are no Patient Instructions on file for this visit.

## 2023-11-09 LAB
ALBUMIN SERPL-MCNC: 4.7 G/DL (ref 3.5–5.2)
ALBUMIN/CREAT UR: 108 MG/G CREAT (ref 0–29)
ALBUMIN/GLOB SERPL: 2 G/DL
ALP SERPL-CCNC: 154 U/L (ref 39–117)
ALT SERPL-CCNC: 26 U/L (ref 1–41)
AST SERPL-CCNC: 26 U/L (ref 1–40)
BASOPHILS # BLD AUTO: 0.04 10*3/MM3 (ref 0–0.2)
BASOPHILS NFR BLD AUTO: 0.6 % (ref 0–1.5)
BILIRUB SERPL-MCNC: 0.8 MG/DL (ref 0–1.2)
BUN SERPL-MCNC: 23 MG/DL (ref 8–23)
BUN/CREAT SERPL: 20 (ref 7–25)
CALCIUM SERPL-MCNC: 10 MG/DL (ref 8.6–10.5)
CHLORIDE SERPL-SCNC: 101 MMOL/L (ref 98–107)
CO2 SERPL-SCNC: 29.7 MMOL/L (ref 22–29)
CREAT SERPL-MCNC: 1.15 MG/DL (ref 0.76–1.27)
CREAT UR-MCNC: 22.5 MG/DL
EGFRCR SERPLBLD CKD-EPI 2021: 65.5 ML/MIN/1.73
EOSINOPHIL # BLD AUTO: 0.12 10*3/MM3 (ref 0–0.4)
EOSINOPHIL NFR BLD AUTO: 1.9 % (ref 0.3–6.2)
ERYTHROCYTE [DISTWIDTH] IN BLOOD BY AUTOMATED COUNT: 12.9 % (ref 12.3–15.4)
FERRITIN SERPL-MCNC: 68.9 NG/ML (ref 30–400)
GLOBULIN SER CALC-MCNC: 2.4 GM/DL
GLUCOSE SERPL-MCNC: 116 MG/DL (ref 65–99)
HBA1C MFR BLD: 6.1 % (ref 4.8–5.6)
HCT VFR BLD AUTO: 40.3 % (ref 37.5–51)
HGB BLD-MCNC: 13.2 G/DL (ref 13–17.7)
IMM GRANULOCYTES # BLD AUTO: 0.14 10*3/MM3 (ref 0–0.05)
IMM GRANULOCYTES NFR BLD AUTO: 2.2 % (ref 0–0.5)
IRON SERPL-MCNC: 61 MCG/DL (ref 59–158)
LYMPHOCYTES # BLD AUTO: 2.36 10*3/MM3 (ref 0.7–3.1)
LYMPHOCYTES NFR BLD AUTO: 36.8 % (ref 19.6–45.3)
MCH RBC QN AUTO: 29.2 PG (ref 26.6–33)
MCHC RBC AUTO-ENTMCNC: 32.8 G/DL (ref 31.5–35.7)
MCV RBC AUTO: 89.2 FL (ref 79–97)
MICROALBUMIN UR-MCNC: 24.3 UG/ML
MONOCYTES # BLD AUTO: 0.6 10*3/MM3 (ref 0.1–0.9)
MONOCYTES NFR BLD AUTO: 9.4 % (ref 5–12)
NEUTROPHILS # BLD AUTO: 3.15 10*3/MM3 (ref 1.7–7)
NEUTROPHILS NFR BLD AUTO: 49.1 % (ref 42.7–76)
NRBC BLD AUTO-RTO: 0.2 /100 WBC (ref 0–0.2)
PLATELET # BLD AUTO: 170 10*3/MM3 (ref 140–450)
POTASSIUM SERPL-SCNC: 4.5 MMOL/L (ref 3.5–5.2)
PROT SERPL-MCNC: 7.1 G/DL (ref 6–8.5)
RBC # BLD AUTO: 4.52 10*6/MM3 (ref 4.14–5.8)
SODIUM SERPL-SCNC: 140 MMOL/L (ref 136–145)
TSH SERPL DL<=0.005 MIU/L-ACNC: 1.79 UIU/ML (ref 0.27–4.2)
VIT B12 SERPL-MCNC: >2000 PG/ML (ref 211–946)
WBC # BLD AUTO: 6.41 10*3/MM3 (ref 3.4–10.8)

## 2023-11-09 RX ORDER — INSULIN GLARGINE 100 [IU]/ML
8 INJECTION, SOLUTION SUBCUTANEOUS DAILY
Status: SHIPPED
Start: 2023-11-09

## 2023-12-08 ENCOUNTER — HOSPITAL ENCOUNTER (INPATIENT)
Facility: HOSPITAL | Age: 78
LOS: 3 days | Discharge: HOME | End: 2023-12-12
Attending: EMERGENCY MEDICINE | Admitting: INTERNAL MEDICINE
Payer: MEDICARE

## 2023-12-08 ENCOUNTER — APPOINTMENT (OUTPATIENT)
Dept: GENERAL RADIOLOGY | Facility: HOSPITAL | Age: 78
End: 2023-12-08
Payer: MEDICARE

## 2023-12-08 DIAGNOSIS — J18.9 PNEUMONIA OF RIGHT LOWER LOBE DUE TO INFECTIOUS ORGANISM: ICD-10-CM

## 2023-12-08 DIAGNOSIS — R78.81 BACTEREMIA DUE TO GROUP B STREPTOCOCCUS: ICD-10-CM

## 2023-12-08 DIAGNOSIS — A41.9 SEPSIS WITHOUT ACUTE ORGAN DYSFUNCTION, DUE TO UNSPECIFIED ORGANISM: Primary | ICD-10-CM

## 2023-12-08 DIAGNOSIS — B95.1 BACTEREMIA DUE TO GROUP B STREPTOCOCCUS: ICD-10-CM

## 2023-12-08 DIAGNOSIS — L03.818 CELLULITIS OF OTHER SPECIFIED SITE: ICD-10-CM

## 2023-12-08 DIAGNOSIS — I50.32 CHRONIC DIASTOLIC CHF (CONGESTIVE HEART FAILURE): ICD-10-CM

## 2023-12-08 PROBLEM — A41.89 SEPSIS DUE TO OTHER ETIOLOGY: Status: ACTIVE | Noted: 2023-12-08

## 2023-12-08 LAB
ALBUMIN SERPL-MCNC: 4 G/DL (ref 3.5–5.2)
ALBUMIN/GLOB SERPL: 1.7 G/DL
ALP SERPL-CCNC: 143 U/L (ref 39–117)
ALT SERPL W P-5'-P-CCNC: 21 U/L (ref 1–41)
ANION GAP SERPL CALCULATED.3IONS-SCNC: 11.1 MMOL/L (ref 5–15)
AST SERPL-CCNC: 18 U/L (ref 1–40)
BASOPHILS # BLD AUTO: 0 10*3/MM3 (ref 0–0.2)
BASOPHILS NFR BLD AUTO: 0 % (ref 0–1.5)
BILIRUB SERPL-MCNC: 1.1 MG/DL (ref 0–1.2)
BILIRUB UR QL STRIP: NEGATIVE
BUN SERPL-MCNC: 19 MG/DL (ref 8–23)
BUN/CREAT SERPL: 19.4 (ref 7–25)
CALCIUM SPEC-SCNC: 9.3 MG/DL (ref 8.6–10.5)
CHLORIDE SERPL-SCNC: 103 MMOL/L (ref 98–107)
CLARITY UR: CLEAR
CO2 SERPL-SCNC: 23.9 MMOL/L (ref 22–29)
COLOR UR: YELLOW
CREAT SERPL-MCNC: 0.98 MG/DL (ref 0.76–1.27)
D-LACTATE SERPL-SCNC: 1.8 MMOL/L (ref 0.5–2)
D-LACTATE SERPL-SCNC: 2.4 MMOL/L (ref 0.5–2)
DEPRECATED RDW RBC AUTO: 45.2 FL (ref 37–54)
EGFRCR SERPLBLD CKD-EPI 2021: 79.4 ML/MIN/1.73
EOSINOPHIL # BLD AUTO: 0.06 10*3/MM3 (ref 0–0.4)
EOSINOPHIL NFR BLD AUTO: 0.7 % (ref 0.3–6.2)
ERYTHROCYTE [DISTWIDTH] IN BLOOD BY AUTOMATED COUNT: 13.2 % (ref 12.3–15.4)
FLUAV SUBTYP SPEC NAA+PROBE: NOT DETECTED
FLUBV RNA ISLT QL NAA+PROBE: NOT DETECTED
GLOBULIN UR ELPH-MCNC: 2.4 GM/DL
GLUCOSE SERPL-MCNC: 144 MG/DL (ref 65–99)
GLUCOSE UR STRIP-MCNC: NEGATIVE MG/DL
HCT VFR BLD AUTO: 40.5 % (ref 37.5–51)
HGB BLD-MCNC: 12.7 G/DL (ref 13–17.7)
HGB UR QL STRIP.AUTO: NEGATIVE
HOLD SPECIMEN: NORMAL
HOLD SPECIMEN: NORMAL
IMM GRANULOCYTES # BLD AUTO: 0.07 10*3/MM3 (ref 0–0.05)
IMM GRANULOCYTES NFR BLD AUTO: 0.9 % (ref 0–0.5)
KETONES UR QL STRIP: NEGATIVE
LEUKOCYTE ESTERASE UR QL STRIP.AUTO: NEGATIVE
LYMPHOCYTES # BLD AUTO: 0.8 10*3/MM3 (ref 0.7–3.1)
LYMPHOCYTES NFR BLD AUTO: 9.7 % (ref 19.6–45.3)
MCH RBC QN AUTO: 28.9 PG (ref 26.6–33)
MCHC RBC AUTO-ENTMCNC: 31.4 G/DL (ref 31.5–35.7)
MCV RBC AUTO: 92.3 FL (ref 79–97)
MONOCYTES # BLD AUTO: 0.57 10*3/MM3 (ref 0.1–0.9)
MONOCYTES NFR BLD AUTO: 6.9 % (ref 5–12)
NEUTROPHILS NFR BLD AUTO: 6.71 10*3/MM3 (ref 1.7–7)
NEUTROPHILS NFR BLD AUTO: 81.8 % (ref 42.7–76)
NITRITE UR QL STRIP: NEGATIVE
PH UR STRIP.AUTO: 7 [PH] (ref 5–8)
PLATELET # BLD AUTO: 124 10*3/MM3 (ref 140–450)
PMV BLD AUTO: 10.2 FL (ref 6–12)
POTASSIUM SERPL-SCNC: 3.6 MMOL/L (ref 3.5–5.2)
PROCALCITONIN SERPL-MCNC: 0.19 NG/ML (ref 0–0.25)
PROT SERPL-MCNC: 6.4 G/DL (ref 6–8.5)
PROT UR QL STRIP: ABNORMAL
QT INTERVAL: 336 MS
QTC INTERVAL: 465 MS
RBC # BLD AUTO: 4.39 10*6/MM3 (ref 4.14–5.8)
RSV RNA NPH QL NAA+NON-PROBE: NOT DETECTED
SARS-COV-2 RNA RESP QL NAA+PROBE: NOT DETECTED
SODIUM SERPL-SCNC: 138 MMOL/L (ref 136–145)
SP GR UR STRIP: 1.02 (ref 1–1.03)
STREP A PCR: NOT DETECTED
UROBILINOGEN UR QL STRIP: ABNORMAL
WBC NRBC COR # BLD AUTO: 8.21 10*3/MM3 (ref 3.4–10.8)
WHOLE BLOOD HOLD COAG: NORMAL
WHOLE BLOOD HOLD SPECIMEN: NORMAL

## 2023-12-08 PROCEDURE — 71045 X-RAY EXAM CHEST 1 VIEW: CPT

## 2023-12-08 PROCEDURE — 99285 EMERGENCY DEPT VISIT HI MDM: CPT | Performed by: EMERGENCY MEDICINE

## 2023-12-08 PROCEDURE — 87634 RSV DNA/RNA AMP PROBE: CPT | Performed by: EMERGENCY MEDICINE

## 2023-12-08 PROCEDURE — 99285 EMERGENCY DEPT VISIT HI MDM: CPT

## 2023-12-08 PROCEDURE — 87040 BLOOD CULTURE FOR BACTERIA: CPT | Performed by: EMERGENCY MEDICINE

## 2023-12-08 PROCEDURE — 25810000003 SODIUM CHLORIDE 0.9 % SOLUTION 250 ML FLEX CONT: Performed by: EMERGENCY MEDICINE

## 2023-12-08 PROCEDURE — 25010000002 VANCOMYCIN 1 G RECONSTITUTED SOLUTION 1 EACH VIAL: Performed by: EMERGENCY MEDICINE

## 2023-12-08 PROCEDURE — 84145 PROCALCITONIN (PCT): CPT | Performed by: EMERGENCY MEDICINE

## 2023-12-08 PROCEDURE — 87636 SARSCOV2 & INF A&B AMP PRB: CPT | Performed by: EMERGENCY MEDICINE

## 2023-12-08 PROCEDURE — 87651 STREP A DNA AMP PROBE: CPT | Performed by: EMERGENCY MEDICINE

## 2023-12-08 PROCEDURE — 83605 ASSAY OF LACTIC ACID: CPT | Performed by: EMERGENCY MEDICINE

## 2023-12-08 PROCEDURE — 93005 ELECTROCARDIOGRAM TRACING: CPT | Performed by: EMERGENCY MEDICINE

## 2023-12-08 PROCEDURE — 80053 COMPREHEN METABOLIC PANEL: CPT | Performed by: EMERGENCY MEDICINE

## 2023-12-08 PROCEDURE — 81001 URINALYSIS AUTO W/SCOPE: CPT | Performed by: EMERGENCY MEDICINE

## 2023-12-08 PROCEDURE — 85025 COMPLETE CBC W/AUTO DIFF WBC: CPT | Performed by: EMERGENCY MEDICINE

## 2023-12-08 PROCEDURE — 36415 COLL VENOUS BLD VENIPUNCTURE: CPT

## 2023-12-08 PROCEDURE — 25010000002 CEFTRIAXONE PER 250 MG: Performed by: EMERGENCY MEDICINE

## 2023-12-08 PROCEDURE — 25810000003 SODIUM CHLORIDE 0.9 % SOLUTION: Performed by: EMERGENCY MEDICINE

## 2023-12-08 RX ORDER — SODIUM CHLORIDE 0.9 % (FLUSH) 0.9 %
10 SYRINGE (ML) INJECTION AS NEEDED
Status: CANCELLED | OUTPATIENT
Start: 2023-12-08

## 2023-12-08 RX ORDER — SODIUM CHLORIDE 0.9 % (FLUSH) 0.9 %
10 SYRINGE (ML) INJECTION AS NEEDED
Status: DISCONTINUED | OUTPATIENT
Start: 2023-12-08 | End: 2023-12-12 | Stop reason: HOSPADM

## 2023-12-08 RX ORDER — IBUPROFEN 600 MG/1
600 TABLET ORAL ONCE
Status: COMPLETED | OUTPATIENT
Start: 2023-12-08 | End: 2023-12-08

## 2023-12-08 RX ORDER — IPRATROPIUM BROMIDE AND ALBUTEROL SULFATE 2.5; .5 MG/3ML; MG/3ML
3 SOLUTION RESPIRATORY (INHALATION) ONCE
Status: COMPLETED | OUTPATIENT
Start: 2023-12-08 | End: 2023-12-08

## 2023-12-08 RX ORDER — AZITHROMYCIN 250 MG/1
500 TABLET, FILM COATED ORAL ONCE
Status: COMPLETED | OUTPATIENT
Start: 2023-12-08 | End: 2023-12-08

## 2023-12-08 RX ORDER — CEFAZOLIN SODIUM 2 G/100ML
2000 INJECTION, SOLUTION INTRAVENOUS EVERY 8 HOURS
Qty: 1400 ML | Refills: 0 | Status: DISCONTINUED | OUTPATIENT
Start: 2023-12-08 | End: 2023-12-08

## 2023-12-08 RX ORDER — ACETAMINOPHEN 325 MG/1
650 TABLET ORAL ONCE
Status: COMPLETED | OUTPATIENT
Start: 2023-12-08 | End: 2023-12-08

## 2023-12-08 RX ORDER — CEFAZOLIN SODIUM 2 G/100ML
2000 INJECTION, SOLUTION INTRAVENOUS EVERY 8 HOURS
Qty: 1500 ML | Refills: 0 | Status: DISCONTINUED | OUTPATIENT
Start: 2023-12-08 | End: 2023-12-08

## 2023-12-08 RX ADMIN — VANCOMYCIN HYDROCHLORIDE 1000 MG: 1 INJECTION, POWDER, LYOPHILIZED, FOR SOLUTION INTRAVENOUS at 19:15

## 2023-12-08 RX ADMIN — CEFTRIAXONE SODIUM 1000 MG: 1 INJECTION, POWDER, FOR SOLUTION INTRAMUSCULAR; INTRAVENOUS at 18:28

## 2023-12-08 RX ADMIN — AZITHROMYCIN 500 MG: 250 TABLET, FILM COATED ORAL at 19:15

## 2023-12-08 RX ADMIN — SODIUM CHLORIDE 2000 ML: 9 INJECTION, SOLUTION INTRAVENOUS at 17:58

## 2023-12-08 RX ADMIN — IPRATROPIUM BROMIDE AND ALBUTEROL SULFATE 3 ML: 2.5; .5 SOLUTION RESPIRATORY (INHALATION) at 18:31

## 2023-12-08 RX ADMIN — IBUPROFEN 600 MG: 600 TABLET, FILM COATED ORAL at 21:14

## 2023-12-08 RX ADMIN — ACETAMINOPHEN 650 MG: 325 TABLET ORAL at 19:24

## 2023-12-09 ENCOUNTER — APPOINTMENT (OUTPATIENT)
Dept: CT IMAGING | Facility: HOSPITAL | Age: 78
End: 2023-12-09
Payer: MEDICARE

## 2023-12-09 ENCOUNTER — APPOINTMENT (OUTPATIENT)
Dept: CARDIOLOGY | Facility: HOSPITAL | Age: 78
End: 2023-12-09
Payer: MEDICARE

## 2023-12-09 PROBLEM — I50.9 CHF EXACERBATION: Status: ACTIVE | Noted: 2023-12-09

## 2023-12-09 PROBLEM — J18.9 PNEUMONIA OF RIGHT LOWER LOBE DUE TO INFECTIOUS ORGANISM: Status: ACTIVE | Noted: 2023-12-09

## 2023-12-09 LAB
ALBUMIN SERPL-MCNC: 3.6 G/DL (ref 3.5–5.2)
ALBUMIN/GLOB SERPL: 1.6 G/DL
ALP SERPL-CCNC: 118 U/L (ref 39–117)
ALT SERPL W P-5'-P-CCNC: 27 U/L (ref 1–41)
ANION GAP SERPL CALCULATED.3IONS-SCNC: 10.9 MMOL/L (ref 5–15)
AST SERPL-CCNC: 22 U/L (ref 1–40)
BACTERIA UR QL AUTO: NORMAL /HPF
BASOPHILS # BLD AUTO: 0.02 10*3/MM3 (ref 0–0.2)
BASOPHILS NFR BLD AUTO: 0.3 % (ref 0–1.5)
BILIRUB SERPL-MCNC: 1.3 MG/DL (ref 0–1.2)
BUN SERPL-MCNC: 14 MG/DL (ref 8–23)
BUN/CREAT SERPL: 13.6 (ref 7–25)
CALCIUM SPEC-SCNC: 8.5 MG/DL (ref 8.6–10.5)
CHLORIDE SERPL-SCNC: 106 MMOL/L (ref 98–107)
CO2 SERPL-SCNC: 22.1 MMOL/L (ref 22–29)
CREAT SERPL-MCNC: 1.03 MG/DL (ref 0.76–1.27)
D-LACTATE SERPL-SCNC: 2.2 MMOL/L (ref 0.5–2)
DEPRECATED RDW RBC AUTO: 39.1 FL (ref 37–54)
EGFRCR SERPLBLD CKD-EPI 2021: 74.8 ML/MIN/1.73
EOSINOPHIL # BLD AUTO: 0.01 10*3/MM3 (ref 0–0.4)
EOSINOPHIL NFR BLD AUTO: 0.1 % (ref 0.3–6.2)
ERYTHROCYTE [DISTWIDTH] IN BLOOD BY AUTOMATED COUNT: 12.9 % (ref 12.3–15.4)
GEN 5 2HR TROPONIN T REFLEX: 50 NG/L
GLOBULIN UR ELPH-MCNC: 2.3 GM/DL
GLUCOSE BLDC GLUCOMTR-MCNC: 133 MG/DL (ref 70–130)
GLUCOSE BLDC GLUCOMTR-MCNC: 143 MG/DL (ref 70–130)
GLUCOSE BLDC GLUCOMTR-MCNC: 148 MG/DL (ref 70–130)
GLUCOSE BLDC GLUCOMTR-MCNC: 172 MG/DL (ref 70–130)
GLUCOSE BLDC GLUCOMTR-MCNC: 182 MG/DL (ref 70–130)
GLUCOSE SERPL-MCNC: 215 MG/DL (ref 65–99)
HCT VFR BLD AUTO: 33.1 % (ref 37.5–51)
HGB BLD-MCNC: 10.8 G/DL (ref 13–17.7)
HOLD SPECIMEN: NORMAL
HYALINE CASTS UR QL AUTO: NORMAL /LPF
L PNEUMO1 AG UR QL IA: NEGATIVE
LYMPHOCYTES # BLD AUTO: 0.97 10*3/MM3 (ref 0.7–3.1)
LYMPHOCYTES NFR BLD AUTO: 13.9 % (ref 19.6–45.3)
MAGNESIUM SERPL-MCNC: 1.6 MG/DL (ref 1.6–2.4)
MCH RBC QN AUTO: 28.1 PG (ref 26.6–33)
MCHC RBC AUTO-ENTMCNC: 32.6 G/DL (ref 31.5–35.7)
MCV RBC AUTO: 86 FL (ref 79–97)
MONOCYTES # BLD AUTO: 0.65 10*3/MM3 (ref 0.1–0.9)
MONOCYTES NFR BLD AUTO: 9.3 % (ref 5–12)
NEUTROPHILS NFR BLD AUTO: 5.24 10*3/MM3 (ref 1.7–7)
NEUTROPHILS NFR BLD AUTO: 75.4 % (ref 42.7–76)
NT-PROBNP SERPL-MCNC: 548 PG/ML (ref 0–1800)
PLATELET # BLD AUTO: 125 10*3/MM3 (ref 140–450)
PMV BLD AUTO: 10.8 FL (ref 6–12)
POTASSIUM SERPL-SCNC: 3.3 MMOL/L (ref 3.5–5.2)
POTASSIUM SERPL-SCNC: 3.9 MMOL/L (ref 3.5–5.2)
PROT SERPL-MCNC: 5.9 G/DL (ref 6–8.5)
RBC # BLD AUTO: 3.85 10*6/MM3 (ref 4.14–5.8)
RBC # UR STRIP: NORMAL /HPF
REF LAB TEST METHOD: NORMAL
S PNEUM AG SPEC QL LA: NEGATIVE
SODIUM SERPL-SCNC: 139 MMOL/L (ref 136–145)
SQUAMOUS #/AREA URNS HPF: NORMAL /HPF
TROPONIN T DELTA: -11 NG/L
TROPONIN T SERPL HS-MCNC: 61 NG/L
WBC # UR STRIP: NORMAL /HPF
WBC NRBC COR # BLD AUTO: 6.96 10*3/MM3 (ref 3.4–10.8)

## 2023-12-09 PROCEDURE — 87449 NOS EACH ORGANISM AG IA: CPT | Performed by: NURSE PRACTITIONER

## 2023-12-09 PROCEDURE — 97530 THERAPEUTIC ACTIVITIES: CPT

## 2023-12-09 PROCEDURE — 94760 N-INVAS EAR/PLS OXIMETRY 1: CPT

## 2023-12-09 PROCEDURE — 63710000001 INSULIN GLARGINE PER 5 UNITS: Performed by: NURSE PRACTITIONER

## 2023-12-09 PROCEDURE — 84484 ASSAY OF TROPONIN QUANT: CPT | Performed by: INTERNAL MEDICINE

## 2023-12-09 PROCEDURE — 87899 AGENT NOS ASSAY W/OPTIC: CPT | Performed by: HOSPITALIST

## 2023-12-09 PROCEDURE — 63710000001 INSULIN LISPRO (HUMAN) PER 5 UNITS: Performed by: NURSE PRACTITIONER

## 2023-12-09 PROCEDURE — 94640 AIRWAY INHALATION TREATMENT: CPT

## 2023-12-09 PROCEDURE — 84132 ASSAY OF SERUM POTASSIUM: CPT | Performed by: INTERNAL MEDICINE

## 2023-12-09 PROCEDURE — 93306 TTE W/DOPPLER COMPLETE: CPT

## 2023-12-09 PROCEDURE — 83735 ASSAY OF MAGNESIUM: CPT | Performed by: INTERNAL MEDICINE

## 2023-12-09 PROCEDURE — 97162 PT EVAL MOD COMPLEX 30 MIN: CPT

## 2023-12-09 PROCEDURE — 83605 ASSAY OF LACTIC ACID: CPT | Performed by: NURSE PRACTITIONER

## 2023-12-09 PROCEDURE — 80053 COMPREHEN METABOLIC PANEL: CPT | Performed by: NURSE PRACTITIONER

## 2023-12-09 PROCEDURE — 25010000002 VANCOMYCIN PER 500 MG: Performed by: HOSPITALIST

## 2023-12-09 PROCEDURE — 83880 ASSAY OF NATRIURETIC PEPTIDE: CPT | Performed by: INTERNAL MEDICINE

## 2023-12-09 PROCEDURE — 82948 REAGENT STRIP/BLOOD GLUCOSE: CPT

## 2023-12-09 PROCEDURE — 85025 COMPLETE CBC W/AUTO DIFF WBC: CPT | Performed by: NURSE PRACTITIONER

## 2023-12-09 PROCEDURE — 93306 TTE W/DOPPLER COMPLETE: CPT | Performed by: INTERNAL MEDICINE

## 2023-12-09 PROCEDURE — 94799 UNLISTED PULMONARY SVC/PX: CPT

## 2023-12-09 PROCEDURE — 25010000002 CEFTRIAXONE PER 250 MG: Performed by: INTERNAL MEDICINE

## 2023-12-09 PROCEDURE — 94761 N-INVAS EAR/PLS OXIMETRY MLT: CPT

## 2023-12-09 RX ORDER — SODIUM CHLORIDE 9 MG/ML
40 INJECTION, SOLUTION INTRAVENOUS AS NEEDED
Status: DISCONTINUED | OUTPATIENT
Start: 2023-12-09 | End: 2023-12-12 | Stop reason: HOSPADM

## 2023-12-09 RX ORDER — BISACODYL 10 MG
10 SUPPOSITORY, RECTAL RECTAL DAILY PRN
Status: DISCONTINUED | OUTPATIENT
Start: 2023-12-09 | End: 2023-12-12 | Stop reason: HOSPADM

## 2023-12-09 RX ORDER — POTASSIUM CHLORIDE 750 MG/1
40 TABLET, FILM COATED, EXTENDED RELEASE ORAL EVERY 4 HOURS
Status: COMPLETED | OUTPATIENT
Start: 2023-12-09 | End: 2023-12-09

## 2023-12-09 RX ORDER — ONDANSETRON 2 MG/ML
4 INJECTION INTRAMUSCULAR; INTRAVENOUS EVERY 6 HOURS PRN
Status: DISCONTINUED | OUTPATIENT
Start: 2023-12-09 | End: 2023-12-12 | Stop reason: HOSPADM

## 2023-12-09 RX ORDER — NICOTINE POLACRILEX 4 MG
15 LOZENGE BUCCAL
Status: DISCONTINUED | OUTPATIENT
Start: 2023-12-09 | End: 2023-12-12 | Stop reason: HOSPADM

## 2023-12-09 RX ORDER — INSULIN GLARGINE 100 [IU]/ML
10 INJECTION, SOLUTION SUBCUTANEOUS DAILY
Status: DISCONTINUED | OUTPATIENT
Start: 2023-12-09 | End: 2023-12-12 | Stop reason: HOSPADM

## 2023-12-09 RX ORDER — NITROGLYCERIN 0.4 MG/1
0.4 TABLET SUBLINGUAL
Status: DISCONTINUED | OUTPATIENT
Start: 2023-12-09 | End: 2023-12-12 | Stop reason: HOSPADM

## 2023-12-09 RX ORDER — SODIUM CHLORIDE 0.9 % (FLUSH) 0.9 %
10 SYRINGE (ML) INJECTION AS NEEDED
Status: DISCONTINUED | OUTPATIENT
Start: 2023-12-09 | End: 2023-12-12 | Stop reason: HOSPADM

## 2023-12-09 RX ORDER — ALBUTEROL SULFATE 2.5 MG/3ML
2.5 SOLUTION RESPIRATORY (INHALATION) EVERY 4 HOURS PRN
Status: DISCONTINUED | OUTPATIENT
Start: 2023-12-09 | End: 2023-12-12 | Stop reason: HOSPADM

## 2023-12-09 RX ORDER — POLYETHYLENE GLYCOL 3350 17 G/17G
17 POWDER, FOR SOLUTION ORAL DAILY PRN
Status: DISCONTINUED | OUTPATIENT
Start: 2023-12-09 | End: 2023-12-12 | Stop reason: HOSPADM

## 2023-12-09 RX ORDER — CETIRIZINE HYDROCHLORIDE 10 MG/1
10 TABLET ORAL DAILY
Status: DISCONTINUED | OUTPATIENT
Start: 2023-12-09 | End: 2023-12-12 | Stop reason: HOSPADM

## 2023-12-09 RX ORDER — ACETAMINOPHEN 160 MG/5ML
650 SOLUTION ORAL EVERY 4 HOURS PRN
Status: DISCONTINUED | OUTPATIENT
Start: 2023-12-09 | End: 2023-12-12 | Stop reason: HOSPADM

## 2023-12-09 RX ORDER — PRAMIPEXOLE DIHYDROCHLORIDE 1.5 MG/1
1.5 TABLET ORAL 2 TIMES DAILY
Status: DISCONTINUED | OUTPATIENT
Start: 2023-12-09 | End: 2023-12-12 | Stop reason: HOSPADM

## 2023-12-09 RX ORDER — FUROSEMIDE 40 MG/1
40 TABLET ORAL DAILY
Status: DISCONTINUED | OUTPATIENT
Start: 2023-12-09 | End: 2023-12-11

## 2023-12-09 RX ORDER — IBUPROFEN 600 MG/1
1 TABLET ORAL
Status: DISCONTINUED | OUTPATIENT
Start: 2023-12-09 | End: 2023-12-12 | Stop reason: HOSPADM

## 2023-12-09 RX ORDER — DORZOLAMIDE HYDROCHLORIDE AND TIMOLOL MALEATE 20; 5 MG/ML; MG/ML
SOLUTION/ DROPS OPHTHALMIC 2 TIMES DAILY
Status: DISCONTINUED | OUTPATIENT
Start: 2023-12-09 | End: 2023-12-12 | Stop reason: HOSPADM

## 2023-12-09 RX ORDER — BRIMONIDINE TARTRATE 2 MG/ML
1 SOLUTION/ DROPS OPHTHALMIC 2 TIMES DAILY
Status: DISCONTINUED | OUTPATIENT
Start: 2023-12-09 | End: 2023-12-12 | Stop reason: HOSPADM

## 2023-12-09 RX ORDER — DEXTROSE MONOHYDRATE 25 G/50ML
25 INJECTION, SOLUTION INTRAVENOUS
Status: DISCONTINUED | OUTPATIENT
Start: 2023-12-09 | End: 2023-12-12 | Stop reason: HOSPADM

## 2023-12-09 RX ORDER — VANCOMYCIN HYDROCHLORIDE 1 G/200ML
1000 INJECTION, SOLUTION INTRAVENOUS EVERY 12 HOURS
Status: DISCONTINUED | OUTPATIENT
Start: 2023-12-09 | End: 2023-12-09

## 2023-12-09 RX ORDER — AZITHROMYCIN 250 MG/1
250 TABLET, FILM COATED ORAL DAILY
Qty: 4 TABLET | Refills: 0 | Status: DISCONTINUED | OUTPATIENT
Start: 2023-12-10 | End: 2023-12-09

## 2023-12-09 RX ORDER — LOSARTAN POTASSIUM 100 MG/1
100 TABLET ORAL DAILY
Status: DISCONTINUED | OUTPATIENT
Start: 2023-12-09 | End: 2023-12-12 | Stop reason: HOSPADM

## 2023-12-09 RX ORDER — MONTELUKAST SODIUM 10 MG/1
10 TABLET ORAL NIGHTLY
Status: DISCONTINUED | OUTPATIENT
Start: 2023-12-09 | End: 2023-12-12 | Stop reason: HOSPADM

## 2023-12-09 RX ORDER — PANTOPRAZOLE SODIUM 40 MG/1
40 TABLET, DELAYED RELEASE ORAL 2 TIMES DAILY
Status: DISCONTINUED | OUTPATIENT
Start: 2023-12-09 | End: 2023-12-12 | Stop reason: HOSPADM

## 2023-12-09 RX ORDER — ACETAMINOPHEN 650 MG/1
650 SUPPOSITORY RECTAL EVERY 4 HOURS PRN
Status: DISCONTINUED | OUTPATIENT
Start: 2023-12-09 | End: 2023-12-12 | Stop reason: HOSPADM

## 2023-12-09 RX ORDER — AZITHROMYCIN 250 MG/1
500 TABLET, FILM COATED ORAL DAILY
Qty: 2 TABLET | Refills: 0 | Status: DISCONTINUED | OUTPATIENT
Start: 2023-12-09 | End: 2023-12-09

## 2023-12-09 RX ORDER — UREA 10 %
3 LOTION (ML) TOPICAL NIGHTLY PRN
Status: DISCONTINUED | OUTPATIENT
Start: 2023-12-09 | End: 2023-12-12 | Stop reason: HOSPADM

## 2023-12-09 RX ORDER — LATANOPROST 50 UG/ML
1 SOLUTION/ DROPS OPHTHALMIC NIGHTLY
Status: DISCONTINUED | OUTPATIENT
Start: 2023-12-09 | End: 2023-12-12 | Stop reason: HOSPADM

## 2023-12-09 RX ORDER — GABAPENTIN 300 MG/1
300 CAPSULE ORAL EVERY 8 HOURS SCHEDULED
Status: DISCONTINUED | OUTPATIENT
Start: 2023-12-09 | End: 2023-12-12 | Stop reason: HOSPADM

## 2023-12-09 RX ORDER — ROSUVASTATIN CALCIUM 20 MG/1
20 TABLET, COATED ORAL EVERY EVENING
Status: DISCONTINUED | OUTPATIENT
Start: 2023-12-09 | End: 2023-12-12 | Stop reason: HOSPADM

## 2023-12-09 RX ORDER — INSULIN LISPRO 100 [IU]/ML
2-7 INJECTION, SOLUTION INTRAVENOUS; SUBCUTANEOUS
Status: DISCONTINUED | OUTPATIENT
Start: 2023-12-09 | End: 2023-12-12 | Stop reason: HOSPADM

## 2023-12-09 RX ORDER — BISACODYL 5 MG/1
5 TABLET, DELAYED RELEASE ORAL DAILY PRN
Status: DISCONTINUED | OUTPATIENT
Start: 2023-12-09 | End: 2023-12-12 | Stop reason: HOSPADM

## 2023-12-09 RX ORDER — SODIUM CHLORIDE 0.9 % (FLUSH) 0.9 %
10 SYRINGE (ML) INJECTION EVERY 12 HOURS SCHEDULED
Status: DISCONTINUED | OUTPATIENT
Start: 2023-12-09 | End: 2023-12-12 | Stop reason: HOSPADM

## 2023-12-09 RX ORDER — PRAMIPEXOLE DIHYDROCHLORIDE 1.5 MG/1
1.5 TABLET ORAL 2 TIMES DAILY
Status: DISCONTINUED | OUTPATIENT
Start: 2023-12-09 | End: 2023-12-09

## 2023-12-09 RX ORDER — AMOXICILLIN 250 MG
2 CAPSULE ORAL 2 TIMES DAILY
Status: DISCONTINUED | OUTPATIENT
Start: 2023-12-09 | End: 2023-12-12 | Stop reason: HOSPADM

## 2023-12-09 RX ORDER — ASPIRIN 81 MG/1
81 TABLET ORAL DAILY
Status: DISCONTINUED | OUTPATIENT
Start: 2023-12-09 | End: 2023-12-12 | Stop reason: HOSPADM

## 2023-12-09 RX ORDER — GABAPENTIN 300 MG/1
300 CAPSULE ORAL 3 TIMES DAILY
Status: DISCONTINUED | OUTPATIENT
Start: 2023-12-09 | End: 2023-12-09

## 2023-12-09 RX ORDER — ACETAMINOPHEN 325 MG/1
650 TABLET ORAL EVERY 4 HOURS PRN
Status: DISCONTINUED | OUTPATIENT
Start: 2023-12-09 | End: 2023-12-12 | Stop reason: HOSPADM

## 2023-12-09 RX ORDER — LEVOTHYROXINE SODIUM 88 UG/1
88 TABLET ORAL EVERY MORNING
Status: DISCONTINUED | OUTPATIENT
Start: 2023-12-09 | End: 2023-12-12 | Stop reason: HOSPADM

## 2023-12-09 RX ADMIN — LEVOTHYROXINE SODIUM 88 MCG: 0.09 TABLET ORAL at 06:42

## 2023-12-09 RX ADMIN — VANCOMYCIN HYDROCHLORIDE 1000 MG: 1 INJECTION, SOLUTION INTRAVENOUS at 04:35

## 2023-12-09 RX ADMIN — GABAPENTIN 300 MG: 300 CAPSULE ORAL at 23:29

## 2023-12-09 RX ADMIN — PANTOPRAZOLE SODIUM 40 MG: 40 TABLET, DELAYED RELEASE ORAL at 20:35

## 2023-12-09 RX ADMIN — CEFTRIAXONE 2000 MG: 2 INJECTION, POWDER, FOR SOLUTION INTRAMUSCULAR; INTRAVENOUS at 23:29

## 2023-12-09 RX ADMIN — ROSUVASTATIN CALCIUM 20 MG: 20 TABLET, FILM COATED ORAL at 17:46

## 2023-12-09 RX ADMIN — ALBUTEROL SULFATE 2.5 MG: 2.5 SOLUTION RESPIRATORY (INHALATION) at 23:47

## 2023-12-09 RX ADMIN — CETIRIZINE HYDROCHLORIDE 10 MG: 10 TABLET ORAL at 10:09

## 2023-12-09 RX ADMIN — BRIMONIDINE TARTRATE 1 DROP: 2 SOLUTION/ DROPS OPHTHALMIC at 10:08

## 2023-12-09 RX ADMIN — PRAMIPEXOLE DIHYDROCHLORIDE 1.5 MG: 1.5 TABLET ORAL at 20:35

## 2023-12-09 RX ADMIN — MONTELUKAST SODIUM 10 MG: 10 TABLET, FILM COATED ORAL at 20:35

## 2023-12-09 RX ADMIN — GABAPENTIN 300 MG: 300 CAPSULE ORAL at 13:25

## 2023-12-09 RX ADMIN — Medication 10 ML: at 20:36

## 2023-12-09 RX ADMIN — METOPROLOL TARTRATE 12.5 MG: 25 TABLET, FILM COATED ORAL at 20:35

## 2023-12-09 RX ADMIN — ASPIRIN 81 MG: 81 TABLET, COATED ORAL at 10:09

## 2023-12-09 RX ADMIN — TIMOLOL MALEATE: 5 SOLUTION/ DROPS OPHTHALMIC at 20:35

## 2023-12-09 RX ADMIN — Medication 10 ML: at 10:11

## 2023-12-09 RX ADMIN — TIMOLOL MALEATE: 5 SOLUTION/ DROPS OPHTHALMIC at 10:07

## 2023-12-09 RX ADMIN — LATANOPROST 1 DROP: 50 SOLUTION OPHTHALMIC at 20:35

## 2023-12-09 RX ADMIN — POTASSIUM CHLORIDE 40 MEQ: 750 TABLET, EXTENDED RELEASE ORAL at 10:09

## 2023-12-09 RX ADMIN — INSULIN LISPRO 2 UNITS: 100 INJECTION, SOLUTION INTRAVENOUS; SUBCUTANEOUS at 17:46

## 2023-12-09 RX ADMIN — LOSARTAN POTASSIUM 100 MG: 100 TABLET, FILM COATED ORAL at 10:09

## 2023-12-09 RX ADMIN — DOXYCYCLINE 100 MG: 100 INJECTION, POWDER, LYOPHILIZED, FOR SOLUTION INTRAVENOUS at 10:09

## 2023-12-09 RX ADMIN — GABAPENTIN 300 MG: 300 CAPSULE ORAL at 04:45

## 2023-12-09 RX ADMIN — DOXYCYCLINE 100 MG: 100 INJECTION, POWDER, LYOPHILIZED, FOR SOLUTION INTRAVENOUS at 21:47

## 2023-12-09 RX ADMIN — PRAMIPEXOLE DIHYDROCHLORIDE 1.5 MG: 1.5 TABLET ORAL at 06:42

## 2023-12-09 RX ADMIN — FUROSEMIDE 40 MG: 40 TABLET ORAL at 10:10

## 2023-12-09 RX ADMIN — BRIMONIDINE TARTRATE 1 DROP: 2 SOLUTION/ DROPS OPHTHALMIC at 20:35

## 2023-12-09 RX ADMIN — INSULIN GLARGINE 10 UNITS: 100 INJECTION, SOLUTION SUBCUTANEOUS at 10:09

## 2023-12-09 RX ADMIN — POTASSIUM CHLORIDE 40 MEQ: 750 TABLET, EXTENDED RELEASE ORAL at 13:30

## 2023-12-09 RX ADMIN — PANTOPRAZOLE SODIUM 40 MG: 40 TABLET, DELAYED RELEASE ORAL at 10:10

## 2023-12-09 RX ADMIN — METOPROLOL TARTRATE 12.5 MG: 25 TABLET, FILM COATED ORAL at 10:10

## 2023-12-09 RX ADMIN — CEFTRIAXONE SODIUM 1000 MG: 1 INJECTION, POWDER, FOR SOLUTION INTRAMUSCULAR; INTRAVENOUS at 13:25

## 2023-12-09 RX ADMIN — INSULIN LISPRO 2 UNITS: 100 INJECTION, SOLUTION INTRAVENOUS; SUBCUTANEOUS at 13:25

## 2023-12-09 NOTE — PLAN OF CARE
Goal Outcome Evaluation:  Plan of Care Reviewed With: patient, spouse        Progress: no change  Outcome Evaluation: Calm and cooperative with care. A/Ox4. No c/o pain this shift. Temp slightly elevated this shift. Receiving IV doxycycline and IV rocephin. Currently on 2L of O2 (does not wear at home).  Home bipap at bedside. Two replacement doses of K+ given - recheck pending. Abdomen round, distended, red, warm, and welpy looking. Spouse at bedside most of the day. Safety measures in place.

## 2023-12-09 NOTE — THERAPY EVALUATION
Patient Name: Nathan Baez  : 1945    MRN: [id number]                              Today's Date: 2023       Admit Date: 2023    Visit Dx:     ICD-10-CM ICD-9-CM   1. Sepsis without acute organ dysfunction, due to unspecified organism  A41.9 038.9     995.91   2. Cellulitis of other specified site  L03.818 682.8     Patient Active Problem List   Diagnosis    OA (osteoarthritis) of knee    Hypertension    Cellulitis of abdominal wall    Hypothyroidism (acquired)    Gastroesophageal reflux disease without esophagitis    Mixed hyperlipidemia    Primary insomnia    DDD (degenerative disc disease), lumbar    KWAME (obstructive sleep apnea)    Allergic    Venous insufficiency    Prostate cancer    Venous stasis dermatitis    Tinea cruris    Tinea corporis    Throat clearing    Sleep apnea    Skin lesion of face    Rib pain on left side    Rectal bleed    Presbycusis    Pes planus    Osteoarthritis    Obesity    Medicare annual wellness visit, subsequent    Lumbar strain    Internal hemorrhoids    Insomnia    Inflamed skin tag    Hyperplastic polyp of stomach    Hospital discharge follow-up    History of colon polyps    High risk medication use    Glaucoma    Gastroenteritis, acute    Erysipelas    Encounter for screening colonoscopy    Encounter for long-term (current) use of NSAIDs    Dysphagia    DVT (deep venous thrombosis)    Lumbar facet arthropathy    Diverticulosis    Cough    Contact with hypodermic needle    Cervical radiculopathy    Cellulitis    Arthritis    Allergic rhinitis    Acute glaucoma    Acute embolism and thrombosis of vein    Actinic keratosis    Status post reverse total shoulder replacement, right    Localized edema    Other specified anemias    Peripheral polyneuropathy    Campylobacter diarrhea    Hyponatremia    Generalized abdominal pain    Fever    Constipation    Bilateral lower extremity edema    Acute pyelonephritis    Chronic idiopathic constipation    Functional diarrhea     Change in bowel habits    RLS (restless legs syndrome)    Type 2 diabetes mellitus with hyperglycemia    Family history of GI malignancy    Primary osteoarthritis of right hip    B12 deficiency    Intervertebral disc stenosis of neural canal of lumbar region    Encounter for hepatitis C screening test for low risk patient    Pain associated with defecation    Calculus of kidney    Gastroesophageal reflux disease    Body aches    Skin lesion    Candidiasis, intertrigo    Hyperbilirubinemia    Acute respiratory failure due to COVID-19    Bacteremia due to group B Streptococcus    Iron deficiency anemia    Acute respiratory failure with hypoxia and hypercapnia    Hypoventilation syndrome    Sepsis due to other etiology    CHF exacerbation    Pneumonia of right lower lobe due to infectious organism     Past Medical History:   Diagnosis Date    Actinic keratosis     Acute embolism and thrombosis of vein     Allergic     Allergic rhinitis     Arthritis     Cataract     Cervical radiculopathy     Chronic constipation     Colon polyp     Diabetes mellitus     Disequilibrium     DJD (degenerative joint disease), lumbar     Elevated cholesterol     Erysipelas     GERD (gastroesophageal reflux disease)     Glaucoma     Hip pain, chronic, right     History of kidney stones     X1    History of peripheral edema     LOWER LEGS BILAT, COMPRESSION KNEE HIGH     History of transfusion     Hyperlipidemia     Hypertension     Hypothyroid     Insomnia     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 ROSHAN SCHAFER    Pes planus     Presbycusis     Prostate cancer     Restless legs syndrome     Shoulder pain     RIGHT, LIMITED MOBILITY-AT TIMES    Sleep  apnea     bipap    Tinea corporis     Tinea cruris     Unsteady gait     RIGHT HIP    Weakness     RIGHT HIP     Past Surgical History:   Procedure Laterality Date    CATARACT EXTRACTION, BILATERAL Bilateral     CERVICAL DISCECTOMY ANTERIOR      COLONOSCOPY  03/08/2017    3/17 normal. Recheck 2022. 12/11. Repeat in 5 years    COLONOSCOPY N/A 04/19/2021    Procedure: COLONOSCOPY INTO CECUM WITH HOT & COLD  SNARE POLYPECTOMIES;  Surgeon: Jaden Chowdhury MD;  Location: [facility] ENDOSCOPY;  Service: Gastroenterology;  Laterality: N/A;  PRE: CHANGE IN BOWEL HABITS; FAMILY H/O COLON CANCER  POST: DIVERTICULOSIS, POLYPS    ENDOSCOPY N/A 01/30/2023    Procedure: ESOPHAGOGASTRODUODENOSCOPY with biopsies;  Surgeon: Jaden Chowdhury MD;  Location: [facility] ENDOSCOPY;  Service: Gastroenterology;  Laterality: N/A;  pre- abdominal pain, anemia  post- gastritis    ENDOSCOPY W/ PEG REMOVAL      EYE SURGERY  2013    Cataract    FOOT SURGERY      1998 had big toe replaced on left foot-METAL     HERNIA REPAIR  03/07/2012    umbilical    JOINT REPLACEMENT Left     big toe    MEDIAL BRANCH BLOCK Bilateral 04/07/2023    Procedure: LUMBAR MEDIAL BRANCH BLOCK bilateral L4-S1 [id number];  Surgeon: Lauren Houston MD;  [facility];  Service: Pain Management;  Laterality: Bilateral;    MEDIAL BRANCH BLOCK Bilateral 04/17/2023    Procedure: LUMBAR MEDIAL BRANCH BLOCK bilateral L4-S1 [id number];  Surgeon: Lauren Houston MD;  [facility];  Service: Pain Management;  Laterality: Bilateral;    [place] ARTHRP KNE CONDYLE&PLATU MEDIAL&LAT COMPARTMENTS Left 09/13/2016    Procedure: LT TOTAL KNEE ARTHROPLASTY;  Surgeon: Tadeo Basurto MD;  Location: [facility] MAIN OR;  Service: Orthopedics    PROSTATECTOMY  07/1999 July 1999. Radical     RADIOFREQUENCY ABLATION Right 05/17/2023    Procedure: RADIOFREQUENCY ABLATION LUMBAR right L3-S1;  Surgeon: Lauren Houston MD;  [facility];  Service: Pain Management;   Laterality: Right;    THYROID LOBECTOMY      TONSILLECTOMY      TOTAL HIP ARTHROPLASTY Right 08/19/2021    Procedure: TOTAL HIP ARTHROPLASTY RIGHT POSTERIOR;  Surgeon: Tadeo Basurto MD;  Location: [facility];  Service: Orthopedics;  Laterality: Right;    TOTAL KNEE ARTHROPLASTY Right 2014    TOTAL SHOULDER ARTHROPLASTY W/ DISTAL CLAVICLE EXCISION Right 11/13/2019    Procedure: TOTAL SHOULDER REVERSE ARTHROPLASTY RIGHT REPAIR RIGHT AXILLARY VEIN;  Surgeon: Behzad Kelley MD;  Location: [facility];  Service: Orthopedics    WRIST SURGERY Right 12/17/2014    x2  wrist replaced      General Information       Row Name 12/09/23 1003          Physical Therapy Time and Intention    Document Type evaluation  -CS     Mode of Treatment individual therapy;physical therapy  -CS       Row Name 12/09/23 1003          General Information    Patient Profile Reviewed yes  -CS     Prior Level of Function independent:;gait;transfer;bed mobility  RW + SC for short distances; motorized scooter for long distances  -CS     Existing Precautions/Restrictions fall  -CS     Barriers to Rehab none identified  -CS       Row Name 12/09/23 1003          Living Environment    People in Home spouse  -CS       Row Name 12/09/23 1003          Home Main Entrance    Number of Stairs, Main Entrance two  -CS     Stair Railings, Main Entrance railings safe and in good condition  -CS       Row Name 12/09/23 1003          Stairs Within Home, Primary    Number of Stairs, Within Home, Primary none  -CS       Row Name 12/09/23 1003          Cognition    Orientation Status (Cognition) oriented x 3  -CS       Row Name 12/09/23 1003          Safety Issues, Functional Mobility    Impairments Affecting Function (Mobility) balance;endurance/activity tolerance;shortness of breath;strength;pain  -CS               User Key  (r) = Recorded By, (t) = Taken By, (c) = Cosigned By      Initials Name Provider Type    CS Luisana Cohen, PT Physical Therapist                    Mobility       Row Name 12/09/23 1004          Bed Mobility    Bed Mobility supine-sit;sit-supine  -CS     Supine-Sit [place] (Bed Mobility) moderate assist (50% patient effort);verbal cues;nonverbal cues (demo/gesture)  -CS     Sit-Supine [place] (Bed Mobility) not tested  -CS     Assistive Device (Bed Mobility) bed rails;head of bed elevated  -CS     Comment, (Bed Mobility) cues for sequencing; sitting EOB at end of session  -CS       Row Name 12/09/23 1004          Sit-Stand Transfer    Sit-Stand [place] (Transfers) moderate assist (50% patient effort);verbal cues;nonverbal cues (demo/gesture)  -CS     Assistive Device (Sit-Stand Transfers) walker, front-wheeled  -CS     Comment, (Sit-Stand Transfer) cues for UE placement  -CS       Row Name 12/09/23 1004          Gait/Stairs (Locomotion)    [place] Level (Gait) contact guard;verbal cues  -CS     Assistive Device (Gait) walker, front-wheeled  -CS     Distance in Feet (Gait) 20'  -CS     Deviations/Abnormal Patterns (Gait) khanh decreased;gait speed decreased;stride length decreased  -CS     Bilateral Gait Deviations forward flexed posture;heel strike decreased  -CS     [place] Level (Stairs) not tested  -CS     Comment, (Gait/Stairs) very slow pace but no overt LOB; visable SOA although O2 sats in 90's following activity while on RA  -CS               User Key  (r) = Recorded By, (t) = Taken By, (c) = Cosigned By      Initials Name Provider Type    CS Luisana Cohen PT Physical Therapist                   Obj/Interventions       Row Name 12/09/23 1005          Range of Motion Comprehensive    General Range of Motion bilateral lower extremity ROM WFL  -       Row Name 12/09/23 1005          Strength Comprehensive (MMT)    General Manual Muscle Testing (MMT) Assessment other (see comments)  -CS     Comment, General Manual Muscle Testing (MMT) Assessment generalized weakness; B LE grossly 3+/5  -       Row Name 12/09/23  1005          Balance    Balance Assessment sitting static balance;sitting dynamic balance;standing static balance;standing dynamic balance  -CS     Static Sitting Balance modified independence  -CS     Dynamic Sitting Balance supervision  -CS     Position, Sitting Balance unsupported;sitting edge of bed  -CS     Static Standing Balance standby assist  -CS     Dynamic Standing Balance contact guard  -CS     Position/Device Used, Standing Balance supported;walker, front-wheeled  -CS               User Key  (r) = Recorded By, (t) = Taken By, (c) = Cosigned By      Initials Name Provider Type    CS Luisana Cohen, PT Physical Therapist                   Goals/Plan       Row Name 12/09/23 1010          Bed Mobility Goal 1 (PT)    Activity/Assistive Device (Bed Mobility Goal 1, PT) bed mobility activities, all  -CS     [place] Level/Cues Needed (Bed Mobility Goal 1, PT) minimum assist (75% or more patient effort)  -CS     Time Frame (Bed Mobility Goal 1, PT) 2 weeks  -       Row Name 12/09/23 1010          Transfer Goal 1 (PT)    Activity/Assistive Device (Transfer Goal 1, PT) sit-to-stand/stand-to-sit;bed-to-chair/chair-to-bed  -CS     [place] Level/Cues Needed (Transfer Goal 1, PT) minimum assist (75% or more patient effort)  -CS     Time Frame (Transfer Goal 1, PT) 2 weeks  -       Row Name 12/09/23 1010          Gait Training Goal 1 (PT)    Activity/Assistive Device (Gait Training Goal 1, PT) gait (walking locomotion);assistive device use  -CS     [place] Level (Gait Training Goal 1, PT) contact guard required  -CS     Distance (Gait Training Goal 1, PT) 75'  -CS     Time Frame (Gait Training Goal 1, PT) 2 weeks  -CS               User Key  (r) = Recorded By, (t) = Taken By, (c) = Cosigned By      Initials Name Provider Type    CS Luisana Cohen, PT Physical Therapist                   Clinical Impression       Row Name 12/09/23 1006          Pain    Pretreatment Pain Rating 6/10  -CS      Posttreatment Pain Rating 6/10  -CS     Pain Location - abdomen  -CS     Pain Intervention(s) Ambulation/increased activity;Rest;Repositioned  -CS       Row Name 12/09/23 1006          Plan of Care Review    Plan of Care Reviewed With patient  -CS     Outcome Evaluation Pt is a 77 y/o M admitted to [facility] with c/o abdominal pain, weakness, and chills. Work-up reveal sepsis due to abdominal wall cellulitis. Pt has a past med hx of CHF, hypothyroidism, HTN, KAWME, DM, GERD, and anemia. Pt reports he lives with his spouse with 2 GLENNA c HR. Pt uses a RW/SC for short distances and has a motorized scooter for long distances. Pt presents to PT with abdominal discomfort, generalized weakness, and decreased endurance. Pt required mod A and increased time to reach sitting EOB. Pt stood requiring mod A and ambulated 20' c RW requiring CGA. Pt demo's a very slow pace but no LOB noted. Pt with visable SOA while ambulating but O2 sats remained in the 90's while on RA. PT recommends SNF at D/C to address functional deficits before returning home.  -CS       Row Name 12/09/23 1006          Therapy Assessment/Plan (PT)    Patient/Family Therapy Goals Statement (PT) to get stronger  -CS     Rehab Potential (PT) good, to achieve stated therapy goals  -CS     Criteria for Skilled Interventions Met (PT) yes;meets criteria  -CS     Therapy Frequency (PT) 5 times/wk  -CS       Row Name 12/09/23 1006          Vital Signs    Pre SpO2 (%) 97  -CS     O2 Delivery Pre Treatment room air  -CS     Intra SpO2 (%) 93  -CS     O2 Delivery Intra Treatment room air  -CS     Post SpO2 (%) 96  -CS     O2 Delivery Post Treatment room air  -CS       Row Name 12/09/23 1006          Positioning and Restraints    Pre-Treatment Position in bed  -CS     Post Treatment Position bed  -CS     In Bed notified nsg;sitting EOB;call light within reach;encouraged to call for assist;exit alarm on  -CS               User Key  (r) = Recorded By, (t) = Taken By, (c) =  Cosigned By      Initials Name Provider Type    Luisana Ferguson, CITLALLI Physical Therapist                   Outcome Measures       Row Name 12/09/23 1010 12/09/23 0232       How much help from another person do you currently need...    Turning from your back to your side while in flat bed without using bedrails? 4  -CS 4  -JS    Moving from lying on back to sitting on the side of a flat bed without bedrails? 3  -CS 3  -JS    Moving to and from a bed to a chair (including a wheelchair)? 3  -CS 3  -JS    Standing up from a chair using your arms (e.g., wheelchair, bedside chair)? 3  -CS 3  -JS    Climbing 3-5 steps with a railing? 2  -CS 2  -JS    To walk in hospital room? 3  -CS 3  -JS    AM-PAC 6 Clicks Score (PT) 18  -CS 18  -JS    Highest Level of Mobility Goal 6 --> Walk 10 steps or more  -CS 6 --> Walk 10 steps or more  -JS      Row Name 12/09/23 0209          How much help from another person do you currently need...    Turning from your back to your side while in flat bed without using bedrails? 4  -JS     Moving from lying on back to sitting on the side of a flat bed without bedrails? 3  -JS     Moving to and from a bed to a chair (including a wheelchair)? 3  -JS     Standing up from a chair using your arms (e.g., wheelchair, bedside chair)? 3  -JS     Climbing 3-5 steps with a railing? 2  -JS     To walk in hospital room? 3  -JS     AM-PAC 6 Clicks Score (PT) 18  -JS     Highest Level of Mobility Goal 6 --> Walk 10 steps or more  -JS       Row Name 12/09/23 1010          Functional Assessment    Outcome Measure Options AM-PAC 6 Clicks Basic Mobility (PT)  -CS               User Key  (r) = Recorded By, (t) = Taken By, (c) = Cosigned By      Initials Name Provider Type    Rekha Stewart, RN Registered Nurse    Luisana Ferguson, CITLALLI Physical Therapist                                 Physical Therapy Education       Title: PT OT SLP Therapies (In Progress)       Topic: Physical Therapy (In Progress)        Point: Mobility training (Done)       Learning Progress Summary             Patient Acceptance, E,TB, VU,DU,NR by  at 12/9/2023 1010                         Point: Home exercise program (Not Started)       Learner Progress:  Not documented in this visit.              Point: Body mechanics (Done)       Learning Progress Summary             Patient Acceptance, E,TB, VU,DU,NR by  at 12/9/2023 1010                         Point: Precautions (Done)       Learning Progress Summary             Patient Acceptance, E,TB, VU,DU,NR by  at 12/9/2023 1010                                         User Key       Initials Effective Dates Name Provider Type Discipline     09/22/22 -  Luisana Cohen, PT Physical Therapist PT                  PT Recommendation and Plan     Plan of Care Reviewed With: patient  Outcome Evaluation: Pt is a 79 y/o M admitted to [facility] with c/o abdominal pain, weakness, and chills. Work-up reveal sepsis due to abdominal wall cellulitis. Pt has a past med hx of CHF, hypothyroidism, HTN, KWAME, DM, GERD, and anemia. Pt reports he lives with his spouse with 2 GLENNA c HR. Pt uses a RW/SC for short distances and has a motorized scooter for long distances. Pt presents to PT with abdominal discomfort, generalized weakness, and decreased endurance. Pt required mod A and increased time to reach sitting EOB. Pt stood requiring mod A and ambulated 20' c RW requiring CGA. Pt demo's a very slow pace but no LOB noted. Pt with visable SOA while ambulating but O2 sats remained in the 90's while on RA. PT recommends SNF at D/C to address functional deficits before returning home.     Time Calculation:         PT Charges       Row Name 12/09/23 1010             Time Calculation    Start Time 0845  -      Stop Time 0903  -      Time Calculation (min) 18 min  -      PT Received On 12/09/23  -      PT - Next Appointment 12/11/23  -      PT Goal Re-Cert Due Date 12/23/23  -         Time Calculation- PT    Total  Timed Code Minutes- PT 12 minute(s)  -CS         Timed Charges    81249 - PT Therapeutic Activity Minutes 12  -CS         Total Minutes    Timed Charges Total Minutes 12  -CS       Total Minutes 12  -CS                User Key  (r) = Recorded By, (t) = Taken By, (c) = Cosigned By      Initials Name Provider Type    CS Luisana Cohen, PT Physical Therapist                  Therapy Charges for Today       Code Description Service Date Service Provider Modifiers Qty    57106913742 HC PT THERAPEUTIC ACT EA 15 MIN 12/9/2023 Luisana Cohen, PT GP 1    35009701108 HC PT EVAL MOD COMPLEXITY 3 12/9/2023 Luisana Cohen, PT GP 1            PT G-Codes  Outcome Measure Options: AM-PAC 6 Clicks Basic Mobility (PT)  AM-PAC 6 Clicks Score (PT): 18  PT Discharge Summary  Anticipated Discharge Disposition (PT): skilled nursing facility    Luisana Cohen PT  12/9/2023

## 2023-12-09 NOTE — PROGRESS NOTES
"[facility]: Vancomycin Pharmacokinetic Initial Consult Note    Nathan Baez is a 78 y.o. male who is on day 1 of pharmacy to dose vancomycin.    Indication: Pneumonia, Sepsis, and Skin and Soft Tissue  Consulting Provider: ALY Taylor  Planned Duration of Therapy: 5 days  Loading Dose Given: only 1000 mg (9 mg/kg) given on 12/08 at 1915 at [facility]  MRSA PCR performed: no; Result: n/a  Culture/Source:   12/8 BCx x 2: in process  12/8 RSV and COVID PCR: negative  12/8 Sputum cx ordered, not collected yet  Target: -600 mg/L.hr   Pertinent Vanc Dosing History: 1000 mg IV q12h, , during February 2023 admission   Other Antimicrobials: Ceftriaxone    Vitals/Labs  Ht: 170.2 cm (67\"); Wt: 114 kg (252 lb 4.8 oz)  Temp Readings from Last 1 Encounters:   12/09/23 98.2 °F (36.8 °C) (Oral)    Estimated Creatinine Clearance: 76.2 mL/min (by C-G formula based on SCr of 0.98 mg/dL).     Results from last 7 days   Lab Units 12/08/23  1748   CREATININE mg/dL 0.98   WBC 10*3/mm3 8.21     Assessment/Plan:    Vancomycin Dose:   1000 mg (9 mg/kg) IV every  12  hours  Predictive AUC level for the dose ordered is 487 mg/L.hr, which is within the target of 400-600 mg/L.hr  Vanc Trough has been ordered for 12/10 at 0400     Pharmacy will follow patient's kidney function and will adjust doses and obtain levels as necessary. Thank you for involving pharmacy in this patient's care. Please contact pharmacy with any questions or concerns.                           Gabriela Giraldo, PharmD  Clinical Pharmacist   "

## 2023-12-09 NOTE — PLAN OF CARE
Goal Outcome Evaluation:  Plan of Care Reviewed With: patient           Outcome Evaluation: Pt new admit from [facility] ED, admission complete. VSS, no c/o pain. pt up to BSC with 1 x assist. IV antibiotics given. Urine sent to lab. Sputum sample still needed. Wound consulted for blisters and redness on lower abdomen. Will CTM, safety maintained.

## 2023-12-09 NOTE — PLAN OF CARE
Goal Outcome Evaluation:  Plan of Care Reviewed With: patient           Outcome Evaluation: Pt is a 79 y/[facility] M [facility]/o abdominal pain, weakness, and chills. Work-up reveal sepsis due to abdominal wall cellulitis. Pt has a past med hx of CHF, hypothyroidism, HTN, KWAME, DM, GERD, and anemia. Pt reports he lives with his spouse with 2 GLENNA c HR. Pt uses a RW/SC for short distances and has a motorized scooter for long distances. Pt presents to PT with abdominal discomfort, generalized weakness, and decreased endurance. Pt required mod A and increased time to reach sitting EOB. Pt stood requiring mod A and ambulated 20' c RW requiring CGA. Pt demo's a very slow pace but no LOB noted. Pt with visable SOA while ambulating but O2 sats remained in the 90's while on RA. PT recommends SNF at D/C to address functional deficits before returning home.      Anticipated Discharge Disposition (PT): skilled nursing facility

## 2023-12-09 NOTE — ED NOTES
Pt's wife wanting update on EMS status, provider told her we ave not heard anything different than 0100 so that is still the current ETA.

## 2023-12-10 ENCOUNTER — APPOINTMENT (OUTPATIENT)
Dept: CT IMAGING | Facility: HOSPITAL | Age: 78
End: 2023-12-10
Payer: MEDICARE

## 2023-12-10 PROBLEM — I50.32 CHRONIC DIASTOLIC CHF (CONGESTIVE HEART FAILURE): Status: ACTIVE | Noted: 2023-12-10

## 2023-12-10 LAB
ANION GAP SERPL CALCULATED.3IONS-SCNC: 12.9 MMOL/L (ref 5–15)
AORTIC DIMENSIONLESS INDEX: 0.8 (DI)
BH CV ECHO MEAS - AO MAX PG: 15.1 MMHG
BH CV ECHO MEAS - AO MEAN PG: 7.8 MMHG
BH CV ECHO MEAS - AO V2 MAX: 194.6 CM/SEC
BH CV ECHO MEAS - AO V2 VTI: 35.1 CM
BH CV ECHO MEAS - AVA(I,D): 1.77 CM2
BH CV ECHO MEAS - EDV(CUBED): 165.3 ML
BH CV ECHO MEAS - EDV(MOD-SP2): 84 ML
BH CV ECHO MEAS - EDV(MOD-SP4): 88 ML
BH CV ECHO MEAS - EF(MOD-BP): 54.8 %
BH CV ECHO MEAS - EF(MOD-SP2): 64.3 %
BH CV ECHO MEAS - EF(MOD-SP4): 50 %
BH CV ECHO MEAS - ESV(CUBED): 70.6 ML
BH CV ECHO MEAS - ESV(MOD-SP2): 30 ML
BH CV ECHO MEAS - ESV(MOD-SP4): 44 ML
BH CV ECHO MEAS - FS: 24.7 %
BH CV ECHO MEAS - IVS/LVPW: 1.07 CM
BH CV ECHO MEAS - IVSD: 1.08 CM
BH CV ECHO MEAS - LAT PEAK E' VEL: 9.2 CM/SEC
BH CV ECHO MEAS - LV MASS(C)D: 225.5 GRAMS
BH CV ECHO MEAS - LV MAX PG: 6.3 MMHG
BH CV ECHO MEAS - LV MEAN PG: 3.3 MMHG
BH CV ECHO MEAS - LV V1 MAX: 125.9 CM/SEC
BH CV ECHO MEAS - LV V1 VTI: 22.9 CM
BH CV ECHO MEAS - LVIDD: 5.5 CM
BH CV ECHO MEAS - LVIDS: 4.1 CM
BH CV ECHO MEAS - LVOT AREA: 2.7 CM2
BH CV ECHO MEAS - LVOT DIAM: 1.86 CM
BH CV ECHO MEAS - LVPWD: 1.01 CM
BH CV ECHO MEAS - MED PEAK E' VEL: 9.6 CM/SEC
BH CV ECHO MEAS - MV A DUR: 0.16 SEC
BH CV ECHO MEAS - MV A MAX VEL: 101.6 CM/SEC
BH CV ECHO MEAS - MV DEC SLOPE: 681.5 CM/SEC2
BH CV ECHO MEAS - MV DEC TIME: 0.16 SEC
BH CV ECHO MEAS - MV E MAX VEL: 127 CM/SEC
BH CV ECHO MEAS - MV E/A: 1.25
BH CV ECHO MEAS - MV MAX PG: 5.7 MMHG
BH CV ECHO MEAS - MV MEAN PG: 2.07 MMHG
BH CV ECHO MEAS - MV P1/2T: 52 MSEC
BH CV ECHO MEAS - MV V2 VTI: 24.5 CM
BH CV ECHO MEAS - MVA(P1/2T): 4.2 CM2
BH CV ECHO MEAS - MVA(VTI): 2.5 CM2
BH CV ECHO MEAS - PA V2 MAX: 126 CM/SEC
BH CV ECHO MEAS - PULM A REVS DUR: 0.14 SEC
BH CV ECHO MEAS - PULM A REVS VEL: 34.1 CM/SEC
BH CV ECHO MEAS - PULM DIAS VEL: 34.6 CM/SEC
BH CV ECHO MEAS - PULM S/D: 1.64
BH CV ECHO MEAS - PULM SYS VEL: 56.7 CM/SEC
BH CV ECHO MEAS - QP/QS: 0.6
BH CV ECHO MEAS - RAP SYSTOLE: 8 MMHG
BH CV ECHO MEAS - RV MAX PG: 1.83 MMHG
BH CV ECHO MEAS - RV V1 MAX: 67.7 CM/SEC
BH CV ECHO MEAS - RV V1 VTI: 11.3 CM
BH CV ECHO MEAS - RVOT DIAM: 2.05 CM
BH CV ECHO MEAS - RVSP: 30.1 MMHG
BH CV ECHO MEAS - SV(LVOT): 62.2 ML
BH CV ECHO MEAS - SV(MOD-SP2): 54 ML
BH CV ECHO MEAS - SV(MOD-SP4): 44 ML
BH CV ECHO MEAS - SV(RVOT): 37.1 ML
BH CV ECHO MEAS - TAPSE (>1.6): 2.2 CM
BH CV ECHO MEAS - TR MAX PG: 22.1 MMHG
BH CV ECHO MEAS - TR MAX VEL: 234.8 CM/SEC
BH CV ECHO MEASUREMENTS AVERAGE E/E' RATIO: 13.51
BH CV XLRA - RV BASE: 3.6 CM
BH CV XLRA - RV LENGTH: 6.9 CM
BH CV XLRA - RV MID: 3 CM
BH CV XLRA - TDI S': 16.2 CM/SEC
BUN SERPL-MCNC: 13 MG/DL (ref 8–23)
BUN/CREAT SERPL: 11.3 (ref 7–25)
CALCIUM SPEC-SCNC: 8.8 MG/DL (ref 8.6–10.5)
CHLORIDE SERPL-SCNC: 105 MMOL/L (ref 98–107)
CO2 SERPL-SCNC: 20.1 MMOL/L (ref 22–29)
CREAT SERPL-MCNC: 1.15 MG/DL (ref 0.76–1.27)
CREAT SERPL-MCNC: 1.15 MG/DL (ref 0.76–1.27)
EGFRCR SERPLBLD CKD-EPI 2021: 65.5 ML/MIN/1.73
EGFRCR SERPLBLD CKD-EPI 2021: 65.5 ML/MIN/1.73
GLUCOSE BLDC GLUCOMTR-MCNC: 142 MG/DL (ref 70–130)
GLUCOSE BLDC GLUCOMTR-MCNC: 154 MG/DL (ref 70–130)
GLUCOSE BLDC GLUCOMTR-MCNC: 183 MG/DL (ref 70–130)
GLUCOSE BLDC GLUCOMTR-MCNC: 195 MG/DL (ref 70–130)
GLUCOSE SERPL-MCNC: 193 MG/DL (ref 65–99)
LEFT ATRIUM VOLUME INDEX: 25.2 ML/M2
LEFT ATRIUM VOLUME: 56 ML
POTASSIUM SERPL-SCNC: 3.7 MMOL/L (ref 3.5–5.2)
SINUS: 3.4 CM
SODIUM SERPL-SCNC: 138 MMOL/L (ref 136–145)

## 2023-12-10 PROCEDURE — 80048 BASIC METABOLIC PNL TOTAL CA: CPT | Performed by: INTERNAL MEDICINE

## 2023-12-10 PROCEDURE — 25010000002 ENOXAPARIN PER 10 MG: Performed by: INTERNAL MEDICINE

## 2023-12-10 PROCEDURE — 82948 REAGENT STRIP/BLOOD GLUCOSE: CPT

## 2023-12-10 PROCEDURE — 74176 CT ABD & PELVIS W/O CONTRAST: CPT

## 2023-12-10 PROCEDURE — 25010000002 CEFTRIAXONE PER 250 MG: Performed by: INTERNAL MEDICINE

## 2023-12-10 PROCEDURE — 82565 ASSAY OF CREATININE: CPT | Performed by: NURSE PRACTITIONER

## 2023-12-10 PROCEDURE — 63710000001 INSULIN GLARGINE PER 5 UNITS: Performed by: NURSE PRACTITIONER

## 2023-12-10 PROCEDURE — 63710000001 INSULIN LISPRO (HUMAN) PER 5 UNITS: Performed by: NURSE PRACTITIONER

## 2023-12-10 PROCEDURE — 93005 ELECTROCARDIOGRAM TRACING: CPT | Performed by: INTERNAL MEDICINE

## 2023-12-10 RX ORDER — FUROSEMIDE 20 MG/1
20 TABLET ORAL
Status: DISCONTINUED | OUTPATIENT
Start: 2023-12-10 | End: 2023-12-11

## 2023-12-10 RX ORDER — ENOXAPARIN SODIUM 100 MG/ML
40 INJECTION SUBCUTANEOUS NIGHTLY
Status: DISCONTINUED | OUTPATIENT
Start: 2023-12-10 | End: 2023-12-11

## 2023-12-10 RX ADMIN — INSULIN LISPRO 2 UNITS: 100 INJECTION, SOLUTION INTRAVENOUS; SUBCUTANEOUS at 09:01

## 2023-12-10 RX ADMIN — BRIMONIDINE TARTRATE 1 DROP: 2 SOLUTION/ DROPS OPHTHALMIC at 09:02

## 2023-12-10 RX ADMIN — INSULIN GLARGINE 10 UNITS: 100 INJECTION, SOLUTION SUBCUTANEOUS at 09:01

## 2023-12-10 RX ADMIN — LOSARTAN POTASSIUM 100 MG: 100 TABLET, FILM COATED ORAL at 09:00

## 2023-12-10 RX ADMIN — CETIRIZINE HYDROCHLORIDE 10 MG: 10 TABLET ORAL at 09:01

## 2023-12-10 RX ADMIN — BRIMONIDINE TARTRATE 1 DROP: 2 SOLUTION/ DROPS OPHTHALMIC at 20:54

## 2023-12-10 RX ADMIN — Medication 10 ML: at 20:54

## 2023-12-10 RX ADMIN — PRAMIPEXOLE DIHYDROCHLORIDE 1.5 MG: 1.5 TABLET ORAL at 09:00

## 2023-12-10 RX ADMIN — LATANOPROST 1 DROP: 50 SOLUTION OPHTHALMIC at 20:53

## 2023-12-10 RX ADMIN — METOPROLOL TARTRATE 12.5 MG: 25 TABLET, FILM COATED ORAL at 20:53

## 2023-12-10 RX ADMIN — FUROSEMIDE 40 MG: 40 TABLET ORAL at 09:00

## 2023-12-10 RX ADMIN — INSULIN LISPRO 2 UNITS: 100 INJECTION, SOLUTION INTRAVENOUS; SUBCUTANEOUS at 13:00

## 2023-12-10 RX ADMIN — ENOXAPARIN SODIUM 40 MG: 100 INJECTION SUBCUTANEOUS at 20:53

## 2023-12-10 RX ADMIN — METOPROLOL TARTRATE 12.5 MG: 25 TABLET, FILM COATED ORAL at 10:54

## 2023-12-10 RX ADMIN — TIMOLOL MALEATE: 5 SOLUTION/ DROPS OPHTHALMIC at 20:53

## 2023-12-10 RX ADMIN — INSULIN LISPRO 2 UNITS: 100 INJECTION, SOLUTION INTRAVENOUS; SUBCUTANEOUS at 17:41

## 2023-12-10 RX ADMIN — LEVOTHYROXINE SODIUM 88 MCG: 0.09 TABLET ORAL at 05:01

## 2023-12-10 RX ADMIN — MONTELUKAST SODIUM 10 MG: 10 TABLET, FILM COATED ORAL at 20:54

## 2023-12-10 RX ADMIN — GABAPENTIN 300 MG: 300 CAPSULE ORAL at 17:41

## 2023-12-10 RX ADMIN — GABAPENTIN 300 MG: 300 CAPSULE ORAL at 09:01

## 2023-12-10 RX ADMIN — Medication 10 ML: at 09:03

## 2023-12-10 RX ADMIN — PANTOPRAZOLE SODIUM 40 MG: 40 TABLET, DELAYED RELEASE ORAL at 20:54

## 2023-12-10 RX ADMIN — ASPIRIN 81 MG: 81 TABLET, COATED ORAL at 09:00

## 2023-12-10 RX ADMIN — PRAMIPEXOLE DIHYDROCHLORIDE 1.5 MG: 1.5 TABLET ORAL at 20:54

## 2023-12-10 RX ADMIN — GABAPENTIN 300 MG: 300 CAPSULE ORAL at 23:47

## 2023-12-10 RX ADMIN — CEFTRIAXONE 2000 MG: 2 INJECTION, POWDER, FOR SOLUTION INTRAMUSCULAR; INTRAVENOUS at 23:47

## 2023-12-10 RX ADMIN — FUROSEMIDE 20 MG: 20 TABLET ORAL at 12:59

## 2023-12-10 RX ADMIN — ROSUVASTATIN CALCIUM 20 MG: 20 TABLET, FILM COATED ORAL at 17:41

## 2023-12-10 RX ADMIN — TIMOLOL MALEATE: 5 SOLUTION/ DROPS OPHTHALMIC at 09:02

## 2023-12-10 RX ADMIN — PANTOPRAZOLE SODIUM 40 MG: 40 TABLET, DELAYED RELEASE ORAL at 09:01

## 2023-12-10 RX ADMIN — ACETAMINOPHEN 650 MG: 325 TABLET, FILM COATED ORAL at 12:59

## 2023-12-10 RX ADMIN — DOXYCYCLINE 100 MG: 100 INJECTION, POWDER, LYOPHILIZED, FOR SOLUTION INTRAVENOUS at 20:53

## 2023-12-10 RX ADMIN — DOXYCYCLINE 100 MG: 100 INJECTION, POWDER, LYOPHILIZED, FOR SOLUTION INTRAVENOUS at 10:55

## 2023-12-10 NOTE — PLAN OF CARE
Goal Outcome Evaluation:  Plan of Care Reviewed With: patient           Outcome Evaluation: VSS, no c/o pain. Pt up ad dasha to use urinal but SB to the BR. Potassium recheck WNL. IV antibiotics given. Pt still needs CT. IV accress lost, replaced by IV RN. Will CTM, safety maintained.

## 2023-12-10 NOTE — PROGRESS NOTES
[facility] Medicine Services  PROGRESS NOTE    Patient Name: Nathan Baez  : 1945  MRN: [id number]    Date of Admission: 2023  Primary Care Physician: Damien Pierce MD    Subjective   Subjective     CC:  Follow-up weakness and abdominal pain    Subjective:  No further coughing this morning.  Breathing comfortably.  Abdominal pain is stable and perhaps improving some.  Still some redness in the abdomen.  Denies any new complaints or events.    Review of Systems  No current fevers or chills  No current shortness of breath or cough  No current nausea, vomiting, or diarrhea  No current chest pain or palpitations      Objective   Objective     Vital Signs:   Temp:  [98.4 °F (36.9 °C)-99.7 °F (37.6 °C)] 98.8 °F (37.1 °C)  Heart Rate:  [89-92] 89  Resp:  [20] 20  BP: (146-158)/(71-84) 158/76        Physical Exam:  Constitutional:Awake, alert  HENT: NCAT, mucous membranes moist, neck supple  Respiratory: No cough or wheezing, nonlabored breathing  Cardiovascular: Pulse rate is normal, normal radial pulses  Gastrointestinal: Chronic panniculitis changes in lower abdomen, some abdominal edema, soft,  nondistended  Musculoskeletal: Elderly frail and chronically debilitated in appearance, significantly obese, improving bilateral lower extremity edema  Psychiatric: Appropriate affect, cooperative, conversational  Neurologic: No slurred speech or facial droop, follows commands  Skin: Abdominal redness improved, no rashes or jaundice, warm      Results Reviewed:  Results from last 7 days   Lab Units 23  0517 23  1748   WBC 10*3/mm3 6.96 8.21   HEMOGLOBIN g/dL 10.8* 12.7*   HEMATOCRIT % 33.1* 40.5   PLATELETS 10*3/mm3 125* 124*   PROCALCITONIN ng/mL  --  0.19     Results from last 7 days   Lab Units 12/10/23  0323 23  1851 23  1048 23  0517 23  1748   SODIUM mmol/L  --   --   --  139 138   POTASSIUM mmol/L  --  3.9  --  3.3* 3.6   CHLORIDE mmol/L  --   --   --  106 103    CO2 mmol/L  --   --   --  22.1 23.9   BUN mg/dL  --   --   --  14 19   CREATININE mg/dL 1.15  --   --  1.03 0.98   GLUCOSE mg/dL  --   --   --  215* 144*   CALCIUM mg/dL  --   --   --  8.5* 9.3   ALK PHOS U/L  --   --   --  118* 143*   ALT (SGPT) U/L  --   --   --  27 21   AST (SGOT) U/L  --   --   --  22 18   HSTROP T ng/L  --   --  50* 61*  --    PROBNP pg/mL  --   --   --  548.0  --      Estimated Creatinine Clearance: 64.9 mL/min (by C-G formula based on SCr of 1.15 mg/dL).    Microbiology Results Abnormal       Procedure Component Value - Date/Time    Blood Culture - Blood, Arm, Left [[id number]]  (Normal) Collected: 12/08/23 1817    Lab Status: Preliminary result Specimen: Blood from Arm, Left Updated: 12/09/23 1831     Blood Culture No growth at 24 hours    Blood Culture - Blood, Arm, Right [[id number]]  (Normal) Collected: 12/08/23 1748    Lab Status: Preliminary result Specimen: Blood from Arm, Right Updated: 12/09/23 1800     Blood Culture No growth at 24 hours    S. Pneumo Ag Urine or CSF - Urine, Urine, Clean Catch [[id number]]  (Normal) Collected: 12/09/23 0248    Lab Status: Final result Specimen: Urine, Clean Catch Updated: 12/09/23 0745     Strep Pneumo Ag Negative    Legionella Antigen, Urine - Urine, Urine, Clean Catch [[id number]]  (Normal) Collected: 12/09/23 0248    Lab Status: Final result Specimen: Urine, Clean Catch Updated: 12/09/23 0622     LEGIONELLA ANTIGEN, URINE Negative    RSV PCR - Swab, Nasopharynx [[id number]]  (Normal) Collected: 12/08/23 1749    Lab Status: Final result Specimen: Swab from Nasopharynx Updated: 12/08/23 1820     RSV, PCR Not Detected    COVID-19 and FLU A/B PCR, 1 HR TAT - Swab, Nasopharynx [[id number]]  (Normal) Collected: 12/08/23 1749    Lab Status: Final result Specimen: Swab from Nasopharynx Updated: 12/08/23 1814     COVID19 Not Detected     Influenza A PCR Not Detected     Influenza B PCR Not Detected    Narrative:      Fact sheet for providers:  https://www.fda.gov/media/110424/download    Fact sheet for patients: https://www.fda.gov/media/567612/download    Test performed by PCR.    Rapid Strep A Screen - Swab, Throat [[id number]]  (Normal) Collected: 12/08/23 1749    Lab Status: Final result Specimen: Swab from Throat Updated: 12/08/23 1810     STREP A PCR Not Detected            Imaging Results (Last 24 Hours)       Procedure Component Value Units Date/Time    CT Abdomen Pelvis Without Contrast [[id number]] Resulted: 12/10/23 0831     Updated: 12/10/23 0832    XR Chest 1 View [[id number]] Collected: 12/08/23 1832     Updated: 12/09/23 1238    Narrative:      X-RAY CHEST ONE VIEW     HISTORY: 78-year-old male with shortness of breath and productive cough.     FINDINGS: There is pulmonary vascular congestion and CHF is suspected.  There is also likely pneumonia at the right lower lobe. Old right rib  fractures are noted. Follow-up with PA and lateral views of the chest is  recommended.     This report was finalized on 12/9/2023 12:35 PM by Dr. Catrachita Cisneros M.D  on Workstation: [id number]               Results for orders placed during the hospital encounter of 12/08/23    Adult Transthoracic Echo Complete W/ Cont if Necessary Per Protocol    Interpretation Summary    Left ventricular systolic function is normal. Calculated left ventricular EF = 54.8%    Left ventricular diastolic function is consistent with (grade I) impaired relaxation.    Mild tricuspid valve regurgitation is present.    Estimated right ventricular systolic pressure from tricuspid regurgitation is normal (<35 mmHg).      I have reviewed the medications:  Scheduled Meds:aspirin, 81 mg, Oral, Daily  brimonidine, 1 drop, Both Eyes, BID  cefTRIAXone, 2,000 mg, Intravenous, Q24H  cetirizine, 10 mg, Oral, Daily  dorzolamide (TRUSOPT) 2 % 1 drop, timolol (TIMOPTIC) 0.5 % 1 drop for Cosopt 22.3-6.8 mg/mL, , Both Eyes, BID  doxycycline, 100 mg, Intravenous, Q12H  furosemide, 20 mg, Oral, Daily With  Lunch  furosemide, 40 mg, Oral, Daily  gabapentin, 300 mg, Oral, Q8H  insulin glargine, 10 Units, Subcutaneous, Daily  insulin lispro, 2-7 Units, Subcutaneous, 4x Daily AC & at Bedtime  latanoprost, 1 drop, Both Eyes, Nightly  levothyroxine, 88 mcg, Oral, QAM  losartan, 100 mg, Oral, Daily  metoprolol tartrate, 12.5 mg, Oral, Q12H  montelukast, 10 mg, Oral, Nightly  pantoprazole, 40 mg, Oral, BID  pramipexole, 1.5 mg, Oral, BID  rosuvastatin, 20 mg, Oral, Q PM  senna-docusate sodium, 2 tablet, Oral, BID  sodium chloride, 10 mL, Intravenous, Q12H      Continuous Infusions:   PRN Meds:.  acetaminophen **OR** acetaminophen **OR** acetaminophen    albuterol    senna-docusate sodium **AND** polyethylene glycol **AND** bisacodyl **AND** bisacodyl    Calcium Replacement - Follow Nurse / BPA Driven Protocol    dextrose    dextrose    glucagon (human recombinant)    Magnesium Standard Dose Replacement - Follow Nurse / BPA Driven Protocol    melatonin    nitroglycerin    ondansetron    Phosphorus Replacement - Follow Nurse / BPA Driven Protocol    Potassium Replacement - Follow Nurse / BPA Driven Protocol    sodium chloride    sodium chloride    sodium chloride    Assessment & Plan   Assessment & Plan     Active Hospital Problems    Diagnosis  POA    **Sepsis due to other etiology [A41.89]  Yes    Chronic diastolic CHF (congestive heart failure) [I50.32]  Yes    Pneumonia of right lower lobe due to infectious organism [J18.9]  Yes    Iron deficiency anemia [D50.9]  Yes    Gastroesophageal reflux disease [K21.9]  Yes    Type 2 diabetes mellitus with hyperglycemia [E11.65]  Yes    KWAME (obstructive sleep apnea) [G47.33]  Yes    Mixed hyperlipidemia [E78.2]  Yes    Hypothyroidism (acquired) [E03.9]  Yes    Cellulitis of abdominal wall [L03.311]  Yes    Hypertension [I10]  Yes      Resolved Hospital Problems   No resolved problems to display.        Brief Hospital Course to date:  Nathan Baez is a 78 y.o. male presents to the  hospital with sepsis secondary to probable right lower lobe pneumonia and abdominal cellulitis with acute on chronic panniculitis.    Sepsis, resolved  Right lower lobe pneumonia  Abdominal cellulitis with acute on chronic panniculitis  Plan to cover with broad-spectrum coverage.  Currently receiving doxycycline and ceftriaxone.  Previous cultures in the past have been positive for Streptococcus with his abdominal cellulitis.  Clinically abdomen looks like recurrent Streptococcus infection.  No wound or abscess clearly identified on examination currently.  Continue to monitor clinically.  Supportive care and symptom treatment.    CT abdomen to further evaluate  Speech evaluation for right lower lobe pneumonia to assess swallowing    Patient with chronic diastolic CHF.  proBNP currently acceptable.  Patient with some acute on chronic edema and plan to increase oral diuretics.  Hold off IV diuretics due to infection for now.    Morbid obesity:  Complicates all aspects of care.  Supportive care and symptom treatment.  Recommended improved diet and exercise and lifestyle changes when possible.    Diabetes:  Monitor glucose and adjust insulin as needed.    KWAME:   May use home CPAP if needed.  Keep oxygen greater than 90%.    Iron deficiency anemia:  No bleeding.  Monitor intermittently    Hypothyroid: Recent TSH from November normal.    DVT Prophylaxis: Lovenox      Disposition: I expect the patient to be discharged pending further improvement    CODE STATUS:   Code Status and Medical Interventions:   Ordered at: 12/09/23 0201     Code Status (Patient has no pulse and is not breathing):    CPR (Attempt to Resuscitate)     Medical Interventions (Patient has pulse or is breathing):    Full Support       Torrey Peres MD  12/10/23

## 2023-12-10 NOTE — PLAN OF CARE
Goal Outcome Evaluation:  Plan of Care Reviewed With: patient      Outcome Evaluation: Patient A/O x4. Pleasant and cooperative. Up with stand by assist. Wife at bedside attentive to patient. PRN Tylenol given for abd pain. CT done today. VSS. Safety maintained.

## 2023-12-11 ENCOUNTER — TELEPHONE (OUTPATIENT)
Dept: FAMILY MEDICINE CLINIC | Facility: CLINIC | Age: 78
End: 2023-12-11
Payer: MEDICARE

## 2023-12-11 ENCOUNTER — APPOINTMENT (OUTPATIENT)
Dept: GENERAL RADIOLOGY | Facility: HOSPITAL | Age: 78
End: 2023-12-11
Payer: MEDICARE

## 2023-12-11 PROBLEM — K76.0 FATTY LIVER: Status: ACTIVE | Noted: 2023-12-11

## 2023-12-11 LAB
ANION GAP SERPL CALCULATED.3IONS-SCNC: 12.5 MMOL/L (ref 5–15)
BUN SERPL-MCNC: 17 MG/DL (ref 8–23)
BUN/CREAT SERPL: 14.3 (ref 7–25)
CALCIUM SPEC-SCNC: 9.2 MG/DL (ref 8.6–10.5)
CHLORIDE SERPL-SCNC: 105 MMOL/L (ref 98–107)
CO2 SERPL-SCNC: 23.5 MMOL/L (ref 22–29)
CREAT SERPL-MCNC: 1.19 MG/DL (ref 0.76–1.27)
DEPRECATED RDW RBC AUTO: 41.6 FL (ref 37–54)
EGFRCR SERPLBLD CKD-EPI 2021: 62.9 ML/MIN/1.73
ERYTHROCYTE [DISTWIDTH] IN BLOOD BY AUTOMATED COUNT: 12.8 % (ref 12.3–15.4)
GLUCOSE BLDC GLUCOMTR-MCNC: 165 MG/DL (ref 70–130)
GLUCOSE BLDC GLUCOMTR-MCNC: 166 MG/DL (ref 70–130)
GLUCOSE BLDC GLUCOMTR-MCNC: 178 MG/DL (ref 70–130)
GLUCOSE BLDC GLUCOMTR-MCNC: 199 MG/DL (ref 70–130)
GLUCOSE SERPL-MCNC: 169 MG/DL (ref 65–99)
HCT VFR BLD AUTO: 35.2 % (ref 37.5–51)
HGB BLD-MCNC: 11 G/DL (ref 13–17.7)
MCH RBC QN AUTO: 27.5 PG (ref 26.6–33)
MCHC RBC AUTO-ENTMCNC: 31.3 G/DL (ref 31.5–35.7)
MCV RBC AUTO: 88 FL (ref 79–97)
PLATELET # BLD AUTO: 149 10*3/MM3 (ref 140–450)
PMV BLD AUTO: 10.5 FL (ref 6–12)
POTASSIUM SERPL-SCNC: 3.7 MMOL/L (ref 3.5–5.2)
QT INTERVAL: 374 MS
QTC INTERVAL: 440 MS
RBC # BLD AUTO: 4 10*6/MM3 (ref 4.14–5.8)
SODIUM SERPL-SCNC: 141 MMOL/L (ref 136–145)
WBC NRBC COR # BLD AUTO: 6.19 10*3/MM3 (ref 3.4–10.8)

## 2023-12-11 PROCEDURE — 63710000001 INSULIN LISPRO (HUMAN) PER 5 UNITS: Performed by: NURSE PRACTITIONER

## 2023-12-11 PROCEDURE — 97530 THERAPEUTIC ACTIVITIES: CPT

## 2023-12-11 PROCEDURE — 85027 COMPLETE CBC AUTOMATED: CPT | Performed by: INTERNAL MEDICINE

## 2023-12-11 PROCEDURE — 25010000002 ENOXAPARIN PER 10 MG: Performed by: INTERNAL MEDICINE

## 2023-12-11 PROCEDURE — 94799 UNLISTED PULMONARY SVC/PX: CPT

## 2023-12-11 PROCEDURE — 92611 MOTION FLUOROSCOPY/SWALLOW: CPT

## 2023-12-11 PROCEDURE — 25010000002 CEFAZOLIN IN DEXTROSE 2-4 GM/100ML-% SOLUTION: Performed by: INTERNAL MEDICINE

## 2023-12-11 PROCEDURE — 25010000002 FUROSEMIDE PER 20 MG: Performed by: INTERNAL MEDICINE

## 2023-12-11 PROCEDURE — 94761 N-INVAS EAR/PLS OXIMETRY MLT: CPT

## 2023-12-11 PROCEDURE — 80048 BASIC METABOLIC PNL TOTAL CA: CPT | Performed by: INTERNAL MEDICINE

## 2023-12-11 PROCEDURE — 94664 DEMO&/EVAL PT USE INHALER: CPT

## 2023-12-11 PROCEDURE — 82948 REAGENT STRIP/BLOOD GLUCOSE: CPT

## 2023-12-11 PROCEDURE — 74230 X-RAY XM SWLNG FUNCJ C+: CPT

## 2023-12-11 PROCEDURE — 63710000001 INSULIN GLARGINE PER 5 UNITS: Performed by: NURSE PRACTITIONER

## 2023-12-11 RX ORDER — CEFAZOLIN SODIUM 2 G/100ML
2000 INJECTION, SOLUTION INTRAVENOUS EVERY 8 HOURS
Status: DISCONTINUED | OUTPATIENT
Start: 2023-12-11 | End: 2023-12-12 | Stop reason: HOSPADM

## 2023-12-11 RX ORDER — ENOXAPARIN SODIUM 100 MG/ML
40 INJECTION SUBCUTANEOUS EVERY 12 HOURS SCHEDULED
Status: DISCONTINUED | OUTPATIENT
Start: 2023-12-11 | End: 2023-12-12 | Stop reason: HOSPADM

## 2023-12-11 RX ORDER — FUROSEMIDE 10 MG/ML
40 INJECTION INTRAMUSCULAR; INTRAVENOUS ONCE
Status: COMPLETED | OUTPATIENT
Start: 2023-12-11 | End: 2023-12-11

## 2023-12-11 RX ORDER — PETROLATUM 420 MG/G
1 OINTMENT TOPICAL
Status: DISCONTINUED | OUTPATIENT
Start: 2023-12-12 | End: 2023-12-12 | Stop reason: HOSPADM

## 2023-12-11 RX ADMIN — CEFAZOLIN SODIUM 2000 MG: 2 INJECTION, SOLUTION INTRAVENOUS at 23:17

## 2023-12-11 RX ADMIN — LOSARTAN POTASSIUM 100 MG: 100 TABLET, FILM COATED ORAL at 09:28

## 2023-12-11 RX ADMIN — PANTOPRAZOLE SODIUM 40 MG: 40 TABLET, DELAYED RELEASE ORAL at 09:29

## 2023-12-11 RX ADMIN — INSULIN LISPRO 2 UNITS: 100 INJECTION, SOLUTION INTRAVENOUS; SUBCUTANEOUS at 09:30

## 2023-12-11 RX ADMIN — ENOXAPARIN SODIUM 40 MG: 100 INJECTION SUBCUTANEOUS at 12:38

## 2023-12-11 RX ADMIN — INSULIN LISPRO 2 UNITS: 100 INJECTION, SOLUTION INTRAVENOUS; SUBCUTANEOUS at 21:15

## 2023-12-11 RX ADMIN — PANTOPRAZOLE SODIUM 40 MG: 40 TABLET, DELAYED RELEASE ORAL at 21:15

## 2023-12-11 RX ADMIN — ALBUTEROL SULFATE 2.5 MG: 2.5 SOLUTION RESPIRATORY (INHALATION) at 12:08

## 2023-12-11 RX ADMIN — BARIUM SULFATE 4 ML: 980 POWDER, FOR SUSPENSION ORAL at 13:14

## 2023-12-11 RX ADMIN — INSULIN LISPRO 2 UNITS: 100 INJECTION, SOLUTION INTRAVENOUS; SUBCUTANEOUS at 12:38

## 2023-12-11 RX ADMIN — DOXYCYCLINE 100 MG: 100 INJECTION, POWDER, LYOPHILIZED, FOR SOLUTION INTRAVENOUS at 09:31

## 2023-12-11 RX ADMIN — CEFAZOLIN SODIUM 2000 MG: 2 INJECTION, SOLUTION INTRAVENOUS at 15:14

## 2023-12-11 RX ADMIN — CETIRIZINE HYDROCHLORIDE 10 MG: 10 TABLET ORAL at 09:29

## 2023-12-11 RX ADMIN — LEVOTHYROXINE SODIUM 88 MCG: 0.09 TABLET ORAL at 06:08

## 2023-12-11 RX ADMIN — GABAPENTIN 300 MG: 300 CAPSULE ORAL at 09:29

## 2023-12-11 RX ADMIN — BARIUM SULFATE 1 TEASPOON(S): 0.6 CREAM ORAL at 13:14

## 2023-12-11 RX ADMIN — BARIUM SULFATE 55 ML: 0.81 POWDER, FOR SUSPENSION ORAL at 13:14

## 2023-12-11 RX ADMIN — GABAPENTIN 300 MG: 300 CAPSULE ORAL at 17:06

## 2023-12-11 RX ADMIN — METOPROLOL TARTRATE 12.5 MG: 25 TABLET, FILM COATED ORAL at 23:17

## 2023-12-11 RX ADMIN — DOXYCYCLINE 100 MG: 100 INJECTION, POWDER, LYOPHILIZED, FOR SOLUTION INTRAVENOUS at 21:17

## 2023-12-11 RX ADMIN — GABAPENTIN 300 MG: 300 CAPSULE ORAL at 23:17

## 2023-12-11 RX ADMIN — LATANOPROST 1 DROP: 50 SOLUTION OPHTHALMIC at 21:15

## 2023-12-11 RX ADMIN — ROSUVASTATIN CALCIUM 20 MG: 20 TABLET, FILM COATED ORAL at 17:06

## 2023-12-11 RX ADMIN — MONTELUKAST SODIUM 10 MG: 10 TABLET, FILM COATED ORAL at 21:15

## 2023-12-11 RX ADMIN — ENOXAPARIN SODIUM 40 MG: 100 INJECTION SUBCUTANEOUS at 21:15

## 2023-12-11 RX ADMIN — METOPROLOL TARTRATE 12.5 MG: 25 TABLET, FILM COATED ORAL at 09:29

## 2023-12-11 RX ADMIN — INSULIN LISPRO 2 UNITS: 100 INJECTION, SOLUTION INTRAVENOUS; SUBCUTANEOUS at 17:06

## 2023-12-11 RX ADMIN — PRAMIPEXOLE DIHYDROCHLORIDE 1.5 MG: 1.5 TABLET ORAL at 21:15

## 2023-12-11 RX ADMIN — Medication 10 ML: at 09:31

## 2023-12-11 RX ADMIN — BRIMONIDINE TARTRATE 1 DROP: 2 SOLUTION/ DROPS OPHTHALMIC at 09:30

## 2023-12-11 RX ADMIN — BARIUM SULFATE 50 ML: 400 SUSPENSION ORAL at 13:14

## 2023-12-11 RX ADMIN — FUROSEMIDE 40 MG: 40 INJECTION, SOLUTION INTRAMUSCULAR; INTRAVENOUS at 09:28

## 2023-12-11 RX ADMIN — ASPIRIN 81 MG: 81 TABLET, COATED ORAL at 09:28

## 2023-12-11 RX ADMIN — BRIMONIDINE TARTRATE 1 DROP: 2 SOLUTION/ DROPS OPHTHALMIC at 21:14

## 2023-12-11 RX ADMIN — PRAMIPEXOLE DIHYDROCHLORIDE 1.5 MG: 1.5 TABLET ORAL at 09:28

## 2023-12-11 RX ADMIN — Medication 10 ML: at 23:19

## 2023-12-11 RX ADMIN — FUROSEMIDE 40 MG: 10 INJECTION, SOLUTION INTRAMUSCULAR; INTRAVENOUS at 15:14

## 2023-12-11 RX ADMIN — INSULIN GLARGINE 10 UNITS: 100 INJECTION, SOLUTION SUBCUTANEOUS at 09:30

## 2023-12-11 RX ADMIN — TIMOLOL MALEATE: 5 SOLUTION/ DROPS OPHTHALMIC at 21:14

## 2023-12-11 RX ADMIN — TIMOLOL MALEATE: 5 SOLUTION/ DROPS OPHTHALMIC at 09:30

## 2023-12-11 NOTE — DISCHARGE PLACEMENT REQUEST
"Dinesh Mart (78 y.o. Male)       Date of Birth   1945    Social Security Number       Address   [address] [facility] [address]    Home Phone   [phone]    MRN   [id number]       Latter-day   [employer]ism    Marital Status                               Admission Date   12/8/23    Admission Type   Urgent    Admitting Provider   Torrey Peres MD    Attending Provider   Torrey Peres MD    Department, Room/Bed   [address], [address]       Discharge Date       Discharge Disposition       Discharge Destination                                 Attending Provider: Torrey Peres MD    Allergies: Adhesive Tape    Isolation: None   Infection: None   Code Status: CPR    Ht: 170.2 cm (67\")   Wt: 124 kg (274 lb)    Admission Cmt: None   Principal Problem: Sepsis due to other etiology [A41.89]                   Active Insurance as of 12/8/2023       Primary Coverage       Payor Plan Insurance Group Employer/Plan Group    MEDICARE MEDICARE A & B        Payor Plan Address Payor Plan Phone Number Payor Plan Fax Number Effective Dates    [address] [phone]  5/1/2011 - None Entered    [facility] [id number]         Subscriber Name Subscriber Birth Date Member ID       BRITTNYDINESH DUNBAR 1945 [id number]               Secondary Coverage       Payor Plan Insurance Group Employer/Plan Group    [id number]       Payor Plan Address Payor Plan Phone Number Payor Plan Fax Number Effective Dates    [address]   12/1/2016 - None Entered    [facility] [id number]         Subscriber Name Subscriber Birth Date Member ID       DINESH MART 1945 [id number]                     Emergency Contacts        (Rel.) Home Phone Work Phone Mobile Phone    BrittnyCoco dunbar (Spouse) [phone]    Brittny,Mitch (Son) [phone] -- [phone]                "

## 2023-12-11 NOTE — THERAPY TREATMENT NOTE
Patient Name: Nathan Baez  : 1945    MRN: [id number]                              Today's Date: 2023       Admit Date: 2023    Visit Dx:     ICD-10-CM ICD-9-CM   1. Sepsis without acute organ dysfunction, due to unspecified organism  A41.9 038.9     995.91   2. Cellulitis of other specified site  L03.818 682.8     Patient Active Problem List   Diagnosis    OA (osteoarthritis) of knee    Hypertension    Cellulitis of abdominal wall    Hypothyroidism (acquired)    Gastroesophageal reflux disease without esophagitis    Mixed hyperlipidemia    Primary insomnia    DDD (degenerative disc disease), lumbar    KWAME (obstructive sleep apnea)    Allergic    Venous insufficiency    Prostate cancer    Venous stasis dermatitis    Tinea cruris    Tinea corporis    Throat clearing    Sleep apnea    Skin lesion of face    Rib pain on left side    Rectal bleed    Presbycusis    Pes planus    Osteoarthritis    Obesity    Medicare annual wellness visit, subsequent    Lumbar strain    Internal hemorrhoids    Insomnia    Inflamed skin tag    Hyperplastic polyp of stomach    Hospital discharge follow-up    History of colon polyps    High risk medication use    Glaucoma    Gastroenteritis, acute    Erysipelas    Encounter for screening colonoscopy    Encounter for long-term (current) use of NSAIDs    Dysphagia    DVT (deep venous thrombosis)    Lumbar facet arthropathy    Diverticulosis    Cough    Contact with hypodermic needle    Cervical radiculopathy    Cellulitis    Arthritis    Allergic rhinitis    Acute glaucoma    Acute embolism and thrombosis of vein    Actinic keratosis    Status post reverse total shoulder replacement, right    Localized edema    Other specified anemias    Peripheral polyneuropathy    Campylobacter diarrhea    Hyponatremia    Generalized abdominal pain    Fever    Constipation    Bilateral lower extremity edema    Acute pyelonephritis    Chronic idiopathic constipation    Functional diarrhea     Change in bowel habits    RLS (restless legs syndrome)    Type 2 diabetes mellitus with hyperglycemia    Family history of GI malignancy    Primary osteoarthritis of right hip    B12 deficiency    Intervertebral disc stenosis of neural canal of lumbar region    Encounter for hepatitis C screening test for low risk patient    Pain associated with defecation    Calculus of kidney    Gastroesophageal reflux disease    Body aches    Skin lesion    Candidiasis, intertrigo    Hyperbilirubinemia    Acute respiratory failure due to COVID-19    Bacteremia due to group B Streptococcus    Iron deficiency anemia    Acute respiratory failure with hypoxia and hypercapnia    Hypoventilation syndrome    Sepsis due to other etiology    Pneumonia of right lower lobe due to infectious organism    Chronic diastolic CHF (congestive heart failure)    Fatty liver     Past Medical History:   Diagnosis Date    Actinic keratosis     Acute embolism and thrombosis of vein     Allergic     Allergic rhinitis     Arthritis     Cataract     Cervical radiculopathy     Chronic constipation     Colon polyp     Diabetes mellitus     Disequilibrium     DJD (degenerative joint disease), lumbar     Elevated cholesterol     Erysipelas     GERD (gastroesophageal reflux disease)     Glaucoma     Hip pain, chronic, right     History of kidney stones     X1    History of peripheral edema     LOWER LEGS BILAT, COMPRESSION KNEE HIGH     History of transfusion     Hyperlipidemia     Hypertension     Hypothyroid     Insomnia     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 ROSHAN SCHAFER    Pes planus     Presbycusis     Prostate cancer     Restless legs syndrome     Shoulder pain      RIGHT, LIMITED MOBILITY-AT TIMES    Sleep apnea     bipap    Tinea corporis     Tinea cruris     Unsteady gait     RIGHT HIP    Weakness     RIGHT HIP     Past Surgical History:   Procedure Laterality Date    CATARACT EXTRACTION, BILATERAL Bilateral     CERVICAL DISCECTOMY ANTERIOR      COLONOSCOPY  03/08/2017    3/17 normal. Recheck 2022. 12/11. Repeat in 5 years    COLONOSCOPY N/A 04/19/2021    Procedure: COLONOSCOPY INTO CECUM WITH HOT & COLD  SNARE POLYPECTOMIES;  Surgeon: Jaden Chowdhury MD;  Location: [facility] ENDOSCOPY;  Service: Gastroenterology;  Laterality: N/A;  PRE: CHANGE IN BOWEL HABITS; FAMILY H/O COLON CANCER  POST: DIVERTICULOSIS, POLYPS    ENDOSCOPY N/A 01/30/2023    Procedure: ESOPHAGOGASTRODUODENOSCOPY with biopsies;  Surgeon: Jaden Chowdhury MD;  Location: [facility] ENDOSCOPY;  Service: Gastroenterology;  Laterality: N/A;  pre- abdominal pain, anemia  post- gastritis    ENDOSCOPY W/ PEG REMOVAL      EYE SURGERY  2013    Cataract    FOOT SURGERY      1998 had big toe replaced on left foot-METAL     HERNIA REPAIR  03/07/2012    umbilical    JOINT REPLACEMENT Left     big toe    MEDIAL BRANCH BLOCK Bilateral 04/07/2023    Procedure: LUMBAR MEDIAL BRANCH BLOCK bilateral L4-S1 61867 [id number];  Surgeon: Lauren Houston MD;  [place] [facility] [place];  Service: Pain Management;  Laterality: Bilateral;    MEDIAL BRANCH BLOCK Bilateral 04/17/2023    Procedure: LUMBAR MEDIAL BRANCH BLOCK bilateral L4-S1 [id number];  Surgeon: Lauren Houston MD;  [place] [facility] [place];  Service: Pain Management;  Laterality: Bilateral;    MA ARTHRP KNE CONDYLE&PLATU MEDIAL&LAT COMPARTMENTS Left 09/13/2016    Procedure: LT TOTAL KNEE ARTHROPLASTY;  Surgeon: Tadeo Basurto MD;  Location: [facility] MAIN OR;  Service: Orthopedics    PROSTATECTOMY  07/1999 July 1999. Radical     RADIOFREQUENCY ABLATION Right 05/17/2023    Procedure: RADIOFREQUENCY ABLATION LUMBAR right L3-S1;  Surgeon: Lauren Houston MD;  Location:  [facility] MAIN OR;  Service: Pain Management;  Laterality: Right;    THYROID LOBECTOMY      TONSILLECTOMY      TOTAL HIP ARTHROPLASTY Right 08/19/2021    Procedure: TOTAL HIP ARTHROPLASTY RIGHT POSTERIOR;  Surgeon: Tadeo Basurto MD;  Location: [facility] MAIN OR;  Service: Orthopedics;  Laterality: Right;    TOTAL KNEE ARTHROPLASTY Right 2014    TOTAL SHOULDER ARTHROPLASTY W/ DISTAL CLAVICLE EXCISION Right 11/13/2019    Procedure: TOTAL SHOULDER REVERSE ARTHROPLASTY RIGHT REPAIR RIGHT AXILLARY VEIN;  Surgeon: Behzad Kelley MD;  Location: [facility];  Service: Orthopedics    WRIST SURGERY Right 12/17/2014    x2  wrist replaced      General Information       Row Name 12/11/23 1515          Physical Therapy Time and Intention    Document Type therapy note (daily note)  -CS     Mode of Treatment individual therapy;physical therapy  -CS       Row Name 12/11/23 1515          General Information    Patient Profile Reviewed yes  -CS     Existing Precautions/Restrictions fall  -CS       Row Name 12/11/23 1515          Cognition    Orientation Status (Cognition) oriented x 4  -CS       Row Name 12/11/23 1515          Safety Issues, Functional Mobility    Impairments Affecting Function (Mobility) endurance/activity tolerance;strength  -CS               User Key  (r) = Recorded By, (t) = Taken By, (c) = Cosigned By      Initials Name Provider Type    CS Luisana Cohen, PT Physical Therapist                   Mobility       Row Name 12/11/23 1522          Bed Mobility    Bed Mobility supine-sit  -CS     Supine-Sit [place] (Bed Mobility) contact guard  -CS     Assistive Device (Bed Mobility) bed rails;head of bed elevated  -CS     Comment, (Bed Mobility) sitting EOB at end of session  -CS       Row Name 12/11/23 1522          Sit-Stand Transfer    Sit-Stand [place] (Transfers) standby assist  -CS     Assistive Device (Sit-Stand Transfers) other (see comments)  no AD  -CS       Row Name 12/11/23 152           Gait/Stairs (Locomotion)    [place] Level (Gait) standby assist  -CS     Assistive Device (Gait) walker, front-wheeled  -CS     Distance in Feet (Gait) 300'  -CS     Deviations/Abnormal Patterns (Gait) khanh decreased;gait speed decreased  -CS     Bilateral Gait Deviations forward flexed posture  -CS     Comment, (Gait/Stairs) slow pace but overall improved gait mechanics  -CS               User Key  (r) = Recorded By, (t) = Taken By, (c) = Cosigned By      Initials Name Provider Type    CS Luisana Cohen, PT Physical Therapist                   Obj/Interventions       Row Name 12/11/23 1524          Balance    Balance Assessment sitting static balance;sitting dynamic balance;standing static balance;standing dynamic balance  -CS     Static Sitting Balance independent  -CS     Dynamic Sitting Balance independent  -CS     Position, Sitting Balance sitting edge of bed  -CS     Static Standing Balance supervision  -CS     Dynamic Standing Balance standby assist  -CS     Position/Device Used, Standing Balance supported;walker, front-wheeled  -CS               User Key  (r) = Recorded By, (t) = Taken By, (c) = Cosigned By      Initials Name Provider Type    CS Luisana Cohen, PT Physical Therapist                   Goals/Plan    No documentation.                  Clinical Impression       Row Name 12/11/23 1524          Pain    Pretreatment Pain Rating 0/10 - no pain  -CS     Posttreatment Pain Rating 0/10 - no pain  -CS       Row Name 12/11/23 1524          Plan of Care Review    Plan of Care Reviewed With patient;spouse  -CS     Progress improving  -CS     Outcome Evaluation Pt received in bed upon arrival and agreeable to PT. Pt completed supine to sit, STS transfer, and ambulated 300' c RW requiring SBA/CGA. Pt demo's a slow pace but overall improved gait mechanics. Pt has significantly improved since last therapy session and is safe to return home with spouse and f/u with HHPT at D/C.  -CS       Row Name  12/11/23 1524          Therapy Assessment/Plan (PT)    Criteria for Skilled Interventions Met (PT) yes;meets criteria  -CS     Therapy Frequency (PT) 3 times/wk  -CS       Row Name 12/11/23 1524          Positioning and Restraints    Pre-Treatment Position in bed  -CS     Post Treatment Position bed  -CS     In Bed sitting EOB;call light within reach;encouraged to call for assist;with family/caregiver  no alarm - cleared for ad dasha activity  -CS               User Key  (r) = Recorded By, (t) = Taken By, (c) = Cosigned By      Initials Name Provider Type    CS Luisana Cohen, PT Physical Therapist                   Outcome Measures       Row Name 12/11/23 1525          How much help from another person do you currently need...    Turning from your back to your side while in flat bed without using bedrails? 4  -CS     Moving from lying on back to sitting on the side of a flat bed without bedrails? 3  -CS     Moving to and from a bed to a chair (including a wheelchair)? 4  -CS     Standing up from a chair using your arms (e.g., wheelchair, bedside chair)? 4  -CS     Climbing 3-5 steps with a railing? 3  -CS     To walk in hospital room? 4  -CS     AM-PAC 6 Clicks Score (PT) 22  -CS     Highest Level of Mobility Goal 7 --> Walk 25 feet or more  -CS       Row Name 12/11/23 1525          Functional Assessment    Outcome Measure Options AM-PAC 6 Clicks Basic Mobility (PT)  -CS               User Key  (r) = Recorded By, (t) = Taken By, (c) = Cosigned By      Initials Name Provider Type    Luisana Ferguson, PT Physical Therapist                                 Physical Therapy Education       Title: PT OT SLP Therapies (Done)       Topic: Physical Therapy (Done)       Point: Mobility training (Done)       Learning Progress Summary             Patient Acceptance, E,TB, VU,DU by  at 12/11/2023 1526    Acceptance, E,TB, VU by  at 12/11/2023 1414    Acceptance, E,TB, VU,DU,NR by  at 12/9/2023 1010                          Point: Home exercise program (Done)       Learning Progress Summary             Patient Acceptance, E,TB, VU,DU by  at 12/11/2023 1526    Acceptance, E,TB, VU by  at 12/11/2023 1414                         Point: Body mechanics (Done)       Learning Progress Summary             Patient Acceptance, E,TB, VU,DU by  at 12/11/2023 1526    Acceptance, E,TB, VU by  at 12/11/2023 1414    Acceptance, E,TB, VU,DU,NR by  at 12/9/2023 1010                         Point: Precautions (Done)       Learning Progress Summary             Patient Acceptance, E,TB, VU,DU by  at 12/11/2023 1526    Acceptance, E,TB, VU by  at 12/11/2023 1414    Acceptance, E,TB, VU,DU,NR by  at 12/9/2023 1010                                         User Key       Initials Effective Dates Name Provider Type Discipline     09/22/22 -  Luisana Cohen, PT Physical Therapist PT     07/11/23 -  Daria Mcdonald, SLP Speech and Language Pathologist SLP                  PT Recommendation and Plan     Plan of Care Reviewed With: patient, spouse  Progress: improving  Outcome Evaluation: Pt received in bed upon arrival and agreeable to PT. Pt completed supine to sit, STS transfer, and ambulated 300' c RW requiring SBA/CGA. Pt demo's a slow pace but overall improved gait mechanics. Pt has significantly improved since last therapy session and is safe to return home with spouse and f/u with HHPT at D/C.     Time Calculation:         PT Charges       Row Name 12/11/23 1526             Time Calculation    Start Time 1458  -      Stop Time 1512  -CS      Time Calculation (min) 14 min  -CS      PT Received On 12/11/23  -      PT - Next Appointment 12/13/23  -         Time Calculation- PT    Total Timed Code Minutes- PT 12 minute(s)  -CS         Timed Charges    39073 - PT Therapeutic Activity Minutes 12  -CS         Total Minutes    Timed Charges Total Minutes 12  -CS       Total Minutes 12  -CS                User Key  (r) = Recorded By,  (t) = Taken By, (c) = Cosigned By      Initials Name Provider Type    CS Luisana Cohen, PT Physical Therapist                  Therapy Charges for Today       Code Description Service Date Service Provider Modifiers Qty    [id number]  PT THERAPEUTIC ACT EA 15 MIN 12/11/2023 Luisana Cohen, PT GP 1            PT G-Codes  Outcome Measure Options: AM-PAC 6 Clicks Basic Mobility (PT)  AM-PAC 6 Clicks Score (PT): 22  PT Discharge Summary  Anticipated Discharge Disposition (PT): home with assist, home with home health    Luisana Cohen PT  12/11/2023

## 2023-12-11 NOTE — CASE MANAGEMENT/SOCIAL WORK
Discharge Planning Assessment  [facility]     Patient Name: Nathan Baez  MRN: [id number]  Today's Date: 12/11/2023    Admit Date: 12/8/2023    Plan: HH vs SNF   Discharge Needs Assessment       Row Name 12/11/23 1333       Living Environment    People in Home spouse    Current Living Arrangements home    Primary Care Provided by self    Provides Primary Care For no one    Family Caregiver if Needed spouse    Able to Return to Prior Arrangements yes       Resource/Environmental Concerns    Resource/Environmental Concerns home accessibility    Home Accessibility Concerns stairs to enter home       Transition Planning    Patient/Family Anticipates Transition to home with help/services    Patient/Family Anticipated Services at Transition home health care    Transportation Anticipated family or friend will provide       Discharge Needs Assessment    Equipment Currently Used at Home walker, rolling;bipap;rollator    Concerns to be Addressed discharge planning    Discharge Facility/Level of Care Needs home with home health                   Discharge Plan       Row Name 12/11/23 1337       Plan    Plan HH vs SNF    Patient/Family in Agreement with Plan yes    Plan Comments Patient off floor for swallow study. CCP spoke with patient's spouse at bedside; explained role, verified facesheet, and discussed dc plan. Patient uses a walker, rollator, and bipap at home. He lives with his spouse in a 2 level home with 2 steps to enter with railings. He has used HH in [facility] but spouse cannot remember which agency. He hasbeen to a SNF but spouse cannot remember which facility. CCP discussed PT eval of SNF with spouse who will discuss with patient when he returns. Spouse is confident patient will prefer to go home w/ HH and is agreeable to referral to [facility]. Family can transport home. CCP to follow up with patient regarding SNF once he returns. MAGNO, YONY                  Continued Care and Services - Admitted Since 12/8/2023     Coordination has not been started for this encounter.          Demographic Summary       Row Name 12/11/23 1331       General Information    Admission Type inpatient    Arrived From home    Referral Source admission list    Preferred Language English       Contact Information    Permission Granted to Share Info With family/designee                   Functional Status       Row Name 12/11/23 1331       Functional Status    Usual Activity Tolerance moderate    Current Activity Tolerance moderate       Functional Status, IADL    Medications assistive equipment    Meal Preparation assistive equipment    Housekeeping assistive equipment    Laundry assistive equipment    Shopping assistive equipment       Mental Status    General Appearance WDL WDL                   Psychosocial    No documentation.                  Abuse/Neglect    No documentation.                  Legal    No documentation.                  Substance Abuse    No documentation.                  Patient Forms    No documentation.                     YONY Mcbride

## 2023-12-11 NOTE — TELEPHONE ENCOUNTER
Caller: CÉSAR MCCANN/ [facility]    Best call back number: [phone]*    What was the call regarding: CÉSAR WITH [facility] CALLING REQUESTING VERBAL ORDERS FOR HOME NURSING AND THERAPY. REQUEST CALL BACK.

## 2023-12-11 NOTE — PLAN OF CARE
Goal Outcome Evaluation:  Plan of Care Reviewed With: patient, spouse        Progress: improving  Outcome Evaluation: Pt received in bed upon arrival and agreeable to PT. Pt completed supine to sit, STS transfer, and ambulated 300' c RW requiring SBA/CGA. Pt demo's a slow pace but overall improved gait mechanics. Pt has significantly improved since last therapy session and is safe to return home with spouse and f/u with HHPT at D/C.      Anticipated Discharge Disposition (PT): home with assist, home with home health

## 2023-12-11 NOTE — PLAN OF CARE
Goal Outcome Evaluation:  Plan of Care Reviewed With: patient      Outcome Evaluation: VSS. FL video swallow done, see SLP note. No c/o pain this shift. Safety maintained.     Daily Care Plan Summary: Heart Failure    Diuretic in use (IV or PO):   IV Lasix    Daily weight (up or down):    ZEYAD    Output > Intake (yes/no): Yes    O2 Requirements (current, any change?): Room air, no change    Symptoms noted with Activity (Respiratory Tolerance, functional state):  none reported    Anticipated Discharge Plans:  Home

## 2023-12-11 NOTE — NURSING NOTE
CWOCN- consult for abdomen. Reviewed chart and spoke with patient and his wife. Patient with lymphedema appearing skin to the pannus. Skin with stretch marks, firm, edematous. From the photo, the cellulitis has been improving this admission. Patient reports some itching to the skin. Wife notes that at home he applies ketoconazole 2% as needed. This was not continued on admission to [facility] and can be addressed in discharge med rec by provider.   Recommend to apply vaseline for now to soften the skin as it is likely to be dry and may flake after being more edematous & cellulitic. MD has spoken to patient about weight loss, hygiene, and surgery consult outpatient. Nothing further to add. There are no wounds on the abdomen.

## 2023-12-11 NOTE — PLAN OF CARE
Goal Outcome Evaluation:  Plan of Care Reviewed With: patient           Outcome Evaluation: VSS, no c/o pain. Pt appeared to sleep some between care. 2LNC placed while sleeping to keep sats >90%, as pt did not want to wear home bi-pap. IV antibiotics given. Will CTM, safety maintained.

## 2023-12-11 NOTE — PLAN OF CARE
Goal Outcome Evaluation:        Outcome Evaluation: VFSS completed. Dr. Scruggs present. Recommend regular solids and thin liquids. Upright positioning, alternating bites/sips, no straws. Meds whole with thin liquids as tolerated. If negative lung changes occur, consider downgrade to nectar thick liquids.

## 2023-12-11 NOTE — MBS/VFSS/FEES
Acute Care - Speech Language Pathology   Swallow Initial Evaluation [facility]     Patient Name: Nathan Baez  : 1945  MRN: [id number]  Today's Date: 2023               Admit Date: 2023    Visit Dx:     ICD-10-CM ICD-9-CM   1. Sepsis without acute organ dysfunction, due to unspecified organism  A41.9 038.9     995.91   2. Cellulitis of other specified site  L03.818 682.8     Patient Active Problem List   Diagnosis    OA (osteoarthritis) of knee    Hypertension    Cellulitis of abdominal wall    Hypothyroidism (acquired)    Gastroesophageal reflux disease without esophagitis    Mixed hyperlipidemia    Primary insomnia    DDD (degenerative disc disease), lumbar    KWAME (obstructive sleep apnea)    Allergic    Venous insufficiency    Prostate cancer    Venous stasis dermatitis    Tinea cruris    Tinea corporis    Throat clearing    Sleep apnea    Skin lesion of face    Rib pain on left side    Rectal bleed    Presbycusis    Pes planus    Osteoarthritis    Obesity    Medicare annual wellness visit, subsequent    Lumbar strain    Internal hemorrhoids    Insomnia    Inflamed skin tag    Hyperplastic polyp of stomach    Hospital discharge follow-up    History of colon polyps    High risk medication use    Glaucoma    Gastroenteritis, acute    Erysipelas    Encounter for screening colonoscopy    Encounter for long-term (current) use of NSAIDs    Dysphagia    DVT (deep venous thrombosis)    Lumbar facet arthropathy    Diverticulosis    Cough    Contact with hypodermic needle    Cervical radiculopathy    Cellulitis    Arthritis    Allergic rhinitis    Acute glaucoma    Acute embolism and thrombosis of vein    Actinic keratosis    Status post reverse total shoulder replacement, right    Localized edema    Other specified anemias    Peripheral polyneuropathy    Campylobacter diarrhea    Hyponatremia    Generalized abdominal pain    Fever    Constipation    Bilateral lower extremity edema    Acute  pyelonephritis    Chronic idiopathic constipation    Functional diarrhea    Change in bowel habits    RLS (restless legs syndrome)    Type 2 diabetes mellitus with hyperglycemia    Family history of GI malignancy    Primary osteoarthritis of right hip    B12 deficiency    Intervertebral disc stenosis of neural canal of lumbar region    Encounter for hepatitis C screening test for low risk patient    Pain associated with defecation    Calculus of kidney    Gastroesophageal reflux disease    Body aches    Skin lesion    Candidiasis, intertrigo    Hyperbilirubinemia    Acute respiratory failure due to COVID-19    Bacteremia due to group B Streptococcus    Iron deficiency anemia    Acute respiratory failure with hypoxia and hypercapnia    Hypoventilation syndrome    Sepsis due to other etiology    Pneumonia of right lower lobe due to infectious organism    Chronic diastolic CHF (congestive heart failure)    Fatty liver     Past Medical History:   Diagnosis Date    Actinic keratosis     Acute embolism and thrombosis of vein     Allergic     Allergic rhinitis     Arthritis     Cataract     Cervical radiculopathy     Chronic constipation     Colon polyp     Diabetes mellitus     Disequilibrium     DJD (degenerative joint disease), lumbar     Elevated cholesterol     Erysipelas     GERD (gastroesophageal reflux disease)     Glaucoma     Hip pain, chronic, right     History of kidney stones     X1    History of peripheral edema     LOWER LEGS BILAT, COMPRESSION KNEE HIGH     History of transfusion     Hyperlipidemia     Hypertension     Hypothyroid     Insomnia     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 ROSHAN costello      Presbycusis     Prostate cancer     Restless legs syndrome     Shoulder pain     RIGHT, LIMITED MOBILITY-AT TIMES    Sleep apnea     bipap    Tinea corporis     Tinea cruris     Unsteady gait     RIGHT HIP    Weakness     RIGHT HIP     Past Surgical History:   Procedure Laterality Date    CATARACT EXTRACTION, BILATERAL Bilateral     CERVICAL DISCECTOMY ANTERIOR      COLONOSCOPY  03/08/2017    3/17 normal. Recheck 2022. 12/11. Repeat in 5 years    COLONOSCOPY N/A 04/19/2021    Procedure: COLONOSCOPY INTO CECUM WITH HOT & COLD  SNARE POLYPECTOMIES;  Surgeon: Jaden Chowdhury MD;  Location: [facility] ENDOSCOPY;  Service: Gastroenterology;  Laterality: N/A;  PRE: CHANGE IN BOWEL HABITS; FAMILY H/O COLON CANCER  POST: DIVERTICULOSIS, POLYPS    ENDOSCOPY N/A 01/30/2023    Procedure: ESOPHAGOGASTRODUODENOSCOPY with biopsies;  Surgeon: Jaden Chowdhury MD;  Location: [facility] ENDOSCOPY;  Service: Gastroenterology;  Laterality: N/A;  pre- abdominal pain, anemia  post- gastritis    ENDOSCOPY W/ PEG REMOVAL      EYE SURGERY  2013    Cataract    FOOT SURGERY      1998 had big toe replaced on left foot-METAL     HERNIA REPAIR  03/07/2012    umbilical    JOINT REPLACEMENT Left     big toe    MEDIAL BRANCH BLOCK Bilateral 04/07/2023    Procedure: LUMBAR MEDIAL BRANCH BLOCK bilateral L4-S1 [id number];  Surgeon: Lauren Houston MD;  [place] [facility] [place];  Service: Pain Management;  Laterality: Bilateral;    MEDIAL BRANCH BLOCK Bilateral 04/17/2023    Procedure: LUMBAR MEDIAL BRANCH BLOCK bilateral L4-S1 [id number];  Surgeon: Lauren Houston MD;  [place] [facility] [place];  Service: Pain Management;  Laterality: Bilateral;    OR ARTHRP KNE CONDYLE&PLATU MEDIAL&LAT COMPARTMENTS Left 09/13/2016    Procedure: LT TOTAL KNEE ARTHROPLASTY;  Surgeon: Tadeo Basurto MD;  Location: [facility] MAIN OR;  Service: Orthopedics    PROSTATECTOMY  07/1999 July 1999. Radical     RADIOFREQUENCY ABLATION Right 05/17/2023    Procedure:  RADIOFREQUENCY ABLATION LUMBAR right L3-S1;  Surgeon: Lauren Houston MD;  [place] [facility] [place];  Service: Pain Management;  Laterality: Right;    THYROID LOBECTOMY      TONSILLECTOMY      TOTAL HIP ARTHROPLASTY Right 08/19/2021    Procedure: TOTAL HIP ARTHROPLASTY RIGHT POSTERIOR;  Surgeon: Tadeo Basurto MD;  Location: [facility] MAIN OR;  Service: Orthopedics;  Laterality: Right;    TOTAL KNEE ARTHROPLASTY Right 2014    TOTAL SHOULDER ARTHROPLASTY W/ DISTAL CLAVICLE EXCISION Right 11/13/2019    Procedure: TOTAL SHOULDER REVERSE ARTHROPLASTY RIGHT REPAIR RIGHT AXILLARY VEIN;  Surgeon: Behzad Kelley MD;  Location: [facility] OR [facility];  Service: Orthopedics    WRIST SURGERY Right 12/17/2014    x2  wrist replaced       SLP Recommendation and Plan  SLP Swallowing Diagnosis: swallow WFL/no suspected pharyngeal impairment (12/11/23 1300)  SLP Diet Recommendation: regular textures, thin liquids (12/11/23 1300)  Recommended Precautions and Strategies: small bites of food and sips of liquid, upright posture during/after eating, general aspiration precautions, alternate between small bites of food and sips of liquid, no straw (12/11/23 1300)  SLP Rec. for Method of Medication Administration: meds whole, with puree, with thin liquids, meds crushed, as tolerated (12/11/23 1300)     Monitor for Signs of Aspiration: yes, notify SLP if any concerns (12/11/23 1300)  Recommended Diagnostics: No further SLP services recommended (12/11/23 1300)  Swallow Criteria for Skilled Therapeutic Interventions Met: no problems identified which require skilled intervention (12/11/23 1300)     Rehab Potential/Prognosis, Swallowing: good, to achieve stated therapy goals (12/11/23 1300)  Therapy Frequency (Swallow): evaluation only (12/11/23 1300)     Oral Care Recommendations: Oral Care BID/PRN (12/11/23 1300)                                      Oral Care Recommendations: Oral Care BID/PRN (12/11/23 1300)    Outcome Evaluation: VFSS completed.  Dr. Scruggs present. Recommend regular solids and thin liquids. Upright positioning, alternating bites/sips, no straws. Meds whole with thin liquids as tolerated. If negative lung changes occur, consider downgrade to nectar thick liquids.      SWALLOW EVALUATION (last 72 hours)       SLP Adult Swallow Evaluation       Row Name 12/11/23 1300                   Rehab Evaluation    Document Type evaluation  -        Subjective Information no complaints  -        Patient Observations alert;cooperative;agree to therapy  -        Patient Effort good  -        Symptoms Noted During/After Treatment none  -           General Information    Patient Profile Reviewed yes  -LH        Pertinent History Of Current Problem Nathan Baez is a 78 y.o. male presents to the hospital with sepsis secondary to probable right lower lobe pneumonia and abdominal cellulitis with acute on chronic panniculitis.  -        Current Method of Nutrition regular textures;thin liquids  -        Precautions/Limitations, Vision WFL;for purposes of eval  -        Precautions/Limitations, Hearing WFL;for purposes of eval  -        Prior Level of Function-Communication WFL  -        Prior Level of Function-Swallowing no diet consistency restrictions  -        Plans/Goals Discussed with patient  -        Barriers to Rehab none identified  -           Pain    Additional Documentation Pain Scale: FACES Pre/Post-Treatment (Group)  -           Pain Scale: Numbers Pre/Post-Treatment    Pretreatment Pain Rating 0/10 - no pain  -        Posttreatment Pain Rating 0/10 - no pain  -           Oral Motor Structure and Function    Dentition Assessment natural, present and adequate  -        Secretion Management WNL/WFL  -LH        Mucosal Quality moist, healthy  -        Volitional Swallow WFL  -           Oral Musculature and Cranial Nerve Assessment    Oral Motor General Assessment WFL  -           General Eating/Swallowing  Observations    Respiratory Support Currently in Use room air  -           MBS/VFSS    Utensils Used spoon;cup;straw  -        Consistencies Trialed regular textures;soft to chew textures;mixed consistency;pureed;thin liquids;nectar/syrup-thick liquids  -           SLP Communication to Radiology    Severity Level of Dysphagia [facility]  -        Consistencies Aspirated/Penetrated penetrated;other (see comments)  thins via straw  -        Summary Statement VFSS completed, Dr. Scruggs present. Patient referred for SLP evaluation for r/o of pharyngeal dysphagia secondary to right lower lobe pneumonia. Patient presents with functional swallow at this time. Patient with adequate oral acceptance across all trials. Patient demonstrated posterior penetration of thins via straw. No penetration or aspiration of thins via cup or nectar thick liquids. Functional mastication of mechanical soft and regular dry solid. Prespill to the vallecula with mixed mechanical soft. Piece meal deglutition with regular solids. Liquid wash effective in clearing oropharyngeal residue following trial of regular textures. Recommend regular solids and thin liquids. Upright positioning, alternating bites/sips, no straws. Meds whole with thin liquids as tolerated. If negative lung changes occur, reconsult ST and consider downgrade to nectar thick liquids.  -           SLP Evaluation Clinical Impression    SLP Swallowing Diagnosis swallow WFL/no suspected pharyngeal impairment  -        Functional Impact no impact on function  -        Rehab Potential/Prognosis, Swallowing good, to achieve stated therapy goals  -        Swallow Criteria for Skilled Therapeutic Interventions Met no problems identified which require skilled intervention  -           Recommendations    Therapy Frequency (Swallow) evaluation only  -        SLP Diet Recommendation regular textures;thin liquids  -        Recommended Diagnostics No further SLP services  recommended  -        Recommended Precautions and Strategies small bites of food and sips of liquid;upright posture during/after eating;general aspiration precautions;alternate between small bites of food and sips of liquid;no straw  -        Oral Care Recommendations Oral Care BID/PRN  -        SLP Rec. for Method of Medication Administration meds whole;with puree;with thin liquids;meds crushed;as tolerated  -        Monitor for Signs of Aspiration yes;notify SLP if any concerns  -                  User Key  (r) = Recorded By, (t) = Taken By, (c) = Cosigned By      Initials Name Effective Dates     Daria Mcdonald SLP 07/11/23 -                     EDUCATION  The patient has been educated in the following areas:   Dysphagia (Swallowing Impairment) Oral Care/Hydration.              Time Calculation:    Time Calculation- SLP       Row Name 12/11/23 1416             Time Calculation- [facility]    SLP Start Time 1300  -      SLP Received On 12/11/23  -         Untimed Charges    SLP Eval/Re-eval  ST [facility] Fluoro Eval Swallow - 64393  -      [id number]-NM Motion Fluoro Eval Swallow Minutes 90  -         Total Minutes    Untimed Charges Total Minutes 90  -       Total Minutes 90  -                User Key  (r) = Recorded By, (t) = Taken By, (c) = Cosigned By      Initials Name Provider Type     Daria Mcdonald SLP Speech and Language Pathologist                    Therapy Charges for Today       Code Description Service Date Service Provider Modifiers Qty    [id number]  ST MOTION FLUORO EVAL SWALLOW 6 12/11/2023 Daria Mcdonald SLP GN 1                 JALEN Roque  12/11/2023

## 2023-12-12 ENCOUNTER — DOCUMENTATION (OUTPATIENT)
Dept: HOME HEALTH SERVICES | Facility: HOME HEALTHCARE | Age: 78
End: 2023-12-12
Payer: MEDICARE

## 2023-12-12 ENCOUNTER — TRANSCRIBE ORDERS (OUTPATIENT)
Dept: HOME HEALTH SERVICES | Facility: HOME HEALTHCARE | Age: 78
End: 2023-12-12
Payer: MEDICARE

## 2023-12-12 ENCOUNTER — HOME HEALTH ADMISSION (OUTPATIENT)
Dept: HOME HEALTH SERVICES | Facility: HOME HEALTHCARE | Age: 78
End: 2023-12-12
Payer: MEDICARE

## 2023-12-12 ENCOUNTER — READMISSION MANAGEMENT (OUTPATIENT)
Dept: CALL CENTER | Facility: HOSPITAL | Age: 78
End: 2023-12-12
Payer: MEDICARE

## 2023-12-12 VITALS
TEMPERATURE: 98.1 F | HEIGHT: 67 IN | DIASTOLIC BLOOD PRESSURE: 84 MMHG | HEART RATE: 91 BPM | OXYGEN SATURATION: 95 % | SYSTOLIC BLOOD PRESSURE: 155 MMHG | WEIGHT: 246.1 LBS | RESPIRATION RATE: 18 BRPM | BODY MASS INDEX: 38.63 KG/M2

## 2023-12-12 DIAGNOSIS — I50.32 CHRONIC DIASTOLIC CONGESTIVE HEART FAILURE: ICD-10-CM

## 2023-12-12 DIAGNOSIS — I10 HYPERTENSION, UNSPECIFIED TYPE: ICD-10-CM

## 2023-12-12 DIAGNOSIS — J18.9 PNEUMONIA OF RIGHT LOWER LOBE DUE TO INFECTIOUS ORGANISM: ICD-10-CM

## 2023-12-12 DIAGNOSIS — L03.311 ABDOMINAL WALL CELLULITIS: Primary | ICD-10-CM

## 2023-12-12 PROBLEM — A41.89 SEPSIS DUE TO OTHER ETIOLOGY: Status: RESOLVED | Noted: 2023-12-08 | Resolved: 2023-12-12

## 2023-12-12 LAB
ANION GAP SERPL CALCULATED.3IONS-SCNC: 14 MMOL/L (ref 5–15)
BUN SERPL-MCNC: 21 MG/DL (ref 8–23)
BUN/CREAT SERPL: 16 (ref 7–25)
CALCIUM SPEC-SCNC: 9 MG/DL (ref 8.6–10.5)
CHLORIDE SERPL-SCNC: 100 MMOL/L (ref 98–107)
CO2 SERPL-SCNC: 22 MMOL/L (ref 22–29)
CREAT SERPL-MCNC: 1.31 MG/DL (ref 0.76–1.27)
DEPRECATED RDW RBC AUTO: 39.6 FL (ref 37–54)
EGFRCR SERPLBLD CKD-EPI 2021: 56.1 ML/MIN/1.73
ERYTHROCYTE [DISTWIDTH] IN BLOOD BY AUTOMATED COUNT: 12.8 % (ref 12.3–15.4)
GLUCOSE BLDC GLUCOMTR-MCNC: 168 MG/DL (ref 70–130)
GLUCOSE BLDC GLUCOMTR-MCNC: 178 MG/DL (ref 70–130)
GLUCOSE SERPL-MCNC: 212 MG/DL (ref 65–99)
HCT VFR BLD AUTO: 37 % (ref 37.5–51)
HGB BLD-MCNC: 11.7 G/DL (ref 13–17.7)
MCH RBC QN AUTO: 27.4 PG (ref 26.6–33)
MCHC RBC AUTO-ENTMCNC: 31.6 G/DL (ref 31.5–35.7)
MCV RBC AUTO: 86.7 FL (ref 79–97)
PLATELET # BLD AUTO: 196 10*3/MM3 (ref 140–450)
PMV BLD AUTO: 10.4 FL (ref 6–12)
POTASSIUM SERPL-SCNC: 3.5 MMOL/L (ref 3.5–5.2)
RBC # BLD AUTO: 4.27 10*6/MM3 (ref 4.14–5.8)
SODIUM SERPL-SCNC: 136 MMOL/L (ref 136–145)
WBC NRBC COR # BLD AUTO: 5.62 10*3/MM3 (ref 3.4–10.8)

## 2023-12-12 PROCEDURE — 82948 REAGENT STRIP/BLOOD GLUCOSE: CPT

## 2023-12-12 PROCEDURE — 25010000002 CEFAZOLIN IN DEXTROSE 2-4 GM/100ML-% SOLUTION: Performed by: INTERNAL MEDICINE

## 2023-12-12 PROCEDURE — 25010000002 ENOXAPARIN PER 10 MG: Performed by: INTERNAL MEDICINE

## 2023-12-12 PROCEDURE — 63710000001 INSULIN GLARGINE PER 5 UNITS: Performed by: NURSE PRACTITIONER

## 2023-12-12 PROCEDURE — 85027 COMPLETE CBC AUTOMATED: CPT | Performed by: INTERNAL MEDICINE

## 2023-12-12 PROCEDURE — 80048 BASIC METABOLIC PNL TOTAL CA: CPT | Performed by: INTERNAL MEDICINE

## 2023-12-12 PROCEDURE — 63710000001 INSULIN LISPRO (HUMAN) PER 5 UNITS: Performed by: NURSE PRACTITIONER

## 2023-12-12 RX ORDER — DOXYCYCLINE HYCLATE 100 MG/1
100 CAPSULE ORAL 2 TIMES DAILY
Qty: 3 CAPSULE | Refills: 0 | Status: SHIPPED | OUTPATIENT
Start: 2023-12-12 | End: 2023-12-14

## 2023-12-12 RX ORDER — ATENOLOL 25 MG/1
12.5 TABLET ORAL DAILY
Qty: 30 TABLET | Refills: 0 | Status: SHIPPED | OUTPATIENT
Start: 2023-12-12 | End: 2023-12-20 | Stop reason: SDUPTHER

## 2023-12-12 RX ORDER — CEPHALEXIN 500 MG/1
500 CAPSULE ORAL 3 TIMES DAILY
Qty: 15 CAPSULE | Refills: 0 | Status: SHIPPED | OUTPATIENT
Start: 2023-12-12 | End: 2023-12-17

## 2023-12-12 RX ORDER — POTASSIUM CHLORIDE 750 MG/1
40 TABLET, FILM COATED, EXTENDED RELEASE ORAL EVERY 4 HOURS
Status: DISCONTINUED | OUTPATIENT
Start: 2023-12-12 | End: 2023-12-12 | Stop reason: HOSPADM

## 2023-12-12 RX ORDER — ATENOLOL 25 MG/1
12.5 TABLET ORAL
Status: DISCONTINUED | OUTPATIENT
Start: 2023-12-12 | End: 2023-12-12 | Stop reason: HOSPADM

## 2023-12-12 RX ADMIN — TIMOLOL MALEATE: 5 SOLUTION/ DROPS OPHTHALMIC at 11:02

## 2023-12-12 RX ADMIN — CETIRIZINE HYDROCHLORIDE 10 MG: 10 TABLET ORAL at 10:57

## 2023-12-12 RX ADMIN — DOXYCYCLINE 100 MG: 100 INJECTION, POWDER, LYOPHILIZED, FOR SOLUTION INTRAVENOUS at 10:49

## 2023-12-12 RX ADMIN — Medication 10 ML: at 10:55

## 2023-12-12 RX ADMIN — ASPIRIN 81 MG: 81 TABLET, COATED ORAL at 10:54

## 2023-12-12 RX ADMIN — CEFAZOLIN SODIUM 2000 MG: 2 INJECTION, SOLUTION INTRAVENOUS at 06:41

## 2023-12-12 RX ADMIN — LOSARTAN POTASSIUM 100 MG: 100 TABLET, FILM COATED ORAL at 10:55

## 2023-12-12 RX ADMIN — PRAMIPEXOLE DIHYDROCHLORIDE 1.5 MG: 1.5 TABLET ORAL at 10:57

## 2023-12-12 RX ADMIN — POTASSIUM CHLORIDE 40 MEQ: 750 TABLET, EXTENDED RELEASE ORAL at 10:58

## 2023-12-12 RX ADMIN — BRIMONIDINE TARTRATE 1 DROP: 2 SOLUTION/ DROPS OPHTHALMIC at 11:03

## 2023-12-12 RX ADMIN — GABAPENTIN 300 MG: 300 CAPSULE ORAL at 10:54

## 2023-12-12 RX ADMIN — PANTOPRAZOLE SODIUM 40 MG: 40 TABLET, DELAYED RELEASE ORAL at 10:54

## 2023-12-12 RX ADMIN — LEVOTHYROXINE SODIUM 88 MCG: 0.09 TABLET ORAL at 10:54

## 2023-12-12 RX ADMIN — INSULIN GLARGINE 10 UNITS: 100 INJECTION, SOLUTION SUBCUTANEOUS at 10:48

## 2023-12-12 RX ADMIN — ATENOLOL 12.5 MG: 25 TABLET ORAL at 12:40

## 2023-12-12 RX ADMIN — ENOXAPARIN SODIUM 40 MG: 100 INJECTION SUBCUTANEOUS at 10:59

## 2023-12-12 RX ADMIN — INSULIN LISPRO 2 UNITS: 100 INJECTION, SOLUTION INTRAVENOUS; SUBCUTANEOUS at 11:04

## 2023-12-12 NOTE — PROGRESS NOTES
[facility] will follow post hospital as requested. Patient agreeable to services. Contact information and PCP confirmed. Best to call spouse at [phone] to schedule home health visits. Verbal order received from [place] with PCP, Dr. Pierce, for home health care.

## 2023-12-12 NOTE — PLAN OF CARE
Goal Outcome Evaluation:              Outcome Evaluation: VSS upto bathroom with assist. Cefazolin given. Maintain safetyy CTM

## 2023-12-12 NOTE — OUTREACH NOTE
Prep Survey      Flowsheet Row Responses   [facility] patient discharged from? [place]   Is LACE score < 7 ? No   Eligibility [facility]   Date of Admission 12/08/23   Date of Discharge 12/12/23   Discharge Disposition Home-Health Care Sv   Discharge diagnosis Sepsis due to other etiology   Does the patient have one of the following disease processes/diagnoses(primary or secondary)? Sepsis   Does the patient have Home health ordered? Yes   What is the Home health agency?  [facility]   Is there a DME ordered? No   Prep survey completed? Yes            Nicolasa DUNBAR - Registered Nurse

## 2023-12-12 NOTE — DISCHARGE SUMMARY
[facility] Medicine Services  DISCHARGE SUMMARY    Patient Name: Nathan Baez  : 1945  MRN: [id number]    Date of Admission: 2023  4:57 PM  Date of Discharge: 2023  Primary Care Physician: Damien Pierce MD          Hospital Course       Active Hospital Problems    Diagnosis  POA    Chronic heart failure with preserved ejection fraction (HFpEF) [I50.32]  Yes    Pneumonia, unspecified organism [J18.9]  Yes    Fatty liver [K76.0]  Yes    Chronic diastolic CHF (congestive heart failure) [I50.32]  Yes    Pneumonia of right lower lobe due to infectious organism [J18.9]  Yes    Iron deficiency anemia [D50.9]  Yes    Gastroesophageal reflux disease [K21.9]  Yes    Type 2 diabetes mellitus with hyperglycemia [E11.65]  Yes    Probable streptococcal cellulitis [L03.90, B95.5]  Yes    KWAME (obstructive sleep apnea) [G47.33]  Yes    Mixed hyperlipidemia [E78.2]  Yes    Hypothyroidism (acquired) [E03.9]  Yes    Cellulitis of abdominal wall [L03.311]  Yes    Hypertension [I10]  Yes      Resolved Hospital Problems    Diagnosis Date Resolved POA    **Sepsis due to other etiology [A41.89] 2023 Yes          Hospital Course:  Nathan Baez is a 78 y.o. male presents to the hospital with sepsis and was found to have diffuse abdominal cellulitis of the pannus region and chest x-ray findings concerning for right lower lobe pneumonia.      Pneumonia:  Patient was initially treated with ceftriaxone and doxycycline and had excellent response to therapy.  I am more suspicious the right lower lobe infiltrate is an aspiration pneumonitis and had speech therapy evaluated him.  Swallow workup is as per below patient was found to have normal swallowing but they recommended small bites of food with sips of liquid and upright posture and alternating bites and liquids with no straw.  This was counseled to patient to avoid further aspiration.  No swallowing issues at the hospital stay.  Respiratory status  currently is excellent.    Cellulitis:  Cellulitis is responding to therapy.  Clinically there is no abscess and this was confirmed on CT imaging.  Previously patient has had Streptococcus infections of his abdomen and this clinically looks very consistent with a recurrent Streptococcus infection.  Patient will discharge on doxycycline and Keflex to complete his antibiotic course.  I will err on the longer side of Keflex course due to the diffuse nature of the panniculitis.  I recommend he speak with his primary care provider as he does have some abnormal chronic skin changes under his pannus that are likely causing his recurrent infections.  Uncertain if he may need an outpatient plastic surgery evaluation to see if panniculectomy could be an option for further prevention.  Otherwise I encouraged him to continue to work on good skin hygiene and to keep the area dry and clean with antibacterial soap.  Patient and family agree with this plan.    Hypertension  Patient also has had some elevated blood pressure and PACs/PVCs.  He was started on beta-blocker and I will discharge him on atenolol for once daily dosing.  He will need to follow-up with his primary care provider for blood pressure check.  Also recommend lab study at follow-up.    Patient did have some hypervolemia and received some additional diuresis at the hospital.  I will return him to his home regimen but at primary care follow-up he may need to consider to increase his diuretic regimen some on an outpatient basis.      Patient with chronic diastolic CHF.  proBNP currently acceptable.  Patient with some acute on chronic edema and received extra diuresis while hospitalized.  Patient did have some hypervolemia and received some additional diuresis at the hospital.  I will return him to his home regimen but at primary care follow-up he may need to consider to increase his diuretic regimen some on an outpatient basis.     Morbid obesity:  Complicates all  aspects of care.  Supportive care and symptom treatment.  Recommended improved diet and exercise and lifestyle changes when possible.     Diabetes:  Monitor glucose and adjust insulin as needed.     Iron deficiency anemia:  No bleeding.  Hemoglobin stable     Hypothyroid: Recent TSH from November normal.    At the time of discharge patient was told to take all medications as prescribed, keep all follow-up appointments, and call their doctor or return to the hospital with any worsening or concerning symptoms.    Please note that this note was made using Dragon voice recognition software        Speech therapy recommendation and Plan  SLP Swallowing Diagnosis: swallow WFL/no suspected pharyngeal impairment (12/11/23 1300)  SLP Diet Recommendation: regular textures, thin liquids (12/11/23 1300)  Recommended Precautions and Strategies: small bites of food and sips of liquid, upright posture during/after eating, general aspiration precautions, alternate between small bites of food and sips of liquid, no straw (12/11/23 1300)  SLP Rec. for Method of Medication Administration: meds whole, with puree, with thin liquids, meds crushed, as tolerated (12/11/23 1300)    Day of Discharge     Subjective:  Patient feels very well today.  Breathing is back to baseline.  Cellulitis is much improved and redness is greatly decreased.  He is very pleased with his progress and is eager to go home.  He is currently ambulating around the nursing vizcaino.  No new complaints.    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, some abdominal edema, soft,   "nondistended  Musculoskeletal: Elderly frail and chronically debilitated in appearance, significantly obese, improving bilateral lower extremity edema  Psychiatric: Appropriate affect, cooperative, conversational  Neurologic: No slurred speech or facial droop, follows commands  Skin: Abdominal redness resolving in the pannus region with notable chronic skin changes under the region of the pannus, no rashes or jaundice, warm    Pertinent  and/or Most Recent Results     Results from last 7 days   Lab Units 12/12/23  0442 12/11/23  0535 12/10/23  0323 12/09/23  1851 12/09/23  0517 12/08/23  1748   WBC 10*3/mm3 5.62 6.19  --   --  6.96 8.21   HEMOGLOBIN g/dL 11.7* 11.0*  --   --  10.8* 12.7*   HEMATOCRIT % 37.0* 35.2*  --   --  33.1* 40.5   PLATELETS 10*3/mm3 196 149  --   --  125* 124*   SODIUM mmol/L 136 141 138  --  139 138   POTASSIUM mmol/L 3.5 3.7 3.7 3.9 3.3* 3.6   CHLORIDE mmol/L 100 105 105  --  106 103   CO2 mmol/L 22.0 23.5 20.1*  --  22.1 23.9   BUN mg/dL 21 17 13  --  14 19   CREATININE mg/dL 1.31* 1.19 1.15  1.15  --  1.03 0.98   GLUCOSE mg/dL 212* 169* 193*  --  215* 144*   CALCIUM mg/dL 9.0 9.2 8.8  --  8.5* 9.3     Results from last 7 days   Lab Units 12/09/23  0517 12/08/23  1748   BILIRUBIN mg/dL 1.3* 1.1   ALK PHOS U/L 118* 143*   ALT (SGPT) U/L 27 21   AST (SGOT) U/L 22 18           Invalid input(s): \"TG\", \"LDLCALC\", \"LDLREALC\"  Results from last 7 days   Lab Units 12/09/23  1048 12/09/23  0517 12/08/23  2118 12/08/23  1748   PROBNP pg/mL  --  548.0  --   --    HSTROP T ng/L 50* 61*  --   --    PROCALCITONIN ng/mL  --   --   --  0.19   LACTATE mmol/L  --  2.2* 1.8 2.4*       Brief Urine Lab Results  (Last result in the past 365 days)        Color   Clarity   Blood   Leuk Est   Nitrite   Protein   CREAT   Urine HCG        12/08/23 2136 Yellow   Clear   Negative   Negative   Negative   100 mg/dL (2+)                   Microbiology Results Abnormal       Procedure Component Value - Date/Time    Blood " Culture - Blood, Arm, Left [[id number]]  (Normal) Collected: 12/08/23 1817    Lab Status: Preliminary result Specimen: Blood from Arm, Left Updated: 12/11/23 1831     Blood Culture No growth at 3 days    Blood Culture - Blood, Arm, Right [[id number]]  (Normal) Collected: 12/08/23 1748    Lab Status: Preliminary result Specimen: Blood from Arm, Right Updated: 12/11/23 1800     Blood Culture No growth at 3 days    S. Pneumo Ag Urine or CSF - Urine, Urine, Clean Catch [[id number]]  (Normal) Collected: 12/09/23 0248    Lab Status: Final result Specimen: Urine, Clean Catch Updated: 12/09/23 0745     Strep Pneumo Ag Negative    Legionella Antigen, Urine - Urine, Urine, Clean Catch [[id number]]  (Normal) Collected: 12/09/23 0248    Lab Status: Final result Specimen: Urine, Clean Catch Updated: 12/09/23 0622     LEGIONELLA ANTIGEN, URINE Negative    RSV PCR - Swab, Nasopharynx [[id number]]  (Normal) Collected: 12/08/23 1749    Lab Status: Final result Specimen: Swab from Nasopharynx Updated: 12/08/23 1820     RSV, PCR Not Detected    COVID-19 and FLU A/B PCR, 1 HR TAT - Swab, Nasopharynx [[id number]]  (Normal) Collected: 12/08/23 1749    Lab Status: Final result Specimen: Swab from Nasopharynx Updated: 12/08/23 1814     COVID19 Not Detected     Influenza A PCR Not Detected     Influenza B PCR Not Detected    Narrative:      Fact sheet for providers: https://www.fda.gov/media/502879/download    Fact sheet for patients: https://www.fda.gov/media/882742/download    Test performed by PCR.    Rapid Strep A Screen - Swab, Throat [[id number]]  (Normal) Collected: 12/08/23 1749    Lab Status: Final result Specimen: Swab from Throat Updated: 12/08/23 1810     STREP A PCR Not Detected            Imaging Results (All)       Procedure Component Value Units Date/Time    FL Video Swallow Single Contrast [[id number]] Collected: 12/11/23 1459     Updated: 12/11/23 1503    Narrative:      VIDEO SWALLOWING EXAMINATION BY SPEECH PATHOLOGY      Clinical: Dysphasia     Video swallowing examination performed under the direction of speech  pathology. Imaging reviewed by radiologist who concurs with the  findings.     Vascular time: 1 minute 27 seconds     Number of images: 2555     Speech pathology summary: VFSS completed, Dr. Scruggs present. Patient  referred for SLP evaluation for r/o of pharyngeal dysphagia secondary to  right lower lobe pneumonia. Patient presents with functional swallow at  this time. Patient with adequate oral acceptance across all trials.  Patient demonstrated posterior penetration of thins via straw. No  penetration or aspiration of thins via cup or nectar thick liquids.  Functional mastication of mechanical soft and regular dry solid.  Pre-spill to the vallecula with mixed mechanical soft. Piece meal  deglutition with regular solids. Liquid wash effective in clearing  oropharyngeal residue following trial of regular textures. Recommend  regular solids and thin liquids. Upright positioning, alternating  bites/sips, no straws. Meds whole with thin liquids as tolerated. If  negative lung changes occur, reconsult ST and consider downgrade to  nectar thick liquids.         This report was finalized on 12/11/2023 3:00 PM by Dr. Kamaljit Bauman M.D on Workstation: [id number]       CT Abdomen Pelvis Without Contrast [[id number]] Collected: 12/10/23 1038     Updated: 12/10/23 1558    Narrative:      CT OF THE ABDOMEN AND PELVIS WITHOUT CONTRAST 12/10/2023     HISTORY: Abdominal pain. Cellulitis.     Spiral images were obtained from the lung bases to the symphysis pubis.  No intravenous contrast was given.     There are several old bilateral rib fractures.     There is fatty infiltration of the liver. Multiple gallstones are seen.  Pancreas is atrophic. Spleen is at the upper limits of normal for size.  There are several nonobstructing right renal stones. There is an  approximately 8.7 cm exophytic left renal cyst.     Adrenal glands are not  enlarged.     There is skin thickening of the lower abdomen and pelvis with some  injection of the underlying subcutaneous tissues. Findings are  consistent with cellulitis. No abscess is seen. Prostate gland has been  removed. Urinary bladder is unremarkable. There is aortoiliac  calcification. Right hip prosthesis is seen.       Impression:      1. Fatty infiltration of the liver.  2. Cholelithiasis.  3. Nonobstructing right renal stones.  4. There is an 8.7 cm left renal cyst.  5. Cellulitis of the anterior abdomen and pelvis. No abscess is seen.     Radiation dose reduction techniques were utilized, including automated  exposure control and exposure modulation based on body size.        This report was finalized on 12/10/2023 3:55 PM by Dr. Srikanth Reese M.D on Workstation: [id number]       XR Chest 1 View [[id number]] Collected: 12/08/23 1832     Updated: 12/09/23 1238    Narrative:      X-RAY CHEST ONE VIEW     HISTORY: 78-year-old male with shortness of breath and productive cough.     FINDINGS: There is pulmonary vascular congestion and CHF is suspected.  There is also likely pneumonia at the right lower lobe. Old right rib  fractures are noted. Follow-up with PA and lateral views of the chest is  recommended.     This report was finalized on 12/9/2023 12:35 PM by Dr. Catrachita Cisneros M.D  on Workstation: [id number]                 Results for orders placed during the hospital encounter of 12/08/23    Adult Transthoracic Echo Complete W/ Cont if Necessary Per Protocol    Interpretation Summary    Left ventricular systolic function is normal. Calculated left ventricular EF = 54.8%    Left ventricular diastolic function is consistent with (grade I) impaired relaxation.    Mild tricuspid valve regurgitation is present.    Estimated right ventricular systolic pressure from tricuspid regurgitation is normal (<35 mmHg).        Discharge Details        Discharge Medications        New Medications        Instructions  Start Date   atenolol 25 MG tablet  Commonly known as: Tenormin   12.5 mg, Oral, Daily      cephalexin 500 MG capsule  Commonly known as: Keflex   500 mg, Oral, 3 Times Daily      doxycycline 100 MG capsule  Commonly known as: VIBRAMYCIN   100 mg, Oral, 2 Times Daily, Take next dose with dinner on 12/12/2023.  Take with food.  May substitute alternative doxycycline formulation if preferred by insurance formulary             Changes to Medications        Instructions Start Date   gabapentin 300 MG capsule  Commonly known as: NEURONTIN  What changed: when to take this   300 mg, Oral, 4 Times Daily      insulin glargine 100 UNIT/ML injection  Commonly known as: LANTUS, SEMGLEE  What changed: how much to take   8 Units, Subcutaneous, Daily             Continue These Medications        Instructions Start Date   acetaminophen 650 MG 8 hr tablet  Commonly known as: TYLENOL   650 mg, Oral, Every 8 Hours PRN      albuterol (2.5 MG/3ML) 0.083% nebulizer solution  Commonly known as: PROVENTIL   2.5 mg, Nebulization, Every 4 Hours PRN      albuterol sulfate  (90 Base) MCG/ACT inhaler  Commonly known as: PROVENTIL HFA;VENTOLIN HFA;PROAIR HFA   2 puffs, Inhalation, Every 4 Hours PRN      aspirin 81 MG EC tablet   81 mg, Oral, Daily      B-12 1000 MCG sublingual tablet   One sublingual tab dissolved on tongue daily      B-D UF III MINI PEN NEEDLES 31G X 5 MM misc  Generic drug: Insulin Pen Needle   1 APPLICATION DAILY      brimonidine 0.2 % ophthalmic solution  Commonly known as: ALPHAGAN   1 drop, Both Eyes, 2 Times Daily      celecoxib 200 MG capsule  Commonly known as: CeleBREX   200 mg, Oral, Daily      dorzolamide-timolol 2-0.5 % ophthalmic solution  Commonly known as: COSOPT   1 drop, Both Eyes, 2 Times Daily      Dulcolax 5 MG EC tablet  Generic drug: bisacodyl   5 mg, Oral, 2 Times Daily      fexofenadine 180 MG tablet  Commonly known as: ALLEGRA   180 mg, Oral, Daily      fluticasone 50 MCG/ACT nasal  spray  Commonly known as: FLONASE   2 sprays, Nasal, Daily      fluticasone-salmeterol 230-21 MCG/ACT inhaler  Commonly known as: ADVAIR HFA   2 puffs, Inhalation, 2 Times Daily - RT      freestyle lancets   Check blood sugar TID      FREESTYLE LITE test strip  Generic drug: glucose blood   Check blood sugar TID      furosemide 40 MG tablet  Commonly known as: LASIX   TAKE 1 TABLET BY MOUTH EVERY DAY      Hydrocortisone (Perianal) 2.5 % rectal cream  Commonly known as: ANUSOL-HC   APPLY RECTALLY THREE TIMES DAILY FOR 7-10 DAYS DURNG HEMORRHOID FLARE      ketoconazole 2 % cream  Commonly known as: NIZORAL   1 application , Topical, Daily      latanoprost 0.005 % ophthalmic solution  Commonly known as: XALATAN   INSTILL 1 DROP IN BOTH EYES DAILY AT BEDTIME      levothyroxine 88 MCG tablet  Commonly known as: SYNTHROID, LEVOTHROID   88 mcg, Oral, Every Morning      losartan 100 MG tablet  Commonly known as: COZAAR   100 mg, Oral, Daily      MAGNESIUM PO   1 tablet, Oral, Daily, 250mg      metFORMIN 500 MG tablet  Commonly known as: GLUCOPHAGE   TAKE 1 TABLET BY MOUTH TWICE DAILY WITH MEALS      montelukast 10 MG tablet  Commonly known as: SINGULAIR   10 mg, Oral, Nightly      pantoprazole 40 MG EC tablet  Commonly known as: PROTONIX   TAKE 1 TABLET BY MOUTH TWICE DAILY      pramipexole 1.5 MG tablet  Commonly known as: MIRAPEX   1.5 mg, Oral, 2 Times Daily      Risaquad-2 capsule capsule   1 capsule, Oral, Daily      rosuvastatin 20 MG tablet  Commonly known as: CRESTOR   20 mg, Oral, Every Evening      sennosides-docusate 8.6-50 MG per tablet  Commonly known as: Stimulant Laxative   2 tablets, Oral, 2 Times Daily      VITAMIN C PO   1 tablet, Oral, Daily               Allergies   Allergen Reactions    Adhesive Tape Rash     Pt states he only had one reaction after hip surgery         Discharge Disposition:  Home or Self Care    Diet:  Hospital:  Diet Order   Procedures    Diet: Cardiac Diets; Healthy Heart (2-3 Na+);  No Straw; Texture: Regular Texture (IDDSI 7); Fluid Consistency: Thin (IDDSI 0)       Activity:  Activity Instructions       Activity as Tolerated                   CODE STATUS:    Code Status and Medical Interventions:   Ordered at: 12/09/23 0201     Code Status (Patient has no pulse and is not breathing):    CPR (Attempt to Resuscitate)     Medical Interventions (Patient has pulse or is breathing):    Full Support       Future Appointments   Date Time Provider Department Center   12/20/2023 10:15 AM Damien Pierce MD [id number]   2/7/2024 11:00 AM Damien Pierce MD [facility]       Additional Instructions for the Follow-ups that You Need to Schedule       Discharge Follow-up with PCP   As directed       Currently Documented PCP:    Damien Pierce MD    PCP Phone Number:    [phone]     Follow Up Details: Recommend primary care provider follow-up within 1 week for general hospital follow-up and blood pressure check.  Call today to schedule.  Recommend labs including BMP at follow-up                Additional Instructions for the Follow-ups that You Need to Schedule       Discharge Follow-up with PCP   As directed       Currently Documented PCP:    Damien Pierce MD    PCP Phone Number:    [phone]     Follow Up Details: Recommend primary care provider follow-up within 1 week for general hospital follow-up and blood pressure check.  Call today to schedule.  Recommend labs including BMP at follow-up               Contact information for follow-up providers       Damien Pierce MD Follow up on 12/20/2023.    Specialty: Family Medicine  Why: at 10:15am  Contact information:  [address] [address]  [phone]               Damien Pierce MD .    Specialty: Family Medicine  Why: Recommend primary care provider follow-up within 1 week for general hospital follow-up and blood pressure check.  Call today to schedule.  Recommend labs including  BMP at follow-up  Contact information:  [address]  [facility] [address]  [phone]                       Contact information for after-discharge care       Home Medical Care       [facility] .    Services: Home Health Services, Home Nursing, Home Rehabilitation  Contact information:  [address]  [facility] [address] [phone]                                       Torrey Peres MD  12/12/23      Time Spent on Discharge:  I spent greater than 40 minutes on this discharge activity which included: face-to-face encounter with the patient, reviewing the data in the system, coordination of the care with the nursing staff as well as consultants, documentation, and entering orders.

## 2023-12-12 NOTE — PLAN OF CARE
Problem: Adjustment to Illness (Sepsis/Septic Shock)  Goal: Optimal Coping  Outcome: Met     Problem: Bleeding (Sepsis/Septic Shock)  Goal: Absence of Bleeding  Outcome: Met     Problem: Glycemic Control Impaired (Sepsis/Septic Shock)  Goal: Blood Glucose Level Within Desired Range  Outcome: Met     Problem: Infection Progression (Sepsis/Septic Shock)  Goal: Absence of Infection Signs and Symptoms  Outcome: Met     Problem: Nutrition Impaired (Sepsis/Septic Shock)  Goal: Optimal Nutrition Intake  Outcome: Met     Problem: Adult Inpatient Plan of Care  Goal: Plan of Care Review  Outcome: Met  Goal: Patient-Specific Goal (Individualized)  Outcome: Met  Goal: Absence of Hospital-Acquired Illness or Injury  Outcome: Met  Goal: Optimal Comfort and Wellbeing  Outcome: Met  Goal: Readiness for Transition of Care  Outcome: Met     Problem: Fall Injury Risk  Goal: Absence of Fall and Fall-Related Injury  Outcome: Met   Goal Outcome Evaluation:

## 2023-12-13 ENCOUNTER — HOME CARE VISIT (OUTPATIENT)
Dept: HOME HEALTH SERVICES | Facility: HOME HEALTHCARE | Age: 78
End: 2023-12-13
Payer: MEDICARE

## 2023-12-13 ENCOUNTER — TRANSITIONAL CARE MANAGEMENT TELEPHONE ENCOUNTER (OUTPATIENT)
Dept: CALL CENTER | Facility: HOSPITAL | Age: 78
End: 2023-12-13
Payer: MEDICARE

## 2023-12-13 VITALS
HEART RATE: 76 BPM | OXYGEN SATURATION: 100 % | TEMPERATURE: 96.2 F | DIASTOLIC BLOOD PRESSURE: 80 MMHG | SYSTOLIC BLOOD PRESSURE: 112 MMHG | RESPIRATION RATE: 20 BRPM

## 2023-12-13 LAB
BACTERIA SPEC AEROBE CULT: NORMAL
BACTERIA SPEC AEROBE CULT: NORMAL

## 2023-12-13 PROCEDURE — G0299 HHS/HOSPICE OF RN EA 15 MIN: HCPCS

## 2023-12-13 NOTE — PROGRESS NOTES
Enter Query Response Below      Query Response:     Yes, possibly both secondary to sepsis   Electronically signed by Torrey Peres MD, 23, 6:59 PM EST.         If applicable, please update the problem list.       Patient: Nathan Baez        : 1945  Account: [id number]           Admit Date:         How to Respond to this query:       a. Click New Note     b. Answer query within the yellow box.                c. Update the Problem List, if applicable.      If you have any questions about this query contact me at: [email or web address]         78 year old male admitted  with sepsis secondary to abdominal wall cellulitis, panniculitis. Clinical findings include heart rate 112, respiratory rate 22-28, WBC 8.21 with 81.8% neutrophils, platelet count 124 (), lactic acid 2.4 ().    Treatment:  IV NS 2000ML bolus, IV ceftriaxone -, IV doxycycline -, discharged on oral Keflex and Doxycycline.    Are either of this patient’s organ dysfunctions (elevated lactic acid, low platelets) secondary to sepsis?     Yes, both secondary to sepsis   Yes, but not both, please specify____________   No   Other- specify____________   Unable to determine        By submitting this query, we are merely seeking further clarification of documentation to accurately reflect all conditions that you are monitoring, evaluating, treating or that extend the hospitalization or utilize additional resources of care. Please utilize your independent clinical judgment when addressing the question(s) above.     This query and your response, once completed, will be entered into the legal medical record.    Sincerely,  Christy Hernandez RN  Clinical Documentation Integrity Program

## 2023-12-13 NOTE — OUTREACH NOTE
Call Center TCM Note      Flowsheet Row Responses   [facility] patient discharged from? [place]   Does the patient have one of the following disease processes/diagnoses(primary or secondary)? Sepsis   TCM attempt successful? Yes   Call start time 1115   Call end time 1116   Discharge diagnosis Sepsis due to other etiology   Person spoke with today (if not patient) and relationship Patient and Spouse   Meds reviewed with patient/caregiver? Yes   Does the patient have all medications related to this admission filled (includes all antibiotics, inhalers, nebulizers,steroids,etc.) Yes   Prescription comments No concerns or questions.   Is the patient taking all medications as directed (includes completed medication regime)? Yes   Comments 12/20/2023 10:15 AM  HOSPITAL FOLLOW UP 30 min [facility] Damien Pierce MD   Does the patient have an appointment with their PCP within 7-14 days of discharge? Yes   What is the Home health agency?  [facility]   Has home health visited the patient within 72 hours of discharge? Yes   Home health comments Coming today.   Psychosocial issues? No   Did the patient receive a copy of their discharge instructions? Yes   Nursing interventions Reviewed instructions with patient   What is the patient's perception of their health status since discharge? Improving   Nursing interventions Nurse provided patient education   Is the patient/caregiver able to teach back TIME? T emperature - higher or lower than normal, I nfection - may have signs and symptoms of an infection, M ental Decline - confused, sleepy, difficult to arouse, E xtremely Ill - severe pain, discomfort, shortness of breath   Nursing interventions Nurse provided patient education   Is patient/caregiver able to teach back steps to recovery at home? Set small, achievable goals for return to baseline health, Rest and regain strength   Is the patient/caregiver able to teach back signs  and symptoms of worsening condition: Fever, Shortness of breath/rapid respiratory rate, Altered mental status(confusion/coma)   Is the patient/caregiver able to teach back the hierarchy of who to call/visit for symptoms/problems? PCP, Specialist, Home health nurse, Urgent Care, ED, 911 Yes   TCM call completed? Yes   Wrap up additional comments Patient reports doing well no worsening s/s of infection noted. No concerns or questions noted.   Call end time 1116   Would this patient benefit from a Referral to [facility] Social Work? No   Is the patient interested in additional calls from an ambulatory ? No            Nicolasa Carlson RN    12/13/2023, 11:16 EST

## 2023-12-13 NOTE — CASE MANAGEMENT/SOCIAL WORK
Case Management Discharge Note      Final Note: Home with [facility], family to transport         Selected Continued Care - Discharged on 12/12/2023 Admission date: 12/8/2023 - Discharge disposition: Home or Self Care      Destination    No services have been selected for the patient.                Durable Medical Equipment    No services have been selected for the patient.                Dialysis/Infusion    No services have been selected for the patient.                Home Medical Care Coordination complete.      Service Provider Selected Services Address Phone Fax Patient Preferred     [facility] ,  Home Nursing ,  Home Rehabilitation [address] [address] [phone] --              Therapy    No services have been selected for the patient.                Community Resources    No services have been selected for the patient.                Community & DME    No services have been selected for the patient.                    Transportation Services  Private: Car    Final Discharge Disposition Code: 06 - home with home health care

## 2023-12-14 ENCOUNTER — HOME CARE VISIT (OUTPATIENT)
Dept: HOME HEALTH SERVICES | Facility: HOME HEALTHCARE | Age: 78
End: 2023-12-14
Payer: MEDICARE

## 2023-12-14 DIAGNOSIS — E78.5 HYPERLIPIDEMIA, UNSPECIFIED HYPERLIPIDEMIA TYPE: ICD-10-CM

## 2023-12-14 DIAGNOSIS — J30.1 SEASONAL ALLERGIC RHINITIS DUE TO POLLEN: ICD-10-CM

## 2023-12-14 NOTE — HOME HEALTH
78M with history of CHF, DM, anemia, GERD, glaucoma, KWAME, HTN, hypothyroidism.  Recent hospitalization 12/8/2023-12/12/2023 due to recurrent streptococcus infection/diffuse abdominal cellulitis and RLL pneumonia.  He was treated with ceftriaxone and doxycycline.  Patient was discharged on doxycycline and Keflex.  Chronic skin changes under his pannus that are likely causing his recurrent infections. He may need an outpatient plastic surgery evaluation to see if panniculectomy could be an option for further prevention. Patient also has had some elevated blood pressure and PACs/PVCs. He was started on beta-blocker.  His wife will pick this up from pharmacy today.  He uses a cane to ambulate.  His VSS and SATS are WNL on RA.  Medications reconciled.  He has all of them in home, except the atenolol to be picked up today.  SN FOC cellulitis.  SN to teach and instruct cellulitis and medication regime.

## 2023-12-15 ENCOUNTER — HOME CARE VISIT (OUTPATIENT)
Dept: HOME HEALTH SERVICES | Facility: HOME HEALTHCARE | Age: 78
End: 2023-12-15
Payer: MEDICARE

## 2023-12-15 PROCEDURE — G0300 HHS/HOSPICE OF LPN EA 15 MIN: HCPCS

## 2023-12-15 RX ORDER — LOSARTAN POTASSIUM 100 MG/1
100 TABLET ORAL DAILY
Qty: 90 TABLET | Refills: 3 | Status: SHIPPED | OUTPATIENT
Start: 2023-12-15

## 2023-12-15 RX ORDER — ROSUVASTATIN CALCIUM 20 MG/1
20 TABLET, COATED ORAL EVERY EVENING
Qty: 90 TABLET | Refills: 3 | Status: SHIPPED | OUTPATIENT
Start: 2023-12-15

## 2023-12-15 RX ORDER — MONTELUKAST SODIUM 10 MG/1
10 TABLET ORAL NIGHTLY
Qty: 90 TABLET | Refills: 3 | Status: SHIPPED | OUTPATIENT
Start: 2023-12-15

## 2023-12-15 NOTE — CASE COMMUNICATION
Your patient could not be seen on Fri 121523 for home PT evaluation so spouse rescheduled his visit to [id number]. I need new order for the PT eval as this one had end date this week. Your office was closed when I called Fri afternoon. You can respond back to this case communication or call me at [phone] with the new order 1w1 for PT evaluation. Thanks in advance and thanks for the referral!

## 2023-12-18 ENCOUNTER — HOME CARE VISIT (OUTPATIENT)
Dept: HOME HEALTH SERVICES | Facility: HOME HEALTHCARE | Age: 78
End: 2023-12-18
Payer: MEDICARE

## 2023-12-18 VITALS
OXYGEN SATURATION: 95 % | SYSTOLIC BLOOD PRESSURE: 140 MMHG | HEART RATE: 76 BPM | TEMPERATURE: 96.8 F | DIASTOLIC BLOOD PRESSURE: 70 MMHG | RESPIRATION RATE: 18 BRPM

## 2023-12-18 VITALS
HEART RATE: 61 BPM | RESPIRATION RATE: 18 BRPM | SYSTOLIC BLOOD PRESSURE: 128 MMHG | DIASTOLIC BLOOD PRESSURE: 68 MMHG | OXYGEN SATURATION: 99 %

## 2023-12-18 PROCEDURE — G0151 HHCP-SERV OF PT,EA 15 MIN: HCPCS

## 2023-12-18 NOTE — HOME HEALTH
"SUBJECTIVE: \"I'm doing ok and getting back my strength and mobility.\"    DIAGNOSIS: Recent hospitalization 12/8/2023-12/12/2023 due to recurrent streptococcus infection/diffuse abdominal cellulitis and RLL pneumonia; also was put on Beta-blocker due to onset of elevated blood pressure and PACs/PVCs.    PAST MEDICAL HX: CHF, DM, Anemia, GERD, Glaucoma, KWAME, HTN, Hypothyroidism    PRIOR LEVEL OF FUNCTION: independent with cane mostly with steps out of home and to basement    SKILLED THERAPY IS NEEDED FOR: 1x only/PT eval visit as PT instructed him in home walking program for endurance and strength training and reviewed home safety with patient and spouse which he was independent and will continue on his own, as he is returning to prior functional level of independent household mobility with cane and as his medical condition of abdominal cellulitis improves he will return to independent community mobility with cane as well. No further skilled PT is needed at this time."

## 2023-12-18 NOTE — HOME HEALTH
Patient was a CP assess with a check of his cellulitis under his stomach in the fold.  Upon check today redness has gone down and blisters are fading.  Patient uses a pab under the fold and keeps it dry, his cellulitis is improving.

## 2023-12-18 NOTE — CASE COMMUNICATION
This is to notify your office home PT made eval visit to patient on 121823 with 1x only/PT eval visit only needed as PT instructed him in home walking program for endurance and strength training and reviewed home safety with patient and spouse which he was independent and will continue on his own; he is returning to prior functional level of independent household mobility with cane and as his medical condition of abdominal cellulitis im proves he will return to independent community mobility with cane as well. No further skilled PT is needed at this time. Thanks for the referral!

## 2023-12-19 ENCOUNTER — HOME CARE VISIT (OUTPATIENT)
Dept: HOME HEALTH SERVICES | Facility: HOME HEALTHCARE | Age: 78
End: 2023-12-19
Payer: MEDICARE

## 2023-12-19 VITALS
OXYGEN SATURATION: 99 % | HEART RATE: 74 BPM | DIASTOLIC BLOOD PRESSURE: 82 MMHG | SYSTOLIC BLOOD PRESSURE: 138 MMHG | RESPIRATION RATE: 18 BRPM

## 2023-12-19 PROCEDURE — G0300 HHS/HOSPICE OF LPN EA 15 MIN: HCPCS

## 2023-12-19 NOTE — HOME HEALTH
Patient is a CP assess and a check of patients cellulits to abd.  Upon checking reddness has gone away, heat no longer felt.  Blisters are healed.  Patient is keeping area clean and dry.  He has finished his ATB as of 12.18.23.

## 2023-12-20 ENCOUNTER — OFFICE VISIT (OUTPATIENT)
Dept: FAMILY MEDICINE CLINIC | Facility: CLINIC | Age: 78
End: 2023-12-20
Payer: MEDICARE

## 2023-12-20 VITALS
HEIGHT: 67 IN | HEART RATE: 65 BPM | BODY MASS INDEX: 39.83 KG/M2 | OXYGEN SATURATION: 100 % | WEIGHT: 253.8 LBS | SYSTOLIC BLOOD PRESSURE: 140 MMHG | DIASTOLIC BLOOD PRESSURE: 90 MMHG

## 2023-12-20 DIAGNOSIS — Z09 HOSPITAL DISCHARGE FOLLOW-UP: Primary | ICD-10-CM

## 2023-12-20 DIAGNOSIS — E65 PANNUS, ABDOMINAL: ICD-10-CM

## 2023-12-20 DIAGNOSIS — I10 PRIMARY HYPERTENSION: ICD-10-CM

## 2023-12-20 DIAGNOSIS — L03.311 CELLULITIS OF ABDOMINAL WALL: ICD-10-CM

## 2023-12-20 RX ORDER — PENICILLIN V POTASSIUM 250 MG/1
TABLET ORAL
Qty: 60 TABLET | Refills: 5 | Status: SHIPPED | OUTPATIENT
Start: 2023-12-20

## 2023-12-20 RX ORDER — ATENOLOL 25 MG/1
12.5 TABLET ORAL DAILY
Qty: 45 TABLET | Refills: 4 | Status: SHIPPED | OUTPATIENT
Start: 2023-12-20

## 2023-12-20 NOTE — PROGRESS NOTES
Transitional Care Follow Up Visit  Subjective     Nathan Baez is a 78 y.o. male who presents for a transitional care management visit.    Within 48 business hours after discharge our office contacted him via telephone to coordinate his care and needs.      I reviewed and discussed the details of that call along with the discharge summary, hospital problems, inpatient lab results, inpatient diagnostic studies, and consultation reports with Nathan.     Current outpatient and discharge medications have been reconciled for the patient.  Reviewed by: Damien Pierce MD          12/12/2023     5:37 PM   Date of TCM Phone Call   [facility]   Date of Admission 12/8/2023   Date of Discharge 12/12/2023   Discharge Disposition Home-Health Care [facility]     Risk for Readmission (LACE) Score: 13 (12/12/2023  6:00 AM)      History of Present Illness   Course During Hospital Stay: F/U hospitalization due to recurrent cellulitis of pannus. Completed cephalexin and doxycycline.  Started on atenolol as well.       The following portions of the patient's history were reviewed and updated as appropriate: allergies, current medications, past family history, past medical history, past social history, past surgical history, and problem list.    Review of Systems   Constitutional:  Negative for activity change, appetite change and fatigue.   HENT:  Negative for hearing loss and postnasal drip.    Eyes:  Negative for discharge and itching.   Respiratory:  Negative for cough and shortness of breath.    Cardiovascular:  Negative for chest pain and leg swelling.   Gastrointestinal:  Negative for abdominal distention and abdominal pain.   Endocrine: Negative for cold intolerance and heat intolerance.   Genitourinary:  Negative for difficulty urinating and flank pain.   Musculoskeletal:  Negative for arthralgias and myalgias.   Skin:  Negative for color change.   Neurological:  Negative for dizziness and facial  asymmetry.   Hematological:  Negative for adenopathy.   Psychiatric/Behavioral:  Negative for agitation and confusion.        Objective   Physical Exam  Vitals reviewed.   Constitutional:       Appearance: He is well-developed. He is not diaphoretic.   HENT:      Head: Normocephalic and atraumatic.   Eyes:      General: No scleral icterus.     Pupils: Pupils are equal, round, and reactive to light.   Neck:      Thyroid: No thyromegaly.   Cardiovascular:      Rate and Rhythm: Normal rate and regular rhythm.      Heart sounds: No murmur heard.     No friction rub. No gallop.   Pulmonary:      Effort: Pulmonary effort is normal. No respiratory distress.      Breath sounds: No wheezing or rales.   Chest:      Chest wall: No tenderness.   Abdominal:      General: Bowel sounds are normal. There is no distension.      Palpations: Abdomen is soft.      Tenderness: There is no abdominal tenderness.      Comments: Extremely large and thickened pannus lower abdomen.     Musculoskeletal:         General: No deformity. Normal range of motion.   Lymphadenopathy:      Cervical: No cervical adenopathy.   Skin:     General: Skin is warm and dry.      Findings: No rash.   Neurological:      Cranial Nerves: No cranial nerve deficit.      Motor: No abnormal muscle tone.         Assessment & Plan   Diagnoses and all orders for this visit:    1. Hospital discharge follow-up (Primary)    2. Primary hypertension    3. Cellulitis of abdominal wall  -     penicillin v potassium (VEETID) 250 MG tablet; One BID  Dispense: 60 tablet; Refill: 5  -     Ambulatory Referral to Plastic Surgery    4. Pannus, abdominal  -     penicillin v potassium (VEETID) 250 MG tablet; One BID  Dispense: 60 tablet; Refill: 5  -     Ambulatory Referral to Plastic Surgery    Other orders  -     atenolol (Tenormin) 25 MG tablet; Take 0.5 tablets by mouth Daily.  Dispense: 45 tablet; Refill: 4      Recurrent cellulitis of pannus.  Needs surgical excision.  To plastics.   Start hibiclens 3 times a week.  Start pen VEE k 250 BID indefinitely until surgery.   HTN.  Improved.  RF atenolol.

## 2023-12-22 ENCOUNTER — HOME CARE VISIT (OUTPATIENT)
Dept: HOME HEALTH SERVICES | Facility: HOME HEALTHCARE | Age: 78
End: 2023-12-22
Payer: MEDICARE

## 2023-12-22 VITALS
RESPIRATION RATE: 18 BRPM | HEART RATE: 64 BPM | TEMPERATURE: 98.1 F | SYSTOLIC BLOOD PRESSURE: 132 MMHG | OXYGEN SATURATION: 97 % | DIASTOLIC BLOOD PRESSURE: 60 MMHG

## 2023-12-22 PROCEDURE — G0300 HHS/HOSPICE OF LPN EA 15 MIN: HCPCS

## 2023-12-22 NOTE — HOME HEALTH
Patient is a CP assess r/t recent cellulitis.  Patients cellulitis is under the fold of his abd.  Upon checking cellulits is about healed.  No blisters noted, skin is normal color, no warmth and patient denies any pain.  Patient is able to voice how to care for his area by keeping it clean and dry and making sure no moisture gets to that area.

## 2023-12-29 ENCOUNTER — HOME CARE VISIT (OUTPATIENT)
Dept: HOME HEALTH SERVICES | Facility: HOME HEALTHCARE | Age: 78
End: 2023-12-29
Payer: MEDICARE

## 2023-12-29 VITALS
HEART RATE: 72 BPM | DIASTOLIC BLOOD PRESSURE: 78 MMHG | RESPIRATION RATE: 18 BRPM | TEMPERATURE: 96.6 F | SYSTOLIC BLOOD PRESSURE: 144 MMHG | OXYGEN SATURATION: 97 %

## 2023-12-29 PROCEDURE — G0493 RN CARE EA 15 MIN HH/HOSPICE: HCPCS

## 2023-12-29 NOTE — HOME HEALTH
Abdominal cellulitis resolved.  Patient no longer homebound.  Patient and spouse are agreeable to agency d/c.  SN no longer required.

## 2024-01-26 DIAGNOSIS — K59.04 CHRONIC IDIOPATHIC CONSTIPATION: ICD-10-CM

## 2024-01-26 RX ORDER — DOCUSATE SODIUM 50 MG AND SENNOSIDES 8.6 MG 8.6; 5 MG/1; MG/1
2 TABLET, FILM COATED ORAL 2 TIMES DAILY
Qty: 200 TABLET | Refills: 3 | Status: SHIPPED | OUTPATIENT
Start: 2024-01-26

## 2024-01-30 DIAGNOSIS — M51.36 DDD (DEGENERATIVE DISC DISEASE), LUMBAR: ICD-10-CM

## 2024-01-30 RX ORDER — CELECOXIB 200 MG/1
200 CAPSULE ORAL DAILY
Qty: 30 CAPSULE | Refills: 5 | Status: SHIPPED | OUTPATIENT
Start: 2024-01-30

## 2024-02-07 ENCOUNTER — OFFICE VISIT (OUTPATIENT)
Dept: FAMILY MEDICINE CLINIC | Facility: CLINIC | Age: 79
End: 2024-02-07
Payer: MEDICARE

## 2024-02-07 VITALS
HEART RATE: 68 BPM | RESPIRATION RATE: 17 BRPM | BODY MASS INDEX: 38.89 KG/M2 | DIASTOLIC BLOOD PRESSURE: 68 MMHG | HEIGHT: 67 IN | OXYGEN SATURATION: 96 % | WEIGHT: 247.8 LBS | SYSTOLIC BLOOD PRESSURE: 130 MMHG

## 2024-02-07 DIAGNOSIS — E11.65 TYPE 2 DIABETES MELLITUS WITH HYPERGLYCEMIA, WITH LONG-TERM CURRENT USE OF INSULIN: Primary | ICD-10-CM

## 2024-02-07 DIAGNOSIS — E66.01 CLASS 2 SEVERE OBESITY DUE TO EXCESS CALORIES WITH SERIOUS COMORBIDITY AND BODY MASS INDEX (BMI) OF 38.0 TO 38.9 IN ADULT: ICD-10-CM

## 2024-02-07 DIAGNOSIS — K21.9 GASTROESOPHAGEAL REFLUX DISEASE WITHOUT ESOPHAGITIS: ICD-10-CM

## 2024-02-07 DIAGNOSIS — I10 PRIMARY HYPERTENSION: ICD-10-CM

## 2024-02-07 DIAGNOSIS — Z79.4 TYPE 2 DIABETES MELLITUS WITH HYPERGLYCEMIA, WITH LONG-TERM CURRENT USE OF INSULIN: Primary | ICD-10-CM

## 2024-02-07 RX ORDER — PHENTERMINE HYDROCHLORIDE 37.5 MG/1
37.5 CAPSULE ORAL EVERY MORNING
Qty: 30 CAPSULE | Refills: 3 | Status: SHIPPED | OUTPATIENT
Start: 2024-02-07

## 2024-02-07 RX ORDER — ATENOLOL 25 MG/1
12.5 TABLET ORAL DAILY
Qty: 45 TABLET | Refills: 4 | Status: SHIPPED | OUTPATIENT
Start: 2024-02-07

## 2024-02-07 RX ORDER — FUROSEMIDE 40 MG/1
TABLET ORAL
Qty: 90 TABLET | Refills: 3 | Status: SHIPPED | OUTPATIENT
Start: 2024-02-07

## 2024-02-07 RX ORDER — PANTOPRAZOLE SODIUM 40 MG/1
40 TABLET, DELAYED RELEASE ORAL 2 TIMES DAILY
Qty: 90 TABLET | Refills: 3 | Status: SHIPPED | OUTPATIENT
Start: 2024-02-07

## 2024-02-07 RX ORDER — INSULIN GLARGINE 100 [IU]/ML
8 INJECTION, SOLUTION SUBCUTANEOUS DAILY
Start: 2024-02-07

## 2024-02-07 RX ORDER — AMLODIPINE BESYLATE 2.5 MG/1
2.5 TABLET ORAL DAILY
Qty: 90 TABLET | Refills: 3 | Status: SHIPPED | OUTPATIENT
Start: 2024-02-07

## 2024-02-07 NOTE — PROGRESS NOTES
Chief Complaint   Patient presents with    Diabetes    Hypertension    Follow-up       Subjective   Nathan Baez is a 78 y.o. male.     Diabetes  Pertinent negatives for hypoglycemia include no confusion. Pertinent negatives for diabetes include no blurred vision, no chest pain, no fatigue, no polyuria and no weakness.   Hypertension  Pertinent negatives include no blurred vision, chest pain or shortness of breath.      F/U HTN.  Doing well with meds.    F/U DM2.  Doing well with Lantus 8 units a day.  BS running 130-150s in morning.    The following portions of the patient's history were reviewed and updated as appropriate: allergies, current medications, past family history, past medical history, past social history, past surgical history and problem list.    Review of Systems   Constitutional:  Negative for appetite change and fatigue.   HENT:  Negative for nosebleeds and sore throat.    Eyes:  Negative for blurred vision and visual disturbance.   Respiratory:  Negative for shortness of breath and wheezing.    Cardiovascular:  Negative for chest pain and leg swelling.   Gastrointestinal:  Negative for abdominal distention and abdominal pain.   Endocrine: Negative for cold intolerance and polyuria.   Genitourinary:  Negative for dysuria and hematuria.   Musculoskeletal:  Negative for arthralgias and myalgias.   Skin:  Negative for color change and rash.   Neurological:  Negative for weakness and confusion.   Psychiatric/Behavioral:  Negative for agitation and depressed mood.        Patient Active Problem List   Diagnosis    OA (osteoarthritis) of knee    Hypertension    Cellulitis of abdominal wall    Hypothyroidism (acquired)    Gastroesophageal reflux disease without esophagitis    Mixed hyperlipidemia    Primary insomnia    DDD (degenerative disc disease), lumbar    KWAME (obstructive sleep apnea)    Allergic    Venous insufficiency    Prostate cancer    Venous stasis dermatitis    Tinea cruris    Tinea  corporis    Throat clearing    Sleep apnea    Skin lesion of face    Rib pain on left side    Rectal bleed    Presbycusis    Pes planus    Osteoarthritis    Obesity    Medicare annual wellness visit, subsequent    Lumbar strain    Internal hemorrhoids    Insomnia    Inflamed skin tag    Hyperplastic polyp of stomach    Hospital discharge follow-up    History of colon polyps    High risk medication use    Glaucoma    Gastroenteritis, acute    Probable streptococcal cellulitis    Encounter for screening colonoscopy    Encounter for long-term (current) use of NSAIDs    Dysphagia    DVT (deep venous thrombosis)    Lumbar facet arthropathy    Diverticulosis    Cough    Contact with hypodermic needle    Cervical radiculopathy    Arthritis    Allergic rhinitis    Acute glaucoma    Acute embolism and thrombosis of vein    Actinic keratosis    Status post reverse total shoulder replacement, right    Localized edema    Other specified anemias    Peripheral polyneuropathy    Campylobacter diarrhea    Hyponatremia    Generalized abdominal pain    Fever    Constipation    Bilateral lower extremity edema    Acute pyelonephritis    Chronic idiopathic constipation    Functional diarrhea    Change in bowel habits    RLS (restless legs syndrome)    Type 2 diabetes mellitus with hyperglycemia    Family history of GI malignancy    Primary osteoarthritis of right hip    B12 deficiency    Intervertebral disc stenosis of neural canal of lumbar region    Encounter for hepatitis C screening test for low risk patient    Pain associated with defecation    Calculus of kidney    Gastroesophageal reflux disease    Body aches    Skin lesion    Candidiasis, intertrigo    Hyperbilirubinemia    Acute respiratory failure due to COVID-19    Bacteremia due to group B Streptococcus    Iron deficiency anemia    Acute respiratory failure with hypoxia and hypercapnia    Hypoventilation syndrome    Pneumonia of right lower lobe due to infectious organism     Chronic diastolic CHF (congestive heart failure)    Fatty liver    Chronic heart failure with preserved ejection fraction (HFpEF)    Pneumonia, unspecified organism    Pannus, abdominal       Allergies   Allergen Reactions    Adhesive Tape Rash     Pt states he only had one reaction after hip surgery         Current Outpatient Medications:     acetaminophen (TYLENOL) 650 MG 8 hr tablet, Take 1 tablet by mouth Every 8 (Eight) Hours As Needed for Mild Pain. Indications: Pain, Disp: , Rfl:     albuterol (PROVENTIL) (2.5 MG/3ML) 0.083% nebulizer solution, Take 2.5 mg by nebulization Every 4 (Four) Hours As Needed for Wheezing. Indications: Spasm of Lung Air Passages, Disp: , Rfl:     albuterol sulfate  (90 Base) MCG/ACT inhaler, Inhale 2 puffs Every 4 (Four) Hours As Needed for Wheezing. Indications: Spasm of Lung Air Passages, Disp: , Rfl:     Ascorbic Acid (VITAMIN C PO), Take 1 tablet by mouth Daily., Disp: , Rfl:     aspirin 81 MG EC tablet, Take 1 tablet by mouth Daily. Indications: Coronary Bypass Surgery, Disp: , Rfl:     atenolol (Tenormin) 25 MG tablet, Take 0.5 tablets by mouth Daily., Disp: 45 tablet, Rfl: 4    B-D UF III MINI PEN NEEDLES 31G X 5 MM misc, 1 APPLICATION DAILY, Disp: 100 each, Rfl: 3    bisacodyl (Dulcolax) 5 MG EC tablet, Take 1 tablet by mouth 2 (Two) Times a Day. Indications: Constipation, Disp: , Rfl:     brimonidine (ALPHAGAN) 0.2 % ophthalmic solution, Administer 1 drop to both eyes 2 (Two) Times a Day. Indications: Wide-Angle Glaucoma, Disp: , Rfl:     celecoxib (CeleBREX) 200 MG capsule, TAKE 1 CAPSULE BY MOUTH DAILY, Disp: 30 capsule, Rfl: 5    Cyanocobalamin (B-12) 1000 MCG sublingual tablet, One sublingual tab dissolved on tongue daily, Disp: 90 each, Rfl: 3    dorzolamide-timolol (COSOPT) 22.3-6.8 MG/ML ophthalmic solution, Administer 1 drop to both eyes 2 (Two) Times a Day. Indications: Wide-Angle Glaucoma, Disp: , Rfl:     fexofenadine (ALLEGRA) 180 MG tablet, Take 1 tablet  by mouth Daily. Indications: Hayfever, Disp: , Rfl:     fluticasone (FLONASE) 50 MCG/ACT nasal spray, 2 sprays into the nostril(s) as directed by provider Daily., Disp: , Rfl:     fluticasone-salmeterol (ADVAIR HFA) 230-21 MCG/ACT inhaler, Inhale 2 puffs 2 (Two) Times a Day., Disp: , Rfl:     furosemide (LASIX) 40 MG tablet, TAKE 1 TABLET BY MOUTH EVERY DAY  Indications: Edema, Disp: 90 tablet, Rfl: 3    gabapentin (NEURONTIN) 300 MG capsule, Take 1 capsule by mouth 4 (Four) Times a Day. (Patient taking differently: Take 1 capsule by mouth 3 (Three) Times a Day.), Disp: 120 capsule, Rfl: 5    glucose blood (FREESTYLE LITE) test strip, Check blood sugar TID, Disp: 200 each, Rfl: 11    Hydrocortisone, Perianal, (ANUSOL-HC) 2.5 % rectal cream, APPLY RECTALLY THREE TIMES DAILY FOR 7-10 DAYS DURNG HEMORRHOID FLARE, Disp: , Rfl:     insulin glargine (LANTUS, SEMGLEE) 100 UNIT/ML injection, Inject 8 Units under the skin into the appropriate area as directed Daily. Indications: Type 2 Diabetes, Disp: , Rfl:     ketoconazole (NIZORAL) 2 % cream, Apply 1 application topically to the appropriate area as directed Daily., Disp: 60 g, Rfl: 3    Lancets (freestyle) lancets, Check blood sugar TID, Disp: 200 each, Rfl: 11    latanoprost (XALATAN) 0.005 % ophthalmic solution, INSTILL 1 DROP IN BOTH EYES DAILY AT BEDTIME, Disp: , Rfl:     levothyroxine (SYNTHROID, LEVOTHROID) 88 MCG tablet, TAKE 1 TABLET BY MOUTH EVERY MORNING, Disp: 90 tablet, Rfl: 3    losartan (COZAAR) 100 MG tablet, TAKE 1 TABLET BY MOUTH DAILY, Disp: 90 tablet, Rfl: 3    MAGNESIUM PO, Take 1 tablet by mouth Daily. 250mg  Indications: Disorder with Low Magnesium Levels, Disp: , Rfl:     metFORMIN (GLUCOPHAGE) 500 MG tablet, TAKE 1 TABLET BY MOUTH TWICE DAILY WITH MEALS, Disp: 180 tablet, Rfl: 3    montelukast (SINGULAIR) 10 MG tablet, TAKE 1 TABLET BY MOUTH EVERY NIGHT, Disp: 90 tablet, Rfl: 3    pantoprazole (PROTONIX) 40 MG EC tablet, Take 1 tablet by mouth 2  (Two) Times a Day. Indications: Heartburn, Disp: 90 tablet, Rfl: 3    penicillin v potassium (VEETID) 250 MG tablet, One BID, Disp: 60 tablet, Rfl: 5    pramipexole (MIRAPEX) 1.5 MG tablet, Take 1 tablet by mouth 2 (Two) Times a Day. Indications: Restless Leg Syndrome, Disp: , Rfl:     Probiotic Product (Risaquad-2) capsule capsule, Take 1 capsule by mouth Daily., Disp: , Rfl:     rosuvastatin (CRESTOR) 20 MG tablet, TAKE 1 TABLET BY MOUTH EVERY EVENING, Disp: 90 tablet, Rfl: 3    Stimulant Laxative 8.6-50 MG per tablet, TAKE 2 TABLETS BY MOUTH TWICE DAILY, Disp: 200 tablet, Rfl: 3    amLODIPine (NORVASC) 2.5 MG tablet, Take 1 tablet by mouth Daily., Disp: 90 tablet, Rfl: 3    phentermine 37.5 MG capsule, Take 1 capsule by mouth Every Morning., Disp: 30 capsule, Rfl: 3    Past Medical History:   Diagnosis Date    Actinic keratosis     Acute embolism and thrombosis of vein     Allergic     Allergic rhinitis     Arthritis     Cataract     Cervical radiculopathy     Chronic constipation     Colon polyp     Diabetes mellitus     Disequilibrium     DJD (degenerative joint disease), lumbar     Elevated cholesterol     Erysipelas     GERD (gastroesophageal reflux disease)     Glaucoma     Hip pain, chronic, right     History of kidney stones     X1    History of peripheral edema     LOWER LEGS BILAT, COMPRESSION KNEE HIGH     History of transfusion     Hyperlipidemia     Hypertension     Hypothyroid     Insomnia     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 ROSHAN SCHAFER    Pes planus     Presbycusis     Prostate cancer     Restless legs syndrome     Shoulder pain     RIGHT, LIMITED MOBILITY-AT TIMES    Sleep apnea     bipap    Tinea  corporis     Tinea cruris     Unsteady gait     RIGHT HIP    Weakness     RIGHT HIP       Past Surgical History:   Procedure Laterality Date    CATARACT EXTRACTION, BILATERAL Bilateral     CERVICAL DISCECTOMY ANTERIOR      COLONOSCOPY  03/08/2017    3/17 normal. Recheck 2022. 12/11. Repeat in 5 years    COLONOSCOPY N/A 04/19/2021    Procedure: COLONOSCOPY INTO CECUM WITH HOT & COLD  SNARE POLYPECTOMIES;  Surgeon: Jaden Chowdhury MD;  Location: [facility] ENDOSCOPY;  Service: Gastroenterology;  Laterality: N/A;  PRE: CHANGE IN BOWEL HABITS; FAMILY H/O COLON CANCER  POST: DIVERTICULOSIS, POLYPS    ENDOSCOPY N/A 01/30/2023    Procedure: ESOPHAGOGASTRODUODENOSCOPY with biopsies;  Surgeon: Jaden Chowdhury MD;  Location: [facility] ENDOSCOPY;  Service: Gastroenterology;  Laterality: N/A;  pre- abdominal pain, anemia  post- gastritis    ENDOSCOPY W/ PEG REMOVAL      EYE SURGERY  2013    Cataract    FOOT SURGERY      1998 had big toe replaced on left foot-METAL     HERNIA REPAIR  03/07/2012    umbilical    JOINT REPLACEMENT Left     big toe    MEDIAL BRANCH BLOCK Bilateral 04/07/2023    Procedure: LUMBAR MEDIAL BRANCH BLOCK bilateral L4-S1 [id number];  Surgeon: Lauren Houston MD;  Location: [facility] [place] MAIN OR;  Service: Pain Management;  Laterality: Bilateral;    MEDIAL BRANCH BLOCK Bilateral 04/17/2023    Procedure: LUMBAR MEDIAL BRANCH BLOCK bilateral L4-S1 [id number];  Surgeon: Lauren Houston MD;  Location: [facility] MAIN [facility];  Service: Pain Management;  Laterality: Bilateral;    WI ARTHRP KNE CONDYLE&PLATU MEDIAL&LAT COMPARTMENTS Left 09/13/2016    Procedure: LT TOTAL KNEE ARTHROPLASTY;  Surgeon: Tadeo Basurto MD;  Location: [facility] MAIN OR;  Service: Orthopedics    PROSTATECTOMY  07/1999 July 1999. Radical     RADIOFREQUENCY ABLATION Right 05/17/2023    Procedure: RADIOFREQUENCY ABLATION LUMBAR right L3-S1;  Surgeon: Lauren Houston MD;  Location: [facility] [place] MAIN OR;  Service: Pain Management;  Laterality: Right;     THYROID LOBECTOMY      TONSILLECTOMY      TOTAL HIP ARTHROPLASTY Right 2021    Procedure: TOTAL HIP ARTHROPLASTY RIGHT POSTERIOR;  Surgeon: Tadeo Basurto MD;  Location: [facility];  Service: Orthopedics;  Laterality: Right;    TOTAL KNEE ARTHROPLASTY Right     TOTAL SHOULDER ARTHROPLASTY W/ DISTAL CLAVICLE EXCISION Right 2019    Procedure: TOTAL SHOULDER REVERSE ARTHROPLASTY RIGHT REPAIR RIGHT AXILLARY VEIN;  Surgeon: Behzad Kelley MD;  Location: [facility] [place], operated by [facility];  Service: Orthopedics    WRIST SURGERY Right 12/17/2014    x2  wrist replaced       Family History   Problem Relation Age of Onset    Arthritis Mother     Colon cancer Mother     Arthritis Father     Cancer Father         Prostate cancer    Heart disease Sister     Arthritis Sister     Breast cancer Sister     Gout Sister     Hypertension Sister     Cancer Sister         All three breast cancer    Hypertension Brother     Heart disease Brother     Arthritis Brother     Heart attack Brother     Bone cancer Brother     Heart disease Brother     Malig Hyperthermia Neg Hx        Social History     Tobacco Use    Smoking status: Former     Packs/day: 1.00     Years: 20.00     Additional pack years: 0.00     Total pack years: 20.00     Types: Cigarettes     Start date: 1960     Quit date: 1984     Years since quittin.1    Smokeless tobacco: Former     Types: Chew   Substance Use Topics    Alcohol use: Yes     Alcohol/week: 2.0 standard drinks of alcohol     Types: 2 Cans of beer per week     Comment: 3-4 MONTHLY            Objective     Vitals:    24 1049   BP: 130/68   Pulse: 68   Resp: 17   SpO2: 96%     Body mass index is 38.8 kg/m².    Physical Exam  Vitals reviewed.   Constitutional:       Appearance: He is well-developed. He is not diaphoretic.   HENT:      Head: Normocephalic and atraumatic.   Eyes:      General: No scleral icterus.     Pupils: Pupils are equal, round, and reactive to light.   Neck:      Thyroid:  No thyromegaly.   Cardiovascular:      Rate and Rhythm: Normal rate and regular rhythm.      Heart sounds: No murmur heard.     No friction rub. No gallop.   Pulmonary:      Effort: Pulmonary effort is normal. No respiratory distress.      Breath sounds: No wheezing or rales.   Chest:      Chest wall: No tenderness.   Abdominal:      General: Bowel sounds are normal. There is no distension.      Palpations: Abdomen is soft.      Tenderness: There is no abdominal tenderness.   Musculoskeletal:         General: No deformity. Normal range of motion.   Lymphadenopathy:      Cervical: No cervical adenopathy.   Skin:     General: Skin is warm and dry.      Findings: No rash.   Neurological:      Cranial Nerves: No cranial nerve deficit.      Motor: No abnormal muscle tone.         Lab Results   Component Value Date    GLUCOSE 212 (H) 12/12/2023    BUN 21 12/12/2023    CREATININE 1.31 (H) 12/12/2023    EGFRIFNONA 76 12/06/2021    EGFRIFAFRI 88 12/06/2021    BCR 16.0 12/12/2023    K 3.5 12/12/2023    CO2 22.0 12/12/2023    CALCIUM 9.0 12/12/2023    PROTENTOTREF 7.1 11/08/2023    ALBUMIN 3.6 12/09/2023    LABIL2 2.0 11/08/2023    AST 22 12/09/2023    ALT 27 12/09/2023       WBC   Date Value Ref Range Status   12/12/2023 5.62 3.40 - 10.80 10*3/mm3 Final   11/08/2023 6.41 3.40 - 10.80 10*3/mm3 Final     RBC   Date Value Ref Range Status   12/12/2023 4.27 4.14 - 5.80 10*6/mm3 Final   11/08/2023 4.52 4.14 - 5.80 10*6/mm3 Final     Hemoglobin   Date Value Ref Range Status   12/12/2023 11.7 (L) 13.0 - 17.7 g/dL Final     Hematocrit   Date Value Ref Range Status   12/12/2023 37.0 (L) 37.5 - 51.0 % Final     MCV   Date Value Ref Range Status   12/12/2023 86.7 79.0 - 97.0 fL Final     MCH   Date Value Ref Range Status   12/12/2023 27.4 26.6 - 33.0 pg Final     MCHC   Date Value Ref Range Status   12/12/2023 31.6 31.5 - 35.7 g/dL Final     RDW   Date Value Ref Range Status   12/12/2023 12.8 12.3 - 15.4 % Final     RDW-SD   Date Value  "Ref Range Status   12/12/2023 39.6 37.0 - 54.0 fl Final     MPV   Date Value Ref Range Status   12/12/2023 10.4 6.0 - 12.0 fL Final     Platelets   Date Value Ref Range Status   12/12/2023 196 140 - 450 10*3/mm3 Final     Neutrophil %   Date Value Ref Range Status   12/09/2023 75.4 42.7 - 76.0 % Final     Lymphocyte %   Date Value Ref Range Status   12/09/2023 13.9 (L) 19.6 - 45.3 % Final     Monocyte %   Date Value Ref Range Status   12/09/2023 9.3 5.0 - 12.0 % Final     Eosinophil %   Date Value Ref Range Status   12/09/2023 0.1 (L) 0.3 - 6.2 % Final     Basophil %   Date Value Ref Range Status   12/09/2023 0.3 0.0 - 1.5 % Final     Immature Grans %   Date Value Ref Range Status   12/08/2023 0.9 (H) 0.0 - 0.5 % Final     Neutrophils, Absolute   Date Value Ref Range Status   12/09/2023 5.24 1.70 - 7.00 10*3/mm3 Final     Lymphocytes, Absolute   Date Value Ref Range Status   12/09/2023 0.97 0.70 - 3.10 10*3/mm3 Final     Monocytes, Absolute   Date Value Ref Range Status   12/09/2023 0.65 0.10 - 0.90 10*3/mm3 Final     Eosinophils, Absolute   Date Value Ref Range Status   12/09/2023 0.01 0.00 - 0.40 10*3/mm3 Final     Basophils, Absolute   Date Value Ref Range Status   12/09/2023 0.02 0.00 - 0.20 10*3/mm3 Final     Immature Grans, Absolute   Date Value Ref Range Status   12/08/2023 0.07 (H) 0.00 - 0.05 10*3/mm3 Final     nRBC   Date Value Ref Range Status   11/08/2023 0.2 0.0 - 0.2 /100 WBC Final   02/20/2023 0.0 0.0 - 0.2 /100 WBC Final       Lab Results   Component Value Date    HGBA1C 6.10 (H) 11/08/2023       Lab Results   Component Value Date    WSKPMSJY80 >2000 (H) 11/08/2023       TSH   Date Value Ref Range Status   11/08/2023 1.790 0.270 - 4.200 uIU/mL Final   10/30/2022 3.070 0.270 - 4.200 uIU/mL Final       No results found for: \"CHOL\"  Lab Results   Component Value Date    TRIG 369 (H) 08/07/2023     Lab Results   Component Value Date    HDL 34 (L) 08/07/2023     Lab Results   Component Value Date    LDL " "61 08/07/2023     Lab Results   Component Value Date    VLDL 57 (H) 08/07/2023     No results found for: \"LDLHDL\"      Procedures    Assessment & Plan   Problems Addressed this Visit       Hypertension    Relevant Medications    atenolol (Tenormin) 25 MG tablet    amLODIPine (NORVASC) 2.5 MG tablet    furosemide (LASIX) 40 MG tablet    Other Relevant Orders    Comprehensive Metabolic Panel    Gastroesophageal reflux disease without esophagitis    Relevant Medications    pantoprazole (PROTONIX) 40 MG EC tablet    Obesity    Relevant Medications    phentermine 37.5 MG capsule    Type 2 diabetes mellitus with hyperglycemia - Primary    Relevant Medications    insulin glargine (LANTUS, SEMGLEE) 100 UNIT/ML injection    Other Relevant Orders    Comprehensive Metabolic Panel    Lipid Panel With / Chol / HDL Ratio    Hemoglobin A1c     Diagnoses         Codes Comments    Type 2 diabetes mellitus with hyperglycemia, with long-term current use of insulin    -  Primary ICD-10-CM: E11.65, Z79.4  ICD-9-CM: 250.00, 790.29, V58.67     Primary hypertension     ICD-10-CM: I10  ICD-9-CM: 401.9     Gastroesophageal reflux disease without esophagitis     ICD-10-CM: K21.9  ICD-9-CM: 530.81     Class 2 severe obesity due to excess calories with serious comorbidity and body mass index (BMI) of 38.0 to 38.9 in adult     ICD-10-CM: E66.01, Z68.38  ICD-9-CM: 278.01, V85.38           DM2  Uncontrolled. Attempt wt loss with phentermine.    HTN.  Contorlled.   Continue losartan and refill atenolol.    Obesity.  Uncontrolled.  Start phentermine.    Orders Placed This Encounter   Procedures    Comprehensive Metabolic Panel     Order Specific Question:   Release to patient     Answer:   Routine Release [[id number]]    Lipid Panel With / Chol / HDL Ratio     Order Specific Question:   Release to patient     Answer:   Routine Release [[id number]]    Hemoglobin A1c     Order Specific Question:   Release to patient     Answer:   Routine Release " [[id number]]       Current Outpatient Medications   Medication Sig Dispense Refill    acetaminophen (TYLENOL) 650 MG 8 hr tablet Take 1 tablet by mouth Every 8 (Eight) Hours As Needed for Mild Pain. Indications: Pain      albuterol (PROVENTIL) (2.5 MG/3ML) 0.083% nebulizer solution Take 2.5 mg by nebulization Every 4 (Four) Hours As Needed for Wheezing. Indications: Spasm of Lung Air Passages      albuterol sulfate  (90 Base) MCG/ACT inhaler Inhale 2 puffs Every 4 (Four) Hours As Needed for Wheezing. Indications: Spasm of Lung Air Passages      Ascorbic Acid (VITAMIN C PO) Take 1 tablet by mouth Daily.      aspirin 81 MG EC tablet Take 1 tablet by mouth Daily. Indications: Coronary Bypass Surgery      atenolol (Tenormin) 25 MG tablet Take 0.5 tablets by mouth Daily. 45 tablet 4    B-D UF III MINI PEN NEEDLES 31G X 5 MM misc 1 APPLICATION DAILY 100 each 3    bisacodyl (Dulcolax) 5 MG EC tablet Take 1 tablet by mouth 2 (Two) Times a Day. Indications: Constipation      brimonidine (ALPHAGAN) 0.2 % ophthalmic solution Administer 1 drop to both eyes 2 (Two) Times a Day. Indications: Wide-Angle Glaucoma      celecoxib (CeleBREX) 200 MG capsule TAKE 1 CAPSULE BY MOUTH DAILY 30 capsule 5    Cyanocobalamin (B-12) 1000 MCG sublingual tablet One sublingual tab dissolved on tongue daily 90 each 3    dorzolamide-timolol (COSOPT) 22.3-6.8 MG/ML ophthalmic solution Administer 1 drop to both eyes 2 (Two) Times a Day. Indications: Wide-Angle Glaucoma      fexofenadine (ALLEGRA) 180 MG tablet Take 1 tablet by mouth Daily. Indications: Hayfever      fluticasone (FLONASE) 50 MCG/ACT nasal spray 2 sprays into the nostril(s) as directed by provider Daily.      fluticasone-salmeterol (ADVAIR HFA) 230-21 MCG/ACT inhaler Inhale 2 puffs 2 (Two) Times a Day.      furosemide (LASIX) 40 MG tablet TAKE 1 TABLET BY MOUTH EVERY DAY  Indications: Edema 90 tablet 3    gabapentin (NEURONTIN) 300 MG capsule Take 1 capsule by mouth 4 (Four)  Times a Day. (Patient taking differently: Take 1 capsule by mouth 3 (Three) Times a Day.) 120 capsule 5    glucose blood (FREESTYLE LITE) test strip Check blood sugar  each 11    Hydrocortisone, Perianal, (ANUSOL-HC) 2.5 % rectal cream APPLY RECTALLY THREE TIMES DAILY FOR 7-10 DAYS DURNG HEMORRHOID FLARE      insulin glargine (LANTUS, SEMGLEE) 100 UNIT/ML injection Inject 8 Units under the skin into the appropriate area as directed Daily. Indications: Type 2 Diabetes      ketoconazole (NIZORAL) 2 % cream Apply 1 application topically to the appropriate area as directed Daily. 60 g 3    Lancets (freestyle) lancets Check blood sugar  each 11    latanoprost (XALATAN) 0.005 % ophthalmic solution INSTILL 1 DROP IN BOTH EYES DAILY AT BEDTIME      levothyroxine (SYNTHROID, LEVOTHROID) 88 MCG tablet TAKE 1 TABLET BY MOUTH EVERY MORNING 90 tablet 3    losartan (COZAAR) 100 MG tablet TAKE 1 TABLET BY MOUTH DAILY 90 tablet 3    MAGNESIUM PO Take 1 tablet by mouth Daily. 250mg  Indications: Disorder with Low Magnesium Levels      metFORMIN (GLUCOPHAGE) 500 MG tablet TAKE 1 TABLET BY MOUTH TWICE DAILY WITH MEALS 180 tablet 3    montelukast (SINGULAIR) 10 MG tablet TAKE 1 TABLET BY MOUTH EVERY NIGHT 90 tablet 3    pantoprazole (PROTONIX) 40 MG EC tablet Take 1 tablet by mouth 2 (Two) Times a Day. Indications: Heartburn 90 tablet 3    penicillin v potassium (VEETID) 250 MG tablet One BID 60 tablet 5    pramipexole (MIRAPEX) 1.5 MG tablet Take 1 tablet by mouth 2 (Two) Times a Day. Indications: Restless Leg Syndrome      Probiotic Product (Risaquad-2) capsule capsule Take 1 capsule by mouth Daily.      rosuvastatin (CRESTOR) 20 MG tablet TAKE 1 TABLET BY MOUTH EVERY EVENING 90 tablet 3    Stimulant Laxative 8.6-50 MG per tablet TAKE 2 TABLETS BY MOUTH TWICE DAILY 200 tablet 3    amLODIPine (NORVASC) 2.5 MG tablet Take 1 tablet by mouth Daily. 90 tablet 3    phentermine 37.5 MG capsule Take 1 capsule by mouth Every  Morning. 30 capsule 3     No current facility-administered medications for this visit.       Nathan Baez had no medications administered during this visit.    Return in about 3 months (around 5/7/2024).    There are no Patient Instructions on file for this visit.

## 2024-02-08 LAB
ALBUMIN SERPL-MCNC: 4.2 G/DL (ref 3.5–5.2)
ALBUMIN/GLOB SERPL: 1.8 G/DL
ALP SERPL-CCNC: 137 U/L (ref 39–117)
ALT SERPL-CCNC: 21 U/L (ref 1–41)
AST SERPL-CCNC: 20 U/L (ref 1–40)
BILIRUB SERPL-MCNC: 0.9 MG/DL (ref 0–1.2)
BUN SERPL-MCNC: 19 MG/DL (ref 8–23)
BUN/CREAT SERPL: 18.4 (ref 7–25)
CALCIUM SERPL-MCNC: 9.5 MG/DL (ref 8.6–10.5)
CHLORIDE SERPL-SCNC: 103 MMOL/L (ref 98–107)
CHOLEST SERPL-MCNC: 133 MG/DL (ref 0–200)
CHOLEST/HDLC SERPL: 4.29 {RATIO}
CO2 SERPL-SCNC: 26.3 MMOL/L (ref 22–29)
CREAT SERPL-MCNC: 1.03 MG/DL (ref 0.76–1.27)
EGFRCR SERPLBLD CKD-EPI 2021: 74.8 ML/MIN/1.73
GLOBULIN SER CALC-MCNC: 2.3 GM/DL
GLUCOSE SERPL-MCNC: 152 MG/DL (ref 65–99)
HBA1C MFR BLD: 7 % (ref 4.8–5.6)
HDLC SERPL-MCNC: 31 MG/DL (ref 40–60)
LDLC SERPL CALC-MCNC: 43 MG/DL (ref 0–100)
POTASSIUM SERPL-SCNC: 4.4 MMOL/L (ref 3.5–5.2)
PROT SERPL-MCNC: 6.5 G/DL (ref 6–8.5)
SODIUM SERPL-SCNC: 142 MMOL/L (ref 136–145)
TRIGL SERPL-MCNC: 397 MG/DL (ref 0–150)
VLDLC SERPL CALC-MCNC: 59 MG/DL (ref 5–40)

## 2024-03-06 ENCOUNTER — OFFICE VISIT (OUTPATIENT)
Dept: PAIN MEDICINE | Facility: CLINIC | Age: 79
End: 2024-03-06
Payer: MEDICARE

## 2024-03-06 VITALS
SYSTOLIC BLOOD PRESSURE: 131 MMHG | RESPIRATION RATE: 18 BRPM | TEMPERATURE: 96.9 F | HEIGHT: 67 IN | HEART RATE: 70 BPM | BODY MASS INDEX: 38.99 KG/M2 | WEIGHT: 248.4 LBS | DIASTOLIC BLOOD PRESSURE: 70 MMHG | OXYGEN SATURATION: 91 %

## 2024-03-06 DIAGNOSIS — M51.36 DDD (DEGENERATIVE DISC DISEASE), LUMBAR: ICD-10-CM

## 2024-03-06 DIAGNOSIS — E11.42 DIABETIC PERIPHERAL NEUROPATHY: Primary | ICD-10-CM

## 2024-03-06 PROCEDURE — 3078F DIAST BP <80 MM HG: CPT | Performed by: NURSE PRACTITIONER

## 2024-03-06 PROCEDURE — 3075F SYST BP GE 130 - 139MM HG: CPT | Performed by: NURSE PRACTITIONER

## 2024-03-06 PROCEDURE — 1125F AMNT PAIN NOTED PAIN PRSNT: CPT | Performed by: NURSE PRACTITIONER

## 2024-03-06 PROCEDURE — 1160F RVW MEDS BY RX/DR IN RCRD: CPT | Performed by: NURSE PRACTITIONER

## 2024-03-06 PROCEDURE — 99214 OFFICE O/P EST MOD 30 MIN: CPT | Performed by: NURSE PRACTITIONER

## 2024-03-06 PROCEDURE — 1159F MED LIST DOCD IN RCRD: CPT | Performed by: NURSE PRACTITIONER

## 2024-03-06 RX ORDER — HYDROCODONE BITARTRATE AND ACETAMINOPHEN 7.5; 325 MG/1; MG/1
1 TABLET ORAL EVERY 8 HOURS PRN
Qty: 90 TABLET | Refills: 0 | Status: SHIPPED | OUTPATIENT
Start: 2024-03-06
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
## 2024-03-07 LAB
POC AMPHETAMINES: POSITIVE
POC BARBITURATES: NEGATIVE
POC BENZODIAZEPHINES: NEGATIVE
POC COCAINE: NEGATIVE
POC METHADONE: NEGATIVE
POC METHAMPHETAMINE SCREEN URINE: NEGATIVE
POC OPIATES: POSITIVE
POC OXYCODONE: POSITIVE
POC PHENCYCLIDINE: NEGATIVE
POC PROPOXYPHENE: NEGATIVE
POC THC: NEGATIVE
POC TRICYCLIC ANTIDEPRESSANTS: NEGATIVE

## 2024-04-04 ENCOUNTER — OFFICE VISIT (OUTPATIENT)
Dept: PAIN MEDICINE | Facility: CLINIC | Age: 79
End: 2024-04-04
Payer: MEDICARE

## 2024-04-04 VITALS
TEMPERATURE: 97.2 F | OXYGEN SATURATION: 94 % | BODY MASS INDEX: 38.64 KG/M2 | WEIGHT: 246.2 LBS | SYSTOLIC BLOOD PRESSURE: 158 MMHG | DIASTOLIC BLOOD PRESSURE: 79 MMHG | HEIGHT: 67 IN | HEART RATE: 105 BPM

## 2024-04-04 DIAGNOSIS — M51.36 DDD (DEGENERATIVE DISC DISEASE), LUMBAR: ICD-10-CM

## 2024-04-04 DIAGNOSIS — E11.42 DIABETIC PERIPHERAL NEUROPATHY: Primary | ICD-10-CM

## 2024-04-04 PROCEDURE — 1160F RVW MEDS BY RX/DR IN RCRD: CPT

## 2024-04-04 PROCEDURE — 3078F DIAST BP <80 MM HG: CPT

## 2024-04-04 PROCEDURE — 1125F AMNT PAIN NOTED PAIN PRSNT: CPT

## 2024-04-04 PROCEDURE — 99213 OFFICE O/P EST LOW 20 MIN: CPT

## 2024-04-04 PROCEDURE — 1159F MED LIST DOCD IN RCRD: CPT

## 2024-04-04 PROCEDURE — 3077F SYST BP >= 140 MM HG: CPT

## 2024-04-04 RX ORDER — HYDROCODONE BITARTRATE AND ACETAMINOPHEN 7.5; 325 MG/1; MG/1
1 TABLET ORAL EVERY 8 HOURS PRN
Qty: 90 TABLET | Refills: 0 | Status: SHIPPED | OUTPATIENT
Start: 2024-04-04

## 2024-04-04 NOTE — PROGRESS NOTES
CHIEF COMPLAINT  Back pain    Subjective   Nathan Baez is a 78 y.o. male  who presents for follow-up.  He has a history of chronic low back pain. He reports that his pain has remained consistent since his last office visit.     Today pain is 6/10VAS in severity. Pain is located in his low back. Describes this pain as a nearly continuous ache. Pain is worsened by walking long distances, bending/twisting, and prolonged sitting or standing. Pain improves with rest/reposition, heat/ice, and medications. He has completed PT and continues with HEP as tolerated.      He was started on Hydrocodone 7.5mg 3/day. His wife is present and helps recall history. She states she has noticed an improvement in mood and improvement in his gait. Patient reports that he has been able to be more active. He reports intermittent constipation but this has been an ongoing issue for many years. He takes OTC medications for this issue. He also takes Gabapentin 300mg QID which is helpful for his leg pain and Celebrex 200mg daily (prescribed by his PCP). Denies any side effects from the regimen including somnolence. Notes improvement in activity and function with regimen. ADL's by self. Denies any bowel or bladder changes.     Procedures:  5/17/23 - Bilateral L3-L5 RFA with Dr. Houston - minimal relief    Procedures at [facility]:  5/3/22 - L4-L5 LESI  8/17/22 - L4-L5 LESI    Back Pain  This is a chronic problem. The current episode started more than 1 year ago. The problem occurs daily. The problem has been comes and goes since onset. The pain is present in the lumbar spine. The quality of the pain is described as aching and burning. The pain does not radiate. The pain is at a severity of 6/10. The pain is moderate. The symptoms are aggravated by standing and sitting (walking). Associated symptoms include abdominal pain, numbness (fingers) and weakness. Pertinent negatives include no chest pain, dysuria, fever or headaches. He has tried heat, ice,  "home exercises, NSAIDs, analgesics and bed rest for the symptoms. The treatment provided moderate relief.      PEG Assessment   What number best describes your pain on average in the past week?6  What number best describes how, during the past week, pain has interfered with your enjoyment of life?5  What number best describes how, during the past week, pain has interfered with your general activity?  7    Review of Pertinent Medical Data ---          The following portions of the patient's history were reviewed and updated as appropriate: allergies, current medications, past family history, past medical history, past social history, past surgical history, and problem list.    Review of Systems   Constitutional:  Positive for fatigue. Negative for activity change, chills and fever.   HENT:  Negative for congestion.    Eyes:  Negative for visual disturbance.   Respiratory:  Negative for chest tightness and shortness of breath.    Cardiovascular:  Negative for chest pain.   Gastrointestinal:  Positive for abdominal pain and constipation. Negative for diarrhea.   Genitourinary:  Negative for difficulty urinating and dysuria.   Musculoskeletal:  Positive for back pain.   Neurological:  Positive for weakness and numbness (fingers). Negative for dizziness, light-headedness and headaches.   Psychiatric/Behavioral:  Negative for agitation and sleep disturbance. The patient is not nervous/anxious.      I have reviewed and confirmed the accuracy of the ROS as documented by the MA/LPN/RN ALY Robertson    Vitals:    04/04/24 1354   BP: 158/79   Pulse: 105   Temp: 97.2 °F (36.2 °C)   SpO2: 94%   Weight: 112 kg (246 lb 3.2 oz)   Height: 170.2 cm (67\")   PainSc:   6   PainLoc: Back     Objective   Physical Exam  Constitutional:       Appearance: Normal appearance.   HENT:      Head: Normocephalic.   Cardiovascular:      Rate and Rhythm: Normal rate and regular rhythm.   Pulmonary:      Effort: Pulmonary effort is normal. "      Breath sounds: Normal breath sounds.   Musculoskeletal:      Cervical back: Normal range of motion.      Lumbar back: Tenderness and bony tenderness present. Decreased range of motion.      Left hip: Tenderness present. Decreased range of motion.   Skin:     General: Skin is warm and dry.      Capillary Refill: Capillary refill takes less than 2 seconds.   Neurological:      General: No focal deficit present.      Mental Status: He is alert and oriented to person, place, and time.      Gait: Gait abnormal (slowed, ambulates with cane).   Psychiatric:         Mood and Affect: Mood normal.         Behavior: Behavior normal.         Thought Content: Thought content normal.         Cognition and Memory: Cognition normal.       Assessment & Plan   Diagnoses and all orders for this visit:    1. Diabetic peripheral neuropathy (Primary)    2. DDD (degenerative disc disease), lumbar  -     HYDROcodone-acetaminophen (NORCO) 7.5-325 MG per tablet; Take 1 tablet by mouth Every 8 (Eight) Hours As Needed for Moderate Pain. 30 day supply. DNF 4/5/24  Dispense: 90 tablet; Refill: 0      Nathan COOPER Sasha reports a pain score of 6.  Given his pain assessment as noted, treatment options were discussed and the following options were decided upon as a follow-up plan to address the patient's pain: continuation of current treatment plan for pain and prescription for opiod analgesics.    --- The urine drug screen confirmation from 3/6/24 has been reviewed and the result is appropriate based on patient history and QIANA report. Patient is no longer taking Oxycodone.   --- The patient signed an updated copy of the controlled substance agreement on 3/6/24  --- ORT -- low risk  --- Continue Hydrocodone. DNF 4/5/24. Patient appears stable with current regimen. No adverse effects. Regarding continuation of opioids, there is no evidence of aberrant behavior or any red flags.  The patient continues with appropriate response to opioid therapy.  ADL's remain intact by self.   --- Follow-up 2 months or sooner if needed     QIANA REPORT  As part of the patient's treatment plan, I am prescribing controlled substances. The patient has been made aware of appropriate use of such medications, including potential risk of somnolence, limited ability to drive and/or work safely, and the potential for dependence or overdose. It has also been made clear that these medications are for use by this patient only, without concomitant use of alcohol or other substances unless prescribed.     Patient has completed prescribing agreement detailing terms of continued prescribing of controlled substances, including monitoring QIANA reports, urine drug screening, and pill counts if necessary. The patient is aware that inappropriate use will results in cessation of prescribing such medications.    As the clinician, I personally reviewed the QIANA from 4/4/24 while the patient was in the office today.    History and physical exam exhibit continued safe and appropriate use of controlled substances.    Dictated utilizing Dragon dictation.

## 2024-05-03 DIAGNOSIS — M51.36 DDD (DEGENERATIVE DISC DISEASE), LUMBAR: ICD-10-CM

## 2024-05-03 RX ORDER — HYDROCODONE BITARTRATE AND ACETAMINOPHEN 7.5; 325 MG/1; MG/1
1 TABLET ORAL EVERY 8 HOURS PRN
Qty: 90 TABLET | Refills: 0 | Status: SHIPPED | OUTPATIENT
Start: 2024-05-03

## 2024-05-03 NOTE — TELEPHONE ENCOUNTER
Reviewed UDS and QIANA. Both updated and appropriate. Refill appropriate.      Covering for Rekha LU

## 2024-05-03 NOTE — TELEPHONE ENCOUNTER
Medication Refill Request    Date of phone call: 5/3/2024    Medication being requested: hydro/apap 7.5-325 mg sig: take 1 tab q 8 hrs prn  Qty: 90    Date of last visit: 4/4/2024    Date of last refill: 4/5/2024    QIANA up to date?: yes    Next Follow up?: 6/3/2024    Any new pertinent information? (i.e, new medication allergies, new use of medications, change in patient's health or condition, non-compliance or inconsistency with prescribing agreement?):

## 2024-05-11 DIAGNOSIS — R20.2 PARESTHESIA OF BILATERAL LEGS: ICD-10-CM

## 2024-05-13 RX ORDER — GABAPENTIN 300 MG/1
300 CAPSULE ORAL 4 TIMES DAILY
Qty: 360 CAPSULE | Refills: 0 | Status: SHIPPED | OUTPATIENT
Start: 2024-05-13

## 2024-06-03 ENCOUNTER — OFFICE VISIT (OUTPATIENT)
Dept: PAIN MEDICINE | Facility: CLINIC | Age: 79
End: 2024-06-03
Payer: MEDICARE

## 2024-06-03 VITALS
OXYGEN SATURATION: 95 % | TEMPERATURE: 96.9 F | SYSTOLIC BLOOD PRESSURE: 150 MMHG | HEART RATE: 78 BPM | HEIGHT: 67 IN | WEIGHT: 238 LBS | BODY MASS INDEX: 37.35 KG/M2 | DIASTOLIC BLOOD PRESSURE: 70 MMHG | RESPIRATION RATE: 20 BRPM

## 2024-06-03 DIAGNOSIS — R20.2 PARESTHESIA OF BILATERAL LEGS: Primary | ICD-10-CM

## 2024-06-03 DIAGNOSIS — M51.36 DDD (DEGENERATIVE DISC DISEASE), LUMBAR: ICD-10-CM

## 2024-06-03 DIAGNOSIS — E11.42 DIABETIC PERIPHERAL NEUROPATHY: ICD-10-CM

## 2024-06-03 PROCEDURE — 1159F MED LIST DOCD IN RCRD: CPT

## 2024-06-03 PROCEDURE — 3078F DIAST BP <80 MM HG: CPT

## 2024-06-03 PROCEDURE — 3077F SYST BP >= 140 MM HG: CPT

## 2024-06-03 PROCEDURE — 1160F RVW MEDS BY RX/DR IN RCRD: CPT

## 2024-06-03 PROCEDURE — 1125F AMNT PAIN NOTED PAIN PRSNT: CPT

## 2024-06-03 PROCEDURE — 99213 OFFICE O/P EST LOW 20 MIN: CPT

## 2024-06-03 RX ORDER — HYDROCODONE BITARTRATE AND ACETAMINOPHEN 7.5; 325 MG/1; MG/1
1 TABLET ORAL EVERY 8 HOURS PRN
Qty: 90 TABLET | Refills: 0 | Status: ON HOLD | OUTPATIENT
Start: 2024-06-03

## 2024-06-03 NOTE — PROGRESS NOTES
CHIEF COMPLAINT  Back pain    Subjective   Nathan Baez is a 79 y.o. male  who presents for follow-up.  He has a history of chronic low back pain. He reports that his pain has remained consistent since his last office visit and varies based on activity level. His wife is present and helps recall history.    Today pain is 6/10VAS in severity. Pain is located in his low back. Describes this pain as a nearly continuous ache. Pain is worsened by walking long distances, bending/twisting, and prolonged sitting or standing. Pain improves with rest/reposition, heat/ice, and medications. He has completed PT and continues with HEP as tolerated.       Continues with Hydrocodone 7.5mg 3/day and Gabapentin 300mg QID. He reports intermittent constipation but this has been an ongoing issue for many years. He manages this with Miralax. Denies any side effects from the regimen including somnolence. Notes improvement in activity and function with regimen. Wife states that she has noticed an improvement in his mood and gait.  ADL's by self. Denies any bowel or bladder changes.      Procedures:  5/17/23 - Bilateral L3-L5 RFA with Dr. Houston - minimal relief     Procedures at [facility]:  5/3/22 - L4-L5 LESI  8/17/22 - L4-L5 LESI    Back Pain  This is a chronic problem. The current episode started more than 1 year ago. The problem occurs daily. The problem is unchanged. The pain is present in the lumbar spine. The quality of the pain is described as aching and burning. The pain does not radiate. The pain is at a severity of 6/10. The pain is moderate. The symptoms are aggravated by standing and sitting (walking). Associated symptoms include numbness (fingers). Pertinent negatives include no abdominal pain, chest pain, dysuria, fever, headaches or weakness. He has tried heat, ice, home exercises, NSAIDs, analgesics and bed rest for the symptoms. The treatment provided moderate relief.      PEG Assessment   What number best describes your pain  "on average in the past week?4  What number best describes how, during the past week, pain has interfered with your enjoyment of life?5  What number best describes how, during the past week, pain has interfered with your general activity?  3    Review of Pertinent Medical Data ---        The following portions of the patient's history were reviewed and updated as appropriate: allergies, current medications, past family history, past medical history, past social history, past surgical history, and problem list.    Review of Systems   Constitutional:  Negative for activity change, fatigue and fever.   HENT:  Negative for congestion.    Respiratory:  Negative for cough and chest tightness.    Cardiovascular:  Negative for chest pain.   Gastrointestinal:  Negative for abdominal pain, constipation and diarrhea.   Genitourinary:  Negative for difficulty urinating and dysuria.   Musculoskeletal:  Positive for back pain.   Neurological:  Positive for numbness (fingers). Negative for dizziness, weakness, light-headedness and headaches.   Psychiatric/Behavioral:  Positive for sleep disturbance. Negative for agitation and suicidal ideas. The patient is not nervous/anxious.      I have reviewed and confirmed the accuracy of the ROS as documented by the MA/LPN/RN ALY Robertson    Vitals:    06/03/24 1350   BP: 150/70   BP Location: Left arm   Patient Position: Sitting   Cuff Size: Large Adult   Pulse: 78   Resp: 20   Temp: 96.9 °F (36.1 °C)   TempSrc: Temporal   SpO2: 95%   Weight: 108 kg (238 lb)   Height: 170.2 cm (67\")   PainSc:   6     Objective   Physical Exam  Constitutional:       Appearance: Normal appearance.   HENT:      Head: Normocephalic.   Cardiovascular:      Rate and Rhythm: Normal rate and regular rhythm.   Pulmonary:      Effort: Pulmonary effort is normal.      Breath sounds: Normal breath sounds.   Musculoskeletal:      Cervical back: Normal range of motion.      Lumbar back: Tenderness and bony " tenderness present. Decreased range of motion.      Left hip: Tenderness present. Decreased range of motion.   Skin:     General: Skin is warm and dry.      Capillary Refill: Capillary refill takes less than 2 seconds.   Neurological:      General: No focal deficit present.      Mental Status: He is alert and oriented to person, place, and time.      Gait: Gait abnormal (slowed, ambulates with cane).   Psychiatric:         Mood and Affect: Mood normal.         Behavior: Behavior normal.         Thought Content: Thought content normal.         Cognition and Memory: Cognition normal.       Assessment & Plan   Diagnoses and all orders for this visit:    1. Paresthesia of bilateral legs (Primary)    2. DDD (degenerative disc disease), lumbar  -     HYDROcodone-acetaminophen (NORCO) 7.5-325 MG per tablet; Take 1 tablet by mouth Every 8 (Eight) Hours As Needed for Moderate Pain. 30 day supply. DNF 6/5/24  Dispense: 90 tablet; Refill: 0    3. Diabetic peripheral neuropathy      Nathan Baez reports a pain score of 6.  Given his pain assessment as noted, treatment options were discussed and the following options were decided upon as a follow-up plan to address the patient's pain: continuation of current treatment plan for pain and prescription for opiod analgesics.    --- The urine drug screen confirmation from 3/6/24 has been reviewed and the result is appropriate based on patient history and QIANA report. Patient is no longer taking Oxycodone.   --- The patient signed an updated copy of the controlled substance agreement on 3/6/24  --- ORT -- low risk  --- Continue Gabapentin. No refill needed at this time.   --- Continue Hydrocodone. DNF 6/5/24. Patient appears stable with current regimen. No adverse effects. Regarding continuation of opioids, there is no evidence of aberrant behavior or any red flags.  The patient continues with appropriate response to opioid therapy. ADL's remain intact by self.   --- Follow-up 2  months or sooner if needed     QIANA REPORT  As part of the patient's treatment plan, I am prescribing controlled substances. The patient has been made aware of appropriate use of such medications, including potential risk of somnolence, limited ability to drive and/or work safely, and the potential for dependence or overdose. It has also been made clear that these medications are for use by this patient only, without concomitant use of alcohol or other substances unless prescribed.     Patient has completed prescribing agreement detailing terms of continued prescribing of controlled substances, including monitoring QIANA reports, urine drug screening, and pill counts if necessary. The patient is aware that inappropriate use will results in cessation of prescribing such medications.    As the clinician, I personally reviewed the QIANA from 6/3/24 while the patient was in the office today.    History and physical exam exhibit continued safe and appropriate use of controlled substances.    Dictated utilizing Dragon dictation.

## 2024-06-09 ENCOUNTER — APPOINTMENT (OUTPATIENT)
Dept: CT IMAGING | Facility: HOSPITAL | Age: 79
End: 2024-06-09
Payer: MEDICARE

## 2024-06-09 ENCOUNTER — HOSPITAL ENCOUNTER (INPATIENT)
Facility: HOSPITAL | Age: 79
LOS: 3 days | Discharge: HOME | End: 2024-06-12
Attending: EMERGENCY MEDICINE | Admitting: INTERNAL MEDICINE
Payer: MEDICARE

## 2024-06-09 DIAGNOSIS — K76.0 FATTY LIVER: ICD-10-CM

## 2024-06-09 DIAGNOSIS — M16.11 PRIMARY OSTEOARTHRITIS OF RIGHT HIP: ICD-10-CM

## 2024-06-09 DIAGNOSIS — E65 PANNUS, ABDOMINAL: ICD-10-CM

## 2024-06-09 DIAGNOSIS — L03.311 ABDOMINAL WALL CELLULITIS: Primary | ICD-10-CM

## 2024-06-09 DIAGNOSIS — L03.311 CELLULITIS OF ABDOMINAL WALL: ICD-10-CM

## 2024-06-09 DIAGNOSIS — I50.32 CHRONIC HEART FAILURE WITH PRESERVED EJECTION FRACTION (HFPEF): ICD-10-CM

## 2024-06-09 DIAGNOSIS — C61 PROSTATE CANCER: ICD-10-CM

## 2024-06-09 LAB
ALBUMIN SERPL-MCNC: 4.2 G/DL (ref 3.5–5.2)
ALBUMIN/GLOB SERPL: 1.5 G/DL
ALP SERPL-CCNC: 183 U/L (ref 39–117)
ALT SERPL W P-5'-P-CCNC: 26 U/L (ref 1–41)
ANION GAP SERPL CALCULATED.3IONS-SCNC: 13.2 MMOL/L (ref 5–15)
AST SERPL-CCNC: 25 U/L (ref 1–40)
BASOPHILS # BLD AUTO: 0.02 10*3/MM3 (ref 0–0.2)
BASOPHILS NFR BLD AUTO: 0.2 % (ref 0–1.5)
BILIRUB SERPL-MCNC: 1 MG/DL (ref 0–1.2)
BUN SERPL-MCNC: 17 MG/DL (ref 8–23)
BUN/CREAT SERPL: 16.8 (ref 7–25)
CALCIUM SPEC-SCNC: 9.5 MG/DL (ref 8.6–10.5)
CHLORIDE SERPL-SCNC: 98 MMOL/L (ref 98–107)
CO2 SERPL-SCNC: 23.8 MMOL/L (ref 22–29)
CREAT SERPL-MCNC: 1.01 MG/DL (ref 0.76–1.27)
D-LACTATE SERPL-SCNC: 2.3 MMOL/L (ref 0.5–2)
D-LACTATE SERPL-SCNC: 3 MMOL/L (ref 0.5–2)
DEPRECATED RDW RBC AUTO: 41.6 FL (ref 37–54)
EGFRCR SERPLBLD CKD-EPI 2021: 76.1 ML/MIN/1.73
EOSINOPHIL # BLD AUTO: 0.05 10*3/MM3 (ref 0–0.4)
EOSINOPHIL NFR BLD AUTO: 0.6 % (ref 0.3–6.2)
ERYTHROCYTE [DISTWIDTH] IN BLOOD BY AUTOMATED COUNT: 12.8 % (ref 12.3–15.4)
GLOBULIN UR ELPH-MCNC: 2.8 GM/DL
GLUCOSE BLDC GLUCOMTR-MCNC: 190 MG/DL (ref 70–130)
GLUCOSE BLDC GLUCOMTR-MCNC: 220 MG/DL (ref 70–130)
GLUCOSE SERPL-MCNC: 227 MG/DL (ref 65–99)
HCT VFR BLD AUTO: 42.3 % (ref 37.5–51)
HGB BLD-MCNC: 13.9 G/DL (ref 13–17.7)
IMM GRANULOCYTES # BLD AUTO: 0.11 10*3/MM3 (ref 0–0.05)
IMM GRANULOCYTES NFR BLD AUTO: 1.3 % (ref 0–0.5)
INR PPP: 1.02 (ref 0.9–1.1)
LYMPHOCYTES # BLD AUTO: 1.18 10*3/MM3 (ref 0.7–3.1)
LYMPHOCYTES NFR BLD AUTO: 13.6 % (ref 19.6–45.3)
MAGNESIUM SERPL-MCNC: 1.7 MG/DL (ref 1.6–2.4)
MCH RBC QN AUTO: 29.3 PG (ref 26.6–33)
MCHC RBC AUTO-ENTMCNC: 32.9 G/DL (ref 31.5–35.7)
MCV RBC AUTO: 89.2 FL (ref 79–97)
MONOCYTES # BLD AUTO: 0.74 10*3/MM3 (ref 0.1–0.9)
MONOCYTES NFR BLD AUTO: 8.5 % (ref 5–12)
NEUTROPHILS NFR BLD AUTO: 6.59 10*3/MM3 (ref 1.7–7)
NEUTROPHILS NFR BLD AUTO: 75.8 % (ref 42.7–76)
PLATELET # BLD AUTO: 139 10*3/MM3 (ref 140–450)
PMV BLD AUTO: 10.9 FL (ref 6–12)
POTASSIUM SERPL-SCNC: 4 MMOL/L (ref 3.5–5.2)
PROCALCITONIN SERPL-MCNC: 0.27 NG/ML (ref 0–0.25)
PROT SERPL-MCNC: 7 G/DL (ref 6–8.5)
PROTHROMBIN TIME: 13.6 SECONDS (ref 11.7–14.2)
RBC # BLD AUTO: 4.74 10*6/MM3 (ref 4.14–5.8)
SODIUM SERPL-SCNC: 135 MMOL/L (ref 136–145)
WBC NRBC COR # BLD AUTO: 8.69 10*3/MM3 (ref 3.4–10.8)

## 2024-06-09 PROCEDURE — 82948 REAGENT STRIP/BLOOD GLUCOSE: CPT

## 2024-06-09 PROCEDURE — 80053 COMPREHEN METABOLIC PANEL: CPT | Performed by: EMERGENCY MEDICINE

## 2024-06-09 PROCEDURE — 25510000001 IOPAMIDOL 61 % SOLUTION: Performed by: EMERGENCY MEDICINE

## 2024-06-09 PROCEDURE — 25810000003 SODIUM CHLORIDE 0.9 % SOLUTION: Performed by: EMERGENCY MEDICINE

## 2024-06-09 PROCEDURE — 25010000002 VANCOMYCIN 10 G RECONSTITUTED SOLUTION: Performed by: EMERGENCY MEDICINE

## 2024-06-09 PROCEDURE — 83735 ASSAY OF MAGNESIUM: CPT | Performed by: EMERGENCY MEDICINE

## 2024-06-09 PROCEDURE — 74177 CT ABD & PELVIS W/CONTRAST: CPT

## 2024-06-09 PROCEDURE — 25010000002 PIPERACILLIN SOD-TAZOBACTAM PER 1 G: Performed by: EMERGENCY MEDICINE

## 2024-06-09 PROCEDURE — 87040 BLOOD CULTURE FOR BACTERIA: CPT | Performed by: EMERGENCY MEDICINE

## 2024-06-09 PROCEDURE — 85025 COMPLETE CBC W/AUTO DIFF WBC: CPT | Performed by: EMERGENCY MEDICINE

## 2024-06-09 PROCEDURE — 99285 EMERGENCY DEPT VISIT HI MDM: CPT

## 2024-06-09 PROCEDURE — 36415 COLL VENOUS BLD VENIPUNCTURE: CPT

## 2024-06-09 PROCEDURE — 83605 ASSAY OF LACTIC ACID: CPT | Performed by: EMERGENCY MEDICINE

## 2024-06-09 PROCEDURE — 84145 PROCALCITONIN (PCT): CPT | Performed by: EMERGENCY MEDICINE

## 2024-06-09 PROCEDURE — 85610 PROTHROMBIN TIME: CPT | Performed by: EMERGENCY MEDICINE

## 2024-06-09 RX ORDER — DEXTROSE MONOHYDRATE 25 G/50ML
25 INJECTION, SOLUTION INTRAVENOUS
Status: DISCONTINUED | OUTPATIENT
Start: 2024-06-09 | End: 2024-06-10

## 2024-06-09 RX ORDER — ONDANSETRON 2 MG/ML
4 INJECTION INTRAMUSCULAR; INTRAVENOUS EVERY 6 HOURS PRN
Status: DISCONTINUED | OUTPATIENT
Start: 2024-06-09 | End: 2024-06-12 | Stop reason: HOSPADM

## 2024-06-09 RX ORDER — BISACODYL 10 MG
10 SUPPOSITORY, RECTAL RECTAL DAILY PRN
Status: DISCONTINUED | OUTPATIENT
Start: 2024-06-09 | End: 2024-06-12 | Stop reason: HOSPADM

## 2024-06-09 RX ORDER — SODIUM CHLORIDE 0.9 % (FLUSH) 0.9 %
10 SYRINGE (ML) INJECTION AS NEEDED
Status: DISCONTINUED | OUTPATIENT
Start: 2024-06-09 | End: 2024-06-12 | Stop reason: HOSPADM

## 2024-06-09 RX ORDER — INSULIN LISPRO 100 [IU]/ML
2-9 INJECTION, SOLUTION INTRAVENOUS; SUBCUTANEOUS
Status: DISCONTINUED | OUTPATIENT
Start: 2024-06-09 | End: 2024-06-10

## 2024-06-09 RX ORDER — AMOXICILLIN 250 MG
2 CAPSULE ORAL 2 TIMES DAILY
Status: DISCONTINUED | OUTPATIENT
Start: 2024-06-09 | End: 2024-06-12 | Stop reason: HOSPADM

## 2024-06-09 RX ORDER — NICOTINE POLACRILEX 4 MG
15 LOZENGE BUCCAL
Status: DISCONTINUED | OUTPATIENT
Start: 2024-06-09 | End: 2024-06-10

## 2024-06-09 RX ORDER — ACETAMINOPHEN 160 MG/5ML
650 SOLUTION ORAL EVERY 4 HOURS PRN
Status: DISCONTINUED | OUTPATIENT
Start: 2024-06-09 | End: 2024-06-12 | Stop reason: HOSPADM

## 2024-06-09 RX ORDER — ACETAMINOPHEN 325 MG/1
650 TABLET ORAL EVERY 4 HOURS PRN
Status: DISCONTINUED | OUTPATIENT
Start: 2024-06-09 | End: 2024-06-12 | Stop reason: HOSPADM

## 2024-06-09 RX ORDER — SODIUM CHLORIDE 9 MG/ML
100 INJECTION, SOLUTION INTRAVENOUS CONTINUOUS
Status: DISCONTINUED | OUTPATIENT
Start: 2024-06-10 | End: 2024-06-10

## 2024-06-09 RX ORDER — IBUPROFEN 600 MG/1
1 TABLET ORAL
Status: DISCONTINUED | OUTPATIENT
Start: 2024-06-09 | End: 2024-06-10

## 2024-06-09 RX ORDER — ACETAMINOPHEN 650 MG/1
650 SUPPOSITORY RECTAL EVERY 4 HOURS PRN
Status: DISCONTINUED | OUTPATIENT
Start: 2024-06-09 | End: 2024-06-12 | Stop reason: HOSPADM

## 2024-06-09 RX ORDER — NITROGLYCERIN 0.4 MG/1
0.4 TABLET SUBLINGUAL
Status: DISCONTINUED | OUTPATIENT
Start: 2024-06-09 | End: 2024-06-12 | Stop reason: HOSPADM

## 2024-06-09 RX ORDER — SODIUM CHLORIDE 9 MG/ML
40 INJECTION, SOLUTION INTRAVENOUS AS NEEDED
Status: DISCONTINUED | OUTPATIENT
Start: 2024-06-09 | End: 2024-06-12 | Stop reason: HOSPADM

## 2024-06-09 RX ORDER — POLYETHYLENE GLYCOL 3350 17 G/17G
17 POWDER, FOR SOLUTION ORAL DAILY PRN
Status: DISCONTINUED | OUTPATIENT
Start: 2024-06-09 | End: 2024-06-12 | Stop reason: HOSPADM

## 2024-06-09 RX ORDER — VANCOMYCIN/0.9 % SOD CHLORIDE 1.5G/250ML
1500 PLASTIC BAG, INJECTION (ML) INTRAVENOUS EVERY 24 HOURS
Status: DISCONTINUED | OUTPATIENT
Start: 2024-06-10 | End: 2024-06-11

## 2024-06-09 RX ORDER — BISACODYL 5 MG/1
5 TABLET, DELAYED RELEASE ORAL DAILY PRN
Status: DISCONTINUED | OUTPATIENT
Start: 2024-06-09 | End: 2024-06-12 | Stop reason: HOSPADM

## 2024-06-09 RX ORDER — SODIUM CHLORIDE 0.9 % (FLUSH) 0.9 %
10 SYRINGE (ML) INJECTION EVERY 12 HOURS SCHEDULED
Status: DISCONTINUED | OUTPATIENT
Start: 2024-06-09 | End: 2024-06-12 | Stop reason: HOSPADM

## 2024-06-09 RX ADMIN — VANCOMYCIN HYDROCHLORIDE 2250 MG: 10 INJECTION, POWDER, LYOPHILIZED, FOR SOLUTION INTRAVENOUS at 21:38

## 2024-06-09 RX ADMIN — IOPAMIDOL 85 ML: 612 INJECTION, SOLUTION INTRAVENOUS at 20:43

## 2024-06-09 RX ADMIN — PIPERACILLIN AND TAZOBACTAM 3.38 G: 3; .375 INJECTION, POWDER, FOR SOLUTION INTRAVENOUS at 20:13

## 2024-06-09 NOTE — ED PROVIDER NOTES
" EMERGENCY DEPARTMENT ENCOUNTER    Room Number:  E664/1  Date of encounter:  6/9/2024  PCP: Damien Pierce MD  Historian: Patient and spouse  Relevant information and history provided by sources other than the patient will be included below and in the ED Course.  Review of pertinent past medical records may also be included in record below and ED Course.    HPI:  Chief Complaint: \"I have cellulitis again\"  A complete HPI/ROS/PMH/PSH/SH/FH are unobtainable due to: Not applicable  Context: Nathan Baez is a 79 y.o. male who presents to the ED c/o this started this afternoon after lunch.  Starting get a little bit of redness in his lower abdomen.  The redness spreads quickly.  And has some tenderness with palpation.  This is his fifth episode or potentially the fourth that he has had like this in the past.  He denies any fevers or chills.  He has been compliant with all his medicines.  Denies any testicular pain.  Denies chest pain or shortness of breath.  Denies any trauma or injury to this region.  He has never needed surgery from his previous cellulitis in the past.        Previous Episodes: Yes multiple times in the past  Current Symptoms: See above    MEDICAL HISTORY REVIEWED  I reviewed the discharge summary from December 2023.  He does have chronic heart failure history of pneumonia and fatty liver in the past.  History of type 2 diabetes history of obstructive sleep apnea hyperlipidemia hypothyroidism.  Recurrent of cellulitis of the abdomen.  On this admission he presented with the same thing that he is presenting with here.  He had diffuse abdominal cellulitis of the pannus patient had a CT scan which showed no abscess.  The antibiotics that they treated him with with ceftriaxone and doxycycline at that time.  He responded well I did review his medicines on discharge.      PAST MEDICAL HISTORY  Active Ambulatory Problems     Diagnosis Date Noted    OA (osteoarthritis) of knee 09/13/2016    " Hypertension     Cellulitis of abdominal wall 06/02/2019    Hypothyroidism (acquired) 06/03/2019    Gastroesophageal reflux disease without esophagitis 07/11/2019    Mixed hyperlipidemia 07/11/2019    Primary insomnia 07/11/2019    DDD (degenerative disc disease), lumbar 07/11/2019    KWAME (obstructive sleep apnea) 07/11/2019    Allergic 07/11/2019    Venous insufficiency 07/11/2019    Prostate cancer 07/11/2019    Venous stasis dermatitis     Tinea cruris     Tinea corporis     Throat clearing     Sleep apnea     Skin lesion of face     Rib pain on left side     Rectal bleed     Presbycusis     Pes planus     Osteoarthritis     Obesity     Medicare annual wellness visit, subsequent     Lumbar strain     Internal hemorrhoids     Insomnia     Inflamed skin tag     Hyperplastic polyp of stomach     Hospital discharge follow-up     History of colon polyps     High risk medication use     Glaucoma     Gastroenteritis, acute     Probable streptococcal cellulitis     Encounter for screening colonoscopy     Encounter for long-term (current) use of NSAIDs     Dysphagia     DVT (deep venous thrombosis)     Lumbar facet arthropathy     Diverticulosis     Cough     Contact with hypodermic needle     Cervical radiculopathy     Arthritis     Allergic rhinitis     Acute glaucoma     Acute embolism and thrombosis of vein     Actinic keratosis     Status post reverse total shoulder replacement, 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    Localized edema 11/26/2019    Other specified anemias 11/26/2019    Peripheral polyneuropathy 11/26/2019    Campylobacter diarrhea 03/14/2020    Hyponatremia 03/15/2020    Generalized abdominal pain 03/20/2020    Fever 03/20/2020    Constipation 03/20/2020    Bilateral lower extremity edema 03/20/2020    Acute pyelonephritis 04/09/2020    Chronic idiopathic constipation 05/19/2020    Functional diarrhea 07/08/2020    Change in bowel habits 07/08/2020    RLS (restless legs syndrome) 09/17/2020    Type 2 diabetes mellitus  with hyperglycemia 12/18/2020    Family history of GI malignancy 04/01/2021    Primary osteoarthritis of right hip 08/19/2021    B12 deficiency 12/07/2021    Intervertebral disc stenosis of neural canal of lumbar region 04/12/2022    Encounter for hepatitis C screening test for low risk patient 04/14/2022    Pain associated with defecation 04/14/2022    Calculus of kidney 07/08/2022    Gastroesophageal reflux disease 07/08/2022    Body aches 09/15/2022    Skin lesion 10/26/2022    Candidiasis, intertrigo 10/26/2022    Hyperbilirubinemia 10/26/2022    Acute respiratory failure due to COVID-19 10/27/2022    Bacteremia due to group B Streptococcus 10/28/2022    Iron deficiency anemia 01/05/2023    Acute respiratory failure with hypoxia and hypercapnia 02/17/2023    Hypoventilation syndrome 02/27/2023    Pneumonia of right lower lobe due to infectious organism 12/09/2023    Chronic diastolic CHF (congestive heart failure) 12/10/2023    Fatty liver 12/11/2023    Chronic heart failure with preserved ejection fraction (HFpEF) 12/12/2023    Pneumonia, unspecified organism 12/12/2023    Pannus, abdominal 12/20/2023     Resolved Ambulatory Problems     Diagnosis Date Noted    Acute DVT (deep venous thrombosis) 06/03/2019    Hyperglycemia 07/11/2019    Restless leg syndrome 07/11/2019    Restless legs syndrome     Restless leg     Hypothyroid     Hyperlipidemia     Disequilibrium     Cellulitis     Sepsis due to other etiology 12/08/2023     Past Medical History:   Diagnosis Date    Cataract     Chronic constipation     Colon polyp     Diabetes mellitus     DJD (degenerative joint disease), lumbar     Elevated cholesterol     Erysipelas     GERD (gastroesophageal reflux disease)     Hip pain, chronic, right     History of kidney stones     History of peripheral edema     History of transfusion     Limited mobility     Low back pain     Male urinary stress incontinence     Pelvic floor dysfunction 06/2022    Shoulder pain      Unsteady gait     Weakness          PAST SURGICAL HISTORY  Past Surgical History:   Procedure Laterality Date    CATARACT EXTRACTION, BILATERAL Bilateral     CERVICAL DISCECTOMY ANTERIOR      COLONOSCOPY  03/08/2017    3/17 normal. Recheck 2022. 12/11. Repeat in 5 years    COLONOSCOPY N/A 04/19/2021    Procedure: COLONOSCOPY INTO CECUM WITH HOT & COLD  SNARE POLYPECTOMIES;  Surgeon: Jaden Chowdhury MD;  Location: [facility] ENDOSCOPY;  Service: Gastroenterology;  Laterality: N/A;  PRE: CHANGE IN BOWEL HABITS; FAMILY H/O COLON CANCER  POST: DIVERTICULOSIS, POLYPS    ENDOSCOPY N/A 01/30/2023    Procedure: ESOPHAGOGASTRODUODENOSCOPY with biopsies;  Surgeon: Jaden Chowdhury MD;  Location: [facility] ENDOSCOPY;  Service: Gastroenterology;  Laterality: N/A;  pre- abdominal pain, anemia  post- gastritis    ENDOSCOPY W/ PEG REMOVAL      EYE SURGERY  2013    Cataract    FOOT SURGERY      1998 had big toe replaced on left foot-METAL     HERNIA REPAIR  03/07/2012    umbilical    JOINT REPLACEMENT Left     big toe    MEDIAL BRANCH BLOCK Bilateral 04/07/2023    Procedure: LUMBAR MEDIAL BRANCH BLOCK bilateral L4-S1 [id number];  Surgeon: Lauren Houston MD;  [place] [facility] [place];  Service: Pain Management;  Laterality: Bilateral;    MEDIAL BRANCH BLOCK Bilateral 04/17/2023    Procedure: LUMBAR MEDIAL BRANCH BLOCK bilateral L4-S1 [id number];  Surgeon: Lauren Houston MD;  Location: [facility];  Service: Pain Management;  Laterality: Bilateral;    MA ARTHRP KNE CONDYLE&PLATU MEDIAL&LAT COMPARTMENTS Left 09/13/2016    Procedure: LT TOTAL KNEE ARTHROPLASTY;  Surgeon: Tadeo Basurto MD;  Location: [facility] MAIN OR;  Service: Orthopedics    PROSTATECTOMY  07/1999 July 1999. Radical     RADIOFREQUENCY ABLATION Right 05/17/2023    Procedure: RADIOFREQUENCY ABLATION LUMBAR right L3-S1;  Surgeon: Lauren Houston MD;  [place] [facility] [place];  Service: Pain Management;  Laterality: Right;    THYROID LOBECTOMY       TONSILLECTOMY      TOTAL HIP ARTHROPLASTY Right 2021    Procedure: TOTAL HIP ARTHROPLASTY RIGHT POSTERIOR;  Surgeon: Tadeo Basurto MD;  Location: [facility] OR;  Service: Orthopedics;  Laterality: Right;    TOTAL KNEE ARTHROPLASTY Right     TOTAL SHOULDER ARTHROPLASTY W/ DISTAL CLAVICLE EXCISION Right 2019    Procedure: TOTAL SHOULDER REVERSE ARTHROPLASTY RIGHT REPAIR RIGHT AXILLARY VEIN;  Surgeon: Behzad Kelley MD;  [facility]: [facility] [place] [facility] [place];  Service: Orthopedics    WRIST SURGERY Right 12/17/2014    x2  wrist replaced         FAMILY HISTORY  Family History   Problem Relation Age of Onset    Arthritis Mother     Colon cancer Mother     Arthritis Father     Cancer Father         Prostate cancer    Heart disease Sister     Arthritis Sister     Breast cancer Sister     Gout Sister     Hypertension Sister     Cancer Sister         All three breast cancer    Hypertension Brother     Heart disease Brother     Arthritis Brother     Heart attack Brother     Bone cancer Brother     Heart disease Brother     Malig Hyperthermia Neg Hx          SOCIAL HISTORY  Social History     Socioeconomic History    Marital status:    Tobacco Use    Smoking status: Former     Current packs/day: 0.00     Average packs/day: 1 pack/day for 24.0 years (24.0 ttl pk-yrs)     Types: Cigarettes     Start date: 1960     Quit date: 1984     Years since quittin.4    Smokeless tobacco: Former     Types: Chew   Vaping Use    Vaping status: Never Used   Substance and Sexual Activity    Alcohol use: Yes     Alcohol/week: 2.0 standard drinks of alcohol     Types: 2 Cans of beer per week     Comment: 3-4 MONTHLY    Drug use: No    Sexual activity: Not Currently     Partners: Female     Birth control/protection: Vasectomy         ALLERGIES  Adhesive tape        REVIEW OF SYSTEMS  Review of Systems     All systems reviewed and negative except for those discussed in HPI.       PHYSICAL EXAM    I have reviewed the  triage vital signs and nursing notes.    ED Triage Vitals   Temp Heart Rate Resp BP SpO2   06/09/24 1811 06/09/24 1811 06/09/24 1811 06/09/24 1814 06/09/24 1811   97.4 °F (36.3 °C) 103 18 128/76 96 %      Temp src Heart Rate Source Patient Position BP Location FiO2 (%)   -- -- -- -- --              GENERAL: This is a male that is elderly appears chronically ill and frail.  No acute respiratory or cardiovascular distress.Vital signs on my initial evaluation have been reviewed.  His blood pressure is normal he is afebrile.  Does not appear septic or toxic  HENT: nares patent  Head/neck/ face are symmetric without gross deformity, signs of trauma, or swelling  EYES: no scleral icterus, no conjunctival pallor.  NECK: Supple, no meningismus  CV: regular rhythm, regular rate with intact distal pulses.  RESPIRATORY: normal effort and no respiratory distress.  Clear to auscultation bilaterally  ABDOMEN: Patient is morbidly obese.  Patient has redness and induration and tenderness to his suprapubic region and extends to the right and the left.  It stops at about the belt.  Is very consistent with a cellulitis.  I do not see any obvious abscess or fluctuance on palpation.  There is no crepitance.  Patient does have a very large pannus.  On his male  exam I do not see any testicular pain or swelling.  No testicular pain.  MUSCULOSKELETAL: no deformity.  Intact distal pulses that are equal strong and symmetric.  NEURO: alert and appropriate, moves all extremities, follows commands.  No focal motor or sensory changes  SKIN: warm, dry    Vital signs and nursing notes reviewed.        LAB RESULTS  Recent Results (from the past 24 hour(s))   POC Glucose Once    Collection Time: 06/09/24  7:32 PM    Specimen: Blood   Result Value Ref Range    Glucose 220 (H) 70 - 130 mg/dL   Comprehensive Metabolic Panel    Collection Time: 06/09/24  7:44 PM    Specimen: Hand, Left; Blood   Result Value Ref Range    Glucose 227 (H) 65 - 99 mg/dL     BUN 17 8 - 23 mg/dL    Creatinine 1.01 0.76 - 1.27 mg/dL    Sodium 135 (L) 136 - 145 mmol/L    Potassium 4.0 3.5 - 5.2 mmol/L    Chloride 98 98 - 107 mmol/L    CO2 23.8 22.0 - 29.0 mmol/L    Calcium 9.5 8.6 - 10.5 mg/dL    Total Protein 7.0 6.0 - 8.5 g/dL    Albumin 4.2 3.5 - 5.2 g/dL    ALT (SGPT) 26 1 - 41 U/L    AST (SGOT) 25 1 - 40 U/L    Alkaline Phosphatase 183 (H) 39 - 117 U/L    Total Bilirubin 1.0 0.0 - 1.2 mg/dL    Globulin 2.8 gm/dL    A/G Ratio 1.5 g/dL    BUN/Creatinine Ratio 16.8 7.0 - 25.0    Anion Gap 13.2 5.0 - 15.0 mmol/L    eGFR 76.1 >60.0 mL/min/1.73   Protime-INR    Collection Time: 06/09/24  7:44 PM    Specimen: Hand, Left; Blood   Result Value Ref Range    Protime 13.6 11.7 - 14.2 Seconds    INR 1.02 0.90 - 1.10   Lactic Acid, Plasma    Collection Time: 06/09/24  7:44 PM    Specimen: Hand, Left; Blood   Result Value Ref Range    Lactate 3.0 (C) 0.5 - 2.0 mmol/L   Procalcitonin    Collection Time: 06/09/24  7:44 PM    Specimen: Hand, Left; Blood   Result Value Ref Range    Procalcitonin 0.27 (H) 0.00 - 0.25 ng/mL   Magnesium    Collection Time: 06/09/24  7:44 PM    Specimen: Hand, Left; Blood   Result Value Ref Range    Magnesium 1.7 1.6 - 2.4 mg/dL   CBC Auto Differential    Collection Time: 06/09/24  7:44 PM    Specimen: Hand, Left; Blood   Result Value Ref Range    WBC 8.69 3.40 - 10.80 10*3/mm3    RBC 4.74 4.14 - 5.80 10*6/mm3    Hemoglobin 13.9 13.0 - 17.7 g/dL    Hematocrit 42.3 37.5 - 51.0 %    MCV 89.2 79.0 - 97.0 fL    MCH 29.3 26.6 - 33.0 pg    MCHC 32.9 31.5 - 35.7 g/dL    RDW 12.8 12.3 - 15.4 %    RDW-SD 41.6 37.0 - 54.0 fl    MPV 10.9 6.0 - 12.0 fL    Platelets 139 (L) 140 - 450 10*3/mm3    Neutrophil % 75.8 42.7 - 76.0 %    Lymphocyte % 13.6 (L) 19.6 - 45.3 %    Monocyte % 8.5 5.0 - 12.0 %    Eosinophil % 0.6 0.3 - 6.2 %    Basophil % 0.2 0.0 - 1.5 %    Immature Grans % 1.3 (H) 0.0 - 0.5 %    Neutrophils, Absolute 6.59 1.70 - 7.00 10*3/mm3    Lymphocytes, Absolute 1.18 0.70 -  3.10 10*3/mm3    Monocytes, Absolute 0.74 0.10 - 0.90 10*3/mm3    Eosinophils, Absolute 0.05 0.00 - 0.40 10*3/mm3    Basophils, Absolute 0.02 0.00 - 0.20 10*3/mm3    Immature Grans, Absolute 0.11 (H) 0.00 - 0.05 10*3/mm3   STAT Lactic Acid, Reflex    Collection Time: 06/09/24  9:59 PM    Specimen: Arm, Right; Blood   Result Value Ref Range    Lactate 2.3 (C) 0.5 - 2.0 mmol/L       Ordered the above labs and independently reviewed the results.        RADIOLOGY  CT Abdomen Pelvis With Contrast    Result Date: 6/9/2024  CT OF THE ABDOMEN AND PELVIS WITH CONTRAST  HISTORY: Cellulitis  COMPARISON: December 10, 2023  TECHNIQUE: Axial CT imaging was obtained through the abdomen and pelvis. IV contrast was administered.  FINDINGS: Small noncalcified nodule within the right lower lobe has been present since November 2022, and is likely benign. Patient has some areas of scarring noted at the lung bases bilaterally. There are also bilateral lung herniations, also noted on prior study. No suspicious hepatic lesions are seen. The stomach, duodenum, and adrenal glands are all normal. A few calcified granulomata are noted within the spleen. There is pancreatic atrophy. There is cholelithiasis. The kidneys enhance symmetrically. There is no hydronephrosis. There are areas of cortical thinning noted within both kidneys. There are bilateral nonobstructing renal stones. The largest stone on the right measures up to 5 mm. Stone on the left measures 3 mm. There is a large simple appearing left renal cyst. No additional follow-up is necessary. No distal ureteral or bladder stones are seen. Prostate gland is absent. There is colonic diverticulosis. There is no bowel obstruction. There is no evidence of appendicitis. There are aortoiliac calcifications. The patient has skin thickening involving the lower anterior abdominal wall, with similar findings being noted on the prior study. No fluid collections are noted which would suggest  abscess. No extension into the abdomen is seen. No acute osseous abnormalities are seen. There are changes of prior right hip arthroplasty. There is lumbar scoliosis, with convexity to the right.      The patient has abdominal wall skin thickening. Similar findings were present on prior exam, and would certainly suggest cellulitis. However, I don't see any evidence of abdominal wall abscess.  Radiation dose reduction techniques were utilized, including automated exposure control and exposure modulation based on body size.   This report was finalized on 6/9/2024 9:22 PM by Dr. Tess Sorto M.D on Workstation: [id number]       I ordered the above noted radiological studies. Reviewed by me and discussed with radiologist.  See dictation for official radiology interpretation.      PROCEDURES    Procedures      MEDICATIONS GIVEN IN ER    Medications   sodium chloride 0.9 % flush 10 mL (has no administration in time range)   vancomycin 2250 mg/500 mL 0.9% NS IVPB ([facility]) (2,250 mg Intravenous New Bag 6/9/24 5240)   sodium chloride 0.9 % flush 10 mL (has no administration in time range)   sodium chloride 0.9 % flush 10 mL (has no administration in time range)   sodium chloride 0.9 % infusion 40 mL (has no administration in time range)   nitroglycerin (NITROSTAT) SL tablet 0.4 mg (has no administration in time range)   acetaminophen (TYLENOL) tablet 650 mg (has no administration in time range)     Or   acetaminophen (TYLENOL) 160 MG/5ML oral solution 650 mg (has no administration in time range)     Or   acetaminophen (TYLENOL) suppository 650 mg (has no administration in time range)   sennosides-docusate (PERICOLACE) 8.6-50 MG per tablet 2 tablet (has no administration in time range)     And   polyethylene glycol (MIRALAX) packet 17 g (has no administration in time range)     And   bisacodyl (DULCOLAX) EC tablet 5 mg (has no administration in time range)     And   bisacodyl (DULCOLAX) suppository 10 mg (has no  administration in time range)   ondansetron (ZOFRAN) injection 4 mg (has no administration in time range)   Pharmacy to Dose Zosyn (has no administration in time range)   Pharmacy to dose vancomycin (has no administration in time range)   dextrose (GLUTOSE) oral gel 15 g (has no administration in time range)   dextrose (D50W) (25 g/50 mL) IV injection 25 g (has no administration in time range)   glucagon (GLUCAGEN) injection 1 mg (has no administration in time range)   insulin lispro (HUMALOG/ADMELOG) injection 2-9 Units (has no administration in time range)   piperacillin-tazobactam (ZOSYN) 3.375 g IVPB in 100 mL NS MBP (CD) (has no administration in time range)   vancomycin IVPB 1500 mg in 0.9% NaCl (Premix) 500 mL (has no administration in time range)   sodium chloride 0.9 % infusion (has no administration in time range)   piperacillin-tazobactam (ZOSYN) 3.375 g IVPB in 100 mL NS MBP (CD) (0 g Intravenous Stopped 6/9/24 2130)   iopamidol (ISOVUE-300) 61 % injection 100 mL (85 mL Intravenous Given 6/9/24 2043)         All labs have been independently reviewed by me.  All radiology studies have been reviewed by me and I discussed with radiologist dictating the report when indicated below.  All EKG's independently viewed and interpreted by me.  Discussion below represents my analysis of pertinent findings related to patient's condition, differential diagnosis, treatment plan and final disposition.        PROGRESS, DATA ANALYSIS, CONSULTS, AND MEDICAL DECISION MAKING    Differential diagnosis includes   - hepatobiliary pathology such as cholecystitis, cholangitis, and symptomatic cholelithiasis  -PUD  -Mesenteric ischemia  - Pancreatitis  - Dyspepsia  - Small bowel or large bowel obstruction  - Appendicitis  - Diverticulitis  - UTI including pyelonephritis  - Ureteral stone  - Zoster  - Colitis, including infectious and ischemic  - Atypical ACS  This is a patient that has cellulitis of his panniculi.  Will get a  start him on antibiotics check some lab work.  He will need to be admitted.  Informed patient and spouse of my clinical concerns.  All questions answered at this time.      ED Course as of 06/09/24 2327   Sun Jun 09, 2024 2130 Lactate(!!): 3.0 [MM]   2130 Procalcitonin(!): 0.27 [MM]   2131 Review of the CT scan of the abdomen pelvis shows abdominal wall thickening this is similar to his previous CT scan when he had cellulitis there is no definitive fluid filled collection or abscess seen.  Please see complete dictated report from radiologist [MM]   2153 I did discuss the case with Dr. Mehta who is on for [facility].  Informed the patient presenting symptoms.  Agrees to admit the patient to the hospital. [MM]      ED Course User Index  [MM] Jasen Worley MD       AS OF 23:27 EDT VITALS:    BP - 103/74  HR - 92  TEMP - 98.6 °F (37 °C) (Oral)  02 SATS - 97%    SOCIAL DETERMINANTS OF HEALTH THAT IMPACT OR LIMIT CARE (For example..Homelessness,safe discharge, inability to obtain care, follow up, or prescriptions):      DIAGNOSIS  Final diagnoses:   Abdominal wall cellulitis         DISPOSITION  I have reviewed the test results with my patient and explained the current treatment plan.  I answered all of the patient's questions.  The patient will be admitted to monitor bed at this time.  The patient is not hypotensive and is tolerating their current disease condition well enough for a monitored bed at this time.  The patient's current condition does not require intensive care treatment at this time.            DICTATED UTILIZING DRAGON DICTATION    Note Disclaimer: At [facility], we believe that sharing information builds trust and better relationships. You are receiving this note because you recently visited [facility]. It is possible you will see health information before a provider has talked with you about it. This kind of information can be easy to misunderstand. To help you fully understand what it means for  your health, we urge you to discuss this note with your provider.       Jasen Worley MD  06/09/24 [id number]

## 2024-06-09 NOTE — ED NOTES
Patient arrives via PV from home with c/o abdominal cellulitis. Patient states he noticed redness earlier today.

## 2024-06-10 LAB
ANION GAP SERPL CALCULATED.3IONS-SCNC: 10 MMOL/L (ref 5–15)
BASOPHILS # BLD AUTO: 0.05 10*3/MM3 (ref 0–0.2)
BASOPHILS NFR BLD AUTO: 0.7 % (ref 0–1.5)
BUN SERPL-MCNC: 17 MG/DL (ref 8–23)
BUN/CREAT SERPL: 16.7 (ref 7–25)
CALCIUM SPEC-SCNC: 8.4 MG/DL (ref 8.6–10.5)
CHLORIDE SERPL-SCNC: 101 MMOL/L (ref 98–107)
CO2 SERPL-SCNC: 24 MMOL/L (ref 22–29)
CREAT SERPL-MCNC: 1.02 MG/DL (ref 0.76–1.27)
D-LACTATE SERPL-SCNC: 1.7 MMOL/L (ref 0.5–2)
D-LACTATE SERPL-SCNC: 2.5 MMOL/L (ref 0.5–2)
DEPRECATED RDW RBC AUTO: 40.8 FL (ref 37–54)
EGFRCR SERPLBLD CKD-EPI 2021: 75.2 ML/MIN/1.73
EOSINOPHIL # BLD AUTO: 0.06 10*3/MM3 (ref 0–0.4)
EOSINOPHIL NFR BLD AUTO: 0.8 % (ref 0.3–6.2)
ERYTHROCYTE [DISTWIDTH] IN BLOOD BY AUTOMATED COUNT: 12.5 % (ref 12.3–15.4)
GLUCOSE BLDC GLUCOMTR-MCNC: 186 MG/DL (ref 70–130)
GLUCOSE BLDC GLUCOMTR-MCNC: 237 MG/DL (ref 70–130)
GLUCOSE BLDC GLUCOMTR-MCNC: 300 MG/DL (ref 70–130)
GLUCOSE BLDC GLUCOMTR-MCNC: 367 MG/DL (ref 70–130)
GLUCOSE SERPL-MCNC: 267 MG/DL (ref 65–99)
HCT VFR BLD AUTO: 37.3 % (ref 37.5–51)
HGB BLD-MCNC: 12.4 G/DL (ref 13–17.7)
IMM GRANULOCYTES # BLD AUTO: 0.1 10*3/MM3 (ref 0–0.05)
IMM GRANULOCYTES NFR BLD AUTO: 1.3 % (ref 0–0.5)
LYMPHOCYTES # BLD AUTO: 1.62 10*3/MM3 (ref 0.7–3.1)
LYMPHOCYTES NFR BLD AUTO: 21.1 % (ref 19.6–45.3)
MCH RBC QN AUTO: 29.6 PG (ref 26.6–33)
MCHC RBC AUTO-ENTMCNC: 33.2 G/DL (ref 31.5–35.7)
MCV RBC AUTO: 89 FL (ref 79–97)
MONOCYTES # BLD AUTO: 0.81 10*3/MM3 (ref 0.1–0.9)
MONOCYTES NFR BLD AUTO: 10.6 % (ref 5–12)
MRSA DNA SPEC QL NAA+PROBE: NORMAL
NEUTROPHILS NFR BLD AUTO: 5.03 10*3/MM3 (ref 1.7–7)
NEUTROPHILS NFR BLD AUTO: 65.5 % (ref 42.7–76)
PLATELET # BLD AUTO: 146 10*3/MM3 (ref 140–450)
PMV BLD AUTO: 10.6 FL (ref 6–12)
POTASSIUM SERPL-SCNC: 3.5 MMOL/L (ref 3.5–5.2)
RBC # BLD AUTO: 4.19 10*6/MM3 (ref 4.14–5.8)
SODIUM SERPL-SCNC: 135 MMOL/L (ref 136–145)
WBC NRBC COR # BLD AUTO: 7.67 10*3/MM3 (ref 3.4–10.8)

## 2024-06-10 PROCEDURE — 97162 PT EVAL MOD COMPLEX 30 MIN: CPT

## 2024-06-10 PROCEDURE — 80048 BASIC METABOLIC PNL TOTAL CA: CPT | Performed by: INTERNAL MEDICINE

## 2024-06-10 PROCEDURE — 82948 REAGENT STRIP/BLOOD GLUCOSE: CPT

## 2024-06-10 PROCEDURE — 63710000001 INSULIN LISPRO (HUMAN) PER 5 UNITS: Performed by: STUDENT IN AN ORGANIZED HEALTH CARE EDUCATION/TRAINING PROGRAM

## 2024-06-10 PROCEDURE — 25010000002 VANCOMYCIN 10 G RECONSTITUTED SOLUTION: Performed by: INTERNAL MEDICINE

## 2024-06-10 PROCEDURE — 25810000003 SODIUM CHLORIDE 0.9 % SOLUTION: Performed by: INTERNAL MEDICINE

## 2024-06-10 PROCEDURE — 83605 ASSAY OF LACTIC ACID: CPT | Performed by: EMERGENCY MEDICINE

## 2024-06-10 PROCEDURE — 63710000001 INSULIN GLARGINE PER 5 UNITS: Performed by: STUDENT IN AN ORGANIZED HEALTH CARE EDUCATION/TRAINING PROGRAM

## 2024-06-10 PROCEDURE — 87641 MR-STAPH DNA AMP PROBE: CPT | Performed by: INTERNAL MEDICINE

## 2024-06-10 PROCEDURE — 85025 COMPLETE CBC W/AUTO DIFF WBC: CPT | Performed by: INTERNAL MEDICINE

## 2024-06-10 PROCEDURE — 36415 COLL VENOUS BLD VENIPUNCTURE: CPT | Performed by: EMERGENCY MEDICINE

## 2024-06-10 PROCEDURE — 97110 THERAPEUTIC EXERCISES: CPT

## 2024-06-10 PROCEDURE — 63710000001 INSULIN LISPRO (HUMAN) PER 5 UNITS: Performed by: INTERNAL MEDICINE

## 2024-06-10 PROCEDURE — 25010000002 PIPERACILLIN SOD-TAZOBACTAM PER 1 G: Performed by: INTERNAL MEDICINE

## 2024-06-10 PROCEDURE — 25010000002 CEFTRIAXONE PER 250 MG: Performed by: INTERNAL MEDICINE

## 2024-06-10 RX ORDER — BRIMONIDINE TARTRATE 2 MG/ML
1 SOLUTION/ DROPS OPHTHALMIC 2 TIMES DAILY
Status: DISCONTINUED | OUTPATIENT
Start: 2024-06-10 | End: 2024-06-12 | Stop reason: HOSPADM

## 2024-06-10 RX ORDER — ATENOLOL 25 MG/1
12.5 TABLET ORAL DAILY
Status: DISCONTINUED | OUTPATIENT
Start: 2024-06-10 | End: 2024-06-12 | Stop reason: HOSPADM

## 2024-06-10 RX ORDER — ROSUVASTATIN CALCIUM 20 MG/1
20 TABLET, COATED ORAL EVERY EVENING
Status: DISCONTINUED | OUTPATIENT
Start: 2024-06-10 | End: 2024-06-12 | Stop reason: HOSPADM

## 2024-06-10 RX ORDER — INSULIN LISPRO 100 [IU]/ML
3-14 INJECTION, SOLUTION INTRAVENOUS; SUBCUTANEOUS
Status: DISCONTINUED | OUTPATIENT
Start: 2024-06-10 | End: 2024-06-12 | Stop reason: HOSPADM

## 2024-06-10 RX ORDER — GABAPENTIN 300 MG/1
300 CAPSULE ORAL 4 TIMES DAILY
Status: DISCONTINUED | OUTPATIENT
Start: 2024-06-10 | End: 2024-06-12 | Stop reason: HOSPADM

## 2024-06-10 RX ORDER — DEXTROSE MONOHYDRATE 25 G/50ML
25 INJECTION, SOLUTION INTRAVENOUS
Status: DISCONTINUED | OUTPATIENT
Start: 2024-06-10 | End: 2024-06-12 | Stop reason: HOSPADM

## 2024-06-10 RX ORDER — LATANOPROST 50 UG/ML
1 SOLUTION/ DROPS OPHTHALMIC NIGHTLY
Status: DISCONTINUED | OUTPATIENT
Start: 2024-06-10 | End: 2024-06-12 | Stop reason: HOSPADM

## 2024-06-10 RX ORDER — IBUPROFEN 600 MG/1
1 TABLET ORAL
Status: DISCONTINUED | OUTPATIENT
Start: 2024-06-10 | End: 2024-06-12 | Stop reason: HOSPADM

## 2024-06-10 RX ORDER — LOSARTAN POTASSIUM 100 MG/1
100 TABLET ORAL DAILY
Status: DISCONTINUED | OUTPATIENT
Start: 2024-06-10 | End: 2024-06-12 | Stop reason: HOSPADM

## 2024-06-10 RX ORDER — PRAMIPEXOLE DIHYDROCHLORIDE 1.5 MG/1
1.5 TABLET ORAL 2 TIMES DAILY
Status: DISCONTINUED | OUTPATIENT
Start: 2024-06-10 | End: 2024-06-12 | Stop reason: HOSPADM

## 2024-06-10 RX ORDER — LEVOTHYROXINE SODIUM 88 UG/1
88 TABLET ORAL
Status: DISCONTINUED | OUTPATIENT
Start: 2024-06-11 | End: 2024-06-12 | Stop reason: HOSPADM

## 2024-06-10 RX ORDER — MONTELUKAST SODIUM 10 MG/1
10 TABLET ORAL NIGHTLY
Status: DISCONTINUED | OUTPATIENT
Start: 2024-06-10 | End: 2024-06-12 | Stop reason: HOSPADM

## 2024-06-10 RX ORDER — ASPIRIN 81 MG/1
81 TABLET ORAL DAILY
Status: DISCONTINUED | OUTPATIENT
Start: 2024-06-10 | End: 2024-06-12 | Stop reason: HOSPADM

## 2024-06-10 RX ORDER — LEVOTHYROXINE SODIUM 88 UG/1
88 TABLET ORAL DAILY
COMMUNITY

## 2024-06-10 RX ORDER — HYDROCODONE BITARTRATE AND ACETAMINOPHEN 7.5; 325 MG/1; MG/1
1 TABLET ORAL EVERY 8 HOURS PRN
Status: DISCONTINUED | OUTPATIENT
Start: 2024-06-10 | End: 2024-06-12 | Stop reason: HOSPADM

## 2024-06-10 RX ORDER — LEVOTHYROXINE SODIUM 88 UG/1
88 TABLET ORAL DAILY
Status: DISCONTINUED | OUTPATIENT
Start: 2024-06-10 | End: 2024-06-10

## 2024-06-10 RX ORDER — PANTOPRAZOLE SODIUM 40 MG/1
40 TABLET, DELAYED RELEASE ORAL 2 TIMES DAILY
Status: DISCONTINUED | OUTPATIENT
Start: 2024-06-10 | End: 2024-06-12 | Stop reason: HOSPADM

## 2024-06-10 RX ORDER — NICOTINE POLACRILEX 4 MG
15 LOZENGE BUCCAL
Status: DISCONTINUED | OUTPATIENT
Start: 2024-06-10 | End: 2024-06-12 | Stop reason: HOSPADM

## 2024-06-10 RX ORDER — DORZOLAMIDE HYDROCHLORIDE AND TIMOLOL MALEATE 20; 5 MG/ML; MG/ML
SOLUTION/ DROPS OPHTHALMIC 2 TIMES DAILY
Status: DISCONTINUED | OUTPATIENT
Start: 2024-06-10 | End: 2024-06-12 | Stop reason: HOSPADM

## 2024-06-10 RX ORDER — FUROSEMIDE 40 MG/1
40 TABLET ORAL DAILY
Status: DISCONTINUED | OUTPATIENT
Start: 2024-06-10 | End: 2024-06-12 | Stop reason: HOSPADM

## 2024-06-10 RX ADMIN — BRIMONIDINE TARTRATE 1 DROP: 2 SOLUTION OPHTHALMIC at 21:25

## 2024-06-10 RX ADMIN — INSULIN LISPRO 2 UNITS: 100 INJECTION, SOLUTION INTRAVENOUS; SUBCUTANEOUS at 00:07

## 2024-06-10 RX ADMIN — DORZOLAMIDE HYDROCHLORIDE: 20 SOLUTION/ DROPS OPHTHALMIC at 21:25

## 2024-06-10 RX ADMIN — SENNOSIDES AND DOCUSATE SODIUM 2 TABLET: 50; 8.6 TABLET ORAL at 08:40

## 2024-06-10 RX ADMIN — INSULIN LISPRO 7 UNITS: 100 INJECTION, SOLUTION INTRAVENOUS; SUBCUTANEOUS at 08:40

## 2024-06-10 RX ADMIN — ACETAMINOPHEN 650 MG: 325 TABLET ORAL at 00:07

## 2024-06-10 RX ADMIN — PRAMIPEXOLE DIHYDROCHLORIDE 1.5 MG: 1.5 TABLET ORAL at 21:24

## 2024-06-10 RX ADMIN — PIPERACILLIN AND TAZOBACTAM 3.38 G: 3; .375 INJECTION, POWDER, FOR SOLUTION INTRAVENOUS at 08:36

## 2024-06-10 RX ADMIN — INSULIN LISPRO 5 UNITS: 100 INJECTION, SOLUTION INTRAVENOUS; SUBCUTANEOUS at 17:55

## 2024-06-10 RX ADMIN — MONTELUKAST SODIUM 10 MG: 10 TABLET, FILM COATED ORAL at 21:24

## 2024-06-10 RX ADMIN — PIPERACILLIN AND TAZOBACTAM 3.38 G: 3; .375 INJECTION, POWDER, FOR SOLUTION INTRAVENOUS at 02:35

## 2024-06-10 RX ADMIN — ASPIRIN 81 MG: 81 TABLET, COATED ORAL at 12:46

## 2024-06-10 RX ADMIN — LOSARTAN POTASSIUM 100 MG: 100 TABLET, FILM COATED ORAL at 15:09

## 2024-06-10 RX ADMIN — ATENOLOL 12.5 MG: 25 TABLET ORAL at 15:09

## 2024-06-10 RX ADMIN — INSULIN LISPRO 8 UNITS: 100 INJECTION, SOLUTION INTRAVENOUS; SUBCUTANEOUS at 12:46

## 2024-06-10 RX ADMIN — PANTOPRAZOLE SODIUM 40 MG: 40 TABLET, DELAYED RELEASE ORAL at 21:24

## 2024-06-10 RX ADMIN — ROSUVASTATIN CALCIUM 20 MG: 20 TABLET, FILM COATED ORAL at 17:54

## 2024-06-10 RX ADMIN — PANTOPRAZOLE SODIUM 40 MG: 40 TABLET, DELAYED RELEASE ORAL at 12:46

## 2024-06-10 RX ADMIN — PRAMIPEXOLE DIHYDROCHLORIDE 1.5 MG: 1.5 TABLET ORAL at 15:09

## 2024-06-10 RX ADMIN — SENNOSIDES AND DOCUSATE SODIUM 2 TABLET: 50; 8.6 TABLET ORAL at 21:24

## 2024-06-10 RX ADMIN — Medication 10 ML: at 08:37

## 2024-06-10 RX ADMIN — CEFTRIAXONE 2000 MG: 2 INJECTION, POWDER, FOR SOLUTION INTRAMUSCULAR; INTRAVENOUS at 12:46

## 2024-06-10 RX ADMIN — GABAPENTIN 300 MG: 300 CAPSULE ORAL at 17:55

## 2024-06-10 RX ADMIN — SODIUM CHLORIDE 100 ML/HR: 9 INJECTION, SOLUTION INTRAVENOUS at 00:07

## 2024-06-10 RX ADMIN — GABAPENTIN 300 MG: 300 CAPSULE ORAL at 21:24

## 2024-06-10 RX ADMIN — FUROSEMIDE 40 MG: 40 TABLET ORAL at 15:09

## 2024-06-10 RX ADMIN — HYDROCODONE BITARTRATE AND ACETAMINOPHEN 1 TABLET: 7.5; 325 TABLET ORAL at 12:46

## 2024-06-10 RX ADMIN — GABAPENTIN 300 MG: 300 CAPSULE ORAL at 12:46

## 2024-06-10 RX ADMIN — LATANOPROST 1 DROP: 50 SOLUTION OPHTHALMIC at 21:43

## 2024-06-10 RX ADMIN — Medication 10 ML: at 21:25

## 2024-06-10 RX ADMIN — SENNOSIDES AND DOCUSATE SODIUM 2 TABLET: 50; 8.6 TABLET ORAL at 00:07

## 2024-06-10 RX ADMIN — VANCOMYCIN HYDROCHLORIDE 1500 MG: 10 INJECTION, POWDER, LYOPHILIZED, FOR SOLUTION INTRAVENOUS at 08:37

## 2024-06-10 RX ADMIN — INSULIN GLARGINE 10 UNITS: 100 INJECTION, SOLUTION SUBCUTANEOUS at 12:46

## 2024-06-10 RX ADMIN — INSULIN LISPRO 3 UNITS: 100 INJECTION, SOLUTION INTRAVENOUS; SUBCUTANEOUS at 21:24

## 2024-06-10 NOTE — CASE MANAGEMENT/SOCIAL WORK
Continued Stay Note  [facility]     Patient Name: Nathan Baez  MRN: [id number]  Today's Date: 6/10/2024    Admit Date: 6/9/2024    Plan: Plan home with spouse.  MARSHA Galdamez RN   Discharge Plan       Row Name 06/10/24 1624       Plan    Plan Plan home with spouse.  MARSHA Galdamez RN    Plan Comments MD wrote order for pt to get rolling walker with seat.  Pt given copy to take to [facility] at discharge.   Plan home with spouse.  MARSHA Galdamez RN                   Discharge Codes    No documentation.                 Expected Discharge Date and Time       Expected Discharge Date Expected Discharge Time    Jun 14, 2024               Shonda Galdamez RN

## 2024-06-10 NOTE — PROGRESS NOTES
[facility] Clinical Pharmacy Services: Piperacillin-Tazobactam Consult    Pt Name: Nathan Baez   : 1945    Indication: Skin and Soft Tissue    Relevant clinical data and objective history reviewed:    Past Medical History:   Diagnosis Date    Actinic keratosis     Acute embolism and thrombosis of vein     Allergic     Allergic rhinitis     Arthritis     Cataract     Cervical radiculopathy     Chronic constipation     Colon polyp     Diabetes mellitus     Disequilibrium     DJD (degenerative joint disease), lumbar     Elevated cholesterol     Erysipelas     GERD (gastroesophageal reflux disease)     Glaucoma     Hip pain, chronic, right     History of kidney stones     X1    History of peripheral edema     LOWER LEGS BILAT, COMPRESSION KNEE HIGH     History of transfusion     Hyperlipidemia     Hypertension     Hypothyroid     Insomnia     Intervertebral disc stenosis of neural canal of lumbar region 2022    Limited mobility     RIGHT HIP    Low back pain     Male urinary stress incontinence     s/p prostatectomy-WEAR PAD    Obesity     KWAME (obstructive sleep apnea)     Osteoarthritis     Pelvic floor dysfunction 2022    DEFOGRAM ORDERED AT [facility] BY DR. ROSHAN SCHAFER    Pes planus     Presbycusis     Prostate cancer     Restless legs syndrome     Shoulder pain     RIGHT, LIMITED MOBILITY-AT TIMES    Sleep apnea     bipap    Tinea corporis     Tinea cruris     Unsteady gait     RIGHT HIP    Weakness     RIGHT HIP     Creatinine   Date Value Ref Range Status   2024 1.01 0.76 - 1.27 mg/dL Final   2024 1.03 0.76 - 1.27 mg/dL Final   2023 1.31 (H) 0.76 - 1.27 mg/dL Final   2023 1.19 0.76 - 1.27 mg/dL Final   2019 1.20 0.60 - 1.30 mg/dL Final     Comment:     Serial Number: [id number]:  [id number]     BUN   Date Value Ref Range Status   2024 17 8 - 23 mg/dL Final     Estimated Creatinine Clearance: 71 mL/min (by C-G formula based on SCr of 1.01 mg/dL).    Lab  Results   Component Value Date    WBC 8.69 06/09/2024     Temp Readings from Last 3 Encounters:   06/09/24 97.4 °F (36.3 °C)   06/03/24 96.9 °F (36.1 °C) (Temporal)   04/04/24 97.2 °F (36.2 °C)      Assessment/Plan  Estimated CrCl >20 mL/min at this time; BMI 37.59 kg/m2  Will start piperacillin-tazobactam 3.375 g IV every 8 hours     Pharmacy will continue to follow daily while on piperacillin-tazobactam and adjust as needed. Thank you for this consult.    Jamie Elliott [facility]  Clinical Pharmacist

## 2024-06-10 NOTE — CONSULTS
CONSULT NOTE    Infectious Diseases - Yamila Greer MD  [facility]       Patient Identification:  Name: Nathan Baez  Age: 79 y.o.  Sex: male  :  1945  MRN: [id number]             Date of Consultation: 6/10/2024      Primary Care Physician: Damien Pierce MD                               Requesting Physician: Dr. Reddy  Reason for Consultation: Recurrent abdominal wall cellulitis    History of presenting illness: Patient is a 79-year-old male who has complicated past medical history including history of diabetes mellitus, hypertension, hypothyroidism, history of fatty liver, history of prostate cancer and prostatectomy and has been dealing with recurrent abdominal wall cellulitis with sepsis including an episode of group B strep bacteremic sepsis in 2022.  Since then patient has multiple hospitalization almost every 6 months with sudden onset of fever chills and sense of ill health associated with pain and discomfort and redness of the abdominal wall treated with broad-spectrum antibiotics with negative culture with improvement in his symptoms and subsequently switched to oral antibiotics with continued improvement until recurrence occurs.  In this background patient was last hospitalized in 2023 and was doing well until yesterday while sitting in the garage patient developed sudden onset of worsening pain and discomfort and redness.  Because the rapid spread and prior similar symptoms patient decided to come to the emergency room.  Workup included CT scan of the abdomen pelvis which did not show any evidence of discrete abscess or drainable focus.  Patient is feeling somewhat better except for redness in the lower abdominal wall and in the pannus area with some element of dependent erythema as redness improves when his pannus is elevated.  Impression:  This presentation and the above context is consistent with:  1-recurrent cellulitis of the abdominal wall in the  setting of known group B strep bacteremia with risk factor for group B strep colonization and recurrent invasive infection with possibilities of MRSA and other strep species.  2-diabetes mellitus  3-fatty liver  4-history of DVT and PE  5-hypothyroidism  6-history of prostate cancer with prostatectomy  7-history of bilateral knee joint replacement and orthopedic interventions and instrumentations  8-other diagnoses per primary team.    Recommendations/Discussions:  At this juncture I agree with the care plan consisting of medical management of systemic illness due to abdominal wall cellulitis.  Would recommend continuation of vancomycin check MRSA screen and de-escalate Zosyn to ceftriaxone.  Follow-up on blood culture results and based on his clinical course and progression of the erythema of the abdominal wall as well as symptoms such as fever chills appetite and sense of wellbeing would recommend final antibiotic treatment.  Since he is getting recurrent cellulitis that is requiring admission is not unreasonable to consider him to be a candidate for chronic suppressive therapy with oral penicillin against recurrent group B sepsis and see if this intervention can minimize future episodes of recurrent cellulitis and hospitalizations.  Obviously this intervention needs to be weighted against the complications of chronic antibiotic treatment such as selection of resistant pathogens C. difficile infection and patient's understanding of risk and benefit and acceptance.  Other measures that can be performed to prevent future episodes cellulitis would be once a week Hibiclens whole body wash as if he is doing a preop skin preparation for clinical surgery.  A preop nurse can educate him about how to perform Hibiclens whole body wash and make a calendar with the second day of the week to perform this intervention to minimize future episodes.  Thank you very much for letting me be the part of your patient care please see  above impression and recommendations            Past Medical History:  Past Medical History:   Diagnosis Date    Actinic keratosis     Acute embolism and thrombosis of vein     Allergic     Allergic rhinitis     Arthritis     Cataract     Cervical radiculopathy     Chronic constipation     Colon polyp     Diabetes mellitus     Disequilibrium     DJD (degenerative joint disease), lumbar     Elevated cholesterol     Erysipelas     GERD (gastroesophageal reflux disease)     Glaucoma     Hip pain, chronic, right     History of kidney stones     X1    History of peripheral edema     LOWER LEGS BILAT, COMPRESSION KNEE HIGH     History of transfusion     Hyperlipidemia     Hypertension     Hypothyroid     Insomnia     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 ROSHAN SCHAFER    Pes planus     Presbycusis     Prostate cancer     Restless legs syndrome     Shoulder pain     RIGHT, LIMITED MOBILITY-AT TIMES    Sleep apnea     bipap    Tinea corporis     Tinea cruris     Unsteady gait     RIGHT HIP    Weakness     RIGHT HIP     Past Surgical History:  Past Surgical History:   Procedure Laterality Date    CATARACT EXTRACTION, BILATERAL Bilateral     CERVICAL DISCECTOMY ANTERIOR      COLONOSCOPY  03/08/2017    3/17 normal. Recheck 2022. 12/11. Repeat in 5 years    COLONOSCOPY N/A 04/19/2021    Procedure: COLONOSCOPY INTO CECUM WITH HOT & COLD  SNARE POLYPECTOMIES;  Surgeon: Jaden Chowdhury MD;  Location: [facility] ENDOSCOPY;  Service: Gastroenterology;  Laterality: N/A;  PRE: CHANGE IN BOWEL HABITS; FAMILY H/O COLON CANCER  POST: DIVERTICULOSIS, POLYPS    ENDOSCOPY N/A 01/30/2023    Procedure: ESOPHAGOGASTRODUODENOSCOPY with biopsies;  Surgeon:  Jaden Chowdhury MD;  Location: [facility] ENDOSCOPY;  Service: Gastroenterology;  Laterality: N/A;  pre- abdominal pain, anemia  post- gastritis    ENDOSCOPY W/ PEG REMOVAL      EYE SURGERY  2013    Cataract    FOOT SURGERY      1998 had big toe replaced on left foot-METAL     HERNIA REPAIR  03/07/2012    umbilical    JOINT REPLACEMENT Left     big toe    MEDIAL BRANCH BLOCK Bilateral 04/07/2023    Procedure: LUMBAR MEDIAL BRANCH BLOCK bilateral L4-S1 [id number];  Surgeon: Lauren Houston MD;  Location: [facility] MAIN OR;  Service: Pain Management;  Laterality: Bilateral;    MEDIAL BRANCH BLOCK Bilateral 04/17/2023    Procedure: LUMBAR MEDIAL BRANCH BLOCK bilateral L4-S1 [id number];  Surgeon: Lauren Houston MD;  Location[facility];  Service: Pain Management;  Laterality: Bilateral;    LA ARTHRP KNE CONDYLE&PLATU MEDIAL&LAT COMPARTMENTS Left 09/13/2016    Procedure: LT TOTAL KNEE ARTHROPLASTY;  Surgeon: Tadeo Basurto MD;  Location: [facility] MAIN OR;  Service: Orthopedics    PROSTATECTOMY  07/1999 July 1999. Radical     RADIOFREQUENCY ABLATION Right 05/17/2023    Procedure: RADIOFREQUENCY ABLATION LUMBAR right L3-S1;  Surgeon: Lauren Houston MD;  Location: [facility];  Service: Pain Management;  Laterality: Right;    THYROID LOBECTOMY      TONSILLECTOMY      TOTAL HIP ARTHROPLASTY Right 08/19/2021    Procedure: TOTAL HIP ARTHROPLASTY RIGHT POSTERIOR;  Surgeon: Tadeo Basurto MD;  Location: [facility] MAIN OR;  Service: Orthopedics;  Laterality: Right;    TOTAL KNEE ARTHROPLASTY Right 2014    TOTAL SHOULDER ARTHROPLASTY W/ DISTAL CLAVICLE EXCISION Right 11/13/2019    Procedure: TOTAL SHOULDER REVERSE ARTHROPLASTY RIGHT REPAIR RIGHT AXILLARY VEIN;  Surgeon: Behzad Kelley MD;  Location: [facility] OR OSC;  Service: Orthopedics    WRIST SURGERY Right 12/17/2014    x2  wrist replaced      Home Meds:  Medications Prior to Admission   Medication Sig Dispense Refill Last Dose    acetaminophen (TYLENOL) 650 MG 8  hr tablet Take 1 tablet by mouth Every 8 (Eight) Hours As Needed for Mild Pain. Indications: Pain   Past Week    amLODIPine (NORVASC) 2.5 MG tablet Take 1 tablet by mouth Daily. 90 tablet 3 6/9/2024    aspirin 81 MG EC tablet Take 1 tablet by mouth Daily. Indications: Coronary Bypass Surgery   6/9/2024    atenolol (Tenormin) 25 MG tablet Take 0.5 tablets by mouth Daily. 45 tablet 4 6/9/2024    B-D UF III MINI PEN NEEDLES 31G X 5 MM misc 1 APPLICATION DAILY 100 each 3 6/9/2024    bisacodyl (Dulcolax) 5 MG EC tablet Take 1 tablet by mouth 2 (Two) Times a Day. Indications: Constipation   6/9/2024    brimonidine (ALPHAGAN) 0.2 % ophthalmic solution Administer 1 drop to both eyes 2 (Two) Times a Day. Indications: Wide-Angle Glaucoma   6/9/2024    celecoxib (CeleBREX) 200 MG capsule TAKE 1 CAPSULE BY MOUTH DAILY 30 capsule 5 6/9/2024    dorzolamide-timolol (COSOPT) 22.3-6.8 MG/ML ophthalmic solution Administer 1 drop to both eyes 2 (Two) Times a Day. Indications: Wide-Angle Glaucoma   6/9/2024    furosemide (LASIX) 40 MG tablet TAKE 1 TABLET BY MOUTH EVERY DAY  Indications: Edema 90 tablet 3 6/9/2024    gabapentin (NEURONTIN) 300 MG capsule TAKE 1 CAPSULE BY MOUTH FOUR TIMES DAILY 360 capsule 0 6/9/2024    glucose blood (FREESTYLE LITE) test strip Check blood sugar  each 11 6/9/2024    HYDROcodone-acetaminophen (NORCO) 7.5-325 MG per tablet Take 1 tablet by mouth Every 8 (Eight) Hours As Needed for Moderate Pain. 30 day supply. DNF 6/5/24 90 tablet 0 6/9/2024    Hydrocortisone, Perianal, (ANUSOL-HC) 2.5 % rectal cream APPLY RECTALLY THREE TIMES DAILY FOR 7-10 DAYS DURNG HEMORRHOID FLARE   Past Month    insulin glargine (LANTUS, SEMGLEE) 100 UNIT/ML injection Inject 8 Units under the skin into the appropriate area as directed Daily. Indications: Type 2 Diabetes   6/9/2024    ketoconazole (NIZORAL) 2 % cream Apply 1 application topically to the appropriate area as directed Daily. (Patient taking differently: Apply 1  Application topically to the appropriate area as directed Daily. prn) 60 g 3 Patient Taking Differently    Lancets (freestyle) lancets Check blood sugar  each 11 6/9/2024    latanoprost (XALATAN) 0.005 % ophthalmic solution INSTILL 1 DROP IN BOTH EYES DAILY AT BEDTIME   6/8/2024    losartan (COZAAR) 100 MG tablet TAKE 1 TABLET BY MOUTH DAILY 90 tablet 3 6/9/2024    metFORMIN (GLUCOPHAGE) 500 MG tablet TAKE 1 TABLET BY MOUTH TWICE DAILY WITH MEALS 180 tablet 3 6/9/2024    montelukast (SINGULAIR) 10 MG tablet TAKE 1 TABLET BY MOUTH EVERY NIGHT 90 tablet 3 6/8/2024    pantoprazole (PROTONIX) 40 MG EC tablet Take 1 tablet by mouth 2 (Two) Times a Day. Indications: Heartburn 90 tablet 3 6/9/2024    penicillin v potassium (VEETID) 250 MG tablet One BID 60 tablet 5 6/9/2024    phentermine 37.5 MG capsule Take 1 capsule by mouth Every Morning. 30 capsule 3 6/9/2024    pramipexole (MIRAPEX) 1.5 MG tablet Take  by mouth 2 (Two) Times a Day. prn  Indications: Restless Leg Syndrome   6/9/2024    Probiotic Product (Risaquad-2) capsule capsule Take 1 capsule by mouth Daily.   6/9/2024    rosuvastatin (CRESTOR) 20 MG tablet TAKE 1 TABLET BY MOUTH EVERY EVENING 90 tablet 3 6/8/2024    Stimulant Laxative 8.6-50 MG per tablet TAKE 2 TABLETS BY MOUTH TWICE DAILY 200 tablet 3 6/9/2024    albuterol (PROVENTIL) (2.5 MG/3ML) 0.083% nebulizer solution Take 2.5 mg by nebulization Every 4 (Four) Hours As Needed for Wheezing. Indications: Spasm of Lung Air Passages   More than a month    albuterol sulfate  (90 Base) MCG/ACT inhaler Inhale 2 puffs Every 4 (Four) Hours As Needed for Wheezing. Indications: Spasm of Lung Air Passages   More than a month    Cyanocobalamin (B-12) 1000 MCG sublingual tablet One sublingual tab dissolved on tongue daily 90 each 3 Unknown    fexofenadine (ALLEGRA) 180 MG tablet Take 1 tablet by mouth Daily. Indications: Hayfever (Patient not taking: Reported on 6/9/2024)   Not Taking     fluticasone-salmeterol (ADVAIR HFA) 230-21 MCG/ACT inhaler Inhale 2 (Two) Times a Day. prn   More than a month    levothyroxine (SYNTHROID, LEVOTHROID) 88 MCG tablet TAKE 1 TABLET BY MOUTH EVERY MORNING 90 tablet 3      Current Meds:     Current Facility-Administered Medications:     acetaminophen (TYLENOL) tablet 650 mg, 650 mg, Oral, Q4H PRN, 650 mg at 06/10/24 0007 **OR** acetaminophen (TYLENOL) 160 MG/5ML oral solution 650 mg, 650 mg, Oral, Q4H PRN **OR** acetaminophen (TYLENOL) suppository 650 mg, 650 mg, Rectal, Q4H PRN, Tadeo Mehta MD    sennosides-docusate (PERICOLACE) 8.6-50 MG per tablet 2 tablet, 2 tablet, Oral, BID, 2 tablet at 06/10/24 0840 **AND** polyethylene glycol (MIRALAX) packet 17 g, 17 g, Oral, Daily PRN **AND** bisacodyl (DULCOLAX) EC tablet 5 mg, 5 mg, Oral, Daily PRN **AND** bisacodyl (DULCOLAX) suppository 10 mg, 10 mg, Rectal, Daily PRN, Tadeo Mehta MD    dextrose (D50W) (25 g/50 mL) IV injection 25 g, 25 g, Intravenous, Q15 Min PRN, Tadeo Mehta MD    dextrose (GLUTOSE) oral gel 15 g, 15 g, Oral, Q15 Min PRN, Tadeo Mehta MD    glucagon (GLUCAGEN) injection 1 mg, 1 mg, Intramuscular, Q15 Min PRN, Tadeo Mehta MD    insulin lispro (HUMALOG/ADMELOG) injection 2-9 Units, 2-9 Units, Subcutaneous, 4x Daily AC & at Bedtime, Tadeo Mehta MD, 7 Units at 06/10/24 0840    nitroglycerin (NITROSTAT) SL tablet 0.4 mg, 0.4 mg, Sublingual, Q5 Min PRN, Tadeo Mehta MD    ondansetron (ZOFRAN) injection 4 mg, 4 mg, Intravenous, Q6H PRN, Tadeo Mehta MD    Pharmacy to dose vancomycin, , Does not apply, Continuous PRN, Tadeo Mehta MD    piperacillin-tazobactam (ZOSYN) 3.375 g IVPB in 100 mL NS MBP (CD), 3.375 g, Intravenous, Q8H, Tadeo Mehta MD, 3.375 g at 06/10/24 0836    [COMPLETED] Insert Peripheral IV, , , Once **AND** sodium chloride 0.9 % flush 10 mL, 10 mL,  Intravenous, PRN, Jasen Worley MD    sodium chloride 0.9 % flush 10 mL, 10 mL, Intravenous, Q12H, Tadeo Mehta MD, 10 mL at 06/10/24 0837    sodium chloride 0.9 % flush 10 mL, 10 mL, Intravenous, PRN, Tadeo Mehta MD    sodium chloride 0.9 % infusion 40 mL, 40 mL, Intravenous, PRN, Tadeo Mehta MD    sodium chloride 0.9 % infusion, 100 mL/hr, Intravenous, Continuous, Tadeo Mehta MD, Last Rate: 100 mL/hr at 06/10/24 0007, 100 mL/hr at 06/10/24 0007    vancomycin IVPB 1500 mg in 0.9% NaCl (Premix) 500 mL, 1,500 mg, Intravenous, Q24H, Tadeo Mehta MD, Last Rate: 333.3 mL/hr at 06/10/24 0837, 1,500 mg at 06/10/24 0837  Allergies:  Allergies   Allergen Reactions    Adhesive Tape Rash     Pt states he only had one reaction after hip surgery     Social History:   Social History     Tobacco Use    Smoking status: Former     Current packs/day: 0.00     Average packs/day: 1 pack/day for 24.0 years (24.0 ttl pk-yrs)     Types: Cigarettes     Start date: 1960     Quit date: 1984     Years since quittin.4    Smokeless tobacco: Former     Types: Chew   Substance Use Topics    Alcohol use: Yes     Alcohol/week: 2.0 standard drinks of alcohol     Types: 2 Cans of beer per week     Comment: 3-4 MONTHLY      Family History:  Family History   Problem Relation Age of Onset    Arthritis Mother     Colon cancer Mother     Arthritis Father     Cancer Father         Prostate cancer    Heart disease Sister     Arthritis Sister     Breast cancer Sister     Gout Sister     Hypertension Sister     Cancer Sister         All three breast cancer    Hypertension Brother     Heart disease Brother     Arthritis Brother     Heart attack Brother     Bone cancer Brother     Heart disease Brother     Malig Hyperthermia Neg Hx           Review of Systems  See history of present illness and past medical history.  As described in the history of presenting  "illness.  Patient denies any localizing pain and discomfort such as neck pain back pain hip pain shoulder pain knee pain etc.      Vitals:   /67 (BP Location: Right arm, Patient Position: Lying)   Pulse 84   Temp 98.4 °F (36.9 °C) (Oral)   Resp 18   Ht 170.2 cm (67\")   Wt 104 kg (229 lb 8 oz)   SpO2 94%   BMI 35.94 kg/m²   I/O:   Intake/Output Summary (Last 24 hours) at 6/10/2024 0914  Last data filed at 6/10/2024 0500  Gross per 24 hour   Intake 570 ml   Output --   Net 570 ml     Exam:  Patient is examined using the personal protective equipment as per guidelines from infection control for this particular patient as enacted.  Hand washing was performed before and after patient interaction.  General Appearance:  Alert cooperative and being ambulated with assistance by physical therapy and able to walk with assistance and use of walker.   Head:    Normocephalic, without obvious abnormality, atraumatic   Eyes:    PERRL, conjunctivae/corneas clear, EOM's intact, both eyes   Ears:    Normal external ear canals, both ears   Nose:   Nares normal, septum midline, mucosa normal, no drainage    or sinus tenderness   Throat:   Lips, tongue, gums normal; oral mucosa pink and moist   Neck: Supple and no adenopathy noted   Back:     Symmetric, no curvature, ROM normal, no CVA tenderness   Lungs:     Clear to auscultation bilaterally, respirations unlabored   Chest Wall:    No tenderness or deformity    Heart:  S1-S2 regular   Abdomen:   Soft lower abdominal erythema with warmth and tenderness with some element of dependent erythema is when the pannus is elevated erythema has decreased only to recur when it is let down.   Extremities: Chronic changes in bilateral lower extremities.   Pulses:   Pulses palpable in all extremities; symmetric all extremities   Skin: Changes from previous surgeries and recurrent cellulitis in the lower abdomen noted.   Neurologic: Alert and oriented and grossly normal       Data " Review:    I reviewed the patient's new clinical results.  Results from last 7 days   Lab Units 06/10/24  0503 06/09/24 1944   WBC 10*3/mm3 7.67 8.69   HEMOGLOBIN g/dL 12.4* 13.9   PLATELETS 10*3/mm3 146 139*     Results from last 7 days   Lab Units 06/10/24  0503 06/09/24 1944   SODIUM mmol/L 135* 135*   POTASSIUM mmol/L 3.5 4.0   CHLORIDE mmol/L 101 98   CO2 mmol/L 24.0 23.8   BUN mg/dL 17 17   CREATININE mg/dL 1.02 1.01   CALCIUM mg/dL 8.4* 9.5   GLUCOSE mg/dL 267* 227*     Microbiology Results (last 10 days)       ** No results found for the last 240 hours. **        CT Abdomen Pelvis With Contrast    Result Date: 6/9/2024  The patient has abdominal wall skin thickening. Similar findings were present on prior exam, and would certainly suggest cellulitis. However, I don't see any evidence of abdominal wall abscess.  Radiation dose reduction techniques were utilized, including automated exposure control and exposure modulation based on body size.   This report was finalized on 6/9/2024 9:22 PM by Dr. Tess Sorto M.D on Workstation: [id number]           Assessment:  Active Hospital Problems    Diagnosis  POA    **Abdominal wall cellulitis [L03.311]  Yes    Type 2 diabetes mellitus with hyperglycemia [E11.65]  Yes    Hypertension [I10]  Yes      Resolved Hospital Problems   No resolved problems to display.         Plan:  See above  Yamila Mabry MD   6/10/2024  09:14 EDT    Parts of this note may be an electronic transcription/translation of spoken language to printed text using the Dragon dictation system.

## 2024-06-10 NOTE — CASE MANAGEMENT/SOCIAL WORK
Continued Stay Note  [facility]     Patient Name: Nathan Baez  MRN: [id number]  Today's Date: 6/10/2024    Admit Date: 6/9/2024    Plan: Plan home with spouse.  REBECA Samuel   Discharge Plan       Row Name 06/10/24 1126       Plan    Plan Plan home with spouse.  REBECA Samuel    Patient/Family in Agreement with Plan yes    Plan Comments FACE SHEET VERIFIED/ IM LETTER SIGNED.  Spoke with pt and pt's spouse  (Coco) at bedside.  Pt's PCP is Dr. Damien Pierce. Pt lives with his spouse ( Coco Baez [phone]) in a single story house.  Pt is independent with ADLs but spouse does assist him putting on his compression socks. Pt has a bath bench, cane, glucometer, grab bar, rollator, and shower chair for home use. Pt gets his prescriptions at [facility]  ( [facility]).  Pt denies any issues affording medications. Pt is not current with . Pt has been in a SNF but cannot remember the name of facility.  Pt denies any discharge needs except requesting an order for a rollator. Pt 's spouse will assist pt at home and transport pt home. Plan home with spouse. MARSHA Galdamez RN                   Discharge Codes    No documentation.                 Expected Discharge Date and Time       Expected Discharge Date Expected Discharge Time    Jun 14, 2024               Shonda Galdamez RN

## 2024-06-10 NOTE — PROGRESS NOTES
"[facility] Clinical Pharmacy Services: Vancomycin Monitoring Note    Nathan Baez is a 79 y.o. male who is on day 1/7 of pharmacy to dose vancomycin for Skin and Soft Tissue.      Updated Cultures and Sensitivities:   6/9 bcx: in process      Vitals/Labs  Ht: 170.2 cm (67\"); Wt: 104 kg (229 lb 8 oz)   Temp Readings from Last 1 Encounters:   06/10/24 98.4 °F (36.9 °C) (Oral)     Estimated Creatinine Clearance: 68.6 mL/min (by C-G formula based on SCr of 1.02 mg/dL).       Results from last 7 days   Lab Units 06/10/24  0503 06/09/24  1944   CREATININE mg/dL 1.02 1.01   WBC 10*3/mm3 7.67 8.69     Assessment/Plan    Current Vancomycin Dose: 1500 mg IV every  24  hours; provides a predicted  mg/L.hr   Next Level Date and Time: Vanc Trough on 6/11 at 0600 with am labs  We will continue to monitor patient changes and renal function. Bmp daily X 4 days    Thank you for involving pharmacy in this patient's care. Please contact pharmacy with any questions or concerns.       Julián Ingram, [facility]  Clinical Pharmacist          "

## 2024-06-10 NOTE — H&P
[facility] Admission H&P    Patient Care Team:  Damien Pierce MD as PCP - General (Family Medicine)  Bunny Diggs MD as Consulting Physician (Colon and Rectal Surgery)    Chief complaint: Lower abdominal redness and warmth    History of Present Illness    This is a 79-year-old male with history of abdominal wall cellulitis/panniculitis, diabetes, hypertension amongst numerous other medical problems as outlined below who presented to the emergency room with recurrence of redness and warmth to the skin of his lower abdomen.  He had some associated discomfort with this as well.  He has been feeling somewhat chilled at home.  Symptoms started today.  He was admitted at the end of last year for abdominal wall cellulitis and pneumonia.  He denies any shortness of breath beyond baseline or cough today.  Workup in the emergency room was consistent with recurrence of cellulitis.  He had a CT scan that did not reveal any evidence of abscess or deeper infection.  He was placed on broad-spectrum antibiotics and I been asked to admit him for further care.    Past Medical History:   Diagnosis Date    Actinic keratosis     Acute embolism and thrombosis of vein     Allergic     Allergic rhinitis     Arthritis     Cataract     Cervical radiculopathy     Chronic constipation     Colon polyp     Diabetes mellitus     Disequilibrium     DJD (degenerative joint disease), lumbar     Elevated cholesterol     Erysipelas     GERD (gastroesophageal reflux disease)     Glaucoma     Hip pain, chronic, right     History of kidney stones     X1    History of peripheral edema     LOWER LEGS BILAT, COMPRESSION KNEE HIGH     History of transfusion     Hyperlipidemia     Hypertension     Hypothyroid     Insomnia     Intervertebral disc stenosis of neural canal of lumbar region 04/12/2022    Limited mobility     RIGHT HIP    Low back pain     Male urinary stress incontinence     s/p prostatectomy-WEAR PAD    Obesity     KWAME (obstructive sleep  apnea)     Osteoarthritis     Pelvic floor dysfunction 06/2022    DEFOGRAM ORDERED AT [facility] BY DR. ROSHAN SCHAFER    Pes planus     Presbycusis     Prostate cancer     Restless legs syndrome     Shoulder pain     RIGHT, LIMITED MOBILITY-AT TIMES    Sleep apnea     bipap    Tinea corporis     Tinea cruris     Unsteady gait     RIGHT HIP    Weakness     RIGHT HIP     Past Surgical History:   Procedure Laterality Date    CATARACT EXTRACTION, BILATERAL Bilateral     CERVICAL DISCECTOMY ANTERIOR      COLONOSCOPY  03/08/2017    3/17 normal. Recheck 2022. 12/11. Repeat in 5 years    COLONOSCOPY N/A 04/19/2021    Procedure: COLONOSCOPY INTO CECUM WITH HOT & COLD  SNARE POLYPECTOMIES;  Surgeon: Jaden Chowdhury MD;  Location: [facility] ENDOSCOPY;  Service: Gastroenterology;  Laterality: N/A;  PRE: CHANGE IN BOWEL HABITS; FAMILY H/O COLON CANCER  POST: DIVERTICULOSIS, POLYPS    ENDOSCOPY N/A 01/30/2023    Procedure: ESOPHAGOGASTRODUODENOSCOPY with biopsies;  Surgeon: Jaden Chowdhury MD;  Location: [facility] ENDOSCOPY;  Service: Gastroenterology;  Laterality: N/A;  pre- abdominal pain, anemia  post- gastritis    ENDOSCOPY W/ PEG REMOVAL      EYE SURGERY  2013    Cataract    FOOT SURGERY      1998 had big toe replaced on left foot-METAL     HERNIA REPAIR  03/07/2012    umbilical    JOINT REPLACEMENT Left     big toe    MEDIAL BRANCH BLOCK Bilateral 04/07/2023    Procedure: LUMBAR MEDIAL BRANCH BLOCK bilateral L4-S1 [id number];  Surgeon: Lauren Houston MD;  Location: [facility] MAIN OR;  Service: Pain Management;  Laterality: Bilateral;    MEDIAL BRANCH BLOCK Bilateral 04/17/2023    Procedure: LUMBAR MEDIAL BRANCH BLOCK bilateral L4-S1 [id number];  Surgeon: Lauren Houston MD;  Location: [facility] MAIN OR;  Service: Pain Management;  Laterality: Bilateral;    TN ARTHRP KNE CONDYLE&PLATU MEDIAL&LAT COMPARTMENTS Left 09/13/2016    Procedure: LT TOTAL KNEE ARTHROPLASTY;  Surgeon: Tadeo Basurto MD;  Location: [facility] MAIN OR;   Service: Orthopedics    PROSTATECTOMY  1999. Radical     RADIOFREQUENCY ABLATION Right 2023    Procedure: RADIOFREQUENCY ABLATION LUMBAR right L3-S1;  Surgeon: Lauren Houston MD;  Location: [facility] MAIN OR;  Service: Pain Management;  Laterality: Right;    THYROID LOBECTOMY      TONSILLECTOMY      TOTAL HIP ARTHROPLASTY Right 2021    Procedure: TOTAL HIP ARTHROPLASTY RIGHT POSTERIOR;  Surgeon: Tadeo Basurto MD;  Location: [facility] MAIN OR;  Service: Orthopedics;  Laterality: Right;    TOTAL KNEE ARTHROPLASTY Right     TOTAL SHOULDER ARTHROPLASTY W/ DISTAL CLAVICLE EXCISION Right 2019    Procedure: TOTAL SHOULDER REVERSE ARTHROPLASTY RIGHT REPAIR RIGHT AXILLARY VEIN;  Surgeon: Behzad Kelley MD;  Location: [facility] OR [facility];  Service: Orthopedics    WRIST SURGERY Right 12/17/2014    x2  wrist replaced     Family History   Problem Relation Age of Onset    Arthritis Mother     Colon cancer Mother     Arthritis Father     Cancer Father         Prostate cancer    Heart disease Sister     Arthritis Sister     Breast cancer Sister     Gout Sister     Hypertension Sister     Cancer Sister         All three breast cancer    Hypertension Brother     Heart disease Brother     Arthritis Brother     Heart attack Brother     Bone cancer Brother     Heart disease Brother     Malig Hyperthermia Neg Hx      Social History     Tobacco Use    Smoking status: Former     Current packs/day: 0.00     Average packs/day: 1 pack/day for 24.0 years (24.0 ttl pk-yrs)     Types: Cigarettes     Start date: 1960     Quit date: 1984     Years since quittin.4    Smokeless tobacco: Former     Types: Chew   Vaping Use    Vaping status: Never Used   Substance Use Topics    Alcohol use: Yes     Alcohol/week: 2.0 standard drinks of alcohol     Types: 2 Cans of beer per week     Comment: 3-4 MONTHLY    Drug use: No     Medications Prior to Admission   Medication Sig Dispense Refill Last Dose     acetaminophen (TYLENOL) 650 MG 8 hr tablet Take 1 tablet by mouth Every 8 (Eight) Hours As Needed for Mild Pain. Indications: Pain       albuterol (PROVENTIL) (2.5 MG/3ML) 0.083% nebulizer solution Take 2.5 mg by nebulization Every 4 (Four) Hours As Needed for Wheezing. Indications: Spasm of Lung Air Passages       albuterol sulfate  (90 Base) MCG/ACT inhaler Inhale 2 puffs Every 4 (Four) Hours As Needed for Wheezing. Indications: Spasm of Lung Air Passages       amLODIPine (NORVASC) 2.5 MG tablet Take 1 tablet by mouth Daily. 90 tablet 3     aspirin 81 MG EC tablet Take 1 tablet by mouth Daily. Indications: Coronary Bypass Surgery       atenolol (Tenormin) 25 MG tablet Take 0.5 tablets by mouth Daily. 45 tablet 4     B-D UF III MINI PEN NEEDLES 31G X 5 MM misc 1 APPLICATION DAILY 100 each 3     bisacodyl (Dulcolax) 5 MG EC tablet Take 1 tablet by mouth 2 (Two) Times a Day. Indications: Constipation       brimonidine (ALPHAGAN) 0.2 % ophthalmic solution Administer 1 drop to both eyes 2 (Two) Times a Day. Indications: Wide-Angle Glaucoma       celecoxib (CeleBREX) 200 MG capsule TAKE 1 CAPSULE BY MOUTH DAILY 30 capsule 5     Cyanocobalamin (B-12) 1000 MCG sublingual tablet One sublingual tab dissolved on tongue daily 90 each 3     dorzolamide-timolol (COSOPT) 22.3-6.8 MG/ML ophthalmic solution Administer 1 drop to both eyes 2 (Two) Times a Day. Indications: Wide-Angle Glaucoma       fexofenadine (ALLEGRA) 180 MG tablet Take 1 tablet by mouth Daily. Indications: Hayfever       fluticasone-salmeterol (ADVAIR HFA) 230-21 MCG/ACT inhaler Inhale 2 (Two) Times a Day. prn       furosemide (LASIX) 40 MG tablet TAKE 1 TABLET BY MOUTH EVERY DAY  Indications: Edema 90 tablet 3     gabapentin (NEURONTIN) 300 MG capsule TAKE 1 CAPSULE BY MOUTH FOUR TIMES DAILY 360 capsule 0     glucose blood (FREESTYLE LITE) test strip Check blood sugar  each 11     HYDROcodone-acetaminophen (NORCO) 7.5-325 MG per tablet Take 1  tablet by mouth Every 8 (Eight) Hours As Needed for Moderate Pain. 30 day supply. DNF 6/5/24 90 tablet 0     Hydrocortisone, Perianal, (ANUSOL-HC) 2.5 % rectal cream APPLY RECTALLY THREE TIMES DAILY FOR 7-10 DAYS DURNG HEMORRHOID FLARE       insulin glargine (LANTUS, SEMGLEE) 100 UNIT/ML injection Inject 8 Units under the skin into the appropriate area as directed Daily. Indications: Type 2 Diabetes       ketoconazole (NIZORAL) 2 % cream Apply 1 application topically to the appropriate area as directed Daily. (Patient taking differently: Apply 1 Application topically to the appropriate area as directed Daily. prn) 60 g 3     Lancets (freestyle) lancets Check blood sugar  each 11     latanoprost (XALATAN) 0.005 % ophthalmic solution INSTILL 1 DROP IN BOTH EYES DAILY AT BEDTIME       levothyroxine (SYNTHROID, LEVOTHROID) 88 MCG tablet TAKE 1 TABLET BY MOUTH EVERY MORNING 90 tablet 3     losartan (COZAAR) 100 MG tablet TAKE 1 TABLET BY MOUTH DAILY 90 tablet 3     metFORMIN (GLUCOPHAGE) 500 MG tablet TAKE 1 TABLET BY MOUTH TWICE DAILY WITH MEALS 180 tablet 3     montelukast (SINGULAIR) 10 MG tablet TAKE 1 TABLET BY MOUTH EVERY NIGHT 90 tablet 3     pantoprazole (PROTONIX) 40 MG EC tablet Take 1 tablet by mouth 2 (Two) Times a Day. Indications: Heartburn 90 tablet 3     penicillin v potassium (VEETID) 250 MG tablet One BID 60 tablet 5     phentermine 37.5 MG capsule Take 1 capsule by mouth Every Morning. 30 capsule 3     pramipexole (MIRAPEX) 1.5 MG tablet Take  by mouth 2 (Two) Times a Day. prn  Indications: Restless Leg Syndrome       Probiotic Product (Risaquad-2) capsule capsule Take 1 capsule by mouth Daily.       rosuvastatin (CRESTOR) 20 MG tablet TAKE 1 TABLET BY MOUTH EVERY EVENING 90 tablet 3     Stimulant Laxative 8.6-50 MG per tablet TAKE 2 TABLETS BY MOUTH TWICE DAILY 200 tablet 3      Allergies:  Adhesive tape    Review of Systems   Constitutional:  Positive for chills. Negative for fatigue and  fever.   HENT:  Negative for congestion, sore throat and trouble swallowing.    Eyes:  Negative for visual disturbance.   Respiratory:  Negative for cough, chest tightness and shortness of breath.    Cardiovascular:  Negative for chest pain, palpitations and leg swelling.   Gastrointestinal:  Negative for abdominal pain, blood in stool, constipation, diarrhea, nausea and vomiting.   Endocrine: Negative for polydipsia and polyuria.   Genitourinary:  Negative for difficulty urinating, dysuria, frequency, hematuria and urgency.   Musculoskeletal:  Negative for arthralgias, joint swelling and myalgias.   Skin:  Positive for color change. Negative for rash and wound.        Redness and warmth to the skin over the lower abdomen   Neurological:  Negative for dizziness, weakness, light-headedness and numbness.        PHYSICAL EXAM    Vital Signs  tMax 24 hrs:  Temp (24hrs), Av.4 °F (36.3 °C), Min:97.4 °F (36.3 °C), Max:97.4 °F (36.3 °C)    Vitals Ranges:  Temp:  [97.4 °F (36.3 °C)] 97.4 °F (36.3 °C)  Heart Rate:  [] 101  Resp:  [18] 18  BP: (103-138)/(74-82) 103/74    Physical Exam  Vitals and nursing note reviewed.   Constitutional:       General: He is not in acute distress.     Appearance: He is well-developed. He is not ill-appearing.   HENT:      Head: Normocephalic and atraumatic.      Nose: Nose normal.      Mouth/Throat:      Mouth: Mucous membranes are not dry.   Eyes:      Conjunctiva/sclera: Conjunctivae normal.      Pupils: Pupils are equal, round, and reactive to light.   Neck:      Vascular: No JVD.   Cardiovascular:      Rate and Rhythm: Normal rate and regular rhythm.      Heart sounds: No murmur heard.     No gallop.   Pulmonary:      Effort: Pulmonary effort is normal. No accessory muscle usage or respiratory distress.      Breath sounds: No decreased breath sounds or wheezing.   Abdominal:      General: Bowel sounds are normal. There is no distension.      Palpations: Abdomen is soft.       Tenderness: There is no abdominal tenderness. There is no guarding or rebound.   Musculoskeletal:         General: No deformity. Normal range of motion.      Cervical back: Normal range of motion and neck supple.   Lymphadenopathy:      Cervical: No cervical adenopathy.   Skin:     General: Skin is warm and dry.      Findings: Erythema present. No rash.      Comments: Cellulitis to the lower abdominal wall   Neurological:      Mental Status: He is alert and oriented to person, place, and time.      Cranial Nerves: No cranial nerve deficit.         Results Review:  Results from last 7 days   Lab Units 06/09/24 1944   WBC 10*3/mm3 8.69   HEMOGLOBIN g/dL 13.9   HEMATOCRIT % 42.3   PLATELETS 10*3/mm3 139*     Results from last 7 days   Lab Units 06/09/24 1944   SODIUM mmol/L 135*   POTASSIUM mmol/L 4.0   CHLORIDE mmol/L 98   CO2 mmol/L 23.8   BUN mg/dL 17   CREATININE mg/dL 1.01   CALCIUM mg/dL 9.5   BILIRUBIN mg/dL 1.0   ALK PHOS U/L 183*   ALT (SGPT) U/L 26   AST (SGOT) U/L 25   GLUCOSE mg/dL 227*        I reviewed the patient's new clinical results.  I reviewed the patient's new imaging results and agree with the interpretation.        Active Hospital Problems    Diagnosis  POA    **Abdominal wall cellulitis [L03.311]  Yes    Type 2 diabetes mellitus with hyperglycemia [E11.65]  Yes    Hypertension [I10]  Yes      Resolved Hospital Problems   No resolved problems to display.       Assessment & Plan    The patient will be admitted.  Will keep him on broad-spectrum antibiotics for abdominal wall cellulitis.  CT scan showed no deeper infection or abscess.  Lactic acid is mildly elevated and downtrending.  He is mildly tachycardic as well.  Will place him on some IV fluids overnight tonight and reassess tomorrow.  Will also place him on moderate dose sliding scale insulin for glucose temporization and get his home dose of Lantus restarted once medications have been reconciled.  Additional plans based on his clinical  course.    I discussed the patients findings and my recommendations with patient      Tadeo Mehta MD  06/09/24  23:07 EDT

## 2024-06-10 NOTE — PLAN OF CARE
Goal Outcome Evaluation:      Pain meds as needed, denies nausea. Abx given as ordered, ivf stopped, joshua meals, vitals stable,

## 2024-06-10 NOTE — PROGRESS NOTES
Name: Nathan Baez ADMIT: 2024   : 1945  PCP: Damien Pierce MD    MRN: [id number] LOS: 1 days   AGE/SEX: 79 y.o. male  ROOM: [facility]     Subjective   Subjective   Sitting up in chair.  Feels well overall.    Objective   Objective   Vital Signs  Temp:  [97.4 °F (36.3 °C)-98.6 °F (37 °C)] 98.4 °F (36.9 °C)  Heart Rate:  [] 84  Resp:  [16-18] 18  BP: (103-138)/(63-82) 128/67  SpO2:  [94 %-98 %] 94 %  on   ;   Device (Oxygen Therapy): room air  Body mass index is 35.94 kg/m².  Physical Exam  Constitutional:       Appearance: He is ill-appearing.   Pulmonary:      Effort: Pulmonary effort is normal. No respiratory distress.      Breath sounds: No stridor.   Skin:     Coloration: Skin is not pale.      Comments: Erythema of lower abdomen, no exudate, no nodules or fluctuance   Neurological:      Mental Status: He is alert and oriented to person, place, and time.         Results Review     I reviewed the patient's new clinical results.  Results from last 7 days   Lab Units 06/10/24  0503 24   WBC 10*3/mm3 7.67 8.69   HEMOGLOBIN g/dL 12.4* 13.9   PLATELETS 10*3/mm3 146 139*     Results from last 7 days   Lab Units 06/10/24  0503 06/09/24  1944   SODIUM mmol/L 135* 135*   POTASSIUM mmol/L 3.5 4.0   CHLORIDE mmol/L 101 98   CO2 mmol/L 24.0 23.8   BUN mg/dL 17 17   CREATININE mg/dL 1.02 1.01   GLUCOSE mg/dL 267* 227*   EGFR mL/min/1.73 75.2 76.1     Results from last 7 days   Lab Units 24   ALBUMIN g/dL 4.2   BILIRUBIN mg/dL 1.0   ALK PHOS U/L 183*   AST (SGOT) U/L 25   ALT (SGPT) U/L 26     Results from last 7 days   Lab Units 06/10/24  0503 24   CALCIUM mg/dL 8.4* 9.5   ALBUMIN g/dL  --  4.2   MAGNESIUM mg/dL  --  1.7     Results from last 7 days   Lab Units 06/10/24  0503 06/10/24  0121 24  2159 24  1944   PROCALCITONIN ng/mL  --   --   --  0.27*   LACTATE mmol/L 1.7 2.5* 2.3* 3.0*     Glucose   Date/Time Value Ref Range Status   06/10/2024 1037  367 (H) 70 - 130 mg/dL Final   06/10/2024 0613 300 (H) 70 - 130 mg/dL Final   06/09/2024 2317 190 (H) 70 - 130 mg/dL Final   06/09/2024 1932 220 (H) 70 - 130 mg/dL Final       CT Abdomen Pelvis With Contrast    Result Date: 6/9/2024  The patient has abdominal wall skin thickening. Similar findings were present on prior exam, and would certainly suggest cellulitis. However, I don't see any evidence of abdominal wall abscess.  Radiation dose reduction techniques were utilized, including automated exposure control and exposure modulation based on body size.   This report was finalized on 6/9/2024 9:22 PM by Dr. Tess Sorto M.D on Workstation: [id number]     Scheduled Medications  aspirin, 81 mg, Oral, Daily  atenolol, 12.5 mg, Oral, Daily  brimonidine, 1 drop, Both Eyes, BID  cefTRIAXone, 2,000 mg, Intravenous, Q24H  dorzolamide (TRUSOPT) 2 % 1 drop, timolol (TIMOPTIC) 0.5 % 1 drop for Cosopt 22.3-6.8 mg/mL, , Both Eyes, BID  furosemide, 40 mg, Oral, Daily  gabapentin, 300 mg, Oral, 4x Daily  insulin glargine, 10 Units, Subcutaneous, Daily  insulin lispro, 3-14 Units, Subcutaneous, 4x Daily AC & at Bedtime  latanoprost, 1 drop, Both Eyes, Nightly  losartan, 100 mg, Oral, Daily  montelukast, 10 mg, Oral, Nightly  pantoprazole, 40 mg, Oral, BID  pramipexole, 1.5 mg, Oral, BID  rosuvastatin, 20 mg, Oral, Q PM  senna-docusate sodium, 2 tablet, Oral, BID  sodium chloride, 10 mL, Intravenous, Q12H  vancomycin, 1,500 mg, Intravenous, Q24H    Infusions  Pharmacy to dose vancomycin,   sodium chloride, 100 mL/hr, Last Rate: 100 mL/hr (06/10/24 0007)    Diet  Diet: Regular/House, Diabetic; Consistent Carbohydrate; Fluid Consistency: Thin (IDDSI 0)       Assessment/Plan     Active Hospital Problems    Diagnosis  POA    **Abdominal wall cellulitis [L03.311]  Yes    Type 2 diabetes mellitus with hyperglycemia [E11.65]  Yes    Mixed hyperlipidemia [E78.2]  Yes    KWAME (obstructive sleep apnea) [G47.33]  Yes    Hypertension  [I10]  Yes      Resolved Hospital Problems   No resolved problems to display.       79 y.o. male admitted with Abdominal wall cellulitis.      06/10/24  ID consulted for recurrent panniculitis.  Increase to moderate high SSI.    Abdominal wall cellulitis  Lactic acidosis, resolved  -History group B strep bacteremia  -ID consulted  -Continue vancomycin and Rocephin  -Consideration of chronic suppressive therapy given recurrent nature  -Follow-up cultures.    DM2 with hyperglycemia  -SSI    HTN  -Atenolol, losartan    HLD  -Rosuvastatin         DVT prophylaxis: SCDs  Discussed with patient and nursing staff.  Anticipated discharge home, when cleared by consultants            Brannon Simpson MD  [facility]  06/10/24  11:18 EDT

## 2024-06-10 NOTE — CASE MANAGEMENT/SOCIAL WORK
Discharge Planning Assessment  [facility]     Patient Name: Nathan Baez  MRN: [id number]  Today's Date: 6/10/2024    Admit Date: 6/9/2024    Plan: Plan home with spouse.  REBECA Samuel   Discharge Needs Assessment       Row Name 06/10/24 1122       Living Environment    People in Home spouse    Name(s) of People in Home Spouse  ( Coco Baez [phone])    Current Living Arrangements home    Potentially Unsafe Housing Conditions none    In the past 12 months has the electric, gas, oil, or water company threatened to shut off services in your home? No    Primary Care Provided by self    Provides Primary Care For no one    Family Caregiver if Needed spouse    Family Caregiver Names Spouse ( Coco Baez [phone])    Quality of Family Relationships involved;helpful;supportive    Able to Return to Prior Arrangements yes    Living Arrangement Comments Pt lives with his spouse ( Coco Baez [phone]) in a single story house.       Resource/Environmental Concerns    Resource/Environmental Concerns none    Transportation Concerns none       Transportation Needs    In the past 12 months, has lack of transportation kept you from medical appointments or from getting medications? no       Food Insecurity    Within the past 12 months, you worried that your food would run out before you got the money to buy more. Never true    Within the past 12 months, the food you bought just didn't last and you didn't have money to get more. Never true       Transition Planning    Patient/Family Anticipates Transition to home with family    Patient/Family Anticipated Services at Transition none    Transportation Anticipated family or friend will provide       Discharge Needs Assessment    Readmission Within the Last 30 Days no previous admission in last 30 days    Equipment Currently Used at Home bath bench;cane, straight;glucometer;grab bar;rollator;shower chair    Concerns to be Addressed no discharge needs  identified;denies needs/concerns at this time    Anticipated Changes Related to Illness none    Equipment Needed After Discharge bath bench;cane, straight;grab bar, tub/shower;glucometer;rollator;shower chair                   Discharge Plan       Row Name 06/10/24 1126       Plan    Plan Plan home with spouse.  REBECA Samuel    Patient/Family in Agreement with Plan yes    Plan Comments FACE SHEET VERIFIED/ IM LETTER SIGNED.  Spoke with pt and pt's spouse  (Coco) at bedside.  Pt's PCP is Dr. Damien Pierce. Pt lives with his spouse ( Coco Baez [phone]) in a single story house.  Pt is independent with ADLs but spouse does assist him putting on his compression socks. Pt has a bath bench, cane, glucometer, grab bar, rollator, and shower chair for home use. Pt gets his prescriptions at [facility]  ( [facility]  Pt denies any issues affording medications. Pt is not current with . Pt has been in a SNF but cannot remember the name of facility.  Pt denies any discharge needs except requesting an order for a rollator. Pt 's spouse will assist pt at home and transport pt home. Plan home with spouse. MARSHA Galdamez RN                  Continued Care and Services - Admitted Since 6/9/2024    No active coordination exists for this encounter.       Expected Discharge Date and Time       Expected Discharge Date Expected Discharge Time    Jun 14, 2024            Demographic Summary       Row Name 06/10/24 1121       General Information    Admission Type inpatient    Arrived From emergency department    Required Notices Provided Important Message from Medicare    Referral Source admission list    Reason for Consult discharge planning    Preferred Language English                   Functional Status       Row Name 06/10/24 1122       Functional Status    Usual Activity Tolerance moderate    Current Activity Tolerance moderate       Functional Status, IADL    Medications independent    Meal  Preparation assistive person    Housekeeping assistive person    Laundry assistive person    Shopping assistive person       Mental Status    General Appearance WDL WDL                   Psychosocial    No documentation.                  Abuse/Neglect    No documentation.                  Legal    No documentation.                  Substance Abuse    No documentation.                  Patient Forms    No documentation.                     Shonda Galdamez RN

## 2024-06-10 NOTE — PLAN OF CARE
Goal Outcome Evaluation:  Plan of Care Reviewed With: patient           Outcome Evaluation: Pt is a 80 yo male adm with abdominal wall cellulitis, he lives with his spouse and uses a cane or rollator at baseline, scooter outside the home, he has chronic back pain and has limited mobility due to this. Pt did fairly well this morning, able to walk 40 ft with a rolling walker with SBA, pt is safe to mobilize with nursing staff, PT will follow 3 x week to maximize independence for safe discharge home.      Anticipated Discharge Disposition (PT): home with assist

## 2024-06-10 NOTE — PROGRESS NOTES
"[facility] Clinical Pharmacy Services: Vancomycin Pharmacokinetic Initial Consult Note    Nathan Baez is a 79 y.o. male who is on day 1 of pharmacy to dose vancomycin.    Indication: Skin and Soft Tissue  Consulting Provider: Dr Mehta  Planned Duration of Therapy: 7 days  Loading Dose Ordered or Given: 2250 mg on 6/9/24 at 2138  Target: -600 mg/L.hr     Other Antimicrobials: Zosyn    Vitals/Labs  Ht: 170.2 cm (67\"); Wt: 109 kg (240 lb)  Temp Readings from Last 1 Encounters:   06/09/24 97.4 °F (36.3 °C)    Estimated Creatinine Clearance: 71 mL/min (by C-G formula based on SCr of 1.01 mg/dL).       Results from last 7 days   Lab Units 06/09/24  1944   CREATININE mg/dL 1.01   WBC 10*3/mm3 8.69     Assessment/Plan:    Vancomycin Dose:   1500 mg IV every hours  Predictive AUC level for the dose ordered is 410 mg/L.hr, which is within the target of 400-600 mg/L.hr  Will defer ordering Vancomycin trough to Clinical Pharmacist in the morning.    Pharmacy will follow patient's kidney function and will adjust doses and obtain levels as necessary. Thank you for involving pharmacy in this patient's care. Please contact pharmacy with any questions or concerns.                           Jamie Elliott, [facility]  Clinical Pharmacist    "

## 2024-06-10 NOTE — PLAN OF CARE
Goal Outcome Evaluation:         VSS. Red-edematous lower abdomin.  Receiving IV fluids and antibiotics.

## 2024-06-10 NOTE — THERAPY EVALUATION
Patient Name: Nathan Baez  : 1945    MRN: [id number]                              Today's Date: 6/10/2024       Admit Date: 2024    Visit Dx:     ICD-10-CM ICD-9-CM   1. Abdominal wall cellulitis  L03.311 682.2     Patient Active Problem List   Diagnosis    OA (osteoarthritis) of knee    Hypertension    Cellulitis of abdominal wall    Hypothyroidism (acquired)    Gastroesophageal reflux disease without esophagitis    Mixed hyperlipidemia    Primary insomnia    DDD (degenerative disc disease), lumbar    KWAME (obstructive sleep apnea)    Allergic    Venous insufficiency    Prostate cancer    Venous stasis dermatitis    Tinea cruris    Tinea corporis    Throat clearing    Sleep apnea    Skin lesion of face    Rib pain on left side    Rectal bleed    Presbycusis    Pes planus    Osteoarthritis    Obesity    Medicare annual wellness visit, subsequent    Lumbar strain    Internal hemorrhoids    Insomnia    Inflamed skin tag    Hyperplastic polyp of stomach    Hospital discharge follow-up    History of colon polyps    High risk medication use    Glaucoma    Gastroenteritis, acute    Probable streptococcal cellulitis    Encounter for screening colonoscopy    Encounter for long-term (current) use of NSAIDs    Dysphagia    DVT (deep venous thrombosis)    Lumbar facet arthropathy    Diverticulosis    Cough    Contact with hypodermic needle    Cervical radiculopathy    Arthritis    Allergic rhinitis    Acute glaucoma    Acute embolism and thrombosis of vein    Actinic keratosis    Status post reverse total shoulder replacement, right    Localized edema    Other specified anemias    Peripheral polyneuropathy    Campylobacter diarrhea    Hyponatremia    Generalized abdominal pain    Fever    Constipation    Bilateral lower extremity edema    Acute pyelonephritis    Chronic idiopathic constipation    Functional diarrhea    Change in bowel habits    RLS (restless legs syndrome)    Type 2 diabetes mellitus with  hyperglycemia    Family history of GI malignancy    Primary osteoarthritis of right hip    B12 deficiency    Intervertebral disc stenosis of neural canal of lumbar region    Encounter for hepatitis C screening test for low risk patient    Pain associated with defecation    Calculus of kidney    Gastroesophageal reflux disease    Body aches    Skin lesion    Candidiasis, intertrigo    Hyperbilirubinemia    Acute respiratory failure due to COVID-19    Bacteremia due to group B Streptococcus    Iron deficiency anemia    Acute respiratory failure with hypoxia and hypercapnia    Hypoventilation syndrome    Pneumonia of right lower lobe due to infectious organism    Chronic diastolic CHF (congestive heart failure)    Fatty liver    Chronic heart failure with preserved ejection fraction (HFpEF)    Pneumonia, unspecified organism    Pannus, abdominal    Abdominal wall cellulitis     Past Medical History:   Diagnosis Date    Actinic keratosis     Acute embolism and thrombosis of vein     Allergic     Allergic rhinitis     Arthritis     Cataract     Cervical radiculopathy     Chronic constipation     Colon polyp     Diabetes mellitus     Disequilibrium     DJD (degenerative joint disease), lumbar     Elevated cholesterol     Erysipelas     GERD (gastroesophageal reflux disease)     Glaucoma     Hip pain, chronic, right     History of kidney stones     X1    History of peripheral edema     LOWER LEGS BILAT, COMPRESSION KNEE HIGH     History of transfusion     Hyperlipidemia     Hypertension     Hypothyroid     Insomnia     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 ROSHAN SCHAFER    Pes planus     Presbycusis     Prostate cancer     Restless legs  syndrome     Shoulder pain     RIGHT, LIMITED MOBILITY-AT TIMES    Sleep apnea     bipap    Tinea corporis     Tinea cruris     Unsteady gait     RIGHT HIP    Weakness     RIGHT HIP     Past Surgical History:   Procedure Laterality Date    CATARACT EXTRACTION, BILATERAL Bilateral     CERVICAL DISCECTOMY ANTERIOR      COLONOSCOPY  03/08/2017    3/17 normal. Recheck 2022. 12/11. Repeat in 5 years    COLONOSCOPY N/A 04/19/2021    Procedure: COLONOSCOPY INTO CECUM WITH HOT & COLD  SNARE POLYPECTOMIES;  Surgeon: Jaden Chowdhury MD;  Location: [facility] ENDOSCOPY;  Service: Gastroenterology;  Laterality: N/A;  PRE: CHANGE IN BOWEL HABITS; FAMILY H/O COLON CANCER  POST: DIVERTICULOSIS, POLYPS    ENDOSCOPY N/A 01/30/2023    Procedure: ESOPHAGOGASTRODUODENOSCOPY with biopsies;  Surgeon: Jaden Chowdhury MD;  Location: [facility] ENDOSCOPY;  Service: Gastroenterology;  Laterality: N/A;  pre- abdominal pain, anemia  post- gastritis    ENDOSCOPY W/ PEG REMOVAL      EYE SURGERY  2013    Cataract    FOOT SURGERY      1998 had big toe replaced on left foot-METAL     HERNIA REPAIR  03/07/2012    umbilical    JOINT REPLACEMENT Left     big toe    MEDIAL BRANCH BLOCK Bilateral 04/07/2023    Procedure: LUMBAR MEDIAL BRANCH BLOCK bilateral L4-S1 [id number];  Surgeon: Lauren Houston MD;  Location: [facility] MAIN OR;  Service: Pain Management;  Laterality: Bilateral;    MEDIAL BRANCH BLOCK Bilateral 04/17/2023    Procedure: LUMBAR MEDIAL BRANCH BLOCK bilateral L4-S1 [id number];  Surgeon: Lauren Houston MD;  Location: [facility];  Service: Pain Management;  Laterality: Bilateral;    WI ARTHRP KNE CONDYLE&PLATU MEDIAL&LAT COMPARTMENTS Left 09/13/2016    Procedure: LT TOTAL KNEE ARTHROPLASTY;  Surgeon: Tadeo Basurto MD;  Location: [facility] MAIN OR;  Service: Orthopedics    PROSTATECTOMY  07/1999 July 1999. Radical     RADIOFREQUENCY ABLATION Right 05/17/2023    Procedure: RADIOFREQUENCY ABLATION LUMBAR right L3-S1;  Surgeon:  Lauren Houston MD;  [place] [facility] [place];  Service: Pain Management;  Laterality: Right;    THYROID LOBECTOMY      TONSILLECTOMY      TOTAL HIP ARTHROPLASTY Right 08/19/2021    Procedure: TOTAL HIP ARTHROPLASTY RIGHT POSTERIOR;  Surgeon: Tadeo Basurto MD;  Location: [facility] MAIN OR;  Service: Orthopedics;  Laterality: Right;    TOTAL KNEE ARTHROPLASTY Right 2014    TOTAL SHOULDER ARTHROPLASTY W/ DISTAL CLAVICLE EXCISION Right 11/13/2019    Procedure: TOTAL SHOULDER REVERSE ARTHROPLASTY RIGHT REPAIR RIGHT AXILLARY VEIN;  Surgeon: Behzad Kelley MD;  [place] [facility] [place] [facility];  Service: Orthopedics    WRIST SURGERY Right 12/17/2014    x2  wrist replaced      General Information       Row Name 06/10/24 1048          Physical Therapy Time and Intention    Document Type evaluation  -PC     Mode of Treatment physical therapy  -PC       Row Name 06/10/24 1048          General Information    Patient Profile Reviewed yes  -PC     Prior Level of Function independent:;all household mobility  wife assists with some bathing and dressing  -PC     Existing Precautions/Restrictions fall  -PC       Row Name 06/10/24 1048          Living Environment    People in Home spouse  -PC       Row Name 06/10/24 1048          Home Main Entrance    Number of Stairs, Main Entrance two  -PC     Stair Railings, Main Entrance railings safe and in good condition;railings on both sides of stairs  -PC       Row Name 06/10/24 1048          Stairs Within Home, Primary    Number of Stairs, Within Home, Primary none  -PC       Row Name 06/10/24 1048          Cognition    Orientation Status (Cognition) oriented x 4  -PC       Row Name 06/10/24 1048          Safety Issues, Functional Mobility    Impairments Affecting Function (Mobility) pain  -PC               User Key  (r) = Recorded By, (t) = Taken By, (c) = Cosigned By      Initials Name Provider Type    PC Leyla Fox, PT Physical Therapist                   Mobility       Row Name 06/10/24  1051          Bed Mobility    Comment, (Bed Mobility) up in chair  -PC       Row Name 06/10/24 1051          Sit-Stand Transfer    Sit-Stand [place] (Transfers) standby assist  -PC     Assistive Device (Sit-Stand Transfers) walker, front-wheeled  -PC       Row Name 06/10/24 1051          Gait/Stairs (Locomotion)    [place] Level (Gait) standby assist  -PC     Assistive Device (Gait) walker, front-wheeled  -PC     Distance in Feet (Gait) 40  -PC     Deviations/Abnormal Patterns (Gait) antalgic;khanh decreased;gait speed decreased  -PC     Bilateral Gait Deviations forward flexed posture;heel strike decreased  -PC     Comment, (Gait/Stairs) poor gait quality due to chronic pain in his back  -PC               User Key  (r) = Recorded By, (t) = Taken By, (c) = Cosigned By      Initials Name Provider Type    PC Leyla Fox PT Physical Therapist                   Obj/Interventions       Row Name 06/10/24 1053          Range of Motion Comprehensive    General Range of Motion bilateral lower extremity ROM WNL  -PC     Comment, General Range of Motion pt has limited back mobility  -PC       Row Name 06/10/24 1053          Strength Comprehensive (MMT)    Comment, General Manual Muscle Testing (MMT) Assessment no focal deficits, general weakness present  -PC       Row Name 06/10/24 1053          Balance    Balance Assessment sitting static balance;sitting dynamic balance;standing static balance;standing dynamic balance  -PC     Static Sitting Balance independent  -PC     Dynamic Sitting Balance independent  -PC     Position, Sitting Balance sitting in chair  -PC     Static Standing Balance modified independence  -PC     Dynamic Standing Balance standby assist  -PC     Position/Device Used, Standing Balance walker, rolling  -PC       Row Name 06/10/24 1053          Sensory Assessment (Somatosensory)    Sensory Assessment (Somatosensory) sensation intact  -PC               User Key  (r) = Recorded By, (t) =  Taken By, (c) = Cosigned By      Initials Name Provider Type    PC Leyla Fox, PT Physical Therapist                   Goals/Plan       Row Name 06/10/24 1107          Bed Mobility Goal 1 (PT)    Activity/Assistive Device (Bed Mobility Goal 1, PT) sit to supine/supine to sit  -PC     [place] Level/Cues Needed (Bed Mobility Goal 1, PT) independent  -PC     Time Frame (Bed Mobility Goal 1, PT) 1 week  -PC       Row Name 06/10/24 1107          Transfer Goal 1 (PT)    Activity/Assistive Device (Transfer Goal 1, PT) sit-to-stand/stand-to-sit  -PC     [place] Level/Cues Needed (Transfer Goal 1, PT) modified independence  -PC     Time Frame (Transfer Goal 1, PT) 1 week  -PC       Row Name 06/10/24 1107          Gait Training Goal 1 (PT)    Activity/Assistive Device (Gait Training Goal 1, PT) gait (walking locomotion);assistive device use  -PC     [place] Level (Gait Training Goal 1, PT) supervision required  -PC     Distance (Gait Training Goal 1, PT) 50  -PC     Time Frame (Gait Training Goal 1, PT) 1 week  -PC       Row Name 06/10/24 1107          Therapy Assessment/Plan (PT)    Planned Therapy Interventions (PT) bed mobility training;gait training;transfer training;strengthening  -PC               User Key  (r) = Recorded By, (t) = Taken By, (c) = Cosigned By      Initials Name Provider Type    PC Leyla Fox, PT Physical Therapist                   Clinical Impression       Row Name 06/10/24 1102          Pain    Pretreatment Pain Rating 8/10  -PC     Pain Location - back  -PC     Pain Intervention(s) Medication (See MAR);Repositioned  -PC       Row Name 06/10/24 1102          Plan of Care Review    Plan of Care Reviewed With patient  -PC     Outcome Evaluation Pt is a 80 yo male adm with abdominal wall cellulitis, he lives with his spouse and uses a cane or rollator at baseline, scooter outside the home, he has chronic back pain and has limited mobility due to this. Pt did fairly well this  morning, able to walk 40 ft with a rolling walker with SBA, pt is safe to mobilize with nursing staff, PT will follow 3 x week to maximize independence for safe discharge home.  -PC       Row Name 06/10/24 1102          Therapy Assessment/Plan (PT)    Rehab Potential (PT) good, to achieve stated therapy goals  -PC     Criteria for Skilled Interventions Met (PT) yes;meets criteria  -PC     Therapy Frequency (PT) 3 times/wk  -PC       Row Name 06/10/24 1102          Positioning and Restraints    Pre-Treatment Position sitting in chair/recliner  -PC     Post Treatment Position chair  -PC     In Chair call light within reach;encouraged to call for assist  -PC               User Key  (r) = Recorded By, (t) = Taken By, (c) = Cosigned By      Initials Name Provider Type    Leyla Estes, PT Physical Therapist                   Outcome Measures       Row Name 06/10/24 1107 06/09/24 2719       How much help from another person do you currently need...    Turning from your back to your side while in flat bed without using bedrails? 4  -PC 4  -NS    Moving from lying on back to sitting on the side of a flat bed without bedrails? 3  -PC 3  -NS    Moving to and from a bed to a chair (including a wheelchair)? 3  -PC 3  -NS    Standing up from a chair using your arms (e.g., wheelchair, bedside chair)? 3  -PC 3  -NS    Climbing 3-5 steps with a railing? 3  -PC 2  -NS    To walk in hospital room? 3  -PC 3  -NS    AM-PAC 6 Clicks Score (PT) 19  -PC 18  -NS    Highest Level of Mobility Goal 6 --> Walk 10 steps or more  -PC 6 --> Walk 10 steps or more  -NS              User Key  (r) = Recorded By, (t) = Taken By, (c) = Cosigned By      Initials Name Provider Type    Leyla Estes, PT Physical Therapist    Barbara Sarabia RN Registered Nurse                                 Physical Therapy Education       Title: PT OT SLP Therapies (In Progress)       Topic: Physical Therapy (In Progress)       Point: Mobility training  (Done)       Learning Progress Summary             Patient Acceptance, E,D, DU by PC at 6/10/2024 1108                         Point: Home exercise program (Not Started)       Learner Progress:  Not documented in this visit.              Point: Body mechanics (Done)       Learning Progress Summary             Patient Acceptance, E,D, DU by PC at 6/10/2024 1108                         Point: Precautions (Done)       Learning Progress Summary             Patient Acceptance, E,D, DU by PC at 6/10/2024 1108                                         User Key       Initials Effective Dates Name Provider Type [facility] 06/16/21 -  Leyla oFx PT Physical Therapist PT                  PT Recommendation and Plan  Planned Therapy Interventions (PT): bed mobility training, gait training, transfer training, strengthening  Plan of Care Reviewed With: patient  Outcome Evaluation: Pt is a 80 yo male adm with abdominal wall cellulitis, he lives with his spouse and uses a cane or rollator at baseline, scooter outside the home, he has chronic back pain and has limited mobility due to this. Pt did fairly well this morning, able to walk 40 ft with a rolling walker with SBA, pt is safe to mobilize with nursing staff, PT will follow 3 x week to maximize independence for safe discharge home.     Time Calculation:         PT Charges       Row Name 06/10/24 1108             Time Calculation    Start Time 0911  -PC      Stop Time 0927  -PC      Time Calculation (min) 16 min  -PC      PT Received On 06/10/24  -PC      PT - Next Appointment 06/11/24  -PC      PT Goal Re-Cert Due Date 06/17/24  -                User Key  (r) = Recorded By, (t) = Taken By, (c) = Cosigned By      Initials Name Provider Type    PC Leyla Fox PT Physical Therapist                  Therapy Charges for Today       Code Description Service Date Service Provider Modifiers Qty    26131503146 HC PT EVAL MOD COMPLEXITY 2 6/10/2024 Leyla Fox, PT GP  1    [id number]  PT THER PROC EA 15 MIN 6/10/2024 Leyla Fox, PT GP 1            PT G-Codes  AM-PAC 6 Clicks Score (PT): 19  PT Discharge Summary  Anticipated Discharge Disposition (PT): home with assist    Leyla Fox, PT  6/10/2024

## 2024-06-10 NOTE — ED NOTES
"Nursing report ED to floor  Nathan Baez  79 y.o.  male    HPI :  HPI (Adult)  Stated Reason for Visit: skin infection on abdomen  History Obtained From: patient    Chief Complaint  Chief Complaint   Patient presents with    Rash       Admitting doctor:   Tadeo Mehta MD    Admitting diagnosis:   The encounter diagnosis was Abdominal wall cellulitis.    Code status:   Current Code Status       Date Active Code Status Order ID Comments User Context       Prior            Allergies:   Adhesive tape    Isolation:   No active isolations    Intake and Output  No intake or output data in the 24 hours ending 06/09/24 2201    Weight:       06/09/24 1811   Weight: 109 kg (240 lb)       Most recent vitals:   Vitals:    06/09/24 1811 06/09/24 1814   BP:  128/76   Pulse: 103    Resp: 18    Temp: 97.4 °F (36.3 °C)    SpO2: 96%    Weight: 109 kg (240 lb)    Height: 170.2 cm (67\")        Active LDAs/IV Access:   Lines, Drains & Airways       Active LDAs       Name Placement date Placement time Site Days    Peripheral IV 06/09/24 1951 Distal;Left;Posterior Forearm 06/09/24 1951  Forearm  less than 1                    Labs (abnormal labs have a star):   Labs Reviewed   COMPREHENSIVE METABOLIC PANEL - Abnormal; Notable for the following components:       Result Value    Glucose 227 (*)     Sodium 135 (*)     Alkaline Phosphatase 183 (*)     All other components within normal limits    Narrative:     GFR Normal >60  Chronic Kidney Disease <60  Kidney Failure <15    The GFR formula is only valid for adults with stable renal function between ages 18 and 70.   LACTIC ACID, PLASMA - Abnormal; Notable for the following components:    Lactate 3.0 (*)     All other components within normal limits   PROCALCITONIN - Abnormal; Notable for the following components:    Procalcitonin 0.27 (*)     All other components within normal limits    Narrative:     As a Marker for Sepsis (Non-Neonates):    1. <0.5 ng/mL represents a low " "risk of severe sepsis and/or septic shock.  2. >2 ng/mL represents a high risk of severe sepsis and/or septic shock.    As a Marker for Lower Respiratory Tract Infections that require antibiotic therapy:    PCT on Admission    Antibiotic Therapy       6-12 Hrs later    >0.5                Strongly Recommended  >0.25 - <0.5        Recommended   0.1 - 0.25          Discouraged              Remeasure/reassess PCT  <0.1                Strongly Discouraged     Remeasure/reassess PCT    As 28 day mortality risk marker: \"Change in Procalcitonin Result\" (>80% or <=80%) if Day 0 (or Day 1) and Day 4 values are available. Refer to http://www.[facility]-pct-calculator.com    Change in PCT <=80%  A decrease of PCT levels below or equal to 80% defines a positive change in PCT test result representing a higher risk for 28-day all-cause mortality of patients diagnosed with severe sepsis for septic shock.    Change in PCT >80%  A decrease of PCT levels of more than 80% defines a negative change in PCT result representing a lower risk for 28-day all-cause mortality of patients diagnosed with severe sepsis or septic shock.      CBC WITH AUTO DIFFERENTIAL - Abnormal; Notable for the following components:    Platelets 139 (*)     Lymphocyte % 13.6 (*)     Immature Grans % 1.3 (*)     Immature Grans, Absolute 0.11 (*)     All other components within normal limits   POCT GLUCOSE FINGERSTICK - Abnormal; Notable for the following components:    Glucose 220 (*)     All other components within normal limits   PROTIME-INR - Normal   MAGNESIUM - Normal   BLOOD CULTURE   BLOOD CULTURE   LACTIC ACID, REFLEX   CBC AND DIFFERENTIAL    Narrative:     The following orders were created for panel order CBC & Differential.  Procedure                               Abnormality         Status                     ---------                               -----------         ------                     CBC Auto Differential[[id number]]        Abnormal            " Final result                 Please view results for these tests on the individual orders.       EKG:   No orders to display       Meds given in ED:   Medications   sodium chloride 0.9 % flush 10 mL (has no administration in time range)   vancomycin 2250 mg/500 mL 0.9% NS IVPB ([facility]) (2,250 mg Intravenous New Bag 24)   piperacillin-tazobactam (ZOSYN) 3.375 g IVPB in 100 mL NS MBP (CD) (0 g Intravenous Stopped 24)   iopamidol (ISOVUE-300) 61 % injection 100 mL (85 mL Intravenous Given 24)       Imaging results:  CT Abdomen Pelvis With Contrast    Result Date: 2024  The patient has abdominal wall skin thickening. Similar findings were present on prior exam, and would certainly suggest cellulitis. However, I don't see any evidence of abdominal wall abscess.  Radiation dose reduction techniques were utilized, including automated exposure control and exposure modulation based on body size.   This report was finalized on 2024 9:22 PM by Dr. Tess Sorto M.D on Workstation: [id number]       Ambulatory status:   - stand by assist     Social issues:   Social History     Socioeconomic History    Marital status:    Tobacco Use    Smoking status: Former     Current packs/day: 0.00     Average packs/day: 1 pack/day for 24.0 years (24.0 ttl pk-yrs)     Types: Cigarettes     Start date: 1960     Quit date: 1984     Years since quittin.4    Smokeless tobacco: Former     Types: Chew   Vaping Use    Vaping status: Never Used   Substance and Sexual Activity    Alcohol use: Yes     Alcohol/week: 2.0 standard drinks of alcohol     Types: 2 Cans of beer per week     Comment: 3-4 MONTHLY    Drug use: No    Sexual activity: Not Currently     Partners: Female     Birth control/protection: Vasectomy       Peripheral Neurovascular       Neuro Cognitive       Learning       Respiratory       Abdominal Pain       Pain Assessments  Pain (Adult)  (0-10) Pain Rating: Rest: 9  (0-10) Pain  Rating: Activity: 9    NIH Stroke Scale       Odalis Davis RN  06/09/24 22:01 EDT

## 2024-06-11 LAB
ANION GAP SERPL CALCULATED.3IONS-SCNC: 7 MMOL/L (ref 5–15)
BASOPHILS # BLD AUTO: 0.02 10*3/MM3 (ref 0–0.2)
BASOPHILS NFR BLD AUTO: 0.3 % (ref 0–1.5)
BUN SERPL-MCNC: 12 MG/DL (ref 8–23)
BUN/CREAT SERPL: 11.2 (ref 7–25)
CALCIUM SPEC-SCNC: 9.1 MG/DL (ref 8.6–10.5)
CHLORIDE SERPL-SCNC: 102 MMOL/L (ref 98–107)
CO2 SERPL-SCNC: 27 MMOL/L (ref 22–29)
CREAT SERPL-MCNC: 1.07 MG/DL (ref 0.76–1.27)
DEPRECATED RDW RBC AUTO: 39 FL (ref 37–54)
EGFRCR SERPLBLD CKD-EPI 2021: 71 ML/MIN/1.73
EOSINOPHIL # BLD AUTO: 0.07 10*3/MM3 (ref 0–0.4)
EOSINOPHIL NFR BLD AUTO: 0.9 % (ref 0.3–6.2)
ERYTHROCYTE [DISTWIDTH] IN BLOOD BY AUTOMATED COUNT: 12.2 % (ref 12.3–15.4)
GLUCOSE BLDC GLUCOMTR-MCNC: 260 MG/DL (ref 70–130)
GLUCOSE BLDC GLUCOMTR-MCNC: 270 MG/DL (ref 70–130)
GLUCOSE BLDC GLUCOMTR-MCNC: 289 MG/DL (ref 70–130)
GLUCOSE BLDC GLUCOMTR-MCNC: 295 MG/DL (ref 70–130)
GLUCOSE SERPL-MCNC: 247 MG/DL (ref 65–99)
HBA1C MFR BLD: 9.8 % (ref 4.8–5.6)
HCT VFR BLD AUTO: 38 % (ref 37.5–51)
HGB BLD-MCNC: 12.5 G/DL (ref 13–17.7)
IMM GRANULOCYTES # BLD AUTO: 0.15 10*3/MM3 (ref 0–0.05)
IMM GRANULOCYTES NFR BLD AUTO: 2 % (ref 0–0.5)
LYMPHOCYTES # BLD AUTO: 1.72 10*3/MM3 (ref 0.7–3.1)
LYMPHOCYTES NFR BLD AUTO: 22.4 % (ref 19.6–45.3)
MCH RBC QN AUTO: 29.2 PG (ref 26.6–33)
MCHC RBC AUTO-ENTMCNC: 32.9 G/DL (ref 31.5–35.7)
MCV RBC AUTO: 88.8 FL (ref 79–97)
MONOCYTES # BLD AUTO: 0.85 10*3/MM3 (ref 0.1–0.9)
MONOCYTES NFR BLD AUTO: 11.1 % (ref 5–12)
NEUTROPHILS NFR BLD AUTO: 4.86 10*3/MM3 (ref 1.7–7)
NEUTROPHILS NFR BLD AUTO: 63.3 % (ref 42.7–76)
NRBC BLD AUTO-RTO: 0 /100 WBC (ref 0–0.2)
PLATELET # BLD AUTO: 141 10*3/MM3 (ref 140–450)
PMV BLD AUTO: 10.4 FL (ref 6–12)
POTASSIUM SERPL-SCNC: 4.2 MMOL/L (ref 3.5–5.2)
RBC # BLD AUTO: 4.28 10*6/MM3 (ref 4.14–5.8)
SODIUM SERPL-SCNC: 136 MMOL/L (ref 136–145)
VANCOMYCIN TROUGH SERPL-MCNC: 10.4 MCG/ML (ref 5–20)
WBC NRBC COR # BLD AUTO: 7.67 10*3/MM3 (ref 3.4–10.8)

## 2024-06-11 PROCEDURE — 85025 COMPLETE CBC W/AUTO DIFF WBC: CPT | Performed by: STUDENT IN AN ORGANIZED HEALTH CARE EDUCATION/TRAINING PROGRAM

## 2024-06-11 PROCEDURE — 80202 ASSAY OF VANCOMYCIN: CPT | Performed by: STUDENT IN AN ORGANIZED HEALTH CARE EDUCATION/TRAINING PROGRAM

## 2024-06-11 PROCEDURE — 83036 HEMOGLOBIN GLYCOSYLATED A1C: CPT | Performed by: STUDENT IN AN ORGANIZED HEALTH CARE EDUCATION/TRAINING PROGRAM

## 2024-06-11 PROCEDURE — 25010000002 CEFAZOLIN PER 500 MG: Performed by: INTERNAL MEDICINE

## 2024-06-11 PROCEDURE — 82948 REAGENT STRIP/BLOOD GLUCOSE: CPT

## 2024-06-11 PROCEDURE — 80048 BASIC METABOLIC PNL TOTAL CA: CPT | Performed by: STUDENT IN AN ORGANIZED HEALTH CARE EDUCATION/TRAINING PROGRAM

## 2024-06-11 PROCEDURE — 63710000001 INSULIN GLARGINE PER 5 UNITS: Performed by: STUDENT IN AN ORGANIZED HEALTH CARE EDUCATION/TRAINING PROGRAM

## 2024-06-11 PROCEDURE — 63710000001 INSULIN LISPRO (HUMAN) PER 5 UNITS: Performed by: STUDENT IN AN ORGANIZED HEALTH CARE EDUCATION/TRAINING PROGRAM

## 2024-06-11 RX ADMIN — GABAPENTIN 300 MG: 300 CAPSULE ORAL at 12:16

## 2024-06-11 RX ADMIN — INSULIN LISPRO 8 UNITS: 100 INJECTION, SOLUTION INTRAVENOUS; SUBCUTANEOUS at 21:47

## 2024-06-11 RX ADMIN — GABAPENTIN 300 MG: 300 CAPSULE ORAL at 17:33

## 2024-06-11 RX ADMIN — DORZOLAMIDE HYDROCHLORIDE: 20 SOLUTION/ DROPS OPHTHALMIC at 21:46

## 2024-06-11 RX ADMIN — GABAPENTIN 300 MG: 300 CAPSULE ORAL at 09:21

## 2024-06-11 RX ADMIN — INSULIN GLARGINE 15 UNITS: 100 INJECTION, SOLUTION SUBCUTANEOUS at 09:21

## 2024-06-11 RX ADMIN — Medication 10 ML: at 09:35

## 2024-06-11 RX ADMIN — INSULIN LISPRO 5 UNITS: 100 INJECTION, SOLUTION INTRAVENOUS; SUBCUTANEOUS at 09:22

## 2024-06-11 RX ADMIN — BRIMONIDINE TARTRATE 1 DROP: 2 SOLUTION OPHTHALMIC at 09:36

## 2024-06-11 RX ADMIN — PRAMIPEXOLE DIHYDROCHLORIDE 1.5 MG: 1.5 TABLET ORAL at 21:47

## 2024-06-11 RX ADMIN — ASPIRIN 81 MG: 81 TABLET, COATED ORAL at 09:20

## 2024-06-11 RX ADMIN — DORZOLAMIDE HYDROCHLORIDE: 20 SOLUTION/ DROPS OPHTHALMIC at 09:36

## 2024-06-11 RX ADMIN — CEFAZOLIN 2000 MG: 2 INJECTION, POWDER, FOR SOLUTION INTRAVENOUS at 09:36

## 2024-06-11 RX ADMIN — SENNOSIDES AND DOCUSATE SODIUM 2 TABLET: 50; 8.6 TABLET ORAL at 09:20

## 2024-06-11 RX ADMIN — ATENOLOL 12.5 MG: 25 TABLET ORAL at 09:20

## 2024-06-11 RX ADMIN — LEVOTHYROXINE SODIUM 88 MCG: 88 TABLET ORAL at 06:31

## 2024-06-11 RX ADMIN — LATANOPROST 1 DROP: 50 SOLUTION OPHTHALMIC at 21:47

## 2024-06-11 RX ADMIN — GABAPENTIN 300 MG: 300 CAPSULE ORAL at 21:47

## 2024-06-11 RX ADMIN — INSULIN LISPRO 8 UNITS: 100 INJECTION, SOLUTION INTRAVENOUS; SUBCUTANEOUS at 17:33

## 2024-06-11 RX ADMIN — PRAMIPEXOLE DIHYDROCHLORIDE 1.5 MG: 1.5 TABLET ORAL at 09:21

## 2024-06-11 RX ADMIN — PANTOPRAZOLE SODIUM 40 MG: 40 TABLET, DELAYED RELEASE ORAL at 09:21

## 2024-06-11 RX ADMIN — MONTELUKAST SODIUM 10 MG: 10 TABLET, FILM COATED ORAL at 21:47

## 2024-06-11 RX ADMIN — INSULIN LISPRO 8 UNITS: 100 INJECTION, SOLUTION INTRAVENOUS; SUBCUTANEOUS at 12:16

## 2024-06-11 RX ADMIN — LOSARTAN POTASSIUM 100 MG: 100 TABLET, FILM COATED ORAL at 09:21

## 2024-06-11 RX ADMIN — PANTOPRAZOLE SODIUM 40 MG: 40 TABLET, DELAYED RELEASE ORAL at 21:47

## 2024-06-11 RX ADMIN — Medication 10 ML: at 21:47

## 2024-06-11 RX ADMIN — CEFAZOLIN 2000 MG: 2 INJECTION, POWDER, FOR SOLUTION INTRAVENOUS at 17:33

## 2024-06-11 RX ADMIN — BRIMONIDINE TARTRATE 1 DROP: 2 SOLUTION OPHTHALMIC at 21:46

## 2024-06-11 RX ADMIN — FUROSEMIDE 40 MG: 40 TABLET ORAL at 09:21

## 2024-06-11 RX ADMIN — ROSUVASTATIN CALCIUM 20 MG: 20 TABLET, FILM COATED ORAL at 17:32

## 2024-06-11 RX ADMIN — SENNOSIDES AND DOCUSATE SODIUM 2 TABLET: 50; 8.6 TABLET ORAL at 21:47

## 2024-06-11 NOTE — PROGRESS NOTES
"  Infectious Diseases Progress Note    Yamila Mabry MD     [facility]  [place]: 2 days  Patient Identification:  Name: Nathan Baez  Age: 79 y.o.  Sex: male  :  1945  MRN: [id number]         Primary Care Physician: Damien Pierce MD        Subjective: Feeling better with decreased pain and discomfort in his abdominal wall and pannus.  Interval History: See consultation note.    Objective:    Scheduled Meds:aspirin, 81 mg, Oral, Daily  atenolol, 12.5 mg, Oral, Daily  brimonidine, 1 drop, Both Eyes, BID  cefTRIAXone, 2,000 mg, Intravenous, Q24H  dorzolamide (TRUSOPT) 2 % 1 drop, timolol (TIMOPTIC) 0.5 % 1 drop for Cosopt 22.3-6.8 mg/mL, , Both Eyes, BID  furosemide, 40 mg, Oral, Daily  gabapentin, 300 mg, Oral, 4x Daily  insulin glargine, 15 Units, Subcutaneous, Daily  insulin lispro, 3-14 Units, Subcutaneous, 4x Daily AC & at Bedtime  latanoprost, 1 drop, Both Eyes, Nightly  levothyroxine, 88 mcg, Oral, Q AM  losartan, 100 mg, Oral, Daily  montelukast, 10 mg, Oral, Nightly  pantoprazole, 40 mg, Oral, BID  pramipexole, 1.5 mg, Oral, BID  rosuvastatin, 20 mg, Oral, Q PM  senna-docusate sodium, 2 tablet, Oral, BID  sodium chloride, 10 mL, Intravenous, Q12H      Continuous Infusions:     Vital signs in last 24 hours:  Temp:  [97.3 °F (36.3 °C)-98.9 °F (37.2 °C)] 98.4 °F (36.9 °C)  Heart Rate:  [] 124  Resp:  [16-18] 18  BP: (131-163)/(66-84) 163/84    Intake/Output:    Intake/Output Summary (Last 24 hours) at 2024 0825  Last data filed at 2024 0524  Gross per 24 hour   Intake 480 ml   Output --   Net 480 ml       Exam:  /84 (BP Location: Right arm, Patient Position: Lying)   Pulse (!) 124   Temp 98.4 °F (36.9 °C) (Oral)   Resp 18   Ht 170.2 cm (67\")   Wt 104 kg (229 lb 8 oz)   SpO2 93%   BMI 35.94 kg/m²   Patient is examined using the personal protective equipment as per guidelines from infection control for this particular patient as enacted.  Hand washing was " performed before and after patient interaction.  General Appearance:    Alert, cooperative, no distress, AAOx3                          Head:    Normocephalic, without obvious abnormality, atraumatic                           Eyes:    PERRL, conjunctivae/corneas clear, EOM's intact, both eyes                         Throat:   Lips, tongue, gums normal; oral mucosa pink and moist                           Neck:   Supple, symmetrical, trachea midline, no JVD                         Lungs:    Clear to auscultation bilaterally, respirations unlabored                 Chest Wall:    No tenderness or deformity                          Heart:    Regular rate and rhythm, S1 and S2 normal, no murmur, no rub                                         or gallop                  Abdomen:   Dependent erythema of the pannus which improved significantly upon elevation of his erythematous abdominal wall.  I used patient cell phone to take pictures of variation and erythema depending upon the posterior of his pannus and explained to him that the dependent erythema is different than cellulitis.                 Extremities:   Extremities normal, atraumatic, no cyanosis or edema                        Pulses:   Pulses palpable in all extremities                            Skin: Edema and chronic changes and dependent erythema of the abdominal wall pannus noted.                  Neurologic: Alert and oriented and grossly nonfocal       Data Review:    I reviewed the patient's new clinical results.  Results from last 7 days   Lab Units 06/11/24  0650 06/10/24  0503 06/09/24 1944   WBC 10*3/mm3 7.67 7.67 8.69   HEMOGLOBIN g/dL 12.5* 12.4* 13.9   PLATELETS 10*3/mm3 141 146 139*     Results from last 7 days   Lab Units 06/11/24  0650 06/10/24  0503 06/09/24  1944   SODIUM mmol/L 136 135* 135*   POTASSIUM mmol/L 4.2 3.5 4.0   CHLORIDE mmol/L 102 101 98   CO2 mmol/L 27.0 24.0 23.8   BUN mg/dL 12 17 17   CREATININE mg/dL 1.07 1.02 1.01   CALCIUM  mg/dL 9.1 8.4* 9.5   GLUCOSE mg/dL 247* 267* 227*     Microbiology Results (last 10 days)       Procedure Component Value - Date/Time    MRSA Screen, PCR (Inpatient) - Swab, Nares [[id number]]  (Normal) Collected: 06/10/24 1255    Lab Status: Final result Specimen: Swab from Nares Updated: 06/10/24 1502     MRSA PCR No MRSA Detected    Narrative:      The negative predictive value of this diagnostic test is high and should only be used to consider de-escalating anti-MRSA therapy. A positive result may indicate colonization with MRSA and must be correlated clinically.    Blood Culture - Blood, Arm, Right [[id number]]  (Normal) Collected: 06/09/24 2010    Lab Status: Preliminary result Specimen: Blood from Arm, Right Updated: 06/10/24 2030     Blood Culture No growth at 24 hours    Blood Culture - Blood, Arm, Right [[id number]]  (Normal) Collected: 06/09/24 2010    Lab Status: Preliminary result Specimen: Blood from Arm, Right Updated: 06/10/24 2030     Blood Culture No growth at 24 hours                Assessment:    Abdominal wall cellulitis    Hypertension    Mixed hyperlipidemia    KWAME (obstructive sleep apnea)    Type 2 diabetes mellitus with hyperglycemia  1-recurrent cellulitis of the abdominal wall in the setting of known group B strep bacteremia with risk factor for group B strep colonization and recurrent invasive infection with possibilities of MRSA and other strep species.  2-diabetes mellitus  3-fatty liver  4-history of DVT and PE  5-hypothyroidism  6-history of prostate cancer with prostatectomy  7-history of bilateral knee joint replacement and orthopedic interventions and instrumentations  8-other diagnoses per primary team.     Recommendations/Discussions:  See my discussion and recommendations on 6/10/2024.  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  The patient has more recurrence of abdominal wall cellulitis and these future episodes are noted to be due to group B strep and patient may be a candidate for chronic suppressive therapy with oral penicillin.  Patient educated about prevention of cellulitis with Hibiclens whole body wash on q. weekly basis and avoidance of trauma or injury.  Decreasing body wall edema is also helpful in preventing future episodes of infection.  Patient is considered stable and clinically improved and can be discharged on oral antibiotics in the next 24 to 48 hours.  Yamila Mabry MD  6/11/2024  08:25 EDT    Parts of this note may be an electronic transcription/translation of spoken language to printed text using the Dragon dictation system.

## 2024-06-11 NOTE — PLAN OF CARE
Goal Outcome Evaluation:  Patient A&Ox4. On room air. Sinus rhythm on monitor. VSS. IV abx given per MAR. Plan of care ongoing.

## 2024-06-11 NOTE — PROGRESS NOTES
Name: Nathan Baez ADMIT: 2024   : 1945  PCP: Damien Pierce MD    MRN: [id number] LOS: 2 days   AGE/SEX: 79 y.o. male  ROOM: [facility]     Subjective   Subjective   Sitting up in chair.  Feels a little better today overall.  No new issues.    Objective   Objective   Vital Signs  Temp:  [97.3 °F (36.3 °C)-98.9 °F (37.2 °C)] 98.4 °F (36.9 °C)  Heart Rate:  [] 78  Resp:  [16-18] 18  BP: (131-163)/(66-84) 131/75  SpO2:  [93 %-97 %] 93 %  on   ;   Device (Oxygen Therapy): room air  Body mass index is 35.94 kg/m².  Physical Exam  Constitutional:       Appearance: He is ill-appearing.   Pulmonary:      Effort: Pulmonary effort is normal. No respiratory distress.      Breath sounds: No stridor.   Skin:     Coloration: Skin is not pale.      Comments: Erythema of lower abdomen, no exudate, no nodules or fluctuance   Neurological:      Mental Status: He is alert and oriented to person, place, and time.         Results Review     I reviewed the patient's new clinical results.  Results from last 7 days   Lab Units 24  0650 06/10/24  0503 24   WBC 10*3/mm3 7.67 7.67 8.69   HEMOGLOBIN g/dL 12.5* 12.4* 13.9   PLATELETS 10*3/mm3 141 146 139*     Results from last 7 days   Lab Units 24  0650 06/10/24  0503 24   SODIUM mmol/L 136 135* 135*   POTASSIUM mmol/L 4.2 3.5 4.0   CHLORIDE mmol/L 102 101 98   CO2 mmol/L 27.0 24.0 23.8   BUN mg/dL 12 17 17   CREATININE mg/dL 1.07 1.02 1.01   GLUCOSE mg/dL 247* 267* 227*   EGFR mL/min/1.73 71.0 75.2 76.1     Results from last 7 days   Lab Units 24   ALBUMIN g/dL 4.2   BILIRUBIN mg/dL 1.0   ALK PHOS U/L 183*   AST (SGOT) U/L 25   ALT (SGPT) U/L 26     Results from last 7 days   Lab Units 24  0650 06/10/24  0503 24   CALCIUM mg/dL 9.1 8.4* 9.5   ALBUMIN g/dL  --   --  4.2   MAGNESIUM mg/dL  --   --  1.7     Results from last 7 days   Lab Units 06/10/24  0503 06/10/24  0121 24  2159 24  194    PROCALCITONIN ng/mL  --   --   --  0.27*   LACTATE mmol/L 1.7 2.5* 2.3* 3.0*     Hemoglobin A1C   Date/Time Value Ref Range Status   06/11/2024 0650 9.80 (H) 4.80 - 5.60 % Final     Glucose   Date/Time Value Ref Range Status   06/11/2024 1035 295 (H) 70 - 130 mg/dL Final   06/11/2024 0615 260 (H) 70 - 130 mg/dL Final   06/10/2024 2046 186 (H) 70 - 130 mg/dL Final   06/10/2024 1532 237 (H) 70 - 130 mg/dL Final   06/10/2024 1037 367 (H) 70 - 130 mg/dL Final   06/10/2024 0613 300 (H) 70 - 130 mg/dL Final   06/09/2024 2317 190 (H) 70 - 130 mg/dL Final       CT Abdomen Pelvis With Contrast    Result Date: 6/9/2024  The patient has abdominal wall skin thickening. Similar findings were present on prior exam, and would certainly suggest cellulitis. However, I don't see any evidence of abdominal wall abscess.  Radiation dose reduction techniques were utilized, including automated exposure control and exposure modulation based on body size.   This report was finalized on 6/9/2024 9:22 PM by Dr. Tess Sorto M.D on Workstation: [id number]     Scheduled Medications  aspirin, 81 mg, Oral, Daily  atenolol, 12.5 mg, Oral, Daily  brimonidine, 1 drop, Both Eyes, BID  ceFAZolin, 2,000 mg, Intravenous, Q8H  dorzolamide (TRUSOPT) 2 % 1 drop, timolol (TIMOPTIC) 0.5 % 1 drop for Cosopt 22.3-6.8 mg/mL, , Both Eyes, BID  furosemide, 40 mg, Oral, Daily  gabapentin, 300 mg, Oral, 4x Daily  insulin glargine, 15 Units, Subcutaneous, Daily  insulin lispro, 3-14 Units, Subcutaneous, 4x Daily AC & at Bedtime  latanoprost, 1 drop, Both Eyes, Nightly  levothyroxine, 88 mcg, Oral, Q AM  losartan, 100 mg, Oral, Daily  montelukast, 10 mg, Oral, Nightly  pantoprazole, 40 mg, Oral, BID  pramipexole, 1.5 mg, Oral, BID  rosuvastatin, 20 mg, Oral, Q PM  senna-docusate sodium, 2 tablet, Oral, BID  sodium chloride, 10 mL, Intravenous, Q12H    Infusions     Diet  Diet: Regular/House, Diabetic; Consistent Carbohydrate; Fluid Consistency: Thin (IDDSI  0)       Assessment/Plan     Active Hospital Problems    Diagnosis  POA    **Abdominal wall cellulitis [L03.311]  Yes    Type 2 diabetes mellitus with hyperglycemia [E11.65]  Yes    Mixed hyperlipidemia [E78.2]  Yes    KWAME (obstructive sleep apnea) [G47.33]  Yes    Hypertension [I10]  Yes      Resolved Hospital Problems   No resolved problems to display.       79 y.o. male admitted with Abdominal wall cellulitis.      06/11/24  Plan to keep for 1 more day on IV antibiotics, if interval improvement will plan to discharge tomorrow.  Increase insulin.    Abdominal wall cellulitis  Lactic acidosis, resolved  -History group B strep bacteremia  -ID following  -Transition vancomycin and Rocephin to cefazolin; plan for 10-day course of Keflex, after which time continue chronic penicillin  -Hibiclens body wash weekly  -Consideration of chronic suppressive therapy given recurrent nature  -Cultures NGTD    DM2 with hyperglycemia  -Increase glargine to 15 units daily  -SSI    HTN  -Atenolol, losartan    HLD  -Rosuvastatin         DVT prophylaxis: SCDs  Discussed with patient, family, and nursing staff.  Anticipated discharge home, tomorrow            Brannon Simpson MD  [facility]  06/11/24  12:05 EDT

## 2024-06-11 NOTE — PLAN OF CARE
Goal Outcome Evaluation:  Plan of Care Reviewed With: patient        Progress: improving  Outcome Evaluation: Patient  resting  at this  time.  No  complaint  of  pain voiced.   Assisted   x  1  to  bathroom.  Continue  ATB  therapy.  Abdomen  red,  warm.  Room  air.   SR on the   monitor  Nursing  will   continue to monitor.

## 2024-06-12 ENCOUNTER — READMISSION MANAGEMENT (OUTPATIENT)
Dept: CALL CENTER | Facility: HOSPITAL | Age: 79
End: 2024-06-12
Payer: MEDICARE

## 2024-06-12 VITALS
WEIGHT: 229.5 LBS | HEIGHT: 67 IN | SYSTOLIC BLOOD PRESSURE: 135 MMHG | DIASTOLIC BLOOD PRESSURE: 84 MMHG | OXYGEN SATURATION: 93 % | TEMPERATURE: 98.1 F | BODY MASS INDEX: 36.02 KG/M2 | HEART RATE: 90 BPM | RESPIRATION RATE: 16 BRPM

## 2024-06-12 LAB
ANION GAP SERPL CALCULATED.3IONS-SCNC: 8 MMOL/L (ref 5–15)
BASOPHILS # BLD AUTO: 0.03 10*3/MM3 (ref 0–0.2)
BASOPHILS NFR BLD AUTO: 0.6 % (ref 0–1.5)
BUN SERPL-MCNC: 15 MG/DL (ref 8–23)
BUN/CREAT SERPL: 13.8 (ref 7–25)
CALCIUM SPEC-SCNC: 9.1 MG/DL (ref 8.6–10.5)
CHLORIDE SERPL-SCNC: 103 MMOL/L (ref 98–107)
CO2 SERPL-SCNC: 27 MMOL/L (ref 22–29)
CREAT SERPL-MCNC: 1.09 MG/DL (ref 0.76–1.27)
DEPRECATED RDW RBC AUTO: 39.9 FL (ref 37–54)
EGFRCR SERPLBLD CKD-EPI 2021: 69.5 ML/MIN/1.73
EOSINOPHIL # BLD AUTO: 0.11 10*3/MM3 (ref 0–0.4)
EOSINOPHIL NFR BLD AUTO: 2.1 % (ref 0.3–6.2)
ERYTHROCYTE [DISTWIDTH] IN BLOOD BY AUTOMATED COUNT: 12.5 % (ref 12.3–15.4)
GLUCOSE BLDC GLUCOMTR-MCNC: 240 MG/DL (ref 70–130)
GLUCOSE BLDC GLUCOMTR-MCNC: 315 MG/DL (ref 70–130)
GLUCOSE SERPL-MCNC: 234 MG/DL (ref 65–99)
HCT VFR BLD AUTO: 37.8 % (ref 37.5–51)
HGB BLD-MCNC: 12.5 G/DL (ref 13–17.7)
IMM GRANULOCYTES # BLD AUTO: 0.13 10*3/MM3 (ref 0–0.05)
IMM GRANULOCYTES NFR BLD AUTO: 2.4 % (ref 0–0.5)
LYMPHOCYTES # BLD AUTO: 1.7 10*3/MM3 (ref 0.7–3.1)
LYMPHOCYTES NFR BLD AUTO: 31.8 % (ref 19.6–45.3)
MCH RBC QN AUTO: 29.3 PG (ref 26.6–33)
MCHC RBC AUTO-ENTMCNC: 33.1 G/DL (ref 31.5–35.7)
MCV RBC AUTO: 88.5 FL (ref 79–97)
MONOCYTES # BLD AUTO: 0.62 10*3/MM3 (ref 0.1–0.9)
MONOCYTES NFR BLD AUTO: 11.6 % (ref 5–12)
NEUTROPHILS NFR BLD AUTO: 2.75 10*3/MM3 (ref 1.7–7)
NEUTROPHILS NFR BLD AUTO: 51.5 % (ref 42.7–76)
PLATELET # BLD AUTO: 150 10*3/MM3 (ref 140–450)
PMV BLD AUTO: 10.7 FL (ref 6–12)
POTASSIUM SERPL-SCNC: 3.7 MMOL/L (ref 3.5–5.2)
RBC # BLD AUTO: 4.27 10*6/MM3 (ref 4.14–5.8)
SODIUM SERPL-SCNC: 138 MMOL/L (ref 136–145)
WBC NRBC COR # BLD AUTO: 5.34 10*3/MM3 (ref 3.4–10.8)

## 2024-06-12 PROCEDURE — 85025 COMPLETE CBC W/AUTO DIFF WBC: CPT | Performed by: STUDENT IN AN ORGANIZED HEALTH CARE EDUCATION/TRAINING PROGRAM

## 2024-06-12 PROCEDURE — 80048 BASIC METABOLIC PNL TOTAL CA: CPT | Performed by: STUDENT IN AN ORGANIZED HEALTH CARE EDUCATION/TRAINING PROGRAM

## 2024-06-12 PROCEDURE — 25010000002 CEFAZOLIN PER 500 MG: Performed by: INTERNAL MEDICINE

## 2024-06-12 PROCEDURE — 63710000001 INSULIN LISPRO (HUMAN) PER 5 UNITS: Performed by: STUDENT IN AN ORGANIZED HEALTH CARE EDUCATION/TRAINING PROGRAM

## 2024-06-12 PROCEDURE — 82948 REAGENT STRIP/BLOOD GLUCOSE: CPT

## 2024-06-12 PROCEDURE — 63710000001 INSULIN GLARGINE PER 5 UNITS: Performed by: STUDENT IN AN ORGANIZED HEALTH CARE EDUCATION/TRAINING PROGRAM

## 2024-06-12 RX ORDER — CEPHALEXIN 500 MG/1
500 CAPSULE ORAL 4 TIMES DAILY
Qty: 28 CAPSULE | Refills: 0 | Status: SHIPPED | OUTPATIENT
Start: 2024-06-12 | End: 2024-06-19

## 2024-06-12 RX ORDER — PENICILLIN V POTASSIUM 250 MG/1
TABLET ORAL
Start: 2024-06-19 | End: 2024-06-18

## 2024-06-12 RX ADMIN — LEVOTHYROXINE SODIUM 88 MCG: 88 TABLET ORAL at 07:13

## 2024-06-12 RX ADMIN — ASPIRIN 81 MG: 81 TABLET, COATED ORAL at 09:26

## 2024-06-12 RX ADMIN — SENNOSIDES AND DOCUSATE SODIUM 2 TABLET: 50; 8.6 TABLET ORAL at 09:25

## 2024-06-12 RX ADMIN — INSULIN LISPRO 5 UNITS: 100 INJECTION, SOLUTION INTRAVENOUS; SUBCUTANEOUS at 09:26

## 2024-06-12 RX ADMIN — HYDROCODONE BITARTRATE AND ACETAMINOPHEN 1 TABLET: 7.5; 325 TABLET ORAL at 02:28

## 2024-06-12 RX ADMIN — GABAPENTIN 300 MG: 300 CAPSULE ORAL at 09:25

## 2024-06-12 RX ADMIN — CEFAZOLIN 2000 MG: 2 INJECTION, POWDER, FOR SOLUTION INTRAVENOUS at 02:23

## 2024-06-12 RX ADMIN — PRAMIPEXOLE DIHYDROCHLORIDE 1.5 MG: 1.5 TABLET ORAL at 09:25

## 2024-06-12 RX ADMIN — ATENOLOL 12.5 MG: 25 TABLET ORAL at 09:25

## 2024-06-12 RX ADMIN — BRIMONIDINE TARTRATE 1 DROP: 2 SOLUTION OPHTHALMIC at 09:27

## 2024-06-12 RX ADMIN — CEFAZOLIN 2000 MG: 2 INJECTION, POWDER, FOR SOLUTION INTRAVENOUS at 09:26

## 2024-06-12 RX ADMIN — DORZOLAMIDE HYDROCHLORIDE: 20 SOLUTION/ DROPS OPHTHALMIC at 09:28

## 2024-06-12 RX ADMIN — PANTOPRAZOLE SODIUM 40 MG: 40 TABLET, DELAYED RELEASE ORAL at 09:25

## 2024-06-12 RX ADMIN — FUROSEMIDE 40 MG: 40 TABLET ORAL at 09:26

## 2024-06-12 RX ADMIN — INSULIN GLARGINE 15 UNITS: 100 INJECTION, SOLUTION SUBCUTANEOUS at 09:26

## 2024-06-12 NOTE — OUTREACH NOTE
Prep Survey      Flowsheet Row Responses   [facility] patient discharged from? [place]   Is LACE score < 7 ? No   Eligibility [facility]   Date of Admission 06/09/24   Date of Discharge 06/12/24   Discharge Disposition Home or Self Care   Discharge diagnosis Abdominal wall cellulitis   Does the patient have one of the following disease processes/diagnoses(primary or secondary)? Other   Prep survey completed? Yes            Nicolasa DOS SANTOS - Registered Nurse              Nicolasa DOS SANTOS - Registered Nurse

## 2024-06-12 NOTE — CASE MANAGEMENT/SOCIAL WORK
Continued Stay Note  [facility]     Patient Name: Nathan Baez  MRN: [id number]  Today's Date: 6/12/2024    Admit Date: 6/9/2024    Plan: Plan home with spouse.  MARSHA Galdamez RN   Discharge Plan       Row Name 06/12/24 1159       Plan    Plan Plan home with spouse.  MARSHA Galdamez RN    Plan Comments Spoke with pt at bedside.   Pt denies any discharge needs.  Pt's spouse will assist pt at home and transport pt home.  MARSHA Galdamez RN                   Discharge Codes    No documentation.                 Expected Discharge Date and Time       Expected Discharge Date Expected Discharge Time    Jun 12, 2024               Shonda Galdamez RN

## 2024-06-12 NOTE — DISCHARGE SUMMARY
"    Patient Name: Nathan Baez  : 1945  MRN: [id number]    Date of Admission: 2024  Date of Discharge:  2024  Primary Care Physician: Damien Pierce MD      Chief Complaint:   Rash      Discharge Diagnoses     Active Hospital Problems    Diagnosis  POA    **Abdominal wall cellulitis [L03.311]  Yes    Type 2 diabetes mellitus with hyperglycemia [E11.65]  Yes    Mixed hyperlipidemia [E78.2]  Yes    KWAME (obstructive sleep apnea) [G47.33]  Yes    Hypertension [I10]  Yes      Resolved Hospital Problems   No resolved problems to display.        Admitting HPI     \"This is a 79-year-old male with history of abdominal wall cellulitis/panniculitis, diabetes, hypertension amongst numerous other medical problems as outlined below who presented to the emergency room with recurrence of redness and warmth to the skin of his lower abdomen. He had some associated discomfort with this as well. He has been feeling somewhat chilled at home. Symptoms started today. He was admitted at the end of last year for abdominal wall cellulitis and pneumonia. He denies any shortness of breath beyond baseline or cough today. Workup in the emergency room was consistent with recurrence of cellulitis. He had a CT scan that did not reveal any evidence of abscess or deeper infection. He was placed on broad-spectrum antibiotics and I been asked to admit him for further care \"    Hospital Course     Pt admitted for abdominal wall cellulitis/panniculitis.  Seen in consultation with ID.  He was started on broad-spectrum antibiotics with improvement of symptoms.  Plan to discharge with Keflex to complete total 10-day course.  He will resume chronic suppressive penicillin therapy thereafter.  Also discussed using Hibiclens at least twice weekly in order to hopefully prevent recurrent cellulitis.  All other infectious workup negative.  Patient symptomatically improved with decrease in erythema on day of discharge.  Also discussed that " he needs follow-up with PCP for continued management of diabetes and prefer tighter control to reduce risk of ongoing infection.  Discussed with patient and wife and they are in agreement with treatment plan.    Discharge Plan     Abdominal wall cellulitis  Lactic acidosis, resolved  -History group B strep bacteremia  -ID eval'd  -Cultures NGTD  -Complete 10 days Keflex, continue chronic prophylactic penicillin thereafter  -Hibiclens body wash 2 times weekly    DM2 with hyperglycemia  -resume home meds  -f/u with PCP for tighter A1c control for continued infection reduction risk     HTN  -Atenolol, losartan     HLD  -Rosuvastatin    Day of Discharge     Physical Exam:  Temp:  [97.9 °F (36.6 °C)-98.6 °F (37 °C)] 98.1 °F (36.7 °C)  Heart Rate:  [78-90] 90  Resp:  [16-18] 16  BP: (101-135)/(67-90) 135/84  Body mass index is 35.94 kg/m².  Physical Exam  Constitutional:       General: He is not in acute distress.     Appearance: He is not ill-appearing.   Eyes:      Extraocular Movements: Extraocular movements intact.      Pupils: Pupils are equal, round, and reactive to light.   Cardiovascular:      Rate and Rhythm: Normal rate and regular rhythm.   Pulmonary:      Effort: Pulmonary effort is normal. No respiratory distress.      Breath sounds: No stridor.   Abdominal:      General: Abdomen is flat. There is no distension.      Palpations: Abdomen is soft.   Musculoskeletal:      Right lower leg: No edema.      Left lower leg: No edema.   Skin:     General: Skin is warm.      Comments: Erythema of lower abdomen, no exudate, no nodules or fluctuance, improved from prior   Neurological:      General: No focal deficit present.      Mental Status: He is alert and oriented to person, place, and time.   Psychiatric:         Mood and Affect: Mood normal.         Behavior: Behavior normal.         Consultants     Consult Orders (all) (From admission, onward)       Start     Ordered    06/10/24 0904  Inpatient Infectious  Diseases Consult  Once        Specialty:  Infectious Diseases  Provider:  Yamila Mabry MD    06/10/24 0904    06/10/24 0853  Inpatient Infectious Diseases Consult  Once,   Status:  Canceled        Specialty:  Infectious Diseases  Provider:  Krishna Aguilera DO    06/10/24 0852    06/09/24 2131  [facility] (on-call MD unless specified) Details  Once        Specialty:  Hospitalist  Provider:  (Not yet assigned)    06/09/24 2130                  Procedures     * Surgery not found *      Imaging Results (All)       Procedure Component Value Units Date/Time    CT Abdomen Pelvis With Contrast [[id number]] Collected: 06/09/24 2116     Updated: 06/09/24 2125    Narrative:      CT OF THE ABDOMEN AND PELVIS WITH CONTRAST     HISTORY: Cellulitis     COMPARISON: December 10, 2023     TECHNIQUE: Axial CT imaging was obtained through the abdomen and pelvis.  IV contrast was administered.     FINDINGS:  Small noncalcified nodule within the right lower lobe has been present  since November 2022, and is likely benign. Patient has some areas of  scarring noted at the lung bases bilaterally. There are also bilateral  lung herniations, also noted on prior study. No suspicious hepatic  lesions are seen. The stomach, duodenum, and adrenal glands are all  normal. A few calcified granulomata are noted within the spleen. There  is pancreatic atrophy. There is cholelithiasis. The kidneys enhance  symmetrically. There is no hydronephrosis. There are areas of cortical  thinning noted within both kidneys. There are bilateral nonobstructing  renal stones. The largest stone on the right measures up to 5 mm. Stone  on the left measures 3 mm. There is a large simple appearing left renal  cyst. No additional follow-up is necessary. No distal ureteral or  bladder stones are seen. Prostate gland is absent. There is colonic  diverticulosis. There is no bowel obstruction. There is no evidence of  appendicitis. There are aortoiliac calcifications. The  patient has skin  thickening involving the lower anterior abdominal wall, with similar  findings being noted on the prior study. No fluid collections are noted  which would suggest abscess. No extension into the abdomen is seen. No  acute osseous abnormalities are seen. There are changes of prior right  hip arthroplasty. There is lumbar scoliosis, with convexity to the  right.       Impression:      The patient has abdominal wall skin thickening. Similar findings were  present on prior exam, and would certainly suggest cellulitis. However,  I don't see any evidence of abdominal wall abscess.     Radiation dose reduction techniques were utilized, including automated  exposure control and exposure modulation based on body size.        This report was finalized on 6/9/2024 9:22 PM by Dr. Tess Sorto M.D on Workstation: [id number]             Results for orders placed during the hospital encounter of 03/16/19    Duplex Venous Lower Extremity - Left CAR    Interpretation Summary  · Normal left lower extremity venous duplex scan.    Results for orders placed during the hospital encounter of 12/08/23    Adult Transthoracic Echo Complete W/ Cont if Necessary Per Protocol    Interpretation Summary    Left ventricular systolic function is normal. Calculated left ventricular EF = 54.8%    Left ventricular diastolic function is consistent with (grade I) impaired relaxation.    Mild tricuspid valve regurgitation is present.    Estimated right ventricular systolic pressure from tricuspid regurgitation is normal (<35 mmHg).    Pertinent Labs     Results from last 7 days   Lab Units 06/12/24  0541 06/11/24  0650 06/10/24  0503 06/09/24 1944   WBC 10*3/mm3 5.34 7.67 7.67 8.69   HEMOGLOBIN g/dL 12.5* 12.5* 12.4* 13.9   PLATELETS 10*3/mm3 150 141 146 139*     Results from last 7 days   Lab Units 06/12/24  0541 06/11/24  0650 06/10/24  0503 06/09/24 1944   SODIUM mmol/L 138 136 135* 135*   POTASSIUM mmol/L 3.7 4.2 3.5 4.0  "  CHLORIDE mmol/L 103 102 101 98   CO2 mmol/L 27.0 27.0 24.0 23.8   BUN mg/dL 15 12 17 17   CREATININE mg/dL 1.09 1.07 1.02 1.01   GLUCOSE mg/dL 234* 247* 267* 227*   EGFR mL/min/1.73 69.5 71.0 75.2 76.1     Results from last 7 days   Lab Units 06/09/24  1944   ALBUMIN g/dL 4.2   BILIRUBIN mg/dL 1.0   ALK PHOS U/L 183*   AST (SGOT) U/L 25   ALT (SGPT) U/L 26     Results from last 7 days   Lab Units 06/12/24  0541 06/11/24  0650 06/10/24  0503 06/09/24  1944   CALCIUM mg/dL 9.1 9.1 8.4* 9.5   ALBUMIN g/dL  --   --   --  4.2   MAGNESIUM mg/dL  --   --   --  1.7               Invalid input(s): \"LDLCALC\"  Results from last 7 days   Lab Units 06/10/24  1255 06/09/24 2010   BLOODCX   --  No growth at 2 days  No growth at 2 days   MRSAPCR  No MRSA Detected  --          Test Results Pending at Discharge     Pending Labs       Order Current Status    Blood Culture - Blood, Arm, Right Preliminary result    Blood Culture - Blood, Arm, Right Preliminary result            Discharge Details        Discharge Medications        New Medications        Instructions Start Date   cephalexin 500 MG capsule  Commonly known as: Keflex   500 mg, Oral, 4 Times Daily             Changes to Medications        Instructions Start Date   ketoconazole 2 % cream  Commonly known as: NIZORAL  What changed: additional instructions   1 application , Topical, Daily      penicillin v potassium 250 MG tablet  Commonly known as: VEETID  What changed: These instructions start on June 19, 2024. If you are unsure what to do until then, ask your doctor or other care provider.   One BID   Start Date: June 19, 2024            Continue These Medications        Instructions Start Date   acetaminophen 650 MG 8 hr tablet  Commonly known as: TYLENOL   1 tablet, Oral, Every 8 Hours PRN      amLODIPine 2.5 MG tablet  Commonly known as: NORVASC   2.5 mg, Oral, Daily      aspirin 81 MG EC tablet   1 tablet, Oral, Daily      atenolol 25 MG tablet  Commonly known as: " Tenormin   12.5 mg, Oral, Daily      B-D UF III MINI PEN NEEDLES 31G X 5 MM misc  Generic drug: Insulin Pen Needle   1 APPLICATION DAILY      brimonidine 0.2 % ophthalmic solution  Commonly known as: ALPHAGAN   1 drop, Both Eyes, 2 Times Daily      celecoxib 200 MG capsule  Commonly known as: CeleBREX   200 mg, Oral, Daily      dorzolamide-timolol 2-0.5 % ophthalmic solution  Commonly known as: COSOPT   1 drop, Both Eyes, 2 Times Daily      Dulcolax 5 MG EC tablet  Generic drug: bisacodyl   1 tablet, Oral, 2 Times Daily      freestyle lancets   Check blood sugar TID      FREESTYLE LITE test strip  Generic drug: glucose blood   Check blood sugar TID      furosemide 40 MG tablet  Commonly known as: LASIX   TAKE 1 TABLET BY MOUTH EVERY DAY      gabapentin 300 MG capsule  Commonly known as: NEURONTIN   300 mg, Oral, 4 Times Daily      HYDROcodone-acetaminophen 7.5-325 MG per tablet  Commonly known as: NORCO   1 tablet, Oral, Every 8 Hours PRN, 30 day supply. DNF 6/5/24      Hydrocortisone (Perianal) 2.5 % rectal cream  Commonly known as: ANUSOL-HC   APPLY RECTALLY THREE TIMES DAILY FOR 7-10 DAYS DURNG HEMORRHOID FLARE      insulin glargine 100 UNIT/ML injection  Commonly known as: LANTUS SEMGLEE   8 Units, Subcutaneous, Daily      latanoprost 0.005 % ophthalmic solution  Commonly known as: XALATAN   INSTILL 1 DROP IN BOTH EYES DAILY AT BEDTIME      levothyroxine 88 MCG tablet  Commonly known as: SYNTHROID, LEVOTHROID   88 mcg, Oral, Daily      losartan 100 MG tablet  Commonly known as: COZAAR   100 mg, Oral, Daily      metFORMIN 500 MG tablet  Commonly known as: GLUCOPHAGE   TAKE 1 TABLET BY MOUTH TWICE DAILY WITH MEALS      montelukast 10 MG tablet  Commonly known as: SINGULAIR   10 mg, Oral, Nightly      pantoprazole 40 MG EC tablet  Commonly known as: PROTONIX   40 mg, Oral, 2 Times Daily      phentermine 37.5 MG capsule   37.5 mg, Oral, Every Morning      pramipexole 1.5 MG tablet  Commonly known as: MIRAPEX    Oral, 2 Times Daily, prn      Risaquad-2 capsule capsule   1 capsule, Oral, Daily      rosuvastatin 20 MG tablet  Commonly known as: CRESTOR   20 mg, Oral, Every Evening      Stimulant Laxative 8.6-50 MG per tablet  Generic drug: sennosides-docusate   2 tablets, Oral, 2 Times Daily               Allergies   Allergen Reactions    Adhesive Tape Rash     Pt states he only had one reaction after hip surgery       Discharge Disposition:  Home or Self Care      Discharge Diet:  Diet Order   Procedures    Diet: Regular/House, Diabetic; Consistent Carbohydrate; Fluid Consistency: Thin (IDDSI 0)       Discharge Activity:   Activity Instructions       Activity as Tolerated              CODE STATUS:    Code Status and Medical Interventions:   Ordered at: 06/09/24 2231     Code Status (Patient has no pulse and is not breathing):    CPR (Attempt to Resuscitate)     Medical Interventions (Patient has pulse or is breathing):    Full Support       Future Appointments   Date Time Provider Department Center   7/10/2024 11:30 AM Damien Pierce MD [facility]  [id number]   8/2/2024  2:00 PM Rekha Poe APRN [facility] PM [facility]      Follow-up Information       Damien Pierce MD Follow up in 1 week(s).    Specialty: Family Medicine  Contact information:  [address] [address] [address] [phone]                             Time Spent on Discharge:  Greater than 30 minutes spent on discharge management including final examination, discussion of hospital stay and patient education, preparation of records, medication reconciliation, follow up planning      Brannon Simpson MD  [facility]  06/12/24  08:48 EDT

## 2024-06-12 NOTE — PLAN OF CARE
Goal Outcome Evaluation    Pt ready for discharge. IV and tele monitor removed. Medications from pharmacy delivered to pt at bedside.

## 2024-06-12 NOTE — CASE MANAGEMENT/SOCIAL WORK
Case Management Discharge Note      Final Note: Pt discharged home with spouse.   MARSHA Galdamez RN         Selected Continued Care - Admitted Since 6/9/2024       Destination    No services have been selected for the patient.                Durable Medical Equipment    No services have been selected for the patient.                Dialysis/Infusion    No services have been selected for the patient.                Home Medical Care    No services have been selected for the patient.                Therapy    No services have been selected for the patient.                Community Resources    No services have been selected for the patient.                Community & DME    No services have been selected for the patient.                    Transportation Services  Private: Car    Final Discharge Disposition Code: 01 - home or self-care

## 2024-06-12 NOTE — PLAN OF CARE
Goal Outcome Evaluation:  Plan of Care Reviewed With: patient        Progress: improving  Outcome Evaluation: Patient  resting  at this time.   Medicated   once  for    back  pain.   with  good  effect.     Up  with  walker.      Continue  Cefazolin,  Redness  to  abdomen  decreasing. SR/St on the  monitor. Nursing  will  continue  to  monitor.

## 2024-06-13 ENCOUNTER — TRANSITIONAL CARE MANAGEMENT TELEPHONE ENCOUNTER (OUTPATIENT)
Dept: CALL CENTER | Facility: HOSPITAL | Age: 79
End: 2024-06-13
Payer: MEDICARE

## 2024-06-13 NOTE — OUTREACH NOTE
Call Center TCM Note      Flowsheet Row Responses   [facility] patient discharged from? [place]   Does the patient have one of the following disease processes/diagnoses(primary or secondary)? Other   TCM attempt successful? Yes   Call start time 1255   Call end time 1258   Discharge diagnosis Abdominal wall cellulitis   Meds reviewed with patient/caregiver? Yes   Is the patient having any side effects they believe may be caused by any medication additions or changes? No   Does the patient have all medications ordered at discharge? Yes   Is the patient taking all medications as directed (includes completed medication regime)? Yes   Comments Hosp dc fu apt on 6/25/24 with PCP   Does the patient have an appointment with their PCP within 7-14 days of discharge? Other   Nursing Interventions Routed TCM call to PCP office, PCP office requested to make appointment - message sent   Has home health visited the patient within 72 hours of discharge? N/A   What DME was ordered? Walker (pt will  later)   Psychosocial issues? No   Did the patient receive a copy of their discharge instructions? Yes   Nursing interventions Reviewed instructions with patient   What is the patient's perception of their health status since discharge? Improving   Is the patient/caregiver able to teach back signs and symptoms related to disease process for when to call PCP? Yes   Is the patient/caregiver able to teach back signs and symptoms related to disease process for when to call 911? Yes   Is the patient/caregiver able to teach back the hierarchy of who to call/visit for symptoms/problems? PCP, Specialist, Home health nurse, Urgent Care, ED, 911 Yes   If the patient is a current smoker, are they able to teach back resources for cessation? Not a smoker   TCM call completed? Yes   Call end time 1258            Crystal Reynolds RN    6/13/2024, 12:59 EDT

## 2024-06-14 LAB
BACTERIA SPEC AEROBE CULT: NORMAL
BACTERIA SPEC AEROBE CULT: NORMAL

## 2024-06-18 DIAGNOSIS — L03.311 CELLULITIS OF ABDOMINAL WALL: ICD-10-CM

## 2024-06-18 DIAGNOSIS — E65 PANNUS, ABDOMINAL: ICD-10-CM

## 2024-06-18 RX ORDER — PENICILLIN V POTASSIUM 250 MG/1
TABLET ORAL
Qty: 60 TABLET | Refills: 5 | Status: SHIPPED | OUTPATIENT
Start: 2024-06-18

## 2024-06-25 ENCOUNTER — OFFICE VISIT (OUTPATIENT)
Dept: FAMILY MEDICINE CLINIC | Facility: CLINIC | Age: 79
End: 2024-06-25
Payer: MEDICARE

## 2024-06-25 VITALS
BODY MASS INDEX: 37.13 KG/M2 | TEMPERATURE: 97.8 F | SYSTOLIC BLOOD PRESSURE: 130 MMHG | OXYGEN SATURATION: 98 % | HEIGHT: 67 IN | RESPIRATION RATE: 17 BRPM | HEART RATE: 78 BPM | WEIGHT: 236.6 LBS | DIASTOLIC BLOOD PRESSURE: 72 MMHG

## 2024-06-25 DIAGNOSIS — E11.65 TYPE 2 DIABETES MELLITUS WITH HYPERGLYCEMIA, WITH LONG-TERM CURRENT USE OF INSULIN: ICD-10-CM

## 2024-06-25 DIAGNOSIS — Z09 HOSPITAL DISCHARGE FOLLOW-UP: Primary | ICD-10-CM

## 2024-06-25 DIAGNOSIS — Z79.4 TYPE 2 DIABETES MELLITUS WITH HYPERGLYCEMIA, WITH LONG-TERM CURRENT USE OF INSULIN: ICD-10-CM

## 2024-06-25 DIAGNOSIS — L03.311 ABDOMINAL WALL CELLULITIS: ICD-10-CM

## 2024-06-25 PROCEDURE — 3078F DIAST BP <80 MM HG: CPT | Performed by: FAMILY MEDICINE

## 2024-06-25 PROCEDURE — 3075F SYST BP GE 130 - 139MM HG: CPT | Performed by: FAMILY MEDICINE

## 2024-06-25 PROCEDURE — 1111F DSCHRG MED/CURRENT MED MERGE: CPT | Performed by: FAMILY MEDICINE

## 2024-06-25 PROCEDURE — 1125F AMNT PAIN NOTED PAIN PRSNT: CPT | Performed by: FAMILY MEDICINE

## 2024-06-25 PROCEDURE — 99495 TRANSJ CARE MGMT MOD F2F 14D: CPT | Performed by: FAMILY MEDICINE

## 2024-06-25 RX ORDER — INSULIN GLARGINE 100 [IU]/ML
10 INJECTION, SOLUTION SUBCUTANEOUS DAILY
Start: 2024-06-25

## 2024-06-25 NOTE — PROGRESS NOTES
Transitional Care Follow Up Visit  Subjective     Nathan Baez is a 79 y.o. male who presents for a transitional care management visit.    Within 48 business hours after discharge our office contacted him via telephone to coordinate his care and needs.      I reviewed and discussed the details of that call along with the discharge summary, hospital problems, inpatient lab results, inpatient diagnostic studies, and consultation reports with Nathan.     Current outpatient and discharge medications have been reconciled for the patient.  Reviewed by: Damien Pierce MD          6/12/2024     5:08 PM   Date of TCM Phone Call   [facility]   Date of Admission 6/9/2024   Date of Discharge 6/12/2024   Discharge Disposition Home or Self Care     Risk for Readmission (LACE) Score: 12 (6/12/2024  6:00 AM)      Diabetes  Pertinent negatives for hypoglycemia include no confusion or dizziness. Pertinent negatives for diabetes include no chest pain and no fatigue.   Cellulitis  Pertinent negatives include no abdominal pain, arthralgias, chest pain, coughing, fatigue or myalgias.   Ear Fullness   Pertinent negatives include no abdominal pain, coughing or hearing loss.      Course During Hospital Stay: F/U abdominal wall cellulitis.  Treated with keflex on discharge.  On chronic pen v K suppressive therapy.  A1c 9.8 from 2 weeks ago.  Abdomen doing much better.        The following portions of the patient's history were reviewed and updated as appropriate: allergies, current medications, past family history, past medical history, past social history, past surgical history, and problem list.    Review of Systems   Constitutional:  Negative for activity change, appetite change and fatigue.   HENT:  Negative for hearing loss and postnasal drip.    Eyes:  Negative for discharge and itching.   Respiratory:  Negative for cough and shortness of breath.    Cardiovascular:  Negative for chest pain and leg  "swelling.   Gastrointestinal:  Negative for abdominal distention and abdominal pain.   Endocrine: Negative for cold intolerance and heat intolerance.   Genitourinary:  Negative for difficulty urinating and flank pain.   Musculoskeletal:  Negative for arthralgias and myalgias.   Skin:  Negative for color change.   Neurological:  Negative for dizziness and facial asymmetry.   Hematological:  Negative for adenopathy.   Psychiatric/Behavioral:  Negative for agitation and confusion.        Objective   /72 (BP Location: Left arm, Patient Position: Sitting, Cuff Size: Adult)   Pulse 78   Temp 97.8 °F (36.6 °C) (Temporal)   Resp 17   Ht 170.2 cm (67.01\")   Wt 107 kg (236 lb 9.6 oz)   SpO2 98%   BMI 37.05 kg/m²   Physical Exam  Vitals reviewed.   Constitutional:       Appearance: He is well-developed. He is not diaphoretic.   HENT:      Head: Normocephalic and atraumatic.   Eyes:      General: No scleral icterus.     Pupils: Pupils are equal, round, and reactive to light.   Neck:      Thyroid: No thyromegaly.   Cardiovascular:      Rate and Rhythm: Normal rate and regular rhythm.      Heart sounds: No murmur heard.     No friction rub. No gallop.   Pulmonary:      Effort: Pulmonary effort is normal. No respiratory distress.      Breath sounds: No wheezing or rales.   Chest:      Chest wall: No tenderness.   Abdominal:      General: Bowel sounds are normal. There is no distension.      Palpations: Abdomen is soft.      Tenderness: There is no abdominal tenderness.   Musculoskeletal:         General: No deformity. Normal range of motion.   Lymphadenopathy:      Cervical: No cervical adenopathy.   Skin:     General: Skin is warm and dry.      Findings: No rash.   Neurological:      Cranial Nerves: No cranial nerve deficit.      Motor: No abnormal muscle tone.         Assessment & Plan   Diagnoses and all orders for this visit:    1. Hospital discharge follow-up (Primary)    2. Abdominal wall cellulitis    3. Type 2 " diabetes mellitus with hyperglycemia, with long-term current use of insulin      DM2.  Uncontrolled.  Increase lantus to 10 units a day.   Contnue other oral agents.    Recurrent abd wall cellulitis.  Hibiclens 3 times a week.  Chronic suppressive tx with Pen V K BID.

## 2024-07-02 ENCOUNTER — PRE-ADMISSION TESTING (OUTPATIENT)
Dept: PREADMISSION TESTING | Facility: HOSPITAL | Age: 79
End: 2024-07-02
Payer: MEDICARE

## 2024-07-02 ENCOUNTER — HOSPITAL ENCOUNTER (OUTPATIENT)
Dept: GENERAL RADIOLOGY | Facility: HOSPITAL | Age: 79
Discharge: HOME | End: 2024-07-02
Payer: MEDICARE

## 2024-07-02 VITALS
SYSTOLIC BLOOD PRESSURE: 135 MMHG | HEIGHT: 63 IN | WEIGHT: 235.9 LBS | OXYGEN SATURATION: 96 % | DIASTOLIC BLOOD PRESSURE: 85 MMHG | TEMPERATURE: 98.7 F | HEART RATE: 90 BPM | BODY MASS INDEX: 41.8 KG/M2 | RESPIRATION RATE: 16 BRPM

## 2024-07-02 LAB
ALBUMIN SERPL-MCNC: 4.2 G/DL (ref 3.5–5.2)
ALBUMIN/GLOB SERPL: 1.4 G/DL
ALP SERPL-CCNC: 176 U/L (ref 39–117)
ALT SERPL W P-5'-P-CCNC: 23 U/L (ref 1–41)
ANION GAP SERPL CALCULATED.3IONS-SCNC: 9.7 MMOL/L (ref 5–15)
AST SERPL-CCNC: 28 U/L (ref 1–40)
BACTERIA UR QL AUTO: NORMAL /HPF
BASOPHILS # BLD AUTO: 0.05 10*3/MM3 (ref 0–0.2)
BASOPHILS NFR BLD AUTO: 0.9 % (ref 0–1.5)
BILIRUB SERPL-MCNC: 0.8 MG/DL (ref 0–1.2)
BILIRUB UR QL STRIP: NEGATIVE
BUN SERPL-MCNC: 16 MG/DL (ref 8–23)
BUN/CREAT SERPL: 15.4 (ref 7–25)
CALCIUM SPEC-SCNC: 9.8 MG/DL (ref 8.6–10.5)
CHLORIDE SERPL-SCNC: 100 MMOL/L (ref 98–107)
CLARITY UR: CLEAR
CO2 SERPL-SCNC: 27.3 MMOL/L (ref 22–29)
COLOR UR: YELLOW
CREAT SERPL-MCNC: 1.04 MG/DL (ref 0.76–1.27)
DEPRECATED RDW RBC AUTO: 42.4 FL (ref 37–54)
EGFRCR SERPLBLD CKD-EPI 2021: 73.5 ML/MIN/1.73
EOSINOPHIL # BLD AUTO: 0.15 10*3/MM3 (ref 0–0.4)
EOSINOPHIL NFR BLD AUTO: 2.6 % (ref 0.3–6.2)
ERYTHROCYTE [DISTWIDTH] IN BLOOD BY AUTOMATED COUNT: 13.1 % (ref 12.3–15.4)
GLOBULIN UR ELPH-MCNC: 2.9 GM/DL
GLUCOSE SERPL-MCNC: 218 MG/DL (ref 65–99)
GLUCOSE UR STRIP-MCNC: ABNORMAL MG/DL
HCT VFR BLD AUTO: 40.2 % (ref 37.5–51)
HGB BLD-MCNC: 12.9 G/DL (ref 13–17.7)
HGB UR QL STRIP.AUTO: NEGATIVE
HYALINE CASTS UR QL AUTO: NORMAL /LPF
IMM GRANULOCYTES # BLD AUTO: 0.09 10*3/MM3 (ref 0–0.05)
IMM GRANULOCYTES NFR BLD AUTO: 1.5 % (ref 0–0.5)
KETONES UR QL STRIP: NEGATIVE
LEUKOCYTE ESTERASE UR QL STRIP.AUTO: NEGATIVE
LYMPHOCYTES # BLD AUTO: 2.14 10*3/MM3 (ref 0.7–3.1)
LYMPHOCYTES NFR BLD AUTO: 36.8 % (ref 19.6–45.3)
MCH RBC QN AUTO: 28.7 PG (ref 26.6–33)
MCHC RBC AUTO-ENTMCNC: 32.1 G/DL (ref 31.5–35.7)
MCV RBC AUTO: 89.3 FL (ref 79–97)
MONOCYTES # BLD AUTO: 0.59 10*3/MM3 (ref 0.1–0.9)
MONOCYTES NFR BLD AUTO: 10.1 % (ref 5–12)
NEUTROPHILS NFR BLD AUTO: 2.8 10*3/MM3 (ref 1.7–7)
NEUTROPHILS NFR BLD AUTO: 48.1 % (ref 42.7–76)
NITRITE UR QL STRIP: NEGATIVE
NRBC BLD AUTO-RTO: 0 /100 WBC (ref 0–0.2)
PH UR STRIP.AUTO: 8 [PH] (ref 5–8)
PLATELET # BLD AUTO: 172 10*3/MM3 (ref 140–450)
PMV BLD AUTO: 10.4 FL (ref 6–12)
POTASSIUM SERPL-SCNC: 4.2 MMOL/L (ref 3.5–5.2)
PROT SERPL-MCNC: 7.1 G/DL (ref 6–8.5)
PROT UR QL STRIP: ABNORMAL
QT INTERVAL: 400 MS
QTC INTERVAL: 435 MS
RBC # BLD AUTO: 4.5 10*6/MM3 (ref 4.14–5.8)
RBC # UR STRIP: NORMAL /HPF
REF LAB TEST METHOD: NORMAL
SODIUM SERPL-SCNC: 137 MMOL/L (ref 136–145)
SP GR UR STRIP: 1.02 (ref 1–1.03)
SQUAMOUS #/AREA URNS HPF: NORMAL /HPF
UROBILINOGEN UR QL STRIP: ABNORMAL
WBC # UR STRIP: NORMAL /HPF
WBC NRBC COR # BLD AUTO: 5.82 10*3/MM3 (ref 3.4–10.8)

## 2024-07-02 PROCEDURE — 80053 COMPREHEN METABOLIC PANEL: CPT

## 2024-07-02 PROCEDURE — 93005 ELECTROCARDIOGRAM TRACING: CPT

## 2024-07-02 PROCEDURE — 36415 COLL VENOUS BLD VENIPUNCTURE: CPT

## 2024-07-02 PROCEDURE — 71046 X-RAY EXAM CHEST 2 VIEWS: CPT

## 2024-07-02 PROCEDURE — 85025 COMPLETE CBC W/AUTO DIFF WBC: CPT

## 2024-07-02 PROCEDURE — 81001 URINALYSIS AUTO W/SCOPE: CPT

## 2024-07-02 NOTE — DISCHARGE INSTRUCTIONS
Take the following medications the morning of surgery:  GABAPENTIN, AMLODIPINE, ATENOLOL, LEVOTHYROXINE, PANTOPRAZOLE, PENICILLIN, AND PRAMIPEXOLE    BRING BIPAP WITH YOU TO PREOP    STOP PHENTERMINE 2 WEEKS PRIOR TO SURGERY  HOLD CELEBREX 2 WEEK PRIOR TO SURGERY PER SURGEON'S INSTRCUTIONS  STOP VITAMINS/SUPPLEMENTS 2 WEEKS PRIOR TO SURGERY    HOLD LASARTAN THE MORNING OF SURGERY    FOLLOW PCP INSTRUCTIONS FOR HOLDING INSULIN PRIOR TO SURGERY      If you are on prescription narcotic pain medication to control your pain you may also take that medication the morning of surgery.    General Instructions:  Do not eat solid food after midnight the night before surgery.  You may drink clear liquids day of surgery but must stop at least one hour before your hospital arrival time.  It is beneficial for you to have a clear drink that contains carbohydrates the day of surgery.  We suggest a 12 to 20 ounce bottle of Gatorade or Powerade for non-diabetic patients or a 12 to 20 ounce bottle of G2 or Powerade Zero for diabetic patients. (Pediatric patients, are not advised to drink a 12 to 20 ounce carbohydrate drink)    Clear liquids are liquids you can see through.  Nothing red in color.     Plain water                               Sports drinks  Sodas                                   Gelatin (Jell-O)  Fruit juices without pulp such as white grape juice and apple juice  Popsicles that contain no fruit or yogurt  Tea or coffee (no cream or milk added)  Gatorade / Powerade  G2 / Powerade Zero      Patients who avoid smoking, chewing tobacco and alcohol for 4 weeks prior to surgery have a reduced risk of post-operative complications.  Quit smoking as many days before surgery as you can.  Do not smoke, use chewing tobacco or drink alcohol the day of surgery.   If applicable bring your C-PAP/ BI-PAP machine in with you to preop day of surgery.  Bring any papers given to you in the doctor’s office.  Wear clean comfortable  clothes.  Do not wear contact lenses, false eyelashes or make-up.  Bring a case for your glasses.   Bring crutches or walker if applicable.  Remove all piercings.  Leave jewelry and any other valuables at home.  Hair extensions with metal clips must be removed prior to surgery.  The Pre-Admission Testing nurse will instruct you to bring medications if unable to obtain an accurate list in Pre-Admission Testing.          Preventing a Surgical Site Infection:  For 2 to 3 days before surgery, avoid shaving with a razor because the razor can irritate skin and make it easier to develop an infection.    Any areas of open skin can increase the risk of a post-operative wound infection by allowing bacteria to enter and travel throughout the body.  Notify your surgeon if you have any skin wounds / rashes even if it is not near the expected surgical site.  The area will need assessed to determine if surgery should be delayed until it is healed.  The night prior to surgery shower using a fresh bar of anti-bacterial soap (such as Dial) and clean washcloth.  Sleep in a clean bed with clean clothing.  Do not allow pets to sleep with you.  Shower on the morning of surgery using a fresh bar of anti-bacterial soap (such as Dial) and clean washcloth.  Dry with a clean towel and dress in clean clothing.  Ask your surgeon if you will be receiving antibiotics prior to surgery.  Make sure you, your family, and all healthcare providers clean their hands with soap and water or an alcohol based hand  before caring for you or your wound.    Day of surgery:  Your arrival time is approximately two hours before your scheduled surgery time.  Upon arrival, a Pre-op nurse and Anesthesiologist will review your health history, obtain vital signs, and answer questions you may have.  The only belongings needed at this time will be a list of your home medications and if applicable your C-PAP/BI-PAP machine.  A Pre-op nurse will start an IV and  you may receive medication in preparation for surgery, including something to help you relax.     Please be aware that surgery does come with discomfort.  We want to make every effort to control your discomfort so please discuss any uncontrolled symptoms with your nurse.   Your doctor will most likely have prescribed pain medications.      If you are going home after surgery you will receive individualized written care instructions before being discharged.  A responsible adult must drive you to and from the hospital on the day of your surgery and ideally stay with you through the night.   .  Discharge prescriptions can be filled by the hospital pharmacy during regular pharmacy hours.  If you are having surgery late in the day/evening your prescription may be e-prescribed to your pharmacy.  Please verify your pharmacy hours or chose a 24 hour pharmacy to avoid not having access to your prescription because your pharmacy has closed for the day.    If you are staying overnight following surgery, you will be transported to your hospital room following the recovery period.  [facility] has all private rooms.    If you have any questions please call Pre-Admission Testing at [phone].  Deductibles and co-payments are collected on the day of service. Please be prepared to pay the required co-pay, deductible or deposit on the day of service as defined by your plan.    Call your surgeon immediately if you experience any of the following symptoms:  Sore Throat  Shortness of Breath or difficulty breathing  Cough  Chills  Body soreness or muscle pain  Headache  Fever  New loss of taste or smell  Do not arrive for your surgery ill.  Your procedure will need to be rescheduled to another time.  You will need to call your physician before the day of surgery to avoid any unnecessary exposure to hospital staff as well as other patients.

## 2024-07-06 ENCOUNTER — APPOINTMENT (OUTPATIENT)
Dept: CT IMAGING | Facility: HOSPITAL | Age: 79
End: 2024-07-06
Payer: MEDICARE

## 2024-07-06 ENCOUNTER — HOSPITAL ENCOUNTER (INPATIENT)
Facility: HOSPITAL | Age: 79
LOS: 2 days | Discharge: HOME | End: 2024-07-09
Attending: EMERGENCY MEDICINE
Payer: MEDICARE

## 2024-07-06 ENCOUNTER — APPOINTMENT (OUTPATIENT)
Dept: ULTRASOUND IMAGING | Facility: HOSPITAL | Age: 79
End: 2024-07-06
Payer: MEDICARE

## 2024-07-06 DIAGNOSIS — M79.3 PANNICULITIS: Primary | ICD-10-CM

## 2024-07-06 LAB
ALBUMIN SERPL-MCNC: 4 G/DL (ref 3.5–5.2)
ALBUMIN/GLOB SERPL: 1.4 G/DL
ALP SERPL-CCNC: 169 U/L (ref 39–117)
ALT SERPL W P-5'-P-CCNC: 23 U/L (ref 1–41)
ANION GAP SERPL CALCULATED.3IONS-SCNC: 11 MMOL/L (ref 5–15)
ANION GAP SERPL CALCULATED.3IONS-SCNC: 11 MMOL/L (ref 5–15)
AST SERPL-CCNC: 27 U/L (ref 1–40)
BACTERIA UR QL AUTO: NORMAL /HPF
BASOPHILS # BLD AUTO: 0.04 10*3/MM3 (ref 0–0.2)
BASOPHILS NFR BLD AUTO: 0.4 % (ref 0–1.5)
BILIRUB SERPL-MCNC: 1.3 MG/DL (ref 0–1.2)
BILIRUB UR QL STRIP: NEGATIVE
BUN SERPL-MCNC: 20 MG/DL (ref 8–23)
BUN SERPL-MCNC: 23 MG/DL (ref 8–23)
BUN/CREAT SERPL: 19.4 (ref 7–25)
BUN/CREAT SERPL: 22.5 (ref 7–25)
CALCIUM SPEC-SCNC: 8.8 MG/DL (ref 8.6–10.5)
CALCIUM SPEC-SCNC: 9.3 MG/DL (ref 8.6–10.5)
CHLORIDE SERPL-SCNC: 97 MMOL/L (ref 98–107)
CHLORIDE SERPL-SCNC: 99 MMOL/L (ref 98–107)
CLARITY UR: CLEAR
CO2 SERPL-SCNC: 24 MMOL/L (ref 22–29)
CO2 SERPL-SCNC: 25 MMOL/L (ref 22–29)
COLOR UR: YELLOW
CREAT SERPL-MCNC: 1.02 MG/DL (ref 0.76–1.27)
CREAT SERPL-MCNC: 1.03 MG/DL (ref 0.76–1.27)
D-LACTATE SERPL-SCNC: 1.6 MMOL/L (ref 0.5–2)
D-LACTATE SERPL-SCNC: 2.3 MMOL/L (ref 0.5–2)
D-LACTATE SERPL-SCNC: 2.5 MMOL/L (ref 0.5–2)
D-LACTATE SERPL-SCNC: 2.8 MMOL/L (ref 0.5–2)
D-LACTATE SERPL-SCNC: 3.1 MMOL/L (ref 0.5–2)
DEPRECATED RDW RBC AUTO: 41.7 FL (ref 37–54)
DEPRECATED RDW RBC AUTO: 43.2 FL (ref 37–54)
EGFRCR SERPLBLD CKD-EPI 2021: 74.4 ML/MIN/1.73
EGFRCR SERPLBLD CKD-EPI 2021: 75.2 ML/MIN/1.73
EOSINOPHIL # BLD AUTO: 0.11 10*3/MM3 (ref 0–0.4)
EOSINOPHIL NFR BLD AUTO: 1 % (ref 0.3–6.2)
ERYTHROCYTE [DISTWIDTH] IN BLOOD BY AUTOMATED COUNT: 13 % (ref 12.3–15.4)
ERYTHROCYTE [DISTWIDTH] IN BLOOD BY AUTOMATED COUNT: 13.3 % (ref 12.3–15.4)
GLOBULIN UR ELPH-MCNC: 2.8 GM/DL
GLUCOSE BLDC GLUCOMTR-MCNC: 162 MG/DL (ref 70–130)
GLUCOSE BLDC GLUCOMTR-MCNC: 251 MG/DL (ref 70–130)
GLUCOSE BLDC GLUCOMTR-MCNC: 300 MG/DL (ref 70–130)
GLUCOSE BLDC GLUCOMTR-MCNC: 358 MG/DL (ref 70–130)
GLUCOSE SERPL-MCNC: 227 MG/DL (ref 65–99)
GLUCOSE SERPL-MCNC: 271 MG/DL (ref 65–99)
GLUCOSE UR STRIP-MCNC: ABNORMAL MG/DL
HBA1C MFR BLD: 9.6 % (ref 4.8–5.6)
HCT VFR BLD AUTO: 36.5 % (ref 37.5–51)
HCT VFR BLD AUTO: 37.6 % (ref 37.5–51)
HGB BLD-MCNC: 12.2 G/DL (ref 13–17.7)
HGB BLD-MCNC: 12.6 G/DL (ref 13–17.7)
HGB UR QL STRIP.AUTO: NEGATIVE
HOLD SPECIMEN: NORMAL
HOLD SPECIMEN: NORMAL
HYALINE CASTS UR QL AUTO: NORMAL /LPF
IMM GRANULOCYTES # BLD AUTO: 0.12 10*3/MM3 (ref 0–0.05)
IMM GRANULOCYTES NFR BLD AUTO: 1.1 % (ref 0–0.5)
KETONES UR QL STRIP: ABNORMAL
LEUKOCYTE ESTERASE UR QL STRIP.AUTO: NEGATIVE
LYMPHOCYTES # BLD AUTO: 1.71 10*3/MM3 (ref 0.7–3.1)
LYMPHOCYTES NFR BLD AUTO: 15.9 % (ref 19.6–45.3)
MCH RBC QN AUTO: 29.4 PG (ref 26.6–33)
MCH RBC QN AUTO: 29.7 PG (ref 26.6–33)
MCHC RBC AUTO-ENTMCNC: 33.4 G/DL (ref 31.5–35.7)
MCHC RBC AUTO-ENTMCNC: 33.5 G/DL (ref 31.5–35.7)
MCV RBC AUTO: 88 FL (ref 79–97)
MCV RBC AUTO: 88.7 FL (ref 79–97)
MONOCYTES # BLD AUTO: 0.84 10*3/MM3 (ref 0.1–0.9)
MONOCYTES NFR BLD AUTO: 7.8 % (ref 5–12)
MRSA DNA SPEC QL NAA+PROBE: NORMAL
NEUTROPHILS NFR BLD AUTO: 7.94 10*3/MM3 (ref 1.7–7)
NEUTROPHILS NFR BLD AUTO: 73.8 % (ref 42.7–76)
NITRITE UR QL STRIP: NEGATIVE
PH UR STRIP.AUTO: 5.5 [PH] (ref 5–8)
PLATELET # BLD AUTO: 129 10*3/MM3 (ref 140–450)
PLATELET # BLD AUTO: 146 10*3/MM3 (ref 140–450)
PMV BLD AUTO: 10.3 FL (ref 6–12)
PMV BLD AUTO: 10.5 FL (ref 6–12)
POTASSIUM SERPL-SCNC: 3.7 MMOL/L (ref 3.5–5.2)
POTASSIUM SERPL-SCNC: 4 MMOL/L (ref 3.5–5.2)
PROCALCITONIN SERPL-MCNC: 0.27 NG/ML (ref 0–0.25)
PROT SERPL-MCNC: 6.8 G/DL (ref 6–8.5)
PROT UR QL STRIP: ABNORMAL
RBC # BLD AUTO: 4.15 10*6/MM3 (ref 4.14–5.8)
RBC # BLD AUTO: 4.24 10*6/MM3 (ref 4.14–5.8)
RBC # UR STRIP: NORMAL /HPF
REF LAB TEST METHOD: NORMAL
SODIUM SERPL-SCNC: 132 MMOL/L (ref 136–145)
SODIUM SERPL-SCNC: 135 MMOL/L (ref 136–145)
SP GR UR STRIP: 1.02 (ref 1–1.03)
SQUAMOUS #/AREA URNS HPF: NORMAL /HPF
TSH SERPL DL<=0.05 MIU/L-ACNC: 0.93 UIU/ML (ref 0.27–4.2)
UROBILINOGEN UR QL STRIP: ABNORMAL
VANCOMYCIN TROUGH SERPL-MCNC: 13.7 MCG/ML (ref 5–20)
WBC # UR STRIP: NORMAL /HPF
WBC NRBC COR # BLD AUTO: 10.76 10*3/MM3 (ref 3.4–10.8)
WBC NRBC COR # BLD AUTO: 8.51 10*3/MM3 (ref 3.4–10.8)
WHOLE BLOOD HOLD COAG: NORMAL
WHOLE BLOOD HOLD SPECIMEN: NORMAL

## 2024-07-06 PROCEDURE — 85027 COMPLETE CBC AUTOMATED: CPT | Performed by: NURSE PRACTITIONER

## 2024-07-06 PROCEDURE — 87040 BLOOD CULTURE FOR BACTERIA: CPT | Performed by: PHYSICIAN ASSISTANT

## 2024-07-06 PROCEDURE — 87641 MR-STAPH DNA AMP PROBE: CPT | Performed by: INTERNAL MEDICINE

## 2024-07-06 PROCEDURE — 25010000002 ENOXAPARIN PER 10 MG: Performed by: INTERNAL MEDICINE

## 2024-07-06 PROCEDURE — 80053 COMPREHEN METABOLIC PANEL: CPT | Performed by: PHYSICIAN ASSISTANT

## 2024-07-06 PROCEDURE — 25810000003 SODIUM CHLORIDE 0.9 % SOLUTION 250 ML FLEX CONT: Performed by: INTERNAL MEDICINE

## 2024-07-06 PROCEDURE — 81001 URINALYSIS AUTO W/SCOPE: CPT | Performed by: PHYSICIAN ASSISTANT

## 2024-07-06 PROCEDURE — 25810000003 SODIUM CHLORIDE 0.9 % SOLUTION: Performed by: PHYSICIAN ASSISTANT

## 2024-07-06 PROCEDURE — 25010000002 VANCOMYCIN 1 G RECONSTITUTED SOLUTION 1 EACH VIAL: Performed by: INTERNAL MEDICINE

## 2024-07-06 PROCEDURE — 84443 ASSAY THYROID STIM HORMONE: CPT | Performed by: INTERNAL MEDICINE

## 2024-07-06 PROCEDURE — 80202 ASSAY OF VANCOMYCIN: CPT | Performed by: INTERNAL MEDICINE

## 2024-07-06 PROCEDURE — 25010000002 CEFTRIAXONE PER 250 MG: Performed by: PHYSICIAN ASSISTANT

## 2024-07-06 PROCEDURE — 84145 PROCALCITONIN (PCT): CPT | Performed by: PHYSICIAN ASSISTANT

## 2024-07-06 PROCEDURE — 83036 HEMOGLOBIN GLYCOSYLATED A1C: CPT | Performed by: INTERNAL MEDICINE

## 2024-07-06 PROCEDURE — 36415 COLL VENOUS BLD VENIPUNCTURE: CPT | Performed by: PHYSICIAN ASSISTANT

## 2024-07-06 PROCEDURE — 25510000001 IOPAMIDOL 61 % SOLUTION: Performed by: EMERGENCY MEDICINE

## 2024-07-06 PROCEDURE — 83605 ASSAY OF LACTIC ACID: CPT | Performed by: PHYSICIAN ASSISTANT

## 2024-07-06 PROCEDURE — 25010000002 VANCOMYCIN 10 G RECONSTITUTED SOLUTION: Performed by: PHYSICIAN ASSISTANT

## 2024-07-06 PROCEDURE — 85025 COMPLETE CBC W/AUTO DIFF WBC: CPT | Performed by: PHYSICIAN ASSISTANT

## 2024-07-06 PROCEDURE — G0378 HOSPITAL OBSERVATION PER HR: HCPCS

## 2024-07-06 PROCEDURE — 74177 CT ABD & PELVIS W/CONTRAST: CPT

## 2024-07-06 PROCEDURE — 99285 EMERGENCY DEPT VISIT HI MDM: CPT

## 2024-07-06 PROCEDURE — 25010000002 PIPERACILLIN SOD-TAZOBACTAM PER 1 G: Performed by: INTERNAL MEDICINE

## 2024-07-06 PROCEDURE — 76705 ECHO EXAM OF ABDOMEN: CPT

## 2024-07-06 PROCEDURE — P9612 CATHETERIZE FOR URINE SPEC: HCPCS

## 2024-07-06 PROCEDURE — 63710000001 INSULIN GLARGINE PER 5 UNITS: Performed by: INTERNAL MEDICINE

## 2024-07-06 PROCEDURE — 63710000001 INSULIN LISPRO (HUMAN) PER 5 UNITS: Performed by: INTERNAL MEDICINE

## 2024-07-06 PROCEDURE — 82948 REAGENT STRIP/BLOOD GLUCOSE: CPT

## 2024-07-06 PROCEDURE — 36415 COLL VENOUS BLD VENIPUNCTURE: CPT

## 2024-07-06 RX ORDER — MONTELUKAST SODIUM 10 MG/1
10 TABLET ORAL NIGHTLY
Status: DISCONTINUED | OUTPATIENT
Start: 2024-07-06 | End: 2024-07-09 | Stop reason: HOSPADM

## 2024-07-06 RX ORDER — ASPIRIN 81 MG/1
81 TABLET ORAL DAILY
Status: DISCONTINUED | OUTPATIENT
Start: 2024-07-06 | End: 2024-07-09 | Stop reason: HOSPADM

## 2024-07-06 RX ORDER — LEVOTHYROXINE SODIUM 88 UG/1
88 TABLET ORAL
Status: DISCONTINUED | OUTPATIENT
Start: 2024-07-06 | End: 2024-07-09 | Stop reason: HOSPADM

## 2024-07-06 RX ORDER — SODIUM CHLORIDE 0.9 % (FLUSH) 0.9 %
10 SYRINGE (ML) INJECTION EVERY 12 HOURS SCHEDULED
Status: DISCONTINUED | OUTPATIENT
Start: 2024-07-06 | End: 2024-07-09 | Stop reason: HOSPADM

## 2024-07-06 RX ORDER — BISACODYL 10 MG
10 SUPPOSITORY, RECTAL RECTAL DAILY PRN
Status: DISCONTINUED | OUTPATIENT
Start: 2024-07-06 | End: 2024-07-09 | Stop reason: HOSPADM

## 2024-07-06 RX ORDER — GABAPENTIN 300 MG/1
300 CAPSULE ORAL 4 TIMES DAILY
Status: DISCONTINUED | OUTPATIENT
Start: 2024-07-06 | End: 2024-07-09 | Stop reason: HOSPADM

## 2024-07-06 RX ORDER — SODIUM CHLORIDE 9 MG/ML
75 INJECTION, SOLUTION INTRAVENOUS CONTINUOUS
Status: DISCONTINUED | OUTPATIENT
Start: 2024-07-06 | End: 2024-07-08

## 2024-07-06 RX ORDER — CELECOXIB 200 MG/1
200 CAPSULE ORAL DAILY
Status: DISCONTINUED | OUTPATIENT
Start: 2024-07-06 | End: 2024-07-09 | Stop reason: HOSPADM

## 2024-07-06 RX ORDER — SODIUM CHLORIDE 9 MG/ML
40 INJECTION, SOLUTION INTRAVENOUS AS NEEDED
Status: DISCONTINUED | OUTPATIENT
Start: 2024-07-06 | End: 2024-07-09 | Stop reason: HOSPADM

## 2024-07-06 RX ORDER — BISACODYL 5 MG/1
5 TABLET, DELAYED RELEASE ORAL 2 TIMES DAILY
Status: DISCONTINUED | OUTPATIENT
Start: 2024-07-06 | End: 2024-07-09 | Stop reason: HOSPADM

## 2024-07-06 RX ORDER — ACETAMINOPHEN 650 MG/1
650 SUPPOSITORY RECTAL EVERY 4 HOURS PRN
Status: DISCONTINUED | OUTPATIENT
Start: 2024-07-06 | End: 2024-07-09 | Stop reason: HOSPADM

## 2024-07-06 RX ORDER — HYDROCODONE BITARTRATE AND ACETAMINOPHEN 7.5; 325 MG/1; MG/1
1 TABLET ORAL EVERY 8 HOURS PRN
Status: DISCONTINUED | OUTPATIENT
Start: 2024-07-06 | End: 2024-07-09 | Stop reason: HOSPADM

## 2024-07-06 RX ORDER — INSULIN GLARGINE 100 [IU]/ML
10 INJECTION, SOLUTION SUBCUTANEOUS DAILY
Status: DISCONTINUED | OUTPATIENT
Start: 2024-07-06 | End: 2024-07-07

## 2024-07-06 RX ORDER — ACETAMINOPHEN 160 MG/5ML
650 SOLUTION ORAL EVERY 4 HOURS PRN
Status: DISCONTINUED | OUTPATIENT
Start: 2024-07-06 | End: 2024-07-09 | Stop reason: HOSPADM

## 2024-07-06 RX ORDER — BRIMONIDINE TARTRATE 2 MG/ML
1 SOLUTION/ DROPS OPHTHALMIC 2 TIMES DAILY
Status: DISCONTINUED | OUTPATIENT
Start: 2024-07-06 | End: 2024-07-09 | Stop reason: HOSPADM

## 2024-07-06 RX ORDER — FUROSEMIDE 40 MG/1
40 TABLET ORAL DAILY
Status: DISCONTINUED | OUTPATIENT
Start: 2024-07-06 | End: 2024-07-09 | Stop reason: HOSPADM

## 2024-07-06 RX ORDER — SODIUM CHLORIDE 0.9 % (FLUSH) 0.9 %
10 SYRINGE (ML) INJECTION AS NEEDED
Status: DISCONTINUED | OUTPATIENT
Start: 2024-07-06 | End: 2024-07-09 | Stop reason: HOSPADM

## 2024-07-06 RX ORDER — LATANOPROST 50 UG/ML
1 SOLUTION/ DROPS OPHTHALMIC NIGHTLY
Status: DISCONTINUED | OUTPATIENT
Start: 2024-07-06 | End: 2024-07-09 | Stop reason: HOSPADM

## 2024-07-06 RX ORDER — DORZOLAMIDE HYDROCHLORIDE AND TIMOLOL MALEATE 20; 5 MG/ML; MG/ML
SOLUTION/ DROPS OPHTHALMIC DAILY
Status: DISCONTINUED | OUTPATIENT
Start: 2024-07-06 | End: 2024-07-09 | Stop reason: HOSPADM

## 2024-07-06 RX ORDER — CALCIUM CARBONATE 500 MG/1
2 TABLET, CHEWABLE ORAL 2 TIMES DAILY PRN
Status: DISCONTINUED | OUTPATIENT
Start: 2024-07-06 | End: 2024-07-09 | Stop reason: HOSPADM

## 2024-07-06 RX ORDER — POLYETHYLENE GLYCOL 3350 17 G/17G
17 POWDER, FOR SOLUTION ORAL DAILY PRN
Status: DISCONTINUED | OUTPATIENT
Start: 2024-07-06 | End: 2024-07-09 | Stop reason: HOSPADM

## 2024-07-06 RX ORDER — DEXTROSE MONOHYDRATE 25 G/50ML
25 INJECTION, SOLUTION INTRAVENOUS
Status: DISCONTINUED | OUTPATIENT
Start: 2024-07-06 | End: 2024-07-09 | Stop reason: HOSPADM

## 2024-07-06 RX ORDER — LOSARTAN POTASSIUM 100 MG/1
100 TABLET ORAL DAILY
Status: DISCONTINUED | OUTPATIENT
Start: 2024-07-06 | End: 2024-07-09 | Stop reason: HOSPADM

## 2024-07-06 RX ORDER — INSULIN LISPRO 100 [IU]/ML
2-7 INJECTION, SOLUTION INTRAVENOUS; SUBCUTANEOUS
Status: DISCONTINUED | OUTPATIENT
Start: 2024-07-06 | End: 2024-07-09 | Stop reason: HOSPADM

## 2024-07-06 RX ORDER — NICOTINE POLACRILEX 4 MG
15 LOZENGE BUCCAL
Status: DISCONTINUED | OUTPATIENT
Start: 2024-07-06 | End: 2024-07-09 | Stop reason: HOSPADM

## 2024-07-06 RX ORDER — ROSUVASTATIN CALCIUM 20 MG/1
20 TABLET, COATED ORAL NIGHTLY
Status: DISCONTINUED | OUTPATIENT
Start: 2024-07-06 | End: 2024-07-09 | Stop reason: HOSPADM

## 2024-07-06 RX ORDER — PRAMIPEXOLE DIHYDROCHLORIDE 1.5 MG/1
1.5 TABLET ORAL 2 TIMES DAILY
Status: DISCONTINUED | OUTPATIENT
Start: 2024-07-06 | End: 2024-07-09 | Stop reason: HOSPADM

## 2024-07-06 RX ORDER — PANTOPRAZOLE SODIUM 40 MG/1
40 TABLET, DELAYED RELEASE ORAL
Status: DISCONTINUED | OUTPATIENT
Start: 2024-07-06 | End: 2024-07-09 | Stop reason: HOSPADM

## 2024-07-06 RX ORDER — ATENOLOL 25 MG/1
12.5 TABLET ORAL DAILY
Status: DISCONTINUED | OUTPATIENT
Start: 2024-07-06 | End: 2024-07-09 | Stop reason: HOSPADM

## 2024-07-06 RX ORDER — ONDANSETRON 2 MG/ML
4 INJECTION INTRAMUSCULAR; INTRAVENOUS EVERY 6 HOURS PRN
Status: DISCONTINUED | OUTPATIENT
Start: 2024-07-06 | End: 2024-07-09 | Stop reason: HOSPADM

## 2024-07-06 RX ORDER — UREA 10 %
1000 LOTION (ML) TOPICAL DAILY
Status: DISCONTINUED | OUTPATIENT
Start: 2024-07-06 | End: 2024-07-09 | Stop reason: HOSPADM

## 2024-07-06 RX ORDER — ENOXAPARIN SODIUM 100 MG/ML
40 INJECTION SUBCUTANEOUS EVERY 24 HOURS
Status: DISCONTINUED | OUTPATIENT
Start: 2024-07-06 | End: 2024-07-09 | Stop reason: HOSPADM

## 2024-07-06 RX ORDER — BISACODYL 5 MG/1
5 TABLET, DELAYED RELEASE ORAL DAILY PRN
Status: DISCONTINUED | OUTPATIENT
Start: 2024-07-06 | End: 2024-07-09 | Stop reason: HOSPADM

## 2024-07-06 RX ORDER — ACETAMINOPHEN 325 MG/1
650 TABLET ORAL EVERY 4 HOURS PRN
Status: DISCONTINUED | OUTPATIENT
Start: 2024-07-06 | End: 2024-07-09 | Stop reason: HOSPADM

## 2024-07-06 RX ORDER — ONDANSETRON 4 MG/1
4 TABLET, ORALLY DISINTEGRATING ORAL EVERY 6 HOURS PRN
Status: DISCONTINUED | OUTPATIENT
Start: 2024-07-06 | End: 2024-07-09 | Stop reason: HOSPADM

## 2024-07-06 RX ORDER — AMOXICILLIN 250 MG
2 CAPSULE ORAL 2 TIMES DAILY PRN
Status: DISCONTINUED | OUTPATIENT
Start: 2024-07-06 | End: 2024-07-09 | Stop reason: HOSPADM

## 2024-07-06 RX ORDER — L.ACID,PARA/B.BIFIDUM/S.THERM 8B CELL
1 CAPSULE ORAL DAILY
Status: DISCONTINUED | OUTPATIENT
Start: 2024-07-06 | End: 2024-07-09 | Stop reason: HOSPADM

## 2024-07-06 RX ORDER — AMLODIPINE BESYLATE 2.5 MG/1
2.5 TABLET ORAL DAILY
Status: DISCONTINUED | OUTPATIENT
Start: 2024-07-06 | End: 2024-07-09 | Stop reason: HOSPADM

## 2024-07-06 RX ORDER — IBUPROFEN 600 MG/1
1 TABLET ORAL
Status: DISCONTINUED | OUTPATIENT
Start: 2024-07-06 | End: 2024-07-09 | Stop reason: HOSPADM

## 2024-07-06 RX ADMIN — DORZOLAMIDE HYDROCHLORIDE: 20 SOLUTION OPHTHALMIC at 13:51

## 2024-07-06 RX ADMIN — ATENOLOL 12.5 MG: 25 TABLET ORAL at 14:26

## 2024-07-06 RX ADMIN — Medication 10 ML: at 08:53

## 2024-07-06 RX ADMIN — LOSARTAN POTASSIUM 100 MG: 100 TABLET, FILM COATED ORAL at 14:27

## 2024-07-06 RX ADMIN — BRIMONIDINE TARTRATE 1 DROP: 2 SOLUTION/ DROPS OPHTHALMIC at 13:51

## 2024-07-06 RX ADMIN — GABAPENTIN 300 MG: 300 CAPSULE ORAL at 22:54

## 2024-07-06 RX ADMIN — GABAPENTIN 300 MG: 300 CAPSULE ORAL at 12:31

## 2024-07-06 RX ADMIN — CEFTRIAXONE 2000 MG: 2 INJECTION, POWDER, FOR SOLUTION INTRAMUSCULAR; INTRAVENOUS at 01:59

## 2024-07-06 RX ADMIN — INSULIN LISPRO 6 UNITS: 100 INJECTION, SOLUTION INTRAVENOUS; SUBCUTANEOUS at 12:30

## 2024-07-06 RX ADMIN — ROSUVASTATIN CALCIUM 20 MG: 20 TABLET, FILM COATED ORAL at 22:55

## 2024-07-06 RX ADMIN — PIPERACILLIN AND TAZOBACTAM 3.38 G: 3; .375 INJECTION, POWDER, FOR SOLUTION INTRAVENOUS at 23:44

## 2024-07-06 RX ADMIN — IOPAMIDOL 85 ML: 612 INJECTION, SOLUTION INTRAVENOUS at 02:51

## 2024-07-06 RX ADMIN — Medication 1000 MCG: at 14:27

## 2024-07-06 RX ADMIN — VANCOMYCIN HYDROCHLORIDE 2250 MG: 10 INJECTION, POWDER, LYOPHILIZED, FOR SOLUTION INTRAVENOUS at 02:31

## 2024-07-06 RX ADMIN — PANTOPRAZOLE SODIUM 40 MG: 40 TABLET, DELAYED RELEASE ORAL at 16:23

## 2024-07-06 RX ADMIN — INSULIN GLARGINE 10 UNITS: 100 INJECTION, SOLUTION SUBCUTANEOUS at 12:30

## 2024-07-06 RX ADMIN — AMLODIPINE BESYLATE 2.5 MG: 2.5 TABLET ORAL at 14:27

## 2024-07-06 RX ADMIN — ENOXAPARIN SODIUM 40 MG: 100 INJECTION SUBCUTANEOUS at 13:51

## 2024-07-06 RX ADMIN — INSULIN LISPRO 4 UNITS: 100 INJECTION, SOLUTION INTRAVENOUS; SUBCUTANEOUS at 16:22

## 2024-07-06 RX ADMIN — FUROSEMIDE 40 MG: 40 TABLET ORAL at 12:31

## 2024-07-06 RX ADMIN — SODIUM CHLORIDE 1000 MG: 9 INJECTION, SOLUTION INTRAVENOUS at 14:27

## 2024-07-06 RX ADMIN — BRIMONIDINE TARTRATE 1 DROP: 2 SOLUTION/ DROPS OPHTHALMIC at 23:00

## 2024-07-06 RX ADMIN — BISACODYL 5 MG: 5 TABLET, COATED ORAL at 23:58

## 2024-07-06 RX ADMIN — Medication 1 CAPSULE: at 14:27

## 2024-07-06 RX ADMIN — Medication 10 ML: at 23:05

## 2024-07-06 RX ADMIN — PIPERACILLIN AND TAZOBACTAM 3.38 G: 3; .375 INJECTION, POWDER, FOR SOLUTION INTRAVENOUS at 12:48

## 2024-07-06 RX ADMIN — CELECOXIB 200 MG: 200 CAPSULE ORAL at 13:51

## 2024-07-06 RX ADMIN — PRAMIPEXOLE DIHYDROCHLORIDE 1.5 MG: 1.5 TABLET ORAL at 22:54

## 2024-07-06 RX ADMIN — LEVOTHYROXINE SODIUM 88 MCG: 88 TABLET ORAL at 14:27

## 2024-07-06 RX ADMIN — ASPIRIN 81 MG: 81 TABLET, COATED ORAL at 12:31

## 2024-07-06 RX ADMIN — GABAPENTIN 300 MG: 300 CAPSULE ORAL at 16:23

## 2024-07-06 RX ADMIN — INSULIN LISPRO 2 UNITS: 100 INJECTION, SOLUTION INTRAVENOUS; SUBCUTANEOUS at 22:57

## 2024-07-06 RX ADMIN — PRAMIPEXOLE DIHYDROCHLORIDE 1.5 MG: 1.5 TABLET ORAL at 14:27

## 2024-07-06 NOTE — ED NOTES
Nursing report ED to floor  Nathan Baez  79 y.o.  male    HPI :  HPI (Adult)  Stated Reason for Visit: rash on abdomen  History Obtained From: patient  Duration (Hours): 1    Chief Complaint  Chief Complaint   Patient presents with    Rash     Rash on lower abdomen        Admitting doctor:   Mustapha Valadez MD    Admitting diagnosis:   The encounter diagnosis was Panniculitis.    Code status:   Current Code Status       Date Active Code Status Order ID Comments User Context       Prior            Allergies:   Adhesive tape    Isolation:   No active isolations    Intake and Output    Intake/Output Summary (Last 24 hours) at 7/6/2024 0406  Last data filed at 7/6/2024 0231  Gross per 24 hour   Intake 100 ml   Output --   Net 100 ml       Weight:       07/06/24  0115   Weight: 107 kg (235 lb)       Most recent vitals:   Vitals:    07/06/24 0331 07/06/24 0332 07/06/24 0333 07/06/24 0400   BP: 132/72      Pulse:   94 97   Resp:       Temp:       TempSrc:       SpO2:  94%  96%   Weight:       Height:           Active LDAs/IV Access:   Lines, Drains & Airways       Active LDAs       Name Placement date Placement time Site Days    Peripheral IV 07/06/24 0150 Anterior;Left Forearm 07/06/24  0150  Forearm  less than 1                    Labs (abnormal labs have a star):   Labs Reviewed   COMPREHENSIVE METABOLIC PANEL - Abnormal; Notable for the following components:       Result Value    Glucose 271 (*)     Sodium 132 (*)     Chloride 97 (*)     Alkaline Phosphatase 169 (*)     Total Bilirubin 1.3 (*)     All other components within normal limits    Narrative:     GFR Normal >60  Chronic Kidney Disease <60  Kidney Failure <15    The GFR formula is only valid for adults with stable renal function between ages 18 and 70.   LACTIC ACID, PLASMA - Abnormal; Notable for the following components:    Lactate 2.3 (*)     All other components within normal limits   PROCALCITONIN - Abnormal; Notable for the following components:     "Procalcitonin 0.27 (*)     All other components within normal limits    Narrative:     As a Marker for Sepsis (Non-Neonates):    1. <0.5 ng/mL represents a low risk of severe sepsis and/or septic shock.  2. >2 ng/mL represents a high risk of severe sepsis and/or septic shock.    As a Marker for Lower Respiratory Tract Infections that require antibiotic therapy:    PCT on Admission    Antibiotic Therapy       6-12 Hrs later    >0.5                Strongly Recommended  >0.25 - <0.5        Recommended   0.1 - 0.25          Discouraged              Remeasure/reassess PCT  <0.1                Strongly Discouraged     Remeasure/reassess PCT    As 28 day mortality risk marker: \"Change in Procalcitonin Result\" (>80% or <=80%) if Day 0 (or Day 1) and Day 4 values are available. Refer to [email or web address]    Change in PCT <=80%  A decrease of PCT levels below or equal to 80% defines a positive change in PCT test result representing a higher risk for 28-day all-cause mortality of patients diagnosed with severe sepsis for septic shock.    Change in PCT >80%  A decrease of PCT levels of more than 80% defines a negative change in PCT result representing a lower risk for 28-day all-cause mortality of patients diagnosed with severe sepsis or septic shock.      URINALYSIS W/ MICROSCOPIC IF INDICATED (NO CULTURE) - Abnormal; Notable for the following components:    Glucose,  mg/dL (2+) (*)     Ketones, UA Trace (*)     Protein, UA 30 mg/dL (1+) (*)     All other components within normal limits   CBC WITH AUTO DIFFERENTIAL - Abnormal; Notable for the following components:    Hemoglobin 12.6 (*)     Lymphocyte % 15.9 (*)     Immature Grans % 1.1 (*)     Neutrophils, Absolute 7.94 (*)     Immature Grans, Absolute 0.12 (*)     All other components within normal limits   BLOOD CULTURE   BLOOD CULTURE   RAINBOW DRAW    Narrative:     The following orders were created for panel order [place] Draw.  Procedure         "                       Abnormality         Status                     ---------                               -----------         ------                     Green Top (Gel)[[id number]]                                  Final result               Lavender Top[[id number]]                                     Final result               Gold Top - SST[[id number]]                                   Final result               Light Blue Top[[id number]]                                   Final result                 Please view results for these tests on the individual orders.   URINALYSIS, MICROSCOPIC ONLY   LACTIC ACID, REFLEX   CBC AND DIFFERENTIAL    Narrative:     The following orders were created for panel order CBC & Differential.  Procedure                               Abnormality         Status                     ---------                               -----------         ------                     CBC Auto Differential[[id number]]        Abnormal            Final result                 Please view results for these tests on the individual orders.   GREEN TOP   LAVENDER TOP   GOLD TOP - SST   LIGHT BLUE TOP       EKG:   No orders to display       Meds given in ED:   Medications   sodium chloride 0.9 % flush 10 mL (has no administration in time range)   vancomycin 2250 mg/500 mL 0.9% NS IVPB ([facility]) (2,250 mg Intravenous New Bag 7/6/24 0231)   cefTRIAXone (ROCEPHIN) 2,000 mg in sodium chloride 0.9 % 100 mL MBP (0 mg Intravenous Stopped 7/6/24 0231)   iopamidol (ISOVUE-300) 61 % injection 100 mL (85 mL Intravenous Given 7/6/24 0251)       Imaging results:  CT Abdomen Pelvis With Contrast    Result Date: 7/6/2024  Stable findings when compared to June 9, 2024. The skin thickening involving the lower anterior abdominal wall has been seen on multiple prior studies. Appearance may be related to dependent edema. No fluid collections to suggest abscess are seen.  Radiation dose reduction techniques were utilized, including  automated exposure control and exposure modulation based on body size.   This report was finalized on 2024 3:45 AM by Dr. Tess Sorto M.D on Workstation: [id number]       Ambulatory status:   - with assist x2    Social issues:   Social History     Socioeconomic History    Marital status:    Tobacco Use    Smoking status: Former     Current packs/day: 0.00     Average packs/day: 1 pack/day for 24.0 years (24.0 ttl pk-yrs)     Types: Cigarettes     Start date: 1960     Quit date: 1984     Years since quittin.5     Passive exposure: Never    Smokeless tobacco: Former     Types: Chew   Vaping Use    Vaping status: Never Used   Substance and Sexual Activity    Alcohol use: Yes     Alcohol/week: 2.0 standard drinks of alcohol     Types: 2 Cans of beer per week     Comment: 2-3 TIMES A MONTH    Drug use: No    Sexual activity: Defer     Partners: Female     Birth control/protection: Vasectomy       Peripheral Neurovascular  Peripheral Neurovascular (Adult)  Peripheral Neurovascular WDL: WDL    Neuro Cognitive  Neuro Cognitive (Adult)  Cognitive/Neuro/Behavioral WDL: WDL, level of consciousness, orientation  Level of Consciousness: Alert  Orientation: oriented x 4    Learning  Learning Assessment (Adult)  Learning Readiness and Ability: no barriers identified    Respiratory  Respiratory WDL  Respiratory WDL: WDL    Abdominal Pain       Pain Assessments  Pain (Adult)  (0-10) Pain Rating: Rest: 7  (0-10) Pain Rating: Activity: 7  Pain Location: abdomen  Pain Description: intermittent    NIH Stroke Scale       Rocío Woodall RN  24 04:06 EDT

## 2024-07-06 NOTE — ED NOTES
"Pt arrived to ER via pv from home, c/o of rash on lower abdomen, per pt \"was seen a month ago for the same thing\" Per pt pain comes and goes.  "

## 2024-07-06 NOTE — PROGRESS NOTES
"[facility] Services: Vancomycin Pharmacokinetic Initial Consult Note    Nathan Baez is a 79 y.o. male who is on day 1 of pharmacy to dose vancomycin.    Indication: Skin and Soft Tissue  Consulting Provider: Dr. Lopez  Planned Duration of Therapy: 5 days  Loading Dose Ordered or Given: 2250mg on 7/6 at 0230  MRSA PCR performed: Yes; Result: Negative  Culture/Source: Blood Culture: Pending  Target: -600 mg/L.hr   Other Antimicrobials: Zosyn 3.375g q 8 hours    Vitals/Labs  Ht: 170.2 cm (67\"); Wt: 107 kg (236 lb 8.9 oz)  Temp Readings from Last 1 Encounters:   07/06/24 98.5 °F (36.9 °C) (Oral)    Estimated Creatinine Clearance: 69 mL/min (by C-G formula based on SCr of 1.03 mg/dL).     Results from last 7 days   Lab Units 07/06/24  0828 07/06/24  0151 07/02/24  1306   CREATININE mg/dL 1.03 1.02 1.04   WBC 10*3/mm3 8.51 10.76 5.82     Assessment/Plan:    Vancomycin Dose: 1000mg IV every 12 hours  Predictive AUC level for the dose ordered is 563 mg/L.hr, which is within the target of 400-600 mg/L.hr  Vanc Trough has been ordered for 7/7 at 1300     Pharmacy will follow patient's kidney function and will adjust doses and obtain levels as necessary. Thank you for involving pharmacy in this patient's care. Please contact pharmacy with any questions or concerns.                           Walt Alarcon [facility]  Clinical Pharmacist   "

## 2024-07-06 NOTE — PROGRESS NOTES
[facility] Clinical Pharmacy Services: Piperacillin-Tazobactam Consult    Pt Name: Nathan Baez   : 1945    Indication: Skin and Soft Tissue    Relevant clinical data and objective history reviewed:    Creatinine   Date Value Ref Range Status   2024 1.03 0.76 - 1.27 mg/dL Final   2024 1.02 0.76 - 1.27 mg/dL Final   2024 1.04 0.76 - 1.27 mg/dL Final   2019 1.20 0.60 - 1.30 mg/dL Final     Comment:     Serial Number: [id number]:  [id number]     BUN   Date Value Ref Range Status   2024 20 8 - 23 mg/dL Final     Estimated Creatinine Clearance: 69 mL/min (by C-G formula based on SCr of 1.03 mg/dL).    Lab Results   Component Value Date    WBC 8.51 2024     Temp Readings from Last 3 Encounters:   24 98.5 °F (36.9 °C) (Oral)   24 98.7 °F (37.1 °C) (Oral)   24 97.8 °F (36.6 °C) (Temporal)      Assessment/Plan  Estimated CrCl >20 mL/min at this time; BMI 37 kg/m2  Will start piperacillin-tazobactam 3.375 g IV every 8 hours     Pharmacy will continue to follow daily while on piperacillin-tazobactam and adjust as needed. Thank you for this consult.    Walt Alarcon [facility]  Clinical Pharmacist

## 2024-07-06 NOTE — H&P
Patient Name:  Nathan Baez  YOB: 1945  MRN:  [id number]  Admit Date:  7/6/2024  Patient Care Team:  Damien Pierce MD as PCP - General (Family Medicine)  Tadeo Peralta MD as Consulting Physician (Pulmonary Disease)  Isadora Oh APRN as Nurse Practitioner (Pain Medicine)        Chief Complaint   Patient presents with    Rash     Rash on lower abdomen    Associated with hotness over the last 24 hours.    History Present Illness     A pleasant 79 years old gentleman with a past history of recurrent abdominal wall cellulitis/panniculitis/nephrolithiasis/B12 deficiency/chronic diastolic congestive heart failure/hypertension/DVT/type 2 diabetes.  GERD/diverticulosis/hypothyroidism/generalized osteoarthritis and questionable rheumatoid arthritis/obstructive sleep apnea.  Patient states that he gets panniculitis and abdominal wall cellulitis.  He frequently approximately every 6 months.  He has had the last episode about a month ago.  He presents again with sudden onset of swelling/redness/hotness of the lower abdomen associated with weakness and nausea.  There was no history of abdominal wall trauma.  This occurs at the site of a previous surgery of incisional hernia and prostatectomy scar.  Positive chills but no fever.  No vomiting.  Normal bowel habits without constipation/diarrhea/bleeding per rectum/melena.  No dysuria or hematuria.  In the emergency department chest x-ray revealed lung herniation at the right lung base.  CT scan of the abdomen pelvis with IV contrast revealed stable findings of skin thickening of the lower abdominal wall related to edema with no fluid collection.  Blood cultures obtained.  CBC normal except a hemoglobin of 12.6.  Procalcitonin elevated 0.27.  Lactic acid elevated at 2.3.  CMP normal except random blood sugar of 271, sodium 132, chloride 97, alkaline phosphatase 169, total bilirubin 1.3.  UA is negative.  Patient was given IV Rocephin and IV  vancomycin in the emergency department and subsequently admitted      Review of Systems   Cardiovascular/respiratory.  Positive old exertional dyspnea and lower extremity edema and dry cough.  No chest pain.  No palpitation.  No hemoptysis.  GI.  As above.  .  As above.  CNS.  No focal neurological symptoms.  No dizziness or loss of consciousness  Personal History     Past Medical History:   Diagnosis Date    Actinic keratosis     Acute embolism and thrombosis of vein     Allergic     Allergic rhinitis     Arthritis     Cataract     Cellulitis 06/16/2024    Psychiatric HOSPITALIZATION    Cervical radiculopathy     Chronic constipation     Colon polyp     Diabetes mellitus     Disequilibrium     DJD (degenerative joint disease), lumbar     Elevated cholesterol     Erysipelas     GERD (gastroesophageal reflux disease)     Glaucoma     Hip pain, chronic, right     History of kidney stones     X1    History of peripheral edema     LOWER LEGS BILAT, COMPRESSION KNEE HIGH     History of transfusion 2019    SHOULDER SURGERY    Hospitalization or health care facility admission within last 6 months 06/16/2024    CELLULITIS    Hyperlipidemia     Hypertension     Hypothyroid     Insomnia     Intervertebral disc stenosis of neural canal of lumbar region 04/12/2022    Kidney stone     Limited mobility     RIGHT HIP    Low back pain     Male urinary stress incontinence     s/p prostatectomy-WEAR PAD    Numbness of left hand     Obesity     KWAME (obstructive sleep apnea)     BIPAP    Osteoarthritis     Pain management     [place] GAITAN    Pelvic floor dysfunction 06/2022    DEFOGRAM ORDERED AT [facility] BY DR. ROSHAN SCHAFER    Pes planus     Presbycusis     Prostate cancer 1999    Restless leg     Restless legs syndrome     Shoulder pain     RIGHT, LIMITED MOBILITY-AT TIMES    Sleep apnea     bipap    Tinea corporis     Tinea cruris     Unsteady gait     RIGHT HIP    Weakness     RIGHT HIP     Past Surgical History:    Procedure Laterality Date    CATARACT EXTRACTION, BILATERAL Right 2013    CERVICAL DISCECTOMY ANTERIOR      COLONOSCOPY  03/08/2017    3/17 normal. Recheck 2022. 12/11. Repeat in 5 years    COLONOSCOPY N/A 04/19/2021    Procedure: COLONOSCOPY INTO CECUM WITH HOT & COLD  SNARE POLYPECTOMIES;  Surgeon: Jaden Chowdhury MD;  Location: [facility] ENDOSCOPY;  Service: Gastroenterology;  Laterality: N/A;  PRE: CHANGE IN BOWEL HABITS; FAMILY H/O COLON CANCER  POST: DIVERTICULOSIS, POLYPS    ENDOSCOPY N/A 01/30/2023    Procedure: ESOPHAGOGASTRODUODENOSCOPY with biopsies;  Surgeon: Jaden Chowdhury MD;  Location: [facility] ENDOSCOPY;  Service: Gastroenterology;  Laterality: N/A;  pre- abdominal pain, anemia  post- gastritis    ENDOSCOPY W/ PEG REMOVAL      EYE SURGERY  2013    Cataract    FOOT SURGERY      1998 had big toe replaced on left foot-METAL     HERNIA REPAIR  03/07/2012    umbilical    JOINT REPLACEMENT Left     big toe    MEDIAL BRANCH BLOCK Bilateral 04/07/2023    Procedure: LUMBAR MEDIAL BRANCH BLOCK bilateral L4-S1 [id number];  Surgeon: Lauren Houston MD;  Location: [facility] MAIN OR;  Service: Pain Management;  Laterality: Bilateral;    MEDIAL BRANCH BLOCK Bilateral 04/17/2023    Procedure: LUMBAR MEDIAL BRANCH BLOCK bilateral L4-S1 [id number];  Surgeon: Lauren Houston MD;  Location: [facility];  Service: Pain Management;  Laterality: Bilateral;    SC ARTHRP KNE CONDYLE&PLATU MEDIAL&LAT COMPARTMENTS Left 09/13/2016    Procedure: LT TOTAL KNEE ARTHROPLASTY;  Surgeon: Tadeo Basurto MD;  Location: [facility] MAIN OR;  Service: Orthopedics    PROSTATECTOMY  07/1999 July 1999. Radical     RADIOFREQUENCY ABLATION Right 05/17/2023    Procedure: RADIOFREQUENCY ABLATION LUMBAR right L3-S1;  Surgeon: Lauren Houston MD;  Location: [facility] MAIN OR;  Service: Pain Management;  Laterality: Right;    THYROID LOBECTOMY  2011    TONSILLECTOMY      CHILDHOOD    TOTAL HIP ARTHROPLASTY Right 08/19/2021    Procedure:  TOTAL HIP ARTHROPLASTY RIGHT POSTERIOR;  Surgeon: Tadeo Basurto MD;  Location: [facility] OR;  Service: Orthopedics;  Laterality: Right;    TOTAL KNEE ARTHROPLASTY Right     TOTAL SHOULDER ARTHROPLASTY W/ DISTAL CLAVICLE EXCISION Right 2019    Procedure: TOTAL SHOULDER REVERSE ARTHROPLASTY RIGHT REPAIR RIGHT AXILLARY VEIN;  Surgeon: Behzad Kelley MD;  [facility] [place];  Service: Orthopedics    WRIST SURGERY Right 12/17/2014    x2  wrist replaced    WRIST SURGERY Right 2009     Family History   Problem Relation Age of Onset    Arthritis Mother     Colon cancer Mother     Arthritis Father     Cancer Father         Prostate cancer    Heart disease Sister     Arthritis Sister     Breast cancer Sister     Gout Sister     Hypertension Sister     Cancer Sister         All three breast cancer    Hypertension Brother     Heart disease Brother     Arthritis Brother     Heart attack Brother     Bone cancer Brother     Heart disease Brother     Malig Hyperthermia Neg Hx      Social History     Tobacco Use    Smoking status: Former     Current packs/day: 0.00     Average packs/day: 1 pack/day for 24.0 years (24.0 ttl pk-yrs)     Types: Cigarettes     Start date: 1960     Quit date: 1984     Years since quittin.5     Passive exposure: Never    Smokeless tobacco: Former     Types: Chew   Vaping Use    Vaping status: Never Used   Substance Use Topics    Alcohol use: Yes     Alcohol/week: 2.0 standard drinks of alcohol     Types: 2 Cans of beer per week     Comment: 2-3 TIMES A MONTH    Drug use: No     No current facility-administered medications on file prior to encounter.     Current Outpatient Medications on File Prior to Encounter   Medication Sig Dispense Refill    B-D UF III MINI PEN NEEDLES 31G X 5 MM misc 1 APPLICATION DAILY 100 each 3    gabapentin (NEURONTIN) 300 MG capsule TAKE 1 CAPSULE BY MOUTH FOUR TIMES DAILY 360 capsule 0    glucose blood (FREESTYLE LITE) test strip Check blood  sugar  each 11    insulin glargine (LANTUS, SEMGLEE) 100 UNIT/ML injection Inject 10 Units under the skin into the appropriate area as directed Daily. Indications: Type 2 Diabetes      Lancets (freestyle) lancets Check blood sugar  each 11    amLODIPine (NORVASC) 2.5 MG tablet Take 1 tablet by mouth Daily. 90 tablet 3    aspirin 81 MG EC tablet Take 1 tablet by mouth Daily. Indications: Coronary Bypass Surgery      atenolol (Tenormin) 25 MG tablet Take 0.5 tablets by mouth Daily. 45 tablet 4    bisacodyl (Dulcolax) 5 MG EC tablet Take 1 tablet by mouth 2 (Two) Times a Day. Indications: Constipation      brimonidine (ALPHAGAN) 0.2 % ophthalmic solution Administer 1 drop to both eyes 2 (Two) Times a Day. Indications: Wide-Angle Glaucoma      celecoxib (CeleBREX) 200 MG capsule TAKE 1 CAPSULE BY MOUTH DAILY (Patient taking differently: Take 1 capsule by mouth Daily. HOLD PER MD INSTRUCTIONS  Indications: Rheumatoid Arthritis) 30 capsule 5    Cyanocobalamin (VITAMIN B 12 PO) Take 1,000 mg by mouth Daily. HOLD PER SURGEON'S INSTRCUTIONS      dorzolamide-timolol (COSOPT) 22.3-6.8 MG/ML ophthalmic solution Administer 1 drop to both eyes 2 (Two) Times a Day. Indications: Wide-Angle Glaucoma      furosemide (LASIX) 40 MG tablet TAKE 1 TABLET BY MOUTH EVERY DAY  Indications: Edema (Patient taking differently: Take 1 tablet by mouth Daily. TAKE 1 TABLET BY MOUTH EVERY DAY  Indications: Edema) 90 tablet 3    HYDROcodone-acetaminophen (NORCO) 7.5-325 MG per tablet Take 1 tablet by mouth Every 8 (Eight) Hours As Needed for Moderate Pain. 30 day supply. DNF 6/5/24 90 tablet 0    Hydrocortisone, Perianal, (ANUSOL-HC) 2.5 % rectal cream APPLY RECTALLY THREE TIMES DAILY FOR 7-10 DAYS DURNG HEMORRHOID FLARE      ketoconazole (NIZORAL) 2 % cream Apply 1 application topically to the appropriate area as directed Daily. 60 g 3    latanoprost (XALATAN) 0.005 % ophthalmic solution Administer 1 drop to both eyes Every Night.  Indications: Wide-Angle Glaucoma      levothyroxine (SYNTHROID, LEVOTHROID) 88 MCG tablet Take 1 tablet by mouth Daily.      losartan (COZAAR) 100 MG tablet TAKE 1 TABLET BY MOUTH DAILY (Patient taking differently: Take 1 tablet by mouth Daily. HOLD AM OF SURGERY  Indications: High Blood Pressure Disorder) 90 tablet 3    metFORMIN (GLUCOPHAGE) 500 MG tablet TAKE 1 TABLET BY MOUTH TWICE DAILY WITH MEALS (Patient taking differently: Take 1 tablet by mouth 2 (Two) Times a Day With Meals. Indications: Type 2 Diabetes) 180 tablet 3    montelukast (SINGULAIR) 10 MG tablet TAKE 1 TABLET BY MOUTH EVERY NIGHT 90 tablet 3    pantoprazole (PROTONIX) 40 MG EC tablet Take 1 tablet by mouth 2 (Two) Times a Day. Indications: Heartburn 90 tablet 3    penicillin v potassium (VEETID) 250 MG tablet TAKE 1 TABLET BY MOUTH TWICE DAILY (Patient taking differently: Take 1 tablet by mouth Daily. TAKE 1 TABLET BY MOUTH TWICE DAILY) 60 tablet 5    phentermine 37.5 MG capsule Take 1 capsule by mouth Every Morning. (Patient taking differently: Take 1 capsule by mouth Every Morning. HOLE FOR 2 WEEKS PRIOR TO SURGERY) 30 capsule 3    pramipexole (MIRAPEX) 1.5 MG tablet Take 1 tablet by mouth 2 (Two) Times a Day. prn  Indications: Restless Leg Syndrome      Probiotic Product (Risaquad-2) capsule capsule Take 1 capsule by mouth Daily. PROBIOTIC      rosuvastatin (CRESTOR) 20 MG tablet TAKE 1 TABLET BY MOUTH EVERY EVENING 90 tablet 3    Stimulant Laxative 8.6-50 MG per tablet TAKE 2 TABLETS BY MOUTH TWICE DAILY 200 tablet 3     Allergies   Allergen Reactions    Adhesive Tape Rash     Pt states he only had one reaction after hip surgery       Objective    Objective     Vital Signs  Temp:  [98.4 °F (36.9 °C)-98.5 °F (36.9 °C)] 98.5 °F (36.9 °C)  Heart Rate:  [] 93  Resp:  [16-18] 18  BP: (111-140)/(61-99) 122/61  SpO2:  [90 %-97 %] 95 %  on   ;   Device (Oxygen Therapy): room air  Body mass index is 37.05 kg/m².    Physical Exam  General.   Elderly gentleman.  Obese.  Alert and oriented x 4.  No apparent pain/distress/diaphoresis.  Normal mood and affect  Eyes.  Pupils equal round and reactive.  Intact extraocular musculature.  Status post bilateral cataract surgery.  No pallor or jaundice  Oral cavity.  Mildly dry mucous membrane.  Neck.  Supple.  No JVD.  No lymphadenopathy or thyromegaly.  Cardiovascular.  Regular rate and rhythm and grade 2 systolic murmur.    Chest.  Poor but clear to auscultation bilaterally with no added  Abdomen.  Obese.  Soft lax.  No tenderness.  No organomegaly.  No guarding or rebound.  Normal bowel sounds.  Lower abdominal scar surrounded by induration and mild tenderness and redness and hotness involving most of the lower abdomen.  Positive lower abdominal peau d'orange.  No inguinal lymphadenopathy.  Extremities.  No clubbing/cyanosis/edema.    Results Review:  I reviewed the patient's new clinical results.  I reviewed the patient's new imaging results and agree with the interpretation.  I reviewed the patient's other test results and agree with the interpretation  I personally viewed and interpreted the patient's EKG/Telemetry data  Discussed with ED provider.    Lab Results (last 24 hours)       Procedure Component Value Units Date/Time    CBC & Differential [[id number]]  (Abnormal) Collected: 07/06/24 0151    Specimen: Blood Updated: 07/06/24 0215    Narrative:      The following orders were created for panel order CBC & Differential.  Procedure                               Abnormality         Status                     ---------                               -----------         ------                     CBC Auto Differential[[id number]]        Abnormal            Final result                 Please view results for these tests on the individual orders.    Comprehensive Metabolic Panel [[id number]]  (Abnormal) Collected: 07/06/24 0151    Specimen: Blood Updated: 07/06/24 0226     Glucose 271 mg/dL      BUN 23 mg/dL      " Creatinine 1.02 mg/dL      Sodium 132 mmol/L      Potassium 4.0 mmol/L      Chloride 97 mmol/L      CO2 24.0 mmol/L      Calcium 9.3 mg/dL      Total Protein 6.8 g/dL      Albumin 4.0 g/dL      ALT (SGPT) 23 U/L      AST (SGOT) 27 U/L      Alkaline Phosphatase 169 U/L      Total Bilirubin 1.3 mg/dL      Globulin 2.8 gm/dL      A/G Ratio 1.4 g/dL      BUN/Creatinine Ratio 22.5     Anion Gap 11.0 mmol/L      eGFR 75.2 mL/min/1.73     Narrative:      GFR Normal >60  Chronic Kidney Disease <60  Kidney Failure <15    The GFR formula is only valid for adults with stable renal function between ages 18 and 70.    Lactic Acid, Plasma [[id number]]  (Abnormal) Collected: 07/06/24 0151    Specimen: Blood Updated: 07/06/24 0225     Lactate 2.3 mmol/L     Blood Culture - Blood, Arm, Right [[id number]] Collected: 07/06/24 0151    Specimen: Blood from Arm, Right Updated: 07/06/24 0201    Procalcitonin [[id number]]  (Abnormal) Collected: 07/06/24 0151    Specimen: Blood Updated: 07/06/24 0249     Procalcitonin 0.27 ng/mL     Narrative:      As a Marker for Sepsis (Non-Neonates):    1. <0.5 ng/mL represents a low risk of severe sepsis and/or septic shock.  2. >2 ng/mL represents a high risk of severe sepsis and/or septic shock.    As a Marker for Lower Respiratory Tract Infections that require antibiotic therapy:    PCT on Admission    Antibiotic Therapy       6-12 Hrs later    >0.5                Strongly Recommended  >0.25 - <0.5        Recommended   0.1 - 0.25          Discouraged              Remeasure/reassess PCT  <0.1                Strongly Discouraged     Remeasure/reassess PCT    As 28 day mortality risk marker: \"Change in Procalcitonin Result\" (>80% or <=80%) if Day 0 (or Day 1) and Day 4 values are available. Refer to http://www.[employer]-pct-calculator.com    Change in PCT <=80%  A decrease of PCT levels below or equal to 80% defines a positive change in PCT test result representing a higher risk for 28-day all-cause " mortality of patients diagnosed with severe sepsis for septic shock.    Change in PCT >80%  A decrease of PCT levels of more than 80% defines a negative change in PCT result representing a lower risk for 28-day all-cause mortality of patients diagnosed with severe sepsis or septic shock.       CBC Auto Differential [[id number]]  (Abnormal) Collected: 07/06/24 0151    Specimen: Blood Updated: 07/06/24 0215     WBC 10.76 10*3/mm3      RBC 4.24 10*6/mm3      Hemoglobin 12.6 g/dL      Hematocrit 37.6 %      MCV 88.7 fL      MCH 29.7 pg      MCHC 33.5 g/dL      RDW 13.3 %      RDW-SD 43.2 fl      MPV 10.5 fL      Platelets 146 10*3/mm3      Neutrophil % 73.8 %      Lymphocyte % 15.9 %      Monocyte % 7.8 %      Eosinophil % 1.0 %      Basophil % 0.4 %      Immature Grans % 1.1 %      Neutrophils, Absolute 7.94 10*3/mm3      Lymphocytes, Absolute 1.71 10*3/mm3      Monocytes, Absolute 0.84 10*3/mm3      Eosinophils, Absolute 0.11 10*3/mm3      Basophils, Absolute 0.04 10*3/mm3      Immature Grans, Absolute 0.12 10*3/mm3     Urinalysis With Microscopic If Indicated (No Culture) - Urine, Catheter [[id number]]  (Abnormal) Collected: 07/06/24 0233    Specimen: Urine, Catheter Updated: 07/06/24 0248     Color, UA Yellow     Appearance, UA Clear     pH, UA 5.5     Specific Gravity, UA 1.020     Glucose,  mg/dL (2+)     Ketones, UA Trace     Bilirubin, UA Negative     Blood, UA Negative     Protein, UA 30 mg/dL (1+)     Leuk Esterase, UA Negative     Nitrite, UA Negative     Urobilinogen, UA 1.0 E.U./dL    Urinalysis, Microscopic Only - Urine, Catheter [[id number]] Collected: 07/06/24 0233    Specimen: Urine, Catheter Updated: 07/06/24 0250     RBC, UA 0-2 /HPF      WBC, UA 0-2 /HPF      Bacteria, UA None Seen /HPF      Squamous Epithelial Cells, UA 0-2 /HPF      Hyaline Casts, UA 0-2 /LPF      Methodology Automated Microscopy    STAT Lactic Acid, Reflex [[id number]]  (Abnormal) Collected: 07/06/24 0527    Specimen: Blood  Updated: 07/06/24 0551     Lactate 2.8 mmol/L     Basic Metabolic Panel [[id number]]  (Abnormal) Collected: 07/06/24 0828    Specimen: Blood Updated: 07/06/24 0917     Glucose 227 mg/dL      BUN 20 mg/dL      Creatinine 1.03 mg/dL      Sodium 135 mmol/L      Potassium 3.7 mmol/L      Chloride 99 mmol/L      CO2 25.0 mmol/L      Calcium 8.8 mg/dL      BUN/Creatinine Ratio 19.4     Anion Gap 11.0 mmol/L      eGFR 74.4 mL/min/1.73     Narrative:      GFR Normal >60  Chronic Kidney Disease <60  Kidney Failure <15    The GFR formula is only valid for adults with stable renal function between ages 18 and 70.    CBC (No Diff) [[id number]]  (Abnormal) Collected: 07/06/24 0828    Specimen: Blood Updated: 07/06/24 0911     WBC 8.51 10*3/mm3      RBC 4.15 10*6/mm3      Hemoglobin 12.2 g/dL      Hematocrit 36.5 %      MCV 88.0 fL      MCH 29.4 pg      MCHC 33.4 g/dL      RDW 13.0 %      RDW-SD 41.7 fl      MPV 10.3 fL      Platelets 129 10*3/mm3     STAT Lactic Acid, Reflex [[id number]]  (Abnormal) Collected: 07/06/24 0828    Specimen: Blood Updated: 07/06/24 0916     Lactate 2.5 mmol/L     MRSA Screen, PCR (Inpatient) - Swab, Nares [[id number]]  (Normal) Collected: 07/06/24 0912    Specimen: Swab from Nares Updated: 07/06/24 1036     MRSA PCR No MRSA Detected    Narrative:      The negative predictive value of this diagnostic test is high and should only be used to consider de-escalating anti-MRSA therapy. A positive result may indicate colonization with MRSA and must be correlated clinically.    POC Glucose Once [[id number]]  (Abnormal) Collected: 07/06/24 1037    Specimen: Blood Updated: 07/06/24 1038     Glucose 300 mg/dL             Imaging Results (Last 24 Hours)       Procedure Component Value Units Date/Time    CT Abdomen Pelvis With Contrast [[id number]] Collected: 07/06/24 0337     Updated: 07/06/24 0348    Narrative:      CT OF THE ABDOMEN AND PELVIS WITH CONTRAST     HISTORY: Panniculitis     COMPARISON: 2/9/2024      TECHNIQUE: Axial CT imaging was obtained through the abdomen and pelvis.  IV contrast was administered.     FINDINGS:  Images through the lung bases demonstrate bibasilar scarring. There is a  6 mm nodule within the right lower lobe. It is been present on exams  dating back to at least February 2023, and is benign. No suspicious  hepatic lesions are seen. Liver is enlarged, measuring up to 17.3 cm in  craniocaudal dimensions. Spleen is enlarged, measuring 14.6 cm in AP  dimensions. The stomach, duodenum, and adrenal glands are normal. There  is cholelithiasis. Pancreas is atrophic. Kidneys enhance symmetrically.  There is no hydronephrosis. Cortical scarring is noted bilaterally.  There are bilateral renal cysts. No additional follow-up is necessary.  There are bilateral nonobstructing renal stones. There are aortoiliac  calcifications. No distal ureteral or bladder stones are seen. Prostate  gland is absent. There is colonic diverticulosis. There is no bowel  obstruction. The appendix is normal. There is skin thickening involving  the lower anterior abdominal wall. This appearance has been seen on  multiple prior studies. Given its persistence over time, it may be  related to dependent edema. I do not see any collections to suggest  abscess. No acute osseous abnormalities are seen. There is lumbar  scoliosis, with convexity to the left. There are changes of prior right  hip arthroplasty. Old bilateral rib fractures are noted.       Impression:      Stable findings when compared to June 9, 2024. The skin thickening  involving the lower anterior abdominal wall has been seen on multiple  prior studies. Appearance may be related to dependent edema. No fluid  collections to suggest abscess are seen.     Radiation dose reduction techniques were utilized, including automated  exposure control and exposure modulation based on body size.        This report was finalized on 7/6/2024 3:45 AM by Dr. Tess Sorto M.D on  Workstation: [id number]               Results for orders placed during the hospital encounter of 12/08/23    Adult Transthoracic Echo Complete W/ Cont if Necessary Per Protocol    Interpretation Summary    Left ventricular systolic function is normal. Calculated left ventricular EF = 54.8%    Left ventricular diastolic function is consistent with (grade I) impaired relaxation.    Mild tricuspid valve regurgitation is present.    Estimated right ventricular systolic pressure from tricuspid regurgitation is normal (<35 mmHg).      Telemetry Scan   Final Result               Assessment/Plan     Active Hospital Problems    Diagnosis  POA    **Panniculitis [M79.3]  Yes      Resolved Hospital Problems   No resolved problems to display.           Recurrent abdominal wall cellulitis and panniculitis.  MRSA screen is negative.  No fever or leukocytosis.  Will check blood cultures.  Will initiate Zosyn and vancomycin.  Procalcitonin and lactic acid are elevated.  CT scan with the same no changes and no abscesses.  Secondary to the recurrence nature of this problem even though he is on suppressive p.o. penicillin at home I will ask ID to evaluate also ask surgery to evaluate as I am suspecting there is a focus of infection and scar tissue of previous surgeries causing his recurrent cellulitis.  Anemia of chronic disease/thrombocytopenia.  Baseline hemoglobin between 11.7 and 13.9.  Hemoglobin is stable.  No bleeding diathesis.  Monitor CBC.  Type 2 diabetes.  Will continue Lantus and add sliding scale insulin.  Hold metformin as the patient has received IV contrast.  Check A1c.  Hyperbilirubinemia and elevated alkaline phosphatase.  Benign GI examination except as mentioned above.  Will check right upper quadrant ultrasound.  Hypertension/chronic diastolic congestive heart failure.  Good blood pressure control with no evidence of angina or congestive heart failure.  Continue Norvasc/atenolol/Lasix/losartan.  History of GERD  and diverticulosis.  Continue Protonix and Florinef.  Hypothyroidism.  Continue current replacement check TSH.  Generalized osteoarthritis/questionable rheumatoid arthritis.  Continue Celebrex.  Hyperlipidemia.  Continue Crestor.  Obstructive sleep apnea.  Oxygen while in the hospital.  History of DVT.  DVT prophylaxis with Lovenox.      Discussed my findings and plan of treatment with the patient.  Disposition.  To be determined based on clinical course    Code Status -full code.    Amaury Lopez MD  [facility] [facility]  07/06/24  12:11 EDT

## 2024-07-06 NOTE — ED PROVIDER NOTES
MD ATTESTATION NOTE  I supervised care provided by the midlevel provider. We have discussed this patient's history, physical exam, and treatment plan. I have reviewed the midlevel provider's note and I agree with the midlevel provider's findings and plan of care.   SHARED VISIT: This visit was performed by BOTH a physician and an APC. The substantive portion of the medical decision making was performed by this attesting physician who made or approved the management plan and takes responsibility for patient management. All studies in the APC note (if performed) were independently interpreted by me.   I have personally had a face to face encounter with the patient.     PCP: Damien Pierce MD  Patient Care Team:  Damien Pierce MD as PCP - General (Family Medicine)  Tadeo Peralta MD as Consulting Physician (Pulmonary Disease)  Isadora Oh APRN as Nurse Practitioner (Pain Medicine)     Nathan Baez is a 79 y.o. male who presents to the ED c/o redness of his lower abdomen.  Patient reports just noticed tonight.  Patient reports history of panniculitis in the past.  Patient denies any fevers shakes chills or night sweats, endorses nausea, no emesis.    On exam:  General: NAD.  Head: NCAT.  ENT: nares patent, no scleral icterus  Neck: Supple, trachea midline.  Cardiac: regular rate and rhythm.  Lungs: normal effort, clear to auscultation bilaterally  Abdomen: Soft, nondistended, NTTP, no rebound tenderness, no guarding or rigidity.   Extremities: Moves all extremities well, no peripheral edema  Neuro: alert, MAEW, follows commands  Psych: calm, cooperative  Skin: Warm, dry.  Erythema with warmth and induration to the lower abdomen, no fluctuance or drainage.    Medical Decision Making:  After the initial H&P, I discussed pertinent information from history and physical exam with patient/family.  Discussed differential diagnosis.  Discussed plan for ED evaluation/work-up/treatment.  All questions  answered.  Patient/family is agreeable with plan.    ED Course as of 07/06/24 0615   Sat Jul 06, 2024 0129 I discussed the case with Dr. Lala and he agrees to evaluate the patient at the bedside.    [CC]   0218 WBC: 10.76 [CC]   0225 Lactate(!!): 2.3 [CC]   0227 Glucose(!): 271 [CC]   0227 Total Bilirubin(!): 1.3 [CC]   0304 Procalcitonin(!): 0.27 [CC]   0304 CT Abdomen Pelvis With Contrast  My independent interpretation of the CT of the abdomen pelvis is skin thickening and stranding in the subcutaneous tissue of the lower abdomen, no drainable fluid collection, no obvious gas producing organism. [CC]   0350 I rechecked the patient.  I discussed the patient's labs, radiology findings (including all incidental findings), diagnosis, and plan for admission. The patient understands and agrees with the plan.   [CC]   0401 Spoke with MABLE Da Silva with [facility].  Reviewed history, exam, results, treatments.  She agrees admit the patient to Dr. Valadez.  Blood cultures pending at the time of admission    [CC]      ED Course User Index  [CC] Jackeline Redd, PACarmellaC       Diagnosis  Final diagnoses:   Panniculitis          Darshan Lala MD  07/06/24 0615

## 2024-07-06 NOTE — CONSULTS
CONSULT NOTE    Infectious Diseases - Yamila Greer MD  [facility]       Patient Identification:  Name: Nathan Baez  Age: 79 y.o.  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  Since then patient has had multiple hospitalization including the latest 1 was in 2024 during which he was treated with IV cefazolin and subsequently was discharged on Keflex with subsequent plan to continue chronic suppressive therapy with oral penicillin.  According to patient he has been compliant in taking his oral penicillin until last evening when while playing cards with his wife.  Felt that he is not feeling very well.  Afterwards this morning they noticed that there is redness of his abdominal wall and based on his previous interaction instructions patient was brought to the emergency room for further evaluation and subsequently admitted with recurrent cellulitis of the abdominal wall.  Patient was empirically started on vancomycin and ceftriaxone infectious disease service is consulted.  Patient not receiving antibiotic therapy feeling well denies any other localizing pain or discomfort and has preserved appetite.  But a month ago patient had similar presentation and was discharged with instructions for chronic suppressive therapy with penicillin and weekly Hibiclens body wash to minimize recurrent cellulitis.  Patient seems to be compliant with these recommendations.  Impression:  This presentation and above context is  consistent with:  1-recurrent pancolitis due to significant alteration of the abdominal wall as a result of previous surgery and recurrent cellulitis predisposing him to these episodes with breakthrough cellulitis occurring on suppressive therapy with penicillin arguing for mixed infection and not necessarily straightforward group B strep cellulitis and recurrent infection.  2-previous umbilical hernia repair in 2022 with chronic abdominal wall edema  3-diabetes mellitus  4-hypertension  5-history of DVT  6-history of congestive heart failure  7-obstructive sleep apnea    Recommendations/Discussions:  At this juncture I agree with the care plan consisting of vancomycin and ceftriaxone with subsequent change to Zosyn while following up on MRSA screen and blood culture results antibiotic assessment of his abdominal wall to rule out any evolving abscess.  Initial CT scan of the abdomen and pelvis did not show any defined collection suggestive of abscess.  Monitor closely for side effects of antibiotic therapy and de-escalate antibiotic treatment based on the culture studies and response to treatment.  Low threshold to discontinue vancomycin if MRSA screen is negative.  Overall concept of care discussed with patient.  Thank you very much for letting me be the part of your patient care please see above impression and recommendations          Past Medical History:  Past Medical History:   Diagnosis Date    Actinic keratosis     Acute embolism and thrombosis of vein     Allergic     Allergic rhinitis     Arthritis     Cataract     Cellulitis 06/16/2024    [facility] HOSPITALIZ[facility]    Cervical radiculopathy     Chronic constipation     Colon polyp     Diabetes mellitus     Disequilibrium     DJD (degenerative joint disease), lumbar     Elevated cholesterol     Erysipelas     GERD (gastroesophageal reflux disease)     Glaucoma     Hip pain, chronic, right     History of kidney stones     X1    History of peripheral  edema     LOWER LEGS BILAT, COMPRESSION KNEE HIGH     History of transfusion 2019    SHOULDER SURGERY    Hospitalization or health care facility admission within last 6 months 06/16/2024    CELLULITIS    Hyperlipidemia     Hypertension     Hypothyroid     Insomnia     Intervertebral disc stenosis of neural canal of lumbar region 04/12/2022    Kidney stone     Limited mobility     RIGHT HIP    Low back pain     Male urinary stress incontinence     s/p prostatectomy-WEAR PAD    Numbness of left hand     Obesity     KWAME (obstructive sleep apnea)     BIPAP    Osteoarthritis     Pain management     OR[place]O KANDY    Pelvic floor dysfunction 06/2022    DEFOGRAM ORDERED AT [facility] BY DR. ROSHAN SCHAFER    Pes planus     Presbycusis     Prostate cancer 1999    Restless leg     Restless legs syndrome     Shoulder pain     RIGHT, LIMITED MOBILITY-AT TIMES    Sleep apnea     bipap    Tinea corporis     Tinea cruris     Unsteady gait     RIGHT HIP    Weakness     RIGHT HIP     Past Surgical History:  Past Surgical History:   Procedure Laterality Date    CATARACT EXTRACTION, BILATERAL Right 2013    CERVICAL DISCECTOMY ANTERIOR      COLONOSCOPY  03/08/2017    3/17 normal. Recheck 2022. 12/11. Repeat in 5 years    COLONOSCOPY N/A 04/19/2021    Procedure: COLONOSCOPY INTO CECUM WITH HOT & COLD  SNARE POLYPECTOMIES;  Surgeon: Jaden Chowdhury MD;  Location: [facility] ENDOSCOPY;  Service: Gastroenterology;  Laterality: N/A;  PRE: CHANGE IN BOWEL HABITS; FAMILY H/O COLON CANCER  POST: DIVERTICULOSIS, POLYPS    ENDOSCOPY N/A 01/30/2023    Procedure: ESOPHAGOGASTRODUODENOSCOPY with biopsies;  Surgeon: Jaden Chowdhury MD;  Location: [facility] ENDOSCOPY;  Service: Gastroenterology;  Laterality: N/A;  pre- abdominal pain, anemia  post- gastritis    ENDOSCOPY W/ PEG REMOVAL      EYE SURGERY  2013    Cataract    FOOT SURGERY      1998 had big toe replaced on left foot-METAL     HERNIA REPAIR  03/07/2012    umbilical    JOINT REPLACEMENT  Left     big toe    MEDIAL BRANCH BLOCK Bilateral 04/07/2023    Procedure: LUMBAR MEDIAL BRANCH BLOCK bilateral L4-S1 [id number];  Surgeon: Lauren Houston MD;  Location[facility];  Service: Pain Management;  Laterality: Bilateral;    MEDIAL BRANCH BLOCK Bilateral 04/17/2023    Procedure: LUMBAR MEDIAL BRANCH BLOCK bilateral L4-S1 [id number];  Surgeon: Lauren Houston MD;  Location: [facility];  Service: Pain Management;  Laterality: Bilateral;    HI ARTHRP KNE CONDYLE&PLATU MEDIAL&LAT COMPARTMENTS Left 09/13/2016    Procedure: LT TOTAL KNEE ARTHROPLASTY;  Surgeon: Tadeo Basurto MD;  Location: [facility] MAIN OR;  Service: Orthopedics    PROSTATECTOMY  07/1999 July 1999. Radical     RADIOFREQUENCY ABLATION Right 05/17/2023    Procedure: RADIOFREQUENCY ABLATION LUMBAR right L3-S1;  Surgeon: Lauren Houston MD;  Location: [facility];  Service: Pain Management;  Laterality: Right;    THYROID LOBECTOMY  2011    TONSILLECTOMY      CHILDHOOD    TOTAL HIP ARTHROPLASTY Right 08/19/2021    Procedure: TOTAL HIP ARTHROPLASTY RIGHT POSTERIOR;  Surgeon: Tadeo Basurto MD;  Location: [facility] MAIN OR;  Service: Orthopedics;  Laterality: Right;    TOTAL KNEE ARTHROPLASTY Right 2014    TOTAL SHOULDER ARTHROPLASTY W/ DISTAL CLAVICLE EXCISION Right 11/13/2019    Procedure: TOTAL SHOULDER REVERSE ARTHROPLASTY RIGHT REPAIR RIGHT AXILLARY VEIN;  Surgeon: Behzad Kelley MD;  Location: [facility] OR [facility];  Service: Orthopedics    WRIST SURGERY Right 12/17/2014    x2  wrist replaced    WRIST SURGERY Right 2009      Home Meds:  (Not in a hospital admission)    Current Meds:     Current Facility-Administered Medications:     acetaminophen (TYLENOL) tablet 650 mg, 650 mg, Oral, Q4H PRN **OR** acetaminophen (TYLENOL) 160 MG/5ML oral solution 650 mg, 650 mg, Oral, Q4H PRN **OR** acetaminophen (TYLENOL) suppository 650 mg, 650 mg, Rectal, Q4H PRN, Rekha Marcelo APRN    sennosides-docusate (PERICOLACE) 8.6-50 MG per tablet  2 tablet, 2 tablet, Oral, BID PRN **AND** polyethylene glycol (MIRALAX) packet 17 g, 17 g, Oral, Daily PRN **AND** bisacodyl (DULCOLAX) EC tablet 5 mg, 5 mg, Oral, Daily PRN **AND** bisacodyl (DULCOLAX) suppository 10 mg, 10 mg, Rectal, Daily PRN, Rekha Marcelo APRN    calcium carbonate (TUMS) chewable tablet 500 mg (200 mg elemental), 2 tablet, Oral, BID PRN, Rekha Marcelo APRN    ondansetron ODT (ZOFRAN-ODT) disintegrating tablet 4 mg, 4 mg, Oral, Q6H PRN **OR** ondansetron (ZOFRAN) injection 4 mg, 4 mg, Intravenous, Q6H PRN, Rekha Marcelo APRN    sodium chloride 0.9 % flush 10 mL, 10 mL, Intravenous, PRN, Jackeline Redd PA-C    sodium chloride 0.9 % flush 10 mL, 10 mL, Intravenous, Q12H, Rekha Marcelo APRN    sodium chloride 0.9 % flush 10 mL, 10 mL, Intravenous, PRN, Rekha Marcelo APRN    sodium chloride 0.9 % infusion 40 mL, 40 mL, Intravenous, PRN, Rekha Marcelo APRN    sodium chloride 0.9 % infusion, 100 mL/hr, Intravenous, Continuous, Rekha Marcelo APRN    Current Outpatient Medications:     amLODIPine (NORVASC) 2.5 MG tablet, Take 1 tablet by mouth Daily., Disp: 90 tablet, Rfl: 3    aspirin 81 MG EC tablet, Take 1 tablet by mouth Daily. Indications: Coronary Bypass Surgery, Disp: , Rfl:     atenolol (Tenormin) 25 MG tablet, Take 0.5 tablets by mouth Daily., Disp: 45 tablet, Rfl: 4    B-D UF III MINI PEN NEEDLES 31G X 5 MM misc, 1 APPLICATION DAILY, Disp: 100 each, Rfl: 3    bisacodyl (Dulcolax) 5 MG EC tablet, Take 1 tablet by mouth 2 (Two) Times a Day. Indications: Constipation, Disp: , Rfl:     brimonidine (ALPHAGAN) 0.2 % ophthalmic solution, Administer 1 drop to both eyes 2 (Two) Times a Day. Indications: Wide-Angle Glaucoma, Disp: , Rfl:     celecoxib (CeleBREX) 200 MG capsule, TAKE 1 CAPSULE BY MOUTH DAILY (Patient taking differently: Take 1 capsule by mouth Daily. HOLD PER MD INSTRUCTIONS  Indications: Rheumatoid Arthritis), Disp: 30 capsule,  Rfl: 5    Cyanocobalamin (VITAMIN B 12 PO), Take 1,000 mg by mouth Daily. HOLD PER SURGEON'S INSTRCUTIONS, Disp: , Rfl:     dorzolamide-timolol (COSOPT) 22.3-6.8 MG/ML ophthalmic solution, Administer 1 drop to both eyes 2 (Two) Times a Day. Indications: Wide-Angle Glaucoma, Disp: , Rfl:     furosemide (LASIX) 40 MG tablet, TAKE 1 TABLET BY MOUTH EVERY DAY  Indications: Edema (Patient taking differently: Take 1 tablet by mouth Daily. TAKE 1 TABLET BY MOUTH EVERY DAY  Indications: Edema), Disp: 90 tablet, Rfl: 3    gabapentin (NEURONTIN) 300 MG capsule, TAKE 1 CAPSULE BY MOUTH FOUR TIMES DAILY, Disp: 360 capsule, Rfl: 0    glucose blood (FREESTYLE LITE) test strip, Check blood sugar TID, Disp: 200 each, Rfl: 11    HYDROcodone-acetaminophen (NORCO) 7.5-325 MG per tablet, Take 1 tablet by mouth Every 8 (Eight) Hours As Needed for Moderate Pain. 30 day supply. DNF 6/5/24, Disp: 90 tablet, Rfl: 0    Hydrocortisone, Perianal, (ANUSOL-HC) 2.5 % rectal cream, APPLY RECTALLY THREE TIMES DAILY FOR 7-10 DAYS DURNG HEMORRHOID FLARE, Disp: , Rfl:     insulin glargine (LANTUS, SEMGLEE) 100 UNIT/ML injection, Inject 10 Units under the skin into the appropriate area as directed Daily. Indications: Type 2 Diabetes, Disp: , Rfl:     ketoconazole (NIZORAL) 2 % cream, Apply 1 application topically to the appropriate area as directed Daily., Disp: 60 g, Rfl: 3    Lancets (freestyle) lancets, Check blood sugar TID, Disp: 200 each, Rfl: 11    latanoprost (XALATAN) 0.005 % ophthalmic solution, Administer 1 drop to both eyes Every Night. Indications: Wide-Angle Glaucoma, Disp: , Rfl:     levothyroxine (SYNTHROID, LEVOTHROID) 88 MCG tablet, Take 1 tablet by mouth Daily., Disp: , Rfl:     losartan (COZAAR) 100 MG tablet, TAKE 1 TABLET BY MOUTH DAILY (Patient taking differently: Take 1 tablet by mouth Daily. HOLD AM OF SURGERY  Indications: High Blood Pressure Disorder), Disp: 90 tablet, Rfl: 3    metFORMIN (GLUCOPHAGE) 500 MG tablet, TAKE 1  TABLET BY MOUTH TWICE DAILY WITH MEALS (Patient taking differently: Take 1 tablet by mouth 2 (Two) Times a Day With Meals. Indications: Type 2 Diabetes), Disp: 180 tablet, Rfl: 3    montelukast (SINGULAIR) 10 MG tablet, TAKE 1 TABLET BY MOUTH EVERY NIGHT, Disp: 90 tablet, Rfl: 3    pantoprazole (PROTONIX) 40 MG EC tablet, Take 1 tablet by mouth 2 (Two) Times a Day. Indications: Heartburn, Disp: 90 tablet, Rfl: 3    penicillin v potassium (VEETID) 250 MG tablet, TAKE 1 TABLET BY MOUTH TWICE DAILY (Patient taking differently: Take 1 tablet by mouth Daily. TAKE 1 TABLET BY MOUTH TWICE DAILY), Disp: 60 tablet, Rfl: 5    phentermine 37.5 MG capsule, Take 1 capsule by mouth Every Morning. (Patient taking differently: Take 1 capsule by mouth Every Morning. HOLE FOR 2 WEEKS PRIOR TO SURGERY), Disp: 30 capsule, Rfl: 3    pramipexole (MIRAPEX) 1.5 MG tablet, Take 1 tablet by mouth 2 (Two) Times a Day. prn  Indications: Restless Leg Syndrome, Disp: , Rfl:     Probiotic Product (Risaquad-2) capsule capsule, Take 1 capsule by mouth Daily. PROBIOTIC, Disp: , Rfl:     rosuvastatin (CRESTOR) 20 MG tablet, TAKE 1 TABLET BY MOUTH EVERY EVENING, Disp: 90 tablet, Rfl: 3    Stimulant Laxative 8.6-50 MG per tablet, TAKE 2 TABLETS BY MOUTH TWICE DAILY, Disp: 200 tablet, Rfl: 3  Allergies:  Allergies   Allergen Reactions    Adhesive Tape Rash     Pt states he only had one reaction after hip surgery     Social History:   Social History     Tobacco Use    Smoking status: Former     Current packs/day: 0.00     Average packs/day: 1 pack/day for 24.0 years (24.0 ttl pk-yrs)     Types: Cigarettes     Start date: 1960     Quit date: 1984     Years since quittin.5     Passive exposure: Never    Smokeless tobacco: Former     Types: Chew   Substance Use Topics    Alcohol use: Yes     Alcohol/week: 2.0 standard drinks of alcohol     Types: 2 Cans of beer per week     Comment: 2-3 TIMES A MONTH      Family History:  Family History  "  Problem Relation Age of Onset    Arthritis Mother     Colon cancer Mother     Arthritis Father     Cancer Father         Prostate cancer    Heart disease Sister     Arthritis Sister     Breast cancer Sister     Gout Sister     Hypertension Sister     Cancer Sister         All three breast cancer    Hypertension Brother     Heart disease Brother     Arthritis Brother     Heart attack Brother     Bone cancer Brother     Heart disease Brother     Malig Hyperthermia Neg Hx           Review of Systems  See history of present illness and past medical history.       Vitals:   /71   Pulse 96   Temp 98.4 °F (36.9 °C) (Tympanic)   Resp 16   Ht 170.2 cm (67\")   Wt 107 kg (235 lb)   SpO2 97%   BMI 36.81 kg/m²   I/O:   Intake/Output Summary (Last 24 hours) at 7/6/2024 0610  Last data filed at 7/6/2024 0231  Gross per 24 hour   Intake 100 ml   Output --   Net 100 ml     Exam:  Patient is examined using the personal protective equipment as per guidelines from infection control for this particular patient as enacted.  Hand washing was performed before and after patient interaction.  General Appearance:    Alert, cooperative, no distress, appears stated age   Head:    Normocephalic, without obvious abnormality, atraumatic   Eyes:    PERRL, conjunctivae/corneas clear, EOM's intact, both eyes   Ears:    Normal external ear canals, both ears   Nose:   Nares normal, septum midline, mucosa normal, no drainage    or sinus tenderness   Throat:   Lips, tongue, gums normal; oral mucosa pink and moist   Neck:   Supple, symmetrical, trachea midline, no adenopathy;     thyroid:  no enlargement/tenderness/nodules; no carotid    bruit or JVD   Back:     Symmetric, no curvature, ROM normal, no CVA tenderness   Lungs:     Clear to auscultation bilaterally, respirations unlabored   Chest Wall:    No tenderness or deformity    Heart:  Irregularly irregular   Abdomen:   Erythematous changes involving the pannus with decreased erythema " upon elevation of the pannus with chronic brawny edema and no obvious discrete abscess.  Mild tenderness noted.  Scar of previous surgery from umbilical hernia surgery noted.   Extremities:   Extremities normal, atraumatic, no cyanosis or edema   Pulses:   Pulses palpable in all extremities; symmetric all extremities   Skin:   Skin color normal, Skin is warm and dry,  no rashes or palpable lesions   Neurologic:   CNII-XII intact, motor strength grossly intact, sensation grossly intact to light touch, no focal deficits noted       Data Review:    I reviewed the patient's new clinical results.  Results from last 7 days   Lab Units 07/06/24  0151 07/02/24  1306   WBC 10*3/mm3 10.76 5.82   HEMOGLOBIN g/dL 12.6* 12.9*   PLATELETS 10*3/mm3 146 172     Results from last 7 days   Lab Units 07/06/24  0151 07/02/24  1306   SODIUM mmol/L 132* 137   POTASSIUM mmol/L 4.0 4.2   CHLORIDE mmol/L 97* 100   CO2 mmol/L 24.0 27.3   BUN mg/dL 23 16   CREATININE mg/dL 1.02 1.04   CALCIUM mg/dL 9.3 9.8   GLUCOSE mg/dL 271* 218*   CT Abdomen Pelvis With Contrast    Result Date: 7/6/2024  Stable findings when compared to June 9, 2024. The skin thickening involving the lower anterior abdominal wall has been seen on multiple prior studies. Appearance may be related to dependent edema. No fluid collections to suggest abscess are seen.  Radiation dose reduction techniques were utilized, including automated exposure control and exposure modulation based on body size.   This report was finalized on 7/6/2024 3:45 AM by Dr. Tess Sorto M.D on Workstation: [id number]      XR Chest PA & Lateral    Result Date: 7/4/2024   1. Lung herniation seen at the right lung base. 2. Suspect some airways disease  This report was finalized on 7/4/2024 3:20 PM by Dr. Santiago Trevino M.D on Workstation: [id number]      CT Abdomen Pelvis With Contrast    Result Date: 6/9/2024  The patient has abdominal wall skin thickening. Similar findings were present on  prior exam, and would certainly suggest cellulitis. However, I don't see any evidence of abdominal wall abscess.  Radiation dose reduction techniques were utilized, including automated exposure control and exposure modulation based on body size.   This report was finalized on 6/9/2024 9:22 PM by Dr. Tess Sorto M.D on Workstation: [id number]         Assessment:  Active Hospital Problems    Diagnosis  POA    **Panniculitis [M79.3]  Yes      Resolved Hospital Problems   No resolved problems to display.         Plan:  See above  Yamila Mabry MD   7/6/2024  06:10 EDT    Parts of this note may be an electronic transcription/translation of spoken language to printed text using the Dragon dictation system.

## 2024-07-06 NOTE — ED PROVIDER NOTES
EMERGENCY DEPARTMENT ENCOUNTER  Room Number:  02/02  PCP: Damien Pierce MD  Independent Historians: Patient      HPI:  Chief Complaint: had concerns including Rash (Rash on lower abdomen ).     A complete HPI/ROS/PMH/PSH/SH/FH are unobtainable due to: None    Chronic or social conditions impacting patient care (Social Determinants of Health): None      Context: Nathan Baez is a 79 y.o. male with a medical history of hypertension, panniculitis, hypothyroidism, diabetes, obstructive sleep apnea, CHF, and DVT presents emergency department today with redness to the lower abdomen that he noticed tonight.  Patient was playing cards with his wife ami when he voiced to her that he felt unwell.  She took his vitals and says they were normal at home.  He then reported that he felt nauseous.  She then looked at his abdomen and realized that it was red.  He has a history of panniculitis with his last admission about a month ago.  Patient takes a suppressive dose of penicillin daily.  He has not had fevers or chills.  He denies chest pain or shortness of breath.  He denies diarrhea.      Review of prior external notes (non-ED) -and- Review of prior external test results outside of this encounter:   I reviewed the hospitalization from 6/9/2024 for patient was admitted for abdominal wall cellulitis/panniculitis.  Patient was seen in consultation by ID and was started on broad-spectrum antibiotics with improvement of symptoms.  He was discharged on 10 days of Keflex.  I reviewed the ID note with they recommended the patient take weekly baths with Hibiclens.  They also recommended a suppressive dose of penicillin.    Patient takes Pen-V 250 mg twice daily as prescribed by his PCP    PAST MEDICAL HISTORY  Active Ambulatory Problems     Diagnosis Date Noted    OA (osteoarthritis) of knee 09/13/2016    Hypertension     Cellulitis of abdominal wall 06/02/2019    Hypothyroidism (acquired) 06/03/2019    Gastroesophageal  reflux disease without esophagitis 07/11/2019    Mixed hyperlipidemia 07/11/2019    Primary insomnia 07/11/2019    DDD (degenerative disc disease), lumbar 07/11/2019    KWAME (obstructive sleep apnea) 07/11/2019    Allergic 07/11/2019    Venous insufficiency 07/11/2019    Prostate cancer 07/11/2019    Venous stasis dermatitis     Tinea cruris     Tinea corporis     Throat clearing     Sleep apnea     Skin lesion of face     Rib pain on left side     Rectal bleed     Presbycusis     Pes planus     Osteoarthritis     Obesity     Medicare annual wellness visit, subsequent     Lumbar strain     Internal hemorrhoids     Insomnia     Inflamed skin tag     Hyperplastic polyp of stomach     Hospital discharge follow-up     History of colon polyps     High risk medication use     Glaucoma     Gastroenteritis, acute     Probable streptococcal cellulitis     Encounter for screening colonoscopy     Encounter for long-term (current) use of NSAIDs     Dysphagia     DVT (deep venous thrombosis)     Lumbar facet arthropathy     Diverticulosis     Cough     Contact with hypodermic needle     Cervical radiculopathy     Arthritis     Allergic rhinitis     Acute glaucoma     Acute embolism and thrombosis of vein     Actinic keratosis     Status post reverse total shoulder replacement, 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    Localized edema 11/26/2019    Other specified anemias 11/26/2019    Peripheral polyneuropathy 11/26/2019    Campylobacter diarrhea 03/14/2020    Hyponatremia 03/15/2020    Generalized abdominal pain 03/20/2020    Fever 03/20/2020    Constipation 03/20/2020    Bilateral lower extremity edema 03/20/2020    Acute pyelonephritis 04/09/2020    Chronic idiopathic constipation 05/19/2020    Functional diarrhea 07/08/2020    Change in bowel habits 07/08/2020    RLS (restless legs syndrome) 09/17/2020    Type 2 diabetes mellitus with hyperglycemia 12/18/2020    Family history of GI malignancy 04/01/2021    Primary osteoarthritis of right hip  08/19/2021    B12 deficiency 12/07/2021    Intervertebral disc stenosis of neural canal of lumbar region 04/12/2022    Encounter for hepatitis C screening test for low risk patient 04/14/2022    Pain associated with defecation 04/14/2022    Calculus of kidney 07/08/2022    Gastroesophageal reflux disease 07/08/2022    Body aches 09/15/2022    Skin lesion 10/26/2022    Candidiasis, intertrigo 10/26/2022    Hyperbilirubinemia 10/26/2022    Acute respiratory failure due to COVID-19 10/27/2022    Bacteremia due to group B Streptococcus 10/28/2022    Iron deficiency anemia 01/05/2023    Acute respiratory failure with hypoxia and hypercapnia 02/17/2023    Hypoventilation syndrome 02/27/2023    Pneumonia of right lower lobe due to infectious organism 12/09/2023    Chronic diastolic CHF (congestive heart failure) 12/10/2023    Fatty liver 12/11/2023    Chronic heart failure with preserved ejection fraction (HFpEF) 12/12/2023    Pneumonia, unspecified organism 12/12/2023    Pannus, abdominal 12/20/2023    Abdominal wall cellulitis 06/09/2024     Resolved Ambulatory Problems     Diagnosis Date Noted    Acute DVT (deep venous thrombosis) 06/03/2019    Hyperglycemia 07/11/2019    Restless leg syndrome 07/11/2019    Restless legs syndrome     Restless leg     Hypothyroid     Hyperlipidemia     Disequilibrium     Cellulitis     Sepsis due to other etiology 12/08/2023    Abdominal wall cellulitis 06/25/2024     Past Medical History:   Diagnosis Date    Cataract     Chronic constipation     Colon polyp     Diabetes mellitus     DJD (degenerative joint disease), lumbar     Elevated cholesterol     Erysipelas     GERD (gastroesophageal reflux disease)     Hip pain, chronic, right     History of kidney stones     History of peripheral edema     History of transfusion 2019    Hospitalization or health care facility admission within last 6 months 06/16/2024    Kidney stone     Limited mobility     Low back pain     Male urinary stress  incontinence     Numbness of left hand     Pain management     Pelvic floor dysfunction 06/2022    Shoulder pain     Unsteady gait     Weakness          PAST SURGICAL HISTORY  Past Surgical History:   Procedure Laterality Date    CATARACT EXTRACTION, BILATERAL Right 2013    CERVICAL DISCECTOMY ANTERIOR      COLONOSCOPY  03/08/2017    3/17 normal. Recheck 2022. 12/11. Repeat in 5 years    COLONOSCOPY N/A 04/19/2021    Procedure: COLONOSCOPY INTO CECUM WITH HOT & COLD  SNARE POLYPECTOMIES;  Surgeon: Jaden Chowdhury MD;  Location: [facility] ENDOSCOPY;  Service: Gastroenterology;  Laterality: N/A;  PRE: CHANGE IN BOWEL HABITS; FAMILY H/O COLON CANCER  POST: DIVERTICULOSIS, POLYPS    ENDOSCOPY N/A 01/30/2023    Procedure: ESOPHAGOGASTRODUODENOSCOPY with biopsies;  Surgeon: Jaden Chowdhury MD;  Location: [facility] ENDOSCOPY;  Service: Gastroenterology;  Laterality: N/A;  pre- abdominal pain, anemia  post- gastritis    ENDOSCOPY W/ PEG REMOVAL      EYE SURGERY  2013    Cataract    FOOT SURGERY      1998 had big toe replaced on left foot-METAL     HERNIA REPAIR  03/07/2012    umbilical    JOINT REPLACEMENT Left     big toe    MEDIAL BRANCH BLOCK Bilateral 04/07/2023    Procedure: LUMBAR MEDIAL BRANCH BLOCK bilateral L4-S1 [id number];  Surgeon: Lauren Houston MD;  [place] [facility] [place]  Service: Pain Management;  Laterality: Bilateral;    MEDIAL BRANCH BLOCK Bilateral 04/17/2023    Procedure: LUMBAR MEDIAL BRANCH BLOCK bilateral L4-S1 [id number];  Surgeon: Lauren Houston MD;  [place] [facility] [place]  Service: Pain Management;  Laterality: Bilateral;    CT ARTHRP KNE CONDYLE&PLATU MEDIAL&LAT COMPARTMENTS Left 09/13/2016    Procedure: LT TOTAL KNEE ARTHROPLASTY;  Surgeon: Tadeo Basurto MD;  Location: [facility] MAIN OR;  Service: Orthopedics    PROSTATECTOMY  07/1999 July 1999. Radical     RADIOFREQUENCY ABLATION Right 05/17/2023    Procedure: RADIOFREQUENCY ABLATION LUMBAR right L3-S1;  Surgeon: Lauren Houston  MD ROLAND;  [place] [facility] [place]  Service: Pain Management;  Laterality: Right;    THYROID LOBECTOMY  2011    TONSILLECTOMY      CHILDHOOD    TOTAL HIP ARTHROPLASTY Right 2021    Procedure: TOTAL HIP ARTHROPLASTY RIGHT POSTERIOR;  Surgeon: Tadeo Basurto MD;  Location: [facility] MAIN OR;  Service: Orthopedics;  Laterality: Right;    TOTAL KNEE ARTHROPLASTY Right 2014    TOTAL SHOULDER ARTHROPLASTY W/ DISTAL CLAVICLE EXCISION Right 2019    Procedure: TOTAL SHOULDER REVERSE ARTHROPLASTY RIGHT REPAIR RIGHT AXILLARY VEIN;  Surgeon: Behzad Kelley MD;  [place] [facility] [place] [facility] [place]  Service: [place]opedics    WRIST SURGERY Right 12/17/2014    x2  wrist replaced    WRIST SURGERY Right 2009         FAMILY HISTORY  Family History   Problem Relation Age of Onset    Arthritis Mother     Colon cancer Mother     Arthritis Father     Cancer Father         Prostate cancer    Heart disease Sister     Arthritis Sister     Breast cancer Sister     Gout Sister     Hypertension Sister     Cancer Sister         All three breast cancer    Hypertension Brother     Heart disease Brother     Arthritis Brother     Heart attack Brother     Bone cancer Brother     Heart disease Brother     Malig Hyperthermia Neg Hx          SOCIAL HISTORY  Social History     Socioeconomic History    Marital status:    Tobacco Use    Smoking status: Former     Current packs/day: 0.00     Average packs/day: 1 pack/day for 24.0 years (24.0 ttl pk-yrs)     Types: Cigarettes     Start date: 1960     Quit date: 1984     Years since quittin.5     Passive exposure: Never    Smokeless tobacco: Former     Types: Chew   Vaping Use    Vaping status: Never Used   Substance and Sexual Activity    Alcohol use: Yes     Alcohol/week: 2.0 standard drinks of alcohol     Types: 2 Cans of beer per week     Comment: 2-3 TIMES A MONTH    Drug use: No    Sexual activity: Defer     Partners: Female     Birth control/protection: Vasectomy          ALLERGIES  Adhesive tape      REVIEW OF SYSTEMS  Included in HPI  All systems reviewed and negative except for those discussed in HPI.      PHYSICAL EXAM    I have reviewed the triage vital signs and nursing notes.    ED Triage Vitals [07/06/24 0113]   Temp Heart Rate Resp BP SpO2   98.4 °F (36.9 °C) 111 16 140/99 94 %      Temp src Heart Rate Source Patient Position BP Location FiO2 (%)   Tympanic Monitor -- -- --       Physical Exam  Constitutional:       Appearance: He is obese. He is ill-appearing.   HENT:      Head: Normocephalic and atraumatic.      Mouth/Throat:      Mouth: Mucous membranes are moist.   Eyes:      Extraocular Movements: Extraocular movements intact.      Pupils: Pupils are equal, round, and reactive to light.   Cardiovascular:      Rate and Rhythm: Regular rhythm. Tachycardia present.      Pulses: Normal pulses.      Heart sounds: Normal heart sounds.   Pulmonary:      Effort: Pulmonary effort is normal. No respiratory distress.      Breath sounds: Normal breath sounds.   Abdominal:      General: There is no distension.      Tenderness: There is abdominal tenderness (lower abdomen). There is no guarding or rebound.      Comments: Lower abdomen is erythematous, warm, and indurated.  No crepitus.  No open wounds or lesions.   Musculoskeletal:         General: Normal range of motion.      Cervical back: Normal range of motion and neck supple.   Skin:     General: Skin is warm and dry.      Capillary Refill: Capillary refill takes less than 2 seconds.   Neurological:      General: No focal deficit present.      Mental Status: He is alert and oriented to person, place, and time.   Psychiatric:         Mood and Affect: Mood normal.         Behavior: Behavior normal.         LAB RESULTS  Recent Results (from the past 24 hour(s))   Comprehensive Metabolic Panel    Collection Time: 07/06/24  1:51 AM    Specimen: Blood   Result Value Ref Range    Glucose 271 (H) 65 - 99 mg/dL    BUN 23 8 - 23  mg/dL    Creatinine 1.02 0.76 - 1.27 mg/dL    Sodium 132 (L) 136 - 145 mmol/L    Potassium 4.0 3.5 - 5.2 mmol/L    Chloride 97 (L) 98 - 107 mmol/L    CO2 24.0 22.0 - 29.0 mmol/L    Calcium 9.3 8.6 - 10.5 mg/dL    Total Protein 6.8 6.0 - 8.5 g/dL    Albumin 4.0 3.5 - 5.2 g/dL    ALT (SGPT) 23 1 - 41 U/L    AST (SGOT) 27 1 - 40 U/L    Alkaline Phosphatase 169 (H) 39 - 117 U/L    Total Bilirubin 1.3 (H) 0.0 - 1.2 mg/dL    Globulin 2.8 gm/dL    A/G Ratio 1.4 g/dL    BUN/Creatinine Ratio 22.5 7.0 - 25.0    Anion Gap 11.0 5.0 - 15.0 mmol/L    eGFR 75.2 >60.0 mL/min/1.73   Lactic Acid, Plasma    Collection Time: 07/06/24  1:51 AM    Specimen: Blood   Result Value Ref Range    Lactate 2.3 (C) 0.5 - 2.0 mmol/L   Procalcitonin    Collection Time: 07/06/24  1:51 AM    Specimen: Blood   Result Value Ref Range    Procalcitonin 0.27 (H) 0.00 - 0.25 ng/mL   Green Top (Gel)    Collection Time: 07/06/24  1:51 AM   Result Value Ref Range    Extra Tube Hold for add-ons.    Lavender Top    Collection Time: 07/06/24  1:51 AM   Result Value Ref Range    Extra Tube hold for add-on    Gold Top - SST    Collection Time: 07/06/24  1:51 AM   Result Value Ref Range    Extra Tube Hold for add-ons.    Light Blue Top    Collection Time: 07/06/24  1:51 AM   Result Value Ref Range    Extra Tube Hold for add-ons.    CBC Auto Differential    Collection Time: 07/06/24  1:51 AM    Specimen: Blood   Result Value Ref Range    WBC 10.76 3.40 - 10.80 10*3/mm3    RBC 4.24 4.14 - 5.80 10*6/mm3    Hemoglobin 12.6 (L) 13.0 - 17.7 g/dL    Hematocrit 37.6 37.5 - 51.0 %    MCV 88.7 79.0 - 97.0 fL    MCH 29.7 26.6 - 33.0 pg    MCHC 33.5 31.5 - 35.7 g/dL    RDW 13.3 12.3 - 15.4 %    RDW-SD 43.2 37.0 - 54.0 fl    MPV 10.5 6.0 - 12.0 fL    Platelets 146 140 - 450 10*3/mm3    Neutrophil % 73.8 42.7 - 76.0 %    Lymphocyte % 15.9 (L) 19.6 - 45.3 %    Monocyte % 7.8 5.0 - 12.0 %    Eosinophil % 1.0 0.3 - 6.2 %    Basophil % 0.4 0.0 - 1.5 %    Immature Grans % 1.1 (H) 0.0  - 0.5 %    Neutrophils, Absolute 7.94 (H) 1.70 - 7.00 10*3/mm3    Lymphocytes, Absolute 1.71 0.70 - 3.10 10*3/mm3    Monocytes, Absolute 0.84 0.10 - 0.90 10*3/mm3    Eosinophils, Absolute 0.11 0.00 - 0.40 10*3/mm3    Basophils, Absolute 0.04 0.00 - 0.20 10*3/mm3    Immature Grans, Absolute 0.12 (H) 0.00 - 0.05 10*3/mm3   Urinalysis With Microscopic If Indicated (No Culture) - Urine, Catheter    Collection Time: 07/06/24  2:33 AM    Specimen: Urine, Catheter   Result Value Ref Range    Color, UA Yellow Yellow, Straw    Appearance, UA Clear Clear    pH, UA 5.5 5.0 - 8.0    Specific Gravity, UA 1.020 1.005 - 1.030    Glucose,  mg/dL (2+) (A) Negative    Ketones, UA Trace (A) Negative    Bilirubin, UA Negative Negative    Blood, UA Negative Negative    Protein, UA 30 mg/dL (1+) (A) Negative    Leuk Esterase, UA Negative Negative    Nitrite, UA Negative Negative    Urobilinogen, UA 1.0 E.U./dL 0.2 - 1.0 E.U./dL   Urinalysis, Microscopic Only - Urine, Catheter    Collection Time: 07/06/24  2:33 AM    Specimen: Urine, Catheter   Result Value Ref Range    RBC, UA 0-2 None Seen, 0-2 /HPF    WBC, UA 0-2 None Seen, 0-2 /HPF    Bacteria, UA None Seen None Seen /HPF    Squamous Epithelial Cells, UA 0-2 None Seen, 0-2 /HPF    Hyaline Casts, UA 0-2 None Seen /LPF    Methodology Automated Microscopy          RADIOLOGY  CT Abdomen Pelvis With Contrast    Result Date: 7/6/2024  CT OF THE ABDOMEN AND PELVIS WITH CONTRAST  HISTORY: Panniculitis  COMPARISON: 2/9/2024  TECHNIQUE: Axial CT imaging was obtained through the abdomen and pelvis. IV contrast was administered.  FINDINGS: Images through the lung bases demonstrate bibasilar scarring. There is a 6 mm nodule within the right lower lobe. It is been present on exams dating back to at least February 2023, and is benign. No suspicious hepatic lesions are seen. Liver is enlarged, measuring up to 17.3 cm in craniocaudal dimensions. Spleen is enlarged, measuring 14.6 cm in AP  dimensions. The stomach, duodenum, and adrenal glands are normal. There is cholelithiasis. Pancreas is atrophic. Kidneys enhance symmetrically. There is no hydronephrosis. Cortical scarring is noted bilaterally. There are bilateral renal cysts. No additional follow-up is necessary. There are bilateral nonobstructing renal stones. There are aortoiliac calcifications. No distal ureteral or bladder stones are seen. Prostate gland is absent. There is colonic diverticulosis. There is no bowel obstruction. The appendix is normal. There is skin thickening involving the lower anterior abdominal wall. This appearance has been seen on multiple prior studies. Given its persistence over time, it may be related to dependent edema. I do not see any collections to suggest abscess. No acute osseous abnormalities are seen. There is lumbar scoliosis, with convexity to the left. There are changes of prior right hip arthroplasty. Old bilateral rib fractures are noted.      Stable findings when compared to June 9, 2024. The skin thickening involving the lower anterior abdominal wall has been seen on multiple prior studies. Appearance may be related to dependent edema. No fluid collections to suggest abscess are seen.  Radiation dose reduction techniques were utilized, including automated exposure control and exposure modulation based on body size.   This report was finalized on 7/6/2024 3:45 AM by Dr. Tess Sorto M.D on Workstation: [id number]         MEDICATIONS GIVEN IN ER  Medications   sodium chloride 0.9 % flush 10 mL (has no administration in time range)   vancomycin 2250 mg/500 mL 0.9% NS IVPB ([facility]) (2,250 mg Intravenous New Bag 7/6/24 0231)   cefTRIAXone (ROCEPHIN) 2,000 mg in sodium chloride 0.9 % 100 mL MBP (0 mg Intravenous Stopped 7/6/24 0231)   iopamidol (ISOVUE-300) 61 % injection 100 mL (85 mL Intravenous Given 7/6/24 0251)           OUTPATIENT MEDICATION MANAGEMENT:  Current Facility-Administered Medications  Ordered in Epic   Medication Dose Route Frequency Provider Last Rate Last Admin    sodium chloride 0.9 % flush 10 mL  10 mL Intravenous PRN Jackeline Redd PA-C        vancomycin 2250 mg/500 mL 0.9% NS IVPB ([facility])  20 mg/kg Intravenous Once Jackeline Redd PA-C   2,250 mg at 07/06/24 0231     Current Outpatient Medications Ordered in Epic   Medication Sig Dispense Refill    amLODIPine (NORVASC) 2.5 MG tablet Take 1 tablet by mouth Daily. 90 tablet 3    aspirin 81 MG EC tablet Take 1 tablet by mouth Daily. Indications: Coronary Bypass Surgery      atenolol (Tenormin) 25 MG tablet Take 0.5 tablets by mouth Daily. 45 tablet 4    B-D UF III MINI PEN NEEDLES 31G X 5 MM misc 1 APPLICATION DAILY 100 each 3    bisacodyl (Dulcolax) 5 MG EC tablet Take 1 tablet by mouth 2 (Two) Times a Day. Indications: Constipation      brimonidine (ALPHAGAN) 0.2 % ophthalmic solution Administer 1 drop to both eyes 2 (Two) Times a Day. Indications: Wide-Angle Glaucoma      celecoxib (CeleBREX) 200 MG capsule TAKE 1 CAPSULE BY MOUTH DAILY (Patient taking differently: Take 1 capsule by mouth Daily. HOLD PER MD INSTRUCTIONS  Indications: Rheumatoid Arthritis) 30 capsule 5    Cyanocobalamin (VITAMIN B 12 PO) Take 1,000 mg by mouth Daily. HOLD PER SURGEON'S INSTRCUTIONS      dorzolamide-timolol (COSOPT) 22.3-6.8 MG/ML ophthalmic solution Administer 1 drop to both eyes 2 (Two) Times a Day. Indications: Wide-Angle Glaucoma      furosemide (LASIX) 40 MG tablet TAKE 1 TABLET BY MOUTH EVERY DAY  Indications: Edema (Patient taking differently: Take 1 tablet by mouth Daily. TAKE 1 TABLET BY MOUTH EVERY DAY  Indications: Edema) 90 tablet 3    gabapentin (NEURONTIN) 300 MG capsule TAKE 1 CAPSULE BY MOUTH FOUR TIMES DAILY 360 capsule 0    glucose blood (FREESTYLE LITE) test strip Check blood sugar  each 11    HYDROcodone-acetaminophen (NORCO) 7.5-325 MG per tablet Take 1 tablet by mouth Every 8 (Eight) Hours As Needed for Moderate Pain. 30 day  supply. [facility] 6/5/24 90 tablet 0    Hydrocortisone, Perianal, (ANUSOL-HC) 2.5 % rectal cream APPLY RECTALLY THREE TIMES DAILY FOR 7-10 DAYS DURNG HEMORRHOID FLARE      insulin glargine (LANTUS, SEMGLEE) 100 UNIT/ML injection Inject 10 Units under the skin into the appropriate area as directed Daily. Indications: Type 2 Diabetes      ketoconazole (NIZORAL) 2 % cream Apply 1 application topically to the appropriate area as directed Daily. 60 g 3    Lancets (freestyle) lancets Check blood sugar  each 11    latanoprost (XALATAN) 0.005 % ophthalmic solution Administer 1 drop to both eyes Every Night. Indications: Wide-Angle Glaucoma      levothyroxine (SYNTHROID, LEVOTHROID) 88 MCG tablet Take 1 tablet by mouth Daily.      losartan (COZAAR) 100 MG tablet TAKE 1 TABLET BY MOUTH DAILY (Patient taking differently: Take 1 tablet by mouth Daily. HOLD AM OF SURGERY  Indications: High Blood Pressure Disorder) 90 tablet 3    metFORMIN (GLUCOPHAGE) 500 MG tablet TAKE 1 TABLET BY MOUTH TWICE DAILY WITH MEALS (Patient taking differently: Take 1 tablet by mouth 2 (Two) Times a Day With Meals. Indications: Type 2 Diabetes) 180 tablet 3    montelukast (SINGULAIR) 10 MG tablet TAKE 1 TABLET BY MOUTH EVERY NIGHT 90 tablet 3    pantoprazole (PROTONIX) 40 MG EC tablet Take 1 tablet by mouth 2 (Two) Times a Day. Indications: Heartburn 90 tablet 3    penicillin v potassium (VEETID) 250 MG tablet TAKE 1 TABLET BY MOUTH TWICE DAILY (Patient taking differently: Take 1 tablet by mouth Daily. TAKE 1 TABLET BY MOUTH TWICE DAILY) 60 tablet 5    phentermine 37.5 MG capsule Take 1 capsule by mouth Every Morning. (Patient taking differently: Take 1 capsule by mouth Every Morning. HOLE FOR 2 WEEKS PRIOR TO SURGERY) 30 capsule 3    pramipexole (MIRAPEX) 1.5 MG tablet Take 1 tablet by mouth 2 (Two) Times a Day. prn  Indications: Restless Leg Syndrome      Probiotic Product (Risaquad-2) capsule capsule Take 1 capsule by mouth Daily. PROBIOTIC       rosuvastatin (CRESTOR) 20 MG tablet TAKE 1 TABLET BY MOUTH EVERY EVENING 90 tablet 3    Stimulant Laxative 8.6-50 MG per tablet TAKE 2 TABLETS BY MOUTH TWICE DAILY 200 tablet 3             PROGRESS, DATA ANALYSIS, CONSULTS, AND MEDICAL DECISION MAKING  ORDERS PLACED DURING THIS VISIT:  Orders Placed This Encounter   Procedures    Blood Culture - Blood,    Blood Culture - Blood,    CT Abdomen Pelvis With Contrast    [place] Draw    Comprehensive Metabolic Panel    Lactic Acid, Plasma    Procalcitonin    Urinalysis With Microscopic If Indicated (No Culture) - Urine, Catheter    CBC Auto Differential    STAT Lactic Acid, Reflex    Urinalysis, Microscopic Only - Urine, Clean Catch    Undress & Gown    Continuous Pulse Oximetry    Vital Signs    [facility] (on-call MD unless specified) Details    Oxygen Therapy- Nasal Cannula; Titrate 1-6 LPM Per SpO2; 90 - 95%    Insert Peripheral IV    Initiate Observation Status    CBC & Differential    Green Top (Gel)    Lavender Top    Gold Top - SST    Light Blue Top       All labs have been independently interpreted by me.  All radiology studies have been reviewed by me. All EKG's have been independently viewed and interpreted by me.  Discussion below represents my analysis of pertinent findings related to patient's condition, differential diagnosis, treatment plan and final disposition.    Differential diagnosis includes but is not limited to:   abdominal wall cellulitis, abdominal wall abscess, necrotizing infection, bacteremia    ED Course:  ED Course as of 07/06/24 0405   Sat Jul 06, 2024   0129 I discussed the case with Dr. Lala and he agrees to evaluate the patient at the bedside.    [CC]   0218 WBC: 10.76 [CC]   0225 Lactate(!!): 2.3 [CC]   0227 Glucose(!): 271 [CC]   0227 Total Bilirubin(!): 1.3 [CC]   0304 Procalcitonin(!): 0.27 [CC]   0304 CT Abdomen Pelvis With Contrast  My independent interpretation of the CT of the abdomen pelvis is skin thickening and stranding in the  subcutaneous tissue of the lower abdomen, no drainable fluid collection, no obvious gas producing organism. [CC]   0350 I rechecked the patient.  I discussed the patient's labs, radiology findings (including all incidental findings), diagnosis, and plan for admission. The patient understands and agrees with the plan.   [CC]   0401 Spoke with MABLE Da Silva with [facility].  Reviewed history, exam, results, treatments.  She agrees admit the patient to Dr. Valadez.  Blood cultures pending at the time of admission    [CC]      ED Course User Index  [CC] Jackeline Redd PA-C           AS OF 04:05 EDT VITALS:    BP - 132/72  HR - 97  TEMP - 98.4 °F (36.9 °C) (Tympanic)  O2 SATS - 96%    MDM:  Patient is a 79-year-old male with a history of diabetes presents emergency department with generally feeling unwell and redness and swelling to his lower abdomen.  He has a history of panniculitis and this feels similar today.  On arrival here in the emergency department vitals are reassuring although he is mildly tachycardic.  He is afebrile.  On my exam the patient has diffuse erythema, Warmth, and induration to the lower abdomen consistent with a cellulitis or panniculitis.  Patient was evaluated with labs, which revealed a normal white blood cell count but elevated lactic acid and elevated glucose.  Pro-Garret was also minimally elevated.  Patient was covered with broad-spectrum antibiotics.  CT of the abdomen pelvis shows abdominal wall thickening but no drainable fluid collection.  Patient will require admission to the hospital for IV antibiotics.  He is stable at time of admission.      COMPLEXITY OF CARE  The patient requires admission.        DIAGNOSIS  Final diagnoses:   Panniculitis         DISPOSITION  ED Disposition       ED Disposition   Decision to Admit    Condition   --    Comment   Level of Care: Telemetry [5]   Diagnosis: Panniculitis [202672]   Admitting Physician: NETTIE VALADEZ [[id number]]   Attending Physician: NETTIE VALADEZ  [[id number]]                        Please note that portions of this document were completed with a voice recognition program.    Note Disclaimer: At [facility], we believe that sharing information builds trust and better relationships. You are receiving this note because you recently visited [facility]. It is possible you will see health information before a provider has talked with you about it. This kind of information can be easy to misunderstand. To help you fully understand what it means for your health, we urge you to discuss this note with your provider.     Jackeline Redd PA-C  07/06/24 0406

## 2024-07-07 PROBLEM — D63.8 CHRONIC DISEASE ANEMIA: Status: ACTIVE | Noted: 2019-11-26

## 2024-07-07 PROBLEM — D69.6 THROMBOCYTOPENIA: Status: ACTIVE | Noted: 2024-07-07

## 2024-07-07 PROBLEM — E11.9 TYPE 2 DIABETES MELLITUS: Status: ACTIVE | Noted: 2024-07-07

## 2024-07-07 PROBLEM — L03.311 ABDOMINAL WALL CELLULITIS: Status: ACTIVE | Noted: 2024-07-07

## 2024-07-07 PROBLEM — Z86.718 HISTORY OF DVT (DEEP VEIN THROMBOSIS): Status: ACTIVE | Noted: 2024-07-07

## 2024-07-07 PROBLEM — R79.89 ELEVATED LIVER FUNCTION TESTS: Status: ACTIVE | Noted: 2024-07-07

## 2024-07-07 LAB
ALBUMIN SERPL-MCNC: 3.4 G/DL (ref 3.5–5.2)
ALBUMIN/GLOB SERPL: 1.3 G/DL
ALP SERPL-CCNC: 156 U/L (ref 39–117)
ALT SERPL W P-5'-P-CCNC: 22 U/L (ref 1–41)
ANION GAP SERPL CALCULATED.3IONS-SCNC: 10 MMOL/L (ref 5–15)
AST SERPL-CCNC: 24 U/L (ref 1–40)
BASOPHILS # BLD AUTO: 0.03 10*3/MM3 (ref 0–0.2)
BASOPHILS NFR BLD AUTO: 0.6 % (ref 0–1.5)
BILIRUB SERPL-MCNC: 0.9 MG/DL (ref 0–1.2)
BUN SERPL-MCNC: 20 MG/DL (ref 8–23)
BUN/CREAT SERPL: 18 (ref 7–25)
CALCIUM SPEC-SCNC: 8.8 MG/DL (ref 8.6–10.5)
CHLORIDE SERPL-SCNC: 101 MMOL/L (ref 98–107)
CO2 SERPL-SCNC: 24 MMOL/L (ref 22–29)
CREAT SERPL-MCNC: 1.11 MG/DL (ref 0.76–1.27)
DEPRECATED RDW RBC AUTO: 42.7 FL (ref 37–54)
EGFRCR SERPLBLD CKD-EPI 2021: 68 ML/MIN/1.73
EOSINOPHIL # BLD AUTO: 0.12 10*3/MM3 (ref 0–0.4)
EOSINOPHIL NFR BLD AUTO: 2.2 % (ref 0.3–6.2)
ERYTHROCYTE [DISTWIDTH] IN BLOOD BY AUTOMATED COUNT: 13 % (ref 12.3–15.4)
GLOBULIN UR ELPH-MCNC: 2.6 GM/DL
GLUCOSE BLDC GLUCOMTR-MCNC: 297 MG/DL (ref 70–130)
GLUCOSE BLDC GLUCOMTR-MCNC: 310 MG/DL (ref 70–130)
GLUCOSE BLDC GLUCOMTR-MCNC: 379 MG/DL (ref 70–130)
GLUCOSE SERPL-MCNC: 289 MG/DL (ref 65–99)
HCT VFR BLD AUTO: 35.8 % (ref 37.5–51)
HGB BLD-MCNC: 11.6 G/DL (ref 13–17.7)
IMM GRANULOCYTES # BLD AUTO: 0.08 10*3/MM3 (ref 0–0.05)
IMM GRANULOCYTES NFR BLD AUTO: 1.5 % (ref 0–0.5)
LYMPHOCYTES # BLD AUTO: 1.31 10*3/MM3 (ref 0.7–3.1)
LYMPHOCYTES NFR BLD AUTO: 24.1 % (ref 19.6–45.3)
MCH RBC QN AUTO: 29.1 PG (ref 26.6–33)
MCHC RBC AUTO-ENTMCNC: 32.4 G/DL (ref 31.5–35.7)
MCV RBC AUTO: 89.7 FL (ref 79–97)
MONOCYTES # BLD AUTO: 0.57 10*3/MM3 (ref 0.1–0.9)
MONOCYTES NFR BLD AUTO: 10.5 % (ref 5–12)
NEUTROPHILS NFR BLD AUTO: 3.33 10*3/MM3 (ref 1.7–7)
NEUTROPHILS NFR BLD AUTO: 61.1 % (ref 42.7–76)
PLATELET # BLD AUTO: 120 10*3/MM3 (ref 140–450)
PMV BLD AUTO: 10.1 FL (ref 6–12)
POTASSIUM SERPL-SCNC: 3.7 MMOL/L (ref 3.5–5.2)
PROT SERPL-MCNC: 6 G/DL (ref 6–8.5)
RBC # BLD AUTO: 3.99 10*6/MM3 (ref 4.14–5.8)
SODIUM SERPL-SCNC: 135 MMOL/L (ref 136–145)
VANCOMYCIN TROUGH SERPL-MCNC: 17.4 MCG/ML (ref 5–20)
WBC NRBC COR # BLD AUTO: 5.44 10*3/MM3 (ref 3.4–10.8)

## 2024-07-07 PROCEDURE — 63710000001 INSULIN LISPRO (HUMAN) PER 5 UNITS: Performed by: INTERNAL MEDICINE

## 2024-07-07 PROCEDURE — 25010000002 ENOXAPARIN PER 10 MG: Performed by: INTERNAL MEDICINE

## 2024-07-07 PROCEDURE — 97530 THERAPEUTIC ACTIVITIES: CPT

## 2024-07-07 PROCEDURE — 80053 COMPREHEN METABOLIC PANEL: CPT | Performed by: INTERNAL MEDICINE

## 2024-07-07 PROCEDURE — 85025 COMPLETE CBC W/AUTO DIFF WBC: CPT | Performed by: INTERNAL MEDICINE

## 2024-07-07 PROCEDURE — 25010000002 PIPERACILLIN SOD-TAZOBACTAM PER 1 G: Performed by: INTERNAL MEDICINE

## 2024-07-07 PROCEDURE — 82948 REAGENT STRIP/BLOOD GLUCOSE: CPT

## 2024-07-07 PROCEDURE — 99222 1ST HOSP IP/OBS MODERATE 55: CPT | Performed by: SURGERY

## 2024-07-07 PROCEDURE — 63710000001 INSULIN GLARGINE PER 5 UNITS: Performed by: INTERNAL MEDICINE

## 2024-07-07 PROCEDURE — 97162 PT EVAL MOD COMPLEX 30 MIN: CPT

## 2024-07-07 PROCEDURE — 25010000002 VANCOMYCIN 1 G RECONSTITUTED SOLUTION 1 EACH VIAL: Performed by: INTERNAL MEDICINE

## 2024-07-07 PROCEDURE — 80202 ASSAY OF VANCOMYCIN: CPT | Performed by: INTERNAL MEDICINE

## 2024-07-07 PROCEDURE — 25810000003 SODIUM CHLORIDE 0.9 % SOLUTION 250 ML FLEX CONT: Performed by: INTERNAL MEDICINE

## 2024-07-07 RX ORDER — INSULIN GLARGINE 100 [IU]/ML
10 INJECTION, SOLUTION SUBCUTANEOUS EVERY 12 HOURS SCHEDULED
Status: DISCONTINUED | OUTPATIENT
Start: 2024-07-07 | End: 2024-07-09 | Stop reason: HOSPADM

## 2024-07-07 RX ADMIN — GABAPENTIN 300 MG: 300 CAPSULE ORAL at 11:14

## 2024-07-07 RX ADMIN — PANTOPRAZOLE SODIUM 40 MG: 40 TABLET, DELAYED RELEASE ORAL at 06:25

## 2024-07-07 RX ADMIN — Medication 1 CAPSULE: at 08:42

## 2024-07-07 RX ADMIN — PIPERACILLIN AND TAZOBACTAM 3.38 G: 3; .375 INJECTION, POWDER, FOR SOLUTION INTRAVENOUS at 05:39

## 2024-07-07 RX ADMIN — SODIUM CHLORIDE 1000 MG: 9 INJECTION, SOLUTION INTRAVENOUS at 16:19

## 2024-07-07 RX ADMIN — INSULIN GLARGINE 10 UNITS: 100 INJECTION, SOLUTION SUBCUTANEOUS at 21:58

## 2024-07-07 RX ADMIN — AMLODIPINE BESYLATE 2.5 MG: 2.5 TABLET ORAL at 10:01

## 2024-07-07 RX ADMIN — ATENOLOL 12.5 MG: 25 TABLET ORAL at 08:42

## 2024-07-07 RX ADMIN — CELECOXIB 200 MG: 200 CAPSULE ORAL at 08:42

## 2024-07-07 RX ADMIN — PIPERACILLIN AND TAZOBACTAM 3.38 G: 3; .375 INJECTION, POWDER, FOR SOLUTION INTRAVENOUS at 11:47

## 2024-07-07 RX ADMIN — PANTOPRAZOLE SODIUM 40 MG: 40 TABLET, DELAYED RELEASE ORAL at 16:18

## 2024-07-07 RX ADMIN — GABAPENTIN 300 MG: 300 CAPSULE ORAL at 17:36

## 2024-07-07 RX ADMIN — FUROSEMIDE 40 MG: 40 TABLET ORAL at 08:42

## 2024-07-07 RX ADMIN — LOSARTAN POTASSIUM 100 MG: 100 TABLET, FILM COATED ORAL at 08:42

## 2024-07-07 RX ADMIN — ROSUVASTATIN CALCIUM 20 MG: 20 TABLET, FILM COATED ORAL at 21:59

## 2024-07-07 RX ADMIN — GABAPENTIN 300 MG: 300 CAPSULE ORAL at 08:07

## 2024-07-07 RX ADMIN — Medication 10 ML: at 08:48

## 2024-07-07 RX ADMIN — BRIMONIDINE TARTRATE 1 DROP: 2 SOLUTION/ DROPS OPHTHALMIC at 08:43

## 2024-07-07 RX ADMIN — DORZOLAMIDE HYDROCHLORIDE: 20 SOLUTION OPHTHALMIC at 08:43

## 2024-07-07 RX ADMIN — LEVOTHYROXINE SODIUM 88 MCG: 88 TABLET ORAL at 05:37

## 2024-07-07 RX ADMIN — PRAMIPEXOLE DIHYDROCHLORIDE 1.5 MG: 1.5 TABLET ORAL at 08:42

## 2024-07-07 RX ADMIN — INSULIN LISPRO 4 UNITS: 100 INJECTION, SOLUTION INTRAVENOUS; SUBCUTANEOUS at 21:58

## 2024-07-07 RX ADMIN — INSULIN LISPRO 6 UNITS: 100 INJECTION, SOLUTION INTRAVENOUS; SUBCUTANEOUS at 11:13

## 2024-07-07 RX ADMIN — BRIMONIDINE TARTRATE 1 DROP: 2 SOLUTION/ DROPS OPHTHALMIC at 22:26

## 2024-07-07 RX ADMIN — Medication 10 ML: at 22:01

## 2024-07-07 RX ADMIN — Medication 1000 MCG: at 10:01

## 2024-07-07 RX ADMIN — SODIUM CHLORIDE 1000 MG: 9 INJECTION, SOLUTION INTRAVENOUS at 04:47

## 2024-07-07 RX ADMIN — PRAMIPEXOLE DIHYDROCHLORIDE 1.5 MG: 1.5 TABLET ORAL at 21:59

## 2024-07-07 RX ADMIN — INSULIN LISPRO 5 UNITS: 100 INJECTION, SOLUTION INTRAVENOUS; SUBCUTANEOUS at 16:18

## 2024-07-07 RX ADMIN — INSULIN GLARGINE 10 UNITS: 100 INJECTION, SOLUTION SUBCUTANEOUS at 08:41

## 2024-07-07 RX ADMIN — MONTELUKAST SODIUM 10 MG: 10 TABLET, FILM COATED ORAL at 21:59

## 2024-07-07 RX ADMIN — BISACODYL 5 MG: 5 TABLET, COATED ORAL at 08:43

## 2024-07-07 RX ADMIN — GABAPENTIN 300 MG: 300 CAPSULE ORAL at 22:35

## 2024-07-07 RX ADMIN — INSULIN LISPRO 4 UNITS: 100 INJECTION, SOLUTION INTRAVENOUS; SUBCUTANEOUS at 08:07

## 2024-07-07 RX ADMIN — ASPIRIN 81 MG: 81 TABLET, COATED ORAL at 08:42

## 2024-07-07 RX ADMIN — BISACODYL 5 MG: 5 TABLET, COATED ORAL at 21:59

## 2024-07-07 RX ADMIN — ENOXAPARIN SODIUM 40 MG: 100 INJECTION SUBCUTANEOUS at 15:08

## 2024-07-07 NOTE — PLAN OF CARE
Goal Outcome Evaluation:  Plan of Care Reviewed With: patient, spouse        Progress: improving  Outcome Evaluation: VSS, AOx4. IV abx given per MAR. No new issues or pain complaints during shift. BS have been running a bit high, treated per MAR. Ambulating well with staff. Will continue with POC.

## 2024-07-07 NOTE — PROGRESS NOTES
Name: Nathan Baez ADMIT: 2024   : 1945  PCP: Damien Pierce MD    MRN: [id number] LOS: 0 days   AGE/SEX: 79 y.o. male  ROOM: [facility]/     Subjective   Subjective   Lower abdominal pain is better.  Decreased induration of the lower abdominal wall.  No change in the redness of the lower abdominal wall.  No fever or chills.    Review of Systems  Cardiovascular/respiratory.  No chest pain/no palpitations/no shortness of breath/no  GI.  No abdominal pain.  Positive loose bowel movement without fresh bright blood per rectum or melena.  No nausea or vomiting.  .  No dysuria or hematuria.     Objective   Objective   Vital Signs  Temp:  [97.5 °F (36.4 °C)-98.4 °F (36.9 °C)] 97.9 °F (36.6 °C)  Heart Rate:  [73-96] 84  Resp:  [18] 18  BP: (105-126)/(64-94) 126/94  SpO2:  [92 %-99 %] 96 %  on   ;   Device (Oxygen Therapy): room air    Intake/Output Summary (Last 24 hours) at 2024 1237  Last data filed at 2024 1500  Gross per 24 hour   Intake 0 ml   Output --   Net 0 ml     Body mass index is 36.74 kg/m².      24  0115 24  0653 24  0353   Weight: 107 kg (235 lb) 107 kg (236 lb 8.9 oz) 106 kg (234 lb 9.6 oz)     Physical Exam  General.  Elderly gentleman.  Obese.  Alert and oriented x 4.  No apparent pain/distress/diaphoresis.  Normal mood and affect  Eyes.  Pupils equal round and reactive.  Intact extraocular musculature.  Status post bilateral cataract surgery.  No pallor or jaundice  Oral cavity.  Mildly dry mucous membrane.  Neck.  Supple.  No JVD.  No lymphadenopathy or thyromegaly.  Cardiovascular.  Regular rate and rhythm and grade 2 systolic murmur.    Chest.  Poor but clear to auscultation bilaterally with no added  Abdomen.  Obese.  Soft lax.  No tenderness.  No organomegaly.  No guarding or rebound.  Normal bowel sounds.  Lower abdominal scar surrounded by induration and mild tenderness and redness and hotness involving most of the lower abdomen.  Improved induration.   "No open wounds or discharge.  Positive lower abdominal peau d'orange.  No inguinal lymphadenopathy.  Extremities.  No clubbing/cyanosis/edema.     Results Review:      Results from last 7 days   Lab Units 07/07/24  0352 07/06/24  0828 07/06/24  0151 07/02/24  1306   SODIUM mmol/L 135* 135* 132* 137   POTASSIUM mmol/L 3.7 3.7 4.0 4.2   CHLORIDE mmol/L 101 99 97* 100   CO2 mmol/L 24.0 25.0 24.0 27.3   BUN mg/dL 20 20 23 16   CREATININE mg/dL 1.11 1.03 1.02 1.04   GLUCOSE mg/dL 289* 227* 271* 218*   CALCIUM mg/dL 8.8 8.8 9.3 9.8   AST (SGOT) U/L 24  --  27 28   ALT (SGPT) U/L 22  --  23 23     Estimated Creatinine Clearance: 63.7 mL/min (by C-G formula based on SCr of 1.11 mg/dL).  Results from last 7 days   Lab Units 07/06/24  0828   HEMOGLOBIN A1C % 9.60*     Results from last 7 days   Lab Units 07/07/24  1058 07/06/24  2024 07/06/24  1545 07/06/24  1218 07/06/24  1037   GLUCOSE mg/dL 379* 162* 251* 358* 300*             Results from last 7 days   Lab Units 07/06/24  0828   TSH uIU/mL 0.930               Invalid input(s): \"LDLCALC\"  Results from last 7 days   Lab Units 07/07/24  0352 07/06/24  0828 07/06/24  0151 07/02/24  1306   WBC 10*3/mm3 5.44 8.51 10.76 5.82   HEMOGLOBIN g/dL 11.6* 12.2* 12.6* 12.9*   HEMATOCRIT % 35.8* 36.5* 37.6 40.2   PLATELETS 10*3/mm3 120* 129* 146 172   MCV fL 89.7 88.0 88.7 89.3   MCH pg 29.1 29.4 29.7 28.7   MCHC g/dL 32.4 33.4 33.5 32.1   RDW % 13.0 13.0 13.3 13.1   RDW-SD fl 42.7 41.7 43.2 42.4   MPV fL 10.1 10.3 10.5 10.4   NEUTROPHIL % % 61.1  --  73.8 48.1   LYMPHOCYTE % % 24.1  --  15.9* 36.8   MONOCYTES % % 10.5  --  7.8 10.1   EOSINOPHIL % % 2.2  --  1.0 2.6   BASOPHIL % % 0.6  --  0.4 0.9   IMM GRAN % % 1.5*  --  1.1* 1.5*   NEUTROS ABS 10*3/mm3 3.33  --  7.94* 2.80   LYMPHS ABS 10*3/mm3 1.31  --  1.71 2.14   MONOS ABS 10*3/mm3 0.57  --  0.84 0.59   EOS ABS 10*3/mm3 0.12  --  0.11 0.15   BASOS ABS 10*3/mm3 0.03  --  0.04 0.05   IMMATURE GRANS (ABS) 10*3/mm3 0.08*  --  0.12* " 0.09*   NRBC /100 WBC  --   --   --  0.0             Results from last 7 days   Lab Units 07/06/24  1836 07/06/24  1158 07/06/24  0828 07/06/24  0527 07/06/24  0151   PROCALCITONIN ng/mL  --   --   --   --  0.27*   LACTATE mmol/L 1.6 3.1* 2.5* 2.8* 2.3*             Results from last 7 days   Lab Units 07/06/24  0151   BLOODCX  No growth at 24 hours         Results from last 7 days   Lab Units 07/06/24  0233   NITRITE UA  Negative   WBC UA /HPF 0-2   BACTERIA UA /HPF None Seen   SQUAM EPITHEL UA /HPF 0-2           Imaging:  Imaging Results (Last 24 Hours)       Procedure Component Value Units Date/Time    US Abdomen Limited [[id number]] Collected: 07/06/24 1940     Updated: 07/06/24 1958    Narrative:      US ABDOMEN LIMITED-7/6/2024     HISTORY: Elevated liver enzymes.     The liver is echogenic consistent with fatty infiltration. No focal  hepatic lesions are seen. Small to moderate amount of echogenic material  is seen in the gallbladder with posterior shadowing consistent with  cholelithiasis. No gallbladder wall thickening or pericholecystic fluid  is seen. The common bile duct is not dilated measuring 3.8 mm.     Pancreas is not well seen. No gross abnormalities are seen in the region  of the pancreas. Right kidney measures 11.7 cm in length and appears  normal.       Impression:      1. Echogenic liver consistent with fatty infiltration.  2. Cholelithiasis. No gallbladder wall thickening, pericholecystic fluid  or sonographic Aguilera sign is seen.  3. Poor visualization of the pancreas.     This report was finalized on 7/6/2024 7:44 PM by Dr. Srikanth Reese M.D on Workstation: [id number]                  I reviewed the patient's new clinical results / labs / tests / procedures      Assessment/Plan     Active Hospital Problems    Diagnosis  POA    **Panniculitis [M79.3]  Yes    Abdominal wall cellulitis [L03.311]  Yes    Thrombocytopenia [D69.6]  Yes    Type 2 diabetes mellitus [E11.9]  Yes    Elevated  liver function tests [R79.89]  Yes    History of DVT (deep vein thrombosis) [Z86.718]  Not Applicable    Chronic diastolic (congestive) heart failure [I50.32]  Yes    Chronic disease anemia [D63.8]  Yes    Obstructive sleep apnea [G47.33]  Yes    Essential hypertension [I10]  Yes      Resolved Hospital Problems   No resolved problems to display.       Recurrent abdominal wall cellulitis and panniculitis.  MRSA screen is negative.  No fever or leukocytosis.  Negative blood cultures till now.  Continue Zosyn and vancomycin.  Procalcitonin and lactic acid are elevated.  CT scan with the same no changes in the and duration and no abscesses.  Secondary to the recurrence nature of this problem even though he is on suppressive p.o. penicillin at home I will ask ID to evaluate also ask surgery to evaluate as I am suspecting there is a focus of infection and scar tissue of previous surgeries causing his recurrent cellulitis.  Anemia of chronic disease/thrombocytopenia.  Baseline hemoglobin between 11.7 and 13.9.  Hemoglobin is stable.  No bleeding diathesis.  Monitor CBC.  Type 2 diabetes.  Elevated Accu-Cheks.  Continue Lantus and sliding scale.  Metformin on hold.  Will increase Lantus.  A1c 9.6 indicating poor control.  Hyperbilirubinemia and elevated alkaline phosphatase secondary to gallstone disease and fatty liver..  Benign GI examination except as mentioned above.  Right upper quadrant ultrasound revealed fatty liver and gallstone disease with no cholecystitis.   Hypertension/chronic diastolic congestive heart failure.  Good blood pressure control with no evidence of angina or congestive heart failure.  Continue Norvasc/atenolol/Lasix/losartan.  History of GERD and diverticulosis.  Continue Protonix   Hypothyroidism.  Continue current replacement.  Normal TSH.  Generalized osteoarthritis/questionable rheumatoid arthritis.  Continue Celebrex.  Hyperlipidemia.  Continue Crestor.  Obstructive sleep apnea.  Oxygen while  in the hospital.  History of DVT.  DVT prophylaxis with Lovenox.        Discussed my findings and plan of treatment with the patient.  Disposition.  To be determined based on clinical course          Amaury Lopez MD  [facility]  07/07/24  12:37 EDT

## 2024-07-07 NOTE — CONSULTS
ASSESSMENT/PLAN:    79-year-old gentleman with recurrent anterior abdominal wall cellulitis.  He has marked pannus below the umbilicus and this is the area that is involved.  There is nothing to be done surgically.  Agree with vancomycin and Zosyn for now.  Recommendations moving forward:  I will check on him every day and plan to discharge when it appears his cellulitis in terms of erythema has resolved  Long-term he will always have skin thickening secondary to lymphedema related to his marked panniculus below the umbilicus.  Given the panniculus that he has, I think his previous courses of antibiotics have been much too short.  I would recommend at least a 3-week course of antibiotics, Bactrim would be a reasonable choice once he goes home.  I will plan to follow him closely in the office to determine final duration of antibiotic treatment.  Obviously weight loss would be beneficial and he is well aware of that.    CC:     Abdominal wall cellulitis    HPI:    79-year-old gentleman who is now experiencing his third episode of anterior abdominal wall cellulitis.  Relevant review of systems negative other than presenting complaints.    ENDOSCOPY:   Colonoscopy 4/19/2021: multiple tubular adenomas    RADIOLOGY:   Ultrasound abdomen 7/6/2024: cholelithiasis   CT abdomen and pelvis 7/6/2024: Stable skin thickening involving the lower anterior abdominal wall.  There is some subcutaneous inflammation on my review of images consistent with cellulitis.    LABS:    WBC 5.4, has been normal throughout hospital stay  Hemoglobin 11.6    SOCIAL HISTORY:   Denies tobacco use  Occasional alcohol use    FAMILY HISTORY:    Colorectal cancer: Negative    PREVIOUS ABDOMINAL SURGERY    Umbilical hernia repair  Prostatectomy    PAST MEDICAL HISTORY:    Morbid obesity  Hypertension  Sleep apnea  Diabetes  History of prostate cancer  Hyperlipidemia    MEDICATIONS:    Gabapentin  Insulin  Norvasc  Atenolol  Celebrex  Lasix  Norco  Synthroid  Losartan  Metformin  Singulair  Protonix  Penicillin to 50 mg p.o. twice daily  Phentermine  Mirapex  Crestor    ALLERGIES:   Adhesive tape-rash    PHYSICAL EXAM:   Constitutional: No acute distress  Vital signs:   Weight: 234 pounds  Height: 67 inches  BMI: 36.7  /94  HR 84  RR 18  Temperature 97.9  Respiratory: Normal nonlabored inspiratory effort  Cardiovascular: Regular rate, no jugular venous distention  Gastrointestinal: Soft, nontender, below the umbilicus he has significant pannus with cutaneous edema and erythema consistent with cellulitis and obesity induced lymphedema    JACKIE GALVAN M.D.

## 2024-07-07 NOTE — PLAN OF CARE
Goal Outcome Evaluation:  Plan of Care Reviewed With: patient, spouse         Pt admit due to recurrent panniculitis. Elevated gluose 289. Hgb 11.6 today. Pt lives with wife with several steps to enter home with genevieve rails. He amb with rollator community distances and stc inside his home. Pth has hx of RA and mulitiple  ortho surgeries, right MADELIN, genevieve TKA, genevieve wrist sx, shoulder sx all due to arthritis, CHF, HTN, DVT, DM. Pt up on eob today and reported back pain as a primary issue. He receives spinal injections for pain relief.  Neutral pelvis/improved postural awareness discussed, use of lumbar support encouraged. Pt with noted scoliosis: flexion, side flexion right and rotation left observed in standing today. STS with cga/sba. He amb 300' with rwx and cga. Flexed posture, shuffle pattern, pronation . Difficulty turning rwx due to used to rollator. Pt tolerated well. Ankle rom/ex also encouraged: laq with df, toe scrunching, ankle circles cw and ccw. Pt does appear near baseline but may benefit from cont skilled PT to attempt further education with exercises, spinal protection and positioning, attempt lumbar support as tolerated. And ensure indep amb with least AD prior to d/c. Stc trial next session as able.          Anticipated Discharge Disposition (PT): home

## 2024-07-07 NOTE — PROGRESS NOTES
"[facility] Clinical Pharmacy Services: Vancomycin Monitoring Note    Nathan Baez is a 79 y.o. male who is on day 2/5 of pharmacy to dose vancomycin for Skin and Soft Tissue.    Previous Vancomycin Dose: 1000mg IV every 12 hours   Updated Cultures and Sensitivities: MRSA PCR: Negative                                                                Blood culture: NGTD x 1 day    Results from last 7 days   Lab Units 07/07/24  1329 07/06/24  1346   VANCOMYCIN TR mcg/mL 17.40 13.70     Vitals/Labs  Ht: 170.2 cm (67\"); Wt: 106 kg (234 lb 9.6 oz)   Temp Readings from Last 1 Encounters:   07/07/24 97.9 °F (36.6 °C) (Oral)     Estimated Creatinine Clearance: 63.7 mL/min (by C-G formula based on SCr of 1.11 mg/dL).     Results from last 7 days   Lab Units 07/07/24  0352 07/06/24  0828 07/06/24  0151   CREATININE mg/dL 1.11 1.03 1.02   WBC 10*3/mm3 5.44 8.51 10.76     Assessment/Plan    Current Vancomycin Dose: 1000mg IV every 12 hours; provides a predicted  mg/L.hr   Next Level Date and Time: No more levels currently ordered at this time, but if vanc duration is extended or if renal function changes then can consider obtaining another level and assessing  We will continue to monitor patient changes and renal function     Thank you for involving pharmacy in this patient's care. Please contact pharmacy with any questions or concerns.       Walt Alarcon [facility]  Clinical Pharmacist  "

## 2024-07-07 NOTE — THERAPY EVALUATION
Patient Name: Nathan Baez  : 1945    MRN: [id number]                              Today's Date: 2024       Admit Date: 2024    Visit Dx:     ICD-10-CM ICD-9-CM   1. Panniculitis  M79.3 729.30     Patient Active Problem List   Diagnosis    OA (osteoarthritis) of knee    Essential hypertension    Cellulitis of abdominal wall    Hypothyroidism (acquired)    Gastroesophageal reflux disease without esophagitis    Mixed hyperlipidemia    Primary insomnia    DDD (degenerative disc disease), lumbar    Obstructive sleep apnea    Allergic    Venous insufficiency    Prostate cancer    Venous stasis dermatitis    Tinea cruris    Tinea corporis    Throat clearing    Sleep apnea    Skin lesion of face    Rib pain on left side    Rectal bleed    Presbycusis    Pes planus    Osteoarthritis    Obesity    Medicare annual wellness visit, subsequent    Lumbar strain    Internal hemorrhoids    Insomnia    Inflamed skin tag    Hyperplastic polyp of stomach    Hospital discharge follow-up    History of colon polyps    High risk medication use    Glaucoma    Gastroenteritis, acute    Probable streptococcal cellulitis    Encounter for screening colonoscopy    Encounter for long-term (current) use of NSAIDs    Dysphagia    DVT (deep venous thrombosis)    Lumbar facet arthropathy    Diverticulosis    Cough    Contact with hypodermic needle    Cervical radiculopathy    Arthritis    Allergic rhinitis    Acute glaucoma    Acute embolism and thrombosis of vein    Actinic keratosis    Status post reverse total shoulder replacement, right    Localized edema    Chronic disease anemia    Peripheral polyneuropathy    Campylobacter diarrhea    Hyponatremia    Generalized abdominal pain    Fever    Constipation    Bilateral lower extremity edema    Acute pyelonephritis    Chronic idiopathic constipation    Functional diarrhea    Change in bowel habits    RLS (restless legs syndrome)    Type 2 diabetes mellitus with hyperglycemia     Family history of GI malignancy    Primary osteoarthritis of right hip    B12 deficiency    Intervertebral disc stenosis of neural canal of lumbar region    Encounter for hepatitis C screening test for low risk patient    Pain associated with defecation    Calculus of kidney    Gastroesophageal reflux disease    Body aches    Skin lesion    Candidiasis, intertrigo    Hyperbilirubinemia    Acute respiratory failure due to COVID-19    Bacteremia due to group B Streptococcus    Iron deficiency anemia    Acute respiratory failure with hypoxia and hypercapnia    Hypoventilation syndrome    Pneumonia of right lower lobe due to infectious organism    Chronic diastolic (congestive) heart failure    Fatty liver    Chronic heart failure with preserved ejection fraction (HFpEF)    Pneumonia, unspecified organism    Pannus, abdominal    Abdominal wall cellulitis    Panniculitis    Abdominal wall cellulitis    Thrombocytopenia    Type 2 diabetes mellitus    Elevated liver function tests    History of DVT (deep vein thrombosis)     Past Medical History:   Diagnosis Date    Actinic keratosis     Acute embolism and thrombosis of vein     Allergic     Allergic rhinitis     Arthritis     Cataract     Cellulitis 06/16/2024    [facility] HOSPITALIZATION    Cervical radiculopathy     Chronic constipation     Colon polyp     Diabetes mellitus     Disequilibrium     DJD (degenerative joint disease), lumbar     Elevated cholesterol     Erysipelas     GERD (gastroesophageal reflux disease)     Glaucoma     Hip pain, chronic, right     History of kidney stones     X1    History of peripheral edema     LOWER LEGS BILAT, COMPRESSION KNEE HIGH     History of transfusion 2019    SHOULDER SURGERY    Hospitalization or health care facility admission within last 6 months 06/16/2024    CELLULITIS    Hyperlipidemia     Hypertension     Hypothyroid     Insomnia     Intervertebral disc stenosis of neural canal of lumbar region 04/12/2022     Kidney stone     Limited mobility     RIGHT HIP    Low back pain     Male urinary stress incontinence     s/p prostatectomy-WEAR PAD    Numbness of left hand     Obesity     KWAME (obstructive sleep apnea)     BIPAP    Osteoarthritis     Pain management     [place] [facility]    Pelvic floor dysfunction 06/2022    DEFOGRAM ORDERED AT [facility] BY DR. ROSHAN SCHAFER    Pes planus     Presbycusis     Prostate cancer 1999    Restless leg     Restless legs syndrome     Shoulder pain     RIGHT, LIMITED MOBILITY-AT TIMES    Sleep apnea     bipap    Tinea corporis     Tinea cruris     Unsteady gait     RIGHT HIP    Weakness     RIGHT HIP     Past Surgical History:   Procedure Laterality Date    CATARACT EXTRACTION, BILATERAL Right 2013    CERVICAL DISCECTOMY ANTERIOR      COLONOSCOPY  03/08/2017    3/17 normal. Recheck 2022. 12/11. Repeat in 5 years    COLONOSCOPY N/A 04/19/2021    Procedure: COLONOSCOPY INTO CECUM WITH HOT & COLD  SNARE POLYPECTOMIES;  Surgeon: Jaden Chowdhury MD;  Location: [facility] ENDOSCOPY;  Service: Gastroenterology;  Laterality: N/A;  PRE: CHANGE IN BOWEL HABITS; FAMILY H/O COLON CANCER  POST: DIVERTICULOSIS, POLYPS    ENDOSCOPY N/A 01/30/2023    Procedure: ESOPHAGOGASTRODUODENOSCOPY with biopsies;  Surgeon: Jaden Chowdhury MD;  Location: [facility] ENDOSCOPY;  Service: Gastroenterology;  Laterality: N/A;  pre- abdominal pain, anemia  post- gastritis    ENDOSCOPY W/ PEG REMOVAL      EYE SURGERY  2013    Cataract    FOOT SURGERY      1998 had big toe replaced on left foot-METAL     HERNIA REPAIR  03/07/2012    umbilical    JOINT REPLACEMENT Left     big toe    MEDIAL BRANCH BLOCK Bilateral 04/07/2023    Procedure: LUMBAR MEDIAL BRANCH BLOCK bilateral L4-S1 [id number];  Surgeon: Lauren Houston MD;  Location: [facility] OR;  Service: Pain Management;  Laterality: Bilateral;    MEDIAL BRANCH BLOCK Bilateral 04/17/2023    Procedure: LUMBAR MEDIAL BRANCH BLOCK bilateral L4-S1 [id number];  Surgeon: Rosemarie  Lauren WATKINS MD;  Location: [facility] MAIN OR;  Service: Pain Management;  Laterality: Bilateral;    AR ARTHRP KNE CONDYLE&PLATU MEDIAL&LAT COMPARTMENTS Left 09/13/2016    Procedure: LT TOTAL KNEE ARTHROPLASTY;  Surgeon: Tadeo Basurto MD;  Location: [facility] MAIN OR;  Service: Orthopedics    PROSTATECTOMY  07/1999 July 1999. Radical     RADIOFREQUENCY ABLATION Right 05/17/2023    Procedure: RADIOFREQUENCY ABLATION LUMBAR right L3-S1;  Surgeon: Lauren Houston MD;  Location: [facility] MAIN OR;  Service: Pain Management;  Laterality: Right;    THYROID LOBECTOMY  2011    TONSILLECTOMY      CHILDHOOD    TOTAL HIP ARTHROPLASTY Right 08/19/2021    Procedure: TOTAL HIP ARTHROPLASTY RIGHT POSTERIOR;  Surgeon: Tadeo Basurto MD;  Location: [facility] MAIN OR;  Service: Orthopedics;  Laterality: Right;    TOTAL KNEE ARTHROPLASTY Right 2014    TOTAL SHOULDER ARTHROPLASTY W/ DISTAL CLAVICLE EXCISION Right 11/13/2019    Procedure: TOTAL SHOULDER REVERSE ARTHROPLASTY RIGHT REPAIR RIGHT AXILLARY VEIN;  Surgeon: Behzad Kelley MD;  Location: [facility] OR [facility];  Service: Orthopedics    WRIST SURGERY Right 12/17/2014    x2  wrist replaced    WRIST SURGERY Right 2009      General Information       Row Name 07/07/24 1238          Physical Therapy Time and Intention    Document Type evaluation  -SV     Mode of Treatment individual therapy;physical therapy  -SV       Row Name 07/07/24 1238          General Information    Patient Profile Reviewed yes  -SV     Prior Level of Function independent:;all household mobility  amb with stc inside home and rollator in community  -SV     Existing Precautions/Restrictions fall  -SV     Barriers to Rehab medically complex;previous functional deficit  -SV       Row Name 07/07/24 1238          Living Environment    People in Home spouse  -SV       Row Name 07/07/24 1238          Home Main Entrance    Number of Stairs, Main Entrance four  -SV     Stair Railings, Main Entrance railings on both sides of  stairs;railings safe and in good condition  -       Row Name 07/07/24 1238          Stairs Within Home, Primary    Stairs Comment, Within Home, Primary steps to basement but pt doesn't go there  -       Row Name 07/07/24 1238          Cognition    Orientation Status (Cognition) oriented x 4  -       Row Name 07/07/24 1238          Safety Issues, Functional Mobility    Impairments Affecting Function (Mobility) balance;endurance/activity tolerance;other (see comments)  -     Comment, Safety Issues/Impairments (Mobility) Pt with obvious scoliosis  -               User Key  (r) = Recorded By, (t) = Taken By, (c) = Cosigned By      Initials Name Provider Type     Jaye Braswell, PT Physical Therapist                   Mobility       Row Name 07/07/24 1328          Bed Mobility    Comment, (Bed Mobility) NT due to pt up on eob  -       Row Name 07/07/24 1328          Sit-Stand Transfer    Sit-Stand [place] (Transfers) contact guard;standby assist  -       Row Name 07/07/24 1328          Gait/Stairs (Locomotion)    [place] Level (Gait) contact guard;standby assist  -     Assistive Device (Gait) walker, front-wheeled  -     Distance in Feet (Gait) 300  cues for rwx placement, difficulty turning due to pt used to rollator  -     Bilateral Gait Deviations forward flexed posture;heel strike decreased;lateral trunk flexion  pronation in stance  -               User Key  (r) = Recorded By, (t) = Taken By, (c) = Cosigned By      Initials Name Provider Type     Jaye Braswell, PT Physical Therapist                   Obj/Interventions       Row Name 07/07/24 1330          Range of Motion Comprehensive    Comment, General Range of Motion BUE/BLE arom appears wfl, genevieve ankle pronation changes , likely tight hamstrings due to PPT during knee ext  scoliosis observed with spinal flexion right side flexion and left rotation  -       Row Name 07/07/24 1330          Strength Comprehensive (MMT)     Comment, General Manual Muscle Testing (MMT) Assessment BUE/BLE appear > 3/5 as obs with mobility and rom today , no MMT performed  -SV       Row Name 07/07/24 1330          Balance    Balance Assessment sitting static balance;standing static balance  -SV     Static Sitting Balance modified independence  -SV     Static Standing Balance standby assist;supervision  -SV     Position/Device Used, Standing Balance walker, rolling  -SV       Row Name 07/07/24 1330          Sensory Assessment (Somatosensory)    Sensory Assessment (Somatosensory) not tested  -SV               User Key  (r) = Recorded By, (t) = Taken By, (c) = Cosigned By      Initials Name Provider Type    SV Jaye Braswell, PT Physical Therapist                   Goals/Plan       Row Name 07/07/24 1341          Bed Mobility Goal 1 (PT)    Activity/Assistive Device (Bed Mobility Goal 1, PT) sit to supine/supine to sit  -SV     [place] Level/Cues Needed (Bed Mobility Goal 1, PT) independent  -SV     Time Frame (Bed Mobility Goal 1, PT) 1 week  -SV       Row Name 07/07/24 1341          Transfer Goal 1 (PT)    Activity/Assistive Device (Transfer Goal 1, PT) sit-to-stand/stand-to-sit;walker, rolling  -SV     [place] Level/Cues Needed (Transfer Goal 1, PT) independent  -SV     Time Frame (Transfer Goal 1, PT) 1 week  -SV       Row Name 07/07/24 1341          Gait Training Goal 1 (PT)    Activity/Assistive Device (Gait Training Goal 1, PT) gait (walking locomotion);walker, rolling  -SV     [place] Level (Gait Training Goal 1, PT) independent  -SV     Distance (Gait Training Goal 1, PT) 300'  -SV     Time Frame (Gait Training Goal 1, PT) 1 week  -SV       Row Name 07/07/24 1341          Therapy Assessment/Plan (PT)    Planned Therapy Interventions (PT) balance training;bed mobility training;gait training;home exercise program;patient/family education;stretching;strengthening;stair training;transfer training;ROM (range of motion)  -SV                User Key  (r) = Recorded By, (t) = Taken By, (c) = Cosigned By      Initials Name Provider Type    SV Jaye Braswell, PT Physical Therapist                   Clinical Impression       Row Name 07/07/24 1340          Pain    Pretreatment Pain Rating 0/10 - no pain  -SV     Posttreatment Pain Rating 0/10 - no pain  -SV     Pre/Posttreatment Pain Comment no report or sign of pain  -SV     Pain Intervention(s) Ambulation/increased activity  -SV       Row Name 07/07/24 1340          Plan of Care Review    Plan of Care Reviewed With patient;spouse  -SV       Row Name 07/07/24 1340          Therapy Assessment/Plan (PT)    Patient/Family Therapy Goals Statement (PT) return  home indep  -SV     Rehab Potential (PT) good, to achieve stated therapy goals  -SV     Criteria for Skilled Interventions Met (PT) yes  -SV     Therapy Frequency (PT) 5 times/wk  -SV       Row Name 07/07/24 1340          Vital Signs    Posttreatment Heart Rate (beats/min) 96  -SV     O2 Delivery Pre Treatment room air  -SV     O2 Delivery Intra Treatment room air  -SV     Post SpO2 (%) 97  -SV     O2 Delivery Post Treatment room air  -SV     Intra Patient Position Standing  -SV     Post Patient Position Sitting  -SV       Row Name 07/07/24 1340          Positioning and Restraints    Pre-Treatment Position in bed  -SV     Post Treatment Position bed  -SV     In Bed sitting EOB;call light within reach;encouraged to call for assist  -SV               User Key  (r) = Recorded By, (t) = Taken By, (c) = Cosigned By      Initials Name Provider Type    SV Jaye Braswell, PT Physical Therapist                   Outcome Measures       Row Name 07/07/24 1342 07/07/24 0840       How much help from another person do you currently need...    Turning from your back to your side while in flat bed without using bedrails? 4  -SV 4  -AH (r) MA (t) AH (c)    Moving from lying on back to sitting on the side of a flat bed without bedrails? 4  -SV 4  -AH (r) MA (t) AH (c)     Moving to and from a bed to a chair (including a wheelchair)? 3  -SV 3  -AH (r) MA (t) AH (c)    Standing up from a chair using your arms (e.g., wheelchair, bedside chair)? 3  -SV 3  -AH (r) MA (t) AH (c)    Climbing 3-5 steps with a railing? 3  -SV 3  -AH (r) MA (t) AH (c)    To walk in hospital room? 3  -SV 4  -AH (r) MA (t) AH (c)    AM-PAC 6 Clicks Score (PT) 20  -SV 21  -AH (r) MA (t)    Highest Level of Mobility Goal 6 --> Walk 10 steps or more  -SV 6 --> Walk 10 steps or more  -AH (r) MA (t)              User Key  (r) = Recorded By, (t) = Taken By, (c) = Cosigned By      Initials Name Provider Type     Jaye Braswell, PT Physical Therapist    Renee Sharp, RN Registered Nurse    Jo-Ann Lucero, Nursing Student Nursing Student                                 Physical Therapy Education       Title: PT OT SLP Therapies (Done)       Topic: Physical Therapy (Done)       Point: Mobility training (Done)       Learning Progress Summary             Patient Acceptance, E, VU,NR by  at 7/7/2024 1343   Significant Other Acceptance, E, VU,NR by  at 7/7/2024 1343                         Point: Home exercise program (Done)       Learning Progress Summary             Patient Acceptance, E, VU,NR by SV at 7/7/2024 1343   Significant Other Acceptance, E, VU,NR by  at 7/7/2024 1343                                         User Key       Initials Effective Dates Name Provider Type Discipline     07/11/23 -  Jaye Braswell, PT Physical Therapist PT                  PT Recommendation and Plan  Planned Therapy Interventions (PT): balance training, bed mobility training, gait training, home exercise program, patient/family education, stretching, strengthening, stair training, transfer training, ROM (range of motion)  Plan of Care Reviewed With: patient, spouse     Time Calculation:         PT Charges       Row Name 07/07/24 1351             Time Calculation    Start Time 1238  -SV      Stop Time 1310  -SV       Time Calculation (min) 32 min  -SV      PT Received On 07/07/24  -SV      PT - Next Appointment 07/08/24  -SV      PT Goal Re-Cert Due Date 07/14/24  -SV                User Key  (r) = Recorded By, (t) = Taken By, (c) = Cosigned By      Initials Name Provider Type    SV Jaye Braswell, PT Physical Therapist                  Therapy Charges for Today       Code Description Service Date Service Provider Modifiers Qty    45326595975 HC PT EVAL MOD COMPLEXITY 2 7/7/2024 Jaye Braswell, PT GP 1    [id number]  PT THERAPEUTIC ACT EA 15 MIN 7/7/2024 Jaye Braswell, PT GP 1            PT G-Codes  AM-PAC 6 Clicks Score (PT): 20  PT Discharge Summary  Anticipated Discharge Disposition (PT): home    Jaye Braswell, PT  7/7/2024

## 2024-07-08 LAB
ALBUMIN SERPL-MCNC: 3.8 G/DL (ref 3.5–5.2)
ALBUMIN/GLOB SERPL: 1.3 G/DL
ALP SERPL-CCNC: 197 U/L (ref 39–117)
ALT SERPL W P-5'-P-CCNC: 28 U/L (ref 1–41)
ANION GAP SERPL CALCULATED.3IONS-SCNC: 9 MMOL/L (ref 5–15)
AST SERPL-CCNC: 29 U/L (ref 1–40)
BASOPHILS # BLD AUTO: 0.03 10*3/MM3 (ref 0–0.2)
BASOPHILS NFR BLD AUTO: 0.7 % (ref 0–1.5)
BILIRUB SERPL-MCNC: 0.7 MG/DL (ref 0–1.2)
BUN SERPL-MCNC: 16 MG/DL (ref 8–23)
BUN/CREAT SERPL: 14.8 (ref 7–25)
CALCIUM SPEC-SCNC: 9.4 MG/DL (ref 8.6–10.5)
CHLORIDE SERPL-SCNC: 105 MMOL/L (ref 98–107)
CO2 SERPL-SCNC: 27 MMOL/L (ref 22–29)
CREAT SERPL-MCNC: 1.08 MG/DL (ref 0.76–1.27)
DEPRECATED RDW RBC AUTO: 42.5 FL (ref 37–54)
EGFRCR SERPLBLD CKD-EPI 2021: 70.2 ML/MIN/1.73
EOSINOPHIL # BLD AUTO: 0.16 10*3/MM3 (ref 0–0.4)
EOSINOPHIL NFR BLD AUTO: 3.5 % (ref 0.3–6.2)
ERYTHROCYTE [DISTWIDTH] IN BLOOD BY AUTOMATED COUNT: 13.1 % (ref 12.3–15.4)
GLOBULIN UR ELPH-MCNC: 2.9 GM/DL
GLUCOSE BLDC GLUCOMTR-MCNC: 182 MG/DL (ref 70–130)
GLUCOSE BLDC GLUCOMTR-MCNC: 274 MG/DL (ref 70–130)
GLUCOSE BLDC GLUCOMTR-MCNC: 279 MG/DL (ref 70–130)
GLUCOSE SERPL-MCNC: 131 MG/DL (ref 65–99)
HCT VFR BLD AUTO: 37 % (ref 37.5–51)
HGB BLD-MCNC: 12.3 G/DL (ref 13–17.7)
IMM GRANULOCYTES # BLD AUTO: 0.08 10*3/MM3 (ref 0–0.05)
IMM GRANULOCYTES NFR BLD AUTO: 1.8 % (ref 0–0.5)
LYMPHOCYTES # BLD AUTO: 1.55 10*3/MM3 (ref 0.7–3.1)
LYMPHOCYTES NFR BLD AUTO: 34.2 % (ref 19.6–45.3)
MCH RBC QN AUTO: 29.4 PG (ref 26.6–33)
MCHC RBC AUTO-ENTMCNC: 33.2 G/DL (ref 31.5–35.7)
MCV RBC AUTO: 88.3 FL (ref 79–97)
MONOCYTES # BLD AUTO: 0.44 10*3/MM3 (ref 0.1–0.9)
MONOCYTES NFR BLD AUTO: 9.7 % (ref 5–12)
NEUTROPHILS NFR BLD AUTO: 2.27 10*3/MM3 (ref 1.7–7)
NEUTROPHILS NFR BLD AUTO: 50.1 % (ref 42.7–76)
PLATELET # BLD AUTO: 134 10*3/MM3 (ref 140–450)
PMV BLD AUTO: 10.1 FL (ref 6–12)
POTASSIUM SERPL-SCNC: 3.8 MMOL/L (ref 3.5–5.2)
PROT SERPL-MCNC: 6.7 G/DL (ref 6–8.5)
RBC # BLD AUTO: 4.19 10*6/MM3 (ref 4.14–5.8)
SODIUM SERPL-SCNC: 141 MMOL/L (ref 136–145)
WBC NRBC COR # BLD AUTO: 4.53 10*3/MM3 (ref 3.4–10.8)

## 2024-07-08 PROCEDURE — 99232 SBSQ HOSP IP/OBS MODERATE 35: CPT | Performed by: SURGERY

## 2024-07-08 PROCEDURE — 82948 REAGENT STRIP/BLOOD GLUCOSE: CPT

## 2024-07-08 PROCEDURE — 80053 COMPREHEN METABOLIC PANEL: CPT | Performed by: INTERNAL MEDICINE

## 2024-07-08 PROCEDURE — 63710000001 INSULIN GLARGINE PER 5 UNITS: Performed by: INTERNAL MEDICINE

## 2024-07-08 PROCEDURE — 63710000001 INSULIN LISPRO (HUMAN) PER 5 UNITS: Performed by: INTERNAL MEDICINE

## 2024-07-08 PROCEDURE — 25010000002 PIPERACILLIN SOD-TAZOBACTAM PER 1 G: Performed by: INTERNAL MEDICINE

## 2024-07-08 PROCEDURE — 97110 THERAPEUTIC EXERCISES: CPT

## 2024-07-08 PROCEDURE — 85025 COMPLETE CBC W/AUTO DIFF WBC: CPT | Performed by: INTERNAL MEDICINE

## 2024-07-08 PROCEDURE — 25010000002 ENOXAPARIN PER 10 MG: Performed by: INTERNAL MEDICINE

## 2024-07-08 RX ADMIN — MONTELUKAST SODIUM 10 MG: 10 TABLET, FILM COATED ORAL at 22:14

## 2024-07-08 RX ADMIN — GABAPENTIN 300 MG: 300 CAPSULE ORAL at 22:18

## 2024-07-08 RX ADMIN — INSULIN LISPRO 2 UNITS: 100 INJECTION, SOLUTION INTRAVENOUS; SUBCUTANEOUS at 08:28

## 2024-07-08 RX ADMIN — AMLODIPINE BESYLATE 2.5 MG: 2.5 TABLET ORAL at 09:20

## 2024-07-08 RX ADMIN — CELECOXIB 200 MG: 200 CAPSULE ORAL at 09:20

## 2024-07-08 RX ADMIN — INSULIN GLARGINE 10 UNITS: 100 INJECTION, SOLUTION SUBCUTANEOUS at 09:19

## 2024-07-08 RX ADMIN — GABAPENTIN 300 MG: 300 CAPSULE ORAL at 09:19

## 2024-07-08 RX ADMIN — ROSUVASTATIN CALCIUM 20 MG: 20 TABLET, FILM COATED ORAL at 22:15

## 2024-07-08 RX ADMIN — LOSARTAN POTASSIUM 100 MG: 100 TABLET, FILM COATED ORAL at 09:20

## 2024-07-08 RX ADMIN — GABAPENTIN 300 MG: 300 CAPSULE ORAL at 18:37

## 2024-07-08 RX ADMIN — ATENOLOL 12.5 MG: 25 TABLET ORAL at 09:20

## 2024-07-08 RX ADMIN — Medication 10 ML: at 22:21

## 2024-07-08 RX ADMIN — GABAPENTIN 300 MG: 300 CAPSULE ORAL at 11:37

## 2024-07-08 RX ADMIN — ASPIRIN 81 MG: 81 TABLET, COATED ORAL at 09:19

## 2024-07-08 RX ADMIN — PANTOPRAZOLE SODIUM 40 MG: 40 TABLET, DELAYED RELEASE ORAL at 16:59

## 2024-07-08 RX ADMIN — Medication 1 CAPSULE: at 09:19

## 2024-07-08 RX ADMIN — PRAMIPEXOLE DIHYDROCHLORIDE 1.5 MG: 1.5 TABLET ORAL at 22:15

## 2024-07-08 RX ADMIN — LEVOTHYROXINE SODIUM 88 MCG: 88 TABLET ORAL at 08:34

## 2024-07-08 RX ADMIN — LATANOPROST 1 DROP: 50 SOLUTION OPHTHALMIC at 22:21

## 2024-07-08 RX ADMIN — INSULIN LISPRO 4 UNITS: 100 INJECTION, SOLUTION INTRAVENOUS; SUBCUTANEOUS at 16:58

## 2024-07-08 RX ADMIN — BISACODYL 5 MG: 5 TABLET, COATED ORAL at 09:19

## 2024-07-08 RX ADMIN — Medication 10 ML: at 09:20

## 2024-07-08 RX ADMIN — FUROSEMIDE 40 MG: 40 TABLET ORAL at 09:19

## 2024-07-08 RX ADMIN — ENOXAPARIN SODIUM 40 MG: 100 INJECTION SUBCUTANEOUS at 15:35

## 2024-07-08 RX ADMIN — DORZOLAMIDE HYDROCHLORIDE: 20 SOLUTION OPHTHALMIC at 09:19

## 2024-07-08 RX ADMIN — PIPERACILLIN AND TAZOBACTAM 3.38 G: 3; .375 INJECTION, POWDER, FOR SOLUTION INTRAVENOUS at 18:37

## 2024-07-08 RX ADMIN — PRAMIPEXOLE DIHYDROCHLORIDE 1.5 MG: 1.5 TABLET ORAL at 09:19

## 2024-07-08 RX ADMIN — BRIMONIDINE TARTRATE 1 DROP: 2 SOLUTION/ DROPS OPHTHALMIC at 22:14

## 2024-07-08 RX ADMIN — INSULIN LISPRO 4 UNITS: 100 INJECTION, SOLUTION INTRAVENOUS; SUBCUTANEOUS at 11:37

## 2024-07-08 RX ADMIN — PANTOPRAZOLE SODIUM 40 MG: 40 TABLET, DELAYED RELEASE ORAL at 08:33

## 2024-07-08 RX ADMIN — PIPERACILLIN AND TAZOBACTAM 3.38 G: 3; .375 INJECTION, POWDER, FOR SOLUTION INTRAVENOUS at 11:37

## 2024-07-08 RX ADMIN — INSULIN GLARGINE 10 UNITS: 100 INJECTION, SOLUTION SUBCUTANEOUS at 22:18

## 2024-07-08 RX ADMIN — BRIMONIDINE TARTRATE 1 DROP: 2 SOLUTION/ DROPS OPHTHALMIC at 09:19

## 2024-07-08 RX ADMIN — Medication 1000 MCG: at 11:38

## 2024-07-08 NOTE — PLAN OF CARE
Goal Outcome Evaluation:  Plan of Care Reviewed With: patient, spouse           Outcome Evaluation: Pleasant pt transferred to unit from [facility].  Pt is A&Ox4 but forgetfull.  Wife at bedside.  Cont with IV ABX.  VSS, RA, NSR.

## 2024-07-08 NOTE — PROGRESS NOTES
Infectious Diseases Progress Note    Yamila Mabry MD     [facility]  Los: 0 days  Patient Identification:  Name: Nathan Baez  Age: 79 y.o.  Sex: male  :  1945  MRN: [id number]         Primary Care Physician: Damien Pierce MD        Subjective: Feeling much better sitting up in the chair playing cards with his wife smiling and does not appear to be in any acute distress.  Interval History: See consultation note.  Earlier general surgery service has evaluated the patient and recommended 3 weeks of Bactrim.  Objective:    Scheduled Meds:amLODIPine, 2.5 mg, Oral, Daily  aspirin, 81 mg, Oral, Daily  atenolol, 12.5 mg, Oral, Daily  bisacodyl, 5 mg, Oral, BID  brimonidine, 1 drop, Both Eyes, BID  celecoxib, 200 mg, Oral, Daily  dorzolamide (TRUSOPT) 2 % 1 drop, timolol (TIMOPTIC) 0.5 % 1 drop for Cosopt 22.3-6.8 mg/mL, , Both Eyes, Daily  enoxaparin, 40 mg, Subcutaneous, Q24H  furosemide, 40 mg, Oral, Daily  gabapentin, 300 mg, Oral, 4x Daily  insulin glargine, 10 Units, Subcutaneous, Q12H  insulin lispro, 2-7 Units, Subcutaneous, 4x Daily AC & at Bedtime  lactobacillus acidophilus, 1 capsule, Oral, Daily  latanoprost, 1 drop, Both Eyes, Nightly  levothyroxine, 88 mcg, Oral, Q AM  losartan, 100 mg, Oral, Daily  montelukast, 10 mg, Oral, Nightly  pantoprazole, 40 mg, Oral, BID AC  piperacillin-tazobactam, 3.375 g, Intravenous, Q8H  pramipexole, 1.5 mg, Oral, BID  rosuvastatin, 20 mg, Oral, Nightly  sodium chloride, 10 mL, Intravenous, Q12H  vancomycin, 1,000 mg, Intravenous, Q12H  vitamin B-12, 1,000 mcg, Oral, Daily      Continuous Infusions:Pharmacy to dose vancomycin,   sodium chloride, 75 mL/hr        Vital signs in last 24 hours:  Temp:  [97.7 °F (36.5 °C)-98.4 °F (36.9 °C)] 98.4 °F (36.9 °C)  Heart Rate:  [74-84] 74  Resp:  [16-18] 16  BP: (109-137)/(64-94) 137/79    Intake/Output:  No intake or output data in the 24 hours ending 24 4275    Exam:  /79 (BP Location: Left  "arm, Patient Position: Sitting)   Pulse 74   Temp 98.4 °F (36.9 °C) (Oral)   Resp 16   Ht 170.2 cm (67\")   Wt 106 kg (234 lb 9.6 oz)   SpO2 95%   BMI 36.74 kg/m²   Patient is examined using the personal protective equipment as per guidelines from infection control for this particular patient as enacted.  Hand washing was performed before and after patient interaction.  General Appearance:    Alert, cooperative, no distress, AAOx3                          Head:    Normocephalic, without obvious abnormality, atraumatic                           Eyes:    PERRL, conjunctivae/corneas clear, EOM's intact, both eyes                         Throat:   Lips, tongue, gums normal; oral mucosa pink and moist                           Neck:   Supple, symmetrical, trachea midline, no JVD                         Lungs:    Clear to auscultation bilaterally, respirations unlabored                 Chest Wall:    No tenderness or deformity                          Heart:  S1-S2 regular.                  Abdomen:   Decreased erythema and tenderness of the pannus.                 Extremities:   Extremities normal, atraumatic, no cyanosis or edema                        Pulses:   Pulses palpable in all extremities                            Skin:   Skin is warm and dry,  no rashes or palpable lesions                  Neurologic: Alert and oriented and grossly nonfocal       Data Review:    I reviewed the patient's new clinical results.  Results from last 7 days   Lab Units 07/07/24  0352 07/06/24  0828 07/06/24  0151 07/02/24  1306   WBC 10*3/mm3 5.44 8.51 10.76 5.82   HEMOGLOBIN g/dL 11.6* 12.2* 12.6* 12.9*   PLATELETS 10*3/mm3 120* 129* 146 172     Results from last 7 days   Lab Units 07/07/24  0352 07/06/24  0828 07/06/24  0151 07/02/24  1306   SODIUM mmol/L 135* 135* 132* 137   POTASSIUM mmol/L 3.7 3.7 4.0 4.2   CHLORIDE mmol/L 101 99 97* 100   CO2 mmol/L 24.0 25.0 24.0 27.3   BUN mg/dL 20 20 23 16   CREATININE mg/dL 1.11 1.03 " 1.02 1.04   CALCIUM mg/dL 8.8 8.8 9.3 9.8   GLUCOSE mg/dL 289* 227* 271* 218*     Microbiology Results (last 10 days)       Procedure Component Value - Date/Time    MRSA Screen, PCR (Inpatient) - Swab, Nares [[id number]]  (Normal) Collected: 07/06/24 0912    Lab Status: Final result Specimen: Swab from Nares Updated: 07/06/24 1036     MRSA PCR No MRSA Detected    Narrative:      The negative predictive value of this diagnostic test is high and should only be used to consider de-escalating anti-MRSA therapy. A positive result may indicate colonization with MRSA and must be correlated clinically.    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  2-previous umbilical hernia repair in 2022 with chronic abdominal wall edema  3-diabetes mellitus  4-hypertension  5-history of DVT  6-history of congestive heart failure  7-obstructive sleep apnea     Recommendations/Discussions:  His MRSA screen is negative and his vancomycin can be safely discontinued.  General surgery services recommendations noted.  Continue with Zosyn while he is here.  From infectious disease  standpoint based on his previous history of documented group B strep bacteremic sepsis and his recurrent cellulitis involving the abdominal wall presenting his pancolitis as a result of underlying structural changes from previous umbilical surgery and dependent edema and negative MRSA screening I think his recurrent cellulitis is not due to failure of antibiotic therapy but due to noncorrectable structural changes involving abdominal wall due to previous umbilical hernia surgery and longstanding pannus.    Going forward would recommend underlying group B strep and other strep species but not covered by Bactrim.  Eventually patient would require some sort of intervention to correct the structural abnormality since predisposing him for recurrent cellulitis which could occur regardless of the duration of antibiotic treatment.  From infectious disease standpoint if patient is considered ready to be discharged he could be switched to Keflex 500 mg every 8 hours for couple weeks but will defer final recommendation to our general surgery colleagues as the plan to follow him up in the office for consideration for any intervention that he may require to address these recurrent episodes.  TMP-SMX would not provide coverage for strep species.  I explained to the patient that given after panniculectomy the subsequent changes in the skin due to surgery and scarring and previous infection there will be still a chance that she could have recurrent infection.  Yamila Mabry MD  7/7/2024  22:49 EDT    Parts of this note may be an electronic transcription/translation of spoken language to printed text using the Dragon dictation system.

## 2024-07-08 NOTE — PROGRESS NOTES
Colorectal & General Surgery  Progress Note    Patient: Nathan Baez  YOB: 1945  MRN: [id number]      Assessment  Nathan Baez is a 79 y.o. male with severe cellulitis and panniculitis currently on intravenous antibiotics.  Seems to be making slow improvement.  No surgical intervention warranted today.  Please see Dr. Rivers's note from yesterday for overall plan.  No deviation from that today.    Subjective  Feels well.  Hoping to go home soon.    Objective    Vitals:    07/08/24 1723   BP: 133/83   Pulse: 73   Resp: 18   Temp: 97.7 °F (36.5 °C)   SpO2: 97%       Physical Exam  Constitutional: Well-developed well-nourished, no acute distress  Neck: Supple, trachea midline  Respiratory: No increased work of breathing, Symmetric excursion  Cardiovascular: Well pefursed, no jugular venous distention evident   Abdominal: Soft, obese, nontender, nondistended  Skin: Warm, panniculitis with stable cellulitis.  Psychiatric: Alert and oriented ×3, normal affect     Laboratory Results  I have personally reviewed CBC with WBC 4, hemoglobin 12, platelets 134.  CMP with creatinine 1.0, albumin 3.8.    Radiology  None to review         Andrew Stone MD  Colorectal & General Surgery  [facility], [facility]    [address], [address]  [place], [place], [address]  P: [phone]  F: [phone]

## 2024-07-08 NOTE — PROGRESS NOTES
Name: Nathan Baez ADMIT: 2024   : 1945  PCP: Damien Pierce MD    MRN: [id number] LOS: 1 days   AGE/SEX: 79 y.o. male  ROOM: 3110/1     Subjective   Subjective   Decreased lower abdominal pain/swelling/redness/hotness.  .  No fever or chills.    Review of Systems  Cardiovascular/respiratory.  No chest pain/no palpitations/no shortness of breath/no  GI.  No abdominal pain.  Positive loose bowel movement without fresh bright blood per rectum or melena.  No nausea or vomiting.  .  No dysuria or hematuria.     Objective   Objective   Vital Signs  Temp:  [97.7 °F (36.5 °C)-98.4 °F (36.9 °C)] 97.7 °F (36.5 °C)  Heart Rate:  [74-84] 84  Resp:  [16-18] 18  BP: (123-152)/(74-84) 152/84  SpO2:  [95 %-99 %] 99 %  on   ;        Intake/Output Summary (Last 24 hours) at 2024 1034  Last data filed at 2024 0800  Gross per 24 hour   Intake 240 ml   Output --   Net 240 ml     Body mass index is 36.74 kg/m².      24  0115 24  0653 24  0353   Weight: 107 kg (235 lb) 107 kg (236 lb 8.9 oz) 106 kg (234 lb 9.6 oz)     Physical Exam  General.  Elderly gentleman.  Obese.  Alert and oriented x 4.  No apparent pain/distress/diaphoresis.  Normal mood and affect  Eyes.  Pupils equal round and reactive.  Intact extraocular musculature.  Status post bilateral cataract surgery.  No pallor or jaundice  Oral cavity.  Moist mucous membrane.  Neck.  Supple.  No JVD.  No lymphadenopathy or thyromegaly.  Cardiovascular.  Regular rate and rhythm and grade 2 systolic murmur.  Distant S1 and S2    Chest.  Poor but clear to auscultation bilaterally with no added sounds  Abdomen.  Obese.  Soft lax.  No tenderness.  No organomegaly.  No guarding or rebound.  Normal bowel sounds.  Lower abdominal scar.  Decreased induration/tenderness/redness and hotness  of the lower abdomen.  Improved induration.  No open wounds or discharge.  Positive lower abdominal peau d'orange.  No inguinal  "lymphadenopathy.  Extremities.  No clubbing/cyanosis/edema.     Results Review:      Results from last 7 days   Lab Units 07/08/24  0349 07/07/24  0352 07/06/24  0828 07/06/24  0151 07/02/24  1306   SODIUM mmol/L 141 135* 135* 132* 137   POTASSIUM mmol/L 3.8 3.7 3.7 4.0 4.2   CHLORIDE mmol/L 105 101 99 97* 100   CO2 mmol/L 27.0 24.0 25.0 24.0 27.3   BUN mg/dL 16 20 20 23 16   CREATININE mg/dL 1.08 1.11 1.03 1.02 1.04   GLUCOSE mg/dL 131* 289* 227* 271* 218*   CALCIUM mg/dL 9.4 8.8 8.8 9.3 9.8   AST (SGOT) U/L 29 24  --  27 28   ALT (SGPT) U/L 28 22  --  23 23     Estimated Creatinine Clearance: 65.5 mL/min (by C-G formula based on SCr of 1.08 mg/dL).  Results from last 7 days   Lab Units 07/06/24  0828   HEMOGLOBIN A1C % 9.60*     Results from last 7 days   Lab Units 07/08/24  0620 07/07/24  2014 07/07/24  1556 07/07/24  1058 07/06/24  2024 07/06/24  1545 07/06/24  1218 07/06/24  1037   GLUCOSE mg/dL 182* 297* 310* 379* 162* 251* 358* 300*             Results from last 7 days   Lab Units 07/06/24  0828   TSH uIU/mL 0.930               Invalid input(s): \"LDLCALC\"  Results from last 7 days   Lab Units 07/08/24  0349 07/07/24  0352 07/06/24  0828 07/06/24  0151 07/02/24  1306   WBC 10*3/mm3 4.53 5.44 8.51 10.76 5.82   HEMOGLOBIN g/dL 12.3* 11.6* 12.2* 12.6* 12.9*   HEMATOCRIT % 37.0* 35.8* 36.5* 37.6 40.2   PLATELETS 10*3/mm3 134* 120* 129* 146 172   MCV fL 88.3 89.7 88.0 88.7 89.3   MCH pg 29.4 29.1 29.4 29.7 28.7   MCHC g/dL 33.2 32.4 33.4 33.5 32.1   RDW % 13.1 13.0 13.0 13.3 13.1   RDW-SD fl 42.5 42.7 41.7 43.2 42.4   MPV fL 10.1 10.1 10.3 10.5 10.4   NEUTROPHIL % % 50.1 61.1  --  73.8 48.1   LYMPHOCYTE % % 34.2 24.1  --  15.9* 36.8   MONOCYTES % % 9.7 10.5  --  7.8 10.1   EOSINOPHIL % % 3.5 2.2  --  1.0 2.6   BASOPHIL % % 0.7 0.6  --  0.4 0.9   IMM GRAN % % 1.8* 1.5*  --  1.1* 1.5*   NEUTROS ABS 10*3/mm3 2.27 3.33  --  7.94* 2.80   LYMPHS ABS 10*3/mm3 1.55 1.31  --  1.71 2.14   MONOS ABS 10*3/mm3 0.44 0.57  --  " 0.84 0.59   EOS ABS 10*3/mm3 0.16 0.12  --  0.11 0.15   BASOS ABS 10*3/mm3 0.03 0.03  --  0.04 0.05   IMMATURE GRANS (ABS) 10*3/mm3 0.08* 0.08*  --  0.12* 0.09*   NRBC /100 WBC  --   --   --   --  0.0             Results from last 7 days   Lab Units 07/06/24  1836 07/06/24  1158 07/06/24  0828 07/06/24  0527 07/06/24  0151   PROCALCITONIN ng/mL  --   --   --   --  0.27*   LACTATE mmol/L 1.6 3.1* 2.5* 2.8* 2.3*             Results from last 7 days   Lab Units 07/06/24  0151   BLOODCX  No growth at 2 days         Results from last 7 days   Lab Units 07/06/24  0233   NITRITE UA  Negative   WBC UA /HPF 0-2   BACTERIA UA /HPF None Seen   SQUAM EPITHEL UA /HPF 0-2           Imaging:  Imaging Results (Last 24 Hours)       ** No results found for the last 24 hours. **               I reviewed the patient's new clinical results / labs / tests / procedures      Assessment/Plan     Active Hospital Problems    Diagnosis  POA    **Panniculitis [M79.3]  Yes    Abdominal wall cellulitis [L03.311]  Yes    Thrombocytopenia [D69.6]  Yes    Type 2 diabetes mellitus [E11.9]  Yes    Elevated liver function tests [R79.89]  Yes    History of DVT (deep vein thrombosis) [Z86.718]  Not Applicable    Chronic diastolic (congestive) heart failure [I50.32]  Yes    Chronic disease anemia [D63.8]  Yes    Obstructive sleep apnea [G47.33]  Yes    Essential hypertension [I10]  Yes      Resolved Hospital Problems   No resolved problems to display.       Recurrent abdominal wall cellulitis and panniculitis.  MRSA screen is negative.  No fever or leukocytosis.  Negative blood cultures till now.  Continue Zosyn and vancomycin.  Procalcitonin and lactic acid are elevated.  CT scan with the same no changes in the and duration and no abscesses.  ID consult noted and appreciated.  ID recommends discontinuation of the vancomycin and continuation of the Zosyn with switching to p.o. Keflex at the time of discharge for this.  Surgery consult noted and  appreciated.  Surgery recommends no surgical intervention at this time with prolonged antibiotic course with outpatient follow-up.    Anemia of chronic disease/thrombocytopenia.  Baseline hemoglobin between 11.7 and 13.9.  Hemoglobin is stable.  No bleeding diathesis.  Platelets are stable.  Monitor CBC.  Type 2 diabetes.  Improving Accu-Chek with increasing the Lantus and on sliding scale insulin.  A1c 9.6 indicating poor control.  Metformin on hold.  Hyperbilirubinemia and elevated alkaline phosphatase secondary to gallstone disease and fatty liver..  Benign GI examination except as mentioned above.  Right upper quadrant ultrasound revealed fatty liver and gallstone disease with no cholecystitis.  Resolved hyperbilirubinemia and stable alkaline phosphatase.  Hypertension/chronic diastolic congestive heart failure.  Good blood pressure control with no evidence of angina or congestive heart failure.  Continue Norvasc/atenolol/Lasix/losartan.  History of GERD and diverticulosis.  Continue Protonix   Hypothyroidism.  Continue current replacement.  Normal TSH.  Generalized osteoarthritis/questionable rheumatoid arthritis.  Continue Celebrex.  Hyperlipidemia.  Continue Crestor.  Obstructive sleep apnea.  Oxygen while in the hospital.  History of DVT.  DVT prophylaxis with Lovenox.        Discussed my findings and plan of treatment with the patient.  Disposition.  Anticipate discharge in 1 to 2 days.        Amaury Lopez MD  [facility]  07/08/24  10:34 EDT

## 2024-07-08 NOTE — PROGRESS NOTES
Infectious Diseases Progress Note    Yamila Mabry MD     [facility]  Los: 1 day  Patient Identification:  Name: Nathan Baez  Age: 79 y.o.  Sex: male  :  1945  MRN: [id number]         Primary Care Physician: Damien Pierce MD        Subjective: Continues to feel well sitting up at the edge of the bed playing cards with his wife.  Decreased pain and discomfort in his lower abdomen/abdominal wall.  Interval History: See consultation note.  Earlier general surgery service has evaluated the patient and recommended 3 weeks of Bactrim.  Objective:    Scheduled Meds:amLODIPine, 2.5 mg, Oral, Daily  aspirin, 81 mg, Oral, Daily  atenolol, 12.5 mg, Oral, Daily  bisacodyl, 5 mg, Oral, BID  brimonidine, 1 drop, Both Eyes, BID  celecoxib, 200 mg, Oral, Daily  dorzolamide (TRUSOPT) 2 % 1 drop, timolol (TIMOPTIC) 0.5 % 1 drop for Cosopt 22.3-6.8 mg/mL, , Both Eyes, Daily  enoxaparin, 40 mg, Subcutaneous, Q24H  furosemide, 40 mg, Oral, Daily  gabapentin, 300 mg, Oral, 4x Daily  insulin glargine, 10 Units, Subcutaneous, Q12H  insulin lispro, 2-7 Units, Subcutaneous, 4x Daily AC & at Bedtime  lactobacillus acidophilus, 1 capsule, Oral, Daily  latanoprost, 1 drop, Both Eyes, Nightly  levothyroxine, 88 mcg, Oral, Q AM  losartan, 100 mg, Oral, Daily  montelukast, 10 mg, Oral, Nightly  pantoprazole, 40 mg, Oral, BID AC  piperacillin-tazobactam, 3.375 g, Intravenous, Q8H  pramipexole, 1.5 mg, Oral, BID  rosuvastatin, 20 mg, Oral, Nightly  sodium chloride, 10 mL, Intravenous, Q12H  vitamin B-12, 1,000 mcg, Oral, Daily      Continuous Infusions:       Vital signs in last 24 hours:  Temp:  [97.7 °F (36.5 °C)-98.4 °F (36.9 °C)] 97.7 °F (36.5 °C)  Heart Rate:  [73-84] 73  Resp:  [16-18] 18  BP: (123-152)/(74-84) 133/83    Intake/Output:    Intake/Output Summary (Last 24 hours) at 2024 1933  Last data filed at 2024 1652  Gross per 24 hour   Intake 720 ml   Output --   Net 720 ml       Exam:  /83 (BP  "Location: Right arm, Patient Position: Sitting)   Pulse 73   Temp 97.7 °F (36.5 °C) (Oral)   Resp 18   Ht 170.2 cm (67\")   Wt 106 kg (234 lb 9.6 oz)   SpO2 97%   BMI 36.74 kg/m²   Patient is examined using the personal protective equipment as per guidelines from infection control for this particular patient as enacted.  Hand washing was performed before and after patient interaction.  General Appearance:    Alert, cooperative, no distress, AAOx3                          Head:    Normocephalic, without obvious abnormality, atraumatic                           Eyes:    PERRL, conjunctivae/corneas clear, EOM's intact, both eyes                         Throat:   Lips, tongue, gums normal; oral mucosa pink and moist                           Neck:   Supple, symmetrical, trachea midline, no JVD                         Lungs:    Clear to auscultation bilaterally, respirations unlabored                 Chest Wall:    No tenderness or deformity                          Heart:  S1-S2 regular.                  Abdomen:   Decreased erythema and tenderness of the pannus.                 Extremities:   Extremities normal, atraumatic, no cyanosis or edema                        Pulses:   Pulses palpable in all extremities                            Skin:   Skin is warm and dry,  no rashes or palpable lesions                  Neurologic: Alert and oriented and grossly nonfocal       Data Review:    I reviewed the patient's new clinical results.  Results from last 7 days   Lab Units 07/08/24  0349 07/07/24  0352 07/06/24  0828 07/06/24  0151 07/02/24  1306   WBC 10*3/mm3 4.53 5.44 8.51 10.76 5.82   HEMOGLOBIN g/dL 12.3* 11.6* 12.2* 12.6* 12.9*   PLATELETS 10*3/mm3 134* 120* 129* 146 172     Results from last 7 days   Lab Units 07/08/24  0349 07/07/24  0352 07/06/24  0828 07/06/24  0151 07/02/24  1306   SODIUM mmol/L 141 135* 135* 132* 137   POTASSIUM mmol/L 3.8 3.7 3.7 4.0 4.2   CHLORIDE mmol/L 105 101 99 97* 100   CO2 mmol/L " 27.0 24.0 25.0 24.0 27.3   BUN mg/dL 16 20 20 23 16   CREATININE mg/dL 1.08 1.11 1.03 1.02 1.04   CALCIUM mg/dL 9.4 8.8 8.8 9.3 9.8   GLUCOSE mg/dL 131* 289* 227* 271* 218*     Microbiology Results (last 10 days)       Procedure Component Value - Date/Time    MRSA Screen, PCR (Inpatient) - Swab, Nares [[id number]]  (Normal) Collected: 07/06/24 0912    Lab Status: Final result Specimen: Swab from Nares Updated: 07/06/24 1036     MRSA PCR No MRSA Detected    Narrative:      The negative predictive value of this diagnostic test is high and should only be used to consider de-escalating anti-MRSA therapy. A positive result may indicate colonization with MRSA and must be correlated clinically.    Blood Culture - Blood, Arm, Right [[id number]]  (Normal) Collected: 07/06/24 0151    Lab Status: Preliminary result Specimen: Blood from Arm, Right Updated: 07/08/24 0215     Blood Culture No growth at 2 days              Assessment:    Panniculitis    Essential hypertension    Obstructive sleep apnea    Chronic disease anemia    Chronic diastolic (congestive) heart failure    Abdominal wall cellulitis    Thrombocytopenia    Type 2 diabetes mellitus    Elevated liver function tests    History of DVT (deep vein thrombosis)      Recommendations/discussion:  1-recurrent pancolitis due to significant alteration of the abdominal wall as a result of previous surgery and recurrent cellulitis predisposing him to these episodes with breakthrough cellulitis occurring on suppressive therapy with penicillin arguing for mixed infection and not necessarily straightforward group B strep cellulitis and recurrent infection.  2-previous umbilical hernia repair in 2022 with chronic abdominal wall edema  3-diabetes mellitus  4-hypertension  5-history of DVT  6-history of congestive heart failure  7-obstructive sleep apnea     Recommendations/Discussions:  See my discussion and recommendation on 7/7/2024.  IV Zosyn can be translated to oral Keflex plus  Bactrim with duration of antibiotic therapy as suggested by general surgery service.  Patient need to be monitored in the outpatient setting for side effects of Bactrim with BMP in the primary care physician's office within few days after discharge from the hospital with Ensure follow-up with general surgery service.  Would recommend discontinuation of penicillin since it was ineffective in preventing the recurrence of abdominal wall cellulitis.  Yamila Mabry MD  7/8/2024  19:33 EDT    Parts of this note may be an electronic transcription/translation of spoken language to printed text using the Dragon dictation system.

## 2024-07-08 NOTE — PLAN OF CARE
Goal Outcome Evaluation:  Plan of Care Reviewed With: patient, spouse        Progress: improving  Outcome Evaluation: Patient is progressing towards goals with PT. SV to achieve EOB, stood with SV to a RW and ambulated 150' SV-SBA with a RW. No new complaints and hopeful to discharge home tomorrow. PT will continue to follow and advance as able. Will begin following peripherally based on mobility status. Encourage UIC for all meals and ambulation 3x/day to promote functional mobility during hospitalization.

## 2024-07-08 NOTE — THERAPY TREATMENT NOTE
Patient Name: Nathan Baez  : 1945    MRN: [id number]                              Today's Date: 2024       Admit Date: 2024    Visit Dx:     ICD-10-CM ICD-9-CM   1. Panniculitis  M79.3 729.30     Patient Active Problem List   Diagnosis    OA (osteoarthritis) of knee    Essential hypertension    Cellulitis of abdominal wall    Hypothyroidism (acquired)    Gastroesophageal reflux disease without esophagitis    Mixed hyperlipidemia    Primary insomnia    DDD (degenerative disc disease), lumbar    Obstructive sleep apnea    Allergic    Venous insufficiency    Prostate cancer    Venous stasis dermatitis    Tinea cruris    Tinea corporis    Throat clearing    Sleep apnea    Skin lesion of face    Rib pain on left side    Rectal bleed    Presbycusis    Pes planus    Osteoarthritis    Obesity    Medicare annual wellness visit, subsequent    Lumbar strain    Internal hemorrhoids    Insomnia    Inflamed skin tag    Hyperplastic polyp of stomach    Hospital discharge follow-up    History of colon polyps    High risk medication use    Glaucoma    Gastroenteritis, acute    Probable streptococcal cellulitis    Encounter for screening colonoscopy    Encounter for long-term (current) use of NSAIDs    Dysphagia    DVT (deep venous thrombosis)    Lumbar facet arthropathy    Diverticulosis    Cough    Contact with hypodermic needle    Cervical radiculopathy    Arthritis    Allergic rhinitis    Acute glaucoma    Acute embolism and thrombosis of vein    Actinic keratosis    Status post reverse total shoulder replacement, right    Localized edema    Chronic disease anemia    Peripheral polyneuropathy    Campylobacter diarrhea    Hyponatremia    Generalized abdominal pain    Fever    Constipation    Bilateral lower extremity edema    Acute pyelonephritis    Chronic idiopathic constipation    Functional diarrhea    Change in bowel habits    RLS (restless legs syndrome)    Type 2 diabetes mellitus with hyperglycemia     Family history of GI malignancy    Primary osteoarthritis of right hip    B12 deficiency    Intervertebral disc stenosis of neural canal of lumbar region    Encounter for hepatitis C screening test for low risk patient    Pain associated with defecation    Calculus of kidney    Gastroesophageal reflux disease    Body aches    Skin lesion    Candidiasis, intertrigo    Hyperbilirubinemia    Acute respiratory failure due to COVID-19    Bacteremia due to group B Streptococcus    Iron deficiency anemia    Acute respiratory failure with hypoxia and hypercapnia    Hypoventilation syndrome    Pneumonia of right lower lobe due to infectious organism    Chronic diastolic (congestive) heart failure    Fatty liver    Chronic heart failure with preserved ejection fraction (HFpEF)    Pneumonia, unspecified organism    Pannus, abdominal    Abdominal wall cellulitis    Panniculitis    Abdominal wall cellulitis    Thrombocytopenia    Type 2 diabetes mellitus    Elevated liver function tests    History of DVT (deep vein thrombosis)     Past Medical History:   Diagnosis Date    Actinic keratosis     Acute embolism and thrombosis of vein     Allergic     Allergic rhinitis     Arthritis     Cataract     Cellulitis 06/16/2024    [facility] HOSPITALIZATION    Cervical radiculopathy     Chronic constipation     Colon polyp     Diabetes mellitus     Disequilibrium     DJD (degenerative joint disease), lumbar     Elevated cholesterol     Erysipelas     GERD (gastroesophageal reflux disease)     Glaucoma     Hip pain, chronic, right     History of kidney stones     X1    History of peripheral edema     LOWER LEGS BILAT, COMPRESSION KNEE HIGH     History of transfusion 2019    SHOULDER SURGERY    Hospitalization or health care facility admission within last 6 months 06/16/2024    CELLULITIS    Hyperlipidemia     Hypertension     Hypothyroid     Insomnia     Intervertebral disc stenosis of neural canal of lumbar region 04/12/2022     Kidney stone     Limited mobility     RIGHT HIP    Low back pain     Male urinary stress incontinence     s/p prostatectomy-WEAR PAD    Numbness of left hand     Obesity     KWAME (obstructive sleep apnea)     BIPAP    Osteoarthritis     Pain management     [place] GAITAN    Pelvic floor dysfunction 06/2022    DEFOGRAM ORDERED AT [facility] BY DR. ROSHAN SCHAFER    Pes planus     Presbycusis     Prostate cancer 1999    Restless leg     Restless legs syndrome     Shoulder pain     RIGHT, LIMITED MOBILITY-AT TIMES    Sleep apnea     bipap    Tinea corporis     Tinea cruris     Unsteady gait     RIGHT HIP    Weakness     RIGHT HIP     Past Surgical History:   Procedure Laterality Date    CATARACT EXTRACTION, BILATERAL Right 2013    CERVICAL DISCECTOMY ANTERIOR      COLONOSCOPY  03/08/2017    3/17 normal. Recheck 2022. 12/11. Repeat in 5 years    COLONOSCOPY N/A 04/19/2021    Procedure: COLONOSCOPY INTO CECUM WITH HOT & COLD  SNARE POLYPECTOMIES;  Surgeon: Jaden Chowdhury MD;  Location: [facility] ENDOSCOPY;  Service: Gastroenterology;  Laterality: N/A;  PRE: CHANGE IN BOWEL HABITS; FAMILY H/O COLON CANCER  POST: DIVERTICULOSIS, POLYPS    ENDOSCOPY N/A 01/30/2023    Procedure: ESOPHAGOGASTRODUODENOSCOPY with biopsies;  Surgeon: Jaden Chowdhury MD;  Location: [facility] ENDOSCOPY;  Service: Gastroenterology;  Laterality: N/A;  pre- abdominal pain, anemia  post- gastritis    ENDOSCOPY W/ PEG REMOVAL      EYE SURGERY  2013    Cataract    FOOT SURGERY      1998 had big toe replaced on left foot-METAL     HERNIA REPAIR  03/07/2012    umbilical    JOINT REPLACEMENT Left     big toe    MEDIAL BRANCH BLOCK Bilateral 04/07/2023    Procedure: LUMBAR MEDIAL BRANCH BLOCK bilateral L4-S1 [id number];  Surgeon: Lauren Houston MD;  Location: [facility] OR;  Service: Pain Management;  Laterality: Bilateral;    MEDIAL BRANCH BLOCK Bilateral 04/17/2023    Procedure: LUMBAR MEDIAL BRANCH BLOCK bilateral L4-S1 [id number];  Surgeon: Rosemarie  Lauren WATKINS MD;  Location: [facility] MAIN OR;  Service: Pain Management;  Laterality: Bilateral;    MA ARTHRP KNE CONDYLE&PLATU MEDIAL&LAT COMPARTMENTS Left 09/13/2016    Procedure: LT TOTAL KNEE ARTHROPLASTY;  Surgeon: Tadeo Basurto MD;  Location: [facility] MAIN OR;  Service: Orthopedics    PROSTATECTOMY  07/1999 July 1999. Radical     RADIOFREQUENCY ABLATION Right 05/17/2023    Procedure: RADIOFREQUENCY ABLATION LUMBAR right L3-S1;  Surgeon: Lauren Houston MD;  Location: [facility] MAIN OR;  Service: Pain Management;  Laterality: Right;    THYROID LOBECTOMY  2011    TONSILLECTOMY      CHILDHOOD    TOTAL HIP ARTHROPLASTY Right 08/19/2021    Procedure: TOTAL HIP ARTHROPLASTY RIGHT POSTERIOR;  Surgeon: Tadeo Basurto MD;  Location: [facility] MAIN OR;  Service: Orthopedics;  Laterality: Right;    TOTAL KNEE ARTHROPLASTY Right 2014    TOTAL SHOULDER ARTHROPLASTY W/ DISTAL CLAVICLE EXCISION Right 11/13/2019    Procedure: TOTAL SHOULDER REVERSE ARTHROPLASTY RIGHT REPAIR RIGHT AXILLARY VEIN;  Surgeon: Behzad Kelley MD;  Location: [facility];  Service: Orthopedics    WRIST SURGERY Right 12/17/2014    x2  wrist replaced    WRIST SURGERY Right 2009      General Information       Row Name 07/08/24 1433          Physical Therapy Time and Intention    Document Type therapy note (daily note)  -MS     Mode of Treatment physical therapy  -MS       Row Name 07/08/24 1433          General Information    Existing Precautions/Restrictions fall  -MS       Row Name 07/08/24 1433          Cognition    Orientation Status (Cognition) oriented x 4  -MS       Row Name 07/08/24 1433          Safety Issues, Functional Mobility    Safety Issues Affecting Function (Mobility) judgment;positioning of assistive device  -MS     Impairments Affecting Function (Mobility) balance;endurance/activity tolerance;other (see comments)  -MS     Comment, Safety Issues/Impairments (Mobility) Gait belt and non skid socks donned.  -MS               User Key  (r)  = Recorded By, (t) = Taken By, (c) = Cosigned By      Initials Name Provider Type    Arlette Mata, PT Physical Therapist                   Mobility       Row Name 07/08/24 1441          Bed Mobility    Bed Mobility supine-sit;sit-supine  -MS     Supine-Sit [place] (Bed Mobility) standby assist  -MS       Row Name 07/08/24 1441          Sit-Stand Transfer    Sit-Stand [place] (Transfers) supervision  -MS     Assistive Device (Sit-Stand Transfers) walker, front-wheeled  -MS       Row Name 07/08/24 1441          Gait/Stairs (Locomotion)    [place] Level (Gait) supervision;standby assist;verbal cues  -MS     Assistive Device (Gait) walker, front-wheeled  -MS     Patient was able to Ambulate yes  -MS     Distance in Feet (Gait) 150  -MS     Comment, (Gait/Stairs) VCs to stay inside RW- no overt LOB or veering noted. Fatigue reported post ambulation.  -MS               User Key  (r) = Recorded By, (t) = Taken By, (c) = Cosigned By      Initials Name Provider Type    Arlette Mata, PT Physical Therapist                   Obj/Interventions       Row Name 07/08/24 1441          Motor Skills    Therapeutic Exercise --  Seated LAQs x 10 reps  -MS       Row Name 07/08/24 1441          Balance    Static Sitting Balance independent  -MS     Position, Sitting Balance sitting edge of bed  -MS     Static Standing Balance standby assist  -MS     Position/Device Used, Standing Balance supported;walker, rolling  -MS               User Key  (r) = Recorded By, (t) = Taken By, (c) = Cosigned By      Initials Name Provider Type    Arlette Mata, PT Physical Therapist                   Goals/Plan    No documentation.                  Clinical Impression       Row Name 07/08/24 1442          Pain    Pretreatment Pain Rating 0/10 - no pain  -MS     Posttreatment Pain Rating 0/10 - no pain  -MS       Row Name 07/08/24 1442          Plan of Care Review    Plan of Care Reviewed With patient;spouse  -MS      Progress improving  -MS     Outcome Evaluation Patient is progressing towards goals with PT. SV to achieve EOB, stood with SV to a RW and ambulated 150' SV-SBA with a RW. No new complaints and hopeful to discharge home tomorrow. PT will continue to follow and advance as able. Will begin following peripherally based on mobility status.  -MS       Row Name 07/08/24 1442          Therapy Assessment/Plan (PT)    Therapy Frequency (PT) 3 times/wk  -MS       Row Name 07/08/24 1442          Positioning and Restraints    Pre-Treatment Position in bed  -MS     Post Treatment Position bed  -MS     In Bed encouraged to call for assist;sitting EOB;call light within reach  no alarm  -MS               User Key  (r) = Recorded By, (t) = Taken By, (c) = Cosigned By      Initials Name Provider Type    Arlette Mata ROLAND, PT Physical Therapist                   Outcome Measures       Row Name 07/08/24 1443 07/08/24 0915       How much help from another person do you currently need...    Turning from your back to your side while in flat bed without using bedrails? 4  -MS 4  -SF    Moving from lying on back to sitting on the side of a flat bed without bedrails? 4  -MS 4  -SF    Moving to and from a bed to a chair (including a wheelchair)? 3  -MS 3  -SF    Standing up from a chair using your arms (e.g., wheelchair, bedside chair)? 3  -MS 3  -SF    Climbing 3-5 steps with a railing? 3  -MS 3  -SF    To walk in hospital room? 3  -MS 3  -SF    AM-PAC 6 Clicks Score (PT) 20  -MS 20  -SF    Highest Level of Mobility Goal 6 --> Walk 10 steps or more  -MS 6 --> Walk 10 steps or more  -SF      Row Name 07/08/24 0820          How much help from another person do you currently need...    Turning from your back to your side while in flat bed without using bedrails? 4  -SF     Moving from lying on back to sitting on the side of a flat bed without bedrails? 4  -SF     Moving to and from a bed to a chair (including a wheelchair)? 3  -SF      Standing up from a chair using your arms (e.g., wheelchair, bedside chair)? 3  -SF     Climbing 3-5 steps with a railing? 3  -SF     To walk in hospital room? 3  -SF     AM-PAC 6 Clicks Score (PT) 20  -SF     Highest Level of Mobility Goal 6 --> Walk 10 steps or more  -SF               User Key  (r) = Recorded By, (t) = Taken By, (c) = Cosigned By      Initials Name Provider Type    MS Arlette Riojas, PT Physical Therapist    Capri Cheney, RN Registered Nurse                                 Physical Therapy Education       Title: PT OT SLP Therapies (Done)       Topic: Physical Therapy (Done)       Point: Mobility training (Done)       Learning Progress Summary             Patient Acceptance, E,TB, VU by MS at 7/8/2024 1444    Acceptance, E, VU,NR by  at 7/7/2024 1343   Significant Other Acceptance, E, VU,NR by SV at 7/7/2024 1343                         Point: Home exercise program (Done)       Learning Progress Summary             Patient Acceptance, E,TB, VU by MS at 7/8/2024 1444    Acceptance, E, VU,NR by SV at 7/7/2024 1343   Significant Other Acceptance, E, VU,NR by SV at 7/7/2024 1343                                         User Key       Initials Effective Dates Name Provider Type Discipline    SV 07/11/23 -  Jaye Braswell, PT Physical Therapist PT    MS 06/16/21 -  Arlette Riojas, PT Physical Therapist PT                  PT Recommendation and Plan     Plan of Care Reviewed With: patient, spouse  Progress: improving  Outcome Evaluation: Patient is progressing towards goals with PT. SV to achieve EOB, stood with SV to a RW and ambulated 150' SV-SBA with a RW. No new complaints and hopeful to discharge home tomorrow. PT will continue to follow and advance as able. Will begin following peripherally based on mobility status.     Time Calculation:         PT Charges       Row Name 07/08/24 1433             Time Calculation    Start Time 1416  -MS      Stop Time 1433  -MS      Time Calculation  (min) 17 min  -MS      PT Received On 07/08/24  -MS      PT - Next Appointment 07/10/24  -MS                User Key  (r) = Recorded By, (t) = Taken By, (c) = Cosigned By      Initials Name Provider Type    Arltete Mata, PT Physical Therapist                  Therapy Charges for Today       Code Description Service Date Service Provider Modifiers Qty    23321389733 HC PT THER PROC EA 15 MIN 7/8/2024 Arlette Riojas, PT GP 1            PT G-Codes  AM-PAC 6 Clicks Score (PT): 20       Arlette Riojas, PT  7/8/2024

## 2024-07-08 NOTE — CONSULTS
"Diabetes Education  Assessment/Teaching    Patient Name:  Nathan Baez  YOB: 1945  MRN: [id number]  Admit Date:  7/6/2024      Assessment Date:  7/8/2024  Flowsheet Row Most Recent Value   General Information     Referral From: A1c, Database  [A1C 9.6%]   Height 170.2 cm (67\")   Height Method Stated   Weight 106 kg (234 lb 9.6 oz)   Weight Method Standing scale   Are you currently involved in an activity/exercise program?  No   How would you rate your current health? fair   Pregnancy Assessment    Diabetes History    What type of diabetes do you have? Type 2   Length of Diabetes Diagnosis 1 - 5 years   Current DM knowledge good   Have you had diabetes education/teaching in the past? no   Do you test your blood sugar at home? yes   Frequency of checks couple times week   Have you had low blood sugar? (<70mg/dl) no   Have you had high blood sugar? (>140mg/dl) no   How would you rate your diabetes control? good   What makes it difficult for you to take care of your diabetes or yourself? I like to eat too much   Education Preferences    What areas of diabetes would you like to learn about? avoiding high blood sugar, medications for diabetes, testing my blood sugar at home, diet information   Nutrition Information    Assessment Topics    Healthy Eating - Assessment Needs education   Being Active - Assessment Needs education   Taking Medication - Assessment Needs education   Monitoring - Assessment Needs education   DM Goals    Healthy Eating - Goal 0-7 days from discharge   Being Active - Goal 0-7 days from discharge   Monitoring - Goal 0-7 days from discharge            Flowsheet Row Most Recent Value   DM Education Needs    Meter Has own   Frequency of Testing 2 times a day   Medication Oral, Insulin  [metformin and lantus]   Problem Solving Hyperglycemia, Treatment, Symptoms, Signs   Healthy Eating Reviewed meal plan   Physical Activity Frequency Never   Healthy Coping Appropriate   Discharge Plan " Home, Follow-up with MD   Motivation Moderate   Teaching Method Explanation, Discussion, Teach back   Patient Response Verbalized understanding  [wife at visit]              Other Comments:  discussed with pt his diabetes management. He states  he would do a lot better if he didn't like sweets so much. Wife at visit and we problem solved how to have pt eat less sweets. Pt is agreeable to lesson his sweet intake. He also has a meter and is testing a couple times day. Dr Damien Bowie is his PCP,so we discussed having pt send him message via my chart with his BG results so Dr Bowie could adjust medications if needed.        Electronically signed by:  Heather Renae RN  07/08/24 15:03 EDT

## 2024-07-08 NOTE — CASE MANAGEMENT/SOCIAL WORK
Discharge Planning Assessment  [facility]     Patient Name: Nathan Baez  MRN: [id number]  Today's Date: 7/8/2024    Admit Date: 7/6/2024    Plan: Home w/ spouse; Denies needs.   Discharge Needs Assessment       Row Name 07/08/24 1648       Living Environment    People in Home spouse    Name(s) of People in Home Coco Baez/spouse    Current Living Arrangements home    Potentially Unsafe Housing Conditions none    In the past 12 months has the electric, gas, oil, or water company threatened to shut off services in your home? No    Primary Care Provided by self    Provides Primary Care For no one    Family Caregiver if Needed spouse    Family Caregiver Names Coco    Quality of Family Relationships helpful;involved;supportive    Able to Return to Prior Arrangements yes       Resource/Environmental Concerns    Resource/Environmental Concerns none    Transportation Concerns none       Transportation Needs    In the past 12 months, has lack of transportation kept you from medical appointments or from getting medications? no    In the past 12 months, has lack of transportation kept you from meetings, work, or from getting things needed for daily living? No       Food Insecurity    Within the past 12 months, you worried that your food would run out before you got the money to buy more. Never true    Within the past 12 months, the food you bought just didn't last and you didn't have money to get more. Never true       Transition Planning    Patient/Family Anticipates Transition to home with family    Patient/Family Anticipated Services at Transition none    Transportation Anticipated family or friend will provide       Discharge Needs Assessment    Readmission Within the Last 30 Days no previous admission in last 30 days    Equipment Currently Used at Home cane, straight;walker, rolling;other (see comments);bipap;bp cuff;scales;glucometer;nebulizer;bath bench;grab bar  electric scooter, compression socks     Concerns to be Addressed no discharge needs identified;denies needs/concerns at this time    Anticipated Changes Related to Illness none    Equipment Needed After Discharge none    Provided Post Acute Provider List? Refused    Refused Provider List Comment Pt declined need for list                   Discharge Plan       Row Name 07/08/24 1645       Plan    Plan Home w/ spouse; Denies needs.    Plan Comments CCP spoke with pleasant patient and his spouse/Coco Baez, at bedside.  CCP role explained and discharge planning discussed.  Face sheet verified.  Patient stated he is IADL's, retired and drives.  Patient spouse assist with his medi-planner and putting on his compression stockings.  Pt lives in a single-story home with two entrance stair steps.  Patients PCP confirmed as, Damien Pierce.  Patient's pharmacy confirmed as, [facility].  Patient has the following DME- straight cane, walker, electric scooter, grab bars, built-in bath bench, nebulizer, BP cuff monitor, scale and BiPAP (Profig).  Patient has used [facility] in the past.  Pt has been to a sub-acute rehab on [facility] but he nor spouse can recall the name of facility.  Patient plans to return home at discharge and spouse will transport.  Patient denies current discharge needs.  CCP will continue to follow….…Hailey FREDERICK /FREEMAN.                  Continued Care and Services - Admitted Since 7/6/2024    No active coordination exists for this encounter.       Expected Discharge Date and Time       Expected Discharge Date Expected Discharge Time    Jul 10, 2024            Demographic Summary       Row Name 07/08/24 9487       General Information    Admission Type inpatient    Arrived From emergency department    Required Notices Provided Important Message from Medicare    Referral Source admission list    Reason for Consult discharge planning    Preferred Language English       Contact Information    Permission Granted to  Share Info With family/designee                   Functional Status       Row Name 07/08/24 5638       Functional Status    Usual Activity Tolerance moderate    Current Activity Tolerance moderate       Assessment of Health Literacy    How often do you have someone help you read hospital materials? Occasionally    Health Literacy Good       Functional Status, IADL    Medications assistive person    Meal Preparation assistive equipment and person    Housekeeping assistive equipment and person    Laundry assistive equipment and person    Shopping assistive equipment and person       Mental Status    General Appearance WDL WDL       Mental Status Summary    Recent Changes in Mental Status/Cognitive Functioning no changes       Employment/    Employment Status retired                   Psychosocial    No documentation.                  Abuse/Neglect    No documentation.                  Legal    No documentation.                  Substance Abuse    No documentation.                  Patient Forms    No documentation.                     Hailey Mike RN

## 2024-07-09 ENCOUNTER — READMISSION MANAGEMENT (OUTPATIENT)
Dept: CALL CENTER | Facility: HOSPITAL | Age: 79
End: 2024-07-09
Payer: MEDICARE

## 2024-07-09 VITALS
BODY MASS INDEX: 36.82 KG/M2 | HEART RATE: 77 BPM | RESPIRATION RATE: 16 BRPM | SYSTOLIC BLOOD PRESSURE: 125 MMHG | TEMPERATURE: 97.9 F | DIASTOLIC BLOOD PRESSURE: 77 MMHG | HEIGHT: 67 IN | WEIGHT: 234.6 LBS | OXYGEN SATURATION: 95 %

## 2024-07-09 PROBLEM — K80.20 CHOLELITHIASIS: Status: ACTIVE | Noted: 2024-07-09

## 2024-07-09 PROBLEM — K76.0 FATTY LIVER: Status: ACTIVE | Noted: 2024-07-09

## 2024-07-09 LAB
ALBUMIN SERPL-MCNC: 3.8 G/DL (ref 3.5–5.2)
ALBUMIN/GLOB SERPL: 1.3 G/DL
ALP SERPL-CCNC: 208 U/L (ref 39–117)
ALT SERPL W P-5'-P-CCNC: 27 U/L (ref 1–41)
ANION GAP SERPL CALCULATED.3IONS-SCNC: 12.6 MMOL/L (ref 5–15)
AST SERPL-CCNC: 30 U/L (ref 1–40)
BASOPHILS # BLD AUTO: 0.05 10*3/MM3 (ref 0–0.2)
BASOPHILS NFR BLD AUTO: 1.1 % (ref 0–1.5)
BILIRUB SERPL-MCNC: 0.7 MG/DL (ref 0–1.2)
BUN SERPL-MCNC: 15 MG/DL (ref 8–23)
BUN/CREAT SERPL: 14.4 (ref 7–25)
CALCIUM SPEC-SCNC: 9.1 MG/DL (ref 8.6–10.5)
CHLORIDE SERPL-SCNC: 102 MMOL/L (ref 98–107)
CO2 SERPL-SCNC: 24.4 MMOL/L (ref 22–29)
CREAT SERPL-MCNC: 1.04 MG/DL (ref 0.76–1.27)
DEPRECATED RDW RBC AUTO: 41.4 FL (ref 37–54)
EGFRCR SERPLBLD CKD-EPI 2021: 73.5 ML/MIN/1.73
EOSINOPHIL # BLD AUTO: 0.15 10*3/MM3 (ref 0–0.4)
EOSINOPHIL NFR BLD AUTO: 3.2 % (ref 0.3–6.2)
ERYTHROCYTE [DISTWIDTH] IN BLOOD BY AUTOMATED COUNT: 12.9 % (ref 12.3–15.4)
GLOBULIN UR ELPH-MCNC: 3 GM/DL
GLUCOSE BLDC GLUCOMTR-MCNC: 156 MG/DL (ref 70–130)
GLUCOSE SERPL-MCNC: 128 MG/DL (ref 65–99)
HCT VFR BLD AUTO: 36.8 % (ref 37.5–51)
HGB BLD-MCNC: 12.1 G/DL (ref 13–17.7)
IMM GRANULOCYTES # BLD AUTO: 0.11 10*3/MM3 (ref 0–0.05)
IMM GRANULOCYTES NFR BLD AUTO: 2.3 % (ref 0–0.5)
LYMPHOCYTES # BLD AUTO: 1.63 10*3/MM3 (ref 0.7–3.1)
LYMPHOCYTES NFR BLD AUTO: 34.4 % (ref 19.6–45.3)
MCH RBC QN AUTO: 29 PG (ref 26.6–33)
MCHC RBC AUTO-ENTMCNC: 32.9 G/DL (ref 31.5–35.7)
MCV RBC AUTO: 88.2 FL (ref 79–97)
MONOCYTES # BLD AUTO: 0.44 10*3/MM3 (ref 0.1–0.9)
MONOCYTES NFR BLD AUTO: 9.3 % (ref 5–12)
NEUTROPHILS NFR BLD AUTO: 2.36 10*3/MM3 (ref 1.7–7)
NEUTROPHILS NFR BLD AUTO: 49.7 % (ref 42.7–76)
PLATELET # BLD AUTO: 142 10*3/MM3 (ref 140–450)
PMV BLD AUTO: 10.7 FL (ref 6–12)
POTASSIUM SERPL-SCNC: 3.7 MMOL/L (ref 3.5–5.2)
PROT SERPL-MCNC: 6.8 G/DL (ref 6–8.5)
RBC # BLD AUTO: 4.17 10*6/MM3 (ref 4.14–5.8)
SODIUM SERPL-SCNC: 139 MMOL/L (ref 136–145)
WBC NRBC COR # BLD AUTO: 4.74 10*3/MM3 (ref 3.4–10.8)

## 2024-07-09 PROCEDURE — 85025 COMPLETE CBC W/AUTO DIFF WBC: CPT | Performed by: INTERNAL MEDICINE

## 2024-07-09 PROCEDURE — 63710000001 INSULIN GLARGINE PER 5 UNITS: Performed by: INTERNAL MEDICINE

## 2024-07-09 PROCEDURE — 80053 COMPREHEN METABOLIC PANEL: CPT | Performed by: INTERNAL MEDICINE

## 2024-07-09 PROCEDURE — 82948 REAGENT STRIP/BLOOD GLUCOSE: CPT

## 2024-07-09 PROCEDURE — 63710000001 INSULIN LISPRO (HUMAN) PER 5 UNITS: Performed by: INTERNAL MEDICINE

## 2024-07-09 PROCEDURE — 25010000002 PIPERACILLIN SOD-TAZOBACTAM PER 1 G: Performed by: INTERNAL MEDICINE

## 2024-07-09 RX ORDER — INSULIN GLARGINE 100 [IU]/ML
10 INJECTION, SOLUTION SUBCUTANEOUS EVERY 12 HOURS SCHEDULED
Qty: 30 ML | Refills: 12 | Status: SHIPPED | OUTPATIENT
Start: 2024-07-09

## 2024-07-09 RX ORDER — AMOXICILLIN AND CLAVULANATE POTASSIUM 875; 125 MG/1; MG/1
1 TABLET, FILM COATED ORAL 2 TIMES DAILY
Qty: 42 TABLET | Refills: 0 | Status: SHIPPED | OUTPATIENT
Start: 2024-07-09 | End: 2024-07-30

## 2024-07-09 RX ADMIN — ASPIRIN 81 MG: 81 TABLET, COATED ORAL at 07:56

## 2024-07-09 RX ADMIN — Medication 1 CAPSULE: at 07:57

## 2024-07-09 RX ADMIN — FUROSEMIDE 40 MG: 40 TABLET ORAL at 07:57

## 2024-07-09 RX ADMIN — ATENOLOL 12.5 MG: 25 TABLET ORAL at 07:56

## 2024-07-09 RX ADMIN — LOSARTAN POTASSIUM 100 MG: 100 TABLET, FILM COATED ORAL at 07:57

## 2024-07-09 RX ADMIN — GABAPENTIN 300 MG: 300 CAPSULE ORAL at 07:57

## 2024-07-09 RX ADMIN — AMLODIPINE BESYLATE 2.5 MG: 2.5 TABLET ORAL at 07:57

## 2024-07-09 RX ADMIN — Medication 10 ML: at 08:02

## 2024-07-09 RX ADMIN — DORZOLAMIDE HYDROCHLORIDE: 20 SOLUTION OPHTHALMIC at 08:00

## 2024-07-09 RX ADMIN — PANTOPRAZOLE SODIUM 40 MG: 40 TABLET, DELAYED RELEASE ORAL at 07:55

## 2024-07-09 RX ADMIN — LEVOTHYROXINE SODIUM 88 MCG: 88 TABLET ORAL at 07:56

## 2024-07-09 RX ADMIN — Medication 1000 MCG: at 07:57

## 2024-07-09 RX ADMIN — INSULIN LISPRO 2 UNITS: 100 INJECTION, SOLUTION INTRAVENOUS; SUBCUTANEOUS at 08:01

## 2024-07-09 RX ADMIN — INSULIN GLARGINE 10 UNITS: 100 INJECTION, SOLUTION SUBCUTANEOUS at 08:01

## 2024-07-09 RX ADMIN — PRAMIPEXOLE DIHYDROCHLORIDE 1.5 MG: 1.5 TABLET ORAL at 07:57

## 2024-07-09 RX ADMIN — BISACODYL 5 MG: 5 TABLET, COATED ORAL at 07:55

## 2024-07-09 RX ADMIN — PIPERACILLIN AND TAZOBACTAM 3.38 G: 3; .375 INJECTION, POWDER, FOR SOLUTION INTRAVENOUS at 04:00

## 2024-07-09 RX ADMIN — CELECOXIB 200 MG: 200 CAPSULE ORAL at 07:56

## 2024-07-09 RX ADMIN — BRIMONIDINE TARTRATE 1 DROP: 2 SOLUTION/ DROPS OPHTHALMIC at 08:00

## 2024-07-09 NOTE — DISCHARGE SUMMARY
Patient Name: Nathan Baez  : 1945  MRN: [id number]    Date of Admission: 2024  Date of Discharge:  2024  Primary Care Physician: Damien Pierce MD      Discharge Diagnoses     Active Hospital Problems    Diagnosis  POA    **Panniculitis [M79.3]  Yes    Fatty liver [K76.0]  Yes    Cholelithiasis [K80.20]  Yes    Abdominal wall cellulitis [L03.311]  Yes    Thrombocytopenia [D69.6]  Yes    Type 2 diabetes mellitus [E11.9]  Yes    Elevated liver function tests [R79.89]  Yes    History of DVT (deep vein thrombosis) [Z86.718]  Not Applicable    Chronic heart failure with preserved ejection fraction (HFpEF) [I50.32]  Yes    Chronic diastolic (congestive) heart failure [I50.32]  Yes    Chronic disease anemia [D63.8]  Yes    Obstructive sleep apnea [G47.33]  Yes    Essential hypertension [I10]  Yes      Resolved Hospital Problems   No resolved problems to display.        Hospital Course     Brief admission history and physical.  Please refer to the H&P for full details.  A pleasant 79 years old gentleman with a past history of recurrent abdominal wall cellulitis and panniculitis/nephrolithiasis/B12 deficiency/chronic diastolic congestive heart failure/hypertension/DVT/type 2 diabetes/GERD/hypothyroidism/generalized osteoarthritis and?  Rheumatoid arthritis/obstructive sleep apnea who presented with lower abdominal wall tenderness/redness/hotness similar to previous recurrent episodes before.  Positive nausea and weakness.  His physical examination on admission included a temperature of 98.5 a pulse of 93 respirate rate of 18 and blood pressure 142/61 and a pulse ox of 95% on room air.  Rest of the examination is remarkable for obesity/mildly dry mucous membranes/grade 2 systolic murmur/poor bilateral air entry/lower abdominal scar surrounded by erythematous, induration with tenderness and no wounds.  Hospital course initial ER evaluation included a CMP that was normal except a random blood sugar  of 275, sodium 132, chloride 97, alkaline phosphatase 169, total bilirubin 1.3.  Lactic acid 2.3.  Procalcitonin elevated 0.27.  CBC was normal except a hemoglobin of 12.6.  UA was positive for sugar, protein, ketones.  MRSA screen is negative.  CT scan of the abdomen and pelvis with IV contrast revealed stable findings of skin thickening involving the lower acute abdominal wall fluid collection.  Hospital course will be summarized in a problem-oriented manner as below.  1.  Recurrent abdominal wall cellulitis/panniculitis.  MRSA screen is negative.  No fever or leukocytosis.  Blood cultures were obtained and they were negative by the time of discharge.  He was initiated on IV Zosyn and vancomycin.  Procalcitonin and lactic acid were elevated.  CT scan of the abdomen pelvis revealed same CHRONIC changes with no fluid collection.  ID and surgery were consulted.  Surgery did not believe surgery is indicated at this time but recommended 3 weeks course of antibiotic and he was discharged on Augmentin x 3 weeks.  He will follow-up with surgery as an outpatient.  His symptomatology of the pain has resolved with significant regression of the angulation and redness while on IV antibiotics inside the hospital.  He was tolerating regular diet well.  No urinary or bowel complaints at discharge.  2.  Anemia of chronic disease and thrombocytopenia.  Patient hemoglobin remained stable around 12.  His thrombocytopenia has resolved and there was no evidence of active bleed throughout the hospital stay.  3.  Type 2 diabetes.  This was uncontrolled with elevated Accu-Cheks.  His A1c was elevated at 9.6.  Metformin was held throughout the admission and resumed at discharge.  His insulin was increased throughout the hospital stay and at the time of discharge with improvement of his Accu-Cheks.  4.  Elevated liver function test secondary to fatty liver and gallstone disease.  Noted to have elevation of the liver function test.  GI  examination without surgical abdomen.  The right upper quadrant ultrasound confirmed fatty liver and cholelithiasis without cholecystitis.  The liver function test remained stable.  5.  Hypertension/chronic diastolic congestive heart failure history.  Patient remained with good blood pressure control and no evidence of angina or congestive heart failure throughout the hospital stay.  He was maintained on Norvasc/atenolol/losartan/Lasix.    6.  History of GERD and diverticulosis.  This remained asymptomatic on proton pump inhibitor.  7.  Hypothyroidism.  He was maintained on his current replacement.  TSH was normal during this admission.  8.  Generalized osteoarthritis and questionable rheumatoid arthritis.  Celebrex was maintained.  No evidence of synovitis during this admission.  9.  Dyslipidemia.  Crestor was maintained.  10.  History of DVT.  No evidence of DVT.  Remained on Lovenox for DVT prophylaxis.      At the time of discharge there was significant improvement of the patient abdominal wall cellulitis and no fever with stable vital signs.  Follow-up primary MD/surgery.    Consultants     Consult Orders (all) (From admission, onward)       Start     Ordered    07/06/24 1210  Inpatient General Surgery Consult  Once        Specialty:  General Surgery  Provider:  Nova Christie MD    07/06/24 1209    07/06/24 0846  Inpatient Diabetes Educator Consult  Once        Provider:  (Not yet assigned)    07/06/24 0846    07/06/24 0409  Inpatient Infectious Diseases Consult  Once        Specialty:  Infectious Diseases  Provider:  Yamila Mabry MD    07/06/24 0409    07/06/24 0330  [facility] (on-call MD unless specified) Details  Once        Specialty:  Hospitalist  Provider:  (Not yet assigned)    07/06/24 0329                  Procedures     Imaging Results (All)       Procedure Component Value Units Date/Time    US Abdomen Limited [[id number]] Collected: 07/06/24 1940     Updated: 07/06/24 1958    Narrative:      US ABDOMEN  LIMITED-7/6/2024     HISTORY: Elevated liver enzymes.     The liver is echogenic consistent with fatty infiltration. No focal  hepatic lesions are seen. Small to moderate amount of echogenic material  is seen in the gallbladder with posterior shadowing consistent with  cholelithiasis. No gallbladder wall thickening or pericholecystic fluid  is seen. The common bile duct is not dilated measuring 3.8 mm.     Pancreas is not well seen. No gross abnormalities are seen in the region  of the pancreas. Right kidney measures 11.7 cm in length and appears  normal.       Impression:      1. Echogenic liver consistent with fatty infiltration.  2. Cholelithiasis. No gallbladder wall thickening, pericholecystic fluid  or sonographic Aguilera sign is seen.  3. Poor visualization of the pancreas.     This report was finalized on 7/6/2024 7:44 PM by Dr. Srikanth Reese M.D on Workstation: [id number]       CT Abdomen Pelvis With Contrast [[id number]] Collected: 07/06/24 0337     Updated: 07/06/24 0348    Narrative:      CT OF THE ABDOMEN AND PELVIS WITH CONTRAST     HISTORY: Panniculitis     COMPARISON: 2/9/2024     TECHNIQUE: Axial CT imaging was obtained through the abdomen and pelvis.  IV contrast was administered.     FINDINGS:  Images through the lung bases demonstrate bibasilar scarring. There is a  6 mm nodule within the right lower lobe. It is been present on exams  dating back to at least February 2023, and is benign. No suspicious  hepatic lesions are seen. Liver is enlarged, measuring up to 17.3 cm in  craniocaudal dimensions. Spleen is enlarged, measuring 14.6 cm in AP  dimensions. The stomach, duodenum, and adrenal glands are normal. There  is cholelithiasis. Pancreas is atrophic. Kidneys enhance symmetrically.  There is no hydronephrosis. Cortical scarring is noted bilaterally.  There are bilateral renal cysts. No additional follow-up is necessary.  There are bilateral nonobstructing renal stones. There are  aortoiliac  calcifications. No distal ureteral or bladder stones are seen. Prostate  gland is absent. There is colonic diverticulosis. There is no bowel  obstruction. The appendix is normal. There is skin thickening involving  the lower anterior abdominal wall. This appearance has been seen on  multiple prior studies. Given its persistence over time, it may be  related to dependent edema. I do not see any collections to suggest  abscess. No acute osseous abnormalities are seen. There is lumbar  scoliosis, with convexity to the left. There are changes of prior right  hip arthroplasty. Old bilateral rib fractures are noted.       Impression:      Stable findings when compared to June 9, 2024. The skin thickening  involving the lower anterior abdominal wall has been seen on multiple  prior studies. Appearance may be related to dependent edema. No fluid  collections to suggest abscess are seen.     Radiation dose reduction techniques were utilized, including automated  exposure control and exposure modulation based on body size.        This report was finalized on 7/6/2024 3:45 AM by Dr. Tess Sorto M.D on Workstation: [id number]               Pertinent Labs     Results from last 7 days   Lab Units 07/09/24 0552 07/08/24 0349 07/07/24  0352 07/06/24  0828   WBC 10*3/mm3 4.74 4.53 5.44 8.51   HEMOGLOBIN g/dL 12.1* 12.3* 11.6* 12.2*   PLATELETS 10*3/mm3 142 134* 120* 129*     Results from last 7 days   Lab Units 07/09/24  0552 07/08/24 0349 07/07/24  0352 07/06/24  0828   SODIUM mmol/L 139 141 135* 135*   POTASSIUM mmol/L 3.7 3.8 3.7 3.7   CHLORIDE mmol/L 102 105 101 99   CO2 mmol/L 24.4 27.0 24.0 25.0   BUN mg/dL 15 16 20 20   CREATININE mg/dL 1.04 1.08 1.11 1.03   GLUCOSE mg/dL 128* 131* 289* 227*   Estimated Creatinine Clearance: 68 mL/min (by C-G formula based on SCr of 1.04 mg/dL).  Results from last 7 days   Lab Units 07/09/24  0552 07/08/24  0349 07/07/24  0352 07/06/24  0151   ALBUMIN g/dL 3.8 3.8 3.4*  "4.0   BILIRUBIN mg/dL 0.7 0.7 0.9 1.3*   ALK PHOS U/L 208* 197* 156* 169*   AST (SGOT) U/L 30 29 24 27   ALT (SGPT) U/L 27 28 22 23     Results from last 7 days   Lab Units 07/09/24  0552 07/08/24  0349 07/07/24  0352 07/06/24  0828 07/06/24  0151   CALCIUM mg/dL 9.1 9.4 8.8 8.8 9.3   ALBUMIN g/dL 3.8 3.8 3.4*  --  4.0               Invalid input(s): \"LDLCALC\"  Results from last 7 days   Lab Units 07/06/24  0156 07/06/24  0151   BLOODCX  No growth at 24 hours No growth at 3 days     Imaging Results (Last 24 Hours)       ** No results found for the last 24 hours. **            Test Results Pending at Discharge     Pending Labs       Order Current Status    Blood Culture - Blood, Arm, Left Preliminary result    Blood Culture - Blood, Arm, Right Preliminary result              Discharge Exam   Physical Exam  Vitals.  Temperature 97.9 a pulse of 77 respirate rate of 16 blood pressure 125/77 O2 sats of 95% room air  General.  Elderly gentleman.  Obese.  Alert and oriented x 4.  No apparent pain/distress/diaphoresis.  Normal mood and affect  Eyes.  Pupils equal round and reactive.  Intact extraocular musculature.  Status post bilateral cataract surgery.  No pallor or jaundice  Oral cavity.  Moist mucous membrane.  Neck.  Supple.  No JVD.  No lymphadenopathy or thyromegaly.  Cardiovascular.  Regular rate and rhythm and grade 2 systolic murmur.  Distant S1 and S2   Chest.  Poor but clear to auscultation bilaterally with no added sounds  Abdomen.  Obese.  Soft lax.  No tenderness.  No organomegaly.  No guarding or rebound.  Normal bowel sounds.  Lower abdominal scar.  induration/tenderness/redness and hotness  of the lower abdomen continues to regress with improvement of the induration..   No open wounds or discharge.  Positive lower abdominal peau d'orange.  No inguinal lymphadenopathy.  Extremities.  No clubbing/cyanosis/edema.     Discharge Details        Discharge Medications        New Medications        Instructions " Start Date   amoxicillin-clavulanate 875-125 MG per tablet  Commonly known as: AUGMENTIN   1 tablet, Oral, 2 Times Daily             Changes to Medications        Instructions Start Date   celecoxib 200 MG capsule  Commonly known as: CeleBREX  What changed: additional instructions   200 mg, Oral, Daily      furosemide 40 MG tablet  Commonly known as: LASIX  What changed:   how much to take  how to take this  when to take this   TAKE 1 TABLET BY MOUTH EVERY DAY      insulin glargine 100 UNIT/ML injection  Commonly known as: LANTUS, SEMGLEE  What changed: when to take this   10 Units, Subcutaneous, Every 12 Hours Scheduled      losartan 100 MG tablet  Commonly known as: COZAAR  What changed: additional instructions   100 mg, Oral, Daily      phentermine 37.5 MG capsule  What changed: additional instructions   37.5 mg, Oral, Every Morning             Continue These Medications        Instructions Start Date   amLODIPine 2.5 MG tablet  Commonly known as: NORVASC   2.5 mg, Oral, Daily      aspirin 81 MG EC tablet   1 tablet, Oral, Daily      atenolol 25 MG tablet  Commonly known as: Tenormin   12.5 mg, Oral, Daily      B-D UF III MINI PEN NEEDLES 31G X 5 MM misc  Generic drug: Insulin Pen Needle   1 APPLICATION DAILY      brimonidine 0.2 % ophthalmic solution  Commonly known as: ALPHAGAN   1 drop, Both Eyes, 2 Times Daily      dorzolamide-timolol 2-0.5 % ophthalmic solution  Commonly known as: COSOPT   1 drop, Both Eyes, 2 Times Daily      Dulcolax 5 MG EC tablet  Generic drug: bisacodyl   1 tablet, Oral, 2 Times Daily      freestyle lancets   Check blood sugar TID      FREESTYLE LITE test strip  Generic drug: glucose blood   Check blood sugar TID      gabapentin 300 MG capsule  Commonly known as: NEURONTIN   300 mg, Oral, 4 Times Daily      HYDROcodone-acetaminophen 7.5-325 MG per tablet  Commonly known as: NORCO   1 tablet, Oral, Every 8 Hours PRN, 30 day supply. DNF 6/5/24      Hydrocortisone (Perianal) 2.5 % rectal  cream  Commonly known as: ANUSOL-HC   APPLY RECTALLY THREE TIMES DAILY FOR 7-10 DAYS DURNG HEMORRHOID FLARE      ketoconazole 2 % cream  Commonly known as: NIZORAL   1 application , Topical, Daily      latanoprost 0.005 % ophthalmic solution  Commonly known as: XALATAN   Administer 1 drop to both eyes Every Night. Indications: Wide-Angle Glaucoma      levothyroxine 88 MCG tablet  Commonly known as: SYNTHROID, LEVOTHROID   88 mcg, Oral, Daily      metFORMIN 500 MG tablet  Commonly known as: GLUCOPHAGE   TAKE 1 TABLET BY MOUTH TWICE DAILY WITH MEALS      montelukast 10 MG tablet  Commonly known as: SINGULAIR   10 mg, Oral, Nightly      pantoprazole 40 MG EC tablet  Commonly known as: PROTONIX   40 mg, Oral, 2 Times Daily      pramipexole 1.5 MG tablet  Commonly known as: MIRAPEX   1.5 mg, Oral, 2 Times Daily, prn      Risaquad-2 capsule capsule   1 capsule, Oral, Daily, PROBIOTIC      rosuvastatin 20 MG tablet  Commonly known as: CRESTOR   20 mg, Oral, Every Evening      Stimulant Laxative 8.6-50 MG per tablet  Generic drug: sennosides-docusate   2 tablets, Oral, 2 Times Daily      VITAMIN B 12 PO   1,000 mg, Oral, Daily, HOLD PER SURGEON'S INSTRCUTIONS             Stop These Medications      penicillin v potassium 250 MG tablet  Commonly known as: VEETID              Allergies   Allergen Reactions    Adhesive Tape Rash     Pt states he only had one reaction after hip surgery         Discharge Disposition:  Condition: Stable    Diet:   Diet Order   Procedures    Diet: Cardiac, Diabetic; Healthy Heart (2-3 Na+); Consistent Carbohydrate; Fluid Consistency: Thin (IDDSI 0)       Activity:   Activity Instructions       Activity as Tolerated                CODE STATUS:    Code Status and Medical Interventions:   Ordered at: 07/06/24 0409     Code Status (Patient has no pulse and is not breathing):    CPR (Attempt to Resuscitate)     Medical Interventions (Patient has pulse or is breathing):    Full Support       Future  Appointments   Date Time Provider Department Center   8/5/2024  2:00 PM Rekha Poe [facility] PM [facility]   9/26/2024 11:00 AM Damien Pierce MD [facility]  [id number]     Additional Instructions for the Follow-ups that You Need to Schedule       Call MD With Problems / Concerns   As directed      Instructions: Call MD or return to ER if increasing abdominal pain/increasing redness and hotness and swelling of the lower abdominal wall/fever or chills/chest pain/shortness of breath/nausea or vomiting    Order Comments: Instructions: Call MD or return to ER if increasing abdominal pain/increasing redness and hotness and swelling of the lower abdominal wall/fever or chills/chest pain/shortness of breath/nausea or vomiting         Discharge Follow-up with PCP   As directed       Currently Documented PCP:    Damien Pierce MD    PCP Phone Number:    [phone]     Follow Up Details: Primary MD.  1 week.  Recurrent abdominal wall cellulitis/anemia of chronic disease/thrombocytopenia/type 2 diabetes/gallstone disease/fatty liver/WARE elevated liver function test/HTN/chronic diastolic CHF/hypothyroidism/GERD/KWAME/diverticulosis        Discharge Follow-up with Specified Provider: 3 Weeks   As directed      Follow Up: 3 Weeks        Discharge Follow-up with Specified Provider: General surgery; 3 Weeks   As directed      To: General surgery   Follow Up: 3 Weeks   Follow Up Details: Recurrent abdominal wall cellulitis/panniculitis               Follow-up Information       Damien Pierce MD .    Specialty: Family Medicine  Contact information:  [address] [facility] [address]  [facility] [id number] [phone]               Jamie Rivers MD. Schedule an appointment as soon as possible for a visit in 3 week(s).    Specialty: General Surgery  Contact information:  [address] [facility] [address]  [place] [address]  [phone]               Damien Pierce MD .    Specialty: Family Medicine  Why: Primary MD.  1  week.  Recurrent abdominal wall cellulitis/anemia of chronic disease/thrombocytopenia/type 2 diabetes/gallstone disease/fatty liver/WARE elevated liver function test/HTN/chronic diastolic CHF/hypothyroidism/GERD/KWAME/diverticulosis  Contact information:  [address] [address] [address] [phone]                               Time Spent on Discharge:  Greater than 30 minutes      Amaury Lopez MD  [facility]  07/09/24  10:21 EDT

## 2024-07-09 NOTE — CASE MANAGEMENT/SOCIAL WORK
Case Management Discharge Note      Final Note: home    Provided Post Acute Provider List?: Refused  Refused Provider List Comment: Pt declined need for list    Selected Continued Care - Discharged on 7/9/2024 Admission date: 7/6/2024 - Discharge disposition: Home or Self Care      Destination    No services have been selected for the patient.                Durable Medical Equipment    No services have been selected for the patient.                Dialysis/Infusion    No services have been selected for the patient.                Home Medical Care    No services have been selected for the patient.                Therapy    No services have been selected for the patient.                Community Resources    No services have been selected for the patient.                Community & DME    No services have been selected for the patient.                         Final Discharge Disposition Code: 01 - home or self-care

## 2024-07-09 NOTE — PLAN OF CARE
Goal Outcome Evaluation:      Pt resting in bed. NO c/o voiced. O2 at 2L applied while asleep, pt states he uses a bipap at home. VSS No s/s of distress. IV antibx infusing per MAR

## 2024-07-09 NOTE — OUTREACH NOTE
Prep Survey      Flowsheet Row Responses   [facility] patient discharged from? [place]   Is LACE score < 7 ? No   Eligibility [facility]   Date of Admission 07/06/24   Date of Discharge 07/09/24   Discharge diagnosis Panniculitis   Does the patient have one of the following disease processes/diagnoses(primary or secondary)? Other   Prep survey completed? Yes            Nicolasa DOS SANTOS - Registered Nurse

## 2024-07-09 NOTE — PLAN OF CARE
Goal Outcome Evaluation:  Plan of Care Reviewed With: patient           Outcome Evaluation: Pt to be DC'd home with wife as transport.  Meds to beds. IV to be removed.  ALFIE, RUTHY.  NSR.

## 2024-07-09 NOTE — PROGRESS NOTES
Cellulitis almost resolved.  He is fine for discharge today.  Given failure of prior treatments, I think only reasonable option here is more extended course of antibiotics and I have written prescription for Augmentin 875 mg p.o. twice daily for 3 weeks and I will see him in the office in 3 weeks    Addendum: I elected to give him Augmentin rather than Bactrim given that he has been on Zosyn in hospital and responded well.  Obviously, he should stop taking the prophylactic dose of penicillin he was on at home.

## 2024-07-10 ENCOUNTER — TRANSITIONAL CARE MANAGEMENT TELEPHONE ENCOUNTER (OUTPATIENT)
Dept: CALL CENTER | Facility: HOSPITAL | Age: 79
End: 2024-07-10
Payer: MEDICARE

## 2024-07-10 DIAGNOSIS — E66.01 CLASS 2 SEVERE OBESITY DUE TO EXCESS CALORIES WITH SERIOUS COMORBIDITY AND BODY MASS INDEX (BMI) OF 36.0 TO 36.9 IN ADULT: Primary | ICD-10-CM

## 2024-07-10 RX ORDER — PHENTERMINE HYDROCHLORIDE 37.5 MG/1
37.5 CAPSULE ORAL EVERY MORNING
Qty: 30 CAPSULE | Refills: 3 | Status: SHIPPED | OUTPATIENT
Start: 2024-07-10

## 2024-07-10 NOTE — OUTREACH NOTE
Call Center TCM Note      Flowsheet Row Responses   [facility] [place] patient discharged from? [place]   Does the patient have one of the following disease processes/diagnoses(primary or secondary)? Other   TCM attempt successful? No   Unsuccessful attempts Attempt 1  [Coco and Dean on verbal release-no answer]            Crystal Reynolds RN    7/10/2024, 14:57 EDT

## 2024-07-10 NOTE — OUTREACH NOTE
Call Center TCM Note      Flowsheet Row Responses   [facility] patient discharged from? [place]   Does the patient have one of the following disease processes/diagnoses(primary or secondary)? Other   TCM attempt successful? Yes   Call start time 1550   Call end time 1552   Discharge diagnosis Panniculitis   Is patient permission given to speak with other caregiver? Yes   List who call center can speak with Coco spouse   Person spoke with today (if not patient) and relationship Coco spouse   Meds reviewed with patient/caregiver? Yes   Is the patient having any side effects they believe may be caused by any medication additions or changes? No   Does the patient have all medications ordered at discharge? Yes   Is the patient taking all medications as directed (includes completed medication regime)? Yes   Comments Spouse states she spoke with PCP and that the PCP is ok with pt following up with Dr. Rivers   Does the patient have an appointment with their PCP within 7-14 days of discharge? Other   Nursing Interventions Routed TCM call to PCP office   Has home health visited the patient within 72 hours of discharge? N/A   Psychosocial issues? No   Did the patient receive a copy of their discharge instructions? Yes   Nursing interventions Reviewed instructions with patient   What is the patient's perception of their health status since discharge? Improving   Is the patient/caregiver able to teach back signs and symptoms related to disease process for when to call PCP? Yes   Is the patient/caregiver able to teach back signs and symptoms related to disease process for when to call 911? Yes   Is the patient/caregiver able to teach back the hierarchy of who to call/visit for symptoms/problems? PCP, Specialist, Home health nurse, Urgent Care, ED, 911 Yes   If the patient is a current smoker, are they able to teach back resources for cessation? Not a smoker   TCM call completed? Yes   Call end time 1552             Crystal Reynolds RN    7/10/2024, 15:53 EDT

## 2024-07-11 DIAGNOSIS — M51.36 DDD (DEGENERATIVE DISC DISEASE), LUMBAR: ICD-10-CM

## 2024-07-11 LAB — BACTERIA SPEC AEROBE CULT: NORMAL

## 2024-07-11 RX ORDER — HYDROCODONE BITARTRATE AND ACETAMINOPHEN 7.5; 325 MG/1; MG/1
1 TABLET ORAL EVERY 8 HOURS PRN
Qty: 90 TABLET | Refills: 0 | Status: SHIPPED | OUTPATIENT
Start: 2024-07-11

## 2024-07-11 NOTE — TELEPHONE ENCOUNTER
Medication Refill Request    Date of phone call: 7/11/2024    Medication being requested: hydro/apap 7.5-325 mg sig: take 1 tab q 8 hrs prn  Qty: 90    Date of last visit: 6/3/2024    Date of last refill: 6/5/2024    QIANA up to date?: yes    Next Follow up?: 8/5/2024    Any new pertinent information? (i.e, new medication allergies, new use of medications, change in patient's health or condition, non-compliance or inconsistency with prescribing agreement?):

## 2024-07-13 LAB — BACTERIA SPEC AEROBE CULT: NORMAL

## 2024-07-22 NOTE — DISCHARGE INSTRUCTIONS
Called patient no answer, left voicemail for patient to return call to Aleksandr Iyer MD office at [phone].    ECO Representative: Please reach out to [address] [facility] via Epic Secure Chat to see if a team member is available to take patient's call.    If team member is unavailable, please document in this encounter that patient returned call and route to: AURA IYER NURSE [facility] [[id number]].     Message sent via patient portal with information    Lumbar Epidural Steroid Injection Instructions  Plan includes:  1.  Lumbar epidural steroid injections, up to 3, spaced 4 weeks apart.  If pain control is acceptable after 1 or 2 injections, it was discussed with the patient that they may return for the subsequent injections if and when their pain returns.  The risks were discussed with the patient including failure of relief, worsening pain, Headache (post dural puncture headache), bleeding (epidural hematoma) and infection (epidural abscess or skin infection).  2.  Physical therapy exercises at home as prescribed by physical therapy or from the pain clinic handout (given to the patient).  Continuation of these exercises every day, or multiple times per week, even when the patient has good pain relief, was stressed to the patient as a preventative measure to decrease the frequency and severity of future pain episodes.  3.  Continue pain medicines as already prescribed.  If patient not currently taking any, it is recommended to begin Acetaminophen 1000 mg po q 8 hours.  If other medicines containing Acetaminophen are currently prescribed, maintain daily dose at 3000 mg.    4.  If they can tolerate NSAIDS, it is recommended to take Ibuprofen 600 mg po q 6 hours for 7 days during pain exacerbations.  Alternatively, they may substitute an NSAID of their choice (e.g. Aleve).  This may be taken at the same time as Acetaminophen.  5.  Heat and ice to the affected area as tolerated for pain control.  It was discussed that heating pads can cause burns.  6.  Daily low impact exercise such as walking or water exercise was recommended to maintain overall health and aid in weight control.   7.  Follow up as needed for subsequent injections.  8.  Patient was counseled to abstain from tobacco products.

## 2024-07-31 PROBLEM — G56.02 CARPAL TUNNEL SYNDROME OF LEFT WRIST: Status: ACTIVE | Noted: 2024-07-31

## 2024-07-31 PROBLEM — G56.22 CUBITAL TUNNEL SYNDROME ON LEFT: Status: ACTIVE | Noted: 2024-07-31

## 2024-07-31 NOTE — DISCHARGE INSTRUCTIONS
Orthopaedic & Hand Surgery  Discharge Instructions  Dr. Erasmo Rayo  [phone]    INCISION CARE  Wash your hands prior to dressing changes  The postoperative dressings should be taken off starting on postoperative day #4. New dry dressings can then be placed around the wound and held in place with an Ace wrap. Dressings should be changed daily.  No creams or ointments to the incision until 3 weeks post op.   It is okay to wash the incision with clean water (water you would drink) and soap but do not scrub. Do not soak your incision. After after showering or washing her hands, pat the wound dry gently and cover with new dry dressings.  Do not touch or pick at the incision  Check incision every day and notify surgeon immediately if any of the following signs or symptoms are seen:  Increase in redness  Increase in swelling around the incision and of the entire extremity  Increase in pain  NEW drainage or oozing from the incision  Pulling apart of the edges of the incision  Increase in overall body temperature (greater than 100.4°F)  Your surgeon will instruct you regarding suture or staple removal. In general, this happens at the 1-2-week post op appointment.    ACTIVITIES  Exercises:  Physical therapy may or may not be needed after surgery. Once some healing has occurred, it will be possible to start therapy if you wish. However, most patients get their function back fairly well after surgery without formal therapy.  Activities of Daily Living:   Showering may begin immediately if the wrist can be protected. The splint and incision cannot get wet after surgery.   No tub baths, hot tubs, or swimming pools for 4 weeks.  May shower and let water run over the incision once the sutures are removed if there is no drainage and the wound is well healing. A new dry dressing can be applied after showering.     Restrictions  Weight: Do not put weight on the wrist until instructed to do so. Your surgeon will discuss  with restrictions in terms of activities allowed. In general, anything more strenuous than holding a pen or piece of paper should be avoided, the other wrist should do the vast majority of the work until the soft tissues have healed enough that it is not painful, then it is ok to use the wrist more strenuously.   Driving: Many patients have questions about when it is safe to return to driving. The answer is that this is extremely variable. It depends on how quickly you heal. Until you can safely navigate the steering wheel, and are off of all narcotics, driving is not permitted. Your surgeon cannot “clear” you to return to driving, only you can make the decision when you feel it is safe.     Pain Control  Ice:  Ice is an excellent pain reliever. This can be used regardless of whether or not you are taking pain medication.  Apply an ice pack to the surgical area for 20 minutes at a time, removing it for at least an hour to prevent frostbite.  You should keep a towel between any dressings on the ice pack to prevent them from getting damp and from developing frostbite on the operative site.  Elevation:  Elevation is another easy way to control pain after surgery. Whenever possible, keep the operative limb elevated above the level of your heart to reduce swelling.  Acetaminophen (Tylenol):  CLASSIFICATION: A non-narcotic medication that is available without a prescription.  Acetaminophen controls pain but does not affect swelling or inflammation.  DRIVING: Acetaminophen will not impair your ability to drive. It is safe to drive while taking if your physical condition does not limit you.  POTENTIAL SIDE EFFECTS: nausea, stomach upset, liver failure if taken in large doses.  Interactions: Some narcotic medications contain acetaminophen. If you have a narcotic prescription, make sure to cut back on the acetaminophen if you are taking the narcotic. You should never take more 3000 mg of acetaminophen in one 24-hour  period.  DOSAGE:  Following surgery, you may take two regular strength (325 mg) tabs to control pain every 6 hours. This can be taken with NSAIDs (see below) or alternating the two.  After the initial surgical pain begins to resolve, you may begin to decrease the pain medication. By the end of a few weeks, you should be off of pain medications.  NSAIDS: This includes aspirin, ibuprofen, naproxen, Motrin, Aleve, Mobic, Celebrex  CLASSIFICATION: These are non-narcotic medications that are available without a prescription.  They are particularly effective at reducing swelling and inflammation  DRIVING: These medications will not impair your ability to drive. It is safe to drive while taking these medications if your physical condition does not limit you.  POTENTIAL SIDE EFFECTS: nausea, stomach upset, ulcer, gastric bleeding, kidney failure.  DOSAGE:  Following surgery, you may take ibuprofen (Motrin) 600 mg to control pain every 6 hours with food. It helps to take it scheduled (around the clock) to allow it to help reduce swelling.  After the initial surgical pain begins to resolve, you may begin to decrease the pain medication. By the end of a few weeks, you should be off of pain medications.    Medications  Anticoagulants: After upper extremity surgery most patients do not require an anticoagulant unless you have another injury that will be keeping you from mobilizing.  If you were already taking an anticoagulant (commonly Aspirin, Coumadin, or Plavix) you will likely be resuming your normal dose postoperatively and will be continuing that medication at the discretion of the prescribing physician.    FOLLOW-UP VISITS  You will need to follow up in the office with your surgeon in 1-2 weeks, or as instructed elsewhere in your discharge paperwork. Please call this number [phone] to schedule this appointment if one has not already been made.  If you have any concerns or suspected complications prior to your follow  up visit, please call the office. Do not wait until your appointment time if you suspect complications. These will need to be addressed in the office promptly.      Erasmo Rayo MD, PhD  Orthopaedic Surgery  [facility]

## 2024-07-31 NOTE — H&P
Orthopaedic & Hand Surgery  H&P Update  Dr. Erasmo Rayo  [phone]    Name: Nathan Baez   : 1945     Date: 24   Time: 12:25 EDT    ------  This document is the preoperative History and Physical and is an extension of the last clinic note that is copied below.  ------    I have reviewed the patient's prior notes, interviewed and examined the patient on the day of surgery in the holding room.  The patient's condition has not changed, there are no new treatments available that obviate the need for surgery. The need for surgery still exists.  I have reviewed the procedure with the patient, answered any last-minute questions and have personally marked the operative site.    Physical exam this morning is unchanged from prior with the addition of:   CV: Regular rate and rhythm.    Respiratory: Non-labored breathing.     Medications:  No current facility-administered medications on file prior to encounter.     Current Outpatient Medications on File Prior to Encounter   Medication Sig Dispense Refill    amLODIPine (NORVASC) 2.5 MG tablet Take 1 tablet by mouth Daily. 90 tablet 3    atenolol (Tenormin) 25 MG tablet Take 0.5 tablets by mouth Daily. 45 tablet 4    bisacodyl (Dulcolax) 5 MG EC tablet Take 1 tablet by mouth 2 (Two) Times a Day. Indications: Constipation      furosemide (LASIX) 40 MG tablet TAKE 1 TABLET BY MOUTH EVERY DAY  Indications: Edema (Patient taking differently: Take 1 tablet by mouth Daily. TAKE 1 TABLET BY MOUTH EVERY DAY  Indications: Edema) 90 tablet 3    gabapentin (NEURONTIN) 300 MG capsule TAKE 1 CAPSULE BY MOUTH FOUR TIMES DAILY 360 capsule 0    Hydrocortisone, Perianal, (ANUSOL-HC) 2.5 % rectal cream APPLY RECTALLY THREE TIMES DAILY FOR 7-10 DAYS DURNG HEMORRHOID FLARE      levothyroxine (SYNTHROID, LEVOTHROID) 88 MCG tablet Take 1 tablet by mouth Daily.      metFORMIN (GLUCOPHAGE) 500 MG tablet TAKE 1 TABLET BY MOUTH TWICE DAILY WITH MEALS (Patient taking differently:  Take 1 tablet by mouth 2 (Two) Times a Day With Meals. Indications: Type 2 Diabetes) 180 tablet 3    montelukast (SINGULAIR) 10 MG tablet TAKE 1 TABLET BY MOUTH EVERY NIGHT 90 tablet 3    pantoprazole (PROTONIX) 40 MG EC tablet Take 1 tablet by mouth 2 (Two) Times a Day. Indications: Heartburn 90 tablet 3    pramipexole (MIRAPEX) 1.5 MG tablet Take 1 tablet by mouth 2 (Two) Times a Day. prn  Indications: Restless Leg Syndrome      Probiotic Product (Risaquad-2) capsule capsule Take 1 capsule by mouth Daily. PROBIOTIC      rosuvastatin (CRESTOR) 20 MG tablet TAKE 1 TABLET BY MOUTH EVERY EVENING 90 tablet 3    aspirin 81 MG EC tablet Take 1 tablet by mouth Daily. Indications: Coronary Bypass Surgery      brimonidine (ALPHAGAN) 0.2 % ophthalmic solution Administer 1 drop to both eyes 2 (Two) Times a Day. Indications: Wide-Angle Glaucoma      celecoxib (CeleBREX) 200 MG capsule TAKE 1 CAPSULE BY MOUTH DAILY (Patient taking differently: Take 1 capsule by mouth Daily. HOLD PER MD INSTRUCTIONS  Indications: Rheumatoid Arthritis) 30 capsule 5    dorzolamide-timolol (COSOPT) 22.3-6.8 MG/ML ophthalmic solution Administer 1 drop to both eyes 2 (Two) Times a Day. Indications: Wide-Angle Glaucoma      glucose blood (FREESTYLE LITE) test strip Check blood sugar  each 11    ketoconazole (NIZORAL) 2 % cream Apply 1 application topically to the appropriate area as directed Daily. 60 g 3    Lancets (freestyle) lancets Check blood sugar  each 11    latanoprost (XALATAN) 0.005 % ophthalmic solution Administer 1 drop to both eyes Every Night. Indications: Wide-Angle Glaucoma      losartan (COZAAR) 100 MG tablet TAKE 1 TABLET BY MOUTH DAILY (Patient taking differently: Take 1 tablet by mouth Daily. HOLD AM OF SURGERY  Indications: High Blood Pressure Disorder) 90 tablet 3    Stimulant Laxative 8.6-50 MG per tablet TAKE 2 TABLETS BY MOUTH TWICE DAILY 200 tablet 3       Allergies:  Allergies   Allergen Reactions    Adhesive  Tape Rash     Pt states he only had one reaction after hip surgery       Past Medical History:  Patient Active Problem List   Diagnosis    OA (osteoarthritis) of knee    Essential hypertension    Cellulitis of abdominal wall    Hypothyroidism (acquired)    Gastroesophageal reflux disease without esophagitis    Mixed hyperlipidemia    Primary insomnia    DDD (degenerative disc disease), lumbar    Obstructive sleep apnea    Allergic    Venous insufficiency    Prostate cancer    Venous stasis dermatitis    Tinea cruris    Tinea corporis    Throat clearing    Sleep apnea    Skin lesion of face    Rib pain on left side    Rectal bleed    Presbycusis    Pes planus    Osteoarthritis    Obesity    Medicare annual wellness visit, subsequent    Lumbar strain    Internal hemorrhoids    Insomnia    Inflamed skin tag    Hyperplastic polyp of stomach    Hospital discharge follow-up    History of colon polyps    High risk medication use    Glaucoma    Gastroenteritis, acute    Probable streptococcal cellulitis    Encounter for screening colonoscopy    Encounter for long-term (current) use of NSAIDs    Dysphagia    DVT (deep venous thrombosis)    Lumbar facet arthropathy    Diverticulosis    Cough    Contact with hypodermic needle    Cervical radiculopathy    Arthritis    Allergic rhinitis    Acute glaucoma    Acute embolism and thrombosis of vein    Actinic keratosis    Status post reverse total shoulder replacement, right    Localized edema    Chronic disease anemia    Peripheral polyneuropathy    Campylobacter diarrhea    Hyponatremia    Generalized abdominal pain    Fever    Constipation    Bilateral lower extremity edema    Acute pyelonephritis    Chronic idiopathic constipation    Functional diarrhea    Change in bowel habits    RLS (restless legs syndrome)    Type 2 diabetes mellitus with hyperglycemia    Family history of GI malignancy    Primary osteoarthritis of right hip    B12 deficiency    Intervertebral disc stenosis  of neural canal of lumbar region    Encounter for hepatitis C screening test for low risk patient    Pain associated with defecation    Calculus of kidney    Gastroesophageal reflux disease    Body aches    Skin lesion    Candidiasis, intertrigo    Hyperbilirubinemia    Acute respiratory failure due to COVID-19    Bacteremia due to group B Streptococcus    Iron deficiency anemia    Acute respiratory failure with hypoxia and hypercapnia    Hypoventilation syndrome    Pneumonia of right lower lobe due to infectious organism    Chronic diastolic (congestive) heart failure    Fatty liver    Chronic heart failure with preserved ejection fraction (HFpEF)    Pneumonia, unspecified organism    Pannus, abdominal    Abdominal wall cellulitis    Panniculitis    Abdominal wall cellulitis    Thrombocytopenia    Type 2 diabetes mellitus    Elevated liver function tests    History of DVT (deep vein thrombosis)    Fatty liver    Cholelithiasis    Carpal tunnel syndrome of left wrist    Cubital tunnel syndrome on left       Family History:  Family History   Problem Relation Age of Onset    Arthritis Mother     Colon cancer Mother     Arthritis Father     Cancer Father         Prostate cancer    Heart disease Sister     Arthritis Sister     Breast cancer Sister     Gout Sister     Hypertension Sister     Cancer Sister         All three breast cancer    Hypertension Brother     Heart disease Brother     Arthritis Brother     Heart attack Brother     Bone cancer Brother     Heart disease Brother     Malig Hyperthermia Neg Hx          Review of Systems - Negative except as stated in the HPI    The most recent clinic note (with his HPI and indications for surgery today) is pasted below:    Name DINESH MART (78yo, M) ID# [id number] Appt. Date/Time 2024 01:30PM   1945 Service Dept. [facility]  Provider RAFFI WILCOX MD  Insurance   [place]  Insurance # : [id number]  Med  "[place]  Insurance # : [id number]  Policy/Group # : [id number]  Med Durable Medical Equipment: [employer] - DMEPOS ASSIGNED (MEDICARE DME REGION B)  Insurance # : [id number]  Prescription: [facility]SCAREMARK - Member is eligible. details  Chief Complaint  None recorded.  Patient's Care Team  Primary Care Provider: MARIA A LYLES: [address] [facility] [address], [place] [address], Ph [phone], Fax [phone]  Patient's Pharmacies  [id number] ([place]): [address] [place] [address], Ph [phone], Fax [phone]  Vitals  None recorded.  Allergies  Allergies not reviewed (last reviewed 2024)  NKDA  Uncoded Allergies  NO KNOWN ALLERGIES (Moderate) (Active)  Medications  Medications not reviewed (last reviewed 2024)  amLODIPine 2.5 mg tablet  TAKE 1 TABLET BY MOUTH DAILY  24   filled surescripts  amoxicillin 500 mg capsule  TAKE 4 CAPSULES BY MOUTH 1 HOUR BEFORE APPOINTMENT THEN TAKE 1 CAPSULE BY MOUTH EVERY 8 HOURS UNTIL GONE  24   filled surescripts  atenoloL 25 mg tablet  TAKE 1/2 TABLET BY MOUTH DAILY  24   filled surescripts  BD Ultra-Fine Mini Pen Needle 31 gauge x 3/16\"  USE 1 DAILY  23   filled surescripts  brimonidine 0.2 % eye drops  INSTILL 1 DROP IN BOTH EYES TWICE DAILY  24   filled surescripts  celecoxib 200 mg capsule  TAKE 1 CAPSULE BY MOUTH DAILY  04/10/24   filled surescripts  cephALEXin 500 mg capsule  24   filled surescripts  dorzolamide 22.3 mg-timoloL 6.8 mg/mL eye drops  INSTILL 1 DROP IN BOTH EYES TWICE DAILY  24   filled surescripts  fluorouraciL 5 % topical cream  APPLY TOPICALLY TWICE DAILY FOR 3 WEEKS TO ANTERIOR TIP OF NOSE  24   filled surescripts  FreeStyle Lancets 28 gauge  USE TO TEST ONCE A DAY  23   filled surescripts  FreeStyle Lite Meter kit  USE FOR BLOOD SUGAR  22   filled surescripts  FreeStyle Lite Strips  TEST THREE TIMES " DAILY  01/04/24   filled surescripts  furosemide 40 mg tablet  TAKE 1 TABLET BY MOUTH EVERY DAY FOR SWELLING  05/22/24   filled surescripts  gabapentin 300 mg capsule  TAKE 1 CAPSULE BY MOUTH FOUR TIMES DAILY  05/23/24   filled surescripts  HYDROcodone 7.5 mg-acetaminophen 325 mg tablet  TAKE 1 TABLET BY MOUTH EVERY 8 HOURS AS NEEDED FOR MODERATE FOR PAIN  06/05/24   filled surescripts  hydrocortisone 2.5 % topical cream with perineal applicator  APPLY RECTALLY THREE TIMES DAILY FOR 7-10 DAYS DURNG HEMORRHOID FLARE  06/09/23   filled surescripts  ketoconazole 2 % topical cream  APPLY TOPICALLY TO THE AFFECTED AREA DAILY AS DIRECTED  09/08/23   filled surescripts  Lantus Solostar U-100 Insulin 100 unit/mL (3 mL) subcutaneous pen  INJECT 40 UNITS UNDER THE SKIN INTO APPROPRIATE AREA DIRECTED DAILY  07/18/23   filled surescripts  latanoprost 0.005 % eye drops  INSTILL 1 DROP IN BOTH EYES DAILY AT BEDTIME  04/04/24   filled surescripts  levothyroxine 88 mcg tablet  TAKE 1 TABLET BY MOUTH EVERY MORNING  04/19/24   filled surescripts  losartan 100 mg tablet  TAKE 1 TABLET BY MOUTH DAILY  03/29/24   filled surescripts  meloxicam 15 mg tablet  TAKE 1 TABLET BY MOUTH DAILY  07/12/22   filled surescripts  metFORMIN 500 mg tablet  TAKE 1 TABLET BY MOUTH TWICE DAILY WITH MEALS  05/10/24   filled surescripts  montelukast 10 mg tablet  TAKE 1 TABLET BY MOUTH EVERY NIGHT  03/29/24   filled surescripts  pantoprazole 40 mg tablet,delayed release  TAKE 1 TABLET BY MOUTH TWICE DAILY FOR HEARTBURN  05/12/24   filled surescripts  penicillin V potassium 250 mg tablet  TAKE 1 TABLET BY MOUTH TWICE DAILY  05/20/24   filled surescripts  phentermine 37.5 mg capsule  TAKE 1 CAPSULE BY MOUTH EVERY MORNING  05/10/24   filled surescripts  pramipexole 1.5 mg tablet  TAKE 1 TABLET BY MOUTH THREE TIMES DAILY AS NEEDED  05/15/24   filled surescripts  rosuvastatin 20 mg tablet  TAKE 1 TABLET BY MOUTH EVERY EVENING  03/29/24    filled surescripts  SENNA S 8.6-50MG TABLETS  TAKE 2 TABLETS BY MOUTH TWICE DAILY  05/23/24   filled surescripts  Stimulant Laxative Plus 8.6 mg-50 mg tablet  TAKE 2 TABLETS BY MOUTH TWICE DAILY  01/28/24   filled surescripts  sulfamethoxazole 800 mg-trimethoprim 160 mg tablet  TAKE 1 TABLET BY MOUTH TWICE DAILY  09/15/22   filled surescripts  triamcinolone acetonide 0.1 % topical cream  APPLY TOPICALLY TO RIGHT ELBOW EVERY DAY AS NEEDED FOR RASH  11/08/23   filled surescripts  Vaccines  Vaccines not reviewed (last reviewed 05/07/2024)  Vaccine Type Date Amt. Route Site NDC Lot # [id number]. Exp.  Date VIS VIS  Given Vaccinator  Influenza  influenza 10/01/23            Problems  Reviewed Problems  Numbness of hand - Onset: 05/07/2024  Pain of knee region - Onset: 05/19/2023  Pain in right hip joint - Onset: 05/19/2023  Carpal tunnel syndrome of left wrist - Onset: 05/07/2024  Acquired trigger finger of right ring finger - Onset: 05/07/2024  Ulnar nerve entrapment at elbow - Onset: 05/07/2024, Bilateral  Family History  Family History not reviewed (last reviewed 05/07/2024)  Father - No current problems or disability  Mother - No current problems or disability  Social History  Social History not reviewed (last reviewed 05/07/2024)  Public Health and Travel  Have you recently traveled abroad?: No  Have you been to an area known to be high risk for COVID-19?: No  In the 14 days before symptom onset, have you had close contact with a laboratory-confirmed COVID-19 while that case was ill?: No  In the 14 days before symptom onset, have you had close contact with a person who is under investigation for COVID-19 while that person was ill?: No  Substance Use  Do you or have you ever smoked tobacco?: Never smoker  Do you or have you ever used any other forms of tobacco or nicotine?: No  Has tobacco cessation counseling been provided?: No  What is your level of alcohol consumption?: None  Do you use any illicit or recreational  drugs?: No  Advance Directive  Do you have an advance directive?: Yes  Do you have a medical power of ?: No  Surgical History  Surgical History not reviewed (last reviewed 05/07/2024)  Past Medical History  Past Medical History not reviewed (last reviewed 05/07/2024)  Arthritis: Y  Cancer: Y - Prostate  Diabetes: Y - Type 2  Gastrointestinal Disease/GERD: Y  High Cholesterol: Y  Hypertension: Y  Prostate Problems: Y  Sleep Apnea: Y  Thyroid Disease: Y  HPI  Returns today for reevaluation of bilateral hand numbness. Since his last evaluation, he has undergone electrodiagnostic studies which we reviewed today. He notes he has trialed bracing at night but is unable to wear both elbow braces together with his CPAP machine and the left wrist brace and be able to sleep. As a result, he has continued to wear the wrist brace but he is not able to tolerate the elbow braces. He notes he has persistent numbness. Left hand numbness may be slightly improved but not significantly. He notes he has paresthetic tingling pain at night on the left that hurts. On the right side, it is not so much pain as clumsiness that bothers him.    With respect to his right ring finger trigger finger, he notes he has had resolution of catching, locking and pain following corticosteroid injection on his previous visit on 5/7/2024.    He is seen with his wife present today.    Initial history obtained 5/7/2024:    80y/o right-hand-dominant male c/o bilateral hand numbness    Onset: 5 months prior  Location: Bilateral hands  Timing: Intermittent, not worse at night  Quality: Numbness in bilateral hands. Occasional sharp pain in his palm at the base of the ring finger on the right. Catching and locking previously in the right ring finger but not recently.  Severity: 0/10 at best, 7/10 at worst  Modifying factors: He has not trialed any conservative modalities including nonsteroidal anti-inflammatories or bracing.  Context: No history of trauma.  Notes he can continue to experience numbness in both hands about 5 months prior. In addition, the right ring finger began catching and locking and then became more stuck in a flexed posture beginning at that time. Progressively, he notes that he has had increased atrophy on the right side between the thumb and index finger dorsally. This is painless in nature.    He has a history of right total wrist replacement. This was first performed by Dr. Carpenter on 12/10/2009 and then revised by Dr. Mchugh on 12/17/2014. He has not had wrist pain on the right since that time. He notes he previously has undergone neck surgery on 4/11/2011 by Dr. Balbuena. He does not have radiating neck pain at this point. He notes a remote history of partial fingertip amputation on the left long finger. This is not currently painful for him.  ROS  ROS as noted in the HPI  Physical Exam  General:  The patient's general appearance was well-nourished, well-hydrated, no acute distress.  Orientation was alert and conversant    Musculoskeletal: Bilateral hands and wrists  Inspection: There is atrophy in the first dorsal musculature on the right. Slight atrophy of the thenar musculature on the left is present. Well-healed dorsal wrist incision consistent with total wrist replacement on the right. There appears to be a well-healed incision in the palm on the right consistent with carpal tunnel release. Partial amputation of the long finger tip on the left. Skin is intact, warm and well perfused.  Palpation: No longer tender to palpation at the level of the A1 pulley of the ring finger on the right.  Sensation: On the right side, diminished in the ulnar nerve distribution and splitting the ring finger with regard to sensitivity. Intact to light touch subjectively in median and radial nerve distributions. [place] Jasmin monofilament testing reveals 0.4 g force sensitivity in the thumb through ring finger. It is 300 g force sensitivity in the small. On  the left, sensitivity is diminished most noticeably in the median but also in the ulnar nerve distribution when compared with radial which is intact to light touch. He splits the ring finger with regard to sensitivity, being diminished more on the radial side. [place] Jasmin monofilament testing reveals 300 g force sensitivity in the thumb, index and long finger. It is 2 g force sensitivity in the ring and small finger.  Motor: 5/5 right and 4-/5 left thenar strength. 3/5 right and 3+/5 left first dorsal interosseous strength. Able to flex, extend and abduct the fingers. Able to abduct the thumb orthogonal to the plane of the palm.  Vascular: Brisk cap refill to all digits and 2+ radial pulse. Modified Dontae's test is normal bilaterally  Joint stability and range of motion: Able to make a composite fist and fully extend the fingers bilaterally with the exception of slight flexion contracture of the right ring finger PIP joint. Wrist range of motion is: Left side: 40° extension/75° flexion. Elbow range of motion is: The right side: 0- 145°. Left side: 10- 140 °.  Other: Tinel's at the carpal tunnel is negative on the right, positive on the left. Phalen's maneuver is negative on the right. It is positive on the left. Tinel's at the cubital tunnel is positive bilaterally. Elbow flexion test is positive bilaterally.  Assessment / Plan  79-year-old right-hand-dominant male with bilateral hand numbness that appears to reflect:    1. Bilateral elbow cubital tunnel syndrome, diagnosis by electrodiagnostic study, history and physical exam. We reviewed the nature of this condition today. He is trialed nighttime splinting but is not able to tolerate this. He is notable atrophy on the right side and the potential for atrophy on the left with developing weakness in the intrinsic musculature. Given the failure of conservative management, my recommendation is to proceed with surgery consisting of a cubital tunnel release with  anterior transposition. When discussing timing, he notes that the left side is more noxious and so I recommended adding the carpal tunnel release at the same time given presence of symptoms in the median nerve distribution as noted below that have failed to resolve fully with nighttime bracing. A thorough discussion of the risks, benefits and alternatives of this combined procedure as well as the anticipated time course of recovery ensued. Following our discussion, questions were solicited and answered to his and his wife's satisfaction. They voiced an understanding of the nature of the condition, the indicated procedure, the risks and the alternatives. He requested surgery beginning on the left side. After an interval of healing, we will consider addressing the right cubital tunnel symptoms if they remain present. Verbal consent was obtained. In keeping with facility practice, written consent will be obtained on the day of procedure.  2. Left wrist carpal tunnel syndrome, diagnosis by electrodiagnostic study, history and physical exam. Since his last evaluation, he has undergone conservative treatment with nighttime bracing and notes persistent paresthetic symptoms at night while sleeping. He has developed initial thenar atrophy and has severe neuropathy by electrodiagnostic criteria. Given the failure of conservative management, recommendation is to proceed with surgery consisting of a carpal tunnel release as noted above. This will be performed in conjunction with with cubital tunnel decompression on the left.    3. Right ring trigger finger with PIP joint flexion contracture. Improved following corticosteroid injection. Observation at this time.    1. Carpal tunnel syndrome of left wrist  G56.02: Carpal tunnel syndrome, left upper limb    2. 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 Acquired trigger finger  of right ring finger  M65.341: Trigger finger, right ring finger    ELECTROMYOGRAM + NERVE CONDUCTION STUDY  Side: BILATERAL  Electrodiagnostic studies of bilateral upper extremities were obtained and interpreted on 5/23/2024 by Dr. Santiago Scanlon.  Impression:  There is neurophysiologic evidence to support left median neuropathy at the wrist. This is based on left absent sensory response and markedly delayed distal motor latency. There is also bilateral ulnar neuropathy at the cubital tunnel that is worse on the right. The ulnar motor amplitude is decreased on the right with severe slowing of conduction velocity across the elbow. Needle EMG of bilateral C5-T1 myotomes shows muscle excitability in the left APB and FDI in the right ADM. There is no activity in the right FDI, likely due to atrophy.    In summary:  1. Severe left median neuropathy at the wrist.  2. Bilateral severe ulnar neuropathy in the cubital tunnel, much worse on the right.      XR, ELBOW, 3 OR MORE VIEW  Side: BILATERAL  Result:  - XRAY ELBOW 3+ VIEW: Performed  Result Note: Radiographs of bilateral elbows (4 views) were obtained today and reviewed by me. My impression is: Mild degenerative changes noted bilaterally and most prominent on the medial joint line and at the tip of the coronoid with osteophyte formation. More subtle changes noted at the radiocapitellar joint. No acute osseous abnormalities identified. Alignment is grossly normal bilaterally.  Patient Instructions  1. Plan for surgery as noted below:  - Diagnosis: Left carpal tunnel & cubital tunnel syndromes  - Proposed surgery: Left carpal tunnel release (CPT 68926) and cubital tunnel release with possible anterior transposition (CPT 80470)  - Special considerations/equipment: To be performed under MAC regional anesthesia with sterile tourniquet control.  - Risks of surgery: Pain, bleeding, infection, damage to nerves, blood vessels or other surrounding structures, incomplete release  and/or recurrence of the condition, stiffness, scar, further procedures, unforeseen risks of surgery including stroke and death.  -Alternatives to surgery: No surgery, bracing, injections    2. He will need postoperative evaluation at 10 to 14 days for wound inspection and suture removal.    Return to Office  to see Erasmo Rayo MD at [facility] on or around 06/17/2024  Encounter Sign-Off  Encounter signed-off by Erasmo Rayo MD, 06/17/2024.  Encounter performed and documented by Erasmo Rayo MD  Encounter reviewed & signed by Erasmo Rayo MD on 06/17/2024 at 8:03pm    -----    I have evaluated the patient and determined that he is stable and cleared for surgery in the ambulatory setting.      Erasmo Rayo MD, PhD  Orthopaedic & Hand Surgery  [facility]  [phone] - [place] [facility]  [phone] - [facility]

## 2024-08-01 ENCOUNTER — OFFICE VISIT (OUTPATIENT)
Dept: SURGERY | Facility: CLINIC | Age: 79
End: 2024-08-01
Payer: MEDICARE

## 2024-08-01 ENCOUNTER — TELEPHONE (OUTPATIENT)
Dept: FAMILY MEDICINE CLINIC | Facility: CLINIC | Age: 79
End: 2024-08-01
Payer: MEDICARE

## 2024-08-01 VITALS
DIASTOLIC BLOOD PRESSURE: 75 MMHG | BODY MASS INDEX: 37.35 KG/M2 | HEIGHT: 67 IN | SYSTOLIC BLOOD PRESSURE: 128 MMHG | WEIGHT: 238 LBS

## 2024-08-01 DIAGNOSIS — Z09 ENCOUNTER FOR FOLLOW-UP: ICD-10-CM

## 2024-08-01 DIAGNOSIS — L03.311 ABDOMINAL WALL CELLULITIS: Primary | ICD-10-CM

## 2024-08-01 RX ORDER — SULFAMETHOXAZOLE AND TRIMETHOPRIM 800; 160 MG/1; MG/1
1 TABLET ORAL 2 TIMES DAILY
Qty: 28 TABLET | Refills: 0 | Status: SHIPPED | OUTPATIENT
Start: 2024-08-01 | End: 2024-08-15

## 2024-08-01 NOTE — TELEPHONE ENCOUNTER
Caller: Coco Baez    Relationship: Emergency Contact    Best call back number: [phone]     What is the best time to reach you: ANYTIME    Who are you requesting to speak with (clinical staff, provider,  specific staff member): CLINICAL    What was the call regarding: PATIENTS SPOUSE CALLING STATING THAT HE IS HAVING SURGERY TOMORROW SHE WOULD LIKE TO KNOW IF HE NEEDS TO HOLD HIS INSULIN TOMORROW MORNING BEFORE SURGERY..      Is it okay if the provider responds through MyChart: NO

## 2024-08-01 NOTE — TELEPHONE ENCOUNTER
Spoke to pts wife, informed her to contact husbands surgeons office, she states they tod her to call her PCP. Informed Coco, to cut insulin dosage in 1/2 and to take 9 units instead of 18 units tomorrow. She voiced understanding

## 2024-08-02 ENCOUNTER — HOSPITAL ENCOUNTER (OUTPATIENT)
Facility: HOSPITAL | Age: 79
Setting detail: HOSPITAL OUTPATIENT SURGERY
Discharge: HOME | End: 2024-08-02
Attending: STUDENT IN AN ORGANIZED HEALTH CARE EDUCATION/TRAINING PROGRAM | Admitting: STUDENT IN AN ORGANIZED HEALTH CARE EDUCATION/TRAINING PROGRAM
Payer: MEDICARE

## 2024-08-02 ENCOUNTER — ANESTHESIA (OUTPATIENT)
Dept: PERIOP | Facility: HOSPITAL | Age: 79
End: 2024-08-02
Payer: MEDICARE

## 2024-08-02 ENCOUNTER — ANESTHESIA EVENT (OUTPATIENT)
Dept: PERIOP | Facility: HOSPITAL | Age: 79
End: 2024-08-02
Payer: MEDICARE

## 2024-08-02 VITALS
DIASTOLIC BLOOD PRESSURE: 64 MMHG | TEMPERATURE: 97.6 F | SYSTOLIC BLOOD PRESSURE: 134 MMHG | RESPIRATION RATE: 18 BRPM | OXYGEN SATURATION: 95 % | HEART RATE: 70 BPM

## 2024-08-02 DIAGNOSIS — G56.22 CUBITAL TUNNEL SYNDROME ON LEFT: ICD-10-CM

## 2024-08-02 DIAGNOSIS — G56.02 CARPAL TUNNEL SYNDROME OF LEFT WRIST: Primary | ICD-10-CM

## 2024-08-02 LAB — GLUCOSE BLDC GLUCOMTR-MCNC: 211 MG/DL (ref 70–130)

## 2024-08-02 PROCEDURE — 25010000002 GLYCOPYRROLATE 1 MG/5ML SOLUTION: Performed by: STUDENT IN AN ORGANIZED HEALTH CARE EDUCATION/TRAINING PROGRAM

## 2024-08-02 PROCEDURE — 82948 REAGENT STRIP/BLOOD GLUCOSE: CPT

## 2024-08-02 PROCEDURE — 25010000002 ROPIVACAINE PER 1 MG: Performed by: STUDENT IN AN ORGANIZED HEALTH CARE EDUCATION/TRAINING PROGRAM

## 2024-08-02 PROCEDURE — 25010000002 PROPOFOL 10 MG/ML EMULSION: Performed by: STUDENT IN AN ORGANIZED HEALTH CARE EDUCATION/TRAINING PROGRAM

## 2024-08-02 PROCEDURE — 25010000002 CEFAZOLIN PER 500 MG: Performed by: STUDENT IN AN ORGANIZED HEALTH CARE EDUCATION/TRAINING PROGRAM

## 2024-08-02 PROCEDURE — 25010000002 LIDOCAINE 1 % SOLUTION: Performed by: STUDENT IN AN ORGANIZED HEALTH CARE EDUCATION/TRAINING PROGRAM

## 2024-08-02 PROCEDURE — 25810000003 LACTATED RINGERS PER 1000 ML: Performed by: STUDENT IN AN ORGANIZED HEALTH CARE EDUCATION/TRAINING PROGRAM

## 2024-08-02 PROCEDURE — 25010000002 DEXAMETHASONE PER 1 MG: Performed by: STUDENT IN AN ORGANIZED HEALTH CARE EDUCATION/TRAINING PROGRAM

## 2024-08-02 PROCEDURE — 25010000002 FENTANYL CITRATE (PF) 50 MCG/ML SOLUTION: Performed by: STUDENT IN AN ORGANIZED HEALTH CARE EDUCATION/TRAINING PROGRAM

## 2024-08-02 RX ORDER — DIPHENHYDRAMINE HYDROCHLORIDE 50 MG/ML
12.5 INJECTION INTRAMUSCULAR; INTRAVENOUS
Status: DISCONTINUED | OUTPATIENT
Start: 2024-08-02 | End: 2024-08-02 | Stop reason: HOSPADM

## 2024-08-02 RX ORDER — HYDROCODONE BITARTRATE AND ACETAMINOPHEN 7.5; 325 MG/1; MG/1
1 TABLET ORAL EVERY 4 HOURS PRN
Status: DISCONTINUED | OUTPATIENT
Start: 2024-08-02 | End: 2024-08-02 | Stop reason: HOSPADM

## 2024-08-02 RX ORDER — ROPIVACAINE HYDROCHLORIDE 5 MG/ML
INJECTION, SOLUTION EPIDURAL; INFILTRATION; PERINEURAL
Status: COMPLETED | OUTPATIENT
Start: 2024-08-02 | End: 2024-08-02

## 2024-08-02 RX ORDER — LABETALOL HYDROCHLORIDE 5 MG/ML
5 INJECTION, SOLUTION INTRAVENOUS
Status: DISCONTINUED | OUTPATIENT
Start: 2024-08-02 | End: 2024-08-02 | Stop reason: HOSPADM

## 2024-08-02 RX ORDER — HYDROCODONE BITARTRATE AND ACETAMINOPHEN 5; 325 MG/1; MG/1
1 TABLET ORAL ONCE AS NEEDED
Status: DISCONTINUED | OUTPATIENT
Start: 2024-08-02 | End: 2024-08-02 | Stop reason: HOSPADM

## 2024-08-02 RX ORDER — MAGNESIUM HYDROXIDE 1200 MG/15ML
LIQUID ORAL AS NEEDED
Status: DISCONTINUED | OUTPATIENT
Start: 2024-08-02 | End: 2024-08-02 | Stop reason: HOSPADM

## 2024-08-02 RX ORDER — DEXAMETHASONE SODIUM PHOSPHATE 4 MG/ML
INJECTION, SOLUTION INTRA-ARTICULAR; INTRALESIONAL; INTRAMUSCULAR; INTRAVENOUS; SOFT TISSUE
Status: COMPLETED | OUTPATIENT
Start: 2024-08-02 | End: 2024-08-02

## 2024-08-02 RX ORDER — FENTANYL CITRATE 50 UG/ML
50 INJECTION, SOLUTION INTRAMUSCULAR; INTRAVENOUS ONCE AS NEEDED
Status: COMPLETED | OUTPATIENT
Start: 2024-08-02 | End: 2024-08-02

## 2024-08-02 RX ORDER — ONDANSETRON 2 MG/ML
4 INJECTION INTRAMUSCULAR; INTRAVENOUS ONCE AS NEEDED
Status: DISCONTINUED | OUTPATIENT
Start: 2024-08-02 | End: 2024-08-02 | Stop reason: HOSPADM

## 2024-08-02 RX ORDER — PROMETHAZINE HYDROCHLORIDE 25 MG/1
25 TABLET ORAL ONCE AS NEEDED
Status: DISCONTINUED | OUTPATIENT
Start: 2024-08-02 | End: 2024-08-02 | Stop reason: HOSPADM

## 2024-08-02 RX ORDER — TRAMADOL HYDROCHLORIDE 50 MG/1
50 TABLET ORAL EVERY 6 HOURS PRN
Qty: 16 TABLET | Refills: 0 | Status: SHIPPED | OUTPATIENT
Start: 2024-08-02 | End: 2024-08-02

## 2024-08-02 RX ORDER — FLUMAZENIL 0.1 MG/ML
0.2 INJECTION INTRAVENOUS AS NEEDED
Status: DISCONTINUED | OUTPATIENT
Start: 2024-08-02 | End: 2024-08-02 | Stop reason: HOSPADM

## 2024-08-02 RX ORDER — IPRATROPIUM BROMIDE AND ALBUTEROL SULFATE 2.5; .5 MG/3ML; MG/3ML
3 SOLUTION RESPIRATORY (INHALATION) ONCE AS NEEDED
Status: DISCONTINUED | OUTPATIENT
Start: 2024-08-02 | End: 2024-08-02 | Stop reason: HOSPADM

## 2024-08-02 RX ORDER — TRAMADOL HYDROCHLORIDE 50 MG/1
50 TABLET ORAL EVERY 6 HOURS PRN
Qty: 16 TABLET | Refills: 0 | Status: SHIPPED | OUTPATIENT
Start: 2024-08-02 | End: 2024-08-02 | Stop reason: HOSPADM

## 2024-08-02 RX ORDER — LIDOCAINE HYDROCHLORIDE AND EPINEPHRINE 10; 10 MG/ML; UG/ML
INJECTION, SOLUTION INFILTRATION; PERINEURAL AS NEEDED
Status: DISCONTINUED | OUTPATIENT
Start: 2024-08-02 | End: 2024-08-02 | Stop reason: HOSPADM

## 2024-08-02 RX ORDER — SODIUM CHLORIDE 0.9 % (FLUSH) 0.9 %
3-10 SYRINGE (ML) INJECTION AS NEEDED
Status: DISCONTINUED | OUTPATIENT
Start: 2024-08-02 | End: 2024-08-02 | Stop reason: HOSPADM

## 2024-08-02 RX ORDER — DROPERIDOL 2.5 MG/ML
0.62 INJECTION, SOLUTION INTRAMUSCULAR; INTRAVENOUS
Status: DISCONTINUED | OUTPATIENT
Start: 2024-08-02 | End: 2024-08-02 | Stop reason: HOSPADM

## 2024-08-02 RX ORDER — PROMETHAZINE HYDROCHLORIDE 25 MG/1
25 SUPPOSITORY RECTAL ONCE AS NEEDED
Status: DISCONTINUED | OUTPATIENT
Start: 2024-08-02 | End: 2024-08-02 | Stop reason: HOSPADM

## 2024-08-02 RX ORDER — LIDOCAINE HYDROCHLORIDE 10 MG/ML
0.5 INJECTION, SOLUTION INFILTRATION; PERINEURAL ONCE AS NEEDED
Status: COMPLETED | OUTPATIENT
Start: 2024-08-02 | End: 2024-08-02

## 2024-08-02 RX ORDER — HYDRALAZINE HYDROCHLORIDE 20 MG/ML
5 INJECTION INTRAMUSCULAR; INTRAVENOUS
Status: DISCONTINUED | OUTPATIENT
Start: 2024-08-02 | End: 2024-08-02 | Stop reason: HOSPADM

## 2024-08-02 RX ORDER — SODIUM CHLORIDE, SODIUM LACTATE, POTASSIUM CHLORIDE, CALCIUM CHLORIDE 600; 310; 30; 20 MG/100ML; MG/100ML; MG/100ML; MG/100ML
9 INJECTION, SOLUTION INTRAVENOUS CONTINUOUS
Status: DISCONTINUED | OUTPATIENT
Start: 2024-08-02 | End: 2024-08-02 | Stop reason: HOSPADM

## 2024-08-02 RX ORDER — EPHEDRINE SULFATE 50 MG/ML
5 INJECTION, SOLUTION INTRAVENOUS ONCE AS NEEDED
Status: DISCONTINUED | OUTPATIENT
Start: 2024-08-02 | End: 2024-08-02 | Stop reason: HOSPADM

## 2024-08-02 RX ORDER — GLYCOPYRROLATE 0.2 MG/ML
INJECTION INTRAMUSCULAR; INTRAVENOUS AS NEEDED
Status: DISCONTINUED | OUTPATIENT
Start: 2024-08-02 | End: 2024-08-02 | Stop reason: SURG

## 2024-08-02 RX ORDER — SODIUM CHLORIDE 0.9 % (FLUSH) 0.9 %
3 SYRINGE (ML) INJECTION EVERY 12 HOURS SCHEDULED
Status: DISCONTINUED | OUTPATIENT
Start: 2024-08-02 | End: 2024-08-02 | Stop reason: HOSPADM

## 2024-08-02 RX ORDER — MIDAZOLAM HYDROCHLORIDE 1 MG/ML
0.5 INJECTION INTRAMUSCULAR; INTRAVENOUS
Status: DISCONTINUED | OUTPATIENT
Start: 2024-08-02 | End: 2024-08-02 | Stop reason: HOSPADM

## 2024-08-02 RX ORDER — NALOXONE HCL 0.4 MG/ML
0.2 VIAL (ML) INJECTION AS NEEDED
Status: DISCONTINUED | OUTPATIENT
Start: 2024-08-02 | End: 2024-08-02 | Stop reason: HOSPADM

## 2024-08-02 RX ORDER — TRAMADOL HYDROCHLORIDE 50 MG/1
50 TABLET ORAL EVERY 6 HOURS PRN
Qty: 16 TABLET | Refills: 0 | Status: SHIPPED | OUTPATIENT
Start: 2024-08-02

## 2024-08-02 RX ORDER — FENTANYL CITRATE 50 UG/ML
25 INJECTION, SOLUTION INTRAMUSCULAR; INTRAVENOUS
Status: DISCONTINUED | OUTPATIENT
Start: 2024-08-02 | End: 2024-08-02 | Stop reason: HOSPADM

## 2024-08-02 RX ORDER — HYDROMORPHONE HYDROCHLORIDE 1 MG/ML
0.25 INJECTION, SOLUTION INTRAMUSCULAR; INTRAVENOUS; SUBCUTANEOUS
Status: DISCONTINUED | OUTPATIENT
Start: 2024-08-02 | End: 2024-08-02 | Stop reason: HOSPADM

## 2024-08-02 RX ADMIN — FENTANYL CITRATE 50 MCG: 50 INJECTION, SOLUTION INTRAMUSCULAR; INTRAVENOUS at 13:30

## 2024-08-02 RX ADMIN — HYDROCODONE BITARTRATE AND ACETAMINOPHEN 1 TABLET: 7.5; 325 TABLET ORAL at 17:11

## 2024-08-02 RX ADMIN — GLYCOPYRROLATE 0.2 MG: 0.2 INJECTION, SOLUTION INTRAMUSCULAR; INTRAVENOUS at 15:08

## 2024-08-02 RX ADMIN — DEXAMETHASONE SODIUM PHOSPHATE 4 MG: 4 INJECTION, SOLUTION INTRA-ARTICULAR; INTRALESIONAL; INTRAMUSCULAR; INTRAVENOUS; SOFT TISSUE at 13:35

## 2024-08-02 RX ADMIN — ROPIVACAINE HYDROCHLORIDE 30 ML: 5 INJECTION EPIDURAL; INFILTRATION; PERINEURAL at 13:35

## 2024-08-02 RX ADMIN — LIDOCAINE HYDROCHLORIDE 0.5 ML: 10 INJECTION, SOLUTION INFILTRATION; PERINEURAL at 12:30

## 2024-08-02 RX ADMIN — SODIUM CHLORIDE, POTASSIUM CHLORIDE, SODIUM LACTATE AND CALCIUM CHLORIDE 9 ML/HR: 600; 310; 30; 20 INJECTION, SOLUTION INTRAVENOUS at 12:30

## 2024-08-02 RX ADMIN — SODIUM CHLORIDE 2 G: 900 INJECTION INTRAVENOUS at 14:55

## 2024-08-02 RX ADMIN — PROPOFOL 50 MCG/KG/MIN: 10 INJECTION, EMULSION INTRAVENOUS at 15:07

## 2024-08-02 NOTE — ANESTHESIA POSTPROCEDURE EVALUATION
Patient: Nathan Baez    Procedure Summary       Date: 08/02/24 Room / Location:  [facility] [address] [place] [address] [facility]    Anesthesia Start: 1500 Anesthesia Stop: 1620    Procedure: LEFT CARPAL TUNNEL RELEASE AND  CUBITAL TUNNEL RELEASE (Left: Wrist) Diagnosis:     Surgeons: Erasmo Rayo MD Provider: Julián Tong MD    Anesthesia Type: MAC, regional ASA Status: 3            Anesthesia Type: MAC, regional    Vitals  Vitals Value Taken Time   /68 08/02/24 1645   Temp 36.4 °C (97.6 °F) 08/02/24 1617   Pulse 63 08/02/24 1648   Resp 18 08/02/24 1635   SpO2 87 % 08/02/24 1648   Vitals shown include unfiled device data.        Post Anesthesia Care and Evaluation    Patient location during evaluation: bedside  Patient participation: complete - patient participated  Level of consciousness: awake  Pain management: adequate    Airway patency: patent  Anesthetic complications: No anesthetic complications    Cardiovascular status: acceptable  Respiratory status: acceptable  Hydration status: acceptable    Comments: /64 (BP Location: Right leg, Patient Position: Lying)   Pulse 70   Temp 36.4 °C (97.6 °F) (Oral)   Resp 18   SpO2 95%

## 2024-08-02 NOTE — PROGRESS NOTES
ASSESSMENT/PLAN:    79 year old male with recurrent anterior abdominal wall cellulitis and panniculitis. On exam today, the cellulitis has essentially resolved, there is what appears to be chronic skin thickening and lymphedema along his pannus inferior to the umbilicus. Dr. Rivers and I discussed with him and recommended another 2 weeks of antibiotics. Will switch him to Bactrim daily. Prescription sent to his pharmacy. No surgical intervention warranted at this time. Reviewed he does not need prophylactic antibiotics after he finished the Bactrim. Encouraged weight loss. He knows to call with any worsening symptoms. Otherwise, he can follow up in our office as needed.     Of note, he is scheduled for left carpal tunnel release and left cubital tunnel release with Dr. Rayo tomorrow; from our standpoint, he is cleared to proceed as scheduled.     CC:     Follow-up abdominal wall cellulitis     HPI:    79 year old male who presents today for a follow-up. He was admitted to the hospital 7/6/2024 - 7/9/2024 for abdominal wall cellulitis/panniculitis. He reports this has been ongoing for the past year or so, usually on average every 6 months. He was treated with IV antibiotics and sent home on oral antibiotics (Augmentin x 3 weeks). He finished his antibiotics Tuesday morning. He feels the erythema and discomfort have significantly improved if not completely resolved. Denies any concerns.   He is undergoing surgery on his left wrist tomorrow and is requesting clearance to proceed.     ENDOSCOPY:   Colonoscopy 2021 Dr. Chowdhury: multiple tubular adenomas  EGD 2023 Dr. Chowdhury     RADIOLOGY:   7/6/2024 US Abdomen: fatty infiltration of liver, cholelithiasis  7/6/2024 CT Abdomen/Pelvis: hepatomegaly, splenomegaly, cholelithiasis, bilateral renal cysts, bilateral non-obstructing renal stones, aortoiliac calcifications, diverticulosis, chronic skin thickening involving the lower anterior abdominal wall, lumbar  "scoliosis    LABS:    7/9/2024 Hgb 12.1, Hct 36.8, Glucose 128, Alkaline phosphatase 208    PAST MEDICAL HISTORY:    Hypertension  Hyperlipidemia  Venous insufficiency  Heart failure   History of DVT  Hypothyroidism  Type 2 diabetes mellitus  Glaucoma  GERD  Diverticulosis  History of prostate cancer  Iron deficiency anemia  Degenerative disc disease  Restless leg syndrome  Carpal tunnel syndrome  KWAME  Morbid obesity     SOCIAL HISTORY:   Denies tobacco use, former smoker quit 1960  Occasional alcohol use    FAMILY HISTORY:    Colorectal cancer: Mother    PREVIOUS ABDOMINAL SURGERY:  Umbilical hernia repair  Prostatectomy    OTHER SURGERY:  Radiofrequency ablation lumbar  Right total hip arthroplasty  Right total shoulder reverse arthroplasty   Right wrist surgery  Right total knee arthroplasty  Thyroid lobectomy  Cervical discectomy  Left large toe replacement  Tonsillectomy     ALLERGIES:   Adhesive tape - rash     MEDICATIONS:   Amlodipine  Aspirin 81mg  Atenolol  Dulcolax  Brimonidine  Celebrex  Vitamin B12  Cosopt  Lasix  Gabapentin   Norco  Anusol  Lantus  Ketoconazole cream  Latanoprost  Levothyroxine  Losartan  Metformin  Montelukast  Pantoprazole  Phentermine  Mirapex  Probiotic  Rosuvastatin    ROS:    No cough, chest pain, shortness of air.  All other systems reviewed and negative other than presenting complaints.    PHYSICAL EXAM:   Constitutional: Well-developed, well-nourished, no acute distress  Vital signs:   Ht: 170.2cm (67\")  Wt: 108kg (238lb)  BMI: 37.28  Eyes: Conjunctiva normal, sclera nonicteric  ENMT: Hearing grossly normal, oral mucosa moist  Respiratory: Normal inspiratory effort  Cardiovascular: No peripheral edema, no jugular venous distention  Gastrointestinal: soft, non-tender, pannus below the umbilicus with skin thickening and chronic lymphedema, no signs of cellulitis, no fluctuance   Skin:  Warm, dry, no rash on visualized skin surfaces  Musculoskeletal: Symmetric strength, normal " gait  Psychiatric: Alert and oriented ×3, normal affect     Laura Gallardo PA-C    [facility] - General Surgery   [address], [address]  [place] [address]    [address], [address]  [place] [address]    Office: [phone]  Fax: [phone]

## 2024-08-02 NOTE — ANESTHESIA PROCEDURE NOTES
Peripheral Block    Pre-sedation assessment completed: 8/2/2024 1:29 PM    Patient reassessed immediately prior to procedure    Patient location during procedure: pre-op  Start time: 8/2/2024 1:30 PM  Stop time: 8/2/2024 1:35 PM  Reason for block: at surgeon's request and post-op pain management  Performed by  Anesthesiologist: Carl Lopez MD  Preanesthetic Checklist  Completed: patient identified, IV checked, site marked, risks and benefits discussed, surgical consent, monitors and equipment checked, pre-op evaluation and timeout performed  Prep:  Pt Position: supine  Sterile barriers:cap, mask and gloves  Prep: ChloraPrep  Patient monitoring: blood pressure monitoring, continuous pulse oximetry and EKG  Procedure    Sedation: yes  Performed under: local infiltration  Guidance:ultrasound guided    ULTRASOUND INTERPRETATION.  Using ultrasound guidance a 21 G gauge needle was placed in close proximity to the nerve, at which point, under ultrasound guidance anesthetic was injected in the area of the nerve and spread of the anesthesia was seen on ultrasound in close proximity thereto.  There were no abnormalities seen on ultrasound; a digital image was taken; and the patient tolerated the procedure with no complications. Images:still images obtained, printed/placed on chart    Laterality:left  Block Type:supraclavicular  Injection Technique:single-shot  Needle Type:echogenic and Tuohy  Needle Gauge:21 G  Resistance on Injection: none    Medications Used: dexamethasone (DECADRON) injection - Injection   4 mg - 8/2/2024 1:35:00 PM  ropivacaine (NAROPIN) 0.5 % injection - Injection   30 mL - 8/2/2024 1:35:00 PM      Post Assessment  Injection Assessment: negative aspiration for heme, no paresthesia on injection and incremental injection  Patient Tolerance:comfortable throughout block  Complications:no  Additional Notes  Ultrasound guidance used to visualize nerve anatomy, guide needle placement and verify local  anesthetic disbursement.      Performed by: Carl Lopez MD

## 2024-08-02 NOTE — OP NOTE
Orthopaedic & Hand Surgery  Carpal Tunnel & Cubital Tunnel Release Note  Dr. Erasmo Rayo  [phone]    PATIENT NAME: Nathan Baez  MRN: [id number]  : 1945 AGE: 79 y.o. GENDER: male  DATE OF OPERATION: 2024  PREOPERATIVE DIAGNOSIS:   Left Cubital Tunnel Syndrome  Left Carpal Tunnel Syndrome  POSTOPERATIVE DIAGNOSIS:   Left Cubital Tunnel Syndrome  Left Carpal Tunnel Syndrome  OPERATION PERFORMED:   Left in situ Cubital Tunnel Release (CPT 75913)  Left Carpal Tunnel Release (CPT 17365)  SURGEON: Erasmo Rayo MD, PhD  ANESTHESIA: Local and Regional  ASSISTANT: None  ESTIMATED BLOOD LOSS: <5 ml  SPONGE AND NEEDLE COUNT: Correct    INDICATIONS: This patient is noted to have cubital tunnel and carpal tunnel syndrome that failed conservative treatment consisting of bracing. The risks of surgery were discussed and included Pain, Bleeding, Infection, Complications of anesthesia, Damage to blood vessels, nerves and other surrounding structures, Stiffness, Scar, Incomplete resolution or recurrence of the condition, Further procedures, and Unforeseen risks of surgery including stroke, heart stoppage or death. No guarantees were made. Following a thorough discussion, questions were solicited and answered to his satisfaction. Verbal and written consent were obtained prior to proceeding with surgery.    SPECIMENS:   None    PERTINENT FINDINGS:   No evidence of subluxation of the ulnar nerve on flexion and extension of the elbow after release.    TOURNIQUET TIME:   24Minutes    DETAILS OF PROCEDURE:  The patient was met in the preoperative area. The sites were marked. The consent and H&P were reviewed. The patient was then transferred to the operative suite.    The patient was positioned supine with the left arm extended on an arm board. General anesthesia was induced. The hand and arm were prepped and draped in normal sterile fashion which included alcohol, chlorhexidine and multiple layers of sterile  draping.     A timeout was performed confirming the sites and side of surgery, the antibiotic and allergy status and the presence of the necessary surgical equipment.     A sterile tourniquet was placed high on the arm, the extremity was then exsanguinated and the tourniquet was inflated. Local anesthetic containing epinephrine was then infiltrated subcutaneously surrounding the medial elbow incision for hemostasis purposes.    Attention was then turned to the carpal tunnel release. A one inch incision was designed in the palm in line with the middle/ring web, making use of an existing crease.      The skin was incised and retracted and hemostasis was achieved. The palmar fascia was divided and the superficial palmar arch protected. The transverse fibers of the flexor retinaculum were then divided under direct vision from distal to proximal. The contents of the carpal tunnel were retracted radially and the proximal ligament and antebrachial fascia were divided, under direct vision to one and one half inches proximal to the wrist crease. This was confirmed visually and palpably with the edge of a freer elevator. Dissection was carried distally to a point visibly and palpably distal to the superficial palmar arch, completely opening the ligament and decompressing the median nerve. The nerve appeared compressed. It was dissected off of the overlying radial side of the flexor retinaculum, to which it was adherent. There were no anomalous branches of the median nerve or masses in the carpal tunnel. Subcutaneous tissues were then infiltrated with local anesthetic containing epinephrine for hemostasis purposes.    Attention was then turned to the cubital tunnel release. The skin was incised with a number 15 blade. The subcutaneous tissue was divided carefully.     The fascial layer, triceps tendon as well as the posterior aspect of the flexor pronator mass were identified and a medial full-thickness fasciocutaneous flap  was raised uncovering the medial epicondyle and the ulnar nerve behind the medial intermuscular septum. The nerve was then traced distally into the area of Godoy's ligament. The ligament appeared to be very stout and nerve appeared to be quite compressed in that area, also through a tight leading edge of the FCU fascia.     The Godoy's ligament was then released and there appeared to be some narrowing of the nerve with some post-stenotic prominence of the nerve. The nerve was then traced out into the flexor pronator mass between the two heads of the FCU tendon about a distance of 7 to 9 cm. Fascial bands were divided carefully. After this, the nerve was traced proximally behind the medial intermuscular septum approximately 10 cm.      The nerve was then taken through range of motion, and appeared to have no adherences. There appeared to be no osteophytes or synovitic tissue from the elbow joint and no tendency of the nerve to subluxate with range of motion.     The tourniquet was then deflated at 24 minutes. Hemostasis was obtained using a combination of direct pressure and bipolar electrocautery. The wounds were copiously irrigated then the elbow wound was closed in a layered fashion. Carpal tunnel incision was closed utilizing 4-0 Prolene suture in a vertical mattress configuration over a vessel loop pledget. Sterile dressings were applied and secured with an Ace bandage. Anesthesia was reversed with uneventful emergence, and the patient was taken to recovery in stable condition. The patient tolerated the procedure well with no immediate complications noted. The estimated blood loss was minimal. No specimens to pathology. Final sponge and instrument count were reported to me as correct.      POSTOPERATIVE PLAN:  The patient is to keep the hand elevated and use the hand lightly.  Remove dressings on postoperative day 4 and cover the wound with dry dressings  Okay to wait and cleanse the wound after 4 days with  clean water and soap  Sutures to be removed on the first postoperative visit  Hand may be immersed in scar massage initiated 3 days after suture removal  Hand may be used gently and progressively for the next 4 weeks postoperative and then normal use and return to regular activities allowed. Anticipate some soreness in palm with pinch,  and palmar pressure and in the medial elbow with pressure and extremes of range of motion for up to 6 months    Erasmo Rayo MD, PhD  Orthopaedic & Hand Surgery  [facility]  [phone] - [place] Office  [phone] - [facility]

## 2024-08-02 NOTE — ANESTHESIA PREPROCEDURE EVALUATION
Anesthesia Evaluation     Patient summary reviewed and Nursing notes reviewed   no history of anesthetic complications:   NPO Solid Status: > 8 hours  NPO Liquid Status: > 2 hours           Airway   Mallampati: II  TM distance: >3 FB  Neck ROM: full  Dental      Pulmonary    (+) ,sleep apnea  Cardiovascular     ECG reviewed    (+) hypertension, hyperlipidemia    ROS comment: Echo reviewed    Neuro/Psych  GI/Hepatic/Renal/Endo    (+) obesity, GERD, liver disease fatty liver disease, renal disease-, diabetes mellitus, thyroid problem hypothyroidism    Musculoskeletal     Abdominal    Substance History      OB/GYN          Other   arthritis,                       Anesthesia Plan    ASA 3     MAC and regional     intravenous induction     Anesthetic plan, risks, benefits, and alternatives have been provided, discussed and informed consent has been obtained with: patient.        CODE STATUS:

## 2024-08-05 ENCOUNTER — OFFICE VISIT (OUTPATIENT)
Dept: PAIN MEDICINE | Facility: CLINIC | Age: 79
End: 2024-08-05
Payer: MEDICARE

## 2024-08-05 VITALS
SYSTOLIC BLOOD PRESSURE: 116 MMHG | HEIGHT: 67 IN | DIASTOLIC BLOOD PRESSURE: 68 MMHG | TEMPERATURE: 96.4 F | OXYGEN SATURATION: 96 % | WEIGHT: 242.8 LBS | BODY MASS INDEX: 38.11 KG/M2 | HEART RATE: 74 BPM

## 2024-08-05 DIAGNOSIS — R20.2 PARESTHESIA OF BILATERAL LEGS: ICD-10-CM

## 2024-08-05 DIAGNOSIS — M51.36 DDD (DEGENERATIVE DISC DISEASE), LUMBAR: Primary | ICD-10-CM

## 2024-08-05 DIAGNOSIS — E11.42 DIABETIC PERIPHERAL NEUROPATHY: ICD-10-CM

## 2024-08-05 PROCEDURE — 3078F DIAST BP <80 MM HG: CPT

## 2024-08-05 PROCEDURE — 1159F MED LIST DOCD IN RCRD: CPT

## 2024-08-05 PROCEDURE — 1125F AMNT PAIN NOTED PAIN PRSNT: CPT

## 2024-08-05 PROCEDURE — 1160F RVW MEDS BY RX/DR IN RCRD: CPT

## 2024-08-05 PROCEDURE — 99213 OFFICE O/P EST LOW 20 MIN: CPT

## 2024-08-05 PROCEDURE — 3074F SYST BP LT 130 MM HG: CPT

## 2024-08-05 RX ORDER — HYDROCODONE BITARTRATE AND ACETAMINOPHEN 7.5; 325 MG/1; MG/1
1 TABLET ORAL EVERY 8 HOURS PRN
Qty: 90 TABLET | Refills: 0 | Status: SHIPPED | OUTPATIENT
Start: 2024-08-05

## 2024-08-05 RX ORDER — GABAPENTIN 300 MG/1
300 CAPSULE ORAL 4 TIMES DAILY
Qty: 360 CAPSULE | Refills: 0 | Status: SHIPPED | OUTPATIENT
Start: 2024-08-05

## 2024-08-05 NOTE — PROGRESS NOTES
CHIEF COMPLAINT  Back pain    Subjective   Nathan Baez is a 79 y.o. male  who presents for follow-up.  He has a history of chronic low back pain. He reports that his pain has remained consistent since his last office visit.     Today pain is 6/10VAS in severity. Pain is located in his low back. Describes this pain as a nearly continuous ache. Pain is worsened by walking long distances, bending/twisting, and prolonged sitting or standing. Pain improves with rest/reposition, heat/ice, and medications. He has completed PT and continues with HEP as tolerated.       Continues with Hydrocodone 7.5mg 3/day and Gabapentin 300mg QID. He reports intermittent constipation but this has been an ongoing issue for many years. He manages this with Miralax. Denies any side effects from the regimen including somnolence. Notes improvement in activity and function with regimen. Wife states that she has noticed an improvement in his mood and gait.  ADL's by self. Denies any bowel or bladder changes.     He had carpal tunnel surgery to his left hand on 8/2/24 with Dr. [facility]. He was prescribed a 4 day supply of Tramadol.     Procedures:  5/17/23 - Bilateral L3-L5 RFA with Dr. Houston - minimal relief     Procedures at [facility]:  5/3/22 - L4-L5 LESI  8/17/22 - L4-L5 LESI    Back Pain  This is a chronic problem. The current episode started more than 1 year ago. The problem occurs daily. The problem is unchanged. The pain is present in the lumbar spine. The quality of the pain is described as aching and burning. The pain does not radiate. The pain is at a severity of 6/10. The pain is moderate. The symptoms are aggravated by standing and sitting (walking). Associated symptoms include weakness (bilateral hand). Pertinent negatives include no abdominal pain, chest pain, dysuria, fever, headaches or numbness. He has tried heat, ice, home exercises, NSAIDs, analgesics and bed rest for the symptoms. The treatment  "provided moderate relief.     PEG Assessment   What number best describes your pain on average in the past week?5  What number best describes how, during the past week, pain has interfered with your enjoyment of life?2  What number best describes how, during the past week, pain has interfered with your general activity?  4    Review of Pertinent Medical Data ---        The following portions of the patient's history were reviewed and updated as appropriate: allergies, current medications, past family history, past medical history, past social history, past surgical history, and problem list.    Review of Systems   Constitutional:  Negative for chills and fever.   Respiratory:  Negative for cough and shortness of breath.    Cardiovascular:  Negative for chest pain.   Gastrointestinal:  Positive for constipation. Negative for abdominal pain and diarrhea.   Genitourinary:  Negative for difficulty urinating and dysuria.   Musculoskeletal:  Positive for back pain and gait problem (ambulates with a cane).   Neurological:  Positive for weakness (bilateral hand). Negative for dizziness, light-headedness, numbness and headaches.   Psychiatric/Behavioral:  Negative for agitation.      I have reviewed and confirmed the accuracy of the ROS as documented by the MA/LPN/RN ALY Robertson    Vitals:    08/05/24 1356   BP: 116/68   BP Location: Right arm   Patient Position: Sitting   Pulse: 74   Temp: 96.4 °F (35.8 °C)   TempSrc: Temporal   SpO2: 96%   Weight: 110 kg (242 lb 12.8 oz)   Height: 170.2 cm (67\")   PainSc:   3   PainLoc: Back     Objective   Physical Exam  Constitutional:       Appearance: Normal appearance.   HENT:      Head: Normocephalic.   Cardiovascular:      Rate and Rhythm: Normal rate and regular rhythm.   Pulmonary:      Effort: Pulmonary effort is normal.      Breath sounds: Normal breath sounds.   Musculoskeletal:      Cervical back: Normal range of motion.      Lumbar back: Tenderness and bony " tenderness present. Decreased range of motion.      Left hip: Tenderness present. Decreased range of motion.   Skin:     General: Skin is warm and dry.      Capillary Refill: Capillary refill takes less than 2 seconds.   Neurological:      General: No focal deficit present.      Mental Status: He is alert and oriented to person, place, and time.      Gait: Gait abnormal (slowed, ambulates with cane).   Psychiatric:         Mood and Affect: Mood normal.         Behavior: Behavior normal.         Thought Content: Thought content normal.         Cognition and Memory: Cognition normal.       Assessment & Plan   Diagnoses and all orders for this visit:    1. DDD (degenerative disc disease), lumbar (Primary)  -     HYDROcodone-acetaminophen (NORCO) 7.5-325 MG per tablet; Take 1 tablet by mouth Every 8 (Eight) Hours As Needed for Moderate Pain. 30 day supply. DNF 8/13/24.  Dispense: 90 tablet; Refill: 0    2. Paresthesia of bilateral legs  -     gabapentin (NEURONTIN) 300 MG capsule; Take 1 capsule by mouth 4 (Four) Times a Day.  Dispense: 360 capsule; Refill: 0    3. Diabetic peripheral neuropathy      Nathan Baez reports a pain score of 3.  Given his pain assessment as noted, treatment options were discussed and the following options were decided upon as a follow-up plan to address the patient's pain: continuation of current treatment plan for pain and prescription for opiod analgesics.    --- The urine drug screen confirmation from 3/6/24 has been reviewed and the result is appropriate based on patient history and QIANA report. Patient is no longer taking Oxycodone.   --- The patient signed an updated copy of the controlled substance agreement on 3/6/24  --- ORT -- low risk  --- Continue Gabapentin. Refill sent to pharmacy.   --- Continue Hydrocodone. DNF 8/13/24. Discussed with patient that he should not be taking this medication with Tramadol prescribed by Dr. Rayo. Patient verbalized understanding. Patient  appears stable with current regimen. No adverse effects. Regarding continuation of opioids, there is no evidence of aberrant behavior or any red flags.  The patient continues with appropriate response to opioid therapy. ADL's remain intact by self.   --- Follow-up 2 months or sooner if needed    Pain / Disability Scale  The scale used for measurement of pain and/or disability for this patient was the [place] back pain disability scale.  The score was 46 on 08/05/2024     QIANA REPORT  As part of the patient's treatment plan, I am prescribing controlled substances. The patient has been made aware of appropriate use of such medications, including potential risk of somnolence, limited ability to drive and/or work safely, and the potential for dependence or overdose. It has also been made clear that these medications are for use by this patient only, without concomitant use of alcohol or other substances unless prescribed.     Patient has completed prescribing agreement detailing terms of continued prescribing of controlled substances, including monitoring QIANA reports, urine drug screening, and pill counts if necessary. The patient is aware that inappropriate use will results in cessation of prescribing such medications.    As the clinician, I personally reviewed the QIANA from 8/5/24 while the patient was in the office today.    History and physical exam exhibit continued safe and appropriate use of controlled substances.    Dictated utilizing Dragon dictation.

## 2024-08-21 DIAGNOSIS — K21.9 GASTROESOPHAGEAL REFLUX DISEASE WITHOUT ESOPHAGITIS: ICD-10-CM

## 2024-08-21 RX ORDER — PANTOPRAZOLE SODIUM 40 MG/1
TABLET, DELAYED RELEASE ORAL
Qty: 180 TABLET | Refills: 0 | Status: SHIPPED | OUTPATIENT
Start: 2024-08-21

## 2024-08-21 RX ORDER — LEVOTHYROXINE SODIUM 88 UG/1
88 TABLET ORAL EVERY MORNING
Qty: 90 TABLET | Refills: 3 | Status: SHIPPED | OUTPATIENT
Start: 2024-08-21

## 2024-08-21 NOTE — TELEPHONE ENCOUNTER
LOV                   6/25/2024  NOV                   9/26/2024  Last RF               2/7/24      PROTOCOL       met    DIONTE Hemphill/CHLOÉR

## 2024-09-25 DIAGNOSIS — M51.369 DDD (DEGENERATIVE DISC DISEASE), LUMBAR: ICD-10-CM

## 2024-09-26 ENCOUNTER — OFFICE VISIT (OUTPATIENT)
Dept: FAMILY MEDICINE CLINIC | Facility: CLINIC | Age: 79
End: 2024-09-26
Payer: MEDICARE

## 2024-09-26 VITALS
DIASTOLIC BLOOD PRESSURE: 70 MMHG | BODY MASS INDEX: 37.79 KG/M2 | WEIGHT: 240.8 LBS | HEIGHT: 67 IN | RESPIRATION RATE: 18 BRPM | OXYGEN SATURATION: 95 % | SYSTOLIC BLOOD PRESSURE: 122 MMHG | TEMPERATURE: 97.7 F | HEART RATE: 73 BPM

## 2024-09-26 DIAGNOSIS — Z79.4 TYPE 2 DIABETES MELLITUS WITH HYPERGLYCEMIA, WITH LONG-TERM CURRENT USE OF INSULIN: ICD-10-CM

## 2024-09-26 DIAGNOSIS — I10 ESSENTIAL HYPERTENSION: Primary | ICD-10-CM

## 2024-09-26 DIAGNOSIS — E11.65 TYPE 2 DIABETES MELLITUS WITH HYPERGLYCEMIA, WITH LONG-TERM CURRENT USE OF INSULIN: ICD-10-CM

## 2024-09-26 DIAGNOSIS — E66.01 CLASS 2 SEVERE OBESITY DUE TO EXCESS CALORIES WITH SERIOUS COMORBIDITY AND BODY MASS INDEX (BMI) OF 36.0 TO 36.9 IN ADULT: ICD-10-CM

## 2024-09-26 PROCEDURE — 1125F AMNT PAIN NOTED PAIN PRSNT: CPT | Performed by: FAMILY MEDICINE

## 2024-09-26 PROCEDURE — 3074F SYST BP LT 130 MM HG: CPT | Performed by: FAMILY MEDICINE

## 2024-09-26 PROCEDURE — 99214 OFFICE O/P EST MOD 30 MIN: CPT | Performed by: FAMILY MEDICINE

## 2024-09-26 PROCEDURE — G2211 COMPLEX E/M VISIT ADD ON: HCPCS | Performed by: FAMILY MEDICINE

## 2024-09-26 PROCEDURE — 3078F DIAST BP <80 MM HG: CPT | Performed by: FAMILY MEDICINE

## 2024-09-26 RX ORDER — HYDROCODONE BITARTRATE AND ACETAMINOPHEN 7.5; 325 MG/1; MG/1
1 TABLET ORAL EVERY 8 HOURS PRN
Qty: 90 TABLET | Refills: 0 | Status: SHIPPED | OUTPATIENT
Start: 2024-09-26

## 2024-09-26 RX ORDER — PHENTERMINE HYDROCHLORIDE 37.5 MG/1
37.5 CAPSULE ORAL EVERY MORNING
Qty: 30 CAPSULE | Refills: 3 | Status: SHIPPED | OUTPATIENT
Start: 2024-09-26

## 2024-09-26 RX ORDER — INSULIN GLARGINE 100 [IU]/ML
20 INJECTION, SOLUTION SUBCUTANEOUS DAILY
Qty: 30 ML | Refills: 12 | Status: SHIPPED | OUTPATIENT
Start: 2024-09-26

## 2024-09-27 LAB
ALBUMIN SERPL-MCNC: 4.2 G/DL (ref 3.5–5.2)
ALBUMIN/GLOB SERPL: 1.8 G/DL
ALP SERPL-CCNC: 165 U/L (ref 39–117)
ALT SERPL-CCNC: 21 U/L (ref 1–41)
AST SERPL-CCNC: 27 U/L (ref 1–40)
BILIRUB SERPL-MCNC: 0.8 MG/DL (ref 0–1.2)
BUN SERPL-MCNC: 19 MG/DL (ref 8–23)
BUN/CREAT SERPL: 16.1 (ref 7–25)
CALCIUM SERPL-MCNC: 9 MG/DL (ref 8.6–10.5)
CHLORIDE SERPL-SCNC: 101 MMOL/L (ref 98–107)
CHOLEST SERPL-MCNC: 124 MG/DL (ref 0–200)
CHOLEST/HDLC SERPL: 4.28 {RATIO}
CO2 SERPL-SCNC: 28.1 MMOL/L (ref 22–29)
CREAT SERPL-MCNC: 1.18 MG/DL (ref 0.76–1.27)
EGFRCR SERPLBLD CKD-EPI 2021: 63.2 ML/MIN/1.73
GLOBULIN SER CALC-MCNC: 2.4 GM/DL
GLUCOSE SERPL-MCNC: 159 MG/DL (ref 65–99)
HBA1C MFR BLD: 8.4 % (ref 4.8–5.6)
HDLC SERPL-MCNC: 29 MG/DL (ref 40–60)
LDLC SERPL CALC-MCNC: 44 MG/DL (ref 0–100)
POTASSIUM SERPL-SCNC: 3.9 MMOL/L (ref 3.5–5.2)
PROT SERPL-MCNC: 6.6 G/DL (ref 6–8.5)
SODIUM SERPL-SCNC: 140 MMOL/L (ref 136–145)
TRIGL SERPL-MCNC: 340 MG/DL (ref 0–150)
VLDLC SERPL CALC-MCNC: 51 MG/DL (ref 5–40)

## 2024-10-07 ENCOUNTER — OFFICE VISIT (OUTPATIENT)
Dept: PAIN MEDICINE | Facility: CLINIC | Age: 79
End: 2024-10-07
Payer: MEDICARE

## 2024-10-07 VITALS
OXYGEN SATURATION: 96 % | DIASTOLIC BLOOD PRESSURE: 78 MMHG | WEIGHT: 242.2 LBS | HEIGHT: 67 IN | SYSTOLIC BLOOD PRESSURE: 138 MMHG | HEART RATE: 84 BPM | BODY MASS INDEX: 38.01 KG/M2 | TEMPERATURE: 97.8 F | RESPIRATION RATE: 20 BRPM

## 2024-10-07 DIAGNOSIS — M47.816 LUMBAR FACET ARTHROPATHY: ICD-10-CM

## 2024-10-07 DIAGNOSIS — R20.2 PARESTHESIA OF BILATERAL LEGS: ICD-10-CM

## 2024-10-07 DIAGNOSIS — M51.360 DEGENERATION OF INTERVERTEBRAL DISC OF LUMBAR REGION WITH DISCOGENIC BACK PAIN: Primary | ICD-10-CM

## 2024-10-07 DIAGNOSIS — E11.42 DIABETIC PERIPHERAL NEUROPATHY: ICD-10-CM

## 2024-10-07 DIAGNOSIS — M16.12 PRIMARY OSTEOARTHRITIS OF LEFT HIP: ICD-10-CM

## 2024-10-07 DIAGNOSIS — Z79.899 HIGH RISK MEDICATION USE: ICD-10-CM

## 2024-10-07 LAB
POC AMPHETAMINES: POSITIVE
POC BARBITURATES: NEGATIVE
POC BENZODIAZEPHINES: NEGATIVE
POC COCAINE: NEGATIVE
POC METHADONE: NEGATIVE
POC METHAMPHETAMINE SCREEN URINE: NEGATIVE
POC OPIATES: POSITIVE
POC OXYCODONE: NEGATIVE
POC PHENCYCLIDINE: NEGATIVE
POC PROPOXYPHENE: NEGATIVE
POC THC: NEGATIVE
POC TRICYCLIC ANTIDEPRESSANTS: NEGATIVE

## 2024-10-07 PROCEDURE — 3075F SYST BP GE 130 - 139MM HG: CPT

## 2024-10-07 PROCEDURE — 1160F RVW MEDS BY RX/DR IN RCRD: CPT

## 2024-10-07 PROCEDURE — 99213 OFFICE O/P EST LOW 20 MIN: CPT

## 2024-10-07 PROCEDURE — 3078F DIAST BP <80 MM HG: CPT

## 2024-10-07 PROCEDURE — 80305 DRUG TEST PRSMV DIR OPT OBS: CPT

## 2024-10-07 PROCEDURE — 1125F AMNT PAIN NOTED PAIN PRSNT: CPT

## 2024-10-07 PROCEDURE — 1159F MED LIST DOCD IN RCRD: CPT

## 2024-10-07 NOTE — PROGRESS NOTES
CHIEF COMPLAINT  Back pain    Subjective   Nathan Baez is a 79 y.o. male  who presents for follow-up.  He has a history of chronic low back pain. He rpeors that his pain level has fluctuated since his last office visit and a varies based on activity level.     Today pain is 4/10VAS in severity. Pain is located in his low back. Denies radicular pain. Describes this pain as a nearly continuous ache. Pain is worsened by walking long distances, bending/twisting, and prolonged sitting or standing. Pain improves with rest/reposition, heat/ice, and medications. He has completed PT and continues with HEP as tolerated.       Continues with Hydrocodone 7.5mg 3/day and Gabapentin 300mg QID. He reports intermittent constipation but this has been an ongoing issue for many years. He manages this with Miralax. Denies any side effects from the regimen including somnolence. Notes improvement in activity and function with regimen. Wife states that she has noticed an improvement in his mood and gait.  ADL's by self. Denies any bowel or bladder changes.      He had carpal tunnel surgery to his left hand on 8/2/24 with Dr. [facility]o at [facility]. He was prescribed a 4 day supply of Tramadol.      Procedures:  5/17/23 - Bilateral L3-L5 RFA with Dr. Houston - minimal relief     Procedures at [facility]:  5/3/22 - L4-L5 LESI  8/17/22 - L4-L5 LESI    Back Pain  This is a chronic problem. The current episode started more than 1 year ago. The problem occurs daily. The problem is unchanged. The pain is present in the lumbar spine. The quality of the pain is described as aching and burning. The pain does not radiate. The pain is at a severity of 6/10. The pain is moderate. The symptoms are aggravated by standing and sitting (walking). Associated symptoms include weakness. Pertinent negatives include no abdominal pain, chest pain, dysuria, fever, headaches or numbness. He has tried heat, ice, home exercises, NSAIDs, analgesics and bed  "rest for the symptoms. The treatment provided moderate relief.     PEG Assessment   What number best describes your pain on average in the past week?5  What number best describes how, during the past week, pain has interfered with your enjoyment of life?8  What number best describes how, during the past week, pain has interfered with your general activity?  8    Review of Pertinent Medical Data ---            The following portions of the patient's history were reviewed and updated as appropriate: allergies, current medications, past family history, past medical history, past social history, past surgical history, and problem list.    Review of Systems   Constitutional:  Negative for activity change, fatigue and fever.   Respiratory:  Negative for cough and chest tightness.    Cardiovascular:  Negative for chest pain.   Gastrointestinal:  Negative for abdominal pain, constipation and diarrhea.   Genitourinary:  Negative for dysuria.   Musculoskeletal:  Positive for back pain.   Neurological:  Positive for weakness. Negative for dizziness, light-headedness, numbness and headaches.   Psychiatric/Behavioral:  Positive for sleep disturbance (occ). Negative for agitation and suicidal ideas. The patient is nervous/anxious.      I have reviewed and confirmed the accuracy of the ROS as documented by the MA/LPN/RN ALY Robertson    Vitals:    10/07/24 1338   BP: 138/78   BP Location: Left arm   Patient Position: Sitting   Cuff Size: Large Adult   Pulse: 84   Resp: 20   Temp: 97.8 °F (36.6 °C)   TempSrc: Oral   SpO2: 96%   Weight: 110 kg (242 lb 3.2 oz)   Height: 170.2 cm (67.01\")   PainSc:   4     Objective   Physical Exam  Constitutional:       Appearance: Normal appearance.   HENT:      Head: Normocephalic.   Cardiovascular:      Rate and Rhythm: Normal rate and regular rhythm.   Pulmonary:      Effort: Pulmonary effort is normal.      Breath sounds: Normal breath sounds.   Musculoskeletal:      Cervical back: " Normal range of motion.      Lumbar back: Tenderness and bony tenderness present. Decreased range of motion.      Left hip: Tenderness present. Decreased range of motion.   Skin:     General: Skin is warm and dry.      Capillary Refill: Capillary refill takes less than 2 seconds.   Neurological:      General: No focal deficit present.      Mental Status: He is alert and oriented to person, place, and time.      Gait: Gait abnormal (slowed, ambulates with cane).   Psychiatric:         Mood and Affect: Mood normal.         Behavior: Behavior normal.         Thought Content: Thought content normal.         Cognition and Memory: Cognition normal.       Assessment & Plan   Diagnoses and all orders for this visit:    1. Degeneration of intervertebral disc of lumbar region with discogenic back pain (Primary)  -     Urine Drug Screen Confirmation - Urine, Clean Catch; Future  -     POC Urine Drug Screen, Triage    2. Paresthesia of bilateral legs  -     Urine Drug Screen Confirmation - Urine, Clean Catch; Future  -     POC Urine Drug Screen, Triage    3. Diabetic peripheral neuropathy  -     Urine Drug Screen Confirmation - Urine, Clean Catch; Future  -     POC Urine Drug Screen, Triage    4. Lumbar facet arthropathy  -     Urine Drug Screen Confirmation - Urine, Clean Catch; Future  -     POC Urine Drug Screen, Triage    5. Primary osteoarthritis of left hip  -     Urine Drug Screen Confirmation - Urine, Clean Catch; Future  -     POC Urine Drug Screen, Triage    6. High risk medication use  -     Urine Drug Screen Confirmation - Urine, Clean Catch; Future  -     POC Urine Drug Screen, Triage      Nathan Baez reports a pain score of 4.  Given his pain assessment as noted, treatment options were discussed and the following options were decided upon as a follow-up plan to address the patient's pain: continuation of current treatment plan for pain.    --- Routine UDS in office today as part of monitoring requirements for  controlled substances.  The specimen was viewed and the immunoassay result reviewed and is +OPI/AMP.  This specimen will be sent to [facility] laboratory for confirmation.     --- The patient signed an updated copy of the controlled substance agreement on 3/6/24  --- ORT -- low risk  --- Continue Gabapentin. No refill needed at this time.   --- Continue Hydrocodone. No refill needed at this time. Patient verbalized understanding. Patient appears stable with current regimen. No adverse effects. Regarding continuation of opioids, there is no evidence of aberrant behavior or any red flags.  The patient continues with appropriate response to opioid therapy. ADL's remain intact by self.   --- Follow-up 2 months or sooner if needed     QIANA REPORT  As part of the patient's treatment plan, I am prescribing controlled substances. The patient has been made aware of appropriate use of such medications, including potential risk of somnolence, limited ability to drive and/or work safely, and the potential for dependence or overdose. It has also been made clear that these medications are for use by this patient only, without concomitant use of alcohol or other substances unless prescribed.     Patient has completed prescribing agreement detailing terms of continued prescribing of controlled substances, including monitoring QIANA reports, urine drug screening, and pill counts if necessary. The patient is aware that inappropriate use will results in cessation of prescribing such medications.    As the clinician, I personally reviewed the QIANA from 10/7/24 while the patient was in the office today.    History and physical exam exhibit continued safe and appropriate use of controlled substances.    Dictated utilizing Dragon dictation.

## 2024-10-16 DIAGNOSIS — M51.369 DDD (DEGENERATIVE DISC DISEASE), LUMBAR: ICD-10-CM

## 2024-10-16 NOTE — TELEPHONE ENCOUNTER
Medication Refill Request    Date of phone call: 10-16-24    Medication being requested: norco 7.5s sig:  Take 1 tablet by mouth Every 8 (Eight) Hours As Needed for Moderate Pain   Qty: 90    Date of last visit: 10-07-24    Date of last refill:     QIANA up to date?:     Next Follow up?: 12-9-24    Any new pertinent information? (i.e, new medication allergies, new use of medications, change in patient's health or condition, non-compliance or inconsistency with prescribing agreement?):

## 2024-10-17 RX ORDER — HYDROCODONE BITARTRATE AND ACETAMINOPHEN 7.5; 325 MG/1; MG/1
1 TABLET ORAL EVERY 8 HOURS PRN
Qty: 90 TABLET | Refills: 0 | Status: SHIPPED | OUTPATIENT
Start: 2024-10-17

## 2024-11-22 DIAGNOSIS — M51.369 DDD (DEGENERATIVE DISC DISEASE), LUMBAR: ICD-10-CM

## 2024-11-22 RX ORDER — HYDROCODONE BITARTRATE AND ACETAMINOPHEN 7.5; 325 MG/1; MG/1
1 TABLET ORAL EVERY 8 HOURS PRN
Qty: 90 TABLET | Refills: 0 | Status: SHIPPED | OUTPATIENT
Start: 2024-11-22

## 2024-11-22 NOTE — TELEPHONE ENCOUNTER
Caller: SUSANNA     Relationship: WIFE ON  [employer]     Best call back number: [phone]    Requested Prescriptions:   Requested Prescriptions     Pending Prescriptions Disp Refills    HYDROcodone-acetaminophen (NORCO) 7.5-325 MG per tablet 90 tablet 0     Sig: Take 1 tablet by mouth Every 8 (Eight) Hours As Needed for Moderate Pain. 30 day supply. DNF 10/26/24        Pharmacy where request should be sent:    [id number] - [place], [place] - [address] [facility] - [phone]  - [phone]    [address] [facility] [address]   Phone: [phone] Fax: [phone]   Hours: Not open 24 hours       Last office visit with prescribing clinician: 10/7/2024   Last telemedicine visit with prescribing clinician: Visit date not found   Next office visit with prescribing clinician: 12/9/2024     Additional details provided by patient: PATIENT HAS ENOUGH FOR THE WEEKEND     Does the patient have less than a 3 day supply:  [] Yes  [x] No    Would you like a call back once the refill request has been completed: [] Yes [x] No    If the office needs to give you a call back, can they leave a voicemail: [] Yes [x] No    Margy Spencer Rep   11/22/24 15:30 EST

## 2024-12-04 DIAGNOSIS — R20.2 PARESTHESIA OF BILATERAL LEGS: ICD-10-CM

## 2024-12-05 RX ORDER — GABAPENTIN 300 MG/1
300 CAPSULE ORAL 4 TIMES DAILY
Qty: 360 CAPSULE | Refills: 0 | Status: SHIPPED | OUTPATIENT
Start: 2024-12-05

## 2024-12-09 ENCOUNTER — OFFICE VISIT (OUTPATIENT)
Dept: PAIN MEDICINE | Facility: CLINIC | Age: 79
End: 2024-12-09
Payer: MEDICARE

## 2024-12-09 ENCOUNTER — TELEPHONE (OUTPATIENT)
Dept: FAMILY MEDICINE CLINIC | Facility: CLINIC | Age: 79
End: 2024-12-09
Payer: MEDICARE

## 2024-12-09 VITALS
OXYGEN SATURATION: 95 % | TEMPERATURE: 96 F | WEIGHT: 240 LBS | HEART RATE: 95 BPM | BODY MASS INDEX: 37.67 KG/M2 | SYSTOLIC BLOOD PRESSURE: 114 MMHG | DIASTOLIC BLOOD PRESSURE: 72 MMHG | HEIGHT: 67 IN

## 2024-12-09 DIAGNOSIS — Z79.4 TYPE 2 DIABETES MELLITUS WITH HYPERGLYCEMIA, WITH LONG-TERM CURRENT USE OF INSULIN: Primary | ICD-10-CM

## 2024-12-09 DIAGNOSIS — M51.360 DEGENERATION OF INTERVERTEBRAL DISC OF LUMBAR REGION WITH DISCOGENIC BACK PAIN: Primary | ICD-10-CM

## 2024-12-09 DIAGNOSIS — E11.42 DIABETIC PERIPHERAL NEUROPATHY: ICD-10-CM

## 2024-12-09 DIAGNOSIS — M16.12 PRIMARY OSTEOARTHRITIS OF LEFT HIP: ICD-10-CM

## 2024-12-09 DIAGNOSIS — K59.04 CHRONIC IDIOPATHIC CONSTIPATION: ICD-10-CM

## 2024-12-09 DIAGNOSIS — E11.65 TYPE 2 DIABETES MELLITUS WITH HYPERGLYCEMIA, WITH LONG-TERM CURRENT USE OF INSULIN: Primary | ICD-10-CM

## 2024-12-09 DIAGNOSIS — Z79.899 HIGH RISK MEDICATION USE: ICD-10-CM

## 2024-12-09 DIAGNOSIS — M47.816 LUMBAR FACET ARTHROPATHY: ICD-10-CM

## 2024-12-09 PROCEDURE — 1159F MED LIST DOCD IN RCRD: CPT

## 2024-12-09 PROCEDURE — 3078F DIAST BP <80 MM HG: CPT

## 2024-12-09 PROCEDURE — 1125F AMNT PAIN NOTED PAIN PRSNT: CPT

## 2024-12-09 PROCEDURE — 3074F SYST BP LT 130 MM HG: CPT

## 2024-12-09 PROCEDURE — 1160F RVW MEDS BY RX/DR IN RCRD: CPT

## 2024-12-09 PROCEDURE — 99213 OFFICE O/P EST LOW 20 MIN: CPT

## 2024-12-09 RX ORDER — AMOXICILLIN 250 MG
2 CAPSULE ORAL 2 TIMES DAILY
Qty: 200 TABLET | Refills: 3 | Status: SHIPPED | OUTPATIENT
Start: 2024-12-09

## 2024-12-09 RX ORDER — INSULIN GLARGINE 100 [IU]/ML
20 INJECTION, SOLUTION SUBCUTANEOUS DAILY
Qty: 30 ML | Refills: 12 | Status: SHIPPED | OUTPATIENT
Start: 2024-12-09

## 2024-12-09 RX ORDER — INSULIN GLARGINE 100 [IU]/ML
20 INJECTION, SOLUTION SUBCUTANEOUS DAILY
Qty: 30 ML | Refills: 12 | Status: CANCELLED | OUTPATIENT
Start: 2024-12-09

## 2024-12-09 NOTE — TELEPHONE ENCOUNTER
Caller: Sasha,Coco    Relationship: Emergency Contact    Best call back number: [phone]    Requested Prescriptions: Insulin Glargine (Lantus SoloStar) 100 UNIT/ML injection pen   Requested Prescriptions      No prescriptions requested or ordered in this encounter        Pharmacy where request should be sent: [id number] [address] [facility] - [phone] [facility] [phone]  [phone]      Last office visit with prescribing clinician: 9/26/2024   Last telemedicine visit with prescribing clinician: Visit date not found   Next office visit with prescribing clinician: 1/8/2025     Additional details provided by patient: PATIENT'S WIFE IS CALLING TO REQUEST NEW PRESCRIPTION WITH REFILLS FOR THE ABOVE MEDICATION.  SHE STATES HE IS CHANGING PHARMACY.    Does the patient have less than a 3 day supply:  [x] Yes  [] No    Would you like a call back once the refill request has been completed: [] Yes [] No    If the office needs to give you a call back, can they leave a voicemail: [] Yes [] No    Coco Salomon, RegSched Rep   12/09/24 12:42 EST     PLEASE ADVISE.

## 2024-12-09 NOTE — PROGRESS NOTES
CHIEF COMPLAINT  Back pain    Subjective   Nathan Baez is a 79 y.o. male  who presents for follow-up.  He has a history of chronic low back pain. He reports that his pain has remained consistent since his last office visit. Today pain is 5/10VAS in severity. He continues to complain of axial low back pain that radiates across his low back in a bandlike pattern. Denies radicular pain. Describes this pain as a nearly continuous ache. He has completed PT and continues with HEP as tolerated.       Continues with Hydrocodone 7.5mg 3/day and Gabapentin 300mg QID. He reports intermittent constipation but this has been an ongoing issue for many years. He manages this with Miralax. Denies any side effects from the regimen including somnolence. Notes improvement in activity and function with regimen. Wife states that she has noticed an improvement in his mood and gait.  ADL's by self. Denies any bowel or bladder changes.      Procedures:  5/17/23 - Bilateral L3-L5 RFA with Dr. Houston - minimal relief     Procedures at [facility]:  5/3/22 - L4-L5 LESI  8/17/22 - L4-L5 LESI    Back Pain  This is a chronic problem. The current episode started more than 1 year ago. The problem occurs daily. The problem has been comes and goes since onset. The pain is present in the lumbar spine. The quality of the pain is described as aching and burning. The pain does not radiate. The pain is at a severity of 5/10. The pain is moderate. The symptoms are aggravated by standing and sitting (walking). Associated symptoms include numbness (fingers -bilateral hand) and weakness (bilateral hand). Pertinent negatives include no abdominal pain, chest pain, dysuria, fever or headaches. He has tried heat, ice, home exercises, NSAIDs, analgesics and bed rest for the symptoms. The treatment provided moderate relief.     PEG Assessment   What number best describes your pain on average in the past week?5  What number best describes how, during the past week, pain  "has interfered with your enjoyment of life?8  What number best describes how, during the past week, pain has interfered with your general activity?  8    Review of Pertinent Medical Data ---          The following portions of the patient's history were reviewed and updated as appropriate: allergies, current medications, past family history, past medical history, past social history, past surgical history, and problem list.    Review of Systems   Constitutional:  Negative for chills and fever.   Respiratory:  Positive for shortness of breath. Negative for cough.    Cardiovascular:  Negative for chest pain.   Gastrointestinal:  Negative for abdominal pain, constipation and diarrhea.   Genitourinary:  Negative for difficulty urinating and dysuria.   Musculoskeletal:  Positive for back pain and gait problem (ambulates with a walker).   Neurological:  Positive for weakness (bilateral hand) and numbness (fingers -bilateral hand). Negative for dizziness, light-headedness and headaches.   Psychiatric/Behavioral:  Negative for agitation.      I have reviewed and confirmed the accuracy of the ROS as documented by the MA/LPN/RN ALY Robertson    Vitals:    12/09/24 1336   BP: 114/72   BP Location: Left arm   Patient Position: Sitting   Pulse: 95   Temp: 96 °F (35.6 °C)   TempSrc: Temporal   SpO2: 95%   Weight: 109 kg (240 lb)   Height: 170.2 cm (67.01\")   PainSc:   5   PainLoc: Back     Objective   Physical Exam  Constitutional:       Appearance: Normal appearance.   HENT:      Head: Normocephalic.   Cardiovascular:      Rate and Rhythm: Normal rate and regular rhythm.   Pulmonary:      Effort: Pulmonary effort is normal.      Breath sounds: Normal breath sounds.   Musculoskeletal:      Cervical back: Normal range of motion.      Lumbar back: Tenderness and bony tenderness present. Decreased range of motion.      Left hip: Tenderness present. Decreased range of motion.   Skin:     General: Skin is warm and dry.      " Capillary Refill: Capillary refill takes less than 2 seconds.   Neurological:      General: No focal deficit present.      Mental Status: He is alert and oriented to person, place, and time.      Gait: Gait abnormal (slowed, ambulates with rollator).   Psychiatric:         Mood and Affect: Mood normal.         Behavior: Behavior normal.         Thought Content: Thought content normal.         Cognition and Memory: Cognition normal.       Assessment & Plan   Diagnoses and all orders for this visit:    1. Degeneration of intervertebral disc of lumbar region with discogenic back pain (Primary)    2. Diabetic peripheral neuropathy    3. Lumbar facet arthropathy    4. Primary osteoarthritis of left hip    5. High risk medication use      Nathan Baez reports a pain score of 5.  Given his pain assessment as noted, treatment options were discussed and the following options were decided upon as a follow-up plan to address the patient's pain: continuation of current treatment plan for pain.    --- The urine drug screen confirmation from 10/7/24 has been reviewed and the result is appropriate based on patient history and QIANA report  --- The patient signed an updated copy of the controlled substance agreement on 3/6/24  --- ORT -- low risk  --- Continue Gabapentin and Hydrocodone. No refill needed at this time. Patient verbalized understanding. Patient appears stable with current regimen. No adverse effects. Regarding continuation of opioids, there is no evidence of aberrant behavior or any red flags.  The patient continues with appropriate response to opioid therapy. ADL's remain intact by self.   --- Follow-up 2 months or sooner if needed     QIANA REPORT  As part of the patient's treatment plan, I am prescribing controlled substances. The patient has been made aware of appropriate use of such medications, including potential risk of somnolence, limited ability to drive and/or work safely, and the potential for  dependence or overdose. It has also been made clear that these medications are for use by this patient only, without concomitant use of alcohol or other substances unless prescribed.     Patient has completed prescribing agreement detailing terms of continued prescribing of controlled substances, including monitoring QIANA reports, urine drug screening, and pill counts if necessary. The patient is aware that inappropriate use will results in cessation of prescribing such medications.    As the clinician, I personally reviewed the QIANA from 12/9/24 while the patient was in the office today.    History and physical exam exhibit continued safe and appropriate use of controlled substances.    Dictated utilizing Dragon dictation.

## 2024-12-27 ENCOUNTER — TELEPHONE (OUTPATIENT)
Dept: PAIN MEDICINE | Facility: CLINIC | Age: 79
End: 2024-12-27
Payer: MEDICARE

## 2024-12-27 DIAGNOSIS — M51.369 DDD (DEGENERATIVE DISC DISEASE), LUMBAR: ICD-10-CM

## 2024-12-27 RX ORDER — HYDROCODONE BITARTRATE AND ACETAMINOPHEN 7.5; 325 MG/1; MG/1
1 TABLET ORAL EVERY 8 HOURS PRN
Qty: 90 TABLET | Refills: 0 | Status: SHIPPED | OUTPATIENT
Start: 2024-12-27

## 2024-12-27 NOTE — TELEPHONE ENCOUNTER
Medication Refill Request    Date of phone call: 12-27-24    Medication being requested: norco 7.5 sig:  Take 1 tablet by mouth Every 8 (Eight) Hours As Needed for Moderate Pain.   Qty: 90    Date of last visit: 12-9-24    Date of last refill:     QIANA up to date?:     Next Follow up?: 2-10-25    Any new pertinent information? (i.e, new medication allergies, new use of medications, change in patient's health or condition, non-compliance or inconsistency with prescribing agreement?):

## 2024-12-27 NOTE — TELEPHONE ENCOUNTER
Caller: Coco Baez    Relationship: Emergency Contact    Best call back number:[phone]    Requested Prescriptions:   HYDROcodone-acetaminophen (NORCO) 7.5-325 MG per tablet     Pharmacy where request should be sent:  [id number] [place], [place] - [address] [facility] [address] [facility] - [phone]  - [phone]  [phone]     Last office visit with prescribing clinician: 12/9/2024   Last telemedicine visit with prescribing clinician: Visit date not found   Next office visit with prescribing clinician: 2/10/2025     Additional details provided by patient: HAS ENOUGH TIL MONDAY    Does the patient have less than a 3 day supply:  [] Yes  [x] No    Would you like a call back once the refill request has been completed: [] Yes [] No    If the office needs to give you a call back, can they leave a voicemail: [] Yes [] No    Margy Mcneil Rep   12/27/24 09:32 EST

## 2025-01-06 DIAGNOSIS — E78.5 HYPERLIPIDEMIA, UNSPECIFIED HYPERLIPIDEMIA TYPE: ICD-10-CM

## 2025-01-06 DIAGNOSIS — J30.1 SEASONAL ALLERGIC RHINITIS DUE TO POLLEN: ICD-10-CM

## 2025-01-06 DIAGNOSIS — I10 ESSENTIAL HYPERTENSION: Primary | ICD-10-CM

## 2025-01-06 RX ORDER — MONTELUKAST SODIUM 10 MG/1
10 TABLET ORAL NIGHTLY
Qty: 90 TABLET | Refills: 0 | Status: SHIPPED | OUTPATIENT
Start: 2025-01-06

## 2025-01-06 RX ORDER — LOSARTAN POTASSIUM 100 MG/1
100 TABLET ORAL DAILY
Qty: 90 TABLET | Refills: 1 | Status: SHIPPED | OUTPATIENT
Start: 2025-01-06

## 2025-01-06 RX ORDER — ROSUVASTATIN CALCIUM 20 MG/1
20 TABLET, COATED ORAL EVERY EVENING
Qty: 90 TABLET | Refills: 1 | Status: SHIPPED | OUTPATIENT
Start: 2025-01-06

## 2025-01-21 ENCOUNTER — OFFICE VISIT (OUTPATIENT)
Dept: FAMILY MEDICINE CLINIC | Facility: CLINIC | Age: 80
End: 2025-01-21
Payer: MEDICARE

## 2025-01-21 VITALS
SYSTOLIC BLOOD PRESSURE: 118 MMHG | HEIGHT: 67 IN | RESPIRATION RATE: 17 BRPM | DIASTOLIC BLOOD PRESSURE: 78 MMHG | OXYGEN SATURATION: 95 % | BODY MASS INDEX: 36.13 KG/M2 | HEART RATE: 70 BPM | TEMPERATURE: 97.5 F | WEIGHT: 230.2 LBS

## 2025-01-21 DIAGNOSIS — I10 ESSENTIAL HYPERTENSION: ICD-10-CM

## 2025-01-21 DIAGNOSIS — E66.812 CLASS 2 SEVERE OBESITY DUE TO EXCESS CALORIES WITH SERIOUS COMORBIDITY AND BODY MASS INDEX (BMI) OF 36.0 TO 36.9 IN ADULT: ICD-10-CM

## 2025-01-21 DIAGNOSIS — E11.65 TYPE 2 DIABETES MELLITUS WITH HYPERGLYCEMIA, WITH LONG-TERM CURRENT USE OF INSULIN: ICD-10-CM

## 2025-01-21 DIAGNOSIS — R31.0 GROSS HEMATURIA: Primary | ICD-10-CM

## 2025-01-21 DIAGNOSIS — E53.8 B12 DEFICIENCY: ICD-10-CM

## 2025-01-21 DIAGNOSIS — E66.01 CLASS 2 SEVERE OBESITY DUE TO EXCESS CALORIES WITH SERIOUS COMORBIDITY AND BODY MASS INDEX (BMI) OF 36.0 TO 36.9 IN ADULT: ICD-10-CM

## 2025-01-21 DIAGNOSIS — E03.9 HYPOTHYROIDISM (ACQUIRED): ICD-10-CM

## 2025-01-21 DIAGNOSIS — E78.2 MIXED HYPERLIPIDEMIA: ICD-10-CM

## 2025-01-21 DIAGNOSIS — Z79.4 TYPE 2 DIABETES MELLITUS WITH HYPERGLYCEMIA, WITH LONG-TERM CURRENT USE OF INSULIN: ICD-10-CM

## 2025-01-21 PROCEDURE — 99214 OFFICE O/P EST MOD 30 MIN: CPT | Performed by: FAMILY MEDICINE

## 2025-01-21 PROCEDURE — G2211 COMPLEX E/M VISIT ADD ON: HCPCS | Performed by: FAMILY MEDICINE

## 2025-01-21 PROCEDURE — 3074F SYST BP LT 130 MM HG: CPT | Performed by: FAMILY MEDICINE

## 2025-01-21 PROCEDURE — 3078F DIAST BP <80 MM HG: CPT | Performed by: FAMILY MEDICINE

## 2025-01-21 PROCEDURE — G0439 PPPS, SUBSEQ VISIT: HCPCS | Performed by: FAMILY MEDICINE

## 2025-01-21 PROCEDURE — 1125F AMNT PAIN NOTED PAIN PRSNT: CPT | Performed by: FAMILY MEDICINE

## 2025-01-21 RX ORDER — PHENTERMINE HYDROCHLORIDE 37.5 MG/1
37.5 CAPSULE ORAL EVERY MORNING
Qty: 30 CAPSULE | Refills: 3 | Status: SHIPPED | OUTPATIENT
Start: 2025-01-21

## 2025-01-21 NOTE — PROGRESS NOTES
Subjective   The ABCs of the Annual Wellness Visit  Medicare Wellness Visit      Nathan Baez is a 79 y.o. patient who presents for a Medicare Wellness Visit.    The following portions of the patient's history were reviewed and   updated as appropriate: allergies, current medications, past family history, past medical history, past social history, past surgical history, and problem list.    Compared to one year ago, the patient's physical   health is the same.  Compared to one year ago, the patient's mental   health is the same.    Recent Hospitalizations:  This patient has had a [facility] admission record on file within the last 365 days.  Current Medical Providers:  Patient Care Team:  Damien Pierce MD as PCP - General (Family Medicine)  Tadeo Peralta MD as Consulting Physician (Pulmonary Disease)  Isadora Oh APRN as Nurse Practitioner (Pain Medicine)    Outpatient Medications Prior to Visit   Medication Sig Dispense Refill    amLODIPine (NORVASC) 2.5 MG tablet Take 1 tablet by mouth Daily. 90 tablet 3    aspirin 81 MG EC tablet Take 1 tablet by mouth Daily. Indications: Coronary Bypass Surgery      atenolol (Tenormin) 25 MG tablet Take 0.5 tablets by mouth Daily. 45 tablet 4    bisacodyl (Dulcolax) 5 MG EC tablet Take 1 tablet by mouth 2 (Two) Times a Day. Indications: Constipation      brimonidine (ALPHAGAN) 0.2 % ophthalmic solution Administer 1 drop to both eyes 2 (Two) Times a Day. Indications: Wide-Angle Glaucoma      Cyanocobalamin (VITAMIN B 12 PO) Take 1,000 mg by mouth Daily. HOLD PER SURGEON'S INSTRCUTIONS      dorzolamide-timolol (COSOPT) 22.3-6.8 MG/ML ophthalmic solution Administer 1 drop to both eyes 2 (Two) Times a Day. Indications: Wide-Angle Glaucoma      furosemide (LASIX) 40 MG tablet TAKE 1 TABLET BY MOUTH EVERY DAY  Indications: Edema (Patient taking differently: Take 1 tablet by mouth Daily. TAKE 1 TABLET BY MOUTH EVERY DAY  Indications: Edema) 90 tablet 3     gabapentin (NEURONTIN) 300 MG capsule TAKE 1 CAPSULE BY MOUTH FOUR TIMES DAILY 360 capsule 0    glucose blood (FREESTYLE LITE) test strip Check blood sugar  each 11    HYDROcodone-acetaminophen (NORCO) 7.5-325 MG per tablet Take 1 tablet by mouth Every 8 (Eight) Hours As Needed for Moderate Pain. 30 day supply. 90 tablet 0    Hydrocortisone, Perianal, (ANUSOL-HC) 2.5 % rectal cream APPLY RECTALLY THREE TIMES DAILY FOR 7-10 DAYS DURNG HEMORRHOID FLARE      Insulin Glargine (Lantus SoloStar) 100 UNIT/ML injection pen Inject 20 Units under the skin into the appropriate area as directed Daily. 30 mL 12    Insulin Pen Needle 32G X 4 MM misc Use to inject insulin under the skin via pens twice daily 100 each 0    ketoconazole (NIZORAL) 2 % cream Apply 1 application topically to the appropriate area as directed Daily. 60 g 3    Lancets (freestyle) lancets Check blood sugar  each 11    latanoprost (XALATAN) 0.005 % ophthalmic solution Administer 1 drop to both eyes Every Night. Indications: Wide-Angle Glaucoma      levothyroxine (SYNTHROID, LEVOTHROID) 88 MCG tablet TAKE 1 TABLET BY MOUTH EVERY MORNING 90 tablet 3    losartan (COZAAR) 100 MG tablet TAKE 1 TABLET BY MOUTH DAILY 90 tablet 1    metFORMIN (GLUCOPHAGE) 500 MG tablet TAKE 1 TABLET BY MOUTH TWICE DAILY WITH MEALS 180 tablet 3    montelukast (SINGULAIR) 10 MG tablet TAKE 1 TABLET BY MOUTH EVERY NIGHT 90 tablet 0    pantoprazole (PROTONIX) 40 MG EC tablet TAKE 1 TABLET BY MOUTH TWICE DAILY FOR HEARTBURN 180 tablet 0    pramipexole (MIRAPEX) 1.5 MG tablet Take 1 tablet by mouth 2 (Two) Times a Day. prn  Indications: Restless Leg Syndrome      Probiotic Product (Risaquad-2) capsule capsule Take 1 capsule by mouth Daily. PROBIOTIC      rosuvastatin (CRESTOR) 20 MG tablet TAKE 1 TABLET BY MOUTH EVERY EVENING 90 tablet 1    sennosides-docusate (Stimulant Laxative) 8.6-50 MG per tablet Take 2 tablets by mouth 2 (Two) Times a Day. 200 tablet 3    phentermine  37.5 MG capsule Take 1 capsule by mouth Every Morning. 30 capsule 3    celecoxib (CeleBREX) 200 MG capsule TAKE 1 CAPSULE BY MOUTH DAILY 30 capsule 5    naloxone (NARCAN) 4 MG/0.1ML nasal spray Call 911. Don't prime. [place] in 1 nostril for overdose. Repeat in 2-3 minutes in other nostril if no or minimal breathing/responsiveness. 2 each 0    traMADol (ULTRAM) 50 MG tablet Take 1 tablet by mouth Every 6 (Six) Hours As Needed for Severe Pain. 16 tablet 0     No facility-administered medications prior to visit.     Opioid medication/s are on active medication list.  and I have evaluated his active treatment plan and pain score trends (see table).  There were no vitals filed for this visit.  I have reviewed the chart for potential of high risk medication and harmful drug interactions in the elderly.        Aspirin is on active medication list. Aspirin use is indicated based on review of current medical condition/s. Pros and cons of this therapy have been discussed today. Benefits of this medication outweigh potential harm.  Patient has been encouraged to continue taking this medication.  .      Patient Active Problem List   Diagnosis    OA (osteoarthritis) of knee    Essential hypertension    Cellulitis of abdominal wall    Hypothyroidism (acquired)    Gastroesophageal reflux disease without esophagitis    Mixed hyperlipidemia    Primary insomnia    DDD (degenerative disc disease), lumbar    Obstructive sleep apnea    Allergic    Venous insufficiency    Prostate cancer    Venous stasis dermatitis    Tinea cruris    Tinea corporis    Throat clearing    Sleep apnea    Skin lesion of face    Rib pain on left side    Rectal bleed    Presbycusis    Pes planus    Osteoarthritis    Obesity    Medicare annual wellness visit, subsequent    Lumbar strain    Internal hemorrhoids    Insomnia    Inflamed skin tag    Hyperplastic polyp of stomach    Hospital discharge follow-up    History of colon polyps    High risk medication use     Glaucoma    Gastroenteritis, acute    Probable streptococcal cellulitis    Encounter for screening colonoscopy    Encounter for long-term (current) use of NSAIDs    Dysphagia    DVT (deep venous thrombosis)    Lumbar facet arthropathy    Diverticulosis    Cough    Contact with hypodermic needle    Cervical radiculopathy    Arthritis    Allergic rhinitis    Acute glaucoma    Acute embolism and thrombosis of vein    Actinic keratosis    Status post reverse total shoulder replacement, right    Localized edema    Chronic disease anemia    Peripheral polyneuropathy    Campylobacter diarrhea    Hyponatremia    Generalized abdominal pain    Fever    Constipation    Bilateral lower extremity edema    Acute pyelonephritis    Chronic idiopathic constipation    Functional diarrhea    Change in bowel habits    RLS (restless legs syndrome)    Type 2 diabetes mellitus with hyperglycemia    Family history of GI malignancy    Primary osteoarthritis of right hip    B12 deficiency    Intervertebral disc stenosis of neural canal of lumbar region    Encounter for hepatitis C screening test for low risk patient    Pain associated with defecation    Calculus of kidney    Gastroesophageal reflux disease    Body aches    Skin lesion    Candidiasis, intertrigo    Hyperbilirubinemia    Acute respiratory failure due to COVID-19    Bacteremia due to group B Streptococcus    Iron deficiency anemia    Acute respiratory failure with hypoxia and hypercapnia    Hypoventilation syndrome    Pneumonia of right lower lobe due to infectious organism    Chronic diastolic (congestive) heart failure    Fatty liver    Chronic heart failure with preserved ejection fraction (HFpEF)    Pneumonia, unspecified organism    Pannus, abdominal    Abdominal wall cellulitis    Panniculitis    Abdominal wall cellulitis    Thrombocytopenia    Type 2 diabetes mellitus    Elevated liver function tests    History of DVT (deep vein thrombosis)    Fatty liver     "Cholelithiasis    Carpal tunnel syndrome of left wrist    Cubital tunnel syndrome on left    Gross hematuria     Advance Care Planning Advance Directive is on file.  ACP discussion was held with the patient during this visit. Patient has an advance directive in EMR which is still valid.             Objective   Vitals:    25 0910   BP: 118/78   BP Location: Left arm   Patient Position: Sitting   Cuff Size: Adult   Pulse: 70   Resp: 17   Temp: 97.5 °F (36.4 °C)   TempSrc: Temporal   SpO2: 95%   Weight: 104 kg (230 lb 3.2 oz)   Height: 170.2 cm (67.01\")       Estimated body mass index is 36.05 kg/m² as calculated from the following:    Height as of this encounter: 170.2 cm (67.01\").    Weight as of this encounter: 104 kg (230 lb 3.2 oz).                Does the patient have evidence of cognitive impairment? No                                                                                                Health  Risk Assessment    Smoking Status:  Social History     Tobacco Use   Smoking Status Former    Current packs/day: 0.00    Average packs/day: 1 pack/day for 24.0 years (24.0 ttl pk-yrs)    Types: Cigarettes    Start date: 1960    Quit date: 1984    Years since quittin.0    Passive exposure: Never   Smokeless Tobacco Former    Types: Chew     Alcohol Consumption:  Social History     Substance and Sexual Activity   Alcohol Use Yes    Alcohol/week: 2.0 standard drinks of alcohol    Types: 2 Cans of beer per week    Comment: 2-3 TIMES A MONTH       Fall Risk Screen  STEADI Fall Risk Assessment was completed, and patient is at LOW risk for falls.Assessment completed on:2025    Depression Screening   Little interest or pleasure in doing things? Not at all   Feeling down, depressed, or hopeless? Not at all   PHQ-2 Total Score 0      Health Habits and Functional and Cognitive Screenin/19/2025     1:48 PM   Functional & Cognitive Status   Do you have difficulty preparing food and eating? No "    Do you have difficulty bathing yourself, getting dressed or grooming yourself? No    Do you have difficulty using the toilet? No    Do you have difficulty moving around from place to place? No    Do you have trouble with steps or getting out of a bed or a chair? No    Current Diet Unhealthy Diet    Dental Exam Up to date    Eye Exam Up to date    Exercise (times per week) 0 times per week    Current Exercises Include No Regular Exercise    Do you need help using the phone?  No    Are you deaf or do you have serious difficulty hearing?  Yes    Do you need help to go to places out of walking distance? Yes    Do you need help shopping? No    Do you need help preparing meals?  No    Do you need help with housework?  No    Do you need help with laundry? No    Do you need help taking your medications? No    Do you need help managing money? No    Do you ever drive or ride in a car without wearing a seat belt? No    Have you felt unusual stress, anger or loneliness in the last month? No    Who do you live with? Spouse    If you need help, do you have trouble finding someone available to you? No    Have you been bothered in the last four weeks by sexual problems? No    Do you have difficulty concentrating, remembering or making decisions? No        Patient-reported           Age-appropriate Screening Schedule:  Refer to the list below for future screening recommendations based on patient's age, sex and/or medical conditions. Orders for these recommended tests are listed in the plan section. The patient has been provided with a written plan.    Health Maintenance List  Health Maintenance   Topic Date Due    DIABETIC EYE EXAM  01/03/2025    HEMOGLOBIN A1C  03/26/2025    LIPID PANEL  09/26/2025    BMI FOLLOWUP  09/26/2025    ANNUAL WELLNESS VISIT  01/21/2026    COLORECTAL CANCER SCREENING  04/19/2026    TDAP/TD VACCINES (2 - Td or Tdap) 09/09/2033    HEPATITIS C SCREENING  Completed    COVID-19 Vaccine  Completed    RSV  "Vaccine - Adults  Completed    INFLUENZA VACCINE  Completed    Pneumococcal Vaccine 65+  Completed    ZOSTER VACCINE  Completed    URINE MICROALBUMIN  Discontinued    LUNG CANCER SCREENING  Discontinued                                                                                                                                                CMS Preventative Services Quick Reference  Risk Factors Identified During Encounter  Inactivity/Sedentary: Patient was advised to exercise at least 150 minutes a week per CDC recommendations.    The above risks/problems have been discussed with the patient.  Pertinent information has been shared with the patient in the After Visit Summary.  An After Visit Summary and PPPS were made available to the patient.    Follow Up:   Next Medicare Wellness visit to be scheduled in 1 year.         Additional E&M Note during same encounter follows:  Patient has additional, significant, and separately identifiable condition(s)/problem(s) that require work above and beyond the Medicare Wellness Visit     Chief Complaint  Medicare Wellness-subsequent    Subjective   HPI  Nathan is also being seen today for additional medical problem/s.    Review of Systems   Constitutional:  Negative for chills, diaphoresis and fatigue.   HENT:  Negative for rhinorrhea.    Respiratory:  Negative for cough and shortness of breath.    Cardiovascular:  Negative for chest pain and leg swelling.   Gastrointestinal:  Negative for abdominal distention and abdominal pain.   Musculoskeletal:  Negative for arthralgias and back pain.   Skin:  Negative for rash.        F/U obesity.  Out of phentermine for 2 weeks.  Down 12 lbs.    C/o passing blood clots for 5 days in 11/24.        Objective   Vital Signs:  /78 (BP Location: Left arm, Patient Position: Sitting, Cuff Size: Adult)   Pulse 70   Temp 97.5 °F (36.4 °C) (Temporal)   Resp 17   Ht 170.2 cm (67.01\")   Wt 104 kg (230 lb 3.2 oz)   SpO2 95%   BMI 36.05 " kg/m²   Physical Exam  Vitals reviewed.   Constitutional:       Appearance: He is well-developed. He is not diaphoretic.   HENT:      Head: Normocephalic and atraumatic.   Eyes:      General: No scleral icterus.     Pupils: Pupils are equal, round, and reactive to light.   Neck:      Thyroid: No thyromegaly.   Cardiovascular:      Rate and Rhythm: Normal rate and regular rhythm.      Heart sounds: No murmur heard.     No friction rub. No gallop.   Pulmonary:      Effort: Pulmonary effort is normal. No respiratory distress.      Breath sounds: No wheezing or rales.   Chest:      Chest wall: No tenderness.   Abdominal:      General: Bowel sounds are normal. There is no distension.      Palpations: Abdomen is soft.      Tenderness: There is no abdominal tenderness.   Musculoskeletal:         General: No deformity. Normal range of motion.   Lymphadenopathy:      Cervical: No cervical adenopathy.   Skin:     General: Skin is warm and dry.      Findings: No rash.   Neurological:      Cranial Nerves: No cranial nerve deficit.      Motor: No abnormal muscle tone.                       Assessment and Plan            Class 2 severe obesity due to excess calories with serious comorbidity and body mass index (BMI) of 36.0 to 36.9 in adult  Patient's (Body mass index is 36.05 kg/m².) indicates that they are obese (BMI >30) with health conditions that include diabetes mellitus . Weight is unchanged. BMI  is above average; BMI management plan is completed. We discussed portion control and increasing exercise.     Orders:    phentermine 37.5 MG capsule; Take 1 capsule by mouth Every Morning.    Gross hematuria    Orders:    CBC & Differential    Ambulatory Referral to Urology    Type 2 diabetes mellitus with hyperglycemia, with long-term current use of insulin      Orders:    Lipid Panel With / Chol / HDL Ratio    Comprehensive Metabolic Panel    Hemoglobin A1c    B12 deficiency    Orders:    Vitamin B12    Hypothyroidism  (acquired)    Orders:    TSH    Mixed hyperlipidemia            Essential hypertension         Gross hematuria.  New problem.  Check Cbc,  To urology.    DM2.  Uncontrolled.  Check A1c, CMP, FLP.  Contnue met/insulin.    Preventive Counseling:  Encouraged to stay active.  Covid vaccine UTD.  HEP A UTD.  Pneumovax UTD.  Colonoscopy UTD.    Dentist UTD.  Optho UTD.            Follow Up   Return in about 4 months (around 5/21/2025).  Patient was given instructions and counseling regarding his condition or for health maintenance advice. Please see specific information pulled into the AVS if appropriate.

## 2025-01-21 NOTE — ASSESSMENT & PLAN NOTE
Orders:    Lipid Panel With / Chol / HDL Ratio    Comprehensive Metabolic Panel    Hemoglobin A1c

## 2025-01-21 NOTE — ASSESSMENT & PLAN NOTE
Patient's (Body mass index is 36.05 kg/m².) indicates that they are obese (BMI >30) with health conditions that include diabetes mellitus . Weight is unchanged. BMI  is above average; BMI management plan is completed. We discussed portion control and increasing exercise.     Orders:    phentermine 37.5 MG capsule; Take 1 capsule by mouth Every Morning.

## 2025-01-21 NOTE — ASSESSMENT & PLAN NOTE
Gross hematuria.  New problem.  Check Cbc,  To urology.    DM2.  Uncontrolled.  Check A1c, CMP, FLP.  Contnue met/insulin.

## 2025-01-22 DIAGNOSIS — E11.65 TYPE 2 DIABETES MELLITUS WITH HYPERGLYCEMIA, WITH LONG-TERM CURRENT USE OF INSULIN: ICD-10-CM

## 2025-01-22 DIAGNOSIS — Z79.4 TYPE 2 DIABETES MELLITUS WITH HYPERGLYCEMIA, WITH LONG-TERM CURRENT USE OF INSULIN: ICD-10-CM

## 2025-01-22 LAB
ALBUMIN SERPL-MCNC: 3.7 G/DL (ref 3.5–5.2)
ALBUMIN/GLOB SERPL: 1.4 G/DL
ALP SERPL-CCNC: 158 U/L (ref 39–117)
ALT SERPL-CCNC: 15 U/L (ref 1–41)
AST SERPL-CCNC: 19 U/L (ref 1–40)
BASOPHILS # BLD AUTO: 0.06 10*3/MM3 (ref 0–0.2)
BASOPHILS NFR BLD AUTO: 1 % (ref 0–1.5)
BILIRUB SERPL-MCNC: 0.8 MG/DL (ref 0–1.2)
BUN SERPL-MCNC: 21 MG/DL (ref 8–23)
BUN/CREAT SERPL: 17.6 (ref 7–25)
CALCIUM SERPL-MCNC: 9.2 MG/DL (ref 8.6–10.5)
CHLORIDE SERPL-SCNC: 99 MMOL/L (ref 98–107)
CHOLEST SERPL-MCNC: 121 MG/DL (ref 0–200)
CHOLEST/HDLC SERPL: 4.32 {RATIO}
CO2 SERPL-SCNC: 26.9 MMOL/L (ref 22–29)
CREAT SERPL-MCNC: 1.19 MG/DL (ref 0.76–1.27)
EGFRCR SERPLBLD CKD-EPI 2021: 62.5 ML/MIN/1.73
EOSINOPHIL # BLD AUTO: 0.11 10*3/MM3 (ref 0–0.4)
EOSINOPHIL NFR BLD AUTO: 1.8 % (ref 0.3–6.2)
ERYTHROCYTE [DISTWIDTH] IN BLOOD BY AUTOMATED COUNT: 13.2 % (ref 12.3–15.4)
GLOBULIN SER CALC-MCNC: 2.6 GM/DL
GLUCOSE SERPL-MCNC: 250 MG/DL (ref 65–99)
HBA1C MFR BLD: 12.1 % (ref 4.8–5.6)
HCT VFR BLD AUTO: 41.2 % (ref 37.5–51)
HDLC SERPL-MCNC: 28 MG/DL (ref 40–60)
HGB BLD-MCNC: 13.4 G/DL (ref 13–17.7)
IMM GRANULOCYTES # BLD AUTO: 0.09 10*3/MM3 (ref 0–0.05)
IMM GRANULOCYTES NFR BLD AUTO: 1.5 % (ref 0–0.5)
LDLC SERPL CALC-MCNC: 41 MG/DL (ref 0–100)
LYMPHOCYTES # BLD AUTO: 1.91 10*3/MM3 (ref 0.7–3.1)
LYMPHOCYTES NFR BLD AUTO: 30.9 % (ref 19.6–45.3)
MCH RBC QN AUTO: 28.6 PG (ref 26.6–33)
MCHC RBC AUTO-ENTMCNC: 32.5 G/DL (ref 31.5–35.7)
MCV RBC AUTO: 88 FL (ref 79–97)
MONOCYTES # BLD AUTO: 0.54 10*3/MM3 (ref 0.1–0.9)
MONOCYTES NFR BLD AUTO: 8.7 % (ref 5–12)
NEUTROPHILS # BLD AUTO: 3.48 10*3/MM3 (ref 1.7–7)
NEUTROPHILS NFR BLD AUTO: 56.1 % (ref 42.7–76)
PLATELET # BLD AUTO: 152 10*3/MM3 (ref 140–450)
POTASSIUM SERPL-SCNC: 4.4 MMOL/L (ref 3.5–5.2)
PROT SERPL-MCNC: 6.3 G/DL (ref 6–8.5)
RBC # BLD AUTO: 4.68 10*6/MM3 (ref 4.14–5.8)
SODIUM SERPL-SCNC: 138 MMOL/L (ref 136–145)
TRIGL SERPL-MCNC: 351 MG/DL (ref 0–150)
TSH SERPL DL<=0.005 MIU/L-ACNC: 1.64 UIU/ML (ref 0.27–4.2)
VIT B12 SERPL-MCNC: >2000 PG/ML (ref 211–946)
VLDLC SERPL CALC-MCNC: 52 MG/DL (ref 5–40)
WBC # BLD AUTO: 6.19 10*3/MM3 (ref 3.4–10.8)

## 2025-01-22 RX ORDER — INSULIN GLARGINE 100 [IU]/ML
32 INJECTION, SOLUTION SUBCUTANEOUS DAILY
Qty: 30 ML | Refills: 12 | Status: SHIPPED | OUTPATIENT
Start: 2025-01-22

## 2025-01-29 ENCOUNTER — TELEPHONE (OUTPATIENT)
Dept: PAIN MEDICINE | Facility: CLINIC | Age: 80
End: 2025-01-29
Payer: MEDICARE

## 2025-01-29 DIAGNOSIS — M51.369 DDD (DEGENERATIVE DISC DISEASE), LUMBAR: ICD-10-CM

## 2025-01-29 NOTE — TELEPHONE ENCOUNTER
Medication Refill Request    Date of phone call: 25    Medication being requested: Norco 7.5-325 mg  si tab po q 8 hrs prn  Qty: 90    Date of last visit: 24    Date of last refill:     QIANA up to date?:     Next Follow up?: 2/10/25    Any new pertinent information? (i.e, new medication allergies, new use of medications, change in patient's health or condition, non-compliance or inconsistency with prescribing agreement?):

## 2025-01-29 NOTE — TELEPHONE ENCOUNTER
Caller: Coco Baez    Relationship: Emergency Contact    Best call back number: [phone]    Requested Prescriptions:   HYDROCODONE     [id number] [address] [facility] [phone]  - [phone] FX     Last office visit with prescribing clinician: 12/9/2024   Last telemedicine visit with prescribing clinician: Visit date not found   Next office visit with prescribing clinician: 2/10/2025     Does the patient have less than a 3 day supply:  [x] Yes  [] No    Would you like a call back once the refill request has been completed: [] Yes [x] No    If the office needs to give you a call back, can they leave a voicemail: [x] Yes [] No    Bala Jin   01/29/25 10:22 EST

## 2025-01-30 RX ORDER — HYDROCODONE BITARTRATE AND ACETAMINOPHEN 7.5; 325 MG/1; MG/1
1 TABLET ORAL EVERY 8 HOURS PRN
Qty: 90 TABLET | Refills: 0 | Status: SHIPPED | OUTPATIENT
Start: 2025-01-30

## 2025-02-10 ENCOUNTER — OFFICE VISIT (OUTPATIENT)
Dept: PAIN MEDICINE | Facility: CLINIC | Age: 80
End: 2025-02-10
Payer: MEDICARE

## 2025-02-10 VITALS
BODY MASS INDEX: 36.88 KG/M2 | DIASTOLIC BLOOD PRESSURE: 80 MMHG | HEART RATE: 88 BPM | TEMPERATURE: 97.7 F | OXYGEN SATURATION: 92 % | HEIGHT: 67 IN | SYSTOLIC BLOOD PRESSURE: 146 MMHG | WEIGHT: 235 LBS

## 2025-02-10 DIAGNOSIS — M47.816 LUMBAR FACET ARTHROPATHY: ICD-10-CM

## 2025-02-10 DIAGNOSIS — E11.42 DIABETIC PERIPHERAL NEUROPATHY: ICD-10-CM

## 2025-02-10 DIAGNOSIS — M51.360 DEGENERATION OF INTERVERTEBRAL DISC OF LUMBAR REGION WITH DISCOGENIC BACK PAIN: Primary | ICD-10-CM

## 2025-02-10 PROCEDURE — 1160F RVW MEDS BY RX/DR IN RCRD: CPT

## 2025-02-10 PROCEDURE — 3077F SYST BP >= 140 MM HG: CPT

## 2025-02-10 PROCEDURE — 3079F DIAST BP 80-89 MM HG: CPT

## 2025-02-10 PROCEDURE — 99214 OFFICE O/P EST MOD 30 MIN: CPT

## 2025-02-10 PROCEDURE — 1125F AMNT PAIN NOTED PAIN PRSNT: CPT

## 2025-02-10 PROCEDURE — 1159F MED LIST DOCD IN RCRD: CPT

## 2025-02-10 RX ORDER — GABAPENTIN 600 MG/1
600 TABLET ORAL 3 TIMES DAILY
Qty: 90 TABLET | Refills: 0 | Status: CANCELLED | OUTPATIENT
Start: 2025-02-10

## 2025-02-10 RX ORDER — GABAPENTIN 600 MG/1
600 TABLET ORAL 3 TIMES DAILY
Qty: 90 TABLET | Refills: 0 | Status: SHIPPED | OUTPATIENT
Start: 2025-02-10

## 2025-02-10 NOTE — PROGRESS NOTES
CHIEF COMPLAINT  Back pain     Subjective   Nathan Baez is a 79 y.o. male  who presents for follow-up.  He has a history of chronic low back pain. He reports that his pain level has fluctuated since his last office visit. Today pain is 5/10VAS in severity. He continues to complain of axial low back pain that radiates across his low back in a bandlike pattern. Denies radicular pain. Describes this pain as a nearly continuous ache. He has completed PT and continues with HEP as tolerated.       Continues with Hydrocodone 7.5mg 3/day and Gabapentin 300mg TID. He reports intermittent constipation with Ducolax and a stool softener. Denies any side effects from the regimen including somnolence. Notes improvement in activity and function with regimen.  ADL's by self. Denies any bowel or bladder changes.     Reviewed office note from Dr. Damien Pierce from 1/21/2025.  Patient was seen for annual wellness exam.  He was started on phentermine 37.5 mg to assist with weight loss and a referral was made for urology.     Procedures:  5/17/23 - Bilateral L3-L5 RFA with Dr. Houston - minimal relief     Procedures at [facility]:  5/3/22 - L4-L5 LESI  8/17/22 - L4-L5 LESI    Back Pain  This is a chronic problem. The current episode started more than 1 year ago. The problem occurs daily. The problem has been comes and goes since onset. The pain is present in the lumbar spine. The quality of the pain is described as aching and burning. The pain does not radiate. The pain is at a severity of 5/10. The pain is moderate. The symptoms are aggravated by standing and sitting (walking). Associated symptoms include weakness (genevieve hands). Pertinent negatives include no abdominal pain, chest pain, dysuria, fever, headaches or numbness. He has tried heat, ice, home exercises, NSAIDs, analgesics and bed rest for the symptoms. The treatment provided moderate relief.      PEG Assessment   What number best describes your pain on average in the past  "week?7  What number best describes how, during the past week, pain has interfered with your enjoyment of life?0  What number best describes how, during the past week, pain has interfered with your general activity?  3    Review of Pertinent Medical Data ---      The following portions of the patient's history were reviewed and updated as appropriate: allergies, current medications, past family history, past medical history, past social history, past surgical history, and problem list.    Review of Systems   Constitutional:  Positive for fatigue. Negative for activity change, chills and fever.   HENT:  Negative for congestion.    Eyes:  Negative for visual disturbance.   Respiratory:  Negative for chest tightness and shortness of breath.    Cardiovascular:  Negative for chest pain.   Gastrointestinal:  Positive for constipation. Negative for abdominal pain and diarrhea.   Genitourinary:  Negative for difficulty urinating and dysuria.   Musculoskeletal:  Positive for back pain.   Neurological:  Positive for weakness (genevieve hands). Negative for dizziness, light-headedness, numbness and headaches.   Psychiatric/Behavioral:  Positive for agitation. Negative for self-injury, sleep disturbance and suicidal ideas. The patient is not nervous/anxious.      I have reviewed and confirmed the accuracy of the ROS as documented by the MA/LPN/RN Rekha Poe, APRN    Vitals:    02/10/25 1342   BP: 146/80   Pulse: 88   Temp: 97.7 °F (36.5 °C)   SpO2: 92%   Weight: 107 kg (235 lb)   Height: 170.2 cm (67\")   PainSc:   5   PainLoc: Back     Objective   Physical Exam  Constitutional:       Appearance: Normal appearance.   HENT:      Head: Normocephalic.   Cardiovascular:      Rate and Rhythm: Normal rate and regular rhythm.   Pulmonary:      Effort: Pulmonary effort is normal.      Breath sounds: Normal breath sounds.   Musculoskeletal:      Cervical back: Normal range of motion.      Lumbar back: Tenderness and bony tenderness " present. Decreased range of motion.      Left hip: Tenderness present. Decreased range of motion.   Skin:     General: Skin is warm and dry.      Capillary Refill: Capillary refill takes less than 2 seconds.   Neurological:      General: No focal deficit present.      Mental Status: He is alert and oriented to person, place, and time.      Gait: Gait abnormal (slowed, ambulates with rollator).   Psychiatric:         Mood and Affect: Mood normal.         Behavior: Behavior normal.         Thought Content: Thought content normal.         Cognition and Memory: Cognition normal.       Assessment & Plan   Diagnoses and all orders for this visit:    1. Degeneration of intervertebral disc of lumbar region with discogenic back pain (Primary)  -     gabapentin (NEURONTIN) 600 MG tablet; Take 1 tablet by mouth 3 (Three) Times a Day.  Dispense: 90 tablet; Refill: 0    2. Diabetic peripheral neuropathy  -     gabapentin (NEURONTIN) 600 MG tablet; Take 1 tablet by mouth 3 (Three) Times a Day.  Dispense: 90 tablet; Refill: 0    3. Lumbar facet arthropathy  -     gabapentin (NEURONTIN) 600 MG tablet; Take 1 tablet by mouth 3 (Three) Times a Day.  Dispense: 90 tablet; Refill: 0      Nathan ALLISON Baez reports a pain score of 5.  Given his pain assessment as noted, treatment options were discussed and the following options were decided upon as a follow-up plan to address the patient's pain: continuation of current treatment plan for pain and prescription for non-opiod analgesics.    --- The urine drug screen confirmation from 10/7/24 has been reviewed and the result is appropriate based on patient history and QIANA report  --- The patient signed an updated copy of the controlled substance agreement on 3/6/24  --- ORT -- low risk  --- Continue Gabapenin. Will increase to 600mg TID to see if this more beneficial to his pain. Discussed medication with the patient.  Included in this discussion was the potential for side effects and  adverse events.  Patient verbalized understanding and wished to proceed.  Prescription will be sent to pharmacy.  --- Continue Hydrocodone. No refill needed at this time. Patient verbalized understanding. Patient appears stable with current regimen. No adverse effects. Regarding continuation of opioids, there is no evidence of aberrant behavior or any red flags.  The patient continues with appropriate response to opioid therapy. ADL's remain intact by self.   --- Follow-up 1 month to assess Gabapentin increase effectiveness      QIANA REPORT  As part of the patient's treatment plan, I am prescribing controlled substances. The patient has been made aware of appropriate use of such medications, including potential risk of somnolence, limited ability to drive and/or work safely, and the potential for dependence or overdose. It has also been made clear that these medications are for use by this patient only, without concomitant use of alcohol or other substances unless prescribed.     Patient has completed prescribing agreement detailing terms of continued prescribing of controlled substances, including monitoring QIANA reports, urine drug screening, and pill counts if necessary. The patient is aware that inappropriate use will results in cessation of prescribing such medications.    As the clinician, I personally reviewed the QIANA from 2/10/25 while the patient was in the office today.    History and physical exam exhibit continued safe and appropriate use of controlled substances.    Dictated utilizing Dragon dictation.

## 2025-02-12 DIAGNOSIS — K21.9 GASTROESOPHAGEAL REFLUX DISEASE WITHOUT ESOPHAGITIS: Primary | ICD-10-CM

## 2025-02-12 RX ORDER — FUROSEMIDE 40 MG/1
TABLET ORAL
Qty: 90 TABLET | Refills: 3 | OUTPATIENT
Start: 2025-02-12

## 2025-02-12 RX ORDER — PANTOPRAZOLE SODIUM 40 MG/1
TABLET, DELAYED RELEASE ORAL
Qty: 180 TABLET | Refills: 0 | Status: SHIPPED | OUTPATIENT
Start: 2025-02-12

## 2025-02-12 RX ORDER — FUROSEMIDE 40 MG/1
TABLET ORAL
Qty: 90 TABLET | Refills: 3 | Status: SHIPPED | OUTPATIENT
Start: 2025-02-12

## 2025-02-12 NOTE — TELEPHONE ENCOUNTER
LV 1 21 25      NV 5/22/25    LF n/a  
Detail Level: Generalized
General Sunscreen Counseling: I recommended a broad spectrum sunscreen with a SPF of 30 or higher.  I explained that SPF 30 sunscreens block approximately 97 percent of the sun's harmful rays.  Sunscreens should be applied at least 15 minutes prior to expected sun exposure and then every 2 hours after that as long as sun exposure continues. If swimming or exercising sunscreen should be reapplied every 45 minutes to an hour after getting wet or sweating.  One ounce, or the equivalent of a shot glass full of sunscreen, is adequate to protect the skin not covered by a bathing suit. I also recommended a lip balm with a sunscreen as well. Sun protective clothing can be used in lieu of sunscreen but must be worn the entire time you are exposed to the sun's rays.

## 2025-02-24 RX ORDER — ATENOLOL 25 MG/1
12.5 TABLET ORAL DAILY
Qty: 45 TABLET | Refills: 0 | Status: SHIPPED | OUTPATIENT
Start: 2025-02-24

## 2025-03-04 DIAGNOSIS — M51.369 DDD (DEGENERATIVE DISC DISEASE), LUMBAR: ICD-10-CM

## 2025-03-04 RX ORDER — HYDROCODONE BITARTRATE AND ACETAMINOPHEN 7.5; 325 MG/1; MG/1
1 TABLET ORAL EVERY 8 HOURS PRN
Qty: 90 TABLET | Refills: 0 | Status: SHIPPED | OUTPATIENT
Start: 2025-03-04

## 2025-03-04 NOTE — TELEPHONE ENCOUNTER
Caller: Coco Baez    Relationship: Emergency Contact    Best call back number:    Requested Prescriptions:   Requested Prescriptions     Pending Prescriptions Disp Refills    HYDROcodone-acetaminophen (NORCO) 7.5-325 MG per tablet 90 tablet 0     Sig: Take 1 tablet by mouth Every 8 (Eight) Hours As Needed for Moderate Pain. 30 day supply.        Pharmacy where request should be sent: [facility] #[id number] [place] [address] AT [facility] [phone] [facility] [phone]      Last office visit with prescribing clinician: 2/10/2025   Last telemedicine visit with prescribing clinician: Visit date not found   Next office visit with prescribing clinician: 3/10/2025     Does the patient have less than a 3 day supply:  [x] Yes  [] No    Would you like a call back once the refill request has been completed: [] Yes [x] No    If the office needs to give you a call back, can they leave a voicemail: [] Yes [x] No    Margy Felder Rep   03/04/25 10:12 EST

## 2025-03-05 NOTE — H&P
Patient Name:  Nathan Baez  YOB: 1945  MRN:  [id number]  Admit Date:  12/8/2023  Patient Care Team:  Damien Pierce MD as PCP - General (Family Medicine)  Bunny Diggs MD as Consulting Physician (Colon and Rectal Surgery)      Subjective   History Present Illness     Chief Complaint   Patient presents with    Cellulitis     History of Present Illness  Mr. Baez is a 78-year-old male with history of CHF, hypothyroidism hypertension, obstructive sleep apnea, type 2 diabetes, GERD, anemia who presents to the emergency room with abdominal pain, generalized weakness and chills.  Patient states that he has not felt well for few days and had been a lot weaker than normal.  He states today he went outside states of the garage and got very weak and barely made back into the house then developed chills and shaking and a fever.  He states at that time he had some mild shortness of breath, but denies any significant shortness of breath.  He does not use home O2 but does use a CPAP machine at night.  Due to his chills and shaking his wife took him to the emergency room for further evaluation.  Patient states he has had some ongoing abdominal pain that comes and goes, but he has had some increasing abdominal pain over the last few days.  He states he has some redness of his abdomen normally, has not noticed any increase in the redness.  He states he has had some increasing difficulty with bowel movements and feels like he cannot finish a bowel movement, and feels like he has to go initially go for the second time.  In the emergency room he was found to have some cellulitis of his lower abdominal pannus.  His sodium was 138, potassium 3.6, creatinine 0.98, BUN 19, glucose 144, alkaline phosphatase 143, AST 18, ALT 21, bilirubin 1.1, lactate initially 2.4, patient was given a 2 L fluid bolus with repeat lactic of 1.8, Pro-Garret is 0.19, white blood cell count 8.2, hemoglobin 12.7, hematocrit 40.5,  Spoke to Matilde at [facility] regarding what information is needed for CPAP supplies. Figured that fax number was incorrect, Matilde states that she will send over authorization form this afternoon.    platelets 124.  Blood cultures are pending, rapid strep was negative, COVID-19 and flu swabs were negative.  Patient was started on Rocephin, azithromycin, and vancomycin in the emergency room.  Chest x-ray does show some pulmonary vascular congestion, also likely pneumonia at the right lower lobe.    Review of Systems   Constitutional:  Positive for chills, fatigue and fever. Negative for appetite change.   HENT:  Negative for nosebleeds and trouble swallowing.    Eyes:  Negative for photophobia, redness and visual disturbance.   Respiratory:  Positive for cough. Negative for chest tightness, shortness of breath and wheezing.    Cardiovascular:  Negative for chest pain, palpitations and leg swelling.   Gastrointestinal:  Positive for abdominal pain and constipation. Negative for abdominal distention, blood in stool, nausea and vomiting.   Endocrine: Negative.    Genitourinary: Negative.    Musculoskeletal:  Negative for gait problem and joint swelling.   Skin: Negative.    Neurological:  Positive for weakness. Negative for dizziness, seizures, speech difficulty, light-headedness and headaches.   Hematological: Negative.    Psychiatric/Behavioral:  Negative for behavioral problems and confusion.         Personal History     Past Medical History:   Diagnosis Date    Actinic keratosis     Acute embolism and thrombosis of vein     Allergic     Allergic rhinitis     Arthritis     Cataract     Cervical radiculopathy     Chronic constipation     Colon polyp     Diabetes mellitus     Disequilibrium     DJD (degenerative joint disease), lumbar     Elevated cholesterol     Erysipelas     GERD (gastroesophageal reflux disease)     Glaucoma     Hip pain, chronic, right     History of kidney stones     X1    History of peripheral edema     LOWER LEGS BILAT, COMPRESSION KNEE HIGH     History of transfusion     Hyperlipidemia     Hypertension     Hypothyroid     Insomnia     Intervertebral disc stenosis of neural canal of lumbar  region 04/12/2022    Limited mobility     RIGHT HIP    Low back pain     Male urinary stress incontinence     s/p prostatectomy-WEAR PAD    Obesity     KWAME (obstructive sleep apnea)     Osteoarthritis     Pelvic floor dysfunction 06/2022    DEFOGRAM ORDERED AT [facility] BY DR. ROSHAN SCHAFER    Pes planus     Presbycusis     Prostate cancer     Restless legs syndrome     Shoulder pain     RIGHT, LIMITED MOBILITY-AT TIMES    Sleep apnea     bipap    Tinea corporis     Tinea cruris     Unsteady gait     RIGHT HIP    Weakness     RIGHT HIP     Past Surgical History:   Procedure Laterality Date    CATARACT EXTRACTION, BILATERAL Bilateral     CERVICAL DISCECTOMY ANTERIOR      COLONOSCOPY  03/08/2017    3/17 normal. Recheck 2022. 12/11. Repeat in 5 years    COLONOSCOPY N/A 04/19/2021    Procedure: COLONOSCOPY INTO CECUM WITH HOT & COLD  SNARE POLYPECTOMIES;  Surgeon: Jaden Chowdhury MD;  Location: [facility] ENDOSCOPY;  Service: Gastroenterology;  Laterality: N/A;  PRE: CHANGE IN BOWEL HABITS; FAMILY H/O COLON CANCER  POST: DIVERTICULOSIS, POLYPS    ENDOSCOPY N/A 01/30/2023    Procedure: ESOPHAGOGASTRODUODENOSCOPY with biopsies;  Surgeon: Jaden Chowdhury MD;  Location: [facility] ENDOSCOPY;  Service: Gastroenterology;  Laterality: N/A;  pre- abdominal pain, anemia  post- gastritis    ENDOSCOPY W/ PEG REMOVAL      EYE SURGERY  2013    Cataract    FOOT SURGERY      1998 had big toe replaced on left foot-METAL     HERNIA REPAIR  03/07/2012    umbilical    JOINT REPLACEMENT Left     big toe    MEDIAL BRANCH BLOCK Bilateral 04/07/2023    Procedure: LUMBAR MEDIAL BRANCH BLOCK bilateral L4-S1 [id number];  Surgeon: Lauren Houston MD;  Location: [facility] [place];  Service: Pain Management;  Laterality: Bilateral;    MEDIAL BRANCH BLOCK Bilateral 04/17/2023    Procedure: LUMBAR MEDIAL BRANCH BLOCK bilateral L4-S1 [id number];  Surgeon: Lauren Houston MD;  Location: [facility] [place] [place];  Service: Pain Management;  Laterality: Bilateral;     IA ARTHRP KNE CONDYLE&PLATU MEDIAL&LAT COMPARTMENTS Left 2016    Procedure: LT TOTAL KNEE ARTHROPLASTY;  Surgeon: Tadeo Basurto MD;  Location: [facility] MAIN OR;  Service: Orthopedics    PROSTATECTOMY  1999. Radical     RADIOFREQUENCY ABLATION Right 2023    Procedure: RADIOFREQUENCY ABLATION LUMBAR right L3-S1;  Surgeon: Lauren Houston MD;  Location: [facility] [place] OR;  Service: Pain Management;  Laterality: Right;    THYROID LOBECTOMY      TONSILLECTOMY      TOTAL HIP ARTHROPLASTY Right 2021    Procedure: TOTAL HIP ARTHROPLASTY RIGHT POSTERIOR;  Surgeon: Tadeo Basurto MD;  Location: [facility] MAIN OR;  Service: Orthopedics;  Laterality: Right;    TOTAL KNEE ARTHROPLASTY Right     TOTAL SHOULDER ARTHROPLASTY W/ DISTAL CLAVICLE EXCISION Right 2019    Procedure: TOTAL SHOULDER REVERSE ARTHROPLASTY RIGHT REPAIR RIGHT AXILLARY VEIN;  Surgeon: Behzad Kelley MD;  Location: [facility] OR [facility] [place];  Service: Orthopedics    WRIST SURGERY Right 12/17/2014    x2  wrist replaced     Family History   Problem Relation Age of Onset    Arthritis Mother     Colon cancer Mother     Arthritis Father     Cancer Father         Prostate cancer    Heart disease Sister     Arthritis Sister     Breast cancer Sister     Gout Sister     Hypertension Sister     Cancer Sister         All three breast cancer    Hypertension Brother     Heart disease Brother     Arthritis Brother     Heart attack Brother     Bone cancer Brother     Heart disease Brother     Malig Hyperthermia Neg Hx      Social History     Tobacco Use    Smoking status: Former     Packs/day: 1.00     Years: 20.00     Additional pack years: 0.00     Total pack years: 20.00     Types: Cigarettes     Start date: 1960     Quit date: 1984     Years since quittin.9    Smokeless tobacco: Former     Types: Chew   Vaping Use    Vaping Use: Never used   Substance Use Topics    Alcohol use: Yes     Alcohol/week: 2.0 standard drinks of  alcohol     Types: 2 Cans of beer per week     Comment: 3-4 MONTHLY    Drug use: No     No current facility-administered medications on file prior to encounter.     Current Outpatient Medications on File Prior to Encounter   Medication Sig Dispense Refill    acetaminophen (TYLENOL) 650 MG 8 hr tablet Take 1 tablet by mouth Every 8 (Eight) Hours As Needed for Mild Pain.      Ascorbic Acid (VITAMIN C PO) Take 1 tablet by mouth Daily.      aspirin 81 MG EC tablet Take 1 tablet by mouth Daily.      B-D UF III MINI PEN NEEDLES 31G X 5 MM misc 1 APPLICATION DAILY 100 each 3    bisacodyl (Dulcolax) 5 MG EC tablet Take 1 tablet by mouth 2 (Two) Times a Day.      brimonidine (ALPHAGAN) 0.2 % ophthalmic solution Administer 1 drop to both eyes 2 (Two) Times a Day.      celecoxib (CeleBREX) 200 MG capsule Take 1 capsule by mouth Daily. 30 capsule 5    Cyanocobalamin (B-12) 1000 MCG sublingual tablet One sublingual tab dissolved on tongue daily 90 each 3    dorzolamide-timolol (COSOPT) 22.3-6.8 MG/ML ophthalmic solution Administer 1 drop to both eyes 2 (Two) Times a Day.      fexofenadine (ALLEGRA) 180 MG tablet Take 1 tablet by mouth Daily.      furosemide (LASIX) 40 MG tablet TAKE 1 TABLET BY MOUTH EVERY DAY 90 tablet 3    gabapentin (NEURONTIN) 300 MG capsule Take 1 capsule by mouth 4 (Four) Times a Day. (Patient taking differently: Take 1 capsule by mouth 3 (Three) Times a Day.) 120 capsule 5    glucose blood (FREESTYLE LITE) test strip Check blood sugar  each 11    Hydrocortisone, Perianal, (ANUSOL-HC) 2.5 % rectal cream APPLY RECTALLY THREE TIMES DAILY FOR 7-10 DAYS DURNG HEMORRHOID FLARE      insulin glargine (LANTUS, SEMGLEE) 100 UNIT/ML injection Inject 8 Units under the skin into the appropriate area as directed Daily. (Patient taking differently: Inject 10 Units under the skin into the appropriate area as directed Daily.)      ketoconazole (NIZORAL) 2 % cream Apply 1 application topically to the appropriate  area as directed Daily. 60 g 3    Lancets (freestyle) lancets Check blood sugar  each 11    latanoprost (XALATAN) 0.005 % ophthalmic solution INSTILL 1 DROP IN BOTH EYES DAILY AT BEDTIME      levothyroxine (SYNTHROID, LEVOTHROID) 88 MCG tablet TAKE 1 TABLET BY MOUTH EVERY MORNING 90 tablet 3    losartan (COZAAR) 100 MG tablet TAKE 1 TABLET BY MOUTH DAILY 90 tablet 3    MAGNESIUM PO Take 1 tablet by mouth Daily. 250mg      metFORMIN (GLUCOPHAGE) 500 MG tablet TAKE 1 TABLET BY MOUTH TWICE DAILY WITH MEALS 180 tablet 3    montelukast (SINGULAIR) 10 MG tablet TAKE 1 TABLET BY MOUTH EVERY NIGHT 90 tablet 3    pantoprazole (PROTONIX) 40 MG EC tablet TAKE 1 TABLET BY MOUTH TWICE DAILY 90 tablet 3    pramipexole (MIRAPEX) 1.5 MG tablet Take 1 tablet by mouth 2 (Two) Times a Day.      Probiotic Product (Risaquad-2) capsule capsule Take 1 capsule by mouth Daily.      rosuvastatin (CRESTOR) 20 MG tablet TAKE 1 TABLET BY MOUTH EVERY EVENING 90 tablet 3    sennosides-docusate (Stimulant Laxative) 8.6-50 MG per tablet Take 2 tablets by mouth 2 (Two) Times a Day. 200 tablet 3    albuterol (PROVENTIL) (2.5 MG/3ML) 0.083% nebulizer solution Take 2.5 mg by nebulization Every 4 (Four) Hours As Needed for Wheezing.      albuterol sulfate  (90 Base) MCG/ACT inhaler Inhale 2 puffs Every 4 (Four) Hours As Needed for Wheezing.      fluticasone (FLONASE) 50 MCG/ACT nasal spray 2 sprays into the nostril(s) as directed by provider Daily.      fluticasone-salmeterol (ADVAIR HFA) 230-21 MCG/ACT inhaler Inhale 2 puffs 2 (Two) Times a Day.       Allergies   Allergen Reactions    Adhesive Tape Rash     Pt states he only had one reaction after hip surgery       Objective    Objective     Vital Signs  Temp:  [98.2 °F (36.8 °C)-100.7 °F (38.2 °C)] 98.2 °F (36.8 °C)  Heart Rate:  [] 104  Resp:  [20-28] 20  BP: (102-173)/(49-96) 149/83  SpO2:  [93 %-98 %] 98 %  on  Flow (L/min):  [2] 2;   Device (Oxygen Therapy): nasal cannula  Body  mass index is 39.52 kg/m².    Physical Exam  Vitals and nursing note reviewed.   Constitutional:       General: He is not in acute distress.     Appearance: He is well-developed.   HENT:      Head: Normocephalic.   Neck:      Vascular: No JVD.   Cardiovascular:      Rate and Rhythm: Normal rate and regular rhythm.      Comments: Normal sinus rhythm on the monitor with heart rate 78 during my exam, patient has no significant peripheral edema, does not appear to be in any acute distress.  Pulmonary:      Effort: Pulmonary effort is normal.      Breath sounds: Normal breath sounds.      Comments: Lung sounds diminished but clear, patient does not appear to be in any distress.  He is on room air with sats 95% during my exam.  He does have his home CPAP machine for use when sleeping  Abdominal:      General: There is no distension.      Palpations: Abdomen is soft.      Tenderness: There is no abdominal tenderness.      Comments: Entire lower abdomen red, warm to touch, no open areas or weeping noted   Musculoskeletal:         General: Normal range of motion.      Cervical back: Normal range of motion.   Skin:     General: Skin is warm and dry.      Capillary Refill: Capillary refill takes less than 2 seconds.   Neurological:      General: No focal deficit present.      Mental Status: He is alert and oriented to person, place, and time.   Psychiatric:         Attention and Perception: Attention normal.         Mood and Affect: Mood normal.         Behavior: Behavior normal.         Cognition and Memory: Cognition normal.         Judgment: Judgment normal.         Results Review:  I reviewed the patient's new clinical results.  I reviewed the patient's new imaging results and agree with the interpretation.  I reviewed the patient's other test results and agree with the interpretation  I personally viewed and interpreted the patient's EKG/Telemetry data  Discussed with ED provider.    Lab Results (last 24 hours)        Procedure Component Value Units Date/Time    CBC & Differential [[id number]]  (Abnormal) Collected: 12/08/23 1748    Specimen: Blood Updated: 12/08/23 1755    Narrative:      The following orders were created for panel order CBC & Differential.  Procedure                               Abnormality         Status                     ---------                               -----------         ------                     CBC Auto Differential[[id number]]        Abnormal            Final result                 Please view results for these tests on the individual orders.    Comprehensive Metabolic Panel [[id number]]  (Abnormal) Collected: 12/08/23 1748    Specimen: Blood Updated: 12/08/23 1813     Glucose 144 mg/dL      BUN 19 mg/dL      Creatinine 0.98 mg/dL      Sodium 138 mmol/L      Potassium 3.6 mmol/L      Chloride 103 mmol/L      CO2 23.9 mmol/L      Calcium 9.3 mg/dL      Total Protein 6.4 g/dL      Albumin 4.0 g/dL      ALT (SGPT) 21 U/L      AST (SGOT) 18 U/L      Alkaline Phosphatase 143 U/L      Total Bilirubin 1.1 mg/dL      Globulin 2.4 gm/dL      A/G Ratio 1.7 g/dL      BUN/Creatinine Ratio 19.4     Anion Gap 11.1 mmol/L      eGFR 79.4 mL/min/1.73     Narrative:      GFR Normal >60  Chronic Kidney Disease <60  Kidney Failure <15    The GFR formula is only valid for adults with stable renal function between ages 18 and 70.    Lactic Acid, Plasma [[id number]]  (Abnormal) Collected: 12/08/23 1748    Specimen: Blood Updated: 12/08/23 1815     Lactate 2.4 mmol/L     Blood Culture - Blood, Arm, Right [[id number]] Collected: 12/08/23 1748    Specimen: Blood from Arm, Right Updated: 12/08/23 1755    CBC Auto Differential [[id number]]  (Abnormal) Collected: 12/08/23 1748    Specimen: Blood Updated: 12/08/23 1755     WBC 8.21 10*3/mm3      RBC 4.39 10*6/mm3      Hemoglobin 12.7 g/dL      Hematocrit 40.5 %      MCV 92.3 fL      MCH 28.9 pg      MCHC 31.4 g/dL      RDW 13.2 %      RDW-SD 45.2 fl      MPV 10.2 fL       "Platelets 124 10*3/mm3      Neutrophil % 81.8 %      Lymphocyte % 9.7 %      Monocyte % 6.9 %      Eosinophil % 0.7 %      Basophil % 0.0 %      Immature Grans % 0.9 %      Neutrophils, Absolute 6.71 10*3/mm3      Lymphocytes, Absolute 0.80 10*3/mm3      Monocytes, Absolute 0.57 10*3/mm3      Eosinophils, Absolute 0.06 10*3/mm3      Basophils, Absolute 0.00 10*3/mm3      Immature Grans, Absolute 0.07 10*3/mm3     Procalcitonin [[id number]]  (Normal) Collected: 12/08/23 1748    Specimen: Blood Updated: 12/08/23 2039     Procalcitonin 0.19 ng/mL     Narrative:      As a Marker for Sepsis (Non-Neonates):    1. <0.5 ng/mL represents a low risk of severe sepsis and/or septic shock.  2. >2 ng/mL represents a high risk of severe sepsis and/or septic shock.    As a Marker for Lower Respiratory Tract Infections that require antibiotic therapy:    PCT on Admission    Antibiotic Therapy       6-12 Hrs later    >0.5                Strongly Recommended  >0.25 - <0.5        Recommended   0.1 - 0.25          Discouraged              Remeasure/reassess PCT  <0.1                Strongly Discouraged     Remeasure/reassess PCT    As 28 day mortality risk marker: \"Change in Procalcitonin Result\" (>80% or <=80%) if Day 0 (or Day 1) and Day 4 values are available. Refer to http://www.[facility]-pct-calculator.com    Change in PCT <=80%  A decrease of PCT levels below or equal to 80% defines a positive change in PCT test result representing a higher risk for 28-day all-cause mortality of patients diagnosed with severe sepsis for septic shock.    Change in PCT >80%  A decrease of PCT levels of more than 80% defines a negative change in PCT result representing a lower risk for 28-day all-cause mortality of patients diagnosed with severe sepsis or septic shock.       COVID-19 and FLU A/B PCR, 1 HR TAT - Swab, Nasopharynx [[id number]]  (Normal) Collected: 12/08/23 1749    Specimen: Swab from Nasopharynx Updated: 12/08/23 1814     COVID19 Not " Detected     Influenza A PCR Not Detected     Influenza B PCR Not Detected    Narrative:      Fact sheet for providers: https://www.fda.gov/media/297623/download    Fact sheet for patients: https://www.fda.gov/media/762484/download    Test performed by PCR.    RSV PCR - Swab, Nasopharynx [[id number]]  (Normal) Collected: 12/08/23 1749    Specimen: Swab from Nasopharynx Updated: 12/08/23 1820     RSV, PCR Not Detected    Rapid Strep A Screen - Swab, Throat [[id number]]  (Normal) Collected: 12/08/23 1749    Specimen: Swab from Throat Updated: 12/08/23 1810     STREP A PCR Not Detected    Blood Culture - Blood, Arm, Left [[id number]] Collected: 12/08/23 1817    Specimen: Blood from Arm, Left Updated: 12/08/23 1820    STAT Lactic Acid, Reflex [[id number]]  (Normal) Collected: 12/08/23 2118    Specimen: Blood Updated: 12/08/23 2141     Lactate 1.8 mmol/L     Urinalysis With Microscopic If Indicated (No Culture) - Urine, Clean Catch [[id number]]  (Abnormal) Collected: 12/08/23 2136    Specimen: Urine, Clean Catch Updated: 12/08/23 2140     Color, UA Yellow     Appearance, UA Clear     pH, UA 7.0     Specific Gravity, UA 1.025     Glucose, UA Negative     Ketones, UA Negative     Bilirubin, UA Negative     Blood, UA Negative     Protein,  mg/dL (2+)     Leuk Esterase, UA Negative     Nitrite, UA Negative     Urobilinogen, UA 1.0 E.U./dL    Urinalysis, Microscopic Only - Urine, Clean Catch [[id number]] Collected: 12/08/23 2136    Specimen: Urine, Clean Catch Updated: 12/08/23 2139    Topmost Urine Culture Tube - Urine, Clean Catch [[id number]] Collected: 12/08/23 2136    Specimen: Urine, Clean Catch Updated: 12/08/23 2140    POC Glucose Once [[id number]]  (Abnormal) Collected: 12/09/23 0212    Specimen: Blood Updated: 12/09/23 0213     Glucose 133 mg/dL     Legionella Antigen, Urine - Urine, Urine, Clean Catch [[id number]] Collected: 12/09/23 0248    Specimen: Urine, Clean Catch Updated: 12/09/23 0303            Imaging  Results (Last 24 Hours)       Procedure Component Value Units Date/Time    XR Chest 1 View [[id number]] Collected: 12/08/23 1832     Updated: 12/08/23 1832    Narrative:      X-RAY CHEST ONE VIEW     HISTORY: 78-year-old male with shortness of breath and productive cough.     FINDINGS: There is pulmonary vascular congestion and CHF is suspected.  There is also likely pneumonia at the right lower lobe. Old right rib  fractures are noted. Follow-up with PA and lateral views of the chest is  recommended.               Results for orders placed during the hospital encounter of 02/16/23    Adult Transthoracic Echo Complete W/ Cont if Necessary Per Protocol    Interpretation Summary    Left ventricular systolic function is normal. Left ventricular ejection fraction appears to be 61 - 65%.    Left ventricular diastolic function is consistent with (grade I) impaired relaxation.    Normal right ventricular cavity size and systolic function noted.    The left atrial cavity is mildly dilated.    The aortic valve leaflets are mildly calcified    Trace to mild tricuspid valve regurgitation is present.    Calculated right ventricular systolic pressure from tricuspid regurgitation is 29 mmHg.    There is a trivial pericardial effusion. There is no evidence of cardiac tamponade.      ECG 12 Lead Tachycardia   Final Result   HEART RATE= 115  bpm   RR Interval= 522  ms   NC Interval= 150  ms   P Horizontal Axis= -10  deg   P Front Axis= 66  deg   QRSD Interval= 83  ms   QT Interval= 336  ms   QTcB= 465  ms   QRS Axis= 10  deg   T Wave Axis= 25  deg   - OTHERWISE NORMAL ECG -   Sinus tachycardia   When compared with ECG of 16-Feb-2023 23:16:45,   No significant change     Electronically Signed By: Kleber Vogt ([facility]) 08-Dec-2023 22:59:02   Date and Time of Study: 2023-12-08 18:44:10           Assessment/Plan     Active Hospital Problems    Diagnosis  POA    **Sepsis due to other etiology [A41.89]  Yes    CHF exacerbation [I50.9]  Yes     Iron deficiency anemia [D50.9]  Yes     Added automatically from request for surgery [id number]      Gastroesophageal reflux disease [K21.9]  Yes    Type 2 diabetes mellitus with hyperglycemia [E11.65]  Yes    KWAME (obstructive sleep apnea) [G47.33]  Yes    Hypothyroidism (acquired) [E03.9]  Yes    Cellulitis of abdominal wall [L03.311]  Yes    Hypertension [I10]  Yes     Mr. Baez is a 78-year-old male with history of CHF, hypothyroidism hypertension, obstructive sleep apnea, type 2 diabetes, GERD, anemia who presents to the emergency room with abdominal pain, generalized weakness and chills    Sepsis/cellulitis of the abdominal wall  -Blood cultures are pending  -Continue vancomycin pharmacy to dose and Rocephin awaiting cultures  -CT abdomen ordered  -CBC, BMP in a.m.    Thrombocytopenia  -Check CBC in a.m.    Type 2 diabetes  -Accu-Cheks before meals and at bedtime with correctional dose insulin  -hold oral diabetic medications at this time    Obstructive sleep apnea  -Patient may use home CPAP machine  -O2 to keep sats above 90%    Hypothyroidism/hypertension  -Chronic conditions stable at this time  -Continue home medications      I discussed the patient's findings and my recommendations with patient.    VTE Prophylaxis - SCDs.  Code Status - Full code.       ALY Jean  [facility]  12/09/23  04:07 EST

## 2025-03-10 ENCOUNTER — OFFICE VISIT (OUTPATIENT)
Dept: PAIN MEDICINE | Facility: CLINIC | Age: 80
End: 2025-03-10
Payer: MEDICARE

## 2025-03-10 VITALS
DIASTOLIC BLOOD PRESSURE: 82 MMHG | BODY MASS INDEX: 36.95 KG/M2 | RESPIRATION RATE: 18 BRPM | WEIGHT: 235.4 LBS | HEART RATE: 70 BPM | HEIGHT: 67 IN | SYSTOLIC BLOOD PRESSURE: 145 MMHG | OXYGEN SATURATION: 94 % | TEMPERATURE: 98 F

## 2025-03-10 DIAGNOSIS — E65 PANNUS, ABDOMINAL: ICD-10-CM

## 2025-03-10 DIAGNOSIS — L03.311 CELLULITIS OF ABDOMINAL WALL: ICD-10-CM

## 2025-03-10 DIAGNOSIS — M51.360 DEGENERATION OF INTERVERTEBRAL DISC OF LUMBAR REGION WITH DISCOGENIC BACK PAIN: Primary | ICD-10-CM

## 2025-03-10 DIAGNOSIS — R20.2 PARESTHESIA OF BILATERAL LEGS: ICD-10-CM

## 2025-03-10 DIAGNOSIS — E11.42 DIABETIC PERIPHERAL NEUROPATHY: ICD-10-CM

## 2025-03-10 DIAGNOSIS — Z79.899 HIGH RISK MEDICATION USE: ICD-10-CM

## 2025-03-10 DIAGNOSIS — M47.816 LUMBAR FACET ARTHROPATHY: ICD-10-CM

## 2025-03-10 PROCEDURE — 3077F SYST BP >= 140 MM HG: CPT

## 2025-03-10 PROCEDURE — 1125F AMNT PAIN NOTED PAIN PRSNT: CPT

## 2025-03-10 PROCEDURE — 1159F MED LIST DOCD IN RCRD: CPT

## 2025-03-10 PROCEDURE — 99213 OFFICE O/P EST LOW 20 MIN: CPT

## 2025-03-10 PROCEDURE — 3079F DIAST BP 80-89 MM HG: CPT

## 2025-03-10 PROCEDURE — 1160F RVW MEDS BY RX/DR IN RCRD: CPT

## 2025-03-10 RX ORDER — PENICILLIN V POTASSIUM 250 MG/1
250 TABLET, FILM COATED ORAL 2 TIMES DAILY
Qty: 60 TABLET | Refills: 5 | Status: SHIPPED | OUTPATIENT
Start: 2025-03-10

## 2025-03-10 RX ORDER — GABAPENTIN 600 MG/1
600 TABLET ORAL 3 TIMES DAILY
Qty: 270 TABLET | Refills: 0 | Status: SHIPPED | OUTPATIENT
Start: 2025-03-10

## 2025-03-10 NOTE — PROGRESS NOTES
CHIEF COMPLAINT  Back pain     Subjective   Nathan Baez is a 79 y.o. male  who presents for follow-up.  He has a history of chronic low back pain. He reports that his pain level has remained consistent since his last office visit. Today pain is 5/10VAS in severity. He continues to complain of axial low back pain that radiates across his low back in a bandlike pattern. Denies radicular pain. Describes this pain as a nearly continuous ache. He has completed PT and continues with HEP as tolerated.       Continues with Hydrocodone 7.5mg 3/day. Gabapentin was increased to 600mg TID at his last office visit. He reports that this increase has been helpful to his pain. He denies any side effects from this increase. He reports intermittent constipation that is managed with Ducolax and a stool softener. Denies any other side effects from the regimen including somnolence. Notes improvement in activity and function with regimen.      Reviewed office note from Dr. Damien Pierce from 1/21/2025.  Patient was seen for annual wellness exam.  He was started on phentermine 37.5 mg to assist with weight loss and a referral was made for urology.     Procedures:  5/17/23 - Bilateral L3-L5 RFA with Dr. Houston - minimal relief     Procedures at [facility]:  5/3/22 - L4-L5 LESI  8/17/22 - L4-L5 LESI    Back Pain  This is a chronic problem. The current episode started more than 1 year ago. The problem occurs daily. The problem is unchanged. The pain is present in the lumbar spine. The quality of the pain is described as aching and burning. The pain does not radiate. The pain is at a severity of 5/10. The pain is moderate. The symptoms are aggravated by standing and sitting (walking). Associated symptoms include numbness and weakness. Pertinent negatives include no abdominal pain, chest pain, dysuria, fever or headaches. He has tried heat, ice, home exercises, NSAIDs, analgesics and bed rest for the symptoms. The treatment provided moderate  "relief.      PEG Assessment   What number best describes your pain on average in the past week?6  What number best describes how, during the past week, pain has interfered with your enjoyment of life?7  What number best describes how, during the past week, pain has interfered with your general activity?  7    Review of Pertinent Medical Data ---        The following portions of the patient's history were reviewed and updated as appropriate: allergies, current medications, past family history, past medical history, past social history, past surgical history, and problem list.    Review of Systems   Constitutional:  Negative for fever.   Cardiovascular:  Negative for chest pain.   Gastrointestinal:  Negative for abdominal pain, constipation and diarrhea.   Genitourinary:  Negative for difficulty urinating and dysuria.   Musculoskeletal:  Positive for back pain.   Neurological:  Positive for weakness and numbness. Negative for headaches.   Psychiatric/Behavioral:  Negative for sleep disturbance and suicidal ideas. The patient is not nervous/anxious.      I have reviewed and confirmed the accuracy of the ROS as documented by the MA/LPN/RN ALY Robertson    Vitals:    03/10/25 1345   BP: 145/82   Pulse: 70   Resp: 18   Temp: 98 °F (36.7 °C)   SpO2: 94%   Weight: 107 kg (235 lb 6.4 oz)   Height: 170.2 cm (67\")   PainSc: 5    PainLoc: Back     Objective   Physical Exam  Constitutional:       Appearance: Normal appearance.   HENT:      Head: Normocephalic.   Cardiovascular:      Rate and Rhythm: Normal rate and regular rhythm.   Pulmonary:      Effort: Pulmonary effort is normal.      Breath sounds: Normal breath sounds.   Musculoskeletal:      Cervical back: Normal range of motion.      Lumbar back: Tenderness and bony tenderness present. Decreased range of motion.      Left hip: Tenderness present. Decreased range of motion.   Skin:     General: Skin is warm and dry.      Capillary Refill: Capillary refill takes " less than 2 seconds.   Neurological:      General: No focal deficit present.      Mental Status: He is alert and oriented to person, place, and time.      Gait: Gait abnormal (slowed, ambulates with rollator).   Psychiatric:         Mood and Affect: Mood normal.         Behavior: Behavior normal.         Thought Content: Thought content normal.         Cognition and Memory: Cognition normal.       Assessment & Plan   Diagnoses and all orders for this visit:    1. Degeneration of intervertebral disc of lumbar region with discogenic back pain (Primary)  -     gabapentin (NEURONTIN) 600 MG tablet; Take 1 tablet by mouth 3 (Three) Times a Day.  Dispense: 270 tablet; Refill: 0    2. Lumbar facet arthropathy  -     gabapentin (NEURONTIN) 600 MG tablet; Take 1 tablet by mouth 3 (Three) Times a Day.  Dispense: 270 tablet; Refill: 0    3. Paresthesia of bilateral legs    4. High risk medication use    5. Diabetic peripheral neuropathy  -     gabapentin (NEURONTIN) 600 MG tablet; Take 1 tablet by mouth 3 (Three) Times a Day.  Dispense: 270 tablet; Refill: 0      Nathan COOPER Sasha reports a pain score of 5.  Given his pain assessment as noted, treatment options were discussed and the following options were decided upon as a follow-up plan to address the patient's pain: continuation of current treatment plan for pain and referral to Physical Therapy.    --- The urine drug screen confirmation from 10/7/24 has been reviewed and the result is appropriate based on patient history and QIANA report  --- The patient signed an updated copy of the controlled substance agreement on 3/10/25  --- ORT -- low risk  --- Continue Gabapenin. Refill sent to pharmacy.   --- Continue Hydrocodone. No refill needed at this time. Patient verbalized understanding. Patient appears stable with current regimen. No adverse effects. Regarding continuation of opioids, there is no evidence of aberrant behavior or any red flags.  The patient continues with  appropriate response to opioid therapy. ADL's remain intact by self.   --- Follow-up 2 months or sooner if needed       QIANA REPORT  As part of the patient's treatment plan, I am prescribing controlled substances. The patient has been made aware of appropriate use of such medications, including potential risk of somnolence, limited ability to drive and/or work safely, and the potential for dependence or overdose. It has also been made clear that these medications are for use by this patient only, without concomitant use of alcohol or other substances unless prescribed.     Patient has completed prescribing agreement detailing terms of continued prescribing of controlled substances, including monitoring QIANA reports, urine drug screening, and pill counts if necessary. The patient is aware that inappropriate use will results in cessation of prescribing such medications.    As the clinician, I personally reviewed the QIANA from 3/10/25 while the patient was in the office today.    History and physical exam exhibit continued safe and appropriate use of controlled substances.    Dictated utilizing Dragon dictation.

## 2025-04-07 DIAGNOSIS — M51.369 DDD (DEGENERATIVE DISC DISEASE), LUMBAR: ICD-10-CM

## 2025-04-07 RX ORDER — HYDROCODONE BITARTRATE AND ACETAMINOPHEN 7.5; 325 MG/1; MG/1
1 TABLET ORAL EVERY 8 HOURS PRN
Qty: 90 TABLET | Refills: 0 | Status: SHIPPED | OUTPATIENT
Start: 2025-04-07

## 2025-04-07 NOTE — TELEPHONE ENCOUNTER
Caller: Coco Baez    Relationship: Emergency Contact    Best call back number:     Requested Prescriptions:   Requested Prescriptions     Pending Prescriptions Disp Refills    HYDROcodone-acetaminophen (NORCO) 7.5-325 MG per tablet 90 tablet 0     Sig: Take 1 tablet by mouth Every 8 (Eight) Hours As Needed for Moderate Pain. 30 day supply.        Pharmacy where request should be sent: [id number] [address] AT [facility] [phone] [facility] [phone]      Last office visit with prescribing clinician: 3/10/2025   Last telemedicine visit with prescribing clinician: Visit date not found   Next office visit with prescribing clinician: 5/19/2025     Does the patient have less than a 3 day supply:  [x] Yes  [] No    Would you like a call back once the refill request has been completed: [] Yes [x] No    If the office needs to give you a call back, can they leave a voicemail: [] Yes [x] No    Margy Felder Rep   04/07/25 10:18 EDT

## 2025-04-11 DIAGNOSIS — J30.1 SEASONAL ALLERGIC RHINITIS DUE TO POLLEN: ICD-10-CM

## 2025-04-12 RX ORDER — MONTELUKAST SODIUM 10 MG/1
10 TABLET ORAL NIGHTLY
Qty: 90 TABLET | Refills: 3 | Status: SHIPPED | OUTPATIENT
Start: 2025-04-12

## 2025-05-06 ENCOUNTER — OFFICE VISIT (OUTPATIENT)
Dept: FAMILY MEDICINE CLINIC | Facility: CLINIC | Age: 80
End: 2025-05-06
Payer: MEDICARE

## 2025-05-06 VITALS
HEIGHT: 67 IN | SYSTOLIC BLOOD PRESSURE: 140 MMHG | BODY MASS INDEX: 37.39 KG/M2 | OXYGEN SATURATION: 96 % | HEART RATE: 85 BPM | TEMPERATURE: 97 F | RESPIRATION RATE: 17 BRPM | WEIGHT: 238.2 LBS | DIASTOLIC BLOOD PRESSURE: 78 MMHG

## 2025-05-06 DIAGNOSIS — Z79.4 TYPE 2 DIABETES MELLITUS WITH HYPERGLYCEMIA, WITH LONG-TERM CURRENT USE OF INSULIN: Primary | ICD-10-CM

## 2025-05-06 DIAGNOSIS — E11.65 TYPE 2 DIABETES MELLITUS WITH HYPERGLYCEMIA, WITH LONG-TERM CURRENT USE OF INSULIN: Primary | ICD-10-CM

## 2025-05-06 DIAGNOSIS — M51.369 DDD (DEGENERATIVE DISC DISEASE), LUMBAR: ICD-10-CM

## 2025-05-06 DIAGNOSIS — I10 ESSENTIAL HYPERTENSION: ICD-10-CM

## 2025-05-06 RX ORDER — HYDROCODONE BITARTRATE AND ACETAMINOPHEN 7.5; 325 MG/1; MG/1
1 TABLET ORAL EVERY 8 HOURS PRN
Qty: 90 TABLET | Refills: 0 | Status: SHIPPED | OUTPATIENT
Start: 2025-05-06

## 2025-05-06 NOTE — TELEPHONE ENCOUNTER
Caller: Coco Baez    Relationship: Emergency Contact    Best call back number:     Requested Prescriptions:   Requested Prescriptions     Pending Prescriptions Disp Refills    HYDROcodone-acetaminophen (NORCO) 7.5-325 MG per tablet 90 tablet 0     Sig: Take 1 tablet by mouth Every 8 (Eight) Hours As Needed for Moderate Pain. 30 day supply.        Pharmacy where request should be sent: [id number] [address] [facility] [phone] [facility] [phone]      Last office visit with prescribing clinician: 3/10/2025   Last telemedicine visit with prescribing clinician: Visit date not found   Next office visit with prescribing clinician: 5/19/2025     Additional details provided by patient: PATIENT IS REQUESTING A REFILL OF THIS MEDICATION IF POSSIBLE.    Does the patient have less than a 3 day supply:  [x] Yes  [] No    Would you like a call back once the refill request has been completed: [] Yes [x] No    If the office needs to give you a call back, can they leave a voicemail: [] Yes [x] No    Margy Tijerina Rep   05/06/25 09:31 EDT

## 2025-05-06 NOTE — PROGRESS NOTES
Chief Complaint   Patient presents with    Diabetes    Hip Pain    Obesity    Hypertension       Subjective   Nathan Baez is a 80 y.o. male.     Diabetes  Associated symptoms:     no blurred vision, no chest pain, no fatigue, no polyuria and no weakness    Hypoglycemia symptoms:     no confusion    Hip Pain     Obesity  Pertinent negative symptoms include no abdominal pain, no chest pain, no fatigue, no myalgias, no rash, no sore throat, no dysuria and no weakness.   Hypertension  Associated symptoms: no blurred vision, no chest pain and no shortness of breath       F/U DM2.  BS running 170-190 now.    F/U HTN  doing well iwht meds.      The following portions of the patient's history were reviewed and updated as appropriate: allergies, current medications, past family history, past medical history, past social history, past surgical history and problem list.    Review of Systems   Constitutional:  Negative for appetite change and fatigue.   HENT:  Negative for nosebleeds and sore throat.    Eyes:  Negative for blurred vision and visual disturbance.   Respiratory:  Negative for shortness of breath and wheezing.    Cardiovascular:  Negative for chest pain and leg swelling.   Gastrointestinal:  Negative for abdominal distention and abdominal pain.   Endocrine: Negative for cold intolerance and polyuria.   Genitourinary:  Negative for dysuria and hematuria.   Musculoskeletal:  Negative for arthralgias and myalgias.   Skin:  Negative for color change and rash.   Neurological:  Negative for weakness and confusion.   Psychiatric/Behavioral:  Negative for agitation and depressed mood.        Patient Active Problem List   Diagnosis    OA (osteoarthritis) of knee    Essential hypertension    Cellulitis of abdominal wall    Hypothyroidism (acquired)    Gastroesophageal reflux disease without esophagitis    Mixed hyperlipidemia    Primary insomnia    DDD (degenerative disc disease), lumbar    Obstructive sleep apnea     Allergic    Venous insufficiency    Prostate cancer    Venous stasis dermatitis    Tinea cruris    Tinea corporis    Throat clearing    Sleep apnea    Skin lesion of face    Rib pain on left side    Rectal bleed    Presbycusis    Pes planus    Osteoarthritis    Obesity    Medicare annual wellness visit, subsequent    Lumbar strain    Internal hemorrhoids    Insomnia    Inflamed skin tag    Hyperplastic polyp of stomach    Hospital discharge follow-up    History of colon polyps    High risk medication use    Glaucoma    Gastroenteritis, acute    Probable streptococcal cellulitis    Encounter for screening colonoscopy    Encounter for long-term (current) use of NSAIDs    Dysphagia    DVT (deep venous thrombosis)    Lumbar facet arthropathy    Diverticulosis    Cough    Contact with hypodermic needle    Cervical radiculopathy    Arthritis    Allergic rhinitis    Acute glaucoma    Acute embolism and thrombosis of vein    Actinic keratosis    Status post reverse total shoulder replacement, right    Localized edema    Chronic disease anemia    Peripheral polyneuropathy    Campylobacter diarrhea    Hyponatremia    Generalized abdominal pain    Fever    Constipation    Bilateral lower extremity edema    Acute pyelonephritis    Chronic idiopathic constipation    Functional diarrhea    Change in bowel habits    RLS (restless legs syndrome)    Type 2 diabetes mellitus with hyperglycemia    Family history of GI malignancy    Primary osteoarthritis of right hip    B12 deficiency    Intervertebral disc stenosis of neural canal of lumbar region    Encounter for hepatitis C screening test for low risk patient    Pain associated with defecation    Calculus of kidney    Gastroesophageal reflux disease    Body aches    Skin lesion    Candidiasis, intertrigo    Hyperbilirubinemia    Acute respiratory failure due to COVID-19    Bacteremia due to group B Streptococcus    Iron deficiency anemia    Acute respiratory failure with hypoxia  and hypercapnia    Hypoventilation syndrome    Pneumonia of right lower lobe due to infectious organism    Chronic diastolic (congestive) heart failure    Fatty liver    Chronic heart failure with preserved ejection fraction (HFpEF)    Pneumonia, unspecified organism    Pannus, abdominal    Abdominal wall cellulitis    Panniculitis    Abdominal wall cellulitis    Thrombocytopenia    Type 2 diabetes mellitus    Elevated liver function tests    History of DVT (deep vein thrombosis)    Fatty liver    Cholelithiasis    Carpal tunnel syndrome of left wrist    Cubital tunnel syndrome on left    Gross hematuria       Allergies   Allergen Reactions    Adhesive Tape Rash     Pt states he only had one reaction after hip surgery         Current Outpatient Medications:     amLODIPine (NORVASC) 2.5 MG tablet, Take 1 tablet by mouth Daily., Disp: 90 tablet, Rfl: 3    aspirin 81 MG EC tablet, Take 1 tablet by mouth Daily. Indications: Coronary Bypass Surgery, Disp: , Rfl:     atenolol (TENORMIN) 25 MG tablet, TAKE 1/2 TABLET BY MOUTH DAILY, Disp: 45 tablet, Rfl: 0    bisacodyl (Dulcolax) 5 MG EC tablet, Take 1 tablet by mouth 2 (Two) Times a Day. Indications: Constipation, Disp: , Rfl:     brimonidine (ALPHAGAN) 0.2 % ophthalmic solution, Administer 1 drop to both eyes 2 (Two) Times a Day. Indications: Wide-Angle Glaucoma, Disp: , Rfl:     Cyanocobalamin (VITAMIN B 12 PO), Take 1,000 mg by mouth Daily. HOLD PER SURGEON'S INSTRCUTIONS, Disp: , Rfl:     dorzolamide-timolol (COSOPT) 22.3-6.8 MG/ML ophthalmic solution, Administer 1 drop to both eyes 2 (Two) Times a Day. Indications: Wide-Angle Glaucoma, Disp: , Rfl:     furosemide (LASIX) 40 MG tablet, TAKE 1 TABLET BY MOUTH EVERY DAY  Indications: Edema, Disp: 90 tablet, Rfl: 3    gabapentin (NEURONTIN) 600 MG tablet, Take 1 tablet by mouth 3 (Three) Times a Day., Disp: 270 tablet, Rfl: 0    glucose blood (FREESTYLE LITE) test strip, Check blood sugar TID, Disp: 200 each, Rfl: 11     HYDROcodone-acetaminophen (NORCO) 7.5-325 MG per tablet, Take 1 tablet by mouth Every 8 (Eight) Hours As Needed for Moderate Pain. 30 day supply., Disp: 90 tablet, Rfl: 0    Hydrocortisone, Perianal, (ANUSOL-HC) 2.5 % rectal cream, APPLY RECTALLY THREE TIMES DAILY FOR 7-10 DAYS DURNG HEMORRHOID FLARE, Disp: , Rfl:     Insulin Glargine (Lantus SoloStar) 100 UNIT/ML injection pen, Inject 32 Units under the skin into the appropriate area as directed Daily., Disp: 30 mL, Rfl: 12    Insulin Pen Needle 32G X 4 MM misc, Use to inject insulin under the skin via pens twice daily, Disp: 100 each, Rfl: 0    ketoconazole (NIZORAL) 2 % cream, Apply 1 application topically to the appropriate area as directed Daily., Disp: 60 g, Rfl: 3    Lancets (freestyle) lancets, Check blood sugar TID, Disp: 200 each, Rfl: 11    latanoprost (XALATAN) 0.005 % ophthalmic solution, Administer 1 drop to both eyes Every Night. Indications: Wide-Angle Glaucoma, Disp: , Rfl:     levothyroxine (SYNTHROID, LEVOTHROID) 88 MCG tablet, TAKE 1 TABLET BY MOUTH EVERY MORNING, Disp: 90 tablet, Rfl: 3    losartan (COZAAR) 100 MG tablet, TAKE 1 TABLET BY MOUTH DAILY, Disp: 90 tablet, Rfl: 1    metFORMIN (GLUCOPHAGE) 500 MG tablet, TAKE 1 TABLET BY MOUTH TWICE DAILY WITH MEALS, Disp: 180 tablet, Rfl: 3    montelukast (SINGULAIR) 10 MG tablet, TAKE 1 TABLET BY MOUTH EVERY NIGHT, Disp: 90 tablet, Rfl: 3    pantoprazole (PROTONIX) 40 MG EC tablet, TAKE 1 TABLET BY MOUTH TWICE DAILY FOR HEARTBURN, Disp: 180 tablet, Rfl: 0    penicillin v potassium (VEETID) 250 MG tablet, TAKE 1 TABLET BY MOUTH TWICE DAILY, Disp: 60 tablet, Rfl: 5    pramipexole (MIRAPEX) 1.5 MG tablet, Take 1 tablet by mouth 2 (Two) Times a Day. prn  Indications: Restless Leg Syndrome, Disp: , Rfl:     Probiotic Product (Risaquad-2) capsule capsule, Take 1 capsule by mouth Daily. PROBIOTIC, Disp: , Rfl:     rosuvastatin (CRESTOR) 20 MG tablet, TAKE 1 TABLET BY MOUTH EVERY EVENING, Disp: 90 tablet,  Rfl: 1    sennosides-docusate (Stimulant Laxative) 8.6-50 MG per tablet, Take 2 tablets by mouth 2 (Two) Times a Day., Disp: 200 tablet, Rfl: 3    Past Medical History:   Diagnosis Date    Actinic keratosis     Acute embolism and thrombosis of vein     Allergic     Allergic rhinitis     Arthritis     Cataract     Cellulitis 07/2024    [facility]OS[facility]ITAL[facility]    Cervical radiculopathy     Chronic constipation     Colon polyp     Diabetes mellitus     Disequilibrium     DJD (degenerative joint disease), lumbar     Elevated cholesterol     Erysipelas     GERD (gastroesophageal reflux disease)     Glaucoma     Hip pain, chronic, right     History of kidney stones     X1    History of peripheral edema     LOWER LEGS BILAT, COMPRESSION KNEE HIGH     History of transfusion 2019    SHOULDER SURGERY    Hospitalization or health care facility admission within last 6 months 06/16/2024    CELLULITIS    Hyperlipidemia     Hypertension     Hypothyroid     Insomnia     Intervertebral disc stenosis of neural canal of lumbar region 04/12/2022    Kidney stone     Limited mobility     RIGHT HIP    Low back pain     Male urinary stress incontinence     s/p prostatectomy-WEAR PAD    Numbness of left hand     Obesity     KWAME (obstructive sleep apnea)     BIPAP    Osteoarthritis     Pain management     [place] [facility]    Pelvic floor dysfunction 06/2022    DEFOGRAM ORDERED AT [facility] BY DR. ROSHAN SCHAFER    Pes planus     Presbycusis     Prostate cancer 1999    Restless legs syndrome     Shoulder pain     RIGHT, LIMITED MOBILITY-AT TIMES    Tinea corporis     Tinea cruris     Unsteady gait     RIGHT HIP    Weakness     RIGHT HIP       Past Surgical History:   Procedure Laterality Date    CARPAL TUNNEL RELEASE WITH CUBITAL TUNNEL RELEASE Left 8/2/2024    Procedure: LEFT CARPAL TUNNEL RELEASE AND  CUBITAL TUNNEL RELEASE;  Surgeon: Erasmo Rayo MD;  [facility]: [facility] OR [facility];  Service: Orthopedics;  Laterality: Left;     CATARACT EXTRACTION, BILATERAL Right 2013    CERVICAL DISCECTOMY ANTERIOR      COLONOSCOPY  03/08/2017    3/17 normal. Recheck 2022. 12/11. Repeat in 5 years    COLONOSCOPY N/A 04/19/2021    Procedure: COLONOSCOPY INTO CECUM WITH HOT & COLD  SNARE POLYPECTOMIES;  Surgeon: Jaden Chowdhury MD;  Location: [facility] ENDOSCOPY;  Service: Gastroenterology;  Laterality: N/A;  PRE: CHANGE IN BOWEL HABITS; FAMILY H/O COLON CANCER  POST: DIVERTICULOSIS, POLYPS    ENDOSCOPY N/A 01/30/2023    Procedure: ESOPHAGOGASTRODUODENOSCOPY with biopsies;  Surgeon: Jaden Chowdhury MD;  Location: [facility] ENDOSCOPY;  Service: Gastroenterology;  Laterality: N/A;  pre- abdominal pain, anemia  post- gastritis    ENDOSCOPY W/ PEG REMOVAL      EYE SURGERY  2013    Cataract    FOOT SURGERY      1998 had big toe replaced on left foot-METAL     HERNIA REPAIR  03/07/2012    umbilical    JOINT REPLACEMENT Left     big toe    MEDIAL BRANCH BLOCK Bilateral 04/07/2023    Procedure: LUMBAR MEDIAL BRANCH BLOCK bilateral L4-S1 [id number];  Surgeon: Lauren Houston MD;  Location: [facility] MAIN OR;  Service: Pain Management;  Laterality: Bilateral;    MEDIAL BRANCH BLOCK Bilateral 04/17/2023    Procedure: LUMBAR MEDIAL BRANCH BLOCK bilateral L4-S1 [id number];  Surgeon: Lauren Houston MD;  Location: [facility];  Service: Pain Management;  Laterality: Bilateral;    NJ ARTHRP KNE CONDYLE&PLATU MEDIAL&LAT COMPARTMENTS Left 09/13/2016    Procedure: LT TOTAL KNEE ARTHROPLASTY;  Surgeon: Tadeo Basurto MD;  Location: [facility] MAIN OR;  Service: Orthopedics    PROSTATECTOMY  07/1999 July 1999. Radical     RADIOFREQUENCY ABLATION Right 05/17/2023    Procedure: RADIOFREQUENCY ABLATION LUMBAR right L3-S1;  Surgeon: Lauren Houston MD;  Location: [facility] MAIN OR;  Service: Pain Management;  Laterality: Right;    THYROID LOBECTOMY  2011    TONSILLECTOMY      CHILDHOOD    TOTAL HIP ARTHROPLASTY Right 08/19/2021    Procedure: TOTAL HIP ARTHROPLASTY RIGHT  POSTERIOR;  Surgeon: Tadeo Basurto MD;  Location: [facility] OR;  Service: Orthopedics;  Laterality: Right;    TOTAL KNEE ARTHROPLASTY Right 2014    TOTAL SHOULDER ARTHROPLASTY W/ DISTAL CLAVICLE EXCISION Right 2019    Procedure: TOTAL SHOULDER REVERSE ARTHROPLASTY RIGHT REPAIR RIGHT AXILLARY VEIN;  Surgeon: Behzad Kelley MD;  [place] [facility] [place] [facility]  Service: Orthopedics    WRIST SURGERY Right 12/17/2014    x2  wrist replaced    WRIST SURGERY Right 2009       Family History   Problem Relation Age of Onset    Arthritis Mother     Colon cancer Mother     Arthritis Father     Cancer Father         Prostate cancer    Heart disease Sister     Arthritis Sister     Breast cancer Sister     Gout Sister     Hypertension Sister     Cancer Sister         All three breast cancer    Hypertension Brother     Heart disease Brother     Arthritis Brother     Heart attack Brother     Bone cancer Brother     Cancer Brother     Heart disease Brother     Malig Hyperthermia Neg Hx        Social History     Tobacco Use    Smoking status: Former     Current packs/day: 0.00     Average packs/day: 1 pack/day for 24.0 years (24.0 ttl pk-yrs)     Types: Cigarettes     Start date: 1960     Quit date: 1984     Years since quittin.3     Passive exposure: Never    Smokeless tobacco: Former     Types: Chew   Substance Use Topics    Alcohol use: Yes     Alcohol/week: 2.0 standard drinks of alcohol     Types: 2 Cans of beer per week     Comment: 2-3 TIMES A MONTH            Objective     Vitals:    25 1551   BP: 140/78   Pulse: 85   Resp: 17   Temp: 97 °F (36.1 °C)   SpO2: 96%     Body mass index is 37.3 kg/m².    Physical Exam  Vitals reviewed.   Constitutional:       Appearance: He is well-developed. He is not diaphoretic.   HENT:      Head: Normocephalic and atraumatic.   Eyes:      General: No scleral icterus.     Pupils: Pupils are equal, round, and reactive to light.   Neck:      Thyroid: No thyromegaly.    Cardiovascular:      Rate and Rhythm: Normal rate and regular rhythm.      Heart sounds: No murmur heard.     No friction rub. No gallop.   Pulmonary:      Effort: Pulmonary effort is normal. No respiratory distress.      Breath sounds: No wheezing or rales.   Chest:      Chest wall: No tenderness.   Abdominal:      General: Bowel sounds are normal. There is no distension.      Palpations: Abdomen is soft.      Tenderness: There is no abdominal tenderness.   Musculoskeletal:         General: No deformity. Normal range of motion.   Lymphadenopathy:      Cervical: No cervical adenopathy.   Skin:     General: Skin is warm and dry.      Findings: No rash.   Neurological:      Cranial Nerves: No cranial nerve deficit.      Motor: No abnormal muscle tone.         Lab Results   Component Value Date    GLUCOSE 250 (H) 01/21/2025    BUN 21 01/21/2025    CREATININE 1.19 01/21/2025    EGFRIFNONA 76 12/06/2021    EGFRIFAFRI 88 12/06/2021    BCR 17.6 01/21/2025    K 4.4 01/21/2025    CO2 26.9 01/21/2025    CALCIUM 9.2 01/21/2025    ALBUMIN 3.7 01/21/2025    AST 19 01/21/2025    ALT 15 01/21/2025       WBC   Date Value Ref Range Status   01/21/2025 6.19 3.40 - 10.80 10*3/mm3 Final     RBC   Date Value Ref Range Status   01/21/2025 4.68 4.14 - 5.80 10*6/mm3 Final     Hemoglobin   Date Value Ref Range Status   01/21/2025 13.4 13.0 - 17.7 g/dL Final   07/09/2024 12.1 (L) 13.0 - 17.7 g/dL Final     Hematocrit   Date Value Ref Range Status   01/21/2025 41.2 37.5 - 51.0 % Final   07/09/2024 36.8 (L) 37.5 - 51.0 % Final     MCV   Date Value Ref Range Status   01/21/2025 88.0 79.0 - 97.0 fL Final   07/09/2024 88.2 79.0 - 97.0 fL Final     MCH   Date Value Ref Range Status   01/21/2025 28.6 26.6 - 33.0 pg Final   07/09/2024 29.0 26.6 - 33.0 pg Final     MCHC   Date Value Ref Range Status   01/21/2025 32.5 31.5 - 35.7 g/dL Final   07/09/2024 32.9 31.5 - 35.7 g/dL Final     RDW   Date Value Ref Range Status   01/21/2025 13.2 12.3 - 15.4 %  Final   07/09/2024 12.9 12.3 - 15.4 % Final     RDW-SD   Date Value Ref Range Status   07/09/2024 41.4 37.0 - 54.0 fl Final     MPV   Date Value Ref Range Status   07/09/2024 10.7 6.0 - 12.0 fL Final     Platelets   Date Value Ref Range Status   01/21/2025 152 140 - 450 10*3/mm3 Final   07/09/2024 142 140 - 450 10*3/mm3 Final     Neutrophil Rel %   Date Value Ref Range Status   01/21/2025 56.1 42.7 - 76.0 % Final     Neutrophil %   Date Value Ref Range Status   07/09/2024 49.7 42.7 - 76.0 % Final     Lymphocyte Rel %   Date Value Ref Range Status   01/21/2025 30.9 19.6 - 45.3 % Final     Lymphocyte %   Date Value Ref Range Status   07/09/2024 34.4 19.6 - 45.3 % Final     Monocyte Rel %   Date Value Ref Range Status   01/21/2025 8.7 5.0 - 12.0 % Final     Monocyte %   Date Value Ref Range Status   07/09/2024 9.3 5.0 - 12.0 % Final     Eosinophil Rel %   Date Value Ref Range Status   01/21/2025 1.8 0.3 - 6.2 % Final     Eosinophil %   Date Value Ref Range Status   07/09/2024 3.2 0.3 - 6.2 % Final     Basophil Rel %   Date Value Ref Range Status   01/21/2025 1.0 0.0 - 1.5 % Final     Basophil %   Date Value Ref Range Status   07/09/2024 1.1 0.0 - 1.5 % Final     Immature Grans %   Date Value Ref Range Status   07/09/2024 2.3 (H) 0.0 - 0.5 % Final     Neutrophils Absolute   Date Value Ref Range Status   01/21/2025 3.48 1.70 - 7.00 10*3/mm3 Final     Neutrophils, Absolute   Date Value Ref Range Status   07/09/2024 2.36 1.70 - 7.00 10*3/mm3 Final     Lymphocytes Absolute   Date Value Ref Range Status   01/21/2025 1.91 0.70 - 3.10 10*3/mm3 Final     Lymphocytes, Absolute   Date Value Ref Range Status   07/09/2024 1.63 0.70 - 3.10 10*3/mm3 Final     Monocytes Absolute   Date Value Ref Range Status   01/21/2025 0.54 0.10 - 0.90 10*3/mm3 Final     Monocytes, Absolute   Date Value Ref Range Status   07/09/2024 0.44 0.10 - 0.90 10*3/mm3 Final     Eosinophils Absolute   Date Value Ref Range Status   01/21/2025 0.11 0.00 - 0.40  "10*3/mm3 Final     Eosinophils, Absolute   Date Value Ref Range Status   07/09/2024 0.15 0.00 - 0.40 10*3/mm3 Final     Basophils Absolute   Date Value Ref Range Status   01/21/2025 0.06 0.00 - 0.20 10*3/mm3 Final     Basophils, Absolute   Date Value Ref Range Status   07/09/2024 0.05 0.00 - 0.20 10*3/mm3 Final     Immature Grans, Absolute   Date Value Ref Range Status   07/09/2024 0.11 (H) 0.00 - 0.05 10*3/mm3 Final     nRBC   Date Value Ref Range Status   07/02/2024 0.0 0.0 - 0.2 /100 WBC Final       Lab Results   Component Value Date    HGBA1C 12.10 (H) 01/21/2025       Lab Results   Component Value Date    SMHAUYFA31 >2000 (H) 01/21/2025       TSH   Date Value Ref Range Status   01/21/2025 1.640 0.270 - 4.200 uIU/mL Final   07/06/2024 0.930 0.270 - 4.200 uIU/mL Final       No results found for: \"CHOL\"  Lab Results   Component Value Date    TRIG 351 (H) 01/21/2025     Lab Results   Component Value Date    HDL 28 (L) 01/21/2025     Lab Results   Component Value Date    LDL 41 01/21/2025     Lab Results   Component Value Date    VLDL 52 (H) 01/21/2025     No results found for: \"LDLHDL\"      Procedures    Assessment & Plan   Problems Addressed this Visit       Essential hypertension    Type 2 diabetes mellitus with hyperglycemia - Primary     Diagnoses         Codes Comments      Type 2 diabetes mellitus with hyperglycemia, with long-term current use of insulin    -  Primary ICD-10-CM: E11.65, Z79.4  ICD-9-CM: 250.00, 790.29, V58.67       Essential hypertension     ICD-10-CM: I10  ICD-9-CM: 401.9           DM2.  Uncontrolled.  Contnue Lantus 32 units a day.  Start mounjaro 2.5 mg a week.    HTN.  Controlled.  Continue losartan, amlo.  No orders of the defined types were placed in this encounter.      Current Outpatient Medications   Medication Sig Dispense Refill    amLODIPine (NORVASC) 2.5 MG tablet Take 1 tablet by mouth Daily. 90 tablet 3    aspirin 81 MG EC tablet Take 1 tablet by mouth Daily. Indications: " Coronary Bypass Surgery      atenolol (TENORMIN) 25 MG tablet TAKE 1/2 TABLET BY MOUTH DAILY 45 tablet 0    bisacodyl (Dulcolax) 5 MG EC tablet Take 1 tablet by mouth 2 (Two) Times a Day. Indications: Constipation      brimonidine (ALPHAGAN) 0.2 % ophthalmic solution Administer 1 drop to both eyes 2 (Two) Times a Day. Indications: Wide-Angle Glaucoma      Cyanocobalamin (VITAMIN B 12 PO) Take 1,000 mg by mouth Daily. HOLD PER SURGEON'S INSTRCUTIONS      dorzolamide-timolol (COSOPT) 22.3-6.8 MG/ML ophthalmic solution Administer 1 drop to both eyes 2 (Two) Times a Day. Indications: Wide-Angle Glaucoma      furosemide (LASIX) 40 MG tablet TAKE 1 TABLET BY MOUTH EVERY DAY  Indications: Edema 90 tablet 3    gabapentin (NEURONTIN) 600 MG tablet Take 1 tablet by mouth 3 (Three) Times a Day. 270 tablet 0    glucose blood (FREESTYLE LITE) test strip Check blood sugar  each 11    HYDROcodone-acetaminophen (NORCO) 7.5-325 MG per tablet Take 1 tablet by mouth Every 8 (Eight) Hours As Needed for Moderate Pain. 30 day supply. 90 tablet 0    Hydrocortisone, Perianal, (ANUSOL-HC) 2.5 % rectal cream APPLY RECTALLY THREE TIMES DAILY FOR 7-10 DAYS DURNG HEMORRHOID FLARE      Insulin Glargine (Lantus SoloStar) 100 UNIT/ML injection pen Inject 32 Units under the skin into the appropriate area as directed Daily. 30 mL 12    Insulin Pen Needle 32G X 4 MM misc Use to inject insulin under the skin via pens twice daily 100 each 0    ketoconazole (NIZORAL) 2 % cream Apply 1 application topically to the appropriate area as directed Daily. 60 g 3    Lancets (freestyle) lancets Check blood sugar  each 11    latanoprost (XALATAN) 0.005 % ophthalmic solution Administer 1 drop to both eyes Every Night. Indications: Wide-Angle Glaucoma      levothyroxine (SYNTHROID, LEVOTHROID) 88 MCG tablet TAKE 1 TABLET BY MOUTH EVERY MORNING 90 tablet 3    losartan (COZAAR) 100 MG tablet TAKE 1 TABLET BY MOUTH DAILY 90 tablet 1    metFORMIN  (GLUCOPHAGE) 500 MG tablet TAKE 1 TABLET BY MOUTH TWICE DAILY WITH MEALS 180 tablet 3    montelukast (SINGULAIR) 10 MG tablet TAKE 1 TABLET BY MOUTH EVERY NIGHT 90 tablet 3    pantoprazole (PROTONIX) 40 MG EC tablet TAKE 1 TABLET BY MOUTH TWICE DAILY FOR HEARTBURN 180 tablet 0    penicillin v potassium (VEETID) 250 MG tablet TAKE 1 TABLET BY MOUTH TWICE DAILY 60 tablet 5    pramipexole (MIRAPEX) 1.5 MG tablet Take 1 tablet by mouth 2 (Two) Times a Day. prn  Indications: Restless Leg Syndrome      Probiotic Product (Risaquad-2) capsule capsule Take 1 capsule by mouth Daily. PROBIOTIC      rosuvastatin (CRESTOR) 20 MG tablet TAKE 1 TABLET BY MOUTH EVERY EVENING 90 tablet 1    sennosides-docusate (Stimulant Laxative) 8.6-50 MG per tablet Take 2 tablets by mouth 2 (Two) Times a Day. 200 tablet 3     No current facility-administered medications for this visit.       Nathan Baez had no medications administered during this visit.    Return in about 4 months (around 9/6/2025).    There are no Patient Instructions on file for this visit.

## 2025-05-07 LAB
ALBUMIN/CREAT UR: 146 MG/G CREAT (ref 0–29)
CREAT UR-MCNC: 199.1 MG/DL
HBA1C MFR BLD: 8.4 % (ref 4.8–5.6)
MICROALBUMIN UR-MCNC: 290.2 UG/ML

## 2025-05-14 ENCOUNTER — TELEPHONE (OUTPATIENT)
Dept: FAMILY MEDICINE CLINIC | Facility: CLINIC | Age: 80
End: 2025-05-14
Payer: MEDICARE

## 2025-06-09 ENCOUNTER — OFFICE VISIT (OUTPATIENT)
Dept: PAIN MEDICINE | Facility: CLINIC | Age: 80
End: 2025-06-09
Payer: MEDICARE

## 2025-06-09 VITALS
HEIGHT: 67 IN | BODY MASS INDEX: 38.14 KG/M2 | TEMPERATURE: 98.4 F | SYSTOLIC BLOOD PRESSURE: 122 MMHG | HEART RATE: 92 BPM | DIASTOLIC BLOOD PRESSURE: 58 MMHG | WEIGHT: 243 LBS | OXYGEN SATURATION: 95 %

## 2025-06-09 DIAGNOSIS — M51.360 DEGENERATION OF INTERVERTEBRAL DISC OF LUMBAR REGION WITH DISCOGENIC BACK PAIN: ICD-10-CM

## 2025-06-09 DIAGNOSIS — M47.816 LUMBAR FACET ARTHROPATHY: ICD-10-CM

## 2025-06-09 DIAGNOSIS — M51.369 DDD (DEGENERATIVE DISC DISEASE), LUMBAR: ICD-10-CM

## 2025-06-09 DIAGNOSIS — Z79.899 HIGH RISK MEDICATION USE: ICD-10-CM

## 2025-06-09 DIAGNOSIS — E11.42 DIABETIC PERIPHERAL NEUROPATHY: ICD-10-CM

## 2025-06-09 DIAGNOSIS — R20.2 PARESTHESIA OF BILATERAL LEGS: Primary | ICD-10-CM

## 2025-06-09 LAB
POC AMPHETAMINES: NEGATIVE
POC BARBITURATES: NEGATIVE
POC BENZODIAZEPHINES: NEGATIVE
POC BUPRENORPHINE: NEGATIVE
POC COCAINE: NEGATIVE
POC METHADONE: NEGATIVE
POC METHAMPHETAMINE SCREEN URINE: NEGATIVE
POC OPIATES: POSITIVE
POC OXYCODONE: NEGATIVE
POC PHENCYCLIDINE: NEGATIVE
POC THC: NEGATIVE

## 2025-06-09 PROCEDURE — 80305 DRUG TEST PRSMV DIR OPT OBS: CPT

## 2025-06-09 PROCEDURE — 1125F AMNT PAIN NOTED PAIN PRSNT: CPT

## 2025-06-09 PROCEDURE — 3078F DIAST BP <80 MM HG: CPT

## 2025-06-09 PROCEDURE — 1159F MED LIST DOCD IN RCRD: CPT

## 2025-06-09 PROCEDURE — 99213 OFFICE O/P EST LOW 20 MIN: CPT

## 2025-06-09 PROCEDURE — 1160F RVW MEDS BY RX/DR IN RCRD: CPT

## 2025-06-09 PROCEDURE — 3074F SYST BP LT 130 MM HG: CPT

## 2025-06-09 RX ORDER — GABAPENTIN 600 MG/1
600 TABLET ORAL 3 TIMES DAILY
Qty: 270 TABLET | Refills: 0 | Status: SHIPPED | OUTPATIENT
Start: 2025-06-09

## 2025-06-09 RX ORDER — HYDROCODONE BITARTRATE AND ACETAMINOPHEN 7.5; 325 MG/1; MG/1
1 TABLET ORAL EVERY 8 HOURS PRN
Qty: 90 TABLET | Refills: 0 | Status: SHIPPED | OUTPATIENT
Start: 2025-06-09

## 2025-06-09 NOTE — PROGRESS NOTES
CHIEF COMPLAINT  Back pain     Subjective   Nathan Baez is a 80 y.o. male  who presents for follow-up.  He has a history of chronic back pain. He reports that his pain has slightly worsened since his last office visit.     Today pain is 6/10VAS in severity. He continues to complain of axial low back pain that radiates across his low back in a bandlike pattern. Denies radicular pain. Describes this pain as a nearly continuous ache. He has completed PT and continues with HEP as tolerated.       Continues with Hydrocodone 7.5mg 3/day and Gabapentin 600mg TID. He reports that this medication regimen allows him to be more active and rest better at night. He reports intermittent constipation that is managed with Ducolax and a stool softener. Denies any other side effects from the regimen including somnolence. Notes improvement in activity and function with regimen.      He underwent a transurethral resection with tumor biopsy on 5/14/25 - performed by Dr. Rust. He was prescribed Oxycodone following this procedure.      Procedures:  5/17/23 - Bilateral L3-L5 RFA with Dr. Houston - minimal relief     Procedures at [facility]:  5/3/22 - L4-L5 LESI  8/17/22 - L4-L5 LESI    Back Pain  Chronicity:  Chronic  Onset:  More than 1 year ago  Frequency:  Daily  Progression since onset:  Worsening  Pain location:  Lumbar spine  Pain quality:  Aching and burning  Radiates to:  Does not radiate  Pain-numeric:  5/10 and 6/10  Pain severity:  Moderate  Aggravated by:  Standing and sitting (walking)  Associated symptoms: weakness (bilateral hand)    Associated symptoms: no abdominal pain, no chest pain, no dysuria, no fever, no headaches and no numbness    Treatments tried:  Heat, ice, home exercises, NSAIDs, analgesics and bed rest  Improvement on treatment:  Moderate     PEG Assessment   What number best describes your pain on average in the past week?7  What number best describes how, during the past week, pain has interfered with your  "enjoyment of life?7  What number best describes how, during the past week, pain has interfered with your general activity?  5    Review of Pertinent Medical Data ---          The following portions of the patient's history were reviewed and updated as appropriate: allergies, current medications, past family history, past medical history, past social history, past surgical history, and problem list.    Review of Systems   Constitutional:  Negative for chills and fever.   Respiratory:  Positive for shortness of breath (mild and for several years). Negative for cough.    Cardiovascular:  Negative for chest pain.   Gastrointestinal:  Positive for constipation (intermittent). Negative for abdominal pain.   Genitourinary:  Negative for difficulty urinating and dysuria.   Musculoskeletal:  Positive for back pain and gait problem (ambulates with a cane).   Neurological:  Positive for weakness (bilateral hand). Negative for dizziness, light-headedness, numbness and headaches.     I have reviewed and confirmed the accuracy of the ROS as documented by the MA/LPN/RN ALY Robertson    Vitals:    06/09/25 0757   BP: 122/58   BP Location: Left arm   Patient Position: Sitting   Pulse: 92   Temp: 98.4 °F (36.9 °C)   TempSrc: Temporal   SpO2: 95%   Weight: 110 kg (243 lb)   Height: 170.2 cm (67.01\")   PainSc: 6    PainLoc: Back     Objective   Physical Exam  Constitutional:       Appearance: Normal appearance.   HENT:      Head: Normocephalic.   Cardiovascular:      Rate and Rhythm: Normal rate and regular rhythm.   Pulmonary:      Effort: Pulmonary effort is normal.      Breath sounds: Normal breath sounds.   Musculoskeletal:      Cervical back: Normal range of motion.      Lumbar back: Tenderness and bony tenderness present. Decreased range of motion. Positive left straight leg raise test.      Left hip: Tenderness present. Decreased range of motion.   Skin:     General: Skin is warm and dry.      Capillary Refill: " Capillary refill takes less than 2 seconds.   Neurological:      General: No focal deficit present.      Mental Status: He is alert and oriented to person, place, and time.      Gait: Gait abnormal (slowed, ambulates with rollator).   Psychiatric:         Mood and Affect: Mood normal.         Behavior: Behavior normal.         Thought Content: Thought content normal.         Cognition and Memory: Cognition normal.       Assessment & Plan   Diagnoses and all orders for this visit:    1. Paresthesia of bilateral legs (Primary)    2. DDD (degenerative disc disease), lumbar  -     HYDROcodone-acetaminophen (NORCO) 7.5-325 MG per tablet; Take 1 tablet by mouth Every 8 (Eight) Hours As Needed for Moderate Pain. 30 day supply.  Dispense: 90 tablet; Refill: 0    3. Degeneration of intervertebral disc of lumbar region with discogenic back pain  -     gabapentin (NEURONTIN) 600 MG tablet; Take 1 tablet by mouth 3 (Three) Times a Day.  Dispense: 270 tablet; Refill: 0    4. Lumbar facet arthropathy  -     gabapentin (NEURONTIN) 600 MG tablet; Take 1 tablet by mouth 3 (Three) Times a Day.  Dispense: 270 tablet; Refill: 0    5. Diabetic peripheral neuropathy  -     gabapentin (NEURONTIN) 600 MG tablet; Take 1 tablet by mouth 3 (Three) Times a Day.  Dispense: 270 tablet; Refill: 0    6. High risk medication use      Nathan Baez reports a pain score of 6.  Given his pain assessment as noted, treatment options were discussed and the following options were decided upon as a follow-up plan to address the patient's pain: continuation of current treatment plan for pain and prescription for opiod analgesics.    --- Routine UDS in office today as part of monitoring requirements for controlled substances.  The specimen was viewed and the immunoassay result reviewed and is +OPI.  This specimen will be sent to [facility] for confirmation.     --- The patient signed an updated copy of the controlled substance agreement on  3/10/25  --- ORT -- low risk  --- Continue Gabapenin. Refill sent to pharmacy.   --- Continue Hydrocodone. Patient verbalized understanding. Patient appears stable with current regimen. No adverse effects. Regarding continuation of opioids, there is no evidence of aberrant behavior or any red flags.  The patient continues with appropriate response to opioid therapy. ADL's remain intact by self.   --- Follow-up 2 months or sooner if needed     QIANA REPORT  As part of the patient's treatment plan, I am prescribing controlled substances. The patient has been made aware of appropriate use of such medications, including potential risk of somnolence, limited ability to drive and/or work safely, and the potential for dependence or overdose. It has also been made clear that these medications are for use by this patient only, without concomitant use of alcohol or other substances unless prescribed.     Patient has completed prescribing agreement detailing terms of continued prescribing of controlled substances, including monitoring QIANA reports, urine drug screening, and pill counts if necessary. The patient is aware that inappropriate use will results in cessation of prescribing such medications.    As the clinician, I personally reviewed the QIANA from 6/9/25 while the patient was in the office today.    History and physical exam exhibit continued safe and appropriate use of controlled substances.    Dictated utilizing Dragon dictation.

## 2025-06-11 DIAGNOSIS — K21.9 GASTROESOPHAGEAL REFLUX DISEASE WITHOUT ESOPHAGITIS: ICD-10-CM

## 2025-06-11 RX ORDER — ATENOLOL 25 MG/1
12.5 TABLET ORAL DAILY
Qty: 45 TABLET | Refills: 3 | Status: SHIPPED | OUTPATIENT
Start: 2025-06-11

## 2025-06-11 RX ORDER — PANTOPRAZOLE SODIUM 40 MG/1
TABLET, DELAYED RELEASE ORAL
Qty: 180 TABLET | Refills: 1 | Status: SHIPPED | OUTPATIENT
Start: 2025-06-11

## 2025-06-18 ENCOUNTER — TELEPHONE (OUTPATIENT)
Dept: FAMILY MEDICINE CLINIC | Facility: CLINIC | Age: 80
End: 2025-06-18

## 2025-06-18 ENCOUNTER — TELEPHONE (OUTPATIENT)
Dept: FAMILY MEDICINE CLINIC | Facility: CLINIC | Age: 80
End: 2025-06-18
Payer: MEDICARE

## 2025-06-18 DIAGNOSIS — E11.65 TYPE 2 DIABETES MELLITUS WITH HYPERGLYCEMIA, WITH LONG-TERM CURRENT USE OF INSULIN: Primary | ICD-10-CM

## 2025-06-18 DIAGNOSIS — Z79.4 TYPE 2 DIABETES MELLITUS WITH HYPERGLYCEMIA, WITH LONG-TERM CURRENT USE OF INSULIN: Primary | ICD-10-CM

## 2025-06-18 RX ORDER — TIRZEPATIDE 5 MG/.5ML
5 INJECTION, SOLUTION SUBCUTANEOUS WEEKLY
Qty: 2 ML | Refills: 1 | Status: SHIPPED | OUTPATIENT
Start: 2025-06-18

## 2025-06-18 NOTE — TELEPHONE ENCOUNTER
Caller: Coco Baez    Relationship: Emergency Contact    Best call back number: [phone]    What medication are you requesting: MONJUARO 5 MG    If a prescription is needed, what is your preferred pharmacy and phone number:  [id number] - [place], [place] - [address] AT [facility] - [phone]  - [phone]  [phone]     Additional notes: PATIENT'S WIFE STATES THAT THE PATIENT HANDLED THE 2.5 MG WELL, AND DR. LYLES WANTED TO MOVE HIM TO THE NEXT DOSAGE IF HE DID.

## 2025-06-18 NOTE — TELEPHONE ENCOUNTER
OKAY FOR HUB TO RELAY     I tried calling and speaking with the patient but did not get an answer. I left a brief message letting him know I was calling over his mounjaro prescription and for him to call back. If he calls back:     We just wanted to let you know Dr Pierce is not in the office today but one of our other providers is going to take a look at this and see if she can send it

## 2025-06-18 NOTE — TELEPHONE ENCOUNTER
OKAY FOR HUB TO RELAY    I tried calling and speaking with the patient but did not get an answer. I left a brief message letting him know I was calling over his mounjaro prescription and for him to call back. If he calls back:    We just wanted to let you know Dr Pierce is not in the office today but one of our other providers is going to take a look at this and see if she can send it in.

## 2025-06-18 NOTE — TELEPHONE ENCOUNTER
Spoke with patient over the phone; patient has been tolerating Mounjaro 2.5 mg weekly without problem; blood sugars have been running 180s; patient states Dr. Pierce had recommended he increase to 5 mg if 2.5 mg dose tolerated; will send Rx for Mounjaro 5 mg weekly; instructed to follow-up as scheduled.

## 2025-07-08 DIAGNOSIS — M51.369 DDD (DEGENERATIVE DISC DISEASE), LUMBAR: ICD-10-CM

## 2025-07-08 DIAGNOSIS — E78.5 HYPERLIPIDEMIA, UNSPECIFIED HYPERLIPIDEMIA TYPE: ICD-10-CM

## 2025-07-08 DIAGNOSIS — I10 ESSENTIAL HYPERTENSION: ICD-10-CM

## 2025-07-08 RX ORDER — LOSARTAN POTASSIUM 100 MG/1
100 TABLET ORAL DAILY
Qty: 90 TABLET | Refills: 1 | Status: SHIPPED | OUTPATIENT
Start: 2025-07-08

## 2025-07-08 RX ORDER — ROSUVASTATIN CALCIUM 20 MG/1
20 TABLET, COATED ORAL EVERY EVENING
Qty: 90 TABLET | Refills: 1 | Status: SHIPPED | OUTPATIENT
Start: 2025-07-08

## 2025-07-08 NOTE — TELEPHONE ENCOUNTER
Caller: Coco Baez    Relationship: Emergency Contact    Best call back number: [phone]    Requested Prescriptions:   Requested Prescriptions     Pending Prescriptions Disp Refills    HYDROcodone-acetaminophen (NORCO) 7.5-325 MG per tablet 90 tablet 0     Sig: Take 1 tablet by mouth Every 8 (Eight) Hours As Needed for Moderate Pain. 30 day supply.        Pharmacy where request should be sent: [id number] [address] [place] [address] [address] AT [facility] [phone] [facility] [phone]      Last office visit with prescribing clinician: 6/9/2025   Last telemedicine visit with prescribing clinician: Visit date not found   Next office visit with prescribing clinician: 8/8/2025     Additional details provided by patient: HAS 2 DAYS LEFT OF MEDS     Does the patient have less than a 3 day supply:  [x] Yes  [] No    Margy Moore   07/08/25 09:01 EDT

## 2025-07-08 NOTE — TELEPHONE ENCOUNTER
LOV                5/6/25  NOV       9/30/25              Last refill     1/6/25          Protocol          met

## 2025-07-09 RX ORDER — AMLODIPINE BESYLATE 2.5 MG/1
2.5 TABLET ORAL DAILY
Qty: 90 TABLET | Refills: 0 | Status: SHIPPED | OUTPATIENT
Start: 2025-07-09

## 2025-07-10 RX ORDER — HYDROCODONE BITARTRATE AND ACETAMINOPHEN 7.5; 325 MG/1; MG/1
1 TABLET ORAL EVERY 8 HOURS PRN
Qty: 90 TABLET | Refills: 0 | Status: SHIPPED | OUTPATIENT
Start: 2025-07-10

## 2025-08-08 ENCOUNTER — OFFICE VISIT (OUTPATIENT)
Dept: PAIN MEDICINE | Facility: CLINIC | Age: 80
End: 2025-08-08
Payer: MEDICARE

## 2025-08-08 VITALS
SYSTOLIC BLOOD PRESSURE: 118 MMHG | DIASTOLIC BLOOD PRESSURE: 70 MMHG | OXYGEN SATURATION: 93 % | WEIGHT: 239 LBS | TEMPERATURE: 97.8 F | BODY MASS INDEX: 37.51 KG/M2 | RESPIRATION RATE: 18 BRPM | HEIGHT: 67 IN

## 2025-08-08 DIAGNOSIS — Z79.899 HIGH RISK MEDICATION USE: ICD-10-CM

## 2025-08-08 DIAGNOSIS — M47.816 LUMBAR FACET ARTHROPATHY: ICD-10-CM

## 2025-08-08 DIAGNOSIS — R20.2 PARESTHESIA OF BILATERAL LEGS: ICD-10-CM

## 2025-08-08 DIAGNOSIS — M51.360 DEGENERATION OF INTERVERTEBRAL DISC OF LUMBAR REGION WITH DISCOGENIC BACK PAIN: Primary | ICD-10-CM

## 2025-08-08 DIAGNOSIS — M51.369 DDD (DEGENERATIVE DISC DISEASE), LUMBAR: ICD-10-CM

## 2025-08-08 DIAGNOSIS — E11.42 DIABETIC PERIPHERAL NEUROPATHY: ICD-10-CM

## 2025-08-08 PROCEDURE — 99213 OFFICE O/P EST LOW 20 MIN: CPT

## 2025-08-08 PROCEDURE — 3074F SYST BP LT 130 MM HG: CPT

## 2025-08-08 PROCEDURE — 3078F DIAST BP <80 MM HG: CPT

## 2025-08-08 PROCEDURE — 1160F RVW MEDS BY RX/DR IN RCRD: CPT

## 2025-08-08 PROCEDURE — 1125F AMNT PAIN NOTED PAIN PRSNT: CPT

## 2025-08-08 PROCEDURE — 1159F MED LIST DOCD IN RCRD: CPT

## 2025-08-08 RX ORDER — GABAPENTIN 300 MG/1
300 CAPSULE ORAL
COMMUNITY
Start: 2025-07-29 | End: 2026-01-25

## 2025-08-08 RX ORDER — HYDROCODONE BITARTRATE AND ACETAMINOPHEN 7.5; 325 MG/1; MG/1
1 TABLET ORAL EVERY 8 HOURS PRN
Qty: 90 TABLET | Refills: 0 | Status: SHIPPED | OUTPATIENT
Start: 2025-08-08

## 2025-08-12 DIAGNOSIS — Z79.4 TYPE 2 DIABETES MELLITUS WITH HYPERGLYCEMIA, WITH LONG-TERM CURRENT USE OF INSULIN: ICD-10-CM

## 2025-08-12 DIAGNOSIS — E11.65 TYPE 2 DIABETES MELLITUS WITH HYPERGLYCEMIA, WITH LONG-TERM CURRENT USE OF INSULIN: ICD-10-CM

## 2025-08-13 RX ORDER — TIRZEPATIDE 5 MG/.5ML
INJECTION, SOLUTION SUBCUTANEOUS
Qty: 2 ML | Refills: 0 | Status: SHIPPED | OUTPATIENT
Start: 2025-08-13

## 2025-08-22 RX ORDER — LEVOTHYROXINE SODIUM 88 UG/1
88 TABLET ORAL EVERY MORNING
Qty: 90 TABLET | Refills: 0 | Status: SHIPPED | OUTPATIENT
Start: 2025-08-22

## (undated) DEVICE — LN SMPL CO2 SHTRM SD STREAM W/M LUER

## (undated) DEVICE — GLV SURG TRIUMPH PF LTX 7.5 STRL

## (undated) DEVICE — GLV SURG BIOGEL LTX PF 8 1/2

## (undated) DEVICE — TUBING, SUCTION, 1/4" X 10', STRAIGHT: Brand: MEDLINE

## (undated) DEVICE — SKIN PREP TRAY W/CHG: Brand: MEDLINE INDUSTRIES, INC.

## (undated) DEVICE — DISPOSABLE BIPOLAR FORCEPS 4" (10.2CM) JEWELERS, STRAIGHT 0.4MM TIP AND 12 FT. (3.6M) CABLE: Brand: KIRWAN

## (undated) DEVICE — UNDRGLV SURG BIOGEL PUNCTUREINDICATION SZ7 PF STRL

## (undated) DEVICE — NDL SPINE 22G 31/2IN BLK

## (undated) DEVICE — DRSNG WND GZ PAD BORDERED 4X8IN STRL

## (undated) DEVICE — HEWSON SUTURE RETRIEVER: Brand: HEWSON SUTURE RETRIEVER

## (undated) DEVICE — STERILE TUBING EXTENSION, SINGLE, QUICK CONNECT

## (undated) DEVICE — SPNG LAP 18X18IN LF STRL PK/5

## (undated) DEVICE — DRAPE,REIN 53X77,STERILE: Brand: MEDLINE

## (undated) DEVICE — INTENDED FOR TISSUE SEPARATION, AND OTHER PROCEDURES THAT REQUIRE A SHARP SURGICAL BLADE TO PUNCTURE OR CUT.: Brand: BARD-PARKER ® CARBON RIB-BACK BLADES

## (undated) DEVICE — SUT MNCRYL 3/0 RB1 27IN UD Y215H

## (undated) DEVICE — SUT TEVDEX 5 K61 30IN 79745

## (undated) DEVICE — THE TORRENT IRRIGATION SCOPE CONNECTOR IS USED WITH THE TORRENT IRRIGATION TUBING TO PROVIDE IRRIGATION FLUIDS SUCH AS STERILE WATER DURING GASTROINTESTINAL ENDOSCOPIC PROCEDURES WHEN USED IN CONJUNCTION WITH AN IRRIGATION PUMP (OR ELECTROSURGICAL UNIT).: Brand: TORRENT

## (undated) DEVICE — SCANLAN® SUTURE BOOT™ INSTRUMENT JAW COVERS - ORIGINAL YELLOW, STANDARD PKG (5 PAIR/CARTRIDGE, 1 CARTRIDGE/PKG): Brand: SCANLAN® SUTURE BOOT™ INSTRUMENT JAW COVERS

## (undated) DEVICE — SUT VIC 0 CT1 36IN J946H

## (undated) DEVICE — SUT MNCRYL 3/0 PS2 18IN MCP497G

## (undated) DEVICE — ARM SLING: Brand: DEROYAL

## (undated) DEVICE — COAXIAL HIGH FLOW TIP WITH SOFT SHIELD

## (undated) DEVICE — DRAPE,U/ SHT,SPLIT,PLAS,STERIL: Brand: MEDLINE

## (undated) DEVICE — SNAR POLYP SENSATION STDOVL 27 240 BX40

## (undated) DEVICE — KT ORCA ORCAPOD DISP STRL

## (undated) DEVICE — NDL SPINE 22G 5IN BLK

## (undated) DEVICE — GLV SURG BIOGEL LTX PF 7

## (undated) DEVICE — 3M™ IOBAN™ 2 ANTIMICROBIAL INCISE DRAPE 6648EZ: Brand: IOBAN™ 2

## (undated) DEVICE — SUT PROLN 5/0 C1 D/A 36IN 8720H

## (undated) DEVICE — KWIRE EQUINOXE GLENOID NITNL 2X190MM NS
Type: IMPLANTABLE DEVICE | Site: SHOULDER | Status: NON-FUNCTIONAL
Removed: 2019-11-13

## (undated) DEVICE — NEEDLE, QUINCKE, 20GX3.5": Brand: MEDLINE

## (undated) DEVICE — SENSR O2 OXIMAX FNGR A/ 18IN NONSTR

## (undated) DEVICE — STPLR SKIN VISISTAT WD 35CT

## (undated) DEVICE — THE SINGLE USE ETRAP – POLYP TRAP IS USED FOR SUCTION RETRIEVAL OF ENDOSCOPICALLY REMOVED POLYPS.: Brand: ETRAP

## (undated) DEVICE — GLV SURG BIOGEL LTX PF 7 1/2

## (undated) DEVICE — HANDPIECE SET WITH COAXIAL HIGH FLOW TIP AND SUCTION TUBE: Brand: INTERPULSE

## (undated) DEVICE — VESSEL LOOPS X-RAY DETECTABLE: Brand: DEROYAL

## (undated) DEVICE — ANTIBACTERIAL UNDYED BRAIDED (POLYGLACTIN 910), SYNTHETIC ABSORBABLE SUTURE: Brand: COATED VICRYL

## (undated) DEVICE — GLV SURG TRIUMPH PF LTX 7 STRL

## (undated) DEVICE — SUT VIC 2/0 X1 27IN J459H

## (undated) DEVICE — PK SHLDR OPN 40

## (undated) DEVICE — GAUZE,SPONGE,4"X4",16PLY,XRAY,STRL,LF: Brand: MEDLINE

## (undated) DEVICE — SOL ISO/ALC 70PCT 4OZ

## (undated) DEVICE — SUT PROLN 4/0 PS2 18IN BLU

## (undated) DEVICE — TOWEL,OR,DSP,ST,BLUE,STD,4/PK,20PK/CS: Brand: MEDLINE

## (undated) DEVICE — POOLE SUCTION HANDLE: Brand: CARDINAL HEALTH

## (undated) DEVICE — DRSNG GZ PETROLTM XEROFORM CURAD 1X8IN STRL

## (undated) DEVICE — CANN O2 ETCO2 FITS ALL CONN CO2 SMPL A/ 7IN DISP LF

## (undated) DEVICE — ZIP 16 SURGICAL SKIN CLOSURE DEVICE, PSA: Brand: ZIP 16 SURGICAL SKIN CLOSURE DEVICE

## (undated) DEVICE — WEBRIL COTTON UNDERCAST PADDING: Brand: WEBRIL

## (undated) DEVICE — BNDG,ELSTC,MATRIX,STRL,3"X5YD,LF,HOOK&LP: Brand: MEDLINE

## (undated) DEVICE — APPL CHLORAPREP HI/LITE 26ML ORNG

## (undated) DEVICE — RECIPROCATING BLADE HEAVY DUTY LONG, OFFSET  (77.6 X 0.77 X 11.2MM)

## (undated) DEVICE — RUBBERBAND LF STRL PK/2

## (undated) DEVICE — ADAPT CLN BIOGUARD AIR/H2O DISP

## (undated) DEVICE — SYR LUERLOK 30CC

## (undated) DEVICE — BITEBLOCK OMNI BLOC

## (undated) DEVICE — GLV SURG PREMIERPRO ORTHO LTX PF SZ7.5 BRN

## (undated) DEVICE — BIT DRL EQUINOXE 2 AND 3.2MM

## (undated) DEVICE — SUT ETHIB 2 CV V37 MS/4 30IN MX69G

## (undated) DEVICE — FRCP BX RADJAW4 NDL 2.8 240CM LG OG BX40

## (undated) DEVICE — DRESSING,GAUZE,XEROFORM,CURAD,1"X8",ST: Brand: CURAD

## (undated) DEVICE — EPIDURAL TRAY: Brand: MEDLINE INDUSTRIES, INC.

## (undated) DEVICE — 2108 SERIES SAGITTAL BLADE (19.5 X 1.27 X 81.0MM)

## (undated) DEVICE — PK ATS CUST W CARDIOTOMY RESEVOIR

## (undated) DEVICE — DRAPE,HAND,STERILE: Brand: MEDLINE

## (undated) DEVICE — PENCL ES MEGADINE EZ/CLEAN BUTN W/HOLSTR 10FT

## (undated) DEVICE — PADDING,UNDERCAST,COTTON, 3X4YD STERILE: Brand: MEDLINE

## (undated) DEVICE — DECANT BG O JET

## (undated) DEVICE — Device

## (undated) DEVICE — CANN NASL O2 INF

## (undated) DEVICE — SUT PROLN 4/0 RB1 D/A 36IN 8557H

## (undated) DEVICE — WEBRIL* CAST PADDING: Brand: DEROYAL

## (undated) DEVICE — PILLW ABD SM

## (undated) DEVICE — PK HIP TOTL 40

## (undated) DEVICE — SPNG GZ WOVN 4X4IN 12PLY 10/BX STRL

## (undated) DEVICE — GLV SURG BIOGEL LTX PF 6 1/2

## (undated) DEVICE — GLV SURG BIOGEL LTX PF 8

## (undated) DEVICE — MAT FLR ABSORBENT LG 4FT 10 2.5FT

## (undated) DEVICE — PAD GRND E/S NT200IX RF/GEN W/CABL DISP

## (undated) DEVICE — SHEET, DRAPE, SPLIT, STERILE: Brand: MEDLINE

## (undated) DEVICE — DISPOSABLE TOURNIQUET CUFF SINGLE BLADDER, SINGLE PORT AND QUICK CONNECT CONNECTOR: Brand: COLOR CUFF

## (undated) DEVICE — BLOOD TRANSFUSION FILTER: Brand: HAEMONETICS

## (undated) DEVICE — BNDG,ELSTC,MATRIX,STRL,2"X5YD,LF,HOOK&LP: Brand: MEDLINE

## (undated) DEVICE — PK ORTHO MINOR TOWER 40

## (undated) DEVICE — SUT PROLN SURGILENE 5.0 24IN BLU 9702H